# Patient Record
Sex: FEMALE | Race: WHITE | NOT HISPANIC OR LATINO | Employment: OTHER | URBAN - METROPOLITAN AREA
[De-identification: names, ages, dates, MRNs, and addresses within clinical notes are randomized per-mention and may not be internally consistent; named-entity substitution may affect disease eponyms.]

---

## 2017-01-06 ENCOUNTER — GENERIC CONVERSION - ENCOUNTER (OUTPATIENT)
Dept: OTHER | Facility: OTHER | Age: 47
End: 2017-01-06

## 2017-01-12 ENCOUNTER — HOSPITAL ENCOUNTER (OUTPATIENT)
Dept: RADIOLOGY | Facility: HOSPITAL | Age: 47
Discharge: HOME | End: 2017-01-12
Attending: INTERNAL MEDICINE
Payer: COMMERCIAL

## 2017-01-12 ENCOUNTER — GENERIC CONVERSION - ENCOUNTER (OUTPATIENT)
Dept: OTHER | Facility: OTHER | Age: 47
End: 2017-01-12

## 2017-01-12 DIAGNOSIS — R13.19 CONSTANT LOW-GRADE DYSPHAGIA: ICD-10-CM

## 2017-01-12 PROCEDURE — 74230 X-RAY XM SWLNG FUNCJ C+: CPT

## 2017-01-12 PROCEDURE — G8996 SWALLOW CURRENT STATUS: HCPCS

## 2017-01-12 PROCEDURE — G8997 SWALLOW GOAL STATUS: HCPCS

## 2017-01-12 PROCEDURE — G8998 SWALLOW D/C STATUS: HCPCS

## 2017-01-12 PROCEDURE — 92611 MOTION FLUOROSCOPY/SWALLOW: CPT

## 2017-01-30 ENCOUNTER — ALLSCRIPTS OFFICE VISIT (OUTPATIENT)
Dept: OTHER | Facility: OTHER | Age: 47
End: 2017-01-30

## 2017-02-16 ENCOUNTER — ALLSCRIPTS OFFICE VISIT (OUTPATIENT)
Dept: OTHER | Facility: OTHER | Age: 47
End: 2017-02-16

## 2017-02-17 ENCOUNTER — ALLSCRIPTS OFFICE VISIT (OUTPATIENT)
Dept: OTHER | Facility: OTHER | Age: 47
End: 2017-02-17

## 2017-02-17 LAB — HBA1C MFR BLD HPLC: 9.5 %

## 2017-02-20 ENCOUNTER — TRANSCRIBE ORDERS (OUTPATIENT)
Dept: LAB | Facility: HOSPITAL | Age: 47
End: 2017-02-20

## 2017-03-17 ENCOUNTER — ALLSCRIPTS OFFICE VISIT (OUTPATIENT)
Dept: OTHER | Facility: OTHER | Age: 47
End: 2017-03-17

## 2017-03-24 ENCOUNTER — ALLSCRIPTS OFFICE VISIT (OUTPATIENT)
Dept: RADIOLOGY | Facility: CLINIC | Age: 47
End: 2017-03-24
Payer: COMMERCIAL

## 2017-03-24 PROCEDURE — 77002 NEEDLE LOCALIZATION BY XRAY: CPT | Performed by: ANESTHESIOLOGY

## 2017-03-30 ENCOUNTER — ALLSCRIPTS OFFICE VISIT (OUTPATIENT)
Dept: OTHER | Facility: OTHER | Age: 47
End: 2017-03-30

## 2017-04-12 ENCOUNTER — GENERIC CONVERSION - ENCOUNTER (OUTPATIENT)
Dept: OTHER | Facility: OTHER | Age: 47
End: 2017-04-12

## 2017-04-18 ENCOUNTER — GENERIC CONVERSION - ENCOUNTER (OUTPATIENT)
Dept: OTHER | Facility: OTHER | Age: 47
End: 2017-04-18

## 2017-04-19 ENCOUNTER — ALLSCRIPTS OFFICE VISIT (OUTPATIENT)
Dept: OTHER | Facility: OTHER | Age: 47
End: 2017-04-19

## 2017-04-25 ENCOUNTER — GENERIC CONVERSION - ENCOUNTER (OUTPATIENT)
Dept: OTHER | Facility: OTHER | Age: 47
End: 2017-04-25

## 2017-05-09 ENCOUNTER — ALLSCRIPTS OFFICE VISIT (OUTPATIENT)
Dept: OTHER | Facility: OTHER | Age: 47
End: 2017-05-09

## 2017-05-12 ENCOUNTER — ALLSCRIPTS OFFICE VISIT (OUTPATIENT)
Dept: RADIOLOGY | Facility: CLINIC | Age: 47
End: 2017-05-12
Payer: COMMERCIAL

## 2017-05-25 ENCOUNTER — GENERIC CONVERSION - ENCOUNTER (OUTPATIENT)
Dept: OTHER | Facility: OTHER | Age: 47
End: 2017-05-25

## 2017-06-21 ENCOUNTER — GENERIC CONVERSION - ENCOUNTER (OUTPATIENT)
Dept: OTHER | Facility: OTHER | Age: 47
End: 2017-06-21

## 2017-06-21 ENCOUNTER — ALLSCRIPTS OFFICE VISIT (OUTPATIENT)
Dept: OTHER | Facility: OTHER | Age: 47
End: 2017-06-21

## 2017-06-21 DIAGNOSIS — E53.8 DEFICIENCY OF OTHER SPECIFIED B GROUP VITAMINS: ICD-10-CM

## 2017-06-21 DIAGNOSIS — R20.2 PARESTHESIA OF SKIN: ICD-10-CM

## 2017-06-21 DIAGNOSIS — E55.9 VITAMIN D DEFICIENCY: ICD-10-CM

## 2017-07-05 ENCOUNTER — ALLSCRIPTS OFFICE VISIT (OUTPATIENT)
Dept: OTHER | Facility: OTHER | Age: 47
End: 2017-07-05

## 2017-07-19 ENCOUNTER — GENERIC CONVERSION - ENCOUNTER (OUTPATIENT)
Dept: OTHER | Facility: OTHER | Age: 47
End: 2017-07-19

## 2017-07-19 ENCOUNTER — ALLSCRIPTS OFFICE VISIT (OUTPATIENT)
Dept: OTHER | Facility: OTHER | Age: 47
End: 2017-07-19

## 2017-07-25 ENCOUNTER — GENERIC CONVERSION - ENCOUNTER (OUTPATIENT)
Dept: OTHER | Facility: OTHER | Age: 47
End: 2017-07-25

## 2017-08-18 ENCOUNTER — ALLSCRIPTS OFFICE VISIT (OUTPATIENT)
Dept: RADIOLOGY | Facility: CLINIC | Age: 47
End: 2017-08-18
Payer: COMMERCIAL

## 2017-08-24 ENCOUNTER — APPOINTMENT (EMERGENCY)
Dept: RADIOLOGY | Facility: HOSPITAL | Age: 47
DRG: 438 | End: 2017-08-24
Payer: MEDICARE

## 2017-08-24 ENCOUNTER — HOSPITAL ENCOUNTER (INPATIENT)
Facility: HOSPITAL | Age: 47
LOS: 8 days | Discharge: HOME | DRG: 438 | End: 2017-09-02
Attending: EMERGENCY MEDICINE | Admitting: EMERGENCY MEDICINE
Payer: MEDICARE

## 2017-08-24 DIAGNOSIS — K29.70 GASTRITIS: ICD-10-CM

## 2017-08-24 DIAGNOSIS — E66.9 DIABETES MELLITUS TYPE 2 IN OBESE (HCC): Chronic | ICD-10-CM

## 2017-08-24 DIAGNOSIS — E11.10 DKA (DIABETIC KETOACIDOSES): Primary | ICD-10-CM

## 2017-08-24 DIAGNOSIS — K85.90 PANCREATITIS: ICD-10-CM

## 2017-08-24 DIAGNOSIS — E11.69 DIABETES MELLITUS TYPE 2 IN OBESE (HCC): Chronic | ICD-10-CM

## 2017-08-24 LAB
ACETONE SERPL-MCNC: ABNORMAL MG/DL
ALBUMIN SERPL BCP-MCNC: 4.1 G/DL (ref 3.5–5)
ALP SERPL-CCNC: 227 U/L (ref 46–116)
ALT SERPL W P-5'-P-CCNC: 42 U/L (ref 12–78)
ANION GAP BLD CALC-SCNC: 20 MMOL/L (ref 4–13)
ANION GAP SERPL CALCULATED.3IONS-SCNC: 21 MMOL/L (ref 4–13)
AST SERPL W P-5'-P-CCNC: 41 U/L (ref 5–45)
BACTERIA UR QL AUTO: NORMAL /HPF
BASE EXCESS BLDA CALC-SCNC: -11 MMOL/L (ref -2–3)
BASOPHILS # BLD AUTO: 0.04 THOUSANDS/ΜL (ref 0–0.1)
BASOPHILS NFR BLD AUTO: 0 % (ref 0–1)
BILIRUB SERPL-MCNC: 0.94 MG/DL (ref 0.2–1)
BILIRUB UR QL STRIP: NEGATIVE
BUN BLD-MCNC: 9 MG/DL (ref 5–25)
BUN SERPL-MCNC: 11 MG/DL (ref 5–25)
CA-I BLD-SCNC: 1.05 MMOL/L (ref 1.12–1.32)
CA-I BLD-SCNC: 1.19 MMOL/L (ref 1.12–1.32)
CALCIUM SERPL-MCNC: 9.5 MG/DL (ref 8.3–10.1)
CHLORIDE BLD-SCNC: 107 MMOL/L (ref 100–108)
CHLORIDE SERPL-SCNC: 97 MMOL/L (ref 100–108)
CLARITY UR: CLEAR
CO2 SERPL-SCNC: 13 MMOL/L (ref 21–32)
COLOR UR: YELLOW
COLOR, POC: NORMAL
CREAT BLD-MCNC: 0.3 MG/DL (ref 0.6–1.3)
CREAT SERPL-MCNC: 0.64 MG/DL (ref 0.6–1.3)
EOSINOPHIL # BLD AUTO: 0.02 THOUSAND/ΜL (ref 0–0.61)
EOSINOPHIL NFR BLD AUTO: 0 % (ref 0–6)
ERYTHROCYTE [DISTWIDTH] IN BLOOD BY AUTOMATED COUNT: 12.4 % (ref 11.6–15.1)
GFR SERPL CREATININE-BSD FRML MDRD: 107 ML/MIN/1.73SQ M
GFR SERPL CREATININE-BSD FRML MDRD: 138 ML/MIN/1.73SQ M
GLUCOSE SERPL-MCNC: 229 MG/DL (ref 65–140)
GLUCOSE SERPL-MCNC: 281 MG/DL (ref 65–140)
GLUCOSE SERPL-MCNC: 283 MG/DL (ref 65–140)
GLUCOSE SERPL-MCNC: 316 MG/DL (ref 65–140)
GLUCOSE SERPL-MCNC: 369 MG/DL (ref 65–140)
GLUCOSE SERPL-MCNC: 389 MG/DL (ref 65–140)
GLUCOSE UR STRIP-MCNC: ABNORMAL MG/DL
HCG UR QL: NEGATIVE
HCO3 BLDA-SCNC: 14.3 MMOL/L (ref 24–30)
HCT VFR BLD AUTO: 48.3 % (ref 34.8–46.1)
HCT VFR BLD CALC: 43 % (ref 34.8–46.1)
HCT VFR BLD CALC: 52 % (ref 34.8–46.1)
HGB BLD-MCNC: 17.1 G/DL (ref 11.5–15.4)
HGB BLDA-MCNC: 14.6 G/DL (ref 11.5–15.4)
HGB BLDA-MCNC: 17.7 G/DL (ref 11.5–15.4)
HGB UR QL STRIP.AUTO: ABNORMAL
HYALINE CASTS #/AREA URNS LPF: NORMAL /LPF
KETONES UR STRIP-MCNC: ABNORMAL MG/DL
LEUKOCYTE ESTERASE UR QL STRIP: NEGATIVE
LIPASE SERPL-CCNC: 3824 U/L (ref 73–393)
LYMPHOCYTES # BLD AUTO: 1.5 THOUSANDS/ΜL (ref 0.6–4.47)
LYMPHOCYTES NFR BLD AUTO: 10 % (ref 14–44)
MAGNESIUM SERPL-MCNC: 2.1 MG/DL (ref 1.6–2.6)
MCH RBC QN AUTO: 34.1 PG (ref 26.8–34.3)
MCHC RBC AUTO-ENTMCNC: 35.4 G/DL (ref 31.4–37.4)
MCV RBC AUTO: 96 FL (ref 82–98)
MONOCYTES # BLD AUTO: 0.83 THOUSAND/ΜL (ref 0.17–1.22)
MONOCYTES NFR BLD AUTO: 6 % (ref 4–12)
NEUTROPHILS # BLD AUTO: 12.18 THOUSANDS/ΜL (ref 1.85–7.62)
NEUTS SEG NFR BLD AUTO: 84 % (ref 43–75)
NITRITE UR QL STRIP: NEGATIVE
NON-SQ EPI CELLS URNS QL MICRO: NORMAL /HPF
NRBC BLD AUTO-RTO: 0 /100 WBCS
PCO2 BLD: 15 MMOL/L (ref 21–32)
PCO2 BLD: 15 MMOL/L (ref 21–32)
PCO2 BLD: 30.4 MM HG (ref 42–50)
PH BLD: 7.28 [PH] (ref 7.3–7.4)
PH UR STRIP.AUTO: 5 [PH] (ref 4.5–8)
PHOSPHATE SERPL-MCNC: 4.4 MG/DL (ref 2.7–4.5)
PLATELET # BLD AUTO: 281 THOUSANDS/UL (ref 149–390)
PMV BLD AUTO: 10.8 FL (ref 8.9–12.7)
PO2 BLD: 58 MM HG (ref 35–45)
POTASSIUM BLD-SCNC: 4.2 MMOL/L (ref 3.5–5.3)
POTASSIUM BLD-SCNC: 5.1 MMOL/L (ref 3.5–5.3)
POTASSIUM SERPL-SCNC: 3.8 MMOL/L (ref 3.5–5.3)
PROT SERPL-MCNC: 9 G/DL (ref 6.4–8.2)
PROT UR STRIP-MCNC: ABNORMAL MG/DL
RBC # BLD AUTO: 5.02 MILLION/UL (ref 3.81–5.12)
RBC #/AREA URNS AUTO: NORMAL /HPF
SAO2 % BLD FROM PO2: 86 % (ref 95–98)
SODIUM BLD-SCNC: 133 MMOL/L (ref 136–145)
SODIUM BLD-SCNC: 137 MMOL/L (ref 136–145)
SODIUM SERPL-SCNC: 131 MMOL/L (ref 136–145)
SP GR UR STRIP.AUTO: 1.01 (ref 1–1.03)
SPECIMEN SOURCE: ABNORMAL
SPECIMEN SOURCE: ABNORMAL
SPECIMEN SOURCE: NORMAL
TROPONIN I BLD-MCNC: 0 NG/ML (ref 0–0.08)
UROBILINOGEN UR QL STRIP.AUTO: 0.2 E.U./DL
WBC # BLD AUTO: 14.62 THOUSAND/UL (ref 4.31–10.16)
WBC #/AREA URNS AUTO: NORMAL /HPF

## 2017-08-24 PROCEDURE — 96367 TX/PROPH/DG ADDL SEQ IV INF: CPT

## 2017-08-24 PROCEDURE — 96365 THER/PROPH/DIAG IV INF INIT: CPT

## 2017-08-24 PROCEDURE — 81025 URINE PREGNANCY TEST: CPT | Performed by: EMERGENCY MEDICINE

## 2017-08-24 PROCEDURE — 96361 HYDRATE IV INFUSION ADD-ON: CPT

## 2017-08-24 PROCEDURE — 80048 BASIC METABOLIC PNL TOTAL CA: CPT | Performed by: EMERGENCY MEDICINE

## 2017-08-24 PROCEDURE — 82330 ASSAY OF CALCIUM: CPT

## 2017-08-24 PROCEDURE — 96376 TX/PRO/DX INJ SAME DRUG ADON: CPT

## 2017-08-24 PROCEDURE — 83690 ASSAY OF LIPASE: CPT | Performed by: EMERGENCY MEDICINE

## 2017-08-24 PROCEDURE — 96375 TX/PRO/DX INJ NEW DRUG ADDON: CPT

## 2017-08-24 PROCEDURE — 96368 THER/DIAG CONCURRENT INF: CPT

## 2017-08-24 PROCEDURE — 85025 COMPLETE CBC W/AUTO DIFF WBC: CPT | Performed by: EMERGENCY MEDICINE

## 2017-08-24 PROCEDURE — C9113 INJ PANTOPRAZOLE SODIUM, VIA: HCPCS | Performed by: EMERGENCY MEDICINE

## 2017-08-24 PROCEDURE — 71020 HB CHEST X-RAY 2VW FRONTAL&LATL: CPT

## 2017-08-24 PROCEDURE — 84132 ASSAY OF SERUM POTASSIUM: CPT

## 2017-08-24 PROCEDURE — 74177 CT ABD & PELVIS W/CONTRAST: CPT

## 2017-08-24 PROCEDURE — 84100 ASSAY OF PHOSPHORUS: CPT | Performed by: EMERGENCY MEDICINE

## 2017-08-24 PROCEDURE — 83735 ASSAY OF MAGNESIUM: CPT | Performed by: EMERGENCY MEDICINE

## 2017-08-24 PROCEDURE — 82948 REAGENT STRIP/BLOOD GLUCOSE: CPT

## 2017-08-24 PROCEDURE — 85014 HEMATOCRIT: CPT

## 2017-08-24 PROCEDURE — 82009 KETONE BODYS QUAL: CPT | Performed by: EMERGENCY MEDICINE

## 2017-08-24 PROCEDURE — 81002 URINALYSIS NONAUTO W/O SCOPE: CPT | Performed by: EMERGENCY MEDICINE

## 2017-08-24 PROCEDURE — 81001 URINALYSIS AUTO W/SCOPE: CPT

## 2017-08-24 PROCEDURE — 84484 ASSAY OF TROPONIN QUANT: CPT

## 2017-08-24 PROCEDURE — 36415 COLL VENOUS BLD VENIPUNCTURE: CPT

## 2017-08-24 PROCEDURE — 93005 ELECTROCARDIOGRAM TRACING: CPT | Performed by: EMERGENCY MEDICINE

## 2017-08-24 PROCEDURE — 84295 ASSAY OF SERUM SODIUM: CPT

## 2017-08-24 PROCEDURE — 96366 THER/PROPH/DIAG IV INF ADDON: CPT

## 2017-08-24 PROCEDURE — 80053 COMPREHEN METABOLIC PANEL: CPT | Performed by: EMERGENCY MEDICINE

## 2017-08-24 PROCEDURE — 82947 ASSAY GLUCOSE BLOOD QUANT: CPT

## 2017-08-24 PROCEDURE — 80047 BASIC METABLC PNL IONIZED CA: CPT

## 2017-08-24 PROCEDURE — 82803 BLOOD GASES ANY COMBINATION: CPT

## 2017-08-24 RX ORDER — SODIUM CHLORIDE 450 MG/100ML
1000 INJECTION, SOLUTION INTRAVENOUS CONTINUOUS
Status: DISCONTINUED | OUTPATIENT
Start: 2017-08-24 | End: 2017-08-24

## 2017-08-24 RX ORDER — POTASSIUM CHLORIDE 14.9 MG/ML
20 INJECTION INTRAVENOUS ONCE
Status: COMPLETED | OUTPATIENT
Start: 2017-08-24 | End: 2017-08-25

## 2017-08-24 RX ORDER — HYDROMORPHONE HCL 110MG/55ML
2 PATIENT CONTROLLED ANALGESIA SYRINGE INTRAVENOUS ONCE
Status: COMPLETED | OUTPATIENT
Start: 2017-08-24 | End: 2017-08-24

## 2017-08-24 RX ORDER — ONDANSETRON 2 MG/ML
4 INJECTION INTRAMUSCULAR; INTRAVENOUS ONCE
Status: COMPLETED | OUTPATIENT
Start: 2017-08-24 | End: 2017-08-24

## 2017-08-24 RX ORDER — DEXTROSE AND SODIUM CHLORIDE 5; .9 G/100ML; G/100ML
250 INJECTION, SOLUTION INTRAVENOUS CONTINUOUS
Status: DISCONTINUED | OUTPATIENT
Start: 2017-08-24 | End: 2017-08-24

## 2017-08-24 RX ORDER — SODIUM CHLORIDE 450 MG/100ML
500 INJECTION, SOLUTION INTRAVENOUS CONTINUOUS
Status: DISCONTINUED | OUTPATIENT
Start: 2017-08-24 | End: 2017-08-24

## 2017-08-24 RX ORDER — SODIUM CHLORIDE 450 MG/100ML
250 INJECTION, SOLUTION INTRAVENOUS CONTINUOUS
Status: DISCONTINUED | OUTPATIENT
Start: 2017-08-25 | End: 2017-08-24

## 2017-08-24 RX ORDER — METOCLOPRAMIDE HYDROCHLORIDE 5 MG/ML
10 INJECTION INTRAMUSCULAR; INTRAVENOUS ONCE
Status: COMPLETED | OUTPATIENT
Start: 2017-08-24 | End: 2017-08-24

## 2017-08-24 RX ORDER — DEXTROSE MONOHYDRATE, SODIUM CHLORIDE, SODIUM LACTATE, POTASSIUM CHLORIDE, CALCIUM CHLORIDE 5; 600; 310; 179; 20 G/100ML; MG/100ML; MG/100ML; MG/100ML; MG/100ML
125 INJECTION, SOLUTION INTRAVENOUS CONTINUOUS
Status: DISCONTINUED | OUTPATIENT
Start: 2017-08-24 | End: 2017-08-24 | Stop reason: SDUPTHER

## 2017-08-24 RX ORDER — POTASSIUM CHLORIDE 14.9 MG/ML
20 INJECTION INTRAVENOUS ONCE
Status: COMPLETED | OUTPATIENT
Start: 2017-08-24 | End: 2017-08-24

## 2017-08-24 RX ADMIN — POTASSIUM CHLORIDE 20 MEQ: 200 INJECTION, SOLUTION INTRAVENOUS at 21:04

## 2017-08-24 RX ADMIN — POTASSIUM CHLORIDE: 2 INJECTION, SOLUTION, CONCENTRATE INTRAVENOUS at 23:28

## 2017-08-24 RX ADMIN — DEXTROSE, SODIUM CHLORIDE, SODIUM LACTATE, POTASSIUM CHLORIDE, AND CALCIUM CHLORIDE 1000 ML: 5; .6; .31; .03; .02 INJECTION, SOLUTION INTRAVENOUS at 23:22

## 2017-08-24 RX ADMIN — SODIUM CHLORIDE 1000 ML: 0.9 INJECTION, SOLUTION INTRAVENOUS at 20:37

## 2017-08-24 RX ADMIN — SODIUM CHLORIDE 8 UNITS/HR: 9 INJECTION, SOLUTION INTRAVENOUS at 21:50

## 2017-08-24 RX ADMIN — HYDROMORPHONE HYDROCHLORIDE 2 MG: 2 INJECTION, SOLUTION INTRAMUSCULAR; INTRAVENOUS; SUBCUTANEOUS at 23:20

## 2017-08-24 RX ADMIN — HYDROMORPHONE HYDROCHLORIDE 0.5 MG: 1 INJECTION, SOLUTION INTRAMUSCULAR; INTRAVENOUS; SUBCUTANEOUS at 20:18

## 2017-08-24 RX ADMIN — POTASSIUM CHLORIDE 20 MEQ: 200 INJECTION, SOLUTION INTRAVENOUS at 23:42

## 2017-08-24 RX ADMIN — POTASSIUM CHLORIDE: 2 INJECTION, SOLUTION, CONCENTRATE INTRAVENOUS at 21:57

## 2017-08-24 RX ADMIN — METOCLOPRAMIDE 10 MG: 5 INJECTION, SOLUTION INTRAMUSCULAR; INTRAVENOUS at 23:06

## 2017-08-24 RX ADMIN — ONDANSETRON 4 MG: 2 INJECTION INTRAMUSCULAR; INTRAVENOUS at 20:18

## 2017-08-24 RX ADMIN — HYDROMORPHONE HYDROCHLORIDE 0.5 MG: 1 INJECTION, SOLUTION INTRAMUSCULAR; INTRAVENOUS; SUBCUTANEOUS at 21:07

## 2017-08-24 RX ADMIN — HYDROMORPHONE HYDROCHLORIDE 1 MG: 1 INJECTION, SOLUTION INTRAMUSCULAR; INTRAVENOUS; SUBCUTANEOUS at 20:43

## 2017-08-24 RX ADMIN — SODIUM CHLORIDE 80 MG: 9 INJECTION, SOLUTION INTRAVENOUS at 21:41

## 2017-08-24 RX ADMIN — IOHEXOL 100 ML: 350 INJECTION, SOLUTION INTRAVENOUS at 21:20

## 2017-08-24 RX ADMIN — HYDROMORPHONE HYDROCHLORIDE 1 MG: 1 INJECTION, SOLUTION INTRAMUSCULAR; INTRAVENOUS; SUBCUTANEOUS at 22:36

## 2017-08-24 RX ADMIN — ONDANSETRON 4 MG: 2 INJECTION INTRAMUSCULAR; INTRAVENOUS at 21:02

## 2017-08-24 RX ADMIN — SODIUM CHLORIDE 1000 ML: 0.9 INJECTION, SOLUTION INTRAVENOUS at 20:09

## 2017-08-25 PROBLEM — E11.10 DKA (DIABETIC KETOACIDOSES): Status: ACTIVE | Noted: 2017-08-25

## 2017-08-25 PROBLEM — IMO0002 CHRONIC MIGRAINE: Status: ACTIVE | Noted: 2017-08-25

## 2017-08-25 PROBLEM — Z98.890 HISTORY OF NISSEN FUNDOPLICATION: Status: ACTIVE | Noted: 2017-08-25

## 2017-08-25 PROBLEM — I10 HTN (HYPERTENSION): Status: ACTIVE | Noted: 2017-08-25

## 2017-08-25 PROBLEM — E87.2 HIGH ANION GAP METABOLIC ACIDOSIS: Status: ACTIVE | Noted: 2017-08-25

## 2017-08-25 PROBLEM — K21.9 GERD (GASTROESOPHAGEAL REFLUX DISEASE): Status: ACTIVE | Noted: 2017-08-25

## 2017-08-25 PROBLEM — M79.7 FIBROMYALGIA: Status: ACTIVE | Noted: 2017-08-25

## 2017-08-25 PROBLEM — E87.29 HIGH ANION GAP METABOLIC ACIDOSIS: Status: ACTIVE | Noted: 2017-08-25

## 2017-08-25 PROBLEM — E03.9 HYPOTHYROIDISM: Status: ACTIVE | Noted: 2017-08-25

## 2017-08-25 PROBLEM — K85.90 PANCREATITIS: Status: ACTIVE | Noted: 2017-08-25

## 2017-08-25 LAB
ALBUMIN SERPL BCP-MCNC: 3 G/DL (ref 3.5–5)
ALP SERPL-CCNC: 172 U/L (ref 46–116)
ALT SERPL W P-5'-P-CCNC: 27 U/L (ref 12–78)
ANION GAP BLD CALC-SCNC: 23 MMOL/L (ref 4–13)
ANION GAP SERPL CALCULATED.3IONS-SCNC: 10 MMOL/L (ref 4–13)
ANION GAP SERPL CALCULATED.3IONS-SCNC: 11 MMOL/L (ref 4–13)
ANION GAP SERPL CALCULATED.3IONS-SCNC: 13 MMOL/L (ref 4–13)
ANION GAP SERPL CALCULATED.3IONS-SCNC: 6 MMOL/L (ref 4–13)
AST SERPL W P-5'-P-CCNC: 30 U/L (ref 5–45)
ATRIAL RATE: 133 BPM
BASE EX.OXY STD BLDV CALC-SCNC: 88.4 % (ref 60–80)
BASE EXCESS BLDV CALC-SCNC: -9.8 MMOL/L
BASOPHILS # BLD AUTO: 0.02 THOUSANDS/ΜL (ref 0–0.1)
BASOPHILS NFR BLD AUTO: 0 % (ref 0–1)
BILIRUB SERPL-MCNC: 0.42 MG/DL (ref 0.2–1)
BUN BLD-MCNC: 6 MG/DL (ref 5–25)
BUN SERPL-MCNC: 2 MG/DL (ref 5–25)
BUN SERPL-MCNC: 4 MG/DL (ref 5–25)
BUN SERPL-MCNC: 6 MG/DL (ref 5–25)
BUN SERPL-MCNC: 7 MG/DL (ref 5–25)
CA-I BLD-SCNC: 1.17 MMOL/L (ref 1.12–1.32)
CALCIUM SERPL-MCNC: 8.1 MG/DL (ref 8.3–10.1)
CALCIUM SERPL-MCNC: 8.4 MG/DL (ref 8.3–10.1)
CALCIUM SERPL-MCNC: 8.5 MG/DL (ref 8.3–10.1)
CALCIUM SERPL-MCNC: 8.5 MG/DL (ref 8.3–10.1)
CHLORIDE BLD-SCNC: 105 MMOL/L (ref 100–108)
CHLORIDE SERPL-SCNC: 102 MMOL/L (ref 100–108)
CHLORIDE SERPL-SCNC: 105 MMOL/L (ref 100–108)
CHLORIDE SERPL-SCNC: 106 MMOL/L (ref 100–108)
CHLORIDE SERPL-SCNC: 106 MMOL/L (ref 100–108)
CO2 SERPL-SCNC: 15 MMOL/L (ref 21–32)
CO2 SERPL-SCNC: 18 MMOL/L (ref 21–32)
CO2 SERPL-SCNC: 20 MMOL/L (ref 21–32)
CO2 SERPL-SCNC: 24 MMOL/L (ref 21–32)
CREAT BLD-MCNC: 0.3 MG/DL (ref 0.6–1.3)
CREAT SERPL-MCNC: 0.36 MG/DL (ref 0.6–1.3)
CREAT SERPL-MCNC: 0.5 MG/DL (ref 0.6–1.3)
CREAT SERPL-MCNC: 0.56 MG/DL (ref 0.6–1.3)
CREAT SERPL-MCNC: 0.57 MG/DL (ref 0.6–1.3)
EOSINOPHIL # BLD AUTO: 0.03 THOUSAND/ΜL (ref 0–0.61)
EOSINOPHIL NFR BLD AUTO: 0 % (ref 0–6)
ERYTHROCYTE [DISTWIDTH] IN BLOOD BY AUTOMATED COUNT: 12.3 % (ref 11.6–15.1)
GFR SERPL CREATININE-BSD FRML MDRD: 112 ML/MIN/1.73SQ M
GFR SERPL CREATININE-BSD FRML MDRD: 112 ML/MIN/1.73SQ M
GFR SERPL CREATININE-BSD FRML MDRD: 116 ML/MIN/1.73SQ M
GFR SERPL CREATININE-BSD FRML MDRD: 130 ML/MIN/1.73SQ M
GFR SERPL CREATININE-BSD FRML MDRD: 138 ML/MIN/1.73SQ M
GLUCOSE SERPL-MCNC: 135 MG/DL (ref 65–140)
GLUCOSE SERPL-MCNC: 146 MG/DL (ref 65–140)
GLUCOSE SERPL-MCNC: 160 MG/DL (ref 65–140)
GLUCOSE SERPL-MCNC: 162 MG/DL (ref 65–140)
GLUCOSE SERPL-MCNC: 162 MG/DL (ref 65–140)
GLUCOSE SERPL-MCNC: 166 MG/DL (ref 65–140)
GLUCOSE SERPL-MCNC: 167 MG/DL (ref 65–140)
GLUCOSE SERPL-MCNC: 173 MG/DL (ref 65–140)
GLUCOSE SERPL-MCNC: 175 MG/DL (ref 65–140)
GLUCOSE SERPL-MCNC: 201 MG/DL (ref 65–140)
GLUCOSE SERPL-MCNC: 206 MG/DL (ref 65–140)
GLUCOSE SERPL-MCNC: 207 MG/DL (ref 65–140)
GLUCOSE SERPL-MCNC: 213 MG/DL (ref 65–140)
GLUCOSE SERPL-MCNC: 229 MG/DL (ref 65–140)
GLUCOSE SERPL-MCNC: 296 MG/DL (ref 65–140)
GLUCOSE SERPL-MCNC: 302 MG/DL (ref 65–140)
GLUCOSE SERPL-MCNC: 317 MG/DL (ref 65–140)
GLUCOSE SERPL-MCNC: 323 MG/DL (ref 65–140)
GLUCOSE SERPL-MCNC: 80 MG/DL (ref 65–140)
GLUCOSE SERPL-MCNC: 83 MG/DL (ref 65–140)
GLUCOSE SERPL-MCNC: 86 MG/DL (ref 65–140)
GLUCOSE SERPL-MCNC: 90 MG/DL (ref 65–140)
GLUCOSE SERPL-MCNC: 91 MG/DL (ref 65–140)
HCO3 BLDV-SCNC: 15.8 MMOL/L (ref 24–30)
HCT VFR BLD AUTO: 45.6 % (ref 34.8–46.1)
HCT VFR BLD CALC: 46 % (ref 34.8–46.1)
HGB BLD-MCNC: 15.4 G/DL (ref 11.5–15.4)
HGB BLDA-MCNC: 15.6 G/DL (ref 11.5–15.4)
LACTATE SERPL-SCNC: 1.4 MMOL/L (ref 0.5–2)
LDH SERPL-CCNC: 203 U/L (ref 81–234)
LIPASE SERPL-CCNC: 4188 U/L (ref 73–393)
LYMPHOCYTES # BLD AUTO: 1.32 THOUSANDS/ΜL (ref 0.6–4.47)
LYMPHOCYTES NFR BLD AUTO: 11 % (ref 14–44)
MAGNESIUM SERPL-MCNC: 1.5 MG/DL (ref 1.6–2.6)
MAGNESIUM SERPL-MCNC: 1.6 MG/DL (ref 1.6–2.6)
MAGNESIUM SERPL-MCNC: 1.8 MG/DL (ref 1.6–2.6)
MAGNESIUM SERPL-MCNC: 2.3 MG/DL (ref 1.6–2.6)
MCH RBC QN AUTO: 33.1 PG (ref 26.8–34.3)
MCHC RBC AUTO-ENTMCNC: 33.8 G/DL (ref 31.4–37.4)
MCV RBC AUTO: 98 FL (ref 82–98)
MONOCYTES # BLD AUTO: 0.7 THOUSAND/ΜL (ref 0.17–1.22)
MONOCYTES NFR BLD AUTO: 6 % (ref 4–12)
NEUTROPHILS # BLD AUTO: 10.52 THOUSANDS/ΜL (ref 1.85–7.62)
NEUTS SEG NFR BLD AUTO: 83 % (ref 43–75)
NRBC BLD AUTO-RTO: 0 /100 WBCS
O2 CT BLDV-SCNC: 19.4 ML/DL
P AXIS: 20 DEGREES
PCO2 BLD: 15 MMOL/L (ref 21–32)
PCO2 BLDV: 34.2 MM HG (ref 42–50)
PH BLDV: 7.28 [PH] (ref 7.3–7.4)
PHOSPHATE SERPL-MCNC: 0.8 MG/DL (ref 2.7–4.5)
PHOSPHATE SERPL-MCNC: 1.3 MG/DL (ref 2.7–4.5)
PHOSPHATE SERPL-MCNC: 2.3 MG/DL (ref 2.7–4.5)
PHOSPHATE SERPL-MCNC: 2.9 MG/DL (ref 2.7–4.5)
PLATELET # BLD AUTO: 196 THOUSANDS/UL (ref 149–390)
PLATELET # BLD AUTO: 224 THOUSANDS/UL (ref 149–390)
PMV BLD AUTO: 10.5 FL (ref 8.9–12.7)
PMV BLD AUTO: 11 FL (ref 8.9–12.7)
PO2 BLDV: 60.8 MM HG (ref 35–45)
POTASSIUM BLD-SCNC: 4.3 MMOL/L (ref 3.5–5.3)
POTASSIUM SERPL-SCNC: 3.3 MMOL/L (ref 3.5–5.3)
POTASSIUM SERPL-SCNC: 3.5 MMOL/L (ref 3.5–5.3)
POTASSIUM SERPL-SCNC: 4.1 MMOL/L (ref 3.5–5.3)
POTASSIUM SERPL-SCNC: 4.1 MMOL/L (ref 3.5–5.3)
PR INTERVAL: 128 MS
PROT SERPL-MCNC: 7.1 G/DL (ref 6.4–8.2)
QRS AXIS: 24 DEGREES
QRSD INTERVAL: 76 MS
QT INTERVAL: 312 MS
QTC INTERVAL: 464 MS
RBC # BLD AUTO: 4.65 MILLION/UL (ref 3.81–5.12)
SODIUM BLD-SCNC: 137 MMOL/L (ref 136–145)
SODIUM SERPL-SCNC: 133 MMOL/L (ref 136–145)
SODIUM SERPL-SCNC: 134 MMOL/L (ref 136–145)
SODIUM SERPL-SCNC: 134 MMOL/L (ref 136–145)
SODIUM SERPL-SCNC: 135 MMOL/L (ref 136–145)
SPECIMEN SOURCE: ABNORMAL
T WAVE AXIS: 70 DEGREES
TRIGL SERPL-MCNC: 163 MG/DL
VENTRICULAR RATE: 133 BPM
WBC # BLD AUTO: 12.63 THOUSAND/UL (ref 4.31–10.16)

## 2017-08-25 PROCEDURE — 84100 ASSAY OF PHOSPHORUS: CPT | Performed by: EMERGENCY MEDICINE

## 2017-08-25 PROCEDURE — 83735 ASSAY OF MAGNESIUM: CPT | Performed by: EMERGENCY MEDICINE

## 2017-08-25 PROCEDURE — 82805 BLOOD GASES W/O2 SATURATION: CPT | Performed by: EMERGENCY MEDICINE

## 2017-08-25 PROCEDURE — 82948 REAGENT STRIP/BLOOD GLUCOSE: CPT

## 2017-08-25 PROCEDURE — 99285 EMERGENCY DEPT VISIT HI MDM: CPT

## 2017-08-25 PROCEDURE — 83605 ASSAY OF LACTIC ACID: CPT | Performed by: EMERGENCY MEDICINE

## 2017-08-25 PROCEDURE — 83615 LACTATE (LD) (LDH) ENZYME: CPT | Performed by: INTERNAL MEDICINE

## 2017-08-25 PROCEDURE — 84100 ASSAY OF PHOSPHORUS: CPT | Performed by: INTERNAL MEDICINE

## 2017-08-25 PROCEDURE — 85025 COMPLETE CBC W/AUTO DIFF WBC: CPT | Performed by: EMERGENCY MEDICINE

## 2017-08-25 PROCEDURE — 83735 ASSAY OF MAGNESIUM: CPT | Performed by: INTERNAL MEDICINE

## 2017-08-25 PROCEDURE — 80048 BASIC METABOLIC PNL TOTAL CA: CPT | Performed by: EMERGENCY MEDICINE

## 2017-08-25 PROCEDURE — 84478 ASSAY OF TRIGLYCERIDES: CPT | Performed by: INTERNAL MEDICINE

## 2017-08-25 PROCEDURE — C9113 INJ PANTOPRAZOLE SODIUM, VIA: HCPCS | Performed by: EMERGENCY MEDICINE

## 2017-08-25 PROCEDURE — 85049 AUTOMATED PLATELET COUNT: CPT | Performed by: EMERGENCY MEDICINE

## 2017-08-25 PROCEDURE — C9113 INJ PANTOPRAZOLE SODIUM, VIA: HCPCS | Performed by: INTERNAL MEDICINE

## 2017-08-25 PROCEDURE — 80048 BASIC METABOLIC PNL TOTAL CA: CPT | Performed by: INTERNAL MEDICINE

## 2017-08-25 PROCEDURE — 80053 COMPREHEN METABOLIC PANEL: CPT | Performed by: EMERGENCY MEDICINE

## 2017-08-25 RX ORDER — POTASSIUM CHLORIDE 29.8 MG/ML
40 INJECTION INTRAVENOUS ONCE
Status: DISCONTINUED | OUTPATIENT
Start: 2017-08-25 | End: 2017-08-25 | Stop reason: CLARIF

## 2017-08-25 RX ORDER — TIZANIDINE 2 MG/1
2 TABLET ORAL EVERY 6 HOURS PRN
Status: DISCONTINUED | OUTPATIENT
Start: 2017-08-25 | End: 2017-09-02 | Stop reason: HOSPADM

## 2017-08-25 RX ORDER — SODIUM CHLORIDE 9 MG/ML
1000 INJECTION, SOLUTION INTRAVENOUS CONTINUOUS
Status: DISCONTINUED | OUTPATIENT
Start: 2017-08-25 | End: 2017-08-25

## 2017-08-25 RX ORDER — DEXTROSE AND SODIUM CHLORIDE 5; .9 G/100ML; G/100ML
250 INJECTION, SOLUTION INTRAVENOUS CONTINUOUS
Status: DISCONTINUED | OUTPATIENT
Start: 2017-08-25 | End: 2017-08-25

## 2017-08-25 RX ORDER — PANTOPRAZOLE SODIUM 40 MG/1
40 INJECTION, POWDER, FOR SOLUTION INTRAVENOUS DAILY
Status: DISCONTINUED | OUTPATIENT
Start: 2017-08-25 | End: 2017-08-25

## 2017-08-25 RX ORDER — PANTOPRAZOLE SODIUM 40 MG/1
40 INJECTION, POWDER, FOR SOLUTION INTRAVENOUS EVERY 12 HOURS SCHEDULED
Status: DISCONTINUED | OUTPATIENT
Start: 2017-08-25 | End: 2017-08-31 | Stop reason: CLARIF

## 2017-08-25 RX ORDER — DIVALPROEX SODIUM 500 MG/1
500 TABLET, DELAYED RELEASE ORAL DAILY
Status: DISCONTINUED | OUTPATIENT
Start: 2017-08-25 | End: 2017-09-02 | Stop reason: HOSPADM

## 2017-08-25 RX ORDER — OLANZAPINE 5 MG/1
5 TABLET, ORALLY DISINTEGRATING ORAL
Status: DISCONTINUED | OUTPATIENT
Start: 2017-08-25 | End: 2017-08-26

## 2017-08-25 RX ORDER — OLANZAPINE 5 MG/1
5 TABLET ORAL
Status: DISCONTINUED | OUTPATIENT
Start: 2017-08-25 | End: 2017-08-25 | Stop reason: SDUPTHER

## 2017-08-25 RX ORDER — FENTANYL CITRATE 50 UG/ML
50 INJECTION, SOLUTION INTRAMUSCULAR; INTRAVENOUS ONCE
Status: COMPLETED | OUTPATIENT
Start: 2017-08-25 | End: 2017-08-25

## 2017-08-25 RX ORDER — SODIUM CHLORIDE 9 MG/ML
250 INJECTION, SOLUTION INTRAVENOUS CONTINUOUS
Status: DISCONTINUED | OUTPATIENT
Start: 2017-08-25 | End: 2017-08-25

## 2017-08-25 RX ORDER — PREGABALIN 75 MG/1
150 CAPSULE ORAL
Status: DISCONTINUED | OUTPATIENT
Start: 2017-08-25 | End: 2017-08-25

## 2017-08-25 RX ORDER — ALPRAZOLAM 0.25 MG/1
0.25 TABLET ORAL 3 TIMES DAILY PRN
Status: DISCONTINUED | OUTPATIENT
Start: 2017-08-25 | End: 2017-09-02 | Stop reason: HOSPADM

## 2017-08-25 RX ORDER — POTASSIUM CHLORIDE 14.9 MG/ML
20 INJECTION INTRAVENOUS ONCE
Status: DISCONTINUED | OUTPATIENT
Start: 2017-08-25 | End: 2017-08-25 | Stop reason: CLARIF

## 2017-08-25 RX ORDER — TOPIRAMATE 100 MG/1
100 TABLET, FILM COATED ORAL 2 TIMES DAILY
Status: DISCONTINUED | OUTPATIENT
Start: 2017-08-25 | End: 2017-09-02 | Stop reason: HOSPADM

## 2017-08-25 RX ORDER — ONDANSETRON 2 MG/ML
4 INJECTION INTRAMUSCULAR; INTRAVENOUS EVERY 4 HOURS PRN
Status: DISCONTINUED | OUTPATIENT
Start: 2017-08-25 | End: 2017-09-02 | Stop reason: HOSPADM

## 2017-08-25 RX ORDER — VENLAFAXINE HYDROCHLORIDE 75 MG/1
150 CAPSULE, EXTENDED RELEASE ORAL
Status: DISCONTINUED | OUTPATIENT
Start: 2017-08-25 | End: 2017-09-02 | Stop reason: HOSPADM

## 2017-08-25 RX ORDER — LEVOTHYROXINE SODIUM 112 UG/1
112 TABLET ORAL DAILY
Status: DISCONTINUED | OUTPATIENT
Start: 2017-08-25 | End: 2017-09-02 | Stop reason: HOSPADM

## 2017-08-25 RX ORDER — LORAZEPAM 2 MG/ML
1 INJECTION INTRAMUSCULAR ONCE
Status: COMPLETED | OUTPATIENT
Start: 2017-08-25 | End: 2017-08-25

## 2017-08-25 RX ORDER — POTASSIUM CHLORIDE 14.9 MG/ML
20 INJECTION INTRAVENOUS
Status: COMPLETED | OUTPATIENT
Start: 2017-08-25 | End: 2017-08-25

## 2017-08-25 RX ORDER — ATORVASTATIN CALCIUM 40 MG/1
40 TABLET, FILM COATED ORAL DAILY
Status: DISCONTINUED | OUTPATIENT
Start: 2017-08-25 | End: 2017-09-02 | Stop reason: HOSPADM

## 2017-08-25 RX ORDER — ZOLPIDEM TARTRATE 5 MG/1
10 TABLET ORAL
Status: DISCONTINUED | OUTPATIENT
Start: 2017-08-25 | End: 2017-09-02 | Stop reason: HOSPADM

## 2017-08-25 RX ORDER — LOSARTAN POTASSIUM 50 MG/1
50 TABLET ORAL DAILY
Status: DISCONTINUED | OUTPATIENT
Start: 2017-08-25 | End: 2017-09-02 | Stop reason: HOSPADM

## 2017-08-25 RX ORDER — MAGNESIUM SULFATE HEPTAHYDRATE 40 MG/ML
4 INJECTION, SOLUTION INTRAVENOUS ONCE
Status: DISCONTINUED | OUTPATIENT
Start: 2017-08-25 | End: 2017-09-02 | Stop reason: HOSPADM

## 2017-08-25 RX ORDER — MAGNESIUM SULFATE HEPTAHYDRATE 40 MG/ML
2 INJECTION, SOLUTION INTRAVENOUS ONCE
Status: COMPLETED | OUTPATIENT
Start: 2017-08-25 | End: 2017-08-25

## 2017-08-25 RX ORDER — DEXTROSE AND SODIUM CHLORIDE 5; .9 G/100ML; G/100ML
125 INJECTION, SOLUTION INTRAVENOUS CONTINUOUS
Status: DISCONTINUED | OUTPATIENT
Start: 2017-08-25 | End: 2017-08-26

## 2017-08-25 RX ORDER — SODIUM CHLORIDE 9 MG/ML
500 INJECTION, SOLUTION INTRAVENOUS CONTINUOUS
Status: DISCONTINUED | OUTPATIENT
Start: 2017-08-25 | End: 2017-08-25

## 2017-08-25 RX ADMIN — HYDROMORPHONE HYDROCHLORIDE 1 MG: 1 INJECTION, SOLUTION INTRAMUSCULAR; INTRAVENOUS; SUBCUTANEOUS at 08:24

## 2017-08-25 RX ADMIN — PANTOPRAZOLE SODIUM 40 MG: 40 INJECTION, POWDER, FOR SOLUTION INTRAVENOUS at 08:24

## 2017-08-25 RX ADMIN — PANTOPRAZOLE SODIUM 40 MG: 40 INJECTION, POWDER, FOR SOLUTION INTRAVENOUS at 20:25

## 2017-08-25 RX ADMIN — OLANZAPINE 5 MG: 5 TABLET, ORALLY DISINTEGRATING ORAL at 21:47

## 2017-08-25 RX ADMIN — HYDROMORPHONE HYDROCHLORIDE 1 MG: 1 INJECTION, SOLUTION INTRAMUSCULAR; INTRAVENOUS; SUBCUTANEOUS at 03:52

## 2017-08-25 RX ADMIN — HYDROMORPHONE HYDROCHLORIDE 1 MG: 1 INJECTION, SOLUTION INTRAMUSCULAR; INTRAVENOUS; SUBCUTANEOUS at 16:30

## 2017-08-25 RX ADMIN — HYDROMORPHONE HYDROCHLORIDE 1 MG: 1 INJECTION, SOLUTION INTRAMUSCULAR; INTRAVENOUS; SUBCUTANEOUS at 20:25

## 2017-08-25 RX ADMIN — SODIUM CHLORIDE 12 UNITS/HR: 9 INJECTION, SOLUTION INTRAVENOUS at 01:48

## 2017-08-25 RX ADMIN — FENTANYL CITRATE 50 MCG: 50 INJECTION INTRAMUSCULAR; INTRAVENOUS at 00:50

## 2017-08-25 RX ADMIN — VENLAFAXINE HYDROCHLORIDE 150 MG: 75 CAPSULE, EXTENDED RELEASE ORAL at 21:07

## 2017-08-25 RX ADMIN — SODIUM CHLORIDE 12 UNITS/HR: 9 INJECTION, SOLUTION INTRAVENOUS at 08:19

## 2017-08-25 RX ADMIN — POTASSIUM CHLORIDE 20 MEQ: 200 INJECTION, SOLUTION INTRAVENOUS at 21:05

## 2017-08-25 RX ADMIN — LEVOTHYROXINE SODIUM 112 MCG: 112 TABLET ORAL at 20:02

## 2017-08-25 RX ADMIN — SODIUM CHLORIDE 1.5 UNITS/HR: 9 INJECTION, SOLUTION INTRAVENOUS at 11:08

## 2017-08-25 RX ADMIN — DEXTROSE AND SODIUM CHLORIDE 125 ML/HR: 5; .9 INJECTION, SOLUTION INTRAVENOUS at 20:09

## 2017-08-25 RX ADMIN — ONDANSETRON 4 MG: 2 INJECTION INTRAMUSCULAR; INTRAVENOUS at 06:10

## 2017-08-25 RX ADMIN — ONDANSETRON 4 MG: 2 INJECTION INTRAMUSCULAR; INTRAVENOUS at 14:23

## 2017-08-25 RX ADMIN — LOSARTAN POTASSIUM 50 MG: 50 TABLET, FILM COATED ORAL at 18:39

## 2017-08-25 RX ADMIN — DEXTROSE AND SODIUM CHLORIDE 250 ML/HR: 5; .9 INJECTION, SOLUTION INTRAVENOUS at 02:18

## 2017-08-25 RX ADMIN — MAGNESIUM SULFATE HEPTAHYDRATE 2 G: 40 INJECTION, SOLUTION INTRAVENOUS at 02:01

## 2017-08-25 RX ADMIN — POTASSIUM PHOSPHATE, MONOBASIC AND POTASSIUM PHOSPHATE, DIBASIC 30 MMOL: 224; 236 INJECTION, SOLUTION INTRAVENOUS at 10:12

## 2017-08-25 RX ADMIN — SODIUM CHLORIDE 6 UNITS/HR: 9 INJECTION, SOLUTION INTRAVENOUS at 23:37

## 2017-08-25 RX ADMIN — ZOLPIDEM TARTRATE 10 MG: 5 TABLET, FILM COATED ORAL at 21:47

## 2017-08-25 RX ADMIN — DEXTROSE AND SODIUM CHLORIDE 125 ML/HR: 5; .9 INJECTION, SOLUTION INTRAVENOUS at 08:20

## 2017-08-25 RX ADMIN — HYDROMORPHONE HYDROCHLORIDE 1 MG: 1 INJECTION, SOLUTION INTRAMUSCULAR; INTRAVENOUS; SUBCUTANEOUS at 11:09

## 2017-08-25 RX ADMIN — LORAZEPAM 1 MG: 2 INJECTION INTRAMUSCULAR; INTRAVENOUS at 01:53

## 2017-08-25 RX ADMIN — ALPRAZOLAM 0.25 MG: 0.25 TABLET ORAL at 20:02

## 2017-08-25 RX ADMIN — DIVALPROEX SODIUM 500 MG: 250 TABLET, DELAYED RELEASE ORAL at 20:25

## 2017-08-25 RX ADMIN — POTASSIUM CHLORIDE 20 MEQ: 200 INJECTION, SOLUTION INTRAVENOUS at 18:36

## 2017-08-25 RX ADMIN — TOPIRAMATE 100 MG: 100 TABLET, FILM COATED ORAL at 18:45

## 2017-08-25 RX ADMIN — METOPROLOL TARTRATE 25 MG: 25 TABLET ORAL at 18:39

## 2017-08-25 RX ADMIN — DEXTROSE AND SODIUM CHLORIDE 250 ML/HR: 5; .9 INJECTION, SOLUTION INTRAVENOUS at 06:09

## 2017-08-25 RX ADMIN — OLANZAPINE 5 MG: 5 TABLET, ORALLY DISINTEGRATING ORAL at 02:02

## 2017-08-25 RX ADMIN — ENOXAPARIN SODIUM 40 MG: 40 INJECTION SUBCUTANEOUS at 08:24

## 2017-08-25 RX ADMIN — HYDROMORPHONE HYDROCHLORIDE 1 MG: 1 INJECTION, SOLUTION INTRAMUSCULAR; INTRAVENOUS; SUBCUTANEOUS at 14:23

## 2017-08-25 RX ADMIN — HYDROMORPHONE HYDROCHLORIDE 1 MG: 1 INJECTION, SOLUTION INTRAMUSCULAR; INTRAVENOUS; SUBCUTANEOUS at 01:52

## 2017-08-25 RX ADMIN — ONDANSETRON 4 MG: 2 INJECTION INTRAMUSCULAR; INTRAVENOUS at 01:52

## 2017-08-25 RX ADMIN — HYDROMORPHONE HYDROCHLORIDE 1 MG: 1 INJECTION, SOLUTION INTRAMUSCULAR; INTRAVENOUS; SUBCUTANEOUS at 18:36

## 2017-08-25 RX ADMIN — HYDROMORPHONE HYDROCHLORIDE 1 MG: 1 INJECTION, SOLUTION INTRAMUSCULAR; INTRAVENOUS; SUBCUTANEOUS at 06:04

## 2017-08-25 RX ADMIN — ENOXAPARIN SODIUM 40 MG: 40 INJECTION SUBCUTANEOUS at 18:37

## 2017-08-26 ENCOUNTER — APPOINTMENT (INPATIENT)
Dept: RADIOLOGY | Facility: HOSPITAL | Age: 47
DRG: 438 | End: 2017-08-26
Payer: MEDICARE

## 2017-08-26 PROBLEM — E11.10 DKA (DIABETIC KETOACIDOSES): Status: RESOLVED | Noted: 2017-08-25 | Resolved: 2017-08-26

## 2017-08-26 PROBLEM — E87.2 HIGH ANION GAP METABOLIC ACIDOSIS: Status: RESOLVED | Noted: 2017-08-25 | Resolved: 2017-08-26

## 2017-08-26 PROBLEM — E83.39 HYPOPHOSPHATEMIA: Status: ACTIVE | Noted: 2017-08-26

## 2017-08-26 PROBLEM — E87.29 HIGH ANION GAP METABOLIC ACIDOSIS: Status: RESOLVED | Noted: 2017-08-25 | Resolved: 2017-08-26

## 2017-08-26 LAB
ANION GAP SERPL CALCULATED.3IONS-SCNC: 11 MMOL/L (ref 4–13)
ANION GAP SERPL CALCULATED.3IONS-SCNC: 8 MMOL/L (ref 4–13)
BUN SERPL-MCNC: 3 MG/DL (ref 5–25)
BUN SERPL-MCNC: 4 MG/DL (ref 5–25)
CALCIUM SERPL-MCNC: 9 MG/DL (ref 8.3–10.1)
CALCIUM SERPL-MCNC: 9.1 MG/DL (ref 8.3–10.1)
CHLORIDE SERPL-SCNC: 105 MMOL/L (ref 100–108)
CHLORIDE SERPL-SCNC: 107 MMOL/L (ref 100–108)
CO2 SERPL-SCNC: 21 MMOL/L (ref 21–32)
CO2 SERPL-SCNC: 23 MMOL/L (ref 21–32)
CREAT SERPL-MCNC: 0.45 MG/DL (ref 0.6–1.3)
CREAT SERPL-MCNC: 0.48 MG/DL (ref 0.6–1.3)
ERYTHROCYTE [DISTWIDTH] IN BLOOD BY AUTOMATED COUNT: 12.3 % (ref 11.6–15.1)
GFR SERPL CREATININE-BSD FRML MDRD: 118 ML/MIN/1.73SQ M
GFR SERPL CREATININE-BSD FRML MDRD: 121 ML/MIN/1.73SQ M
GLUCOSE SERPL-MCNC: 122 MG/DL (ref 65–140)
GLUCOSE SERPL-MCNC: 125 MG/DL (ref 65–140)
GLUCOSE SERPL-MCNC: 129 MG/DL (ref 65–140)
GLUCOSE SERPL-MCNC: 131 MG/DL (ref 65–140)
GLUCOSE SERPL-MCNC: 135 MG/DL (ref 65–140)
GLUCOSE SERPL-MCNC: 142 MG/DL (ref 65–140)
GLUCOSE SERPL-MCNC: 170 MG/DL (ref 65–140)
GLUCOSE SERPL-MCNC: 177 MG/DL (ref 65–140)
GLUCOSE SERPL-MCNC: 189 MG/DL (ref 65–140)
GLUCOSE SERPL-MCNC: 195 MG/DL (ref 65–140)
GLUCOSE SERPL-MCNC: 200 MG/DL (ref 65–140)
GLUCOSE SERPL-MCNC: 208 MG/DL (ref 65–140)
GLUCOSE SERPL-MCNC: 260 MG/DL (ref 65–140)
GLUCOSE SERPL-MCNC: 74 MG/DL (ref 65–140)
HCT VFR BLD AUTO: 45.2 % (ref 34.8–46.1)
HGB BLD-MCNC: 15.3 G/DL (ref 11.5–15.4)
LIPASE SERPL-CCNC: 176 U/L (ref 73–393)
MAGNESIUM SERPL-MCNC: 1.9 MG/DL (ref 1.6–2.6)
MCH RBC QN AUTO: 32.9 PG (ref 26.8–34.3)
MCHC RBC AUTO-ENTMCNC: 33.8 G/DL (ref 31.4–37.4)
MCV RBC AUTO: 97 FL (ref 82–98)
PHOSPHATE SERPL-MCNC: 1.4 MG/DL (ref 2.7–4.5)
PHOSPHATE SERPL-MCNC: 3.4 MG/DL (ref 2.7–4.5)
PLATELET # BLD AUTO: 232 THOUSANDS/UL (ref 149–390)
PMV BLD AUTO: 11.4 FL (ref 8.9–12.7)
POTASSIUM SERPL-SCNC: 3.3 MMOL/L (ref 3.5–5.3)
POTASSIUM SERPL-SCNC: 3.5 MMOL/L (ref 3.5–5.3)
RBC # BLD AUTO: 4.65 MILLION/UL (ref 3.81–5.12)
SODIUM SERPL-SCNC: 136 MMOL/L (ref 136–145)
SODIUM SERPL-SCNC: 139 MMOL/L (ref 136–145)
WBC # BLD AUTO: 12.51 THOUSAND/UL (ref 4.31–10.16)

## 2017-08-26 PROCEDURE — 74000 HB X-RAY EXAM OF ABDOMEN (SINGLE ANTEROPOSTERIOR VIEW): CPT

## 2017-08-26 PROCEDURE — 84100 ASSAY OF PHOSPHORUS: CPT | Performed by: INTERNAL MEDICINE

## 2017-08-26 PROCEDURE — 83690 ASSAY OF LIPASE: CPT | Performed by: NURSE PRACTITIONER

## 2017-08-26 PROCEDURE — 80048 BASIC METABOLIC PNL TOTAL CA: CPT | Performed by: NURSE PRACTITIONER

## 2017-08-26 PROCEDURE — 80048 BASIC METABOLIC PNL TOTAL CA: CPT | Performed by: INTERNAL MEDICINE

## 2017-08-26 PROCEDURE — 84100 ASSAY OF PHOSPHORUS: CPT | Performed by: NURSE PRACTITIONER

## 2017-08-26 PROCEDURE — 82948 REAGENT STRIP/BLOOD GLUCOSE: CPT

## 2017-08-26 PROCEDURE — 83735 ASSAY OF MAGNESIUM: CPT | Performed by: INTERNAL MEDICINE

## 2017-08-26 PROCEDURE — 85027 COMPLETE CBC AUTOMATED: CPT | Performed by: INTERNAL MEDICINE

## 2017-08-26 PROCEDURE — C9113 INJ PANTOPRAZOLE SODIUM, VIA: HCPCS | Performed by: INTERNAL MEDICINE

## 2017-08-26 RX ORDER — OXYCODONE HYDROCHLORIDE 10 MG/1
10 TABLET ORAL EVERY 6 HOURS PRN
Status: DISCONTINUED | OUTPATIENT
Start: 2017-08-26 | End: 2017-08-27

## 2017-08-26 RX ORDER — SODIUM CHLORIDE, SODIUM GLUCONATE, SODIUM ACETATE, POTASSIUM CHLORIDE, MAGNESIUM CHLORIDE, SODIUM PHOSPHATE, DIBASIC, AND POTASSIUM PHOSPHATE .53; .5; .37; .037; .03; .012; .00082 G/100ML; G/100ML; G/100ML; G/100ML; G/100ML; G/100ML; G/100ML
100 INJECTION, SOLUTION INTRAVENOUS CONTINUOUS
Status: DISCONTINUED | OUTPATIENT
Start: 2017-08-26 | End: 2017-08-28

## 2017-08-26 RX ORDER — MAGNESIUM SULFATE HEPTAHYDRATE 40 MG/ML
2 INJECTION, SOLUTION INTRAVENOUS ONCE
Status: COMPLETED | OUTPATIENT
Start: 2017-08-26 | End: 2017-08-26

## 2017-08-26 RX ORDER — POTASSIUM CHLORIDE 20MEQ/15ML
40 LIQUID (ML) ORAL ONCE
Status: COMPLETED | OUTPATIENT
Start: 2017-08-26 | End: 2017-08-26

## 2017-08-26 RX ORDER — POTASSIUM CHLORIDE 14.9 MG/ML
20 INJECTION INTRAVENOUS ONCE
Status: COMPLETED | OUTPATIENT
Start: 2017-08-26 | End: 2017-08-26

## 2017-08-26 RX ADMIN — LEVOTHYROXINE SODIUM 112 MCG: 112 TABLET ORAL at 08:01

## 2017-08-26 RX ADMIN — POTASSIUM CHLORIDE 40 MEQ: 20 SOLUTION ORAL at 19:13

## 2017-08-26 RX ADMIN — POTASSIUM CHLORIDE 20 MEQ: 200 INJECTION, SOLUTION INTRAVENOUS at 08:01

## 2017-08-26 RX ADMIN — LOSARTAN POTASSIUM 50 MG: 50 TABLET, FILM COATED ORAL at 08:01

## 2017-08-26 RX ADMIN — HYDROMORPHONE HYDROCHLORIDE 1 MG: 1 INJECTION, SOLUTION INTRAMUSCULAR; INTRAVENOUS; SUBCUTANEOUS at 08:01

## 2017-08-26 RX ADMIN — POTASSIUM PHOSPHATE, MONOBASIC AND POTASSIUM PHOSPHATE, DIBASIC 30 MMOL: 224; 236 INJECTION, SOLUTION INTRAVENOUS at 08:01

## 2017-08-26 RX ADMIN — PANTOPRAZOLE SODIUM 40 MG: 40 INJECTION, POWDER, FOR SOLUTION INTRAVENOUS at 08:01

## 2017-08-26 RX ADMIN — ONDANSETRON 4 MG: 2 INJECTION INTRAMUSCULAR; INTRAVENOUS at 04:13

## 2017-08-26 RX ADMIN — HYDROMORPHONE HYDROCHLORIDE 1 MG: 1 INJECTION, SOLUTION INTRAMUSCULAR; INTRAVENOUS; SUBCUTANEOUS at 22:05

## 2017-08-26 RX ADMIN — ONDANSETRON 4 MG: 2 INJECTION INTRAMUSCULAR; INTRAVENOUS at 08:00

## 2017-08-26 RX ADMIN — ONDANSETRON 4 MG: 2 INJECTION INTRAMUSCULAR; INTRAVENOUS at 20:10

## 2017-08-26 RX ADMIN — SODIUM CHLORIDE 4 UNITS/HR: 9 INJECTION, SOLUTION INTRAVENOUS at 17:03

## 2017-08-26 RX ADMIN — HYDROMORPHONE HYDROCHLORIDE 1 MG: 1 INJECTION, SOLUTION INTRAMUSCULAR; INTRAVENOUS; SUBCUTANEOUS at 14:26

## 2017-08-26 RX ADMIN — TOPIRAMATE 100 MG: 100 TABLET, FILM COATED ORAL at 10:13

## 2017-08-26 RX ADMIN — ATORVASTATIN CALCIUM 40 MG: 40 TABLET, FILM COATED ORAL at 08:01

## 2017-08-26 RX ADMIN — TOPIRAMATE 100 MG: 100 TABLET, FILM COATED ORAL at 17:03

## 2017-08-26 RX ADMIN — ENOXAPARIN SODIUM 40 MG: 40 INJECTION SUBCUTANEOUS at 08:00

## 2017-08-26 RX ADMIN — SODIUM CHLORIDE, SODIUM GLUCONATE, SODIUM ACETATE, POTASSIUM CHLORIDE, MAGNESIUM CHLORIDE, SODIUM PHOSPHATE, DIBASIC, AND POTASSIUM PHOSPHATE 100 ML/HR: .53; .5; .37; .037; .03; .012; .00082 INJECTION, SOLUTION INTRAVENOUS at 10:13

## 2017-08-26 RX ADMIN — DEXTROSE AND SODIUM CHLORIDE 125 ML/HR: 5; .9 INJECTION, SOLUTION INTRAVENOUS at 04:01

## 2017-08-26 RX ADMIN — VENLAFAXINE HYDROCHLORIDE 150 MG: 75 CAPSULE, EXTENDED RELEASE ORAL at 22:01

## 2017-08-26 RX ADMIN — ZOLPIDEM TARTRATE 10 MG: 5 TABLET, FILM COATED ORAL at 22:01

## 2017-08-26 RX ADMIN — DIVALPROEX SODIUM 500 MG: 250 TABLET, DELAYED RELEASE ORAL at 20:11

## 2017-08-26 RX ADMIN — ENOXAPARIN SODIUM 40 MG: 40 INJECTION SUBCUTANEOUS at 17:03

## 2017-08-26 RX ADMIN — PANTOPRAZOLE SODIUM 40 MG: 40 INJECTION, POWDER, FOR SOLUTION INTRAVENOUS at 20:11

## 2017-08-26 RX ADMIN — OXYCODONE HYDROCHLORIDE 10 MG: 10 TABLET ORAL at 17:17

## 2017-08-26 RX ADMIN — MAGNESIUM SULFATE HEPTAHYDRATE 2 G: 40 INJECTION, SOLUTION INTRAVENOUS at 08:01

## 2017-08-26 RX ADMIN — HYDROMORPHONE HYDROCHLORIDE 1 MG: 1 INJECTION, SOLUTION INTRAMUSCULAR; INTRAVENOUS; SUBCUTANEOUS at 19:13

## 2017-08-26 RX ADMIN — SODIUM CHLORIDE, SODIUM GLUCONATE, SODIUM ACETATE, POTASSIUM CHLORIDE, MAGNESIUM CHLORIDE, SODIUM PHOSPHATE, DIBASIC, AND POTASSIUM PHOSPHATE 100 ML/HR: .53; .5; .37; .037; .03; .012; .00082 INJECTION, SOLUTION INTRAVENOUS at 22:02

## 2017-08-26 RX ADMIN — METOPROLOL TARTRATE 25 MG: 25 TABLET ORAL at 08:01

## 2017-08-26 RX ADMIN — HYDROMORPHONE HYDROCHLORIDE 1 MG: 1 INJECTION, SOLUTION INTRAMUSCULAR; INTRAVENOUS; SUBCUTANEOUS at 04:01

## 2017-08-26 RX ADMIN — OXYCODONE HYDROCHLORIDE 10 MG: 10 TABLET ORAL at 10:22

## 2017-08-27 LAB
ANION GAP SERPL CALCULATED.3IONS-SCNC: 9 MMOL/L (ref 4–13)
BUN SERPL-MCNC: 4 MG/DL (ref 5–25)
CA-I BLD-SCNC: 1.15 MMOL/L (ref 1.12–1.32)
CALCIUM SERPL-MCNC: 9 MG/DL (ref 8.3–10.1)
CHLORIDE SERPL-SCNC: 105 MMOL/L (ref 100–108)
CO2 SERPL-SCNC: 23 MMOL/L (ref 21–32)
CREAT SERPL-MCNC: 0.39 MG/DL (ref 0.6–1.3)
GFR SERPL CREATININE-BSD FRML MDRD: 126 ML/MIN/1.73SQ M
GLUCOSE SERPL-MCNC: 102 MG/DL (ref 65–140)
GLUCOSE SERPL-MCNC: 105 MG/DL (ref 65–140)
GLUCOSE SERPL-MCNC: 118 MG/DL (ref 65–140)
GLUCOSE SERPL-MCNC: 130 MG/DL (ref 65–140)
GLUCOSE SERPL-MCNC: 132 MG/DL (ref 65–140)
GLUCOSE SERPL-MCNC: 135 MG/DL (ref 65–140)
GLUCOSE SERPL-MCNC: 136 MG/DL (ref 65–140)
GLUCOSE SERPL-MCNC: 178 MG/DL (ref 65–140)
GLUCOSE SERPL-MCNC: 184 MG/DL (ref 65–140)
GLUCOSE SERPL-MCNC: 193 MG/DL (ref 65–140)
GLUCOSE SERPL-MCNC: 207 MG/DL (ref 65–140)
LIPASE SERPL-CCNC: 130 U/L (ref 73–393)
MAGNESIUM SERPL-MCNC: 2 MG/DL (ref 1.6–2.6)
PHOSPHATE SERPL-MCNC: 2.7 MG/DL (ref 2.7–4.5)
POTASSIUM SERPL-SCNC: 3.4 MMOL/L (ref 3.5–5.3)
SODIUM SERPL-SCNC: 137 MMOL/L (ref 136–145)

## 2017-08-27 PROCEDURE — 82948 REAGENT STRIP/BLOOD GLUCOSE: CPT

## 2017-08-27 PROCEDURE — 80048 BASIC METABOLIC PNL TOTAL CA: CPT | Performed by: NURSE PRACTITIONER

## 2017-08-27 PROCEDURE — 83690 ASSAY OF LIPASE: CPT | Performed by: NURSE PRACTITIONER

## 2017-08-27 PROCEDURE — 84100 ASSAY OF PHOSPHORUS: CPT | Performed by: NURSE PRACTITIONER

## 2017-08-27 PROCEDURE — C9113 INJ PANTOPRAZOLE SODIUM, VIA: HCPCS | Performed by: INTERNAL MEDICINE

## 2017-08-27 PROCEDURE — 82330 ASSAY OF CALCIUM: CPT | Performed by: NURSE PRACTITIONER

## 2017-08-27 PROCEDURE — 83735 ASSAY OF MAGNESIUM: CPT | Performed by: NURSE PRACTITIONER

## 2017-08-27 RX ORDER — POTASSIUM CHLORIDE 14.9 MG/ML
20 INJECTION INTRAVENOUS
Status: COMPLETED | OUTPATIENT
Start: 2017-08-27 | End: 2017-08-27

## 2017-08-27 RX ORDER — INSULIN GLARGINE 100 [IU]/ML
INJECTION, SOLUTION SUBCUTANEOUS
Status: COMPLETED
Start: 2017-08-27 | End: 2017-08-27

## 2017-08-27 RX ORDER — INSULIN GLARGINE 100 [IU]/ML
15 INJECTION, SOLUTION SUBCUTANEOUS
Status: DISCONTINUED | OUTPATIENT
Start: 2017-08-27 | End: 2017-08-27

## 2017-08-27 RX ORDER — OXYCODONE HYDROCHLORIDE 10 MG/1
10 TABLET ORAL EVERY 4 HOURS PRN
Status: DISCONTINUED | OUTPATIENT
Start: 2017-08-27 | End: 2017-08-28

## 2017-08-27 RX ORDER — INSULIN GLARGINE 100 [IU]/ML
15 INJECTION, SOLUTION SUBCUTANEOUS
Status: DISCONTINUED | OUTPATIENT
Start: 2017-08-27 | End: 2017-08-28

## 2017-08-27 RX ORDER — GABAPENTIN 400 MG/1
400 CAPSULE ORAL 3 TIMES DAILY
Status: DISCONTINUED | OUTPATIENT
Start: 2017-08-27 | End: 2017-09-02 | Stop reason: HOSPADM

## 2017-08-27 RX ADMIN — HYDROMORPHONE HYDROCHLORIDE 1 MG: 1 INJECTION, SOLUTION INTRAMUSCULAR; INTRAVENOUS; SUBCUTANEOUS at 06:02

## 2017-08-27 RX ADMIN — POTASSIUM CHLORIDE 20 MEQ: 200 INJECTION, SOLUTION INTRAVENOUS at 08:20

## 2017-08-27 RX ADMIN — LOSARTAN POTASSIUM 50 MG: 50 TABLET, FILM COATED ORAL at 08:20

## 2017-08-27 RX ADMIN — DIVALPROEX SODIUM 500 MG: 250 TABLET, DELAYED RELEASE ORAL at 21:20

## 2017-08-27 RX ADMIN — PANTOPRAZOLE SODIUM 40 MG: 40 INJECTION, POWDER, FOR SOLUTION INTRAVENOUS at 08:20

## 2017-08-27 RX ADMIN — OXYCODONE HYDROCHLORIDE 10 MG: 10 TABLET ORAL at 17:19

## 2017-08-27 RX ADMIN — INSULIN GLARGINE 15 UNITS: 100 INJECTION, SOLUTION SUBCUTANEOUS at 17:26

## 2017-08-27 RX ADMIN — GABAPENTIN 400 MG: 400 CAPSULE ORAL at 21:11

## 2017-08-27 RX ADMIN — ENOXAPARIN SODIUM 40 MG: 40 INJECTION SUBCUTANEOUS at 08:20

## 2017-08-27 RX ADMIN — INSULIN LISPRO 1 UNITS: 100 INJECTION, SOLUTION INTRAVENOUS; SUBCUTANEOUS at 17:34

## 2017-08-27 RX ADMIN — PANTOPRAZOLE SODIUM 40 MG: 40 INJECTION, POWDER, FOR SOLUTION INTRAVENOUS at 21:11

## 2017-08-27 RX ADMIN — ONDANSETRON 4 MG: 2 INJECTION INTRAMUSCULAR; INTRAVENOUS at 21:16

## 2017-08-27 RX ADMIN — HYDROMORPHONE HYDROCHLORIDE 1 MG: 1 INJECTION, SOLUTION INTRAMUSCULAR; INTRAVENOUS; SUBCUTANEOUS at 03:07

## 2017-08-27 RX ADMIN — ATORVASTATIN CALCIUM 40 MG: 40 TABLET, FILM COATED ORAL at 08:20

## 2017-08-27 RX ADMIN — LEVOTHYROXINE SODIUM 112 MCG: 112 TABLET ORAL at 08:20

## 2017-08-27 RX ADMIN — ENOXAPARIN SODIUM 40 MG: 40 INJECTION SUBCUTANEOUS at 17:19

## 2017-08-27 RX ADMIN — VENLAFAXINE HYDROCHLORIDE 150 MG: 75 CAPSULE, EXTENDED RELEASE ORAL at 21:11

## 2017-08-27 RX ADMIN — OXYCODONE HYDROCHLORIDE 10 MG: 10 TABLET ORAL at 21:19

## 2017-08-27 RX ADMIN — TOPIRAMATE 100 MG: 100 TABLET, FILM COATED ORAL at 17:19

## 2017-08-27 RX ADMIN — ALPRAZOLAM 0.25 MG: 0.25 TABLET ORAL at 12:22

## 2017-08-27 RX ADMIN — OXYCODONE HYDROCHLORIDE 10 MG: 10 TABLET ORAL at 13:11

## 2017-08-27 RX ADMIN — TOPIRAMATE 100 MG: 100 TABLET, FILM COATED ORAL at 08:21

## 2017-08-27 RX ADMIN — METOPROLOL TARTRATE 25 MG: 25 TABLET ORAL at 08:20

## 2017-08-27 RX ADMIN — ALPRAZOLAM 0.25 MG: 0.25 TABLET ORAL at 22:55

## 2017-08-27 RX ADMIN — SODIUM CHLORIDE 0.5 UNITS/HR: 9 INJECTION, SOLUTION INTRAVENOUS at 04:33

## 2017-08-27 RX ADMIN — GABAPENTIN 400 MG: 400 CAPSULE ORAL at 17:18

## 2017-08-27 RX ADMIN — POTASSIUM CHLORIDE 20 MEQ: 200 INJECTION, SOLUTION INTRAVENOUS at 10:45

## 2017-08-28 LAB
ALBUMIN SERPL BCP-MCNC: 2.1 G/DL (ref 3.5–5)
ALP SERPL-CCNC: 150 U/L (ref 46–116)
ALT SERPL W P-5'-P-CCNC: 13 U/L (ref 12–78)
ANION GAP SERPL CALCULATED.3IONS-SCNC: 11 MMOL/L (ref 4–13)
AST SERPL W P-5'-P-CCNC: 13 U/L (ref 5–45)
BILIRUB SERPL-MCNC: 0.44 MG/DL (ref 0.2–1)
BUN SERPL-MCNC: 4 MG/DL (ref 5–25)
CALCIUM SERPL-MCNC: 8.9 MG/DL (ref 8.3–10.1)
CHLORIDE SERPL-SCNC: 106 MMOL/L (ref 100–108)
CO2 SERPL-SCNC: 21 MMOL/L (ref 21–32)
CREAT SERPL-MCNC: 0.42 MG/DL (ref 0.6–1.3)
GFR SERPL CREATININE-BSD FRML MDRD: 123 ML/MIN/1.73SQ M
GLUCOSE SERPL-MCNC: 204 MG/DL (ref 65–140)
GLUCOSE SERPL-MCNC: 219 MG/DL (ref 65–140)
GLUCOSE SERPL-MCNC: 276 MG/DL (ref 65–140)
GLUCOSE SERPL-MCNC: 292 MG/DL (ref 65–140)
GLUCOSE SERPL-MCNC: 350 MG/DL (ref 65–140)
POTASSIUM SERPL-SCNC: 3.7 MMOL/L (ref 3.5–5.3)
PROT SERPL-MCNC: 6.3 G/DL (ref 6.4–8.2)
SODIUM SERPL-SCNC: 138 MMOL/L (ref 136–145)

## 2017-08-28 PROCEDURE — 84443 ASSAY THYROID STIM HORMONE: CPT | Performed by: INTERNAL MEDICINE

## 2017-08-28 PROCEDURE — 80053 COMPREHEN METABOLIC PANEL: CPT | Performed by: EMERGENCY MEDICINE

## 2017-08-28 PROCEDURE — C9113 INJ PANTOPRAZOLE SODIUM, VIA: HCPCS | Performed by: INTERNAL MEDICINE

## 2017-08-28 PROCEDURE — 82948 REAGENT STRIP/BLOOD GLUCOSE: CPT

## 2017-08-28 RX ORDER — INSULIN GLARGINE 100 [IU]/ML
10 INJECTION, SOLUTION SUBCUTANEOUS ONCE
Status: COMPLETED | OUTPATIENT
Start: 2017-08-28 | End: 2017-08-28

## 2017-08-28 RX ORDER — INSULIN GLARGINE 100 [IU]/ML
25 INJECTION, SOLUTION SUBCUTANEOUS
Status: DISCONTINUED | OUTPATIENT
Start: 2017-08-28 | End: 2017-08-30

## 2017-08-28 RX ORDER — SENNOSIDES 8.6 MG
1 TABLET ORAL
Status: DISCONTINUED | OUTPATIENT
Start: 2017-08-28 | End: 2017-09-02 | Stop reason: HOSPADM

## 2017-08-28 RX ORDER — CALCIUM CARBONATE 200(500)MG
500 TABLET,CHEWABLE ORAL DAILY PRN
Status: DISCONTINUED | OUTPATIENT
Start: 2017-08-28 | End: 2017-09-02 | Stop reason: HOSPADM

## 2017-08-28 RX ADMIN — METOPROLOL TARTRATE 25 MG: 25 TABLET ORAL at 09:07

## 2017-08-28 RX ADMIN — TOPIRAMATE 100 MG: 100 TABLET, FILM COATED ORAL at 19:02

## 2017-08-28 RX ADMIN — ALPRAZOLAM 0.25 MG: 0.25 TABLET ORAL at 16:45

## 2017-08-28 RX ADMIN — INSULIN GLARGINE 10 UNITS: 100 INJECTION, SOLUTION SUBCUTANEOUS at 14:30

## 2017-08-28 RX ADMIN — INSULIN LISPRO 2 UNITS: 100 INJECTION, SOLUTION INTRAVENOUS; SUBCUTANEOUS at 08:00

## 2017-08-28 RX ADMIN — ATORVASTATIN CALCIUM 40 MG: 40 TABLET, FILM COATED ORAL at 09:07

## 2017-08-28 RX ADMIN — ALPRAZOLAM 0.25 MG: 0.25 TABLET ORAL at 22:47

## 2017-08-28 RX ADMIN — ZOLPIDEM TARTRATE 10 MG: 5 TABLET, FILM COATED ORAL at 22:46

## 2017-08-28 RX ADMIN — INSULIN LISPRO 6 UNITS: 100 INJECTION, SOLUTION INTRAVENOUS; SUBCUTANEOUS at 21:32

## 2017-08-28 RX ADMIN — VENLAFAXINE HYDROCHLORIDE 150 MG: 75 CAPSULE, EXTENDED RELEASE ORAL at 21:02

## 2017-08-28 RX ADMIN — LOSARTAN POTASSIUM 50 MG: 50 TABLET, FILM COATED ORAL at 09:07

## 2017-08-28 RX ADMIN — GABAPENTIN 400 MG: 400 CAPSULE ORAL at 15:55

## 2017-08-28 RX ADMIN — INSULIN GLARGINE 25 UNITS: 100 INJECTION, SOLUTION SUBCUTANEOUS at 21:02

## 2017-08-28 RX ADMIN — DIVALPROEX SODIUM 500 MG: 250 TABLET, DELAYED RELEASE ORAL at 20:56

## 2017-08-28 RX ADMIN — LEVOTHYROXINE SODIUM 112 MCG: 112 TABLET ORAL at 09:07

## 2017-08-28 RX ADMIN — SODIUM CHLORIDE, SODIUM GLUCONATE, SODIUM ACETATE, POTASSIUM CHLORIDE, MAGNESIUM CHLORIDE, SODIUM PHOSPHATE, DIBASIC, AND POTASSIUM PHOSPHATE 100 ML/HR: .53; .5; .37; .037; .03; .012; .00082 INJECTION, SOLUTION INTRAVENOUS at 05:00

## 2017-08-28 RX ADMIN — INSULIN LISPRO 6 UNITS: 100 INJECTION, SOLUTION INTRAVENOUS; SUBCUTANEOUS at 19:04

## 2017-08-28 RX ADMIN — GABAPENTIN 400 MG: 400 CAPSULE ORAL at 20:56

## 2017-08-28 RX ADMIN — INSULIN LISPRO 6 UNITS: 100 INJECTION, SOLUTION INTRAVENOUS; SUBCUTANEOUS at 13:30

## 2017-08-28 RX ADMIN — ENOXAPARIN SODIUM 40 MG: 40 INJECTION SUBCUTANEOUS at 09:07

## 2017-08-28 RX ADMIN — ONDANSETRON 4 MG: 2 INJECTION INTRAMUSCULAR; INTRAVENOUS at 19:36

## 2017-08-28 RX ADMIN — Medication 500 MG: at 20:56

## 2017-08-28 RX ADMIN — GABAPENTIN 400 MG: 400 CAPSULE ORAL at 09:07

## 2017-08-28 RX ADMIN — PANTOPRAZOLE SODIUM 40 MG: 40 INJECTION, POWDER, FOR SOLUTION INTRAVENOUS at 09:07

## 2017-08-28 RX ADMIN — SENNOSIDES 8.6 MG: 8.6 TABLET, FILM COATED ORAL at 21:01

## 2017-08-28 RX ADMIN — PANTOPRAZOLE SODIUM 40 MG: 40 INJECTION, POWDER, FOR SOLUTION INTRAVENOUS at 20:56

## 2017-08-28 RX ADMIN — TOPIRAMATE 100 MG: 100 TABLET, FILM COATED ORAL at 09:07

## 2017-08-29 LAB
GLUCOSE SERPL-MCNC: 230 MG/DL (ref 65–140)
GLUCOSE SERPL-MCNC: 323 MG/DL (ref 65–140)
GLUCOSE SERPL-MCNC: 381 MG/DL (ref 65–140)
GLUCOSE SERPL-MCNC: 387 MG/DL (ref 65–140)
TSH SERPL DL<=0.05 MIU/L-ACNC: 1.61 UIU/ML (ref 0.36–3.74)

## 2017-08-29 PROCEDURE — C9113 INJ PANTOPRAZOLE SODIUM, VIA: HCPCS | Performed by: INTERNAL MEDICINE

## 2017-08-29 PROCEDURE — 82948 REAGENT STRIP/BLOOD GLUCOSE: CPT

## 2017-08-29 RX ORDER — POLYETHYLENE GLYCOL 3350 17 G/17G
17 POWDER, FOR SOLUTION ORAL DAILY
Status: DISCONTINUED | OUTPATIENT
Start: 2017-08-29 | End: 2017-09-02 | Stop reason: HOSPADM

## 2017-08-29 RX ORDER — INSULIN GLARGINE 100 [IU]/ML
100 INJECTION, SOLUTION SUBCUTANEOUS
COMMUNITY
End: 2017-09-02 | Stop reason: HOSPADM

## 2017-08-29 RX ORDER — LIDOCAINE 50 MG/G
1 PATCH TOPICAL DAILY
Status: DISCONTINUED | OUTPATIENT
Start: 2017-08-29 | End: 2017-09-02 | Stop reason: HOSPADM

## 2017-08-29 RX ORDER — NICOTINE 21 MG/24HR
14 PATCH, TRANSDERMAL 24 HOURS TRANSDERMAL DAILY
Status: DISCONTINUED | OUTPATIENT
Start: 2017-08-29 | End: 2017-09-02 | Stop reason: HOSPADM

## 2017-08-29 RX ADMIN — SENNOSIDES 8.6 MG: 8.6 TABLET, FILM COATED ORAL at 21:43

## 2017-08-29 RX ADMIN — METOPROLOL TARTRATE 25 MG: 25 TABLET ORAL at 09:12

## 2017-08-29 RX ADMIN — PANTOPRAZOLE SODIUM 40 MG: 40 INJECTION, POWDER, FOR SOLUTION INTRAVENOUS at 09:13

## 2017-08-29 RX ADMIN — INSULIN LISPRO 4 UNITS: 100 INJECTION, SOLUTION INTRAVENOUS; SUBCUTANEOUS at 06:39

## 2017-08-29 RX ADMIN — POLYETHYLENE GLYCOL 3350 17 G: 17 POWDER, FOR SOLUTION ORAL at 14:13

## 2017-08-29 RX ADMIN — INSULIN LISPRO 6 UNITS: 100 INJECTION, SOLUTION INTRAVENOUS; SUBCUTANEOUS at 21:51

## 2017-08-29 RX ADMIN — GABAPENTIN 400 MG: 400 CAPSULE ORAL at 21:40

## 2017-08-29 RX ADMIN — LEVOTHYROXINE SODIUM 112 MCG: 112 TABLET ORAL at 09:12

## 2017-08-29 RX ADMIN — GABAPENTIN 400 MG: 400 CAPSULE ORAL at 16:16

## 2017-08-29 RX ADMIN — ENOXAPARIN SODIUM 40 MG: 40 INJECTION SUBCUTANEOUS at 17:21

## 2017-08-29 RX ADMIN — GABAPENTIN 400 MG: 400 CAPSULE ORAL at 09:12

## 2017-08-29 RX ADMIN — VENLAFAXINE HYDROCHLORIDE 150 MG: 75 CAPSULE, EXTENDED RELEASE ORAL at 21:42

## 2017-08-29 RX ADMIN — LOSARTAN POTASSIUM 50 MG: 50 TABLET, FILM COATED ORAL at 09:12

## 2017-08-29 RX ADMIN — INSULIN LISPRO 10 UNITS: 100 INJECTION, SOLUTION INTRAVENOUS; SUBCUTANEOUS at 16:14

## 2017-08-29 RX ADMIN — DIVALPROEX SODIUM 500 MG: 250 TABLET, DELAYED RELEASE ORAL at 21:42

## 2017-08-29 RX ADMIN — ZOLPIDEM TARTRATE 10 MG: 5 TABLET, FILM COATED ORAL at 23:04

## 2017-08-29 RX ADMIN — INSULIN GLARGINE 25 UNITS: 100 INJECTION, SOLUTION SUBCUTANEOUS at 21:51

## 2017-08-29 RX ADMIN — INSULIN LISPRO 8 UNITS: 100 INJECTION, SOLUTION INTRAVENOUS; SUBCUTANEOUS at 10:55

## 2017-08-29 RX ADMIN — ALPRAZOLAM 0.25 MG: 0.25 TABLET ORAL at 10:37

## 2017-08-29 RX ADMIN — LIDOCAINE 1 PATCH: 50 PATCH CUTANEOUS at 14:13

## 2017-08-29 RX ADMIN — TOPIRAMATE 100 MG: 100 TABLET, FILM COATED ORAL at 09:13

## 2017-08-29 RX ADMIN — TOPIRAMATE 100 MG: 100 TABLET, FILM COATED ORAL at 17:19

## 2017-08-29 RX ADMIN — ENOXAPARIN SODIUM 40 MG: 40 INJECTION SUBCUTANEOUS at 09:12

## 2017-08-29 RX ADMIN — NICOTINE 14 MG: 14 PATCH, EXTENDED RELEASE TRANSDERMAL at 14:13

## 2017-08-29 RX ADMIN — PANTOPRAZOLE SODIUM 40 MG: 40 INJECTION, POWDER, FOR SOLUTION INTRAVENOUS at 21:41

## 2017-08-29 RX ADMIN — ATORVASTATIN CALCIUM 40 MG: 40 TABLET, FILM COATED ORAL at 09:12

## 2017-08-30 PROBLEM — F17.200 TOBACCO DEPENDENCE: Status: ACTIVE | Noted: 2017-08-30

## 2017-08-30 PROBLEM — E46 PROTEIN CALORIE MALNUTRITION (HCC): Status: ACTIVE | Noted: 2017-08-30

## 2017-08-30 LAB
ANION GAP SERPL CALCULATED.3IONS-SCNC: 10 MMOL/L (ref 4–13)
BASOPHILS # BLD AUTO: 0.03 THOUSANDS/ΜL (ref 0–0.1)
BASOPHILS NFR BLD AUTO: 1 % (ref 0–1)
BUN SERPL-MCNC: 6 MG/DL (ref 5–25)
CALCIUM SERPL-MCNC: 9.5 MG/DL (ref 8.3–10.1)
CHLORIDE SERPL-SCNC: 106 MMOL/L (ref 100–108)
CO2 SERPL-SCNC: 21 MMOL/L (ref 21–32)
CREAT SERPL-MCNC: 0.58 MG/DL (ref 0.6–1.3)
EOSINOPHIL # BLD AUTO: 0.05 THOUSAND/ΜL (ref 0–0.61)
EOSINOPHIL NFR BLD AUTO: 1 % (ref 0–6)
ERYTHROCYTE [DISTWIDTH] IN BLOOD BY AUTOMATED COUNT: 12.4 % (ref 11.6–15.1)
EST. AVERAGE GLUCOSE BLD GHB EST-MCNC: 289 MG/DL
GFR SERPL CREATININE-BSD FRML MDRD: 111 ML/MIN/1.73SQ M
GLUCOSE SERPL-MCNC: 213 MG/DL (ref 65–140)
GLUCOSE SERPL-MCNC: 263 MG/DL (ref 65–140)
GLUCOSE SERPL-MCNC: 272 MG/DL (ref 65–140)
GLUCOSE SERPL-MCNC: 312 MG/DL (ref 65–140)
GLUCOSE SERPL-MCNC: 418 MG/DL (ref 65–140)
HBA1C MFR BLD: 11.7 % (ref 4.2–6.3)
HCT VFR BLD AUTO: 40 % (ref 34.8–46.1)
HGB BLD-MCNC: 13.8 G/DL (ref 11.5–15.4)
LYMPHOCYTES # BLD AUTO: 2.58 THOUSANDS/ΜL (ref 0.6–4.47)
LYMPHOCYTES NFR BLD AUTO: 41 % (ref 14–44)
MCH RBC QN AUTO: 33.3 PG (ref 26.8–34.3)
MCHC RBC AUTO-ENTMCNC: 34.5 G/DL (ref 31.4–37.4)
MCV RBC AUTO: 97 FL (ref 82–98)
MONOCYTES # BLD AUTO: 0.58 THOUSAND/ΜL (ref 0.17–1.22)
MONOCYTES NFR BLD AUTO: 9 % (ref 4–12)
NEUTROPHILS # BLD AUTO: 2.98 THOUSANDS/ΜL (ref 1.85–7.62)
NEUTS SEG NFR BLD AUTO: 48 % (ref 43–75)
NRBC BLD AUTO-RTO: 0 /100 WBCS
PLATELET # BLD AUTO: 297 THOUSANDS/UL (ref 149–390)
PMV BLD AUTO: 11 FL (ref 8.9–12.7)
POTASSIUM SERPL-SCNC: 3.5 MMOL/L (ref 3.5–5.3)
RBC # BLD AUTO: 4.14 MILLION/UL (ref 3.81–5.12)
SODIUM SERPL-SCNC: 137 MMOL/L (ref 136–145)
WBC # BLD AUTO: 6.23 THOUSAND/UL (ref 4.31–10.16)

## 2017-08-30 PROCEDURE — 82948 REAGENT STRIP/BLOOD GLUCOSE: CPT

## 2017-08-30 PROCEDURE — 83036 HEMOGLOBIN GLYCOSYLATED A1C: CPT | Performed by: INTERNAL MEDICINE

## 2017-08-30 PROCEDURE — 85025 COMPLETE CBC W/AUTO DIFF WBC: CPT | Performed by: INTERNAL MEDICINE

## 2017-08-30 PROCEDURE — C9113 INJ PANTOPRAZOLE SODIUM, VIA: HCPCS | Performed by: INTERNAL MEDICINE

## 2017-08-30 PROCEDURE — 80048 BASIC METABOLIC PNL TOTAL CA: CPT | Performed by: INTERNAL MEDICINE

## 2017-08-30 RX ORDER — INSULIN GLARGINE 100 [IU]/ML
45 INJECTION, SOLUTION SUBCUTANEOUS
Status: DISCONTINUED | OUTPATIENT
Start: 2017-08-30 | End: 2017-08-31

## 2017-08-30 RX ADMIN — TOPIRAMATE 100 MG: 100 TABLET, FILM COATED ORAL at 18:31

## 2017-08-30 RX ADMIN — VENLAFAXINE HYDROCHLORIDE 150 MG: 75 CAPSULE, EXTENDED RELEASE ORAL at 21:07

## 2017-08-30 RX ADMIN — INSULIN LISPRO 12 UNITS: 100 INJECTION, SOLUTION INTRAVENOUS; SUBCUTANEOUS at 11:28

## 2017-08-30 RX ADMIN — GABAPENTIN 400 MG: 400 CAPSULE ORAL at 16:31

## 2017-08-30 RX ADMIN — GABAPENTIN 400 MG: 400 CAPSULE ORAL at 21:08

## 2017-08-30 RX ADMIN — LIDOCAINE 1 PATCH: 50 PATCH CUTANEOUS at 08:15

## 2017-08-30 RX ADMIN — POLYETHYLENE GLYCOL 3350 17 G: 17 POWDER, FOR SOLUTION ORAL at 08:13

## 2017-08-30 RX ADMIN — INSULIN LISPRO 2 UNITS: 100 INJECTION, SOLUTION INTRAVENOUS; SUBCUTANEOUS at 16:31

## 2017-08-30 RX ADMIN — INSULIN LISPRO 6 UNITS: 100 INJECTION, SOLUTION INTRAVENOUS; SUBCUTANEOUS at 06:35

## 2017-08-30 RX ADMIN — DIVALPROEX SODIUM 500 MG: 250 TABLET, DELAYED RELEASE ORAL at 21:07

## 2017-08-30 RX ADMIN — ATORVASTATIN CALCIUM 40 MG: 40 TABLET, FILM COATED ORAL at 08:16

## 2017-08-30 RX ADMIN — INSULIN LISPRO 15 UNITS: 100 INJECTION, SOLUTION INTRAVENOUS; SUBCUTANEOUS at 11:28

## 2017-08-30 RX ADMIN — TOPIRAMATE 100 MG: 100 TABLET, FILM COATED ORAL at 08:59

## 2017-08-30 RX ADMIN — ALPRAZOLAM 0.25 MG: 0.25 TABLET ORAL at 07:41

## 2017-08-30 RX ADMIN — INSULIN GLARGINE 45 UNITS: 100 INJECTION, SOLUTION SUBCUTANEOUS at 21:07

## 2017-08-30 RX ADMIN — PANTOPRAZOLE SODIUM 40 MG: 40 INJECTION, POWDER, FOR SOLUTION INTRAVENOUS at 08:13

## 2017-08-30 RX ADMIN — ENOXAPARIN SODIUM 40 MG: 40 INJECTION SUBCUTANEOUS at 18:31

## 2017-08-30 RX ADMIN — LEVOTHYROXINE SODIUM 112 MCG: 112 TABLET ORAL at 08:16

## 2017-08-30 RX ADMIN — LOSARTAN POTASSIUM 50 MG: 50 TABLET, FILM COATED ORAL at 08:16

## 2017-08-30 RX ADMIN — ALPRAZOLAM 0.25 MG: 0.25 TABLET ORAL at 03:04

## 2017-08-30 RX ADMIN — GABAPENTIN 400 MG: 400 CAPSULE ORAL at 08:15

## 2017-08-30 RX ADMIN — ENOXAPARIN SODIUM 40 MG: 40 INJECTION SUBCUTANEOUS at 08:15

## 2017-08-30 RX ADMIN — INSULIN LISPRO 15 UNITS: 100 INJECTION, SOLUTION INTRAVENOUS; SUBCUTANEOUS at 16:31

## 2017-08-30 RX ADMIN — METOPROLOL TARTRATE 25 MG: 25 TABLET ORAL at 08:16

## 2017-08-30 RX ADMIN — INSULIN LISPRO 4 UNITS: 100 INJECTION, SOLUTION INTRAVENOUS; SUBCUTANEOUS at 22:20

## 2017-08-30 RX ADMIN — NICOTINE 14 MG: 14 PATCH, EXTENDED RELEASE TRANSDERMAL at 08:15

## 2017-08-30 RX ADMIN — PANTOPRAZOLE SODIUM 40 MG: 40 INJECTION, POWDER, FOR SOLUTION INTRAVENOUS at 21:08

## 2017-08-31 ENCOUNTER — GENERIC CONVERSION - ENCOUNTER (OUTPATIENT)
Dept: OTHER | Facility: OTHER | Age: 47
End: 2017-08-31

## 2017-08-31 LAB
GLUCOSE SERPL-MCNC: 227 MG/DL (ref 65–140)
GLUCOSE SERPL-MCNC: 254 MG/DL (ref 65–140)
GLUCOSE SERPL-MCNC: 337 MG/DL (ref 65–140)
GLUCOSE SERPL-MCNC: 374 MG/DL (ref 65–140)

## 2017-08-31 PROCEDURE — C9113 INJ PANTOPRAZOLE SODIUM, VIA: HCPCS | Performed by: INTERNAL MEDICINE

## 2017-08-31 PROCEDURE — 82948 REAGENT STRIP/BLOOD GLUCOSE: CPT

## 2017-08-31 RX ORDER — INSULIN GLARGINE 100 [IU]/ML
60 INJECTION, SOLUTION SUBCUTANEOUS
Status: DISCONTINUED | OUTPATIENT
Start: 2017-08-31 | End: 2017-09-01

## 2017-08-31 RX ORDER — PANTOPRAZOLE SODIUM 40 MG/1
40 TABLET, DELAYED RELEASE ORAL
Status: DISCONTINUED | OUTPATIENT
Start: 2017-08-31 | End: 2017-09-02 | Stop reason: HOSPADM

## 2017-08-31 RX ADMIN — INSULIN LISPRO 15 UNITS: 100 INJECTION, SOLUTION INTRAVENOUS; SUBCUTANEOUS at 11:32

## 2017-08-31 RX ADMIN — INSULIN LISPRO 4 UNITS: 100 INJECTION, SOLUTION INTRAVENOUS; SUBCUTANEOUS at 16:46

## 2017-08-31 RX ADMIN — INSULIN GLARGINE 60 UNITS: 100 INJECTION, SOLUTION SUBCUTANEOUS at 22:20

## 2017-08-31 RX ADMIN — GABAPENTIN 400 MG: 400 CAPSULE ORAL at 22:20

## 2017-08-31 RX ADMIN — METOPROLOL TARTRATE 25 MG: 25 TABLET ORAL at 08:52

## 2017-08-31 RX ADMIN — INSULIN LISPRO 8 UNITS: 100 INJECTION, SOLUTION INTRAVENOUS; SUBCUTANEOUS at 11:32

## 2017-08-31 RX ADMIN — ENOXAPARIN SODIUM 40 MG: 40 INJECTION SUBCUTANEOUS at 08:51

## 2017-08-31 RX ADMIN — INSULIN LISPRO 3 UNITS: 100 INJECTION, SOLUTION INTRAVENOUS; SUBCUTANEOUS at 22:21

## 2017-08-31 RX ADMIN — VENLAFAXINE HYDROCHLORIDE 150 MG: 75 CAPSULE, EXTENDED RELEASE ORAL at 22:21

## 2017-08-31 RX ADMIN — TOPIRAMATE 100 MG: 100 TABLET, FILM COATED ORAL at 17:06

## 2017-08-31 RX ADMIN — ALPRAZOLAM 0.25 MG: 0.25 TABLET ORAL at 22:20

## 2017-08-31 RX ADMIN — ENOXAPARIN SODIUM 40 MG: 40 INJECTION SUBCUTANEOUS at 17:05

## 2017-08-31 RX ADMIN — LEVOTHYROXINE SODIUM 112 MCG: 112 TABLET ORAL at 08:52

## 2017-08-31 RX ADMIN — NICOTINE 14 MG: 14 PATCH, EXTENDED RELEASE TRANSDERMAL at 08:53

## 2017-08-31 RX ADMIN — ATORVASTATIN CALCIUM 40 MG: 40 TABLET, FILM COATED ORAL at 08:52

## 2017-08-31 RX ADMIN — ALPRAZOLAM 0.25 MG: 0.25 TABLET ORAL at 00:10

## 2017-08-31 RX ADMIN — SENNOSIDES 8.6 MG: 8.6 TABLET, FILM COATED ORAL at 22:20

## 2017-08-31 RX ADMIN — POLYETHYLENE GLYCOL 3350 17 G: 17 POWDER, FOR SOLUTION ORAL at 08:52

## 2017-08-31 RX ADMIN — PANTOPRAZOLE SODIUM 40 MG: 40 INJECTION, POWDER, FOR SOLUTION INTRAVENOUS at 08:52

## 2017-08-31 RX ADMIN — INSULIN LISPRO 10 UNITS: 100 INJECTION, SOLUTION INTRAVENOUS; SUBCUTANEOUS at 06:30

## 2017-08-31 RX ADMIN — GABAPENTIN 400 MG: 400 CAPSULE ORAL at 16:45

## 2017-08-31 RX ADMIN — LOSARTAN POTASSIUM 50 MG: 50 TABLET, FILM COATED ORAL at 08:52

## 2017-08-31 RX ADMIN — LIDOCAINE 1 PATCH: 50 PATCH CUTANEOUS at 08:52

## 2017-08-31 RX ADMIN — INSULIN LISPRO 15 UNITS: 100 INJECTION, SOLUTION INTRAVENOUS; SUBCUTANEOUS at 07:49

## 2017-08-31 RX ADMIN — DIVALPROEX SODIUM 500 MG: 250 TABLET, DELAYED RELEASE ORAL at 22:21

## 2017-08-31 RX ADMIN — GABAPENTIN 400 MG: 400 CAPSULE ORAL at 08:52

## 2017-08-31 RX ADMIN — ZOLPIDEM TARTRATE 10 MG: 5 TABLET, FILM COATED ORAL at 00:10

## 2017-08-31 RX ADMIN — ZOLPIDEM TARTRATE 10 MG: 5 TABLET, FILM COATED ORAL at 22:19

## 2017-08-31 RX ADMIN — TOPIRAMATE 100 MG: 100 TABLET, FILM COATED ORAL at 08:54

## 2017-08-31 RX ADMIN — PANTOPRAZOLE SODIUM 40 MG: 40 TABLET, DELAYED RELEASE ORAL at 16:45

## 2017-09-01 LAB
GLUCOSE SERPL-MCNC: 217 MG/DL (ref 65–140)
GLUCOSE SERPL-MCNC: 258 MG/DL (ref 65–140)
GLUCOSE SERPL-MCNC: 313 MG/DL (ref 65–140)
GLUCOSE SERPL-MCNC: 319 MG/DL (ref 65–140)

## 2017-09-01 PROCEDURE — 82948 REAGENT STRIP/BLOOD GLUCOSE: CPT

## 2017-09-01 RX ORDER — INSULIN GLARGINE 100 [IU]/ML
75 INJECTION, SOLUTION SUBCUTANEOUS
Status: DISCONTINUED | OUTPATIENT
Start: 2017-09-01 | End: 2017-09-02

## 2017-09-01 RX ORDER — ACETAMINOPHEN 325 MG/1
650 TABLET ORAL EVERY 6 HOURS PRN
Status: DISCONTINUED | OUTPATIENT
Start: 2017-09-01 | End: 2017-09-02 | Stop reason: HOSPADM

## 2017-09-01 RX ADMIN — SENNOSIDES 8.6 MG: 8.6 TABLET, FILM COATED ORAL at 21:47

## 2017-09-01 RX ADMIN — GABAPENTIN 400 MG: 400 CAPSULE ORAL at 17:04

## 2017-09-01 RX ADMIN — INSULIN LISPRO 8 UNITS: 100 INJECTION, SOLUTION INTRAVENOUS; SUBCUTANEOUS at 12:19

## 2017-09-01 RX ADMIN — GABAPENTIN 400 MG: 400 CAPSULE ORAL at 08:12

## 2017-09-01 RX ADMIN — ZOLPIDEM TARTRATE 10 MG: 5 TABLET, FILM COATED ORAL at 23:28

## 2017-09-01 RX ADMIN — ATORVASTATIN CALCIUM 40 MG: 40 TABLET, FILM COATED ORAL at 08:12

## 2017-09-01 RX ADMIN — LIDOCAINE 1 PATCH: 50 PATCH CUTANEOUS at 08:12

## 2017-09-01 RX ADMIN — LEVOTHYROXINE SODIUM 112 MCG: 112 TABLET ORAL at 08:12

## 2017-09-01 RX ADMIN — ENOXAPARIN SODIUM 40 MG: 40 INJECTION SUBCUTANEOUS at 17:04

## 2017-09-01 RX ADMIN — METOPROLOL TARTRATE 25 MG: 25 TABLET ORAL at 08:12

## 2017-09-01 RX ADMIN — INSULIN GLARGINE 75 UNITS: 100 INJECTION, SOLUTION SUBCUTANEOUS at 21:47

## 2017-09-01 RX ADMIN — PANTOPRAZOLE SODIUM 40 MG: 40 TABLET, DELAYED RELEASE ORAL at 17:04

## 2017-09-01 RX ADMIN — INSULIN LISPRO 8 UNITS: 100 INJECTION, SOLUTION INTRAVENOUS; SUBCUTANEOUS at 08:15

## 2017-09-01 RX ADMIN — DIVALPROEX SODIUM 500 MG: 250 TABLET, DELAYED RELEASE ORAL at 21:47

## 2017-09-01 RX ADMIN — ENOXAPARIN SODIUM 40 MG: 40 INJECTION SUBCUTANEOUS at 08:13

## 2017-09-01 RX ADMIN — TOPIRAMATE 100 MG: 100 TABLET, FILM COATED ORAL at 08:13

## 2017-09-01 RX ADMIN — VENLAFAXINE HYDROCHLORIDE 150 MG: 75 CAPSULE, EXTENDED RELEASE ORAL at 21:47

## 2017-09-01 RX ADMIN — INSULIN LISPRO 2 UNITS: 100 INJECTION, SOLUTION INTRAVENOUS; SUBCUTANEOUS at 21:47

## 2017-09-01 RX ADMIN — ALPRAZOLAM 0.25 MG: 0.25 TABLET ORAL at 11:00

## 2017-09-01 RX ADMIN — TOPIRAMATE 100 MG: 100 TABLET, FILM COATED ORAL at 17:04

## 2017-09-01 RX ADMIN — GABAPENTIN 400 MG: 400 CAPSULE ORAL at 21:47

## 2017-09-01 RX ADMIN — INSULIN LISPRO 6 UNITS: 100 INJECTION, SOLUTION INTRAVENOUS; SUBCUTANEOUS at 17:05

## 2017-09-01 RX ADMIN — NICOTINE 14 MG: 14 PATCH, EXTENDED RELEASE TRANSDERMAL at 08:12

## 2017-09-01 RX ADMIN — PANTOPRAZOLE SODIUM 40 MG: 40 TABLET, DELAYED RELEASE ORAL at 06:52

## 2017-09-01 RX ADMIN — ACETAMINOPHEN 650 MG: 325 TABLET, FILM COATED ORAL at 11:18

## 2017-09-01 RX ADMIN — LOSARTAN POTASSIUM 50 MG: 50 TABLET, FILM COATED ORAL at 08:12

## 2017-09-02 VITALS
WEIGHT: 169.4 LBS | RESPIRATION RATE: 18 BRPM | DIASTOLIC BLOOD PRESSURE: 75 MMHG | OXYGEN SATURATION: 95 % | HEART RATE: 108 BPM | HEIGHT: 62 IN | SYSTOLIC BLOOD PRESSURE: 122 MMHG | BODY MASS INDEX: 31.17 KG/M2 | TEMPERATURE: 98.4 F

## 2017-09-02 LAB
GLUCOSE SERPL-MCNC: 295 MG/DL (ref 65–140)
GLUCOSE SERPL-MCNC: 370 MG/DL (ref 65–140)

## 2017-09-02 PROCEDURE — 82948 REAGENT STRIP/BLOOD GLUCOSE: CPT

## 2017-09-02 RX ORDER — INSULIN GLARGINE 100 [IU]/ML
80 INJECTION, SOLUTION SUBCUTANEOUS
Qty: 10 ML | Refills: 0 | Status: SHIPPED | OUTPATIENT
Start: 2017-09-02 | End: 2019-12-31

## 2017-09-02 RX ORDER — DIVALPROEX SODIUM 500 MG/1
500 TABLET, DELAYED RELEASE ORAL DAILY
Qty: 30 TABLET | Refills: 0 | Status: SHIPPED | OUTPATIENT
Start: 2017-09-02 | End: 2018-02-23 | Stop reason: ALTCHOICE

## 2017-09-02 RX ORDER — VENLAFAXINE HYDROCHLORIDE 150 MG/1
150 CAPSULE, EXTENDED RELEASE ORAL
Qty: 30 CAPSULE | Refills: 0 | Status: SHIPPED | OUTPATIENT
Start: 2017-09-02 | End: 2018-12-21 | Stop reason: SDUPTHER

## 2017-09-02 RX ORDER — INSULIN GLARGINE 100 [IU]/ML
80 INJECTION, SOLUTION SUBCUTANEOUS
Status: DISCONTINUED | OUTPATIENT
Start: 2017-09-02 | End: 2017-09-02 | Stop reason: HOSPADM

## 2017-09-02 RX ORDER — GABAPENTIN 400 MG/1
400 CAPSULE ORAL 3 TIMES DAILY
Qty: 90 CAPSULE | Refills: 0 | Status: SHIPPED | OUTPATIENT
Start: 2017-09-02 | End: 2018-02-24 | Stop reason: SDUPTHER

## 2017-09-02 RX ORDER — POLYETHYLENE GLYCOL 3350 17 G/17G
17 POWDER, FOR SOLUTION ORAL DAILY
Qty: 14 EACH | Refills: 0 | Status: SHIPPED | OUTPATIENT
Start: 2017-09-02 | End: 2018-12-21

## 2017-09-02 RX ORDER — NICOTINE 21 MG/24HR
1 PATCH, TRANSDERMAL 24 HOURS TRANSDERMAL DAILY
Qty: 7 PATCH | Refills: 0 | Status: SHIPPED | OUTPATIENT
Start: 2017-09-02 | End: 2017-09-09

## 2017-09-02 RX ADMIN — NICOTINE 14 MG: 14 PATCH, EXTENDED RELEASE TRANSDERMAL at 08:49

## 2017-09-02 RX ADMIN — METOPROLOL TARTRATE 25 MG: 25 TABLET ORAL at 08:49

## 2017-09-02 RX ADMIN — LIDOCAINE 1 PATCH: 50 PATCH CUTANEOUS at 08:49

## 2017-09-02 RX ADMIN — PANTOPRAZOLE SODIUM 40 MG: 40 TABLET, DELAYED RELEASE ORAL at 06:19

## 2017-09-02 RX ADMIN — ALPRAZOLAM 0.25 MG: 0.25 TABLET ORAL at 11:08

## 2017-09-02 RX ADMIN — ENOXAPARIN SODIUM 40 MG: 40 INJECTION SUBCUTANEOUS at 08:49

## 2017-09-02 RX ADMIN — INSULIN LISPRO 10 UNITS: 100 INJECTION, SOLUTION INTRAVENOUS; SUBCUTANEOUS at 11:15

## 2017-09-02 RX ADMIN — LOSARTAN POTASSIUM 50 MG: 50 TABLET, FILM COATED ORAL at 08:49

## 2017-09-02 RX ADMIN — GABAPENTIN 400 MG: 400 CAPSULE ORAL at 08:49

## 2017-09-02 RX ADMIN — INSULIN LISPRO 6 UNITS: 100 INJECTION, SOLUTION INTRAVENOUS; SUBCUTANEOUS at 07:22

## 2017-09-02 RX ADMIN — ATORVASTATIN CALCIUM 40 MG: 40 TABLET, FILM COATED ORAL at 08:49

## 2017-09-02 RX ADMIN — LEVOTHYROXINE SODIUM 112 MCG: 112 TABLET ORAL at 05:10

## 2017-09-02 RX ADMIN — TOPIRAMATE 100 MG: 100 TABLET, FILM COATED ORAL at 08:50

## 2017-09-15 ENCOUNTER — ALLSCRIPTS OFFICE VISIT (OUTPATIENT)
Dept: OTHER | Facility: OTHER | Age: 47
End: 2017-09-15

## 2017-09-26 ENCOUNTER — GENERIC CONVERSION - ENCOUNTER (OUTPATIENT)
Dept: OTHER | Facility: OTHER | Age: 47
End: 2017-09-26

## 2017-09-26 LAB
LEFT EYE DIABETIC RETINOPATHY: NORMAL
RIGHT EYE DIABETIC RETINOPATHY: NORMAL

## 2017-09-27 ENCOUNTER — ALLSCRIPTS OFFICE VISIT (OUTPATIENT)
Dept: OTHER | Facility: OTHER | Age: 47
End: 2017-09-27

## 2017-10-26 ENCOUNTER — ALLSCRIPTS OFFICE VISIT (OUTPATIENT)
Dept: OTHER | Facility: OTHER | Age: 47
End: 2017-10-26

## 2017-10-27 NOTE — PROGRESS NOTES
Assessment  1  Chronic migraine without aura (346 70) (G43 709)   2  Depression (311) (F32 9)    Plan  Chronic migraine without aura    · Topiramate 25 MG Oral Tablet (Topamax); 1 tab qHS x 5 days, then 2 tabs qhs (with  100 mg tab qhs)   Rx By: Marie Hutchinson; Dispense: 90 Days ; #:180 Tablet; Refill: 1;For: Chronic migraine without aura; HEYDI = N; Verified Transmission to [facility][id number] Last Updated By: System, SureScripts; 9/27/2017 9:37:38 AM  Chronic migraine without aura, Depression    · Follow-up visit in 3 months Evaluation and Treatment  Follow-up  Status: Complete   Done: 20Rxj0652   Ordered; For: Chronic migraine without aura, Depression; Ordered By: Marie Hutchinson Performed:  Due: 66HYI4744; Last Updated By: Linda Labs; 9/27/2017 9:51:50 AM  Depression    · Venlafaxine HCl  MG Oral Capsule Extended Release 24 Hour; take 1  capsule by mouth daily   Rx By: Marie Hutchinson; Dispense: 90 Days ; #:90 Capsule Extended Release 24 Hour; Refill: 1;For: Depression; HEYDI = N; Verified Transmission to [facility] #[id number]; Msg to Pharmacy: please refill d/t patient losing meds  pt has not taken for 2 weeks  thanks ; Last Updated By: System, SureScripts; 9/27/2017 9:37:38 AM   · *[address] [place] [address] Co-Management  *  Status: Active   Requested for: 68FTJ0853   Ordered; For: Depression; Ordered By: Marie Hutchinson Performed:  Due: 61YED8435; Last Updated By: Linda Labs; 9/27/2017 9:51:50 AM  Health Referral Questions : Patient requires Psychiatry assessment/intake      appointment (with MD/DO)  Care Summary provided  : Yes   · 6 - [address] (CLINICAL PSYCHOLOGIST ) Co-Management  *  Status: Active   Requested for: 12DVP2353   Ordered; For: Depression; Ordered By: Marie Hutchinson Performed:  Due: 10QZG0500; Last Updated By: Linda Labs; 9/27/2017 9:51:50 AM  are Referring to a non-SL Preferred Provider : Services not provided in network  Care Summary provided   Gavino Palm Yes    Discussion/Summary  Discussion Summary:   Chronic migraine, myofascial painwill continue with Botox q 3 months and increased Topamax as noted on the bottle for 1-2 weeks  After increasing Topamax she should eliminate Depakote if she is able  If she stops Depakote and feels that her headaches are returning she will restart it  She will continue to follow up with pain management for potential spinal cord stimulator which I highly encouraged her to consider, although she needs a psychological evaluation prior to insertion/ trial  For depression and anxiety I would like her to see a psychiatrist and counselor, as per orders on this note  provided a refill of venlafaxine since she states she lost her bottle  I asked her to call me if she loses her medication because most of her medications should not be stopped abruptly, including effexor  patient will follow up for Botox on 10/26/2017 with Dr Candy Marquis and in three months with me for a follow-up  The patient was encouraged to call the office with any questions or concerns  Medication SE Review and Pt Understands Tx: Possible side effects of new medications were reviewed with the patient/guardian today  The treatment plan was reviewed with the patient/guardian  The patient/guardian understands and agrees with the treatment plan   Patient Guardian understands agrees: The treatment plan was reviewed with the patient/guardian  The patient/guardian understands and agrees with the treatment plan      Chief Complaint  Chief Complaint Free Text Note Form: Patient present for follow up appointment regarding migraines      History of Present Illness  HPI: We had the pleasure of evaluating Dahiana Gamez in neurological follow up today  As you know she is a 56year old right handed female   She has a complicated medical history of pancreatitis, gastroesophageal reflux disease status post Nissen fundoplication, diabetes, hypertension, hypothyroidism, HLD, Cholecystectomy in July 2013, Depression, alleged stroke in March 2013, deep vein thrombosis and pulmonary embolus 2009, fibromyalgia and homocysteinemia  She is getting disability in September hopefully  finds flexeril, Topamax and olanzapine helpful  Toradol also helps prn migraine, but she typically runs out before the end of the month  She decreased Depakote to 250 mg qhs last time from 500 mg and reports no changes in sxs or side effects  starting botox, the patient reports greater than 7 days of migraine relief from baseline, correlated with headache diary, decreased abortive medication use and decreased ER visits  takes Tramadol daily for fibromyalgia as per her PCP  She also takes Effexor for fibromyalgia and depression, but has stopped Effexor about 2 weeks ago because she lost the medication  She stopped it suddenly w/o weaning  She continues to have significant depression, anxiety and panic, and reports that she would like to see a psychiatrist, on my questioning, and she has never seen on before  She has seen a therapist briefly in the past   brain MRI shows mild microangiopathic changes, but no clear evidence for acute or previous stroke  She cont to f/u with endocrinology for DM and sees her PCP regularly as well  On atorvastatin and fenfibrate, started recently, for elevated cholesterol and LDL, and on metformin and insulin for DM (last HBA1c 9 8%)  Thyroid tests still abnormal and following with PCP ad meryl for this  daily headaches:medications do you take or have you taken for your headaches? Prozac, Xanax, amitriptyline, Lyrica, Cymbalta, gabapentin, Metoprolol, Depakote, Topamax, Effexor, Botox, ZyprexaFioricet, aspirin, Tramadol, Dilaudid, Fentanyl, oxycodone, olanzapine- helped decrease severityTreatments used in the past for headaches: none   pain- 8/10often do the headaches occur ?  15 per month, almost every daytime of the day do the headaches start ? varies long do the headaches last ?12-24 hoursare they located ? occipital region b/l, apex, bi-temporal, behind eyes, neck, jawis the intensity of pain ? average pain level 7-9/10your usual headache - Throbbing, Pounding, Pressure, Squeezingsymptoms: blurred vision, photophobia, phonophobia, nausea, light-headed or dizzy, hands or feet tingle or feel numb, problem with concentration, stiff or sore neck, prefer to be alone and in a dark room, unable to workare worse if the patient: is anxious/ stressed, hot/ summer timetrigger: Fatigue, Stress/Tension, Weather change, bright lights, sunlight, Menstruation, Heat/ summer time  of systems, Past medical history, Surgical history, Family history, Social history and Medication history were all reviewed today  Review of Systems  Neurological ROS:   Constitutional: no fever, no chills, no recent weight gain, no recent weight loss, no complaints of feeling tired, no changes in appetite  HEENT: tinnitus  Cardiovascular:  no chest pain or pressure, no palpitations present, the heart rate was not rapid or irregular, no swelling in the arms or legs, no poor circulation  Respiratory:  no unusual or persistant cough, no shortness of breath with or without exertion  Gastrointestinal: nausea,-- diarrhea,-- abdominal pain-- and-- loss of bowel control  Genitourinary: feelings of urinary urgency  Musculoskeletal: arthralgias,-- myalgias,-- head/neck/back pain-- and-- pain while walking  Integumentary  no masses, no rash, no skin lesions, no livedo reticularis  Psychiatric: anxiety,-- depression-- and-- mood swings  Endocrine loss of sexual ability or drive   Hematologic/Lymphatic:  no unusual bleeding, no tendency for easy bruising, no clotting skin or lumps  Neurological General: headache,-- lightheadedness,-- increased sleepiness,-- trouble falling asleep-- and-- waking up at night  Neurological Mental Status: memory problems  Neurological Cranial Nerves: vertigo or dizziness     Neurological Motor findings include:  no tremor, no twitching, no cramping(pre/post exercise), no atrophy  Neurological Coordination: balance difficulties-- and-- clumsiness  Neurological Sensory: numbness-- and-- tingling  Neurological Gait: has had falls  ROS Reviewed:   ROS reviewed  Active Problems  1  Anxiety (300 00) (F41 9)   2  Arthralgia (719 40) (M25 50)   3  Benign essential hypertension (401 1) (I10)   4  Carpal tunnel syndrome of right wrist (354 0) (G56 01)   5  Carpal tunnel syndrome, left (354 0) (G56 02)   6  Cervical disc disorder with radiculopathy of mid-cervical region (723 4) (M50 120)   7  Cervical dysphagia (787 29) (R13 19)   8  Chest pain (786 50) (R07 9)   9  Chronic bilateral low back pain (724 2,338 29) (M54 5,G89 29)   10  Chronic cervical pain (723 1,338 29) (M54 2,G89 29)   11  Chronic cervical radiculopathy (723 4) (M54 12)   12  Chronic cough (786 2) (R05)   13  Chronic diarrhea (787 91) (K52 9)   14  Chronic fatigue (780 79) (R53 82)   15  Chronic migraine without aura (346 70) (G43 709)   16  Chronic pain syndrome (338 4) (G89 4)   17  CRP elevated (790 95) (R79 82)   18  Degenerative disc disease, cervical (722 4) (M50 30)   19  Depression (311) (F32 9)   20  Diabetes mellitus type II, uncontrolled (250 02) (E11 65)   21  Elevated LFTs (790 6) (R79 89)   22  Fatty liver (571 8) (K76 0)   23  Fibromyalgia (729 1) (M79 7)   24  GERD without esophagitis (530 81) (K21 9)   25  Headache (784 0) (R51)   26  Hyperlipidemia (272 4) (E78 5)   27  Hypothyroidism (244 9) (E03 9)   28  Insomnia (780 52) (G47 00)   29  Intractable chronic common migraine without aura (346 71) (G43 719)   30  Iron deficiency (280 9) (E61 1)   31  Irritable bowel syndrome with diarrhea (564 1) (K58 0)   32  Left hand paresthesia (782 0) (R20 2)   33  Left sided numbness (782 0) (R20 0)   34  Long-term insulin use (V58 67) (Z79 4)   35  Multiple joint pain (719 49) (M25 50)   36  Nausea (787 02) (R11 0)   37   Nicotine dependence (305 1) (F17 200)   38  Opioid dependence (304 00) (F11 20)   39  Osteoarthritis of spine with radiculopathy, cervical region (721 0) (M47 22)   40  Pancreatic cyst (577 2) (K86 2)   41  Pancreatitis (577 0) (K85 90)   42  Paresthesia (782 0) (R20 2)   43  Polyarthritis (716 50) (M13 0)   44  Polyneuropathy associated with underlying disease (357 4) (G63)   45  Pyelonephritis (590 80) (N12)   46  Right hand pain (729 5) (M79 641)   47  S/P carpal tunnel release (V45 89) (Z98 890)   48  S/P trigger finger release (V45 89) (Z98 890)   49  Sacroiliac pain (724 6) (M53 3)   50  Shortness of breath (786 05) (R06 02)   51  Shoulder pain (719 41) (M25 519)   52  Skin rash (782 1) (R21)   53  Snoring (786 09) (R06 83)   54  Spondylosis of cervical region without myelopathy or radiculopathy (721 0) (M47 812)   55  Stroke syndrome (434 91) (I63 9)   56  Swelling of hand joint, right (719 04) (M25 441)   57  Transient ischemic attack (TIA) (435 9) (G45 9)   58  Trigger thumb of right hand (727 03) (M65 311)   59  Type 2 diabetes mellitus with hyperglycemia, with long-term current use of insulin    (250 00,790 29,V58 67) (E11 65,Z79 4)   60  Uncontrolled type 2 diabetes mellitus with diabetic polyneuropathy, with long-term    current use of insulin (250 62,357 2,V58 67) (E11 42,E11 65,Z79 4)   61  Vitamin B12 deficiency (266 2) (E53 8)   62  Vitamin D deficiency (268 9) (E55 9)    Past Medical History  1  History of Abdominal pain, epigastric (789 06) (R10 13)   2  History of Abnormal weight gain (783 1) (R63 5)   3  History of Acute hip pain (719 45) (M25 559)   4  History of Acute venous embolism and thrombosis of deep vessels of distal lower   extremity (453 42) (I82 4Z9)   5  History of Analgesic use (V58 69) (Z79 899)   6  Anxiety (300 00) (F41 9)   7  History of Cerebral infarction, unspecified (434 91) (I63 9)   8  Diabetes mellitus type II, uncontrolled (250 02) (E11 65)   9   History of Encounter for comprehensive diabetic foot examination, type 2 diabetes   mellitus (250 00) (E11 9)   10  History of GERD without esophagitis (530 81) (K21 9)   11  History of High cholesterol (272 0) (E78 00)   12  History of abdominal pain (V13 89) (Z87 898)   13  History of acute pancreatitis (V12 79) (Z87 19)   14  History of anemia (V12 3) (Z86 2)   15  History of arthritis (V13 4) (Z87 39)   16  History of asthma (V12 69) (Z87 09)   17  History of esophageal reflux (V12 79) (Z87 19)   18  History of migraine (V12 49) (Z86 69)   19  History of nausea and vomiting (V12 79) (Z87 898)   20  History of pulmonary embolism (V12 55) (Z86 711)   21  History of stroke (V12 54) (Z86 73)   22  History of thyroid disease (V12 29) (Z86 39)   23  History of upper respiratory infection (V12 09) (Z87 09)   24  History of Need for pneumococcal vaccination (V03 82) (Z23)   25  History of Numbness (782 0) (R20 0)   26  History of Pulmonary Embolism (V12 51)   27  History of Septicemia (038 9) (A41 9)   28  History of TMJ arthralgia (524 62) (M26 629)   29  History of Visit for screening mammogram (V76 12) (Z12 31)    Surgical History  1  History of Cholecystectomy   2  History of Diagnostic Esophagogastroduodenoscopy   3  History of Esophagogastric Fundoplasty   4  History of Esophagogastric Fundoplasty Nissen Fundoplication   5  History of Neuroplasty Decompression Median Nerve At Carpal Tunnel   6  Denied: History of Tonsillectomy With Adenoidectomy    Family History  Mother    1  Family history of Acute Myocardial Infarction (V17 3)   2  Family history of Chronic Kidney Disease (NKF Classification)   3  Family history of Depression   4  Family history of arthritis (V17 7) (Z82 61)   5  Family history of substance abuse (V17 0) (Z81 4)   6  Family history of ulcerative colitis (V18 59) (Z83 79)   7  Family history of Schizophrenia   8  Family history of Type 2 Diabetes Mellitus  Father    5   Family history of Acute Myocardial Infarction (V17 3)   10  Family history of psoriasis (V19 4) (Z84 0)   11  Family history of Gastric Cancer (V16 0)   12  Family history of Stroke Syndrome (V17 1)  Sister    15  Family history of Crohn's disease (V18 59) (Z83 79)  Maternal Grandfather    15  Family history of rheumatoid arthritis (V17 7) (Z82 61)  Family History    15  Denied: Family history of systemic lupus erythematosus    Social History   · Completed 12th grade   · Current every day smoker (305 1) (F17 200)   · Current Every Day Smoker (305 1)   · Denied: Daily Coffee Consumption (___ Cups/Day)   ·    · Never Drank Alcohol   · No alcohol use   · No drug use   · One child   · Unemployed (V62 0) (Z56 0)    Current Meds   1  ALPRAZolam 0 25 MG Oral Tablet; take 1 tablet 3 times a day as needed; Therapy: 00Rzl2632 to (Evaluate:14Oct2017)  Requested for: 38Osf6928; Last   Rx:06Qde9627 Ordered   2  Aspirin 325 MG Oral Tablet; TAKE 1 TABLET DAILY; Therapy: 27FJO1002 to Recorded   3  Atorvastatin Calcium 40 MG Oral Tablet; TAKE 1 TABLET DAILY; Therapy: 18Xjo3246 to (Last Rx:21Ymf8307)  Requested for: 67Gmi5855 Ordered   4  Botox 200 UNIT Injection Solution Reconstituted; INJECT UP  UNITS ONCE   EVERY 3 MONTHS IN OFFICE; Therapy: 51Oxh8280 to (Last Rx:21Jun2017) Ordered   5  BuPROPion HCl ER (XL) 150 MG Oral Tablet Extended Release 24 Hour; TAKE 1   TABLET DAILY; Therapy: 25ROC5790 to (Evaluate:02Apr2018)  Requested for: 13MYQ4325; Last   Rx:70Vjo6480 Ordered   6  Cyanocobalamin 1000 MCG/ML Injection Solution; Inject 1 ml every 2 months; Therapy: 86HRR8520 to (Last Rx:71Uei1355)  Requested for: 13Rtr5481 Ordered   7  Cyclobenzaprine HCl - 10 MG Oral Tablet; 1 tab qAM and 1 tab qHS; Therapy: 52GMP9549 to (Dulce Worthy)  Requested for: 37Ngy9471; Last   Rx:09Otp4422 Ordered   8  Dicyclomine HCl - 20 MG Oral Tablet; TAKE 1 TABLET EVERY 6 HOURS AS NEEDED;    Therapy: 55THE5868 to (03 17 74 30 53)  Requested for: 49BCJ0538; Last Rx: 35OVD2642 Ordered   9  Divalproex Sodium 250 MG Oral Tablet Delayed Release; take 2 tablets by mouth at   bedtime; Therapy: 81OLO7246 to (SKPVZSPA:95NOR9043)  Requested for: 51Wcw4832; Last   Rx:06Wfq2546 Ordered   10  Fenofibrate 48 MG Oral Tablet; TAKE 1 TABLET DAILY; Therapy: 15QZW8809 to (Radha Haile)  Requested for: 04ZYM0670; Last    Rx:30Nov2016; Status: ACTIVE - Renewal Denied Ordered   11  Folic Acid 943 MCG Oral Tablet; TAKE 1 TABLET DAILY AS DIRECTED; Therapy: 25OEC7435 to (Evaluate:20Sif1355)  Requested for: 49XYP0619; Last    Rx:86Xey2047 Ordered   12  FreeStyle Lite Test In [employer]; use as directed; Therapy: 61APH5444 to (Last Rx:85Fst0274) Ordered   13  Gabapentin 400 MG Oral Capsule; TAKE 1-2 CAPSULES 3 TIMES DAILY; Therapy: 70PMQ5547 to (Evaluate:03Oct2017)  Requested for: 75TTD2608; Last    Rx:96Hhn5515 Ordered   14  HumaLOG KwikPen 200 UNIT/ML Subcutaneous Solution Pen-injector; Inject SQ 15    units before each meal;    Therapy: 57ARK1563 to (Last Rx:63Gbp8709) Ordered   15  Ketorolac Tromethamine 10 MG Oral Tablet; TAKE 1 TABLET EVERY 8 HOURS AS    NEEDED FOR PAIN  DON'T TAKE FOR MORE THAN 5 DAYS; Therapy: 37ONT5583 to (Evaluate:41Gip5231)  Requested for: 44MSI8841; Last    Rx:03Pgl9192 Ordered   16  Lantus SoloStar 100 UNIT/ML Subcutaneous Solution Pen-injector; Inject SQ 50 units at    bedtime; Therapy: (Recorded:62Vcd2872) to Recorded   17  Levothyroxine Sodium 112 MCG Oral Tablet; TAKE 1 TABLET DAILY MONDAY THROUGH    SATURDAY  TAKE 2 TABLETS ON SUNDAY; Therapy: 69MUI1330 to (Evaluate:17Oct2017)  Requested for: 20Apr2017; Last    Rx:31Doh6972 Ordered   18  Losartan Potassium 50 MG Oral Tablet; TAKE 1 TABLET DAILY; Therapy: 29PNV7169 to (Madeleine Elise)  Requested for: 45PCZ9287; Last    Rx:37Not1149 Ordered   19  MetFORMIN HCl  MG Oral Tablet Extended Release 24 Hour; take 2 tablets twice    daily with meals;     Therapy: 10Dfq9547 to (Evaluate:77Fdq4507)  Requested for: 25EGR7722; Last    Rx:02Jun2017; Status: ACTIVE - Renewal Denied Ordered   20  Metoprolol Tartrate 25 MG Oral Tablet; Take 1 tablet by mouth daily; Therapy: 22Erx0074 to (Donel Grapes)  Requested for: 23VGR1182; Last    Rx:66Pzc2106 Ordered   21  OLANZapine 5 MG Oral Tablet; TAKE 1 TABLET AT BEDTIME; Therapy: 57PQM9049 to (Cooper Poet)  Requested for: 71LYH0391; Last    Rx:97Khb5234 Ordered   22  Ondansetron 4 MG Oral Tablet Disintegrating; ALLOW 1 TABLET TO DISSOLVE ON    TONGUE TWICE DAILY AS NEEDED FOR NAUSEA; Therapy: 44QHO3033 to (Evaluate:26Sep2017)  Requested for: 09Zow0024; Last    Rx:14Sep2017 Ordered   23  OneTouch Ultra Blue In [employer]; USE 1 STRIP Twice daily; Therapy: 86PLO2635 to (Rosalba Brandon)  Requested for: 41LIO8440; Last    Rx:03Jan2017 Ordered   24  ProAir  (90 Base) MCG/ACT Inhalation Aerosol Solution; INHALE 2 PUFFS    EVERY 4-6 HOURS AS NEEDED; Therapy: 20EED1774 to (Last Rx:04Feb2016)  Requested for: 18Cem2567 Ordered   25  Topiramate 100 MG Oral Tablet; TAKE 1 TABLET TWICE A DAY  ONE IN AM AND ONE IN    PM;    Therapy: 95IVH6168 to (Evaluate:13Jan2018)  Requested for: 26Tej6516; Last    Rx:64Vmi0719 Ordered   26  Toujeo SoloStar 300 UNIT/ML Subcutaneous Solution Pen-injector; 50 units; Therapy: 60Ruj6306 to (Last Rx:24Oct2016) Ordered   27  TraMADol HCl - 50 MG Oral Tablet; TAKE 1 - 2 TABLETS 4 TIMES DAILY AS NEEDED; Therapy: 40NUN6738 to (Evaluate:14Oct2017)  Requested for: 80Uti7364; Last    Rx:05Qcr9618 Ordered   28  Turmeric TABS; 1 Tab daily; Therapy: (Recorded:24Oct2016) to Recorded   29  Venlafaxine HCl  MG Oral Capsule Extended Release 24 Hour; take 1 capsule by    mouth daily; Therapy: 20CJS7599 to (Evaluate:13Feb2018)  Requested for: 02Sep2017; Last    Rx:17Aug2017 Ordered   30   Ventolin  (90 Base) MCG/ACT Inhalation Aerosol Solution; inhale 1 to 2 puffs    every 4 to 6 hours as needed; Therapy: 96Dbt1135 to (Last Rx:44Mir3897)  Requested for: 47Uib6874; Status:    ACTIVE - Retrospective By Protocol Authorization Ordered   31  Zolpidem Tartrate 10 MG Oral Tablet; TAKE 1 TABLET AT BEDTIME AS NEEDED; Therapy: 91Hub9516 to (Evaluate:14Oct2017)  Requested for: 68Isy8141; Last    Rx:35Okx9431 Ordered    Allergies  1  Adhesive Tape TAPE   2  Lexapro TABS    Vitals  Signs   Recorded: 90XKN6343 09:11AM   Heart Rate: 113  Respiration: 16  Systolic: 806  Diastolic: 74  Height: 5 ft 2 in  Weight: 169 lb   BMI Calculated: 30 91  BSA Calculated: 1 78  O2 Saturation: 98    Physical Exam    Constitutional   General appearance: Abnormal   overweight  Eyes   Ophthalmoscopic examination: Vision is grossly normal  Gross visual field testing by confrontation shows no abnormalities  EOMI in both eyes  Conjunctivae clear  Eyelids normal palpebral fissures equal  Orbits exhibit normal position  No discharge from the eyes  PERRL  Musculoskeletal   Gait and station: Normal gait, stance and balance  Muscle strength: Abnormal  -- (Mild proximal muscle weakness t/o with poor effort, no give-way weakness)   Motor Strength:  no pronator drift on the right  -- no pronator drift on the left  Strength examination:   Muscle tone: No atrophy, abnormal movements, flaccidity, cogwheeling or spasticity  Neurologic   Language: Names objects, able to repeat phrases and speaks spontaneously  Language: The evaluation was normal    2nd cranial nerve: Normal     3rd, 4th, and 6th cranial nerves: Normal     5th cranial nerve: Normal     7th cranial nerve: Abnormal  -- mild left nasolabial flattening  8th cranial nerve: Normal     9th cranial nerve: Normal     11th cranial nerve: Normal     12th cranial nerve: Normal     Sensation: Abnormal  -- (Hypersensitivity to temp at the left dorsal wrist and palm, also in the left V2 and V3 distributions   LEs intact to all modalities)   Reflexes: Abnormal   Deep tendon reflexes: 1+ right ankle jerk-- and-- 1+ left ankle jerk2+ right biceps,-- 2+ left biceps,-- 2+ right triceps,-- 2+ left triceps,-- 2+ right brachioradialis,-- 2+ left brachioradialis,-- 2+ right patella,-- 2+ left patella,-- no ankle clonus on the right-- and-- no ankle clonus on the left  Superficial/Primitive Reflexes: Babinski reflex absent on the right-- and-- Babinski reflex absent on the left  Coordination: Normal     Mood and affect: Abnormal   Mood and Affect: depressed-- and-- tearful  Future Appointments    Date/Time Provider Specialty Site   01/26/2018 10:45 AM Asha Amanda [facility] Neurology [facility]  [facility]   10/26/2017 12:00 PM Leyla Foster MD Neurology [facility]   16/17/3095 65:76 PM Maye Luu DO Family Medicine [facility]     Signatures   Electronically signed by :  Vaughn Du [facility]; Sep 27 2017  9:11PM EST                       (Author)    Electronically signed by : Jordin Carcamo MD; Oct  3 2017 11:13AM EST                       (Co-author)

## 2017-10-27 NOTE — PROGRESS NOTES
Chief Complaint  f/u for botox injections for chronic migraine      Current Meds   1  ALPRAZolam 0 25 MG Oral Tablet; take 1 tablet 3 times a day as needed; Therapy: 97Chq2513 to (Evaluate:2017)  Requested for: 60Poh2529; Last   Rx:25Fbf8385 Ordered   2  Aspirin 325 MG Oral Tablet; TAKE 1 TABLET DAILY; Therapy: 36NVW6118 to Recorded   3  Atorvastatin Calcium 40 MG Oral Tablet; TAKE 1 TABLET DAILY; Therapy: 80Skg5081 to (Last Rx:17Gms3200)  Requested for: 60Hwo2475 Ordered   4  Botox 200 UNIT Injection Solution Reconstituted; INJECT UP  UNITS ONCE   EVERY 3 MONTHS IN OFFICE; Therapy: 2017 to (Last Rx:2017) Ordered   5  BuPROPion HCl ER (XL) 150 MG Oral Tablet Extended Release 24 Hour; TAKE 1   TABLET DAILY; Therapy: 51GQD6500 to (Evaluate:2018)  Requested for: 14OKO8979; Last   Rx:2017 Ordered   6  Cyanocobalamin 1000 MCG/ML Injection Solution; Inject 1 ml every 2 months; Therapy: 18WYU0222 to (Last Rx:95Ehm4789)  Requested for: 27Obc6135 Ordered   7  Cyclobenzaprine HCl - 10 MG Oral Tablet; 1 tab qAM and 1 tab qHS; Therapy: 67IUV1790 to ([phone])  Requested for: 48Igt9710; Last   Rx:88Llq9996 Ordered   8  Dicyclomine HCl - 20 MG Oral Tablet; TAKE 1 TABLET EVERY 6 HOURS AS NEEDED; Therapy: 39GJN5370 to ([phone])  Requested for: 2016; Last   Rx:2016 Ordered   9  Divalproex Sodium 250 MG Oral Tablet Delayed Release; take 2 tablets by mouth at   bedtime; Therapy: 59ATP3252 to ([id number])  Requested for: 32Vyi5172; Last   Rx:47Hhm6017 Ordered   10  Fenofibrate 48 MG Oral Tablet; TAKE 1 TABLET DAILY; Therapy: 91MXO5470 to (Chikis Gutierrez)  Requested for: 89IGD1765; Last    Rx:2016; Status: ACTIVE - Renewal Denied Ordered   11  Folic Acid 148 MCG Oral Tablet; TAKE 1 TABLET DAILY AS DIRECTED; Therapy: 15CVR8604 to (Evaluate:2017)  Requested for: 53MJO3658; Last    Rx:2016 Ordered   12   FreeStyle Lite Test In Vitro Strip; use as directed; Therapy: 17KKC0255 to (Last Rx:98Mof0965) Ordered   13  Gabapentin 400 MG Oral Capsule; TAKE 1-2 CAPSULES 3 TIMES DAILY; Therapy: 98OAJ1245 to (Evaluate:10Jan2018)  Requested for: 66YZN0847; Last    Rx:12Oct2017 Ordered   15  HumaLOG KwikPen 200 UNIT/ML Subcutaneous Solution Pen-injector; Inject SQ 15    units before each meal;    Therapy: 99CMN4065 to (Last Rx:69Rqy5066) Ordered   15  Ketorolac Tromethamine 10 MG Oral Tablet; TAKE 1 TABLET EVERY 8 HOURS AS    NEEDED FOR PAIN  DON'T TAKE FOR MORE THAN 5 DAYS; Therapy: 10RTL9791 to (Evaluate:81Vhb3172)  Requested for: 22NYK2726; Last    Rx:44Gmg2299 Ordered   16  Lantus SoloStar 100 UNIT/ML Subcutaneous Solution Pen-injector; Inject SQ 50 units at    bedtime; Therapy: (Recorded:26Jyr8270) to Recorded   17  Levothyroxine Sodium 112 MCG Oral Tablet; TAKE 1 TABLET DAILY MONDAY THROUGH    SATURDAY  TAKE 2 TABLETS ON SUNDAY; Therapy: 75JWY2167 to (Evaluate:17Oct2017)  Requested for: 56Iyz1311; Last    Rx:06Ost2541 Ordered   18  Losartan Potassium 50 MG Oral Tablet; TAKE 1 TABLET DAILY; Therapy: 72WZK1622 to (Stoney Marc)  Requested for: 10XLD9331; Last    Rx:09May2017 Ordered   19  MetFORMIN HCl  MG Oral Tablet Extended Release 24 Hour; take 2 tablets twice    daily with meals; Therapy: 45Gzd3381 to (Evaluate:56Tji3434)  Requested for: 24Yvo2913; Last    Rx:02Jun2017; Status: ACTIVE - Renewal Denied Ordered   20  Metoprolol Tartrate 25 MG Oral Tablet; Take 1 tablet by mouth daily; Therapy: 59Lsw2573 to (Stoney Marc)  Requested for: 39EMZ8340; Last    Rx:09May2017 Ordered   21  OLANZapine 5 MG Oral Tablet; TAKE 1 TABLET AT BEDTIME; Therapy: 46GFS8121 to (Nora Oreilly)  Requested for: 52MAK6309; Last    Rx:18Sqd7832 Ordered   22  Ondansetron 4 MG Oral Tablet Disintegrating; ALLOW 1 TABLET TO DISSOLVE ON    TONGUE TWICE DAILY AS NEEDED FOR NAUSEA;     Therapy: 84PRK3947 to (Evaluate:15Oct2017) Requested for: 63LCA3917; Last    Rx:03Oct2017 Ordered   23  OneTouch Ultra Blue In [employer]; USE 1 STRIP Twice daily; Therapy: 58JHN3935 to (Inderjit Keller)  Requested for: 13WML3227; Last    Rx:03Jan2017 Ordered   24  ProAir  (90 Base) MCG/ACT Inhalation Aerosol Solution; INHALE 2 PUFFS    EVERY 4-6 HOURS AS NEEDED; Therapy: 68WGV9111 to (Last Rx:33Fzj1313)  Requested for: 92Xaq9362 Ordered   25  Topiramate 100 MG Oral Tablet; TAKE 1 TABLET TWICE A DAY  ONE IN AM AND ONE IN    PM;    Therapy: 49RLB6804 to (Evaluate:13Jan2018)  Requested for: 43Eal3162; Last    Rx:74Jij7171 Ordered   26  Topiramate 25 MG Oral Tablet; 1 tab qHS x 5 days, then 2 tabs qhs (with 100 mg tab    qhs); Therapy: 24XBT4804 to (DBOUXSES:12XWX1485)  Requested for: 31CED5681; Last    Rx:93Mbi6971 Ordered   27  Toujeo SoloStar 300 UNIT/ML Subcutaneous Solution Pen-injector; 50 units; Therapy: 91Ljd8768 to (Last Rx:24Oct2016) Ordered   28  TraMADol HCl - 50 MG Oral Tablet; TAKE 1 - 2 TABLETS 4 TIMES DAILY AS NEEDED; Therapy: 58LLL1477 to (Evaluate:14Oct2017)  Requested for: 81Baa7143; Last    Rx:20Exa2178 Ordered   29  Turmeric TABS; 1 Tab daily; Therapy: (Recorded:24Oct2016) to Recorded   30  Venlafaxine HCl  MG Oral Capsule Extended Release 24 Hour; take 1 capsule by    mouth daily; Therapy: 16PLK0117 to (Evaluate:26Mar2018)  Requested for: 29HTU2369; Last    Rx:18Dto2687 Ordered   31  Ventolin  (90 Base) MCG/ACT Inhalation Aerosol Solution; inhale 1 to 2 puffs    every 4 to 6 hours as needed; Therapy: 50Oli8613 to (Last Rx:84Juu8163)  Requested for: 02Qes6255; Status:    ACTIVE - Retrospective By Protocol Authorization Ordered   32  Zolpidem Tartrate 10 MG Oral Tablet; TAKE 1 TABLET AT BEDTIME AS NEEDED; Therapy: 12Apr2011 to (Evaluate:14Oct2017)  Requested for: 97Tkf5993; Last    Rx:54Qfj7989 Ordered    Allergies  1  Adhesive Tape TAPE   2   Sapphire 21   Recorded: 51PRQ9970 12: 05PM   Temperature 98 F   Heart Rate 838   Systolic 749   Diastolic 95     Procedure  Procedure: Headache botox injection  Indication: Chronic migraine headache  Risks, benefits and alternatives were discussed with the patient  We discussed possible complications, including infection,-- bleeding-- and-- allergic reaction  The site was prepped with an alcohol swab  Anesthesia:   Procedure Note:   200 units --Mml of Botox-A and    5 unit(s) was injected into the procerus muscle  5 unit(s) was injected into the  right  muscle  5 unit(s) was injected into the  left  muscle  10 unit(s) was injected into the  right frontalis muscle  10 unit(s) was injected into the  left frontalis muscle  20 unit(s) was injected into the  right temporalis muscle  20 unit(s) was injected into the  left temporalis muscle  15 unit(s) was injected into the  right occipitalis muscle  15 unit(s) was injected into the  left occipitalis muscle  10 unit(s) was injected into the  right cervical paraspinal muscle  10 unit(s) was injected into the  left cervical paraspinal muscle  15 unit(s) was injected into the  right trapezius muscle  15 unit(s) was injected into the  left trapezius muscle  A total of 155 unitsunits were used  A total of 45 unitsunits were discarded  Botox Lot:  Lot number: [id number] -- Expiration date: 05 2020  Complications:  there were no complications  100 units/ 2 cc saline x2      Assessment  1  Chronic migraine without aura (346 70) (G43 709)    Plan  Chronic migraine without aura    · Botox 100 UNIT Injection Solution Reconstituted   Rx By: Radha Hutchison; For: Chronic migraine without aura;  Dose of 200 UNIT; Intramuscular; HEYDI = N; Administered: 10/26/2017 12:16:00 PM; Last Updated By: Marvin Waterman; 10/26/2017 12:16:46 PM   · Follow-up visit in 3 months Evaluation and Treatment  Follow-up  Status: Hold For -  Scheduling  Requested for: 27WNV4077 Ordered; For: Chronic migraine without aura; Ordered By: Rosas Bustamante Performed:  Due: 19VKV1236   · Chemodenervation of muscles innervated by facial, trigeminal, c-spine, accessory  nerves - POC; Status:Need Information - Financial Authorization; Requested  LDW:[id number];    Perform: In Office; 30-11-62-95; Ordered; For:Chronic migraine without aura; Ordered By:Radha Bryson;     Future Appointments    Date/Time Provider Specialty Site   01[facility]26/2018 10:45 [facility] [address]   80/54/6251 71:76 PM Julian[facility]     Signatures   Electronically signed by : Shanika Banuelos MD; Oct 26 2017 12:23PM EST                       (Author)

## 2017-11-03 ENCOUNTER — GENERIC CONVERSION - ENCOUNTER (OUTPATIENT)
Dept: OTHER | Facility: OTHER | Age: 47
End: 2017-11-03

## 2017-11-20 ENCOUNTER — GENERIC CONVERSION - ENCOUNTER (OUTPATIENT)
Dept: OTHER | Facility: OTHER | Age: 47
End: 2017-11-20

## 2018-01-09 NOTE — PROGRESS NOTES
Plan    1  DSMT/MNT Time Record; Status:Complete;   Done: 04TNB0292 03:41PM    Discussion/Summary    PATIENT EDUCATION RECORD   Indication for Services: hypertension, type 2 Diabetes Mellitus, hyperlipidemia and obesity  She is ready to learn  She has emotional  barriers to learning  Diabetes Disease Process:   She understands the pathophysiology of diabetes: Method: Instruction  Response: Verbalizes Understanding   Discussed patient's type of diabetes: Method: Instruction  Response: Verbalizes Understanding   Discussed diagnosis criteria: Method: Instruction  Response: Verbalizes Understanding   Discussed treatment goals: Method: Instruction  Response: Verbalizes Understanding   Discussed benefits of control: Method: Instruction  Response: Verbalizes Understanding   Discussed treatment options: Method: Instruction  Response: Verbalizes Understanding   Healthy Eating:   Discussed general nutrition topics: Method: Instruction and Handout  Response: Verbalizes Understanding   Being Active:   Stated the benefits of exercise: Method: Instruction  Response: Verbalizes Understanding   Discussed importance of managing body weight: Method: Instruction  Response: Verbalizes Understanding   Her blood glucose targets are: Pre-meal target <110, Post-meal target <140 and HS target   Monitoring:   Discussed target blood glucose ranges: Method: Instruction and Handout  Response: Verbalizes Understanding  Discussed target hemoglobin A1c: Method: Instruction  Response: Verbalizes Understanding  Discussed proper stick techniques: Method: Instruction and Demonstration  Response: Verbalizes Understanding  Discussed testing times: Method: Instruction and Handout  Response: Verbalizes Understanding  Discussed meter : Method: Instruction and Demonstration  Response: Verbalizes Understanding  Discussed reporting of readings to M D : Method: Instruction and Handout  Response: Verbalizes Understanding     She was given a One Touch Flex meter  Discussed Insulin Types  Method: Instruction  Taking Medication:   She is taking  biguanides  Response: [facility] [employer]  She is taking   She is taking insulin Lantus 50 U hs, Humalog 20 U qac  Discussed onset, peak, and duration of insulin  Method: Instruction  Response: [facility] [employer]  Discussed basal-bolus concept  Method: Instruction  Response: [facility] [employer]  She was given Fast 15 List and Hypoglycemia Tear Sheet   Problem Solving: She is on medications that cause hypoglycemia, She is able to state the symptoms, prevention, and treatment of hypoglycemia   Hypoglycemia: Stated definition and causes: Method: Instruction and Handout  Response: Verbalizes Understanding   Described signs and symptoms: Method: Instruction and Handout  Response: Verbalizes Understanding   Discussed prevention: Method: Instruction and Handout  Response: Verbalizes Instruction   Discussed treatment: Method: Instruction and Handout  Response: [employer] when to notify physician and when to call EMS: Method: Instruction and Handout  Response: Verbalizes Instruction   Hyperglycemia: Stated definition and causes: Method: Instruction and Handout  Response: Verbalizes Understanding   Described signs and symptoms: Method: Instruction and Handout  Response: Verbalizes Instruction   Discussed prevention of illness: Method: Instruction and Handout  Response: Verbalizes Understanding   Reducing Risk:   Discussed long term complications- prevention, assessment, and monitoring  Method: Instruction  Response: [facility] [employer]  Healthy Coping Class:   Identified lifestyle behaviors that need to change: Method: Instruction  Response: Verbalizes Understanding   Discussed motivation to change: Method: Instruction  Response: Verbalizes Understanding   Identified goals for behavior change: Method: Instruction  Response: Verbalizes Understanding   Discussed strategies for change: Method: Instruction  Response: Verbalizes Understanding   Education Plan/Path:  She needs an individual consultation  Recommended patient see: RD   She was given the following educational materials: Know Your Blood Glucose Numbers, The 15/15 Rule for Treating Low Blood Sugar, Blood Glucose Tracker, Diabetes Guidelines and Hyperglycemia/Hypoglycemia   Chief Complaint  Patient is here today for initial assessment for DSME for T2DM      History of Present Illness  Patient is a 41 y/o female with pmhx uncontrolled T2DM, Obesity  A1c 9 8, BMI 35  Patient stated she has been SMBG but had no logs or meter  Self reported readings: fasting 207-350, bedtime 221  Stated all readings were in 200's  She has been taking her bolus insulin after meals  She was instructed to take 5-15 before meals to coincide with peak BG from carbs eaten  She is also eating cakes between meals when bolus insulin is no longer active  she was educated on basal/bolus insulin therapy; timing of insulin doses; effects of carbs on BG; targets, testing times using paired testing  She was encouraged to exercise for BG and weight control  We discussed label reading and basic carb counting however more detailed education will be given at her MNT appointment  Active Problems    1  Abdominal pain, epigastric (789 06) (R10 13)   2  Analgesic use (V58 69) (Z79 899)   3  Anxiety (300 00) (F41 9)   4  Arthralgia (719 40) (M25 50)   5  Benign essential hypertension (401 1) (I10)   6  Carpal tunnel syndrome of right wrist (354 0) (G56 01)   7  Carpal tunnel syndrome, left (354 0) (G56 02)   8  Cerebral infarction, unspecified (434 91) (I63 9)   9  Cervical radiculopathy (723 4) (M54 12)   10  Chest pain (786 50) (R07 9)   11  Chronic cough (786 2) (R05)   12  Chronic diarrhea (787 91) (K52 9)   13  Chronic fatigue (780 79) (R53 82)   14  Chronic migraine without aura (346 70) (G43 709)   15  Chronic pain syndrome (338 4) (G89 4)   16   CRP elevated (790 95) (R79 82)   17  Degenerative disc disease, cervical (722 4) (M50 30)   18  Depression (311) (F32 9)   19  Diabetes mellitus type II, uncontrolled (250 02) (E11 65)   20  Elevated LFTs (790 6) (R79 89)   21  Fatty liver (571 8) (K76 0)   22  Fibromyalgia (729 1) (M79 7)   23  GERD without esophagitis (530 81) (K21 9)   24  Headache (784 0) (R51)   25  Hyperlipidemia (272 4) (E78 5)   26  Hypothyroidism (244 9) (E03 9)   27  Insomnia (780 52) (G47 00)   28  Intractable chronic common migraine without aura (346 71) (G43 719)   29  Iron deficiency (280 9) (E61 1)   30  Irritable bowel syndrome with diarrhea (564 1) (K58 0)   31  Left hand paresthesia (782 0) (R20 2)   32  Left sided numbness (782 0) (R20 0)   33  Long-term insulin use (V58 67) (Z79 4)   34  Multiple joint pain (719 49) (M25 50)   35  Nausea (787 02) (R11 0)   36  Neck pain (723 1) (M54 2)   37  Nicotine dependence (305 1) (F17 200)   38  Opioid dependence (304 00) (F11 20)   39  Pancreatic cyst (577 2) (K86 2)   40  Pancreatitis (577 0) (K85 90)   41  Paresthesia (782 0) (R20 2)   42  Polyarthritis (716 50) (M13 0)   43  Pyelonephritis (590 80) (N12)   44  Recurrent low back pain (724 2) (M54 5)   45  Right hand pain (729 5) (M79 641)   46  S/P carpal tunnel release (V45 89) (Z98 890)   47  S/P trigger finger release (V45 89) (Z98 890)   48  Sacroiliac pain (724 6) (M53 3)   49  Shortness of breath (786 05) (R06 02)   50  Shoulder pain (719 41) (M25 519)   51  Skin rash (782 1) (R21)   52  Snoring (786 09) (R06 83)   53  Spondylosis of cervical region without myelopathy or radiculopathy (721 0) (M47 812)   54  Stroke Syndrome (436)   55  Swelling of hand joint, right (719 04) (M25 441)   56  TMJ arthralgia (524 62) (M26 629)   57  Transient ischemic attack (TIA) (435 9) (G45 9)   58  Trigger thumb of right hand (727 03) (M65 311)   59  Vitamin B12 deficiency (266 2) (E53 8)   60   Vitamin D deficiency (268 9) (E55 9)    Past Medical History    1  History of Acute hip pain (719 45) (M25 559)   2  History of Acute venous embolism and thrombosis of deep vessels of distal lower   extremity (453 42) (I82 4Z9)   3  Anxiety (300 00) (F41 9)   4  Diabetes mellitus type II, uncontrolled (250 02) (E11 65)   5  History of Encounter for comprehensive diabetic foot examination, type 2 diabetes   mellitus (250 00) (E11 9)   6  History of GERD without esophagitis (530 81) (K21 9)   7  History of High cholesterol (272 0) (E78 00)   8  History of abdominal pain (V13 89) (Z87 898)   9  History of acute pancreatitis (V12 79) (Z87 19)   10  History of anemia (V12 3) (Z86 2)   11  History of arthritis (V13 4) (Z87 39)   12  History of asthma (V12 69) (Z87 09)   13  History of esophageal reflux (V12 79) (Z87 19)   14  History of migraine (V12 49) (Z86 69)   15  History of nausea and vomiting (V12 79) (Z87 898)   16  History of pulmonary embolism (V12 55) (Z86 711)   17  History of stroke (V12 54) (Z86 73)   18  History of thyroid disease (V12 29) (Z86 39)   19  History of upper respiratory infection (V12 09) (Z87 09)   20  History of Need for pneumococcal vaccination (V03 82) (Z23)   21  History of Numbness (782 0) (R20 0)   22  History of Pulmonary Embolism (V12 51)   23  History of Septicemia (038 9) (A41 9)   24  History of Visit for screening mammogram (V76 12) (Z12 31)    Surgical History    1  History of Cholecystectomy   2  History of Diagnostic Esophagogastroduodenoscopy   3  History of Esophagogastric Fundoplasty   4  History of Esophagogastric Fundoplasty Nissen Fundoplication   5  History of Neuroplasty Decompression Median Nerve At Carpal Tunnel   6  Denied: History of Tonsillectomy With Adenoidectomy    Family History  Mother    1  Family history of Acute Myocardial Infarction (V17 3)   2  Family history of Chronic Kidney Disease (NKF Classification)   3  Family history of Depression   4  Family history of arthritis (V17 7) (Z82 61)   5   Family history of substance abuse (V17 0) (Z81 4)   6  Family history of ulcerative colitis (V18 59) (Z83 79)   7  Family history of Schizophrenia   8  Family history of Type 2 Diabetes Mellitus  Father    5  Family history of Acute Myocardial Infarction (V17 3)   10  Family history of psoriasis (V19 4) (Z84 0)   11  Family history of Gastric Cancer (V16 0)   12  Family history of Stroke Syndrome (V17 1)  Sister    15  Family history of Crohn's disease (V18 59) (Z83 79)  Maternal Grandfather    15  Family history of rheumatoid arthritis (V17 7) (Z82 61)  Family History    15  Denied: Family history of systemic lupus erythematosus    Social History    · Completed 12th grade   · Current every day smoker (305 1) (F17 200)   · Current Every Day Smoker (305 1)   · Denied: Daily Coffee Consumption (___ Cups/Day)   ·    · Never Drank Alcohol   · No alcohol use   · No drug use   · One child   · Unemployed (V62 0) (Z56 0)    Current Meds   1  ALPRAZolam 0 25 MG Oral Tablet; take 1 tablet 3 times a day as needed; Therapy: 93Zht7078 to (Evaluate:16Gjs0047)  Requested for: 34VOJ4804; Last   Rx:28Nov2016; Status: ACTIVE - Retrospective By Protocol Authorization Ordered   2  Aspirin 325 MG Oral Tablet; TAKE 1 TABLET DAILY; Therapy: 74VVD8490 to Recorded   3  Atorvastatin Calcium 40 MG Oral Tablet; TAKE 1 TABLET DAILY; Therapy: 54Mqo2967 to (Last Rx:03Uni4706)  Requested for: 34Kgy0442 Ordered   4  Botox 100 UNIT Injection Solution Reconstituted; to be injected by physician in office as   directed; Therapy: 82RTM3600 to (Last Rx:24Cpy6882) Ordered   5  Cyanocobalamin 1000 MCG/ML Injection Solution; Inject 1 ml every 2 months; Therapy: 92MDY6820 to (Last Rx:84Nlu1363)  Requested for: 44Qxy7830 Ordered   6  Dicyclomine HCl - 20 MG Oral Tablet; TAKE 1 TABLET EVERY 6 HOURS AS NEEDED; Therapy: 14ZTM5101 to ([phone])  Requested for: 31Oct2016; Last   Rx:12Qqe4867 Ordered   7   Divalproex Sodium 250 MG Oral Tablet Delayed Release; TAKE 2 TABLET Bedtime; Therapy: 85MKS4950 to (Evaluate:20Mar2017)  Requested for: 32GBA1532; Last   Rx:28Wuo6524 Ordered   8  Fenofibrate 48 MG Oral Tablet; TAKE 1 TABLET DAILY; Therapy: 07CTR5244 to (Evaluate:22Jan2017)  Requested for: 24Oct2016; Last   Rx:24Oct2016 Ordered   9  Folic Acid 663 MCG Oral Tablet; TAKE 1 TABLET DAILY AS DIRECTED; Therapy: 23XKT2962 to (Evaluate:11May2017)  Requested for: 78QFZ3030; Last   Rx:16May2016 Ordered   10  HumaLOG 100 UNIT/ML Subcutaneous Solution; inject 20 units before each meal; Last    Rx:02Aug2016 Ordered   11  HumaLOG KwikPen 200 UNIT/ML Subcutaneous Solution Pen-injector; use as directed; Therapy: 30BGP5784 to (Last Rx:24Oct2016) Ordered   12  Ketorolac Tromethamine 10 MG Oral Tablet; TAKE 1 TABLET EVERY 8 HOURS AS    NEEDED FOR PAIN;    Therapy: 41OYC3688 to (Last Rx:18Nov2016)  Requested for: 82ZLE1056 Ordered   13  Lantus 100 UNIT/ML Subcutaneous Solution; 50 units daily at bedtime  Requested for:    78Cgg5446; Last Rx:02Aug2016 Ordered   14  Levothyroxine Sodium 75 MCG Oral Tablet; TAKE 1 TABLET DAILY; Therapy: 29KNQ2191 to (Katharina Patiño)  Requested for: 13NVL1407; Last    Rx:28Nov2016; Status: ACTIVE - Retrospective By Protocol Authorization Ordered   15  Levoxyl 112 MCG Oral Tablet; TAKE 1 TABLET DAILY; Therapy: 17RDX0429 to (Evaluate:22Apr2017)  Requested for: 24Oct2016; Last    Rx:24Oct2016 Ordered   16  LORazepam 1 MG Oral Tablet; TAKE 1 TABLET 1 HOUR BEFORE MRI  MAY REPEAT    ONCE 15 MINUTES BEFORE MRI; Therapy: 79OBT9432 to (Evaluate:41Llr4049); Last Rx:38Lrq3093 Ordered   17  Losartan Potassium 50 MG Oral Tablet; TAKE 1 TABLET DAILY; Therapy: 92TGF5042 to (Evaluate:11Mar2017)  Requested for: 88GSB9185; Last    Rx:16Mar2016 Ordered   18  Lyrica 200 MG Oral Capsule; TAKE 1 CAPSULE 3 times daily;     Therapy: 73YPF7288 to ([facility])  Requested for: 28Nov2016; Last    Rx:28Nov2016; Status: ACTIVE - Retrospective By Protocol Authorization Ordered   19  MetFORMIN HCl  MG Oral Tablet Extended Release 24 Hour; TAKE 2 TABLET    DAILY twice daily with meals; Therapy: 79Frk4557 to (Evaluate:28Mar2017)  Requested for: 63RVX6987; Last    Rx:56Hxl2155 Ordered   20  Metoprolol Tartrate 25 MG Oral Tablet; Take 1 tablet by mouth daily; Therapy: 67Sqi0503 to (Evaluate:23Jul2017)  Requested for: 26Hru1199; Last    Rx:28Sqm6321 Ordered   21  OLANZapine 5 MG Oral Tablet; TAKE 1 TABLET AT BEDTIME; Therapy: 87HFR7279 to (Evaluate:23Mar2017)  Requested for: 88FIA2542; Last    Rx:23Nov2016 Ordered   22  Omeprazole 20 MG Oral Capsule Delayed Release; take one capsule by mouth daily; Therapy: 16UID0312 to (Evaluate:11Mar2017)  Requested for: 14HYH4151; Last    Rx:16Mar2016 Ordered   23  Ondansetron 4 MG Oral Tablet Dispersible; ALLOW 1 TABLET TO DISSOLVE ON    TONGUE TWICE DAILY AS NEEDED FOR NAUSEA; Therapy: 01IKQ1193 to ()  Requested for: 80CZC0846; Last    Rx:60Rbt0713 Ordered   24  OneTouch Ultra Blue In [employer]; USE 1 STRIP Twice daily; Therapy: 67UWK4619 to (Evaluate:19Oct2017)  Requested for: 02AFP9979; Last    Rx:05Lxr9689 Ordered   25  OneTouch Ultra Blue In [employer]; use as directed; Therapy: 58Pey2898 to (Evaluate:72Uxb9379)  Requested for: 84GCM0617; Last    Rx:16Nov2016 Ordered   26  ProAir  (90 Base) MCG/ACT Inhalation Aerosol Solution; INHALE 2 PUFFS    EVERY 4-6 HOURS AS NEEDED; Therapy: 21KXE0995 to (Last Rx:01Vue9008)  Requested for: 79Ats6907 Ordered   27  TiZANidine HCl - 2 MG Oral Capsule; 1 tab TID prn spasm or headache; Therapy: 95Nca8856 to (Evaluate:30Jan2017)  Requested for: 67CLM8617; Last    Rx:01Nov2016 Ordered   28  Topiramate 100 MG Oral Tablet (Topamax); TAKE 1 TABLET QAM;    Therapy: 13BJH8093 to (Evaluate:88Juh2153)  Requested for: 69XUV0294; Last    Rx:14Nov2016 Ordered   29  Topiramate 100 MG Oral Tablet; TAKE 1 TABLET AT BEDTIME;     Therapy: 37XAJ0840 to (Evaluate:24Apr2017)  Requested for: 19Gqa9169; Last    Rx:02Qhq3518 Ordered   30  Toujeo SoloStar 300 UNIT/ML Subcutaneous Solution Pen-injector; 50 units; Therapy: 81Qyi3214 to (Last Rx:24Oct2016) Ordered   31  TraMADol HCl - 50 MG Oral Tablet; TAKE 1 OR 2 TABLETS FOUR TIMES A DAY AS    NEEDED; Therapy: 71UEP0164 to ([place])  Requested for: 15PCO5209; Last    Rx:28Nov2016; Status: ACTIVE - Retrospective By Protocol Authorization Ordered   32  Turmeric TABS; 1 Tab daily; Therapy: (Recorded:24Oct2016) to Recorded   33  Venlafaxine HCl ER 75 MG Oral Capsule Extended Release 24 Hour; TAKE 1 CAPSULE    DAILY; Therapy: 06WSB6681 to (Evaluate:18Mar2017)  Requested for: 02UPT0528; Last    Rx:46Iux8637 Ordered   34  Ventolin  (90 Base) MCG/ACT Inhalation Aerosol Solution; inhale 1 to 2 puffs    every 4 to 6 hours as needed; Therapy: 12Dec2013 to (Last Rx:74Hnv7876)  Requested for: 06Jva2791; Status:    ACTIVE - Retrospective By Protocol Authorization Ordered   35  Xifaxan 550 MG Oral Tablet; TAKE 1 TABLET 3 times daily; Therapy: 96GBE6504 to (Renew:21Nov2016)  Requested for: 76XEK1665; Last    Rx:07Nov2016 Ordered   39  Zolpidem Tartrate 10 MG Oral Tablet; TAKE 1 TABLET AT BEDTIME; Therapy: 12Apr2011 to (Evaluate:01Gxr3777)  Requested for: 20MTT4093; Last    Rx:28Nov2016; Status: ACTIVE - Retrospective By Protocol Authorization Ordered    Allergies    1  Adhesive Tape TAPE   2  Lexapro TABS    End of Encounter Meds    1  ALPRAZolam 0 25 MG Oral Tablet; take 1 tablet 3 times a day as needed; Therapy: 12Apr2011 to (Evaluate:28Dec2016)  Requested for: 97EQG6583; Last   Rx:28Nov2016; Status: ACTIVE - Retrospective By Protocol Authorization Ordered   2  LORazepam 1 MG Oral Tablet; TAKE 1 TABLET 1 HOUR BEFORE MRI  MAY REPEAT   ONCE 15 MINUTES BEFORE MRI; Therapy: 62QKZ0766 to (Evaluate:52Wyb9379); Last Rx:06Rdd2254 Ordered    3   TraMADol HCl - 50 MG Oral Tablet; TAKE 1 OR 2 TABLETS FOUR TIMES A DAY AS   NEEDED; Therapy: 12EYO0834 to (Sharon Owen)  Requested for: 88HOP7041; Last   Rx:28Nov2016; Status: ACTIVE - Retrospective By Protocol Authorization Ordered    4  Losartan Potassium 50 MG Oral Tablet; TAKE 1 TABLET DAILY; Therapy: 35MJH1310 to (Evaluate:11Mar2017)  Requested for: 30PLA9646; Last   Rx:16Mar2016 Ordered   5  Metoprolol Tartrate 25 MG Oral Tablet; Take 1 tablet by mouth daily; Therapy: 28Hxq7503 to (Evaluate:20Jly4416)  Requested for: 43Cvv2537; Last   Rx:64Sjm7426 Ordered    6  MetFORMIN HCl  MG Oral Tablet Extended Release 24 Hour; TAKE 2 TABLET   DAILY twice daily with meals; Therapy: 65Mue1896 to (Evaluate:28Mar2017)  Requested for: 01EVW1497; Last   Rx:31Cey1618 Ordered    7  Aspirin 325 MG Oral Tablet; TAKE 1 TABLET DAILY; Therapy: 43GYT7967 to Recorded    8  Lyrica 200 MG Oral Capsule; TAKE 1 CAPSULE 3 times daily; Therapy: 26IWG0258 to (Jose A Larios)  Requested for: 28Nov2016; Last   Rx:28Nov2016; Status: ACTIVE - Retrospective By Protocol Authorization Ordered    9  Ventolin  (90 Base) MCG/ACT Inhalation Aerosol Solution; inhale 1 to 2 puffs   every 4 to 6 hours as needed; Therapy: 81Usy3569 to (Last Rx:60Ttv6822)  Requested for: 32Iqk1488; Status:   ACTIVE - Retrospective By Protocol Authorization Ordered    10  Xifaxan 550 MG Oral Tablet; TAKE 1 TABLET 3 times daily; Therapy: 01NUA8839 to (Renew:21Nov2016)  Requested for: 22CST3763; Last    Rx:07Nov2016 Ordered    11  Botox 100 UNIT Injection Solution Reconstituted; to be injected by physician in office as    directed; Therapy: 85FEG0200 to (Last Rx:80Fxt3924) Ordered   12  Divalproex Sodium 250 MG Oral Tablet Delayed Release; TAKE 2 TABLET Bedtime; Therapy: 97WGE0151 to (Evaluate:20Mar2017)  Requested for: 17SKY5123; Last    Rx:66Zvt7385 Ordered   13  OLANZapine 5 MG Oral Tablet; TAKE 1 TABLET AT BEDTIME;     Therapy: 81QFT4613 to (Evaluate:23Mar2017)  Requested for: 34ZNL2446; Last    Rx:23Nov2016 Ordered   14  Topiramate 100 MG Oral Tablet; TAKE 1 TABLET AT BEDTIME; Therapy: 94JLN9674 to (Evaluate:24Apr2017)  Requested for: 94Tfj6182; Last    Rx:14Ihi0416 Ordered    15  Topiramate 100 MG Oral Tablet (Topamax); TAKE 1 TABLET QAM;    Therapy: 00QHO7731 to (Evaluate:02Qfg5484)  Requested for: 01ZFU1974; Last    Rx:14Nov2016 Ordered    16  TiZANidine HCl - 2 MG Oral Capsule; 1 tab TID prn spasm or headache; Therapy: 03Dbx4789 to (Evaluate:30Jan2017)  Requested for: 56MCS8044; Last    Rx:01Nov2016 Ordered    17  Venlafaxine HCl ER 75 MG Oral Capsule Extended Release 24 Hour; TAKE 1 CAPSULE    DAILY; Therapy: 67QVM4163 to (Evaluate:18Mar2017)  Requested for: 96XCZ7598; Last    Rx:17Keb0365 Ordered    18  HumaLOG 100 UNIT/ML Subcutaneous Solution; inject 20 units before each meal; Last    Rx:02Aug2016 Ordered   19  HumaLOG KwikPen 200 UNIT/ML Subcutaneous Solution Pen-injector; use as directed; Therapy: 03GCD3962 to (Last Rx:24Oct2016) Ordered   20  Lantus 100 UNIT/ML Subcutaneous Solution; 50 units daily at bedtime  Requested for:    31Kxd0401; Last Rx:84Eeu9842 Ordered   21  OneTouch Ultra Blue In [employer]; USE 1 STRIP Twice daily; Therapy: 89FJN3014 to (Evaluate:19Oct2017)  Requested for: 78DYF9742; Last    Rx:24Oct2016 Ordered   22  OneTouch Ultra Blue In [employer]; use as directed; Therapy: 58Sfx8051 to (Evaluate:44Pyk8800)  Requested for: 59SGM8957; Last    Rx:16Nov2016 Ordered   23  Toujeo SoloStar 300 UNIT/ML Subcutaneous Solution Pen-injector; 50 units; Therapy: 89Twl0328 to (Last Rx:24Oct2016) Ordered    24  Fenofibrate 48 MG Oral Tablet; TAKE 1 TABLET DAILY; Therapy: 95KIQ3890 to (Evaluate:22Jan2017)  Requested for: 05Yzw9604; Last    Rx:24Oct2016 Ordered    25  Omeprazole 20 MG Oral Capsule Delayed Release; take one capsule by mouth daily;     Therapy: 83SEB7808 to (Evaluate:11Mar2017)  Requested for: 60FRD5438; Last    Rx:16Mar2016 Ordered    26  Ketorolac Tromethamine 10 MG Oral Tablet; TAKE 1 TABLET EVERY 8 HOURS AS    NEEDED FOR PAIN;    Therapy: 37ZRW1770 to (Last Rx:58Fmg8201)  Requested for: 23WHN7679 Ordered    27  Atorvastatin Calcium 40 MG Oral Tablet; TAKE 1 TABLET DAILY; Therapy: 55Uxt8789 to (Last Rx:48Vrc2687)  Requested for: 46Lqg1209 Ordered    28  Levothyroxine Sodium 75 MCG Oral Tablet; TAKE 1 TABLET DAILY; Therapy: 25PRV8719 to (Marshal Acosta)  Requested for: 56VVF0556; Last    Rx:28Nov2016; Status: ACTIVE - Retrospective By Protocol Authorization Ordered   29  Levoxyl 112 MCG Oral Tablet; TAKE 1 TABLET DAILY; Therapy: 99WRA0165 to (Evaluate:05Ytx1782)  Requested for: 24Oct2016; Last    Rx:21Qvs2771 Ordered    30  Zolpidem Tartrate 10 MG Oral Tablet; TAKE 1 TABLET AT BEDTIME; Therapy: 44Mot7032 to (Trinh Hansen)  Requested for: 41CZC3502; Last    Rx:28Nov2016; Status: ACTIVE - Retrospective By Protocol Authorization Ordered    31  Cyanocobalamin 1000 MCG/ML Injection Solution; Inject 1 ml every 2 months; Therapy: 33KAZ8001 to (Last Rx:18Ylz8106)  Requested for: 66Tlu7705 Ordered    32  Dicyclomine HCl - 20 MG Oral Tablet; TAKE 1 TABLET EVERY 6 HOURS AS NEEDED; Therapy: 22WGT4239 to (03 17 74 30 53)  Requested for: 41Rqu7251; Last    Rx:17Xwy1772 Ordered    33  Ondansetron 4 MG Oral Tablet Dispersible; ALLOW 1 TABLET TO DISSOLVE ON    TONGUE TWICE DAILY AS NEEDED FOR NAUSEA; Therapy: 71ZTE3693 to (03 17 74 30 53)  Requested for: 02VQZ9584; Last    Rx:27Tif1657 Ordered    34  ProAir  (90 Base) MCG/ACT Inhalation Aerosol Solution; INHALE 2 PUFFS    EVERY 4-6 HOURS AS NEEDED; Therapy: 35CPX5020 to (Last Rx:31Evf1531)  Requested for: 49Aub0168 Ordered    35  Folic Acid 559 MCG Oral Tablet; TAKE 1 TABLET DAILY AS DIRECTED; Therapy: 83DBO4143 to (Evaluate:99Jon3415)  Requested for: 38TQZ6684; Last    Rx:27Nzl4095 Ordered    36  Turmeric TABS; 1 Tab daily;     Therapy: (Steve Zapata) to Recorded    Future Appointments    Date/Time Provider Specialty Site   12/08/2016 09:15 AM John Paul Call, [address] Endocrinology [facility] ENDOCRINOLOGY [facility]   01/19/2017 08:30 AM Loan Fox, [address] Diabetes Educator [facility] ENDOCRINOLOGY [facility]   08/22/2017 01:20 PM ANASTACIO Guzman  Hematology Oncology [facility]   03/15/2017 09:00 AM Derrell Ashby, [facility] Neurology [facility] NE[facility]  Aaron Boone   12/14/2016 09:30 AM Nohemi Brooks MD Neurology [facility] NEUROLOGY ASS  Aaron Boone   12/02/2016 08:30 AM Molly Sanchez MD Gastroenterology Adult  [address] [address]   67/47/0808 68:98 AM Lissett Darby, [address] [place] [address]   01/30/2017 02:10 PM ANASTACIO Miles   Orthopedic Surgery  [facility]     Signatures   Electronically signed by : Karson Corrales RD; Nov 30 2016  3:54PM EST                       (Author)    Electronically signed by : ANASTACIO Koroma ; Dec  1 2016  7:52AM EST

## 2018-01-09 NOTE — MISCELLANEOUS
Message   Recorded as Task   Date: 06/20/2017 11:50 AM, Created By: Zeny Coffman   Task Name: Miscellaneous   Assigned To: [facility],Team   Regarding Patient: Abram Colmenares, Status: Active   Comment:    Chel Miller - 20 Jun 2017 11:50 AM     TASK CREATED  Pt called stating she would like to discuss the spinal stimulator  Pls call pt at [phone]  She said Dr Savanna Benites told her to call back if she still has pain  Sangeeta Quintaan - 20 Jun 2017 11:52 AM     TASK EDITED   Iris Huddleston - 20 Jun 2017 12:39 PM     TASK REASSIGNED: Previously Assigned To Isaac Martinez - 21 Jun 2017 9:59 AM     TASK EDITED  Pt scheduled for SOVS 7/5/17 w/ DG to discuss tx  Active Problems    1  Anxiety (300 00) (F41 9)   2  Arthralgia (719 40) (M25 50)   3  Benign essential hypertension (401 1) (I10)   4  Carpal tunnel syndrome of right wrist (354 0) (G56 01)   5  Carpal tunnel syndrome, left (354 0) (G56 02)   6  Cervical disc disorder with radiculopathy of mid-cervical region (723 4) (M50 120)   7  Cervical dysphagia (787 29) (R13 19)   8  Chest pain (786 50) (R07 9)   9  Chronic cervical pain (723 1,338 29) (M54 2,G89 29)   10  Chronic cervical radiculopathy (723 4) (M54 12)   11  Chronic cough (786 2) (R05)   12  Chronic diarrhea (787 91) (K52 9)   13  Chronic fatigue (780 79) (R53 82)   14  Chronic migraine without aura (346 70) (G43 709)   15  Chronic pain syndrome (338 4) (G89 4)   16  CRP elevated (790 95) (R79 82)   17  Degenerative disc disease, cervical (722 4) (M50 30)   18  Depression (311) (F32 9)   19  Diabetes mellitus type II, uncontrolled (250 02) (E11 65)   20  Elevated LFTs (790 6) (R94 5)   21  Fatty liver (571 8) (K76 0)   22  Fibromyalgia (729 1) (M79 7)   23  GERD without esophagitis (530 81) (K21 9)   24  Headache (784 0) (R51)   25  Hyperlipidemia (272 4) (E78 5)   26  Hypothyroidism (244 9) (E03 9)   27  Insomnia (780 52) (G47 00)   28   Intractable chronic common migraine without aura (346 71) (G43 719)   29  Iron deficiency (280 9) (E61 1)   30  Irritable bowel syndrome with diarrhea (564 1) (K58 0)   31  Left hand paresthesia (782 0) (R20 2)   32  Left sided numbness (782 0) (R20 0)   33  Long-term insulin use (V58 67) (Z79 4)   34  Multiple joint pain (719 49) (M25 50)   35  Nausea (787 02) (R11 0)   36  Nicotine dependence (305 1) (F17 200)   37  Opioid dependence (304 00) (F11 20)   38  Osteoarthritis of spine with radiculopathy, cervical region (721 0) (M47 22)   39  Pancreatic cyst (577 2) (K86 2)   40  Pancreatitis (577 0) (K85 90)   41  Paresthesia (782 0) (R20 2)   42  Polyarthritis (716 50) (M13 0)   43  Polyneuropathy associated with underlying disease (357 4) (G63)   44  Pyelonephritis (590 80) (N12)   45  Recurrent low back pain (724 2) (M54 5)   46  Right hand pain (729 5) (M79 641)   47  S/P carpal tunnel release (V45 89) (Z98 890)   48  S/P trigger finger release (V45 89) (Z98 890)   49  Sacroiliac pain (724 6) (M53 3)   50  Shortness of breath (786 05) (R06 02)   51  Shoulder pain (719 41) (M25 519)   52  Skin rash (782 1) (R21)   53  Snoring (786 09) (R06 83)   54  Spondylosis of cervical region without myelopathy or radiculopathy (721 0) (M47 812)   55  Stroke syndrome (434 91) (I63 9)   56  Swelling of hand joint, right (719 04) (M25 441)   57  Transient ischemic attack (TIA) (435 9) (G45 9)   58  Trigger thumb of right hand (727 03) (M65 311)   59  Type 2 diabetes mellitus with hyperglycemia, with long-term current use of insulin    (250 00,790 29,V58 67) (E11 65,Z79 4)   60  Uncontrolled type 2 diabetes mellitus with diabetic polyneuropathy, with long-term    current use of insulin (250 62,357 2,V58 67) (E11 42,E11 65,Z79 4)   61  Vitamin B12 deficiency (266 2) (E53 8)   62  Vitamin D deficiency (268 9) (E55 9)    Current Meds   1  ALPRAZolam 0 25 MG Oral Tablet; TAKE 1 TABLET THREE TIMES A DAY AS NEEDED;    Therapy: 12Apr2011 to (Evaluate:00Qxd1589) Requested for: 41TXS9389; Last   Rx:06Jun2017; Status: ACTIVE - Retrospective By Protocol Authorization Ordered   2  Aspirin 325 MG Oral Tablet; TAKE 1 TABLET DAILY; Therapy: 51JOX6665 to Recorded   3  Atorvastatin Calcium 40 MG Oral Tablet; TAKE 1 TABLET DAILY; Therapy: 53Dbr0496 to (Last Rx:64Qxq5751)  Requested for: 06Ymy3940 Ordered   4  BuPROPion HCl ER (XL) 150 MG Oral Tablet Extended Release 24 Hour; TAKE 1   TABLET DAILY; Therapy: 47KES7884 to (Evaluate:02Apr2018)  Requested for: 87JXJ5072; Last   Rx:07Apr2017 Ordered   5  Cyanocobalamin 1000 MCG/ML Injection Solution; Inject 1 ml every 2 months; Therapy: 61ULS9562 to (Last Rx:67Qrn8775)  Requested for: 14Qmb3567 Ordered   6  Cyclobenzaprine HCl - 10 MG Oral Tablet; TAKE 1 TABLET AT BEDTIME; Therapy: 19NNF9246 to (Evaluate:98Mjl4572)  Requested for: 21Jun2017; Last   SO:54LMH2706 Ordered   7  Dicyclomine HCl - 20 MG Oral Tablet; TAKE 1 TABLET EVERY 6 HOURS AS NEEDED; Therapy: 56OMN9753 to [phone]  Requested for: 31Oct2016; Last   Rx:31Oct2016 Ordered   8  Divalproex Sodium 250 MG Oral Tablet Delayed Release; take 2 tablets by mouth at   bedtime; Therapy: 26REC0152 to (Evaluate:62Myj2739)  Requested for: 97FBZ6981; Last   Rx:49Vrg4209 Ordered   9  Fenofibrate 48 MG Oral Tablet; TAKE 1 TABLET DAILY; Therapy: 12CWN2159 to (Ernestene Essex)  Requested for: 72HPS1782; Last   Rx:30Nov2016; Status: ACTIVE - Renewal Denied Ordered   10  Folic Acid 494 MCG Oral Tablet; TAKE 1 TABLET DAILY AS DIRECTED; Therapy: 30KZZ8508 to (Evaluate:01Sir6997)  Requested for: 05GXS0874; Last    Rx:46Vaq2858 Ordered   11  FreeStyle Lite Test In [employer]; use as directed; Therapy: 42GDG5123 to (Last Rx:31Pvn0723) Ordered   12  Gabapentin 400 MG Oral Capsule; TAKE 1-2 CAPSULES 3 TIMES DAILY; Therapy: 88RWS3634 to (Evaluate:16Apr2017)  Requested for: 97CFC4618; Last    Rx:17Mar2017 Ordered   13   HumaLOG KwikPen 200 UNIT/ML Subcutaneous Solution Pen-injector; Inject SQ 15    units before each meal;    Therapy: 24Oct2016 to (Last Rx:73Niv5172) Ordered   14  Ketorolac Tromethamine 10 MG Oral Tablet; TAKE 1 TABLET EVERY 8 HOURS AS    NEEDED FOR PAIN  DON'T TAKE FOR MORE THAN 5 DAYS; Therapy: 07MEU6368 to (Evaluate:24Jun2017)  Requested for: 21Jun2017; Last    HK:52KXE6736 Ordered   15  Lantus SoloStar 100 UNIT/ML Subcutaneous Solution Pen-injector; Inject SQ 50 units at    bedtime; Therapy: (Recorded:19Zvx0458) to Recorded   16  Levothyroxine Sodium 112 MCG Oral Tablet; TAKE 1 TABLET DAILY MONDAY    THROUGH SATURDAY  TAKE 2 TABLETS ON SUNDAY; Therapy: 60SXW1641 to (Evaluate:17Oct2017)  Requested for: 20Apr2017; Last    Rx:20Apr2017 Ordered   17  Losartan Potassium 50 MG Oral Tablet; TAKE 1 TABLET DAILY; Therapy: 75ETT2018 to (Nasim Lan)  Requested for: 80QIN3586; Last    Rx:09May2017 Ordered   18  MetFORMIN HCl  MG Oral Tablet Extended Release 24 Hour; take 2 tablets twice    daily with meals; Therapy: 94Vbs5674 to (Evaluate:64Yed0744)  Requested for: 25TFH6965; Last    Rx:02Jun2017 Ordered   19  Metoprolol Tartrate 25 MG Oral Tablet; Take 1 tablet by mouth daily; Therapy: 25Tbn0260 to (Nasim Lan)  Requested for: 54ITL1576; Last    Rx:09May2017 Ordered   20  OLANZapine 5 MG Oral Tablet; TAKE 1 TABLET AT BEDTIME; Therapy: 36HOE6011 to (Evaluate:99Elt1055)  Requested for: 15RPK7222; Last    Rx:25Mar2017 Ordered   21  Ondansetron 4 MG Oral Tablet Disintegrating; ALLOW 1 TABLET TO DISSOLVE ON    TONGUE TWICE DAILY AS NEEDED FOR NAUSEA; Therapy: 99GZA7708 to (Evaluate:18Jun2017)  Requested for: 34MHS9265; Last    Rx:06Jun2017; Status: ACTIVE - Retrospective By Protocol Authorization Ordered   22  OneTouch Ultra Blue In [employer]; USE 1 STRIP Twice daily; Therapy: 89GVZ3403 to (Kirsty Gottlieb)  Requested for: 77DBH5712; Last    Rx:03Jan2017 Ordered   23   ProAir  (90 Base) MCG/ACT Inhalation Aerosol Solution; INHALE 2 PUFFS    EVERY 4-6 HOURS AS NEEDED; Therapy: 24NIA2031 to (Last Rx:37Ewl6798)  Requested for: 10Yps4015 Ordered   24  Topiramate 100 MG Oral Tablet; TAKE 1 TABLET TWICE A DAY  ONE IN AM AND ONE    IN PM;    Therapy: 01YQZ0029 to (Evaluate:13Jan2018)  Requested for: 72Gfg9557; Last    Rx:18Apr2017 Ordered   25  Toujeo SoloStar 300 UNIT/ML Subcutaneous Solution Pen-injector; 50 units; Therapy: 09Mnf8911 to (Last Rx:24Oct2016) Ordered   26  TraMADol HCl - 50 MG Oral Tablet; TAKE 1-2 TABLETS BY MOUTH 4 TIMES A DAY AS    NEEDED; Therapy: 71BOX8580 to (Evaluate:13Fqy4153)  Requested for: 90VCO1164; Last    Rx:06Jun2017; Status: ACTIVE - Retrospective By Protocol Authorization Ordered   27  Turmeric TABS; 1 Tab daily; Therapy: (Recorded:24Oct2016) to Recorded   28  Venlafaxine HCl  MG Oral Capsule Extended Release 24 Hour; Take 1 capsule    by mouth  daily; Therapy: 59SES4331 to (Evaluate:45Wdn6260)  Requested for: 50WMO4032; Last    Rx:27Mar2017 Ordered   29  Ventolin  (90 Base) MCG/ACT Inhalation Aerosol Solution; inhale 1 to 2 puffs    every 4 to 6 hours as needed; Therapy: 90Gkd5527 to (Last Rx:87Yri2852)  Requested for: 72Zql9947; Status:    ACTIVE - Retrospective By Protocol Authorization Ordered   30  Zolpidem Tartrate 10 MG Oral Tablet; take 1 tablet by mouth at bedtime as needed; Therapy: 94The8971 to (Evaluate:09Fse1532)  Requested for: 17JPD2998; Last    Rx:06Jun2017; Status: ACTIVE - Retrospective By Protocol Authorization Ordered    Allergies    1  Adhesive Tape TAPE   2   Lexapro TABS    Signatures   Electronically signed by : Pavel Resendez, ; Jun 21 2017  9:59AM EST                       (Author)

## 2018-01-09 NOTE — RESULT NOTES
Verified Results  (1) TISSUE EXAM [id number] 11:43AM [facility]     Test Name Result Flag Reference   LAB AP CASE REPORT (Report)     Surgical Pathology Report             Case: [id number]                   Authorizing Provider: Dena Good MD       Collected:      11/02/2016 1143        Ordering Location:   [address] [place] [address]   Received:      11/02/2016 [address] [place] [address] Endoscopy                               Pathologist:      Tesfaye Smith MD                               Specimens:  A) - Duodenum, Bx of duodenum, h/o diarrhea                              B) - Stomach, Gastric body, gastritis, r/o H pylori                          C) - Colon, Random colon bx for h/o diarrhea                              D) - Polyp, Colorectal, Descending Colon Polyp                             E) - Polyp, Colorectal, Sigmoid Colon Polyp x2   LAB AP FINAL DIAGNOSIS (Report)     A  Duodenum, biopsy:   - No significant pathologic findings   - Features of gluten-sensitive enteropathy (celiac disease) are not   identified    B  Stomach, body, biopsy:   - Mild chronic inactive gastritis   - H pylori organisms are not identified on immunostain   - No intestinal metaplasia, dysplasia, or malignancy identified    C  Colon, random, biopsy:   - No significant pathologic findings    D  Colon, descending, biopsy:   - Hyperplastic polyp    E  Colon, sigmoid, biopsy:   - Hyperplastic polyp  Electronically signed by Tesfaye Smith MD on 11/4/2016 at 3:05 PM   LAB AP NOTE      Immunohistochemical stains are performed with adequate controls  LAB AP SURGICAL ADDITIONAL INFORMATION (Report)     These tests were developed and their performance characteristics   determined by [facility] or [facility]  They may not be cleared or approved by the [place]  Food and   Drug Administration  The FDA has determined that such clearance or   approval is not necessary   These tests are used for clinical purposes  They should not be regarded as investigational or for research  This   laboratory has been approved by CLIA 88, designated as a high-complexity   laboratory and is qualified to perform these tests  Interpretation performed at [facility]   LAB AP GROSS DESCRIPTION (Report)     A  The specimen is received in formalin, labeled with the patient's name   and hospital number, and is designated biopsy of duodenum, history of   diarrhea, are two irregularly shaped fragments of tan-pink soft tissue   measuring 0 5 and 0 1 cm in greatest dimension  Entirely submitted  One   cassette  B  The specimen is received in formalin, labeled with the patient's name   and hospital number, and is designated gastric body, gastritis, rule out   H  pylori, is a single irregularly shaped fragment of tan-pink soft   tissue measuring 0 5 cm in greatest dimension  Entirely submitted  One   cassette  C  The specimen is received in formalin, labeled with the patient's name   and hospital number, and is designated random colon biopsy for history of   diarrhea, are multiple irregularly shaped fragments of tan-pink soft   tissue measuring 1 2 x 1 0 x 0 1 cm in aggregate  Entirely submitted  One   cassette  D  The specimen is received in formalin, labeled with the patient's name   and hospital number, and is designated descending colon polyp, is a   single irregularly shaped fragment of tan-pink soft tissue measuring 0 4   cm in greatest dimension  Entirely submitted  One cassette  E  The specimen is received in formalin, labeled with the patient's name   and hospital number, and is designated sigmoid colon polyp ??2, is a   single irregularly shaped fragment of tan-pink soft tissue measuring 0 6   cm in greatest dimension  Entirely submitted  One cassette          Note: The estimated total formalin fixation time based upon information   provided by the submitting clinician and the standard processing schedule   is 16 5 hours  RLR       Discussion/Summary   Biopsies were benign including polyps  No cause for diarrhea was identified  I would recommend to continue with the imodium but if it is not helping please let us know so we can start her alternative therapy

## 2018-01-10 NOTE — MISCELLANEOUS
Message   Recorded as Task   Date: 05/11/2016 10:29 AM, Created By: Genesis Parekh   Task Name: Follow Up   Assigned To: [address] ,Team   Regarding Patient: Yuan Guzman, Status: Active   CommentDeranulfo Cortes - 11 May 1428 25:93 AM     TASK CREATED  S/P B/L SIJ INJ ON 5/4/2016 W/R  MICKY - NO F/U Jojo Ashippun - 11 May 1042 31:10 AM     TASK EDITED  Lm on vm to HOSP [facility] - 17 May 2120 78:39 AM     TASK EDITED  2nd MSG left for CB   Genesis Parekh - 24 May 8298 8:45 PM     TASK EDITED  Please send CNR letter  Thanks   letter sent      Active Problems    1  Abdominal pain, epigastric (789 06) (R10 13)   2  Analgesic use (V58 69) (Z79 899)   3  Anxiety (300 00) (F41 9)   4  Arthralgia (719 40) (M25 50)   5  Benign essential hypertension (401 1) (I10)   6  Carpal tunnel syndrome of right wrist (354 0) (G56 01)   7  Carpal tunnel syndrome, left (354 0) (G56 02)   8  Cerebral infarction, unspecified (434 91) (I63 9)   9  Cervical radiculopathy (723 4) (M54 12)   10  Chest pain (786 50) (R07 9)   11  Chronic cough (786 2) (R05)   12  Chronic migraine without aura (346 70) (G43 709)   13  CRP elevated (790 95) (R79 82)   14  Degenerative disc disease, cervical (722 4) (M50 30)   15  Depression (311) (F32 9)   16  Diabetes mellitus type II, uncontrolled (250 02) (E11 65)   17  Elevated LFTs (790 6) (R79 89)   18  Fatty liver (571 8) (K76 0)   19  Fibromyalgia (729 1) (M79 7)   20  GERD without esophagitis (530 81) (K21 9)   21  Headache (784 0) (R51)   22  Hyperlipidemia (272 4) (E78 5)   23  Hypothyroidism (244 9) (E03 9)   24  Insomnia (780 52) (G47 00)   25  Intractable chronic common migraine without aura (346 71) (G43 719)   26  Iron deficiency (280 9) (E61 1)   27  Left hand paresthesia (782 0) (R20 2)   28  Long-term insulin use (V58 67) (Z79 4)   29  Multiple joint pain (719 49) (M25 50)   30  Nausea (787 02) (R11 0)   31  Neck pain (723 1) (M54 2)   32   Nicotine dependence (305 1) (F17 200)   33  Opioid dependence (304 00) (F11 20)   34  Pancreatitis (577 0) (K85 9)   35  Paresthesia (782 0) (R20 2)   36  Polyarthritis (716 50) (M13 0)   37  Pyelonephritis (590 80) (N12)   38  Recurrent low back pain (724 2) (M54 5)   39  Right hand pain (729 5) (M79 641)   40  S/P carpal tunnel release (V45 89) (Z98 89)   41  S/P trigger finger release (V45 89) (Z98 89)   42  Sacroiliac pain (724 6) (M53 3)   43  Septicemia (038 9) (A41 9)   44  Shortness of breath (786 05) (R06 02)   45  Skin rash (782 1) (R21)   46  Snoring (786 09) (R06 83)   47  Spondylosis of cervical region without myelopathy or radiculopathy (721 0) (M47 812)   48  Stroke Syndrome (436)   49  Swelling of hand joint, right (719 04) (M25 441)   50  TMJ arthralgia (524 62) (M26 62)   51  Transient cerebral ischemic attack (435 9) (G45 9)   52  Trigger thumb of right hand (727 03) (M65 311)   53  Vitamin B12 deficiency (266 2) (E53 8)   54  Vitamin D deficiency (268 9) (E55 9)    Current Meds   1  ALPRAZolam 0 25 MG Oral Tablet; TAKE 1 TABLET THREE TIMES A DAY AS NEEDED; Therapy: 87Mzt5934 to (Evaluate:15Jun2016)  Requested for: 65NMC1210; Last   Rx:16May2016; Status: ACTIVE - Retrospective By Protocol Authorization Ordered   2  Aspirin 325 MG Oral Tablet; TAKE 1 TABLET DAILY; Therapy: 87CIT8418 to Recorded   3  Atorvastatin Calcium 20 MG Oral Tablet; TAKE 1 TABLET DAILY AS DIRECTED; Therapy: 64LAC6452 to (Jon Cooper)  Requested for: 35OLK5361; Last   Rx:12May2016 Ordered   4  Botox 100 UNIT Injection Solution Reconstituted; to be injected by physician in office as   directed; Therapy: 18TAP6920 to (Last Rx:18May2016) Ordered   5  Cyanocobalamin 1000 MCG/ML Injection Solution; Inject 1 ml every 2 months; Therapy: 76HCA9861 to (Last Rx:26Feb2016)  Requested for: 26Feb2016 Ordered   6  Divalproex Sodium 250 MG Oral Tablet Delayed Release; TAKE 2 TABLET Bedtime;    Therapy: 67YWE1013 to (Evaluate:10Sep2016)  Requested for: 77XIQ3864; Last   Rx:14Mar2016 Ordered   7  Folic Acid 330 MCG Oral Tablet; TAKE 1 TABLET DAILY AS DIRECTED; Therapy: 34OQY7392 to (Evaluate:11May2017)  Requested for: 08JCO2427; Last   Rx:75Wfi1270 Ordered   8  Lantus 100 UNIT/ML Subcutaneous Solution; 20 units daily  Requested for: 10Tqc6341; Last Rx:07Btm3477 Ordered   9  Levothyroxine Sodium 75 MCG Oral Tablet; TAKE 1 TABLET DAILY; Therapy: 46FUJ8045 to (Evaluate:62Adb6175)  Requested for: 30Thr0895; Last   Rx:44Age5943 Ordered   10  Losartan Potassium 50 MG Oral Tablet; TAKE 1 TABLET DAILY; Therapy: 55NIL5875 to (Evaluate:11Mar2017)  Requested for: 33WYL0030; Last    Rx:16Mar2016 Ordered   11  Lyrica 150 MG Oral Capsule; TAKE 1 CAPSULE 3 TIMES DAILY; Therapy: 39XCT0691 to (Evaluate:26Oct2016)  Requested for: 29Apr2016; Last    Rx:67Wnv8710 Ordered   12  Metoprolol Tartrate 25 MG Oral Tablet; Take 1 tablet by mouth daily; Therapy: 58Huj3002 to (Evaluate:06Sep2016)  Requested for: 89DLL7031; Last    Rx:11Mar2016 Ordered   13  OLANZapine 5 MG Oral Tablet; TAKE 1 TABLET AT BEDTIME; Therapy: 08HPT6609 to (Evaluate:43Tgo7887)  Requested for: 06Nwy5499; Last    Rx:94Pxf1631 Ordered   14  Omeprazole 20 MG Oral Capsule Delayed Release; take one capsule by mouth daily; Therapy: 50ZXG2019 to (Evaluate:11Mar2017)  Requested for: 91SNS0490; Last    Rx:16Mar2016 Ordered   15  Ondansetron 4 MG Oral Tablet Dispersible; ALLOW 1 TABLET TO DISSOLVE ON    TONGUE TWICE DAILY AS NEEDED FOR NAUSEA; Therapy: 31GAI6236 to (Evaluate:24Mar2016)  Requested for: 86Sdl8043; Last    Rx:13Eos7625 Ordered   16  OneTouch Ultra Blue In [employer]; USE 1 STRIP Twice daily; Therapy: 95MAS2620 to (Evaluate:74Tni2807)  Requested for: 72FQB7127; Last    Rx:32Tqx6627 Ordered   17  ProAir  (90 Base) MCG/ACT Inhalation Aerosol Solution; INHALE 2 PUFFS    EVERY 4-6 HOURS AS NEEDED;     Therapy: 86VTU9820 to (Last Rx:28Qub0384) Requested for: 20Olc0384 Ordered   18  Topiramate 100 MG Oral Tablet (Topamax); take 1 tab q HS; Therapy: 86HPH1400 to (Chelsea Cool)  Requested for: 99SNF3962; Last    Rx:00Bui5392 Ordered   19  TraMADol HCl - 50 MG Oral Tablet; TAKE 1 TO 2 TABLETS 4 TIMES A DAY AS NEEDED; Therapy: 55FJG5561 to (Evaluate:15Jun2016)  Requested for: 53FDR1597; Last    Rx:46Hgc7667; Status: ACTIVE - Retrospective By Protocol Authorization Ordered   20  Venlafaxine HCl ER 75 MG Oral Capsule Extended Release 24 Hour; TAKE 1 CAPSULE    DAILY; Therapy: 30XBG9145 to (Evaluate:49Uob1770)  Requested for: 93Kzr8011; Last    Rx:25Mar2016 Ordered   21  Ventolin  (90 Base) MCG/ACT Inhalation Aerosol Solution; inhale 1 to 2 puffs    every 4 to 6 hours as needed; Therapy: 66Xih8615 to (Last Rx:30Mdg7513)  Requested for: 11Zck4694; Status:    ACTIVE - Retrospective By Protocol Authorization Ordered   22  Zolpidem Tartrate 10 MG Oral Tablet; Take 1 tablet by mouth at bedtime; Therapy: 80Sjz6595 to (Evaluate:09Hwn5420)  Requested for: 16QDR3496; Last    Rx:22Qtw8752; Status: ACTIVE - Retrospective By Protocol Authorization Ordered    Allergies    1  Adhesive Tape TAPE   2   Lexapro TABS    Signatures   Electronically signed by : Audrey Johansen, ; May 26 2016 11:13AM EST                       (Author)

## 2018-01-10 NOTE — MISCELLANEOUS
Message   Recorded as Task   Date: 01/13/2016 01:57 PM, Created By: Alicia Rodríguez   Task Name: Med Renewal Request   Assigned To: [facility]   Regarding Patient: Gwyn Sen, Status: Active   Comment:    Alicia Rodríguez - 13 Jan 2016 1:57 PM     TASK CREATED  Caller: Self; Renew Medication; [phone] (Home)  pt left vm 1/13 at 1:11 that she has problem with the Lyrica, requesting c/b  Left vm for pt to c/b to discuss  *there is another task that was completed this morning that Dr Jf Staton approved RF to be called to pt's [facility] for a 1 month supply of lyrica 150mg TID, #90, with no refill  *   Joann Sheets - 13 Jan 2016 1:58 PM     TASK IN PROGRESS   Joann Sheets - 13 Jan 2016 2:06 PM     TASK EDITED  TC from pt who began crying saying that the lyrica will cost her $508 00  She said her last ins had a zero co-pay but now she has a $4,500 00 deductible to pay first  Pt stated she doesn't have 508 00 and doesn't know what to do  Told pt I would inform Dr Jf Staton and c/b with her recommendation  *just a reminder from earlier task pt only has 3 of her 150mg lyrica left and 4 of some 75mg lyrica left  *   Shahid Kholer - 13 Jan 2016 2:38 PM     TASK REPLIED TO: Previously Assigned To Shahid Kohler  There is a program through [employer] she can apply for  She can look it up on line- called [employer] or care program     In the mean time, she should decrease to 150 mg BID, then starting 75 mg TID  We can also provide sample to continue wtih 75 mg TID dose  Darryle Darner - 13 Jan 2016 2:39 PM     TASK REASSIGNED: Previously Assigned To [facility]  please see below  I am not sure if she used the copay card previously  She can pick it up now or ask pharmacy if that can be used  Sumaya Bernal - 13 Jan 2016 3:00 PM     TASK REPLIED TO: Previously Assigned To [facility]  S/w pt  and advised of above   Pt  verbalized understanding and states she would like to  the samples for 75 mg capsules  Asking when she can pick them up  Pt  was advised will notify Dr Jf Staton that should would like the samples and she can pick them up today if she would like  Pt  states her  will be to the office shortly to retrieve samples of 75 mg Lyrica  Pt  also states that she would like to try the co-pay card, anything that could help  Pt  was advised will also have Dr Jf Staton place co-pay card at   Pt  appreciative  Pt  also asking if there is any alternative to the Lyrica that may be cheaper  Pt  was asked if she took Gabapentin before  Pt  states she took Gabapentin 300 mg in the past, and did not have relief  Pt  was advised will discuss w/ Dr Jf Staton and c/b w/ recs  Pt  appreciative  ****Please place the Lyrica 75 mg samples and co-pay card at  for pt ****   Shahid Kohler - 13 Jan 2016 3:47 PM     TASK REPLIED TO: Previously Assigned To Darryle Darner  at   Sumaya Bernal - 13 Jan 2016 4:05 PM     TASK REPLIED TO: Previously Assigned To [facility]  Thank you  Do you have any recommendations for alternatives if pt  cannot afford medication? Darryle Darner - 13 Jan 2016 4:09 PM     TASK REPLIED TO: Previously Assigned To Darryle Darner  I will discuss gabapentin again with her during ov  Hopefully she is able to use copay card  Daysi Coombs - 14 Jan 2016 8:34 AM     TASK REPLIED TO: Previously Assigned To [facility]  Spoke with pt and advised of the same,verbalizes understanding  Active Problems    1  Acute hip pain (719 45) (M25 559)   2  Analgesic use (V58 69) (Z79 899)   3  Anxiety (300 00) (F41 9)   4  Arthralgia (719 40) (M25 50)   5  Benign essential hypertension (401 1) (I10)   6  Carpal tunnel syndrome, left (354 0) (G56 02)   7  Cervical radiculopathy (723 4) (M54 12)   8  Chest pain (786 50) (R07 9)   9  Chronic cough (786 2) (R05)   10   Chronic migraine without aura (346 70) (G43 709)   11  CVA (cerebral vascular accident) (434 91) (I63 9)   12  Degenerative disc disease, cervical (722 4) (M50 30)   13  Depression (311) (F32 9)   14  Diabetes mellitus type II, uncontrolled (250 02) (E11 65)   15  Encounter for comprehensive diabetic foot examination, type 2 diabetes mellitus    (250 00) (E11 9)   16  Esophageal reflux (530 81) (K21 9)   17  Fibromyalgia (729 1) (M79 7)   18  Headache (784 0) (R51)   19  Hyperlipidemia (272 4) (E78 5)   20  Hypothyroidism (244 9) (E03 9)   21  Insomnia (780 52) (G47 00)   22  Iron deficiency (280 9) (E61 1)   23  Long-term insulin use (V58 67) (Z79 4)   24  Nausea (787 02) (R11 0)   25  Neck pain (723 1) (M54 2)   26  Nicotine dependence (305 1) (F17 200)   27  Pancreatitis (577 0) (K85 9)   28  Paresthesia (782 0) (R20 2)   29  Pyelonephritis (590 80) (N12)   30  Recurrent low back pain (724 2) (M54 5)   31  Right hand pain (729 5) (M79 641)   32  Septicemia (038 9) (A41 9)   33  Shortness of breath (786 05) (R06 02)   34  Skin rash (782 1) (R21)   35  Snoring (786 09) (R06 83)   36  Spondylosis of cervical region without myelopathy or radiculopathy (721 0) (M47 812)   37  Stroke Syndrome (436)   38  Swelling of hand joint, right (719 04) (M25 441)   39  TIA (transient ischemic attack) (435 9) (G45 9)   40  Vitamin B12 deficiency (266 2) (E53 8)   41  Vitamin D deficiency (268 9) (E55 9)    Current Meds   1  ALPRAZolam 0 25 MG Oral Tablet; TAKE 1 TABLET BY MOUTH 3 TIMES A DAY AS   NEEDED; Therapy: 66Ivo7263 to (Evaluate:50Ocl7624)  Requested for: 75XKO1197; Last   Rx:43Biw4405 Ordered   2  Aspirin 325 MG Oral Tablet; TAKE 1 TABLET DAILY; Therapy: 10BXP5026 to Recorded   3  Divalproex Sodium  MG Oral Tablet Extended Release 24 Hour (Depakote ER);   TAKE TWO TABLETS BY MOUTH DAILY AT BEDTIME; Therapy: 36ARH9803 to (Evaluate:18Apr2016)  Requested for: 21Sep2015; Last   Rx:21Sep2015 Ordered   4   DULoxetine HCl - 30 MG Oral Capsule Delayed Release Particles (Cymbalta); TAKE 1   CAPSULE TWICE DAILY; Therapy: 54TJZ7921 to (Evaluate:10Aug2015)  Requested for: 79QQK9930; Last   Rx:19Jou3699 Ordered   5  Folic Acid 082 MCG Oral Tablet; TAKE 1 TABLET DAILY AS DIRECTED; Therapy: 73GZR5019 to (Evaluate:96Zua1485)  Requested for: 64AKO9234; Last   Rx:43Qff4914 Ordered   6  Gabapentin 300 MG Oral Capsule; TAKE 1 CAPSULE BY MOUTH 3 TIMES DAILY; Therapy: 14AUA7769 to (Evaluate:24Mar2016)  Requested for: 95YAA5600; Last   Rx:86Ytw9114 Ordered   7  Lantus 100 UNIT/ML Subcutaneous Solution; 20 units daily  Requested for: 57Bpn5017; Last Rx:40Vff6365 Ordered   8  Levothyroxine Sodium 75 MCG Oral Tablet; TAKE 1 TABLET DAILY; Therapy: 24ZTN1796 to (Evaluate:33Vwo1452)  Requested for: 90Tgh2954; Last   Rx:22Hju4957 Ordered   9  LORazepam 1 MG Oral Tablet (Ativan); TAKE 1 TABLET 1 HOUR BEFORE MRI  MAY   REPEAT ONCE 15 MINUTES BEFORE MRI; Therapy: 51DXS5135 to (Evaluate:48Xga3548); Last Rx:81Pzl6200 Ordered   10  Losartan Potassium 50 MG Oral Tablet; TAKE 1 TABLET DAILY; Therapy: 97MDC6037 to (Evaluate:05Pff0851)  Requested for: 52XYR0118; Last    Rx:13Jan2016; Status: ACTIVE - Retrospective By Protocol Authorization Ordered   11  Lyrica 150 MG Oral Capsule; TAKE 1 CAPSULE 3 TIMES DAILY; Therapy: 76KND7301 to (Evaluate:45Jek0861)  Requested for: 64ADB8046; Last    Rx:13Jan2016 Ordered   12  Metoprolol Tartrate 25 MG Oral Tablet; Take 1 tablet by mouth daily; Therapy: 51Qtz7057 to (Evaluate:13Nqq9124)  Requested for: 68Hld4467; Last    Rx:18Fkg1281 Ordered   13  Mometasone Furoate 0 1 % External Solution; apply BID to rash; Therapy: 75WVI8902 to (Last Rx:22Svv8444)  Requested for: 93Spf0885 Ordered   14  Morphine Sulfate 15 MG Oral Tablet; TAKE 1 TABLET Every 6 hours PRN pain; Therapy: 58PEC0129 to (Evaluate:28Jan2016); Last Rx:51Tou0060 Ordered   15  Omeprazole 20 MG Oral Tablet Delayed Release; Take one tablet daily;     Therapy: 25Apr2013 to (Evaluate:20Jaa3227)  Requested for: 33PMS2614; Last    Rx:89Jwm6722 Ordered   16  Ondansetron 4 MG Oral Tablet Dispersible; ALLOW 1 TABLET TO DISSOLVE ON    TONGUE TWICE DAILY AS NEEDED FOR NAUSEA; Therapy: 50DGL4170 to (Evaluate:18Jan2016)  Requested for: 01OLB9830; Last    Rx:42Eoz5591 Ordered   17  OneTouch Ultra Blue In [employer]; USE 1 STRIP Twice daily; Therapy: 58ZTA7905 to (Evaluate:08Aug2016)  Requested for: 42FSP2357; Last    Rx:77Dlp9479 Ordered   18  Pennsaid 2 % Transdermal Solution; 2 pumps BID prn; Therapy: 88KPJ8521 to (Evaluate:06Jan2016); Last Rx:15Hdc0396 Ordered   19  TraMADol HCl - 50 MG Oral Tablet; take 1-2 tablets qid prn; Therapy: 69GCB9048 to (Last Rx:52Qvd5005) Ordered   20  Ventolin  (90 Base) MCG/ACT Inhalation Aerosol Solution; inhale 1 to 2 puffs    every 4 to 6 hours as needed; Therapy: 20Dsc3172 to (Last Rx:91Xxj2669)  Requested for: 15Dvq3710; Status:    ACTIVE - Retrospective By Protocol Authorization Ordered   21  Vitamin D3 81156 UNIT Oral Capsule; Once a week for 8 weeks; Therapy: 87UDK0976 to (Last Rx:90Vud9877)  Requested for: 90Cwq9115 Ordered   22  Zolpidem Tartrate 10 MG Oral Tablet; Take 1 tablet by mouth at bedtime; Therapy: 12Apr2011 to (Evaluate:81Hqx8498)  Requested for: 08RHJ4915; Last    Rx:10Jan2016 Ordered    Allergies    1  Lexapro TABS   2  Adhesive Tape TAPE    Signatures   Electronically signed by :  Daysi Coombs, ; Jan 14 2016  8:34AM EST                       (Author)

## 2018-01-11 NOTE — MISCELLANEOUS
Message   Recorded as Task   Date: 05/18/2017 02:40 PM, Created By: Antonietta Singh   Task Name: Follow Up   Assigned To: [facility]   Regarding Patient: Debby Mosqueda, Status: Active   Comment:    Helene Coronel - 18 May 2017 2:40 PM     TASK CREATED  L/m to return call  S/p C7-T1 NATHALY- ASA done 5/12/17 by DR Johnson @ [facility]   HomerJully - 18 May 2017 3:03 PM     TASK EDITED   Cierra Arroyo - 24 May 5639 7:75 PM     TASK EDITED  2nd Attempt     LM on VM to Vanna Calles - 24 May 2017 1:42 PM     TASK EDITED  phone call from patient returning call stating she is doing okay, she has a little pain, but nothing excrutiating  please call patient at [phone]  HomerJully - 24 May 2017 2:28 PM     TASK EDITED  Patient reports she received 70-80% relief from her inj  She has no pain today  S/p C7-T1 NATHALY- ASA done 5/12/17 @ [facility]  [employer]ia [employer] 17 - 24 May 2017 2:34 PM     TASK REPLIED TO: Previously Assigned To [place]  Thanks  Active Problems    1  Anxiety (300 00) (F41 9)   2  Arthralgia (719 40) (M25 50)   3  Benign essential hypertension (401 1) (I10)   4  Carpal tunnel syndrome of right wrist (354 0) (G56 01)   5  Carpal tunnel syndrome, left (354 0) (G56 02)   6  Cervical disc disorder with radiculopathy of mid-cervical region (723 4) (M50 120)   7  Cervical dysphagia (787 29) (R13 19)   8  Chest pain (786 50) (R07 9)   9  Chronic cervical pain (723 1,338 29) (M54 2,G89 29)   10  Chronic cervical radiculopathy (723 4) (M54 12)   11  Chronic cough (786 2) (R05)   12  Chronic diarrhea (787 91) (K52 9)   13  Chronic fatigue (780 79) (R53 82)   14  Chronic migraine without aura (346 70) (G43 709)   15  Chronic pain syndrome (338 4) (G89 4)   16  CRP elevated (790 95) (R79 82)   17  Degenerative disc disease, cervical (722 4) (M50 30)   18  Depression (311) (F32 9)   19  Diabetes mellitus type II, uncontrolled (250 02) (E11 65)   20  Elevated LFTs (790 6) (R94 5)   21  Fatty liver (571 8) (K76 0)   22  Fibromyalgia (729 1) (M79 7)   23  GERD without esophagitis (530 81) (K21 9)   24  Headache (784 0) (R51)   25  Hyperlipidemia (272 4) (E78 5)   26  Hypothyroidism (244 9) (E03 9)   27  Insomnia (780 52) (G47 00)   28  Intractable chronic common migraine without aura (346 71) (G43 719)   29  Iron deficiency (280 9) (E61 1)   30  Irritable bowel syndrome with diarrhea (564 1) (K58 0)   31  Left hand paresthesia (782 0) (R20 2)   32  Left sided numbness (782 0) (R20 0)   33  Long-term insulin use (V58 67) (Z79 4)   34  Multiple joint pain (719 49) (M25 50)   35  Nausea (787 02) (R11 0)   36  Nicotine dependence (305 1) (F17 200)   37  Opioid dependence (304 00) (F11 20)   38  Osteoarthritis of spine with radiculopathy, cervical region (721 0) (M47 22)   39  Pancreatic cyst (577 2) (K86 2)   40  Pancreatitis (577 0) (K85 90)   41  Paresthesia (782 0) (R20 2)   42  Polyarthritis (716 50) (M13 0)   43  Polyneuropathy associated with underlying disease (357 4) (G63)   44  Pyelonephritis (590 80) (N12)   45  Recurrent low back pain (724 2) (M54 5)   46  Right hand pain (729 5) (M79 641)   47  S/P carpal tunnel release (V45 89) (Z98 890)   48  S/P trigger finger release (V45 89) (Z98 890)   49  Sacroiliac pain (724 6) (M53 3)   50  Shortness of breath (786 05) (R06 02)   51  Shoulder pain (719 41) (M25 519)   52  Skin rash (782 1) (R21)   53  Snoring (786 09) (R06 83)   54  Spondylosis of cervical region without myelopathy or radiculopathy (721 0) (M47 812)   55  Stroke syndrome (434 91) (I63 9)   56  Swelling of hand joint, right (719 04) (M25 441)   57  Transient ischemic attack (TIA) (435 9) (G45 9)   58  Trigger thumb of right hand (727 03) (M65 311)   59  Type 2 diabetes mellitus with hyperglycemia, with long-term current use of insulin    (250 00,790 29,V58 67) (E11 65,Z79 4)   60   Uncontrolled type 2 diabetes mellitus with diabetic polyneuropathy, with long-term    current use of insulin (250 62,357 2,V58 67) (E11 42,E11 65,Z79 4)   61  Vitamin B12 deficiency (266 2) (E53 8)   62  Vitamin D deficiency (268 9) (E55 9)    Current Meds   1  ALPRAZolam 0 25 MG Oral Tablet; take 1 tablet 3 times a day as needed; Therapy: 30Rqo4887 to (Evaluate:90Vxz8657)  Requested for: 88OUD4922; Last   Rx:02Xrn3391 Ordered   2  Aspirin 325 MG Oral Tablet; TAKE 1 TABLET DAILY; Therapy: 91YJI4038 to Recorded   3  Atorvastatin Calcium 40 MG Oral Tablet; TAKE 1 TABLET DAILY; Therapy: 06Iyu7150 to (Last Rx:58Nye2659)  Requested for: 59Mxc0947 Ordered   4  BuPROPion HCl ER (XL) 150 MG Oral Tablet Extended Release 24 Hour; TAKE 1   TABLET DAILY; Therapy: 62YZT7895 to (Evaluate:02Apr2018)  Requested for: 17PMQ6305; Last   Rx:07Apr2017 Ordered   5  Cyanocobalamin 1000 MCG/ML Injection Solution; Inject 1 ml every 2 months; Therapy: 68CDS6900 to (Last Rx:20Zfb9047)  Requested for: 10Wgn0802 Ordered   6  Cyclobenzaprine HCl - 10 MG Oral Tablet; TAKE 1 TABLET AT BEDTIME; Therapy: 19IOH9546 to (Evaluate:07Jun2017)  Requested for: 16FMQ3713; Last   Rx:09Mar2017 Ordered   7  Dexamethasone 1 MG Oral Tablet; take 1 tab at 11 pm the night before labs; Therapy: 07HGP1980 to (Evaluate:15Nna5766)  Requested for: 72IXH8216; Last   Rx:33Lkp4994 Ordered   8  Dicyclomine HCl - 20 MG Oral Tablet; TAKE 1 TABLET EVERY 6 HOURS AS NEEDED; Therapy: 22TKR9618 to (05 12 73 93 30)  Requested for: 45Kxy4486; Last   Rx:57Gbv7351 Ordered   9  Divalproex Sodium 250 MG Oral Tablet Delayed Release; take 2 tablets by mouth at   bedtime; Therapy: 15XMT9349 to (Evaluate:21Lfy9025)  Requested for: 50TKR4880; Last   Rx:13Mpe1798 Ordered   10  Fenofibrate 48 MG Oral Tablet; TAKE 1 TABLET DAILY; Therapy: 36CTK6289 to (Cody Harrington)  Requested for: 75FEB6383; Last    Rx:30Nov2016; Status: ACTIVE - Renewal Denied Ordered   11  Folic Acid 947 MCG Oral Tablet; TAKE 1 TABLET DAILY AS DIRECTED;     Therapy: 22CDV8926 to (Evaluate:11May2017)  Requested for: 74UXZ2278; Last    Rx:37Bra3492 Ordered   12  FreeStyle Lite Test In [employer]; use as directed; Therapy: 23XIM1403 to (Last Rx:06Jac8736) Ordered   13  Gabapentin 400 MG Oral Capsule; TAKE 1-2 CAPSULES 3 TIMES DAILY; Therapy: 29GHH2434 to (Evaluate:16Apr2017)  Requested for: 48VAZ3600; Last    Rx:17Mar2017 Ordered   14  HumaLOG KwikPen 200 UNIT/ML Subcutaneous Solution Pen-injector; Inject SQ 15    units before each meal;    Therapy: 08TWO4804 to (Last Rx:69Hwo2678) Ordered   15  Ketorolac Tromethamine 10 MG Oral Tablet; TAKE 1 TABLET EVERY 8 HOURS AS    NEEDED FOR PAIN  DON'T TAKE FOR MORE THAN 5 DAYS; Therapy: 04NMO5604 to (Evaluate:21Apr2017)  Requested for: 36Ucx0828; Last    Rx:08Ffp7926 Ordered   16  Lantus SoloStar 100 UNIT/ML Subcutaneous Solution Pen-injector; Inject SQ 50 units at    bedtime; Therapy: (Recorded:85Xcz9901) to Recorded   17  Levothyroxine Sodium 112 MCG Oral Tablet; TAKE 1 TABLET DAILY MONDAY    THROUGH SATURDAY  TAKE 2 TABLETS ON SUNDAY; Therapy: 88HHW1768 to (Evaluate:17Oct2017)  Requested for: 02Hpe8485; Last    Rx:20Apr2017 Ordered   18  Losartan Potassium 50 MG Oral Tablet; TAKE 1 TABLET DAILY; Therapy: 00MQK5134 to (Inocente Grossman)  Requested for: 90HCX9757; Last    Rx:09May2017 Ordered   19  MetFORMIN HCl  MG Oral Tablet Extended Release 24 Hour; TAKE 2 TABLET    DAILY twice daily with meals; Therapy: 27Yzx3746 to (Evaluate:28Mar2017)  Requested for: 40WHO9132; Last    Rx:33Pxo1277 Ordered   20  Metoprolol Tartrate 25 MG Oral Tablet; Take 1 tablet by mouth daily; Therapy: 68Soj7443 to (Inocente Grossman)  Requested for: 11CQC8945; Last    Rx:09May2017 Ordered   21  OLANZapine 5 MG Oral Tablet; TAKE 1 TABLET AT BEDTIME; Therapy: 05ZXP5933 to (Evaluate:08Iwu8406)  Requested for: 42CFQ7195; Last    Rx:25Mar2017 Ordered   22   Ondansetron 4 MG Oral Tablet Disintegrating; ALLOW 1 TABLET TO DISSOLVE ON    TONGUE TWICE DAILY AS NEEDED FOR NAUSEA; Therapy: 27ERC3354 to (Evaluate:18May2017)  Requested for: 28UZN0392; Last    Rx:18Apr2017 Ordered   23  OneTouch Ultra Blue In [employer]; USE 1 STRIP Twice daily; Therapy: 26XJM5774 to (Marina Stark)  Requested for: 23LUE9567; Last    Rx:03Jan2017 Ordered   24  ProAir  (90 Base) MCG/ACT Inhalation Aerosol Solution; INHALE 2 PUFFS    EVERY 4-6 HOURS AS NEEDED; Therapy: 66HWZ2693 to (Last Rx:83Csh7194)  Requested for: 58Kdc9593 Ordered   25  Topiramate 100 MG Oral Tablet; TAKE 1 TABLET TWICE A DAY  ONE IN AM AND ONE    IN PM;    Therapy: 48SJJ9368 to (Evaluate:13Jan2018)  Requested for: 18Apr2017; Last    Rx:18Apr2017 Ordered   26  Toujeo SoloStar 300 UNIT/ML Subcutaneous Solution Pen-injector; 50 units; Therapy: 08Qno6559 to (Last Rx:24Oct2016) Ordered   27  TraMADol HCl - 50 MG Oral Tablet; TAKE 1 OR 2 TABLETS FOUR TIMES A DAY AS    NEEDED; Therapy: 76KZN0888 to (Evaluate:18May2017)  Requested for: 44KGD3256; Last    Rx:18Apr2017 Ordered   28  Turmeric TABS; 1 Tab daily; Therapy: (Recorded:24Oct2016) to Recorded   29  Venlafaxine HCl  MG Oral Capsule Extended Release 24 Hour; Take 1 capsule    by mouth  daily; Therapy: 60MMH9442 to (Evaluate:82Mbf9388)  Requested for: 83DZO9918; Last    Rx:27Mar2017 Ordered   30  Ventolin  (90 Base) MCG/ACT Inhalation Aerosol Solution; inhale 1 to 2 puffs    every 4 to 6 hours as needed; Therapy: 98Rop9006 to (Last Rx:32Evu1973)  Requested for: 50Xro3080; Status:    ACTIVE - Retrospective By Protocol Authorization Ordered   31  Zolpidem Tartrate 10 MG Oral Tablet; TAKE 1 TABLET AT BEDTIME AS NEEDED; Therapy: 74Tfs9985 to (Evaluate:97Drt5169)  Requested for: 31HQB9574; Last    Rx:18Apr2017 Ordered    Allergies    1  Adhesive Tape TAPE   2   Lexapro TABS    Signatures   Electronically signed by : Erika Morrell MA; May 25 2017  1:12PM EST                       (Author)

## 2018-01-11 NOTE — CONSULTS
Chief Complaint    1  Wrist Pain    History of Present Illness  HPI: Patient presents to me today is a 42-year-old female with a history of significant multiple medical problems in the past including stroke, diabetes, pulmonary embolism, hypertension with a history of numbness and tingling into the right hand  This started about a year ago and produces pain into basically the thumb and long finger  She also has discomfort which is worse at night and problems with heavy gripping moderate in intensity  She has catching popping clicking and locking into her right thumb with pain mild to moderate over the A1 pulley  splints do not significantly help her  The past medical history, surgical history, family history, social history, medications, allergies, and review of systems have been read and reviewed on the chart and have been updated  Review of Systems was recorded in the office today on a separate evaluation sheet and is listed below  Review of Systems  Focused-Female Orthopedics:   Constitutional: No fever, no chills, feels well, no tiredness, no recent weight gain or loss  Eyes: No complaints of eyesight problems, no red eyes  ENT: no loss of hearing, no nosebleeds, no sore throat  Cardiovascular: No complaints of chest pain, no palpitations, no leg claudication or lower extremity edema  Respiratory: no compliants of shortness of breath, no wheezing, no cough  Gastrointestinal: no complaints of abdominal pain, no constipation, no nausea or diarrhea, no vomiting, no bloody stools  Genitourinary: no complaints of dysuria, no incontinence  Musculoskeletal: as noted in HPI  Integumentary: no complaints of skin rash or lesion, no itching or dry skin, no skin wounds  Neurological: no complaints of headache, no confusion, no numbness or tingling, no dizziness     Endocrine: No complaints of muscle weakness, no feelings of weakness, no frequent urination, no excessive thirst    Psychiatric: No suicidal thoughts, no anxiety, no feelings of depression  Active Problems    1  Acute hip pain (719 45) (M25 559)   2  Analgesic use (V58 69) (Z79 899)   3  Anxiety (300 00) (F41 9)   4  Arthralgia (719 40) (M25 50)   5  Benign essential hypertension (401 1) (I10)   6  Carpal tunnel syndrome of right wrist (354 0) (G56 01)   7  Carpal tunnel syndrome, left (354 0) (G56 02)   8  Cervical radiculopathy (723 4) (M54 12)   9  Chest pain (786 50) (R07 9)   10  Chronic cough (786 2) (R05)   11  Chronic migraine without aura (346 70) (G43 709)   12  CVA (cerebral vascular accident) (434 91) (I63 9)   13  Degenerative disc disease, cervical (722 4) (M50 30)   14  Depression (311) (F32 9)   15  Diabetes mellitus type II, uncontrolled (250 02) (E11 65)   16  Encounter for comprehensive diabetic foot examination, type 2 diabetes mellitus    (250 00) (E11 9)   17  Esophageal reflux (530 81) (K21 9)   18  Fibromyalgia (729 1) (M79 7)   19  Headache (784 0) (R51)   20  Hyperlipidemia (272 4) (E78 5)   21  Hypothyroidism (244 9) (E03 9)   22  Insomnia (780 52) (G47 00)   23  Iron deficiency (280 9) (E61 1)   24  Long-term insulin use (V58 67) (Z79 4)   25  Multiple joint pain (719 49) (M25 50)   26  Nausea (787 02) (R11 0)   27  Neck pain (723 1) (M54 2)   28  Nicotine dependence (305 1) (F17 200)   29  Opioid dependence (304 00) (F11 20)   30  Pancreatitis (577 0) (K85 9)   31  Paresthesia (782 0) (R20 2)   32  Pyelonephritis (590 80) (N12)   33  Recurrent low back pain (724 2) (M54 5)   34  Right hand pain (729 5) (M79 641)   35  Septicemia (038 9) (A41 9)   36  Shortness of breath (786 05) (R06 02)   37  Skin rash (782 1) (R21)   38  Snoring (786 09) (R06 83)   39  Spondylosis of cervical region without myelopathy or radiculopathy (721 0) (M47 812)   40  Stroke Syndrome (436)   41  Swelling of hand joint, right (719 04) (M25 441)   42  TIA (transient ischemic attack) (435 9) (G45 9)   43   Trigger thumb of right hand (727 03) (M65 311)   44  Vitamin B12 deficiency (266 2) (E53 8)   45  Vitamin D deficiency (268 9) (E55 9)    Past Medical History    1  History of Acute venous embolism and thrombosis of deep vessels of distal lower   extremity (453 42) (I82 4Z9)   2  Anxiety (300 00) (F41 9)   3  Diabetes mellitus type II, uncontrolled (250 02) (E11 65)   4  History of High cholesterol (272 0) (E78 0)   5  History of abdominal pain (V13 89) (Z87 898)   6  History of acute pancreatitis (V12 79) (Z87 19)   7  History of anemia (V12 3) (Z86 2)   8  History of arthritis (V13 4) (Z87 39)   9  History of asthma (V12 69) (Z87 09)   10  History of esophageal reflux (V12 79) (Z87 19)   11  History of migraine (V12 49) (Z86 69)   12  History of nausea and vomiting (V12 79) (Z87 898)   13  History of stroke (V12 54) (Z86 73)   14  History of thyroid disease (V12 29) (Z86 39)   15  History of upper respiratory infection (V12 09) (Z87 09)   16  History of Numbness (782 0) (R20 0)   17  History of Pulmonary Embolism (V12 51)    Surgical History    1  History of Cholecystectomy   2  History of Diagnostic Esophagogastroduodenoscopy   3  History of Esophagogastric Fundoplasty Nissen Fundoplication   4  History of Tonsillectomy With Adenoidectomy    Family History    1  Family history of Acute Myocardial Infarction (V17 3)   2  Family history of Chronic Kidney Disease (NKF Classification)   3  Family history of Depression   4  Family history of Schizophrenia   5  Family history of Type 2 Diabetes Mellitus    6  Family history of Acute Myocardial Infarction (V17 3)   7  Family history of Gastric Cancer (V16 0)   8  Family history of Stroke Syndrome (V17 1)    Social History    · Completed 12th grade   · Current Every Day Smoker (305 1)   · Denied: Daily Coffee Consumption (___ Cups/Day)   ·    · Never Drank Alcohol   · No drug use   · One child   · Unemployed (V62 0) (Z56 0)    Current Meds   1   ALPRAZolam 0 25 MG Oral Tablet; TAKE 1 TABLET BY MOUTH 3 TIMES A DAY AS   NEEDED; Therapy: 92Jur5479 to (Evaluate:95Stf1679)  Requested for: 10UPU6339; Last   Rx:98Drr1770 Ordered   2  Aspirin 325 MG Oral Tablet; TAKE 1 TABLET DAILY; Therapy: 22AOA9080 to Recorded   3  Divalproex Sodium 250 MG Oral Tablet Delayed Release; TAKE 2 TABLET Bedtime; Therapy: 90DYE6641 to (Evaluate:23Jun2016)  Requested for: 90XZJ5264; Last   Rx:25Jan2016 Ordered   4  DULoxetine HCl - 30 MG Oral Capsule Delayed Release Particles (Cymbalta); TAKE 1   CAPSULE TWICE DAILY; Therapy: 00EUU8272 to (Evaluate:10Aug2015)  Requested for: 24BAH3717; Last   Rx:99Dkc9347 Ordered   5  Folic Acid 720 MCG Oral Tablet; TAKE 1 TABLET DAILY AS DIRECTED; Therapy: 73SUD9036 to (Evaluate:90Ajm5447)  Requested for: 90XPT1568; Last   Rx:30Ufr1974 Ordered   6  Gabapentin 300 MG Oral Capsule; TAKE 1 CAPSULE BY MOUTH 3 TIMES DAILY; Therapy: 31NHQ5487 to (Evaluate:24Mar2016)  Requested for: 88YPI6979; Last   Rx:30Ezk8728 Ordered   7  Lantus 100 UNIT/ML Subcutaneous Solution; 20 units daily  Requested for: 14Msx8756; Last Rx:61Wab4475 Ordered   8  Levothyroxine Sodium 75 MCG Oral Tablet; TAKE 1 TABLET DAILY; Therapy: 85ENT2859 to (Evaluate:71Srw7271)  Requested for: 51Ues4379; Last   Rx:68Bbu6795 Ordered   9  LORazepam 1 MG Oral Tablet (Ativan); TAKE 1 TABLET 1 HOUR BEFORE MRI  MAY   REPEAT ONCE 15 MINUTES BEFORE MRI; Therapy: 47TQZ9427 to (Evaluate:17Kri4425); Last Rx:09Kck3964 Ordered   10  Losartan Potassium 50 MG Oral Tablet; TAKE 1 TABLET DAILY; Therapy: 83VFX8361 to (Evaluate:13Dfh6311)  Requested for: 05DQQ1635; Last    Rx:25Jan2016 Ordered   11  Lyrica 150 MG Oral Capsule; TAKE 1 CAPSULE 3 TIMES DAILY; Therapy: 96PPE4824 to (Evaluate:89Ahs8930)  Requested for: 28OIP5368; Last    Rx:25Jan2016 Ordered   12  Metoprolol Tartrate 25 MG Oral Tablet; Take 1 tablet by mouth daily; Therapy: 53Zzv7032 to (Evaluate:06Pmf9542)  Requested for: 04Sep2015; Last    Rx:30Tmo1345 Ordered   13  Mometasone Furoate 0 1 % External Solution; apply BID to rash; Therapy: 74NTQ5244 to (Last Rx:09Dkv0548)  Requested for: 08Jqr2871 Ordered   14  Morphine Sulfate 15 MG Oral Tablet; TAKE 1 TABLET Every 6 hours PRN pain; Therapy: 22NAE9589 to (Evaluate:26Mar2016); Last Rx:25Jan2016 Ordered   15  Omeprazole 20 MG Oral Tablet Delayed Release; Take one tablet daily; Therapy: 48ACT3392 to (Evaluate:76Eed2408)  Requested for: 78DOM9987; Last    Rx:06Feb2015 Ordered   16  Ondansetron 4 MG Oral Tablet Dispersible; ALLOW 1 TABLET TO DISSOLVE ON    TONGUE TWICE DAILY AS NEEDED FOR NAUSEA; Therapy: 20BWF0487 to (Evaluate:18Jan2016)  Requested for: 63KSB9911; Last    Rx:51Xir1474 Ordered   17  OneTouch Ultra Blue In [employer]; USE 1 STRIP Twice daily; Therapy: 89ZGM3475 to (Evaluate:08Lhx2544)  Requested for: 39VSB0312; Last    Rx:96Hyj9386 Ordered   18  TraMADol HCl - 50 MG Oral Tablet; take 1-2 tablets qid prn; Therapy: 10IQD3495 to (Last Rx:79Hyl3296) Ordered   19  Ventolin  (90 Base) MCG/ACT Inhalation Aerosol Solution; inhale 1 to 2 puffs    every 4 to 6 hours as needed; Therapy: 21Yeh8743 to (Last Rx:25Wfc1440)  Requested for: 53Yqp3766; Status:    ACTIVE - Retrospective By Protocol Authorization Ordered   20  Zolpidem Tartrate 10 MG Oral Tablet; Take 1 tablet by mouth at bedtime; Therapy: 12Apr2011 to (Evaluate:22Fmq0507)  Requested for: 37KHK2608; Last    Rx:10Jan2016 Ordered    Allergies    1  Lexapro TABS   2  Adhesive Tape TAPE    Vitals  Signs [Data Includes: Current Encounter]   Recorded: [id number] 01:47PM   Heart Rate: 96  Systolic: 819  Diastolic: 019  Height: 5 ft 2 in  Weight: 179 lb   BMI Calculated: 32 74  BSA Calculated: 1 82    Physical Exam      General: No acute distress, age-appropriate  Neck: Supple, trachea midline  HEENT: Normocephalic atraumatic, mucous membranes are moist, sclera are nonicteric  Cardiovascular: No discernable arrhythmia     Respiratory: Breathing is even and unlabored, no stridor or audible wheezing  Psychiatric: Awake alert and oriented x3, normal mood and affect  Abdomen: Without rebound or guarding  Right Carpal Tunnel: Tinel's, Josesito's Compression and Phalen's, but No AIN Weakness, Negative FDS Flexion Test, Negative Pronator Stress Test, No Scratch Collapse, No Thenar Atrophy and Abnormal APB Strength (4/5)   Carpal Tunnel Examination-   Right Trigger Finger: Crepitus, Nodules and Locking (thumb)    Skin: was evaluated and demonstrated no masses, no abrasions, no erythema, no effusion, no edema, no fluctuance, no induration, no laceration, no ulceration, no lymphadenopathy  Right Upper Neurovascular: were evaluated and demonstrated: The patient has normal motor strength to the median, ulnar and radial nerve  Normal sensation to the median, ulnar, and radial nerve  Normal pulses in the radial and ulnar artery  Capillary refill is < 2 seconds  Procedure           Injection: After appropriate confirmation of the patient, site, and procedure, the Right thumb flexor tendon was prepared in a sterile fashion  The area was then injected with 6 milligrams of Celestone and 1 of 1% lidocaine without epinephrine  A sterile bandage was then applied  The patient tolerated the procedure well  All risks, benefits, and potential complications of the steroid injection were discussed with the patient, which were including but not limited to: Bleeding, small risk of infection, numbness and tingling within the area of injection, soreness at the injection site, potential brief exacerbation of pain, as well as steroid reactions including flushing, increased aggravation, and potential increased blood sugar in diabetics  Assessment    1  Carpal tunnel syndrome of right wrist (354 0) (G56 01)   2  Trigger thumb of right hand (727 03) (M65 311)    Plan  Carpal tunnel syndrome of right wrist, Trigger thumb of right hand    1   Betamethasone Sod Phos & Acet 6 (3-3) MG/ML Injection Suspension   2  Injection Tendon Sheath Ligament Single - POC; Status:Complete;   Done: 51JAP3553   3  Follow Up After Surgery Evaluation and Treatment  Follow-up  Status: Hold For -   Scheduling  Requested for: 20DWE6556   4  Please refer to the patient information sheet that was provided at your office visit today for   information on  your current condition ; Status:Complete;   Done: 23QCV3946    Discussion/Summary  Discussion Summary:   Patient with right-sided carpal tunnel syndrome and right thumb trigger finger  Trigger finger was injected today  She is signing up for carpal tunnel release on the right-hand side  As well as trigger finger release of the right thumb  Paperwork was done today and we'll do this under local medication  She is aware about the risks with diabetes and we'll get this cleared by her medical doctor  The patient has elected to undergo a release of the A1 pulley (trigger finger)  A small incision will be made over the palmar aspect of the hand, the tendon sheath holding the flexor tendons will be released  In the postoperative period, light activities are allowed immediately, driving is allowed when narcotic medication has stopped, and the incision may get wet after 5 days  Heavy activities (lifting more than approximately 10 pounds) will be allowed after the sutures are removed at approximately 10-14 days  While the pain and discomfort within the wrist typically improves rapidly, some residual discomfort may be present for up to 6 weeks  The nodule that is typically palpable in the palmar aspect of the hand will not be removed, as this would necessitate removal of a portion of the flexor tendon, however the catching, clicking, and locking should resolve  Approximate success rate is 98%  The patient has elected to undergo an endoscopic carpal tunnel release   The 2 incision technique was discussed with the patient, which results in approximately a two-week less recovery time, and less wound complications  In the postoperative period, light activities are allowed immediately, driving is allowed when narcotic medication has stopped, and the incision may get wet after 5 days  Heavy activities (lifting more than approximately 10 pounds) will be allowed after the sutures are removed at approximately 10-14 days  While the pain and discomfort in the hands generally improves rapidly, the numbness and tingling as well as the strength will slowly improve over weeks to months depending on the severity of the carpal tunnel syndrome  Pillar pain was discussed with the patient, which is typically a common but self-limiting condition  The risks of bleeding and infection from the surgery are less than 1%  Risk of recurrence is approximately 0 5%  The risks of nerve injury or nerve damage or damage to the blood vessels is approximately 1 in 1200  The patient has an understanding of the above mentioned discussion         Future Appointments    Signatures   Electronically signed by : ANASTACIO Fenton ; Feb  3 2016  4:59PM EST                       (Author)

## 2018-01-11 NOTE — MISCELLANEOUS
Assessment    1  Pancreatitis (577 0) (K85 9)   2  Abdominal pain, epigastric (789 06) (R10 13)   3  Diabetes mellitus type II, uncontrolled (250 02) (E11 65)   4  Long-term insulin use (V58 67) (Z79 4)   5  Fibromyalgia (729 1) (M79 7)   6  Chest pain (786 50) (R07 9)   7  Elevated LFTs (790 6) (R79 89)    Plan  Diabetes mellitus type II, uncontrolled    · Atorvastatin Calcium 20 MG Oral Tablet; TAKE 1 TABLET DAILY AS DIRECTED   Rx By: Seth Cole; Dispense: 30 Days ; #:30 Tablet; Refill: 5; For: Diabetes mellitus type II, uncontrolled; HEYDI = N; Verified Transmission to [facility]Y #[id number] Last Updated By: System, SureScripts; 5/12/2016 2:24:40 PM  Diabetes mellitus type II, uncontrolled, Pancreatitis    · *1 -  ENDOCRINOLOGY Physician Referral  Consult  Status: Hold For -  Scheduling,Retrospective By Protocol Authorization  Requested for: [id number]   Ordered; For: Diabetes mellitus type II, uncontrolled, Pancreatitis; Ordered By: Seth Cole Performed:  Due: 81TBR5334; Last Updated By: Carlotta Gonzalez; 5/12/2016 2:05:34 PM  Care Summary provided  : Yes  Pancreatitis    · (1) COMPREHENSIVE METABOLIC PANEL; Status:Active - Retrospective By Protocol  Authorization; Requested for:12May2016 02:20PM;    Perform:[facility]; UMJ:[id number]; Last Updated By:Flori Horton; 5/12/2016 2:20:43 PM;Ordered; For:Pancreatitis; Ordered By:Kris Mendez;   · (1) LIPASE; Status:Active - Retrospective By Protocol Authorization; Requested  for:12May2016 02:20PM;    Perform:[facility]; WMY:[id number]; Last Updated By:Flori Horton; 5/12/2016 2:20:43 PM;Ordered; For:Pancreatitis; Ordered By:Kris Mendez;    Chief Complaint  Chief Complaint Free Text Note Form: Pt is here for a KIERRA  Pt said she is still having some pain and trouble controlling the sugars  Pt went in on 05/06/2016 and discharged 05/10/2016        History of Present Illness  TCM Communication [facility]: The patient is being contacted for follow-up after hospitalization  She was hospitalized at [address]  The date of admission: 05/06/2016, date of discharge: 05/10/2016  Diagnosis: Acute pancreatitis/Hypokalemia  She was discharged to home  The patient is currently asymptomatic  Communication performed and completed by Rosario Finch      Active Problems    1  Analgesic use (V58 69) (Z79 899)   2  Anxiety (300 00) (F41 9)   3  Arthralgia (719 40) (M25 50)   4  Benign essential hypertension (401 1) (I10)   5  Carpal tunnel syndrome of right wrist (354 0) (G56 01)   6  Carpal tunnel syndrome, left (354 0) (G56 02)   7  Cerebral infarction, unspecified (434 91) (I63 9)   8  Cervical radiculopathy (723 4) (M54 12)   9  Chest pain (786 50) (R07 9)   10  Chronic cough (786 2) (R05)   11  Chronic migraine without aura (346 70) (G43 709)   12  CRP elevated (790 95) (R79 82)   13  Degenerative disc disease, cervical (722 4) (M50 30)   14  Depression (311) (F32 9)   15  Diabetes mellitus type II, uncontrolled (250 02) (E11 65)   16  Elevated LFTs (790 6) (R79 89)   17  Fatty liver (571 8) (K76 0)   18  Fibromyalgia (729 1) (M79 7)   19  GERD without esophagitis (530 81) (K21 9)   20  Headache (784 0) (R51)   21  Hyperlipidemia (272 4) (E78 5)   22  Hypothyroidism (244 9) (E03 9)   23  Insomnia (780 52) (G47 00)   24  Intractable chronic common migraine without aura (346 71) (G43 719)   25  Iron deficiency (280 9) (E61 1)   26  Left hand paresthesia (782 0) (R20 2)   27  Long-term insulin use (V58 67) (Z79 4)   28  Multiple joint pain (719 49) (M25 50)   29  Nausea (787 02) (R11 0)   30  Neck pain (723 1) (M54 2)   31  Nicotine dependence (305 1) (F17 200)   32  Opioid dependence (304 00) (F11 20)   33  Pancreatitis (577 0) (K85 9)   34  Paresthesia (782 0) (R20 2)   35  Polyarthritis (716 50) (M13 0)   36  Pyelonephritis (590 80) (N12)   37  Recurrent low back pain (724 2) (M54 5)   38  Right hand pain (729 5) (M79 641)   39   S/P carpal tunnel release (V45 89) (Z98 89)   40  S/P trigger finger release (V45 89) (Z98 89)   41  Sacroiliac pain (724 6) (M53 3)   42  Septicemia (038 9) (A41 9)   43  Shortness of breath (786 05) (R06 02)   44  Skin rash (782 1) (R21)   45  Snoring (786 09) (R06 83)   46  Spondylosis of cervical region without myelopathy or radiculopathy (721 0) (M47 812)   47  Stroke Syndrome (436)   48  Swelling of hand joint, right (719 04) (M25 441)   49  TMJ arthralgia (524 62) (M26 62)   50  Transient cerebral ischemic attack (435 9) (G45 9)   51  Trigger thumb of right hand (727 03) (M65 311)   52  Vitamin B12 deficiency (266 2) (E53 8)   53  Vitamin D deficiency (268 9) (E55 9)    Past Medical History    1  History of Acute hip pain (719 45) (M25 559)   2  History of Acute venous embolism and thrombosis of deep vessels of distal lower   extremity (453 42) (I82 4Z9)   3  Anxiety (300 00) (F41 9)   4  Diabetes mellitus type II, uncontrolled (250 02) (E11 65)   5  History of Encounter for comprehensive diabetic foot examination, type 2 diabetes   mellitus (250 00) (E11 9)   6  History of GERD without esophagitis (530 81) (K21 9)   7  History of High cholesterol (272 0) (E78 0)   8  History of abdominal pain (V13 89) (Z87 898)   9  History of acute pancreatitis (V12 79) (Z87 19)   10  History of anemia (V12 3) (Z86 2)   11  History of arthritis (V13 4) (Z87 39)   12  History of asthma (V12 69) (Z87 09)   13  History of esophageal reflux (V12 79) (Z87 19)   14  History of migraine (V12 49) (Z86 69)   15  History of nausea and vomiting (V12 79) (Z87 898)   16  History of pulmonary embolism (V12 55) (Z86 711)   17  History of stroke (V12 54) (Z86 73)   18  History of thyroid disease (V12 29) (Z86 39)   19  History of upper respiratory infection (V12 09) (Z87 09)   20  History of Need for pneumococcal vaccination (V03 82) (Z23)   21  History of Numbness (782 0) (R20 0)   22  History of Pulmonary Embolism (V12 51)   23   History of Visit for screening mammogram (V76 12) (Z12 31)    Surgical History    1  History of Cholecystectomy   2  History of Diagnostic Esophagogastroduodenoscopy   3  History of Esophagogastric Fundoplasty   4  History of Esophagogastric Fundoplasty Nissen Fundoplication   5  History of Neuroplasty Decompression Median Nerve At Carpal Tunnel   6  Denied: History of Tonsillectomy With Adenoidectomy    Family History  Mother    1  Family history of Acute Myocardial Infarction (V17 3)   2  Family history of Chronic Kidney Disease (NKF Classification)   3  Family history of Depression   4  Family history of substance abuse (V17 0) (Z81 4)   5  Family history of ulcerative colitis (V18 59) (Z83 79)   6  Family history of Schizophrenia   7  Family history of Type 2 Diabetes Mellitus  Father    8  Family history of Acute Myocardial Infarction (V17 3)   9  Family history of psoriasis (V19 4) (Z84 0)   10  Family history of Gastric Cancer (V16 0)   11  Family history of Stroke Syndrome (V17 1)  Sister    15  Family history of Crohn's disease (V18 59) (Z83 79)  Maternal Grandfather    15  Family history of rheumatoid arthritis (V17 7) (Z82 61)  Family History    14  Denied: Family history of systemic lupus erythematosus    Social History    · Completed 12th grade   · Current every day smoker (305 1) (F17 200)   · Current Every Day Smoker (305 1)   · Denied: Daily Coffee Consumption (___ Cups/Day)   ·    · Never Drank Alcohol   · No alcohol use   · No drug use   · One child   · Unemployed (V62 0) (Z56 0)    Current Meds   1  ALPRAZolam 0 25 MG Oral Tablet; TAKE 1 TABLET THREE TIMES A DAY AS NEEDED; Therapy: 86Dfd4620 to (Evaluate:64Tvm0167)  Requested for: 25Apr2016; Last   Rx:15Apr2016 Ordered   2  Aspirin 325 MG Oral Tablet; TAKE 1 TABLET DAILY; Therapy: 40NVH5939 to Recorded   3  Cyanocobalamin 1000 MCG/ML Injection Solution; Inject 1 ml every 2 months;    Therapy: 61SOK2039 to (Last Rx:32Voe8376)  Requested for: 70KDV7671 Ordered   4  Divalproex Sodium 250 MG Oral Tablet Delayed Release; TAKE 2 TABLET Bedtime; Therapy: 93FRM0157 to (Evaluate:03Xnz7513)  Requested for: 96XIM9821; Last   Rx:14Mar2016 Ordered   5  Folic Acid 317 MCG Oral Tablet; TAKE 1 TABLET DAILY AS DIRECTED; Therapy: 97YDR4401 to (Evaluate:30Mar2017)  Requested for: 94Xnj6114; Last   Rx:04Apr2016 Ordered   6  Lantus 100 UNIT/ML Subcutaneous Solution; 20 units daily  Requested for: 16Ifw2102; Last Rx:20Hlh0710 Ordered   7  Levothyroxine Sodium 75 MCG Oral Tablet; TAKE 1 TABLET DAILY; Therapy: 57UPL2846 to (Evaluate:17Klz5024)  Requested for: 19Yfp8657; Last   Rx:17Vci0055 Ordered   8  Losartan Potassium 50 MG Oral Tablet; TAKE 1 TABLET DAILY; Therapy: 71MTU6919 to (Evaluate:11Mar2017)  Requested for: 00YXZ7255; Last   Rx:16Mar2016 Ordered   9  Lyrica 150 MG Oral Capsule; TAKE 1 CAPSULE 3 TIMES DAILY; Therapy: 34HHA4103 to (Evaluate:26Oct2016)  Requested for: 29Apr2016; Last   Rx:36Zdk1644 Ordered   10  Metoprolol Tartrate 25 MG Oral Tablet; Take 1 tablet by mouth daily; Therapy: 40Dsh4421 to (Evaluate:53Amp5518)  Requested for: 02IQS6606; Last    Rx:11Mar2016 Ordered   11  OLANZapine 5 MG Oral Tablet; TAKE 1 TABLET AT BEDTIME; Therapy: 40OZG0975 to (Evaluate:41Has6594)  Requested for: 04Apr2016; Last    Rx:68Cyu2496 Ordered   12  Omeprazole 20 MG Oral Capsule Delayed Release; take one capsule by mouth daily; Therapy: 20CYA4380 to (Evaluate:11Mar2017)  Requested for: 48XMS9989; Last    Rx:16Mar2016 Ordered   13  Ondansetron 4 MG Oral Tablet Dispersible; ALLOW 1 TABLET TO DISSOLVE ON    TONGUE TWICE DAILY AS NEEDED FOR NAUSEA; Therapy: 29BPK9671 to (Evaluate:24Mar2016)  Requested for: 14Nza5747; Last    Rx:61Byt5609 Ordered   14  OneTouch Ultra Blue In [employer]; USE 1 STRIP Twice daily; Therapy: 57KKV9975 to (Evaluate:73Xpf6722)  Requested for: 98QDO4660; Last    Rx:56Gvc5335 Ordered   15   ProAir  (90 Base) MCG/ACT Inhalation Aerosol Solution; INHALE 2 PUFFS    EVERY 4-6 HOURS AS NEEDED; Therapy: 30YZK5570 to (Last Rx:70Umv0778)  Requested for: 25Ule7950 Ordered   16  Topiramate 100 MG Oral Tablet; take 1 tab q HS; Therapy: 60IFN7878 to (Maria Khadijah)  Requested for: 06MGC1434; Last    Rx:01Avh7383 Ordered   17  TraMADol HCl - 50 MG Oral Tablet; TAKE 1 TO 2 TABLETS 4 TIMES A DAY AS NEEDED; Therapy: 49BSD5902 to (Evaluate:23Zzj5902)  Requested for: 61Ncy4201; Last    Rx:22Tmv9575 Ordered   18  Venlafaxine HCl ER 75 MG Oral Capsule Extended Release 24 Hour; TAKE 1 CAPSULE    DAILY; Therapy: 29XJK8788 to (Evaluate:44Prj8277)  Requested for: 46Jzf6399; Last    Rx:25Mar2016 Ordered   19  Ventolin  (90 Base) MCG/ACT Inhalation Aerosol Solution; inhale 1 to 2 puffs    every 4 to 6 hours as needed; Therapy: 41Kfy9760 to (Last Rx:98Wmr9771)  Requested for: 88Bxk4808; Status:    ACTIVE - Retrospective By Protocol Authorization Ordered   20  Zolpidem Tartrate 10 MG Oral Tablet; Take 1 tablet by mouth at bedtime; Therapy: 57Qow5445 to (Evaluate:66Rnj0947)  Requested for: 83MQG1885; Last    Rx:99Izy0219 Ordered    Allergies    1  Adhesive Tape TAPE   2  Lexapro TABS    Vitals  Signs [Data Includes: Current Encounter]   Recorded: 08BKQ5257 58:96QX   Systolic: 020  Diastolic: 76  Height: 5 ft 2 in  Weight: 182 lb 8 0 oz  BMI Calculated: 33 38  BSA Calculated: 1 84    Future Appointments    Date/Time Provider Specialty Site   08/24/2016 01:30 PM ANASTACIO Skelton  Hematology Oncology [facility]   07/13/2016 11:40 AM Ton Gomes MD Gastroenterology Adult ST [address]   05/25/2016 01:30 PM ANASTACIO Jackson   Orthopedic Surgery [facility]   06/02/2016 11:00 AM Iona Cat DO Rheumatology ST [address] [facility]   58/39/0898 94:21 PM [facility]     Signatures   Electronically signed by : Zoe Pham DO; May 13 2016  1:02PM EST                       (Author)

## 2018-01-11 NOTE — MISCELLANEOUS
Message   Recorded as Task   Date: 07/19/2017 01:25 PM, Created By: Nilton Liz   Task Name: Miscellaneous   Assigned To: [facility],Team   Regarding Patient: Andres Kendall, Status: Active   Comment:    Jose Daniel Alex - 19 Jul 2017 1:25 PM     TASK CREATED  Faxed anti coag hold form to Dr Aleja Blas for pt to hold aspirin  Pt to be scheduled for C7-T1 NATH[facility]Y at [facility] with Dr Angel Santos fax: [phone], phone: [phone]  Rachel Hernandez - 21 Jul 2017 7:23 AM     TASK EDITED  Received consent to hold ASA per Dr Tori Santos on 1/28/34  Rachel Hernandez - 21 Jul 2017 7:24 AM     TASK EDITED   Arie Lee - 21 Jul 2017 8:25 AM     TASK EDITED  LMfor pt to cb, CB#provided   Zahira Denton - 24 Jul 2017 11:37 AM     TASK EDITED  lm for pt to cb,cb# provided   Nilton Liz - 24 Jul 2017 1:40 PM     TASK EDITED  Pt returned call and LMOM here- she can be reached at [phone]  Michelle Pineda - 25 Jul 2017 3:29 PM     TASK EDITED   Arie Lee - 25 Jul 2017 3:39 PM     TASK EDITED   S/w pt schedule pt procedure C-7-T1  NATHALY for 8/18/17 @ 815am   went over istruction for ASpirin hold last dose of ASA will be on 8/11/17 and ASA hold will begin on 8/12/17 pt verbalized understanding  Also went over pre-operative instructions with pt and pt verbalized understanding    ******FYI******   Regina Fine - 25 Jul 2017 4:05 PM     TASK REPLIED TO: Previously Assigned To [place]  Thanks  Active Problems    1  Anxiety (300 00) (F41 9)   2  Arthralgia (719 40) (M25 50)   3  Benign essential hypertension (401 1) (I10)   4  Carpal tunnel syndrome of right wrist (354 0) (G56 01)   5  Carpal tunnel syndrome, left (354 0) (G56 02)   6  Cervical disc disorder with radiculopathy of mid-cervical region (723 4) (M50 120)   7  Cervical dysphagia (787 29) (R13 19)   8  Chest pain (786 50) (R07 9)   9  Chronic bilateral low back pain (724 2,338 29) (M54 5,G89 29)   10   Chronic cervical pain (723 1,338 29) (M54 2,G89 29)   11  Chronic cervical radiculopathy (723 4) (M54 12)   12  Chronic cough (786 2) (R05)   13  Chronic diarrhea (787 91) (K52 9)   14  Chronic fatigue (780 79) (R53 82)   15  Chronic migraine without aura (346 70) (G43 709)   16  Chronic pain syndrome (338 4) (G89 4)   17  CRP elevated (790 95) (R79 82)   18  Degenerative disc disease, cervical (722 4) (M50 30)   19  Depression (311) (F32 9)   20  Diabetes mellitus type II, uncontrolled (250 02) (E11 65)   21  Elevated LFTs (790 6) (R79 89)   22  Fatty liver (571 8) (K76 0)   23  Fibromyalgia (729 1) (M79 7)   24  GERD without esophagitis (530 81) (K21 9)   25  Headache (784 0) (R51)   26  Hyperlipidemia (272 4) (E78 5)   27  Hypothyroidism (244 9) (E03 9)   28  Insomnia (780 52) (G47 00)   29  Intractable chronic common migraine without aura (346 71) (G43 719)   30  Iron deficiency (280 9) (E61 1)   31  Irritable bowel syndrome with diarrhea (564 1) (K58 0)   32  Left hand paresthesia (782 0) (R20 2)   33  Left sided numbness (782 0) (R20 0)   34  Long-term insulin use (V58 67) (Z79 4)   35  Multiple joint pain (719 49) (M25 50)   36  Nausea (787 02) (R11 0)   37  Nicotine dependence (305 1) (F17 200)   38  Opioid dependence (304 00) (F11 20)   39  Osteoarthritis of spine with radiculopathy, cervical region (721 0) (M47 22)   40  Pancreatic cyst (577 2) (K86 2)   41  Pancreatitis (577 0) (K85 90)   42  Paresthesia (782 0) (R20 2)   43  Polyarthritis (716 50) (M13 0)   44  Polyneuropathy associated with underlying disease (357 4) (G63)   45  Pyelonephritis (590 80) (N12)   46  Right hand pain (729 5) (M79 641)   47  S/P carpal tunnel release (V45 89) (Z98 890)   48  S/P trigger finger release (V45 89) (Z98 890)   49  Sacroiliac pain (724 6) (M53 3)   50  Shortness of breath (786 05) (R06 02)   51  Shoulder pain (719 41) (M25 519)   52  Skin rash (782 1) (R21)   53  Snoring (786 09) (R06 83)   54   Spondylosis of cervical region without myelopathy or radiculopathy (721 0) (M47 812)   55  Stroke syndrome (434 91) (I63 9)   56  Swelling of hand joint, right (719 04) (M25 441)   57  Transient ischemic attack (TIA) (435 9) (G45 9)   58  Trigger thumb of right hand (727 03) (M65 311)   59  Type 2 diabetes mellitus with hyperglycemia, with long-term current use of insulin    (250 00,790 29,V58 67) (E11 65,Z79 4)   60  Uncontrolled type 2 diabetes mellitus with diabetic polyneuropathy, with long-term    current use of insulin (250 62,357 2,V58 67) (E11 42,E11 65,Z79 4)   61  Vitamin B12 deficiency (266 2) (E53 8)   62  Vitamin D deficiency (268 9) (E55 9)    Current Meds   1  ALPRAZolam 0 25 MG Oral Tablet; TAKE 1 TABLET THREE TIMES A DAY AS NEEDED; Therapy: 12Apr2011 to (Evaluate:86Jjj8365)  Requested for: 26Jun2017; Last   Rx:06Jun2017 Ordered   2  Aspirin 325 MG Oral Tablet; TAKE 1 TABLET DAILY; Therapy: 68FMM6028 to Recorded   3  Atorvastatin Calcium 40 MG Oral Tablet; TAKE 1 TABLET DAILY; Therapy: 58Ylc3460 to (Last Rx:88Ppr9616)  Requested for: 02Mbh6008 Ordered   4  Botox 200 UNIT Injection Solution Reconstituted; INJECT UP  UNITS ONCE   EVERY 3 MONTHS IN OFFICE; Therapy: 21Jun2017 to (Last Rx:21Jun2017) Ordered   5  BuPROPion HCl ER (XL) 150 MG Oral Tablet Extended Release 24 Hour; TAKE 1   TABLET DAILY; Therapy: 05CQN1159 to (Evaluate:02Apr2018)  Requested for: 92NYP7326; Last   Rx:07Apr2017 Ordered   6  Cyanocobalamin 1000 MCG/ML Injection Solution; Inject 1 ml every 2 months; Therapy: 79EMP3990 to (Last Rx:02Nwx8899)  Requested for: 72Bdr3037 Ordered   7  Cyclobenzaprine HCl - 10 MG Oral Tablet; TAKE 1 TABLET AT BEDTIME; Therapy: 82PZW6174 to (Evaluate:42Rff4835)  Requested for: 21Jun2017; Last   QT:07BUD9148 Ordered   8  Dicyclomine HCl - 20 MG Oral Tablet; TAKE 1 TABLET EVERY 6 HOURS AS NEEDED; Therapy: 92HGR9359 to (([phone])  Requested for: 31Oct2016; Last   Rx:31Oct2016 Ordered   9   Divalproex Sodium 250 MG Oral Tablet Delayed Release; take 2 tablets by mouth at   bedtime; Therapy: 83ZVU0956 to (Evaluate:25Ulo7374)  Requested for: 12AHG5005; Last   Rx:92Grn4109 Ordered   10  Fenofibrate 48 MG Oral Tablet; TAKE 1 TABLET DAILY; Therapy: 48YFW5805 to (Leeann Buchanan)  Requested for: 25GJM8608; Last    Rx:30Nov2016; Status: ACTIVE - Renewal Denied Ordered   11  Folic Acid 019 MCG Oral Tablet; TAKE 1 TABLET DAILY AS DIRECTED; Therapy: 81TIE5307 to (Evaluate:11May2017)  Requested for: 27RAO8930; Last    Rx:64Orv0684 Ordered   12  FreeStyle Lite Test In [employer]; use as directed; Therapy: 64NDE9905 to (Last Rx:91Yzb4525) Ordered   13  Gabapentin 400 MG Oral Capsule; TAKE 1-2 CAPSULES 3 TIMES DAILY; Therapy: 96QEK5724 to (Evaluate:03Oct2017)  Requested for: 51SCW1765; Last    Rx:78Usp8647 Ordered   14  HumaLOG KwikPen 200 UNIT/ML Subcutaneous Solution Pen-injector; Inject SQ 15    units before each meal;    Therapy: 44FBK5688 to (Last Rx:08Dec2016) Ordered   15  Ketorolac Tromethamine 10 MG Oral Tablet; TAKE 1 TABLET EVERY 8 HOURS AS    NEEDED FOR PAIN  DON'T TAKE FOR MORE THAN 5 DAYS; Therapy: 37ZQB1737 to (Evaluate:24Jun2017)  Requested for: 21Jun2017; Last    FI:37UBY7692 Ordered   16  Lantus SoloStar 100 UNIT/ML Subcutaneous Solution Pen-injector; Inject SQ 50 units at    bedtime; Therapy: (Recorded:18Ced5975) to Recorded   17  Levothyroxine Sodium 112 MCG Oral Tablet; TAKE 1 TABLET DAILY MONDAY    THROUGH SATURDAY  TAKE 2 TABLETS ON SUNDAY; Therapy: 58QPS1616 to (Evaluate:17Oct2017)  Requested for: 20Apr2017; Last    Rx:20Apr2017 Ordered   18  Losartan Potassium 50 MG Oral Tablet; TAKE 1 TABLET DAILY; Therapy: 73WOS8164 to (Kimberly Salas)  Requested for: 75OHV4796; Last    Rx:09May2017 Ordered   19  MetFORMIN HCl  MG Oral Tablet Extended Release 24 Hour; take 2 tablets twice    daily with meals;     Therapy: 39Obc1463 to (Evaluate:91Noh3224)  Requested for: 18OYC6364; Last    NZ:45SSX8916 Ordered   20  Metoprolol Tartrate 25 MG Oral Tablet; Take 1 tablet by mouth daily; Therapy: 05Qiq9001 to (Lauralee Kehr)  Requested for: 87PVR5858; Last    Rx:33Xes4242 Ordered   21  OLANZapine 5 MG Oral Tablet; TAKE 1 TABLET AT BEDTIME; Therapy: 00SMJ7604 to (Evaluate:41Kzf9343)  Requested for: 72XTR8640; Last    Rx:25Mar2017 Ordered   22  Ondansetron 4 MG Oral Tablet Disintegrating; ALLOW 1 TABLET TO DISSOLVE ON    TONGUE TWICE DAILY AS NEEDED FOR NAUSEA; Therapy: 45XLU1899 to (Evaluate:17Pli5853)  Requested for: 89PDJ9154; Last    Rx:62Yrg8861 Ordered   23  OneTouch Ultra Blue In [employer]; USE 1 STRIP Twice daily; Therapy: 07IIF6420 to (Meri Cortez)  Requested for: 88VLW5534; Last    Rx:03Jan2017 Ordered   24  ProAir  (90 Base) MCG/ACT Inhalation Aerosol Solution; INHALE 2 PUFFS    EVERY 4-6 HOURS AS NEEDED; Therapy: 86DKX9184 to (Last Rx:96Toc2006)  Requested for: 25Osy9780 Ordered   25  Topiramate 100 MG Oral Tablet; TAKE 1 TABLET TWICE A DAY  ONE IN AM AND ONE    IN PM;    Therapy: 27KCJ3136 to (Evaluate:13Jan2018)  Requested for: 39Lat1892; Last    Rx:72Uri0353 Ordered   26  Toujeo SoloStar 300 UNIT/ML Subcutaneous Solution Pen-injector; 50 units; Therapy: 95Dca1487 to (Last Rx:24Oct2016) Ordered   27  TraMADol HCl - 50 MG Oral Tablet; TAKE 1-2 TABLETS BY MOUTH 4 TIMES A DAY AS    NEEDED; Therapy: 06MMV2480 to (Evaluate:90Avq0235)  Requested for: 21NST8339; Last    Rx:06Jun2017 Ordered   28  Turmeric TABS; 1 Tab daily; Therapy: (Recorded:24Oct2016) to Recorded   29  Venlafaxine HCl  MG Oral Capsule Extended Release 24 Hour; Take 1 capsule    by mouth  daily; Therapy: 18AIJ4916 to (Evaluate:72Dqg6492)  Requested for: 61WKC5391; Last    Rx:27Mar2017 Ordered   30  Ventolin  (90 Base) MCG/ACT Inhalation Aerosol Solution; inhale 1 to 2 puffs    every 4 to 6 hours as needed;     Therapy: 59Fjt1358 to (Last Rx:14Aug2015) Requested for: 27Aik4206; Status:    ACTIVE - Retrospective By Protocol Authorization Ordered   31  Zolpidem Tartrate 10 MG Oral Tablet; take 1 tablet by mouth at bedtime as needed; Therapy: 12Apr2011 to (Evaluate:58Mks9222)  Requested for: 02TPN3229; Last    Rx:06Jun2017 Ordered    Allergies    1  Adhesive Tape TAPE   2   Lexapro TABS    Signatures   Electronically signed by : Carlos Alberto Lemus, ; Jul 25 2017  4:08PM EST                       (Author)

## 2018-01-12 NOTE — CONSULTS
Chief Complaint    1  Wrist Pain    History of Present Illness  HPI: Patient presents to me today is a 60-year-old female with a history of significant multiple medical problems in the past including stroke, diabetes, pulmonary embolism, hypertension with a history of numbness and tingling into the right hand  This started about a year ago and produces pain into basically the thumb and long finger  She also has discomfort which is worse at night and problems with heavy gripping moderate in intensity  She has catching popping clicking and locking into her right thumb with pain mild to moderate over the A1 pulley  splints do not significantly help her  The past medical history, surgical history, family history, social history, medications, allergies, and review of systems have been read and reviewed on the chart and have been updated  Review of Systems was recorded in the office today on a separate evaluation sheet and is listed below  Review of Systems    Constitutional: No fever, no chills, feels well, no tiredness, no recent weight gain or loss  Eyes: No complaints of eyesight problems, no red eyes  ENT: no loss of hearing, no nosebleeds, no sore throat  Cardiovascular: No complaints of chest pain, no palpitations, no leg claudication or lower extremity edema  Respiratory: no compliants of shortness of breath, no wheezing, no cough  Gastrointestinal: no complaints of abdominal pain, no constipation, no nausea or diarrhea, no vomiting, no bloody stools  Genitourinary: no complaints of dysuria, no incontinence  Musculoskeletal: as noted in HPI  Integumentary: no complaints of skin rash or lesion, no itching or dry skin, no skin wounds  Neurological: no complaints of headache, no confusion, no numbness or tingling, no dizziness     Endocrine: No complaints of muscle weakness, no feelings of weakness, no frequent urination, no excessive thirst    Psychiatric: No suicidal thoughts, no anxiety, no feelings of depression  Active Problems    1  Acute hip pain (719 45) (M25 559)   2  Analgesic use (V58 69) (Z79 899)   3  Anxiety (300 00) (F41 9)   4  Arthralgia (719 40) (M25 50)   5  Benign essential hypertension (401 1) (I10)   6  Carpal tunnel syndrome of right wrist (354 0) (G56 01)   7  Carpal tunnel syndrome, left (354 0) (G56 02)   8  Cervical radiculopathy (723 4) (M54 12)   9  Chest pain (786 50) (R07 9)   10  Chronic cough (786 2) (R05)   11  Chronic migraine without aura (346 70) (G43 709)   12  CVA (cerebral vascular accident) (434 91) (I63 9)   13  Degenerative disc disease, cervical (722 4) (M50 30)   14  Depression (311) (F32 9)   15  Diabetes mellitus type II, uncontrolled (250 02) (E11 65)   16  Encounter for comprehensive diabetic foot examination, type 2 diabetes mellitus    (250 00) (E11 9)   17  Esophageal reflux (530 81) (K21 9)   18  Fibromyalgia (729 1) (M79 7)   19  Headache (784 0) (R51)   20  Hyperlipidemia (272 4) (E78 5)   21  Hypothyroidism (244 9) (E03 9)   22  Insomnia (780 52) (G47 00)   23  Iron deficiency (280 9) (E61 1)   24  Long-term insulin use (V58 67) (Z79 4)   25  Multiple joint pain (719 49) (M25 50)   26  Nausea (787 02) (R11 0)   27  Neck pain (723 1) (M54 2)   28  Nicotine dependence (305 1) (F17 200)   29  Opioid dependence (304 00) (F11 20)   30  Pancreatitis (577 0) (K85 9)   31  Paresthesia (782 0) (R20 2)   32  Pyelonephritis (590 80) (N12)   33  Recurrent low back pain (724 2) (M54 5)   34  Right hand pain (729 5) (M79 641)   35  Septicemia (038 9) (A41 9)   36  Shortness of breath (786 05) (R06 02)   37  Skin rash (782 1) (R21)   38  Snoring (786 09) (R06 83)   39  Spondylosis of cervical region without myelopathy or radiculopathy (721 0) (M47 812)   40  Stroke Syndrome (436)   41  Swelling of hand joint, right (719 04) (M25 441)   42  TIA (transient ischemic attack) (435 9) (G45 9)   43  Trigger thumb of right hand (727 03) (M65 311)   44  Vitamin B12 deficiency (266 2) (E53 8)   45  Vitamin D deficiency (268 9) (E55 9)    Past Medical History    · History of Acute venous embolism and thrombosis of deep vessels of distal lower  extremity (453 42) (I82 4Z9)   · Anxiety (300 00) (F41 9)   · Diabetes mellitus type II, uncontrolled (250 02) (E11 65)   · History of High cholesterol (272 0) (E78 0)   · History of abdominal pain (V13 89) (C95 071)   · History of acute pancreatitis (V12 79) (Z87 19)   · History of anemia (V12 3) (Z86 2)   · History of arthritis (V13 4) (Z87 39)   · History of asthma (V12 69) (Z87 09)   · History of esophageal reflux (V12 79) (Z87 19)   · History of migraine (V12 49) (Z86 69)   · History of nausea and vomiting (V12 79) (K14 547)   · History of stroke (V12 54) (Z86 73)   · History of thyroid disease (V12 29) (Z86 39)   · History of upper respiratory infection (V12 09) (Z87 09)   · History of Numbness (782 0) (R20 0)   · History of Pulmonary Embolism (V12 51)    Surgical History    · History of Cholecystectomy   · History of Diagnostic Esophagogastroduodenoscopy   · History of Esophagogastric Fundoplasty Nissen Fundoplication   · History of Tonsillectomy With Adenoidectomy    Family History    · Family history of Acute Myocardial Infarction (V17 3)   · Family history of Chronic Kidney Disease (NKF Classification)   · Family history of Depression   · Family history of Schizophrenia   · Family history of Type 2 Diabetes Mellitus    · Family history of Acute Myocardial Infarction (V17 3)   · Family history of Gastric Cancer (V16 0)   · Family history of Stroke Syndrome (V17 1)    Social History    · Completed 12th grade   · Current Every Day Smoker (305 1)   · Denied: Daily Coffee Consumption (___ Cups/Day)   ·    · Never Drank Alcohol   · No drug use   · One child   · Unemployed (V62 0) (Z56 0)    Current Meds   1  ALPRAZolam 0 25 MG Oral Tablet; TAKE 1 TABLET BY MOUTH 3 TIMES A DAY AS   NEEDED;    Therapy: 98Tgw0754 to (Evaluate:81Yqx5041)  Requested for: 24RHV8408; Last   Rx:20Oct2015 Ordered   2  Aspirin 325 MG Oral Tablet; TAKE 1 TABLET DAILY; Therapy: 98SAH5738 to Recorded   3  Divalproex Sodium 250 MG Oral Tablet Delayed Release; TAKE 2 TABLET Bedtime; Therapy: 97BNA5461 to (Evaluate:23Jun2016)  Requested for: 18WGP8182; Last   Rx:25Jan2016 Ordered   4  DULoxetine HCl - 30 MG Oral Capsule Delayed Release Particles (Cymbalta); TAKE 1   CAPSULE TWICE DAILY; Therapy: 47WLD5187 to (Evaluate:10Aug2015)  Requested for: 23CZX3088; Last   Rx:98Rnj5485 Ordered   5  Folic Acid 763 MCG Oral Tablet; TAKE 1 TABLET DAILY AS DIRECTED; Therapy: 88MDL4571 to (Evaluate:53Rad9170)  Requested for: 34TDD6206; Last   Rx:42Mlw6439 Ordered   6  Gabapentin 300 MG Oral Capsule; TAKE 1 CAPSULE BY MOUTH 3 TIMES DAILY; Therapy: 37NJR0154 to (Evaluate:24Mar2016)  Requested for: 43MQN1824; Last   Rx:26Oct2015 Ordered   7  Lantus 100 UNIT/ML Subcutaneous Solution; 20 units daily  Requested for: 90Lkj1552; Last Rx:14Rim0092 Ordered   8  Levothyroxine Sodium 75 MCG Oral Tablet; TAKE 1 TABLET DAILY; Therapy: 72GQK8049 to (Evaluate:98Yky6910)  Requested for: 53Usf6517; Last   Rx:07Vfu9667 Ordered   9  LORazepam 1 MG Oral Tablet (Ativan); TAKE 1 TABLET 1 HOUR BEFORE MRI  MAY   REPEAT ONCE 15 MINUTES BEFORE MRI; Therapy: 03MFF4567 to (Evaluate:66Mwv7421); Last Rx:71Rgi8478 Ordered   10  Losartan Potassium 50 MG Oral Tablet; TAKE 1 TABLET DAILY; Therapy: 79WHM7448 to (Evaluate:27Fjl6392)  Requested for: 98WMX0054; Last    Rx:25Jan2016 Ordered   11  Lyrica 150 MG Oral Capsule; TAKE 1 CAPSULE 3 TIMES DAILY; Therapy: 40CSR4125 to (Evaluate:86Vid0448)  Requested for: 91PTI8959; Last    Rx:25Jan2016 Ordered   12  Metoprolol Tartrate 25 MG Oral Tablet; Take 1 tablet by mouth daily; Therapy: 10Qzd8054 to (Evaluate:49Lwb5574)  Requested for: 04Sep2015; Last    Rx:59Gnc2739 Ordered   13   Mometasone Furoate 0 1 % External Solution; apply BID to rash;    Therapy: 94Sbl3013 to (Last Rx:36Sdx4415)  Requested for: 28Dec2015 Ordered   14  Morphine Sulfate 15 MG Oral Tablet; TAKE 1 TABLET Every 6 hours PRN pain; Therapy: 88WMM3755 to (Evaluate:26Mar2016); Last Rx:25Jan2016 Ordered   15  Omeprazole 20 MG Oral Tablet Delayed Release; Take one tablet daily; Therapy: 42LJZ1287 to (Evaluate:64Kwo5841)  Requested for: 37UAM6011; Last    Rx:91Eie5014 Ordered   16  Ondansetron 4 MG Oral Tablet Dispersible; ALLOW 1 TABLET TO DISSOLVE ON    TONGUE TWICE DAILY AS NEEDED FOR NAUSEA; Therapy: 39IJV7357 to (Evaluate:18Jan2016)  Requested for: 18UUY0020; Last    Rx:28Plw4298 Ordered   17  OneTouch Ultra Blue In [employer]; USE 1 STRIP Twice daily; Therapy: 81ZCS1055 to (Evaluate:08Aug2016)  Requested for: 46LEI0443; Last    Rx:85Iot2968 Ordered   18  TraMADol HCl - 50 MG Oral Tablet; take 1-2 tablets qid prn; Therapy: 16KJZ4206 to (Last Rx:04Aug2015) Ordered   19  Ventolin  (90 Base) MCG/ACT Inhalation Aerosol Solution; inhale 1 to 2 puffs    every 4 to 6 hours as needed; Therapy: 88Jyk6980 to (Last Rx:90Kzj5251)  Requested for: 11Egd5721; Status:    ACTIVE - Retrospective By Protocol Authorization Ordered   20  Zolpidem Tartrate 10 MG Oral Tablet; Take 1 tablet by mouth at bedtime; Therapy: 10Sqk2974 to (Evaluate:12Ata7967)  Requested for: 71WKM1728; Last    Rx:10Jan2016 Ordered    Allergies    1  Lexapro TABS   2  Adhesive Tape TAPE    Vitals  Signs [Data Includes: Current Encounter]    Heart Rate: 91CGHIWBPA: 362AUMPLZQJL: 358ZKOJIH: 5 ft 2 inWeight: 179 lb BMI Calculated: 32 74BSA Calculated: 1 82    Physical Exam      General: No acute distress, age-appropriate  Neck: Supple, trachea midline  HEENT: Normocephalic atraumatic, mucous membranes are moist, sclera are nonicteric  Cardiovascular: No discernable arrhythmia  Respiratory: Breathing is even and unlabored, no stridor or audible wheezing     Psychiatric: Awake alert and oriented x3, normal mood and affect  Abdomen: Without rebound or guarding  Right Carpal Tunnel: Tinel's, Josesito's Compression and Phalen's, but No AIN Weakness, Negative FDS Flexion Test, Negative Pronator Stress Test, No Scratch Collapse, No Thenar Atrophy and Abnormal APB Strength (4/5)   Carpal Tunnel Examination-   Right Trigger Finger: Crepitus, Nodules and Locking (thumb)    Skin: was evaluated and demonstrated no masses, no abrasions, no erythema, no effusion, no edema, no fluctuance, no induration, no laceration, no ulceration, no lymphadenopathy  Right Upper Neurovascular: were evaluated and demonstrated: The patient has normal motor strength to the median, ulnar and radial nerve  Normal sensation to the median, ulnar, and radial nerve  Normal pulses in the radial and ulnar artery  Capillary refill is < 2 seconds  Procedure           Injection: After appropriate confirmation of the patient, site, and procedure, the Right thumb flexor tendon was prepared in a sterile fashion  The area was then injected with 6 milligrams of Celestone and 1 of 1% lidocaine without epinephrine  A sterile bandage was then applied  The patient tolerated the procedure well  All risks, benefits, and potential complications of the steroid injection were discussed with the patient, which were including but not limited to: Bleeding, small risk of infection, numbness and tingling within the area of injection, soreness at the injection site, potential brief exacerbation of pain, as well as steroid reactions including flushing, increased aggravation, and potential increased blood sugar in diabetics  Assessment    1  Carpal tunnel syndrome of right wrist (354 0) (G56 01)   2   Trigger thumb of right hand (727 03) (M65 311)    Plan     Carpal tunnel syndrome of right wrist, Trigger thumb of right hand    · Betamethasone Sod Phos & Acet 6 (3-3) MG/ML Injection Suspension   · Injection Tendon Sheath Ligament Single - POC; Status:Complete;   Done: 57XEX0852   · Follow Up After Surgery Evaluation and Treatment  Follow-up  Status: Hold For -  Scheduling  Requested for: 61ZUH9295   · Please refer to the patient information sheet that was provided at your office visit today for  information on  your current condition ; Status:Complete;   Done: 36RQN7965    Discussion/Summary    Patient with right-sided carpal tunnel syndrome and right thumb trigger finger  Trigger finger was injected today  She is signing up for carpal tunnel release on the right-hand side  As well as trigger finger release of the right thumb  Paperwork was done today and we'll do this under local medication  She is aware about the risks with diabetes and we'll get this cleared by her medical doctor  The patient has elected to undergo a release of the A1 pulley (trigger finger)  A small incision will be made over the palmar aspect of the hand, the tendon sheath holding the flexor tendons will be released  In the postoperative period, light activities are allowed immediately, driving is allowed when narcotic medication has stopped, and the incision may get wet after 5 days  Heavy activities (lifting more than approximately 10 pounds) will be allowed after the sutures are removed at approximately 10-14 days  While the pain and discomfort within the wrist typically improves rapidly, some residual discomfort may be present for up to 6 weeks  The nodule that is typically palpable in the palmar aspect of the hand will not be removed, as this would necessitate removal of a portion of the flexor tendon, however the catching, clicking, and locking should resolve  Approximate success rate is 98%  The patient has elected to undergo an endoscopic carpal tunnel release  The 2 incision technique was discussed with the patient, which results in approximately a two-week less recovery time, and less wound complications   In the postoperative period, light activities are allowed immediately, driving is allowed when narcotic medication has stopped, and the incision may get wet after 5 days  Heavy activities (lifting more than approximately 10 pounds) will be allowed after the sutures are removed at approximately 10-14 days  While the pain and discomfort in the hands generally improves rapidly, the numbness and tingling as well as the strength will slowly improve over weeks to months depending on the severity of the carpal tunnel syndrome  Pillar pain was discussed with the patient, which is typically a common but self-limiting condition  The risks of bleeding and infection from the surgery are less than 1%  Risk of recurrence is approximately 0 5%  The risks of nerve injury or nerve damage or damage to the blood vessels is approximately 1 in 1200  The patient has an understanding of the above mentioned discussion         Signatures   Electronically signed by : ANASTACIO Ocampo ; Feb  3 2016  4:59PM EST                       (Author)

## 2018-01-12 NOTE — RESULT NOTES
Message   Recorded as Task   Date: 04/19/2017 10:45 AM, Created By: Alyson Che   Task Name: Miscellaneous   Assigned To: [facility],Team   Regarding Patient: Sj Gil, Status: In Progress   Comment:    Jose Daniel Alex - 19 Apr 2017 10:45 AM     TASK CREATED  Faxed anti coag hold to Dr Benigno Longo to [phone]  Pt to hold aspirin and needs to be scheduled for C7-T1 NATHALY with Dr Gerianne Goltz in [place]  Dr Beatrice Law phone: [phone]  Sangeeta Quintana - 19 Apr 2017 4:03 PM     TASK EDITED  Received anticoagulant hold request by Rachel Menard - 20 Apr 2017 10:31 AM     TASK EDITED  Attempted to call the pt  and LMOM to CB to schedule  Rachel Hernandez - 20 Apr 2017 10:31 AM     TASK IN PROGRESS   Rachel Hernandez - 25 Apr 2017 10:10 AM     TASK EDITED  Attempted to call the pt  and left a detailed mom to cb to schedule a opro c/ AS  Rachel Hernandez - 25 Apr 2017 10:37 AM     TASK EDITED  S/w the pt  and scheduled a C7-T1 NATHALY c/ AS for 5/12/17 at 0930  Pt  will stop her ASA on 5/6/17  Pt  denies taking any other anticoagulants or NSAID's  She verbalized understanding of the preoperative instructions and will cb if she becomes sick  Tomas Mccormack - 25 Apr 2017 10:53 AM     TASK REPLIED TO: Previously Assigned To [place]  Thanks          Signatures   Electronically signed by : Gerianne Goltz, ; Apr 25 2017 11:59AM EST                       (Author)

## 2018-01-12 NOTE — PROGRESS NOTES
Assessment    1  Carpal tunnel syndrome of right wrist (354 0) (G56 01)   2  Trigger thumb of right hand (727 03) (M65 311)    Plan  Carpal tunnel syndrome of right wrist, Trigger thumb of right hand    · Betamethasone Sod Phos & Acet 6 (3-3) MG/ML Injection Suspension   · Injection Tendon Sheath Ligament Single - POC; Status:Complete;   Done: 08ISY3171   · Follow Up After Surgery Evaluation and Treatment  Follow-up  Status: Hold For -  Scheduling  Requested for: 69QSH4075   · Please refer to the patient information sheet that was provided at your office visit today for  information on  your current condition ; Status:Complete;   Done: 45FKU5359    Discussion/Summary    Patient with right-sided carpal tunnel syndrome and right thumb trigger finger  Trigger finger was injected today  She is signing up for carpal tunnel release on the right-hand side  As well as trigger finger release of the right thumb  Paperwork was done today and we'll do this under local medication  She is aware about the risks with diabetes and we'll get this cleared by her medical doctor  The patient has elected to undergo a release of the A1 pulley (trigger finger)  A small incision will be made over the palmar aspect of the hand, the tendon sheath holding the flexor tendons will be released  In the postoperative period, light activities are allowed immediately, driving is allowed when narcotic medication has stopped, and the incision may get wet after 5 days  Heavy activities (lifting more than approximately 10 pounds) will be allowed after the sutures are removed at approximately 10-14 days  While the pain and discomfort within the wrist typically improves rapidly, some residual discomfort may be present for up to 6 weeks   The nodule that is typically palpable in the palmar aspect of the hand will not be removed, as this would necessitate removal of a portion of the flexor tendon, however the catching, clicking, and locking should resolve  Approximate success rate is 98%  The patient has elected to undergo an endoscopic carpal tunnel release  The 2 incision technique was discussed with the patient, which results in approximately a two-week less recovery time, and less wound complications  In the postoperative period, light activities are allowed immediately, driving is allowed when narcotic medication has stopped, and the incision may get wet after 5 days  Heavy activities (lifting more than approximately 10 pounds) will be allowed after the sutures are removed at approximately 10-14 days  While the pain and discomfort in the hands generally improves rapidly, the numbness and tingling as well as the strength will slowly improve over weeks to months depending on the severity of the carpal tunnel syndrome  Pillar pain was discussed with the patient, which is typically a common but self-limiting condition  The risks of bleeding and infection from the surgery are less than 1%  Risk of recurrence is approximately 0 5%  The risks of nerve injury or nerve damage or damage to the blood vessels is approximately 1 in 1200  The patient has an understanding of the above mentioned discussion  Chief Complaint    1  Wrist Pain    History of Present Illness  HPI: Patient presents to me today is a 30-year-old female with a history of significant multiple medical problems in the past including stroke, diabetes, pulmonary embolism, hypertension with a history of numbness and tingling into the right hand  This started about a year ago and produces pain into basically the thumb and long finger  She also has discomfort which is worse at night and problems with heavy gripping moderate in intensity  She has catching popping clicking and locking into her right thumb with pain mild to moderate over the A1 pulley  splints do not significantly help her       The past medical history, surgical history, family history, social history, medications, allergies, and review of systems have been read and reviewed on the chart and have been updated  Review of Systems was recorded in the office today on a separate evaluation sheet and is listed below  Review of Systems    Constitutional: No fever, no chills, feels well, no tiredness, no recent weight gain or loss  Eyes: No complaints of eyesight problems, no red eyes  ENT: no loss of hearing, no nosebleeds, no sore throat  Cardiovascular: No complaints of chest pain, no palpitations, no leg claudication or lower extremity edema  Respiratory: no compliants of shortness of breath, no wheezing, no cough  Gastrointestinal: no complaints of abdominal pain, no constipation, no nausea or diarrhea, no vomiting, no bloody stools  Genitourinary: no complaints of dysuria, no incontinence  Musculoskeletal: as noted in HPI  Integumentary: no complaints of skin rash or lesion, no itching or dry skin, no skin wounds  Neurological: no complaints of headache, no confusion, no numbness or tingling, no dizziness  Endocrine: No complaints of muscle weakness, no feelings of weakness, no frequent urination, no excessive thirst    Psychiatric: No suicidal thoughts, no anxiety, no feelings of depression  Active Problems    1  Acute hip pain (719 45) (M25 559)   2  Analgesic use (V58 69) (Z79 899)   3  Anxiety (300 00) (F41 9)   4  Arthralgia (719 40) (M25 50)   5  Benign essential hypertension (401 1) (I10)   6  Carpal tunnel syndrome of right wrist (354 0) (G56 01)   7  Carpal tunnel syndrome, left (354 0) (G56 02)   8  Cervical radiculopathy (723 4) (M54 12)   9  Chest pain (786 50) (R07 9)   10  Chronic cough (786 2) (R05)   11  Chronic migraine without aura (346 70) (G43 709)   12  CVA (cerebral vascular accident) (434 91) (I63 9)   13  Degenerative disc disease, cervical (722 4) (M50 30)   14  Depression (311) (F32 9)   15  Diabetes mellitus type II, uncontrolled (250 02) (E11 65)   16   Encounter for comprehensive diabetic foot examination, type 2 diabetes mellitus    (250 00) (E11 9)   17  Esophageal reflux (530 81) (K21 9)   18  Fibromyalgia (729 1) (M79 7)   19  Headache (784 0) (R51)   20  Hyperlipidemia (272 4) (E78 5)   21  Hypothyroidism (244 9) (E03 9)   22  Insomnia (780 52) (G47 00)   23  Iron deficiency (280 9) (E61 1)   24  Long-term insulin use (V58 67) (Z79 4)   25  Multiple joint pain (719 49) (M25 50)   26  Nausea (787 02) (R11 0)   27  Neck pain (723 1) (M54 2)   28  Nicotine dependence (305 1) (F17 200)   29  Opioid dependence (304 00) (F11 20)   30  Pancreatitis (577 0) (K85 9)   31  Paresthesia (782 0) (R20 2)   32  Pyelonephritis (590 80) (N12)   33  Recurrent low back pain (724 2) (M54 5)   34  Right hand pain (729 5) (M79 641)   35  Septicemia (038 9) (A41 9)   36  Shortness of breath (786 05) (R06 02)   37  Skin rash (782 1) (R21)   38  Snoring (786 09) (R06 83)   39  Spondylosis of cervical region without myelopathy or radiculopathy (721 0) (M47 812)   40  Stroke Syndrome (436)   41  Swelling of hand joint, right (719 04) (M25 441)   42  TIA (transient ischemic attack) (435 9) (G45 9)   43  Trigger thumb of right hand (727 03) (M65 311)   44  Vitamin B12 deficiency (266 2) (E53 8)   45   Vitamin D deficiency (268 9) (E55 9)    Past Medical History    · History of Acute venous embolism and thrombosis of deep vessels of distal lower  extremity (453 42) (I82 4Z9)   · Anxiety (300 00) (F41 9)   · Diabetes mellitus type II, uncontrolled (250 02) (E11 65)   · History of High cholesterol (272 0) (E78 0)   · History of abdominal pain (V13 89) (M34 943)   · History of acute pancreatitis (V12 79) (Z87 19)   · History of anemia (V12 3) (Z86 2)   · History of arthritis (V13 4) (Z87 39)   · History of asthma (V12 69) (Z87 09)   · History of esophageal reflux (V12 79) (Z87 19)   · History of migraine (V12 49) (Z86 69)   · History of nausea and vomiting (V12 79) (Z87 898)   · History of stroke (V12 54) (Z86 73)   · History of thyroid disease (V12 29) (Z86 39)   · History of upper respiratory infection (V12 09) (Z87 09)   · History of Numbness (782 0) (R20 0)   · History of Pulmonary Embolism (V12 51)    Surgical History    · History of Cholecystectomy   · History of Diagnostic Esophagogastroduodenoscopy   · History of Esophagogastric Fundoplasty Nissen Fundoplication   · History of Tonsillectomy With Adenoidectomy    Family History    · Family history of Acute Myocardial Infarction (V17 3)   · Family history of Chronic Kidney Disease (NKF Classification)   · Family history of Depression   · Family history of Schizophrenia   · Family history of Type 2 Diabetes Mellitus    · Family history of Acute Myocardial Infarction (V17 3)   · Family history of Gastric Cancer (V16 0)   · Family history of Stroke Syndrome (V17 1)    Social History    · Completed 12th grade   · Current Every Day Smoker (305 1)   · Denied: Daily Coffee Consumption (___ Cups/Day)   ·    · Never Drank Alcohol   · No drug use   · One child   · Unemployed (V62 0) (Z56 0)    Current Meds   1  ALPRAZolam 0 25 MG Oral Tablet; TAKE 1 TABLET BY MOUTH 3 TIMES A DAY AS   NEEDED; Therapy: 97Vuz5027 to (Evaluate:79Bje1130)  Requested for: 94HXV3979; Last   Rx:15Riy1384 Ordered   2  Aspirin 325 MG Oral Tablet; TAKE 1 TABLET DAILY; Therapy: 22FSP6308 to Recorded   3  Divalproex Sodium 250 MG Oral Tablet Delayed Release; TAKE 2 TABLET Bedtime; Therapy: 45FSF1258 to (Evaluate:23Jun2016)  Requested for: 39CCN4205; Last   Rx:25Jan2016 Ordered   4  DULoxetine HCl - 30 MG Oral Capsule Delayed Release Particles (Cymbalta); TAKE 1   CAPSULE TWICE DAILY; Therapy: 59GME0405 to (Evaluate:10Aug2015)  Requested for: 25SEO4458; Last   Rx:62Nfp2569 Ordered   5  Folic Acid 465 MCG Oral Tablet; TAKE 1 TABLET DAILY AS DIRECTED; Therapy: 02ATW5282 to (Evaluate:10Fdp6433)  Requested for: 97SVI0599; Last   Rx:80Rxg4858 Ordered   6   Gabapentin 300 MG Oral Capsule; TAKE 1 CAPSULE BY MOUTH 3 TIMES DAILY; Therapy: 30WIV7156 to (Evaluate:24Mar2016)  Requested for: 78SHB2055; Last   Rx:26Oct2015 Ordered   7  Lantus 100 UNIT/ML Subcutaneous Solution; 20 units daily  Requested for: 06Jvh1258; Last Rx:72Qsz5554 Ordered   8  Levothyroxine Sodium 75 MCG Oral Tablet; TAKE 1 TABLET DAILY; Therapy: 22DNF9024 to (Evaluate:99Aen1449)  Requested for: 28Dwu3691; Last   Rx:16Awo6924 Ordered   9  LORazepam 1 MG Oral Tablet (Ativan); TAKE 1 TABLET 1 HOUR BEFORE MRI  MAY   REPEAT ONCE 15 MINUTES BEFORE MRI; Therapy: 64FPI0814 to (Evaluate:93Jjq6228); Last Rx:19May2015 Ordered   10  Losartan Potassium 50 MG Oral Tablet; TAKE 1 TABLET DAILY; Therapy: 36CVM5885 to (Evaluate:90Avl6876)  Requested for: 60GAC2709; Last    Rx:25Jan2016 Ordered   11  Lyrica 150 MG Oral Capsule; TAKE 1 CAPSULE 3 TIMES DAILY; Therapy: 14TES4300 to (Evaluate:30Pmb7487)  Requested for: 22GKU6715; Last    Rx:25Jan2016 Ordered   12  Metoprolol Tartrate 25 MG Oral Tablet; Take 1 tablet by mouth daily; Therapy: 54Tdh0557 to (Evaluate:35Edf0237)  Requested for: 60Jbf8338; Last    Rx:07Hjl2968 Ordered   13  Mometasone Furoate 0 1 % External Solution; apply BID to rash; Therapy: 14BBY1635 to (Last Rx:31Vfw2449)  Requested for: 27Gko9142 Ordered   14  Morphine Sulfate 15 MG Oral Tablet; TAKE 1 TABLET Every 6 hours PRN pain; Therapy: 48AHE2411 to (Evaluate:26Mar2016); Last Rx:25Jan2016 Ordered   15  Omeprazole 20 MG Oral Tablet Delayed Release; Take one tablet daily; Therapy: 59NIS0570 to (Evaluate:65Rrg8629)  Requested for: 70AIX3293; Last    Rx:99Gic5638 Ordered   16  Ondansetron 4 MG Oral Tablet Dispersible; ALLOW 1 TABLET TO DISSOLVE ON    TONGUE TWICE DAILY AS NEEDED FOR NAUSEA; Therapy: 77SJL2017 to (Evaluate:18Jan2016)  Requested for: 11BST1853; Last    Rx:82Zei0596 Ordered   17  OneTouch Ultra Blue In [employer]; USE 1 STRIP Twice daily;     Therapy: 06PGB0701 to (Evaluate:75Kmq1694)  Requested for: 05JTM8275; Last    Rx:19Otx3474 Ordered   18  TraMADol HCl - 50 MG Oral Tablet; take 1-2 tablets qid prn; Therapy: 96BMK1693 to (Last Rx:04Aug2015) Ordered   19  Ventolin  (90 Base) MCG/ACT Inhalation Aerosol Solution; inhale 1 to 2 puffs    every 4 to 6 hours as needed; Therapy: 82Jsw6405 to (Last Rx:65Eoq2623)  Requested for: 46Oio2439; Status:    ACTIVE - Retrospective By Protocol Authorization Ordered   20  Zolpidem Tartrate 10 MG Oral Tablet; Take 1 tablet by mouth at bedtime; Therapy: 32Kvj9868 to (Evaluate:78Ttv7133)  Requested for: 33WRX4903; Last    Rx:10Jan2016 Ordered    Allergies    1  Lexapro TABS   2  Adhesive Tape TAPE    Vitals  Signs [Data Includes: Current Encounter]    Heart Rate: 96  Systolic: 158  Diastolic: 707  Height: 5 ft 2 in  Weight: 179 lb   BMI Calculated: 32 74  BSA Calculated: 1 82    Physical Exam      General: No acute distress, age-appropriate  Neck: Supple, trachea midline  HEENT: Normocephalic atraumatic, mucous membranes are moist, sclera are nonicteric  Cardiovascular: No discernable arrhythmia  Respiratory: Breathing is even and unlabored, no stridor or audible wheezing  Psychiatric: Awake alert and oriented x3, normal mood and affect  Abdomen: Without rebound or guarding  Right Carpal Tunnel: Tinel's, Josesito's Compression and Phalen's, but No AIN Weakness, Negative FDS Flexion Test, Negative Pronator Stress Test, No Scratch Collapse, No Thenar Atrophy and Abnormal APB Strength (4/5)   Carpal Tunnel Examination-   Right Trigger Finger: Crepitus, Nodules and Locking (thumb)    Skin: was evaluated and demonstrated no masses, no abrasions, no erythema, no effusion, no edema, no fluctuance, no induration, no laceration, no ulceration, no lymphadenopathy  Right Upper Neurovascular: were evaluated and demonstrated: The patient has normal motor strength to the median, ulnar and radial nerve  Normal sensation to the median, ulnar, and radial nerve   Normal pulses in the radial and ulnar artery  Capillary refill is < 2 seconds  Procedure           Injection: After appropriate confirmation of the patient, site, and procedure, the Right thumb flexor tendon was prepared in a sterile fashion  The area was then injected with 6 milligrams of Celestone and 1 of 1% lidocaine without epinephrine  A sterile bandage was then applied  The patient tolerated the procedure well  All risks, benefits, and potential complications of the steroid injection were discussed with the patient, which were including but not limited to: Bleeding, small risk of infection, numbness and tingling within the area of injection, soreness at the injection site, potential brief exacerbation of pain, as well as steroid reactions including flushing, increased aggravation, and potential increased blood sugar in diabetics  Future Appointments    Date/Time Provider Specialty Site   02/09/2016 09:45 AM ANASTACIO Love  Orthopedic Surgery [facility]   02/10/2016 09:45 AM Jenel Galeazzi, MD Pain Management [address] [place] [address]   03/21/2016 02:30 PM Jenel Galeazzi, MD Pain Management [address]   02/26/2016 02:30 PM ANASTACIO Agrawal  Hematology [facility]   03/08/2016 10:45 AM ANASTACIO Pearson  [address] [place] [address]   03/18/2016 01:10 PM ANASTACIO Pearson   Orthopedic Surgery [facility]   03/08/2016 01:40 PM Ugo Hickman DO Rheumatology [facility]   05/56/8608 43:59 PM Tony Tomlin, [address] [place] [address]   78/08/6279 53:41 PM Tony Tomlin DO Family Medicine [facility]   03/17/2016 07:30 AM [facility] [address]     Signatures   Electronically signed by : ANASTACIO Spangler ; Feb  3 2016  4:59PM EST                       (Author)

## 2018-01-12 NOTE — RESULT NOTES
Message   Recorded as Task   Date: 03/31/2017 10:41 AM, Created By: Vamsi Hernandez   Task Name: Follow Up   Assigned To: [facility] [place] [facility],Team   Regarding Patient: Michelle Cruz, Status: In Progress   Comment:    HomerHelene - 31 Mar 2017 10:41 AM     TASK CREATED  L/m to return call  S/p C7-T1 NATHALY done 3/24/17 by Dr Augusto CoronelHelene - 31 Mar 2017 10:48 AM     TASK REASSIGNED: Previously Assigned To [address] - 31 Mar 2017 11:04 AM     TASK EDITED   Adra Proud - 07 Apr 3175 52:39 AM     TASK EDITED  2nd Attempt     Lm on VM to [facility]   Adra Proud - 12 Apr 8550 36:25 PM     TASK EDITED  Patient has f/u 4/19 w/ DG     Unable to get a hold of patient will f/u at appointment          Signatures   Electronically signed by : Julianne Madera, ; Apr 12 2017 12:43PM EST                       (Author)

## 2018-01-13 VITALS
OXYGEN SATURATION: 96 % | SYSTOLIC BLOOD PRESSURE: 135 MMHG | HEART RATE: 116 BPM | HEIGHT: 62 IN | BODY MASS INDEX: 35.95 KG/M2 | DIASTOLIC BLOOD PRESSURE: 95 MMHG | WEIGHT: 195.38 LBS | TEMPERATURE: 98.3 F

## 2018-01-13 VITALS
HEART RATE: 113 BPM | BODY MASS INDEX: 31.1 KG/M2 | WEIGHT: 169 LBS | OXYGEN SATURATION: 98 % | HEIGHT: 62 IN | SYSTOLIC BLOOD PRESSURE: 122 MMHG | RESPIRATION RATE: 16 BRPM | DIASTOLIC BLOOD PRESSURE: 74 MMHG

## 2018-01-13 VITALS
HEART RATE: 112 BPM | SYSTOLIC BLOOD PRESSURE: 122 MMHG | HEIGHT: 62 IN | OXYGEN SATURATION: 98 % | TEMPERATURE: 95.8 F | WEIGHT: 194 LBS | BODY MASS INDEX: 35.7 KG/M2 | DIASTOLIC BLOOD PRESSURE: 86 MMHG

## 2018-01-13 VITALS
DIASTOLIC BLOOD PRESSURE: 70 MMHG | SYSTOLIC BLOOD PRESSURE: 120 MMHG | OXYGEN SATURATION: 97 % | HEART RATE: 97 BPM | WEIGHT: 181.75 LBS | BODY MASS INDEX: 33.45 KG/M2 | HEIGHT: 62 IN

## 2018-01-13 VITALS — TEMPERATURE: 98 F | SYSTOLIC BLOOD PRESSURE: 132 MMHG | DIASTOLIC BLOOD PRESSURE: 95 MMHG | HEART RATE: 117 BPM

## 2018-01-13 VITALS — DIASTOLIC BLOOD PRESSURE: 86 MMHG | TEMPERATURE: 98.3 F | SYSTOLIC BLOOD PRESSURE: 134 MMHG

## 2018-01-13 NOTE — PROGRESS NOTES
Assessment    1  Fibromyalgia (729 1) (M79 7)   2  Depression (311) (F32 9)   3  Diabetes mellitus type II, uncontrolled (250 02) (E11 65)   4  Long-term insulin use (V58 67) (Z79 4)    Plan  Depression    · Venlafaxine HCl ER 75 MG Oral Capsule Extended Release 24 Hour; TAKE 1  CAPSULE DAILY  Nicotine dependence    · ProAir  (90 Base) MCG/ACT Inhalation Aerosol Solution; INHALE 2  PUFFS EVERY 4-6 HOURS AS NEEDED    Discussion/Summary    Meds revd  Do not like her being on KARLA and Lyrica  Weaning of KARLA revd  Ins forms for Lantus completed and faxed  rec wean off and change to effexor XR AS  This will save a lot of $$  Reassured  keep next OV  New rheum soon but needs hand surg first w [facility]  The patient, patient's family was counseled regarding instructions for management, prognosis, patient and family education, impressions, risks and benefits of treatment options  total time of encounter was 25 minutes and > 50% minutes was spent counseling  Chief Complaint  PT IS HERE TO DISCUSS EFFEXOR  History of Present Illness  The patient reports doing poorly  Comorbid Illnesses: depression and sedentary  Interval symptoms:  stable myalgias, stable diffuse pain, stable diffuse tenderness and worsened fatigue  40 yo wf w mult medical issues, here to rev meds  W current insurance now has high deductible ($4,500)  Can't afford all meds  Depressed  needs to change rheum due to ins change  Review of Systems    Constitutional: as noted in HPI    ENT: no complaints of earache, no loss of hearing, no nose bleeds, no nasal discharge, no sore throat, no hoarseness  Cardiovascular: No complaints of slow heart rate, no fast heart rate, no chest pain, no palpitations, no leg claudication, no lower extremity edema  Respiratory: No complaints of shortness of breath, no wheezing, no cough, no SOB on exertion, no orthopnea, no PND     Gastrointestinal: No complaints of abdominal pain, no constipation, no nausea or vomiting, no diarrhea, no bloody stools  Musculoskeletal: as noted in HPI  Neurological: No complaints of headache, no confusion, no convulsions, no numbness, no dizziness or fainting, no tingling, no limb weakness, no difficulty walking  Psychiatric: as noted in HPI  Active Problems    1  Acute hip pain (719 45) (M25 559)   2  Analgesic use (V58 69) (Z79 899)   3  Anxiety (300 00) (F41 9)   4  Arthralgia (719 40) (M25 50)   5  Benign essential hypertension (401 1) (I10)   6  Carpal tunnel syndrome of right wrist (354 0) (G56 01)   7  Carpal tunnel syndrome, left (354 0) (G56 02)   8  Cervical radiculopathy (723 4) (M54 12)   9  Chest pain (786 50) (R07 9)   10  Chronic cough (786 2) (R05)   11  Chronic migraine without aura (346 70) (G43 709)   12  CVA (cerebral vascular accident) (434 91) (I63 9)   13  Degenerative disc disease, cervical (722 4) (M50 30)   14  Depression (311) (F32 9)   15  Diabetes mellitus type II, uncontrolled (250 02) (E11 65)   16  Encounter for comprehensive diabetic foot examination, type 2 diabetes mellitus    (250 00) (E11 9)   17  Esophageal reflux (530 81) (K21 9)   18  Fibromyalgia (729 1) (M79 7)   19  Headache (784 0) (R51)   20  Hyperlipidemia (272 4) (E78 5)   21  Hypothyroidism (244 9) (E03 9)   22  Insomnia (780 52) (G47 00)   23  Iron deficiency (280 9) (E61 1)   24  Long-term insulin use (V58 67) (Z79 4)   25  Multiple joint pain (719 49) (M25 50)   26  Nausea (787 02) (R11 0)   27  Neck pain (723 1) (M54 2)   28  Nicotine dependence (305 1) (F17 200)   29  Opioid dependence (304 00) (F11 20)   30  Pancreatitis (577 0) (K85 9)   31  Paresthesia (782 0) (R20 2)   32  Pyelonephritis (590 80) (N12)   33  Recurrent low back pain (724 2) (M54 5)   34  Right hand pain (729 5) (M79 641)   35  Septicemia (038 9) (A41 9)   36  Shortness of breath (786 05) (R06 02)   37  Skin rash (782 1) (R21)   38  Snoring (786 09) (R06 83)   39   Spondylosis of cervical region without myelopathy or radiculopathy (721 0) (M47 812)   40  Stroke Syndrome (436)   41  Swelling of hand joint, right (719 04) (M25 441)   42  TIA (transient ischemic attack) (435 9) (G45 9)   43  Trigger thumb of right hand (727 03) (M65 311)   44  Vitamin B12 deficiency (266 2) (E53 8)   45  Vitamin D deficiency (268 9) (E55 9)    Past Medical History    1  History of Acute venous embolism and thrombosis of deep vessels of distal lower   extremity (453 42) (I82 4Z9)   2  Anxiety (300 00) (F41 9)   3  Diabetes mellitus type II, uncontrolled (250 02) (E11 65)   4  History of High cholesterol (272 0) (E78 0)   5  History of abdominal pain (V13 89) (Z87 898)   6  History of acute pancreatitis (V12 79) (Z87 19)   7  History of anemia (V12 3) (Z86 2)   8  History of arthritis (V13 4) (Z87 39)   9  History of asthma (V12 69) (Z87 09)   10  History of esophageal reflux (V12 79) (Z87 19)   11  History of migraine (V12 49) (Z86 69)   12  History of nausea and vomiting (V12 79) (Z87 898)   13  History of stroke (V12 54) (Z86 73)   14  History of thyroid disease (V12 29) (Z86 39)   15  History of upper respiratory infection (V12 09) (Z87 09)   16  History of Numbness (782 0) (R20 0)   17  History of Pulmonary Embolism (V12 51)    Surgical History    1  History of Cholecystectomy   2  History of Diagnostic Esophagogastroduodenoscopy   3  History of Esophagogastric Fundoplasty Nissen Fundoplication   4  History of Tonsillectomy With Adenoidectomy    Family History    1  Family history of Acute Myocardial Infarction (V17 3)   2  Family history of Chronic Kidney Disease (NKF Classification)   3  Family history of Depression   4  Family history of Schizophrenia   5  Family history of Type 2 Diabetes Mellitus    6  Family history of Acute Myocardial Infarction (V17 3)   7  Family history of Gastric Cancer (V16 0)   8   Family history of Stroke Syndrome (V17 1)    Social History    · Completed 12th grade   · Current Every Day Smoker (305 1)   · Denied: Daily Coffee Consumption (___ Cups/Day)   ·    · Never Drank Alcohol   · No drug use   · One child   · Unemployed (V62 0) (Z56 0)    Current Meds   1  ALPRAZolam 0 25 MG Oral Tablet; TAKE 1 TABLET BY MOUTH 3 TIMES A DAY AS   NEEDED; Therapy: 95Pbl7971 to (Evaluate:19Fvb8853)  Requested for: 18STF7322; Last   Rx:42Ncc0537 Ordered   2  Aspirin 325 MG Oral Tablet; TAKE 1 TABLET DAILY; Therapy: 14PIQ0623 to Recorded   3  Divalproex Sodium 250 MG Oral Tablet Delayed Release; TAKE 2 TABLET Bedtime; Therapy: 83SAG3688 to (Evaluate:23Jun2016)  Requested for: 98HLJ1545; Last   Rx:25Jan2016 Ordered   4  Folic Acid 445 MCG Oral Tablet; TAKE 1 TABLET DAILY AS DIRECTED; Therapy: 08GKV5409 to (Evaluate:17Kyb4341)  Requested for: 13ATQ9177; Last   Rx:59Oej7271 Ordered   5  Gabapentin 300 MG Oral Capsule; TAKE 1 CAPSULE BY MOUTH 3 TIMES DAILY; Therapy: 74AUE0458 to (Evaluate:24Mar2016)  Requested for: 30UXV0037; Last   Rx:36Iis6954 Ordered   6  Lantus 100 UNIT/ML Subcutaneous Solution; 20 units daily  Requested for: 53Inn6015; Last Rx:36Lps3694 Ordered   7  Levothyroxine Sodium 75 MCG Oral Tablet; TAKE 1 TABLET DAILY; Therapy: 48VWV7501 to (Evaluate:46Yyl6929)  Requested for: 85Oze1791; Last   Rx:37Gtl7191 Ordered   8  LORazepam 1 MG Oral Tablet; TAKE 1 TABLET 1 HOUR BEFORE MRI  MAY REPEAT   ONCE 15 MINUTES BEFORE MRI; Therapy: 51WRF5972 to (Evaluate:08Ned5377); Last Rx:65Dlp8694 Ordered   9  Losartan Potassium 50 MG Oral Tablet; TAKE 1 TABLET DAILY; Therapy: 63EUZ1131 to (Evaluate:33Fch9109)  Requested for: 64GGD1956; Last   Rx:25Jan2016 Ordered   10  Lyrica 150 MG Oral Capsule; TAKE 1 CAPSULE 3 TIMES DAILY; Therapy: 96CIF9203 to (Evaluate:52Wzn3428)  Requested for: 68TGG7708; Last    Rx:25Jan2016 Ordered   11  Metoprolol Tartrate 25 MG Oral Tablet; Take 1 tablet by mouth daily; Therapy: 65Ghd9617 to (Evaluate:71Jtw1475)  Requested for: 60Res2836;  Last    Rx:86Oqy6827 Ordered 12  Omeprazole 20 MG Oral Tablet Delayed Release; Take one tablet daily; Therapy: 96AIG8914 to (Evaluate:05Aug2015)  Requested for: 94WCG1255; Last    Rx:85Zup0697 Ordered   13  Ondansetron 4 MG Oral Tablet Dispersible; ALLOW 1 TABLET TO DISSOLVE ON    TONGUE TWICE DAILY AS NEEDED FOR NAUSEA; Therapy: 49PLL9821 to (Evaluate:18Jan2016)  Requested for: 59LME9230; Last    Rx:92Byn2593 Ordered   14  OneTouch Ultra Blue In [employer]; USE 1 STRIP Twice daily; Therapy: 15DEK0165 to (Evaluate:08Aug2016)  Requested for: 09WCP7933; Last    Rx:64Xrd1977 Ordered   15  TraMADol HCl - 50 MG Oral Tablet; take 1-2 tablets qid prn; Therapy: 44GHW1900 to (Last Rx:71Oue6150) Ordered   16  Ventolin  (90 Base) MCG/ACT Inhalation Aerosol Solution; inhale 1 to 2 puffs    every 4 to 6 hours as needed; Therapy: 88Nvx7800 to (Last Rx:41Vbm0581)  Requested for: 23Bzy1981; Status:    ACTIVE - Retrospective By Protocol Authorization Ordered   17  Zolpidem Tartrate 10 MG Oral Tablet; Take 1 tablet by mouth at bedtime; Therapy: 12Apr2011 to (Evaluate:88Ltz8809)  Requested for: 31CIZ8829; Last    Rx:10Jan2016 Ordered    Allergies    1  Lexapro TABS   2  Adhesive Tape TAPE    Vitals  Vital Signs [Data Includes: Current Encounter]    Recorded: 45QJN3034 16:16AE   Systolic 453   Diastolic 979   Height 5 ft 2 in   Weight 176 lb 2 08 oz   BMI Calculated 32 21   BSA Calculated 1 81     Physical Exam    Constitutional   General appearance: Abnormal   obese  Eyes   Pupils and irises: Equal, round and reactive to light  Pulmonary   Respiratory effort: No increased work of breathing or signs of respiratory distress  Auscultation of lungs: Clear to auscultation  Cardiovascular   Auscultation of heart: Normal rate and rhythm, normal S1 and S2, without murmurs  Examination of extremities for edema and/or varicosities: Normal     Carotid pulses: Normal     Neurologic   Cranial nerves: Cranial nerves 2-12 intact  Psychiatric   Orientation to person, place, and time: Normal     Mood and affect: Abnormal   Mood and Affect: anxious and tearful  Future Appointments    Date/Time Provider Specialty Site   02/09/2016 09:45 AM ANASTACIO Anguiano  Orthopedic Surgery [facility]   02/10/2016 09:45 AM Gaston Wong MD Pain Management [address] [place] [address]   03/21/2016 02:30 PM Gaston Wong MD Pain Management [address] [place] [address]   02/26/2016 02:30 PM ANASTACIO Cool  Hematology [facility]   03/08/2016 10:45 AM ANASTACIO Lozano  [address] [facility]   03/18/2016 01:10 PM ANASTACIO Lozano   Orthopedic Surgery [facility]   03/08/2016 01:40 PM Cesar Morillo DO Rheumatology [facility]   86/70/3652 41:00 PM Alejandro Suggs DO Family Medicine [facility]   03/17/2016 07:30 AM Tracey Cockayne, [facility] Neurology [address]     Signatures   Electronically signed by : Caleb Mooney DO; Feb 5 0827  8:36AM EST                       (Author)

## 2018-01-13 NOTE — MISCELLANEOUS
Message   Recorded as Task   Date: 01/13/2016 09:25 AM, Created By: Julieta Jung   Task Name: Med Renewal Request   Assigned To: [facility]   Regarding Patient: Mika Sy, Status: Active   Comment:    Julieta Jung - 13 Jan 2016 9:25 AM     TASK CREATED  Caller: Self; Renew Medication; [phone] (Home)  TC from pt 1/13 at 9:17 asking for RF of lyrica 150mg TID, she has 3 of her 150mg pills left  pt said she's tolerating and has no s/e  Pt informed office policy is 02OXQ notice for all RF  Pt said she also has 4 of her 75mg pills left from in the past if she needed to use for tomorrow  pt uses [facility] on file  Next povs 1/25/16 w/ Dr Jeanmarie Spatz  Told pt I would forward request to Dr Jeanmarie Spatz and c/b once filled  Sophia Lick - 13 Jan 2016 10:10 AM     TASK REPLIED TO: Previously Assigned To [facility],Team  Can you pleae call in refill for 1 month Sherita 150 mg TID  #90, no refills  I can provide additional during ov  Joann Sheets - 13 Jan 2016 10:24 AM     TASK EDITED  Left vm for pt (ok per release) that a 1 month refill for her lyrica was called to her [facility] and Dr Jeanmarie Spatz will provide add'l at next ov on 1/25/16  Any questions pt to contact the office  Joann Sheets - 13 Jan 2016 10:24 AM     TASK EDITED  Addendum, lyrica refill called to [facility] on [address] as written in task note by Dr Jeanmarie Spatz  Active Problems    1  Acute hip pain (719 45) (M25 559)   2  Analgesic use (V58 69) (Z79 899)   3  Anxiety (300 00) (F41 9)   4  Arthralgia (719 40) (M25 50)   5  Benign essential hypertension (401 1) (I10)   6  Carpal tunnel syndrome, left (354 0) (G56 02)   7  Cervical radiculopathy (723 4) (M54 12)   8  Chest pain (786 50) (R07 9)   9  Chronic cough (786 2) (R05)   10  Chronic migraine without aura (346 70) (G43 709)   11  CVA (cerebral vascular accident) (434 91) (I63 9)   12  Degenerative disc disease, cervical (722 4) (M50 30)   13  Depression (311) (F32 9)   14  Diabetes mellitus type II, uncontrolled (250 02) (E11 65)   15  Encounter for comprehensive diabetic foot examination, type 2 diabetes mellitus    (250 00) (E11 9)   16  Esophageal reflux (530 81) (K21 9)   17  Fibromyalgia (729 1) (M79 7)   18  Headache (784 0) (R51)   19  Hyperlipidemia (272 4) (E78 5)   20  Hypothyroidism (244 9) (E03 9)   21  Insomnia (780 52) (G47 00)   22  Iron deficiency (280 9) (E61 1)   23  Long-term insulin use (V58 67) (Z79 4)   24  Nausea (787 02) (R11 0)   25  Neck pain (723 1) (M54 2)   26  Nicotine dependence (305 1) (F17 200)   27  Pancreatitis (577 0) (K85 9)   28  Paresthesia (782 0) (R20 2)   29  Pyelonephritis (590 80) (N12)   30  Recurrent low back pain (724 2) (M54 5)   31  Right hand pain (729 5) (M79 641)   32  Septicemia (038 9) (A41 9)   33  Shortness of breath (786 05) (R06 02)   34  Skin rash (782 1) (R21)   35  Snoring (786 09) (R06 83)   36  Spondylosis of cervical region without myelopathy or radiculopathy (721 0) (M47 812)   37  Stroke Syndrome (436)   38  Swelling of hand joint, right (719 04) (M25 441)   39  TIA (transient ischemic attack) (435 9) (G45 9)   40  Vitamin B12 deficiency (266 2) (E53 8)   41  Vitamin D deficiency (268 9) (E55 9)    Current Meds   1  ALPRAZolam 0 25 MG Oral Tablet; TAKE 1 TABLET BY MOUTH 3 TIMES A DAY AS   NEEDED; Therapy: 25Ahf7621 to (Evaluate:26Qtr8605)  Requested for: 57KOC8011; Last   Rx:34Nrp2123 Ordered   2  Aspirin 325 MG Oral Tablet; TAKE 1 TABLET DAILY; Therapy: 18PYS5547 to Recorded   3  Divalproex Sodium  MG Oral Tablet Extended Release 24 Hour (Depakote ER);   TAKE TWO TABLETS BY MOUTH DAILY AT BEDTIME; Therapy: 66UXU1112 to (Evaluate:98Pcc7502)  Requested for: 64Hsu7635; Last   Rx:42Rwv9030 Ordered   4  DULoxetine HCl - 30 MG Oral Capsule Delayed Release Particles (Cymbalta); TAKE 1   CAPSULE TWICE DAILY; Therapy: 68DWY0100 to (Evaluate:10Aug2015)  Requested for: 15KVO0221; Last   Rx:71Tja9394 Ordered   5  Folic Acid 731 MCG Oral Tablet; TAKE 1 TABLET DAILY AS DIRECTED; Therapy: 22TNT5001 to (Evaluate:07Aug2016)  Requested for: 42KHY7689; Last   Rx:33Hss1905 Ordered   6  Gabapentin 300 MG Oral Capsule; TAKE 1 CAPSULE BY MOUTH 3 TIMES DAILY; Therapy: 55OME8998 to (Evaluate:24Mar2016)  Requested for: 54INJ5044; Last   Rx:46Oia3640 Ordered   7  Lantus 100 UNIT/ML Subcutaneous Solution; 20 units daily  Requested for: 04Hgx3734; Last Rx:75Yti0945 Ordered   8  Levothyroxine Sodium 75 MCG Oral Tablet; TAKE 1 TABLET DAILY; Therapy: 56UMW6455 to (Evaluate:22Dec2016)  Requested for: 64Swb3701; Last   Rx:21Bti4803 Ordered   9  LORazepam 1 MG Oral Tablet (Ativan); TAKE 1 TABLET 1 HOUR BEFORE MRI  MAY   REPEAT ONCE 15 MINUTES BEFORE MRI; Therapy: 53PYY1220 to (Evaluate:20May2015); Last Rx:19Bzx6370 Ordered   10  Losartan Potassium 50 MG Oral Tablet; TAKE 1 TABLET DAILY; Therapy: 98MNO1533 to (Evaluate:35Ywg9767)  Requested for: 45OSO6036; Last    Rx:13Jan2016; Status: ACTIVE - Retrospective By Protocol Authorization Ordered   11  Lyrica 150 MG Oral Capsule; TAKE 1 CAPSULE 3 TIMES DAILY; Therapy: 86LOO3297 to (Evaluate:06Jan2016)  Requested for: 45RKL0634; Last    Rx:27Yfz3984; Status: ACTIVE - Renewal Denied Ordered   12  Metoprolol Tartrate 25 MG Oral Tablet; Take 1 tablet by mouth daily; Therapy: 52Ygw6441 to (Evaluate:11Xfy3635)  Requested for: 77Jlu7004; Last    Rx:61Sxe5664 Ordered   13  Mometasone Furoate 0 1 % External Solution; apply BID to rash; Therapy: 11QLN6999 to (Last Rx:60Nkb4890)  Requested for: 87Plp1543 Ordered   14  Morphine Sulfate 15 MG Oral Tablet; TAKE 1 TABLET Every 6 hours PRN pain; Therapy: 40RKO0546 to (Evaluate:28Jan2016); Last Rx:01Lcc0382 Ordered   15  Omeprazole 20 MG Oral Tablet Delayed Release; Take one tablet daily; Therapy: 08JKK6524 to (Evaluate:02Urq4410)  Requested for: 59JWW4747; Last    Rx:66Eqq2065 Ordered   16   Ondansetron 4 MG Oral Tablet Dispersible; ALLOW 1 TABLET TO DISSOLVE ON    TONGUE TWICE DAILY AS NEEDED FOR NAUSEA; Therapy: 51MTP9982 to (Evaluate:18Jan2016)  Requested for: 14QZH0452; Last    Rx:71Mtm3725 Ordered   17  OneTouch Ultra Blue In [employer]; USE 1 STRIP Twice daily; Therapy: 92ULF8183 to (Evaluate:08Aug2016)  Requested for: 14CRR4909; Last    Rx:20Fpn4209 Ordered   18  Pennsaid 2 % Transdermal Solution; 2 pumps BID prn; Therapy: 44QQS3472 to (Evaluate:06Jan2016); Last Rx:11Nmx9113 Ordered   19  TraMADol HCl - 50 MG Oral Tablet; take 1-2 tablets qid prn; Therapy: 52XUI0089 to (Last Rx:04Aug2015) Ordered   20  Ventolin  (90 Base) MCG/ACT Inhalation Aerosol Solution; inhale 1 to 2 puffs    every 4 to 6 hours as needed; Therapy: 54Bsr7776 to (Last Rx:64Plf3174)  Requested for: 40Zez9577; Status:    ACTIVE - Retrospective By Protocol Authorization Ordered   21  Vitamin D3 25922 UNIT Oral Capsule; Once a week for 8 weeks; Therapy: 82OFQ3931 to (Last Rx:65Jmo6615)  Requested for: 57Tri6368 Ordered   22  Zolpidem Tartrate 10 MG Oral Tablet; Take 1 tablet by mouth at bedtime; Therapy: 12Apr2011 to (Evaluate:14Vmx8205)  Requested for: 89SGR8626; Last    Rx:10Jan2016 Ordered    Allergies    1  Lexapro TABS   2   Adhesive Tape TAPE    Plan  Cervical radiculopathy, Degenerative disc disease, cervical, Fibromyalgia    · Lyrica 150 MG Oral Capsule; TAKE 1 CAPSULE 3 TIMES DAILY    Signatures   Electronically signed by : Niels Salazar, ; Jan 13 2016 10:25AM EST                       (Author)

## 2018-01-13 NOTE — CONSULTS
Assessment    1  Polyarthritis (716 50) (M13 0)   2  CRP elevated (790 95) (R79 82)   3  Fibromyalgia (729 1) (M79 7)   4  Vitamin D deficiency (268 9) (E55 9)   5  Elevated LFTs (790 6) (R79 89)   6  Chronic migraine without aura (346 70) (G43 709)   7  Cerebral infarction, unspecified (434 91) (I63 9)   8  Benign essential hypertension (401 1) (I10)   9  Depression (311) (F32 9)   10  Diabetes mellitus type II, uncontrolled (250 02) (E11 65)   11  GERD without esophagitis (530 81) (K21 9)    Plan  Cervical radiculopathy, Degenerative disc disease, cervical, Fibromyalgia    · Lyrica 150 MG Oral Capsule; TAKE 1 CAPSULE 3 TIMES DAILY  CRP elevated, Fibromyalgia, Polyarthritis    · (1) ROGERIO SCREEN W/REFLEX TO TITER/PATTERN; Status:Active; Requested  for:29Apr2016;    · (1) CBC/PLT/DIFF; Status:Active; Requested for:29Apr2016;    · (1) CHRONIC HEPATITIS PANEL; Status:Active; Requested for:29Apr2016;    · (1) C-REACTIVE PROTEIN; Status:Active; Requested QOM:26HUE7031;    · (1) CYCLIC CITRULLINATED PEPTIDE; Status:Active; Requested for:29Apr2016;    · (1) HIV AG/AB COMBO, 4TH GEN; [Do Not Release]; Status:Active; Requested  for:29Apr2016;    · (1) HLA B27; Status:Active; Requested for:29Apr2016;    · (1) RHEUMATOID FACTOR SCREEN; Status:Active; Requested for:29Apr2016;    · (1) SED RATE; Status:Active; Requested for:29Apr2016;    · (1) SJOGREN'S ANTIBODIES; Status:Active; Requested for:29Apr2016;    · (1) TSH WITH FT4 REFLEX; Status:Active; Requested for:29Apr2016;    · (1) VITAMIN D 25-HYDROXY; Status:Active; Requested for:29Apr2016;    · Call [phone] if: The pain seems worse ; Status:Complete;   Done: 04DMG4760   · Call [phone] if: You have questions or concerns about your problem ;  Status:Complete;   Done: 29LFR0377   · Follow-up visit in 1 month Evaluation and Treatment  Follow-up  Status: Complete  Done:  35Qjp9014    Discussion/Summary    Ms Joe Campbell is a 52-year-old  female who presents the office with a known history of fibromyalgia and was referred here by Dr Osman Saleh, her pain management physician  She was previously treated by Dr Sagrario Hernadez, who ultimately retired, and she then transferred her care to Dr Ashley Brooks  She was initially treated with Lyrica, but stopped it as she cannot afford it due to insurance issues  She was then given the Lyrica by Dr Osman Saleh, but it has not increased until she was able to be seen by another rheumatologist  She states that Dr Ashley Brooks was unable to offer her any specific treatment for her fibromyalgia  She states that she was diagnosed with a fibromyalgia 2006 or 2007, and her symptoms worsened after sustaining a CVA several years ago  She also has a history of degenerative disc disease in the cervical and lumbar spine  She did try gabapentin with Dr Ashley Brooks, but she did not find it helpful  She describes her pain as being burning in quality and constant  Her pain is worsened by sitting or standing for prolonged periods of time  She also briefly tried Cymbalta, but could not afford it  She denies any obvious joint swelling, but she does report intermittent swelling in her hands and her feet  She reports that she was tried on morphine by Dr Osman Saleh, but this was discontinued because of a "problem " She does report morning stiffness which can last all day, and she recently throughout her back  She also reports difficulty sleeping because of pain, nonrestorative sleep, and fatigue  On exam, there is synovitis of the MCPs and PIPs of the bilateral hands, as well as the bilateral ankles  There is crepitus of the bilateral knees  She has decreased range of motion of the cervical and lumbar spine  There are multiple myofascial tender points throughout the spine, as well as the bilateral upper and lower extremities  Review of laboratory studies did reveal a mildly elevated CRP in February 2015  An ROGERIO and ESR at that time were negative   A recent CMP did show significant transaminitis  A review of a drug abuse screen did reveal positive results for morphine, tramadol, and metabolites of alcohol and benzodiazepines  At this time, her history, and exam to appear consistent with an inflammatory arthropathy, and she does have a family history of psoriasis, as well as inflammatory bowel disease  She did have an elevated CRP in the past, which would not typically be found in fibromyalgia  Therefore she warrants an additional workup for this finding  I will check additional laboratory studies to further investigate this  I did explain that she is on the maximum dose of Lyrica for fibromyalgia, and I cannot use Cymbalta as she is currently on venlafaxine, and also has transaminitis  Therefore treatment of her fibromyalgia will be relatively difficult, and I did explain that I typically do not prescribe narcotic medications, as this is usually managed by pain management  Narcotics are also inappropriate in fibromyalgia with the exception of tramadol  She does have transaminitis of unclear etiology, but it is possible that there is some component of alcohol abuse given the alcohol metabolites were found in her drug abuse screen  This should be considered  I did provide her with samples for the Lyrica today, as well as an updated prescription  I will reevaluate her in one month  She will call in interim if there are any questions or concerns  Counseling  Rheumatology Counseling Documentation: The patient and patient's family was counseled regarding diagnostic results, instructions for management, impressions and risks and benefits of treatment options  Chief Complaint  History of fibromyalgia      History of Present Illness  Pt  is a 41 yo  female who presents with history of fibromyalgia  Referred here by Pain Management  Previously treated by Dr Jose Oquendo, who retired, then saw Dr Lalo Gordon  Symptoms worsened after having a CVA  Diagnosed in 0851-8252  Initially had pain all over, especially in joints  Initially treated with Lyrica, but stopped as she could not afford it due to insurance issues  Also has DDD C-spine and L-spine  + difficulty coping with pain  Was given Lyrica by Dr Gal Ellis, but she has not increased until she had another Rheum eval  Also tried Gabapentin with Dr Dayanara Sandhu which was not helping  Then transitioned to Lyrica  Currently does not have a prescription for the Lyrica  Pain described as burning in quality  Pain is constant and worsened by sitting or standing for prolonged periods of time  Previously tried Cymbalta, but could not afford it  No obvious joint swelling, but has swelling in hands and feet at time  + AM stiffness x up to all day  Recently threw out back  + difficulty sleeping due to pain  + non-restorative sleep  + fatigue  RAPID3: not completed      Review of Systems    Constitutional: recent 7 lb weight gain, fatigue and anorexia, but no fever, no chills and no recent weight loss  HEENT: + jaw pain, blurred vision, double vision, dryness of the eyes and dryness mouth, but no amaurosis fugax, no eye pain, no erythema eye(s), no mouth sores, not feeling congested and no sore throat  Cardiovascular: swelling in the arms or legs, but no dyspnea on exertion    The patient presents with complaints of chest pain or pressure, described as pleuritic and pressure-like, radiating to the jaw and left arm  Respiratory: shortness of breath and pleurisy, but no unusual or persistent cough  Gastrointestinal: heartburn and constipation, but no vomiting, no melena and no BRBPR    The patient presents with complaints of occasional episodes of abdominal pain  The patient presents with complaints of nausea (on daily basis)   The patient presents with complaints of diarrhea (intermittent with constipation)   Genitourinary: no foamy urine, but no dysuria and no hematuria  Musculoskeletal: as noted in HPI     Integumentary rash, Raynaud's and nail changes, but no alopecia and no photosensitivity  Endocrine polydipsia, but no polyuria  Hematologic/Lymphatic: no unusual bleeding and no tendency for easy bruising  Neurological: headache, tingling and weakness    The patient presents with complaints of occasional episodes of vertigo or dizziness, described as lightheadedness  Symptoms are made worse by bending over and getting up quickly  Psychiatric: insomnia, non-restorative sleep, depression and anxiety  Active Problems    1  Analgesic use (V58 69) (Z79 899)   2  Anxiety (300 00) (F41 9)   3  Arthralgia (719 40) (M25 50)   4  Benign essential hypertension (401 1) (I10)   5  Carpal tunnel syndrome of right wrist (354 0) (G56 01)   6  Carpal tunnel syndrome, left (354 0) (G56 02)   7  Cerebral infarction, unspecified (434 91) (I63 9)   8  Cervical radiculopathy (723 4) (M54 12)   9  Chest pain (786 50) (R07 9)   10  Chronic cough (786 2) (R05)   11  Chronic migraine without aura (346 70) (G43 709)   12  Degenerative disc disease, cervical (722 4) (M50 30)   13  Depression (311) (F32 9)   14  Diabetes mellitus type II, uncontrolled (250 02) (E11 65)   15  Elevated LFTs (790 6) (R79 89)   16  Fatty liver (571 8) (K76 0)   17  Fibromyalgia (729 1) (M79 7)   18  GERD without esophagitis (530 81) (K21 9)   19  Headache (784 0) (R51)   20  Hyperlipidemia (272 4) (E78 5)   21  Hypothyroidism (244 9) (E03 9)   22  Insomnia (780 52) (G47 00)   23  Intractable chronic common migraine without aura (346 71) (G43 719)   24  Iron deficiency (280 9) (E61 1)   25  Left hand paresthesia (782 0) (R20 2)   26  Long-term insulin use (V58 67) (Z79 4)   27  Multiple joint pain (719 49) (M25 50)   28  Nausea (787 02) (R11 0)   29  Neck pain (723 1) (M54 2)   30  Nicotine dependence (305 1) (F17 200)   31  Opioid dependence (304 00) (F11 20)   32  Pancreatitis (577 0) (K85 9)   33  Paresthesia (782 0) (R20 2)   34  Pyelonephritis (590 80) (N12)   35  Recurrent low back pain (724 2) (M54 5)   36  Right hand pain (729 5) (M79 641)   37  S/P carpal tunnel release (V45 89) (Z98 89)   38  S/P trigger finger release (V45 89) (Z98 89)   39  Sacroiliac pain (724 6) (M53 3)   40  Septicemia (038 9) (A41 9)   41  Shortness of breath (786 05) (R06 02)   42  Skin rash (782 1) (R21)   43  Snoring (786 09) (R06 83)   44  Spondylosis of cervical region without myelopathy or radiculopathy (721 0) (M47 812)   45  Stroke Syndrome (436)   46  Swelling of hand joint, right (719 04) (M25 441)   47  TMJ arthralgia (524 62) (M26 62)   48  Transient cerebral ischemic attack (435 9) (G45 9)   49  Trigger thumb of right hand (727 03) (M65 311)   50  Vitamin B12 deficiency (266 2) (E53 8)   51  Vitamin D deficiency (268 9) (E55 9)    Past Medical History    1  History of Acute hip pain (719 45) (M25 559)   2  History of Acute venous embolism and thrombosis of deep vessels of distal lower   extremity (453 42) (I82 4Z9)   3  Anxiety (300 00) (F41 9)   4  Diabetes mellitus type II, uncontrolled (250 02) (E11 65)   5  History of Encounter for comprehensive diabetic foot examination, type 2 diabetes   mellitus (250 00) (E11 9)   6  History of High cholesterol (272 0) (E78 0)   7  History of abdominal pain (V13 89) (Z87 898)   8  History of acute pancreatitis (V12 79) (Z87 19)   9  History of anemia (V12 3) (Z86 2)   10  History of arthritis (V13 4) (Z87 39)   11  History of asthma (V12 69) (Z87 09)   12  History of esophageal reflux (V12 79) (Z87 19)   13  History of migraine (V12 49) (Z86 69)   14  History of nausea and vomiting (V12 79) (Z87 898)   15  History of pulmonary embolism (V12 55) (Z86 711)   16  History of stroke (V12 54) (Z86 73)   17  History of thyroid disease (V12 29) (Z86 39)   18  History of upper respiratory infection (V12 09) (Z87 09)   19  History of Need for pneumococcal vaccination (V03 82) (Z23)   20  History of Numbness (782 0) (R20 0)   21   History of Pulmonary Embolism (V12 51)   22  History of Visit for screening mammogram (V76 12) (Z12 31)    Surgical History    1  History of Cholecystectomy   2  History of Diagnostic Esophagogastroduodenoscopy   3  History of Esophagogastric Fundoplasty   4  History of Esophagogastric Fundoplasty Nissen Fundoplication   5  History of Neuroplasty Decompression Median Nerve At Carpal Tunnel   6  Denied: History of Tonsillectomy With Adenoidectomy    OB History  Pregnancy History (Brief):   Prior pregnancies: : 1  Para: 0 (abortions) and 1 (living)   Additional pregnancy history details: 0 miscarriage(s)       Family History  Mother    1  Family history of Acute Myocardial Infarction (V17 3)   2  Family history of Chronic Kidney Disease (NKF Classification)   3  Family history of Depression   4  Family history of ulcerative colitis (V18 59) (Z83 79)   5  Family history of Schizophrenia   6  Family history of Type 2 Diabetes Mellitus  Father    7  Family history of Acute Myocardial Infarction (V17 3)   8  Family history of psoriasis (V19 4) (Z84 0)   9  Family history of Gastric Cancer (V16 0)   10  Family history of Stroke Syndrome (V17 1)  Sister    6  Family history of Crohn's disease (V18 59) (Z83 79)  Maternal Grandfather    15  Family history of rheumatoid arthritis (V17 7) (Z82 61)  Family History    13  Denied: Family history of systemic lupus erythematosus    Social History    · Completed 12th grade   · Current every day smoker (305 1) (F17 200)   · Current Every Day Smoker (305 1)   · Denied: Daily Coffee Consumption (___ Cups/Day)   ·    · Never Drank Alcohol   · No alcohol use   · No drug use   · One child   · Unemployed (V62 0) (Z56 0)    Current Meds   1  ALPRAZolam 0 25 MG Oral Tablet; TAKE 1 TABLET THREE TIMES A DAY AS NEEDED; Therapy: 32Sfi4924 to (Evaluate:41Lxv6866)  Requested for: 2016; Last   Rx:2016 Ordered   2  Aspirin 325 MG Oral Tablet; TAKE 1 TABLET DAILY;    Therapy: 53LGJ9682 to Recorded   3  Cyanocobalamin 1000 MCG/ML Injection Solution; Inject 1 ml every 2 months; Therapy: 32EVF4050 to (Last Rx:86Juv4912)  Requested for: 12Gdi4952 Ordered   4  Divalproex Sodium 250 MG Oral Tablet Delayed Release; TAKE 2 TABLET Bedtime; Therapy: 67SYH7068 to (Evaluate:10Sep2016)  Requested for: 66NDN9665; Last   Rx:14Mar2016 Ordered   5  Folic Acid 174 MCG Oral Tablet; TAKE 1 TABLET DAILY AS DIRECTED; Therapy: 72XPS9617 to (Evaluate:30Mar2017)  Requested for: 04Apr2016; Last   Rx:04Apr2016 Ordered   6  Lantus 100 UNIT/ML Subcutaneous Solution; 20 units daily  Requested for: 30Dsx0935; Last Rx:66Lbe3838 Ordered   7  Levothyroxine Sodium 75 MCG Oral Tablet; TAKE 1 TABLET DAILY; Therapy: 15KJB4328 to (Evaluate:16Bpi4659)  Requested for: 66Mjs6746; Last   Rx:11Guh2281 Ordered   8  Losartan Potassium 50 MG Oral Tablet; TAKE 1 TABLET DAILY; Therapy: 33NGP8201 to (Evaluate:11Mar2017)  Requested for: 38RHK6435; Last   Rx:16Mar2016 Ordered   9  Lyrica 150 MG Oral Capsule; TAKE 1 CAPSULE 3 TIMES DAILY; Therapy: 66CIY2214 to (Evaluate:49Zns3507)  Requested for: 82FBK2208; Last   Rx:25Jan2016 Ordered   10  Metoprolol Tartrate 25 MG Oral Tablet; Take 1 tablet by mouth daily; Therapy: 81Ewb6852 to (Evaluate:06Sep2016)  Requested for: 18BXD9055; Last    Rx:11Mar2016 Ordered   11  OLANZapine 5 MG Oral Tablet; TAKE 1 TABLET AT BEDTIME; Therapy: 28DKL0500 to (Evaluate:42Ykf9491)  Requested for: 35Erx6433; Last    Rx:04Apr2016 Ordered   12  Omeprazole 20 MG Oral Capsule Delayed Release; take one capsule by mouth daily; Therapy: 76WNL9476 to (Evaluate:11Mar2017)  Requested for: 43ZMN2083; Last    Rx:16Mar2016 Ordered   13  Ondansetron 4 MG Oral Tablet Dispersible; ALLOW 1 TABLET TO DISSOLVE ON    TONGUE TWICE DAILY AS NEEDED FOR NAUSEA; Therapy: 84ZCB6619 to (Evaluate:24Mar2016)  Requested for: 44Ovx4688; Last    Rx:61Jsl7862 Ordered   14   OneTouch Ultra Blue In [employer]; USE 1 STRIP Twice daily; Therapy: 58NIG9540 to (Evaluate:14Wns9419)  Requested for: 69IRE1603; Last    Rx:00Yzr8423 Ordered   15  ProAir  (90 Base) MCG/ACT Inhalation Aerosol Solution; INHALE 2 PUFFS    EVERY 4-6 HOURS AS NEEDED; Therapy: 89PYW8033 to (Last Rx:86Qly1457)  Requested for: 11Kjc4302 Ordered   16  Topiramate 25 MG Oral Tablet; 1 tab qhs x 5 days, then 2 tabs qhs x 5 days, then 3    tablets qhs x 5 days, then 4 tab qhs;    Therapy: 12FRR5640 to (Evaluate:55Orr3598)  Requested for: 32JVT2345; Last    Rx:17Mar2016 Ordered   17  TraMADol HCl - 50 MG Oral Tablet; TAKE 1 TO 2 TABLETS 4 TIMES A DAY AS NEEDED; Therapy: 12TMS9169 to (Evaluate:05Mwp8213)  Requested for: 58Qzw0897; Last    Rx:77Reb6517 Ordered   18  Venlafaxine HCl ER 75 MG Oral Capsule Extended Release 24 Hour; TAKE 1 CAPSULE    DAILY; Therapy: 11XGZ4566 to (Evaluate:03Svq4151)  Requested for: 77Wuu8395; Last    Rx:25Mar2016 Ordered   19  Ventolin  (90 Base) MCG/ACT Inhalation Aerosol Solution; inhale 1 to 2 puffs    every 4 to 6 hours as needed; Therapy: 79Zfd7431 to (Last Rx:72Jnm2818)  Requested for: 54Rvy4500; Status:    ACTIVE - Retrospective By Protocol Authorization Ordered   20  Zolpidem Tartrate 10 MG Oral Tablet; Take 1 tablet by mouth at bedtime; Therapy: 83Olw3788 to (Evaluate:21Rzj3888)  Requested for: 75KPW6785; Last    Rx:10Jan2016 Ordered    Immunizations   1    Pneumococcal  14Rzt4639     Allergies    1  Adhesive Tape TAPE   2  Lexapro TABS    Vitals  Signs [Data Includes: Current Encounter]   Recorded: 29Apr2016 02:02PM   Heart Rate: 978  Systolic: 792  Diastolic: 986  Weight: 313 lb   BMI Calculated: 32 92  BSA Calculated: 1 83    Physical Exam    Constitutional   General appearance: Abnormal   obese and appears tired  Eyes   Conjunctiva and lids: No swelling, erythema or discharge  Pupils and irises: Equal, round and reactive to light      Ears, Nose, Mouth, and Throat   External inspection of ears and nose: Normal     Oropharynx: Abnormal   Oral mucosa was dry  Pulmonary   Respiratory effort: No increased work of breathing or signs of respiratory distress  Auscultation of lungs: Abnormal   Auscultation of the lungs revealed decreased breath sounds diffusely  Cardiovascular   Auscultation of heart: Normal rate and rhythm, normal S1 and S2, without murmurs  Examination of extremities for edema and/or varicosities: Normal     Lymphatic   Palpation of lymph nodes in neck: No lymphadenopathy  Psychiatric   Orientation to person, place, and time: Normal     Mood and affect: Abnormal   Mood and Affect: tearful  Right wrist tenderness and swelling  Right elbow tenderness  Right glenohumeral joint tenderness and restricted ROM  Left wrist tenderness and swelling  Left Elbow tenderness  Left glenohumeral joint tenderness and restricted ROM  Right ankle tenderness and swelling  Right knee tenderness  Right hip tenderness  Left ankle tenderness and swelling  Left knee tenderness  Left hip tenderness  Musculoskeletal - Joints, bones, and muscles: Abnormal  Palpation - bilateral knee crepitus  Muscle strength/tone: Normal  Motor Strength Findings: right hand dominance and bilateral upper extremity weakness, but normal lower extremity strength  Right upper extremity: shoulder flexion 5/5, shoulder extension 5/5, biceps 5/5, triceps 5/5, the hand  was weak  Left upper extremity shoulder flexion 5/5, shoulder extension 5/5, biceps 5/5, triceps 5/5, the hand  was weak  Right lower extremity strength: hip flexion 5/5, hip abduction 5/5, hip adduction 5/5, knee flexion 5/5, knee extension 5/5, ankle dorsiflexion 5/5, ankle plantar flexion 5/5  Left lower extremity strength: hip flexion 5/5, hip abduction 5/5, hip adduction 5/5, knee flexion 5/5, knee extension 5/5, ankle dorsiflexion 5/5, ankle plantar flexion 5/5  Skin - Skin and subcutaneous tissue: Abnormal  multiple tattoos  Neurologic - Reflexes: Normal  Deep tendon reflexes: 1+ right biceps, 1+ left biceps, 1+ right triceps, 1+ left triceps, 1+ right brachioradialis, 1+ left brachioradialis, 1+ right patella, 1+ left patella, 1+ right ankle jerk and 1+ left ankle jerk  Sensation: Normal    Additional Findings - Decreased ROM C-spine and L-spine; + multiple myofascial tender points  The patient has tenderness in all MCP, PIP and DIP joints of the right hand  The patient has tenderness in all MCP and PIP joints of the left hand  The patient has swelling of all MCP joints of the right hand  The patient has swelling of all MCP joints of the left hand  The patient has tenderness in all MTP joints of the right foot  The patient has tenderness in all MTP joints of the left foot  Results/Data  Results   (1) COMPREHENSIVE METABOLIC PANEL [id number] 91:87NN [employer]     Test Name Result Flag Reference   GLUCOSE 212 mg/dL H 65-99   Fasting reference interval   UREA NITROGEN (BUN) 12 mg/dL  7-25   CREATININE 0 67 mg/dL  0 50-1 10   eGFR NON-AFR   AMERICAN 107 mL/min/1 73m2  > OR = 60   eGFR AFRICAN AMERICAN 124 mL/min/1 73m2  > OR = 60   BUN/CREATININE RATIO   3-05   NOT APPLICABLE (calc)   SODIUM 136 mmol/L  135-146   POTASSIUM 4 5 mmol/L  3 5-5 3   CHLORIDE 103 mmol/L     CARBON DIOXIDE 21 mmol/L  19-30   CALCIUM 9 5 mg/dL  8 6-10 2   PROTEIN, TOTAL 7 0 g/dL  6 1-8 1   ALBUMIN 3 7 g/dL  3 6-5 1   GLOBULIN 3 3 g/dL (calc)  1 9-3 7   ALBUMIN/GLOBULIN RATIO 1 1 (calc)  1 0-2 5   BILIRUBIN, TOTAL 0 4 mg/dL  0 2-1 2   ALKALINE PHOSPHATASE 152 U/L H     U/L H 10-30    U/L H 6-29     (Q) TSH, 3RD GENERATION [id number] 68:00YC [employer]     Test Name Result Flag Reference   TSH 1 40 mIU/L     Reference Range                         > or = 20 Years  0 40-4 50                              Pregnancy Ranges            First trimester    0 26-2 66            Second trimester   0 55-2 73            Third trimester    0 43-2 91     (1) COMPREHENSIVE METABOLIC PANEL [id number] 77:49RC Trey Baltazar     Test Name Result Flag Reference   SODIUM 136 mmol/L  136-145   POTASSIUM 3 5 mmol/L  3 5-5 3   CHLORIDE 101 mmol/L  100-108   CARBON DIOXIDE 23 mmol/L  23-33   ANION GAP (CALC) 12 mmol/L  4-13   BILI, TOTAL 0 14 mg/dL L 0 20-1 00   TOTAL PROTEIN 7 6 g/dL  6 4-8 2   ALK PHOSPHATAS 121 U/L H    ALT (SGPT) 79 U/L H 14-59   AST(SGOT) 105 U/L H 0-45   GLUCOSE,RANDM 268 mg/dL H    If patient is fasting, the ADA then defines impaired fasting glucose as  >100 mg/dl and diabetes as  >or equal to 126 mg/dl  ALBUMIN 3 4 g/dL L 3 5-5 0   BLOOD UREA NITROGEN 7 mg/dL  5-25   CALCIUM 9 2 mg/dL  8 3-10 1   CREATININE 0 54 mg/dL L 0 60-1 30   Standardized to IDMS reference method   eGFR African American >60 ml/min/1 73sqm     National Kidney Disease Education Program recommendations are as  follows:  GFR calculation is accurate only with a steady state creatinine  Chronic Kidney disease less than 60 ml/min/1 73 sq  meters  Kidney failure less than 15 ml/min/1 73 sq  meters  eGFR Non-African American >60 ml/min/1 73sqm       (1) ROGERIO SCREEN W/REFLEX TO TITER/PATTERN [id number] 07:68VI Trey Baltazar     Test Name Result Flag Reference   ROGERIO SCREEN  Negative  Negative     (1) SED RATE [id number] 65:82TX Trey Baltazar     Test Name Result Flag Reference   SED RATE 18 mm/hour  0-20     (1) C-REACTIVE PROTEIN [id number] 90:49EF Trey Abltazar     Test Name Result Flag Reference   C-REACT PROTEIN 11 7 mg/L H 0 0-2 9       Future Appointments    Date/Time Provider Specialty Site   05/04/2016 09:15 AM Kemper Mcardle, MD Pain Management [address]   08/24/2016 01:30 PM ANASTACIO Morton  Hematology [facility]cology [facility]   07/13/2016 11:40 AM Gabriel Vila MD Gastroenterology Adult ST [address]   05/25/2016 01:30 PM ANASTACIO Guerrero   Orthopedic Surgery [facility]   06/02/2016 11:00 AM Ugo Hickman DO Rheumatology [facility] [address] [facility]   61/57/8054 06:12 PM [facility]     Signatures   Electronically signed by : Natalie Tenorio DO;  Apr 29 2016  5:26PM EST                       (Author)

## 2018-01-13 NOTE — MISCELLANEOUS
Message  GI Reminder Recall [facility]:   Date: 07/19/2017   Dear Kishor [place]:     Review of our records shows you are due for the following: Follow Up Visit  Please call the following office to schedule your appointment:   [address] [facility], [address] [place] [address], [address] [place] [address] [phone]  We look forward to hearing from you!      Sincerely,     [facility]      Signatures   Electronically signed by : Sheryl Gonzalez, ; Jul 19 2017  2:44PM EST                       (Author)

## 2018-01-13 NOTE — RESULT NOTES
Verified Results  [facility] BARIUM SWALLOW [id number] 09:36AM Samir Finer Order Number: [id number]    - Patient Instructions: To schedule this appointment, please contact Central Scheduling at [phone]  Test Name Result Flag Reference   FL ESOPHAGRAM COMPLETE (Report)     BARIUM SWALLOW-ESOPHAGRAM     INDICATION: Dysphasia  Patient reports having difficulty swallowing  COMPARISON: October 25, 2014     IMAGES: [de-identified]     FLUOROSCOPY TIME:  1 minute      TECHNIQUE:   The patient was given effervescent granules and barium by mouth and images of the esophagus were obtained  FINDINGS:   The esophagus is normal in caliber  Esophageal emptying of contrast from the esophagus is prompt  Esophageal motility within normal limits with slightly uncoordinated tertiary wave contractions observed distally    Patient was able to swallow a 13 mm    barium tablet without difficulty  Gastroesophageal reflux was not observed  There is no hiatal hernia  IMPRESSION:   No esophageal stricture, diverticulum, mass, or spasm identified  Workstation performed: [id number]     Signed by: Zelalem Barrientos DO   12/6/16   Annabebalbir STUDENT FENG MAGALLON ASSISTED IN PROCEDURE  RAD_DOSE   Modality Radiation Exposure Data    Order Radiation    Type Dose Range    Radiation Dose 358 52 mGy 0 - 30 mGy       Discussion/Summary   normal barium esophagram study  No narrowing or stricture or change in motility seen

## 2018-01-13 NOTE — RESULT NOTES
Message   Recorded as Task   Date: 02/18/2016 12:30 PM, Created By: Aneudy Morales   Task Name: Follow Up   Assigned To: [facility] [place] procedure,Team   Regarding Patient: Sj Gil, Status: Active   Comment:    Aneudy Morales - 18 Feb 2016 12:30 PM     TASK CREATED  Patient appeared to have significant relief of her pain in her neck based on pain diary  We can proceed with confirmatory block, mbb #2   Alex Pool - 19 Feb 4024 0:67 AM     TASK EDITED  Spoke with patient she states she did get 90% relief from procedure - She was scheduled for confirmatory 2/24/2016  All preprocedure instructions were revieed, patient agreed and understoon          Signatures   Electronically signed by : Carolann Tapia, ; Feb 19 2016  9:56AM EST                       (Author)

## 2018-01-13 NOTE — MISCELLANEOUS
Message   Recorded as Task   Date: 07/06/2017 08:07 AM, Created By: Marisa Salomon   Task Name: Care Coordination   Assigned To: [facility] stim,Team   Regarding Patient: Chantelle Calderon, Status: In Progress   Comment:    Jeri Nice - 06 Jul 2017 8:07 AM     TASK CREATED  Pt to be an [employer] cervical SCS trial with Dr Tad Holloway in [place]  Pt signed release of info  Pt received SCS booklet, script for psych eval with list of providers  Clinical info excluding psych eval faxed to [employer]  Education? Jeri Nice - 06 Jul 2017 8:07 AM     TASK IN PROGRESS   Jeri Nice - 03 Nov 2017 10:55 AM     TASK EDITED  Attempt to reach pt to see if she was still interseted in SCS  LM asking pt to return call if she would like to move forward  Active Problems    1  Anxiety (300 00) (F41 9)   2  Arthralgia (719 40) (M25 50)   3  Benign essential hypertension (401 1) (I10)   4  Carpal tunnel syndrome of right wrist (354 0) (G56 01)   5  Carpal tunnel syndrome, left (354 0) (G56 02)   6  Cervical disc disorder with radiculopathy of mid-cervical region (723 4) (M50 120)   7  Cervical dysphagia (787 29) (R13 19)   8  Chest pain (786 50) (R07 9)   9  Chronic bilateral low back pain (724 2,338 29) (M54 5,G89 29)   10  Chronic cervical pain (723 1,338 29) (M54 2,G89 29)   11  Chronic cervical radiculopathy (723 4) (M54 12)   12  Chronic cough (786 2) (R05)   13  Chronic diarrhea (787 91) (K52 9)   14  Chronic fatigue (780 79) (R53 82)   15  Chronic migraine without aura (346 70) (G43 709)   16  Chronic pain syndrome (338 4) (G89 4)   17  CRP elevated (790 95) (R79 82)   18  Degenerative disc disease, cervical (722 4) (M50 30)   19  Depression (311) (F32 9)   20  Diabetes mellitus type II, uncontrolled (250 02) (E11 65)   21  Elevated LFTs (790 6) (R79 89)   22  Fatty liver (571 8) (K76 0)   23  Fibromyalgia (729 1) (M79 7)   24  GERD without esophagitis (530 81) (K21 9)   25  Headache (784 0) (R51)   26   Hyperlipidemia (272  4) (E78 5)   27  Hypothyroidism (244 9) (E03 9)   28  Insomnia (780 52) (G47 00)   29  Intractable chronic common migraine without aura (346 71) (G43 719)   30  Iron deficiency (280 9) (E61 1)   31  Irritable bowel syndrome with diarrhea (564 1) (K58 0)   32  Left hand paresthesia (782 0) (R20 2)   33  Left sided numbness (782 0) (R20 0)   34  Long-term insulin use (V58 67) (Z79 4)   35  Multiple joint pain (719 49) (M25 50)   36  Nausea (787 02) (R11 0)   37  Nicotine dependence (305 1) (F17 200)   38  Opioid dependence (304 00) (F11 20)   39  Osteoarthritis of spine with radiculopathy, cervical region (721 0) (M47 22)   40  Pancreatic cyst (577 2) (K86 2)   41  Pancreatitis (577 0) (K85 90)   42  Paresthesia (782 0) (R20 2)   43  Polyarthritis (716 50) (M13 0)   44  Polyneuropathy associated with underlying disease (357 4) (G63)   45  Pyelonephritis (590 80) (N12)   46  Right hand pain (729 5) (M79 641)   47  S/P carpal tunnel release (V45 89) (Z98 890)   48  S/P trigger finger release (V45 89) (Z98 890)   49  Sacroiliac pain (724 6) (M53 3)   50  Shortness of breath (786 05) (R06 02)   51  Shoulder pain (719 41) (M25 519)   52  Skin rash (782 1) (R21)   53  Snoring (786 09) (R06 83)   54  Spondylosis of cervical region without myelopathy or radiculopathy (721 0) (M47 812)   55  Stroke syndrome (434 91) (I63 9)   56  Swelling of hand joint, right (719 04) (M25 441)   57  Transient ischemic attack (TIA) (435 9) (G45 9)   58  Trigger thumb of right hand (727 03) (M65 311)   59  Type 2 diabetes mellitus with hyperglycemia, with long-term current use of insulin    (250 00,790 29,V58 67) (E11 65,Z79 4)   60  Uncontrolled type 2 diabetes mellitus with diabetic polyneuropathy, with long-term    current use of insulin (250 62,357 2,V58 67) (E11 42,E11 65,Z79 4)   61  Vitamin B12 deficiency (266 2) (E53 8)   62  Vitamin D deficiency (268 9) (E55 9)    Current Meds   1   ALPRAZolam 0 25 MG Oral Tablet; take 1 tablet 3 times a day as needed; Therapy: 63Wlt6007 to (Paco Ro)  Requested for: 33PXL9192; Last   Rx:02Nov2017; Status: ACTIVE - Retrospective By Protocol Authorization Ordered   2  Aspirin 325 MG Oral Tablet; TAKE 1 TABLET DAILY; Therapy: 02GQJ8655 to Recorded   3  Atorvastatin Calcium 40 MG Oral Tablet; TAKE 1 TABLET DAILY; Therapy: 05Mgn9189 to (Last Rx:32Rhe3372)  Requested for: 19Fvb4635 Ordered   4  Botox 200 UNIT Injection Solution Reconstituted; INJECT UP  UNITS ONCE   EVERY 3 MONTHS IN OFFICE; Therapy: 71Qkx0570 to (Last Rx:21Jun2017) Ordered   5  BuPROPion HCl ER (XL) 150 MG Oral Tablet Extended Release 24 Hour; TAKE 1   TABLET DAILY; Therapy: 36SCP5722 to (Evaluate:02Apr2018)  Requested for: 04GQA0801; Last   Rx:07Apr2017 Ordered   6  Cyanocobalamin 1000 MCG/ML Injection Solution; Inject 1 ml every 2 months; Therapy: 50NXW4567 to (Last Rx:07Qsj5780)  Requested for: 62Ybe2787 Ordered   7  Cyclobenzaprine HCl - 10 MG Oral Tablet; 1 tab qAM and 1 tab qHS; Therapy: 97AMJ7636 to ([phone])  Requested for: 07Ptt3427; Last   Rx:92Csp5509 Ordered   8  Dicyclomine HCl - 20 MG Oral Tablet; TAKE 1 TABLET EVERY 6 HOURS AS NEEDED; Therapy: 73YTB9096 to [phone])  Requested for: 31Oct2016; Last   Rx:31Oct2016 Ordered   9  Divalproex Sodium 250 MG Oral Tablet Delayed Release; take 2 tablets by mouth at   bedtime; Therapy: 55FBF4085 to (USQIHGIX:56TUZ8502)  Requested for: 25Nvr8278; Last   Rx:40Tlo0349 Ordered   10  Fenofibrate 48 MG Oral Tablet; TAKE 1 TABLET DAILY; Therapy: 69CXN6501 to (King Stewart)  Requested for: 58GYQ0285; Last    Rx:30Nov2016; Status: ACTIVE - Renewal Denied Ordered   11  Folic Acid 628 MCG Oral Tablet; TAKE 1 TABLET DAILY AS DIRECTED; Therapy: 89DQT3761 to (Evaluate:84Ksr3612)  Requested for: 82GGR8180; Last    Rx:96Adt0727 Ordered   12  FreeStyle Lite Test In [employer]; use as directed;     Therapy: 45PWW2044 to (Last Rx:06Pkl6070) Ordered   13  Gabapentin 400 MG Oral Capsule; TAKE 1-2 CAPSULES 3 TIMES DAILY; Therapy: 36SJA6187 to (Evaluate:10Jan2018)  Requested for: 41JWP6170; Last    Rx:12Oct2017 Ordered   15  HumaLOG KwikPen 200 UNIT/ML Subcutaneous Solution Pen-injector; Inject SQ 15    units before each meal;    Therapy: 00EHJ4732 to (Last Rx:08Dec2016) Ordered   15  Ketorolac Tromethamine 10 MG Oral Tablet; TAKE 1 TABLET EVERY 8 HOURS AS    NEEDED FOR PAIN  DON'T TAKE FOR MORE THAN 5 DAYS; Therapy: 80IDH4629 to (Zaid Rodgers)  Requested for: 08HXR3761; Last    Rx:02Nov2017 Ordered   16  Lantus SoloStar 100 UNIT/ML Subcutaneous Solution Pen-injector; Inject SQ 50 units at    bedtime; Therapy: (Recorded:61Erk7728) to Recorded   17  Levothyroxine Sodium 112 MCG Oral Tablet; TAKE 1 TABLET DAILY MONDAY    THROUGH SATURDAY  TAKE 2 TABLETS ON SUNDAY; Therapy: 46PIG4511 to (Evaluate:17Oct2017)  Requested for: 35FYX7300; Last    Rx:20Apr2017; Status: ACTIVE - Renewal Denied Ordered   18  Losartan Potassium 50 MG Oral Tablet; TAKE 1 TABLET DAILY; Therapy: 70GTC9711 to (Severa Coupe)  Requested for: 07DVK0095; Last    Rx:09May2017 Ordered   19  MetFORMIN HCl  MG Oral Tablet Extended Release 24 Hour; take 2 tablets twice    daily with meals; Therapy: 86Nuu2521 to (Evaluate:54Bbt4418)  Requested for: 32Cnh4895; Last    Rx:02Jun2017; Status: ACTIVE - Renewal Denied Ordered   20  Metoprolol Tartrate 25 MG Oral Tablet; Take 1 tablet by mouth daily; Therapy: 99Uco1833 to (Severa Coupe)  Requested for: 81BZF6898; Last    Rx:09May2017 Ordered   21  OLANZapine 5 MG Oral Tablet; TAKE 1 TABLET AT BEDTIME; Therapy: 36GMW7616 to (Rima Munson)  Requested for: 78WPF7249; Last    Rx:75Ryf8169 Ordered   22  Ondansetron 4 MG Oral Tablet Disintegrating; ALLOW 1 TABLET TO DISSOLVE ON    TONGUE TWICE DAILY AS NEEDED FOR NAUSEA;     Therapy: 06DMK6388 to 96 754532)  Requested for: 69UYB8828; Last Rx: 40JDY1258 Ordered   23  OneTouch Ultra Blue In [employer]; USE 1 STRIP Twice daily; Therapy: 93IVC3198 to (Delores Dennis)  Requested for: 54EKY5326; Last    Rx:03Jan2017 Ordered   24  ProAir  (90 Base) MCG/ACT Inhalation Aerosol Solution; INHALE 2 PUFFS    EVERY 4-6 HOURS AS NEEDED; Therapy: 46UWJ4493 to (Last Rx:79Pji3317)  Requested for: 85Emu9697 Ordered   25  Topiramate 100 MG Oral Tablet; TAKE 1 TABLET TWICE A DAY  ONE IN AM AND ONE    IN PM;    Therapy: 19XYR4119 to (Evaluate:13Jan2018)  Requested for: 18Apr2017; Last    Rx:18Apr2017 Ordered   26  Topiramate 25 MG Oral Tablet (Topamax); 1 tab qHS x 5 days, then 2 tabs qhs (with 100    mg tab qhs); Therapy: 31GOU1201 to (BDKWMNRI:39WKP0513)  Requested for: 63IMG6774; Last    Rx:27Sep2017 Ordered   27  Toujeo SoloStar 300 UNIT/ML Subcutaneous Solution Pen-injector; 50 units; Therapy: 77Iyg3286 to (Last Rx:24Oct2016) Ordered   28  TraMADol HCl - 50 MG Oral Tablet; 1-2 TABS 4 TIMES DAILY AS NEEDED; Therapy: 03LWM9138 to (Heidi Beech)  Requested for: 83LGN4553; Last    Rx:02Nov2017; Status: ACTIVE - Retrospective By Protocol Authorization Ordered   29  Turmeric TABS; 1 Tab daily; Therapy: (Recorded:24Oct2016) to Recorded   30  Venlafaxine HCl  MG Oral Capsule Extended Release 24 Hour; take 1 capsule by    mouth daily; Therapy: 86EQH7461 to (Evaluate:26Mar2018)  Requested for: 29JUF0469; Last    Rx:27Jcl2252 Ordered   31  Ventolin  (90 Base) MCG/ACT Inhalation Aerosol Solution; inhale 1 to 2 puffs    every 4 to 6 hours as needed; Therapy: 51Hcb8361 to (Last Rx:59Wws5206)  Requested for: 24Qbu7339; Status:    ACTIVE - Retrospective By Protocol Authorization Ordered   32  Zolpidem Tartrate 10 MG Oral Tablet; TAKE 1 TABLET AT BEDTIME AS NEEDED;     Therapy: 12Apr2011 to (Evaluate:97Ijl2384)  Requested for: 18HFN6477; Last    Rx:02Nov2017; Status: ACTIVE - Retrospective By Protocol Authorization Ordered    Allergies    1  Adhesive Tape TAPE   2   Lexapro TABS    Signatures   Electronically signed by : Richa More, ; Nov  3 2017 10:56AM EST                       (Author)

## 2018-01-14 VITALS
SYSTOLIC BLOOD PRESSURE: 110 MMHG | WEIGHT: 182.25 LBS | DIASTOLIC BLOOD PRESSURE: 82 MMHG | HEIGHT: 62 IN | BODY MASS INDEX: 33.54 KG/M2

## 2018-01-14 VITALS
HEART RATE: 101 BPM | TEMPERATURE: 98.6 F | SYSTOLIC BLOOD PRESSURE: 130 MMHG | WEIGHT: 190 LBS | BODY MASS INDEX: 34.96 KG/M2 | HEIGHT: 62 IN | DIASTOLIC BLOOD PRESSURE: 96 MMHG

## 2018-01-14 VITALS
HEART RATE: 114 BPM | DIASTOLIC BLOOD PRESSURE: 84 MMHG | TEMPERATURE: 98.9 F | HEIGHT: 62 IN | BODY MASS INDEX: 33.08 KG/M2 | WEIGHT: 179.75 LBS | SYSTOLIC BLOOD PRESSURE: 124 MMHG

## 2018-01-14 VITALS
DIASTOLIC BLOOD PRESSURE: 99 MMHG | HEART RATE: 121 BPM | SYSTOLIC BLOOD PRESSURE: 144 MMHG | WEIGHT: 197.13 LBS | BODY MASS INDEX: 36.28 KG/M2 | HEIGHT: 62 IN

## 2018-01-14 VITALS
DIASTOLIC BLOOD PRESSURE: 98 MMHG | HEIGHT: 62 IN | BODY MASS INDEX: 36.5 KG/M2 | WEIGHT: 198.38 LBS | SYSTOLIC BLOOD PRESSURE: 140 MMHG

## 2018-01-14 NOTE — PROCEDURES
Procedures by LO Wallace  at 1/12/2017 10:45 AM      Author:  LO Wallace Service:  PMR Author Type:  Speech and Language Pathologist     Filed:  1/12/2017 12:26 PM Date of Service:  1/12/2017 10:45 AM Status:  Signed     :  LO Wallace (Speech and Language Pathologist)         Pre-procedure Diagnoses:       1  Gastroesophageal reflux disease without esophagitis [K21 9]       2  Feeding difficulties and mismanagement [R63 3]                Procedures:       1   FL BARIUM Lonita Krelvin SPEECH [NBH534 (Custom)]                                                      Video Swallow Study      Patient Name: Ashley Morrison  YLCKQ'C Date: 1/12/2017  par  par  Past Medical History  Past Medical History     Diagnosis  Date    Acute venous embolism and thrombosis of deep vessels of distal lower extremity     Asthma     Depression     Diabetes mellitus     DJD (degenerative joint disease)     Fibromyalgia     GERD with esophagitis     History of pulmonary embolism     Hyperlipidemia     Hypertension     Hypothyroidism     Iron deficiency     Pancreatitis     Polyarthritis     Stroke syndrome     TIA (transient ischemic attack)     TIA (transient ischemic attack)     Vitamin B12 deficiency     Vitamin D deficiency         Past Surgical History  Past Surgical History       Procedure   Laterality Date    Pr wrist arthroscop,release xvers lig  Right 3/8/2016     Procedure: RELEASE CARPAL TUNNEL ENDOSCOPIC;  Surgeon: Cherry Vasquez MD;  Location: [facility];  Service: Orthopedics      Pr incise finger tendon sheath  Right 3/8/2016     Procedure: RELEASE TRIGGER FINGER RIGHT THUMB;  Surgeon: Cherry Vasquez MD;  Location: [facility];  Service: Orthopedics      Cholecystectomy       Esophagogastric fundoplasty       Nissen fundoplication       Tonsillectomy and adenoidectomy       Ercp       Carpal tunnel release  Right     Ercp       Pr esophagogastroduodenoscopy transoral diagnostic  N/A 11/2/2016     Procedure: EGD AND COLONOSCOPY;  Surgeon: Ni Gold MD;  Location: [facility]; Service: Gastroenterology         Pt is a 39 y/o F referred by Dr Raven Flores for video barium swallow due to pt's reports of difficulty swallowing food and occasional heart burn  EGD Nov '16 did not reveal any source  for dysphagia  Barium Swallow - Esophagram completed 12/6/16 showed: FINDINGS:  The esophagus is normal in caliber  Esophageal emptying of contrast from the esophagus is prompt  Esophageal motility within normal limits with slightly uncoordinated  tertiary wave contractions observed distally    Patient was able to swallow a 13 mm barium tablet without difficulty  Gastroesophageal reflux was not observed  There is no hiatal hernia  IMPRESSION:  No esophageal stricture, diverticulum, mass, or spasm  identified  Pt reports today that for approximately 1 year, she has experienced food getting stuck (pointing to her throat) and at times pain when swallowing due to same  She reports that rice and potatoes (mostly starches) give her the most trouble  She also reports difficulty taking pills and states that at times, even liquids are difficult to swallow  Previous VBS:  none  Current Diet:  Regular diet with thin liquids; pt denies making any changes to diet based on current complaints  Dentition:  adequate  O2 requirement:  Room air  Oral mech:  Strength and ROM:  WNLs  Vocal Quality/Speech:  WNLs  Cognitive status:  Alert and oriented; appropriate    Consistencies administered: Barium laden applesauce, banana, hard solid, nectar thick, thin liquids, 13mm barium pill  Liquids were administered by cup and straw  Pt was seated laterally at 90 degrees       Oral stage:  WNL  Lip closure: good  Mastication: good  Bolus formation: good  Bolus control: good  Transfer: timely  Residue: mild oral residue for hard/dry consistency required multiple transfers and ultimately liquid wash to clear  Pharyngeal stage: WNL  Swallow promptness: prompt  Spill to valleculae: no  Spill to pyriforms: no  Epiglottic inversion: yes  Laryngeal rise: good  Pharyngeal constriction: good  Vallecular retention: only for dry/hard solids; cleared with liquid wash  Pyriform retention: no  PPW coating: no  Penetration: no  Aspiration: no  Strategies: alternate liquids and solids to clear oral residue for dry/sticky boluses     Esophageal stage:  Screened only; slow transit for hard/dry solids through esophagus (pt identified this as a time where she feels the food) - pt benefited from liquid wash to clear  Slow clearance of 13 MM barium pill at LES; ultimately cleared with liquid  wash  Summary:  Oropharyngeal swallow is WFLs  Pt was observed to have mild difficulty transferring hard dry solids orally (also noted in esophageal screening) that my be attributed to xerostomia  No penetration, aspiration, or significant pharyngeal residue  observed on this study  Pt benefited from liquid wash and moister food textures to facilitate prompt clearance  Recommendations:  Diet: Regular (moist food choices, use of sauces/gravies discussed)  Liquids: Thin  Meds: as desired followed by extra liquids to aid in clearance  Strategies: Alternate liquids and solids  Upright position  F/u ST tx: NO  Results reviewed with patient                     Received for:Provider  EPIC   Jan 12 2017 12:26PM [facility] Standard Time

## 2018-01-14 NOTE — MISCELLANEOUS
Message  patient called asking records alexandra sent to [facility] to new doctor  Records were faxed today 1/6/17 at 1100  to [phone]      Active Problems    1  Abdominal pain, epigastric (789 06) (R10 13)   2  Abnormal weight gain (783 1) (R63 5)   3  Analgesic use (V58 69) (Z79 899)   4  Anxiety (300 00) (F41 9)   5  Arthralgia (719 40) (M25 50)   6  Benign essential hypertension (401 1) (I10)   7  Carpal tunnel syndrome of right wrist (354 0) (G56 01)   8  Carpal tunnel syndrome, left (354 0) (G56 02)   9  Cerebral infarction, unspecified (434 91) (I63 9)   10  Cervical dysphagia (787 29) (R13 19)   11  Cervical radiculopathy (723 4) (M54 12)   12  Chest pain (786 50) (R07 9)   13  Chronic cough (786 2) (R05)   14  Chronic diarrhea (787 91) (K52 9)   15  Chronic fatigue (780 79) (R53 82)   16  Chronic migraine without aura (346 70) (G43 709)   17  Chronic pain syndrome (338 4) (G89 4)   18  CRP elevated (790 95) (R79 82)   19  Degenerative disc disease, cervical (722 4) (M50 30)   20  Depression (311) (F32 9)   21  Diabetes mellitus type II, uncontrolled (250 02) (E11 65)   22  Elevated LFTs (790 6) (R94 5)   23  Fatty liver (571 8) (K76 0)   24  Fibromyalgia (729 1) (M79 7)   25  GERD without esophagitis (530 81) (K21 9)   26  Headache (784 0) (R51)   27  Hyperlipidemia (272 4) (E78 5)   28  Hypothyroidism (244 9) (E03 9)   29  Insomnia (780 52) (G47 00)   30  Intractable chronic common migraine without aura (346 71) (G43 719)   31  Iron deficiency (280 9) (E61 1)   32  Irritable bowel syndrome with diarrhea (564 1) (K58 0)   33  Left hand paresthesia (782 0) (R20 2)   34  Left sided numbness (782 0) (R20 0)   35  Long-term insulin use (V58 67) (Z79 4)   36  Multiple joint pain (719 49) (M25 50)   37  Nausea (787 02) (R11 0)   38  Neck pain (723 1) (M54 2)   39  Nicotine dependence (305 1) (F17 200)   40  Opioid dependence (304 00) (F11 20)   41  Pancreatic cyst (577 2) (K86 2)   42   Pancreatitis (577 0) (K85 90)   43  Paresthesia (782 0) (R20 2)   44  Polyarthritis (716 50) (M13 0)   45  Pyelonephritis (590 80) (N12)   46  Recurrent low back pain (724 2) (M54 5)   47  Right hand pain (729 5) (M79 641)   48  S/P carpal tunnel release (V45 89) (Z98 890)   49  S/P trigger finger release (V45 89) (Z98 890)   50  Sacroiliac pain (724 6) (M53 3)   51  Shortness of breath (786 05) (R06 02)   52  Shoulder pain (719 41) (M25 519)   53  Skin rash (782 1) (R21)   54  Snoring (786 09) (R06 83)   55  Spondylosis of cervical region without myelopathy or radiculopathy (721 0) (M47 812)   56  Stroke Syndrome (436)   57  Swelling of hand joint, right (719 04) (M25 441)   58  TMJ arthralgia (524 62) (M26 629)   59  Transient ischemic attack (TIA) (435 9) (G45 9)   60  Trigger thumb of right hand (727 03) (M65 311)   61  Type 2 diabetes mellitus with hyperglycemia, with long-term current use of insulin    (250 00,790 29,V58 67) (E11 65,Z79 4)   62  Vitamin B12 deficiency (266 2) (E53 8)   63  Vitamin D deficiency (268 9) (E55 9)    Current Meds   1  ALPRAZolam 0 25 MG Oral Tablet; take 1 tablet 3 times a day as needed; Therapy: 12Apr2011 to (Evaluate:26Jan2017)  Requested for: 02Jan2017; Last   Rx:68Wau9582 Ordered   2  Aspirin 325 MG Oral Tablet; TAKE 1 TABLET DAILY; Therapy: 66PZA8244 to Recorded   3  Atorvastatin Calcium 40 MG Oral Tablet; TAKE 1 TABLET DAILY; Therapy: 85Ouv6287 to (Last Rx:11Jau5068)  Requested for: 21Jul2016 Ordered   4  Botox 100 UNIT Injection Solution Reconstituted; to be injected by physician in office as   directed; Therapy: 36TJC9049 to (Last Rx:82Fai2584) Ordered   5  Cholestyramine 4 GM Oral Packet; MIX THE CONTENTS OF 1 POWDER PACKET WITH   2-6 OZ OF NONCARBONATED BEVERAGE AND SWALLOW ONCE DAILY; Therapy: 36ONI9175 to (Ephriam Edward)  Requested for: 03AZZ9333; Last   Rx:05Jan2017 Ordered   6  Cyanocobalamin 1000 MCG/ML Injection Solution; Inject 1 ml every 2 months;    Therapy: 65OBE8453 to (Last Rx:22Xpt8518)  Requested for: 93Ztj3637 Ordered   7  Dexamethasone 1 MG Oral Tablet; take 1 tab at 11 pm the night before labs; Therapy: 38OFS2272 to (Evaluate:07Mgk9583)  Requested for: 16ASM1921; Last   Rx:42Krz6651 Ordered   8  Dicyclomine HCl - 20 MG Oral Tablet; TAKE 1 TABLET EVERY 6 HOURS AS NEEDED; Therapy: 52HZO7523 to [phone])  Requested for: 31Oct2016; Last   Rx:63Crd9415 Ordered   9  Divalproex Sodium 250 MG Oral Tablet Delayed Release; TAKE 2 TABLET Bedtime; Therapy: 83SHM3859 to (Evaluate:20Mar2017)  Requested for: 31KPG2399; Last   Rx:32Fpb4756 Ordered   10  Fenofibrate 48 MG Oral Tablet; TAKE 1 TABLET DAILY; Therapy: 51LZQ1260 to (Derrick Medina)  Requested for: 47OSP3507; Last    Rx:71Mrq1603; Status: ACTIVE - Renewal Denied Ordered   11  Folic Acid 120 MCG Oral Tablet; TAKE 1 TABLET DAILY AS DIRECTED; Therapy: 94QFF1522 to (Evaluate:99Gzy2186)  Requested for: 98XQD8004; Last    Rx:71Wit6375 Ordered   12  FreeStyle Lite Test In [employer]; use as directed; Therapy: 44VOQ8458 to (Last Rx:36Jqt0400) Ordered   13  HumaLOG KwikPen 200 UNIT/ML Subcutaneous Solution Pen-injector; Inject SQ 15    units before each meal;    Therapy: 24Oct2016 to (Last Rx:59Hvm8225) Ordered   14  Ketorolac Tromethamine 10 MG Oral Tablet; TAKE 1 TABLET EVERY 8 HOURS AS    NEEDED FOR PAIN  DON'T TAKE FOR MORE THAN 5 DAYS; Therapy: 45NJW9208 to (Evaluate:11Ubw0989)  Requested for: 84Qdb5255; Last    Rx:20Eyc5510 Ordered   15  Lantus SoloStar 100 UNIT/ML Subcutaneous Solution Pen-injector; Inject SQ 50 units at    bedtime; Therapy: (Recorded:63Xfc3924) to Recorded   16  Levothyroxine Sodium 112 MCG Oral Tablet; 1 tablet daily Monday thru Saturday; take 2    tabs daily on Sunday; Therapy: 38DMO8033 to (Last Rx:23Cnr7617)  Requested for: 75RXV6718 Ordered   17  LORazepam 1 MG Oral Tablet; TAKE 1 TABLET 1 HOUR BEFORE MRI  MAY REPEAT    ONCE 15 MINUTES BEFORE MRI;     Therapy: 35IMK8552 to (Evaluate:29Mld5118); Last Rx:13Swg8366 Ordered   18  Losartan Potassium 50 MG Oral Tablet; TAKE 1 TABLET DAILY; Therapy: 40DJY2581 to (Evaluate:11Mar2017)  Requested for: 28XWY2377; Last    Rx:16Mar2016 Ordered   19  Lyrica 200 MG Oral Capsule; TAKE 1 CAPSULE 3 times daily; Therapy: 48NHH1776 to (Renu Sullivan)  Requested for: 18BEC8942; Last    Rx:28Nov2016 Ordered   20  MetFORMIN HCl  MG Oral Tablet Extended Release 24 Hour; TAKE 2 TABLET    DAILY twice daily with meals; Therapy: 00Xtt0659 to (Evaluate:28Mar2017)  Requested for: 54IWO9066; Last    Rx:41Kqu2213 Ordered   21  Metoprolol Tartrate 25 MG Oral Tablet; Take 1 tablet by mouth daily; Therapy: 87Nwr7061 to (Evaluate:23Jul2017)  Requested for: 86Vfc0968; Last    Rx:59Ved6228 Ordered   22  OLANZapine 5 MG Oral Tablet; TAKE 1 TABLET AT BEDTIME; Therapy: 67FYD9489 to (Evaluate:23Mar2017)  Requested for: 58CQZ5412; Last    Rx:23Nov2016 Ordered   23  Omeprazole 20 MG Oral Capsule Delayed Release; take one capsule by mouth daily; Therapy: 69OKA5014 to (Evaluate:11Mar2017)  Requested for: 11FCK6306; Last    Rx:16Mar2016 Ordered   24  Ondansetron 4 MG Oral Tablet Dispersible; ALLOW 1 TABLET TO DISSOLVE ON    TONGUE TWICE DAILY AS NEEDED FOR NAUSEA; Therapy: 60HLM4798 to (Evaluate:21Hgi2488)  Requested for: 97SEI4097; Last    Rx:57Kmr8072 Ordered   25  OneTouch Ultra Blue In [employer]; USE 1 STRIP Twice daily; Therapy: 39ITA7225 to (Fan Collins)  Requested for: 95YEH2234; Last    Rx:03Jan2017 Ordered   26  ProAir  (90 Base) MCG/ACT Inhalation Aerosol Solution; INHALE 2 PUFFS    EVERY 4-6 HOURS AS NEEDED; Therapy: 36LUP0302 to (Last Rx:74Sje8369)  Requested for: 69Oip6468 Ordered   27  TiZANidine HCl - 2 MG Oral Capsule; 1 tab TID prn spasm or headache; Therapy: 96Qxa0991 to (Evaluate:30Jan2017)  Requested for: 19MML5883; Last    Rx:01Nov2016 Ordered   28  Topiramate 100 MG Oral Tablet;  Take 1 tab BID;    Therapy: 39XGV8507 to (Evaluate:12Mar2017)  Requested for: 60Ybk6162; Last    Rx:12Dec2016 Ordered   29  Toujeo SoloStar 300 UNIT/ML Subcutaneous Solution Pen-injector; 50 units; Therapy: 90Cgg9108 to (Last Rx:24Oct2016) Ordered   30  TraMADol HCl - 50 MG Oral Tablet; TAKE 1 OR 2 TABLETS FOUR TIMES A DAY AS    NEEDED; Therapy: 40KQA5181 to (Evaluate:26Jan2017)  Requested for: 02Jan2017; Last    Rx:30Oak5973 Ordered   31  Turmeric TABS; 1 Tab daily; Therapy: (Recorded:24Oct2016) to Recorded   32  Venlafaxine HCl  MG Oral Capsule Extended Release 24 Hour; Take 1 capsule    by mouth  daily; Therapy: 44IPS8412 to (Evaluate:11Jun2017)  Requested for: 07JQE6820; Last    Rx:03Asp9212 Ordered   33  Venlafaxine HCl ER 75 MG Oral Capsule Extended Release 24 Hour; TAKE 1 CAPSULE    DAILY; Therapy: 53YTW2871 to (Evaluate:10Jun2017)  Requested for: 24CZE0813; Last    Rx:12Dec2016 Ordered   34  Ventolin  (90 Base) MCG/ACT Inhalation Aerosol Solution; inhale 1 to 2 puffs    every 4 to 6 hours as needed; Therapy: 00Eyq5725 to (Last Rx:64Dpm6451)  Requested for: 53Utx0392; Status:    ACTIVE - Retrospective By Protocol Authorization Ordered   35  Xifaxan 550 MG Oral Tablet; TAKE 1 TABLET 3 times daily; Therapy: 01LKL8970 to (Renew:21Nov2016)  Requested for: 33XVU7266; Last    Rx:07Nov2016 Ordered   39  Zolpidem Tartrate 10 MG Oral Tablet; TAKE 1 TABLET AT BEDTIME; Therapy: 12Apr2011 to (Evaluate:26Jan2017)  Requested for: 02Jan2017; Last    Rx:52Uzm3030 Ordered    Allergies    1  Adhesive Tape TAPE   2   Lexapro TABS    Signatures   Electronically signed by : Arun Acosta, ; Jan 6 2017 11:20AM EST                       (Author)

## 2018-01-15 VITALS
WEIGHT: 171 LBS | DIASTOLIC BLOOD PRESSURE: 80 MMHG | BODY MASS INDEX: 31.47 KG/M2 | HEIGHT: 62 IN | SYSTOLIC BLOOD PRESSURE: 118 MMHG

## 2018-01-15 NOTE — RESULT NOTES
Message   Recorded as Task   Date: 02/29/2016 12:11 PM, Created By: Aneudy Morales   Task Name: Follow Up   Assigned To: Kimmie Sharma   Regarding Patient: Sj Gil, Status: Active   Comment:    Aneudy Morales - 29 Feb 2016 12:11 PM     TASK CREATED  It appears patient had significant relief wtih mbb, we can proceed with RFA if she is in agreement  [place] - 01 Mar 7934 92:53 AM     TASK EDITED  Pt agreed and waiting on call for the RFA scheduling       Please schedule patient for RFA   Nevaeh Whyte - 01 Mar 2016 3:27 PM     TASK REASSIGNED: Previously Assigned To [facility] sched,Team   Nevaeh Whyte - 01 Mar 2016 4:15 PM     TASK EDITED  S/W patient - patient scheduled for Wednesday, March 16 at [facility] with Dr Maribel Warren - patient advised nothing to eat or drink 1 hour prior to procedure but encouraged to have a light lunch - patient denies any blood thinners/abx's - patient also advised to arrange for  - patient aware and agreed    Patient also scheduled for 4 wk f/u post RFA for Thursday, April 14        Signatures   Electronically signed by : Meka Ogden, ; Mar  1 2016  4:15PM EST                       (Author)

## 2018-01-15 NOTE — RESULT NOTES
Message   Recorded as Task   Date: 08/24/2017 12:37 PM, Created By: Juliana Dennis   Task Name: Follow Up   Assigned To: [facility]   Regarding Patient: Devyn Hinds, Status: In Progress   Comment:    Helene Coronel - 24 Aug 2017 12:37 PM     TASK CREATED  L/m to return call with % of relief  S/p C7&T1 NATHALY done 8/18/17 by Helene Mccall - 24 Aug 2017 1:08 PM     TASK EDITED   Helene Coronel - 30 Aug 2017 10:16 AM     TASK EDITED  2nd attempt  Raina Camejo - 31 Aug 3493 74:43 AM     TASK EDITED  Unsuccessful attempts to reach patient          Signatures   Electronically signed by : Vanessa Moore, ; Aug 31 2017 11:49AM EST                       (Author)

## 2018-01-16 NOTE — RESULT NOTES
Message   TSH normal and free T4 in lower end of normal, please verify that she is on Levothyroxine 112 mcg daily because I see an order for 75 mcg daily from PCP  If on LT4 112 mcg tab daily, increase by taking 2 tabs every Sunday and continue with 1 tab Mon-Sat  Repeat TSH and free T4 in 6 weeks  Verified Results  (1) CORTISOL AM SPECIMEN [id number] 07:31AM Elena Membreno     Test Name Result Flag Reference   CORTISOL, A M  5 6 mcg/dL     Reference Range  8 a m  (7-9 a m ) Specimen: 4 0-22 0     (Q) TEST AUTHORIZATION [id number] 07:31AM Elena Membreno     Test Name Result Flag Reference   TEST(S) ORDERED ON$REQUISITION T4, FREE TSH     TEST CODE: [id number]     CLIENT CONTACT: JULISSA FULLER     REPORT ALWAYS MESSAGE$SIGNATURE See Below     The laboratory testing on this patient was verbally requested  or confirmed by the ordering physician or his or her authorized  representative after contact with an employee of [facility]  Federal regulations require that we maintain on file written  authorization for all laboratory testing  Accordingly we are asking  that the ordering physician or his or her authorized representative  sign a copy of this report and promptly return it to the client            Signature:____________________________________________________     (1) T4, FREE [id number] 07:31AM Elena Membreno     Test Name Result Flag Reference   T4, FREE 0 9 ng/dL  0 8-1 8     (Q) TSH, 3RD GENERATION 24PZU4737 07:31AM Elena Membreno     Test Name Result Flag Reference   TSH 3 73 mIU/L     Reference Range                         > or = 20 Years  0 40-4 50                              Pregnancy Ranges            First trimester    0 26-2 66            Second trimester   0 55-2 73            Third trimester    0 43-2 91

## 2018-01-16 NOTE — MISCELLANEOUS
Message   Recorded as Task   Date: 03/02/2016 03:16 PM, Created By: Jeremy Alvarenga   Task Name: Care Coordination   Assigned To: [facility]   Regarding Patient: Madeline Mccurdy, Status: Active   [facility] [place] [facility] [place] 02 [place] 3:16 PM     TASK CREATED  Caller: Shannen Singh, Care Coordinator; Care Coordination; [phone] (Work)  Received VM from Shannen Singh at [facility] on [place] triage line from [address] [facility] states that she received the rx  application for Lyrica 928 mg, but there was no fax cover sheet  Shannen Singh states that she needs verbal confirmation that this was faxed from our office  Shannen Singh states that she can only make one help phone call, so she needs a c/b w/ the verbal confirmation  Shannen Singh also states that she can only hold this paperwork until this Friday 3/4  Shannen Singh states that if she is not reached to please Gillian Hensley w/ the verbal confirmation, staff member's full name and title  Shannen Singh requesting c/b at [phone] ext  [phone]  Joann Sheets - 02 Mar 2016 4:03 PM     TASK EDITED  Left detailed vm for Kristi at number/ext provided confirming that the RX application for lyrica did come from our office/Dr Tushar Covarrubias  I provided my full name and title in vm as directed  Active Problems    1  Acute hip pain (719 45) (M25 559)   2  Analgesic use (V58 69) (Z79 899)   3  Anxiety (300 00) (F41 9)   4  Arthralgia (719 40) (M25 50)   5  Benign essential hypertension (401 1) (I10)   6  Carpal tunnel syndrome of right wrist (354 0) (G56 01)   7  Carpal tunnel syndrome, left (354 0) (G56 02)   8  Cervical radiculopathy (723 4) (M54 12)   9  Chest pain (786 50) (R07 9)   10  Chronic cough (786 2) (R05)   11  Chronic migraine without aura (346 70) (G43 709)   12  CVA (cerebral vascular accident) (434 91) (I63 9)   13  Degenerative disc disease, cervical (722 4) (M50 30)   14  Depression (311) (F32 9)   15   Diabetes mellitus type II, uncontrolled (250 02) (E11 65) 12  Encounter for comprehensive diabetic foot examination, type 2 diabetes mellitus    (250 00) (E11 9)   17  Esophageal reflux (530 81) (K21 9)   18  Fibromyalgia (729 1) (M79 7)   19  Headache (784 0) (R51)   20  Hyperlipidemia (272 4) (E78 5)   21  Hypothyroidism (244 9) (E03 9)   22  Insomnia (780 52) (G47 00)   23  Iron deficiency (280 9) (E61 1)   24  Long-term insulin use (V58 67) (Z79 4)   25  Multiple joint pain (719 49) (M25 50)   26  Nausea (787 02) (R11 0)   27  Neck pain (723 1) (M54 2)   28  Nicotine dependence (305 1) (F17 200)   29  Opioid dependence (304 00) (F11 20)   30  Pancreatitis (577 0) (K85 9)   31  Paresthesia (782 0) (R20 2)   32  Pyelonephritis (590 80) (N12)   33  Recurrent low back pain (724 2) (M54 5)   34  Right hand pain (729 5) (M79 641)   35  Septicemia (038 9) (A41 9)   36  Shortness of breath (786 05) (R06 02)   37  Skin rash (782 1) (R21)   38  Snoring (786 09) (R06 83)   39  Spondylosis of cervical region without myelopathy or radiculopathy (721 0) (M47 812)   40  Stroke Syndrome (436)   41  Swelling of hand joint, right (719 04) (M25 441)   42  TIA (transient ischemic attack) (435 9) (G45 9)   43  Trigger thumb of right hand (727 03) (M65 311)   44  Visit for screening mammogram (V76 12) (Z12 31)   45  Vitamin B12 deficiency (266 2) (E53 8)   46  Vitamin D deficiency (268 9) (E55 9)    Current Meds   1  ALPRAZolam 0 25 MG Oral Tablet; TAKE 1 TABLET BY MOUTH 3 TIMES A DAY AS   NEEDED; Therapy: 76Lih7007 to (Evaluate:06Rli5487)  Requested for: 24ZQQ4352; Last   Rx:06Wng0237 Ordered   2  Aspirin 325 MG Oral Tablet; TAKE 1 TABLET DAILY; Therapy: 22KTC0170 to Recorded   3  Cyanocobalamin 1000 MCG/ML Injection Solution; Inject 1 ml every 2 months; Therapy: 45MUE1540 to (Last Rx:26Feb2016)  Requested for: 26Feb2016 Ordered   4  Divalproex Sodium 250 MG Oral Tablet Delayed Release; TAKE 2 TABLET Bedtime;    Therapy: 30XNP3887 to (DJSNINYL:59TPB2143)  Requested for: 24XSO5322; Last   Rx:25Jan2016 Ordered   5  Folic Acid 748 MCG Oral Tablet; TAKE 1 TABLET DAILY AS DIRECTED; Therapy: 90OOR7481 to (Evaluate:10Bls0271)  Requested for: 82TPF2060; Last   Rx:53Wfn0345 Ordered   6  Gabapentin 300 MG Oral Capsule; TAKE 1 CAPSULE BY MOUTH 3 TIMES DAILY; Therapy: 70PZT7676 to (Evaluate:24Mar2016)  Requested for: 46ZWW5653; Last   Rx:58Rlx4677 Ordered   7  Lantus 100 UNIT/ML Subcutaneous Solution; 20 units daily  Requested for: 46Nej4308; Last Rx:09Vyu9270 Ordered   8  Levothyroxine Sodium 75 MCG Oral Tablet; TAKE 1 TABLET DAILY; Therapy: 69OSH1273 to (Evaluate:33Gij7365)  Requested for: 22Xuk6943; Last   Rx:78Ybp1436 Ordered   9  LORazepam 1 MG Oral Tablet (Ativan); TAKE 1 TABLET 1 HOUR BEFORE MRI  MAY   REPEAT ONCE 15 MINUTES BEFORE MRI; Therapy: 37XCW5115 to (Evaluate:13Agu2066); Last Rx:19May2015 Ordered   10  Losartan Potassium 50 MG Oral Tablet; TAKE 1 TABLET DAILY; Therapy: 02BGG7341 to (Evaluate:86Xsf1953)  Requested for: 15LEQ5946; Last    Rx:25Jan2016 Ordered   11  Lyrica 150 MG Oral Capsule; TAKE 1 CAPSULE 3 TIMES DAILY; Therapy: 01MHU5019 to (Evaluate:03Dnv0885)  Requested for: 52MZV9923; Last    Rx:25Jan2016 Ordered   12  Metoprolol Tartrate 25 MG Oral Tablet; Take 1 tablet by mouth daily; Therapy: 31Ryr8582 to (Evaluate:17Agb7502)  Requested for: 27Ily0294; Last    Rx:04Oxn5596 Ordered   13  OLANZapine 5 MG Oral Tablet; 1 po qd x 5 days; Therapy: 50ETU2764 to (Dylan Ramirez)  Requested for: 58Ipi6011; Last    Rx:23Eox0978 Ordered   14  Omeprazole 20 MG Oral Capsule Delayed Release; take one capsule by mouth daily; Therapy: 73GRF8884 to (Evaluate:23Dyl3711)  Requested for: 19Gvd5115; Last    Rx:51Gmx8040 Ordered   15  Omeprazole 20 MG Oral Tablet Delayed Release; Take one tablet daily; Therapy: 49MXI9451 to (Evaluate:78Nko2368)  Requested for: 70CDU6878; Last    Rx:81Ekk1847 Ordered   16   Ondansetron 4 MG Oral Tablet Dispersible; ALLOW 1 TABLET TO DISSOLVE ON    TONGUE TWICE DAILY AS NEEDED FOR NAUSEA; Therapy: 61OVX1841 to (Evaluate:24Mar2016)  Requested for: 55Oad0117; Last    Rx:17Feb2016 Ordered   17  OneTouch Ultra Blue In [employer]; USE 1 STRIP Twice daily; Therapy: 26SGW2165 to (Evaluate:08Aug2016)  Requested for: 38TWW2447; Last    Rx:31Rgw1350 Ordered   18  ProAir  (90 Base) MCG/ACT Inhalation Aerosol Solution; INHALE 2 PUFFS    EVERY 4-6 HOURS AS NEEDED; Therapy: 37XTM1581 to (Last Rx:04Feb2016)  Requested for: 14Zvg9526 Ordered   19  TraMADol HCl - 50 MG Oral Tablet; TAKE 1 TO 2 TABLET(S) BY MOUTH 4 TIMES A DAY    AS NEEDED; Therapy: 59LKQ9082 to (Evaluate:18Mar2016)  Requested for: 25Gcf3574; Last    Rx:82Hvv9108 Ordered   20  Venlafaxine HCl ER 75 MG Oral Capsule Extended Release 24 Hour; TAKE 1 CAPSULE    DAILY; Therapy: 63TTW7109 to (Evaluate:07Eyw3981)  Requested for: 78MMS6741; Last    Rx:04Feb2016 Ordered   21  Ventolin  (90 Base) MCG/ACT Inhalation Aerosol Solution; inhale 1 to 2 puffs    every 4 to 6 hours as needed; Therapy: 77Gkp0679 to (Last Rx:85Urp1138)  Requested for: 14Bdw3857; Status:    ACTIVE - Retrospective By Protocol Authorization Ordered   22  Zolpidem Tartrate 10 MG Oral Tablet; Take 1 tablet by mouth at bedtime; Therapy: 54Nje8563 to (Evaluate:03Haa4264)  Requested for: 93MCO7862; Last    Rx:10Jan2016 Ordered    Allergies    1  Lexapro TABS   2   Adhesive Tape TAPE    Signatures   Electronically signed by : Rupali Ring, ; Mar  2 2016  4:03PM EST                       (Author)

## 2018-01-16 NOTE — MISCELLANEOUS
Message      Recorded as Task   Date: 02/10/2016 09:51 AM, Created By: Silvia Mcfarland   Task Name: Follow Up   Assigned To: Joann Sheets   Regarding Patient: Codey Vazquez, Status: Active   Comment:    Joann Sheets - 10 Feb 2016 9:51 AM     TASK CREATED  Other  Pt arrived for inj today w/ Dr Lexi Marshall and brought back the Rxs written 1/25/16 by Dr Lexi Marshall for Morphine Sulfate 15mg DNFB 1/27/16 and DNFB 2/25/16 and told Dr Lexi Marshall she doesn't want to take them  Dr Lexi Marshall aware  Both RXs marked void and placed in shredder box  Active Problems    1  Acute hip pain (719 45) (M25 559)   2  Analgesic use (V58 69) (Z79 899)   3  Anxiety (300 00) (F41 9)   4  Arthralgia (719 40) (M25 50)   5  Benign essential hypertension (401 1) (I10)   6  Carpal tunnel syndrome of right wrist (354 0) (G56 01)   7  Carpal tunnel syndrome, left (354 0) (G56 02)   8  Cervical radiculopathy (723 4) (M54 12)   9  Chest pain (786 50) (R07 9)   10  Chronic cough (786 2) (R05)   11  Chronic migraine without aura (346 70) (G43 709)   12  CVA (cerebral vascular accident) (434 91) (I63 9)   13  Degenerative disc disease, cervical (722 4) (M50 30)   14  Depression (311) (F32 9)   15  Diabetes mellitus type II, uncontrolled (250 02) (E11 65)   16  Encounter for comprehensive diabetic foot examination, type 2 diabetes mellitus    (250 00) (E11 9)   17  Esophageal reflux (530 81) (K21 9)   18  Fibromyalgia (729 1) (M79 7)   19  Headache (784 0) (R51)   20  Hyperlipidemia (272 4) (E78 5)   21  Hypothyroidism (244 9) (E03 9)   22  Insomnia (780 52) (G47 00)   23  Iron deficiency (280 9) (E61 1)   24  Long-term insulin use (V58 67) (Z79 4)   25  Multiple joint pain (719 49) (M25 50)   26  Nausea (787 02) (R11 0)   27  Neck pain (723 1) (M54 2)   28  Nicotine dependence (305 1) (F17 200)   29  Opioid dependence (304 00) (F11 20)   30  Pancreatitis (577 0) (K85 9)   31  Paresthesia (782 0) (R20 2)   32  Pyelonephritis (590 80) (N12)   33   Recurrent low back pain (724 2) (M54 5)   34  Right hand pain (729 5) (M79 641)   35  Septicemia (038 9) (A41 9)   36  Shortness of breath (786 05) (R06 02)   37  Skin rash (782 1) (R21)   38  Snoring (786 09) (R06 83)   39  Spondylosis of cervical region without myelopathy or radiculopathy (721 0) (M47 812)   40  Stroke Syndrome (436)   41  Swelling of hand joint, right (719 04) (M25 441)   42  TIA (transient ischemic attack) (435 9) (G45 9)   43  Trigger thumb of right hand (727 03) (M65 311)   44  Vitamin B12 deficiency (266 2) (E53 8)   45  Vitamin D deficiency (268 9) (E55 9)    Current Meds   1  ALPRAZolam 0 25 MG Oral Tablet; TAKE 1 TABLET BY MOUTH 3 TIMES A DAY AS   NEEDED; Therapy: 21Ehd5785 to (Evaluate:17Ocl9570)  Requested for: 77GWL3949; Last   Rx:20Oct2015 Ordered   2  Aspirin 325 MG Oral Tablet; TAKE 1 TABLET DAILY; Therapy: 18VYI3554 to Recorded   3  Divalproex Sodium 250 MG Oral Tablet Delayed Release; TAKE 2 TABLET Bedtime; Therapy: 82EXY7477 to (Evaluate:23Jun2016)  Requested for: 37PLN6350; Last   Rx:25Jan2016 Ordered   4  Folic Acid 483 MCG Oral Tablet; TAKE 1 TABLET DAILY AS DIRECTED; Therapy: 38GGO3317 to (Evaluate:08Usl9351)  Requested for: 49FCO2983; Last   Rx:17Feb9634 Ordered   5  Gabapentin 300 MG Oral Capsule; TAKE 1 CAPSULE BY MOUTH 3 TIMES DAILY; Therapy: 57AOR4613 to (Evaluate:24Mar2016)  Requested for: 52IAM4232; Last   Rx:26Oct2015 Ordered   6  Lantus 100 UNIT/ML Subcutaneous Solution; 20 units daily  Requested for: 83Fls5951; Last Rx:65Bgl4662 Ordered   7  Levothyroxine Sodium 75 MCG Oral Tablet; TAKE 1 TABLET DAILY; Therapy: 95LQT0616 to (Evaluate:71Qrs4565)  Requested for: 60Vyb1585; Last   Rx:08Pft6904 Ordered   8  LORazepam 1 MG Oral Tablet (Ativan); TAKE 1 TABLET 1 HOUR BEFORE MRI  MAY   REPEAT ONCE 15 MINUTES BEFORE MRI; Therapy: 93USJ5674 to (Evaluate:20May2015); Last Rx:19May2015 Ordered   9  Losartan Potassium 50 MG Oral Tablet; TAKE 1 TABLET DAILY;    Therapy: 47XLL3078 to (Evaluate:37Jbm1158)  Requested for: 72QRN7457; Last   Rx:25Jan2016 Ordered   10  Lyrica 150 MG Oral Capsule; TAKE 1 CAPSULE 3 TIMES DAILY; Therapy: 83GJY6082 to (Evaluate:66Jei0861)  Requested for: 01NWJ6584; Last    Rx:25Jan2016 Ordered   11  Metoprolol Tartrate 25 MG Oral Tablet; Take 1 tablet by mouth daily; Therapy: 91Hix8229 to (Evaluate:41Bvw2112)  Requested for: 82Epi3073; Last    Rx:58Qfn3377 Ordered   12  Omeprazole 20 MG Oral Tablet Delayed Release; Take one tablet daily; Therapy: 59PGK5301 to (Evaluate:57Nzu1179)  Requested for: 66GWH1274; Last    Rx:06Feb2015 Ordered   13  Ondansetron 4 MG Oral Tablet Dispersible; ALLOW 1 TABLET TO DISSOLVE ON    TONGUE TWICE DAILY AS NEEDED FOR NAUSEA; Therapy: 54LKH8654 to (Evaluate:18Jan2016)  Requested for: 76HPG0758; Last    Rx:92Twj3215 Ordered   14  OneTouch Ultra Blue In [employer]; USE 1 STRIP Twice daily; Therapy: 67XVU7184 to (Evaluate:93Eer9653)  Requested for: 46HPZ7366; Last    Rx:48Exm1472 Ordered   15  ProAir  (90 Base) MCG/ACT Inhalation Aerosol Solution; INHALE 2 PUFFS    EVERY 4-6 HOURS AS NEEDED; Therapy: 35ODG1933 to (Last Rx:72Kkq1994)  Requested for: 77OOF5172; Status:    ACTIVE - Retrospective By Protocol Authorization Ordered   16  TraMADol HCl - 50 MG Oral Tablet; take 1-2 tablets qid prn; Therapy: 79JVS5918 to (Last Rx:66Jwg7894) Ordered   17  Venlafaxine HCl ER 75 MG Oral Capsule Extended Release 24 Hour; TAKE 1 CAPSULE    DAILY; Therapy: 02PGM0558 to (Evaluate:05Mkj9015)  Requested for: 06GNX0576; Last    Rx:04Feb2016 Ordered   18  Ventolin  (90 Base) MCG/ACT Inhalation Aerosol Solution; inhale 1 to 2 puffs    every 4 to 6 hours as needed; Therapy: 93Drv4693 to (Last Rx:26Zij4750)  Requested for: 05Xbm4719; Status:    ACTIVE - Retrospective By Protocol Authorization Ordered   19  Zolpidem Tartrate 10 MG Oral Tablet; Take 1 tablet by mouth at bedtime;     Therapy: 12Apr2011 to (Evaluate:05Tlo7528)  Requested for: 25VVJ1484; Last    Rx:10Jan2016 Ordered    Allergies    1  Lexapro TABS   2   Adhesive Tape TAPE    Signatures   Electronically signed by : Erick Carrizales, ; Feb 10 2016  9:52AM EST                       (Author)

## 2018-01-17 NOTE — MISCELLANEOUS
Message   Recorded as Task   Date: 01/28/2016 11:12 AM, Created By: Sixto Wallace   Task Name: Call Back   Assigned To: [facility]   Regarding Patient: Devyn Hinds, Status: In Progress   Comment:    Shahid Kohler - 28 Jan 2016 11:12 AM     TASK CREATED  Patient is appropriate for the medication on her UDS  Please remind her that she should not combine Tramadol and Morphine  Her UDS also demonstrated alcohol  Please inform her she should not be combing alcohol with opioid medications  Quinn Tyler - 29 Jan 2016 10:06 AM     TASK EDITED  Matthew Sanchez with pt and informed her of above  Pt verbalizes understanding and states that she only had 1 drink  pt also states that her prescribed morphine is not working, and that she will not be filling her new prescriptions for morphine  Pt states that her prescribed tramodol works better for her  Advise pt to bring back the prescribtions for morphine  Informed her I will relay this information to Shahid Mann - 29 Jan 2016 12:14 PM     TASK REPLIED TO: Previously Assigned To Sixto Wallace  Thank you  Quinn Tyler - 29 Jan 2016 4:26 PM     TASK EDITED        Active Problems    1  Acute hip pain (719 45) (M25 559)   2  Analgesic use (V58 69) (Z79 899)   3  Anxiety (300 00) (F41 9)   4  Arthralgia (719 40) (M25 50)   5  Benign essential hypertension (401 1) (I10)   6  Carpal tunnel syndrome, left (354 0) (G56 02)   7  Cervical radiculopathy (723 4) (M54 12)   8  Chest pain (786 50) (R07 9)   9  Chronic cough (786 2) (R05)   10  Chronic migraine without aura (346 70) (G43 709)   11  CVA (cerebral vascular accident) (434 91) (I63 9)   12  Degenerative disc disease, cervical (722 4) (M50 30)   13  Depression (311) (F32 9)   14  Diabetes mellitus type II, uncontrolled (250 02) (E11 65)   15  Encounter for comprehensive diabetic foot examination, type 2 diabetes mellitus    (250 00) (E11 9)   16  Esophageal reflux (530 81) (K21 9)   17  Fibromyalgia (729 1) (M79 7)   18  Headache (784 0) (R51)   19  Hyperlipidemia (272 4) (E78 5)   20  Hypothyroidism (244 9) (E03 9)   21  Insomnia (780 52) (G47 00)   22  Iron deficiency (280 9) (E61 1)   23  Long-term insulin use (V58 67) (Z79 4)   24  Multiple joint pain (719 49) (M25 50)   25  Nausea (787 02) (R11 0)   26  Neck pain (723 1) (M54 2)   27  Nicotine dependence (305 1) (F17 200)   28  Opioid dependence (304 00) (F11 20)   29  Pancreatitis (577 0) (K85 9)   30  Paresthesia (782 0) (R20 2)   31  Pyelonephritis (590 80) (N12)   32  Recurrent low back pain (724 2) (M54 5)   33  Right hand pain (729 5) (M79 641)   34  Septicemia (038 9) (A41 9)   35  Shortness of breath (786 05) (R06 02)   36  Skin rash (782 1) (R21)   37  Snoring (786 09) (R06 83)   38  Spondylosis of cervical region without myelopathy or radiculopathy (721 0) (M47 812)   39  Stroke Syndrome (436)   40  Swelling of hand joint, right (719 04) (M25 441)   41  TIA (transient ischemic attack) (435 9) (G45 9)   42  Vitamin B12 deficiency (266 2) (E53 8)   43  Vitamin D deficiency (268 9) (E55 9)    Current Meds   1  ALPRAZolam 0 25 MG Oral Tablet; TAKE 1 TABLET BY MOUTH 3 TIMES A DAY AS   NEEDED; Therapy: 42Rjd9816 to (Evaluate:99Ngd6118)  Requested for: 12GKB8100; Last   Rx:20Oct2015 Ordered   2  Aspirin 325 MG Oral Tablet; TAKE 1 TABLET DAILY; Therapy: 48UAE2598 to Recorded   3  Divalproex Sodium 250 MG Oral Tablet Delayed Release; TAKE 2 TABLET Bedtime; Therapy: 88JOA6164 to (Evaluate:23Whm6254)  Requested for: 00YXS6001; Last   Rx:25Jan2016 Ordered   4  DULoxetine HCl - 30 MG Oral Capsule Delayed Release Particles (Cymbalta); TAKE 1   CAPSULE TWICE DAILY; Therapy: 33QFC3145 to (Evaluate:10Aug2015)  Requested for: 37KYC9781; Last   Rx:26Ycz4514 Ordered   5  Folic Acid 027 MCG Oral Tablet; TAKE 1 TABLET DAILY AS DIRECTED; Therapy: 15YJU6897 to (Evaluate:63Wqq0211)  Requested for: 64MAV3166; Last   Rx:44Ttu0426 Ordered   6  Gabapentin 300 MG Oral Capsule; TAKE 1 CAPSULE BY MOUTH 3 TIMES DAILY; Therapy: 12VPO0323 to (Evaluate:24Mar2016)  Requested for: 96TMA6993; Last   Rx:60Jad9981 Ordered   7  Lantus 100 UNIT/ML Subcutaneous Solution; 20 units daily  Requested for: 58Xth4854; Last Rx:81Tru3359 Ordered   8  Levothyroxine Sodium 75 MCG Oral Tablet; TAKE 1 TABLET DAILY; Therapy: 49PHP9664 to (Evaluate:35Fri5046)  Requested for: 14Azp7727; Last   Rx:29Aed5712 Ordered   9  LORazepam 1 MG Oral Tablet (Ativan); TAKE 1 TABLET 1 HOUR BEFORE MRI  MAY   REPEAT ONCE 15 MINUTES BEFORE MRI; Therapy: 63IPE6606 to (Evaluate:74Pck5955); Last Rx:14Owm9027 Ordered   10  Losartan Potassium 50 MG Oral Tablet; TAKE 1 TABLET DAILY; Therapy: 82IQD6698 to (Evaluate:47Vsh4577)  Requested for: 78GEW3938; Last    Rx:25Jan2016 Ordered   11  Lyrica 150 MG Oral Capsule; TAKE 1 CAPSULE 3 TIMES DAILY; Therapy: 17XUU2742 to (Evaluate:72Aun5991)  Requested for: 77JYO2306; Last    Rx:25Jan2016 Ordered   12  Metoprolol Tartrate 25 MG Oral Tablet; Take 1 tablet by mouth daily; Therapy: 63Bdc9319 to (Evaluate:68Qwp9404)  Requested for: 21Hso6800; Last    Rx:47Mvw0192 Ordered   13  Mometasone Furoate 0 1 % External Solution; apply BID to rash; Therapy: 26GLY0976 to (Last Rx:18Sbv9036)  Requested for: 50Xbf2120 Ordered   14  Morphine Sulfate 15 MG Oral Tablet; TAKE 1 TABLET Every 6 hours PRN pain; Therapy: 04NJY7833 to (Evaluate:26Mar2016); Last Rx:25Jan2016 Ordered   15  Omeprazole 20 MG Oral Tablet Delayed Release; Take one tablet daily; Therapy: 90FMY3381 to (Evaluate:56Bca9862)  Requested for: 62HIM6642; Last    Rx:12Zva6496 Ordered   16  Ondansetron 4 MG Oral Tablet Dispersible; ALLOW 1 TABLET TO DISSOLVE ON    TONGUE TWICE DAILY AS NEEDED FOR NAUSEA; Therapy: 85HZU9472 to (Evaluate:18Jan2016)  Requested for: 18YSB3967; Last    Rx:39Cvq1077 Ordered   17  OneTouch Ultra Blue In [employer]; USE 1 STRIP Twice daily;     Therapy: 94EIQ1742 to (Evaluate:15Rzp6398)  Requested for: 03UXM3406; Last    Rx:97Zyy2608 Ordered   18  TraMADol HCl - 50 MG Oral Tablet; take 1-2 tablets qid prn; Therapy: 27XAT1070 to (Last Rx:44Hqn8178) Ordered   19  Ventolin  (90 Base) MCG/ACT Inhalation Aerosol Solution; inhale 1 to 2 puffs    every 4 to 6 hours as needed; Therapy: 67Spf8645 to (Last Rx:35Cmw1076)  Requested for: 50Prb7477; Status:    ACTIVE - Retrospective By Protocol Authorization Ordered   20  Zolpidem Tartrate 10 MG Oral Tablet; Take 1 tablet by mouth at bedtime; Therapy: 12Apr2011 to (Evaluate:08Typ2482)  Requested for: 29VTY3316; Last    Rx:10Jan2016 Ordered    Allergies    1  Lexapro TABS   2   Adhesive Tape TAPE    Signatures   Electronically signed by : Florencia Pierre, ; Jan 29 2016  4:27PM EST                       (Author)

## 2018-01-17 NOTE — MISCELLANEOUS
Provider Comments  Provider Comments:   TU CRNA      [id number]   Electronically signed by : ANASTACIO Church ; Jun 22 2016  1:12PM EST                       (Author)

## 2018-01-17 NOTE — MISCELLANEOUS
Provider Comments  Pt no showed for appointment on 2/16/17        Signatures   Electronically signed by : Daksha Ferreira, ; Feb 16 2017  3:47PM EST                       (Author)

## 2018-01-17 NOTE — RESULT NOTES
Message   Recorded as Task   Date: 03/17/2016 02:06 PM, Created By: Ever Luz   Task Name: Follow Up   Assigned To: [facility]   Regarding Patient: Sj Gil, Status: Active   Comment:    Daysi Coombs - 17 Mar 2016 2:06 PM     TASK CREATED  S/P RIGHT C4-C6 RFA 3-16-16    F/U with DR Maribel Warren 9[birth year]74  Pt requesting to be called for procedure f/u on Friday,3-18-16,b/c her phone will be out of service until then  ***Please call pt on Friday  Pt has another ov scheduled for 3-21-16? Please question pt,if no med refill,can cx as pt has ov on 4-14-16  *****   Daysi Coombs - 18 Mar 2016 3:15 PM     TASK REASSIGNED: Previously Assigned To [facility],Team  Spoke with pt who states she is doing well  A little bit sore,but good  States sites look good,no s/s of infection  Confirmed f/u on 4-14-16  OV cx on 3-21-16-pt states she does not need med refills  She is in [employer] program for her Jarvis KohlerShahid - 18 Mar 2016 4:16 PM     TASK REPLIED TO: Previously Assigned To Aneudy Morales  Thank you  Signatures   Electronically signed by :  Jessica Merida, ; Mar 18 2016  4:40PM EST                       (Author)

## 2018-01-18 NOTE — PROGRESS NOTES
Assessment    1  Multiple joint pain (719 49) (M25 50)   2  Neck pain (723 1) (M54 2)   3  Degenerative disc disease, cervical (722 4) (M50 30)   4  Spondylosis of cervical region without myelopathy or radiculopathy (721 0) (M47 812)    Plan  Analgesic use, Neck pain, Opioid dependence    · (1) DRUG ABUSE SCREEN, URINE ROUTINE; Status:Active - Retrospective  Authorization; Requested PMK:[id number];    Perform:[employer]; TRK:[id number]; Last Updated Radha Jung; 2/70/5055 5:18:00 PM;Ordered; For:Analgesic use, Neck pain, Opioid dependence; Ordered By:Shahid Kohler;  Cervical radiculopathy, Degenerative disc disease, cervical, Fibromyalgia    · Lyrica 150 MG Oral Capsule; TAKE 1 CAPSULE 3 TIMES DAILY   Rx By: Helio Warren; Dispense: 30 Days ; #:90 Capsule; Refill: 0; For: Cervical radiculopathy, Degenerative disc disease, cervical, Fibromyalgia; HEYDI = N; Print Rx  Cervical radiculopathy, Neck pain, Recurrent low back pain    · Morphine Sulfate 15 MG Oral Tablet; TAKE 1 TABLET Every 6 hours PRN pain   Rx By: Helio Warren; Dispense: 30 Days ; #:120 Tablet; Refill: 0; For: Cervical radiculopathy, Neck pain, Recurrent low back pain; HEYDI = N; Print Rx; Do Not Fill Before: [id number]  Degenerative disc disease, cervical, Fibromyalgia, Multiple joint pain, Neck pain,  Recurrent low back pain    · *1 - [facility] Physician Referral  Consult  Please evaluate and treat for generalized body pain, multiple joint pain, neck pain, low  back pain  Please convert to HEP when appropriate  Status: Hold For - Scheduling  Requested for:  [id number]   Ordered;  For: Degenerative disc disease, cervical, Fibromyalgia, Multiple joint pain, Neck pain, Recurrent low back pain; Ordered By: Helio Warren Performed:  Due: 03BRQ5972  Care Summary provided  : Yes    Discussion/Summary    Patient is a 60-year-old female with history of depression, diabetes, anxiety, asthma, angina, fibromyalgia, GERD, stroke in 2013 resulting in left-sided facial and left upper extremity numbness, arthritis, DVT and pulmonary embolism not on anticoagulation at this time presenting with neck and low back pain and right hand and b/l LE pain  She also has diffuse generalized pain consistent with fibromyalgia, multiple joint pain  She has noticed increase in the right thumb pain and stiffness since the EMG  However denies any fevers  Patient has had worsening of the right hand pain generalized with swelling and redness and warmth  Patient's pain appears to be multifactorial  Patient's generalized body pain is consistent with fibromyalgia  She also does display spondylitic changes of the neck  Does not appear to be radicular on current exam  She also is displaying hand pain that appears to be inflammatory  EMG demonstrates right carpal tunnel, leftl ulnar neuropathy and b/l LE neuropathy    Patient's more x-ray demonstrates advancing degenerative changes compared with the MRI in the past   MRI does nto demonstrates changes  [employer] form completed to get approval of Lyrica to 150 mg TID prn  Discussed that it should never be stopped abruptly  Possible side effects include but are not limited to vertigo, lethargy, nausea and edema of the extremities  Advised the patient to call our office if they experience any side effects  We will also order morphine 15 mg every 6 when necessary  DNFB 1/26 and 2/24  Dsicussed possible long acting  Advised not to combine with Tramadol and d/c tramadol  Patient has noticed some relief with tramadol and no significant relief with Percocet  The risk of opioid medications, including dependence, addiction and tolerance were explained to the patient  The patient understands and agrees to use the medications only as prescribed   I have fully discussed the potential side effects of the medication with the patient, which include, but are not limited to, constipation, drowsiness, addiction, impaired judgment, and risk of fatal overdose if not taken as prescribed  I have warned the patient that sharing medications is a felony  I warned against driving while taking sedating medications  At this point in time, the patient is showing no signs of addiction, abuse, diversion or suicidal ideation  While the patient was in the office today, an opioid contract was thoroughly reviewed and signed by the patient  The patient was given adequate time to ask questions in regards to the contract and a signed copy was given to the patient for his/her records  Recommended to f/u with Neurology  Referral provided to Rheumatology previously  F/U with orthopedic  Also provided sample of Pennsaid to be applied to right hand and neck again  Unable to tolerate Acetaminophen and NSAIDS  On ASA 325mg  Discussed cervical medial branch block to the right C4-6, if relief, will proceed with confirmatory block and consider RFA  Complete risks and benefits including bleeding, infection, tissue reaction, nerve injury, and allergic reaction were discussed  The approach was demonstrated using models and literature was provided  F/u in 2 month  Possible side effects of new medications were reviewed with the patient/guardian today  The treatment plan was reviewed with the patient/guardian  The patient/guardian understands and agrees with the treatment plan      Chief Complaint    1  Pain    History of Present Illness  Patient is a 56-year-old female with history of depression, diabetes, anxiety, asthma, angina, fibromyalgia, GERD, stroke in 2013 resulting in left-sided facial and left upper extremity numbness, arthritis, DVT and pulmonary embolism not on anticoagulation at this time presenting with neck and low back pain and right hand and b/l LE pain  She also has diffuse generalized pain consistent with fibromyalgia, multiple joint pain  She has noticed increase in the right thumb pain and stiffness since the EMG   However denies any fevers  EMG demonstrates right carpal tunnel, leftl ulnar neuropathy and b/l LE neuropathy    She is also on Cymbalta  Previous medications include Paxil, Zoloft, MOBIC, oxycodone 5/325mg - she did not feel that this provides significant relief, some relief with tramadol daily to 4 times per day  She has started Lyrica 75 mg 3 times a day she is weaned herself off gabapentin, which did not provide relief  She is also taking Xanax  Referring physician is  Dr Rodriguez Hem   Primary Care physician is  Nan Ordonez presents with complaints of constant episodes of bilateral neck and bilateral lower back pain, described as burning and throbbing  On a scale of 1 to 10, the patient rates the pain as 5 Symptoms are unchanged (shooting, numbness, pins & needles )      Review of Systems    Constitutional: no fever, no recent weight gain and no recent weight loss  Eyes: no double vision and no blurry vision  Cardiovascular: chest pain, but no palpitations and no lower extremity edema  Respiratory: shortness of breath, but no wheezing  Musculoskeletal: joint stiffness and decreased range of motion, but no difficulty walking, no muscle weakness, no joint swelling, no limb swelling` and no pain in extremity  Neurological: dizziness and memory loss, but no difficulty swallowing, no loss of consciousness and no seizures  Gastrointestinal: nausea, constipation and diarrhea, but no vomiting  Genitourinary: no difficulty initiating urine stream, no genital pain and no frequent urination  Integumentary: a rash  Psychiatric: no depression  Endocrine: no excessive thirst, no adrenal disease, no hypothyroidism and no hyperthyroidism  Hematologic/Lymphatic: no tendency for easy bruising and no tendency for easy bleeding  ROS reviewed  Active Problems    1  Acute hip pain (619 45) (M25 559)   2  Analgesic use (V58 69) (Z79 899)   3  Anxiety (300 00) (F41 9)   4  Arthralgia (719 40) (M25 50)   5  Benign essential hypertension (401 1) (I10)   6  Carpal tunnel syndrome, left (354 0) (G56 02)   7  Cervical radiculopathy (723 4) (M54 12)   8  Chest pain (786 50) (R07 9)   9  Chronic cough (786 2) (R05)   10  Chronic migraine without aura (346 70) (G43 709)   11  CVA (cerebral vascular accident) (434 91) (I63 9)   12  Degenerative disc disease, cervical (722 4) (M50 30)   13  Depression (311) (F32 9)   14  Diabetes mellitus type II, uncontrolled (250 02) (E11 65)   15  Encounter for comprehensive diabetic foot examination, type 2 diabetes mellitus    (250 00) (E11 9)   16  Esophageal reflux (530 81) (K21 9)   17  Fibromyalgia (729 1) (M79 7)   18  Headache (784 0) (R51)   19  Hyperlipidemia (272 4) (E78 5)   20  Hypothyroidism (244 9) (E03 9)   21  Insomnia (780 52) (G47 00)   22  Iron deficiency (280 9) (E61 1)   23  Long-term insulin use (V58 67) (Z79 4)   24  Nausea (787 02) (R11 0)   25  Neck pain (723 1) (M54 2)   26  Nicotine dependence (305 1) (F17 200)   27  Pancreatitis (577 0) (K85 9)   28  Paresthesia (782 0) (R20 2)   29  Pyelonephritis (590 80) (N12)   30  Recurrent low back pain (724 2) (M54 5)   31  Right hand pain (729 5) (M79 641)   32  Septicemia (038 9) (A41 9)   33  Shortness of breath (786 05) (R06 02)   34  Skin rash (782 1) (R21)   35  Snoring (786 09) (R06 83)   36  Spondylosis of cervical region without myelopathy or radiculopathy (721 0) (M47 812)   37  Stroke Syndrome (436)   38  Swelling of hand joint, right (719 04) (M25 441)   39  TIA (transient ischemic attack) (435 9) (G45 9)   40  Vitamin B12 deficiency (266 2) (E53 8)   41  Vitamin D deficiency (268 9) (E55 9)    Past Medical History    1  History of Acute venous embolism and thrombosis of deep vessels of distal lower   extremity (453 42) (I82 4Z9)   2  Anxiety (300 00) (F41 9)   3  Diabetes mellitus type II, uncontrolled (250 02) (E11 65)   4  History of High cholesterol (272 0) (E78 0)   5   History of abdominal pain (V13 89) (V72 394)   6  History of acute pancreatitis (V12 79) (Z87 19)   7  History of anemia (V12 3) (Z86 2)   8  History of arthritis (V13 4) (Z87 39)   9  History of asthma (V12 69) (Z87 09)   10  History of esophageal reflux (V12 79) (Z87 19)   11  History of migraine (V12 49) (Z86 69)   12  History of nausea and vomiting (V12 79) (Z87 898)   13  History of stroke (V12 54) (Z86 73)   14  History of thyroid disease (V12 29) (Z86 39)   15  History of upper respiratory infection (V12 09) (Z87 09)   16  History of Numbness (782 0) (R20 0)   17  History of Pulmonary Embolism (V12 51)    The active problems and past medical history were reviewed and updated today  Surgical History    1  History of Cholecystectomy   2  History of Diagnostic Esophagogastroduodenoscopy   3  History of Esophagogastric Fundoplasty Nissen Fundoplication   4  History of Tonsillectomy With Adenoidectomy    The surgical history was reviewed and updated today  Family History    1  Family history of Acute Myocardial Infarction (V17 3)   2  Family history of Chronic Kidney Disease (NKF Classification)   3  Family history of Depression   4  Family history of Schizophrenia   5  Family history of Type 2 Diabetes Mellitus    6  Family history of Acute Myocardial Infarction (V17 3)   7  Family history of Gastric Cancer (V16 0)   8  Family history of Stroke Syndrome (V17 1)    The family history was reviewed and updated today  Social History    · Completed 12th grade   · Current Every Day Smoker (305 1)   · Denied: Daily Coffee Consumption (___ Cups/Day)   ·    · Never Drank Alcohol   · No drug use   · One child   · Unemployed (V62 0) (Z56 0)  The social history was reviewed and updated today  The social history was reviewed and is unchanged  Current Meds   1  ALPRAZolam 0 25 MG Oral Tablet; TAKE 1 TABLET BY MOUTH 3 TIMES A DAY AS   NEEDED; Therapy: 12Apr2011 to (Evaluate:66Stf5301)  Requested for: 51KFY1951;  Last   Rx:20Oct2015 Ordered   2  Aspirin 325 MG Oral Tablet; TAKE 1 TABLET DAILY; Therapy: 88RAT2620 to Recorded   3  Divalproex Sodium 250 MG Oral Tablet Delayed Release; TAKE 2 TABLET Bedtime; Therapy: 15KBK7287 to (Evaluate:23Jun2016)  Requested for: 27ADP3361; Last   Rx:25Jan2016 Ordered   4  DULoxetine HCl - 30 MG Oral Capsule Delayed Release Particles; TAKE 1 CAPSULE   TWICE DAILY; Therapy: 20FQW4619 to (Evaluate:10Aug2015)  Requested for: 78IXI9290; Last   Rx:47Hgh0962 Ordered   5  Folic Acid 203 MCG Oral Tablet; TAKE 1 TABLET DAILY AS DIRECTED; Therapy: 69HMQ2993 to (Evaluate:20Cpa3392)  Requested for: 87SGG6364; Last   Rx:12Klm3606 Ordered   6  Gabapentin 300 MG Oral Capsule; TAKE 1 CAPSULE BY MOUTH 3 TIMES DAILY; Therapy: 86TFK3440 to (Evaluate:24Mar2016)  Requested for: 94DXD8164; Last   Rx:89Zbz6253 Ordered   7  Lantus 100 UNIT/ML Subcutaneous Solution; 20 units daily  Requested for: 34Jwq6295; Last Rx:83Moa8367 Ordered   8  Levothyroxine Sodium 75 MCG Oral Tablet; TAKE 1 TABLET DAILY; Therapy: 78MWD6954 to (Evaluate:29Tmq9831)  Requested for: 06Ogi6405; Last   Rx:42Nsw4163 Ordered   9  LORazepam 1 MG Oral Tablet; TAKE 1 TABLET 1 HOUR BEFORE MRI  MAY REPEAT   ONCE 15 MINUTES BEFORE MRI; Therapy: 62JFE0072 to (Evaluate:00Whm6403); Last Rx:19May2015 Ordered   10  Losartan Potassium 50 MG Oral Tablet; TAKE 1 TABLET DAILY; Therapy: 95DKB8014 to (Evaluate:68Lee5611)  Requested for: 53SIX7706; Last    Rx:25Jan2016 Ordered   11  Lyrica 150 MG Oral Capsule; TAKE 1 CAPSULE 3 TIMES DAILY; Therapy: 82ONJ2429 to (Evaluate:72Zzh3222)  Requested for: 28VXB0553; Last    Rx:13Jan2016 Ordered   12  Metoprolol Tartrate 25 MG Oral Tablet; Take 1 tablet by mouth daily; Therapy: 49Vlg5904 to (Evaluate:99Fhx7402)  Requested for: 38Fgh7800; Last    Rx:36Jpz7984 Ordered   13  Mometasone Furoate 0 1 % External Solution; apply BID to rash; Therapy: 68OZL3457 to (Last Rx:28Dec2015)  Requested for: 18Rnj0885 Ordered   14  Morphine Sulfate 15 MG Oral Tablet; TAKE 1 TABLET Every 6 hours PRN pain; Therapy: 58AHB5756 to (Evaluate:28Jan2016); Last Rx:00Pkx8779 Ordered   15  Omeprazole 20 MG Oral Tablet Delayed Release; Take one tablet daily; Therapy: 15ODZ6402 to (Evaluate:57Dic9586)  Requested for: 55OYU2457; Last    Rx:06Kpb5815 Ordered   16  Ondansetron 4 MG Oral Tablet Dispersible; ALLOW 1 TABLET TO DISSOLVE ON    TONGUE TWICE DAILY AS NEEDED FOR NAUSEA; Therapy: 52RTX0061 to (Evaluate:18Jan2016)  Requested for: 69SRP8418; Last    Rx:15Sfy0166 Ordered   17  OneTouch Ultra Blue In [employer]; USE 1 STRIP Twice daily; Therapy: 28IHA8807 to (Evaluate:08Aug2016)  Requested for: 07PJE3406; Last    Rx:97Gvt8677 Ordered   18  TraMADol HCl - 50 MG Oral Tablet; take 1-2 tablets qid prn; Therapy: 60KOI3511 to (Last Rx:93Vpf4800) Ordered   19  Ventolin  (90 Base) MCG/ACT Inhalation Aerosol Solution; inhale 1 to 2 puffs    every 4 to 6 hours as needed; Therapy: 95Dej0546 to (Last Rx:46Ped6085)  Requested for: 07Ikx0767; Status:    ACTIVE - Retrospective By Protocol Authorization Ordered   20  Zolpidem Tartrate 10 MG Oral Tablet; Take 1 tablet by mouth at bedtime; Therapy: 12Apr2011 to (Evaluate:09Feb2016)  Requested for: 97BOY8427; Last    Rx:10Jan2016 Ordered    The medication list was reviewed and updated today  Allergies    1  Lexapro TABS   2  Adhesive Tape TAPE    Vitals  Vital Signs [Data Includes: Current Encounter]    Recorded: 61BYZ1518 03:06PM   Temperature 98 7 F   Heart Rate 958   Systolic 695   Diastolic 78   Height 5 ft 2 in   Weight 179 lb 8 0 oz   BMI Calculated 32 83   BSA Calculated 1 83   Pain Scale 5     Physical Exam    Constitutional   General appearance: Well developed, well nourished, alert, in no distress, non-toxic and no overt pain behavior  Eyes   Sclera: anicteric   HEENT   Hearing grossly intact  Neck   Neck: Supple, symmetric, trachea midline, no masses      Pulmonary Respiratory effort: Even and unlabored  Cardiovascular   Examination of extremities: No edema or pitting edema present  Skin   Skin and subcutaneous tissue: Normal without rashes or lesions, well hydrated  Psychiatric   Mood and affect: Mood and affect appropriate  Neurologic   Cranial nerves: Cranial nerves II-XII grossly intact  Musculoskeletal   Gait and station: Normal   TTP in clavicle, triceps, spinous process,paraspinal region, ASIS, knees, wrists, triceps, shoulders, clavicle  Diffuse right hand pain tenderness to palpation  Difficulty in range of motion with the right thumb  Cervical Spine examination demonstrates  Limitation in range of motion with extension  negative Spurling's  Negative Akil's  Results/Data  Results Free Text Form Pain [facility]:   Results     MRI:  MRI Cervical spine dated 11/7/2015  FINDINGS-     ALIGNMENT- There is nonspecific straightening of the cervical lordosis   without subluxation  MARROW SIGNAL- Multilevel degenerative endplate changes noted  No   compression abnormality or bone marrow edema  No focally suspicious   marrow lesions  CERVICAL AND VISUALIZED THORACIC CORD- Normal signal within the   visualized cord  PREVERTEBRAL AND PARASPINAL SOFT TISSUES- Prevertebral and paraspinal   soft tissues are unremarkable  VISUALIZED POSTERIOR FOSSA- The visualized posterior fossa   demonstrates no abnormal signal      CERVICAL DISC SPACES-     C2-C3- Normal      C3-C4- Normal      C4-C5- There is a disc osteophyte complex with a superimposed right   neural foraminal disc protrusion  There is moderate to severe right   neural foraminal narrowing  Mild central canal and left neural   foraminal narrowing  This level is similar to the prior study  C5-C6- There is a disc osteophyte complex with a superimposed left   neural foraminal disc protrusion  Moderate left neural foraminal   narrowing   Mild central canal or neural foraminal narrowing  This   level is similar to the prior study  C6-C7- There is a disc osteophyte complex with a superimposed left   neural foraminal disc protrusion  There is moderate left neural   foraminal narrowing  Mild central canal and right neural foraminal   narrowing  This level is similar to the prior study  C7-T1- There is loss of disc height and signal  There is no focal   disk herniation, central canal or neural foraminal narrowing  UPPER THORACIC DISC SPACES- Normal      IMPRESSION-     Multilevel cervical spondylosis is stable  No cord compression or cord   signal abnormality  No new disc herniation  Spondylotic narrowing   relatively advanced at C4-C5, asymmetric on the right  Moderate   spondylotic narrowing C5-C6 and C6-C7 re demonstrated  Future Appointments    Date/Time Provider Specialty Site   02/09/2016 09:45 AM ANASTACIO Borges  Orthopedic Surgery [facility]   02/10/2016 09:45 AM Jamal Sun MD Pain Management [place] [facility]   02/02/2016 02:30 PM ANASTACIO Rahman  Hematology [facility]   02/03/2016 01:40 PM ANASTACIO Sims   Orthopedic Surgery [facility]   03/08/2016 01:40 PM Lenny Gomes DO Rheumatology [facility]   05/40/4521 70:67 PM Beverley Peck DO Family Medicine [facility]   03/17/2016 07:30 AM Eric Marcus [facility] Neurology [address]     Signatures   Electronically signed by : Ayala Cullen MD; Jan 25 2016  4:11PM EST                       (Author)

## 2018-01-22 VITALS
BODY MASS INDEX: 29.63 KG/M2 | WEIGHT: 161 LBS | HEIGHT: 62 IN | SYSTOLIC BLOOD PRESSURE: 122 MMHG | DIASTOLIC BLOOD PRESSURE: 70 MMHG

## 2018-01-23 ENCOUNTER — GENERIC CONVERSION - ENCOUNTER (OUTPATIENT)
Dept: OTHER | Facility: OTHER | Age: 48
End: 2018-01-23

## 2018-01-24 NOTE — MISCELLANEOUS
Assessment   1  Pancreatitis (577 0) (K85 90)1   2  Uncontrolled type 2 diabetes mellitus with diabetic polyneuropathy, with long-term   current use of insulin (250 62,357 2,V58 67) (E11 42,E11 65,Z79 4)1   3  Polyneuropathy associated with underlying disease (357 4) (G63)1   4  Long-term insulin use (V58 67) (Z79 4)1   5  Hyperlipidemia (272 4) (E78 5)1   6  Anxiety (300 00) (F41 9)1   7  Depression (311) (F32 9)1   8  Chronic pain syndrome (338 4) (G89 4)1      1 Amended By: Aubrie Fails; Sep 15 7270 4:42 PM EST    Discussion/Summary  Discussion Summary:   1  Pancreatitis-patient's hospitalization emergency room evaluation and discharge summary and procedures have been reviewed thoroughly  2  Type 2 diabetes with neuropathy  Diabetic foot examination today is positive  A1c remains elevated  Samples of NovoLog x2 given today  3  Diabetic neuropathy 4  Long-term insulin usage  5  Hyperlipidemia continue present medications and will recheck in November 6  Anxiety with depression  Patient counseled  Will contact [facility] with regards to the patient advocate  Hopefully that will contact the patient and find out what happened during her hospitalization  Patient to call us every 2 weeks with her blood sugars  1    Counseling Documentation With Imm: The  patient1  was counseled regarding1  diagnostic results1 , instructions for management1 , risk factor reductions1 , prognosis1 , patient and family education1 , impressions1 , risks and benefits of treatment options1 , importance of compliance with treatment1   Total time of encounter was 40 minutes1  and > 50% minutes was spent counseling1   Medication SE Review and Pt Understands Tx: The treatment plan was reviewed with the patient/guardian  The patient/guardian understands and agrees with the treatment plan1        1 Amended By: Aubrie Chiu; Sep 18 9458 9:14 AM EST    Chief Complaint  Chief Complaint Free Text Note Form: TCM  1        1 Amended By: Jazmin Beasley; Sep 15 2017 2:06 PM EST    History of Present Illness  Pancreatitis, Acute (Brief): The patient is being seen for  follow-up of a hospitalization for1  acute pancreatitis1   Symptoms: 1  no abdominal pain1 , no diarrhea1 , no fever1 , no loss of appetite1 , no nausea1  and no vomiting1   Associated symptoms: 1  no abdominal distension1 , no chest pain1 , no fatty stools1 , no muscle spasms1 , no jaundice1 , no shortness of breath1  and no tachycardia1   TCM Communication [facility]: The patient is being contacted for follow-up after hospitalization  Hospital records were reviewed  She was hospitalized at [place]  The date of admission: 08/24/17, date of discharge: 09/02/17  Diagnosis: pancreatitis  She was discharged to home  Medications reviewed and updated today  She scheduled a follow up appointment  The patient is currently asymptomatic  Counseling was provided to the patient  Communication performed and completed by dilip boudreaux   HPI: 52-year-old obese white female status post hospitalization for pancreatitis at [facility]  Patient very upset hospitalization because she states that a couple of her caretakers were outside her room suggestions she was a drug addict  Once again she can only take her insulin when she has it there are significant financial issues for her and her   She does not qualify for medical assistance  Does not qualify for patient assistance  Very upset and tearful  1        1 Amended By: Nai New; Sep 18 7122 9:10 AM EST    Review of Systems  Complete-Female:   Constitutional: as noted in HPI1   ENT:1  no complaints of earache, no loss of hearing, no nose bleeds, no nasal discharge, no sore throat, no hoarseness1   Cardiovascular:1  No complaints of slow heart rate, no fast heart rate, no chest pain, no palpitations, no leg claudication, no lower extremity edema1      Respiratory:1  No complaints of shortness of breath, no wheezing, no cough, no SOB on exertion, no orthopnea, no PND1   Gastrointestinal:1  No complaints of abdominal pain, no constipation, no nausea or vomiting, no diarrhea, no bloody stools1   Genitourinary:1  no dysuria1   Musculoskeletal:1  No complaints of arthralgias, no myalgias, no joint swelling or stiffness, no limb pain or swelling1   Integumentary:1  no rashes1   Neurological:1  No complaints of headache, no confusion, no convulsions, no numbness, no dizziness or fainting, no tingling, no limb weakness, no difficulty walking1   Psychiatric:1  anxiety1  and depression1   Endocrine:1  No complaints of proptosis, no hot flashes, no muscle weakness, no deepening of the voice, no feelings of weakness1   Hematologic/Lymphatic:1  no tendency for easy bleeding1         1 Amended By: Miri Ly; Sep 18 2001 9:11 AM EST    Active Problems   1  Anxiety (300 00) (F41 9)  2  Arthralgia (719 40) (M25 50)  3  Benign essential hypertension (401 1) (I10)  4  Carpal tunnel syndrome of right wrist (354 0) (G56 01)  5  Carpal tunnel syndrome, left (354 0) (G56 02)  6  Cervical disc disorder with radiculopathy of mid-cervical region (723 4) (M50 120)  7  Cervical dysphagia (787 29) (R13 19)  8  Chest pain (786 50) (R07 9)  9  Chronic bilateral low back pain (724 2,338 29) (M54 5,G89 29)  10  Chronic cervical pain (723 1,338 29) (M54 2,G89 29)  11  Chronic cervical radiculopathy (723 4) (M54 12)  12  Chronic cough (786 2) (R05)  13  Chronic diarrhea (787 91) (K52 9)  14  Chronic fatigue (780 79) (R53 82)  15  Chronic migraine without aura (346 70) (G43 709)  16  Chronic pain syndrome (338 4) (G89 4)  17  CRP elevated (790 95) (R79 82)  18  Degenerative disc disease, cervical (722 4) (M50 30)  19  Depression (311) (F32 9)  20  Diabetes mellitus type II, uncontrolled (250 02) (E11 65)  21  Elevated LFTs (790 6) (R79 89)  22  Fatty liver (571 8) (K76 0)  23  Fibromyalgia (729 1) (M79 7)  24  GERD without esophagitis (530 81) (K21 9)  25  Headache (784 0) (R51)  26  Hyperlipidemia (272 4) (E78 5)  27  Hypothyroidism (244 9) (E03 9)  28  Insomnia (780 52) (G47 00)  29  Intractable chronic common migraine without aura (346 71) (G43 719)  30  Iron deficiency (280 9) (E61 1)  31  Irritable bowel syndrome with diarrhea (564 1) (K58 0)  32  Left hand paresthesia (782 0) (R20 2)  33  Left sided numbness (782 0) (R20 0)  34  Long-term insulin use (V58 67) (Z79 4)  35  Multiple joint pain (719 49) (M25 50)  36  Nausea (787 02) (R11 0)  37  Nicotine dependence (305 1) (F17 200)  38  Opioid dependence (304 00) (F11 20)  39  Osteoarthritis of spine with radiculopathy, cervical region (721 0) (M47 22)  40  Pancreatic cyst (577 2) (K86 2)  41  Pancreatitis (577 0) (K85 90)  42  Paresthesia (782 0) (R20 2)  43  Polyarthritis (716 50) (M13 0)  44  Polyneuropathy associated with underlying disease (357 4) (G63)  45  Pyelonephritis (590 80) (N12)  46  Right hand pain (729 5) (M79 641)  47  S/P carpal tunnel release (V45 89) (Z98 890)  48  S/P trigger finger release (V45 89) (Z98 890)  49  Sacroiliac pain (724 6) (M53 3)  50  Shortness of breath (786 05) (R06 02)  51  Shoulder pain (719 41) (M25 519)  52  Skin rash (782 1) (R21)  53  Snoring (786 09) (R06 83)  54  Spondylosis of cervical region without myelopathy or radiculopathy (721 0) (M47 812)  55  Stroke syndrome (434 91) (I63 9)  56  Swelling of hand joint, right (719 04) (M25 441)  57  Transient ischemic attack (TIA) (435 9) (G45 9)  58  Trigger thumb of right hand (727 03) (M65 311)  59  Type 2 diabetes mellitus with hyperglycemia, with long-term current use of insulin    (250 00,790 29,V58 67) (E11 65,Z79 4)  60  Uncontrolled type 2 diabetes mellitus with diabetic polyneuropathy, with long-term    current use of insulin (250 62,357 2,V58 67) (E11 42,E11 65,Z79 4)  61  Vitamin B12 deficiency (266 2) (E53 8)  62  Vitamin D deficiency (268 9) (E55 9)    Past Medical History   1   History of Abdominal pain, epigastric (789 06) (R10 13)  2  History of Abnormal weight gain (783 1) (R63 5)  3  History of Acute hip pain (719 45) (M25 559)  4  History of Acute venous embolism and thrombosis of deep vessels of distal lower   extremity (453 42) (I82 4Z9)  5  History of Analgesic use (V58 69) (Z79 899)  6  Anxiety (300 00) (F41 9)  7  History of Cerebral infarction, unspecified (434 91) (I63 9)  8  Diabetes mellitus type II, uncontrolled (250 02) (E11 65)  9  History of Encounter for comprehensive diabetic foot examination, type 2 diabetes   mellitus (250 00) (E11 9)  10  History of GERD without esophagitis (530 81) (K21 9)  11  History of High cholesterol (272 0) (E78 00)  12  History of abdominal pain (V13 89) (Z87 898)  13  History of acute pancreatitis (V12 79) (Z87 19)  14  History of anemia (V12 3) (Z86 2)  15  History of arthritis (V13 4) (Z87 39)  16  History of asthma (V12 69) (Z87 09)  17  History of esophageal reflux (V12 79) (Z87 19)  18  History of migraine (V12 49) (Z86 69)  19  History of nausea and vomiting (V12 79) (Z87 898)  20  History of pulmonary embolism (V12 55) (Z86 711)  21  History of stroke (V12 54) (Z86 73)  22  History of thyroid disease (V12 29) (Z86 39)  23  History of upper respiratory infection (V12 09) (Z87 09)  24  History of Need for pneumococcal vaccination (V03 82) (Z23)  25  History of Numbness (782 0) (R20 0)  26  History of Pulmonary Embolism (V12 51)  27  History of Septicemia (038 9) (A41 9)  28  History of TMJ arthralgia (524 62) (M26 629)  29  History of Visit for screening mammogram (V76 12) (Z12 31)    Surgical History   1  History of Cholecystectomy  2  History of Diagnostic Esophagogastroduodenoscopy  3  History of Esophagogastric Fundoplasty  4  History of Esophagogastric Fundoplasty Nissen Fundoplication  5  History of Neuroplasty Decompression Median Nerve At Carpal Tunnel  6  Denied: History of Tonsillectomy With Adenoidectomy    Family History  Mother   1   Family history of Acute Myocardial Infarction (V17 3)  2  Family history of Chronic Kidney Disease (NKF Classification)  3  Family history of Depression  4  Family history of arthritis (V17 7) (Z82 61)  5  Family history of substance abuse (V17 0) (Z81 4)  6  Family history of ulcerative colitis (V18 59) (Z83 79)  7  Family history of Schizophrenia  8  Family history of Type 2 Diabetes Mellitus  Father   5  Family history of Acute Myocardial Infarction (V17 3)  10  Family history of psoriasis (V19 4) (Z84 0)  11  Family history of Gastric Cancer (V16 0)  12  Family history of Stroke Syndrome (V17 1)  Sister   15  Family history of Crohn's disease (V18 59) (Z83 79)  Maternal Grandfather   15  Family history of rheumatoid arthritis (V17 7) (Z82 61)  Family History   15  Denied: Family history of systemic lupus erythematosus    Social History    · Completed 12th grade   · Current every day smoker (305 1) (F17 200)   · Current Every Day Smoker (305 1)   · Denied: Daily Coffee Consumption (___ Cups/Day)   ·    · Never Drank Alcohol   · No alcohol use   · No drug use   · One child   · Unemployed (V62 0) (Z56 0)    Current Meds  1  ALPRAZolam 0 25 MG Oral Tablet; TAKE 1 TABLET BY MOUTH 3 TIMES A DAY AS   NEEDED; Therapy: 12Apr2011 to (Evaluate:02Hjo9651)  Requested for: 48Vxz7762; Last   Rx:32Gfj3266 Ordered  2  Aspirin 325 MG Oral Tablet; TAKE 1 TABLET DAILY; Therapy: 09XHA1725 to Recorded  3  Atorvastatin Calcium 40 MG Oral Tablet; TAKE 1 TABLET DAILY; Therapy: 33Dzm1662 to (Last Rx:84Asr3446)  Requested for: 11Mmi7374 Ordered  4  Botox 200 UNIT Injection Solution Reconstituted; INJECT UP  UNITS ONCE   EVERY 3 MONTHS IN OFFICE; Therapy: 03Ghp6439 to (Last Rx:21Jun2017) Ordered  5  BuPROPion HCl ER (XL) 150 MG Oral Tablet Extended Release 24 Hour; TAKE 1   TABLET DAILY; Therapy: 49GEF2584 to (Evaluate:02Apr2018)  Requested for: 97EPU0010; Last   Rx:81Vft2755 Ordered  6   Cyanocobalamin 1000 MCG/ML Injection Solution; Inject 1 ml every 2 months; Therapy: 90XKL4120 to (Last Rx:65Kqf0922)  Requested for: 85Uye3026 Ordered  7  Cyclobenzaprine HCl - 10 MG Oral Tablet; 1 tab qAM and 1 tab qHS; Therapy: 06GJY0963 to (95 579698)  Requested for: 51Zzq8070; Last   Rx:29Wfx0570 Ordered  8  Dicyclomine HCl - 20 MG Oral Tablet; TAKE 1 TABLET EVERY 6 HOURS AS NEEDED; Therapy: 75HMD9660 to (03 17 74 30 53)  Requested for: 31Oct2016; Last   Rx:31Oct2016 Ordered  9  Divalproex Sodium 250 MG Oral Tablet Delayed Release; take 2 tablets by mouth at   bedtime; Therapy: 18KEH8242 to (MNRWTWKW:50RGS9375)  Requested for: 03Aug2017; Last   Rx:03Aug2017 Ordered  10  Fenofibrate 48 MG Oral Tablet; TAKE 1 TABLET DAILY; Therapy: 83IBI1247 to (Isael Armas)  Requested for: 33GSF1449; Last    Rx:30Nov2016; Status: ACTIVE - Renewal Denied Ordered  11  Folic Acid 139 MCG Oral Tablet; TAKE 1 TABLET DAILY AS DIRECTED; Therapy: 38LYJ8336 to (Evaluate:37Osm9909)  Requested for: 57DYE4715; Last    Rx:44Svc3474 Ordered  12  FreeStyle Lite Test In [employer]; use as directed; Therapy: 64LIF5512 to (Last Rx:90Moe1711) Ordered  13  Gabapentin 400 MG Oral Capsule; TAKE 1-2 CAPSULES 3 TIMES DAILY; Therapy: 25SQU4127 to (Evaluate:03Oct2017)  Requested for: 42MTV4376; Last    Rx:44Uob4675 Ordered  14  HumaLOG KwikPen 200 UNIT/ML Subcutaneous Solution Pen-injector; Inject SQ 15    units before each meal;    Therapy: 69RXX0471 to (Last Rx:82Hlm4548) Ordered  15  Ketorolac Tromethamine 10 MG Oral Tablet; TAKE 1 TABLET EVERY 8 HOURS AS    NEEDED FOR PAIN  DON'T TAKE FOR MORE THAN 5 DAYS; Therapy: 05JHG2942 to (Evaluate:05Txu8051)  Requested for: 94ZPQ7784; Last    Rx:92Frw5833 Ordered  16  Lantus SoloStar 100 UNIT/ML Subcutaneous Solution Pen-injector; Inject SQ 50 units at    bedtime; Therapy: (Recorded:51Ssl6775) to Recorded  17  Levothyroxine Sodium 112 MCG Oral Tablet; TAKE 1 TABLET DAILY MONDAY THROUGH    SATURDAY   TAKE 2 TABLETS ON SUNDAY; Therapy: 28EFL6104 to (Evaluate:17Oct2017)  Requested for: 20Apr2017; Last    Rx:20Apr2017 Ordered  18  Losartan Potassium 50 MG Oral Tablet; TAKE 1 TABLET DAILY; Therapy: 59OKZ0932 to (Rosenda Vences)  Requested for: 03PTI1560; Last    Rx:22Jgd1440 Ordered  19  MetFORMIN HCl  MG Oral Tablet Extended Release 24 Hour; take 2 tablets twice    daily with meals; Therapy: 04Lcn8206 to (Evaluate:31Aug2017)  Requested for: 99Ikl7397; Last    Rx:02Jun2017; Status: ACTIVE - Renewal Denied Ordered  20  Metoprolol Tartrate 25 MG Oral Tablet; Take 1 tablet by mouth daily; Therapy: 68Rpi8949 to (Rosenda Vences)  Requested for: 95HOS2007; Last    Rx:09May2017 Ordered  21  OLANZapine 5 MG Oral Tablet; TAKE 1 TABLET AT BEDTIME; Therapy: 93MOM7512 to (Gaby Pop)  Requested for: 23UPU1719; Last    Rx:85Krt6130 Ordered  22  Ondansetron 4 MG Oral Tablet Disintegrating; ALLOW 1 TABLET TO DISSOLVE ON    TONGUE TWICE DAILY AS NEEDED FOR NAUSEA; Therapy: 36EUY8715 to (Evaluate:76Ntb3796)  Requested for: 93Jcv5475; Last    Rx:18Xee7476 Ordered  23  OneTouch Ultra Blue In [employer]; USE 1 STRIP Twice daily; Therapy: 20IVA1915 to (Danita Marcano)  Requested for: 21RMA3488; Last    Rx:03Jan2017 Ordered  24  ProAir  (90 Base) MCG/ACT Inhalation Aerosol Solution; INHALE 2 PUFFS    EVERY 4-6 HOURS AS NEEDED; Therapy: 12QAU3448 to (Last Rx:97Vlu2793)  Requested for: 79Xde8950 Ordered  25  Topiramate 100 MG Oral Tablet; TAKE 1 TABLET TWICE A DAY  ONE IN AM AND ONE IN    PM;    Therapy: 73QFG5583 to (Evaluate:13Jan2018)  Requested for: 18Apr2017; Last    Rx:18Apr2017 Ordered  26  Toujeo SoloStar 300 UNIT/ML Subcutaneous Solution Pen-injector; 50 units; Therapy: 72Rka9155 to (Last Rx:24Oct2016) Ordered  27  TraMADol HCl - 50 MG Oral Tablet; TAKE 1-2 TABLET BY MOUTH 4 TIMES A DAY AS    NEEDED;     Therapy: 01UNU4189 to (Evaluate:76Wuh9870)  Requested for: 12Ddk9774; Last    Rx:43Qll1749 Ordered  28  Turmeric TABS; 1 Tab daily; Therapy: (Recorded:2016) to Recorded  29  Venlafaxine HCl  MG Oral Capsule Extended Release 24 Hour; take 1 capsule by    mouth daily; Therapy: 18VLS3696 to (Evaluate:01Jly1073)  Requested for: 80Odz1249; Last    Rx:33Sxx5983 Ordered  30  Ventolin  (90 Base) MCG/ACT Inhalation Aerosol Solution; inhale 1 to 2 puffs    every 4 to 6 hours as needed; Therapy: 62Dvo6331 to (Last Rx:15Mnq4519)  Requested for: 60Wai6493; Status:    ACTIVE - Retrospective By Protocol Authorization Ordered  31  Zolpidem Tartrate 10 MG Oral Tablet; take 1 tablet by mouth at bedtime as needed; Therapy: 39Wmm8322 to (Evaluate:48Qmf7669)  Requested for: 31Uuo5638; Last    Rx:91Ltj0248 Ordered    Allergies   1  Adhesive Tape TAPE  2  Lexapro TABS    Physical Exam  Socks and shoes removed, Right Foot Findings:1  normal foot1   The toes1  were normal1   Socks and Shoes removed, Left Foot Findings:1  normal foot1   The toes1  were normal1   Monofilament Testin  Diminished tactile sensation with monofilament testing throughout both feet1   Vascular: Pulses:1  1+ in the posterior tibialis1  and 1+ in the dorsalis pedis1   Pulses:1  1+ in the posterior tibialis1  and 1+ in the dorsalis pedis1   Assign Risk Category:1  2: Loss of protective sensation with or without weakness, deformity, callus, pre-ulcer, or history of ulceration  High risk1   Constitutional1    General appearance: Abnormal  1  Obese1   Eyes1    Pupils and irises: Equal, round and reactive to light  1    Ears, Nose, Mouth, and Throat1    Otoscopic examination: Tympanic membranes translucent with normal light reflex  Canals patent without erythema  1    Nasal mucosa, septum, and turbinates: Normal without edema or erythema  1    Oropharynx: Normal with no erythema, edema, exudate or lesions  1    Pulmonary1    Respiratory effort: No increased work of breathing or signs of respiratory distress  1    Auscultation of lungs: Clear to auscultation  1    Cardiovascular1    Auscultation of heart: Normal rate and rhythm, normal S1 and S2, without murmurs  1    Examination of extremities for edema and/or varicosities: Normal 1    Carotid pulses: Normal 1    Abdomen1    Abdomen: Non-tender, no masses  1    Lymphatic1    Palpation of lymph nodes in neck: No lymphadenopathy  1    Musculoskeletal1    Gait and station: Normal 1    Skin1    Skin and subcutaneous tissue: Normal without rashes or lesions  1    Neurologic1    Cranial nerves: Cranial nerves 2-12 intact  1    Psychiatric1    Orientation to person, place, and time: Normal 1    Mood and affect: Abnormal  1  Mood and Affect:1  anxious1 , depressed1  and tearful1   Diabetic Foot Screen: Abnormal  1        1 Amended By: Venkat Aviles; Sep 18 1993 9:12 AM EST    Health Management  Irritable bowel syndrome with diarrhea   COLONOSCOPY (GI, SURG); every 5 years; Last 51PMP5331; Next Due: 50CMY7095; Active  Pancreatic cyst   * MRI ABDOMEN W WO CONTRAST AND MRCP; every 1 year; Next Due: 46Xcc1030;  Overdue    Future Appointments    Date/Time Provider Specialty Site   09/27/2017 09:00 AM Didi Fabian [facility]   10/26/2017 12:00 PM Rosas Bustamante MD Neurology [facility] 21   49/18/4282 08:09 PM [facility]     Signatures   Electronically signed by : Erich Quezada DO; Sep  5 2017  3:34PM EST                       (Author)    Electronically signed by : Erich Quezada DO; Sep  5 2017  3:34PM EST                       (Author)    Electronically signed by : Saundra Guerra DO; Sep 15 0615  4:42PM EST                       (Author)    Electronically signed by : Saundra Guerra DO; Sep 18 7476  9:14AM EST                       (Author)

## 2018-01-24 NOTE — MISCELLANEOUS
Message   Recorded as Task   Date: 07/06/2017 08:07 AM, Created By: Jaki Ellis   Task Name: Care Coordination   Assigned To: [facility] stim,Team   Regarding Patient: Dana Roland, Status: In Progress   Comment:    Jeri Nice - 06 Jul 2017 8:07 AM     TASK CREATED  Pt to be an [facility] cervical SCS trial with Dr Debo Carney in [place]  Pt signed release of info  Pt received SCS booklet, script for psych eval with list of providers  Clinical info excluding psych eval faxed to [facility]  Education? Jeri Nice - 06 Jul 2017 8:07 AM     TASK IN PROGRESS   Jeri Nice - 03 Nov 2017 10:55 AM     TASK EDITED  Attempt to reach pt to see if she was still interseted in SCS  LM asking pt to return call if she would like to move forward  Jeri Nice - 64 Nov 2017 10:56 AM     TASK Chip Gelineau - 08 Nov 2017 8:27 AM     TASK REACTIVATED   Jaki Ellis - 08 Nov 2017 8:29 AM     TASK EDITED  Pt returned my call about SCS  Attempt to reach pt, LM for return call  Jeri Nice - 08 Nov 2017 8:29 AM     TASK IN PROGRESS   Jaki Ellis - 08 Nov 2017 10:29 AM     TASK EDITED  Spoke with pt who is still interested in SCS trial  Pt misplaced script for psych eval  I mailed pt new script with list of providers  Pt will also need education  Active Problems    1  Anxiety (300 00) (F41 9)   2  Arthralgia (719 40) (M25 50)   3  Benign essential hypertension (401 1) (I10)   4  Carpal tunnel syndrome of right wrist (354 0) (G56 01)   5  Carpal tunnel syndrome, left (354 0) (G56 02)   6  Cervical disc disorder with radiculopathy of mid-cervical region (723 4) (M50 120)   7  Cervical dysphagia (787 29) (R13 19)   8  Chronic bilateral low back pain (724 2,338 29) (M54 5,G89 29)   9  Chronic cervical pain (723 1,338 29) (M54 2,G89 29)   10  Chronic cervical radiculopathy (723 4) (M54 12)   11  Chronic diarrhea (787 91) (K52 9)   12  Chronic fatigue (780 79) (R53 82)   13   Chronic migraine without aura (346 70) (G43 709)   14  Chronic pain syndrome (338 4) (G89 4)   15  CRP elevated (790 95) (R79 82)   16  Degenerative disc disease, cervical (722 4) (M50 30)   17  Depression (311) (F32 9)   18  Diabetes mellitus type II, uncontrolled (250 02) (E11 65)   19  Elevated LFTs (790 6) (R79 89)   20  Fatty liver (571 8) (K76 0)   21  Fibromyalgia (729 1) (M79 7)   22  GERD without esophagitis (530 81) (K21 9)   23  Headache (784 0) (R51)   24  Hyperlipidemia (272 4) (E78 5)   25  Hypothyroidism (244 9) (E03 9)   26  Insomnia (780 52) (G47 00)   27  Intractable chronic common migraine without aura (346 71) (G43 719)   28  Iron deficiency (280 9) (E61 1)   29  Irritable bowel syndrome with diarrhea (564 1) (K58 0)   30  Left hand paresthesia (782 0) (R20 2)   31  Left sided numbness (782 0) (R20 0)   32  Long-term insulin use (V58 67) (Z79 4)   33  Multiple joint pain (719 49) (M25 50)   34  Nausea (787 02) (R11 0)   35  Nicotine dependence (305 1) (F17 200)   36  Opioid dependence (304 00) (F11 20)   37  Osteoarthritis of spine with radiculopathy, cervical region (721 0) (M47 22)   38  Pancreatic cyst (577 2) (K86 2)   39  Paresthesia (782 0) (R20 2)   40  Polyarthritis (716 50) (M13 0)   41  Polyneuropathy associated with underlying disease (357 4) (G63)   42  Pyelonephritis (590 80) (N12)   43  Right hand pain (729 5) (M79 641)   44  Sacroiliac pain (724 6) (M53 3)   45  Shoulder pain (719 41) (M25 519)   46  Snoring (786 09) (R06 83)   47  Spondylosis of cervical region without myelopathy or radiculopathy (721 0) (M47 812)   48  Stroke syndrome (434 91) (I63 9)   49  Swelling of hand joint, right (719 04) (M25 441)   50  Transient ischemic attack (TIA) (435 9) (G45 9)   51  Trigger thumb of right hand (727 03) (M65 311)   52  Type 2 diabetes mellitus with hyperglycemia, with long-term current use of insulin    (250 00,790 29,V58 67) (E11 65,Z79 4)   53   Uncontrolled type 2 diabetes mellitus with diabetic polyneuropathy, with long-term    current use of insulin (250 62,357 2,V58 67) (E11 42,E11 65,Z79 4)   54  Vitamin B12 deficiency (266 2) (E53 8)   55  Vitamin D deficiency (268 9) (E55 9)    Current Meds   1  ALPRAZolam 0 25 MG Oral Tablet; TAKE 1 TABLET BY MOUTH 3 TIMES A DAY AS   NEEDED; Therapy: 18Rvg8821 to (Evaluate:20Jan2018)  Requested for: 61Wnz1892; Last   Rx:18Bpp3391 Ordered   2  Aspirin 325 MG Oral Tablet; TAKE 1 TABLET DAILY; Therapy: 93PVL1841 to Recorded   3  Atorvastatin Calcium 40 MG Oral Tablet; TAKE 1 TABLET DAILY; Therapy: 10Jst2045 to (Last Rx:12Pex7397)  Requested for: 47Xvi1289 Ordered   4  BD Pen Needle Kimberli U/F 32G X 4 MM Miscellaneous; use daily; Therapy: 57UPC8777 to (Last Rx:20Nov2017)  Requested for: 20Nov2017 Ordered   5  Botox 200 UNIT Injection Solution Reconstituted; INJECT UP  UNITS ONCE   EVERY 3 MONTHS IN OFFICE; Therapy: 21Jun2017 to (Last Rx:04Jan2018) Ordered   6  BuPROPion HCl ER (XL) 150 MG Oral Tablet Extended Release 24 Hour; TAKE 1   TABLET DAILY; Therapy: 35SIH0034 to (Evaluate:02Apr2018)  Requested for: 70KWP8698; Last   Rx:07Apr2017 Ordered   7  Cyanocobalamin 1000 MCG/ML Injection Solution; Inject 1 ml every 2 months; Therapy: 89DJT9932 to (Last Rx:32Lvu2158)  Requested for: 88Hnt1624 Ordered   8  Cyclobenzaprine HCl - 10 MG Oral Tablet; 1 tab qAM and 1 tab qHS; Therapy: 92CRK7398 to ([phone])  Requested for: 26Zzr1864; Last   Rx:83Jgm1279 Ordered   9  Dicyclomine HCl - 20 MG Oral Tablet; TAKE 1 TABLET EVERY 6 HOURS AS NEEDED; Therapy: 60AVM3744 to ([phone] [id number])  Requested for: 31Oct2016; Last   Rx:31Oct2016 Ordered   10  Divalproex Sodium 250 MG Oral Tablet Delayed Release; take 2 tablets by mouth at    bedtime; Therapy: 55BTA0642 to (PHAJ:07GKT4221)  Requested for: 74Xzi4053; Last    Rx:59Xvs4856 Ordered   11  Fenofibrate 48 MG Oral Tablet; TAKE 1 TABLET DAILY;     Therapy: 98OBB1940 to (Kateryna Lucas)  Requested for: 71PRE8185; Last    Rx:30Nov2016; Status: ACTIVE - Renewal Denied Ordered   12  Folic Acid 858 MCG Oral Tablet; TAKE 1 TABLET DAILY AS DIRECTED; Therapy: 46TQH3511 to (Evaluate:11May2017)  Requested for: 86UJD1466; Last    Rx:16May2016 Ordered   13  FreeStyle Lite Test In [employer]; use as directed; Therapy: 22IIB8456 to (Last Rx:48Hit7687) Ordered   14  Gabapentin 400 MG Oral Capsule; TAKE 1-2 CAPSULES 3 TIMES DAILY; Therapy: 89WBT1302 to (Nyasia Heller)  Requested for: 05KCC0718; Last    Rx:23Jan2018 Ordered   15  HumaLOG KwikPen 200 UNIT/ML Subcutaneous Solution Pen-injector; Inject SQ 15    units before each meal;    Therapy: 82YIC5180 to (Last Rx:27Cvb5980) Ordered   16  Ketorolac Tromethamine 10 MG Oral Tablet; TAKE 1 TABLET EVERY 8 HOURS AS    NEEDED FOR PAIN  DON'T TAKE FOR MORE THAN 5 DAYS; Therapy: 38LTH0404 to (Evaluate:83Hlx2955)  Requested for: 84Qxt5541; Last    Rx:21Dec2017 Ordered   17  Lantus SoloStar 100 UNIT/ML Subcutaneous Solution Pen-injector; Inject SQ 50 units at    bedtime; Therapy: (Recorded:26Hoa8682) to Recorded   18  Levothyroxine Sodium 112 MCG Oral Tablet; TAKE 1 TABLET DAILY MONDAY    THROUGH SATURDAY  TAKE 2 TABLETS ON SUNDAY; Therapy: 56KMY3556 to (Evaluate:17Oct2017)  Requested for: 42QJV7445; Last    Rx:20Apr2017; Status: ACTIVE - Renewal Denied Ordered   19  Losartan Potassium 50 MG Oral Tablet; TAKE 1 TABLET DAILY; Therapy: 36XMN7409 to (Rowdy Slimmer)  Requested for: 67PPI9786; Last    Rx:09May2017 Ordered   20  MetFORMIN HCl  MG Oral Tablet Extended Release 24 Hour; take 2 tablets twice    daily with meals; Therapy: 63Kvi2161 to (Evaluate:65Jup3949)  Requested for: 05TLF8985; Last    Rx:20Nov2017 Ordered   21  Metoprolol Tartrate 25 MG Oral Tablet; Take 1 tablet by mouth daily; Therapy: 08Nam0173 to (Rowdy Slimmer)  Requested for: 11TZS0512; Last    Rx:81Ssn4476 Ordered   22   OLANZapine 5 MG Oral Tablet; TAKE 1 TABLET AT BEDTIME; Therapy: 91FCH2521 to (Evaluate:21Mar2018)  Requested for: 21Nov2017; Last    Rx:21Nov2017 Ordered   23  Ondansetron 4 MG Oral Tablet Disintegrating; ALLOW 1 TABLET TO DISSOLVE ON    TONGUE TWICE DAILY AS NEEDED FOR NAUSEA; Therapy: 71KNB5344 to [phone] [id number])  Requested for: 39MQP4944; Last    Rx:23Jan2018 Ordered   24  OneTouch Ultra Blue In [employer]; USE 1 STRIP Twice daily; Therapy: 93BKR1789 to ([facility])  Requested for: 74BAU0019; Last    Rx:03Jan2017 Ordered   25  ProAir  (90 Base) MCG/ACT Inhalation Aerosol Solution; INHALE 2 PUFFS    EVERY 4-6 HOURS AS NEEDED; Therapy: 05YFI0435 to (Last Rx:04Feb2016)  Requested for: 21Feb2016 Ordered   26  ProAir RespiClick 806 (90 Base) MCG/ACT Inhalation Aerosol Powder Breath Activated;    2 inhalations every 4-6 hours; Therapy: 39TGU2323 to (Last Rx:10Nov2017)  Requested for: 94GFE7883 Ordered   27  Topiramate 100 MG Oral Tablet; TAKE 1 TABLET TWICE A DAY  ONE IN AM AND ONE    IN PM;    Therapy: 13KFD8627 to (Evaluate:22Sep2018)  Requested for: 26Dec2017; Last    Rx:26Dec2017 Ordered   28  Topiramate 25 MG Oral Tablet (Topamax); 1 tab qHS x 5 days, then 2 tabs qhs (with 100    mg tab qhs); [facility]UEV2089 [facility]  Requested for: 90BPI0075; Last    Rx:27Sep2017 Ordered   29  Toujeo SoloStar 300 UNIT/ML Subcutaneous Solution Pen-injector; 50 units; Therapy: 92Pdn6221 to (Last Rx:24Oct2016) Ordered   30  TraMADol HCl - 50 MG Oral Tablet; TAKE 1-2 TABLETS BY MOUTH 4 TIMES DAILY AS    NEEDED; Therapy: 91IBI4327 to (Evaluate:20Jan2018)  Requested for: 27Kxs5002; Last    Rx:76Ybo7672 Ordered   31  Turmeric TABS; 1 Tab daily; Therapy: (Recorded:24Oct2016) to Recorded   32  Venlafaxine HCl  MG Oral Capsule Extended Release 24 Hour; take 1 capsule by    mouth daily; Therapy: 43KBW0293 to (Krystal Quigley)  Requested for: 13YUX0817; Last    Rx:69Lyq8086 Ordered   33   Ventolin A 108 (90 Base) MCG/ACT Inhalation Aerosol Solution; inhale 1 to 2 puffs    every 4 to 6 hours as needed; Therapy: 87Dhw4603 to (Last Rx:78Onf1181)  Requested for: 02Rkv6947; Status:    ACTIVE - Retrospective By Protocol Authorization Ordered   34  Zolpidem Tartrate 10 MG Oral Tablet; take 1 tablet by mouth at bedtime as needed; Therapy: 60Vkg4129 to (Evaluate:20Jan2018)  Requested for: 16Rlh7203; Last    Rx:87Luz6137 Ordered    Allergies    1  Adhesive Tape TAPE   2   Lexapro TABS    Signatures   Electronically signed by : Levon Hayes, ; Jan 23 2018  4:14PM EST                       (Author)

## 2018-01-24 NOTE — CONSULTS
Assessment    1  Benign essential hypertension (401 1) (I10)   2  Diabetes mellitus type II, uncontrolled (250 02) (E11 65)   3  Hyperlipidemia (272 4) (E78 5)   4  Hypothyroidism (244 9) (E03 9)   5  Vitamin D deficiency (268 9) (E55 9)   6  Depression (311) (F32 9)   7  Elevated LFTs (790 6) (R79 89)   8  Fatty liver (571 8) (K76 0)   9  Fibromyalgia (729 1) (M79 7)   10  Long-term insulin use (V58 67) (Z79 4)   11  Nicotine dependence (305 1) (F17 200)      1  Uncontrolled T2 DM: a1c 13%  - increase lantus to 50 units at bedtime  - Increase novolog to 20 units with each meal  - Start metformin 500 BID and build upto 1 gram BID  - Meet CDE, eye MD and podiatry   - explained long and short term a/e of uncontrolled DM  - Send us BG logs every week  - carry glucose tabs all the time  - she reported financial issues with her supplies and meds  -- gave her multiple samples of insulin, needles and glucometer strips    2  HLP:  - keep same atorva and decide in future if she needs fibrates  -She has hx of transaminitis and fatty liver    3  HTN:  - uncontrolled, fu with PCP    4  Depression:  -refer to Psychiatry      Plan  Benign essential hypertension, Depression, Diabetes mellitus type II, uncontrolled,  Elevated LFTs, Fatty liver, Hyperlipidemia, Hypothyroidism, Vitamin D deficiency    · MetFORMIN HCl  MG Oral Tablet Extended Release 24 Hour; TAKE 2  TABLET DAILY twice daily with meals   Rx By: Thomas Alfaro; Dispense: 30 Days ; #:120 Tablet Extended Release 24 Hour; Refill: 1; For: Benign essential hypertension, Depression, Diabetes mellitus type II, uncontrolled, Elevated LFTs, Fatty liver, Hyperlipidemia, Hypothyroidism, Vitamin D deficiency; HEYDI = N; Verified Transmission to [facility]/PHARMACY #[id number]; Last Updated By: System, SureScript[employer]; 8/2/2016 10:17:15 AM   · (1) C-PEPTIDE; Status:Active; Requested for:17Oct2016;    Perform:[facility]; Due:17Oct2017;Ordered;   For:Benign essential hypertension, Depression, Diabetes mellitus type II, uncontrolled, Elevated LFTs, Fatty liver, Hyperlipidemia, Hypothyroidism, Vitamin D deficiency; Ordered By:Chris Park;   · (1) HEMOGLOBIN A1C; Status:Active; Requested for:17Oct2016;    Perform:[facility]; Due:17Oct2017;Ordered; For:Benign essential hypertension, Depression, Diabetes mellitus type II, uncontrolled, Elevated LFTs, Fatty liver, Hyperlipidemia, Hypothyroidism, Vitamin D deficiency; Ordered By:Chris Park;   · (1) HEPATIC FUNCTION PANEL; Status:Active; Requested for:17Oct2016;    Perform:[facility]; Due:17Oct2017;Ordered; For:Benign essential hypertension, Depression, Diabetes mellitus type II, uncontrolled, Elevated LFTs, Fatty liver, Hyperlipidemia, Hypothyroidism, Vitamin D deficiency; Ordered By:Chris Park;   · (1) LIPID PANEL, FASTING; Status:Active; Requested for:17Oct2016;    Perform:[facility]; Due:17Oct2017;Ordered; For:Benign essential hypertension, Depression, Diabetes mellitus type II, uncontrolled, Elevated LFTs, Fatty liver, Hyperlipidemia, Hypothyroidism, Vitamin D deficiency; Ordered By:Chris Park;   · (1) MICROALBUMIN CREATININE RATIO, RANDOM URINE; Status:Active; Requested  for:17Oct2016;    Perform:[facility]; Due:17Oct2017;Ordered; For:Benign essential hypertension, Depression, Diabetes mellitus type II, uncontrolled, Elevated LFTs, Fatty liver, Hyperlipidemia, Hypothyroidism, Vitamin D deficiency; Ordered By:Chris Park;   · (1) T4, FREE; Status:Active; Requested for:17Oct2016;    Perform:[facility]; Due:17Oct2017;Ordered; For:Benign essential hypertension, Depression, Diabetes mellitus type II, uncontrolled, Elevated LFTs, Fatty liver, Hyperlipidemia, Hypothyroidism, Vitamin D deficiency; Ordered By:Chris Park;   · (1) TSH; Status:Active; Requested for:17Oct2016;    Perform:[facility]; Due:17Oct2017;Ordered;   For:Benign essential hypertension, Depression, Diabetes mellitus type II, uncontrolled, Elevated LFTs, Fatty liver, Hyperlipidemia, Hypothyroidism, Vitamin D deficiency; Ordered By:Chris Park;   · (1) URINALYSIS (will reflex a microscopy if leukocytes, occult blood, protein or nitrites are  not within normal limits); Status:Active; Requested for:17Oct2016;    Perform:[facility]; Due:17Oct2017;Ordered; For:Benign essential hypertension, Depression, Diabetes mellitus type II, uncontrolled, Elevated LFTs, Fatty liver, Hyperlipidemia, Hypothyroidism, Vitamin D deficiency; Ordered By:Chris Park;   · *VB-Foot Exam; Status:Active; Requested for:17Oct2016;    Perform: In Office; [facility]:[id number];SPUXPVN; For:Benign essential hypertension, Depression, Diabetes mellitus type II, uncontrolled, Elevated LFTs, Fatty liver, Hyperlipidemia, Hypothyroidism, Vitamin D deficiency; Ordered By:Chris Park;   · Begin a limited exercise program ; Status:Complete;   Done: 25JRJ3261   Ordered; For:Benign essential hypertension, Depression, Diabetes mellitus type II, uncontrolled, Elevated LFTs, Fatty liver, Hyperlipidemia, Hypothyroidism, Vitamin D deficiency; Ordered By:Chris Park;   · Eat a low fat and low cholesterol diet ; Status:Complete;   Done: 40WLT4172   Ordered; For:Benign essential hypertension, Depression, Diabetes mellitus type II, uncontrolled, Elevated LFTs, Fatty liver, Hyperlipidemia, Hypothyroidism, Vitamin D deficiency; Ordered By:Chris Park;   · Some eating tips that can help you lose weight ; Status:Complete;   Done: 01LJZ8076   Ordered; For:Benign essential hypertension, Depression, Diabetes mellitus type II, uncontrolled, Elevated LFTs, Fatty liver, Hyperlipidemia, Hypothyroidism, Vitamin D deficiency; Ordered By:Chris Park;   · We recommend that you bring your body mass index down to 26 ; Status:Complete;    Done: 71Odk1879   Ordered; For:Benign essential hypertension, Depression, Diabetes mellitus type II, uncontrolled, Elevated LFTs, Fatty liver, Hyperlipidemia, Hypothyroidism, Vitamin D deficiency;  Ordered By:Chris Park;   · *1 - [facility]ET[facility] SELF MANAGEMENT TRAINING OUTPATIENT Physician Referral   Consult  Status: Hold For - Scheduling  Requested for: 36DQO8457   Ordered; For: Benign essential hypertension, Depression, Diabetes mellitus type II, uncontrolled, Elevated LFTs, Fatty liver, Hyperlipidemia, Hypothyroidism, Vitamin D deficiency; Ordered By: Francisca Justice Performed:  Due: 75MOC3695  Insulin Therapy : Yes  Counting : Yes  requires instruction : Yes  Needs requiring Individual DSMT? : No  Self-Management Education/Trainng : Living Well with Diabetes Education      Program  Care Summary provided  : Yes   · *1 - [facility] FOR DIABETES Physician Referral  Consult   Status: Need Information - Financial Authorization  Requested for: [id number]   Ordered; For: Benign essential hypertension, Depression, Diabetes mellitus type II, uncontrolled, Elevated LFTs, Fatty liver, Hyperlipidemia, Hypothyroidism, Vitamin D deficiency; Ordered By: Francisca Justice Performed:  Due: 64TYA9404  Care Summary provided  : Yes   · *1 - [address] [facility] Physician Referral  Consult for major  depression  Status: Active  Requested for: 49Cks4844   Ordered; For: Benign essential hypertension, Depression, Diabetes mellitus type II, uncontrolled, Elevated LFTs, Fatty liver, Hyperlipidemia, Hypothyroidism, Vitamin D deficiency; Ordered By: rFancisca Justice Performed:  Due: 45CJM1989; Last Updated By: Anthony [facility]; 8/2/2016 10:26:58 AM  Health Referral Questions : Patient requires outpatient Psychotherapy      assessment/intake appointment (with licensed therapist)  Care Summary provided  : Yes   · 2 - Whitney Burroughs  (Podiatry) Physician Referral  Consult  Status: Active   Requested for: 20BMR5656   Ordered; For: Benign essential hypertension, Depression, Diabetes mellitus type II, uncontrolled, Elevated LFTs, Fatty liver, Hyperlipidemia, Hypothyroidism, Vitamin D deficiency;  Ordered By: Francisca Justice Performed:  Due: 12LPW9871; Last Updated By: Rachel Mayen; 8/2/2016 10:26:58 AM  Care Summary provided  : Yes   · [address] ([address] ) Physician Referral  Consult  Status: Active   Requested for: 31Lhu4695   Ordered; For: Benign essential hypertension, Depression, Diabetes mellitus type II, uncontrolled, Elevated LFTs, Fatty liver, Hyperlipidemia, Hypothyroidism, Vitamin D deficiency; Ordered By: Rei Geiger Performed:  Due: 36QSP0808; Last Updated By: Rachel Mayen; 8/2/2016 10:26:58 AM  Care Summary provided  : Yes   · Follow-up visit in 2 months Evaluation and Treatment  Follow-up  Status: Complete   Done: 48Mgv0504   Ordered; For: Benign essential hypertension, Depression, Diabetes mellitus type II, uncontrolled, Elevated LFTs, Fatty liver, Hyperlipidemia, Hypothyroidism, Vitamin D deficiency; Ordered By: Rei Geiger Performed:  Due: 35JTN2623; Last Updated By: Rachel Mayen; 8/2/2016 10:26:58 AM   · Follow-Up With Advanced Practitioner Evaluation and Treatment  Follow-up  Status:  Complete  Done: 54VXO6745   Ordered; For: Benign essential hypertension, Depression, Diabetes mellitus type II, uncontrolled, Elevated LFTs, Fatty liver, Hyperlipidemia, Hypothyroidism, Vitamin D deficiency; Ordered By: Rei Geiger Performed:  Due: 59LQE8720; Last Updated By: Rachel Mayen; 8/2/2016 10:26:58 AM  Diabetes mellitus type II, uncontrolled    · NovoLOG FlexPen 100 UNIT/ML Subcutaneous Solution Pen-injector; as directed   Rx By: Rei Geiger; Dispense: 0 Days ; #:3 ML; Refill: 0; For: Diabetes mellitus type II, uncontrolled; HEYDI = N; Dispense Sample; Last Updated By: Chuck Schreiber; 8/2/2016 4:06:24 PM   · OneTouch Ultra Blue In Vitro Strip; use as directed   Rx By: Rei Geiger; Dispense: 0 Days ; #:40 Strip; Refill: 0; For: Diabetes mellitus type II, uncontrolled; HEYDI = N; Dispense Sample;  Last Updated By: Chuck Schreiber; 8/2/2016 4:06:24 PM   · Toujeo SoloStar 300 UNIT/ML Subcutaneous Solution Pen-injector; 50 units   Rx By: Lv Alexander; Dispense: 0 Days ; #:3 ML; Refill: 0; For: Diabetes mellitus type II, uncontrolled; HEYDI = N; Dispense Sample; Last Updated By: Gini Soler; 8/2/2016 4:06:24 PM   · HumaLOG 100 UNIT/ML Subcutaneous Solution; inject 20 units before each  meal   Rx By: Lv Alexander; Dispense: 0 Days ; #:2 X 3 ML Vial; Refill: 0; For: Diabetes mellitus type II, uncontrolled; HEYDI = N; Dispense Sample; Last Updated By: Gini Soler; 8/2/2016 4:06:24 PM   · Lantus 100 UNIT/ML Subcutaneous Solution; 50 units daily at bedtime   Rx By: Lv Alexander; Dispense: 0 Days ; #:3 X 10 ML Vial; Refill: 5; For: Diabetes mellitus type II, uncontrolled; HEYDI = N; Verified Transmission to [facility]/PHARMACY #[id number]; Msg to Pharmacy: [address]; Last Updated By: System, SureScripts; 8/2/2016 10:20:20 AM   · OneTouch Ultra Blue In Vitro Strip; USE 1 STRIP Twice daily   Rx By: Kenny Cash; Dispense: 90 Days ; #:2 X 100 Strip Box; Refill: 3; For: Diabetes mellitus type II, uncontrolled; HEYDI = N; Dispense Sample; Last Updated By: Christin Shaver; 8/2/2016 11:28:01 AM    Discussion/Summary  Discussion Summary:   Increase lantus to 50 units at bedtime  increase novolog 20 units with each meal  check your BG and send me every week  labs before next visit in 2 months  pls see a psychiatrist  Counseling Documentation With Imm: The patient was counseled regarding diagnostic results, instructions for management, risk factor reductions, impressions, risks and benefits of treatment options, importance of compliance with treatment  Medication SE Review and Pt Understands Tx: Possible side effects of new medications were reviewed with the patient/guardian today  The treatment plan was reviewed with the patient/guardian   The patient/guardian understands and agrees with the treatment plan      Chief Complaint  Chief Complaint Free Text Note Form: Consult      History of Present Illness  HPI: The patient is a 25-year-old female with a past medical history poorly controlled type 2 diabetes mellitus a1C 13% for last 2 years, on Lantus and NovoLog given as samples by the primary care physician, reports that she cannot afford the co-pays of these insulins  She comes for initial visit and she is constantly crying  She reports having depression anxiety and panic disorder and does not see a psychologist  She also has fibromyalgia and chronic pain syndrome  She takes levothyroxine for hypothyroidism  Do not have any blood sugar logs today but she reported testing once or twice a day with numbers greater than 300  No hypoglycemia  Takes NovoLog 10 units at brunch and dinner, does not eat breakfast, takes Lantus 30 units at bedtime but forgets some time  She has neuropathy in his legs is not able to see an eye doctor recently  She also has hyperlipidemia and recently started atorvastatin previously had transaminitis also  She has fatty liver disease  Diabetes: The patient is being seen for an initial evaluation of and 2 Diabetes Mellitus 2  The HbA1c was   See Medication List for current medication(s)  By report, there is fair compliance with treatment and fair tolerance of treatment  Current pertinent lifestyle factors include obesity, disordered eating and inactivity  The patient is currently asymptomatic   Disease Course and Complications:  there have been no previous episodes of diabetic ketoacidosis  there have been no previous hospitalizations  Cardiovascular: last eye exam in May  Neurologic: peripheral neuropathy  (none)   Hypothyroidism: The patient is being seen for an initial evaluation of and 15-20 yrs hypothyroidism  The patient has hypothyroidism of undetermined etiology  Recent results: TSH date  Current treatment includes levothyroxine  The patient is currently asymptomatic        Review of Systems  Endo Adult ROS Female New Patient:   Constitutional/General: change in ring size, change in shoe size, no chills, dizziness, no fainting, fatigue, no fever, forgetfulness, headache, loss of sleep, no recent weight loss, nervousness, no numbness, temperature intolerance, excessive sweating and no weight gain  Muscle/Joint/Bone: arm pain, back pain, hip pain, leg pain, foot pain, neck pain, hand pain, shoulder pain, arm weakness, hip weakness, leg weakness, neck weakness, hand weakness, arm numbness, leg numbness, foot numbness, neck numbness and hand numbness, but no back weakness, no foot weakness, no shoulder weakness, no back numbness, no hip numbness and no shoulder numbness  Gastrointestinal: constipation, diarrhea, excessive thirst, nausea, poor appetite and stomach pain, but no excessive hunger, no rectal bleeding, no vomiting and no vomiting blood  Cardiovascular: chest pain, hypertension, rapid heart beat and ankle swelling, but no irregular heart beat, no hypotension and no poor circulation  Eye/Ear/Nose/Throat: blurred vision, difficulty swallowing and hoarseness, but no bleeding gums, no double vision, no gritty eyes, no hearing loss, no persistent cough, no sinus problems, not seeing flashes and not seeing halos  Skin: itching and a rash, but no easy bruising, no hives, no change in a mole, no scar and no slow healing sores  Genitourinary frequent urination and night time urination, but no blood in urine and no painful urination  Genitourinary - Reproductive abnorrmal period, hot flashes and 1 Children, but no bleeding between periods, no breast lump, no nipple discharge, no vaginal discharge and not pregnant  date of last menstruation 5/3/16 the interval of the LMP is unknown periods usually lasts 4 days   ROS Reviewed:   ROS reviewed  Active Problems    1  Abdominal pain, epigastric (789 06) (R10 13)   2  Analgesic use (V58 69) (Z79 899)   3  Anxiety (300 00) (F41 9)   4  Arthralgia (719 40) (M25 50)   5  Benign essential hypertension (401 1) (I10)   6   Carpal tunnel syndrome of right wrist (354 0) (G56 01)   7  Carpal tunnel syndrome, left (354 0) (G56 02)   8  Cerebral infarction, unspecified (434 91) (I63 9)   9  Cervical radiculopathy (723 4) (M54 12)   10  Chest pain (786 50) (R07 9)   11  Chronic cough (786 2) (R05)   12  Chronic migraine without aura (346 70) (G43 709)   13  CRP elevated (790 95) (R79 82)   14  Degenerative disc disease, cervical (722 4) (M50 30)   15  Depression (311) (F32 9)   16  Diabetes mellitus type II, uncontrolled (250 02) (E11 65)   17  Elevated LFTs (790 6) (R79 89)   18  Fatty liver (571 8) (K76 0)   19  Fibromyalgia (729 1) (M79 7)   20  GERD without esophagitis (530 81) (K21 9)   21  Headache (784 0) (R51)   22  Hyperlipidemia (272 4) (E78 5)   23  Hypothyroidism (244 9) (E03 9)   24  Insomnia (780 52) (G47 00)   25  Intractable chronic common migraine without aura (346 71) (G43 719)   26  Iron deficiency (280 9) (E61 1)   27  Left hand paresthesia (782 0) (R20 2)   28  Left sided numbness (782 0) (R20 0)   29  Long-term insulin use (V58 67) (Z79 4)   30  Multiple joint pain (719 49) (M25 50)   31  Nausea (787 02) (R11 0)   32  Neck pain (723 1) (M54 2)   33  Nicotine dependence (305 1) (F17 200)   34  Opioid dependence (304 00) (F11 20)   35  Pancreatitis (577 0) (K85 9)   36  Paresthesia (782 0) (R20 2)   37  Polyarthritis (716 50) (M13 0)   38  Pyelonephritis (590 80) (N12)   39  Recurrent low back pain (724 2) (M54 5)   40  Right hand pain (729 5) (M79 641)   41  S/P carpal tunnel release (V45 89) (Z98 89)   42  S/P trigger finger release (V45 89) (Z98 89)   43  Sacroiliac pain (724 6) (M53 3)   44  Septicemia (038 9) (A41 9)   45  Shortness of breath (786 05) (R06 02)   46  Skin rash (782 1) (R21)   47  Snoring (786 09) (R06 83)   48  Spondylosis of cervical region without myelopathy or radiculopathy (721 0) (M47 812)   49  Stroke Syndrome (436)   50  Swelling of hand joint, right (719 04) (M25 441)   51  TMJ arthralgia (524 62) (M26 62)   52   Transient cerebral ischemic attack (435 9) (G45 9)   53  Trigger thumb of right hand (727 03) (M65 311)   54  Vitamin B12 deficiency (266 2) (E53 8)   55  Vitamin D deficiency (268 9) (E55 9)    Past Medical History    1  History of Acute hip pain (719 45) (M25 559)   2  History of Acute venous embolism and thrombosis of deep vessels of distal lower   extremity (453 42) (I82 4Z9)   3  Anxiety (300 00) (F41 9)   4  Diabetes mellitus type II, uncontrolled (250 02) (E11 65)   5  History of Encounter for comprehensive diabetic foot examination, type 2 diabetes   mellitus (250 00) (E11 9)   6  History of GERD without esophagitis (530 81) (K21 9)   7  History of High cholesterol (272 0) (E78 0)   8  History of abdominal pain (V13 89) (Z87 898)   9  History of acute pancreatitis (V12 79) (Z87 19)   10  History of anemia (V12 3) (Z86 2)   11  History of arthritis (V13 4) (Z87 39)   12  History of asthma (V12 69) (Z87 09)   13  History of esophageal reflux (V12 79) (Z87 19)   14  History of migraine (V12 49) (Z86 69)   15  History of nausea and vomiting (V12 79) (Z87 898)   16  History of pulmonary embolism (V12 55) (Z86 711)   17  History of stroke (V12 54) (Z86 73)   18  History of thyroid disease (V12 29) (Z86 39)   19  History of upper respiratory infection (V12 09) (Z87 09)   20  History of Need for pneumococcal vaccination (V03 82) (Z23)   21  History of Numbness (782 0) (R20 0)   22  History of Pulmonary Embolism (V12 51)   23  History of Visit for screening mammogram (N98 50) (Z12 31)  Active Problems And Past Medical History Reviewed: The active problems and past medical history were reviewed and updated today  Surgical History    1  History of Cholecystectomy   2  History of Diagnostic Esophagogastroduodenoscopy   3  History of Esophagogastric Fundoplasty   4  History of Esophagogastric Fundoplasty Nissen Fundoplication   5  History of Neuroplasty Decompression Median Nerve At Carpal Tunnel   6   Denied: History of Tonsillectomy With Adenoidectomy  Surgical History Reviewed: The surgical history was reviewed and updated today  Family History  Mother    1  Family history of Acute Myocardial Infarction (V17 3)   2  Family history of Chronic Kidney Disease (NKF Classification)   3  Family history of Depression   4  Family history of substance abuse (V17 0) (Z81 4)   5  Family history of ulcerative colitis (V18 59) (Z83 79)   6  Family history of Schizophrenia   7  Family history of Type 2 Diabetes Mellitus  Father    8  Family history of Acute Myocardial Infarction (V17 3)   9  Family history of psoriasis (V19 4) (Z84 0)   10  Family history of Gastric Cancer (V16 0)   11  Family history of Stroke Syndrome (V17 1)  Sister    15  Family history of Crohn's disease (V18 59) (Z83 79)  Maternal Grandfather    15  Family history of rheumatoid arthritis (V17 7) (Z82 61)  Family History    14  Denied: Family history of systemic lupus erythematosus  Family History Reviewed: The family history was reviewed and updated today  Social History    · Completed 12th grade   · Current every day smoker (305 1) (F17 200)   · Current Every Day Smoker (305 1)   · Denied: Daily Coffee Consumption (___ Cups/Day)   ·    · Never Drank Alcohol   · No alcohol use   · No drug use   · One child   · Unemployed (V62 0) (Z56 0)  Social History Reviewed: The social history was reviewed and updated today  Current Meds   1  ALPRAZolam 0 25 MG Oral Tablet; TAKE 1 TABLET THREE TIMES A DAY AS NEEDED; Therapy: 42Clz4008 to (Evaluate:78Cwv7996)  Requested for: 06Skc3419; Last   Rx:23Ipi2331 Ordered   2  Aspirin 325 MG Oral Tablet; TAKE 1 TABLET DAILY; Therapy: 99DPI8612 to Recorded   3  Atorvastatin Calcium 20 MG Oral Tablet; TAKE 1 TABLET DAILY AS DIRECTED; Therapy: 99JTW6420 to ([place])  Requested for: 93BGI7180; Last   Rx:14Ijo2092 Ordered   4  Atorvastatin Calcium 40 MG Oral Tablet; TAKE 1 TABLET DAILY;    Therapy: 88LFX1817 to (Last Rx:37Avo1078)  Requested for: 19Frh2980 Ordered   5  Botox 100 UNIT Injection Solution Reconstituted; to be injected by physician in office as   directed; Therapy: 76ITS6548 to (Last Rx:18May2016) Ordered   6  Cyanocobalamin 1000 MCG/ML Injection Solution; Inject 1 ml every 2 months; Therapy: 66UAW2070 to (Last Rx:29Eid1717)  Requested for: 14Nfp8929 Ordered   7  Divalproex Sodium 250 MG Oral Tablet Delayed Release; TAKE 2 TABLET Bedtime; Therapy: 85MFH6305 to (Evaluate:82Trm5043)  Requested for: 26ZER7515; Last   Rx:14Mar2016 Ordered   8  Folic Acid 251 MCG Oral Tablet; TAKE 1 TABLET DAILY AS DIRECTED; Therapy: 94AYV1483 to (Evaluate:11May2017)  Requested for: 91AMB0624; Last   Rx:16May2016 Ordered   9  HumaLOG 100 UNIT/ML Subcutaneous Solution; inject 20 units before each meal;   Therapy: (Recorded:85Gag9717) to Recorded   10  Lantus 100 UNIT/ML Subcutaneous Solution; 20 units daily  Requested for: 48Ixe6673; Last Rx:08Icq0960 Ordered   11  Levothyroxine Sodium 75 MCG Oral Tablet; TAKE 1 TABLET DAILY; Therapy: 75LVT8337 to (Evaluate:39Jdl4677)  Requested for: 74Ttv2899; Last    Rx:66Buu3606 Ordered   12  LORazepam 1 MG Oral Tablet; TAKE 1 TABLET 1 HOUR BEFORE MRI  MAY REPEAT    ONCE 15 MINUTES BEFORE MRI; Therapy: 01HQT6690 to (Evaluate:59Yxy2566); Last Rx:54Xss3989 Ordered   13  Losartan Potassium 50 MG Oral Tablet; TAKE 1 TABLET DAILY; Therapy: 46BQK8979 to (Evaluate:11Mar2017)  Requested for: 34HDQ7859; Last    Rx:16Mar2016 Ordered   14  Lyrica 150 MG Oral Capsule; TAKE 1 CAPSULE 3 TIMES DAILY; Therapy: 48SCH1688 to (Evaluate:26Oct2016)  Requested for: 64Fwj6168; Last    Rx:86Smg7980 Ordered   15  Metoprolol Tartrate 25 MG Oral Tablet; Take 1 tablet by mouth daily; Therapy: 15Uek4702 to (Evaluate:16Haz4360)  Requested for: 47Zyv2167; Last    Rx:16Ocg1889 Ordered   16  OLANZapine 5 MG Oral Tablet; TAKE 1 TABLET AT BEDTIME;     Therapy: 92IRX8678 to (Evaluate:74Qss5484) Requested for: 02Uzm8421; Last    Rx:89Drp5998 Ordered   17  Omeprazole 20 MG Oral Capsule Delayed Release; take one capsule by mouth daily; Therapy: 47ZBD0339 to (Evaluate:11Mar2017)  Requested for: 37ETY1465; Last    Rx:16Mar2016 Ordered   18  Ondansetron 4 MG Oral Tablet Dispersible; ALLOW 1 TABLET TO DISSOLVE ON    TONGUE TWICE DAILY AS NEEDED FOR NAUSEA; Therapy: 69TKC1999 to (Evaluate:39Jyp4857)  Requested for: 33BVD4169; Last    Rx:15Jun2016 Ordered   19  OneTouch Ultra Blue In [employer]; USE 1 STRIP Twice daily; Therapy: 72QIZ0035 to (Evaluate:81Nar8509)  Requested for: 66BGC3366; Last    Rx:86Vrb9547 Ordered   20  ProAir  (90 Base) MCG/ACT Inhalation Aerosol Solution; INHALE 2 PUFFS    EVERY 4-6 HOURS AS NEEDED; Therapy: 81GYH0179 to (Last Rx:34Hsc3567)  Requested for: 05Uvx6491 Ordered   21  Topiramate 100 MG Oral Tablet; TAKE 1 TABLET AT BEDTIME; Therapy: 43WWG4615 to (Evaluate:24Apr2017)  Requested for: 09XHE3927; Last    Rx:[id number] Ordered   22  Topiramate 25 MG Oral Tablet; Take 1 tab qAM (in addition to 100 mg qHS); Therapy: 25BOH2300 to (Last Rx:17Hia0258)  Requested for: 04Ysg5705 Ordered   23  TraMADol HCl - 50 MG Oral Tablet; TAKE 1 TO 2 TABLETS BY MOUTH 4 TIMES A DAY AS    NEEDED; Therapy: 87KYD7778 to (Evaluate:59Zpz5503)  Requested for: 47SLT7530; Last    Rx:[id number] Ordered   24  Venlafaxine HCl ER 75 MG Oral Capsule Extended Release 24 Hour; TAKE 1 CAPSULE    DAILY; Therapy: 34CRG6265 to (Evaluate:23Fqo3594)  Requested for: 72Tqv4702; Last    Rx:25Mar2016 Ordered   25  Ventolin  (90 Base) MCG/ACT Inhalation Aerosol Solution; inhale 1 to 2 puffs    every 4 to 6 hours as needed; Therapy: 42Zlf2704 to (Last Rx:95Lgu1878)  Requested for: 42Cvs9914; Status:    ACTIVE - Retrospective By Protocol Authorization Ordered   26  Zolpidem Tartrate 10 MG Oral Tablet; TAKE 1 TABLET AT BEDTIME;     Therapy: 12Apr2011 to (Evaluate:70Unq5432)  Requested for: 19BOH9293; Last    Rx:47Mfd1300 Ordered  Medication List Reviewed: The medication list was reviewed and updated today  Allergies    1  Adhesive Tape TAPE   2  Lexapro TABS    Vitals  Vital Signs    Recorded: 02Aug2016 09:54AM Recorded: 54UWQ2631 86:50CL   Systolic 746    Diastolic 791    Heart Rate 56    Height  5 ft 2 in   Weight  178 lb 2 00 oz   BMI Calculated  32 58   BSA Calculated  1 82     Physical Exam    Constitutional   General appearance: No acute distress, well appearing and well nourished  Eyes   Conjunctiva and lids: No swelling, erythema, or discharge  Pupils: Equal, round and reactive to light  The sclera are anicteric  Extraocular movements are intact  Ears, Nose, Mouth, and Throat   External inspection of ears, nose and lips: Normal     Exam of Head: The head is atraumatic and normocephalic  Neck: The neck is supple  The thyroid is normal in size with no palpable nodules  Pulmonary   Auscultation of lungs: Clear to auscultation bilaterally with normal chest expansion  Cardiovascular   Auscultation of heart: Abnormal     Examination of extremities for edema and/or varicosities: Abnormal     Examination of pulses: Dorsalis pedal pulses are +2 and equal bilaterally  Abdomen   Abdomen: Abdomen is soft, non-tender with normal bowel sounds  Lymphatic   Palpation of lymph nodes: No supraclavicular or suboccipital lymphadenopathy  Musculoskeletal   Inspection/palpation of joints, bones, and muscles: Muscle bulk and tone is normal     Skin   Skin and subcutaneous tissue: Normal skin temperature and color  Neurologic   Reflexes: 2+ and symmetric  Motor Strength: Strength is 5/5 bilaterally      Psychiatric   Orientation to person, place and time: Normal     Mood and affect: Abnormal        Results/Data  Fingerstick - POC 02Aug2016 09:57AM Saenz Cos     Test Name Result Flag Reference   Glucose Finger Stick 310       Office Record Review: I have reviewed the office records as summarized above in the HPI  I have requested any additional records  (1) LIPID PANEL, FASTING [id number] 89:15UO [employer]     Test Name Result Flag Reference   CHOLESTEROL, TOTAL 309 mg/dL H 125-200   HDL CHOLESTEROL 49 mg/dL  > OR = 46   TRIGLICERIDES 472 mg/dL H <150   LDL-CHOLESTEROL 202 mg/dL (calc) H <130   LDL-C levels > or = 190 mg/dL may indicate familial   hypercholesterolemia (FH)  Clinical assessment and   measurement of blood lipid levels should be considered  for all first degree relatives of patients with an   FH diagnosis  J of Clinical Lipidology 5:S1-S8 2011  Desirable range <100 mg/dL for patients with CHD or  diabetes and <70 mg/dL for diabetic patients with  known heart disease  CHOL/HDLC RATIO 6 3 (calc) H < OR = 5 0   NON HDL CHOLESTEROL 260 mg/dL (calc) H    Target for non-HDL cholesterol is 30 mg/dL higher than   LDL cholesterol target  (1) COMPREHENSIVE METABOLIC PANEL [id number] 66:04WZ [employer]     Test Name Result Flag Reference   GLUCOSE 353 mg/dL H 65-99   Fasting reference interval   UREA NITROGEN (BUN) 8 mg/dL  7-25   CREATININE 0 65 mg/dL  0 50-1 10   eGFR NON-AFR   AMERICAN 107 mL/min/1 73m2  > OR = 60   eGFR AFRICAN AMERICAN 124 mL/min/1 73m2  > OR = 60   BUN/CREATININE RATIO   6-39   NOT APPLICABLE (calc)   SODIUM 136 mmol/L  135-146   POTASSIUM 4 2 mmol/L  3 5-5 3   CHLORIDE 101 mmol/L     CARBON DIOXIDE 20 mmol/L  19-30   CALCIUM 9 5 mg/dL  8 6-10 2   PROTEIN, TOTAL 7 3 g/dL  6 1-8 1   ALBUMIN 4 2 g/dL  3 6-5 1   GLOBULIN 3 1 g/dL (calc)  1 9-3 7   ALBUMIN/GLOBULIN RATIO 1 4 (calc)  1 0-2 5   BILIRUBIN, TOTAL 0 3 mg/dL  0 2-1 2   ALKALINE PHOSPHATASE 121 U/L H    AST 33 U/L  10-35   ALT 58 U/L H 6-29     (Q) HEMOGLOBIN A1c [id number] 16:50XV [employer]     Test Name Result Flag Reference   HEMOGLOBIN A1c 13 0 % of total Hgb H <5 7   According to ADA guidelines, hemoglobin A1c <7 0%  represents optimal control in non-pregnant diabetic  patients  Different metrics may apply to specific  patient populations  Standards of Medical Care in    Diabetes Care  2013;36:s11-s66     For the purpose of screening for the presence of  diabetes  <5 7%       Consistent with the absence of diabetes  5 7-6 4%    Consistent with increased risk for diabetes              (prediabetes)  >or=6 5%    Consistent with diabetes     This assay result is consistent with diabetes  mellitus  Currently, no consensus exists for use of hemoglobin  A1c for diagnosis of diabetes for children  (1) LIPASE [id number] 21:29ZL Matt Fill   REPORT COMMENT:  FASTING:YES     Test Name Result Flag Reference   LIPASE 24 U/L  7-60     (1) COMPREHENSIVE METABOLIC PANEL [id number] 00:09TS Matt Fill     Test Name Result Flag Reference   GLUCOSE 212 mg/dL H 65-99   Fasting reference interval   UREA NITROGEN (BUN) 12 mg/dL  7-25   CREATININE 0 67 mg/dL  0 50-1 10   eGFR NON-AFR  AMERICAN 107 mL/min/1 73m2  > OR = 60   eGFR AFRICAN AMERICAN 124 mL/min/1 73m2  > OR = 60   BUN/CREATININE RATIO   0-12   NOT APPLICABLE (calc)   SODIUM 136 mmol/L  135-146   POTASSIUM 4 5 mmol/L  3 5-5 3   CHLORIDE 103 mmol/L     CARBON DIOXIDE 21 mmol/L  19-30   CALCIUM 9 5 mg/dL  8 6-10 2   PROTEIN, TOTAL 7 0 g/dL  6 1-8 1   ALBUMIN 3 7 g/dL  3 6-5 1   GLOBULIN 3 3 g/dL (calc)  1 9-3 7   ALBUMIN/GLOBULIN RATIO 1 1 (calc)  1 0-2 5   BILIRUBIN, TOTAL 0 4 mg/dL  0 2-1 2   ALKALINE PHOSPHATASE 152 U/L H     U/L H 10-30    U/L H 6-29     (Q) LIPID PANEL WITH REFLEX TO DIRECT LDL [id number] 11:96BT Matt Fill     Test Name Result Flag Reference   CHOLESTEROL, TOTAL 284 mg/dL H 125-200   HDL CHOLESTEROL 43 mg/dL L > OR = 46   TRIGLICERIDES 375 mg/dL H <150   LDL-CHOLESTEROL 177 mg/dL (calc) H <130   Desirable range <100 mg/dL for patients with CHD or  diabetes and <70 mg/dL for diabetic patients with  known heart disease     CHOL/HDLC RATIO 6 6 (calc) H < OR = 5  0   NON HDL CHOLESTEROL 241 mg/dL (calc) H    Target for non-HDL cholesterol is 30 mg/dL higher than   LDL cholesterol target  (Q) MICROALBUMIN, RANDOM URINE (W/CREATININE) [id number] 67:44OL Alejandro Suggs     Test Name Result Flag Reference   CREATININE, RANDOM URINE 91 mg/dL     MICROALBUMIN 0 5 mg/dL     Reference Range  Not established   MICROALBUMIN/CREATININE$RATIO, RANDOM URINE 5 mcg/mg creat  <30   The ADA defines abnormalities in albumin  excretion as follows:     Category         Result (mcg/mg creatinine)     Normal                    <30  Microalbuminuria            Clinical albuminuria   > OR = 300     The ADA recommends that at least two of three  specimens collected within a 3-6 month period be  abnormal before considering a patient to be  within a diagnostic category  (Q) TSH, 3RD GENERATION [id number] 35:38QF Alejandro Suggs     Test Name Result Flag Reference   TSH 1 40 mIU/L     Reference Range                         > or = 20 Years  0 40-4 50                              Pregnancy Ranges            First trimester    0 26-2 66            Second trimester   0 55-2 73            Third trimester    0 43-2 91     (Q) HEMOGLOBIN A1c [id number] 57:00VW Eduardo Mendez   REPORT COMMENT:  FASTING:YES     Test Name Result Flag Reference   HEMOGLOBIN A1c 10 6 % of total Hgb H <5 7   According to ADA guidelines, hemoglobin A1c <7 0%  represents optimal control in non-pregnant diabetic  patients  Different metrics may apply to specific  patient populations  Standards of Medical Care in    Diabetes Care  2013;36:s11-s66     For the purpose of screening for the presence of  diabetes  <5 7%       Consistent with the absence of diabetes  5 7-6 4%    Consistent with increased risk for diabetes              (prediabetes)  >or=6 5%    Consistent with diabetes     This assay result is consistent with diabetes  mellitus       Currently, no consensus exists for use of hemoglobin  A1c for diagnosis of diabetes for children  Future Appointments    Date/Time Provider Specialty Site   10/24/2016 09:30 AM ANASTACIO Shaw  Endocrinology [facility] ENDOCRINOLOGY [place] [facility]   08/24/2016 01:30 PM ANASTACIO Morton  Hematology [facility]   [facility]12[facility]2016 09:[facility]   09/13/2016 12:30 [facility]   10/04/2016 10:00 AM Gabriel Vila [facility]   08/03/2016 01:30 PM ANASTACIO Guerrero   Orthopedic Surgery [address]   61/63/4509 59:90 PM [facility]     Signatures   Electronically signed by : ANASTACIO Coy ; Aug  2 2016  5:29PM EST                       (Author)

## 2018-01-25 DIAGNOSIS — G43.709 CHRONIC MIGRAINE WITHOUT AURA WITHOUT STATUS MIGRAINOSUS, NOT INTRACTABLE: Primary | ICD-10-CM

## 2018-01-25 DIAGNOSIS — J45.20 MILD INTERMITTENT ASTHMA, UNSPECIFIED WHETHER COMPLICATED: ICD-10-CM

## 2018-01-25 DIAGNOSIS — G89.4 CHRONIC PAIN SYNDROME: Primary | ICD-10-CM

## 2018-01-25 DIAGNOSIS — F51.01 PRIMARY INSOMNIA: ICD-10-CM

## 2018-01-25 DIAGNOSIS — F41.9 ANXIETY: ICD-10-CM

## 2018-01-26 RX ORDER — ZOLPIDEM TARTRATE 10 MG/1
TABLET ORAL
Qty: 30 TABLET | Refills: 0 | Status: SHIPPED | OUTPATIENT
Start: 2018-01-26 | End: 2018-01-27 | Stop reason: SDUPTHER

## 2018-01-26 RX ORDER — ALPRAZOLAM 0.25 MG/1
TABLET ORAL
Qty: 90 TABLET | Refills: 0 | Status: SHIPPED | OUTPATIENT
Start: 2018-01-26 | End: 2018-01-27 | Stop reason: SDUPTHER

## 2018-01-26 RX ORDER — KETOROLAC TROMETHAMINE 10 MG/1
TABLET, FILM COATED ORAL
Qty: 7 TABLET | Refills: 0 | Status: SHIPPED | OUTPATIENT
Start: 2018-01-26 | End: 2018-02-23 | Stop reason: SDUPTHER

## 2018-01-26 RX ORDER — TRAMADOL HYDROCHLORIDE 50 MG/1
TABLET ORAL
Qty: 240 TABLET | Refills: 0 | Status: SHIPPED | OUTPATIENT
Start: 2018-01-26 | End: 2018-01-27 | Stop reason: SDUPTHER

## 2018-01-26 RX ORDER — ALBUTEROL SULFATE 90 UG/1
POWDER, METERED RESPIRATORY (INHALATION)
Qty: 1 EACH | Refills: 1 | Status: SHIPPED | OUTPATIENT
Start: 2018-01-26 | End: 2018-01-29 | Stop reason: SDUPTHER

## 2018-01-27 DIAGNOSIS — G43.709 CHRONIC MIGRAINE WITHOUT AURA WITHOUT STATUS MIGRAINOSUS, NOT INTRACTABLE: Primary | ICD-10-CM

## 2018-01-27 DIAGNOSIS — F41.9 ANXIETY: ICD-10-CM

## 2018-01-27 DIAGNOSIS — F51.01 PRIMARY INSOMNIA: ICD-10-CM

## 2018-01-27 DIAGNOSIS — G89.4 CHRONIC PAIN SYNDROME: ICD-10-CM

## 2018-01-27 RX ORDER — DIVALPROEX SODIUM 250 MG/1
TABLET, DELAYED RELEASE ORAL
Qty: 180 TABLET | Refills: 0 | Status: SHIPPED | OUTPATIENT
Start: 2018-01-27 | End: 2018-01-30 | Stop reason: SDUPTHER

## 2018-01-29 ENCOUNTER — TELEPHONE (OUTPATIENT)
Dept: NEUROLOGY | Facility: CLINIC | Age: 48
End: 2018-01-29

## 2018-01-29 DIAGNOSIS — F41.9 ANXIETY: ICD-10-CM

## 2018-01-29 DIAGNOSIS — G89.4 CHRONIC PAIN SYNDROME: ICD-10-CM

## 2018-01-29 DIAGNOSIS — F51.01 PRIMARY INSOMNIA: ICD-10-CM

## 2018-01-29 DIAGNOSIS — J45.20 MILD INTERMITTENT ASTHMA, UNSPECIFIED WHETHER COMPLICATED: ICD-10-CM

## 2018-01-29 DIAGNOSIS — G43.709 CHRONIC MIGRAINE WITHOUT AURA WITHOUT STATUS MIGRAINOSUS, NOT INTRACTABLE: ICD-10-CM

## 2018-01-29 RX ORDER — ALBUTEROL SULFATE 90 UG/1
POWDER, METERED RESPIRATORY (INHALATION)
Qty: 1 EACH | Refills: 1 | Status: SHIPPED | OUTPATIENT
Start: 2018-01-29 | End: 2018-12-21

## 2018-01-29 RX ORDER — ZOLPIDEM TARTRATE 10 MG/1
TABLET ORAL
Qty: 30 TABLET | Refills: 0 | Status: SHIPPED | OUTPATIENT
Start: 2018-01-29 | End: 2018-01-29 | Stop reason: SDUPTHER

## 2018-01-29 RX ORDER — TRAMADOL HYDROCHLORIDE 50 MG/1
50-100 TABLET ORAL 4 TIMES DAILY PRN
Qty: 240 TABLET | Refills: 0 | OUTPATIENT
Start: 2018-01-29 | End: 2018-06-15 | Stop reason: SDUPTHER

## 2018-01-29 RX ORDER — TRAMADOL HYDROCHLORIDE 50 MG/1
TABLET ORAL
Qty: 240 TABLET | Refills: 0 | Status: SHIPPED | OUTPATIENT
Start: 2018-01-29 | End: 2018-01-29 | Stop reason: SDUPTHER

## 2018-01-29 RX ORDER — ZOLPIDEM TARTRATE 10 MG/1
10 TABLET ORAL
Qty: 30 TABLET | Refills: 0 | OUTPATIENT
Start: 2018-01-29 | End: 2018-07-20 | Stop reason: SDUPTHER

## 2018-01-29 RX ORDER — ALPRAZOLAM 0.25 MG/1
0.25 TABLET ORAL 3 TIMES DAILY PRN
Qty: 90 TABLET | Refills: 0 | OUTPATIENT
Start: 2018-01-29 | End: 2018-07-27 | Stop reason: SDUPTHER

## 2018-01-29 RX ORDER — ALPRAZOLAM 0.25 MG/1
TABLET ORAL
Qty: 90 TABLET | Refills: 0 | Status: SHIPPED | OUTPATIENT
Start: 2018-01-29 | End: 2018-01-29 | Stop reason: SDUPTHER

## 2018-01-29 NOTE — TELEPHONE ENCOUNTER
Pia has been trying to reach the pt to set up botox delivery  Pt has not returned their calls   They will be mailing out a patient rescue letter and will be placing her order of botox on hold until they speak to the patient

## 2018-01-30 DIAGNOSIS — G43.709 CHRONIC MIGRAINE WITHOUT AURA WITHOUT STATUS MIGRAINOSUS, NOT INTRACTABLE: ICD-10-CM

## 2018-01-30 RX ORDER — KETOROLAC TROMETHAMINE 10 MG/1
TABLET, FILM COATED ORAL
Qty: 10 TABLET | Refills: 0 | OUTPATIENT
Start: 2018-01-30

## 2018-01-30 RX ORDER — DIVALPROEX SODIUM 250 MG/1
TABLET, DELAYED RELEASE ORAL
Qty: 180 TABLET | Refills: 1 | Status: SHIPPED | OUTPATIENT
Start: 2018-01-30 | End: 2018-02-23 | Stop reason: ALTCHOICE

## 2018-01-31 DIAGNOSIS — G89.4 CHRONIC PAIN SYNDROME: ICD-10-CM

## 2018-01-31 DIAGNOSIS — F51.01 PRIMARY INSOMNIA: ICD-10-CM

## 2018-01-31 DIAGNOSIS — F41.9 ANXIETY: ICD-10-CM

## 2018-01-31 RX ORDER — ALPRAZOLAM 0.25 MG/1
TABLET ORAL
Qty: 90 TABLET | Refills: 0 | OUTPATIENT
Start: 2018-01-31

## 2018-01-31 RX ORDER — ZOLPIDEM TARTRATE 10 MG/1
TABLET ORAL
Qty: 30 TABLET | Refills: 0 | OUTPATIENT
Start: 2018-01-31

## 2018-01-31 RX ORDER — TRAMADOL HYDROCHLORIDE 50 MG/1
TABLET ORAL
Qty: 240 TABLET | Refills: 0 | OUTPATIENT
Start: 2018-01-31

## 2018-02-16 DIAGNOSIS — Z79.4 TYPE 2 DIABETES MELLITUS WITH OTHER NEUROLOGIC COMPLICATION, WITH LONG-TERM CURRENT USE OF INSULIN (HCC): Primary | ICD-10-CM

## 2018-02-16 DIAGNOSIS — E11.49 TYPE 2 DIABETES MELLITUS WITH OTHER NEUROLOGIC COMPLICATION, WITH LONG-TERM CURRENT USE OF INSULIN (HCC): Primary | ICD-10-CM

## 2018-02-16 RX ORDER — METFORMIN HYDROCHLORIDE 500 MG/1
TABLET, EXTENDED RELEASE ORAL
Qty: 360 TABLET | Refills: 0 | Status: SHIPPED | OUTPATIENT
Start: 2018-02-16 | End: 2018-12-21 | Stop reason: SDUPTHER

## 2018-02-22 RX ORDER — ALBUTEROL SULFATE 90 UG/1
2 AEROSOL, METERED RESPIRATORY (INHALATION)
COMMUNITY
Start: 2016-02-04 | End: 2020-05-26

## 2018-02-22 RX ORDER — VENLAFAXINE HYDROCHLORIDE 150 MG/1
1 CAPSULE, EXTENDED RELEASE ORAL DAILY
COMMUNITY
Start: 2016-12-13 | End: 2018-12-21 | Stop reason: SDUPTHER

## 2018-02-22 RX ORDER — KETOROLAC TROMETHAMINE 10 MG/1
TABLET, FILM COATED ORAL
COMMUNITY
Start: 2016-11-18 | End: 2018-12-21 | Stop reason: SDUPTHER

## 2018-02-23 ENCOUNTER — OFFICE VISIT (OUTPATIENT)
Dept: FAMILY MEDICINE CLINIC | Facility: CLINIC | Age: 48
End: 2018-02-23
Payer: COMMERCIAL

## 2018-02-23 ENCOUNTER — TELEPHONE (OUTPATIENT)
Dept: FAMILY MEDICINE CLINIC | Facility: CLINIC | Age: 48
End: 2018-02-23

## 2018-02-23 VITALS
SYSTOLIC BLOOD PRESSURE: 142 MMHG | DIASTOLIC BLOOD PRESSURE: 96 MMHG | BODY MASS INDEX: 27.57 KG/M2 | HEIGHT: 62 IN | WEIGHT: 149.8 LBS

## 2018-02-23 DIAGNOSIS — M62.838 MUSCLE SPASM: ICD-10-CM

## 2018-02-23 DIAGNOSIS — E03.9 ACQUIRED HYPOTHYROIDISM: Primary | ICD-10-CM

## 2018-02-23 DIAGNOSIS — E78.2 MIXED HYPERLIPIDEMIA: ICD-10-CM

## 2018-02-23 DIAGNOSIS — G63 POLYNEUROPATHY ASSOCIATED WITH UNDERLYING DISEASE (HCC): ICD-10-CM

## 2018-02-23 DIAGNOSIS — G43.709 CHRONIC MIGRAINE WITHOUT AURA WITHOUT STATUS MIGRAINOSUS, NOT INTRACTABLE: ICD-10-CM

## 2018-02-23 DIAGNOSIS — I10 ESSENTIAL HYPERTENSION: ICD-10-CM

## 2018-02-23 DIAGNOSIS — J45.20 MILD INTERMITTENT REACTIVE AIRWAY DISEASE WITHOUT COMPLICATION: ICD-10-CM

## 2018-02-23 DIAGNOSIS — Z79.4 TYPE 2 DIABETES MELLITUS WITH COMPLICATION, WITH LONG-TERM CURRENT USE OF INSULIN (HCC): Primary | ICD-10-CM

## 2018-02-23 DIAGNOSIS — E11.8 TYPE 2 DIABETES MELLITUS WITH COMPLICATION, WITH LONG-TERM CURRENT USE OF INSULIN (HCC): ICD-10-CM

## 2018-02-23 DIAGNOSIS — F41.9 ANXIETY: ICD-10-CM

## 2018-02-23 DIAGNOSIS — E11.8 TYPE 2 DIABETES MELLITUS WITH COMPLICATION, WITH LONG-TERM CURRENT USE OF INSULIN (HCC): Primary | ICD-10-CM

## 2018-02-23 DIAGNOSIS — Z79.4 TYPE 2 DIABETES MELLITUS WITH COMPLICATION, WITH LONG-TERM CURRENT USE OF INSULIN (HCC): ICD-10-CM

## 2018-02-23 DIAGNOSIS — F33.41 RECURRENT MAJOR DEPRESSIVE DISORDER, IN PARTIAL REMISSION (HCC): Chronic | ICD-10-CM

## 2018-02-23 PROBLEM — M50.120 CERVICAL DISC DISORDER WITH RADICULOPATHY OF MID-CERVICAL REGION: Status: ACTIVE | Noted: 2017-03-17

## 2018-02-23 PROBLEM — J45.909 REACTIVE AIRWAY DISEASE WITHOUT COMPLICATION: Status: ACTIVE | Noted: 2018-02-23

## 2018-02-23 PROBLEM — K85.90 PANCREATITIS: Status: RESOLVED | Noted: 2017-08-25 | Resolved: 2018-02-23

## 2018-02-23 LAB — SL AMB POCT HEMOGLOBIN AIC: 13.3

## 2018-02-23 PROCEDURE — 99214 OFFICE O/P EST MOD 30 MIN: CPT | Performed by: FAMILY MEDICINE

## 2018-02-23 PROCEDURE — 83036 HEMOGLOBIN GLYCOSYLATED A1C: CPT | Performed by: FAMILY MEDICINE

## 2018-02-23 RX ORDER — CYCLOBENZAPRINE HCL 10 MG
TABLET ORAL
COMMUNITY
Start: 2017-01-24 | End: 2018-02-24 | Stop reason: SDUPTHER

## 2018-02-23 RX ORDER — TIZANIDINE 2 MG/1
2 TABLET ORAL EVERY 6 HOURS PRN
Qty: 30 TABLET | Refills: 5 | Status: SHIPPED | OUTPATIENT
Start: 2018-02-23 | End: 2018-02-26 | Stop reason: SDUPTHER

## 2018-02-23 RX ORDER — ASPIRIN 325 MG
1 TABLET ORAL DAILY
COMMUNITY
Start: 2013-03-15 | End: 2018-12-21 | Stop reason: SDUPTHER

## 2018-02-23 RX ORDER — KETOROLAC TROMETHAMINE 10 MG/1
10 TABLET, FILM COATED ORAL EVERY 8 HOURS PRN
Qty: 7 TABLET | Refills: 5 | Status: SHIPPED | OUTPATIENT
Start: 2018-02-23 | End: 2018-02-26 | Stop reason: SDUPTHER

## 2018-02-23 RX ORDER — BUPROPION HYDROCHLORIDE 150 MG/1
150 TABLET ORAL DAILY
Refills: 3 | COMMUNITY
Start: 2018-01-09 | End: 2018-04-13 | Stop reason: SDUPTHER

## 2018-02-23 RX ORDER — PEN NEEDLE, DIABETIC 32GX 5/32"
NEEDLE, DISPOSABLE MISCELLANEOUS DAILY
Refills: 0 | COMMUNITY
Start: 2017-11-20 | End: 2020-04-28 | Stop reason: SDUPTHER

## 2018-02-23 RX ORDER — TURMERIC ROOT EXTRACT 500 MG
1 TABLET ORAL DAILY
COMMUNITY

## 2018-02-23 RX ORDER — LEVOTHYROXINE SODIUM 112 UG/1
112 TABLET ORAL DAILY
Qty: 30 TABLET | Refills: 11 | Status: SHIPPED | OUTPATIENT
Start: 2018-02-23 | End: 2018-02-26 | Stop reason: SDUPTHER

## 2018-02-23 RX ORDER — ONDANSETRON 4 MG/1
TABLET, ORALLY DISINTEGRATING ORAL
Refills: 0 | COMMUNITY
Start: 2018-01-23 | End: 2018-03-16 | Stop reason: SDUPTHER

## 2018-02-23 NOTE — TELEPHONE ENCOUNTER
Pt called in stating that she gave a list of meds that needed to be filled when she was here this morning, she called the [facility] and was told that nothing was ordered     Earl Dewey would like a call back  # [phone]

## 2018-02-23 NOTE — ASSESSMENT & PLAN NOTE
2 samples of Symbicort 80 have been given to the patient sample of ProAir respite click has been given to the patient

## 2018-02-23 NOTE — PROGRESS NOTES
Assessment/Plan:    Uncontrolled type 2 diabetes mellitus with diabetic polyneuropathy, with long-term current use of insulin ([facility])  A1c in the office is 13 1  Patient is noncompliant with her medication  Multiple samples provided today  She is to call us every 2 weeks with her sugars  Will likely send Endocrinology to see what else they can possibly do for her  We might have to change her to 70/30 insulin for cost savings  [facility]urop[facility] associated with [facility]  Stable    HTN (hypertension)  Stable continue present medication    Depression  Increased symptoms right now but continue present medication  Patient not suicidal    Hyperlipidemia  Patient did not get labs for today  Will update them in 3 months  Continue    Reactive airway disease without complication  2 samples of Symbicort 80 have been given to the patient sample of ProAir respite click has been given to the patient       Diagnoses and all orders for this visit:    Type 2 diabetes mellitus with complication, with long-term current use of insulin ([facility])  -     POCT hemoglobin A1c    Polyneuropathy associated with underlying disease ([facility] )    Essential hypertension    Recurrent major depressive disorder, in partial remission ([facility] )    Anxiety    Mixed hyperlipidemia    Mild intermittent reactive airway disease without complication    Other orders  -     venlafaxine (EFFEXOR-XR) 150 mg 24 hr capsule; Take 1 capsule by mouth daily  -     Insulin Glargine (TOUJEO SOLOSTAR) injection pen 300 units/mL; Inject under the skin  -     albuterol (PROAIR HFA) 90 mcg/act inhaler; Inhale 2 puffs  -     ketorolac (TORADOL) 10 mg tablet; Take by mouth  -     aspirin 325 mg tablet;  Take 1 tablet by mouth daily  -     Insulin Pen Needle (BD PEN NEEDLE LINDY U/F) 32G X 4 MM MISC; by Does not apply route daily  -     Botulinum Toxin Type A (BOTOX) 200 units SOLR; Inject 200 Units as directed every 3 (three) months  -     buPROPion (WELLBUTRIN XL) 150 mg 24 hr tablet; Take 150 mg by mouth daily  -     cyclobenzaprine (FLEXERIL) 10 mg tablet; Take by mouth  -     glucose blood (FREESTYLE LITE) test strip; by In Vitro route  -     BD PEN NEEDLE LINDY U/F 32G X 4 MM MISC; daily  -     ondansetron (ZOFRAN-ODT) 4 mg disintegrating tablet; ALLOW 1 TABLET TO DISSOLVE ON TONGUE TWICE DAILY AS NEEDED FOR NAUSEA  -     Turmeric 500 MG TABS; Take 1 tablet by mouth daily        There are no Patient Instructions on file for this visit  Subjective:        Patient ID: Chun Miller is a 56 y o  female  Chief Complaint   Patient presents with    Follow-up     DM       18-year-old female here for 3 month check of labs in blood pressure  Patient's mother passed away since we last saw  They are not sure the cause of death  She is 64years old and living in a nursing home  Patient very upset about this  Patient also has a new insurance which has extremely poor prescription coverage  Has not been taking a lot of her meds  Is in need of an inhaler because of breathing issues  Has none  The following portions of the patient's history were reviewed and updated as appropriate: past medical history, past surgical history and problem list       Review of Systems   Constitutional: Negative for appetite change, fatigue, fever and unexpected weight change  HENT: Negative for congestion, ear pain, postnasal drip, rhinorrhea, sinus pain, sinus pressure and sore throat  Eyes: Negative for redness and visual disturbance  Respiratory: Positive for chest tightness  Negative for shortness of breath  Cardiovascular: Negative for chest pain, palpitations and leg swelling  Gastrointestinal: Negative for abdominal distention, abdominal pain, diarrhea and nausea  Endocrine: Negative for cold intolerance and heat intolerance  Genitourinary: Negative for dysuria and hematuria  Musculoskeletal: Negative for gait problem and myalgias     Skin: Negative for pallor and rash  Neurological: Negative for dizziness and headaches  Psychiatric/Behavioral: The patient is nervous/anxious  Depressed         Objective:  /96 (BP Location: Left arm, Patient Position: Sitting, Cuff Size: Standard)   Ht 5' 2" (1 575 m)   Wt 67 9 kg (149 lb 12 8 oz)   LMP 03/04/2016   BMI 27 40 kg/m²        Physical Exam   Constitutional: She is oriented to person, place, and time  She appears well-developed and well-nourished  No distress  HENT:   Head: Normocephalic and atraumatic  Right Ear: External ear normal    Left Ear: External ear normal    Mouth/Throat: Oropharynx is clear and moist    Eyes: Conjunctivae and EOM are normal  Pupils are equal, round, and reactive to light  No scleral icterus  Neck: Normal range of motion  Neck supple  No thyromegaly present  Cardiovascular: Normal rate, regular rhythm and intact distal pulses  No murmur heard  Pulmonary/Chest: Effort normal and breath sounds normal  She has no wheezes  Abdominal: Soft  Bowel sounds are normal  She exhibits no distension  There is no tenderness  Musculoskeletal: Normal range of motion  Lymphadenopathy:     She has no cervical adenopathy  Neurological: She is alert and oriented to person, place, and time  She displays normal reflexes  Coordination normal    Skin: Skin is warm  No pallor     Psychiatric: Her behavior is normal  Thought content normal    Tearful and anxious

## 2018-02-23 NOTE — ASSESSMENT & PLAN NOTE
A1c in the office is 13 1  Patient is noncompliant with her medication  Multiple samples provided today  She is to call us every 2 weeks with her sugars  Will likely send Endocrinology to see what else they can possibly do for her  We might have to change her to 70/30 insulin for cost savings

## 2018-02-24 DIAGNOSIS — G89.4 CHRONIC PAIN DISORDER: Primary | ICD-10-CM

## 2018-02-24 DIAGNOSIS — G43.709 CHRONIC MIGRAINE WITHOUT AURA WITHOUT STATUS MIGRAINOSUS, NOT INTRACTABLE: Primary | ICD-10-CM

## 2018-02-24 RX ORDER — CYCLOBENZAPRINE HCL 10 MG
TABLET ORAL
Qty: 180 TABLET | Refills: 1 | Status: SHIPPED | OUTPATIENT
Start: 2018-02-24 | End: 2018-12-21 | Stop reason: SDUPTHER

## 2018-02-25 RX ORDER — GABAPENTIN 400 MG/1
CAPSULE ORAL
Qty: 180 CAPSULE | Refills: 0 | Status: SHIPPED | OUTPATIENT
Start: 2018-02-25 | End: 2018-04-05 | Stop reason: SDUPTHER

## 2018-02-26 DIAGNOSIS — R11.0 NAUSEA: Primary | ICD-10-CM

## 2018-02-26 DIAGNOSIS — G43.709 CHRONIC MIGRAINE WITHOUT AURA WITHOUT STATUS MIGRAINOSUS, NOT INTRACTABLE: ICD-10-CM

## 2018-02-26 DIAGNOSIS — M62.838 MUSCLE SPASM: ICD-10-CM

## 2018-02-26 DIAGNOSIS — E11.8 TYPE 2 DIABETES MELLITUS WITH COMPLICATION, WITH LONG-TERM CURRENT USE OF INSULIN (HCC): ICD-10-CM

## 2018-02-26 DIAGNOSIS — E03.9 ACQUIRED HYPOTHYROIDISM: ICD-10-CM

## 2018-02-26 DIAGNOSIS — Z79.4 TYPE 2 DIABETES MELLITUS WITH COMPLICATION, WITH LONG-TERM CURRENT USE OF INSULIN (HCC): ICD-10-CM

## 2018-02-26 RX ORDER — TIZANIDINE 2 MG/1
2 TABLET ORAL EVERY 6 HOURS PRN
Qty: 60 TABLET | Refills: 5 | Status: SHIPPED | OUTPATIENT
Start: 2018-02-26 | End: 2018-10-04 | Stop reason: ALTCHOICE

## 2018-02-26 RX ORDER — LEVOTHYROXINE SODIUM 112 UG/1
112 TABLET ORAL DAILY
Qty: 30 TABLET | Refills: 5 | Status: SHIPPED | OUTPATIENT
Start: 2018-02-26 | End: 2018-12-21 | Stop reason: SDUPTHER

## 2018-02-26 RX ORDER — ONDANSETRON 4 MG/1
4 TABLET, FILM COATED ORAL EVERY 8 HOURS PRN
Qty: 20 TABLET | Refills: 0 | Status: SHIPPED | OUTPATIENT
Start: 2018-02-26 | End: 2018-08-23 | Stop reason: SDUPTHER

## 2018-02-26 RX ORDER — KETOROLAC TROMETHAMINE 10 MG/1
10 TABLET, FILM COATED ORAL EVERY 8 HOURS PRN
Qty: 7 TABLET | Refills: 5 | Status: SHIPPED | OUTPATIENT
Start: 2018-02-26 | End: 2019-09-17

## 2018-02-26 NOTE — TELEPHONE ENCOUNTER
Patient states her prescriptions were sent to the incorrect pharmacy, they should go to [address] [facility]  Can we please resend metformin, levothyroxine, ketoralac, and tizanidine to the correct pharmacy? Also states she only has 6 tablets of the Ondansetron left and wondering if this could be refilled for her, she uses the dissolving tablet

## 2018-03-16 DIAGNOSIS — R11.0 NAUSEA: Primary | ICD-10-CM

## 2018-03-16 RX ORDER — ONDANSETRON 4 MG/1
TABLET, ORALLY DISINTEGRATING ORAL
Qty: 30 TABLET | Refills: 0 | Status: SHIPPED | OUTPATIENT
Start: 2018-03-16 | End: 2018-06-15 | Stop reason: SDUPTHER

## 2018-03-23 DIAGNOSIS — I10 ESSENTIAL HYPERTENSION: Primary | ICD-10-CM

## 2018-03-23 DIAGNOSIS — G43.709 CHRONIC MIGRAINE WITHOUT AURA WITHOUT STATUS MIGRAINOSUS, NOT INTRACTABLE: Primary | ICD-10-CM

## 2018-03-23 RX ORDER — TOPIRAMATE 25 MG/1
TABLET ORAL
Qty: 180 TABLET | Refills: 1 | Status: SHIPPED | OUTPATIENT
Start: 2018-03-23 | End: 2018-12-21

## 2018-04-05 DIAGNOSIS — G89.4 CHRONIC PAIN DISORDER: ICD-10-CM

## 2018-04-05 RX ORDER — GABAPENTIN 400 MG/1
CAPSULE ORAL
Qty: 180 CAPSULE | Refills: 0 | Status: SHIPPED | OUTPATIENT
Start: 2018-04-05 | End: 2018-05-10 | Stop reason: SDUPTHER

## 2018-04-11 DIAGNOSIS — I10 ESSENTIAL HYPERTENSION: Primary | ICD-10-CM

## 2018-04-11 RX ORDER — LOSARTAN POTASSIUM 50 MG/1
TABLET ORAL
Qty: 90 TABLET | Refills: 2 | Status: SHIPPED | OUTPATIENT
Start: 2018-04-11

## 2018-04-13 DIAGNOSIS — F33.41 RECURRENT MAJOR DEPRESSIVE DISORDER, IN PARTIAL REMISSION (HCC): Primary | ICD-10-CM

## 2018-04-16 ENCOUNTER — TELEPHONE (OUTPATIENT)
Dept: FAMILY MEDICINE CLINIC | Facility: CLINIC | Age: 48
End: 2018-04-16

## 2018-04-16 RX ORDER — BUPROPION HYDROCHLORIDE 150 MG/1
TABLET ORAL
Qty: 90 TABLET | Refills: 2 | Status: SHIPPED | OUTPATIENT
Start: 2018-04-16 | End: 2019-03-14 | Stop reason: SDUPTHER

## 2018-04-16 NOTE — TELEPHONE ENCOUNTER
Please clarify with the patient that she really should not be on 2 different muscle relaxants and she should pick the 1 that works best for her and let me know

## 2018-04-16 NOTE — TELEPHONE ENCOUNTER
I spoke to the patient she states the [place] works the best for her, so I did notify the pharmacy this is what the patient should be taking

## 2018-05-10 DIAGNOSIS — G89.4 CHRONIC PAIN DISORDER: ICD-10-CM

## 2018-05-10 RX ORDER — GABAPENTIN 400 MG/1
CAPSULE ORAL
Qty: 180 CAPSULE | Refills: 0 | Status: SHIPPED | OUTPATIENT
Start: 2018-05-10 | End: 2018-12-21 | Stop reason: SDUPTHER

## 2018-06-15 DIAGNOSIS — G89.4 CHRONIC PAIN SYNDROME: ICD-10-CM

## 2018-06-15 DIAGNOSIS — R11.0 NAUSEA: ICD-10-CM

## 2018-06-15 RX ORDER — ONDANSETRON 4 MG/1
TABLET, ORALLY DISINTEGRATING ORAL
Qty: 30 TABLET | Refills: 0 | Status: SHIPPED | OUTPATIENT
Start: 2018-06-15 | End: 2019-03-22

## 2018-06-15 RX ORDER — TRAMADOL HYDROCHLORIDE 50 MG/1
TABLET ORAL
Qty: 240 TABLET | Refills: 0 | Status: SHIPPED | OUTPATIENT
Start: 2018-06-15 | End: 2018-08-23 | Stop reason: SDUPTHER

## 2018-06-28 ENCOUNTER — TELEPHONE (OUTPATIENT)
Dept: FAMILY MEDICINE CLINIC | Facility: CLINIC | Age: 48
End: 2018-06-28

## 2018-06-28 NOTE — TELEPHONE ENCOUNTER
Please call patient back ASAP, if her sugars are running that high she MUST present to the ER and follow up with us       Thank you

## 2018-06-28 NOTE — TELEPHONE ENCOUNTER
Pt refuses to go to ER  States she does not want to go at this time because last time she went the staff was "mean" to her and had "issues" I asked if she could possibly go to a different hospital, she said yes she could but does not want to tonight  States she will go tomorrow if her numbers are still high  Either way, I asked her to give me a call so we know  If I do not hear from her tomorrow morning, I will call her myself to get her status

## 2018-06-28 NOTE — TELEPHONE ENCOUNTER
Patient asking if we have samples of a quick acting insulin, her blood sugars have been running very high for the past 5 days, at times cannot even be read on her glucometer which goes to 600  Also asking for a slip for labs before her August appointment to be mailed to her

## 2018-06-29 NOTE — TELEPHONE ENCOUNTER
Pt is coming to pick of lantus samples today  Pt cannot afford any insulin to be prescribed to her pharmacy

## 2018-06-29 NOTE — TELEPHONE ENCOUNTER
Please verify which insulins she takes  We can check samples and she can  today if we have them       It says she takes toujeo and lantus but it should just be one of those    Then the humalog       Thanks

## 2018-06-29 NOTE — TELEPHONE ENCOUNTER
Pt states her sugar this morning was 375, she does have blurry vision and excessive thirst  Feels a lot better than yesterday  Pt does not want to go to ER at this time  She does need the long acting insulin

## 2018-07-12 DIAGNOSIS — G43.709 CHRONIC MIGRAINE WITHOUT AURA WITHOUT STATUS MIGRAINOSUS, NOT INTRACTABLE: Primary | ICD-10-CM

## 2018-07-12 RX ORDER — OLANZAPINE 5 MG/1
TABLET ORAL
Qty: 30 TABLET | Refills: 3 | Status: SHIPPED | OUTPATIENT
Start: 2018-07-12 | End: 2018-12-21 | Stop reason: SDUPTHER

## 2018-07-20 DIAGNOSIS — F51.01 PRIMARY INSOMNIA: ICD-10-CM

## 2018-07-20 RX ORDER — ZOLPIDEM TARTRATE 10 MG/1
TABLET ORAL
Qty: 30 TABLET | Refills: 1 | Status: SHIPPED | OUTPATIENT
Start: 2018-07-20 | End: 2018-09-23 | Stop reason: SDUPTHER

## 2018-07-27 DIAGNOSIS — F41.9 ANXIETY: ICD-10-CM

## 2018-07-30 ENCOUNTER — TELEPHONE (OUTPATIENT)
Dept: FAMILY MEDICINE CLINIC | Facility: CLINIC | Age: 48
End: 2018-07-30

## 2018-07-30 NOTE — TELEPHONE ENCOUNTER
Patient called and stated she has an appt on 08/03/2018, she needs a bw script to get done prior   She goes to [facility]

## 2018-07-31 ENCOUNTER — TRANSCRIBE ORDERS (OUTPATIENT)
Dept: FAMILY MEDICINE CLINIC | Facility: CLINIC | Age: 48
End: 2018-07-31

## 2018-07-31 DIAGNOSIS — M25.50 ARTHRALGIA, UNSPECIFIED JOINT: Primary | ICD-10-CM

## 2018-07-31 DIAGNOSIS — IMO0002 TYPE II DIABETES MELLITUS WITH COMA, UNCONTROLLED: ICD-10-CM

## 2018-07-31 DIAGNOSIS — R79.89 ABNORMAL LFTS: ICD-10-CM

## 2018-08-01 RX ORDER — ALPRAZOLAM 0.25 MG/1
TABLET ORAL
Qty: 90 TABLET | Refills: 1 | Status: SHIPPED | OUTPATIENT
Start: 2018-08-01 | End: 2018-10-25 | Stop reason: SDUPTHER

## 2018-08-23 DIAGNOSIS — G89.4 CHRONIC PAIN SYNDROME: ICD-10-CM

## 2018-08-23 DIAGNOSIS — R11.0 NAUSEA: ICD-10-CM

## 2018-08-23 RX ORDER — ONDANSETRON 4 MG/1
4 TABLET, FILM COATED ORAL EVERY 8 HOURS PRN
Qty: 20 TABLET | Refills: 3 | Status: SHIPPED | OUTPATIENT
Start: 2018-08-23 | End: 2018-11-28 | Stop reason: SDUPTHER

## 2018-08-23 RX ORDER — TRAMADOL HYDROCHLORIDE 50 MG/1
TABLET ORAL
Qty: 240 TABLET | Refills: 2 | Status: SHIPPED | OUTPATIENT
Start: 2018-08-23 | End: 2019-01-11 | Stop reason: SDUPTHER

## 2018-09-04 RX ORDER — ASPIRIN 325 MG
TABLET ORAL DAILY
COMMUNITY
End: 2018-12-21 | Stop reason: SDUPTHER

## 2018-09-23 DIAGNOSIS — F51.01 PRIMARY INSOMNIA: ICD-10-CM

## 2018-09-24 RX ORDER — ZOLPIDEM TARTRATE 10 MG/1
TABLET ORAL
Qty: 30 TABLET | Refills: 1 | Status: SHIPPED | OUTPATIENT
Start: 2018-09-24 | End: 2018-11-28 | Stop reason: SDUPTHER

## 2018-10-04 ENCOUNTER — TELEPHONE (OUTPATIENT)
Dept: FAMILY MEDICINE CLINIC | Facility: CLINIC | Age: 48
End: 2018-10-04

## 2018-10-04 DIAGNOSIS — M25.511 CHRONIC PAIN OF BOTH SHOULDERS: Primary | ICD-10-CM

## 2018-10-04 DIAGNOSIS — G89.29 CHRONIC PAIN OF BOTH SHOULDERS: Primary | ICD-10-CM

## 2018-10-04 DIAGNOSIS — M25.512 CHRONIC PAIN OF BOTH SHOULDERS: Primary | ICD-10-CM

## 2018-10-04 RX ORDER — CYCLOBENZAPRINE HCL 10 MG
10 TABLET ORAL 3 TIMES DAILY PRN
Qty: 30 TABLET | Refills: 0 | Status: SHIPPED | OUTPATIENT
Start: 2018-10-04 | End: 2018-12-21 | Stop reason: SDUPTHER

## 2018-10-04 NOTE — TELEPHONE ENCOUNTER
Patient aware but wants to know if she can get bilateral xrays on her shoulders due to the pain that she is in

## 2018-10-04 NOTE — TELEPHONE ENCOUNTER
Patient has bilateral shoulder pain  Her left is worse than the right  Her pain level is an 8  She states that the Tizanidine is not helping  She would like to go back on the Flexeril      [address] [place] [address] Anirudh Torres

## 2018-10-05 DIAGNOSIS — G89.29 OTHER CHRONIC PAIN: ICD-10-CM

## 2018-10-05 DIAGNOSIS — G89.29 CHRONIC PAIN IN LEFT SHOULDER: Primary | ICD-10-CM

## 2018-10-05 DIAGNOSIS — M25.512 CHRONIC PAIN IN LEFT SHOULDER: Primary | ICD-10-CM

## 2018-10-25 DIAGNOSIS — F41.9 ANXIETY: ICD-10-CM

## 2018-10-27 RX ORDER — ALPRAZOLAM 0.25 MG/1
TABLET ORAL
Qty: 90 TABLET | Refills: 1 | Status: SHIPPED | OUTPATIENT
Start: 2018-10-27 | End: 2018-12-27 | Stop reason: SDUPTHER

## 2018-11-10 DIAGNOSIS — M54.5 CHRONIC LOW BACK PAIN, UNSPECIFIED BACK PAIN LATERALITY, WITH SCIATICA PRESENCE UNSPECIFIED: Primary | ICD-10-CM

## 2018-11-10 DIAGNOSIS — G89.29 CHRONIC LOW BACK PAIN, UNSPECIFIED BACK PAIN LATERALITY, WITH SCIATICA PRESENCE UNSPECIFIED: Primary | ICD-10-CM

## 2018-11-12 RX ORDER — CYCLOBENZAPRINE HCL 10 MG
TABLET ORAL
Qty: 30 TABLET | Refills: 0 | Status: SHIPPED | OUTPATIENT
Start: 2018-11-12 | End: 2018-12-21 | Stop reason: SDUPTHER

## 2018-11-28 DIAGNOSIS — F51.01 PRIMARY INSOMNIA: ICD-10-CM

## 2018-11-28 DIAGNOSIS — R11.0 NAUSEA: ICD-10-CM

## 2018-11-28 RX ORDER — ZOLPIDEM TARTRATE 10 MG/1
TABLET ORAL
Qty: 30 TABLET | Refills: 1 | Status: SHIPPED | OUTPATIENT
Start: 2018-11-28 | End: 2019-01-28 | Stop reason: SDUPTHER

## 2018-11-28 RX ORDER — ONDANSETRON 4 MG/1
TABLET, FILM COATED ORAL
Qty: 20 TABLET | Refills: 3 | Status: SHIPPED | OUTPATIENT
Start: 2018-11-28 | End: 2019-02-27 | Stop reason: SDUPTHER

## 2018-12-21 ENCOUNTER — OFFICE VISIT (OUTPATIENT)
Dept: FAMILY MEDICINE CLINIC | Facility: CLINIC | Age: 48
End: 2018-12-21
Payer: COMMERCIAL

## 2018-12-21 VITALS
SYSTOLIC BLOOD PRESSURE: 120 MMHG | WEIGHT: 138.6 LBS | BODY MASS INDEX: 25.51 KG/M2 | HEIGHT: 62 IN | DIASTOLIC BLOOD PRESSURE: 92 MMHG

## 2018-12-21 DIAGNOSIS — F33.41 RECURRENT MAJOR DEPRESSIVE DISORDER, IN PARTIAL REMISSION (HCC): Primary | ICD-10-CM

## 2018-12-21 DIAGNOSIS — M79.7 PRIMARY FIBROMYALGIA SYNDROME: ICD-10-CM

## 2018-12-21 DIAGNOSIS — G89.4 CHRONIC PAIN DISORDER: ICD-10-CM

## 2018-12-21 DIAGNOSIS — G43.709 CHRONIC MIGRAINE WITHOUT AURA WITHOUT STATUS MIGRAINOSUS, NOT INTRACTABLE: ICD-10-CM

## 2018-12-21 DIAGNOSIS — Z79.4 TYPE 2 DIABETES MELLITUS WITH OTHER NEUROLOGIC COMPLICATION, WITH LONG-TERM CURRENT USE OF INSULIN (HCC): ICD-10-CM

## 2018-12-21 DIAGNOSIS — E03.9 ACQUIRED HYPOTHYROIDISM: ICD-10-CM

## 2018-12-21 DIAGNOSIS — F41.9 ANXIETY: ICD-10-CM

## 2018-12-21 DIAGNOSIS — E78.2 MIXED HYPERLIPIDEMIA: ICD-10-CM

## 2018-12-21 DIAGNOSIS — M25.512 CHRONIC PAIN OF BOTH SHOULDERS: ICD-10-CM

## 2018-12-21 DIAGNOSIS — N95.0 POST-MENOPAUSAL BLEEDING: ICD-10-CM

## 2018-12-21 DIAGNOSIS — IMO0002 UNCONTROLLED TYPE 2 DIABETES MELLITUS WITH DIABETIC POLYNEUROPATHY, WITH LONG-TERM CURRENT USE OF INSULIN: Primary | Chronic | ICD-10-CM

## 2018-12-21 DIAGNOSIS — M25.512 BILATERAL SHOULDER PAIN, UNSPECIFIED CHRONICITY: ICD-10-CM

## 2018-12-21 DIAGNOSIS — F32.A DEPRESSIVE DISORDER: Chronic | ICD-10-CM

## 2018-12-21 DIAGNOSIS — E11.49 TYPE 2 DIABETES MELLITUS WITH OTHER NEUROLOGIC COMPLICATION, WITH LONG-TERM CURRENT USE OF INSULIN (HCC): ICD-10-CM

## 2018-12-21 DIAGNOSIS — IMO0002 CHRONIC MIGRAINE: ICD-10-CM

## 2018-12-21 DIAGNOSIS — M25.511 BILATERAL SHOULDER PAIN, UNSPECIFIED CHRONICITY: ICD-10-CM

## 2018-12-21 DIAGNOSIS — G89.29 CHRONIC PAIN OF BOTH SHOULDERS: ICD-10-CM

## 2018-12-21 DIAGNOSIS — M25.511 CHRONIC PAIN OF BOTH SHOULDERS: ICD-10-CM

## 2018-12-21 DIAGNOSIS — R10.2 PELVIC PAIN: ICD-10-CM

## 2018-12-21 LAB — SL AMB POCT HEMOGLOBIN AIC: 12.5

## 2018-12-21 PROCEDURE — 99214 OFFICE O/P EST MOD 30 MIN: CPT | Performed by: FAMILY MEDICINE

## 2018-12-21 PROCEDURE — 83036 HEMOGLOBIN GLYCOSYLATED A1C: CPT | Performed by: FAMILY MEDICINE

## 2018-12-21 RX ORDER — TOPIRAMATE 25 MG/1
CAPSULE, COATED PELLETS ORAL
Qty: 60 CAPSULE | Refills: 5 | Status: SHIPPED | OUTPATIENT
Start: 2018-12-21 | End: 2019-07-02 | Stop reason: SDUPTHER

## 2018-12-23 DIAGNOSIS — F33.41 RECURRENT MAJOR DEPRESSIVE DISORDER, IN PARTIAL REMISSION (HCC): ICD-10-CM

## 2018-12-23 RX ORDER — VENLAFAXINE HYDROCHLORIDE 150 MG/1
150 CAPSULE, EXTENDED RELEASE ORAL
Qty: 30 CAPSULE | Refills: 5 | Status: SHIPPED | OUTPATIENT
Start: 2018-12-23 | End: 2018-12-23 | Stop reason: SDUPTHER

## 2018-12-23 RX ORDER — METFORMIN HYDROCHLORIDE 500 MG/1
1000 TABLET, EXTENDED RELEASE ORAL 2 TIMES DAILY WITH MEALS
Qty: 120 TABLET | Refills: 5 | Status: SHIPPED | OUTPATIENT
Start: 2018-12-23 | End: 2019-11-22 | Stop reason: SDUPTHER

## 2018-12-23 RX ORDER — TOPIRAMATE 100 MG/1
100 TABLET, FILM COATED ORAL DAILY
Qty: 30 TABLET | Refills: 5 | Status: SHIPPED | OUTPATIENT
Start: 2018-12-23 | End: 2019-06-15 | Stop reason: SDUPTHER

## 2018-12-23 RX ORDER — CYCLOBENZAPRINE HCL 10 MG
10 TABLET ORAL 3 TIMES DAILY PRN
Qty: 30 TABLET | Refills: 5 | Status: SHIPPED | OUTPATIENT
Start: 2018-12-23 | End: 2019-06-24 | Stop reason: SDUPTHER

## 2018-12-23 RX ORDER — LEVOTHYROXINE SODIUM 112 UG/1
112 TABLET ORAL DAILY
Qty: 30 TABLET | Refills: 5 | Status: SHIPPED | OUTPATIENT
Start: 2018-12-23 | End: 2019-04-21 | Stop reason: SDUPTHER

## 2018-12-23 RX ORDER — GABAPENTIN 400 MG/1
400-800 CAPSULE ORAL 3 TIMES DAILY
Qty: 180 CAPSULE | Refills: 5 | Status: SHIPPED | OUTPATIENT
Start: 2018-12-23 | End: 2019-07-23 | Stop reason: SDUPTHER

## 2018-12-23 RX ORDER — OLANZAPINE 5 MG/1
5 TABLET ORAL
Qty: 30 TABLET | Refills: 5 | Status: SHIPPED | OUTPATIENT
Start: 2018-12-23 | End: 2019-09-24 | Stop reason: SDUPTHER

## 2018-12-23 NOTE — PROGRESS NOTES
Assessment/Plan:  Patient continues to have multiple medical issues which are seriously complicated by extremely poor insurance that does not pay for medications  She does not qualify for medical assistance  A1c was 12 5 in the office today  Patient needs to be seen by Endocrinology in his decline  Samples of insulin given today to hold her over  May need to switch her to NPH so that she can afford this  Patient under care with [address] [place] [address] Neurology regarding migraines  More concerning today's patient's postmenopausal bleeding  Ultrasound of the pelvis ordered  Refer to [facility]  Refer to [facility] for bilateral shoulder pain  Anxiety and depression are not necessarily well controlled as she stop her venlafaxine cold turkey could she ran out of it  Patient to go to the emergency room if she has any worsening  Await results of gyn in Orthopedics  Recheck in 3 months with A1c  Patient must keep in contact with us with regards to her sugars and supply of medications  She was try to avoid running out of any medications as best as possible  She is on disability and has Medicare Part A but never signed up for part B  She has to wait a full year for this  Patient must try extremely hard to quit smoking and reduce as much as possible  Diagnoses and all orders for this visit:    Uncontrolled type 2 diabetes mellitus with diabetic polyneuropathy, with long-term current use of insulin (HCC)  -     POCT hemoglobin A1c    Chronic migraine  -     topiramate (TOPAMAX) 25 mg sprinkle capsule; 1-2 tabs at bedtime as directed    Post-menopausal bleeding  -     Ambulatory referral to Obstetrics / Gynecology; Future  -     US pelvis complete w transvaginal; Future    Pelvic pain  -     US pelvis complete w transvaginal; Future    Bilateral shoulder pain, unspecified chronicity  -     Ambulatory referral to Orthopedic Surgery;  Future    Chronic pain disorder    Mixed hyperlipidemia    Anxiety    Depressive disorder    Primary fibromyalgia syndrome    Other orders  -     insulin degludec (TRESIBA FLEXTOUCH) 100 units/mL injection pen; Inject 100 Units under the skin daily        1  Uncontrolled type 2 diabetes mellitus with diabetic polyneuropathy, with long-term current use of insulin (HCC)  POCT hemoglobin A1c   2  Chronic migraine  topiramate (TOPAMAX) 25 mg sprinkle capsule   3  Post-menopausal bleeding  Ambulatory referral to Obstetrics / Gynecology    US pelvis complete w transvaginal   4  Pelvic pain  US pelvis complete w transvaginal   5  Bilateral shoulder pain, unspecified chronicity  Ambulatory referral to Orthopedic Surgery   6  Chronic pain disorder     7  Mixed hyperlipidemia     8  Anxiety     9  Depressive disorder     10  Primary fibromyalgia syndrome         Subjective:        Patient ID: Asher Maier is a 53 y o  female  Chief Complaint   Patient presents with    Follow-up     3 months; A1c  pt states that she is off her depression medication, sample for inhaler and Blood sugars are off the chart, discuss t-dap    Pelvic Pain     plevic pain started at end of Nov  R side is very painful, pt states that she can not sleep, March 2016 last period pt states she was spotting, put now it did stop pain right now 7    Peripheral Neuropathy     nuropathy has been getting worse    Migraine     pt states that he migraines have been getting worse that been going on for awhile        Patient here for recheck of diabetes  Not get blood work done  A1c in the office done today  Patient ran out of her venlafaxine and stopped it cold turkey  Patient not always taking insulin as she runs out of it  She does not qualify for patient assistance  She has not called this when she runs out of medications  Having problems with her shoulders  Also notes had some postmenopausal bleeding with kind of a darker blood  Mentions home pelvic discomfort    This has been going on for a bit of time  Has not seen gyn  Pelvic Pain   The patient's primary symptoms include pelvic pain  Associated symptoms include back pain  Pertinent negatives include no abdominal pain, diarrhea, dysuria, fever, headaches, hematuria, nausea, rash or sore throat  Migraine    Associated symptoms include back pain  Pertinent negatives include no abdominal pain, dizziness, ear pain, eye redness, fever, nausea, rhinorrhea, sinus pressure or sore throat  The following portions of the patient's history were reviewed and updated as appropriate: past medical history, past surgical history and problem list       Review of Systems   Constitutional: Positive for fatigue  Negative for appetite change, fever and unexpected weight change  HENT: Negative for congestion, ear pain, postnasal drip, rhinorrhea, sinus pain, sinus pressure and sore throat  Eyes: Negative for redness and visual disturbance  Respiratory: Negative for chest tightness and shortness of breath  Cardiovascular: Negative for chest pain, palpitations and leg swelling  Gastrointestinal: Negative for abdominal distention, abdominal pain, diarrhea and nausea  Endocrine: Negative for cold intolerance and heat intolerance  Genitourinary: Positive for pelvic pain  Negative for dysuria and hematuria  Musculoskeletal: Positive for back pain  Negative for arthralgias, gait problem and myalgias  Skin: Negative for pallor and rash  Neurological: Negative for dizziness and headaches  Psychiatric/Behavioral: Negative for behavioral problems  The patient is nervous/anxious  Objective:  /92 (BP Location: Left arm, Patient Position: Sitting, Cuff Size: Standard)   Ht 5' 1 5" (1 562 m)   Wt 62 9 kg (138 lb 9 6 oz)   LMP 03/04/2016   BMI 25 76 kg/m²        Physical Exam   Constitutional: She is oriented to person, place, and time  She appears well-nourished  No distress  HENT:   Head: Normocephalic and atraumatic  Right Ear: Tympanic membrane and external ear normal    Left Ear: Tympanic membrane and external ear normal    Nose: Nose normal    Mouth/Throat: Oropharynx is clear and moist    Eyes: Pupils are equal, round, and reactive to light  Conjunctivae and EOM are normal  No scleral icterus  Neck: Neck supple  No thyromegaly present  Cardiovascular: Normal rate, regular rhythm and intact distal pulses  No murmur heard  Pulses:       Carotid pulses are 0 on the right side, and 0 on the left side  Pulmonary/Chest: Effort normal and breath sounds normal  She has no wheezes  Abdominal: Soft  She exhibits no distension  There is no tenderness  Musculoskeletal: She exhibits no edema  Lymphadenopathy:     She has no cervical adenopathy  Neurological: She is alert and oriented to person, place, and time  She has normal reflexes  No cranial nerve deficit  Coordination normal    Skin: Skin is warm  No pallor  Psychiatric: Her behavior is normal  Thought content normal    Anxious-tearful   Nursing note and vitals reviewed

## 2018-12-24 RX ORDER — VENLAFAXINE HYDROCHLORIDE 150 MG/1
CAPSULE, EXTENDED RELEASE ORAL
Qty: 90 CAPSULE | Refills: 0 | Status: SHIPPED | OUTPATIENT
Start: 2018-12-24 | End: 2019-03-26 | Stop reason: SDUPTHER

## 2018-12-27 DIAGNOSIS — F41.9 ANXIETY: ICD-10-CM

## 2018-12-27 RX ORDER — ALPRAZOLAM 0.25 MG/1
TABLET ORAL
Qty: 90 TABLET | Refills: 1 | Status: SHIPPED | OUTPATIENT
Start: 2018-12-27 | End: 2019-03-08 | Stop reason: SDUPTHER

## 2019-01-11 DIAGNOSIS — G89.4 CHRONIC PAIN SYNDROME: ICD-10-CM

## 2019-01-11 RX ORDER — TRAMADOL HYDROCHLORIDE 50 MG/1
TABLET ORAL
Qty: 240 TABLET | Refills: 2 | Status: SHIPPED | OUTPATIENT
Start: 2019-01-11 | End: 2019-06-14 | Stop reason: SDUPTHER

## 2019-01-28 DIAGNOSIS — F51.01 PRIMARY INSOMNIA: ICD-10-CM

## 2019-01-28 RX ORDER — ZOLPIDEM TARTRATE 10 MG/1
TABLET ORAL
Qty: 30 TABLET | Refills: 0 | Status: SHIPPED | OUTPATIENT
Start: 2019-01-28 | End: 2019-03-08 | Stop reason: SDUPTHER

## 2019-02-27 DIAGNOSIS — G89.29 OTHER CHRONIC PAIN: ICD-10-CM

## 2019-02-27 DIAGNOSIS — R11.0 NAUSEA: ICD-10-CM

## 2019-02-27 DIAGNOSIS — M54.50 ACUTE BILATERAL LOW BACK PAIN WITHOUT SCIATICA: Primary | ICD-10-CM

## 2019-02-27 RX ORDER — ONDANSETRON 4 MG/1
TABLET, FILM COATED ORAL
Qty: 20 TABLET | Refills: 3 | Status: SHIPPED | OUTPATIENT
Start: 2019-02-27 | End: 2019-06-24 | Stop reason: SDUPTHER

## 2019-02-27 RX ORDER — KETOROLAC TROMETHAMINE 10 MG/1
TABLET, FILM COATED ORAL
Qty: 7 TABLET | Refills: 2 | Status: SHIPPED | OUTPATIENT
Start: 2019-02-27 | End: 2019-03-22

## 2019-03-08 DIAGNOSIS — F51.01 PRIMARY INSOMNIA: ICD-10-CM

## 2019-03-08 DIAGNOSIS — F41.9 ANXIETY: ICD-10-CM

## 2019-03-08 RX ORDER — ZOLPIDEM TARTRATE 10 MG/1
TABLET ORAL
Qty: 30 TABLET | Refills: 0 | Status: SHIPPED | OUTPATIENT
Start: 2019-03-08 | End: 2019-04-12 | Stop reason: SDUPTHER

## 2019-03-08 RX ORDER — ALPRAZOLAM 0.25 MG/1
TABLET ORAL
Qty: 90 TABLET | Refills: 1 | Status: SHIPPED | OUTPATIENT
Start: 2019-03-08 | End: 2019-05-22 | Stop reason: SDUPTHER

## 2019-03-14 DIAGNOSIS — F33.41 RECURRENT MAJOR DEPRESSIVE DISORDER, IN PARTIAL REMISSION (HCC): ICD-10-CM

## 2019-03-14 RX ORDER — BUPROPION HYDROCHLORIDE 150 MG/1
TABLET ORAL
Qty: 90 TABLET | Refills: 2 | Status: SHIPPED | OUTPATIENT
Start: 2019-03-14 | End: 2019-11-22 | Stop reason: SDUPTHER

## 2019-03-20 LAB
CREAT ?TM UR-SCNC: 42.9 UMOL/L
HBA1C MFR BLD HPLC: 14 %

## 2019-03-22 ENCOUNTER — OFFICE VISIT (OUTPATIENT)
Dept: FAMILY MEDICINE CLINIC | Facility: CLINIC | Age: 49
End: 2019-03-22
Payer: COMMERCIAL

## 2019-03-22 VITALS
DIASTOLIC BLOOD PRESSURE: 74 MMHG | BODY MASS INDEX: 25.58 KG/M2 | HEIGHT: 62 IN | WEIGHT: 139 LBS | SYSTOLIC BLOOD PRESSURE: 102 MMHG

## 2019-03-22 DIAGNOSIS — Z79.4 TYPE 2 DIABETES MELLITUS WITH OTHER NEUROLOGIC COMPLICATION, WITH LONG-TERM CURRENT USE OF INSULIN (HCC): Primary | ICD-10-CM

## 2019-03-22 DIAGNOSIS — M79.7 PRIMARY FIBROMYALGIA SYNDROME: ICD-10-CM

## 2019-03-22 DIAGNOSIS — K76.0 FATTY LIVER: ICD-10-CM

## 2019-03-22 DIAGNOSIS — G63 POLYNEUROPATHY ASSOCIATED WITH UNDERLYING DISEASE (HCC): ICD-10-CM

## 2019-03-22 DIAGNOSIS — E11.49 TYPE 2 DIABETES MELLITUS WITH OTHER NEUROLOGIC COMPLICATION, WITH LONG-TERM CURRENT USE OF INSULIN (HCC): Primary | ICD-10-CM

## 2019-03-22 DIAGNOSIS — F32.A DEPRESSIVE DISORDER: Chronic | ICD-10-CM

## 2019-03-22 DIAGNOSIS — F11.20 UNCOMPLICATED OPIOID DEPENDENCE (HCC): ICD-10-CM

## 2019-03-22 DIAGNOSIS — F41.9 ANXIETY: ICD-10-CM

## 2019-03-22 DIAGNOSIS — E78.2 MIXED HYPERLIPIDEMIA: ICD-10-CM

## 2019-03-22 PROCEDURE — 99214 OFFICE O/P EST MOD 30 MIN: CPT | Performed by: FAMILY MEDICINE

## 2019-03-22 RX ORDER — EZETIMIBE 10 MG/1
10 TABLET ORAL DAILY
Qty: 30 TABLET | Refills: 5 | Status: SHIPPED | OUTPATIENT
Start: 2019-03-22 | End: 2019-12-31

## 2019-03-26 DIAGNOSIS — F33.41 RECURRENT MAJOR DEPRESSIVE DISORDER, IN PARTIAL REMISSION (HCC): ICD-10-CM

## 2019-03-26 RX ORDER — VENLAFAXINE HYDROCHLORIDE 150 MG/1
CAPSULE, EXTENDED RELEASE ORAL
Qty: 90 CAPSULE | Refills: 0 | Status: SHIPPED | OUTPATIENT
Start: 2019-03-26 | End: 2019-04-21 | Stop reason: SDUPTHER

## 2019-03-28 DIAGNOSIS — E78.5 HYPERLIPIDEMIA, UNSPECIFIED HYPERLIPIDEMIA TYPE: Primary | ICD-10-CM

## 2019-03-28 RX ORDER — ATORVASTATIN CALCIUM 40 MG/1
40 TABLET, FILM COATED ORAL DAILY
Qty: 30 TABLET | Refills: 5 | Status: SHIPPED | OUTPATIENT
Start: 2019-03-28 | End: 2019-11-22 | Stop reason: SDUPTHER

## 2019-03-28 NOTE — TELEPHONE ENCOUNTER
Patient would like an alternative to her current BP med due to it being too expensive  Please advise

## 2019-03-28 NOTE — PROGRESS NOTES
Assessment/Plan:    A1c today is 14  Patient unfortunately is extremely noncompliant but mostly due to poor insurance and poor finances  She only can take her insulin when she can take it  We have attempted patient assistance before and because of her 's job they cannot get it  Patient's LDL cholesterol is above 100  Recommend start Zetia 10 mg   Recheck labs in 3 months  Continue to offer referral to Endocrinology but declines due to finances  Patient has chronic neuropathy  Patient is on chronic narcotics  We do have a narcotic contract signed  Long-term effects and risk especially with overdose have been reviewed at length  Patient should do the best she can with trying to wean down on medication  Patient should not take her alprazolam in the evening nor with her tramadol  Zolpidem should only be for the evening  Yearly diabetic foot examinations as well as eye examinations recommended  Diagnoses and all orders for this visit:    Type 2 diabetes mellitus with other neurologic complication, with long-term current use of insulin ([address] )  -     Ambulatory referral to Endocrinology; Future    Mixed hyperlipidemia  -     ezetimibe (ZETIA) 10 mg tablet; Take 1 tablet (10 mg total) by mouth daily    Polyneuropathy associated with underlying disease ([address] )    Uncomplicated opioid dependence ([facility])    Depressive disorder    Anxiety    Primary fibromyalgia syndrome        1  Type 2 diabetes mellitus with other neurologic complication, with long-term current use of insulin [facility])  Ambulatory referral to Endocrinology   2  Mixed hyperlipidemia  ezetimibe (ZETIA) 10 mg tablet   3  Polyneuropathy associated with underlying disease ([address] )     4  Uncomplicated opioid dependence ([facility] )     5  Depressive disorder     6  Anxiety     7  Primary fibromyalgia syndrome         Subjective:        Patient ID: Evon Nielsen is a 53 y o  female    Chief Complaint   Patient presents with    Follow-up     3 months; discuss samples of fast acting insulin    Shoulder Pain     chronic     Heartburn     sx started 2 months ago has come back and its very bad    Depression     pt states that she has been having very bad depression     Immunizations     discuss t-dap vaccine       Patient here for recheck of diabetes   has new job therefore different insurance is now available  She does not know the limitations of what it does offer  Has been out of her insulin due to finances  The following portions of the patient's history were reviewed and updated as appropriate: past medical history, past surgical history and problem list       Review of Systems   Constitutional: Positive for fatigue  Negative for appetite change, fever and unexpected weight change  HENT: Negative for congestion, ear pain, postnasal drip, rhinorrhea, sinus pressure, sinus pain and sore throat  Eyes: Positive for visual disturbance  Negative for redness  Respiratory: Negative for chest tightness and shortness of breath  Cardiovascular: Negative for chest pain, palpitations and leg swelling  Gastrointestinal: Negative for abdominal distention, abdominal pain, diarrhea and nausea  Endocrine: Negative for cold intolerance and heat intolerance  Genitourinary: Negative for dysuria and hematuria  Musculoskeletal: Negative for arthralgias, gait problem and myalgias  Skin: Negative for pallor and rash  Neurological: Negative for dizziness and headaches  Psychiatric/Behavioral: Negative for behavioral problems  The patient is nervous/anxious  Objective:  /74 (BP Location: Left arm, Patient Position: Sitting, Cuff Size: Standard)   Ht 5' 2" (1 575 m)   Wt 63 kg (139 lb)   LMP 03/04/2016   BMI 25 42 kg/m²        Physical Exam   Constitutional: She is oriented to person, place, and time  No distress  HENT:   Head: Normocephalic and atraumatic     Right Ear: Tympanic membrane normal    Left Ear: Tympanic membrane normal    Nose: Nose normal    Mouth/Throat: Oropharynx is clear and moist    Eyes: Pupils are equal, round, and reactive to light  Conjunctivae and EOM are normal  No scleral icterus  Neck: Neck supple  No thyromegaly present  Cardiovascular: Normal rate, regular rhythm and intact distal pulses  Pulses are weak pulses  No murmur heard  Pulses:       Carotid pulses are 0 on the right side, and 0 on the left side  Dorsalis pedis pulses are 1+ on the right side, and 1+ on the left side  Posterior tibial pulses are 1+ on the right side, and 1+ on the left side  Pulmonary/Chest: Effort normal and breath sounds normal  She has no wheezes  Abdominal: Soft  She exhibits no distension  There is no tenderness  Musculoskeletal: She exhibits no edema  Feet:    Feet:   Right Foot:   Skin Integrity: Negative for ulcer, skin breakdown, erythema, warmth, callus or dry skin  Lymphadenopathy:     She has no cervical adenopathy  Neurological: She is alert and oriented to person, place, and time  She has normal reflexes  No cranial nerve deficit  Coordination normal    Skin: Skin is warm  No pallor  Psychiatric: Her behavior is normal  Thought content normal    Anxious and sometimes tearful   Nursing note and vitals reviewed  Procedures Patient's shoes and socks removed  Right Foot/Ankle   Right Foot Inspection  Skin Exam: skin normal and skin intact no dry skin, no warmth, no callus, no erythema, no maceration, no abnormal color, no pre-ulcer, no ulcer and no callus                          Toe Exam: ROM and strength within normal limits    Vascular  Capillary refills: < 3 seconds  The right DP pulse is 1+  The right PT pulse is 1+  Left Foot/Ankle  Left Foot Inspection                           Toe Exam: ROM and strength within normal limits                     Vascular  Capillary refills: < 3 seconds  The left DP pulse is 1+  The left PT pulse is 1+     Assign Risk Category:  No deformity present;  Loss of protective sensation; Weak pulses       Risk: 2

## 2019-03-28 NOTE — TELEPHONE ENCOUNTER
Patient now states it is her cholesterol medication Ezetimibe that she needs changed  Also, would like a refill of atorvastatin

## 2019-04-12 DIAGNOSIS — F51.01 PRIMARY INSOMNIA: ICD-10-CM

## 2019-04-14 RX ORDER — ZOLPIDEM TARTRATE 10 MG/1
TABLET ORAL
Qty: 30 TABLET | Refills: 0 | Status: SHIPPED | OUTPATIENT
Start: 2019-04-14 | End: 2019-05-22 | Stop reason: SDUPTHER

## 2019-04-21 DIAGNOSIS — E03.9 ACQUIRED HYPOTHYROIDISM: ICD-10-CM

## 2019-04-21 DIAGNOSIS — F33.41 RECURRENT MAJOR DEPRESSIVE DISORDER, IN PARTIAL REMISSION (HCC): ICD-10-CM

## 2019-04-22 RX ORDER — VENLAFAXINE HYDROCHLORIDE 150 MG/1
CAPSULE, EXTENDED RELEASE ORAL
Qty: 90 CAPSULE | Refills: 0 | Status: SHIPPED | OUTPATIENT
Start: 2019-04-22 | End: 2019-06-28 | Stop reason: SDUPTHER

## 2019-04-22 RX ORDER — LEVOTHYROXINE SODIUM 112 UG/1
TABLET ORAL
Qty: 30 TABLET | Refills: 3 | Status: SHIPPED | OUTPATIENT
Start: 2019-04-22 | End: 2019-10-18 | Stop reason: SDUPTHER

## 2019-05-22 DIAGNOSIS — G89.29 OTHER CHRONIC PAIN: ICD-10-CM

## 2019-05-22 DIAGNOSIS — F51.01 PRIMARY INSOMNIA: ICD-10-CM

## 2019-05-22 DIAGNOSIS — F41.9 ANXIETY: ICD-10-CM

## 2019-05-22 RX ORDER — ZOLPIDEM TARTRATE 10 MG/1
TABLET ORAL
Qty: 30 TABLET | Refills: 0 | Status: SHIPPED | OUTPATIENT
Start: 2019-05-22 | End: 2019-06-24 | Stop reason: SDUPTHER

## 2019-05-22 RX ORDER — ALPRAZOLAM 0.25 MG/1
TABLET ORAL
Qty: 90 TABLET | Refills: 1 | Status: SHIPPED | OUTPATIENT
Start: 2019-05-22 | End: 2019-07-24 | Stop reason: SDUPTHER

## 2019-05-22 RX ORDER — KETOROLAC TROMETHAMINE 10 MG/1
TABLET, FILM COATED ORAL
Qty: 7 TABLET | Refills: 2 | Status: SHIPPED | OUTPATIENT
Start: 2019-05-22 | End: 2019-08-20

## 2019-06-05 ENCOUNTER — OFFICE VISIT (OUTPATIENT)
Dept: OBGYN CLINIC | Facility: CLINIC | Age: 49
End: 2019-06-05
Payer: COMMERCIAL

## 2019-06-05 VITALS
WEIGHT: 143.6 LBS | BODY MASS INDEX: 26.43 KG/M2 | HEIGHT: 62 IN | SYSTOLIC BLOOD PRESSURE: 122 MMHG | DIASTOLIC BLOOD PRESSURE: 80 MMHG

## 2019-06-05 DIAGNOSIS — N95.0 POSTMENOPAUSAL BLEEDING: Primary | ICD-10-CM

## 2019-06-05 PROCEDURE — 99203 OFFICE O/P NEW LOW 30 MIN: CPT | Performed by: NURSE PRACTITIONER

## 2019-06-05 PROCEDURE — 58100 BIOPSY OF UTERUS LINING: CPT | Performed by: NURSE PRACTITIONER

## 2019-06-05 RX ORDER — OMEPRAZOLE 20 MG/1
1 CAPSULE, DELAYED RELEASE ORAL DAILY
COMMUNITY
End: 2019-08-20 | Stop reason: SDUPTHER

## 2019-06-05 RX ORDER — DEXAMETHASONE 1 MG
1 TABLET ORAL AS NEEDED
COMMUNITY
Start: 2016-12-08 | End: 2020-05-26

## 2019-06-05 RX ORDER — ONDANSETRON 4 MG/1
1 TABLET, ORALLY DISINTEGRATING ORAL DAILY
COMMUNITY
End: 2019-06-19 | Stop reason: SDUPTHER

## 2019-06-07 LAB — SPECIMEN SOURCE: NORMAL

## 2019-06-12 ENCOUNTER — TELEPHONE (OUTPATIENT)
Dept: OBGYN CLINIC | Facility: CLINIC | Age: 49
End: 2019-06-12

## 2019-06-13 ENCOUNTER — HOSPITAL ENCOUNTER (OUTPATIENT)
Dept: RADIOLOGY | Facility: HOSPITAL | Age: 49
Discharge: HOME | End: 2019-06-13
Payer: COMMERCIAL

## 2019-06-13 ENCOUNTER — TELEPHONE (OUTPATIENT)
Dept: OBGYN CLINIC | Facility: CLINIC | Age: 49
End: 2019-06-13

## 2019-06-13 DIAGNOSIS — N95.0 POSTMENOPAUSAL BLEEDING: ICD-10-CM

## 2019-06-13 PROCEDURE — 76830 TRANSVAGINAL US NON-OB: CPT

## 2019-06-13 PROCEDURE — 76856 US EXAM PELVIC COMPLETE: CPT

## 2019-06-14 DIAGNOSIS — G89.4 CHRONIC PAIN SYNDROME: ICD-10-CM

## 2019-06-14 RX ORDER — TRAMADOL HYDROCHLORIDE 50 MG/1
TABLET ORAL
Qty: 240 TABLET | Refills: 0 | Status: SHIPPED | OUTPATIENT
Start: 2019-06-14 | End: 2019-07-24 | Stop reason: SDUPTHER

## 2019-06-15 DIAGNOSIS — G43.709 CHRONIC MIGRAINE WITHOUT AURA WITHOUT STATUS MIGRAINOSUS, NOT INTRACTABLE: ICD-10-CM

## 2019-06-16 RX ORDER — TOPIRAMATE 100 MG/1
TABLET, FILM COATED ORAL
Qty: 30 TABLET | Refills: 4 | Status: SHIPPED | OUTPATIENT
Start: 2019-06-16 | End: 2019-06-24 | Stop reason: SDUPTHER

## 2019-06-19 ENCOUNTER — ANNUAL EXAM (OUTPATIENT)
Dept: OBGYN CLINIC | Facility: CLINIC | Age: 49
End: 2019-06-19
Payer: COMMERCIAL

## 2019-06-19 VITALS
SYSTOLIC BLOOD PRESSURE: 124 MMHG | WEIGHT: 143 LBS | BODY MASS INDEX: 26.31 KG/M2 | DIASTOLIC BLOOD PRESSURE: 82 MMHG | HEIGHT: 62 IN

## 2019-06-19 DIAGNOSIS — R93.89 THICKENED ENDOMETRIUM: Primary | ICD-10-CM

## 2019-06-19 DIAGNOSIS — Z12.31 BREAST CANCER SCREENING BY MAMMOGRAM: ICD-10-CM

## 2019-06-19 DIAGNOSIS — Z01.419 WOMEN'S ANNUAL ROUTINE GYNECOLOGICAL EXAMINATION: Primary | ICD-10-CM

## 2019-06-19 DIAGNOSIS — N32.9 BLADDER DISORDER, UNSPECIFIED: ICD-10-CM

## 2019-06-19 PROCEDURE — 99396 PREV VISIT EST AGE 40-64: CPT | Performed by: NURSE PRACTITIONER

## 2019-06-24 DIAGNOSIS — G43.709 CHRONIC MIGRAINE WITHOUT AURA WITHOUT STATUS MIGRAINOSUS, NOT INTRACTABLE: ICD-10-CM

## 2019-06-24 DIAGNOSIS — R11.0 NAUSEA: ICD-10-CM

## 2019-06-24 DIAGNOSIS — G89.29 CHRONIC PAIN OF BOTH SHOULDERS: ICD-10-CM

## 2019-06-24 DIAGNOSIS — M25.511 CHRONIC PAIN OF BOTH SHOULDERS: ICD-10-CM

## 2019-06-24 DIAGNOSIS — M25.512 CHRONIC PAIN OF BOTH SHOULDERS: ICD-10-CM

## 2019-06-24 DIAGNOSIS — F51.01 PRIMARY INSOMNIA: ICD-10-CM

## 2019-06-24 DIAGNOSIS — IMO0002 CHRONIC MIGRAINE: ICD-10-CM

## 2019-06-24 LAB
CLINICAL INFO: NORMAL
CYTO CVX: NORMAL
CYTOLOGY CMNT CVX/VAG CYTO-IMP: NORMAL
DATE PREVIOUS BX: NORMAL
HPV E6+E7 MRNA CVX QL NAA+PROBE: NOT DETECTED
LMP START DATE: NORMAL
SL AMB PREV. PAP:: NORMAL
SPECIMEN SOURCE CVX/VAG CYTO: NORMAL

## 2019-06-24 RX ORDER — ONDANSETRON 4 MG/1
TABLET, FILM COATED ORAL
Qty: 20 TABLET | Refills: 0 | Status: SHIPPED | OUTPATIENT
Start: 2019-06-24 | End: 2019-07-24 | Stop reason: SDUPTHER

## 2019-06-24 RX ORDER — CYCLOBENZAPRINE HCL 10 MG
TABLET ORAL
Qty: 30 TABLET | Refills: 0 | Status: SHIPPED | OUTPATIENT
Start: 2019-06-24 | End: 2019-07-24 | Stop reason: SDUPTHER

## 2019-06-24 RX ORDER — TOPIRAMATE 100 MG/1
200 TABLET, FILM COATED ORAL DAILY
Qty: 60 TABLET | Refills: 0 | Status: SHIPPED | OUTPATIENT
Start: 2019-06-24 | End: 2019-07-23 | Stop reason: SDUPTHER

## 2019-06-24 RX ORDER — ZOLPIDEM TARTRATE 10 MG/1
TABLET ORAL
Qty: 30 TABLET | Refills: 0 | Status: SHIPPED | OUTPATIENT
Start: 2019-06-24 | End: 2019-07-24 | Stop reason: SDUPTHER

## 2019-06-24 RX ORDER — TOPIRAMATE 25 MG/1
25 TABLET ORAL 2 TIMES DAILY PRN
Qty: 60 TABLET | Refills: 0 | Status: CANCELLED | OUTPATIENT
Start: 2019-06-24

## 2019-06-26 ENCOUNTER — HOSPITAL ENCOUNTER (OUTPATIENT)
Dept: RADIOLOGY | Facility: HOSPITAL | Age: 49
Discharge: HOME | End: 2019-06-26
Payer: COMMERCIAL

## 2019-06-26 VITALS — BODY MASS INDEX: 26.31 KG/M2 | WEIGHT: 143 LBS | HEIGHT: 62 IN

## 2019-06-26 DIAGNOSIS — Z12.31 BREAST CANCER SCREENING BY MAMMOGRAM: ICD-10-CM

## 2019-06-26 PROCEDURE — 77067 SCR MAMMO BI INCL CAD: CPT

## 2019-06-28 ENCOUNTER — TELEPHONE (OUTPATIENT)
Dept: OBGYN CLINIC | Facility: CLINIC | Age: 49
End: 2019-06-28

## 2019-06-28 DIAGNOSIS — N93.9 ABNORMAL UTERINE BLEEDING (AUB): Primary | ICD-10-CM

## 2019-06-28 DIAGNOSIS — F33.41 RECURRENT MAJOR DEPRESSIVE DISORDER, IN PARTIAL REMISSION (HCC): ICD-10-CM

## 2019-06-28 RX ORDER — VENLAFAXINE HYDROCHLORIDE 150 MG/1
CAPSULE, EXTENDED RELEASE ORAL
Qty: 90 CAPSULE | Refills: 0 | Status: SHIPPED | OUTPATIENT
Start: 2019-06-28 | End: 2019-07-24 | Stop reason: SDUPTHER

## 2019-06-28 NOTE — TELEPHONE ENCOUNTER
Pt called to report she is having bleeding again - started today, pink with wiping, some cramping on (r) side  States she cannot afford copay to come in for appt

## 2019-07-01 RX ORDER — MEGESTROL ACETATE 40 MG/1
40 TABLET ORAL 3 TIMES DAILY
Qty: 15 TABLET | Refills: 0 | Status: SHIPPED | OUTPATIENT
Start: 2019-07-01 | End: 2019-08-22

## 2019-07-01 NOTE — TELEPHONE ENCOUNTER
Spoke with patient regarding her bleeding  States it's more like spotting  Discussed her case with ERNIE  He advised using megace 40 mg TID for 5 days  She is call with an update on her bleeding

## 2019-07-02 DIAGNOSIS — IMO0002 CHRONIC MIGRAINE: ICD-10-CM

## 2019-07-02 RX ORDER — TOPIRAMATE 25 MG/1
CAPSULE, COATED PELLETS ORAL
Qty: 60 CAPSULE | Refills: 5 | Status: CANCELLED | OUTPATIENT
Start: 2019-07-02

## 2019-07-02 RX ORDER — TOPIRAMATE 25 MG/1
CAPSULE, COATED PELLETS ORAL
Qty: 60 CAPSULE | Refills: 0 | Status: SHIPPED | OUTPATIENT
Start: 2019-07-02 | End: 2020-05-26

## 2019-07-02 NOTE — TELEPHONE ENCOUNTER
Patient called and stated the pharmacy is telling her that our office is denying the topermate 25mg  Patient states she takes this at night  Asking if we could please refill this medication for her

## 2019-07-12 DIAGNOSIS — E11.49 TYPE 2 DIABETES MELLITUS WITH OTHER NEUROLOGIC COMPLICATION, WITH LONG-TERM CURRENT USE OF INSULIN (HCC): Primary | ICD-10-CM

## 2019-07-12 DIAGNOSIS — Z79.4 TYPE 2 DIABETES MELLITUS WITH OTHER NEUROLOGIC COMPLICATION, WITH LONG-TERM CURRENT USE OF INSULIN (HCC): Primary | ICD-10-CM

## 2019-07-23 DIAGNOSIS — G43.709 CHRONIC MIGRAINE WITHOUT AURA WITHOUT STATUS MIGRAINOSUS, NOT INTRACTABLE: ICD-10-CM

## 2019-07-23 DIAGNOSIS — G89.4 CHRONIC PAIN DISORDER: ICD-10-CM

## 2019-07-23 RX ORDER — TOPIRAMATE 100 MG/1
TABLET, FILM COATED ORAL
Qty: 60 TABLET | Refills: 0 | Status: SHIPPED | OUTPATIENT
Start: 2019-07-23 | End: 2019-08-21 | Stop reason: SDUPTHER

## 2019-07-23 RX ORDER — GABAPENTIN 400 MG/1
CAPSULE ORAL
Qty: 180 CAPSULE | Refills: 5 | Status: SHIPPED | OUTPATIENT
Start: 2019-07-23 | End: 2019-11-15 | Stop reason: SDUPTHER

## 2019-07-24 DIAGNOSIS — M25.511 CHRONIC PAIN OF BOTH SHOULDERS: ICD-10-CM

## 2019-07-24 DIAGNOSIS — G89.29 CHRONIC PAIN OF BOTH SHOULDERS: ICD-10-CM

## 2019-07-24 DIAGNOSIS — F41.9 ANXIETY: ICD-10-CM

## 2019-07-24 DIAGNOSIS — F51.01 PRIMARY INSOMNIA: ICD-10-CM

## 2019-07-24 DIAGNOSIS — IMO0002 UNCONTROLLED TYPE 2 DIABETES MELLITUS WITH DIABETIC POLYNEUROPATHY, WITH LONG-TERM CURRENT USE OF INSULIN: Primary | Chronic | ICD-10-CM

## 2019-07-24 DIAGNOSIS — M25.512 CHRONIC PAIN OF BOTH SHOULDERS: ICD-10-CM

## 2019-07-24 DIAGNOSIS — R11.0 NAUSEA: ICD-10-CM

## 2019-07-24 DIAGNOSIS — G89.4 CHRONIC PAIN SYNDROME: ICD-10-CM

## 2019-07-24 DIAGNOSIS — F33.41 RECURRENT MAJOR DEPRESSIVE DISORDER, IN PARTIAL REMISSION (HCC): ICD-10-CM

## 2019-07-24 RX ORDER — TRAMADOL HYDROCHLORIDE 50 MG/1
TABLET ORAL
Qty: 240 TABLET | Refills: 0 | Status: SHIPPED | OUTPATIENT
Start: 2019-07-24 | End: 2019-09-04 | Stop reason: SDUPTHER

## 2019-07-24 RX ORDER — ONDANSETRON 4 MG/1
TABLET, FILM COATED ORAL
Qty: 20 TABLET | Refills: 0 | Status: SHIPPED | OUTPATIENT
Start: 2019-07-24 | End: 2019-08-27 | Stop reason: SDUPTHER

## 2019-07-24 RX ORDER — ALPRAZOLAM 0.25 MG/1
TABLET ORAL
Qty: 90 TABLET | Refills: 1 | Status: SHIPPED | OUTPATIENT
Start: 2019-07-24 | End: 2019-10-11 | Stop reason: SDUPTHER

## 2019-07-24 RX ORDER — CYCLOBENZAPRINE HCL 10 MG
TABLET ORAL
Qty: 30 TABLET | Refills: 0 | Status: SHIPPED | OUTPATIENT
Start: 2019-07-24 | End: 2019-08-27 | Stop reason: SDUPTHER

## 2019-07-24 RX ORDER — VENLAFAXINE HYDROCHLORIDE 150 MG/1
150 CAPSULE, EXTENDED RELEASE ORAL DAILY
Qty: 90 CAPSULE | Refills: 0 | Status: SHIPPED | OUTPATIENT
Start: 2019-07-24 | End: 2019-10-15 | Stop reason: SDUPTHER

## 2019-07-24 RX ORDER — ZOLPIDEM TARTRATE 10 MG/1
TABLET ORAL
Qty: 30 TABLET | Refills: 0 | Status: SHIPPED | OUTPATIENT
Start: 2019-07-24 | End: 2019-08-27 | Stop reason: SDUPTHER

## 2019-07-24 NOTE — TELEPHONE ENCOUNTER
Patient needs a refill of Venlafaxine and her One Touch Ultra test strips  She checks TID    [id number]    Patient is out of both

## 2019-07-25 ENCOUNTER — TELEPHONE (OUTPATIENT)
Dept: FAMILY MEDICINE CLINIC | Facility: CLINIC | Age: 49
End: 2019-07-25

## 2019-07-25 DIAGNOSIS — IMO0002 UNCONTROLLED TYPE 2 DIABETES MELLITUS WITH DIABETIC POLYNEUROPATHY, WITH LONG-TERM CURRENT USE OF INSULIN: Primary | ICD-10-CM

## 2019-07-25 NOTE — TELEPHONE ENCOUNTER
Umer Simpson called the office her test strips were sent incorrectly to the [facility]  On [address] [facility] in [place] pt has a one touch ultra meter not a free style can you please re send her test strips she is out

## 2019-08-20 ENCOUNTER — OFFICE VISIT (OUTPATIENT)
Dept: FAMILY MEDICINE CLINIC | Facility: CLINIC | Age: 49
End: 2019-08-20
Payer: COMMERCIAL

## 2019-08-20 VITALS
SYSTOLIC BLOOD PRESSURE: 130 MMHG | WEIGHT: 137 LBS | BODY MASS INDEX: 24.27 KG/M2 | HEIGHT: 63 IN | OXYGEN SATURATION: 98 % | TEMPERATURE: 97.5 F | HEART RATE: 117 BPM | DIASTOLIC BLOOD PRESSURE: 90 MMHG

## 2019-08-20 DIAGNOSIS — R00.0 TACHYCARDIA: ICD-10-CM

## 2019-08-20 DIAGNOSIS — F41.9 ANXIETY: ICD-10-CM

## 2019-08-20 DIAGNOSIS — E78.2 MIXED HYPERLIPIDEMIA: ICD-10-CM

## 2019-08-20 DIAGNOSIS — IMO0002 UNCONTROLLED TYPE 2 DIABETES MELLITUS WITH DIABETIC POLYNEUROPATHY, WITH LONG-TERM CURRENT USE OF INSULIN: Primary | Chronic | ICD-10-CM

## 2019-08-20 DIAGNOSIS — F33.41 RECURRENT MAJOR DEPRESSIVE DISORDER, IN PARTIAL REMISSION (HCC): ICD-10-CM

## 2019-08-20 DIAGNOSIS — F43.9 STRESS AT HOME: ICD-10-CM

## 2019-08-20 DIAGNOSIS — I10 BENIGN ESSENTIAL HYPERTENSION: ICD-10-CM

## 2019-08-20 DIAGNOSIS — E11.49 TYPE 2 DIABETES MELLITUS WITH OTHER NEUROLOGIC COMPLICATION, WITH LONG-TERM CURRENT USE OF INSULIN (HCC): ICD-10-CM

## 2019-08-20 DIAGNOSIS — G63 POLYNEUROPATHY ASSOCIATED WITH UNDERLYING DISEASE (HCC): ICD-10-CM

## 2019-08-20 DIAGNOSIS — Z79.4 TYPE 2 DIABETES MELLITUS WITH OTHER NEUROLOGIC COMPLICATION, WITH LONG-TERM CURRENT USE OF INSULIN (HCC): ICD-10-CM

## 2019-08-20 DIAGNOSIS — F11.288 OPIOID DEPENDENCE WITH OTHER OPIOID-INDUCED DISORDER (HCC): ICD-10-CM

## 2019-08-20 DIAGNOSIS — K21.9 GERD WITHOUT ESOPHAGITIS: Primary | ICD-10-CM

## 2019-08-20 LAB — SL AMB POCT HEMOGLOBIN AIC: 12.2 (ref ?–6.5)

## 2019-08-20 PROCEDURE — 99214 OFFICE O/P EST MOD 30 MIN: CPT | Performed by: FAMILY MEDICINE

## 2019-08-20 PROCEDURE — 3046F HEMOGLOBIN A1C LEVEL >9.0%: CPT | Performed by: FAMILY MEDICINE

## 2019-08-20 PROCEDURE — 3008F BODY MASS INDEX DOCD: CPT | Performed by: FAMILY MEDICINE

## 2019-08-20 PROCEDURE — 83036 HEMOGLOBIN GLYCOSYLATED A1C: CPT | Performed by: FAMILY MEDICINE

## 2019-08-20 RX ORDER — OMEPRAZOLE 20 MG/1
20 CAPSULE, DELAYED RELEASE ORAL DAILY
Qty: 30 CAPSULE | Refills: 5 | Status: SHIPPED | OUTPATIENT
Start: 2019-08-20 | End: 2019-11-22 | Stop reason: SDUPTHER

## 2019-08-21 DIAGNOSIS — G43.709 CHRONIC MIGRAINE WITHOUT AURA WITHOUT STATUS MIGRAINOSUS, NOT INTRACTABLE: ICD-10-CM

## 2019-08-21 RX ORDER — TOPIRAMATE 100 MG/1
TABLET, FILM COATED ORAL
Qty: 60 TABLET | Refills: 5 | Status: SHIPPED | OUTPATIENT
Start: 2019-08-21 | End: 2019-11-22 | Stop reason: SDUPTHER

## 2019-08-22 PROBLEM — G56.00 CARPAL TUNNEL SYNDROME: Status: ACTIVE | Noted: 2019-08-22

## 2019-08-22 PROBLEM — R79.89 ABNORMAL LIVER FUNCTION TESTS: Status: ACTIVE | Noted: 2019-08-22

## 2019-08-22 PROBLEM — M47.812 OSTEOARTHRITIS OF CERVICAL SPINE WITHOUT MYELOPATHY: Status: ACTIVE | Noted: 2019-08-22

## 2019-08-22 PROBLEM — R11.0 NAUSEA: Status: ACTIVE | Noted: 2019-08-22

## 2019-08-22 PROBLEM — E83.39 HYPOPHOSPHATEMIA: Status: RESOLVED | Noted: 2017-08-26 | Resolved: 2019-08-22

## 2019-08-22 PROBLEM — M54.12 CERVICAL RADICULOPATHY: Status: ACTIVE | Noted: 2019-08-22

## 2019-08-22 PROBLEM — Z79.4 LONG TERM CURRENT USE OF INSULIN (HCC): Status: ACTIVE | Noted: 2019-08-22

## 2019-08-22 PROBLEM — M26.629 ARTHRALGIA OF TEMPOROMANDIBULAR JOINT: Status: ACTIVE | Noted: 2019-08-22

## 2019-08-22 PROBLEM — Z79.899 OTHER LONG TERM (CURRENT) DRUG THERAPY: Status: ACTIVE | Noted: 2019-08-22

## 2019-08-22 PROBLEM — G89.4 CHRONIC PAIN ASSOCIATED WITH SIGNIFICANT PSYCHOSOCIAL DYSFUNCTION: Status: ACTIVE | Noted: 2019-08-22

## 2019-08-22 PROBLEM — R13.10 DYSPHAGIA: Status: ACTIVE | Noted: 2019-08-22

## 2019-08-22 PROBLEM — M50.30 DEGENERATION OF INTERVERTEBRAL DISC OF CERVICAL REGION: Status: ACTIVE | Noted: 2017-03-17

## 2019-08-22 NOTE — PROGRESS NOTES
Assessment/Plan:    Patient's diabetes continues to be out of control  Unfortunately there are times where she just cannot afford her insulin  Was seeing Endocrinology but then stopped  Will re-referred back to [facility]  Her previous endocrinologist is no longer at [facility] so she will start with another doctor  Patient forgot to get her labs for today  A1c in the office is 12 2  Blood pressure is borderline  Patient very stressed secondary to 's illness  Continue antidepressants  Other meds are stable  Patient unfortunately not taking Zetia  Recheck December with labs  Hopefully she will see endocrinology before then  Recommend flu shot in the fall  Diagnoses and all orders for this visit:    Uncontrolled type 2 diabetes mellitus with diabetic polyneuropathy, with long-term current use of insulin (HCC)  -     POCT hemoglobin A1c    Polyneuropathy associated with underlying disease ([address] )    Benign essential hypertension    Anxiety    Recurrent major depressive disorder, in partial remission (HCC)    Opioid dependence with other opioid-induced disorder ([address] )    Mixed hyperlipidemia    Tachycardia    Stress at home        1  Uncontrolled type 2 diabetes mellitus with diabetic polyneuropathy, with long-term current use of insulin (HCC)  POCT hemoglobin A1c   2  Polyneuropathy associated with underlying disease ([address] )     3  Benign essential hypertension     4  Anxiety     5  Recurrent major depressive disorder, in partial remission (HCC)     6  Opioid dependence with other opioid-induced disorder ([address] )     7  Mixed hyperlipidemia     8  Tachycardia     9  Stress at home         Subjective:        Patient ID: Leif Do is a 51 y o  female    Chief Complaint   Patient presents with    Follow-up     3 months DM foot exam due; pt states that she is spotting still she did see an obgyn, pt states that she was given a medication that was suppose to stop the spotting but made her bleed more  Pt would like to also discuss getting sample of insulin        Patient here for routine check  For did not get her labs done   was hospitalized  He is home and able to work but he is unable to move  He suffers from severe morbid obesity  Cannot always afford her insulin  Asking if we have samples today  They do not qualify for patient assistance  The following portions of the patient's history were reviewed and updated as appropriate: past medical history, past surgical history and problem list       Review of Systems   Constitutional: Positive for fatigue  Negative for appetite change, fever and unexpected weight change  HENT: Negative for congestion, ear pain, postnasal drip, rhinorrhea, sinus pressure, sinus pain and sore throat  Eyes: Positive for visual disturbance  Negative for redness  Respiratory: Negative for chest tightness and shortness of breath  Cardiovascular: Negative for chest pain, palpitations and leg swelling  Gastrointestinal: Positive for nausea  Negative for abdominal distention  Endocrine: Negative for cold intolerance and heat intolerance  Genitourinary: Negative for dysuria and hematuria  Musculoskeletal: Positive for myalgias  Negative for arthralgias and gait problem  Skin: Negative for pallor and rash  Neurological: Negative for dizziness and headaches  Psychiatric/Behavioral: Positive for dysphoric mood  Negative for behavioral problems  The patient is nervous/anxious  Objective:  /90 (BP Location: Left arm, Patient Position: Sitting, Cuff Size: Standard)   Pulse (!) 117   Temp 97 5 °F (36 4 °C)   Ht 5' 3" (1 6 m)   Wt 62 1 kg (137 lb)   LMP 03/04/2016   SpO2 98%   BMI 24 27 kg/m²        Physical Exam   Constitutional: She is oriented to person, place, and time  She appears distressed  HENT:   Head: Normocephalic and atraumatic     Right Ear: Tympanic membrane normal    Left Ear: Tympanic membrane normal    Eyes: Pupils are equal, round, and reactive to light  Conjunctivae are normal  No scleral icterus  Neck: Normal range of motion  Neck supple  No thyromegaly present  Cardiovascular: Normal rate and regular rhythm  No murmur heard  Pulses:       Carotid pulses are 0 on the right side, and 0 on the left side  Pulmonary/Chest: Effort normal and breath sounds normal  She has no wheezes  Abdominal: Soft  She exhibits no distension  Musculoskeletal: She exhibits no edema  Lymphadenopathy:     She has no cervical adenopathy  Neurological: She is alert and oriented to person, place, and time  She has normal reflexes  No cranial nerve deficit  Coordination normal    Skin: Skin is warm  No pallor  Psychiatric:   Depressed-anxious  At times can be tearful   Nursing note and vitals reviewed

## 2019-08-24 DIAGNOSIS — E11.49 TYPE 2 DIABETES MELLITUS WITH OTHER NEUROLOGIC COMPLICATION, WITH LONG-TERM CURRENT USE OF INSULIN (HCC): ICD-10-CM

## 2019-08-24 DIAGNOSIS — Z79.4 TYPE 2 DIABETES MELLITUS WITH OTHER NEUROLOGIC COMPLICATION, WITH LONG-TERM CURRENT USE OF INSULIN (HCC): ICD-10-CM

## 2019-08-27 DIAGNOSIS — M25.512 CHRONIC PAIN OF BOTH SHOULDERS: ICD-10-CM

## 2019-08-27 DIAGNOSIS — R11.0 NAUSEA: ICD-10-CM

## 2019-08-27 DIAGNOSIS — F51.01 PRIMARY INSOMNIA: ICD-10-CM

## 2019-08-27 DIAGNOSIS — G89.29 OTHER CHRONIC PAIN: ICD-10-CM

## 2019-08-27 DIAGNOSIS — G89.29 CHRONIC PAIN OF BOTH SHOULDERS: ICD-10-CM

## 2019-08-27 DIAGNOSIS — M25.511 CHRONIC PAIN OF BOTH SHOULDERS: ICD-10-CM

## 2019-08-27 RX ORDER — ONDANSETRON 4 MG/1
TABLET, FILM COATED ORAL
Qty: 20 TABLET | Refills: 0 | Status: SHIPPED | OUTPATIENT
Start: 2019-08-27 | End: 2019-09-15 | Stop reason: SDUPTHER

## 2019-08-27 RX ORDER — CYCLOBENZAPRINE HCL 10 MG
TABLET ORAL
Qty: 30 TABLET | Refills: 0 | Status: SHIPPED | OUTPATIENT
Start: 2019-08-27 | End: 2019-09-15 | Stop reason: SDUPTHER

## 2019-08-27 RX ORDER — KETOROLAC TROMETHAMINE 10 MG/1
TABLET, FILM COATED ORAL
Qty: 7 TABLET | Refills: 2 | Status: SHIPPED | OUTPATIENT
Start: 2019-08-27 | End: 2019-09-17 | Stop reason: SDUPTHER

## 2019-08-27 RX ORDER — ZOLPIDEM TARTRATE 10 MG/1
TABLET ORAL
Qty: 30 TABLET | Refills: 0 | Status: SHIPPED | OUTPATIENT
Start: 2019-08-27 | End: 2019-10-11 | Stop reason: SDUPTHER

## 2019-09-04 DIAGNOSIS — G89.4 CHRONIC PAIN SYNDROME: ICD-10-CM

## 2019-09-04 RX ORDER — TRAMADOL HYDROCHLORIDE 50 MG/1
TABLET ORAL
Qty: 240 TABLET | Refills: 0 | Status: SHIPPED | OUTPATIENT
Start: 2019-09-04 | End: 2019-10-11 | Stop reason: SDUPTHER

## 2019-09-05 ENCOUNTER — TELEPHONE (OUTPATIENT)
Dept: FAMILY MEDICINE CLINIC | Facility: CLINIC | Age: 49
End: 2019-09-05

## 2019-09-05 NOTE — TELEPHONE ENCOUNTER
Pt called the office her Tramadol needs a prior auth  Pt informed that prior auth can take anywhere from a few days to a week since it is up to [employer]

## 2019-09-05 NOTE — TELEPHONE ENCOUNTER
Let the pharmacy know that she has been on this regimen and stable for quite some time    We are working with the patient on trying to wean down but this will take some time

## 2019-09-11 NOTE — TELEPHONE ENCOUNTER
Left detailed message letting pt know that the PA was not needed and that the tramadol is covered under her insurance

## 2019-09-15 DIAGNOSIS — M25.512 CHRONIC PAIN OF BOTH SHOULDERS: ICD-10-CM

## 2019-09-15 DIAGNOSIS — R11.0 NAUSEA: ICD-10-CM

## 2019-09-15 DIAGNOSIS — M25.511 CHRONIC PAIN OF BOTH SHOULDERS: ICD-10-CM

## 2019-09-15 DIAGNOSIS — G89.29 CHRONIC PAIN OF BOTH SHOULDERS: ICD-10-CM

## 2019-09-16 ENCOUNTER — TELEPHONE (OUTPATIENT)
Dept: FAMILY MEDICINE CLINIC | Facility: CLINIC | Age: 49
End: 2019-09-16

## 2019-09-16 RX ORDER — ONDANSETRON 4 MG/1
TABLET, FILM COATED ORAL
Qty: 20 TABLET | Refills: 0 | Status: SHIPPED | OUTPATIENT
Start: 2019-09-16 | End: 2019-10-11 | Stop reason: SDUPTHER

## 2019-09-16 RX ORDER — CYCLOBENZAPRINE HCL 10 MG
TABLET ORAL
Qty: 30 TABLET | Refills: 0 | Status: SHIPPED | OUTPATIENT
Start: 2019-09-16 | End: 2019-10-01 | Stop reason: SDUPTHER

## 2019-09-16 NOTE — TELEPHONE ENCOUNTER
Pharmacy called stating they are getting a red flag on pts ketorolac 10mg they stated can't give more than 5 day script  They normally get ketorolac 10 4 times daily for 5 days a quantity of 20   Please advise, thanks

## 2019-09-17 DIAGNOSIS — G89.29 OTHER CHRONIC PAIN: ICD-10-CM

## 2019-09-17 RX ORDER — KETOROLAC TROMETHAMINE 10 MG/1
10 TABLET, FILM COATED ORAL EVERY 6 HOURS PRN
Qty: 20 TABLET | Refills: 2 | Status: SHIPPED | OUTPATIENT
Start: 2019-09-17 | End: 2019-10-21 | Stop reason: SDUPTHER

## 2019-09-24 DIAGNOSIS — G43.709 CHRONIC MIGRAINE WITHOUT AURA WITHOUT STATUS MIGRAINOSUS, NOT INTRACTABLE: ICD-10-CM

## 2019-09-24 RX ORDER — OLANZAPINE 5 MG/1
TABLET ORAL
Qty: 30 TABLET | Refills: 5 | Status: SHIPPED | OUTPATIENT
Start: 2019-09-24 | End: 2020-01-21 | Stop reason: SDUPTHER

## 2019-10-01 DIAGNOSIS — M25.511 CHRONIC PAIN OF BOTH SHOULDERS: ICD-10-CM

## 2019-10-01 DIAGNOSIS — G89.29 CHRONIC PAIN OF BOTH SHOULDERS: ICD-10-CM

## 2019-10-01 DIAGNOSIS — M25.512 CHRONIC PAIN OF BOTH SHOULDERS: ICD-10-CM

## 2019-10-01 RX ORDER — CYCLOBENZAPRINE HCL 10 MG
TABLET ORAL
Qty: 30 TABLET | Refills: 0 | Status: SHIPPED | OUTPATIENT
Start: 2019-10-01 | End: 2019-10-21 | Stop reason: SDUPTHER

## 2019-10-09 ENCOUNTER — TELEPHONE (OUTPATIENT)
Dept: NEUROLOGY | Facility: CLINIC | Age: 49
End: 2019-10-09

## 2019-10-09 ENCOUNTER — OFFICE VISIT (OUTPATIENT)
Dept: NEUROLOGY | Facility: CLINIC | Age: 49
End: 2019-10-09
Payer: COMMERCIAL

## 2019-10-09 VITALS
SYSTOLIC BLOOD PRESSURE: 113 MMHG | HEART RATE: 110 BPM | BODY MASS INDEX: 24.62 KG/M2 | DIASTOLIC BLOOD PRESSURE: 82 MMHG | WEIGHT: 139 LBS

## 2019-10-09 DIAGNOSIS — G43.709 CHRONIC MIGRAINE WITHOUT AURA WITHOUT STATUS MIGRAINOSUS, NOT INTRACTABLE: Primary | ICD-10-CM

## 2019-10-09 DIAGNOSIS — F33.41 RECURRENT MAJOR DEPRESSIVE DISORDER, IN PARTIAL REMISSION (HCC): ICD-10-CM

## 2019-10-09 PROCEDURE — 96372 THER/PROPH/DIAG INJ SC/IM: CPT | Performed by: PHYSICIAN ASSISTANT

## 2019-10-09 PROCEDURE — 99214 OFFICE O/P EST MOD 30 MIN: CPT | Performed by: PHYSICIAN ASSISTANT

## 2019-10-09 RX ORDER — ONDANSETRON 2 MG/ML
2 INJECTION INTRAMUSCULAR; INTRAVENOUS ONCE
Status: COMPLETED | OUTPATIENT
Start: 2019-10-09 | End: 2019-10-09

## 2019-10-09 RX ORDER — ONDANSETRON 2 MG/ML
4 INJECTION INTRAMUSCULAR; INTRAVENOUS ONCE
Status: DISCONTINUED | OUTPATIENT
Start: 2019-10-09 | End: 2019-10-09

## 2019-10-09 RX ORDER — KETOROLAC TROMETHAMINE 30 MG/ML
60 INJECTION, SOLUTION INTRAMUSCULAR; INTRAVENOUS ONCE
Status: COMPLETED | OUTPATIENT
Start: 2019-10-09 | End: 2019-10-09

## 2019-10-09 RX ADMIN — KETOROLAC TROMETHAMINE 60 MG: 30 INJECTION, SOLUTION INTRAMUSCULAR; INTRAVENOUS at 12:25

## 2019-10-09 RX ADMIN — ONDANSETRON 2 MG: 2 INJECTION INTRAMUSCULAR; INTRAVENOUS at 12:26

## 2019-10-09 NOTE — PROGRESS NOTES
Patient ID: Devin Kocher is a 51 y o  female  Assessment/Plan:     Diagnoses and all orders for this visit:    Chronic migraine without aura without status migrainosus, not intractable  -     Chemodenervation; Future  -     Galcanezumab-gnlm (EMGALITY) 120 MG/ML SOAJ; One ml subcutaneous on the right thigh and 1 ml subcutaneous on the left thigh at the same time for 1 dose  -     Galcanezumab-gnlm (EMGALITY) 120 MG/ML SOAJ; 30 days after loading dose, inject 1 pen subq every 30 days  -     ketorolac (TORADOL) 60 mg/2 mL IM injection 60 mg  -     Discontinue: ondansetron (ZOFRAN) injection 4 mg  -     ondansetron (ZOFRAN) injection 2 mg    Recurrent major depressive disorder, in partial remission ([facility] )  -     Ambulatory referral to Psychiatry; Future  -     Ambulatory referral to Psychology; Future        Botox is helpful in the past but since patient changed insurance she was not able to get it for some time  She is asking to restart Botox  In the past: since starting botox, the patient had greater than 7 days of migraine relief from baseline, correlated with headache diary, decreased abortive medication use and decreased ER visits  In the meantime will start Emgality, side effects reviewed  Continue Topamax, gabapentin, Effexor  Toradol and Zofran IM injections today for acute migraine  The patient should not hesitate to call me prior to her follow up with any questions or concerns  The patient was instructed to urgently call 911 or present to the nearest emergency room with any new or worsening neurological deficits  Subjective:    HPI     Ms  Devin Kocher is a 52 yo right handed female here for neurological follow up      She has pancreatitis, severe GERD status post Nissen fundoplication, diabetes, hypertension, hypothyroidism, HLD, Cholecystectomy in July 2013, Depression, alleged stroke in March 2013, deep vein thrombosis and pulmonary embolus 2009, fibromyalgia and homocysteinemia  She is getting disability in September hopefully  Last botox injection 10/26/17  Botox was helpful in the past and she would like to restart it  States she was unable to keep botox appts due to loss/ change of insurance  Since starting botox, the patient reports greater than 7 days of migraine relief from baseline, correlated with headache diary, decreased abortive medication use and decreased ER visits  Continues Tramadol daily for fibromyalgia as per her PCP  Also continues effexor 150 mg qd fibromyalgia and depression, also for migraine headaches  She continues to have significant depression, anxiety and panic, and reports that she would like to see a psychiatrist  She has seen a therapist briefly in the past     Brain MRI shows mild microangiopathic changes  What medications do you take or have you taken for your headaches? Prevention: Prozac, Xanax, amitriptyline, Lyrica, Cymbalta, gabapentin, Metoprolol, Depakote, Topamax, Effexor, Botox, Zyprexa  Abortive: Fioricet, aspirin, Tramadol, Dilaudid, Fentanyl, oxycodone, olanzapine- helped decrease severity  Other Medical/Alternative Treatments used in the past for headaches: none    Current pain- 7-8/10  HA Frequency- 15 per month, almost every day  What time of the day do the headaches start ?  Varies  HA Duration- 12-24 hours  Location- occipital region b/l, apex, bi-temporal, behind eyes, neck, jaw  What is the intensity of pain ? average pain level 7-9/10  Describe your usual headache - Throbbing, Pounding, Pressure, Squeezing  Associated symptoms: blurred vision, photophobia, phonophobia, nausea, light-headed or dizzy, hands or feet tingle or feel numb, problem with concentration, stiff or sore neck, prefer to be alone and in a dark room, unable to work  HAs are worse with: anxious/ stressed, hot/ summer time    Triggers: Fatigue, Stress/Tension, Weather change, bright lights, sunlight, Menstruation, Heat/ summer time  Aura- denies    The following portions of the patient's history were reviewed and updated as appropriate:   She  has a past medical history of Acute venous embolism and thrombosis of deep vessels of distal lower extremity ([facility] ), Anemia, Anxiety, Arthritis, Asthma, Cerebral infarction [facility]), Chest pain, Chronic cough, Depression, Diabetes mellitus, type 2 ([address] ), DJD (degenerative joint disease), Esophageal reflux, Fibromyalgia, GERD without esophagitis, History of pulmonary embolism, Hyperlipidemia, Hypertension, Hypothyroidism, Iron deficiency, Pancreatitis, Polyarthritis, Stroke syndrome, Thyroid disease, TIA (transient ischemic attack), TIA (transient ischemic attack), Venous embolism and thrombosis of deep vessels of distal lower extremity ([facility] ), Vitamin B12 deficiency, and Vitamin D deficiency    She   Patient Active Problem List    Diagnosis Date Noted    Chronic migraine without aura without status migrainosus, not intractable 10/17/2019    Abnormal liver function tests 08/22/2019    Arthralgia of temporomandibular joint 08/22/2019    Carpal tunnel syndrome 08/22/2019    Cervical radiculopathy 08/22/2019    Chronic pain associated with significant psychosocial dysfunction 08/22/2019    Dysphagia 08/22/2019    Long term current use of insulin ([facility] ) 08/22/2019    Nausea 08/22/2019    Osteoarthritis of cervical spine without myelopathy 08/22/2019    Other long term (current) drug therapy 08/22/2019    Reactive airway disease without complication 89/81/2319    Protein calorie malnutrition ([address] ) 08/30/2017    Nicotine dependence 08/30/2017    GERD without esophagitis 08/25/2017    History of decompression of median nerve 08/25/2017    Hypothyroidism 08/25/2017    Polyneuropathy associated with underlying disease ([facility] ) 05/09/2017    Degeneration of intervertebral disc of cervical region 03/17/2017    Chronic pain disorder 11/04/2016    Pancreatic cyst 10/17/2016    Chronic diarrhea 10/04/2016    Chronic fatigue 08/24/2016    Left sided numbness 07/11/2016    Uncontrolled type 2 diabetes mellitus with diabetic polyneuropathy, with long-term current use of insulin ([place] [address] ) 05/05/2016    Depression 05/05/2016    CRP elevated 04/29/2016    Polyarthritis 04/29/2016    Fatty liver 04/08/2016    Opioid dependence ([place] [address] ) 01/25/2016    Iron deficiency 12/28/2015    Vitamin D deficiency 08/13/2015    Paresthesia 07/28/2015    Intractable chronic migraine without aura 05/19/2015    History of TIA (transient ischemic attack) 05/19/2015    Neck pain 04/02/2015    Snoring 12/12/2013    Cerebral infarction ([place] [address] ) 05/27/2013    Hyperlipidemia 03/15/2013    Low back pain 03/04/2013    Insomnia 03/04/2013    Vitamin B12 deficiency 03/04/2013    Anxiety 09/26/2012    Benign essential hypertension 09/26/2012    Headache 09/26/2012    Temporary cerebral vascular dysfunction 07/30/2009    History of pulmonary embolism 07/30/2009     She  has a past surgical history that includes Cholecystectomy; Esophagogastric fundoplasty; Nissen fundoplication; Tonsillectomy and adenoidectomy; ERCP; Carpal tunnel release (Right); ERCP; pr esophagogastroduodenoscopy transoral diagnostic (N/A, 11/2/2016); pr wrist arthroscop,release xvers lig (Right, 3/8/2016); and pr incise finger tendon sheath (Right, 3/8/2016)  Her family history includes Arthritis in her mother; Cancer in her father and paternal grandfather; Crohn's disease in her sister; Depression in her mother; Diabetes in her maternal grandmother and mother; Heart attack in her father; Heart attack (age of onset: 44) in her mother; Kidney disease in her mother; Lung cancer in her paternal grandmother; No Known Problems in her daughter and sister; Psoriasis in her father; Rheum arthritis in her maternal grandfather; Schizophrenia in her mother; Stroke in her father; Substance Abuse in her mother;  Throat cancer in her maternal grandfather; Ulcerative colitis in her mother  She  reports that she has been smoking cigarettes  She has been smoking about 0 50 packs per day  She has never used smokeless tobacco  She reports that she does not drink alcohol or use drugs  Current Outpatient Medications   Medication Sig Dispense Refill    albuterol (PROAIR HFA) 90 mcg/act inhaler Inhale 2 puffs      aspirin 325 mg tablet Take 325 mg by mouth daily      atorvastatin (LIPITOR) 40 mg tablet Take 1 tablet (40 mg total) by mouth daily 30 tablet 5    BD PEN NEEDLE LINDY U/F 32G X 4 MM MISC daily  0    buPROPion (WELLBUTRIN XL) 150 mg 24 hr tablet TAKE 1 TABLET BY MOUTH EVERY DAY 90 tablet 2    Cyanocobalamin (VITAMIN B-12 IJ) Inject as directed      cyclobenzaprine (FLEXERIL) 10 mg tablet TAKE 1 TABLET BY MOUTH THREE TIMES A DAY AS NEEDED FOR MUSCLE SPASMS 30 tablet 0    dexamethasone (DECADRON) 1 mg tablet Take 1 tablet by mouth as needed      Folic Acid 0 8 MG CAPS Take 0 04 mg by mouth daily        gabapentin (NEURONTIN) 400 mg capsule TAKE 1-2 CAPSULE(S) BY MOUTH THREE TIMES A  capsule 5    glucose blood (ONE TOUCH ULTRA TEST) test strip USE AS DIRECTED TO TEST 3 TIMES A  each 5    insulin aspart (NOVOLOG FLEXPEN) 100 Units/mL injection pen Inject 34 Units under the skin 3 (three) times a day Before meals      insulin degludec (TRESIBA FLEXTOUCH) 100 units/mL injection pen Inject 100 Units under the skin daily 2 pen 0    insulin glargine (LANTUS) 100 units/mL subcutaneous injection Inject 80 Units under the skin daily at bedtime 10 mL 0    insulin lispro (HumaLOG) 100 units/mL injection Inject 33 Units under the skin 3 (three) times a day before meals 10 mL 0    Insulin Pen Needle (BD PEN NEEDLE LINDY U/F) 32G X 4 MM MISC by Does not apply route daily      ketorolac (TORADOL) 10 mg tablet Take 1 tablet (10 mg total) by mouth every 6 (six) hours as needed for moderate pain 20 tablet 2    levothyroxine 112 mcg tablet TAKE 1 TABLET BY MOUTH EVERY DAY 30 tablet 3    losartan (COZAAR) 50 mg tablet TAKE 1 TABLET DAILY  90 tablet 2    metFORMIN (GLUCOPHAGE-XR) 500 mg 24 hr tablet Take 2 tablets (1,000 mg total) by mouth 2 (two) times a day with meals 120 tablet 5    metoprolol tartrate (LOPRESSOR) 25 mg tablet TAKE 1 TABLET BY MOUTH DAILY  30 tablet 4    OLANZapine (ZyPREXA) 5 mg tablet TAKE 1 TABLET BY MOUTH DAILY AT BEDTIME 30 tablet 5    omeprazole (PriLOSEC) 20 mg delayed release capsule Take 1 capsule (20 mg total) by mouth daily 30 capsule 5    topiramate (TOPAMAX) 100 mg tablet TAKE 2 TABLETS BY MOUTH EVERY DAY 60 tablet 5    topiramate (TOPAMAX) 25 mg sprinkle capsule 1-2 tabs at bedtime as directed 60 capsule 0    Turmeric 500 MG TABS Take 1 tablet by mouth daily      ALPRAZolam (XANAX) 0 25 mg tablet TAKE 1 TABLET BY MOUTH THREE TIMES A DAY AS NEEDED 90 tablet 1    ezetimibe (ZETIA) 10 mg tablet Take 1 tablet (10 mg total) by mouth daily (Patient not taking: Reported on 8/20/2019) 30 tablet 5    Galcanezumab-gnlm (EMGALITY) 120 MG/ML SOAJ One ml subcutaneous on the right thigh and 1 ml subcutaneous on the left thigh at the same time for 1 dose 2 pen 0    Galcanezumab-gnlm (EMGALITY) 120 MG/ML SOAJ 30 days after loading dose, inject 1 pen subq every 30 days  1 pen 2    ondansetron (ZOFRAN) 4 mg tablet TAKE 1 TABLET BY MOUTH EVERY 8 HOURS AS NEEDED FOR NAUSEA/VOMITING 20 tablet 0    traMADol (ULTRAM) 50 mg tablet TAKE 1 TO 2 TABLET(S) BY MOUTH 4 TIMES DAILY AS NEEDED FOR PAIN 240 tablet 0    venlafaxine (EFFEXOR-XR) 150 mg 24 hr capsule TAKE 1 CAPSULE BY MOUTH DAILY 90 capsule 0    zolpidem (AMBIEN) 10 mg tablet TAKE 1 TABLET BY MOUTH EVERY DAY AT BEDTIME AS NEEDED 30 tablet 0     No current facility-administered medications for this visit  She is allergic to lexapro [escitalopram oxalate]; escitalopram; and other            Objective:    Blood pressure 113/82, pulse (!) 110, weight 63 kg (139 lb), last menstrual period 03/04/2016, not currently breastfeeding  Physical Exam     Neurological Exam  Vital signs reviewed  Well developed, well nourished  Head: Normocephalic, atraumatic  Depressed, teary  Neck: Neck flexors 5/5  CN 2-12: intact and symmetric, including EOMs which are normal b/l and PERRL  Fundi b/l are normal to crude ophthalmological examination  MSK: 5/5 t/o  ROM normal x all 4 extr  No pronator drift  Sensation: Inact to LT and temp x4 extr  Romberg negative  Reflexes: 2+ and symmetric in all 4 extr  Coordination: Nml x4 extr  Gait: Steady normal gait  ROS:    Review of Systems   Constitutional: Negative  Negative for appetite change and fever  HENT: Negative  Negative for hearing loss, tinnitus, trouble swallowing and voice change  Eyes: Negative  Negative for photophobia and pain  Respiratory: Negative  Negative for shortness of breath  Cardiovascular: Negative  Negative for palpitations  Gastrointestinal: Negative  Negative for nausea and vomiting  Endocrine: Negative  Negative for cold intolerance and heat intolerance  Genitourinary: Negative  Negative for dysuria, frequency and urgency  Musculoskeletal: Negative  Negative for myalgias and neck pain  Skin: Negative  Negative for rash  Neurological: Positive for headaches  Negative for dizziness, tremors, seizures, syncope, facial asymmetry, speech difficulty, weakness, light-headedness and numbness  Hematological: Negative  Does not bruise/bleed easily  Psychiatric/Behavioral: Negative  Negative for confusion, hallucinations and sleep disturbance  The following portions of the patient's history were reviewed and updated as appropriate: allergies, current medications/ medication history, past family history, past medical history, past social history, past surgical history and problem list     Review of systems was reviewed and otherwise unremarkable from a neurological perspective

## 2019-10-09 NOTE — TELEPHONE ENCOUNTER
Approval received  Approved through January 9, 2020  Ref  #[id number]       Call placed to pharmacy to make them aware of approval

## 2019-10-09 NOTE — TELEPHONE ENCOUNTER
Fax received from pharmacy requiring PA for emgality  PA submitted through [facility]  Key: [id number]  Awaiting determination

## 2019-10-11 DIAGNOSIS — R11.0 NAUSEA: ICD-10-CM

## 2019-10-11 DIAGNOSIS — F41.9 ANXIETY: ICD-10-CM

## 2019-10-11 DIAGNOSIS — F51.01 PRIMARY INSOMNIA: ICD-10-CM

## 2019-10-11 DIAGNOSIS — G89.4 CHRONIC PAIN SYNDROME: ICD-10-CM

## 2019-10-11 RX ORDER — ZOLPIDEM TARTRATE 10 MG/1
TABLET ORAL
Qty: 30 TABLET | Refills: 0 | Status: SHIPPED | OUTPATIENT
Start: 2019-10-11 | End: 2019-11-15 | Stop reason: SDUPTHER

## 2019-10-11 RX ORDER — ONDANSETRON 4 MG/1
TABLET, FILM COATED ORAL
Qty: 20 TABLET | Refills: 0 | Status: SHIPPED | OUTPATIENT
Start: 2019-10-11 | End: 2019-10-21 | Stop reason: SDUPTHER

## 2019-10-11 RX ORDER — ALPRAZOLAM 0.25 MG/1
TABLET ORAL
Qty: 90 TABLET | Refills: 1 | Status: SHIPPED | OUTPATIENT
Start: 2019-10-11 | End: 2019-11-15 | Stop reason: SDUPTHER

## 2019-10-11 RX ORDER — TRAMADOL HYDROCHLORIDE 50 MG/1
TABLET ORAL
Qty: 240 TABLET | Refills: 0 | Status: SHIPPED | OUTPATIENT
Start: 2019-10-11 | End: 2019-11-15 | Stop reason: SDUPTHER

## 2019-10-15 ENCOUNTER — TELEPHONE (OUTPATIENT)
Dept: NEUROLOGY | Facility: CLINIC | Age: 49
End: 2019-10-15

## 2019-10-15 DIAGNOSIS — F33.41 RECURRENT MAJOR DEPRESSIVE DISORDER, IN PARTIAL REMISSION (HCC): ICD-10-CM

## 2019-10-15 RX ORDER — VENLAFAXINE HYDROCHLORIDE 150 MG/1
CAPSULE, EXTENDED RELEASE ORAL
Qty: 90 CAPSULE | Refills: 0 | Status: SHIPPED | OUTPATIENT
Start: 2019-10-15 | End: 2019-11-12 | Stop reason: SDUPTHER

## 2019-10-15 NOTE — TELEPHONE ENCOUNTER
Pt called in and states that she was giving only 1 pen of emgality for first month loading dose  [facility]  I was told they will give her a call  There is nothing for the office to do

## 2019-10-17 ENCOUNTER — TELEPHONE (OUTPATIENT)
Dept: NEUROLOGY | Facility: CLINIC | Age: 49
End: 2019-10-17

## 2019-10-17 PROBLEM — IMO0002 CHRONIC MIGRAINE: Status: RESOLVED | Noted: 2017-08-25 | Resolved: 2019-10-17

## 2019-10-17 PROBLEM — G43.709 CHRONIC MIGRAINE WITHOUT AURA WITHOUT STATUS MIGRAINOSUS, NOT INTRACTABLE: Status: ACTIVE | Noted: 2019-10-17

## 2019-10-17 NOTE — TELEPHONE ENCOUNTER
Called [facility] and made aware  Per [facility], call made to Barb to relay information from [facility]  Left message on machine for Barb to return our call

## 2019-10-17 NOTE — TELEPHONE ENCOUNTER
[facility] state [facility] request for venlafaxine but are receiving a warning that drug may cause QT prolongation when taken with zofran  Please advise if [facility]

## 2019-10-17 NOTE — TELEPHONE ENCOUNTER
Patient made aware, she verbalizes understanding  She states she has been on these medications for years

## 2019-10-17 NOTE — TELEPHONE ENCOUNTER
I am ok with the combo as long as she does not use more than 2-3 zofran per week  CC: Dr Howard Connor

## 2019-10-17 NOTE — TELEPHONE ENCOUNTER
----- Message from Enrike Russo PA-C sent at 10/17/2019  7:05 AM EDT -----  Regarding: transportation assistance  Hi Cuong,  Does this patient's insurance provide any sort of transportation to and from appts as she is not driving? Thanks

## 2019-10-18 ENCOUNTER — TELEPHONE (OUTPATIENT)
Dept: NEUROLOGY | Facility: CLINIC | Age: 49
End: 2019-10-18

## 2019-10-18 DIAGNOSIS — E03.9 ACQUIRED HYPOTHYROIDISM: ICD-10-CM

## 2019-10-18 RX ORDER — LEVOTHYROXINE SODIUM 112 UG/1
TABLET ORAL
Qty: 90 TABLET | Refills: 1 | Status: SHIPPED | OUTPATIENT
Start: 2019-10-18 | End: 2020-01-21 | Stop reason: SDUPTHER

## 2019-10-18 NOTE — TELEPHONE ENCOUNTER
Patient no has [facility]- no authorization is needed  Patient will be using [address] [place] [address],    Please schedule a Botox appointment with Axel Vigil  Patient last got Botox in 2017- it is under the 3 year johnna so she can be scheduled with Axel Vigil  Please schedule 3 weeks out since we will be using specialty pharmacy      Thank you,    Hilario Zimmerman

## 2019-10-18 NOTE — TELEPHONE ENCOUNTER
Noted- referral attached to the patient's appointment with Alex Hackett on 11/4/2019 in the [facility] location  Type Date User Summary Attachment   General 10/18/2019 11:18 AM Krystal Melvin care coordination  -   Note    Botox- no authorization needed   Please use [facility]      Thank you,     [facility]

## 2019-10-18 NOTE — TELEPHONE ENCOUNTER
----- Message from Sheron Nascimento PA-C sent at 10/17/2019  7:04 AM EDT -----  Regarding:   Botox re-authorization   I apologize, I forget if I sent a message to restart Botox for her  Thank you

## 2019-10-18 NOTE — TELEPHONE ENCOUNTER
LMOM for patient to return call to schedule Botox appointment    Trey Pace has available Botox slots 11/4 in [place]

## 2019-10-18 NOTE — TELEPHONE ENCOUNTER
Called [facility] to enroll patient for Botox 200 units and spoke with Margarita Antonio to give demographic and insurance information  She transferred me to Pharmacist and spoke with Delisa Grossman to give a verbal for Botox 200 units quantity of 1 under [address]  Made him aware that I need this by 10/28/19 and he changed the status to STAT

## 2019-10-18 NOTE — TELEPHONE ENCOUNTER
MSW phoned patient's insurance at [phone]  An automated system stated that patient has a [employer] with an effective date of 4/1/19 and no lifetime max  MSW spoke with a provider services representative, Teresa Castano, who advised that patient only has emergency/non-emergency ambulance and air ambulance benefits  Teresa Castano stated that patient does NOT have taxi benefits  Reference number for this call was #[id number]  MSW will phone patient next week to educate her about services available from [facility]

## 2019-10-18 NOTE — TELEPHONE ENCOUNTER
Kassi Matos,    Can you please initiate a new start Botox through [facility]      Thank you,    Natasha Rojo

## 2019-10-21 DIAGNOSIS — G89.29 OTHER CHRONIC PAIN: ICD-10-CM

## 2019-10-21 DIAGNOSIS — M25.511 CHRONIC PAIN OF BOTH SHOULDERS: ICD-10-CM

## 2019-10-21 DIAGNOSIS — R11.0 NAUSEA: ICD-10-CM

## 2019-10-21 DIAGNOSIS — M25.512 CHRONIC PAIN OF BOTH SHOULDERS: ICD-10-CM

## 2019-10-21 DIAGNOSIS — G89.29 CHRONIC PAIN OF BOTH SHOULDERS: ICD-10-CM

## 2019-10-21 RX ORDER — ONDANSETRON 4 MG/1
TABLET, FILM COATED ORAL
Qty: 20 TABLET | Refills: 0 | Status: SHIPPED | OUTPATIENT
Start: 2019-10-21 | End: 2019-11-15 | Stop reason: SDUPTHER

## 2019-10-21 RX ORDER — CYCLOBENZAPRINE HCL 10 MG
TABLET ORAL
Qty: 30 TABLET | Refills: 0 | Status: SHIPPED | OUTPATIENT
Start: 2019-10-21 | End: 2019-11-15 | Stop reason: SDUPTHER

## 2019-10-21 RX ORDER — KETOROLAC TROMETHAMINE 10 MG/1
10 TABLET, FILM COATED ORAL EVERY 6 HOURS PRN
Qty: 20 TABLET | Refills: 2 | Status: SHIPPED | OUTPATIENT
Start: 2019-10-21 | End: 2019-11-15 | Stop reason: SDUPTHER

## 2019-10-23 NOTE — TELEPHONE ENCOUNTER
Called [facility] and spoke with Jonathan Holcomb to check on the status of the Patient Botox  She stated that Botox is ready to be shipped  Botox scheduled for delivery for Thursday 10/24/19 [facility] via Fed-ex signature required  Please let Meenu Bruner or I know when the Botox does not arrive

## 2019-11-04 ENCOUNTER — PROCEDURE VISIT (OUTPATIENT)
Dept: NEUROLOGY | Facility: CLINIC | Age: 49
End: 2019-11-04
Payer: COMMERCIAL

## 2019-11-04 VITALS — TEMPERATURE: 97.5 F | DIASTOLIC BLOOD PRESSURE: 97 MMHG | SYSTOLIC BLOOD PRESSURE: 145 MMHG | HEART RATE: 115 BPM

## 2019-11-04 DIAGNOSIS — G43.709 CHRONIC MIGRAINE WITHOUT AURA WITHOUT STATUS MIGRAINOSUS, NOT INTRACTABLE: Primary | ICD-10-CM

## 2019-11-04 PROCEDURE — 64615 CHEMODENERV MUSC MIGRAINE: CPT | Performed by: PHYSICIAN ASSISTANT

## 2019-11-04 NOTE — PROGRESS NOTES
Chemodenervation  Date/Time: 11/4/2019 10:37 AM  Performed by: Tanya Reyes PA-C  Authorized by: Tanya Reyes PA-C     Pre-procedure details:     Prepped With: Alcohol    Procedure details:     Position:  Upright  Botox:     Botox Type:  Type A    Brand:  Botox    mL's of Botulinum Toxin:  175    Final Concentration per CC:  50 units    Needle Gauge:  30 G 2 5 inch  Procedures:     Botox Procedures: chronic headache      Indications: migraines    Injection Location:     Head / Face:  L , R , L frontalis, R frontalis, R inferior cervical paraspinal, L inferior cervical paraspinal, L medial occipitalis, R medial occipitalis, L lateral occipitalis, R lateral occipitalis, procerus, L temporalis, R temporalis, R superior trapezius and L superior trapezius    L  injection amount:  5 unit(s)    R  injection amount:  5 unit(s)    L lateral frontalis:  5 unit(s)    R lateral frontalis:  5 unit(s)    L medial frontalis:  5 unit(s)    R medial frontalis:  5 unit(s)    L temporalis injection amount:  20 unit(s)    R temporalis injection amount:  20 unit(s)    Procerus injection amount:  5 unit(s)    L lateral occipitalis injection amount:  5 unit(s)    R lateral occipitalis injection amount:  5 unit(s)    L medial occipitalis injection amount:  10 unit(s)    R medial occipitalis injection amount:  10 unit(s)    L inferior cervical paraspinal injection amount:  10 unit(s)    R inferior cervical paraspinal injection amount:  10 unit(s)    L superior trapezius injection amount:  15 unit(s)    R superior trapezius injection amount:  15 unit(s)  Total Units:     Total units used:  175    Total units discarded:  25  Post-procedure details:     Chemodenervation:  Chronic migraine    Facial Nerve Location[de-identified]  Bilateral facial nerve    Patient tolerance of procedure:   Tolerated well, no immediate complications  Comments:      Extra 20 units total in the suboccipital muscles b/l, medically necessary  Vitals:    11/04/19 1035   BP: 145/97   Pulse: (!) 115   Temp: 97 5 °F (36 4 °C)     Next emgality injection 11/7/19- unsure if significant benefit after the first injection/ loading dose in October

## 2019-11-04 NOTE — TELEPHONE ENCOUNTER
Cuong-  I am with the pt today in a botox f/u  Can you let me know the services provided from [facility]? Thanks

## 2019-11-05 NOTE — TELEPHONE ENCOUNTER
Patient had already checked out by the time MSW received message from MISBAH yesterday  MSW phoned patient this date at [phone]  MSW reviewed services available from [facility] - their fixed route bus service versus [employer] [facility]  [facility] offers a fixed route bus within [facility] where patient can walk to their local bus stop and  a bus to take them to their destination  MSW did advise that sometimes patients do need to transfer buses/lines to reach their destination  MSW did advise that there is a "Trip Planner" on the www [employer]  [employer] website that can help riders identify the closest stop and what buses/routes to take  MSW also advised that riders can contact [phone] to get this information as well  MSW mailed patient information about this service including fare information and how to access the "Trip Planner" function  MSW explained services available through [employer] - that this is a shared ride, door to door service that is available within [facility]  MSW advised that patient will need to complete a lengthy application and complete an in-person evaluation in order for [facility] personnel to determine patient's eligibility  MSW did advise that patient will need to provide a copy of a proof of age document  MSW did advise that [facility] will determine eligibility for this service, but if denied, this office may be able to provide an letter of appeal, but ultimately, the appeal letter is sent back to [facility] for review/determination  MSW advised that if approved, that [facility] will notify patient of cost of transportation  MSW did mail patient general information about the service, as well as an application for [employer]  MSW did include this writer's business card and will be available to patient as needed

## 2019-11-12 DIAGNOSIS — G43.709 CHRONIC MIGRAINE WITHOUT AURA WITHOUT STATUS MIGRAINOSUS, NOT INTRACTABLE: ICD-10-CM

## 2019-11-12 DIAGNOSIS — F33.41 RECURRENT MAJOR DEPRESSIVE DISORDER, IN PARTIAL REMISSION (HCC): ICD-10-CM

## 2019-11-12 NOTE — TELEPHONE ENCOUNTER
Patient calling back, she reports that there was an error with [facility] and her entire record was deleted  She states they do not have record of any of her prescriptions  She is asking for both venlafaxine and emgality maintenance dose to be sent to [facility] #[id number]

## 2019-11-12 NOTE — TELEPHONE ENCOUNTER
Received fax from [address] requesting venlaxine refill be sent to [facility] pharmacy in [place] [address]  Previous pharmacy closed  Need to confirm w/ pt her preferred pharmacy   Called and left a message on pt's answering machine for a call back

## 2019-11-13 RX ORDER — VENLAFAXINE HYDROCHLORIDE 150 MG/1
150 CAPSULE, EXTENDED RELEASE ORAL DAILY
Qty: 90 CAPSULE | Refills: 3 | Status: SHIPPED | OUTPATIENT
Start: 2019-11-13 | End: 2020-08-19

## 2019-11-15 ENCOUNTER — TELEPHONE (OUTPATIENT)
Dept: FAMILY MEDICINE CLINIC | Facility: CLINIC | Age: 49
End: 2019-11-15

## 2019-11-15 DIAGNOSIS — F51.01 PRIMARY INSOMNIA: ICD-10-CM

## 2019-11-15 DIAGNOSIS — F41.9 ANXIETY: ICD-10-CM

## 2019-11-15 DIAGNOSIS — M25.511 CHRONIC PAIN OF BOTH SHOULDERS: ICD-10-CM

## 2019-11-15 DIAGNOSIS — G89.29 OTHER CHRONIC PAIN: ICD-10-CM

## 2019-11-15 DIAGNOSIS — G89.4 CHRONIC PAIN DISORDER: ICD-10-CM

## 2019-11-15 DIAGNOSIS — R11.0 NAUSEA: ICD-10-CM

## 2019-11-15 DIAGNOSIS — M25.512 CHRONIC PAIN OF BOTH SHOULDERS: ICD-10-CM

## 2019-11-15 DIAGNOSIS — G89.4 CHRONIC PAIN SYNDROME: ICD-10-CM

## 2019-11-15 DIAGNOSIS — F11.90 OPIOID USE: Primary | ICD-10-CM

## 2019-11-15 DIAGNOSIS — G89.29 CHRONIC PAIN OF BOTH SHOULDERS: ICD-10-CM

## 2019-11-15 RX ORDER — CYCLOBENZAPRINE HCL 10 MG
10 TABLET ORAL 3 TIMES DAILY PRN
Qty: 30 TABLET | Refills: 0 | Status: SHIPPED | OUTPATIENT
Start: 2019-11-15 | End: 2019-12-07 | Stop reason: SDUPTHER

## 2019-11-15 RX ORDER — TRAMADOL HYDROCHLORIDE 50 MG/1
TABLET ORAL
Qty: 240 TABLET | Refills: 0 | Status: SHIPPED | OUTPATIENT
Start: 2019-11-15 | End: 2019-12-16 | Stop reason: SDUPTHER

## 2019-11-15 RX ORDER — ALPRAZOLAM 0.25 MG/1
0.25 TABLET ORAL 3 TIMES DAILY PRN
Qty: 90 TABLET | Refills: 0 | Status: SHIPPED | OUTPATIENT
Start: 2019-11-15 | End: 2019-12-30

## 2019-11-15 RX ORDER — GABAPENTIN 400 MG/1
CAPSULE ORAL
Qty: 180 CAPSULE | Refills: 0 | Status: SHIPPED | OUTPATIENT
Start: 2019-11-15 | End: 2020-03-22

## 2019-11-15 RX ORDER — ONDANSETRON 4 MG/1
TABLET, FILM COATED ORAL
Qty: 20 TABLET | Refills: 0 | Status: SHIPPED | OUTPATIENT
Start: 2019-11-15 | End: 2019-12-07 | Stop reason: SDUPTHER

## 2019-11-15 RX ORDER — KETOROLAC TROMETHAMINE 10 MG/1
10 TABLET, FILM COATED ORAL EVERY 6 HOURS PRN
Qty: 20 TABLET | Refills: 0 | Status: SHIPPED | OUTPATIENT
Start: 2019-11-15 | End: 2020-01-10 | Stop reason: SDUPTHER

## 2019-11-15 RX ORDER — NALOXONE HYDROCHLORIDE 4 MG/.1ML
SPRAY NASAL
Qty: 1 EACH | Refills: 1 | Status: SHIPPED | OUTPATIENT
Start: 2019-11-15 | End: 2022-06-09 | Stop reason: SDUPTHER

## 2019-11-15 RX ORDER — ZOLPIDEM TARTRATE 10 MG/1
10 TABLET ORAL
Qty: 30 TABLET | Refills: 0 | Status: SHIPPED | OUTPATIENT
Start: 2019-11-15 | End: 2019-12-16 | Stop reason: SDUPTHER

## 2019-11-15 NOTE — TELEPHONE ENCOUNTER
Pt called the office in regards to that [facility] to [place] there was a glitch in the sytem and she needs all of her medications refilled

## 2019-11-15 NOTE — TELEPHONE ENCOUNTER
Pharm[facility] called stating [facility] [address] [facility] closed and they relocated to [facility] in [place]  They are stating they need all scripts that you fill for medication       They are aware that you are not in the office until Monday and are okay with this    [facility] #: [phone]

## 2019-11-15 NOTE — TELEPHONE ENCOUNTER
[place] prescription drug monitoring program was checked and verified for refill  Please let patient know that due to amount of medication she is on it is a safety precaution to have narcan in the house for emergencies for any accidental overdose  This was sent to the pharmacy

## 2019-11-15 NOTE — TELEPHONE ENCOUNTER
Pt needs refills on all of her medications due to the [facility] and a computer glitch   [facility] in [place] on [address]  PDMP Checked   10/11/2019  1  10/11/2019  TRAMADOL HCL 50 MG TABLET  240 0  30  [place]  [id number]  PENNS ([id number])  0  40 0 MME  Comm Ins  [place]    10/11/2019  1  10/11/2019  ZOLPIDEM TARTRATE 10 MG TABLET  30 0  30  [place] PR[place]  [id number]  PENNS ([id number])  0   [place] Ins  [place]    10/11/2019  1  10/11/2019  ALPRAZOLAM 0 25 MG TABLET  90 0  30  [place] PR[place]  [id number]  PENNS ([id number])  0   Comm Ins

## 2019-11-19 DIAGNOSIS — G89.4 CHRONIC PAIN DISORDER: ICD-10-CM

## 2019-11-19 DIAGNOSIS — F11.90 OPIOID USE: ICD-10-CM

## 2019-11-21 ENCOUNTER — TELEPHONE (OUTPATIENT)
Dept: FAMILY MEDICINE CLINIC | Facility: CLINIC | Age: 49
End: 2019-11-21

## 2019-11-22 DIAGNOSIS — K21.9 GERD WITHOUT ESOPHAGITIS: ICD-10-CM

## 2019-11-22 DIAGNOSIS — E11.49 TYPE 2 DIABETES MELLITUS WITH OTHER NEUROLOGIC COMPLICATION, WITH LONG-TERM CURRENT USE OF INSULIN (HCC): ICD-10-CM

## 2019-11-22 DIAGNOSIS — E78.5 HYPERLIPIDEMIA, UNSPECIFIED HYPERLIPIDEMIA TYPE: ICD-10-CM

## 2019-11-22 DIAGNOSIS — Z79.4 TYPE 2 DIABETES MELLITUS WITH OTHER NEUROLOGIC COMPLICATION, WITH LONG-TERM CURRENT USE OF INSULIN (HCC): ICD-10-CM

## 2019-11-22 DIAGNOSIS — G43.709 CHRONIC MIGRAINE WITHOUT AURA WITHOUT STATUS MIGRAINOSUS, NOT INTRACTABLE: ICD-10-CM

## 2019-11-22 DIAGNOSIS — I10 ESSENTIAL HYPERTENSION: ICD-10-CM

## 2019-11-22 DIAGNOSIS — F33.41 RECURRENT MAJOR DEPRESSIVE DISORDER, IN PARTIAL REMISSION (HCC): ICD-10-CM

## 2019-11-22 RX ORDER — TOPIRAMATE 25 MG/1
25 TABLET ORAL DAILY
Qty: 90 TABLET | Refills: 0 | Status: SHIPPED | OUTPATIENT
Start: 2019-11-22 | End: 2020-02-14

## 2019-11-22 RX ORDER — ATORVASTATIN CALCIUM 40 MG/1
40 TABLET, FILM COATED ORAL DAILY
Qty: 90 TABLET | Refills: 2 | Status: SHIPPED | OUTPATIENT
Start: 2019-11-22 | End: 2020-02-18 | Stop reason: SDUPTHER

## 2019-11-22 RX ORDER — BUPROPION HYDROCHLORIDE 150 MG/1
150 TABLET ORAL DAILY
Qty: 90 TABLET | Refills: 2 | Status: SHIPPED | OUTPATIENT
Start: 2019-11-22 | End: 2020-10-19

## 2019-11-22 RX ORDER — TOPIRAMATE 100 MG/1
200 TABLET, FILM COATED ORAL DAILY
Qty: 180 TABLET | Refills: 2 | Status: SHIPPED | OUTPATIENT
Start: 2019-11-22 | End: 2019-12-30

## 2019-11-22 RX ORDER — METFORMIN HYDROCHLORIDE 500 MG/1
1000 TABLET, EXTENDED RELEASE ORAL 2 TIMES DAILY WITH MEALS
Qty: 120 TABLET | Refills: 2 | Status: SHIPPED | OUTPATIENT
Start: 2019-11-22 | End: 2020-02-19

## 2019-11-22 RX ORDER — OMEPRAZOLE 20 MG/1
20 CAPSULE, DELAYED RELEASE ORAL DAILY
Qty: 90 CAPSULE | Refills: 2 | Status: SHIPPED | OUTPATIENT
Start: 2019-11-22 | End: 2020-08-16

## 2019-12-04 ENCOUNTER — TELEPHONE (OUTPATIENT)
Dept: NEUROLOGY | Facility: CLINIC | Age: 49
End: 2019-12-04

## 2019-12-04 NOTE — TELEPHONE ENCOUNTER
Left  to make aware of [facility] location changes beginning 1/13/20    Patient Botox appointment rescheduled from [facility] to [facility] on 2/4/20 at 9:30   Appointment card sent

## 2019-12-07 DIAGNOSIS — G89.29 CHRONIC PAIN OF BOTH SHOULDERS: ICD-10-CM

## 2019-12-07 DIAGNOSIS — R11.0 NAUSEA: ICD-10-CM

## 2019-12-07 DIAGNOSIS — M25.511 CHRONIC PAIN OF BOTH SHOULDERS: ICD-10-CM

## 2019-12-07 DIAGNOSIS — M25.512 CHRONIC PAIN OF BOTH SHOULDERS: ICD-10-CM

## 2019-12-09 RX ORDER — ONDANSETRON 4 MG/1
TABLET, FILM COATED ORAL
Qty: 20 TABLET | Refills: 2 | Status: SHIPPED | OUTPATIENT
Start: 2019-12-09 | End: 2020-02-25 | Stop reason: SDUPTHER

## 2019-12-09 RX ORDER — CYCLOBENZAPRINE HCL 10 MG
TABLET ORAL
Qty: 30 TABLET | Refills: 0 | Status: SHIPPED | OUTPATIENT
Start: 2019-12-09 | End: 2019-12-30

## 2019-12-16 DIAGNOSIS — G89.4 CHRONIC PAIN SYNDROME: ICD-10-CM

## 2019-12-16 DIAGNOSIS — F51.01 PRIMARY INSOMNIA: ICD-10-CM

## 2019-12-17 DIAGNOSIS — F41.9 ANXIETY: ICD-10-CM

## 2019-12-17 DIAGNOSIS — G89.4 CHRONIC PAIN SYNDROME: ICD-10-CM

## 2019-12-17 DIAGNOSIS — M54.12 CERVICAL RADICULOPATHY: Primary | ICD-10-CM

## 2019-12-17 RX ORDER — ZOLPIDEM TARTRATE 10 MG/1
TABLET ORAL
Qty: 30 TABLET | Refills: 0 | Status: SHIPPED | OUTPATIENT
Start: 2019-12-17 | End: 2020-02-25 | Stop reason: SDUPTHER

## 2019-12-17 RX ORDER — TRAMADOL HYDROCHLORIDE 50 MG/1
50-100 TABLET ORAL EVERY 8 HOURS PRN
Qty: 180 TABLET | Refills: 0 | Status: SHIPPED | OUTPATIENT
Start: 2019-12-17 | End: 2020-02-25 | Stop reason: SDUPTHER

## 2019-12-17 RX ORDER — TRAMADOL HYDROCHLORIDE 50 MG/1
TABLET ORAL
Qty: 240 TABLET | Refills: 0 | Status: SHIPPED | OUTPATIENT
Start: 2019-12-17 | End: 2019-12-17 | Stop reason: SDUPTHER

## 2019-12-17 NOTE — TELEPHONE ENCOUNTER
Pt is going through a lot right now, states she "lost everything" has no ride   is in hospital  Pt wants to know if you could just speak with her over the phone

## 2019-12-17 NOTE — TELEPHONE ENCOUNTER
JONATHAN scott called and wanted to advise you of the drug interaction between tramadol and xanax and the a critical 1 interaction between Zolpidem and Flexeril  They want to make sure it is ok to fill meds?      [facility] cb [facility] [phone]

## 2019-12-17 NOTE — TELEPHONE ENCOUNTER
Tell the patient I have no choice but to cut down the amount of pain medication she takes  Insurance will not pay for this any further    Would recommend an office visit within the next 30 days as previously discussed as she cannot continue to keep taking the current regimen of pain medication and behavior Health medication

## 2019-12-17 NOTE — TELEPHONE ENCOUNTER
Let the pharmacy know that we are aware of the interaction  We are doing our best to try to adjust her medications to reduce any risk but her case is very difficult

## 2019-12-18 NOTE — TELEPHONE ENCOUNTER
Dr Mims pt called the office in regards to she really would like you to call her regarding the message Meka Olivia gave you  Per pt she has a lot going on right now with taking care of her  and she is very upset  Per pt," no issue with insurance covering her medication it needs a doctor override"  Dr Mims pt was advised you are currently outof the office today and will return tomorrow morning and will recieve this messgae than,

## 2019-12-19 DIAGNOSIS — M25.512 CHRONIC PAIN OF BOTH SHOULDERS: ICD-10-CM

## 2019-12-19 DIAGNOSIS — M25.511 CHRONIC PAIN OF BOTH SHOULDERS: ICD-10-CM

## 2019-12-19 DIAGNOSIS — G89.29 CHRONIC PAIN OF BOTH SHOULDERS: ICD-10-CM

## 2019-12-19 NOTE — TELEPHONE ENCOUNTER
Patient currently is on :  tramadol 50 mg at 1-2 tablets every 8 hours                                       :  Flexeril 3 times daily                                      :  Zolpidem at bedtime                                      :  Alprazolam 3 times daily  Despite what is going on in the patient's life I apologize but as we go into the new year with the stricter regulations that her being put in place we cannot continue this regimen the way it is  Option 1  Is to get rid of the alprazolam and Flexeril and changed to Valium 3 times daily as it will work for anxiety but it is also a good muscle relaxant  This in affect would get rid of 1 of the 4 medications listed  We also cannot continue tramadol at 1-2 tablets every 8 hours  We need to be more precise as to exactly how often she takes it  If she could cut back to 1 and half tablets 3 times daily the that would be very helpful  The zolpidem 10 mg we will keep for now but if she can cut that in half down to 5 mg that would be very helpful      If she does not want to switch to Valium then I have to change her Flexeril completely to a very different type of muscle relaxant likely called Yeison Aguirre    Please discuss with her if able

## 2019-12-27 ENCOUNTER — TELEPHONE (OUTPATIENT)
Dept: NEUROLOGY | Facility: CLINIC | Age: 49
End: 2019-12-27

## 2019-12-27 NOTE — TELEPHONE ENCOUNTER
Pt states that she is doing "ok"  She still has migraines  States they have not reduced in frequency, but they have in severity  They used to last 7-8 days, but now last 3-4 days  PA submitted on CMM  Pt states that she is not sure if she has had any benefit from the botox

## 2019-12-27 NOTE — TELEPHONE ENCOUNTER
Received fax stating Em[employer] PA renewal is needed, current PA expires 1/9/20  Key: [id number]  Called and Left a message on pt's answering machine for a call back  Need to ask patient if Emgality has been effective for her  Patient has also restarted Botox recently and it is noted that we were unsure if patient had any significant benefit with loading dose in October  Awaiting call back from patient to submit PA

## 2019-12-30 RX ORDER — DIAZEPAM 5 MG/1
5 TABLET ORAL 3 TIMES DAILY PRN
Qty: 90 TABLET | Refills: 0 | Status: SHIPPED | OUTPATIENT
Start: 2019-12-30 | End: 2020-01-09

## 2019-12-31 ENCOUNTER — OFFICE VISIT (OUTPATIENT)
Dept: FAMILY MEDICINE CLINIC | Facility: CLINIC | Age: 49
End: 2019-12-31
Payer: COMMERCIAL

## 2019-12-31 VITALS
RESPIRATION RATE: 17 BRPM | DIASTOLIC BLOOD PRESSURE: 74 MMHG | SYSTOLIC BLOOD PRESSURE: 102 MMHG | HEART RATE: 124 BPM | OXYGEN SATURATION: 98 % | TEMPERATURE: 96.4 F | HEIGHT: 63 IN | BODY MASS INDEX: 21.09 KG/M2 | WEIGHT: 119 LBS

## 2019-12-31 DIAGNOSIS — IMO0002 UNCONTROLLED TYPE 2 DIABETES MELLITUS WITH DIABETIC POLYNEUROPATHY, WITH LONG-TERM CURRENT USE OF INSULIN: Primary | Chronic | ICD-10-CM

## 2019-12-31 DIAGNOSIS — E11.49 TYPE 2 DIABETES MELLITUS WITH OTHER NEUROLOGIC COMPLICATION, WITH LONG-TERM CURRENT USE OF INSULIN (HCC): ICD-10-CM

## 2019-12-31 DIAGNOSIS — Z79.4 TYPE 2 DIABETES MELLITUS WITH OTHER NEUROLOGIC COMPLICATION, WITH LONG-TERM CURRENT USE OF INSULIN (HCC): ICD-10-CM

## 2019-12-31 DIAGNOSIS — M54.12 CERVICAL RADICULOPATHY: ICD-10-CM

## 2019-12-31 DIAGNOSIS — E78.5 HYPERLIPIDEMIA, UNSPECIFIED HYPERLIPIDEMIA TYPE: ICD-10-CM

## 2019-12-31 DIAGNOSIS — F41.9 ANXIETY: ICD-10-CM

## 2019-12-31 DIAGNOSIS — N95.0 POSTMENOPAUSAL BLEEDING: ICD-10-CM

## 2019-12-31 DIAGNOSIS — R63.4 WEIGHT LOSS: ICD-10-CM

## 2019-12-31 DIAGNOSIS — Z72.0 TOBACCO ABUSE: ICD-10-CM

## 2019-12-31 DIAGNOSIS — R05.9 COUGH: ICD-10-CM

## 2019-12-31 LAB — SL AMB POCT HEMOGLOBIN AIC: 13.4 (ref ?–6.5)

## 2019-12-31 PROCEDURE — 99215 OFFICE O/P EST HI 40 MIN: CPT | Performed by: FAMILY MEDICINE

## 2019-12-31 PROCEDURE — 83036 HEMOGLOBIN GLYCOSYLATED A1C: CPT | Performed by: FAMILY MEDICINE

## 2019-12-31 RX ORDER — DIAZEPAM 5 MG/1
TABLET ORAL
Qty: 90 TABLET | Refills: 0 | Status: SHIPPED | OUTPATIENT
Start: 2019-12-31 | End: 2020-02-25 | Stop reason: SDUPTHER

## 2020-01-02 ENCOUNTER — TELEPHONE (OUTPATIENT)
Dept: FAMILY MEDICINE CLINIC | Facility: CLINIC | Age: 50
End: 2020-01-02

## 2020-01-02 DIAGNOSIS — Z79.4 TYPE 2 DIABETES MELLITUS WITH OTHER NEUROLOGIC COMPLICATION, WITH LONG-TERM CURRENT USE OF INSULIN (HCC): ICD-10-CM

## 2020-01-02 DIAGNOSIS — IMO0002 UNCONTROLLED TYPE 2 DIABETES MELLITUS WITH DIABETIC POLYNEUROPATHY, WITH LONG-TERM CURRENT USE OF INSULIN: Chronic | ICD-10-CM

## 2020-01-02 DIAGNOSIS — E11.49 TYPE 2 DIABETES MELLITUS WITH OTHER NEUROLOGIC COMPLICATION, WITH LONG-TERM CURRENT USE OF INSULIN (HCC): ICD-10-CM

## 2020-01-02 NOTE — TELEPHONE ENCOUNTER
I spoke to the pharmacy, patient also uses Tramadol so they need OK to fill the diazepam knowing the patient is on Tramadol

## 2020-01-02 NOTE — PROGRESS NOTES
Assessment/Plan:    Patient's A1c again is over 13  Once again we will try to get to go to Endocrinology but she has issues affording her Co pays  I told her I have done pretty much what I can do as a primary care doctor and the amount of insulin she is on is definitely significant  Refer to [address]  Patient aware that we can no longer keep giving her Flexeril and her benzodiazepine  We will try to switch this regimen to just Valium 3 times daily  She knows to start with half tablet 1st   Hopefully 1 Medicine will do the workup to and may prescribing a bit easier  Patient under significant stress with her  and his illness  He may lose his job the need to go on disability  They are already financially distressed  Patient has lost weight since last visit  This could be stress related but recommend chest x-ray secondary to her smoking history  She had a recent fall on gynecological ultrasound but has had postmenopausal bleeding and has not followed up  Told her we cannot rule out endometrial cancer and that she must follow up otherwise she is at increased risk of morbidity and mortality  Recheck early April  Diagnoses and all orders for this visit:    Uncontrolled type 2 diabetes mellitus with diabetic polyneuropathy, with long-term current use of insulin (HCC)  -     POCT hemoglobin A1c    Cervical radiculopathy  -     diazepam (VALIUM) 5 mg tablet; Take 1/2 -1 tab 3 times a days as needed for anxiety and or muscle spasm  Do not take w Delgado Gamma    Anxiety  -     diazepam (VALIUM) 5 mg tablet; Take 1/2 -1 tab 3 times a days as needed for anxiety and or muscle spasm  Do not take w ambien    Hyperlipidemia, unspecified hyperlipidemia type  -     Comprehensive metabolic panel; Future  -     Lipid panel; Future    Weight loss  -     CBC and differential; Future  -     TSH, 3rd generation;  Future  -     XR chest pa & lateral; Future    Postmenopausal bleeding    Tobacco abuse    Cough  -     XR chest pa & lateral; Future    Type 2 diabetes mellitus with other neurologic complication, with long-term current use of insulin (HCC)  -     insulin lispro (HumaLOG) 100 units/mL injection; Inject 33 Units under the skin 3 (three) times a day before meals        1  Uncontrolled type 2 diabetes mellitus with diabetic polyneuropathy, with long-term current use of insulin (HCC)  POCT hemoglobin A1c   2  Cervical radiculopathy  diazepam (VALIUM) 5 mg tablet   3  Anxiety  diazepam (VALIUM) 5 mg tablet   4  Hyperlipidemia, unspecified hyperlipidemia type  Comprehensive metabolic panel    Lipid panel   5  Weight loss  CBC and differential    TSH, 3rd generation    XR chest pa & lateral   6  Postmenopausal bleeding     7  Tobacco abuse     8  Cough  XR chest pa & lateral   9  Type 2 diabetes mellitus with other neurologic complication, with long-term current use of insulin (HCC)  insulin lispro (HumaLOG) 100 units/mL injection       Subjective:        Patient ID: Daysi Santos is a 51 y o  female  Chief Complaint   Patient presents with    Follow-up     Pt presents for a 3 month f/u  Pt would like to discuss her medications   Generalized Body Aches     Pt c/o generalized body aches  Pt states she has trouble sleeping and has been loosing weight   Cough     Pt c/o consistent cough x1 month followed by chest tightness and SOB  Patient here for recheck  Where she is not allowed to have Flexeril with her benzodiazepine anymore  Discussed options today  Patient under severe stress secondary to her 's illness  May lose his job in the new year  If that happens he would have to go on disability  They are or ready financially and a dilemma  Patient has lost weight  States she is not eating as much  Still smoking  States she also had postmenopausal bleeding but has not followed up yet        The following portions of the patient's history were reviewed and updated as appropriate: past medical history, past surgical history and problem list       Review of Systems   Constitutional: Positive for fatigue and unexpected weight change  Negative for appetite change and fever  HENT: Negative for congestion, ear pain, postnasal drip, rhinorrhea, sinus pressure, sinus pain and sore throat  Eyes: Negative for redness and visual disturbance  Respiratory: Negative for chest tightness and shortness of breath  Cardiovascular: Negative for chest pain, palpitations and leg swelling  Gastrointestinal: Negative for abdominal distention, abdominal pain, diarrhea and nausea  Endocrine: Negative for cold intolerance and heat intolerance  Genitourinary: Positive for menstrual problem  Negative for dysuria and hematuria  Musculoskeletal: Positive for myalgias, neck pain and neck stiffness  Negative for arthralgias and gait problem  Skin: Negative for pallor and rash  Neurological: Negative for dizziness and headaches  Psychiatric/Behavioral: Positive for dysphoric mood  Negative for behavioral problems  The patient is nervous/anxious  Objective:  /74 (BP Location: Left arm, Patient Position: Sitting, Cuff Size: Adult)   Pulse (!) 124   Temp (!) 96 4 °F (35 8 °C) (Temporal)   Resp 17   Ht 5' 3" (1 6 m)   Wt 54 kg (119 lb)   LMP 03/04/2016   SpO2 98%   BMI 21 08 kg/m²        Physical Exam   Constitutional: She is oriented to person, place, and time  No distress  HENT:   Head: Normocephalic and atraumatic  Right Ear: Tympanic membrane normal    Left Ear: Tympanic membrane normal    Mouth/Throat: Oropharynx is clear and moist  No oropharyngeal exudate  Eyes: Pupils are equal, round, and reactive to light  Conjunctivae and EOM are normal  No scleral icterus  Neck: Normal range of motion  Neck supple  No thyromegaly present  Cardiovascular: Regular rhythm, normal heart sounds and intact distal pulses  No murmur heard    Pulses:       Carotid pulses are 0 on the right side, and 0 on the left side  Borderline tachycardia   Pulmonary/Chest: Effort normal and breath sounds normal  No respiratory distress  She has no wheezes  Abdominal: Soft  She exhibits no distension  Musculoskeletal: She exhibits no edema  Lymphadenopathy:     She has no cervical adenopathy  Neurological: She is alert and oriented to person, place, and time  She has normal reflexes  No cranial nerve deficit  Skin: Skin is warm  No pallor  Psychiatric: Her behavior is normal  Judgment and thought content normal    Anxious and tearful   Nursing note and vitals reviewed

## 2020-01-02 NOTE — TELEPHONE ENCOUNTER
Dr Mims pt is requesting refill of her Toujeo and her Humalog  Pt uses the [facility] pharmacy on [address] in [place]  Please advise pt needs refills sent to the pharmacy to see how much they would cost Pt was originally  getting samples from you please advise

## 2020-01-02 NOTE — TELEPHONE ENCOUNTER
Josias Quevedo called the office in regards to her medication Diazepam needs carnification from [facility]  Pt's number is [phone]     Pt's pharmacy is the [facility] on [address] in [place]

## 2020-01-03 NOTE — TELEPHONE ENCOUNTER
Yes we are aware of this were trying to get rid of both the Flexeril and the other benzodiazepine to cut it down to 1 medication

## 2020-01-07 DIAGNOSIS — IMO0002 UNCONTROLLED TYPE 2 DIABETES MELLITUS WITH DIABETIC POLYNEUROPATHY, WITH LONG-TERM CURRENT USE OF INSULIN: Primary | ICD-10-CM

## 2020-01-09 ENCOUNTER — TELEPHONE (OUTPATIENT)
Dept: FAMILY MEDICINE CLINIC | Facility: CLINIC | Age: 50
End: 2020-01-09

## 2020-01-09 NOTE — TELEPHONE ENCOUNTER
I spoke to the pharmacy, they are aware to contact neurology regarding both medications  Pharmacy has made 2 calls to neurology today and have not gotten a response  Left voicemail message for patient to return call

## 2020-01-09 NOTE — TELEPHONE ENCOUNTER
The venlafaxine is written for by her neurologist and there sure we should not be questioned on this and it should be filled  If they have questions they can contact Neurology  Also whether not she should continue could markos fermin should also come from Neurology

## 2020-01-09 NOTE — TELEPHONE ENCOUNTER
Please let the patient know that they will no longer give her her the ketorolac and she should try to make do unfortunately just with the tramadol  I would recommend a trial off of it for at least a month    If she agrees which hopefully she will because she does not really have much other choice then I would just let the pharmacy know were D/c ing the medication

## 2020-01-09 NOTE — TELEPHONE ENCOUNTER
Pharmacy is getting a message from the patient's insurance regarding ketorolac due to patient's dx/history of hypertension, hyperlipidemia, and cardiac events medication is contraindicated    Can you please review and advise, thanks

## 2020-01-09 NOTE — TELEPHONE ENCOUNTER
I spoke to the patient, she states she doesn't need a refill for the ketorolac, she didn't ask for a refill from the pharmacy  She only takes this if needed for migraine  She started crying with on the phone  She states she has not had venlefaxine for the past few days as she needs a refill of this and the pharmacy will not give it to her due to the other meds she is on and needs clarification from you that she should be on this medication    Please advise, thanks

## 2020-01-10 ENCOUNTER — TELEPHONE (OUTPATIENT)
Dept: NEUROLOGY | Facility: CLINIC | Age: 50
End: 2020-01-10

## 2020-01-10 DIAGNOSIS — G89.29 OTHER CHRONIC PAIN: ICD-10-CM

## 2020-01-10 RX ORDER — KETOROLAC TROMETHAMINE 10 MG/1
10 TABLET, FILM COATED ORAL EVERY 6 HOURS PRN
Qty: 5 TABLET | Refills: 0 | Status: SHIPPED | OUTPATIENT
Start: 2020-01-10 | End: 2020-07-08

## 2020-01-10 NOTE — TELEPHONE ENCOUNTER
When was her vascular cardiac event? We can decrease to #5 toradol per month for now  At f/u we can discuss trying something different

## 2020-01-10 NOTE — TELEPHONE ENCOUNTER
Patient calling about venlafaxine, stating that the pharmacy will not fill her venlafaxine  Called pharmacy and they advised that they called PCP and confirmed patient is on venlafaxine and bupropion and filled  Patient made aware  [facility]ac  I advised that it is with our provider and we would let them know

## 2020-01-10 NOTE — TELEPHONE ENCOUNTER
[facility] with [facility] regarding [facility]olac  Insurance is flagging medication due to history of tobacco use, HTN, and risk of cardiac vascular event  Patients PCP last filled script as a courtesy for patient, but our office was the first to prescribe  Sebastian Fraser does not want to prescribe this med for patient, he wants it to come from you  Pharmacy is requesting a new script with your name on it if you are agreeable and okay with patient being on medication with known risk factors  Clinical team - pharmacy requesting notification if [facility] is agreeable and sends script

## 2020-01-13 NOTE — TELEPHONE ENCOUNTER
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

## 2020-01-14 ENCOUNTER — TELEPHONE (OUTPATIENT)
Dept: NEUROLOGY | Facility: CLINIC | Age: 50
End: 2020-01-14

## 2020-01-14 NOTE — TELEPHONE ENCOUNTER
[address] [facility]- spoke with Laddie Klinefelter- I advised him I was calling to initiate a refill on the patient's Botox prescription  He states he Is getting a rejection  He is going to send it to the medical benefits  Will do a status check in 2 days

## 2020-01-15 NOTE — TELEPHONE ENCOUNTER
Botox to be delivered on Tuesday 1/21/2020 to the [facility] location- a signature will be required  Miriam/ [employer],    Please await Botox delivery and document once it has arrived  Please let me know if you do not receive the patient's Botox order      Thank you,    Ana Romero

## 2020-01-15 NOTE — TELEPHONE ENCOUNTER
[address] [facility]- spoke with Janiya Gutierrez- she advised me that the medical benefits review has been completed  She states the patient's order is ready to be scheduled for delivery  They do need to obtain the patient's consent before we can set up delivery  She is going to try and attempt to contact the patient while I am on the phone to see if she can get her consent  She states she was unable to reach the patient- she questioned if I wanted to try and give her a call  I attempted to contact the patient on my end- she did not answer  Left a message for the patient to give our office a call back  When patient calls back please advise her that her Botox order is ready to be scheduled for delivery  Patient must call [address] [place] [address] to give consent to ship her order before I can set up delivery  Please provide the patient with the phone number for Abbi Frausto listed below      Abbi Frausto: [phone]

## 2020-01-15 NOTE — TELEPHONE ENCOUNTER
Received a call from Ye with Meryl [facility] - Botox delivery confirmed for Tuesday 1/21/20 via [employer] priority signature required to [facility] location [address]

## 2020-01-20 ENCOUNTER — TELEPHONE (OUTPATIENT)
Dept: NEUROLOGY | Facility: CLINIC | Age: 50
End: 2020-01-20

## 2020-01-20 DIAGNOSIS — H54.62 VISION LOSS OF LEFT EYE: Primary | ICD-10-CM

## 2020-01-20 DIAGNOSIS — G43.709 CHRONIC MIGRAINE WITHOUT AURA WITHOUT STATUS MIGRAINOSUS, NOT INTRACTABLE: ICD-10-CM

## 2020-01-20 DIAGNOSIS — R29.898 LEFT ARM WEAKNESS: ICD-10-CM

## 2020-01-20 RX ORDER — TIZANIDINE 2 MG/1
TABLET ORAL
Qty: 30 TABLET | Refills: 2 | Status: SHIPPED | OUTPATIENT
Start: 2020-01-20 | End: 2020-04-20

## 2020-01-20 NOTE — TELEPHONE ENCOUNTER
Called patient to discuss message  Patient does not have a facial droop  Her only complaint is the vision loss and worsening numbness on her left side  She does not know what her BP is  Patient begins to start crying stating that all she wants is to get a scan done  Patient does not drive and has no access to a ride  Inquired about a f/u and she says that she cannot afford the $40 co-pay  States that she cannot come in today  Maybe in the next couple of days she says, but will not be able to pay the co-pay  Still not agreeable to the ED  Please place order for HCT and I'll check with our PA team and notify the patient

## 2020-01-20 NOTE — TELEPHONE ENCOUNTER
Can you change the CT order from routine to STAT  They will not allow us to schedule routine as STAT, but we can push the STAT order back if needed  Thanks

## 2020-01-20 NOTE — TELEPHONE ENCOUNTER
Patient is requesting a "scan" of the head  States that she lost her vision in her left eye and was confused  States that she can barely see out of her left eye today, but is no longer confused  States that the ED is out of the question as there is no one to look after her  and she can not risk getting admitted  Says all they would do is shoot up her stomach with blood thinners and she can do that at home  Patient does not have any new pain  Says that her numbness on the left side (Face, arm, hand, and part of the leg) as gotten worse  Please Advise    [phone]: Ramya Simpson to leave a detailed message

## 2020-01-20 NOTE — TELEPHONE ENCOUNTER
I spoke with the pt  States she started with trouble walking, imbalance starting at grocery store, this past Thursday, 1/16/2020  She then began to have trouble seeing out of her left eye or left eye blurry vision  Vision issue started same day as imbalance  Denies falls  Also reports some hand weakness in the left, dropping things, numbness on the left which occurred with her "last stroke" but is worse, all since Thursday  Also has had a severe migraine for the past 5 days (since Wednesday she thinks) which improves with tramadol a little bit, but then comes back  Some nausea, no vomiting  Is not taking toradol  Refusing to come to see me or go to ED  States that no one is available to take care of her  who is bed bound  States if she is admitted no one could take care of him overnight  States no family members to help  Again told her important to go to ED to r/o stroke, intracranial bleed  But she refused again  I told to her to seek emergent care in ED or call 911 if headache worsens or gets n/v, or thunderclap headache  She does take asa daily  I told her to call me back to keep me updated if she cannot come in for an OV  HCT ordered wo  Please order stat if possible

## 2020-01-20 NOTE — TELEPHONE ENCOUNTER
MSW phoned patient this date  Patient still with c/o left blurry vision  Patient reports that she is agreeable to having a STAT CT scan, and if the CT scan could be scheduled in the AM that she would have transportation to get there  Patient stated that she is able to leave her  unattended for short time periods so she would be able to get the CT done  MSW spoke with [facility] who is in agreement to arrange a Lyft as a last resort as a one time courtesy if the STAT CT would be ordered at a later time in the day  MSW previously spoke with patient regarding transportation options, and mailed information on [employer] and [employer]  Patient stated that she does not plan to apply for [employer] as she feels she will not qualify for same  MSW advised that this office does not determine her eligibility, but that she would need to complete application and go for an in-person evaluation in order for [employer] to determine her eligibility  MSW also offered to connect patient to a [facility] to see if patient would qualify for any assistance to help lower her out of pocket costs  Patient was agreeable to receive a call from the [facility]  MSW left message for Bryant at  to request that she contact patient ASAP

## 2020-01-21 ENCOUNTER — HOSPITAL ENCOUNTER (OUTPATIENT)
Dept: RADIOLOGY | Age: 50
Discharge: HOME | End: 2020-01-21
Payer: COMMERCIAL

## 2020-01-21 DIAGNOSIS — H54.62 VISION LOSS OF LEFT EYE: ICD-10-CM

## 2020-01-21 DIAGNOSIS — E03.9 ACQUIRED HYPOTHYROIDISM: ICD-10-CM

## 2020-01-21 DIAGNOSIS — G43.709 CHRONIC MIGRAINE WITHOUT AURA WITHOUT STATUS MIGRAINOSUS, NOT INTRACTABLE: ICD-10-CM

## 2020-01-21 PROCEDURE — 70450 CT HEAD/BRAIN W/O DYE: CPT

## 2020-01-21 RX ORDER — OLANZAPINE 5 MG/1
5 TABLET ORAL
Qty: 30 TABLET | Refills: 5 | Status: SHIPPED | OUTPATIENT
Start: 2020-01-21 | End: 2020-06-18

## 2020-01-21 RX ORDER — LEVOTHYROXINE SODIUM 112 UG/1
112 TABLET ORAL DAILY
Qty: 30 TABLET | Refills: 5 | Status: SHIPPED | OUTPATIENT
Start: 2020-01-21 | End: 2020-06-16

## 2020-01-21 NOTE — TELEPHONE ENCOUNTER
Please Advise:    Patient notified  Patient states that the same thing happened when she had a stroke  The CT did not show anything, but her MRI did  Patient is confused because she does not have an answer to the loss of vision  Patient would like to know what you recommend as a next step?

## 2020-01-21 NOTE — TELEPHONE ENCOUNTER
Pt called the office requesting that new scripts please be sent to the [facility] in [place] on [address]

## 2020-01-21 NOTE — TELEPHONE ENCOUNTER
MSW phoned patient at [phone] to inquire if she was in need of transport to her STAT CT scan this date  Patient stated that she has a ride  MSW advised that a [facility] would be reaching out to her  Patient requested that another Darylene Holts application be placed in the mail to her  Same placed in the mail to her this date  MSW will follow-up with patient in ~4 weeks to see if she had applied for/received determination about her [employer]ts application  MSW will be available to patient as needed

## 2020-01-21 NOTE — TELEPHONE ENCOUNTER
We could order MRI, not stat, or consider coming to see me for eval  I know she can't really come in, but I need to offer  Also would recommend f/u with eye  as well

## 2020-01-21 NOTE — TELEPHONE ENCOUNTER
Called patient and transferred to central scheduling  Once scheduled PA team will begin working to get the scan approved

## 2020-01-21 NOTE — TELEPHONE ENCOUNTER
FYI:    Patient calling back to inform office that her HCT is scheduled for today 1/21/2020 1:45 pm @ [facility] [address]  PA team made aware

## 2020-01-22 NOTE — TELEPHONE ENCOUNTER
Patient returned call  Verbalized understanding with recommendations  Requesting order for MRI to be placed  Will also follow up with eye doctor

## 2020-01-30 ENCOUNTER — TELEPHONE (OUTPATIENT)
Dept: NEUROLOGY | Facility: CLINIC | Age: 50
End: 2020-01-30

## 2020-01-31 ENCOUNTER — DOCTOR'S OFFICE (OUTPATIENT)
Dept: URBAN - METROPOLITAN AREA CLINIC 136 | Facility: CLINIC | Age: 50
Setting detail: OPHTHALMOLOGY
End: 2020-01-31
Payer: COMMERCIAL

## 2020-01-31 DIAGNOSIS — E11.9: ICD-10-CM

## 2020-01-31 DIAGNOSIS — I63.9: ICD-10-CM

## 2020-01-31 DIAGNOSIS — H25.13: ICD-10-CM

## 2020-01-31 DIAGNOSIS — H04.123: ICD-10-CM

## 2020-01-31 PROCEDURE — 92004 COMPRE OPH EXAM NEW PT 1/>: CPT | Performed by: OPHTHALMOLOGY

## 2020-01-31 ASSESSMENT — VISUAL ACUITY
OD_BCVA: 20/CF@3FT
OS_BCVA: 20/150

## 2020-01-31 ASSESSMENT — CONFRONTATIONAL VISUAL FIELD TEST (CVF)
OS_FINDINGS: FULL
OD_FINDINGS: FULL

## 2020-01-31 ASSESSMENT — REFRACTION_AUTOREFRACTION
OD_CYLINDER: -1.50
OD_AXIS: 092
OD_SPHERE: -1.25
OS_CYLINDER: -1.75
OS_AXIS: 103
OS_SPHERE: -4.75

## 2020-01-31 ASSESSMENT — SPHEQUIV_DERIVED
OS_SPHEQUIV: -5.625
OD_SPHEQUIV: -2

## 2020-01-31 ASSESSMENT — SUPERFICIAL PUNCTATE KERATITIS (SPK)
OD_SPK: 1+ 2+
OS_SPK: 1+ 2+

## 2020-02-04 ENCOUNTER — PROCEDURE VISIT (OUTPATIENT)
Dept: NEUROLOGY | Facility: CLINIC | Age: 50
End: 2020-02-04
Payer: COMMERCIAL

## 2020-02-04 VITALS — TEMPERATURE: 98 F | SYSTOLIC BLOOD PRESSURE: 91 MMHG | HEART RATE: 98 BPM | DIASTOLIC BLOOD PRESSURE: 55 MMHG

## 2020-02-04 DIAGNOSIS — H53.2 DIPLOPIA: ICD-10-CM

## 2020-02-04 DIAGNOSIS — R63.4 WEIGHT LOSS: ICD-10-CM

## 2020-02-04 DIAGNOSIS — H54.62 VISION LOSS OF LEFT EYE: ICD-10-CM

## 2020-02-04 DIAGNOSIS — G43.709 CHRONIC MIGRAINE WITHOUT AURA WITHOUT STATUS MIGRAINOSUS, NOT INTRACTABLE: Primary | ICD-10-CM

## 2020-02-04 PROCEDURE — 64615 CHEMODENERV MUSC MIGRAINE: CPT | Performed by: PHYSICIAN ASSISTANT

## 2020-02-04 NOTE — PROGRESS NOTES
Chemodenervation  Date/Time: 2/4/2020 9:46 AM  Performed by: Lennox Gold, PA-C  Authorized by: Lennox Gold, PA-C     Pre-procedure details:     Prepped With: Alcohol    Procedure details:     Position:  Upright  Botox:     Botox Type:  Type A    Brand:  Botox    mL's of Botulinum Toxin:  175    Final Concentration per CC:  50 units    Needle Gauge:  30 G 2 5 inch  Procedures:     Botox Procedures: chronic headache      Indications: migraines    Injection Location:     Head / Face:  L , R , L frontalis, R frontalis, R inferior cervical paraspinal, L inferior cervical paraspinal, L medial occipitalis, R medial occipitalis, L lateral occipitalis, R lateral occipitalis, procerus, L temporalis, R temporalis, R superior trapezius and L superior trapezius    L  injection amount:  5 unit(s)    R  injection amount:  5 unit(s)    L lateral frontalis:  5 unit(s)    R lateral frontalis:  5 unit(s)    L medial frontalis:  5 unit(s)    R medial frontalis:  5 unit(s)    L temporalis injection amount:  20 unit(s)    R temporalis injection amount:  20 unit(s)    Procerus injection amount:  5 unit(s)    L lateral occipitalis injection amount:  5 unit(s)    R lateral occipitalis injection amount:  5 unit(s)    L medial occipitalis injection amount:  10 unit(s)    R medial occipitalis injection amount:  10 unit(s)    L inferior cervical paraspinal injection amount:  10 unit(s)    R inferior cervical paraspinal injection amount:  10 unit(s)    L superior trapezius injection amount:  15 unit(s)    R superior trapezius injection amount:  15 unit(s)  Total Units:     Total units used:  175    Total units discarded:  25  Post-procedure details:     Chemodenervation:  Chronic migraine    Facial Nerve Location[de-identified]  Bilateral facial nerve    Patient tolerance of procedure:   Tolerated well, no immediate complications  Comments:      Extra 20 units total in the suboccipital muscles b/l, medically necessary  Vitals:    02/04/20 0924   BP: 91/55   Pulse: 98   Temp: 98 °F (36 7 °C)     See other documentation note re: new blood work ordered

## 2020-02-06 ENCOUNTER — HOSPITAL ENCOUNTER (OUTPATIENT)
Dept: RADIOLOGY | Age: 50
Discharge: HOME | End: 2020-02-06
Payer: COMMERCIAL

## 2020-02-06 DIAGNOSIS — H54.62 VISION LOSS OF LEFT EYE: ICD-10-CM

## 2020-02-06 DIAGNOSIS — R29.898 LEFT ARM WEAKNESS: ICD-10-CM

## 2020-02-06 PROCEDURE — 70551 MRI BRAIN STEM W/O DYE: CPT

## 2020-02-11 ENCOUNTER — DOCTOR'S OFFICE (OUTPATIENT)
Dept: URBAN - METROPOLITAN AREA CLINIC 136 | Facility: CLINIC | Age: 50
Setting detail: OPHTHALMOLOGY
End: 2020-02-11
Payer: COMMERCIAL

## 2020-02-11 ENCOUNTER — RX ONLY (RX ONLY)
Age: 50
End: 2020-02-11

## 2020-02-11 DIAGNOSIS — E11.9: ICD-10-CM

## 2020-02-11 DIAGNOSIS — H31.003: ICD-10-CM

## 2020-02-11 DIAGNOSIS — H04.123: ICD-10-CM

## 2020-02-11 DIAGNOSIS — H25.13: ICD-10-CM

## 2020-02-11 PROCEDURE — 92250 FUNDUS PHOTOGRAPHY W/I&R: CPT | Performed by: OPHTHALMOLOGY

## 2020-02-11 PROCEDURE — 92014 COMPRE OPH EXAM EST PT 1/>: CPT | Performed by: OPHTHALMOLOGY

## 2020-02-11 ASSESSMENT — CONFRONTATIONAL VISUAL FIELD TEST (CVF)
OD_FINDINGS: FULL
OS_FINDINGS: FULL

## 2020-02-11 ASSESSMENT — VISUAL ACUITY
OS_BCVA: 20/100-1
OD_BCVA: 20/400

## 2020-02-11 ASSESSMENT — SPHEQUIV_DERIVED
OD_SPHEQUIV: -2
OS_SPHEQUIV: -5.625

## 2020-02-11 ASSESSMENT — REFRACTION_AUTOREFRACTION
OD_AXIS: 092
OS_SPHERE: -4.75
OS_CYLINDER: -1.75
OD_CYLINDER: -1.50
OD_SPHERE: -1.25
OS_AXIS: 103

## 2020-02-11 ASSESSMENT — SUPERFICIAL PUNCTATE KERATITIS (SPK)
OD_SPK: 1+ 2+
OS_SPK: 1+ 2+

## 2020-02-14 DIAGNOSIS — G43.709 CHRONIC MIGRAINE WITHOUT AURA WITHOUT STATUS MIGRAINOSUS, NOT INTRACTABLE: ICD-10-CM

## 2020-02-14 RX ORDER — TOPIRAMATE 25 MG/1
TABLET ORAL
Qty: 90 TABLET | Refills: 0 | Status: SHIPPED | OUTPATIENT
Start: 2020-02-14 | End: 2020-05-19

## 2020-02-18 DIAGNOSIS — E78.5 HYPERLIPIDEMIA, UNSPECIFIED HYPERLIPIDEMIA TYPE: ICD-10-CM

## 2020-02-18 DIAGNOSIS — E11.49 TYPE 2 DIABETES MELLITUS WITH OTHER NEUROLOGIC COMPLICATION, WITH LONG-TERM CURRENT USE OF INSULIN (HCC): ICD-10-CM

## 2020-02-18 DIAGNOSIS — Z79.4 TYPE 2 DIABETES MELLITUS WITH OTHER NEUROLOGIC COMPLICATION, WITH LONG-TERM CURRENT USE OF INSULIN (HCC): ICD-10-CM

## 2020-02-18 NOTE — TELEPHONE ENCOUNTER
Brain MRI does not show anything new, no acute stroke  There are is an old stroke in the right thalamus  She may have had a recrudescence of symptoms/ stroke in light of possible dehydration or uncontrolled diabetes  We need to monitor her very closely  I need her to let me know if she has recurrence of symptoms  I would also like her to see 1 of our stroke specialists, if she can come in the office again, to re-evaluate  I know she has a high co-pay  Continue aspirin for now and atorvastatin daily  Would recommend increasing atorvastatin to 80 mg if tolerable  Last   Goal less than 100  Biggest side effect is muscle cramping  Goal hemoglobin A1c less than 7  The last was 13 4% in December  Documentation only: Dr Cristian Ramirez- recent LUE weakness, diplopia, with known chronic R thalamus stroke  Is she on the right preventative medication for this? Should I get an echo? Would you be willing to re evaluate her from a stroke perspective if necessary?

## 2020-02-18 NOTE — TELEPHONE ENCOUNTER
Patient made aware of brain MRI results and verbalized understanding  Patient aware to inform us if she would have any sxs related to stroke  Patient also agreeable to increasing the atorvastatin, she is requesting new rx be sent to pharmacy  Advised she watch for cramping as SE and inform us if this would occur  Patient agreeable to seeing stroke specialist    Please assist in scheduling patient with soonest available  Thank you

## 2020-02-19 RX ORDER — ATORVASTATIN CALCIUM 80 MG/1
80 TABLET, FILM COATED ORAL DAILY
Qty: 30 TABLET | Refills: 2 | Status: SHIPPED | OUTPATIENT
Start: 2020-02-19 | End: 2020-03-19

## 2020-02-19 RX ORDER — METFORMIN HYDROCHLORIDE 500 MG/1
TABLET, EXTENDED RELEASE ORAL
Qty: 120 TABLET | Refills: 2 | Status: SHIPPED | OUTPATIENT
Start: 2020-02-19 | End: 2020-03-19

## 2020-02-19 NOTE — TELEPHONE ENCOUNTER
The only appointment times open are 30 mins  Would you be able to fit this patient into Dr Brigitte Berrios schedule the near future?  Patient has never seen Dr Felicia Min

## 2020-02-21 NOTE — TELEPHONE ENCOUNTER
MSW followed up with patient this date  Patient confirmed that she did receive the Joanie Whiteon application that this writer had sent her, but stated that she has not completed same yet  Patient is considering if she wishes to apply for the service  MSW will follow-up with patient in one month to see if she has applied, and if so, to see if she had received determination yet  Patient reported that she had yet to get a call from a Financial Counselor  MSW spoke with Monae Chaney, [facility] Counselor, previously who advised that there is a phone number on any bill that patient gets from [facility] that directs them to contact the Billing Office if they have any difficulty affording their bills as a payment plan can be established or that they can be connected to a financial counselor who can see if patient will qualify for any other assistance  Patient confirmed that she does have outstanding balances and a bill from [facility]  Patient was directed to contact the number on the bill for further assistance  Patient was agreeable to same  MSW will follow-up with patient regarding transportation in about one month

## 2020-02-24 DIAGNOSIS — M54.12 CERVICAL RADICULOPATHY: ICD-10-CM

## 2020-02-24 DIAGNOSIS — R11.0 NAUSEA: ICD-10-CM

## 2020-02-24 DIAGNOSIS — G89.4 CHRONIC PAIN SYNDROME: ICD-10-CM

## 2020-02-24 DIAGNOSIS — F51.01 PRIMARY INSOMNIA: ICD-10-CM

## 2020-02-24 DIAGNOSIS — F41.9 ANXIETY: ICD-10-CM

## 2020-02-25 DIAGNOSIS — F41.9 ANXIETY: ICD-10-CM

## 2020-02-25 DIAGNOSIS — G89.4 CHRONIC PAIN SYNDROME: ICD-10-CM

## 2020-02-25 DIAGNOSIS — R11.0 NAUSEA: ICD-10-CM

## 2020-02-25 DIAGNOSIS — M54.12 CERVICAL RADICULOPATHY: ICD-10-CM

## 2020-02-25 DIAGNOSIS — F51.01 PRIMARY INSOMNIA: ICD-10-CM

## 2020-02-25 NOTE — TELEPHONE ENCOUNTER
Called patient, lmom for a return call to schedule appt per Sharona Collier    60 min appointment with Dr Chantale Garcia in [place] on 3/24/20 at 2 PM

## 2020-02-25 NOTE — TELEPHONE ENCOUNTER
Patient calling in  Stating she would like an earlier in the day appt - 9am or 10am works best for her  As I was looking for different appointments, patient disconnected call  Left voicemail for patient to call our office back    Spoke with Abe Hassan MA and as of right now, no earlier in the day appt

## 2020-02-25 NOTE — TELEPHONE ENCOUNTER
Would ideally need 60mins since she has not seen me, but if no new appts available then ok to fit into 30 min slot

## 2020-02-26 RX ORDER — TRAMADOL HYDROCHLORIDE 50 MG/1
TABLET ORAL
Qty: 180 TABLET | Refills: 0 | OUTPATIENT
Start: 2020-02-26

## 2020-02-26 RX ORDER — DIAZEPAM 5 MG/1
TABLET ORAL
Qty: 90 TABLET | Refills: 0 | Status: SHIPPED | OUTPATIENT
Start: 2020-02-26 | End: 2020-04-10

## 2020-02-26 RX ORDER — ONDANSETRON 4 MG/1
TABLET, FILM COATED ORAL
Qty: 20 TABLET | Refills: 2 | Status: SHIPPED | OUTPATIENT
Start: 2020-02-26 | End: 2020-05-04

## 2020-02-26 RX ORDER — DIAZEPAM 5 MG/1
TABLET ORAL
Qty: 90 TABLET | Refills: 0 | OUTPATIENT
Start: 2020-02-26

## 2020-02-26 RX ORDER — TRAMADOL HYDROCHLORIDE 50 MG/1
50-100 TABLET ORAL EVERY 8 HOURS PRN
Qty: 180 TABLET | Refills: 0 | Status: SHIPPED | OUTPATIENT
Start: 2020-02-26 | End: 2020-04-10

## 2020-02-26 RX ORDER — ZOLPIDEM TARTRATE 10 MG/1
10 TABLET ORAL
Qty: 30 TABLET | Refills: 0 | Status: SHIPPED | OUTPATIENT
Start: 2020-02-26 | End: 2020-04-10

## 2020-02-26 RX ORDER — ZOLPIDEM TARTRATE 10 MG/1
TABLET ORAL
Qty: 30 TABLET | Refills: 0 | OUTPATIENT
Start: 2020-02-26

## 2020-02-26 RX ORDER — ONDANSETRON 4 MG/1
TABLET, FILM COATED ORAL
Qty: 20 TABLET | Refills: 2 | OUTPATIENT
Start: 2020-02-26

## 2020-02-27 ENCOUNTER — TELEPHONE (OUTPATIENT)
Dept: NEUROLOGY | Facility: CLINIC | Age: 50
End: 2020-02-27

## 2020-02-27 NOTE — TELEPHONE ENCOUNTER
Pt called for lab results   See results 2/19/20              [phone] ok to leave detailed message

## 2020-02-28 NOTE — TELEPHONE ENCOUNTER
B12 low- take 1000 mcg daily  Also ROGERIO positive indicates some sort of possible autoimmune problem  Would like to get her back to see rheumatology  Last seen here 2016

## 2020-03-05 ENCOUNTER — DOCTOR'S OFFICE (OUTPATIENT)
Dept: URBAN - METROPOLITAN AREA CLINIC 136 | Facility: CLINIC | Age: 50
Setting detail: OPHTHALMOLOGY
End: 2020-03-05
Payer: COMMERCIAL

## 2020-03-05 DIAGNOSIS — H31.003: ICD-10-CM

## 2020-03-05 DIAGNOSIS — H04.123: ICD-10-CM

## 2020-03-05 DIAGNOSIS — H25.13: ICD-10-CM

## 2020-03-05 DIAGNOSIS — E11.9: ICD-10-CM

## 2020-03-05 LAB
LEFT EYE DIABETIC RETINOPATHY: NORMAL
RIGHT EYE DIABETIC RETINOPATHY: NORMAL
SEVERITY (EYE EXAM): NORMAL

## 2020-03-05 PROCEDURE — 92014 COMPRE OPH EXAM EST PT 1/>: CPT | Performed by: OPHTHALMOLOGY

## 2020-03-05 PROCEDURE — 2023F DILAT RTA XM W/O RTNOPTHY: CPT | Performed by: INTERNAL MEDICINE

## 2020-03-05 ASSESSMENT — REFRACTION_AUTOREFRACTION
OS_SPHERE: -4.75
OD_AXIS: 092
OS_AXIS: 103
OD_CYLINDER: -1.50
OD_SPHERE: -1.25
OS_CYLINDER: -1.75

## 2020-03-05 ASSESSMENT — SPHEQUIV_DERIVED
OS_SPHEQUIV: -5.625
OD_SPHEQUIV: -2

## 2020-03-05 ASSESSMENT — SUPERFICIAL PUNCTATE KERATITIS (SPK)
OD_SPK: 2+
OS_SPK: 2+

## 2020-03-05 ASSESSMENT — CONFRONTATIONAL VISUAL FIELD TEST (CVF)
OS_FINDINGS: FULL
OD_FINDINGS: FULL

## 2020-03-05 ASSESSMENT — VISUAL ACUITY
OS_BCVA: 20/200
OD_BCVA: 20/CF@5FT

## 2020-03-06 ENCOUNTER — TELEPHONE (OUTPATIENT)
Dept: FAMILY MEDICINE CLINIC | Facility: CLINIC | Age: 50
End: 2020-03-06

## 2020-03-06 NOTE — TELEPHONE ENCOUNTER
Sunny [phone]  ID#: [id number]  [facility]#:[id number]  Group#: Shanika Bah  YASIR#: [id number]

## 2020-03-06 NOTE — TELEPHONE ENCOUNTER
Patient called and stated in order for her to get a refill on her tramadol, she needs a prior authorization  Patient was very upset on the phone (crying) because she needs this medication as soon as possible  Asking if we could get this done as soon as possible

## 2020-03-19 DIAGNOSIS — E11.49 TYPE 2 DIABETES MELLITUS WITH OTHER NEUROLOGIC COMPLICATION, WITH LONG-TERM CURRENT USE OF INSULIN (HCC): ICD-10-CM

## 2020-03-19 DIAGNOSIS — E78.5 HYPERLIPIDEMIA, UNSPECIFIED HYPERLIPIDEMIA TYPE: ICD-10-CM

## 2020-03-19 DIAGNOSIS — Z79.4 TYPE 2 DIABETES MELLITUS WITH OTHER NEUROLOGIC COMPLICATION, WITH LONG-TERM CURRENT USE OF INSULIN (HCC): ICD-10-CM

## 2020-03-19 RX ORDER — METFORMIN HYDROCHLORIDE 500 MG/1
TABLET, EXTENDED RELEASE ORAL
Qty: 120 TABLET | Refills: 2 | Status: SHIPPED | OUTPATIENT
Start: 2020-03-19 | End: 2020-07-10 | Stop reason: ALTCHOICE

## 2020-03-19 RX ORDER — ATORVASTATIN CALCIUM 80 MG/1
TABLET, FILM COATED ORAL
Qty: 30 TABLET | Refills: 2 | Status: SHIPPED | OUTPATIENT
Start: 2020-03-19 | End: 2020-06-16

## 2020-03-20 NOTE — TELEPHONE ENCOUNTER
MSW phoned patient at [phone]  MSW inquired if patient had decided to apply for Venus Edward  Patient confirmed that she has the application, but has not completed it yet as she has not been feeling well  Patient sounded tearful  MSW asked if there was anything this writer could do to help  Patient stated that she has cataracts and that her eye doctor's office just called to cancel her appt amid the coronavirus crisis  Patient stated that she is having difficulty with her visit  MSW provided this writer's contact number and will be available to patient as needed, even to assist with the Venus Edward application if needed  Patient will reach out to this writer if she requires assistance

## 2020-03-21 DIAGNOSIS — G89.4 CHRONIC PAIN DISORDER: ICD-10-CM

## 2020-03-22 RX ORDER — GABAPENTIN 400 MG/1
CAPSULE ORAL
Qty: 180 CAPSULE | Refills: 2 | Status: SHIPPED | OUTPATIENT
Start: 2020-03-22 | End: 2020-06-17

## 2020-03-24 ENCOUNTER — TELEPHONE (OUTPATIENT)
Dept: NEUROLOGY | Facility: CLINIC | Age: 50
End: 2020-03-24

## 2020-03-25 DIAGNOSIS — IMO0002 UNCONTROLLED TYPE 2 DIABETES MELLITUS WITH DIABETIC POLYNEUROPATHY, WITH LONG-TERM CURRENT USE OF INSULIN: Chronic | ICD-10-CM

## 2020-04-10 ENCOUNTER — TELEPHONE (OUTPATIENT)
Dept: NEUROLOGY | Facility: CLINIC | Age: 50
End: 2020-04-10

## 2020-04-10 DIAGNOSIS — F41.9 ANXIETY: ICD-10-CM

## 2020-04-10 DIAGNOSIS — F51.01 PRIMARY INSOMNIA: ICD-10-CM

## 2020-04-10 DIAGNOSIS — M54.12 CERVICAL RADICULOPATHY: ICD-10-CM

## 2020-04-10 DIAGNOSIS — G89.4 CHRONIC PAIN SYNDROME: ICD-10-CM

## 2020-04-10 RX ORDER — ZOLPIDEM TARTRATE 10 MG/1
TABLET ORAL
Qty: 30 TABLET | Refills: 0 | Status: SHIPPED | OUTPATIENT
Start: 2020-04-10 | End: 2020-05-14

## 2020-04-10 RX ORDER — TRAMADOL HYDROCHLORIDE 50 MG/1
TABLET ORAL
Qty: 180 TABLET | Refills: 0 | Status: SHIPPED | OUTPATIENT
Start: 2020-04-10 | End: 2020-05-14

## 2020-04-10 RX ORDER — DIAZEPAM 5 MG/1
TABLET ORAL
Qty: 90 TABLET | Refills: 0 | Status: SHIPPED | OUTPATIENT
Start: 2020-04-10 | End: 2020-05-14

## 2020-04-10 NOTE — TELEPHONE ENCOUNTER
PDMP as of 4/10/2020:    Aliyah Angeles   Risk Indicators   NARX SCORES   Narcotic   000   Sedative   000   Stimulant   210     OVERDOSE RISK SCORE   000   (Range 000-999)     ADDITIONAL RISK INDICATORS ( 0 )     This [facility]xCare report is based on search criteria supplied and the data entered by the dispensing pharmacy. For more information about any prescription, please contact the dispensing pharmacy or the prescriber. NarxCare scores and reports are intended to aid, not replace, medical decision making. None of the information presented should be used as sole justification for providing or refusing to provide medications. The information on this report is not warranted as accurate or complete.   Graphs   RX GRAPH   Narcotic Buprenorphine Sedative Stimulant Other   All PrescribersPrescribers4 - Romulo Santiago, M3 - WILLIAM Forbes 2 - Rubi Lopezs1 - Gris Martinez    Buprenorphine mg   Morphine MgEq (MME)   Lorazepam MgEq (LME)     *Per CDC guidance, the MME conversion factors prescribed or provided as part of the medication-assisted treatment for opioid use disorder should not be used to benchmark against dosage thresholds meant for opioids prescribed for pain. Buprenorphine products have no agreed upon morphine equivalency, and as partial opioid agonists, are not expected to be associated with overdose risk in the same dose-dependent manner as doses for full agonist opioids. MME = morphine milligram equivalents. LME = Lorazepam milligram equivalents. mg = dose in milligrams.   Summary   Summary   Total Prescriptions: 14   Total Prescribers: 4   Total Pharmacies: 4   Narcotics*   (excluding buprenorphine)  Current Qty: 0   Current MME/day: 0.00   30 Day Avg MME/day: 0.00   Sedatives*   Current Qty: 0   Current LME/day: 0.00   30 Day Avg LME/day: 0.00   Buprenorphine*   Current Qty: 0   Current mg/day: 0.00   30 Day Avg mg/day: 0.00   Rx Data   PRESCRIPTIONS   Total Prescriptions: 14   Total Private Pay: 9  Type Date User Summary Attachment   General 01/14/2020 11:36 AM Prashanthlali [facility] coordination  -   Note    Botox- no authorization needed   Please use Brittaney         Thank you,     [facility]     Fill Date ID Written Drug Qty Days Prescriber Rx # Pharmacy Refill Daily Dose * Pymt Type    04/03/2020  3  03/27/2020  Gabapentin 100 Mg Capsule    180.00 30 An Pyl  [id number]  Gen ([id number])  0/0  [place]   03/10/2020  3  03/10/2020  Gabapentin 100 Mg Capsule    180.00 30 Sh Car  [id number]  Gen ([id number])  0/0  [place]   03/02/2020  2  03/02/2020  Gabapentin 100 Mg Capsule    90.00 30 Sh Car  [id number]  Gen ([id number])  0/0  [place]   11/01/2019  1  10/28/2019  Dextroamp-Amphetamin 30 Mg Tab    90.00 30 L Rn  [id number]  Wal ([id number])  0/0  [place]   10/16/2019  1  10/11/2019  Dextroamp-Amphetamin 30 Mg Tab    90.00 30 L Rn  [id number]  Wal ([id number])  0/0  [place]   09/24/2019  4  09/12/2019  Dextroamp-Amphetamin 30 Mg Tab    60.00 30 Br Lab  [id number]  [facility] ([id number])  0/[place]   09/24/2019  1  08/23/2019  Dextroamp-Amphetamin 30 Mg Tab    90.00 30 L Rn  [id number]  Wal ([id number])  0/0  [place]   08/28/2019  4  08/28/2019  Dextroamp-Amphetamin 30 Mg Tab    90.00 30 L Rn  [id number]  [place] ([id number])  0/[place]   08/06/2019  1  07/11/2019  Dextroamp-Amphetamin 30 Mg Tab    90.00 30 L Rn  [id number]  Wal ([id number])  0/0  [place]   07/18/2019  1  07/11/2019  Dextroamp-Amphetamin 30 Mg Tab    90.00 30 L Rn  [id number]  [facility] ([id number])  0/0  [place]   06/25/2019  1  06/16/2019  Dextroamp-Amphetamin 30 Mg Tab    90.00 30 L Rn  [id number]  [facility] ([id number])  0/0  [place]   06/04/2019  4  06/03/2019  Dextroamp-Amphetamin 30 Mg Tab    90.00 30 L Rn  [id number]  [facility] ([id number])  0/0  [place]   05/17/2019  1  05/10/2019  Dextroamp-Amphetamin 30 Mg Tab    90.00 30 L Rn  [id number]  [facility] ([id number])  0/0  [place]   04/18/2019  1  04/15/2019  Dextroamp-Amphetamin 30 Mg Tab    90.00 30 L Rn  [id number]  [facility] ([id number])  0/0  [place]   *Per CDC guidance, the MME conversion factors prescribed or provided as part of the medication-assisted treatment for opioid use disorder should not be used to benchmark against dosage  thresholds meant for opioids prescribed for pain. Buprenorphine products have no agreed upon morphine equivalency, and as partial opioid agonists, are not expected to be associated with overdose risk in the same dose-dependent manner as doses for full agonist opioids. MME = morphine milligram equivalents. LME = Lorazepam milligram equivalents. mg = dose in milligrams.   Providers   Total Providers: 4     Name Address City State Zipcode Phone   Gris Martinez [address] [facility] [address] [phone]   Rubi Pino [address] [facility] [address] [phone]   WILLIAM THOMAS [address] [address] [address] [facility] [address] [phone]   Romulo Santiago MD [address] [place] [address] [address] [facility] [address] [phone]   Pharmacies   Total Pharmacies: 4     Name Address City State Zipcode Phone   [facility]  [phone] [facility] [address] [phone]   [facility] ([phone]) [address] [place] [address] [phone]   [facility] [phone]) [phone] [facility] [address] [phone]   [facility] [facility] [phone]) [address] [address] [phone]     The report provided is based upon the search criteria entered and the corresponding data as it has been reported by dispenser(s). If erroneous information is identified or additional information is needed, please contact the dispenser or the prescriber provided on the report. Note, federal regulation (CFR Title 42: Part 2) prevents federally funded opioid treatment programs (OTPs) from releasing certain patient data. As such, controlled substances dispensed from OTPs for medication-assisted treatment will not appear in the [place]  report. Morphine milligram equivalent (MME) conversion factors published by the CDC are used in the MME calculation. Per the CDC, the MME conversion factor is intended only for analytic purposes where  prescription data are used to retrospectively calculate daily MME to inform analyses of risks associated with opioid prescribing. This value does not constitute clinical guidance or recommendations for converting patients from one form of opioid analgesic to another. Per the CDC, the conversion factors for drugs prescribed or provided, as part of medication-assisted treatment for opioid use disorder should not be used to benchmark against MME dosage thresholds meant for opioids prescribed for pain. Buprenorphine products listed in the CDC s MME file do not have an associated conversion factor. Lastly, the CDC notes, in clinical practice, calculating MME for methadone often involves a sliding-scale approach, whereby the conversion factor increases with increasing dose. The conversion factor of 3 for methadone presented in this file could underestimate MME for a given patient. This report contains confidential information, including patient identifiers, and is not a public record. The information on this report must be treated as protected health information and is only to be disclosed to others as authorized by applicable state and Federal regulations.   Powered By     [place] Prescription Monitoring Program  [place] [employer]  [address] [place] [address] [address]  [place] [address]  [phone]     [employer] 2020. All Rights Reserved.

## 2020-04-16 DIAGNOSIS — J45.20 MILD INTERMITTENT REACTIVE AIRWAY DISEASE WITHOUT COMPLICATION: Primary | ICD-10-CM

## 2020-04-17 DIAGNOSIS — J45.20 MILD INTERMITTENT REACTIVE AIRWAY DISEASE WITHOUT COMPLICATION: Primary | ICD-10-CM

## 2020-04-20 DIAGNOSIS — G43.709 CHRONIC MIGRAINE WITHOUT AURA WITHOUT STATUS MIGRAINOSUS, NOT INTRACTABLE: ICD-10-CM

## 2020-04-20 RX ORDER — TIZANIDINE 2 MG/1
TABLET ORAL
Qty: 30 TABLET | Refills: 2 | Status: SHIPPED | OUTPATIENT
Start: 2020-04-20 | End: 2020-05-28

## 2020-04-22 ENCOUNTER — TELEPHONE (OUTPATIENT)
Dept: NEUROLOGY | Facility: CLINIC | Age: 50
End: 2020-04-22

## 2020-04-27 DIAGNOSIS — IMO0002 UNCONTROLLED TYPE 2 DIABETES MELLITUS WITH DIABETIC POLYNEUROPATHY, WITH LONG-TERM CURRENT USE OF INSULIN: Primary | ICD-10-CM

## 2020-04-27 RX ORDER — PEN NEEDLE, DIABETIC 32GX 5/32"
NEEDLE, DISPOSABLE MISCELLANEOUS DAILY
Qty: 30 EACH | Refills: 5 | Status: SHIPPED | OUTPATIENT
Start: 2020-04-27

## 2020-04-28 ENCOUNTER — TELEMEDICINE (OUTPATIENT)
Dept: NEUROLOGY | Facility: CLINIC | Age: 50
End: 2020-04-28
Payer: COMMERCIAL

## 2020-04-28 DIAGNOSIS — I63.9 CEREBRAL INFARCTION, UNSPECIFIED MECHANISM (HCC): Primary | ICD-10-CM

## 2020-04-28 DIAGNOSIS — G43.719 INTRACTABLE CHRONIC MIGRAINE WITHOUT AURA AND WITHOUT STATUS MIGRAINOSUS: ICD-10-CM

## 2020-04-28 PROCEDURE — 99214 OFFICE O/P EST MOD 30 MIN: CPT | Performed by: PSYCHIATRY & NEUROLOGY

## 2020-05-04 DIAGNOSIS — R11.0 NAUSEA: ICD-10-CM

## 2020-05-04 DIAGNOSIS — Z79.4 TYPE 2 DIABETES MELLITUS WITH OTHER NEUROLOGIC COMPLICATION, WITH LONG-TERM CURRENT USE OF INSULIN (HCC): ICD-10-CM

## 2020-05-04 DIAGNOSIS — E11.49 TYPE 2 DIABETES MELLITUS WITH OTHER NEUROLOGIC COMPLICATION, WITH LONG-TERM CURRENT USE OF INSULIN (HCC): ICD-10-CM

## 2020-05-04 RX ORDER — ONDANSETRON 4 MG/1
TABLET, FILM COATED ORAL
Qty: 20 TABLET | Refills: 2 | Status: SHIPPED | OUTPATIENT
Start: 2020-05-04 | End: 2020-06-18

## 2020-05-12 ENCOUNTER — TELEPHONE (OUTPATIENT)
Dept: NEUROLOGY | Facility: CLINIC | Age: 50
End: 2020-05-12

## 2020-05-13 ENCOUNTER — PROCEDURE VISIT (OUTPATIENT)
Dept: NEUROLOGY | Facility: CLINIC | Age: 50
End: 2020-05-13
Payer: COMMERCIAL

## 2020-05-13 ENCOUNTER — TELEPHONE (OUTPATIENT)
Dept: NEUROLOGY | Facility: CLINIC | Age: 50
End: 2020-05-13

## 2020-05-13 VITALS
BODY MASS INDEX: 20.55 KG/M2 | SYSTOLIC BLOOD PRESSURE: 95 MMHG | HEIGHT: 63 IN | DIASTOLIC BLOOD PRESSURE: 67 MMHG | HEART RATE: 107 BPM | TEMPERATURE: 98.5 F | WEIGHT: 116 LBS

## 2020-05-13 DIAGNOSIS — G43.709 CHRONIC MIGRAINE WITHOUT AURA WITHOUT STATUS MIGRAINOSUS, NOT INTRACTABLE: ICD-10-CM

## 2020-05-13 DIAGNOSIS — G89.29 OTHER CHRONIC PAIN: ICD-10-CM

## 2020-05-13 DIAGNOSIS — R11.0 NAUSEA: ICD-10-CM

## 2020-05-13 DIAGNOSIS — F33.41 RECURRENT MAJOR DEPRESSIVE DISORDER, IN PARTIAL REMISSION (HCC): ICD-10-CM

## 2020-05-13 DIAGNOSIS — F41.9 ANXIETY: ICD-10-CM

## 2020-05-13 DIAGNOSIS — M54.12 CERVICAL RADICULOPATHY: ICD-10-CM

## 2020-05-13 DIAGNOSIS — R76.8 ANA POSITIVE: ICD-10-CM

## 2020-05-13 DIAGNOSIS — F51.01 PRIMARY INSOMNIA: ICD-10-CM

## 2020-05-13 DIAGNOSIS — G43.709 CHRONIC MIGRAINE WITHOUT AURA WITHOUT STATUS MIGRAINOSUS, NOT INTRACTABLE: Primary | ICD-10-CM

## 2020-05-13 DIAGNOSIS — IMO0002 UNCONTROLLED TYPE 2 DIABETES MELLITUS WITH DIABETIC POLYNEUROPATHY, WITH LONG-TERM CURRENT USE OF INSULIN: Chronic | ICD-10-CM

## 2020-05-13 DIAGNOSIS — G89.4 CHRONIC PAIN SYNDROME: ICD-10-CM

## 2020-05-13 PROCEDURE — 64615 CHEMODENERV MUSC MIGRAINE: CPT | Performed by: PHYSICIAN ASSISTANT

## 2020-05-13 RX ORDER — PROCHLORPERAZINE MALEATE 10 MG
10 TABLET ORAL EVERY 6 HOURS PRN
Qty: 20 TABLET | Refills: 0 | Status: SHIPPED | OUTPATIENT
Start: 2020-05-13 | End: 2020-08-09

## 2020-05-14 RX ORDER — ZOLPIDEM TARTRATE 10 MG/1
TABLET ORAL
Qty: 30 TABLET | Refills: 0 | Status: SHIPPED | OUTPATIENT
Start: 2020-05-14 | End: 2020-06-18

## 2020-05-14 RX ORDER — DIAZEPAM 5 MG/1
TABLET ORAL
Qty: 90 TABLET | Refills: 0 | Status: SHIPPED | OUTPATIENT
Start: 2020-05-14 | End: 2020-05-15

## 2020-05-14 RX ORDER — TRAMADOL HYDROCHLORIDE 50 MG/1
TABLET ORAL
Qty: 180 TABLET | Refills: 0 | Status: SHIPPED | OUTPATIENT
Start: 2020-05-14 | End: 2020-05-15

## 2020-05-15 ENCOUNTER — HOSPITAL ENCOUNTER (OUTPATIENT)
Facility: HOSPITAL | Age: 50
Setting detail: OBSERVATION
Discharge: HOME | End: 2020-05-17
Attending: EMERGENCY MEDICINE | Admitting: INTERNAL MEDICINE
Payer: COMMERCIAL

## 2020-05-15 ENCOUNTER — APPOINTMENT (EMERGENCY)
Dept: RADIOLOGY | Facility: HOSPITAL | Age: 50
End: 2020-05-15
Payer: COMMERCIAL

## 2020-05-15 DIAGNOSIS — G93.40 ENCEPHALOPATHY: ICD-10-CM

## 2020-05-15 DIAGNOSIS — G89.4 CHRONIC PAIN SYNDROME: ICD-10-CM

## 2020-05-15 DIAGNOSIS — F41.9 ANXIETY: ICD-10-CM

## 2020-05-15 DIAGNOSIS — R41.82 ALTERED MENTAL STATUS: Primary | ICD-10-CM

## 2020-05-15 DIAGNOSIS — F51.01 PRIMARY INSOMNIA: ICD-10-CM

## 2020-05-15 DIAGNOSIS — M54.12 CERVICAL RADICULOPATHY: ICD-10-CM

## 2020-05-15 PROBLEM — R94.31 PROLONGED QT INTERVAL: Status: ACTIVE | Noted: 2020-05-15

## 2020-05-15 LAB
ALBUMIN SERPL BCP-MCNC: 3.8 G/DL (ref 3.5–5)
ALP SERPL-CCNC: 144 U/L (ref 46–116)
ALT SERPL W P-5'-P-CCNC: 34 U/L (ref 12–78)
AMPHETAMINES SERPL QL SCN: NEGATIVE
ANION GAP SERPL CALCULATED.3IONS-SCNC: 10 MMOL/L (ref 4–13)
APAP SERPL-MCNC: <2 UG/ML (ref 10–20)
AST SERPL W P-5'-P-CCNC: 22 U/L (ref 5–45)
ATRIAL RATE: 104 BPM
BARBITURATES UR QL: NEGATIVE
BASOPHILS # BLD AUTO: 0.07 THOUSANDS/ΜL (ref 0–0.1)
BASOPHILS NFR BLD AUTO: 1 % (ref 0–1)
BENZODIAZ UR QL: POSITIVE
BILIRUB SERPL-MCNC: 0.39 MG/DL (ref 0.2–1)
BILIRUB UR QL STRIP: ABNORMAL
BUN SERPL-MCNC: 7 MG/DL (ref 5–25)
CALCIUM SERPL-MCNC: 10 MG/DL (ref 8.3–10.1)
CHLORIDE SERPL-SCNC: 104 MMOL/L (ref 100–108)
CLARITY UR: CLEAR
CO2 SERPL-SCNC: 23 MMOL/L (ref 21–32)
COCAINE UR QL: NEGATIVE
COLOR UR: YELLOW
CREAT SERPL-MCNC: 0.82 MG/DL (ref 0.6–1.3)
EOSINOPHIL # BLD AUTO: 0.1 THOUSAND/ΜL (ref 0–0.61)
EOSINOPHIL NFR BLD AUTO: 1 % (ref 0–6)
ERYTHROCYTE [DISTWIDTH] IN BLOOD BY AUTOMATED COUNT: 11.4 % (ref 11.6–15.1)
ETHANOL SERPL-MCNC: <3 MG/DL (ref 0–3)
GFR SERPL CREATININE-BSD FRML MDRD: 84 ML/MIN/1.73SQ M
GLUCOSE SERPL-MCNC: 138 MG/DL (ref 65–140)
GLUCOSE SERPL-MCNC: 149 MG/DL (ref 65–140)
GLUCOSE SERPL-MCNC: 184 MG/DL (ref 65–140)
GLUCOSE UR STRIP-MCNC: ABNORMAL MG/DL
HCG SERPL QL: NEGATIVE
HCT VFR BLD AUTO: 40.9 % (ref 34.8–46.1)
HGB BLD-MCNC: 14.1 G/DL (ref 11.5–15.4)
HGB UR QL STRIP.AUTO: NEGATIVE
IMM GRANULOCYTES # BLD AUTO: 0.07 THOUSAND/UL (ref 0–0.2)
IMM GRANULOCYTES NFR BLD AUTO: 1 % (ref 0–2)
KETONES UR STRIP-MCNC: NEGATIVE MG/DL
LEUKOCYTE ESTERASE UR QL STRIP: NEGATIVE
LYMPHOCYTES # BLD AUTO: 2.37 THOUSANDS/ΜL (ref 0.6–4.47)
LYMPHOCYTES NFR BLD AUTO: 22 % (ref 14–44)
MCH RBC QN AUTO: 33.3 PG (ref 26.8–34.3)
MCHC RBC AUTO-ENTMCNC: 34.5 G/DL (ref 31.4–37.4)
MCV RBC AUTO: 97 FL (ref 82–98)
METHADONE UR QL: NEGATIVE
MONOCYTES # BLD AUTO: 0.81 THOUSAND/ΜL (ref 0.17–1.22)
MONOCYTES NFR BLD AUTO: 7 % (ref 4–12)
NEUTROPHILS # BLD AUTO: 7.52 THOUSANDS/ΜL (ref 1.85–7.62)
NEUTS SEG NFR BLD AUTO: 68 % (ref 43–75)
NITRITE UR QL STRIP: NEGATIVE
NRBC BLD AUTO-RTO: 0 /100 WBCS
OPIATES UR QL SCN: NEGATIVE
P AXIS: 55 DEGREES
PCP UR QL: NEGATIVE
PH UR STRIP.AUTO: 5.5 [PH] (ref 4.5–8)
PLATELET # BLD AUTO: 225 THOUSANDS/UL (ref 149–390)
PMV BLD AUTO: 11.7 FL (ref 8.9–12.7)
POTASSIUM SERPL-SCNC: 3.2 MMOL/L (ref 3.5–5.3)
PR INTERVAL: 118 MS
PROT SERPL-MCNC: 7.2 G/DL (ref 6.4–8.2)
PROT UR STRIP-MCNC: NEGATIVE MG/DL
QRS AXIS: 76 DEGREES
QRSD INTERVAL: 88 MS
QT INTERVAL: 388 MS
QTC INTERVAL: 510 MS
RBC # BLD AUTO: 4.24 MILLION/UL (ref 3.81–5.12)
SALICYLATES SERPL-MCNC: <3 MG/DL (ref 3–20)
SODIUM SERPL-SCNC: 137 MMOL/L (ref 136–145)
SP GR UR STRIP.AUTO: 1.02 (ref 1–1.03)
T WAVE AXIS: 65 DEGREES
THC UR QL: NEGATIVE
UROBILINOGEN UR QL STRIP.AUTO: 0.2 E.U./DL
VENTRICULAR RATE: 104 BPM
WBC # BLD AUTO: 10.94 THOUSAND/UL (ref 4.31–10.16)

## 2020-05-15 PROCEDURE — 93005 ELECTROCARDIOGRAM TRACING: CPT

## 2020-05-15 PROCEDURE — 70450 CT HEAD/BRAIN W/O DYE: CPT

## 2020-05-15 PROCEDURE — 82948 REAGENT STRIP/BLOOD GLUCOSE: CPT

## 2020-05-15 PROCEDURE — 93010 ELECTROCARDIOGRAM REPORT: CPT | Performed by: INTERNAL MEDICINE

## 2020-05-15 PROCEDURE — 80329 ANALGESICS NON-OPIOID 1 OR 2: CPT | Performed by: EMERGENCY MEDICINE

## 2020-05-15 PROCEDURE — 85025 COMPLETE CBC W/AUTO DIFF WBC: CPT | Performed by: EMERGENCY MEDICINE

## 2020-05-15 PROCEDURE — 99220 PR INITIAL OBSERVATION CARE/DAY 70 MINUTES: CPT | Performed by: INTERNAL MEDICINE

## 2020-05-15 PROCEDURE — 82607 VITAMIN B-12: CPT | Performed by: INTERNAL MEDICINE

## 2020-05-15 PROCEDURE — 82746 ASSAY OF FOLIC ACID SERUM: CPT | Performed by: INTERNAL MEDICINE

## 2020-05-15 PROCEDURE — 36415 COLL VENOUS BLD VENIPUNCTURE: CPT | Performed by: EMERGENCY MEDICINE

## 2020-05-15 PROCEDURE — 80053 COMPREHEN METABOLIC PANEL: CPT | Performed by: EMERGENCY MEDICINE

## 2020-05-15 PROCEDURE — 80307 DRUG TEST PRSMV CHEM ANLYZR: CPT | Performed by: EMERGENCY MEDICINE

## 2020-05-15 PROCEDURE — 84703 CHORIONIC GONADOTROPIN ASSAY: CPT | Performed by: EMERGENCY MEDICINE

## 2020-05-15 PROCEDURE — 99285 EMERGENCY DEPT VISIT HI MDM: CPT

## 2020-05-15 PROCEDURE — 99285 EMERGENCY DEPT VISIT HI MDM: CPT | Performed by: EMERGENCY MEDICINE

## 2020-05-15 PROCEDURE — 80320 DRUG SCREEN QUANTALCOHOLS: CPT | Performed by: EMERGENCY MEDICINE

## 2020-05-15 PROCEDURE — 84443 ASSAY THYROID STIM HORMONE: CPT | Performed by: INTERNAL MEDICINE

## 2020-05-15 PROCEDURE — 81003 URINALYSIS AUTO W/O SCOPE: CPT

## 2020-05-15 RX ORDER — LEVOTHYROXINE SODIUM 112 UG/1
112 TABLET ORAL
Status: DISCONTINUED | OUTPATIENT
Start: 2020-05-16 | End: 2020-05-17 | Stop reason: HOSPADM

## 2020-05-15 RX ORDER — LOSARTAN POTASSIUM 50 MG/1
50 TABLET ORAL DAILY
Status: DISCONTINUED | OUTPATIENT
Start: 2020-05-16 | End: 2020-05-17 | Stop reason: HOSPADM

## 2020-05-15 RX ORDER — ASPIRIN 325 MG
325 TABLET ORAL DAILY
Status: DISCONTINUED | OUTPATIENT
Start: 2020-05-16 | End: 2020-05-17 | Stop reason: HOSPADM

## 2020-05-15 RX ORDER — ATORVASTATIN CALCIUM 80 MG/1
80 TABLET, FILM COATED ORAL DAILY
Status: DISCONTINUED | OUTPATIENT
Start: 2020-05-16 | End: 2020-05-17 | Stop reason: HOSPADM

## 2020-05-15 RX ORDER — DIAZEPAM 2 MG/1
2 TABLET ORAL EVERY 8 HOURS PRN
Status: DISCONTINUED | OUTPATIENT
Start: 2020-05-15 | End: 2020-05-17 | Stop reason: HOSPADM

## 2020-05-15 RX ORDER — NICOTINE 21 MG/24HR
1 PATCH, TRANSDERMAL 24 HOURS TRANSDERMAL DAILY
Status: DISCONTINUED | OUTPATIENT
Start: 2020-05-16 | End: 2020-05-17 | Stop reason: HOSPADM

## 2020-05-15 RX ORDER — LANOLIN ALCOHOL/MO/W.PET/CERES
400 CREAM (GRAM) TOPICAL DAILY
Status: DISCONTINUED | OUTPATIENT
Start: 2020-05-16 | End: 2020-05-17 | Stop reason: HOSPADM

## 2020-05-15 RX ORDER — ACETAMINOPHEN 325 MG/1
650 TABLET ORAL EVERY 6 HOURS PRN
Status: DISCONTINUED | OUTPATIENT
Start: 2020-05-15 | End: 2020-05-17 | Stop reason: HOSPADM

## 2020-05-15 RX ORDER — BUPROPION HYDROCHLORIDE 150 MG/1
150 TABLET ORAL DAILY
Status: DISCONTINUED | OUTPATIENT
Start: 2020-05-16 | End: 2020-05-17 | Stop reason: HOSPADM

## 2020-05-15 RX ORDER — TRAMADOL HYDROCHLORIDE 50 MG/1
TABLET ORAL
Qty: 180 TABLET | Refills: 0 | Status: SHIPPED | OUTPATIENT
Start: 2020-05-15 | End: 2020-06-18

## 2020-05-15 RX ORDER — ALBUTEROL SULFATE 90 UG/1
2 AEROSOL, METERED RESPIRATORY (INHALATION) EVERY 4 HOURS PRN
Status: DISCONTINUED | OUTPATIENT
Start: 2020-05-15 | End: 2020-05-17 | Stop reason: HOSPADM

## 2020-05-15 RX ORDER — LORAZEPAM 2 MG/ML
1 INJECTION INTRAMUSCULAR ONCE
Status: COMPLETED | OUTPATIENT
Start: 2020-05-15 | End: 2020-05-15

## 2020-05-15 RX ORDER — INSULIN GLARGINE 100 [IU]/ML
100 INJECTION, SOLUTION SUBCUTANEOUS EVERY MORNING
Status: DISCONTINUED | OUTPATIENT
Start: 2020-05-16 | End: 2020-05-17 | Stop reason: HOSPADM

## 2020-05-15 RX ORDER — ZOLPIDEM TARTRATE 10 MG/1
10 TABLET ORAL
Qty: 30 TABLET | Refills: 0 | Status: CANCELLED | OUTPATIENT
Start: 2020-05-15

## 2020-05-15 RX ORDER — FLUTICASONE FUROATE AND VILANTEROL 200; 25 UG/1; UG/1
1 POWDER RESPIRATORY (INHALATION)
Status: DISCONTINUED | OUTPATIENT
Start: 2020-05-16 | End: 2020-05-17 | Stop reason: HOSPADM

## 2020-05-15 RX ORDER — DIAZEPAM 5 MG/1
TABLET ORAL
Qty: 90 TABLET | Refills: 0 | Status: SHIPPED | OUTPATIENT
Start: 2020-05-15 | End: 2020-06-18

## 2020-05-15 RX ADMIN — SODIUM CHLORIDE 1000 ML: 0.9 INJECTION, SOLUTION INTRAVENOUS at 21:09

## 2020-05-16 ENCOUNTER — APPOINTMENT (OUTPATIENT)
Dept: NEUROLOGY | Facility: CLINIC | Age: 50
End: 2020-05-16
Payer: COMMERCIAL

## 2020-05-16 LAB
ANION GAP SERPL CALCULATED.3IONS-SCNC: 5 MMOL/L (ref 4–13)
BUN SERPL-MCNC: 7 MG/DL (ref 5–25)
CALCIUM SERPL-MCNC: 9.1 MG/DL (ref 8.3–10.1)
CHLORIDE SERPL-SCNC: 114 MMOL/L (ref 100–108)
CO2 SERPL-SCNC: 26 MMOL/L (ref 21–32)
CREAT SERPL-MCNC: 0.52 MG/DL (ref 0.6–1.3)
ERYTHROCYTE [DISTWIDTH] IN BLOOD BY AUTOMATED COUNT: 11.5 % (ref 11.6–15.1)
EST. AVERAGE GLUCOSE BLD GHB EST-MCNC: 326 MG/DL
FOLATE SERPL-MCNC: >20 NG/ML (ref 3.1–17.5)
GFR SERPL CREATININE-BSD FRML MDRD: 113 ML/MIN/1.73SQ M
GLUCOSE P FAST SERPL-MCNC: 92 MG/DL (ref 65–99)
GLUCOSE SERPL-MCNC: 158 MG/DL (ref 65–140)
GLUCOSE SERPL-MCNC: 166 MG/DL (ref 65–140)
GLUCOSE SERPL-MCNC: 273 MG/DL (ref 65–140)
GLUCOSE SERPL-MCNC: 46 MG/DL (ref 65–140)
GLUCOSE SERPL-MCNC: 77 MG/DL (ref 65–140)
GLUCOSE SERPL-MCNC: 85 MG/DL (ref 65–140)
GLUCOSE SERPL-MCNC: 87 MG/DL (ref 65–140)
GLUCOSE SERPL-MCNC: 92 MG/DL (ref 65–140)
HBA1C MFR BLD: 13 %
HCT VFR BLD AUTO: 40.4 % (ref 34.8–46.1)
HGB BLD-MCNC: 13.7 G/DL (ref 11.5–15.4)
MCH RBC QN AUTO: 33.3 PG (ref 26.8–34.3)
MCHC RBC AUTO-ENTMCNC: 33.9 G/DL (ref 31.4–37.4)
MCV RBC AUTO: 98 FL (ref 82–98)
PLATELET # BLD AUTO: 220 THOUSANDS/UL (ref 149–390)
PMV BLD AUTO: 11.9 FL (ref 8.9–12.7)
POTASSIUM SERPL-SCNC: 3 MMOL/L (ref 3.5–5.3)
RBC # BLD AUTO: 4.11 MILLION/UL (ref 3.81–5.12)
SODIUM SERPL-SCNC: 145 MMOL/L (ref 136–145)
TSH SERPL DL<=0.05 MIU/L-ACNC: 3.61 UIU/ML (ref 0.36–3.74)
VIT B12 SERPL-MCNC: >6000 PG/ML (ref 100–900)
WBC # BLD AUTO: 7.29 THOUSAND/UL (ref 4.31–10.16)

## 2020-05-16 PROCEDURE — 85027 COMPLETE CBC AUTOMATED: CPT | Performed by: INTERNAL MEDICINE

## 2020-05-16 PROCEDURE — 82948 REAGENT STRIP/BLOOD GLUCOSE: CPT

## 2020-05-16 PROCEDURE — 95816 EEG AWAKE AND DROWSY: CPT

## 2020-05-16 PROCEDURE — 95816 EEG AWAKE AND DROWSY: CPT | Performed by: PSYCHIATRY & NEUROLOGY

## 2020-05-16 PROCEDURE — 99225 PR SBSQ OBSERVATION CARE/DAY 25 MINUTES: CPT | Performed by: INTERNAL MEDICINE

## 2020-05-16 PROCEDURE — 99214 OFFICE O/P EST MOD 30 MIN: CPT | Performed by: PSYCHIATRY & NEUROLOGY

## 2020-05-16 PROCEDURE — 80048 BASIC METABOLIC PNL TOTAL CA: CPT | Performed by: INTERNAL MEDICINE

## 2020-05-16 PROCEDURE — 83036 HEMOGLOBIN GLYCOSYLATED A1C: CPT | Performed by: INTERNAL MEDICINE

## 2020-05-16 RX ORDER — POTASSIUM CHLORIDE 20 MEQ/1
40 TABLET, EXTENDED RELEASE ORAL ONCE
Status: COMPLETED | OUTPATIENT
Start: 2020-05-16 | End: 2020-05-16

## 2020-05-16 RX ORDER — POTASSIUM CHLORIDE 20 MEQ/1
20 TABLET, EXTENDED RELEASE ORAL ONCE
Status: COMPLETED | OUTPATIENT
Start: 2020-05-16 | End: 2020-05-16

## 2020-05-16 RX ADMIN — DICLOFENAC 2 G: 10 GEL TOPICAL at 17:29

## 2020-05-16 RX ADMIN — ATORVASTATIN CALCIUM 80 MG: 80 TABLET, FILM COATED ORAL at 09:45

## 2020-05-16 RX ADMIN — METOPROLOL TARTRATE 25 MG: 25 TABLET, FILM COATED ORAL at 09:45

## 2020-05-16 RX ADMIN — INSULIN LISPRO 33 UNITS: 100 INJECTION, SOLUTION INTRAVENOUS; SUBCUTANEOUS at 18:09

## 2020-05-16 RX ADMIN — INSULIN GLARGINE 100 UNITS: 100 INJECTION, SOLUTION SUBCUTANEOUS at 10:00

## 2020-05-16 RX ADMIN — LOSARTAN POTASSIUM 50 MG: 50 TABLET, FILM COATED ORAL at 09:45

## 2020-05-16 RX ADMIN — DIAZEPAM 2 MG: 2 TABLET ORAL at 21:55

## 2020-05-16 RX ADMIN — POTASSIUM CHLORIDE 20 MEQ: 1500 TABLET, EXTENDED RELEASE ORAL at 12:15

## 2020-05-16 RX ADMIN — FLUTICASONE FUROATE AND VILANTEROL TRIFENATATE 1 PUFF: 200; 25 POWDER RESPIRATORY (INHALATION) at 09:53

## 2020-05-16 RX ADMIN — INSULIN LISPRO 33 UNITS: 100 INJECTION, SOLUTION INTRAVENOUS; SUBCUTANEOUS at 07:46

## 2020-05-16 RX ADMIN — ENOXAPARIN SODIUM 40 MG: 40 INJECTION SUBCUTANEOUS at 09:44

## 2020-05-16 RX ADMIN — POTASSIUM CHLORIDE 40 MEQ: 1500 TABLET, EXTENDED RELEASE ORAL at 10:00

## 2020-05-16 RX ADMIN — FOLIC ACID TAB 400 MCG 400 MCG: 400 TAB at 09:45

## 2020-05-16 RX ADMIN — LEVOTHYROXINE SODIUM 112 MCG: 112 TABLET ORAL at 05:19

## 2020-05-16 RX ADMIN — INSULIN LISPRO 5 UNITS: 100 INJECTION, SOLUTION INTRAVENOUS; SUBCUTANEOUS at 17:28

## 2020-05-16 RX ADMIN — ASPIRIN 325 MG ORAL TABLET 325 MG: 325 PILL ORAL at 09:45

## 2020-05-16 RX ADMIN — DICLOFENAC 2 G: 10 GEL TOPICAL at 21:55

## 2020-05-16 RX ADMIN — DICLOFENAC 2 G: 10 GEL TOPICAL at 12:15

## 2020-05-16 RX ADMIN — ACETAMINOPHEN 650 MG: 325 TABLET ORAL at 09:45

## 2020-05-16 RX ADMIN — BUPROPION HYDROCHLORIDE 150 MG: 150 TABLET, FILM COATED, EXTENDED RELEASE ORAL at 09:45

## 2020-05-16 RX ADMIN — NICOTINE 1 PATCH: 14 PATCH TRANSDERMAL at 09:53

## 2020-05-17 VITALS
TEMPERATURE: 98 F | RESPIRATION RATE: 14 BRPM | BODY MASS INDEX: 20.08 KG/M2 | HEART RATE: 110 BPM | SYSTOLIC BLOOD PRESSURE: 117 MMHG | WEIGHT: 113.32 LBS | DIASTOLIC BLOOD PRESSURE: 85 MMHG | OXYGEN SATURATION: 98 % | HEIGHT: 63 IN

## 2020-05-17 LAB
ALBUMIN SERPL BCP-MCNC: 3.1 G/DL (ref 3.5–5)
ALP SERPL-CCNC: 114 U/L (ref 46–116)
ALT SERPL W P-5'-P-CCNC: 33 U/L (ref 12–78)
AMMONIA PLAS-SCNC: 21 UMOL/L (ref 11–35)
ANION GAP SERPL CALCULATED.3IONS-SCNC: 6 MMOL/L (ref 4–13)
AST SERPL W P-5'-P-CCNC: 28 U/L (ref 5–45)
BILIRUB SERPL-MCNC: 0.29 MG/DL (ref 0.2–1)
BUN SERPL-MCNC: 8 MG/DL (ref 5–25)
CALCIUM SERPL-MCNC: 9.5 MG/DL (ref 8.3–10.1)
CHLORIDE SERPL-SCNC: 114 MMOL/L (ref 100–108)
CO2 SERPL-SCNC: 24 MMOL/L (ref 21–32)
CREAT SERPL-MCNC: 0.58 MG/DL (ref 0.6–1.3)
GFR SERPL CREATININE-BSD FRML MDRD: 109 ML/MIN/1.73SQ M
GLUCOSE SERPL-MCNC: 150 MG/DL (ref 65–140)
GLUCOSE SERPL-MCNC: 158 MG/DL (ref 65–140)
GLUCOSE SERPL-MCNC: 229 MG/DL (ref 65–140)
GLUCOSE SERPL-MCNC: 234 MG/DL (ref 65–140)
POTASSIUM SERPL-SCNC: 3.5 MMOL/L (ref 3.5–5.3)
PROT SERPL-MCNC: 6.5 G/DL (ref 6.4–8.2)
SODIUM SERPL-SCNC: 144 MMOL/L (ref 136–145)

## 2020-05-17 PROCEDURE — 82140 ASSAY OF AMMONIA: CPT | Performed by: INTERNAL MEDICINE

## 2020-05-17 PROCEDURE — 99217 PR OBSERVATION CARE DISCHARGE MANAGEMENT: CPT | Performed by: INTERNAL MEDICINE

## 2020-05-17 PROCEDURE — 82948 REAGENT STRIP/BLOOD GLUCOSE: CPT

## 2020-05-17 PROCEDURE — 80053 COMPREHEN METABOLIC PANEL: CPT | Performed by: INTERNAL MEDICINE

## 2020-05-17 RX ORDER — GABAPENTIN 400 MG/1
800 CAPSULE ORAL ONCE
Status: COMPLETED | OUTPATIENT
Start: 2020-05-17 | End: 2020-05-17

## 2020-05-17 RX ADMIN — DICLOFENAC 2 G: 10 GEL TOPICAL at 09:28

## 2020-05-17 RX ADMIN — ATORVASTATIN CALCIUM 80 MG: 80 TABLET, FILM COATED ORAL at 09:26

## 2020-05-17 RX ADMIN — LOSARTAN POTASSIUM 50 MG: 50 TABLET, FILM COATED ORAL at 09:25

## 2020-05-17 RX ADMIN — NICOTINE 1 PATCH: 14 PATCH TRANSDERMAL at 09:29

## 2020-05-17 RX ADMIN — BUPROPION HYDROCHLORIDE 150 MG: 150 TABLET, FILM COATED, EXTENDED RELEASE ORAL at 09:26

## 2020-05-17 RX ADMIN — METOPROLOL TARTRATE 25 MG: 25 TABLET, FILM COATED ORAL at 09:28

## 2020-05-17 RX ADMIN — FOLIC ACID TAB 400 MCG 400 MCG: 400 TAB at 09:26

## 2020-05-17 RX ADMIN — GABAPENTIN 800 MG: 400 CAPSULE ORAL at 03:55

## 2020-05-17 RX ADMIN — INSULIN LISPRO 10 UNITS: 100 INJECTION, SOLUTION INTRAVENOUS; SUBCUTANEOUS at 11:36

## 2020-05-17 RX ADMIN — ASPIRIN 325 MG ORAL TABLET 325 MG: 325 PILL ORAL at 09:25

## 2020-05-17 RX ADMIN — DICLOFENAC 2 G: 10 GEL TOPICAL at 11:40

## 2020-05-17 RX ADMIN — INSULIN LISPRO 33 UNITS: 100 INJECTION, SOLUTION INTRAVENOUS; SUBCUTANEOUS at 11:37

## 2020-05-17 RX ADMIN — ENOXAPARIN SODIUM 40 MG: 40 INJECTION SUBCUTANEOUS at 09:25

## 2020-05-17 RX ADMIN — LEVOTHYROXINE SODIUM 112 MCG: 112 TABLET ORAL at 05:47

## 2020-05-17 RX ADMIN — FLUTICASONE FUROATE AND VILANTEROL TRIFENATATE 1 PUFF: 200; 25 POWDER RESPIRATORY (INHALATION) at 07:25

## 2020-05-17 RX ADMIN — INSULIN LISPRO 5 UNITS: 100 INJECTION, SOLUTION INTRAVENOUS; SUBCUTANEOUS at 07:23

## 2020-05-18 DIAGNOSIS — G43.709 CHRONIC MIGRAINE WITHOUT AURA WITHOUT STATUS MIGRAINOSUS, NOT INTRACTABLE: ICD-10-CM

## 2020-05-19 ENCOUNTER — DOCTOR'S OFFICE (OUTPATIENT)
Dept: URBAN - METROPOLITAN AREA CLINIC 136 | Facility: CLINIC | Age: 50
Setting detail: OPHTHALMOLOGY
End: 2020-05-19
Payer: COMMERCIAL

## 2020-05-19 DIAGNOSIS — E11.9: ICD-10-CM

## 2020-05-19 DIAGNOSIS — H04.123: ICD-10-CM

## 2020-05-19 DIAGNOSIS — H25.13: ICD-10-CM

## 2020-05-19 PROCEDURE — 76512 OPH US DX B-SCAN: CPT | Performed by: OPHTHALMOLOGY

## 2020-05-19 PROCEDURE — 92014 COMPRE OPH EXAM EST PT 1/>: CPT | Performed by: OPHTHALMOLOGY

## 2020-05-19 RX ORDER — TOPIRAMATE 25 MG/1
TABLET ORAL
Qty: 90 TABLET | Refills: 0 | Status: SHIPPED | OUTPATIENT
Start: 2020-05-19 | End: 2020-08-13

## 2020-05-19 ASSESSMENT — SPHEQUIV_DERIVED
OD_SPHEQUIV: -2
OS_SPHEQUIV: -5.625

## 2020-05-19 ASSESSMENT — VISUAL ACUITY: OS_BCVA: 20/70

## 2020-05-19 ASSESSMENT — REFRACTION_AUTOREFRACTION
OS_AXIS: 103
OD_AXIS: 092
OS_CYLINDER: -1.75
OS_SPHERE: -4.75
OD_CYLINDER: -1.50
OD_SPHERE: -1.25

## 2020-05-19 ASSESSMENT — SUPERFICIAL PUNCTATE KERATITIS (SPK)
OD_SPK: 2+
OS_SPK: 2+

## 2020-05-19 ASSESSMENT — CONFRONTATIONAL VISUAL FIELD TEST (CVF)
OS_FINDINGS: FULL
OD_FINDINGS: FULL
OS_COMMENTS: HM

## 2020-05-21 ENCOUNTER — TELEPHONE (OUTPATIENT)
Dept: PSYCHIATRY | Facility: CLINIC | Age: 50
End: 2020-05-21

## 2020-05-22 ENCOUNTER — TELEPHONE (OUTPATIENT)
Dept: PSYCHIATRY | Facility: CLINIC | Age: 50
End: 2020-05-22

## 2020-05-22 ENCOUNTER — TELEPHONE (OUTPATIENT)
Dept: FAMILY MEDICINE CLINIC | Facility: CLINIC | Age: 50
End: 2020-05-22

## 2020-05-26 ENCOUNTER — OFFICE VISIT (OUTPATIENT)
Dept: FAMILY MEDICINE CLINIC | Facility: CLINIC | Age: 50
End: 2020-05-26
Payer: COMMERCIAL

## 2020-05-26 VITALS
DIASTOLIC BLOOD PRESSURE: 72 MMHG | TEMPERATURE: 97.7 F | WEIGHT: 121 LBS | HEIGHT: 62 IN | HEART RATE: 106 BPM | SYSTOLIC BLOOD PRESSURE: 96 MMHG | OXYGEN SATURATION: 97 % | RESPIRATION RATE: 17 BRPM | BODY MASS INDEX: 22.26 KG/M2

## 2020-05-26 DIAGNOSIS — E55.9 VITAMIN D DEFICIENCY: ICD-10-CM

## 2020-05-26 DIAGNOSIS — G89.4 CHRONIC PAIN DISORDER: ICD-10-CM

## 2020-05-26 DIAGNOSIS — E03.9 HYPOTHYROIDISM, UNSPECIFIED TYPE: ICD-10-CM

## 2020-05-26 DIAGNOSIS — R94.31 PROLONGED QT INTERVAL: ICD-10-CM

## 2020-05-26 DIAGNOSIS — K21.9 GERD WITHOUT ESOPHAGITIS: ICD-10-CM

## 2020-05-26 DIAGNOSIS — Z01.818 PRE-OP EXAMINATION: Primary | ICD-10-CM

## 2020-05-26 DIAGNOSIS — F17.210 CIGARETTE NICOTINE DEPENDENCE WITHOUT COMPLICATION: ICD-10-CM

## 2020-05-26 DIAGNOSIS — K86.2 PANCREATIC CYST: ICD-10-CM

## 2020-05-26 DIAGNOSIS — F41.9 ANXIETY: ICD-10-CM

## 2020-05-26 DIAGNOSIS — G93.40 ACUTE ENCEPHALOPATHY: ICD-10-CM

## 2020-05-26 DIAGNOSIS — E61.1 IRON DEFICIENCY: ICD-10-CM

## 2020-05-26 DIAGNOSIS — G43.709 CHRONIC MIGRAINE WITHOUT AURA WITHOUT STATUS MIGRAINOSUS, NOT INTRACTABLE: ICD-10-CM

## 2020-05-26 DIAGNOSIS — Z86.711 HISTORY OF PULMONARY EMBOLISM: ICD-10-CM

## 2020-05-26 DIAGNOSIS — J45.20 MILD INTERMITTENT REACTIVE AIRWAY DISEASE WITHOUT COMPLICATION: ICD-10-CM

## 2020-05-26 DIAGNOSIS — IMO0002 UNCONTROLLED TYPE 2 DIABETES MELLITUS WITH DIABETIC POLYNEUROPATHY, WITH LONG-TERM CURRENT USE OF INSULIN: Chronic | ICD-10-CM

## 2020-05-26 DIAGNOSIS — G47.00 INSOMNIA, UNSPECIFIED TYPE: ICD-10-CM

## 2020-05-26 DIAGNOSIS — F33.41 RECURRENT MAJOR DEPRESSIVE DISORDER, IN PARTIAL REMISSION (HCC): ICD-10-CM

## 2020-05-26 DIAGNOSIS — E78.2 MIXED HYPERLIPIDEMIA: ICD-10-CM

## 2020-05-26 DIAGNOSIS — H26.9 CATARACT OF LEFT EYE, UNSPECIFIED CATARACT TYPE: ICD-10-CM

## 2020-05-26 DIAGNOSIS — I10 BENIGN ESSENTIAL HYPERTENSION: ICD-10-CM

## 2020-05-26 DIAGNOSIS — E53.8 VITAMIN B12 DEFICIENCY: ICD-10-CM

## 2020-05-26 DIAGNOSIS — K76.0 FATTY LIVER: ICD-10-CM

## 2020-05-26 DIAGNOSIS — I63.9 CEREBRAL INFARCTION, UNSPECIFIED MECHANISM (HCC): ICD-10-CM

## 2020-05-26 DIAGNOSIS — I67.82 TEMPORARY CEREBRAL VASCULAR DYSFUNCTION: ICD-10-CM

## 2020-05-26 PROBLEM — R68.82 DECREASED LIBIDO: Status: ACTIVE | Noted: 2020-05-26

## 2020-05-26 PROBLEM — N97.9 FEMALE INFERTILITY: Status: ACTIVE | Noted: 2020-05-26

## 2020-05-26 PROBLEM — N39.3 FEMALE STRESS INCONTINENCE: Status: ACTIVE | Noted: 2020-05-26

## 2020-05-26 PROBLEM — N94.9 SYMPTOM ASSOCIATED WITH FEMALE GENITAL ORGANS: Status: ACTIVE | Noted: 2020-05-26

## 2020-05-26 PROBLEM — R73.01 IMPAIRED FASTING GLUCOSE: Status: RESOLVED | Noted: 2020-05-26 | Resolved: 2020-05-26

## 2020-05-26 PROBLEM — R73.01 IMPAIRED FASTING GLUCOSE: Status: ACTIVE | Noted: 2020-05-26

## 2020-05-26 PROCEDURE — 99214 OFFICE O/P EST MOD 30 MIN: CPT | Performed by: NURSE PRACTITIONER

## 2020-05-26 PROCEDURE — 1111F DSCHRG MED/CURRENT MED MERGE: CPT | Performed by: NURSE PRACTITIONER

## 2020-05-26 PROCEDURE — 3046F HEMOGLOBIN A1C LEVEL >9.0%: CPT | Performed by: NURSE PRACTITIONER

## 2020-05-26 PROCEDURE — 93000 ELECTROCARDIOGRAM COMPLETE: CPT | Performed by: NURSE PRACTITIONER

## 2020-05-26 RX ORDER — PREDNISOLONE ACETATE 10 MG/ML
SUSPENSION/ DROPS OPHTHALMIC
COMMUNITY
Start: 2020-03-25 | End: 2021-10-19

## 2020-05-26 RX ORDER — OFLOXACIN 3 MG/ML
SOLUTION/ DROPS OPHTHALMIC
COMMUNITY
Start: 2020-03-25 | End: 2021-10-19

## 2020-05-26 RX ORDER — B-COMPLEX WITH VITAMIN C
TABLET ORAL
COMMUNITY
End: 2020-08-19

## 2020-05-27 ENCOUNTER — DOCTOR'S OFFICE (OUTPATIENT)
Dept: URBAN - METROPOLITAN AREA CLINIC 136 | Facility: CLINIC | Age: 50
Setting detail: OPHTHALMOLOGY
End: 2020-05-27
Payer: COMMERCIAL

## 2020-05-27 ENCOUNTER — TELEPHONE (OUTPATIENT)
Dept: NEUROLOGY | Facility: CLINIC | Age: 50
End: 2020-05-27

## 2020-05-27 DIAGNOSIS — H25.012: ICD-10-CM

## 2020-05-27 PROCEDURE — 92136 OPHTHALMIC BIOMETRY: CPT | Performed by: OPHTHALMOLOGY

## 2020-05-28 ENCOUNTER — TELEMEDICINE (OUTPATIENT)
Dept: FAMILY MEDICINE CLINIC | Facility: CLINIC | Age: 50
End: 2020-05-28
Payer: COMMERCIAL

## 2020-05-28 DIAGNOSIS — G89.29 CHRONIC PAIN OF BOTH KNEES: ICD-10-CM

## 2020-05-28 DIAGNOSIS — M25.562 CHRONIC PAIN OF BOTH KNEES: ICD-10-CM

## 2020-05-28 DIAGNOSIS — M25.561 CHRONIC PAIN OF BOTH KNEES: ICD-10-CM

## 2020-05-28 DIAGNOSIS — G43.709 CHRONIC MIGRAINE WITHOUT AURA WITHOUT STATUS MIGRAINOSUS, NOT INTRACTABLE: ICD-10-CM

## 2020-05-28 DIAGNOSIS — M25.559 HIP PAIN: Primary | ICD-10-CM

## 2020-05-28 DIAGNOSIS — E11.49 TYPE 2 DIABETES MELLITUS WITH OTHER NEUROLOGIC COMPLICATION, WITH LONG-TERM CURRENT USE OF INSULIN (HCC): ICD-10-CM

## 2020-05-28 DIAGNOSIS — I10 BENIGN ESSENTIAL HYPERTENSION: ICD-10-CM

## 2020-05-28 DIAGNOSIS — G47.00 INSOMNIA, UNSPECIFIED TYPE: ICD-10-CM

## 2020-05-28 DIAGNOSIS — IMO0002 UNCONTROLLED TYPE 2 DIABETES MELLITUS WITH DIABETIC POLYNEUROPATHY, WITH LONG-TERM CURRENT USE OF INSULIN: Chronic | ICD-10-CM

## 2020-05-28 DIAGNOSIS — Z79.4 TYPE 2 DIABETES MELLITUS WITH OTHER NEUROLOGIC COMPLICATION, WITH LONG-TERM CURRENT USE OF INSULIN (HCC): ICD-10-CM

## 2020-05-28 DIAGNOSIS — F41.9 ANXIETY: ICD-10-CM

## 2020-05-28 DIAGNOSIS — E03.9 HYPOTHYROIDISM, UNSPECIFIED TYPE: ICD-10-CM

## 2020-05-28 PROBLEM — N97.9 FEMALE INFERTILITY: Status: RESOLVED | Noted: 2020-05-26 | Resolved: 2020-05-28

## 2020-05-28 PROCEDURE — 99214 OFFICE O/P EST MOD 30 MIN: CPT | Performed by: FAMILY MEDICINE

## 2020-05-28 PROCEDURE — 1111F DSCHRG MED/CURRENT MED MERGE: CPT | Performed by: FAMILY MEDICINE

## 2020-05-31 PROBLEM — G93.40 ACUTE ENCEPHALOPATHY: Status: RESOLVED | Noted: 2020-05-15 | Resolved: 2020-05-31

## 2020-06-01 ENCOUNTER — AMBUL SURGICAL CARE (OUTPATIENT)
Dept: URBAN - METROPOLITAN AREA SURGERY 32 | Facility: SURGERY | Age: 50
Setting detail: OPHTHALMOLOGY
End: 2020-06-01
Payer: COMMERCIAL

## 2020-06-01 DIAGNOSIS — H25.12: ICD-10-CM

## 2020-06-01 PROCEDURE — G8907 PT DOC NO EVENTS ON DISCHARG: HCPCS | Performed by: OPHTHALMOLOGY

## 2020-06-01 PROCEDURE — 66982 XCAPSL CTRC RMVL CPLX WO ECP: CPT | Performed by: OPHTHALMOLOGY

## 2020-06-01 PROCEDURE — G8918 PT W/O PREOP ORDER IV AB PRO: HCPCS | Performed by: OPHTHALMOLOGY

## 2020-06-02 ENCOUNTER — RX ONLY (RX ONLY)
Age: 50
End: 2020-06-02

## 2020-06-02 ENCOUNTER — DOCTOR'S OFFICE (OUTPATIENT)
Dept: URBAN - METROPOLITAN AREA CLINIC 136 | Facility: CLINIC | Age: 50
Setting detail: OPHTHALMOLOGY
End: 2020-06-02
Payer: COMMERCIAL

## 2020-06-02 DIAGNOSIS — H25.11: ICD-10-CM

## 2020-06-02 DIAGNOSIS — H04.122: ICD-10-CM

## 2020-06-02 DIAGNOSIS — H04.121: ICD-10-CM

## 2020-06-02 DIAGNOSIS — H27.8: ICD-10-CM

## 2020-06-02 DIAGNOSIS — E11.9: ICD-10-CM

## 2020-06-02 DIAGNOSIS — H27.9: ICD-10-CM

## 2020-06-02 PROCEDURE — 99024 POSTOP FOLLOW-UP VISIT: CPT | Performed by: OPHTHALMOLOGY

## 2020-06-02 ASSESSMENT — REFRACTION_AUTOREFRACTION
OD_AXIS: 092
OS_AXIS: 103
OD_CYLINDER: -1.50
OS_CYLINDER: -1.75
OD_SPHERE: -1.25
OS_SPHERE: -4.75

## 2020-06-02 ASSESSMENT — SUPERFICIAL PUNCTATE KERATITIS (SPK)
OS_SPK: 2+
OD_SPK: 2+

## 2020-06-02 ASSESSMENT — VISUAL ACUITY
OD_BCVA: 20/30-1
OS_BCVA: 20/70

## 2020-06-02 ASSESSMENT — SPHEQUIV_DERIVED
OD_SPHEQUIV: -2
OS_SPHEQUIV: -5.625

## 2020-06-16 DIAGNOSIS — E78.5 HYPERLIPIDEMIA, UNSPECIFIED HYPERLIPIDEMIA TYPE: ICD-10-CM

## 2020-06-16 DIAGNOSIS — E03.9 ACQUIRED HYPOTHYROIDISM: ICD-10-CM

## 2020-06-16 RX ORDER — LEVOTHYROXINE SODIUM 112 UG/1
TABLET ORAL
Qty: 90 TABLET | Refills: 0 | Status: SHIPPED | OUTPATIENT
Start: 2020-06-16 | End: 2020-07-13

## 2020-06-16 RX ORDER — ATORVASTATIN CALCIUM 80 MG/1
TABLET, FILM COATED ORAL
Qty: 90 TABLET | Refills: 3 | Status: SHIPPED | OUTPATIENT
Start: 2020-06-16 | End: 2021-06-14

## 2020-06-17 ENCOUNTER — APPOINTMENT (OUTPATIENT)
Dept: RADIOLOGY | Age: 50
End: 2020-06-17
Payer: COMMERCIAL

## 2020-06-17 DIAGNOSIS — G89.4 CHRONIC PAIN DISORDER: ICD-10-CM

## 2020-06-17 DIAGNOSIS — G89.29 CHRONIC PAIN OF BOTH KNEES: ICD-10-CM

## 2020-06-17 DIAGNOSIS — M25.559 HIP PAIN: ICD-10-CM

## 2020-06-17 DIAGNOSIS — G43.709 CHRONIC MIGRAINE WITHOUT AURA WITHOUT STATUS MIGRAINOSUS, NOT INTRACTABLE: ICD-10-CM

## 2020-06-17 DIAGNOSIS — M25.562 CHRONIC PAIN OF BOTH KNEES: ICD-10-CM

## 2020-06-17 DIAGNOSIS — M25.561 CHRONIC PAIN OF BOTH KNEES: ICD-10-CM

## 2020-06-17 PROCEDURE — 72110 X-RAY EXAM L-2 SPINE 4/>VWS: CPT

## 2020-06-17 PROCEDURE — 73521 X-RAY EXAM HIPS BI 2 VIEWS: CPT

## 2020-06-17 PROCEDURE — 73562 X-RAY EXAM OF KNEE 3: CPT

## 2020-06-17 RX ORDER — GABAPENTIN 400 MG/1
CAPSULE ORAL
Qty: 180 CAPSULE | Refills: 2 | Status: SHIPPED | OUTPATIENT
Start: 2020-06-17 | End: 2020-06-23

## 2020-06-18 DIAGNOSIS — F51.01 PRIMARY INSOMNIA: ICD-10-CM

## 2020-06-18 DIAGNOSIS — G89.4 CHRONIC PAIN SYNDROME: ICD-10-CM

## 2020-06-18 DIAGNOSIS — R11.0 NAUSEA: ICD-10-CM

## 2020-06-18 DIAGNOSIS — J45.20 MILD INTERMITTENT REACTIVE AIRWAY DISEASE WITHOUT COMPLICATION: ICD-10-CM

## 2020-06-18 DIAGNOSIS — F41.9 ANXIETY: ICD-10-CM

## 2020-06-18 DIAGNOSIS — IMO0002 UNCONTROLLED TYPE 2 DIABETES MELLITUS WITH DIABETIC POLYNEUROPATHY, WITH LONG-TERM CURRENT USE OF INSULIN: Chronic | ICD-10-CM

## 2020-06-18 DIAGNOSIS — M54.12 CERVICAL RADICULOPATHY: ICD-10-CM

## 2020-06-18 RX ORDER — OLANZAPINE 5 MG/1
TABLET ORAL
Qty: 90 TABLET | Refills: 1 | Status: SHIPPED | OUTPATIENT
Start: 2020-06-18 | End: 2020-08-19 | Stop reason: SDUPTHER

## 2020-06-18 RX ORDER — TRAMADOL HYDROCHLORIDE 50 MG/1
TABLET ORAL
Qty: 180 TABLET | Refills: 0 | Status: SHIPPED | OUTPATIENT
Start: 2020-06-18 | End: 2020-07-27

## 2020-06-18 RX ORDER — ZOLPIDEM TARTRATE 10 MG/1
TABLET ORAL
Qty: 30 TABLET | Refills: 0 | Status: SHIPPED | OUTPATIENT
Start: 2020-06-18 | End: 2020-07-27

## 2020-06-18 RX ORDER — ONDANSETRON 4 MG/1
TABLET, FILM COATED ORAL
Qty: 20 TABLET | Refills: 2 | Status: SHIPPED | OUTPATIENT
Start: 2020-06-18 | End: 2020-08-06

## 2020-06-18 RX ORDER — DIAZEPAM 5 MG/1
TABLET ORAL
Qty: 90 TABLET | Refills: 0 | Status: SHIPPED | OUTPATIENT
Start: 2020-06-18 | End: 2020-07-27

## 2020-06-22 ENCOUNTER — TELEPHONE (OUTPATIENT)
Dept: NEUROLOGY | Facility: CLINIC | Age: 50
End: 2020-06-22

## 2020-06-23 ENCOUNTER — DOCTOR'S OFFICE (OUTPATIENT)
Dept: URBAN - METROPOLITAN AREA CLINIC 136 | Facility: CLINIC | Age: 50
Setting detail: OPHTHALMOLOGY
End: 2020-06-23
Payer: COMMERCIAL

## 2020-06-23 DIAGNOSIS — Z96.1: ICD-10-CM

## 2020-06-23 DIAGNOSIS — H25.11: ICD-10-CM

## 2020-06-23 DIAGNOSIS — G89.4 CHRONIC PAIN DISORDER: ICD-10-CM

## 2020-06-23 PROCEDURE — 99024 POSTOP FOLLOW-UP VISIT: CPT | Performed by: OPHTHALMOLOGY

## 2020-06-23 RX ORDER — GABAPENTIN 400 MG/1
CAPSULE ORAL
Qty: 540 CAPSULE | Refills: 1 | Status: SHIPPED | OUTPATIENT
Start: 2020-06-23 | End: 2021-01-11

## 2020-06-23 ASSESSMENT — SUPERFICIAL PUNCTATE KERATITIS (SPK)
OS_SPK: 2+
OD_SPK: 2+

## 2020-06-23 ASSESSMENT — REFRACTION_AUTOREFRACTION
OS_SPHERE: +0.50
OD_CYLINDER: -2.00
OS_CYLINDER: -0.50
OD_AXIS: 094
OS_AXIS: 074
OD_SPHERE: -1.50

## 2020-06-23 ASSESSMENT — SPHEQUIV_DERIVED
OD_SPHEQUIV: -2.5
OS_SPHEQUIV: 0.25

## 2020-06-23 ASSESSMENT — VISUAL ACUITY
OS_BCVA: 20/400
OD_BCVA: 20/25-2

## 2020-06-26 DIAGNOSIS — E11.49 TYPE 2 DIABETES MELLITUS WITH OTHER NEUROLOGIC COMPLICATION, WITH LONG-TERM CURRENT USE OF INSULIN (HCC): ICD-10-CM

## 2020-06-26 DIAGNOSIS — Z79.4 TYPE 2 DIABETES MELLITUS WITH OTHER NEUROLOGIC COMPLICATION, WITH LONG-TERM CURRENT USE OF INSULIN (HCC): ICD-10-CM

## 2020-07-01 ENCOUNTER — OFFICE VISIT (OUTPATIENT)
Dept: FAMILY MEDICINE CLINIC | Facility: CLINIC | Age: 50
End: 2020-07-01
Payer: COMMERCIAL

## 2020-07-01 VITALS
DIASTOLIC BLOOD PRESSURE: 84 MMHG | OXYGEN SATURATION: 98 % | TEMPERATURE: 97.1 F | HEIGHT: 62 IN | HEART RATE: 108 BPM | SYSTOLIC BLOOD PRESSURE: 118 MMHG | RESPIRATION RATE: 16 BRPM | BODY MASS INDEX: 20.39 KG/M2 | WEIGHT: 110.8 LBS

## 2020-07-01 DIAGNOSIS — Z01.818 PREOPERATIVE CLEARANCE: Primary | ICD-10-CM

## 2020-07-01 DIAGNOSIS — H26.9 CATARACT OF RIGHT EYE, UNSPECIFIED CATARACT TYPE: ICD-10-CM

## 2020-07-01 DIAGNOSIS — I10 BENIGN ESSENTIAL HYPERTENSION: ICD-10-CM

## 2020-07-01 DIAGNOSIS — IMO0002 UNCONTROLLED TYPE 2 DIABETES MELLITUS WITH DIABETIC POLYNEUROPATHY, WITH LONG-TERM CURRENT USE OF INSULIN: Chronic | ICD-10-CM

## 2020-07-01 PROCEDURE — 3074F SYST BP LT 130 MM HG: CPT | Performed by: FAMILY MEDICINE

## 2020-07-01 PROCEDURE — 3046F HEMOGLOBIN A1C LEVEL >9.0%: CPT | Performed by: FAMILY MEDICINE

## 2020-07-01 PROCEDURE — 3079F DIAST BP 80-89 MM HG: CPT | Performed by: FAMILY MEDICINE

## 2020-07-01 PROCEDURE — 99214 OFFICE O/P EST MOD 30 MIN: CPT | Performed by: FAMILY MEDICINE

## 2020-07-01 RX ORDER — TOPIRAMATE 100 MG/1
TABLET, FILM COATED ORAL
COMMUNITY
Start: 2020-05-27 | End: 2020-08-16

## 2020-07-01 NOTE — PROGRESS NOTES
Subjective:      Katya Jack is a 53 y o  female who presents to the office today for a preoperative consultation at the request of surgeon CHARLA at [facility] (patient did not bring any paperwork)  who plans on performing R CAT on July 6  Planned anesthesia is MAC  The patient has the following known anesthesia issues: NONE  Patient has a bleeding risk of: no recent abnormal bleeding  Review of Systems  Review of Systems   Constitutional: Negative for chills, fatigue and fever  Eyes: Negative for visual disturbance  Respiratory: Negative for cough and shortness of breath  Cardiovascular: Negative for chest pain, palpitations and leg swelling  Gastrointestinal: Negative for abdominal pain  Genitourinary: Negative for dysuria  Musculoskeletal: Positive for back pain, myalgias, neck pain and neck stiffness  Neurological: Negative for dizziness and headaches  Psychiatric/Behavioral: Positive for dysphoric mood  The patient is nervous/anxious  Objective:      Physical Exam    /84 (BP Location: Left arm, Patient Position: Sitting, Cuff Size: Adult)   Pulse (!) 108   Temp (!) 97 1 °F (36 2 °C) (Temporal)   Resp 16   Ht 5' 2" (1 575 m)   Wt 50 3 kg (110 lb 12 8 oz)   LMP 03/04/2016   SpO2 98%   BMI 20 27 kg/m²     Physical Exam   Constitutional: She is oriented to person, place, and time  She appears well-nourished  No distress  HENT:   Head: Normocephalic  Right Ear: Tympanic membrane normal    Left Ear: Tympanic membrane normal    Eyes: Pupils are equal, round, and reactive to light  No scleral icterus  Cardiovascular: Normal heart sounds  No murmur heard  Pulmonary/Chest: Breath sounds normal    Musculoskeletal: She exhibits no edema  Lymphadenopathy:     She has no cervical adenopathy  Neurological: She is alert and oriented to person, place, and time  Psychiatric: She has a normal mood and affect   Her behavior is normal  Thought content normal  Nursing note and vitals reviewed  Predictors of intubation difficulty:  Morbid obesity? no  Anatomically abnormal facies? no  Short, thick neck? no  Neck range of motion: normal    Cardiographics  ECG: N/A    Imaging  Chest x-ray: N/A    Lab Review   N/A      Assessment:      53 y o  female with planned surgery as above  Known risk factors for perioperative complications: Diabetes mellitus        Can walk 2 blocks without difficulty:  Can walk up 2 flights of stairs without difficulty:      1  Preoperative clearance     2  Cataract of right eye, unspecified cataract type     3  Benign essential hypertension     4  Uncontrolled type 2 diabetes mellitus with diabetic polyneuropathy, with long-term current use of insulin ([facility] [address] )            Plan:        No problem-specific Assessment & Plan notes found for this encounter  Patient is medically cleared for surgery  We also reviewed recent x-rays done of the knees and lumbar spine There are minimal changes  Patient's pain is higher than the actual findings on actual x-ray  They are very likely can be a fibromyalgia like component to her history as previously discussed  She is scheduled to see endocrinology next week but is not sure she can not sure if she can afford the co-pay  Told her she must try to make this visit as she has had uncontrolled diabetes and is on a significant amount of insulin  She will also have follow-up with Neurology and eventually Rheumatology    Has follow-up in our office in August  Lexapro [escitalopram oxalate]; Escitalopram; and Other  Past Medical History:   Diagnosis Date    Acute venous embolism and thrombosis of deep vessels of distal lower extremity (HCC)     Anemia     Anxiety     last assessed 11/20/17    Arthritis     Asthma     Cerebral infarction [facility])     unspecified, last assessed 11/14/16    Chest pain     last assessed 5/9/17    Chronic cough     last assessed 12/12/13    Depression     Diabetes mellitus, type 2 ([address] )     DJD (degenerative joint disease)     Esophageal reflux     Fibromyalgia     GERD without esophagitis     resolved 5/13/16    History of pulmonary embolism     Hyperlipidemia     Hypertension     Hypothyroidism     Iron deficiency     Pancreatitis     Polyarthritis     Stroke syndrome     Thyroid disease     TIA (transient ischemic attack)     TIA (transient ischemic attack)     Venous embolism and thrombosis of deep vessels of distal lower extremity ([address] )     Vitamin B12 deficiency     Vitamin D deficiency      Past Surgical History:   Procedure Laterality Date    CARPAL TUNNEL RELEASE Right     neuroplasty decompression of median nerve    CATARACT EXTRACTION      CHOLECYSTECTOMY      ERCP      ERCP      ESOPHAGOGASTRIC FUNDOPLASTY      NISSEN FUNDOPLICATION      IA ESOPHAGOGASTRODUODENOSCOPY TRANSORAL DIAGNOSTIC N/A 11/2/2016    Procedure: EGD AND COLONOSCOPY;  Surgeon: Daniel Mcbride MD;  [facility]: [facility];   Service: Gastroenterology    IA INCISE FINGER TENDON SHEATH Right 3/8/2016    Procedure: RELEASE TRIGGER FINGER RIGHT THUMB;  Surgeon: Lc Hurtdao MD;  [facility];  Service: Orthopedics    IA WRIST Aydin Narrow LIG Right 3/8/2016    Procedure: RELEASE CARPAL TUNNEL ENDOSCOPIC;  Surgeon: Lc Hurtado MD;  [facility];  Service: Orthopedics    TONSILLECTOMY AND ADENOIDECTOMY       Patient Active Problem List   Diagnosis    Uncontrolled type 2 diabetes mellitus with diabetic polyneuropathy, with long-term current use of insulin ([address] )    Depression    GERD without esophagitis    History of decompression of median nerve    Hypothyroidism    Protein calorie malnutrition ([address] )    Nicotine dependence    Anxiety    Benign essential hypertension    Chronic fatigue    Chronic pain disorder    Fatty liver    Hyperlipidemia    Insomnia    Iron deficiency    Chronic diarrhea    Opioid dependence ([address] )    Pancreatic cyst    History of stroke    Vitamin B12 deficiency    Vitamin D deficiency    Reactive airway disease without complication    Temporary cerebral vascular dysfunction    History of pulmonary embolism    Arthralgia of temporomandibular joint    Carpal tunnel syndrome    Dysphagia    Chronic migraine without aura without status migrainosus, not intractable    Diplopia    ROGERIO positive    Prolonged QT interval    Symptom associated with female genital organs    Irregular menstrual cycle    Female stress incontinence    Decreased libido     Social History     Socioeconomic History    Marital status: /Civil Union     Spouse name: None    Number of children: 1    Years of education: completed 1 grade    Highest education level: None   Occupational History    Occupation: unemployed   Social Needs    Financial resource strain: None    Food insecurity:     Worry: None     Inability: None    Transportation needs:     Medical: None     Non-medical: None   Tobacco Use    Smoking status: Current Every Day Smoker     Packs/day: 0 50     Years: 35 00     Pack years: 17 50     Types: Cigarettes     Start date: 1/1/1983    Smokeless tobacco: Never Used    Tobacco comment: 20 + years   Substance and Sexual Activity    Alcohol use: Not Currently     Alcohol/week: 0 0 standard drinks     Frequency: Never     Binge frequency: Never    Drug use: No    Sexual activity: Yes   Lifestyle    Physical activity:     Days per week: None     Minutes per session: None    Stress: None   Relationships    Social connections:     Talks on phone: None     Gets together: None     Attends Yarsani service: None     Active member of club or organization: None     Attends meetings of clubs or organizations: None     Relationship status: None    Intimate partner violence:     Fear of current or ex partner: None     Emotionally abused: None     Physically abused: None     Forced sexual activity: None Other Topics Concern    None   Social History Narrative    No coffee consumption       Current Outpatient Medications:     ADVAIR DISKUS 500-50 MCG/DOSE inhaler, INHALE 1 PUFF 2 (TWO) TIMES A DAY RINSE MOUTH AFTER USE , Disp: 1 Inhaler, Rfl: 5    Albuterol Sulfate 108 (90 Base) MCG/ACT AEPB, Inhale 2 puffs by mouth every 4-6 hours as needed for wheezing or shortness of breath, Disp: 1 each, Rfl: 2    aspirin 325 mg tablet, Take 325 mg by mouth daily, Disp: , Rfl:     atorvastatin (LIPITOR) 80 mg tablet, TAKE 1 TABLET BY MOUTH EVERY DAY, Disp: 90 tablet, Rfl: 3    BD PEN NEEDLE LINDY U/F 32G X 4 MM MISC, Inject under the skin daily, Disp: 30 each, Rfl: 5    buPROPion (WELLBUTRIN XL) 150 mg 24 hr tablet, Take 1 tablet (150 mg total) by mouth daily, Disp: 90 tablet, Rfl: 2    Cyanocobalamin (VITAMIN B-12 IJ), Inject as directed, Disp: , Rfl:     diazepam (VALIUM) 5 mg tablet, TAKE 1/2 TO 1 TABLET BY MOUTH 3 TIMES DAILY AS NEEDED FOR ANXIETY AND/OR MUSCLE SPASMS  DO NOT TAKE WITH AMBIEN , Disp: 90 tablet, Rfl: 0    Folic Acid 0 8 MG CAPS, Take 0 04 mg by mouth daily  , Disp: , Rfl:     gabapentin (NEURONTIN) 400 mg capsule, TAKE 1 TO 2 CAPSULE(S) BY MOUTH THREE TIMES DAILY, Disp: 540 capsule, Rfl: 1    Galcanezumab-gnlm (Emgality) 120 MG/ML SOAJ, inject 1 pen subq every 30 days  , Disp: 1 pen, Rfl: 11    glucose blood (ONE TOUCH ULTRA TEST) test strip, USE AS DIRECTED TO TEST 3 TIMES A DAY, Disp: 100 each, Rfl: 5    HUMALOG 100 UNIT/ML injection, INJECT 33 UNITS UNDER THE SKIN 3 TIMES DAILY BEFORE MEALS, Disp: 30 mL, Rfl: 5    insulin aspart (NOVOLOG FLEXPEN) 100 Units/mL injection pen, Inject 34 Units under the skin 3 (three) times a day Before meals, Disp: , Rfl:     insulin degludec (TRESIBA) 200 units/mL CONCENTRATED U-200 injection pen, INJECT 100 UNITS UNDER THE SKIN DAILY, Disp: 9 pen, Rfl: 3    Insulin Pen Needle (BD PEN NEEDLE LINDY U/F) 32G X 4 MM MISC, by Does not apply route daily, Disp: , Rfl:     Insulin Syringes, Disposable, U-100 1 ML MISC, by Does not apply route 3 (three) times a day, Disp: 100 each, Rfl: 3    levothyroxine 112 mcg tablet, TAKE 1 TABLET BY MOUTH EVERY DAY, Disp: 90 tablet, Rfl: 0    losartan (COZAAR) 50 mg tablet, TAKE 1 TABLET DAILY  , Disp: 90 tablet, Rfl: 2    metFORMIN (GLUCOPHAGE-XR) 500 mg 24 hr tablet, TAKE 2 TABLETS BY MOUTH TWICE A DAY WITH FOOD, Disp: 120 tablet, Rfl: 2    metoprolol tartrate (LOPRESSOR) 25 mg tablet, Take 1 tablet (25 mg total) by mouth daily, Disp: 90 tablet, Rfl: 2    Naloxone HCl (NARCAN) 4 MG/0 1ML LIQD, 1 actuation in one nostril once as needed for opioid overdose, may repeat dose every 2-3 minutes until patient responsive or EMS arrives, Disp: 1 each, Rfl: 1    NEEDLE, DISP, 25 G (B-D DISP NEEDLE 25GX1") 25G X 1" MISC, by Does not apply route once as needed (opiod overdose) for up to 1 dose, Disp: 3 each, Rfl: 0    ofloxacin (OCUFLOX) 0 3 % ophthalmic solution, PLEASE SEE ATTACHED FOR DETAILED DIRECTIONS, Disp: , Rfl:     OLANZapine (ZyPREXA) 5 mg tablet, TAKE 1 TABLET BY MOUTH AT BEDTIME, Disp: 90 tablet, Rfl: 1    omeprazole (PriLOSEC) 20 mg delayed release capsule, Take 1 capsule (20 mg total) by mouth daily, Disp: 90 capsule, Rfl: 2    ondansetron (ZOFRAN) 4 mg tablet, TAKE 1 TABLET BY MOUTH EVERY 8 HOURS AS NEEDED FOR NAUSEA AND VOMITING, Disp: 20 tablet, Rfl: 2    prednisoLONE acetate (PRED FORTE) 1 % ophthalmic suspension, INSTILL 1 DROP INTO SURGERY EYE 4 TIMES DAILY AFTER SURGERY , Disp: , Rfl:     prochlorperazine (COMPAZINE) 10 mg tablet, Take 1 tablet (10 mg total) by mouth every 6 (six) hours as needed for nausea or vomiting, Disp: 20 tablet, Rfl: 0    topiramate (TOPAMAX) 100 mg tablet, , Disp: , Rfl:     topiramate (TOPAMAX) 25 mg tablet, TAKE 1 TABLET BY MOUTH EVERY DAY, Disp: 90 tablet, Rfl: 0    traMADol (ULTRAM) 50 mg tablet, TAKE 1 TO 2 TABLET(S) BY MOUTH EVERY 8 HOURS AS NEEDED FOR SEVERE PAIN, Disp: 180 tablet, Rfl: 0    Turmeric 500 MG TABS, Take 1 tablet by mouth daily, Disp: , Rfl:     venlafaxine (EFFEXOR-XR) 150 mg 24 hr capsule, Take 1 capsule (150 mg total) by mouth daily, Disp: 90 capsule, Rfl: 3    Vitamin B Complex-C CAPS, Take 1 tablet by mouth daily, Disp: , Rfl:     zolpidem (AMBIEN) 10 mg tablet, TAKE 1 TABLET BY MOUTH DAILY AT BEDTIME AS NEEDED FOR SLEEP, Disp: 30 tablet, Rfl: 0    ketorolac (TORADOL) 10 mg tablet, Take 1 tablet (10 mg total) by mouth every 6 (six) hours as needed for moderate pain (Patient not taking: Reported on 7/1/2020), Disp: 5 tablet, Rfl: 0  Lab Results   Component Value Date    WBC 7 29 05/16/2020    HGB 13 7 05/16/2020    HCT 40 4 05/16/2020    MCV 98 05/16/2020     05/16/2020     Lab Results   Component Value Date     07/16/2016    K 3 5 05/17/2020     (H) 05/17/2020    CO2 24 05/17/2020    ANIONGAP 12 02/11/2015    BUN 8 05/17/2020    CREATININE 0 58 (L) 05/17/2020    GLUCOSE 323 (H) 08/24/2017    GLUF 92 05/16/2020    CALCIUM 9 5 05/17/2020    AST 28 05/17/2020    ALT 33 05/17/2020    ALKPHOS 114 05/17/2020    PROT 6 7 10/22/2016    BILITOT 0 3 10/22/2016    EGFR 109 05/17/2020     No results found for: KIMBERLY        @[email or web address] [email or web address]@    Current Outpatient Medications:     ADVAIR DISKUS 500-50 MCG/DOSE inhaler, INHALE 1 PUFF 2 (TWO) TIMES A DAY RINSE MOUTH AFTER USE , Disp: 1 Inhaler, Rfl: 5    Albuterol Sulfate 108 (90 Base) MCG/ACT AEPB, Inhale 2 puffs by mouth every 4-6 hours as needed for wheezing or shortness of breath, Disp: 1 each, Rfl: 2    aspirin 325 mg tablet, Take 325 mg by mouth daily, Disp: , Rfl:     atorvastatin (LIPITOR) 80 mg tablet, TAKE 1 TABLET BY MOUTH EVERY DAY, Disp: 90 tablet, Rfl: 3    BD PEN NEEDLE LINDY U/F 32G X 4 MM MISC, Inject under the skin daily, Disp: 30 each, Rfl: 5    buPROPion (WELLBUTRIN XL) 150 mg 24 hr tablet, Take 1 tablet (150 mg total) by mouth daily, Disp: 90 tablet, Rfl: 2    Cyanocobalamin (VITAMIN B-12 IJ), Inject as directed, Disp: , Rfl:     diazepam (VALIUM) 5 mg tablet, TAKE 1/2 TO 1 TABLET BY MOUTH 3 TIMES DAILY AS NEEDED FOR ANXIETY AND/OR MUSCLE SPASMS  DO NOT TAKE WITH AMBIEN , Disp: 90 tablet, Rfl: 0    Folic Acid 0 8 MG CAPS, Take 0 04 mg by mouth daily  , Disp: , Rfl:     gabapentin (NEURONTIN) 400 mg capsule, TAKE 1 TO 2 CAPSULE(S) BY MOUTH THREE TIMES DAILY, Disp: 540 capsule, Rfl: 1    Galcanezumab-gnlm (Emgality) 120 MG/ML SOAJ, inject 1 pen subq every 30 days  , Disp: 1 pen, Rfl: 11    glucose blood (ONE TOUCH ULTRA TEST) test strip, USE AS DIRECTED TO TEST 3 TIMES A DAY, Disp: 100 each, Rfl: 5    HUMALOG 100 UNIT/ML injection, INJECT 33 UNITS UNDER THE SKIN 3 TIMES DAILY BEFORE MEALS, Disp: 30 mL, Rfl: 5    insulin aspart (NOVOLOG FLEXPEN) 100 Units/mL injection pen, Inject 34 Units under the skin 3 (three) times a day Before meals, Disp: , Rfl:     insulin degludec (TRESIBA) 200 units/mL CONCENTRATED U-200 injection pen, INJECT 100 UNITS UNDER THE SKIN DAILY, Disp: 9 pen, Rfl: 3    Insulin Pen Needle (BD PEN NEEDLE LINDY U/F) 32G X 4 MM MISC, by Does not apply route daily, Disp: , Rfl:     Insulin Syringes, Disposable, U-100 1 ML MISC, by Does not apply route 3 (three) times a day, Disp: 100 each, Rfl: 3    levothyroxine 112 mcg tablet, TAKE 1 TABLET BY MOUTH EVERY DAY, Disp: 90 tablet, Rfl: 0    losartan (COZAAR) 50 mg tablet, TAKE 1 TABLET DAILY  , Disp: 90 tablet, Rfl: 2    metFORMIN (GLUCOPHAGE-XR) 500 mg 24 hr tablet, TAKE 2 TABLETS BY MOUTH TWICE A DAY WITH FOOD, Disp: 120 tablet, Rfl: 2    metoprolol tartrate (LOPRESSOR) 25 mg tablet, Take 1 tablet (25 mg total) by mouth daily, Disp: 90 tablet, Rfl: 2    Naloxone HCl (NARCAN) 4 MG/0 1ML LIQD, 1 actuation in one nostril once as needed for opioid overdose, may repeat dose every 2-3 minutes until patient responsive or EMS arrives, Disp: 1 each, Rfl: 1    NEEDLE, DISP, 25 G (B-D DISP NEEDLE 25GX1") 25G X 1" MISC, by Does not apply route once as needed (opiod overdose) for up to 1 dose, Disp: 3 each, Rfl: 0    ofloxacin (OCUFLOX) 0 3 % ophthalmic solution, PLEASE SEE ATTACHED FOR DETAILED DIRECTIONS, Disp: , Rfl:     OLANZapine (ZyPREXA) 5 mg tablet, TAKE 1 TABLET BY MOUTH AT BEDTIME, Disp: 90 tablet, Rfl: 1    omeprazole (PriLOSEC) 20 mg delayed release capsule, Take 1 capsule (20 mg total) by mouth daily, Disp: 90 capsule, Rfl: 2    ondansetron (ZOFRAN) 4 mg tablet, TAKE 1 TABLET BY MOUTH EVERY 8 HOURS AS NEEDED FOR NAUSEA AND VOMITING, Disp: 20 tablet, Rfl: 2    prednisoLONE acetate (PRED FORTE) 1 % ophthalmic suspension, INSTILL 1 DROP INTO SURGERY EYE 4 TIMES DAILY AFTER SURGERY , Disp: , Rfl:     prochlorperazine (COMPAZINE) 10 mg tablet, Take 1 tablet (10 mg total) by mouth every 6 (six) hours as needed for nausea or vomiting, Disp: 20 tablet, Rfl: 0    topiramate (TOPAMAX) 100 mg tablet, , Disp: , Rfl:     topiramate (TOPAMAX) 25 mg tablet, TAKE 1 TABLET BY MOUTH EVERY DAY, Disp: 90 tablet, Rfl: 0    traMADol (ULTRAM) 50 mg tablet, TAKE 1 TO 2 TABLET(S) BY MOUTH EVERY 8 HOURS AS NEEDED FOR SEVERE PAIN, Disp: 180 tablet, Rfl: 0    Turmeric 500 MG TABS, Take 1 tablet by mouth daily, Disp: , Rfl:     venlafaxine (EFFEXOR-XR) 150 mg 24 hr capsule, Take 1 capsule (150 mg total) by mouth daily, Disp: 90 capsule, Rfl: 3    Vitamin B Complex-C CAPS, Take 1 tablet by mouth daily, Disp: , Rfl:     zolpidem (AMBIEN) 10 mg tablet, TAKE 1 TABLET BY MOUTH DAILY AT BEDTIME AS NEEDED FOR SLEEP, Disp: 30 tablet, Rfl: 0    ketorolac (TORADOL) 10 mg tablet, Take 1 tablet (10 mg total) by mouth every 6 (six) hours as needed for moderate pain (Patient not taking: Reported on 7/1/2020), Disp: 5 tablet, Rfl: 0  Allergies   Allergen Reactions    Lexapro [Escitalopram Oxalate]     Escitalopram Other (See Comments) and Palpitations    Other Hives and Rash     Adhesive Tape

## 2020-07-06 ENCOUNTER — AMBUL SURGICAL CARE (OUTPATIENT)
Dept: URBAN - METROPOLITAN AREA SURGERY 32 | Facility: SURGERY | Age: 50
Setting detail: OPHTHALMOLOGY
End: 2020-07-06
Payer: COMMERCIAL

## 2020-07-06 DIAGNOSIS — H25.11: ICD-10-CM

## 2020-07-06 PROCEDURE — 66984 XCAPSL CTRC RMVL W/O ECP: CPT | Performed by: OPHTHALMOLOGY

## 2020-07-06 PROCEDURE — G8907 PT DOC NO EVENTS ON DISCHARG: HCPCS | Performed by: OPHTHALMOLOGY

## 2020-07-06 PROCEDURE — G8918 PT W/O PREOP ORDER IV AB PRO: HCPCS | Performed by: OPHTHALMOLOGY

## 2020-07-07 ENCOUNTER — TELEPHONE (OUTPATIENT)
Dept: FAMILY MEDICINE CLINIC | Facility: CLINIC | Age: 50
End: 2020-07-07

## 2020-07-07 ENCOUNTER — DOCTOR'S OFFICE (OUTPATIENT)
Dept: URBAN - METROPOLITAN AREA CLINIC 136 | Facility: CLINIC | Age: 50
Setting detail: OPHTHALMOLOGY
End: 2020-07-07
Payer: COMMERCIAL

## 2020-07-07 DIAGNOSIS — H25.011: ICD-10-CM

## 2020-07-07 DIAGNOSIS — Z79.4 TYPE 2 DIABETES MELLITUS WITH OTHER NEUROLOGIC COMPLICATION, WITH LONG-TERM CURRENT USE OF INSULIN (HCC): ICD-10-CM

## 2020-07-07 DIAGNOSIS — H04.122: ICD-10-CM

## 2020-07-07 DIAGNOSIS — E11.49 TYPE 2 DIABETES MELLITUS WITH OTHER NEUROLOGIC COMPLICATION, WITH LONG-TERM CURRENT USE OF INSULIN (HCC): ICD-10-CM

## 2020-07-07 DIAGNOSIS — H04.121: ICD-10-CM

## 2020-07-07 DIAGNOSIS — Z96.1: ICD-10-CM

## 2020-07-07 PROCEDURE — 99024 POSTOP FOLLOW-UP VISIT: CPT | Performed by: OPHTHALMOLOGY

## 2020-07-07 ASSESSMENT — REFRACTION_AUTOREFRACTION
OD_CYLINDER: -1.75
OS_AXIS: 074
OD_AXIS: 096
OS_SPHERE: +0.50
OD_SPHERE: +1.00
OS_CYLINDER: -0.50

## 2020-07-07 ASSESSMENT — VISUAL ACUITY
OS_BCVA: 20/30
OD_BCVA: 20/25-2

## 2020-07-07 ASSESSMENT — SUPERFICIAL PUNCTATE KERATITIS (SPK)
OS_SPK: 2+
OD_SPK: 2+

## 2020-07-07 ASSESSMENT — SPHEQUIV_DERIVED
OS_SPHEQUIV: 0.25
OD_SPHEQUIV: 0.125

## 2020-07-09 DIAGNOSIS — E11.49 TYPE 2 DIABETES MELLITUS WITH OTHER NEUROLOGIC COMPLICATION, WITH LONG-TERM CURRENT USE OF INSULIN (HCC): Primary | ICD-10-CM

## 2020-07-09 DIAGNOSIS — Z79.4 TYPE 2 DIABETES MELLITUS WITH OTHER NEUROLOGIC COMPLICATION, WITH LONG-TERM CURRENT USE OF INSULIN (HCC): Primary | ICD-10-CM

## 2020-07-10 ENCOUNTER — CONSULT (OUTPATIENT)
Dept: ENDOCRINOLOGY | Facility: CLINIC | Age: 50
End: 2020-07-10
Payer: COMMERCIAL

## 2020-07-10 VITALS
WEIGHT: 109 LBS | HEART RATE: 109 BPM | TEMPERATURE: 97.3 F | BODY MASS INDEX: 19.94 KG/M2 | SYSTOLIC BLOOD PRESSURE: 98 MMHG | DIASTOLIC BLOOD PRESSURE: 70 MMHG

## 2020-07-10 DIAGNOSIS — E78.2 MIXED HYPERLIPIDEMIA: ICD-10-CM

## 2020-07-10 DIAGNOSIS — IMO0002 UNCONTROLLED TYPE 2 DIABETES MELLITUS WITH DIABETIC POLYNEUROPATHY, WITH LONG-TERM CURRENT USE OF INSULIN: Primary | Chronic | ICD-10-CM

## 2020-07-10 DIAGNOSIS — E55.9 VITAMIN D DEFICIENCY: ICD-10-CM

## 2020-07-10 DIAGNOSIS — R53.82 CHRONIC FATIGUE: ICD-10-CM

## 2020-07-10 DIAGNOSIS — K76.0 FATTY LIVER: ICD-10-CM

## 2020-07-10 DIAGNOSIS — E03.9 HYPOTHYROIDISM, UNSPECIFIED TYPE: ICD-10-CM

## 2020-07-10 DIAGNOSIS — E88.1: ICD-10-CM

## 2020-07-10 PROCEDURE — 3074F SYST BP LT 130 MM HG: CPT | Performed by: INTERNAL MEDICINE

## 2020-07-10 PROCEDURE — 3046F HEMOGLOBIN A1C LEVEL >9.0%: CPT | Performed by: INTERNAL MEDICINE

## 2020-07-10 PROCEDURE — 99215 OFFICE O/P EST HI 40 MIN: CPT | Performed by: INTERNAL MEDICINE

## 2020-07-10 PROCEDURE — 3078F DIAST BP <80 MM HG: CPT | Performed by: INTERNAL MEDICINE

## 2020-07-10 RX ORDER — PIOGLITAZONEHYDROCHLORIDE 15 MG/1
15 TABLET ORAL DAILY
Qty: 90 TABLET | Refills: 0 | Status: SHIPPED | OUTPATIENT
Start: 2020-07-10 | End: 2020-10-02

## 2020-07-10 NOTE — PATIENT INSTRUCTIONS
INSULIN DOSAGE INSTRUCTIONS    Name: Anthony Aden                        : 1970  MRN #: [id number]    Your Current Insulin  and dose is: Before Breakfast Before Lunch Before Evening Meal Bedtime     Novolog Insulin   15     15   15    Regular, Apidra, Humalog orNovolog Sliding Scale:   <80              151-200 +  + +    201-250 + + + +   251-300 + + + +   301-350 + + + +   >350 + + + +              NPH Insulin       Lantus Insulin/Tresiba   (*see below)    50     Additional Instructions:   Please test your blood sugar:  _4_ Times per day  X_ Before Breakfast                _ Alternate Testing  X_ Before Lunch                _ 2 Hours After  Meal  X_ Before Evening Meal               _ 3 a m   x_ Before Bedtime Snack     Target Blood sugar range _70_to _140__  Call if  blood sugar is less than _60_ or greater than _400__      Today's Date: 7/10/2020

## 2020-07-10 NOTE — PROGRESS NOTES
Follow-up Patient Progress Note      CC: Initial visit type 2 diabetes    History of Present Illness:   53 yr female with history of pre diabetes diagnosed as a child and type 2 diabetes as a young adult initially on oral agents and now on high doses of insulin since 2009  He has associated EMG proven peripheral neuropathy with some foot deformity and severe pain on gabapentin; retinopathy and history of bilateral cataracts surgery but no nephropathy  She denies any history of heart attacks or strokes  She reports family history of type 2 diabetes with her mother and grandmother  She reports polyuria, polydipsia, blurred vision, weight loss associated with significant persistent hyperglycemia in the 200-400 mg/dL range  She had recent hospitalizations during which she was given 100 units of basal insulin with stable morning glucose the day after approximately 130 mg/dL  Diet:  She eats only 1 meal a day  Reports significant financial stress and therefore tends to eat mostly carb rich diet  She uses her mealtime insulin with a meal   Does report some occasional snacking  Home blood glucose monitoring: checks glucose 3 times a day using a  glucometer  Before breakfast:   Before lunch:   Before dinner:   Bedtime:   Hypoglycemia:  She denies any previous episodes of hypoglycemia  Diabetes Education:  No    Current meds:  Lantus 100u qhs  Humalog 34 u daily once a day  Metformin - not taken for a long time  Opthamology: cataracts surgery; Last exam 5/2020  Podiatry: No  Influenza: Never gets them  Dental: No  Pancreatitis:Yes 3 times; had cholecystectomy but still recurrence  Ace/ARB: Losartan  Statin:Lipitor 80  Thyroid issues:known Hypothyroidism for 15 yrs on Levothyroxine 112mcg daily early morning c water, nothing to eat/drink for 1 hour       Patient Active Problem List   Diagnosis    Uncontrolled type 2 diabetes mellitus with diabetic polyneuropathy, with long-term current use of insulin (HCC)    Depression    GERD without esophagitis    History of decompression of median nerve    Hypothyroidism    Protein calorie malnutrition (HCC)    Nicotine dependence    Anxiety    Benign essential hypertension    Chronic fatigue    Chronic pain disorder    Fatty liver    Hyperlipidemia    Insomnia    Iron deficiency    Chronic diarrhea    Opioid dependence (HCC)    Pancreatic cyst    History of stroke    Vitamin B12 deficiency    Vitamin D deficiency    Reactive airway disease without complication    Temporary cerebral vascular dysfunction    History of pulmonary embolism    Arthralgia of temporomandibular joint    Carpal tunnel syndrome    Dysphagia    Chronic migraine without aura without status migrainosus, not intractable    Diplopia    ROGERIO positive    Prolonged QT interval    Symptom associated with female genital organs    Irregular menstrual cycle    Female stress incontinence    Decreased libido     Past Medical History:   Diagnosis Date    Acute venous embolism and thrombosis of deep vessels of distal lower extremity (HCC)     Anemia     Anxiety     last assessed 11/20/17    Arthritis     Asthma     Cerebral infarction ([facility] [address] )     unspecified, last assessed 11/14/16    Chest pain     last assessed 5/9/17    Chronic cough     last assessed 12/12/13    Depression     Diabetes mellitus, type 2 ([address] )     DJD (degenerative joint disease)     Esophageal reflux     Fibromyalgia     GERD without esophagitis     resolved 5/13/16    History of pulmonary embolism     Hyperlipidemia     Hypertension     Hypothyroidism     Iron deficiency     Pancreatitis     Polyarthritis     Stroke syndrome     Thyroid disease     TIA (transient ischemic attack)     TIA (transient ischemic attack)     Venous embolism and thrombosis of deep vessels of distal lower extremity (HCC)     Vitamin B12 deficiency     Vitamin D deficiency       Past Surgical History: Procedure Laterality Date    CARPAL TUNNEL RELEASE Right     neuroplasty decompression of median nerve    CATARACT EXTRACTION      CHOLECYSTECTOMY      ERCP      ERCP      ESOPHAGOGASTRIC FUNDOPLASTY      NISSEN FUNDOPLICATION      [place] ESOPHAGOGASTRODUODENOSCOPY TRANSORAL DIAGNOSTIC N/A 11/2/2016    Procedure: EGD AND COLONOSCOPY;  Surgeon: Daniel Mcbride MD;  [facility];   Service: Gastroenterology    [place] INCISE FINGER TENDON SHEATH Right 3/8/2016    Procedure: RELEASE TRIGGER FINGER RIGHT THUMB;  Surgeon: Lc Hurtado MD;  [facility];  Service: Orthopedics    [place] WRIST Aydin Narrow LIG Right 3/8/2016    Procedure: RELEASE CARPAL TUNNEL ENDOSCOPIC;  Surgeon: Lc Hurtado MD;  [facility];  Service: Orthopedics    TONSILLECTOMY AND ADENOIDECTOMY        Family History   Problem Relation Age of Onset    Diabetes Mother         type 2    Heart attack Mother 44        acute MI    Kidney disease Mother         CKD NKF classfication    Depression Mother     Arthritis Mother     Substance Abuse Mother         mother OD in past on MS04    Ulcerative colitis Mother     Schizophrenia Mother     Diabetes Maternal Grandmother     Heart attack Father         acute MI    Psoriasis Father    Maurice Abt Cancer Father         gastric cancer    Stroke Father     Crohn's disease Sister     Rheum arthritis Maternal Grandfather     Throat cancer Maternal Grandfather     No Known Problems Daughter     Lung cancer Paternal Grandmother     Cancer Paternal Grandfather     No Known Problems Sister     Anesthesia problems Neg Hx      Social History     Tobacco Use    Smoking status: Current Every Day Smoker     Packs/day: 0 50     Years: 35 00     Pack years: 17 50     Types: Cigarettes     Start date: 1/1/1983    Smokeless tobacco: Never Used    Tobacco comment: 20 + years   Substance Use Topics    Alcohol use: Not Currently     Alcohol/week: 0 0 standard drinks Frequency: Never     Binge frequency: Never     Allergies   Allergen Reactions    Lexapro [Escitalopram Oxalate]     Escitalopram Other (See Comments) and Palpitations    Other Hives and Rash     Adhesive Tape       Review of Systems   Constitutional: Positive for fatigue  HENT: Negative  Eyes: Negative  Respiratory: Negative  Cardiovascular: Negative  Gastrointestinal: Negative  Endocrine: Negative  Musculoskeletal: Negative  Skin: Negative  Allergic/Immunologic: Negative  Neurological: Negative  Hematological: Negative  Psychiatric/Behavioral: Negative  All other systems reviewed and are negative  Physical Exam:  Body mass index is 19 94 kg/m²  BP 98/70   Pulse (!) 109   Temp (!) 97 3 °F (36 3 °C)   Wt 49 4 kg (109 lb)   LMP 03/04/2016   BMI 19 94 kg/m²    Vitals:    07/10/20 1006   Weight: 49 4 kg (109 lb)        Physical Exam   Constitutional: She is oriented to person, place, and time  She appears well-developed  Very lean individual with the very little subcutaneous fat   HENT:   Head: Normocephalic  Mouth/Throat: Oropharynx is clear and moist    Eyes: Pupils are equal, round, and reactive to light  Neck: Normal range of motion  No thyromegaly present  Cardiovascular: Normal rate and normal heart sounds  Pulmonary/Chest: Effort normal and breath sounds normal    Abdominal: Soft  Bowel sounds are normal    Musculoskeletal: She exhibits no edema or deformity  Thin arms and legs  Mild swelling of the right foot   Neurological: She is alert and oriented to person, place, and time  Skin: Capillary refill takes less than 2 seconds  No rash noted  No pallor  Psychiatric: She has a normal mood and affect  Vitals reviewed      Labs:   Lab Results   Component Value Date    HGBA1C 13 0 (H) 05/16/2020       Lab Results   Component Value Date    QAD4ZKKIGELB 3 610 05/15/2020       Lab Results   Component Value Date    CREATININE 0 58 (L) 05/17/2020 CREATININE 0 52 (L) 05/16/2020    CREATININE 0 82 05/15/2020    BUN 8 05/17/2020     07/16/2016    K 3 5 05/17/2020     (H) 05/17/2020    CO2 24 05/17/2020     eGFR   Date Value Ref Range Status   05/17/2020 109 ml/min/1 73sq m Final   08/24/2017 138 ml/min/1 73sq m Final       Lab Results   Component Value Date    ALT 33 05/17/2020    AST 28 05/17/2020    ALKPHOS 114 05/17/2020    BILITOT 0 3 10/22/2016       Lab Results   Component Value Date    CHOLESTEROL 229 (H) 05/07/2016     Lab Results   Component Value Date    HDL 54 10/22/2016    HDL 49 07/16/2016    HDL 47 05/07/2016     Lab Results   Component Value Date    TRIG 163 (H) 08/25/2017    TRIG 284 (H) 10/22/2016    TRIG 288 (H) 07/16/2016     Lab Results   Component Value Date    NONHDLC 211 (H) 10/22/2016    NONHDLC 260 (H) 07/16/2016    [place] 241 (H) 04/02/2016         Impression:  1  Fatty liver    2  Uncontrolled type 2 diabetes mellitus with diabetic polyneuropathy, with long-term current use of insulin ([address] )    3  Hypothyroidism, unspecified type    4  Chronic fatigue    5  Mixed hyperlipidemia    6  Vitamin D deficiency         Plan:    Diagnoses and all orders for this visit:    Uncontrolled type 2 diabetes mellitus with diabetic polyneuropathy, with long-term current use of insulin ([address] )  She is uncontrolled with an A1c of 13%  Her goal is less than 7%  Her phenotype of diabetes is not typical      In the context of significant insulin resistance, hypertriglyceridemia, weight loss and muscle wasting, loss of subcutaneous fat, history of cataracts and pancreatitis, will need to rule out lipodystrophy syndrome  She also has a component of medical non adherence due to multiple reasons  She reports missing 40% of her basal insulin dosage  Today we discussed consistent carbohydrate diet at least twice daily along with prandial insulin use and regular use of basal insulin  Her total insulin dose was reduced by roughly 50% today    She was advised to check blood glucose before meals and bedtime and send logs in 2 weeks when she will be followed up in the office again  -     Ambulatory referral to Endocrinology  -     Zinc Transporter 8 (Znt8) Antibody; Future  -     IA2 Autoantibodies; Future  -     Glutamic acid decarboxylase; Future  -     Hemoglobin A1C; Future  -     Microalbumin / creatinine urine ratio; Future  -     insulin degludec (TRESIBA) 200 units/mL CONCENTRATED U-200 injection pen; Use 50units daily at bedtime  -     pioglitazone (ACTOS) 15 mg tablet; Take 1 tablet (15 mg total) by mouth daily  -     Insulin syringes  -     Ambulatory referral to Diabetic Education; Future    Will follow-up in office in 2 weeks time  Fatty liver/recurrent pancreatitis/hypertriglyceridemia  She does not have transaminasemia  Rule out underlying lipodystrophy syndrome  Hypothyroidism, unspecified type  She has longstanding hypothyroidism with positive family history but no record of antibody tests  For now continue levothyroxine 112 mcg daily  Repeat thyroid functions  -     T4, free; Future  -     TSH, 3rd generation; Future    Mixed hyperlipidemia  On Lipitor   -     Lipid panel; Future    Vitamin D deficiency  -     Vitamin D 25 hydroxy; Future      I have spent 60 minutes with patient today in which greater than 50% of this time was spent in counseling/coordination of care  Discussed with the patient and all questioned fully answered  She will call me if any problems arise  Educated/ Counseled patient on diagnostic test results, prognosis, risk vs benefit of treatment options, importance of treatment compliance, healthy life and lifestyle choices        [address]

## 2020-07-12 DIAGNOSIS — E03.9 ACQUIRED HYPOTHYROIDISM: ICD-10-CM

## 2020-07-13 RX ORDER — LEVOTHYROXINE SODIUM 112 UG/1
TABLET ORAL
Qty: 90 TABLET | Refills: 1 | Status: SHIPPED | OUTPATIENT
Start: 2020-07-13 | End: 2021-03-19

## 2020-07-13 NOTE — ADDENDUM NOTE
Addended [facility] on: 7/13/2020 12:01 PM     Modules accepted: Orders, Level of Service

## 2020-07-15 ENCOUNTER — TELEPHONE (OUTPATIENT)
Dept: NEUROLOGY | Facility: CLINIC | Age: 50
End: 2020-07-15

## 2020-07-15 NOTE — TELEPHONE ENCOUNTER
Received a call from [address] [facility] [place] with Brittaney- he is calling to [facility] for the patient's Botox order  Botox to be delivered on Tuesday 7/21/2020 to the [facility] location via [employer] priority overnight- a signature will be required  Katelynn Sumner,    Please await the patient's Botox order and document once it has arrived  Please let myself or Michelle know if you do not receive the patient's Botox order  Thank you,    Yumi Arias- please attach a referral     Thank you!

## 2020-07-16 ENCOUNTER — TELEMEDICINE (OUTPATIENT)
Dept: DIABETES SERVICES | Facility: CLINIC | Age: 50
End: 2020-07-16
Payer: COMMERCIAL

## 2020-07-16 DIAGNOSIS — IMO0002 UNCONTROLLED TYPE 2 DIABETES MELLITUS WITH DIABETIC POLYNEUROPATHY, WITH LONG-TERM CURRENT USE OF INSULIN: Primary | Chronic | ICD-10-CM

## 2020-07-16 PROCEDURE — 97802 MEDICAL NUTRITION INDIV IN: CPT | Performed by: DIETITIAN, REGISTERED

## 2020-07-16 NOTE — PATIENT INSTRUCTIONS
45 g of carbohydrate per meal    15 g of carbohydrate per snack  2-3 meals per day, 4-5 hours apart  1-2 snacks per day, at least 2 hours apart from meals  Use the Portion Book and label reading to determine carbohydrate amounts in food and beverages  Increase fiber intake by replacing refined grains such as white rice and bagels with whole grain versions such as brown rice (1 cup) or 1/2 whole wheat/whole grain bagel and 3/4 cup berries  Decrease intake of saturated fat and sugar adequate intake of omega-3 fatty acids  Begin to gradually increase exercise working toward goal of at least 150 minutes of physical activity per week, spread throughout the week

## 2020-07-16 NOTE — PROGRESS NOTES
Virtual Regular Visit      Assessment/Plan:    Problem List Items Addressed This Visit     None          Reason for visit is   Chief Complaint   Patient presents with    Diabetes Type 2    Virtual Regular Visit        Encounter provider Vince Blair RD    Provider located at [address] [address]      Recent Visits  No visits were found meeting these conditions  Showing recent visits within past 7 days and meeting all other requirements     Today's Visits  Date Type Provider Dept   07/16/20 Telemedicine Reji Enriquez RD [facility] Niki Sandoval   Showing today's visits and meeting all other requirements     Future Appointments  No visits were found meeting these conditions  Showing future appointments within next 150 days and meeting all other requirements        The patient was identified by name and date of birth  Reagan Montoya was informed that this is a telemedicine visit and that the visit is being conducted through [employer]  My office door was closed  No one else was in the room  She acknowledged consent and understanding of privacy and security of the video platform  The patient has agreed to participate and understands they can discontinue the visit at any time  Patient is aware this is a billable service  Subjective  Reagan Montoya is a 53 y o  female with type 2 diabetes, lipodystrophy, fatty liver, hypothyroidism, hyperlipidemia, and vitamin D deficiency  See note below  There were no vitals filed for this visit  I spent 51 minutes with patient today in which greater than 50% of the time was spent in counseling/coordination of care regarding Medical Nutrition Therapy  VIRTUAL VISIT DISCLAIMER    Reagan Montoya acknowledges that she has consented to an online visit or consultation   She understands that the online visit is based solely on information provided by her, and that, in the absence of a face-to-face physical evaluation by the physician, the diagnosis she receives is both limited and provisional in terms of accuracy and completeness  This is not intended to replace a full medical face-to-face evaluation by the physician  Jaden Ly understands and accepts these terms  Medical Nutrition Therapy        Assessment    Visit Type: Initial visit    Chief complaint T2DM    HPI: Jaden Ly is a 53year old female diagnosed with type 2 diabetes, partial lipodystrophy, fatty liver, hypothyroidism, hyperlipidemia, and vitamin D deficiency  She also has history of pancreatitis and cataracts  Most recent HbA1c 13 0%  Barbs diet history reveals excess carbohydrate intake at meals, meal skipping, excess intake of saturated fat, and inadequate intake of omega-3 fatty acids, fiber, fruit, whole grains, and low-fat dairy  Currently meals range from 60 to 75 grams of carbohydrate  Currently she always eats a meal between 8-10 pm and sometimes also eats a meals between 10 am - 12 pm as well  She states that she usually does not eat between those meal times and may occasionally have a snack between dinner and bedtime which is usually 2-3 tasharoldo  Explained basic pathophysiology of diabetes and impact of diet on blood glucose levels  Together we discussed what foods contain CHO, reading a food label, timing of meals, serving sizes, and the role of consistent carbohydrate intake in achieving and maintaining target blood glucose levels  Used the portion booklet to teach Clinton Varela more about food groups and basic carbohydrate counting  Discussed nutrition recommendations for lipodystrophy including getting carbohydrates from  high fiber sources such as whole grains and high-fiber fruit/starchy vegetables, limiting saturated fat intake, and adequate intake of omega-3 fatty acids   Clinton Nichole states that she really likes fish and other seafood such as shrimp which will help with omega-3 intake  She also likes many different vegetables and high-fiber fruit such as apples, blueberries, and tangerines  Created an individualized meal plan for Barb with 2 meals for now and 1-2 snacks providing 45 g carb per meal and 15 g carb per snack  Recommended that Barb begin to improve consistency with her diet by routinely having 2 meals per day and gradually adding food to a 2-3 pm meal/snack time as well such as starting with just an apple as a snack at that time and then gradually adding other nutrient dense foods as well  Discussed sample meals for Barb's reference  Discussed the role of exercise in helping to manage blood sugars as well as improving overall health  Provided physical activity guidleline of gradually increasing exercise, working toward goal of at least 150 minutes of physical activity per week spread throughout the week  Bethany Contreras demonstrated good understanding and will call with any questions prior to follow-up appointment in 6 weeks  Ht Readings from Last 1 Encounters:   07/01/20 5' 2" (1 575 m)     Wt Readings from Last 2 Encounters:   07/10/20 49 4 kg (109 lb)   07/01/20 50 3 kg (110 lb 12 8 oz)     Weight Change: Yes reports 90 lb weight loss over last 6 months most recent HbA1c 13 0%  Medical Diagnosis/ICD10: E11 42, Z79 4, E11 65 (ICD-10-CM) - Uncontrolled type 2 diabetes mellitus with diabetic polyneuropathy, with long-term current use of insulin    Barriers to Learning: hearing and has had 2 strokes, paperwork overwhelms her    Do you follow any special diet presently?: No, on disability so eats affordable foods such as rice and pasta  Who shops: patient  Who cooks: patient    Food Log: Completed via the method of food recall    Breakfast:wakes 8:45 am  Cup of tea plain (no sugar or cream) then seltzer water  Cucumber lime water  Sometimes does not eat at all  Other times eats 10-11 am  Bagel (everything bagel) with cream cheese and tomato   12 pm today4 cookies (short bread or peanut butter)  Morning Snack:n/a  Lunch:n/a  Afternoon Snack: n/a  Dinner:8 -10 pm  Pork chops made in air fryer (only ate 2 bites) 1 5 cups rice (from a packet, mushroom flavored) , mixed vegetables, (cauliflower, broccoli, carrots, and corn   Evening Snack:not usually, but sometimes between 11 pm - 12 am a tastycake cupcake (2-3 per pack) once every 2 weeks  Beverages: drinks water throughout the day, tea, selzter water  Eating out/Take out:very rare, twice per month  Exercise walks down to the store, 4 blocks away about twice per week  Calorie needs 1300 kcals/day Carbs: 45 g/meal, 15g/snack     Protein: 60 g/day Fat: 49 g/day    Nutrition Diagnosis:  Excess carbohydrate intake  related to Food and nutrition related knowledge deficit concerning appropriate amount of carbohydrate intake as  evidenced by Estimated carbohydrate intake that is consistently more than recommended amounts    Intervention: plate method, reduced fat intake, increased fiber intake, label reading, carbohydrate counting, increased protein intake, increased plant based foods, meal timing, meal planning, individualized meal plan, monitoring portion control and food diary     Treatment Goals: Patient understands education and recommendations, Patient will monitor food intake daily with tracker, Patient will monitor fat intake, Patient will monitor portion control, Patient will consume 25-35 grams of fiber a day, Patient will increase their intake of plant based foods, Patient will count carbohydrates and Patient will exercise    Monitoring and evaluation:    Term code indicator  FH 1 6 3 Carbohydrate Intake Criteria: 45 g of carbohydrate per meal, 15 g of carbohydrate per snack  2-3 meals per day, 4-5 hours apart  1-2 snacks per day, at least 2 hours apart from meals    Term code indicator  CH 2 2 Treatments/Therapy/Alternative Medicine Criteria: Patient will begin a gradual increase exercise working toward goal of at least 150 minutes of physical activity per week, spread throughout the week  Term code indicator  FH 1 6 1 Fat and Cholesterol Intake and CH 2 2 Treatments/Therapy/Alternative Medicine Criteria: Decrease intake of saturated fat and sugar adequate intake of omega-3 fatty acids    Patients Response to Instruction:  Anastacio Lopez  Expected Compliancefair     Start- Stop: 1:20 pm - 2:11 pm  Total Minutes: 51 Minutes  Group or Individual Instruction: MNT - I   Other: Trang Mittal MD    Thank you for referring your patient to the [facility] for education today  Please feel free to call with any questions or concerns      Vince Blair, [address] [facility] [id number]

## 2020-07-22 ENCOUNTER — DOCTOR'S OFFICE (OUTPATIENT)
Dept: URBAN - METROPOLITAN AREA CLINIC 136 | Facility: CLINIC | Age: 50
Setting detail: OPHTHALMOLOGY
End: 2020-07-22
Payer: COMMERCIAL

## 2020-07-22 DIAGNOSIS — H04.122: ICD-10-CM

## 2020-07-22 DIAGNOSIS — Z96.1: ICD-10-CM

## 2020-07-22 DIAGNOSIS — H04.121: ICD-10-CM

## 2020-07-22 LAB
CREAT ?TM UR-SCNC: 125 UMOL/L
EXT MICROALBUMIN URINE RANDOM: 1.5
HBA1C MFR BLD HPLC: 13.7 %
MICROALBUMIN/CREAT UR: 12 MG/G{CREAT}

## 2020-07-22 PROCEDURE — 99024 POSTOP FOLLOW-UP VISIT: CPT | Performed by: OPHTHALMOLOGY

## 2020-07-22 ASSESSMENT — SPHEQUIV_DERIVED
OS_SPHEQUIV: 0.25
OD_SPHEQUIV: 0.125

## 2020-07-22 ASSESSMENT — REFRACTION_AUTOREFRACTION
OD_SPHERE: +1.00
OD_CYLINDER: -1.75
OD_AXIS: 096
OS_SPHERE: +0.50
OS_CYLINDER: -0.50
OS_AXIS: 074

## 2020-07-22 ASSESSMENT — VISUAL ACUITY
OS_BCVA: 20/30-2
OD_BCVA: 20/20-2

## 2020-07-22 ASSESSMENT — SUPERFICIAL PUNCTATE KERATITIS (SPK)
OS_SPK: T
OD_SPK: 1+

## 2020-07-24 ENCOUNTER — TELEPHONE (OUTPATIENT)
Dept: ENDOCRINOLOGY | Facility: CLINIC | Age: 50
End: 2020-07-24

## 2020-07-24 DIAGNOSIS — G89.4 CHRONIC PAIN SYNDROME: ICD-10-CM

## 2020-07-24 DIAGNOSIS — F51.01 PRIMARY INSOMNIA: ICD-10-CM

## 2020-07-24 DIAGNOSIS — M54.12 CERVICAL RADICULOPATHY: ICD-10-CM

## 2020-07-24 DIAGNOSIS — F41.9 ANXIETY: ICD-10-CM

## 2020-07-24 DIAGNOSIS — E27.40 LOW SERUM CORTISOL LEVEL (HCC): Primary | ICD-10-CM

## 2020-07-24 RX ORDER — HYDROCORTISONE 5 MG/1
TABLET ORAL
Qty: 100 TABLET | Refills: 2 | Status: SHIPPED | OUTPATIENT
Start: 2020-07-24 | End: 2020-11-18

## 2020-07-24 NOTE — TELEPHONE ENCOUNTER
Called pt to discuss the blood work results, cortisol is low, however it was drawn late in the day which can cause falsely low cortisol level,   Could not reach patient and got answering machine, message left to call us back to review the results  Patient also has upcoming appointment for follow-up  Will have to repeat cortisol   Her vitamin-D was also low  She has upcoming appointment will discuss in detail at that time  Call Back again at 3:20 a m  Was able to speak with patient  She does complain of nausea which has been going on for past couple of weeks, not sure whether it is related to her migraine headaches  Also she has poor appetite  She has lost weight in the last 2-3 months  Considering her cortisol level was low, her blood pressure was also on low side, will start her on hydrocortisone 10 mg in the morning and 5 mg in the evening  Will assess if her symptoms are improving during her follow-up appointment  Also discussed with patient to repeat cortisol at 8:00 a m  next week, by holding the dose of hydrocortisone the day before in the evening as well as on the day of the test in the morning, restart Hydrocortisone after the blood work is done    Will follow-up    Shasta Griggs MD

## 2020-07-27 ENCOUNTER — TELEPHONE (OUTPATIENT)
Dept: ENDOCRINOLOGY | Facility: CLINIC | Age: 50
End: 2020-07-27

## 2020-07-27 RX ORDER — DIAZEPAM 5 MG/1
TABLET ORAL
Qty: 90 TABLET | Refills: 0 | Status: SHIPPED | OUTPATIENT
Start: 2020-07-27 | End: 2020-08-27

## 2020-07-27 RX ORDER — ZOLPIDEM TARTRATE 10 MG/1
TABLET ORAL
Qty: 30 TABLET | Refills: 0 | Status: SHIPPED | OUTPATIENT
Start: 2020-07-27 | End: 2020-08-27

## 2020-07-27 RX ORDER — TRAMADOL HYDROCHLORIDE 50 MG/1
TABLET ORAL
Qty: 180 TABLET | Refills: 0 | Status: SHIPPED | OUTPATIENT
Start: 2020-07-27 | End: 2020-08-27

## 2020-07-27 NOTE — TELEPHONE ENCOUNTER
Please double check if patient has controlled substance/narcotic contract sign  She should  If this is not done within the last year please send her new forms as her chart is listed is not being on file  See me in the a m   Regarding this

## 2020-08-04 ENCOUNTER — TELEPHONE (OUTPATIENT)
Dept: OTHER | Facility: HOSPITAL | Age: 50
End: 2020-08-04

## 2020-08-04 NOTE — TELEPHONE ENCOUNTER
[facility] on [facility]    Also patient was supposed to follow-up with us in 2 weeks, can you please make appointment for follow-up  She should bring her meter or glucose log for her appointment    Patience Dorado MD

## 2020-08-04 NOTE — TELEPHONE ENCOUNTER
Only lab drawn yesterday was cortisol  Labs from 7/22/2020 received and scanned   [address] [facility] scheduling ov

## 2020-08-04 NOTE — TELEPHONE ENCOUNTER
She had blood work done 2 weeks ago, it was fasting leptin level   Can you please check     Corrie Blakely MD

## 2020-08-06 ENCOUNTER — TELEPHONE (OUTPATIENT)
Dept: NEUROLOGY | Facility: CLINIC | Age: 50
End: 2020-08-06

## 2020-08-06 DIAGNOSIS — G43.709 CHRONIC MIGRAINE WITHOUT AURA WITHOUT STATUS MIGRAINOSUS, NOT INTRACTABLE: ICD-10-CM

## 2020-08-06 DIAGNOSIS — R11.0 NAUSEA: ICD-10-CM

## 2020-08-06 DIAGNOSIS — G43.709 CHRONIC MIGRAINE WITHOUT AURA WITHOUT STATUS MIGRAINOSUS, NOT INTRACTABLE: Primary | ICD-10-CM

## 2020-08-06 RX ORDER — ONDANSETRON 4 MG/1
TABLET, FILM COATED ORAL
Qty: 20 TABLET | Refills: 2 | Status: SHIPPED | OUTPATIENT
Start: 2020-08-06 | End: 2020-10-19

## 2020-08-06 RX ORDER — BUTALBITAL, ACETAMINOPHEN AND CAFFEINE 50; 325; 40 MG/1; MG/1; MG/1
TABLET ORAL
Qty: 10 TABLET | Refills: 0 | Status: SHIPPED | OUTPATIENT
Start: 2020-08-06 | End: 2020-09-14

## 2020-08-06 NOTE — TELEPHONE ENCOUNTER
patient calling in stating she is having a really bad migraine  patient is very tearful on the line  This started 2 days ago  Pain is throbbing behind eyes and going to neck  Rates pain 7/10  She states she is having nausea and dizziness      Patient has and takes emgality (took injection yesterday), get botox, gabapentin 400mg caps 2 caps TID, zofran, topamax 100mg tab in am and 125mg in PM, olanzapine 5mg tab HS, venlafaxine 150mg tab daily  If agreeable to a medication, can send to [facility] in [place]    [phone]   ok to leave a detailed message

## 2020-08-09 RX ORDER — PROCHLORPERAZINE MALEATE 10 MG
10 TABLET ORAL EVERY 6 HOURS PRN
Qty: 20 TABLET | Refills: 0 | Status: SHIPPED | OUTPATIENT
Start: 2020-08-09 | End: 2020-12-01

## 2020-08-11 ENCOUNTER — TELEPHONE (OUTPATIENT)
Dept: NEUROLOGY | Facility: CLINIC | Age: 50
End: 2020-08-11

## 2020-08-11 NOTE — TELEPHONE ENCOUNTER
----- Message from Rere Rosen PA-C sent at 5/13/2020 10:26 AM EDT -----  Can we contact [facility] to get last report/ office visit note for this patient? Thank you!

## 2020-08-12 ENCOUNTER — TELEPHONE (OUTPATIENT)
Dept: NEUROLOGY | Facility: CLINIC | Age: 50
End: 2020-08-12

## 2020-08-12 NOTE — TELEPHONE ENCOUNTER
LMOM to confirm  8/13/2020 8:30AM [address] at [facility] and review covid screening questions and to complete registration, please transfer to [address]  ADVISED OF NO SHOW FEE  Please make aware of mask, visitor and temp policy

## 2020-08-13 ENCOUNTER — TELEPHONE (OUTPATIENT)
Dept: FAMILY MEDICINE CLINIC | Facility: CLINIC | Age: 50
End: 2020-08-13

## 2020-08-13 ENCOUNTER — PROCEDURE VISIT (OUTPATIENT)
Dept: NEUROLOGY | Facility: CLINIC | Age: 50
End: 2020-08-13
Payer: COMMERCIAL

## 2020-08-13 VITALS
DIASTOLIC BLOOD PRESSURE: 77 MMHG | TEMPERATURE: 98.5 F | SYSTOLIC BLOOD PRESSURE: 113 MMHG | HEIGHT: 62 IN | WEIGHT: 115.4 LBS | HEART RATE: 112 BPM | BODY MASS INDEX: 21.23 KG/M2

## 2020-08-13 DIAGNOSIS — G43.709 CHRONIC MIGRAINE WITHOUT AURA WITHOUT STATUS MIGRAINOSUS, NOT INTRACTABLE: ICD-10-CM

## 2020-08-13 DIAGNOSIS — R39.9 UTI SYMPTOMS: Primary | ICD-10-CM

## 2020-08-13 DIAGNOSIS — G89.29 CHRONIC CERVICAL PAIN: ICD-10-CM

## 2020-08-13 DIAGNOSIS — M54.2 CHRONIC CERVICAL PAIN: ICD-10-CM

## 2020-08-13 DIAGNOSIS — G43.709 CHRONIC MIGRAINE WITHOUT AURA WITHOUT STATUS MIGRAINOSUS, NOT INTRACTABLE: Primary | ICD-10-CM

## 2020-08-13 PROCEDURE — 64615 CHEMODENERV MUSC MIGRAINE: CPT | Performed by: PHYSICIAN ASSISTANT

## 2020-08-13 RX ORDER — TOPIRAMATE 25 MG/1
TABLET ORAL
Qty: 90 TABLET | Refills: 1 | Status: SHIPPED | OUTPATIENT
Start: 2020-08-13 | End: 2021-01-29

## 2020-08-13 RX ORDER — NITROFURANTOIN 25; 75 MG/1; MG/1
100 CAPSULE ORAL 2 TIMES DAILY
Qty: 10 CAPSULE | Refills: 0 | Status: SHIPPED | OUTPATIENT
Start: 2020-08-13 | End: 2020-08-19

## 2020-08-13 NOTE — PROGRESS NOTES
Chemodenervation    Date/Time: 8/13/2020 8:40 AM  Performed by: Fausto Schneider PA-C  Authorized by: Fausto Schneider PA-C     Pre-procedure details:     Prepped With: Alcohol    Procedure details:     Position:  Upright  Botox:     Botox Type:  Type A    Brand:  Botox    mL's of Botulinum Toxin:  200    Final Concentration per CC:  100 units    Needle Gauge:  30 G 2 5 inch  Procedures:     Botox Procedures: chronic headache      Indications: migraines    Injection Location:     Head / Face:  L , R , L frontalis, R frontalis, R inferior cervical paraspinal, L inferior cervical paraspinal, L medial occipitalis, R medial occipitalis, L lateral occipitalis, R lateral occipitalis, procerus, L temporalis, R temporalis, R superior trapezius and L superior trapezius    L  injection amount:  5 unit(s)    R  injection amount:  5 unit(s)    L lateral frontalis:  5 unit(s)    R lateral frontalis:  5 unit(s)    L medial frontalis:  5 unit(s)    R medial frontalis:  5 unit(s)    L temporalis injection amount:  20 unit(s)    R temporalis injection amount:  20 unit(s)    Procerus injection amount:  5 unit(s)    L lateral occipitalis injection amount:  5 unit(s)    R lateral occipitalis injection amount:  5 unit(s)    L medial occipitalis injection amount:  10 unit(s)    R medial occipitalis injection amount:  10 unit(s)    L inferior cervical paraspinal injection amount:  10 unit(s)    R inferior cervical paraspinal injection amount:  10 unit(s)    L superior trapezius injection amount:  15 unit(s)    R superior trapezius injection amount:  15 unit(s)  Total Units:     Total units used:  200    Total units discarded:  0  Post-procedure details:     Chemodenervation:  Chronic migraine    Facial Nerve Location[de-identified]  Bilateral facial nerve    Patient tolerance of procedure:   Tolerated well, no immediate complications  Comments:      Extra units medically necessary:  - 25 between the right and left occipital region  - 10 left cervical paraspinal muscles  - 10 right cervical paraspinal muscles    Need to address dystonia with LEFT SCM spasm- inject at next visit  Blood pressure 113/77, pulse (!) 112, temperature 98 5 °F (36 9 °C), height 5' 2" (1 575 m), weight 52 3 kg (115 lb 6 4 oz), last menstrual period 03/04/2016, not currently breastfeeding

## 2020-08-13 NOTE — TELEPHONE ENCOUNTER
Patient has pain/burning with urination, frequency, urgency x's 3 days  I offered patient appointment, but she began crying stating she only has $ 067 in her account and cannot afford any type of copay due to this    She is asking if we can please send an antibiotic to [facility] - [address] [place] [address]

## 2020-08-15 DIAGNOSIS — K21.9 GERD WITHOUT ESOPHAGITIS: ICD-10-CM

## 2020-08-15 DIAGNOSIS — G43.709 CHRONIC MIGRAINE WITHOUT AURA WITHOUT STATUS MIGRAINOSUS, NOT INTRACTABLE: Primary | ICD-10-CM

## 2020-08-16 RX ORDER — TOPIRAMATE 100 MG/1
TABLET, FILM COATED ORAL
Qty: 180 TABLET | Refills: 0 | Status: SHIPPED | OUTPATIENT
Start: 2020-08-16 | End: 2020-11-05

## 2020-08-16 RX ORDER — OMEPRAZOLE 20 MG/1
CAPSULE, DELAYED RELEASE ORAL
Qty: 90 CAPSULE | Refills: 0 | Status: SHIPPED | OUTPATIENT
Start: 2020-08-16 | End: 2020-11-09

## 2020-08-18 DIAGNOSIS — I10 ESSENTIAL HYPERTENSION: ICD-10-CM

## 2020-08-19 ENCOUNTER — OFFICE VISIT (OUTPATIENT)
Dept: PSYCHIATRY | Facility: CLINIC | Age: 50
End: 2020-08-19
Payer: COMMERCIAL

## 2020-08-19 DIAGNOSIS — F33.41 RECURRENT MAJOR DEPRESSIVE DISORDER, IN PARTIAL REMISSION (HCC): ICD-10-CM

## 2020-08-19 DIAGNOSIS — F40.01 PANIC DISORDER WITH AGORAPHOBIA: ICD-10-CM

## 2020-08-19 DIAGNOSIS — F40.10 SOCIAL ANXIETY DISORDER: ICD-10-CM

## 2020-08-19 DIAGNOSIS — G43.709 CHRONIC MIGRAINE WITHOUT AURA WITHOUT STATUS MIGRAINOSUS, NOT INTRACTABLE: ICD-10-CM

## 2020-08-19 DIAGNOSIS — F43.10 PTSD (POST-TRAUMATIC STRESS DISORDER): ICD-10-CM

## 2020-08-19 DIAGNOSIS — F31.81 BIPOLAR II DISORDER (HCC): Primary | ICD-10-CM

## 2020-08-19 PROCEDURE — 90792 PSYCH DIAG EVAL W/MED SRVCS: CPT | Performed by: PSYCHIATRY & NEUROLOGY

## 2020-08-19 RX ORDER — DULOXETIN HYDROCHLORIDE 60 MG/1
60 CAPSULE, DELAYED RELEASE ORAL DAILY
Qty: 30 CAPSULE | Refills: 1 | Status: SHIPPED | OUTPATIENT
Start: 2020-08-19 | End: 2020-09-11

## 2020-08-19 RX ORDER — OLANZAPINE 10 MG/1
10 TABLET ORAL
Qty: 30 TABLET | Refills: 1 | Status: SHIPPED | OUTPATIENT
Start: 2020-08-19 | End: 2020-09-11

## 2020-08-19 RX ORDER — PRAZOSIN HYDROCHLORIDE 1 MG/1
1 CAPSULE ORAL
Qty: 30 CAPSULE | Refills: 1 | Status: SHIPPED | OUTPATIENT
Start: 2020-08-19 | End: 2020-09-11

## 2020-08-19 NOTE — BH TREATMENT PLAN
TREATMENT PLAN (Medication Management Only)        [facility]    Name and Date of Birth:  Sonya Copeland 53 y o  1970  Date of Treatment Plan: August 19, 2020  Diagnosis/Diagnoses:    1  Bipolar II disorder ([facility] )    2  Recurrent major depressive disorder, in partial remission ([facility] )    3  Chronic migraine without aura without status migrainosus, not intractable    4  Social anxiety disorder    5  Panic disorder with agoraphobia    6  PTSD (post-traumatic stress disorder)      Strengths/Personal Resources for Self-Care: "I have a really big heart  I am very patient  I will do anything for anyone ", taking medications as prescribed  Area/Areas of need (in own words): anxiety symptoms, depressive symptoms, mood swings, unstable mood, leaving the house  1  Long Term Goal: interact more with family  Target Date: 2 months - 10/19/2020  Person/Persons responsible for completion of goal: Laurent Lucas, Dr Jessica Egan  2  Short Term Objective (s) - How will we reach this goal?:   A  Provider new recommended medication/dosage changes and/or continue medication(s): Medication changes: I have discontinued Barb Palomo's venlafaxine and Vitamin B Complex-C  I have also changed her OLANZapine  Additionally, I am having her start on DULoxetine and prazosin   Lastly, I am having her maintain her Folic Acid, aspirin, Cyanocobalamin (VITAMIN B-12 IJ), Turmeric, losartan, insulin aspart, naloxone, NEEDLE (DISP) 25 G, buPROPion, glucose blood, Insulin Syringes (Disposable), BD Pen Needle Kimberli U/F, Galcanezumab-gnlm, ofloxacin, prednisoLONE acetate, atorvastatin, Advair Diskus, Albuterol Sulfate, gabapentin, insulin lispro, Insulin Pen Needle, insulin degludec, pioglitazone, insulin glargine, levothyroxine, traMADol, diazepam, zolpidem, hydrocortisone, butalbital-acetaminophen-caffeine, prochlorperazine, ondansetron, topiramate, nitrofurantoin, omeprazole, topiramate, metoprolol tartrate, and Cyanocobalamin     B  N/A   C  N/A  Target Date: 1 year - 8/19/2021  Person/Persons Responsible for Completion of Goal: Dr Keron Rangel  Progress Towards Goals: starting treatment  Treatment Modality: medication management every 4 weeks, referral for individual psychotherapy  Review due 90 to 120 days from date of this plan: 6 months  Expected length of service: ongoing treatment  My Physician/PA/NP and I have developed this plan together and I agree to work on the goals and objectives  I understand the treatment goals that were developed for my treatment
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
## 2020-08-19 NOTE — PATIENT INSTRUCTIONS
Bipolar II disorder (HCC)  -     OLANZapine (ZyPREXA) 10 mg tablet; Take 1 tablet (10 mg total) by mouth daily at bedtime  -     DULoxetine (CYMBALTA) 60 mg delayed release capsule; Take 1 capsule (60 mg total) by mouth daily    Recurrent major depressive disorder, in partial remission ([facility] )  -     Ambulatory referral to Psychiatry  -     DULoxetine (CYMBALTA) 60 mg delayed release capsule; Take 1 capsule (60 mg total) by mouth daily    Chronic migraine without aura without status migrainosus, not intractable    Social anxiety disorder  -     DULoxetine (CYMBALTA) 60 mg delayed release capsule; Take 1 capsule (60 mg total) by mouth daily    Panic disorder with agoraphobia  -     DULoxetine (CYMBALTA) 60 mg delayed release capsule; Take 1 capsule (60 mg total) by mouth daily    PTSD (post-traumatic stress disorder)  -     DULoxetine (CYMBALTA) 60 mg delayed release capsule; Take 1 capsule (60 mg total) by mouth daily  -     prazosin (MINIPRESS) 1 mg capsule;  Take 1 capsule (1 mg total) by mouth daily at bedtime    Other orders  -     Cyanocobalamin 1000 MCG/ML LIQD; Take 500 mcg by mouth daily      discontinue venlafaxine, direct switch to Cymbalta  Use ambien sparingly and only if still unable to sleep with Zyprexa 10mg at bedtime  Valium 5mg as needed for anxiety and panic    Therapy referral     Follow up in 4 weeks

## 2020-08-26 ENCOUNTER — DOCTOR'S OFFICE (OUTPATIENT)
Dept: URBAN - METROPOLITAN AREA CLINIC 136 | Facility: CLINIC | Age: 50
Setting detail: OPHTHALMOLOGY
End: 2020-08-26
Payer: COMMERCIAL

## 2020-08-26 DIAGNOSIS — Z96.1: ICD-10-CM

## 2020-08-26 DIAGNOSIS — E11.49 TYPE 2 DIABETES MELLITUS WITH OTHER NEUROLOGIC COMPLICATION, WITH LONG-TERM CURRENT USE OF INSULIN (HCC): ICD-10-CM

## 2020-08-26 DIAGNOSIS — Z79.4 TYPE 2 DIABETES MELLITUS WITH OTHER NEUROLOGIC COMPLICATION, WITH LONG-TERM CURRENT USE OF INSULIN (HCC): ICD-10-CM

## 2020-08-26 DIAGNOSIS — H04.122: ICD-10-CM

## 2020-08-26 DIAGNOSIS — H04.121: ICD-10-CM

## 2020-08-26 PROBLEM — E11.9 DIABETES TYPE 2 NO RETINOPATHY; 
BOTH EYES: Status: ACTIVE | Noted: 2020-01-31

## 2020-08-26 PROBLEM — H31.003 CHORIORETINAL SCARS; BOTH EYES: Status: ACTIVE | Noted: 2020-02-11

## 2020-08-26 PROCEDURE — 99024 POSTOP FOLLOW-UP VISIT: CPT | Performed by: OPHTHALMOLOGY

## 2020-08-26 ASSESSMENT — REFRACTION_AUTOREFRACTION
OS_CYLINDER: -1.75
OD_CYLINDER: -1.00
OS_SPHERE: +1.25
OD_AXIS: 083
OD_SPHERE: +1.00
OS_AXIS: 084

## 2020-08-26 ASSESSMENT — SUPERFICIAL PUNCTATE KERATITIS (SPK)
OD_SPK: T
OS_SPK: T

## 2020-08-26 ASSESSMENT — SPHEQUIV_DERIVED
OD_SPHEQUIV: 0.5
OS_SPHEQUIV: 0.375

## 2020-08-26 ASSESSMENT — VISUAL ACUITY
OS_BCVA: 20/40
OD_BCVA: 20/30-2

## 2020-08-27 ENCOUNTER — TELEPHONE (OUTPATIENT)
Dept: ADMINISTRATIVE | Facility: OTHER | Age: 50
End: 2020-08-27

## 2020-08-27 ENCOUNTER — OFFICE VISIT (OUTPATIENT)
Dept: FAMILY MEDICINE CLINIC | Facility: CLINIC | Age: 50
End: 2020-08-27
Payer: COMMERCIAL

## 2020-08-27 ENCOUNTER — TELEPHONE (OUTPATIENT)
Dept: FAMILY MEDICINE CLINIC | Facility: CLINIC | Age: 50
End: 2020-08-27

## 2020-08-27 VITALS
TEMPERATURE: 98.9 F | WEIGHT: 119.2 LBS | BODY MASS INDEX: 21.94 KG/M2 | DIASTOLIC BLOOD PRESSURE: 72 MMHG | RESPIRATION RATE: 18 BRPM | HEIGHT: 62 IN | OXYGEN SATURATION: 98 % | SYSTOLIC BLOOD PRESSURE: 120 MMHG | HEART RATE: 112 BPM

## 2020-08-27 DIAGNOSIS — G89.4 CHRONIC PAIN DISORDER: ICD-10-CM

## 2020-08-27 DIAGNOSIS — G89.4 CHRONIC PAIN SYNDROME: ICD-10-CM

## 2020-08-27 DIAGNOSIS — G89.29 CHRONIC CERVICAL PAIN: ICD-10-CM

## 2020-08-27 DIAGNOSIS — F33.41 RECURRENT MAJOR DEPRESSIVE DISORDER, IN PARTIAL REMISSION (HCC): ICD-10-CM

## 2020-08-27 DIAGNOSIS — F41.9 ANXIETY: ICD-10-CM

## 2020-08-27 DIAGNOSIS — M79.602 PARESTHESIA AND PAIN OF BOTH UPPER EXTREMITIES: ICD-10-CM

## 2020-08-27 DIAGNOSIS — M54.12 CERVICAL RADICULOPATHY: ICD-10-CM

## 2020-08-27 DIAGNOSIS — R00.0 TACHYCARDIA: ICD-10-CM

## 2020-08-27 DIAGNOSIS — F17.210 CIGARETTE NICOTINE DEPENDENCE WITHOUT COMPLICATION: ICD-10-CM

## 2020-08-27 DIAGNOSIS — Z12.31 VISIT FOR SCREENING MAMMOGRAM: ICD-10-CM

## 2020-08-27 DIAGNOSIS — M79.601 PARESTHESIA AND PAIN OF BOTH UPPER EXTREMITIES: ICD-10-CM

## 2020-08-27 DIAGNOSIS — R20.2 PARESTHESIA AND PAIN OF BOTH UPPER EXTREMITIES: ICD-10-CM

## 2020-08-27 DIAGNOSIS — M25.512 CHRONIC PAIN OF BOTH SHOULDERS: ICD-10-CM

## 2020-08-27 DIAGNOSIS — M25.511 CHRONIC PAIN OF BOTH SHOULDERS: ICD-10-CM

## 2020-08-27 DIAGNOSIS — M54.2 CHRONIC CERVICAL PAIN: ICD-10-CM

## 2020-08-27 DIAGNOSIS — IMO0002 UNCONTROLLED TYPE 2 DIABETES MELLITUS WITH DIABETIC POLYNEUROPATHY, WITH LONG-TERM CURRENT USE OF INSULIN: Chronic | ICD-10-CM

## 2020-08-27 DIAGNOSIS — I10 BENIGN ESSENTIAL HYPERTENSION: Primary | ICD-10-CM

## 2020-08-27 DIAGNOSIS — G89.29 CHRONIC PAIN OF BOTH SHOULDERS: ICD-10-CM

## 2020-08-27 DIAGNOSIS — R20.2 PARESTHESIA OF BOTH LOWER EXTREMITIES: ICD-10-CM

## 2020-08-27 DIAGNOSIS — E03.9 HYPOTHYROIDISM, UNSPECIFIED TYPE: ICD-10-CM

## 2020-08-27 DIAGNOSIS — F11.288 OPIOID DEPENDENCE WITH OTHER OPIOID-INDUCED DISORDER (HCC): ICD-10-CM

## 2020-08-27 PROCEDURE — 99215 OFFICE O/P EST HI 40 MIN: CPT | Performed by: FAMILY MEDICINE

## 2020-08-27 PROCEDURE — 3008F BODY MASS INDEX DOCD: CPT | Performed by: INTERNAL MEDICINE

## 2020-08-27 RX ORDER — BLOOD SUGAR DIAGNOSTIC
STRIP MISCELLANEOUS
Qty: 100 EACH | Refills: 2 | Status: SHIPPED | OUTPATIENT
Start: 2020-08-27 | End: 2020-12-01

## 2020-08-27 RX ORDER — TRAMADOL HYDROCHLORIDE 50 MG/1
TABLET ORAL
Qty: 180 TABLET | Refills: 0 | Status: SHIPPED | OUTPATIENT
Start: 2020-08-27 | End: 2020-10-13

## 2020-08-27 RX ORDER — DIAZEPAM 5 MG/1
TABLET ORAL
Qty: 90 TABLET | Refills: 0 | Status: SHIPPED | OUTPATIENT
Start: 2020-08-27 | End: 2020-09-16

## 2020-08-27 NOTE — TELEPHONE ENCOUNTER
----- Message from Pratik Law sent at 8/27/2020 11:55 AM EDT -----  Regarding: DM eye exam  08/27/20 11:56 AM    Hello, our patient Jacquelin Tucker has had Diabetic Eye Exam completed/performed  Please assist in updating the patient chart by pulling the document from the Media Tab  The date of service is 03/05/2020       Thank you,  JEFF MONTEIRO  [facility]

## 2020-08-27 NOTE — TELEPHONE ENCOUNTER
Patient called and stated she did not get the order for the MRI that you and her discussed, was not sure what the final decision was    Please advise

## 2020-08-27 NOTE — TELEPHONE ENCOUNTER
Tell her I did not order the MRI because she seeing pain management next week  We will let them decide which she needs to have done since he cannot be done before that visit anyway  She was also supposed to have a routine mammography ordered    If that can be placed

## 2020-08-27 NOTE — TELEPHONE ENCOUNTER
Patient requested that we send office notes, labs, ans imaging to Dr Arthur Lane  I mailed the patient the medical records release

## 2020-08-27 NOTE — TELEPHONE ENCOUNTER
Upon review of the In Basket request we were able to locate, review, and update the patient chart as requested for Diabetic Eye Exam     Any additional questions or concerns should be emailed to the Practice Liaisons via [email or web address]  [email or web address] email, please do not reply via In Basket      Thank you  Everette Cushing

## 2020-08-27 NOTE — PROGRESS NOTES
Patient's shoes and socks removed  Right Foot/Ankle   Right Foot Inspection  Skin Exam: skin normal, skin intact and dry skin no warmth, no callus, no erythema, no maceration, no abnormal color, no pre-ulcer, no ulcer and no callus                          Toe Exam: erythema  Sensory   Vibration: intact  Proprioception: intact   Monofilament testing: diminished  Vascular    The right DP pulse is 1+  The right PT pulse is 1+  Left Foot/Ankle  Left Foot Inspection  Skin Exam: skin normal, skin intact and dry skinno warmth, no erythema, no maceration, normal color, no pre-ulcer, no ulcer and no callus                         Toe Exam: erythema                   Sensory   Vibration: intact  Proprioception: intact  Monofilament: diminished  Vascular    The left DP pulse is 1+  The left PT pulse is 1+  Assign Risk Category:  No deformity present;  Loss of protective sensation; Weak pulses       Risk: 2

## 2020-08-31 ENCOUNTER — TELEPHONE (OUTPATIENT)
Dept: ADMINISTRATIVE | Facility: OTHER | Age: 50
End: 2020-08-31

## 2020-08-31 NOTE — TELEPHONE ENCOUNTER
----- Message from Chani Terry MA sent at 8/28/2020  4:19 PM EDT -----  Regarding: Diabetic Eye Exam  Diabetic Eye Exam done this year at [facility]

## 2020-08-31 NOTE — TELEPHONE ENCOUNTER
Upon review of your request/inquiry, we have found this is a duplicate request and no further action is needed  This request was completed upon initial request, the patient chart is up to date, and this message will now be closed  HM is up to date     Any additional questions or concerns should be emailed to the Practice Liaisons via [email or web address] email, please do not reply via In Basket       Thank you  Dulce Batista

## 2020-09-01 ENCOUNTER — TELEPHONE (OUTPATIENT)
Dept: FAMILY MEDICINE CLINIC | Facility: CLINIC | Age: 50
End: 2020-09-01

## 2020-09-01 PROBLEM — M79.602 PARESTHESIA AND PAIN OF BOTH UPPER EXTREMITIES: Status: ACTIVE | Noted: 2020-09-01

## 2020-09-01 PROBLEM — R20.2 PARESTHESIA AND PAIN OF BOTH UPPER EXTREMITIES: Status: ACTIVE | Noted: 2020-09-01

## 2020-09-01 PROBLEM — R20.2 PARESTHESIA OF BOTH LOWER EXTREMITIES: Status: ACTIVE | Noted: 2020-09-01

## 2020-09-01 PROBLEM — M25.512 CHRONIC PAIN OF BOTH SHOULDERS: Status: ACTIVE | Noted: 2020-09-01

## 2020-09-01 PROBLEM — G89.29 CHRONIC PAIN OF BOTH SHOULDERS: Status: ACTIVE | Noted: 2020-09-01

## 2020-09-01 PROBLEM — M25.511 CHRONIC PAIN OF BOTH SHOULDERS: Status: ACTIVE | Noted: 2020-09-01

## 2020-09-01 PROBLEM — M79.601 PARESTHESIA AND PAIN OF BOTH UPPER EXTREMITIES: Status: ACTIVE | Noted: 2020-09-01

## 2020-09-01 NOTE — TELEPHONE ENCOUNTER
[place] not on medication list, called [facility] pharmacy however my call was unable to be connected  Will try again to see who prescribed the [place] and when

## 2020-09-01 NOTE — PROGRESS NOTES
Assessment/Plan:    Approximately 40 minutes spent with the patient today reviewing multiple medical issues  Patient has significant medical history  Living situation is not good as her  basically can not move in the home as he is well over 350 lb  Is working from home but they will not allow him to continue to do this for ever  States son-in-law is horrible to him and her  They cannot get out of the situation  Patient finally got to see endocrinology for the 1st time after multiple years of trying to get her there  They will now manage her sugars which are definitely on controlled  She continues to take her tramadol with regards to multiple pains including neck and shoulders  X-rays ordered of the shoulders  Depression at this point stable  Patient actually started off the visit in much better spirits than usual   At the end she ended up being tearful once again  TSH is in normal range  LDL cholesterol is less than 100  Will not order any labs and will follow-up with endocrinology  If endo does not order certain things then I will add them on as we go  Recommend consider flu shot in the fall  Patient is no longer on Ambien  Diabetic foot examination done  Recommend yearly  eye examination  Patient continues to follow with [facility] with regards to chronic headaches  X-rays ordered the bilateral shoulders  Rheumatology evaluation coming up soon  Diagnoses and all orders for this visit:    Benign essential hypertension    Uncontrolled type 2 diabetes mellitus with diabetic polyneuropathy, with long-term current use of insulin (HCC)    Opioid dependence with other opioid-induced disorder (HCC)    Recurrent major depressive disorder, in partial remission (HCC)    Chronic pain disorder    Paresthesia and pain of both upper extremities  -     EMG 2 Limb Upper Extremity; Future    Paresthesia of both lower extremities  -     EMG 2 limb lower extremity;  Future    Chronic pain of both shoulders  -     XR shoulder 2+ vw left; Future  -     XR shoulder 2+ vw right; Future    Anxiety    Cigarette nicotine dependence without complication    Hypothyroidism, unspecified type    Chronic cervical pain    Tachycardia    Visit for screening mammogram  -     Mammo screening bilateral w cad; Future        1  Benign essential hypertension     2  Uncontrolled type 2 diabetes mellitus with diabetic polyneuropathy, with long-term current use of insulin ([facility])     3  Opioid dependence with other opioid-induced disorder ([facility] [address] )     4  Recurrent major depressive disorder, in partial [facility] )     5  Chronic pain disorder     6  Paresthesia and pain of both upper extremities  EMG 2 Limb Upper Extremity   7  Paresthesia of both lower extremities  EMG 2 limb lower extremity   8  Chronic pain of both shoulders  XR shoulder 2+ vw left    XR shoulder 2+ vw right   9  Anxiety     10  Cigarette nicotine dependence without complication     11  Hypothyroidism, unspecified type     12  Chronic cervical pain     13  Tachycardia     14  Visit for screening mammogram  Mammo screening bilateral w cad       Subjective:        Patient ID: Asher Maier is a 53 y o  female  Chief Complaint   Patient presents with    Follow-up     med check       Patient here for routine check  Since we saw her last she finally got in to [facility] endocrinology and they are not managing her insulin  Always under severe stress at home especially secondary to son-in-law  She is no longer taking Ambien  Mentions chronic bilateral shoulder pain and numbness in her hands and feet  The following portions of the patient's history were reviewed and updated as appropriate: past medical history, past surgical history and problem list       Review of Systems   Constitutional: Negative for appetite change, fatigue, fever and unexpected weight change     HENT: Negative for congestion, ear pain, postnasal drip, rhinorrhea, sinus pressure, sinus pain and sore throat  Eyes: Negative for redness and visual disturbance  Respiratory: Negative for chest tightness and shortness of breath  Cardiovascular: Negative for chest pain, palpitations and leg swelling  Gastrointestinal: Negative for abdominal distention, abdominal pain, diarrhea and nausea  Endocrine: Negative for cold intolerance and heat intolerance  Genitourinary: Negative for dysuria and hematuria  Musculoskeletal: Positive for neck stiffness  Negative for arthralgias, gait problem and myalgias  Chronic bilateral shoulder pain neck pain and low back pain  Skin: Negative for pallor and rash  Neurological: Positive for numbness  Negative for dizziness and headaches  Psychiatric/Behavioral: Positive for dysphoric mood  Negative for behavioral problems  The patient is nervous/anxious  Objective:  /72 (BP Location: Left arm, Patient Position: Sitting, Cuff Size: Standard)   Pulse (!) 112   Temp 98 9 °F (37 2 °C) (Temporal)   Resp 18   Ht 5' 2" (1 575 m)   Wt 54 1 kg (119 lb 3 2 oz)   LMP 03/04/2016   SpO2 98%   BMI 21 80 kg/m²        Physical Exam  Vitals signs and nursing note reviewed  Constitutional:       General: She is not in acute distress  HENT:      Head: Normocephalic and atraumatic  Eyes:      General: No scleral icterus  Conjunctiva/sclera: Conjunctivae normal       Pupils: Pupils are equal, round, and reactive to light  Neck:      Musculoskeletal: Normal range of motion and neck supple  Thyroid: No thyromegaly  Cardiovascular:      Rate and Rhythm: Normal rate and regular rhythm  Pulses:           Carotid pulses are 0 on the right side and 0 on the left side  Heart sounds: Normal heart sounds  No murmur  Pulmonary:      Effort: Pulmonary effort is normal  No respiratory distress  Breath sounds: Normal breath sounds  No wheezing  Abdominal:      General: There is no distension  Palpations: Abdomen is soft  Musculoskeletal:      Comments: Slight bilateral decreased range of motion of the upper extremities  Lymphadenopathy:      Cervical: No cervical adenopathy  Skin:     General: Skin is warm  Coloration: Skin is not pale  Neurological:      Mental Status: She is alert and oriented to person, place, and time  Cranial Nerves: No cranial nerve deficit  Deep Tendon Reflexes: Reflexes are normal and symmetric  Psychiatric:         Behavior: Behavior normal          Thought Content:  Thought content normal          Judgment: Judgment normal

## 2020-09-09 ENCOUNTER — TELEPHONE (OUTPATIENT)
Dept: NEUROLOGY | Facility: CLINIC | Age: 50
End: 2020-09-09

## 2020-09-09 NOTE — TELEPHONE ENCOUNTER
PT IS SCHEDULED WITH [address] ON 11/12/20 IN [place]    [facility]  [place]

## 2020-09-09 NOTE — TELEPHONE ENCOUNTER
General  09/09/2020 12:55 PM  Reina Epps MA  CARE COORDINATION  -    Note     BOTOX 200 UNITS - NO AUTH NEEDED    [facility]  Marty Schmidt

## 2020-09-11 DIAGNOSIS — F31.81 BIPOLAR II DISORDER (HCC): ICD-10-CM

## 2020-09-11 DIAGNOSIS — F33.41 RECURRENT MAJOR DEPRESSIVE DISORDER, IN PARTIAL REMISSION (HCC): ICD-10-CM

## 2020-09-11 DIAGNOSIS — F40.01 PANIC DISORDER WITH AGORAPHOBIA: ICD-10-CM

## 2020-09-11 DIAGNOSIS — F43.10 PTSD (POST-TRAUMATIC STRESS DISORDER): ICD-10-CM

## 2020-09-11 DIAGNOSIS — F40.10 SOCIAL ANXIETY DISORDER: ICD-10-CM

## 2020-09-11 RX ORDER — PRAZOSIN HYDROCHLORIDE 1 MG/1
1 CAPSULE ORAL
Qty: 30 CAPSULE | Refills: 1 | Status: SHIPPED | OUTPATIENT
Start: 2020-09-11 | End: 2020-10-07

## 2020-09-11 RX ORDER — DULOXETIN HYDROCHLORIDE 60 MG/1
CAPSULE, DELAYED RELEASE ORAL
Qty: 30 CAPSULE | Refills: 1 | Status: SHIPPED | OUTPATIENT
Start: 2020-09-11 | End: 2020-10-07

## 2020-09-11 RX ORDER — OLANZAPINE 10 MG/1
TABLET ORAL
Qty: 30 TABLET | Refills: 1 | Status: SHIPPED | OUTPATIENT
Start: 2020-09-11 | End: 2020-10-07

## 2020-09-14 ENCOUNTER — CONSULT (OUTPATIENT)
Dept: PAIN MEDICINE | Facility: CLINIC | Age: 50
End: 2020-09-14
Payer: COMMERCIAL

## 2020-09-14 VITALS
SYSTOLIC BLOOD PRESSURE: 109 MMHG | WEIGHT: 115 LBS | HEART RATE: 116 BPM | BODY MASS INDEX: 21.16 KG/M2 | DIASTOLIC BLOOD PRESSURE: 83 MMHG | TEMPERATURE: 97.9 F | HEIGHT: 62 IN

## 2020-09-14 DIAGNOSIS — G89.29 CHRONIC CERVICAL PAIN: ICD-10-CM

## 2020-09-14 DIAGNOSIS — J45.20 MILD INTERMITTENT REACTIVE AIRWAY DISEASE WITHOUT COMPLICATION: ICD-10-CM

## 2020-09-14 DIAGNOSIS — M54.12 CERVICAL RADICULOPATHY: Primary | ICD-10-CM

## 2020-09-14 DIAGNOSIS — M54.16 LUMBAR RADICULOPATHY: ICD-10-CM

## 2020-09-14 DIAGNOSIS — M48.02 CERVICAL SPINAL STENOSIS: ICD-10-CM

## 2020-09-14 DIAGNOSIS — M54.40 LOW BACK PAIN WITH SCIATICA, SCIATICA LATERALITY UNSPECIFIED, UNSPECIFIED BACK PAIN LATERALITY, UNSPECIFIED CHRONICITY: ICD-10-CM

## 2020-09-14 DIAGNOSIS — M47.812 CERVICAL SPONDYLOSIS WITHOUT MYELOPATHY: ICD-10-CM

## 2020-09-14 DIAGNOSIS — M51.36 DDD (DEGENERATIVE DISC DISEASE), LUMBAR: ICD-10-CM

## 2020-09-14 DIAGNOSIS — M50.30 DDD (DEGENERATIVE DISC DISEASE), CERVICAL: ICD-10-CM

## 2020-09-14 DIAGNOSIS — M54.2 CHRONIC CERVICAL PAIN: ICD-10-CM

## 2020-09-14 DIAGNOSIS — G43.709 CHRONIC MIGRAINE WITHOUT AURA WITHOUT STATUS MIGRAINOSUS, NOT INTRACTABLE: ICD-10-CM

## 2020-09-14 PROBLEM — M51.369 DDD (DEGENERATIVE DISC DISEASE), LUMBAR: Status: ACTIVE | Noted: 2020-09-14

## 2020-09-14 PROCEDURE — 3079F DIAST BP 80-89 MM HG: CPT | Performed by: ANESTHESIOLOGY

## 2020-09-14 PROCEDURE — 99204 OFFICE O/P NEW MOD 45 MIN: CPT | Performed by: ANESTHESIOLOGY

## 2020-09-14 RX ORDER — ALBUTEROL SULFATE 90 UG/1
POWDER, METERED RESPIRATORY (INHALATION)
Qty: 1 EACH | Refills: 2 | Status: SHIPPED | OUTPATIENT
Start: 2020-09-14 | End: 2020-12-23

## 2020-09-14 RX ORDER — BUTALBITAL, ACETAMINOPHEN AND CAFFEINE 50; 325; 40 MG/1; MG/1; MG/1
TABLET ORAL
Qty: 10 TABLET | Refills: 3 | Status: SHIPPED | OUTPATIENT
Start: 2020-09-14 | End: 2021-02-12 | Stop reason: SDUPTHER

## 2020-09-14 NOTE — PATIENT INSTRUCTIONS
Lower Back Exercises   WHAT YOU NEED TO KNOW:   What do I need to know about lower back exercises? Lower back exercises help heal and strengthen your back muscles to prevent another injury  Ask your healthcare provider if you need to see a physical therapist for more advanced exercises  · Do the exercises on a mat or firm surface  (not on a bed) to support your spine and prevent low back pain  · Move slowly and smoothly  Avoid fast or jerky motions  · Breathe normally  Do not hold your breath  · Stop if you feel pain  It is normal to feel some discomfort at first  Regular exercise will help decrease your discomfort over time  How do I perform lower back exercises safely? Your healthcare provider may recommend that you do back exercises 10 to 30 minutes each day  He may also recommend that you do exercises 1 to 3 times each day  Ask your healthcare provider which exercises are best for you and how often to do them  · Ankle pumps:  Lie on your back  Move your foot up (with your toes pointing toward your head)  Then, move your foot down (with your toes pointing away from you)  Repeat this exercise 10 times on each side  · Heel slides:  Lie on your back  Slowly bend one leg and then straighten it  Next, bend the other leg and then straighten it  Repeat 10 times on each side  · Pelvic tilt:  Lie on your back with your knees bent and feet flat on the floor  Place your arms in a relaxed position beside your body  Tighten the muscles of your abdomen and flatten your back against the floor  Hold for 5 seconds  Repeat 5 times  · Back stretch:  Lie on your back with your hands behind your head  Bend your knees and turn the lower half of your body to one side  Hold this position for 10 seconds  Repeat 3 times on each side  · Straight leg raises:  Lie on your back with one leg straight  Bend the other knee   Tighten your abdomen and then slowly lift the straight leg up about 6 to 12 inches off the floor  Hold for 1 to 5 seconds  Lower your leg slowly  Repeat 10 times on each leg  · Knee-to-chest:  Lie on your back with your knees bent and feet flat on the floor  Pull one of your knees toward your chest and hold it there for 5 seconds  Return your leg to the starting position  Lift the other knee toward your chest and hold for 5 seconds  Do this 5 times on each side  · Cat and camel:  Place your hands and knees on the floor  Arch your back upward toward the ceiling and lower your head  Round out your spine as much as you can  Hold for 5 seconds  Lift your head upward and push your chest downward toward the floor  Hold for 5 seconds  Do 3 sets or as directed  · Wall squats:  Stand with your back against a wall  Tighten the muscles of your abdomen  Slowly lower your body until your knees are bent at a 45 degree angle  Hold this position for 5 seconds  Slowly move back up to a standing position  Repeat 10 times  · Curl up:  Lie on your back with your knees bent and feet flat on the floor  Place your hands, palms down, underneath the curve in your lower back  Next, with your elbows on the floor, lift your shoulders and chest 2 to 3 inches  Keep your head in line with your shoulders  Hold this position for 5 seconds  When you can do this exercise without pain for 10 to 15 seconds, you may add a rotation  While your shoulders and chest are lifted off the ground, turn slightly to the left and hold  Repeat on the other side  · Bird dog:  Place your hands and knees on the floor  Keep your wrists directly below your shoulders and your knees directly below your hips  Pull your belly button in toward your spine  Do not flatten or arch your back  Tighten your abdominal muscles  Raise one arm straight out so that it is aligned with your head  Next, raise the leg opposite your arm  Hold this position for 15 seconds   Lower your arm and leg slowly and change sides  Do 5 sets  When should I seek immediate care? · You have severe pain that prevents you from moving  When should I contact my healthcare provider? · Your pain becomes worse  · You have new pain  · You have questions or concerns about your condition or care  CARE AGREEMENT:   You have the right to help plan your care  Learn about your health condition and how it may be treated  Discuss treatment options with your caregivers to decide what care you want to receive  You always have the right to refuse treatment  The above information is an  only  It is not intended as medical advice for individual conditions or treatments  Talk to your doctor, nurse or pharmacist before following any medical regimen to see if it is safe and effective for you  © 2017 [address] Information is for End User's use only and may not be sold, redistributed or otherwise used for commercial purposes  All illustrations and images included in CareNotes® are the copyrighted property of [employer] M [employer]  or [employer]  Neck Exercises   WHAT YOU NEED TO KNOW:   Why is it important to do neck exercises? Neck exercises help reduce neck pain, and improve neck movement and strength  Neck exercises also help prevent long-term neck problems  What do I need to know about neck exercises? · Do the exercises every day,  or as often as directed by your healthcare provider  · Move slowly, gently, and smoothly  Avoid fast or jerky motions  · Stand and sit the way your healthcare provider shows you  Good posture may reduce your neck pain  Check your posture often, even when you are not doing your neck exercises  How do I perform neck exercises safely? · Exercise position:  You may sit or stand while you do neck exercises  Face forward  Your shoulders should be straight and relaxed, with a good posture             · Head tilts, forward and back:  Gently bow your head and try to touch your chin to your chest  Your healthcare provider may tell you to push on the back of your neck to help bow your head  Raise your chin back to the starting position  Tilt your head back as far as possible so you are looking up at the ceiling  Your healthcare provider may tell you to lift your chin to help tilt your head back  Return your head to the starting position  · Head tilts, side to side:  Tilt your head, bringing your ear toward your shoulder  Then tilt your head toward the other shoulder  · Head turns:  Turn your head to look over your shoulder  Tilt your chin down and try to touch it to your shoulder  Do not raise your shoulder to your chin  Face forward again  Do the same on the other side  · Head rolls:  Slowly bring your chin toward your chest  Next, roll your head to the right  Your ear should be positioned over your shoulder  Hold this position for 5 seconds  Roll your head back toward your chest and to the left into the same position  Hold for 5 seconds  Gently roll your head back and around in a clockwise [place] 3 times  Next, move your head in the reverse direction (counterclockwise) in a [place] 3 times  Do not shrug your shoulders upwards while you do this exercise  When should I contact my healthcare provider? · Your pain does not get better, or gets worse  · You have questions or concerns about your condition, care, or exercise program   CARE AGREEMENT:   You have the right to help plan your care  Learn about your health condition and how it may be treated  Discuss treatment options with your caregivers to decide what care you want to receive  You always have the right to refuse treatment  The above information is an  only  It is not intended as medical advice for individual conditions or treatments  Talk to your doctor, nurse or pharmacist before following any medical regimen to see if it is safe and effective for you    © 2017 [facility] Information is for End User's use only and may not be sold, redistributed or otherwise used for commercial purposes  All illustrations and images included in CareNotes® are the copyrighted property of A D A M , Inc  or Andres Garibay

## 2020-09-14 NOTE — PROGRESS NOTES
Assessment  1  Cervical radiculopathy    2  Lumbar radiculopathy    3  Chronic cervical pain    4  DDD (degenerative disc disease), cervical    5  DDD (degenerative disc disease), lumbar    6  Low back pain with sciatica, sciatica laterality unspecified, unspecified back pain laterality, unspecified chronicity    7  Cervical spondylosis without myelopathy    8  Cervical spinal stenosis        Plan  41-year-old female last seen by our practice in July 2017 with a history of fibromyalgia, carpal tunnel syndrome on the right status post release, CVA affecting the left side of her body, cervical degenerative disc disease, spondylosis, cervical radiculopathy, stenosis returning for follow-up  The patient complains of neck pain that radiates in bilateral upper extremities in no specific dermatomal fashion with associated numbness, paresthesias, and subjective weakness  She also complains of lumbosacral back pain that radiates into bilateral lower extremities with associated numbness, paresthesias, and subjective weakness  Last MRI of the cervical spine from 2015 reveals degenerative disc disease and spondylosis with varying degrees of central and foraminal stenosis from C4-5 C6-7 without any cord or neural impingement  Patient does have an EMG of her upper extremities revealing chronic C6 radiculopathy as well as carpal tunnel syndrome on the left  Most recent x-ray of the lumbar spine reveals degenerative disc disease at L3-4  The patient is currently taking tramadol 100 mg q 8 hours p r n  Which is prescribed by her PCP, gabapentin 800 mg t i d , and duloxetine 60 mg every other day which she takes for mood  Physical therapy has been ineffective in the past   The patient has had cervical rhizotomy and cervical epidural steroid injections which had provided her approximately 80% relief  Epidural steroid injections lasted for at least 3 months at a time and cervical rhizotomy lasted for more than a year    The patient's neck pain seems to be multifactorial including myofascial and facet mediated components  Upper extremity symptoms are multifactorial including radicular components, carpal tunnel syndrome, and a component stemming from previous CVA  Patient's low back pain does have a myofascial and possibly facet mediated component  Her lower extremity symptoms may be radicular in nature, however peripheral neuropathy a component coming from CVA also on the differential   The patient does have evidence of sacroiliitis on the right  1  I will order an MRI of the cervical spine without contrast  2  The patient was prescribed home exercise program for her cervical and lumbar complaints as she is unable to afford formal physical therapy and chiropractic treatment secondary to out of pocket cost     3  The patient will continue with tramadol and gabapentin as prescribed by PCP  4  I will follow up with the patient in 6 weeks and will call her with results of MRI once received and recommendations based upon this results       My impressions and treatment recommendations were discussed in detail with the patient who verbalized understanding and had no further questions  Discharge instructions were provided  I personally saw and examined the patient and I agree with the above discussed plan of care  No orders of the defined types were placed in this encounter  No orders of the defined types were placed in this encounter  History of Present Illness    Yvonne Ackerman is a 53 y o  female last seen by our practice in July 2017 with a history of fibromyalgia, carpal tunnel syndrome on the right status post release, CVA affecting the left side of her body, cervical degenerative disc disease, spondylosis, cervical radiculopathy, stenosis returning for interval consultation    The patient complains of neck pain that radiates in bilateral upper extremities in no specific dermatomal fashion with associated numbness, paresthesias, and subjective weakness  She also complains of lumbosacral back pain that radiates into bilateral lower extremities with associated numbness, paresthesias, and subjective weakness  She denies any trauma or inciting event  Pain has been going on for greater than 12 years  Last MRI of the cervical spine from 2015 reveals degenerative disc disease and spondylosis with varying degrees of central and foraminal stenosis from C4-5 C6-7 without any cord or neural impingement  Patient does have an EMG of her upper extremities revealing chronic C6 radiculopathy as well as carpal tunnel syndrome on the left  Most recent x-ray of the lumbar spine reveals degenerative disc disease at L3-4  The patient is currently taking tramadol 100 mg q 8 hours p r n  Which is prescribed by her PCP, gabapentin 800 mg t i d , and duloxetine 60 mg every other day which she takes for mood  Physical therapy has been ineffective in the past   The patient has had cervical rhizotomy and cervical epidural steroid injections which had provided her approximately 80% relief  Epidural steroid injections lasted for at least 3 months at a time and cervical rhizotomy lasted for more than a year  The patient rates her pain an 8/10 on the pain is nearly constant  The pain is not follow any particular pattern throughout the day  The pain is described as shooting, numbness, sharp, pins and needles, pressure like, and throbbing  The pain is increased with lying down, standing, bending, sitting, walking, and exercise  She has received some relief with previous injections as noted above  Other than as stated above, the patient denies any interval changes in medications, medical condition, mental condition, symptoms, or allergies since the last office visit            I have personally reviewed and/or updated the patient's past medical history, past surgical history, family history, social history, current medications, allergies, and vital signs today  Review of Systems   Constitutional: Negative for fever and unexpected weight change  HENT: Negative for trouble swallowing  Eyes: Negative for visual disturbance  Respiratory: Negative for shortness of breath and wheezing  Cardiovascular: Positive for leg swelling  Negative for chest pain and palpitations  Gastrointestinal: Negative for constipation, diarrhea, nausea and vomiting  Endocrine: Positive for polydipsia  Negative for cold intolerance and heat intolerance  Genitourinary: Positive for difficulty urinating and frequency  Musculoskeletal: Positive for joint swelling and myalgias  Negative for arthralgias and gait problem  Skin: Negative for rash  Neurological: Positive for numbness and headaches  Negative for dizziness, seizures, syncope and weakness  Hematological: Does not bruise/bleed easily  Psychiatric/Behavioral: Positive for decreased concentration  Negative for dysphoric mood  Anxiety, depression   All other systems reviewed and are negative        Patient Active Problem List   Diagnosis    Uncontrolled type 2 diabetes mellitus with diabetic polyneuropathy, with long-term current use of insulin (HCC)    Depression    GERD without esophagitis    History of decompression of median nerve    Hypothyroidism    Protein calorie malnutrition (HCC)    Nicotine dependence    Anxiety    Benign essential hypertension    Chronic fatigue    Chronic pain disorder    Fatty liver    Hyperlipidemia    Insomnia    Iron deficiency    Chronic diarrhea    Opioid dependence ([facility] [address] )    Pancreatic cyst    History of stroke    Vitamin B12 deficiency    Vitamin D deficiency    Reactive airway disease without complication    Temporary cerebral vascular dysfunction    History of pulmonary embolism    Arthralgia of temporomandibular joint    Carpal tunnel syndrome    Dysphagia    Chronic migraine without aura without status migrainosus, not intractable    Diplopia    ROGERIO positive    Prolonged QT interval    Symptom associated with female genital organs    Irregular menstrual cycle    Female stress incontinence    Decreased libido    Chronic cervical pain    Paresthesia and pain of both upper extremities    Paresthesia of both lower extremities    Chronic pain of both shoulders       Past Medical History:   Diagnosis Date    Acute venous embolism and thrombosis of deep vessels of distal lower extremity (HCC)     Anemia     Anxiety     last assessed 11/20/17    Arthritis     Asthma     Cerebral infarction ([facility] )     unspecified, last assessed 11/14/16    Chest pain     last assessed 5/9/17    Chronic cough     last assessed 12/12/13    Depression     Diabetes mellitus, type 2 ([facility] )     DJD (degenerative joint disease)     Esophageal reflux     Fibromyalgia     GERD without esophagitis     resolved 5/13/16    History of pulmonary embolism     Hyperlipidemia     Hypertension     Hypothyroidism     Iron deficiency     Pancreatitis     Panic attack     Panic disorder     Polyarthritis     PTSD (post-traumatic stress disorder)     Sleep difficulties     Stroke syndrome     Thyroid disease     TIA (transient ischemic attack)     TIA (transient ischemic attack)     Venous embolism and thrombosis of deep vessels of distal lower extremity (HCC)     Vitamin B12 deficiency     Vitamin D deficiency        Past Surgical History:   Procedure Laterality Date    CARPAL TUNNEL RELEASE Right     neuroplasty decompression of median nerve    CATARACT EXTRACTION      CHOLECYSTECTOMY      ERCP      ERCP      ESOPHAGOGASTRIC FUNDOPLASTY      NISSEN FUNDOPLICATION      HI ESOPHAGOGASTRODUODENOSCOPY TRANSORAL DIAGNOSTIC N/A 11/2/2016    Procedure: EGD AND COLONOSCOPY;  Surgeon: Cristal Diaz MD;  [facility]: [facility];   Service: Gastroenterology    HI INCISE FINGER TENDON SHEATH Right 3/8/2016    Procedure: RELEASE TRIGGER FINGER RIGHT THUMB;  Surgeon: Giovana Otero MD;  [facility];  Service: Orthopedics    NC WRIST Olesya Tejada LIG Right 3/8/2016    Procedure: RELEASE CARPAL TUNNEL ENDOSCOPIC;  Surgeon: Giovana Otero MD;  [facility];  Service: Orthopedics    TONSILLECTOMY AND ADENOIDECTOMY         Family History   Problem Relation Age of Onset    Diabetes Mother         type 2    Heart attack Mother 44        acute MI    Kidney disease Mother         CKD NKF classfication    Depression Mother     Arthritis Mother     Substance Abuse Mother         mother OD in past on MS04    Ulcerative colitis Mother    Brittany Ambrosio Schizophrenia Mother     Suicide Attempts Mother     Bipolar disorder Mother     Diabetes Maternal Grandmother     Heart attack Father         acute MI    Psoriasis Father    Brittany Ambrosio Cancer Father         gastric cancer    Stroke Father     Crohn's disease Sister     Bipolar disorder Sister     Rheum arthritis Maternal Grandfather     Throat cancer Maternal Grandfather     Suicide Attempts Daughter     Drug abuse Daughter     Lung cancer Paternal Grandmother     Cancer Paternal Grandfather     No Known Problems Sister     Bipolar disorder Maternal Uncle     Anesthesia problems Neg Hx        Social History     Occupational History    Occupation: unemployed     Comment: SSDI   Tobacco Use    Smoking status: Current Every Day Smoker     Packs/day: 0 50     Years: 35 00     Pack years: 17 50     Types: Cigarettes     Start date: 1/1/1983    Smokeless tobacco: Never Used    Tobacco comment: 20 + years   Substance and Sexual Activity    Alcohol use: Not Currently     Alcohol/week: 0 0 standard drinks     Frequency: Never     Binge frequency: Never     Comment: last was in 2010 after DUI    Drug use: No    Sexual activity: Yes     Partners: Male       Current Outpatient Medications on File Prior to Visit   Medication Sig    ADVAIR DISKUS 500-50 MCG/DOSE inhaler INHALE 1 PUFF 2 (TWO) TIMES A DAY RINSE MOUTH AFTER USE   Albuterol Sulfate 108 (90 Base) MCG/ACT AEPB Inhale 2 puffs by mouth every 4-6 hours as needed for wheezing or shortness of breath    aspirin 81 mg chewable tablet Chew 81 mg daily     atorvastatin (LIPITOR) 80 mg tablet TAKE 1 TABLET BY MOUTH EVERY DAY    BD PEN NEEDLE LINDY U/F 32G X 4 MM MISC Inject under the skin daily    buPROPion (WELLBUTRIN XL) 150 mg 24 hr tablet Take 1 tablet (150 mg total) by mouth daily    butalbital-acetaminophen-caffeine (FIORICET,ESGIC) -40 mg per tablet 1 tab q6 hours prn migraine  No more than 2-3 per week   Cyanocobalamin (VITAMIN B-12 IJ) Inject as directed    Cyanocobalamin 1000 MCG/ML LIQD Take 500 mcg by mouth daily    diazepam (VALIUM) 5 mg tablet TAKE 1/2 TO 1 TABLET BY MOUTH 3 TIMES DAILY AS NEEDED FOR ANXIETY AND/OR MUSCLE SPASMS  DO NOT TAKE WITH AMBIEN   DULoxetine (CYMBALTA) 60 mg delayed release capsule TAKE 1 CAPSULE BY MOUTH EVERY DAY    Folic Acid 0 8 MG CAPS Take 0 04 mg by mouth daily   gabapentin (NEURONTIN) 400 mg capsule TAKE 1 TO 2 CAPSULE(S) BY MOUTH THREE TIMES DAILY    Galcanezumab-gnlm (Emgality) 120 MG/ML SOAJ inject 1 pen subq every 30 days      hydrocortisone (CORTEF) 5 mg tablet Take 2 tablets in the morning at 8:00 a m , 1 tablet at 5:00 p m ( double the dose in acute sickness, such as fever upper respiratory tract infection)    insulin aspart (NOVOLOG FLEXPEN) 100 Units/mL injection pen Inject 15 Units under the skin 3 (three) times a day Before meals plus 2units for every 50mg/dL above 150mg/dL    insulin glargine (Lantus SoloStar) 100 units/mL injection pen Inject 50 Units under the skin daily    insulin lispro (HumaLOG) 100 units/mL injection Inject 33 units before each meal    Insulin Pen Needle (BD Pen Needle Lindy U/F) 32G X 4 MM MISC by Does not apply route daily    Insulin Syringes, Disposable, U-100 1 ML MISC by Does not apply route 3 (three) times a day    levothyroxine 112 mcg tablet TAKE 1 TABLET BY MOUTH EVERY DAY    losartan (COZAAR) 50 mg tablet TAKE 1 TABLET DAILY   metoprolol tartrate (LOPRESSOR) 25 mg tablet TAKE 1 TABLET BY MOUTH EVERY DAY    NEEDLE, DISP, 25 G (B-D DISP NEEDLE 25GX1") 25G X 1" MISC by Does not apply route once as needed (opiod overdose) for up to 1 dose    OLANZapine (ZyPREXA) 10 mg tablet TAKE 1 TABLET BY MOUTH DAILY AT BEDTIME    omeprazole (PriLOSEC) 20 mg delayed release capsule TAKE 1 CAPSULE BY MOUTH EVERY DAY    ondansetron (ZOFRAN) 4 mg tablet TAKE 1 TABLET BY MOUTH EVERY 8 HOURS AS NEEDED FOR NAUSEA AND VOMITING    OneTouch Ultra test strip USE AS DIRECTED TO TEST 3 TIMES A DAY    pioglitazone (ACTOS) 15 mg tablet Take 1 tablet (15 mg total) by mouth daily    prazosin (MINIPRESS) 1 mg capsule TAKE 1 CAPSULE (1 MG TOTAL) BY MOUTH DAILY AT BEDTIME    prednisoLONE acetate (PRED FORTE) 1 % ophthalmic suspension INSTILL 1 DROP INTO SURGERY EYE 4 TIMES DAILY AFTER SURGERY   prochlorperazine (COMPAZINE) 10 mg tablet TAKE 1 TABLET (10 MG TOTAL) BY MOUTH EVERY 6 (SIX) HOURS AS NEEDED FOR NAUSEA OR VOMITING    topiramate (TOPAMAX) 100 mg tablet TAKE 2 TABLETS BY MOUTH EVERY DAY    topiramate (TOPAMAX) 25 mg tablet TAKE 1 TABLET BY MOUTH EVERY DAY    traMADol (ULTRAM) 50 mg tablet TAKE 1 TO 2 TABLET(S) BY MOUTH EVERY 8 HOURS AS NEEDED FOR SEVERE PAIN    Turmeric 500 MG TABS Take 1 tablet by mouth daily    Naloxone HCl (NARCAN) 4 MG/0 1ML LIQD 1 actuation in one nostril once as needed for opioid overdose, may repeat dose every 2-3 minutes until patient responsive or EMS arrives (Patient not taking: Reported on 9/14/2020)    ofloxacin (OCUFLOX) 0 3 % ophthalmic solution PLEASE SEE ATTACHED FOR DETAILED DIRECTIONS     No current facility-administered medications on file prior to visit          Allergies   Allergen Reactions    Lexapro [Escitalopram Oxalate]     Escitalopram Other (See Comments) and Palpitations    Other Hives and Rash Adhesive Tape       Physical Exam    /83   Pulse (!) 116   Temp 97 9 °F (36 6 °C) (Temporal)   Ht 5' 2" (1 575 m)   Wt 52 2 kg (115 lb)   LMP 03/04/2016   BMI 21 03 kg/m²     Constitutional: normal, well developed, well nourished, alert, in no distress and non-toxic and no overt pain behavior  Eyes: anicteric  HEENT: grossly intact  Neck: supple, symmetric, trachea midline and no masses   Pulmonary:even and unlabored  Cardiovascular:No edema or pitting edema present  Skin:Normal without rashes or lesions and well hydrated  Psychiatric:Mood and affect appropriate  Neurologic:Cranial Nerves II-XII grossly intact  Musculoskeletal:normal gait  Bilateral cervical paraspinals and trapezii tender to palpation or ropy in texture  Full range of motion of cervical spine in all planes  Bilateral biceps, triceps, brachioradialis, patella, and Achilles reflexes are 2 4th symmetrical   No clonus noted bilaterally  Negative Akil's reflex bilaterally  Bilateral upper extremity strength 5/5 in all muscle groups with the exception of right  strength which was 4/5  Sensation intact to light touch in C5 13 dermatomes bilaterally  Positive Spurling's bilaterally  Negative Tinel's over bilateral wrists and cubital tunnels  Bilateral lumbar paraspinals and SI joint is tender to palpation  Bilateral trochanteric flares nontender to palpation  Bilateral lower extremity strength 5/5 in all muscle groups  Sensation intact to light touch in the thoracic dermatomes bilaterally  Positive straight leg raise bilaterally  Positive Venkata's on the right and negative on the left  Positive Gaenslen's and AP compression test on the right and negative on the left  Imaging        PACS Images      Show images for XR spine lumbar minimum 4 views non injury    Study Result     LUMBAR SPINE     INDICATION:   [id number]: Pain in unspecified hip    Low back pain and bilateral hip and knee pain      COMPARISON:  Abdomen and pelvic CT from 8/24/2017      VIEWS:  XR SPINE LUMBAR MINIMUM 4 VIEWS NON INJURY        FINDINGS:     There are 5 non rib bearing lumbar vertebral bodies       There is no evidence of acute fracture or destructive osseous lesion      Alignment is unremarkable       Mild degenerative disc space narrowing at L3-L4      The pedicles appear intact      Soft tissues are unremarkable      IMPRESSION:     No acute osseous abnormality        Mild degenerative disc space narrowing at L3-L4         Workstation performed: [id number]

## 2020-09-16 ENCOUNTER — OFFICE VISIT (OUTPATIENT)
Dept: PSYCHIATRY | Facility: CLINIC | Age: 50
End: 2020-09-16
Payer: COMMERCIAL

## 2020-09-16 DIAGNOSIS — F40.01 PANIC DISORDER WITH AGORAPHOBIA: ICD-10-CM

## 2020-09-16 DIAGNOSIS — M54.12 CERVICAL RADICULOPATHY: ICD-10-CM

## 2020-09-16 DIAGNOSIS — F43.10 POST TRAUMATIC STRESS DISORDER (PTSD): ICD-10-CM

## 2020-09-16 DIAGNOSIS — F41.1 GENERALIZED ANXIETY DISORDER: ICD-10-CM

## 2020-09-16 DIAGNOSIS — F41.9 ANXIETY: ICD-10-CM

## 2020-09-16 DIAGNOSIS — F31.81 BIPOLAR II DISORDER (HCC): Primary | ICD-10-CM

## 2020-09-16 PROCEDURE — 90833 PSYTX W PT W E/M 30 MIN: CPT | Performed by: PSYCHIATRY & NEUROLOGY

## 2020-09-16 PROCEDURE — 99214 OFFICE O/P EST MOD 30 MIN: CPT | Performed by: PSYCHIATRY & NEUROLOGY

## 2020-09-16 RX ORDER — DIAZEPAM 5 MG/1
5 TABLET ORAL 3 TIMES DAILY PRN
Qty: 90 TABLET | Refills: 1 | Status: SHIPPED | OUTPATIENT
Start: 2020-09-24 | End: 2020-12-02 | Stop reason: SDUPTHER

## 2020-09-16 RX ORDER — DULOXETIN HYDROCHLORIDE 30 MG/1
30 CAPSULE, DELAYED RELEASE ORAL
Qty: 30 CAPSULE | Refills: 1 | Status: SHIPPED | OUTPATIENT
Start: 2020-09-16 | End: 2020-10-09

## 2020-09-16 RX ORDER — ONABOTULINUMTOXINA 200 [USP'U]/1
INJECTION, POWDER, LYOPHILIZED, FOR SOLUTION INTRADERMAL; INTRAMUSCULAR
Qty: 1 EACH | OUTPATIENT
Start: 2020-09-16

## 2020-09-16 NOTE — PATIENT INSTRUCTIONS
Bipolar II disorder (HCC)  -     DULoxetine (CYMBALTA) 30 mg delayed release capsule; Take 1 capsule (30 mg total) by mouth daily at bedtime 60mg in the morning, daily total of 90mg  Generalized anxiety disorder  -     DULoxetine (CYMBALTA) 30 mg delayed release capsule; Take 1 capsule (30 mg total) by mouth daily at bedtime 60mg in the morning, daily total of 90mg   -     diazepam (VALIUM) 5 mg tablet; Take 1 tablet (5 mg total) by mouth 3 (three) times a day as needed for anxiety    Post traumatic stress disorder (PTSD)  -     diazepam (VALIUM) 5 mg tablet; Take 1 tablet (5 mg total) by mouth 3 (three) times a day as needed for anxiety    Panic disorder with agoraphobia  -     diazepam (VALIUM) 5 mg tablet;  Take 1 tablet (5 mg total) by mouth 3 (three) times a day as needed for anxiety    Cervical radiculopathy    Anxiety        Follow up in 4-6 weeks  Therapy appt
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
## 2020-09-17 RX ORDER — ONABOTULINUMTOXINA 200 [USP'U]/1
INJECTION, POWDER, LYOPHILIZED, FOR SOLUTION INTRADERMAL; INTRAMUSCULAR
Qty: 1 EACH | OUTPATIENT
Start: 2020-09-17

## 2020-09-18 DIAGNOSIS — E11.49 TYPE 2 DIABETES MELLITUS WITH OTHER NEUROLOGIC COMPLICATION, WITH LONG-TERM CURRENT USE OF INSULIN (HCC): ICD-10-CM

## 2020-09-18 DIAGNOSIS — Z79.4 TYPE 2 DIABETES MELLITUS WITH OTHER NEUROLOGIC COMPLICATION, WITH LONG-TERM CURRENT USE OF INSULIN (HCC): ICD-10-CM

## 2020-09-18 DIAGNOSIS — IMO0002 UNCONTROLLED TYPE 2 DIABETES MELLITUS WITH DIABETIC POLYNEUROPATHY, WITH LONG-TERM CURRENT USE OF INSULIN: ICD-10-CM

## 2020-09-18 DIAGNOSIS — F11.90 OPIOID USE: ICD-10-CM

## 2020-09-30 DIAGNOSIS — IMO0002 UNCONTROLLED TYPE 2 DIABETES MELLITUS WITH DIABETIC POLYNEUROPATHY, WITH LONG-TERM CURRENT USE OF INSULIN: Chronic | ICD-10-CM

## 2020-10-01 ENCOUNTER — TELEPHONE (OUTPATIENT)
Dept: NEUROLOGY | Facility: CLINIC | Age: 50
End: 2020-10-01

## 2020-10-02 ENCOUNTER — TELEPHONE (OUTPATIENT)
Dept: NEUROLOGY | Facility: CLINIC | Age: 50
End: 2020-10-02

## 2020-10-02 ENCOUNTER — TELEMEDICINE (OUTPATIENT)
Dept: NEUROLOGY | Facility: CLINIC | Age: 50
End: 2020-10-02
Payer: COMMERCIAL

## 2020-10-02 VITALS — HEIGHT: 62 IN | BODY MASS INDEX: 20.61 KG/M2 | WEIGHT: 112 LBS

## 2020-10-02 DIAGNOSIS — G43.709 CHRONIC MIGRAINE WITHOUT AURA WITHOUT STATUS MIGRAINOSUS, NOT INTRACTABLE: Primary | ICD-10-CM

## 2020-10-02 DIAGNOSIS — M79.18 CERVICAL MYOFASCIAL PAIN SYNDROME: ICD-10-CM

## 2020-10-02 DIAGNOSIS — IMO0002 UNCONTROLLED TYPE 2 DIABETES MELLITUS WITH DIABETIC POLYNEUROPATHY, WITH LONG-TERM CURRENT USE OF INSULIN: ICD-10-CM

## 2020-10-02 DIAGNOSIS — M54.2 CHRONIC CERVICAL PAIN: ICD-10-CM

## 2020-10-02 DIAGNOSIS — E55.9 VITAMIN D DEFICIENCY: ICD-10-CM

## 2020-10-02 DIAGNOSIS — G89.29 CHRONIC CERVICAL PAIN: ICD-10-CM

## 2020-10-02 PROCEDURE — 99214 OFFICE O/P EST MOD 30 MIN: CPT | Performed by: PHYSICIAN ASSISTANT

## 2020-10-02 RX ORDER — BACLOFEN 10 MG/1
TABLET ORAL
Qty: 90 TABLET | Refills: 0 | Status: SHIPPED | OUTPATIENT
Start: 2020-10-02 | End: 2020-10-26

## 2020-10-02 RX ORDER — PIOGLITAZONEHYDROCHLORIDE 15 MG/1
TABLET ORAL
Qty: 90 TABLET | Refills: 0 | Status: SHIPPED | OUTPATIENT
Start: 2020-10-02 | End: 2020-12-23 | Stop reason: SDUPTHER

## 2020-10-02 RX ORDER — ERGOCALCIFEROL 1.25 MG/1
CAPSULE ORAL
Qty: 12 CAPSULE | Refills: 0 | Status: SHIPPED | OUTPATIENT
Start: 2020-10-02 | End: 2020-12-27

## 2020-10-07 ENCOUNTER — TELEPHONE (OUTPATIENT)
Dept: PAIN MEDICINE | Facility: MEDICAL CENTER | Age: 50
End: 2020-10-07

## 2020-10-07 DIAGNOSIS — F40.01 PANIC DISORDER WITH AGORAPHOBIA: ICD-10-CM

## 2020-10-07 DIAGNOSIS — F43.10 PTSD (POST-TRAUMATIC STRESS DISORDER): ICD-10-CM

## 2020-10-07 DIAGNOSIS — F40.10 SOCIAL ANXIETY DISORDER: ICD-10-CM

## 2020-10-07 DIAGNOSIS — F33.41 RECURRENT MAJOR DEPRESSIVE DISORDER, IN PARTIAL REMISSION (HCC): ICD-10-CM

## 2020-10-07 DIAGNOSIS — F31.81 BIPOLAR II DISORDER (HCC): ICD-10-CM

## 2020-10-07 RX ORDER — DULOXETIN HYDROCHLORIDE 60 MG/1
CAPSULE, DELAYED RELEASE ORAL
Qty: 30 CAPSULE | Refills: 1 | Status: SHIPPED | OUTPATIENT
Start: 2020-10-07 | End: 2020-11-25

## 2020-10-07 RX ORDER — OLANZAPINE 10 MG/1
TABLET ORAL
Qty: 30 TABLET | Refills: 1 | Status: SHIPPED | OUTPATIENT
Start: 2020-10-07 | End: 2021-01-13 | Stop reason: SDUPTHER

## 2020-10-07 RX ORDER — PRAZOSIN HYDROCHLORIDE 1 MG/1
1 CAPSULE ORAL
Qty: 30 CAPSULE | Refills: 1 | Status: SHIPPED | OUTPATIENT
Start: 2020-10-07 | End: 2020-11-24 | Stop reason: SDUPTHER

## 2020-10-08 ENCOUNTER — HOSPITAL ENCOUNTER (OUTPATIENT)
Dept: RADIOLOGY | Facility: HOSPITAL | Age: 50
Discharge: HOME | End: 2020-10-08

## 2020-10-08 ENCOUNTER — APPOINTMENT (OUTPATIENT)
Dept: RADIOLOGY | Age: 50
End: 2020-10-08
Payer: COMMERCIAL

## 2020-10-08 DIAGNOSIS — M25.511 CHRONIC PAIN OF BOTH SHOULDERS: ICD-10-CM

## 2020-10-08 DIAGNOSIS — G89.29 CHRONIC PAIN OF BOTH SHOULDERS: ICD-10-CM

## 2020-10-08 DIAGNOSIS — M25.512 CHRONIC PAIN OF BOTH SHOULDERS: ICD-10-CM

## 2020-10-08 PROCEDURE — 73030 X-RAY EXAM OF SHOULDER: CPT

## 2020-10-09 DIAGNOSIS — F41.1 GENERALIZED ANXIETY DISORDER: ICD-10-CM

## 2020-10-09 DIAGNOSIS — F31.81 BIPOLAR II DISORDER (HCC): ICD-10-CM

## 2020-10-09 RX ORDER — DULOXETIN HYDROCHLORIDE 30 MG/1
30 CAPSULE, DELAYED RELEASE ORAL
Qty: 30 CAPSULE | Refills: 1 | Status: SHIPPED | OUTPATIENT
Start: 2020-10-09 | End: 2020-11-25

## 2020-10-12 DIAGNOSIS — G89.4 CHRONIC PAIN SYNDROME: ICD-10-CM

## 2020-10-12 NOTE — TELEPHONE ENCOUNTER
Called [facility] and spoke to Tameka Robbins - he informed me that pt's prescription has   I was transferred to the pharmacy where I spoke to [facility]  - provided her with verbal order for Botox 200 Units vial Qty of 1 with 3 refills

## 2020-10-13 RX ORDER — TRAMADOL HYDROCHLORIDE 50 MG/1
TABLET ORAL
Qty: 180 TABLET | Refills: 0 | Status: SHIPPED | OUTPATIENT
Start: 2020-10-13 | End: 2020-11-18

## 2020-10-15 NOTE — TELEPHONE ENCOUNTER
Called [facility] and spoke to [place] - Botox delivery confirmed for Tuesday 10/22/20 via [facility] priority overnight signature required to [facility] location [address]  Please await Botox delivery  Thank you!     Michelle

## 2020-10-16 DIAGNOSIS — F33.41 RECURRENT MAJOR DEPRESSIVE DISORDER, IN PARTIAL REMISSION (HCC): ICD-10-CM

## 2020-10-19 DIAGNOSIS — R11.0 NAUSEA: ICD-10-CM

## 2020-10-19 RX ORDER — BUPROPION HYDROCHLORIDE 150 MG/1
TABLET ORAL
Qty: 90 TABLET | Refills: 2 | Status: SHIPPED | OUTPATIENT
Start: 2020-10-19 | End: 2021-02-17

## 2020-10-19 RX ORDER — ONDANSETRON 4 MG/1
TABLET, FILM COATED ORAL
Qty: 20 TABLET | Refills: 2 | Status: SHIPPED | OUTPATIENT
Start: 2020-10-19 | End: 2020-11-18

## 2020-10-20 NOTE — TELEPHONE ENCOUNTER
Botox number of units: 200  Botox quantity: 1 - 200 unit vial  Arrived at what location: [facility]  Lot number: [id number]  Expiration Date: 06/2023  Medication received, logged and stored  Invoice emailed

## 2020-10-26 ENCOUNTER — OFFICE VISIT (OUTPATIENT)
Dept: PAIN MEDICINE | Facility: CLINIC | Age: 50
End: 2020-10-26
Payer: COMMERCIAL

## 2020-10-26 VITALS
BODY MASS INDEX: 20.8 KG/M2 | HEIGHT: 62 IN | WEIGHT: 113 LBS | DIASTOLIC BLOOD PRESSURE: 88 MMHG | SYSTOLIC BLOOD PRESSURE: 121 MMHG | TEMPERATURE: 97.9 F | HEART RATE: 114 BPM

## 2020-10-26 DIAGNOSIS — M79.18 MYOFASCIAL PAIN SYNDROME: ICD-10-CM

## 2020-10-26 DIAGNOSIS — M54.2 CHRONIC CERVICAL PAIN: ICD-10-CM

## 2020-10-26 DIAGNOSIS — M79.18 CERVICAL MYOFASCIAL PAIN SYNDROME: ICD-10-CM

## 2020-10-26 DIAGNOSIS — G43.709 CHRONIC MIGRAINE WITHOUT AURA WITHOUT STATUS MIGRAINOSUS, NOT INTRACTABLE: ICD-10-CM

## 2020-10-26 DIAGNOSIS — G89.29 CHRONIC CERVICAL PAIN: ICD-10-CM

## 2020-10-26 DIAGNOSIS — M48.02 CERVICAL SPINAL STENOSIS: ICD-10-CM

## 2020-10-26 DIAGNOSIS — M51.36 DDD (DEGENERATIVE DISC DISEASE), LUMBAR: ICD-10-CM

## 2020-10-26 DIAGNOSIS — M54.12 CERVICAL RADICULOPATHY: ICD-10-CM

## 2020-10-26 DIAGNOSIS — M54.16 LUMBAR RADICULOPATHY: ICD-10-CM

## 2020-10-26 DIAGNOSIS — G89.4 CHRONIC PAIN DISORDER: Primary | ICD-10-CM

## 2020-10-26 DIAGNOSIS — M25.512 CHRONIC PAIN OF BOTH SHOULDERS: ICD-10-CM

## 2020-10-26 DIAGNOSIS — M47.812 CERVICAL SPONDYLOSIS WITHOUT MYELOPATHY: ICD-10-CM

## 2020-10-26 DIAGNOSIS — M25.511 CHRONIC PAIN OF BOTH SHOULDERS: ICD-10-CM

## 2020-10-26 DIAGNOSIS — M50.30 DDD (DEGENERATIVE DISC DISEASE), CERVICAL: ICD-10-CM

## 2020-10-26 DIAGNOSIS — G89.29 CHRONIC PAIN OF BOTH SHOULDERS: ICD-10-CM

## 2020-10-26 PROCEDURE — 99214 OFFICE O/P EST MOD 30 MIN: CPT | Performed by: NURSE PRACTITIONER

## 2020-10-26 RX ORDER — BACLOFEN 10 MG/1
TABLET ORAL
Qty: 90 TABLET | Refills: 0 | Status: SHIPPED | OUTPATIENT
Start: 2020-10-26 | End: 2020-11-24 | Stop reason: SDUPTHER

## 2020-10-28 ENCOUNTER — OFFICE VISIT (OUTPATIENT)
Dept: PSYCHIATRY | Facility: CLINIC | Age: 50
End: 2020-10-28
Payer: COMMERCIAL

## 2020-10-28 DIAGNOSIS — F41.1 GENERALIZED ANXIETY DISORDER: ICD-10-CM

## 2020-10-28 DIAGNOSIS — F40.10 SOCIAL ANXIETY DISORDER: ICD-10-CM

## 2020-10-28 DIAGNOSIS — F40.01 PANIC DISORDER WITH AGORAPHOBIA: ICD-10-CM

## 2020-10-28 DIAGNOSIS — F31.81 BIPOLAR II DISORDER (HCC): Primary | ICD-10-CM

## 2020-10-28 DIAGNOSIS — F33.41 RECURRENT MAJOR DEPRESSIVE DISORDER, IN PARTIAL REMISSION (HCC): ICD-10-CM

## 2020-10-28 DIAGNOSIS — F43.10 PTSD (POST-TRAUMATIC STRESS DISORDER): ICD-10-CM

## 2020-10-28 PROCEDURE — 99214 OFFICE O/P EST MOD 30 MIN: CPT | Performed by: PSYCHIATRY & NEUROLOGY

## 2020-11-05 DIAGNOSIS — G43.709 CHRONIC MIGRAINE WITHOUT AURA WITHOUT STATUS MIGRAINOSUS, NOT INTRACTABLE: ICD-10-CM

## 2020-11-05 RX ORDER — TOPIRAMATE 100 MG/1
TABLET, FILM COATED ORAL
Qty: 180 TABLET | Refills: 0 | Status: SHIPPED | OUTPATIENT
Start: 2020-11-05 | End: 2021-01-29

## 2020-11-09 DIAGNOSIS — K21.9 GERD WITHOUT ESOPHAGITIS: ICD-10-CM

## 2020-11-09 RX ORDER — OMEPRAZOLE 20 MG/1
CAPSULE, DELAYED RELEASE ORAL
Qty: 90 CAPSULE | Refills: 1 | Status: SHIPPED | OUTPATIENT
Start: 2020-11-09

## 2020-11-10 ENCOUNTER — TELEPHONE (OUTPATIENT)
Dept: NEUROLOGY | Facility: CLINIC | Age: 50
End: 2020-11-10

## 2020-11-11 ENCOUNTER — TELEPHONE (OUTPATIENT)
Dept: NEUROLOGY | Facility: CLINIC | Age: 50
End: 2020-11-11

## 2020-11-12 ENCOUNTER — PROCEDURE VISIT (OUTPATIENT)
Dept: NEUROLOGY | Facility: CLINIC | Age: 50
End: 2020-11-12
Payer: COMMERCIAL

## 2020-11-12 VITALS
WEIGHT: 111.4 LBS | SYSTOLIC BLOOD PRESSURE: 106 MMHG | DIASTOLIC BLOOD PRESSURE: 74 MMHG | HEART RATE: 120 BPM | BODY MASS INDEX: 20.5 KG/M2 | HEIGHT: 62 IN | TEMPERATURE: 97.3 F

## 2020-11-12 DIAGNOSIS — M54.2 CHRONIC CERVICAL PAIN: ICD-10-CM

## 2020-11-12 DIAGNOSIS — G89.29 CHRONIC CERVICAL PAIN: ICD-10-CM

## 2020-11-12 DIAGNOSIS — R29.898 RIGHT ARM WEAKNESS: ICD-10-CM

## 2020-11-12 DIAGNOSIS — G43.709 CHRONIC MIGRAINE WITHOUT AURA WITHOUT STATUS MIGRAINOSUS, NOT INTRACTABLE: Primary | ICD-10-CM

## 2020-11-12 PROCEDURE — 64615 CHEMODENERV MUSC MIGRAINE: CPT | Performed by: PHYSICIAN ASSISTANT

## 2020-11-17 ENCOUNTER — TELEPHONE (OUTPATIENT)
Dept: PAIN MEDICINE | Facility: CLINIC | Age: 50
End: 2020-11-17

## 2020-11-18 ENCOUNTER — TELEPHONE (OUTPATIENT)
Dept: ENDOCRINOLOGY | Facility: CLINIC | Age: 50
End: 2020-11-18

## 2020-11-18 ENCOUNTER — OFFICE VISIT (OUTPATIENT)
Dept: ENDOCRINOLOGY | Facility: CLINIC | Age: 50
End: 2020-11-18
Payer: COMMERCIAL

## 2020-11-18 ENCOUNTER — TELEPHONE (OUTPATIENT)
Dept: ENDOCRINOLOGY | Facility: HOSPITAL | Age: 50
End: 2020-11-18

## 2020-11-18 VITALS
HEIGHT: 62 IN | TEMPERATURE: 96.9 F | SYSTOLIC BLOOD PRESSURE: 96 MMHG | DIASTOLIC BLOOD PRESSURE: 60 MMHG | HEART RATE: 92 BPM | WEIGHT: 119 LBS | BODY MASS INDEX: 21.9 KG/M2

## 2020-11-18 DIAGNOSIS — E78.2 MIXED HYPERLIPIDEMIA: ICD-10-CM

## 2020-11-18 DIAGNOSIS — R11.0 NAUSEA: ICD-10-CM

## 2020-11-18 DIAGNOSIS — E03.9 ACQUIRED HYPOTHYROIDISM: ICD-10-CM

## 2020-11-18 DIAGNOSIS — Z79.4 TYPE 2 DIABETES MELLITUS WITH OTHER NEUROLOGIC COMPLICATION, WITH LONG-TERM CURRENT USE OF INSULIN (HCC): Primary | ICD-10-CM

## 2020-11-18 DIAGNOSIS — E27.40 LOW SERUM CORTISOL LEVEL (HCC): ICD-10-CM

## 2020-11-18 DIAGNOSIS — E11.49 TYPE 2 DIABETES MELLITUS WITH OTHER NEUROLOGIC COMPLICATION, WITH LONG-TERM CURRENT USE OF INSULIN (HCC): Primary | ICD-10-CM

## 2020-11-18 DIAGNOSIS — G89.4 CHRONIC PAIN SYNDROME: ICD-10-CM

## 2020-11-18 LAB — SL AMB POCT HEMOGLOBIN AIC: 15 (ref ?–6.5)

## 2020-11-18 PROCEDURE — 83036 HEMOGLOBIN GLYCOSYLATED A1C: CPT | Performed by: PHYSICIAN ASSISTANT

## 2020-11-18 PROCEDURE — 99214 OFFICE O/P EST MOD 30 MIN: CPT | Performed by: PHYSICIAN ASSISTANT

## 2020-11-18 PROCEDURE — 3046F HEMOGLOBIN A1C LEVEL >9.0%: CPT | Performed by: PHYSICIAN ASSISTANT

## 2020-11-18 PROCEDURE — 3078F DIAST BP <80 MM HG: CPT | Performed by: PHYSICIAN ASSISTANT

## 2020-11-18 PROCEDURE — 3008F BODY MASS INDEX DOCD: CPT | Performed by: PHYSICIAN ASSISTANT

## 2020-11-18 PROCEDURE — 3074F SYST BP LT 130 MM HG: CPT | Performed by: PHYSICIAN ASSISTANT

## 2020-11-18 RX ORDER — HYDROCORTISONE 5 MG/1
TABLET ORAL
Qty: 100 TABLET | Refills: 2
Start: 2020-11-18 | End: 2021-01-27

## 2020-11-18 RX ORDER — PIOGLITAZONEHYDROCHLORIDE 30 MG/1
15 TABLET ORAL DAILY
Qty: 90 TABLET | Refills: 1 | Status: SHIPPED | OUTPATIENT
Start: 2020-11-18 | End: 2021-04-01 | Stop reason: SDUPTHER

## 2020-11-18 RX ORDER — INSULIN GLARGINE 100 [IU]/ML
60 INJECTION, SOLUTION SUBCUTANEOUS DAILY
Qty: 15 PEN | Refills: 1
Start: 2020-11-18 | End: 2020-12-23

## 2020-11-18 RX ORDER — TRAMADOL HYDROCHLORIDE 50 MG/1
TABLET ORAL
Qty: 180 TABLET | Refills: 0 | Status: SHIPPED | OUTPATIENT
Start: 2020-11-18 | End: 2020-12-23

## 2020-11-18 RX ORDER — ACARBOSE 25 MG/1
25 TABLET ORAL
Qty: 270 TABLET | Refills: 1 | Status: SHIPPED | OUTPATIENT
Start: 2020-11-18 | End: 2021-04-19

## 2020-11-18 RX ORDER — INSULIN LISPRO 200 [IU]/ML
34 INJECTION, SOLUTION SUBCUTANEOUS
Start: 2020-11-18 | End: 2021-04-01 | Stop reason: ALTCHOICE

## 2020-11-18 RX ORDER — ONDANSETRON 4 MG/1
TABLET, FILM COATED ORAL
Qty: 20 TABLET | Refills: 2 | Status: SHIPPED | OUTPATIENT
Start: 2020-11-18 | End: 2021-01-29

## 2020-11-19 PROBLEM — R79.89 LOW SERUM CORTISOL LEVEL: Status: ACTIVE | Noted: 2020-11-19

## 2020-11-19 PROBLEM — E27.40 LOW SERUM CORTISOL LEVEL (HCC): Status: ACTIVE | Noted: 2020-11-19

## 2020-11-19 LAB
CREAT ?TM UR-SCNC: 43.3 UMOL/L
HBA1C MFR BLD HPLC: >14.9 %
MICROALBUMIN/CREAT UR: NORMAL MG/G{CREAT}

## 2020-11-24 ENCOUNTER — TELEPHONE (OUTPATIENT)
Dept: OBGYN CLINIC | Facility: CLINIC | Age: 50
End: 2020-11-24

## 2020-11-24 DIAGNOSIS — F31.81 BIPOLAR II DISORDER (HCC): ICD-10-CM

## 2020-11-24 DIAGNOSIS — R93.89 THICKENED ENDOMETRIUM: Primary | ICD-10-CM

## 2020-11-24 DIAGNOSIS — F40.01 PANIC DISORDER WITH AGORAPHOBIA: ICD-10-CM

## 2020-11-24 DIAGNOSIS — N83.201 CYST OF RIGHT OVARY: ICD-10-CM

## 2020-11-24 DIAGNOSIS — M79.18 CERVICAL MYOFASCIAL PAIN SYNDROME: ICD-10-CM

## 2020-11-24 DIAGNOSIS — G43.709 CHRONIC MIGRAINE WITHOUT AURA WITHOUT STATUS MIGRAINOSUS, NOT INTRACTABLE: ICD-10-CM

## 2020-11-24 DIAGNOSIS — F40.10 SOCIAL ANXIETY DISORDER: ICD-10-CM

## 2020-11-24 DIAGNOSIS — N83.202 CYST OF LEFT OVARY: ICD-10-CM

## 2020-11-24 DIAGNOSIS — F33.41 RECURRENT MAJOR DEPRESSIVE DISORDER, IN PARTIAL REMISSION (HCC): ICD-10-CM

## 2020-11-24 DIAGNOSIS — F43.10 PTSD (POST-TRAUMATIC STRESS DISORDER): ICD-10-CM

## 2020-11-24 RX ORDER — BACLOFEN 10 MG/1
TABLET ORAL
Qty: 90 TABLET | Refills: 0 | Status: SHIPPED | OUTPATIENT
Start: 2020-11-24 | End: 2020-12-17

## 2020-11-25 ENCOUNTER — TELEPHONE (OUTPATIENT)
Dept: DIABETES SERVICES | Facility: CLINIC | Age: 50
End: 2020-11-25

## 2020-11-25 RX ORDER — PRAZOSIN HYDROCHLORIDE 1 MG/1
1 CAPSULE ORAL
Qty: 30 CAPSULE | Refills: 1 | Status: SHIPPED | OUTPATIENT
Start: 2020-11-25 | End: 2020-12-17

## 2020-11-25 RX ORDER — DULOXETIN HYDROCHLORIDE 60 MG/1
60 CAPSULE, DELAYED RELEASE ORAL 2 TIMES DAILY
Qty: 180 CAPSULE | Refills: 1 | Status: SHIPPED | OUTPATIENT
Start: 2020-11-25 | End: 2020-12-30 | Stop reason: SDUPTHER

## 2020-11-30 ENCOUNTER — TELEMEDICINE (OUTPATIENT)
Dept: DIABETES SERVICES | Facility: CLINIC | Age: 50
End: 2020-11-30
Payer: COMMERCIAL

## 2020-11-30 DIAGNOSIS — R11.0 NAUSEA: ICD-10-CM

## 2020-11-30 DIAGNOSIS — E11.49 TYPE 2 DIABETES MELLITUS WITH OTHER NEUROLOGIC COMPLICATION, WITH LONG-TERM CURRENT USE OF INSULIN (HCC): ICD-10-CM

## 2020-11-30 DIAGNOSIS — Z79.4 TYPE 2 DIABETES MELLITUS WITH OTHER NEUROLOGIC COMPLICATION, WITH LONG-TERM CURRENT USE OF INSULIN (HCC): ICD-10-CM

## 2020-11-30 DIAGNOSIS — G43.709 CHRONIC MIGRAINE WITHOUT AURA WITHOUT STATUS MIGRAINOSUS, NOT INTRACTABLE: ICD-10-CM

## 2020-11-30 PROCEDURE — G0270 MNT SUBS TX FOR CHANGE DX: HCPCS | Performed by: DIETITIAN, REGISTERED

## 2020-12-01 RX ORDER — BLOOD SUGAR DIAGNOSTIC
STRIP MISCELLANEOUS
Qty: 100 EACH | Refills: 2 | Status: SHIPPED | OUTPATIENT
Start: 2020-12-01 | End: 2021-03-24

## 2020-12-01 RX ORDER — PROCHLORPERAZINE MALEATE 10 MG
10 TABLET ORAL EVERY 6 HOURS PRN
Qty: 20 TABLET | Refills: 0 | Status: SHIPPED | OUTPATIENT
Start: 2020-12-01 | End: 2020-12-23

## 2020-12-02 DIAGNOSIS — F41.1 GENERALIZED ANXIETY DISORDER: ICD-10-CM

## 2020-12-02 DIAGNOSIS — F43.10 POST TRAUMATIC STRESS DISORDER (PTSD): ICD-10-CM

## 2020-12-02 DIAGNOSIS — F40.01 PANIC DISORDER WITH AGORAPHOBIA: ICD-10-CM

## 2020-12-02 RX ORDER — DIAZEPAM 5 MG/1
5 TABLET ORAL 3 TIMES DAILY PRN
Qty: 90 TABLET | Refills: 1 | Status: SHIPPED | OUTPATIENT
Start: 2020-12-02 | End: 2021-02-22

## 2020-12-02 RX ORDER — DIAZEPAM 5 MG/1
5 TABLET ORAL 3 TIMES DAILY PRN
Qty: 90 TABLET | Refills: 0 | Status: CANCELLED | OUTPATIENT
Start: 2020-12-02

## 2020-12-08 ENCOUNTER — ANNUAL EXAM (OUTPATIENT)
Dept: OBGYN CLINIC | Facility: CLINIC | Age: 50
End: 2020-12-08
Payer: COMMERCIAL

## 2020-12-08 VITALS
WEIGHT: 121 LBS | BODY MASS INDEX: 22.26 KG/M2 | DIASTOLIC BLOOD PRESSURE: 66 MMHG | HEIGHT: 62 IN | SYSTOLIC BLOOD PRESSURE: 108 MMHG

## 2020-12-08 DIAGNOSIS — Z12.31 ENCOUNTER FOR SCREENING MAMMOGRAM FOR MALIGNANT NEOPLASM OF BREAST: ICD-10-CM

## 2020-12-08 DIAGNOSIS — Z11.4 SCREENING FOR HIV (HUMAN IMMUNODEFICIENCY VIRUS): ICD-10-CM

## 2020-12-08 DIAGNOSIS — Z01.419 WOMEN'S ANNUAL ROUTINE GYNECOLOGICAL EXAMINATION: Primary | ICD-10-CM

## 2020-12-08 DIAGNOSIS — N39.0 RECURRENT UTI: ICD-10-CM

## 2020-12-08 PROCEDURE — 3008F BODY MASS INDEX DOCD: CPT | Performed by: NURSE PRACTITIONER

## 2020-12-08 PROCEDURE — 99396 PREV VISIT EST AGE 40-64: CPT | Performed by: NURSE PRACTITIONER

## 2020-12-08 PROCEDURE — 3078F DIAST BP <80 MM HG: CPT | Performed by: NURSE PRACTITIONER

## 2020-12-08 PROCEDURE — 3074F SYST BP LT 130 MM HG: CPT | Performed by: NURSE PRACTITIONER

## 2020-12-15 DIAGNOSIS — J45.20 MILD INTERMITTENT REACTIVE AIRWAY DISEASE WITHOUT COMPLICATION: ICD-10-CM

## 2020-12-17 DIAGNOSIS — F43.10 PTSD (POST-TRAUMATIC STRESS DISORDER): ICD-10-CM

## 2020-12-17 DIAGNOSIS — G43.709 CHRONIC MIGRAINE WITHOUT AURA WITHOUT STATUS MIGRAINOSUS, NOT INTRACTABLE: ICD-10-CM

## 2020-12-17 DIAGNOSIS — M79.18 CERVICAL MYOFASCIAL PAIN SYNDROME: ICD-10-CM

## 2020-12-17 RX ORDER — BACLOFEN 10 MG/1
TABLET ORAL
Qty: 90 TABLET | Refills: 0 | Status: SHIPPED | OUTPATIENT
Start: 2020-12-17 | End: 2021-01-12

## 2020-12-17 RX ORDER — PRAZOSIN HYDROCHLORIDE 1 MG/1
1 CAPSULE ORAL
Qty: 30 CAPSULE | Refills: 1 | Status: SHIPPED | OUTPATIENT
Start: 2020-12-17 | End: 2021-01-22 | Stop reason: SDUPTHER

## 2020-12-23 DIAGNOSIS — G89.4 CHRONIC PAIN SYNDROME: ICD-10-CM

## 2020-12-23 DIAGNOSIS — J45.20 MILD INTERMITTENT REACTIVE AIRWAY DISEASE WITHOUT COMPLICATION: ICD-10-CM

## 2020-12-23 DIAGNOSIS — E11.49 TYPE 2 DIABETES MELLITUS WITH OTHER NEUROLOGIC COMPLICATION, WITH LONG-TERM CURRENT USE OF INSULIN (HCC): ICD-10-CM

## 2020-12-23 DIAGNOSIS — IMO0002 UNCONTROLLED TYPE 2 DIABETES MELLITUS WITH DIABETIC POLYNEUROPATHY, WITH LONG-TERM CURRENT USE OF INSULIN: Chronic | ICD-10-CM

## 2020-12-23 DIAGNOSIS — Z79.4 TYPE 2 DIABETES MELLITUS WITH OTHER NEUROLOGIC COMPLICATION, WITH LONG-TERM CURRENT USE OF INSULIN (HCC): ICD-10-CM

## 2020-12-23 DIAGNOSIS — R11.0 NAUSEA: ICD-10-CM

## 2020-12-23 DIAGNOSIS — G43.709 CHRONIC MIGRAINE WITHOUT AURA WITHOUT STATUS MIGRAINOSUS, NOT INTRACTABLE: ICD-10-CM

## 2020-12-23 RX ORDER — PIOGLITAZONEHYDROCHLORIDE 15 MG/1
TABLET ORAL
Qty: 90 TABLET | Refills: 0 | Status: SHIPPED | OUTPATIENT
Start: 2020-12-23 | End: 2021-04-01 | Stop reason: ALTCHOICE

## 2020-12-23 RX ORDER — PROCHLORPERAZINE MALEATE 10 MG
10 TABLET ORAL EVERY 6 HOURS PRN
Qty: 20 TABLET | Refills: 0 | Status: SHIPPED | OUTPATIENT
Start: 2020-12-23 | End: 2021-01-11

## 2020-12-23 RX ORDER — INSULIN GLARGINE 100 [IU]/ML
60 INJECTION, SOLUTION SUBCUTANEOUS DAILY
Qty: 18 PEN | Refills: 0 | Status: SHIPPED | OUTPATIENT
Start: 2020-12-23 | End: 2021-01-25 | Stop reason: SDUPTHER

## 2020-12-23 RX ORDER — ALBUTEROL SULFATE 90 UG/1
POWDER, METERED RESPIRATORY (INHALATION)
Qty: 1 EACH | Refills: 2 | Status: SHIPPED | OUTPATIENT
Start: 2020-12-23 | End: 2021-01-29

## 2020-12-23 RX ORDER — TRAMADOL HYDROCHLORIDE 50 MG/1
TABLET ORAL
Qty: 160 TABLET | Refills: 0 | Status: SHIPPED | OUTPATIENT
Start: 2020-12-23 | End: 2021-01-29

## 2020-12-25 DIAGNOSIS — E55.9 VITAMIN D DEFICIENCY: ICD-10-CM

## 2020-12-27 RX ORDER — ERGOCALCIFEROL 1.25 MG/1
CAPSULE ORAL
Qty: 12 CAPSULE | Refills: 0 | Status: SHIPPED | OUTPATIENT
Start: 2020-12-27 | End: 2021-05-10

## 2020-12-30 DIAGNOSIS — F40.10 SOCIAL ANXIETY DISORDER: ICD-10-CM

## 2020-12-30 DIAGNOSIS — F40.01 PANIC DISORDER WITH AGORAPHOBIA: ICD-10-CM

## 2020-12-30 DIAGNOSIS — F43.10 PTSD (POST-TRAUMATIC STRESS DISORDER): ICD-10-CM

## 2020-12-30 DIAGNOSIS — F33.41 RECURRENT MAJOR DEPRESSIVE DISORDER, IN PARTIAL REMISSION (HCC): ICD-10-CM

## 2020-12-30 DIAGNOSIS — F31.81 BIPOLAR II DISORDER (HCC): ICD-10-CM

## 2020-12-30 RX ORDER — DULOXETIN HYDROCHLORIDE 60 MG/1
60 CAPSULE, DELAYED RELEASE ORAL 2 TIMES DAILY
Qty: 180 CAPSULE | Refills: 1 | Status: SHIPPED | OUTPATIENT
Start: 2020-12-30 | End: 2021-08-27

## 2021-01-11 DIAGNOSIS — G89.4 CHRONIC PAIN DISORDER: ICD-10-CM

## 2021-01-11 DIAGNOSIS — R11.0 NAUSEA: ICD-10-CM

## 2021-01-11 DIAGNOSIS — G43.709 CHRONIC MIGRAINE WITHOUT AURA WITHOUT STATUS MIGRAINOSUS, NOT INTRACTABLE: ICD-10-CM

## 2021-01-11 RX ORDER — PROCHLORPERAZINE MALEATE 10 MG
10 TABLET ORAL EVERY 6 HOURS PRN
Qty: 20 TABLET | Refills: 0 | Status: SHIPPED | OUTPATIENT
Start: 2021-01-11 | End: 2021-01-27

## 2021-01-11 RX ORDER — GABAPENTIN 400 MG/1
CAPSULE ORAL
Qty: 540 CAPSULE | Refills: 1 | Status: SHIPPED | OUTPATIENT
Start: 2021-01-11 | End: 2021-03-18 | Stop reason: SDUPTHER

## 2021-01-12 DIAGNOSIS — G43.709 CHRONIC MIGRAINE WITHOUT AURA WITHOUT STATUS MIGRAINOSUS, NOT INTRACTABLE: ICD-10-CM

## 2021-01-12 DIAGNOSIS — M79.18 CERVICAL MYOFASCIAL PAIN SYNDROME: ICD-10-CM

## 2021-01-12 RX ORDER — BACLOFEN 10 MG/1
TABLET ORAL
Qty: 90 TABLET | Refills: 0 | Status: SHIPPED | OUTPATIENT
Start: 2021-01-12 | End: 2021-02-10

## 2021-01-13 DIAGNOSIS — F31.81 BIPOLAR II DISORDER (HCC): ICD-10-CM

## 2021-01-13 RX ORDER — OLANZAPINE 10 MG/1
10 TABLET ORAL
Qty: 60 TABLET | Refills: 1 | Status: SHIPPED | OUTPATIENT
Start: 2021-01-13 | End: 2021-03-22

## 2021-01-18 ENCOUNTER — TELEPHONE (OUTPATIENT)
Dept: NEUROLOGY | Facility: CLINIC | Age: 51
End: 2021-01-18

## 2021-01-18 NOTE — TELEPHONE ENCOUNTER
Type Date User Summary Attachment   General 02/11/2021 10:10 AM [facility] coordination -   Note    Botox - authorization # [id number] - valid dates 01/26/2021 - 01/26/2022   Please use [facility]      Thanks  Eric Payton

## 2021-01-22 DIAGNOSIS — F43.10 PTSD (POST-TRAUMATIC STRESS DISORDER): ICD-10-CM

## 2021-01-22 RX ORDER — PRAZOSIN HYDROCHLORIDE 1 MG/1
1 CAPSULE ORAL
Qty: 30 CAPSULE | Refills: 1 | Status: SHIPPED | OUTPATIENT
Start: 2021-01-22 | End: 2021-02-22

## 2021-01-25 DIAGNOSIS — Z79.4 TYPE 2 DIABETES MELLITUS WITH OTHER NEUROLOGIC COMPLICATION, WITH LONG-TERM CURRENT USE OF INSULIN (HCC): ICD-10-CM

## 2021-01-25 DIAGNOSIS — E11.49 TYPE 2 DIABETES MELLITUS WITH OTHER NEUROLOGIC COMPLICATION, WITH LONG-TERM CURRENT USE OF INSULIN (HCC): ICD-10-CM

## 2021-01-26 DIAGNOSIS — E27.40 LOW SERUM CORTISOL LEVEL (HCC): ICD-10-CM

## 2021-01-26 DIAGNOSIS — G43.709 CHRONIC MIGRAINE WITHOUT AURA WITHOUT STATUS MIGRAINOSUS, NOT INTRACTABLE: ICD-10-CM

## 2021-01-26 DIAGNOSIS — R11.0 NAUSEA: ICD-10-CM

## 2021-01-26 DIAGNOSIS — G89.4 CHRONIC PAIN SYNDROME: ICD-10-CM

## 2021-01-26 RX ORDER — INSULIN GLARGINE 100 [IU]/ML
60 INJECTION, SOLUTION SUBCUTANEOUS DAILY
Qty: 20 PEN | Refills: 0 | Status: SHIPPED | OUTPATIENT
Start: 2021-01-26 | End: 2021-04-01 | Stop reason: SDUPTHER

## 2021-01-26 NOTE — TELEPHONE ENCOUNTER
Patient had an appointment with Dr Janice Johnson in December that she had canceled  I suggest she make a follow-up with Dr DENNIS this time given the complexity of condition

## 2021-01-26 NOTE — TELEPHONE ENCOUNTER
Received a call back from Stalin Iniguez with [facility], I provided her with clinical information to complete authorization request   Stalin Iniguez states that botox has been approved and provided me with the following authorization information  Botox 200 units approved authorization # [id number] valid dates 01/26/2021 - 01/26/2022 with [facility] listed as dispensing pharmacy  Will call [facility] and provide them with new authorization information

## 2021-01-26 NOTE — TELEPHONE ENCOUNTER
Called [facility] prior authorization, spoke with Amanda Yancey, advised I was calling to inquire if prior authorization is needed for botox  [facility] states that authorization is needed through patient's medical benefits, Amanda Yancey offered to begin prior authorization over the phone  Authorization was started for botox 200 units for Dx [id number] [id number] order by Dr Jon Tony  While beginning authorization Amnada Yancey states her computer froze   She provided me with Case ID # [id number] she states she will restart her system and call me back in 5 mins to continue authorization process

## 2021-01-26 NOTE — TELEPHONE ENCOUNTER
Called Steve, spoke with [facility], advised I was calling to initiate a refill for patient's botox 200 units  [facility] advised that there a remaining refills left on current Rx  [facility] states that patient's profile is already in insurance verification, their system is showing a new authorization is needed through patient's medical benefits  Jimi Gutierrez we had not obtained authorization for this patient before, [facility] states that authorization was on file from 10/21/2019 - 10/21/2020 and authorization is needed through patient's medical benefits  [facility] provided me with phone number for [facility] prior authorization [phone] to call and obtain authorization

## 2021-01-27 ENCOUNTER — HOSPITAL ENCOUNTER (OUTPATIENT)
Dept: NEUROLOGY | Facility: CLINIC | Age: 51
Discharge: HOME | End: 2021-01-27
Payer: COMMERCIAL

## 2021-01-27 DIAGNOSIS — R20.2 PARESTHESIA OF BOTH LOWER EXTREMITIES: ICD-10-CM

## 2021-01-27 PROCEDURE — 95911 NRV CNDJ TEST 9-10 STUDIES: CPT | Performed by: PSYCHIATRY & NEUROLOGY

## 2021-01-27 PROCEDURE — 95886 MUSC TEST DONE W/N TEST COMP: CPT | Performed by: PSYCHIATRY & NEUROLOGY

## 2021-01-27 RX ORDER — HYDROCORTISONE 5 MG/1
TABLET ORAL
Qty: 50 TABLET | Refills: 5 | Status: SHIPPED | OUTPATIENT
Start: 2021-01-27 | End: 2021-06-17

## 2021-01-27 RX ORDER — PROCHLORPERAZINE MALEATE 10 MG
10 TABLET ORAL EVERY 6 HOURS PRN
Qty: 20 TABLET | Refills: 0 | Status: SHIPPED | OUTPATIENT
Start: 2021-01-27 | End: 2021-02-21

## 2021-01-27 NOTE — TELEPHONE ENCOUNTER
Called [facility], spoke with Akosua Dillon, advised I was calling to inform that authorization has been received and from patient's medical benefits  Akosua Dillon states that this will now be verified by their major medical  Once claim is processed they will reach out to patient to obtain consent for shipment    Will call for status check in 2 days

## 2021-01-27 NOTE — TELEPHONE ENCOUNTER
Please tell the patient as of the new year we need to be much more specific gone controlled substances  I can no longer right 1-2 tablets of tramadol every 6-8 hours    I need to know exactly what her averages per month

## 2021-01-28 ENCOUNTER — HOSPITAL ENCOUNTER (OUTPATIENT)
Dept: NEUROLOGY | Facility: CLINIC | Age: 51
Discharge: HOME | End: 2021-01-28
Payer: COMMERCIAL

## 2021-01-28 DIAGNOSIS — R20.2 PARESTHESIA AND PAIN OF BOTH UPPER EXTREMITIES: ICD-10-CM

## 2021-01-28 DIAGNOSIS — M79.602 PARESTHESIA AND PAIN OF BOTH UPPER EXTREMITIES: ICD-10-CM

## 2021-01-28 DIAGNOSIS — M79.601 PARESTHESIA AND PAIN OF BOTH UPPER EXTREMITIES: ICD-10-CM

## 2021-01-28 PROCEDURE — 95886 MUSC TEST DONE W/N TEST COMP: CPT | Performed by: PSYCHIATRY & NEUROLOGY

## 2021-01-28 PROCEDURE — 95911 NRV CNDJ TEST 9-10 STUDIES: CPT | Performed by: PSYCHIATRY & NEUROLOGY

## 2021-01-29 DIAGNOSIS — J45.20 MILD INTERMITTENT REACTIVE AIRWAY DISEASE WITHOUT COMPLICATION: ICD-10-CM

## 2021-01-29 RX ORDER — TRAMADOL HYDROCHLORIDE 50 MG/1
TABLET ORAL
Qty: 160 TABLET | Refills: 0 | Status: SHIPPED | OUTPATIENT
Start: 2021-01-29 | End: 2021-03-19

## 2021-01-29 RX ORDER — TOPIRAMATE 25 MG/1
TABLET ORAL
Qty: 90 TABLET | Refills: 1 | Status: SHIPPED | OUTPATIENT
Start: 2021-01-29 | End: 2021-07-28

## 2021-01-29 RX ORDER — ONDANSETRON 4 MG/1
TABLET, FILM COATED ORAL
Qty: 20 TABLET | Refills: 2 | Status: SHIPPED | OUTPATIENT
Start: 2021-01-29 | End: 2021-07-20

## 2021-01-29 RX ORDER — TOPIRAMATE 100 MG/1
TABLET, FILM COATED ORAL
Qty: 180 TABLET | Refills: 0 | Status: SHIPPED | OUTPATIENT
Start: 2021-01-29 | End: 2021-04-08

## 2021-01-29 RX ORDER — ALBUTEROL SULFATE 90 UG/1
POWDER, METERED RESPIRATORY (INHALATION)
Qty: 1 EACH | Refills: 2 | Status: SHIPPED | OUTPATIENT
Start: 2021-01-29 | End: 2021-09-07

## 2021-01-29 NOTE — TELEPHONE ENCOUNTER
I spoke to the patient she has been taking 2 tablets every 6-8 hours and is usually averaging 6 tablets per day

## 2021-01-29 NOTE — TELEPHONE ENCOUNTER
Please see me early next week  Patient finally did virtual visit in November  Has not had labs in over a year  I did tell him this during that visit  I refilled his medications right now but he needs to get blood work done within the next month    Orders were placed last February

## 2021-01-29 NOTE — TELEPHONE ENCOUNTER
Patient called stating the Respiquik is too expensive and she did call her insurance company and they gave her a medication that they will cover for less and it is Ventolin, if you would send another script over for that

## 2021-02-03 NOTE — TELEPHONE ENCOUNTER
Called [facility], spoke with Kenneth Barnhart, advised I was calling to check the status of patient's botox order  Kenneth Barnhart states that patient's profile is still in major medical  Kenneth Barnhart marked order as STAT, states this should take about 24-48 hours to be processed     Will call in 2 days for status check

## 2021-02-04 ENCOUNTER — TELEPHONE (OUTPATIENT)
Dept: NEUROLOGY | Facility: CLINIC | Age: 51
End: 2021-02-04

## 2021-02-05 NOTE — TELEPHONE ENCOUNTER
Called patient, left message, advised patient that her botox is ready to be scheduled for delivery, we need her to call [facility] @ [phone] and provide her consent for shipment  If patient returns call please advise her to call [facility] @ [phone]    Will call [facility] Monday to see if they were able to obtain patient's consent for shipment

## 2021-02-05 NOTE — TELEPHONE ENCOUNTER
[facility], spoke with Bert Dill, advised I was calling to check the status of patient's botox order  Bert Dill states that patient's botox is ready to be scheduled for delivery however patient's consent is not on file  Bert Dill placed me on hold and attempted to reach patient but was unsuccessful     Will call patient and ask her to call [facility] @ [phone] to provide her consent for shipment

## 2021-02-08 ENCOUNTER — TELEPHONE (OUTPATIENT)
Dept: NEUROLOGY | Facility: CLINIC | Age: 51
End: 2021-02-08

## 2021-02-08 DIAGNOSIS — M79.18 CERVICAL MYOFASCIAL PAIN SYNDROME: ICD-10-CM

## 2021-02-08 DIAGNOSIS — G43.709 CHRONIC MIGRAINE WITHOUT AURA WITHOUT STATUS MIGRAINOSUS, NOT INTRACTABLE: ICD-10-CM

## 2021-02-08 NOTE — TELEPHONE ENCOUNTER
T/c to patient to discuss providing consent to Delfino Johnson Lamine to ship Botox for appointment on 2/16  Patient states that she is unable to provide consent because she cannot afford the large copay  Patient stated she cannot keep appointment on Friday with Renetta Rider because she does not have transportation  Offered patient conversion to virtual visit - patient accepted  Patient will conduct virtual visit through 's cellphone - email address is [email or web address]  Entered appointment into Teams

## 2021-02-08 NOTE — TELEPHONE ENCOUNTER
Please call [facility] @ [phone] and ask if patient has called in and provided her consent for shipment  If consent has been given please coordinate delivery to the [facility] location for Wednesday 2/10/2021      Thanks  Wilbert Miguel

## 2021-02-09 NOTE — TELEPHONE ENCOUNTER
Called patient, left message, asked patient to call our office ASAP  Need to see how patient would like to proceed with botox, patient states that she will be unable to afford the co-pay that she was quoted from [facility]   Would like to see if Steve had spoken with her about setting up a payment plan and also to advise patient of the botox savings program   Will attempt to reach patient tomorrow to see how she would like to proceed with botox order

## 2021-02-10 ENCOUNTER — TELEPHONE (OUTPATIENT)
Dept: NEUROLOGY | Facility: CLINIC | Age: 51
End: 2021-02-10

## 2021-02-10 RX ORDER — BACLOFEN 10 MG/1
TABLET ORAL
Qty: 90 TABLET | Refills: 0 | Status: SHIPPED | OUTPATIENT
Start: 2021-02-10 | End: 2021-03-12

## 2021-02-10 NOTE — TELEPHONE ENCOUNTER
Called, spoke with patient, advised I was following up regarding her botox and her co-pay  Asked patient is she had spoke with [facility] regarding co-pay and if they offered her any type of co-pay assistance  Patient states she spoke with [facility] and was advised that she has a co-pay but was not offered any type of assistance  Spoke with patient in length about the botox savings program, patient cannot put money out though and wait to be reimbursed  Offered to reach out to [facility] with patient on the line so they could advised her if there are any options for a payment plan or co-pay assistance  Patient is agreeable to this plan  Called [facility], spoke with Mar advised I was calling with patient to see if there are any payment plans/co-pay assistance that patient can apply for  Cloretta Medal states that patient doesn't currently have account balance only balance for current order of $117 72  Asked Mar if patient can be billed for this amount instead of charged Cloretta Medal states since patient does not have balance she can be billed for her current order  Attempted to conference patient in with [facility] and accidentally transfer patient to [facility], will call patient back and see if she spoke to [facility] regarding co-pay after we disconnected  maximum assist (25% patients effort) moderate assist (50% patients effort) maximum assist (25% patients effort)

## 2021-02-10 NOTE — TELEPHONE ENCOUNTER
Received a voicemail from CARLOS with [facility]- she states she is calling to schedule delivery for the patient's Botox order   Please give them a call at [phone]

## 2021-02-10 NOTE — TELEPHONE ENCOUNTER
Called spoke with patient, she was not connected to [facility] when call was disconnected  Advised patient that I spoke to a representative from [employer], she advised me that patient's reasonability for current order is $117 72 and that patient has no other balance on her account  Per Mar patient can be billed for her responsibility and does not need to make payment in full  Patient verbalized understanding, she states she will call [facility] and provide her consent for shipment today  Will call [facility] tomorrow to see if we can coordinate delivery

## 2021-02-10 NOTE — TELEPHONE ENCOUNTER
Called patient regarding her upcoming botox appointment 2/16/2021 with [facility] in the [facility] location, patient states she is currently without transportation, advised patient that I will reach out to our social workers to see if they would be able to assist patient in arranging transportation/ Patient has [employer]  Patient is agreeable to this plan  Linda Ball can you please reach out to the patient to assist in coordinating transportation for 2/16/21 botox in [facility]       Thanks  Rosalio Benavidez

## 2021-02-11 ENCOUNTER — TELEPHONE (OUTPATIENT)
Dept: NEUROLOGY | Facility: CLINIC | Age: 51
End: 2021-02-11

## 2021-02-11 NOTE — TELEPHONE ENCOUNTER
Sudheer Connelly returned call unable to accept appointment today, she would like to move her [phone] video to [phone] if possible

## 2021-02-11 NOTE — TELEPHONE ENCOUNTER
DELAYED ENTRY FROM 2/10/2021  Called Steve, spoke with Harley, advised I was calling to coordinate delivery for patient's botox  Harley advised that botox is ready for delivery and confirmed that patient's consent is on file  While scheduling delivery Harley states that he is receiving a notification that prior authorization is needed through medical, advised Harley that we have authorization through [facility] and provided him with authorization information Diamatheusshant Rodriguez # [id number] valid dates 01/26/2021 - 01/26/2022  Harley advised that he has added auth information to order and confirmed that botox is scheduled for delivery conf # [id number]    Botox is scheduled for delivery Monday 2/15/2021 to the [facility] location   [facility] please await [facility]otox delivery and document once received   Please advise if medication is not received by 2pm     Thanks  Karen

## 2021-02-11 NOTE — TELEPHONE ENCOUNTER
SHARLENE called the patient to discuss transportation options  She did not answer the phone  SHARLENE left her a VM explaining the reason for the call and asked her to please call SHARLENE back

## 2021-02-11 NOTE — TELEPHONE ENCOUNTER
LMOM asking patient to call the office back to see if patient would like to reschedule her video visit for today 2/11/21 per [facility] office call back number

## 2021-02-12 ENCOUNTER — TELEMEDICINE (OUTPATIENT)
Dept: NEUROLOGY | Facility: CLINIC | Age: 51
End: 2021-02-12
Payer: COMMERCIAL

## 2021-02-12 ENCOUNTER — TELEPHONE (OUTPATIENT)
Dept: NEUROLOGY | Facility: CLINIC | Age: 51
End: 2021-02-12

## 2021-02-12 VITALS — HEIGHT: 62 IN | WEIGHT: 112 LBS | BODY MASS INDEX: 20.61 KG/M2

## 2021-02-12 DIAGNOSIS — R20.2 PARESTHESIA AND PAIN OF BOTH UPPER EXTREMITIES: ICD-10-CM

## 2021-02-12 DIAGNOSIS — R29.898 WEAKNESS OF RIGHT UPPER EXTREMITY: Primary | ICD-10-CM

## 2021-02-12 DIAGNOSIS — M79.602 PARESTHESIA AND PAIN OF BOTH UPPER EXTREMITIES: ICD-10-CM

## 2021-02-12 DIAGNOSIS — G43.709 CHRONIC MIGRAINE WITHOUT AURA WITHOUT STATUS MIGRAINOSUS, NOT INTRACTABLE: ICD-10-CM

## 2021-02-12 DIAGNOSIS — M79.601 PARESTHESIA AND PAIN OF BOTH UPPER EXTREMITIES: ICD-10-CM

## 2021-02-12 DIAGNOSIS — G89.29 CHRONIC CERVICAL PAIN: ICD-10-CM

## 2021-02-12 DIAGNOSIS — M54.2 CHRONIC CERVICAL PAIN: ICD-10-CM

## 2021-02-12 PROCEDURE — 3008F BODY MASS INDEX DOCD: CPT | Performed by: PHYSICIAN ASSISTANT

## 2021-02-12 PROCEDURE — 99214 OFFICE O/P EST MOD 30 MIN: CPT | Performed by: PHYSICIAN ASSISTANT

## 2021-02-12 RX ORDER — BUTALBITAL, ACETAMINOPHEN AND CAFFEINE 50; 325; 40 MG/1; MG/1; MG/1
TABLET ORAL
Qty: 10 TABLET | Refills: 3 | Status: SHIPPED | OUTPATIENT
Start: 2021-02-12 | End: 2021-05-10

## 2021-02-12 NOTE — TELEPHONE ENCOUNTER
SHARLENE made a 2nd attempt to contact the patient but she did not answer the phone  SHARLENE left her a VM explaining the reason for the call and asked her to please call SHARLENE back

## 2021-02-12 NOTE — PATIENT INSTRUCTIONS
Wrist braces for both sides  Neck exercises  Will contact MSW to call you again about transportation        Neck Exercises   AMBULATORY CARE:   Neck exercises  help reduce neck pain, and improve neck movement and strength  Neck exercises also help prevent long-term neck problems  What you need to know about neck exercises:   · Do the exercises every day,  or as often as directed by your healthcare provider  · Move slowly, gently, and smoothly  Avoid fast or jerky motions  · Stand and sit the way your healthcare provider shows you  Good posture may reduce your neck pain  Check your posture often, even when you are not doing your neck exercises  How to perform neck exercises safely:   · Exercise position:  You may sit or stand while you do neck exercises  Face forward  Your shoulders should be straight and relaxed, with a good posture  · Head tilts, forward and back:  Gently bow your head and try to touch your chin to your chest  Your healthcare provider may tell you to push on the back of your neck to help bow your head  Raise your chin back to the starting position  Tilt your head back as far as possible so you are looking up at the ceiling  Your healthcare provider may tell you to lift your chin to help tilt your head back  Return your head to the starting position  · Head tilts, side to side:  Tilt your head, bringing your ear toward your shoulder  Then tilt your head toward the other shoulder  · Head turns:  Turn your head to look over your shoulder  Tilt your chin down and try to touch it to your shoulder  Do not raise your shoulder to your chin  Face forward again  Do the same on the other side  · Head rolls:  Slowly bring your chin toward your chest  Next, roll your head to the right  Your ear should be positioned over your shoulder  Hold this position for 5 seconds  Roll your head back toward your chest and to the left into the same position  Hold for 5 seconds  Gently roll your head back and around in a clockwise [place] 3 times  Next, move your head in the reverse direction (counterclockwise) in a [place] 3 times  Do not shrug your shoulders upwards while you do this exercise  Contact your healthcare provider if:   · Your pain does not get better, or gets worse  · You have questions or concerns about your condition, care, or exercise program     © Copyright [address] Information is for End User's use only and may not be sold, redistributed or otherwise used for commercial purposes  All illustrations and images included in CareNotes® are the copyrighted property of [employer]  or [address] [place], [place] [address]calvin   The above information is an  only  It is not intended as medical advice for individual conditions or treatments  Talk to your doctor, nurse or pharmacist before following any medical regimen to see if it is safe and effective for you

## 2021-02-12 NOTE — TELEPHONE ENCOUNTER
LMOM for patient to call the office back to go over medical history prior to her virtual visit with [facility] at [facility]

## 2021-02-12 NOTE — PROGRESS NOTES
Virtual Regular Visit      Assessment/Plan:    Problem List Items Addressed This Visit        Cardiovascular and Mediastinum    Chronic migraine without aura without status migrainosus, not intractable    Relevant Medications    butalbital-acetaminophen-caffeine (FIORICET,ESGIC) -40 mg per tablet       Nervous and Auditory    Weakness of right upper extremity - Primary    Relevant Orders    MRI cervical spine wo contrast       Other    Chronic cervical pain    Relevant Orders    MRI cervical spine wo contrast    Paresthesia and pain of both upper extremities    Relevant Orders    MRI cervical spine wo contrast        The patient will increase baclofen to 10/10/10/20 q h  s  Helps with spasms especially at nighttime  Cervical spine MRI for right upper extremity weakness, worsening  The patient is interested in a pain management interventional procedures such as JOSE ANGEL if appropriate  She has tried and failed 6-8 weeks of physician directed therapy exercises, and EMG reveals C7-8 radiculopathy  The patient cannot do formal physical therapy due to high co-pay  Will contact MSW to reach out to the patient for transportation to her next Botox  May also need to reach out to endocrinology to assist with transportation to that next appointment  Will send order for cock-up wrist splint bilaterally for carpal tunnel  Right upper extremity weakness from the neck, more so in the hand, so much so that she has difficulty picking up a cup  Advised to rest and not lift anything heavy  Can continue light manipulation with the exercises as sent to her today  The patient should not hesitate to call me prior to her follow up with any questions or concerns           Reason for visit is   Chief Complaint   Patient presents with    Virtual Regular Visit        Encounter provider Timm Dance, PA-C    Provider located at [address] [address]      Recent Visits  Date Type Provider Dept   02/15/21 Telephone Katrina  98  recent visits within past 7 days and meeting all other requirements     Future Appointments  No visits were found meeting these conditions  Showing future appointments within next 150 days and meeting all other requirements        The patient was identified by name and date of birth  Maryse Emmanuel was informed that this is a telemedicine visit and that the visit is being conducted through [employer] and patient was informed that this is a secure, HIPAA-compliant platform  She agrees to proceed     My office door was closed  No one else was in the room  She acknowledged consent and understanding of privacy and security of the video platform  The patient has agreed to participate and understands they can discontinue the visit at any time  Patient is aware this is a billable service  Subjective  Maryse Emmanuel is a 53 y o  female who is contacted via telemedicine for neurological follow-up  HPI     The patient reports worsening neck pain with right upper extremity weakness which is worsening since last seen  States she can barely even hold a cup or a plate  She is trying not to use her right arm  The increased dose of baclofen does help take the edge off the pain, and helps with spasm  She has difficulty falling asleep, but not due to pain, rather she has a lot of other stressors  She is now following with Endocrinology  Botox is helpful for migraine prevention and she would like to continue with it  Since starting botox, the patient reports greater than 7 days of migraine relief from baseline, correlated with headache diary, decreased abortive medication use and decreased ER visits        Past Medical History:   Diagnosis Date    Acute venous embolism and thrombosis of deep vessels of distal lower extremity (HCC)     Anemia     Anxiety     last assessed 11/20/17  Arthritis     Asthma     Cerebral infarction ([facility] )     unspecified, last assessed 11/14/16    Chest pain     last assessed 5/9/17    Chronic cough     last assessed 12/12/13    Depression     Diabetes mellitus, type 2 ([facility] )     DJD (degenerative joint disease)     Esophageal reflux     Fibromyalgia     GERD without esophagitis     resolved 5/13/16    History of pulmonary embolism     Hyperlipidemia     Hypertension     Hypothyroidism     Iron deficiency     Pancreatitis     Panic attack     Panic disorder     Polyarthritis     PTSD (post-traumatic stress disorder)     Sleep difficulties     Stroke syndrome     Thyroid disease     TIA (transient ischemic attack)     TIA (transient ischemic attack)     Venous embolism and thrombosis of deep vessels of distal lower extremity (HCC)     Vitamin B12 deficiency     Vitamin D deficiency        Past Surgical History:   Procedure Laterality Date    CARPAL TUNNEL RELEASE Right     neuroplasty decompression of median nerve    CATARACT EXTRACTION      CHOLECYSTECTOMY      ERCP      ERCP      ESOPHAGOGASTRIC FUNDOPLASTY      NISSEN FUNDOPLICATION      AR ESOPHAGOGASTRODUODENOSCOPY TRANSORAL DIAGNOSTIC N/A 11/2/2016    Procedure: EGD AND COLONOSCOPY;  Surgeon: Ingris Hwang MD;  [facility];   Service: Gastroenterology    AR INCISE FINGER TENDON SHEATH Right 3/8/2016    Procedure: RELEASE TRIGGER FINGER RIGHT THUMB;  Surgeon: Felicity Cranker, MD;  [facility];  Service: Orthopedics    AR WRIST [place] Gina LIG Right 3/8/2016    Procedure: RELEASE CARPAL TUNNEL ENDOSCOPIC;  Surgeon: Felicity Cranker, MD;  [facility];  Service: Orthopedics    TONSILLECTOMY AND ADENOIDECTOMY         Current Outpatient Medications   Medication Sig Dispense Refill    acarbose (PRECOSE) 25 mg tablet Take 1 tablet (25 mg total) by mouth 3 (three) times a day with meals 270 tablet 1    Advair Diskus 500-50 MCG/DOSE inhaler INHALE 1 PUFF BY MOUTH 2 TIMES PER DAY  RINSE MOUTH AFTER USE  1 Inhaler 5    aspirin 81 mg chewable tablet Chew 81 mg daily       atorvastatin (LIPITOR) 80 mg tablet TAKE 1 TABLET BY MOUTH EVERY DAY 90 tablet 3    baclofen 10 mg tablet TAKE 1 TABLET BY MOUTH THREE TIMES A DAY AS NEEDED 90 tablet 0    BD PEN NEEDLE LINDY U/F 32G X 4 MM MISC Inject under the skin daily 30 each 5    butalbital-acetaminophen-caffeine (FIORICET,ESGIC) -40 mg per tablet TAKE 1 TABLET EVERY 6 HOURS AS NEEDED FOR MIGRAINE  NO MORE THAN 2-3 PER WEEK  10 tablet 3    Cyanocobalamin 1000 MCG/ML LIQD Take 500 mcg by mouth daily      diazepam (VALIUM) 5 mg tablet Take 1 tablet (5 mg total) by mouth 3 (three) times a day as needed for anxiety 90 tablet 1    DULoxetine (CYMBALTA) 60 mg delayed release capsule Take 1 capsule (60 mg total) by mouth 2 (two) times a day 180 capsule 1    ergocalciferol (VITAMIN D2) 50,000 units 1 TAB ONCE WEEKLY 12 capsule 0    Folic Acid 0 8 MG CAPS Take 0 04 mg by mouth daily   gabapentin (NEURONTIN) 400 mg capsule TAKE 1 TO 2 CAPSULE(S) BY MOUTH THREE TIMES DAILY 540 capsule 1    Galcanezumab-gnlm (Emgality) 120 MG/ML SOAJ inject 1 pen subq every 30 days  1 pen 11    hydrocortisone (CORTEF) 5 mg tablet TAKE 2 TABLETS IN THE MORNING AT 8:00 A  M , 1 TABLET AT 5:00 P M ( DOUBLE THE DOSE IN ACUTE SICKNESS, SUCH AS FEVER UPPER RESPIRATORY TRACT INFECTION) 50 tablet 5    insulin aspart (NOVOLOG FLEXPEN) 100 Units/mL injection pen Inject 15 Units under the skin 3 (three) times a day Before meals plus 2units for every 50mg/dL above 150mg/dL      insulin glargine (Lantus SoloStar) 100 units/mL injection pen Inject 60 Units under the skin daily 20 pen 0    Insulin Pen Needle (BD Pen Needle Lindy U/F) 32G X 4 MM MISC by Does not apply route daily 100 each 3    Insulin Syringe-Needle U-100 25G X 1" 1 ML MISC by Does not apply route 3 (three) times a day 300 each 1    Insulin Syringes, Disposable, U-100 1 ML MISC by Does not apply route 3 (three) times a day 100 each 3    levothyroxine 112 mcg tablet TAKE 1 TABLET BY MOUTH EVERY DAY 90 tablet 1    losartan (COZAAR) 50 mg tablet TAKE 1 TABLET DAILY  90 tablet 2    metoprolol tartrate (LOPRESSOR) 25 mg tablet TAKE 1 TABLET BY MOUTH EVERY DAY 90 tablet 2    Naloxone HCl (NARCAN) 4 MG/0 1ML LIQD 1 actuation in one nostril once as needed for opioid overdose, may repeat dose every 2-3 minutes until patient responsive or EMS arrives 1 each 1    NEEDLE, DISP, 25 G (B-D DISP NEEDLE 25GX1") 25G X 1" MISC by Does not apply route once as needed (opiod overdose) for up to 1 dose 3 each 0    Nerve Stimulator (TENS Therapy Pain Relief) ALPESH 1 Device by Does not apply route as needed (myofascial pain) 1 Device 0    OLANZapine (ZyPREXA) 10 mg tablet Take 1 tablet (10 mg total) by mouth daily at bedtime 60 tablet 1    omeprazole (PriLOSEC) 20 mg delayed release capsule TAKE 1 CAPSULE BY MOUTH EVERY DAY 90 capsule 1    ondansetron (ZOFRAN) 4 mg tablet TAKE 1 TABLET BY MOUTH EVERY 8 HOURS AS NEEDED FOR NAUSEA AND VOMITING 20 tablet 2    OneTouch Ultra test strip USE AS DIRECTED TO TEST 3 TIMES A  each 2    pioglitazone (ACTOS) 15 mg tablet TAKE 1 TABLET BY MOUTH EVERY DAY 90 tablet 0    prazosin (MINIPRESS) 1 mg capsule Take 1 capsule (1 mg total) by mouth daily at bedtime 30 capsule 1    ProAir RespiClick 763 (90 Base) MCG/ACT AEPB INHALE 2 PUFFS BY MOUTH EVERY 4-6 HOURS AS NEEDED FOR WHEEZING OR SHORTNESS OF BREATH 1 each 2    topiramate (TOPAMAX) 100 mg tablet TAKE 2 TABLETS BY MOUTH EVERY  tablet 0    topiramate (TOPAMAX) 25 mg tablet TAKE 1 TABLET BY MOUTH EVERY DAY 90 tablet 1    traMADol (ULTRAM) 50 mg tablet TAKE 1-2 TABLETS EVERY 6-8 HOURS AS NEEDED FOR SEVERE PAIN  DO NOT TAKE WITHIN 6 HOURS OF VALIUM (Patient taking differently: Take 50 mg by mouth Take 1-2 tablets every 6-8 hours as needed for severe pain    Do not take within 6 hours of Valium) 160 tablet 0    Turmeric 500 MG TABS Take 1 tablet by mouth daily      Cyanocobalamin (VITAMIN B-12 IJ) Inject as directed      insuln lispro (HumaLOG KwikPen) 200 units/mL CONCENTRATED U-200 injection pen Inject 34 Units under the skin 3 (three) times a day with meals      mirtazapine (REMERON) 7 5 MG tablet Take 1 tablet (7 5 mg total) by mouth daily at bedtime 30 tablet 1    Nutritional Supplements (Glucerna Advance Shake) LIQD 1 daily  30 Bottle 0    ofloxacin (OCUFLOX) 0 3 % ophthalmic solution PLEASE SEE ATTACHED FOR DETAILED DIRECTIONS      pioglitazone (ACTOS) 30 mg tablet Take 0 5 tablets (15 mg total) by mouth daily 90 tablet 1    prednisoLONE acetate (PRED FORTE) 1 % ophthalmic suspension INSTILL 1 DROP INTO SURGERY EYE 4 TIMES DAILY AFTER SURGERY   prochlorperazine (COMPAZINE) 10 mg tablet TAKE 1 TABLET (10 MG TOTAL) BY MOUTH EVERY 6 (SIX) HOURS AS NEEDED FOR NAUSEA OR VOMITING 20 tablet 0     No current facility-administered medications for this visit  Allergies   Allergen Reactions    Medical Tape Rash    Lexapro [Escitalopram Oxalate]     Escitalopram Other (See Comments) and Palpitations    Other Hives and Rash     Adhesive Tape       Review of Systems   Constitutional: Negative  Negative for appetite change and fever  HENT: Negative  Negative for hearing loss, tinnitus, trouble swallowing and voice change  Eyes: Negative  Negative for photophobia and pain  Respiratory: Positive for shortness of breath  Cardiovascular: Negative  Negative for palpitations  Gastrointestinal: Negative  Negative for nausea and vomiting  Endocrine: Negative  Negative for cold intolerance  Genitourinary: Negative  Negative for dysuria, frequency and urgency  Musculoskeletal: Negative  Negative for myalgias and neck pain  Skin: Negative  Negative for rash  Allergic/Immunologic: Negative  Neurological: Positive for weakness (right hand)   Negative for dizziness, tremors, seizures, syncope, facial asymmetry, speech difficulty, light-headedness, numbness and headaches  Hematological: Negative  Does not bruise/bleed easily  Psychiatric/Behavioral: Positive for sleep disturbance  Negative for confusion and hallucinations  The patient is nervous/anxious  Memory issues, depression, anxiety and mood swings     The following portions of the patient's history were reviewed and updated as appropriate: allergies, current medications/ medication history, past family history, past medical history, past social history, past surgical history and problem list     Review of systems was reviewed and otherwise unremarkable from a neurological perspective  Video Exam    Vitals:    02/12/21 1422   Weight: 50 8 kg (112 lb)   Height: 5' 2" (1 575 m)   Body mass index is 20 49 kg/m²  Physical Exam   Neurological exam:  On neurologic exam, the patient is alert and oriented to time and place  Speech is fluent and articulate, and the patient follows commands appropriately  Judgment and affect appear normal   Extraocular muscles are intact without nystagmus  Face is symmetric  Hearing is intact bilaterally  Motor examination reveals adequate range of motion  Cannot directly test strength due to telemedicine platform  I spent 38 minutes directly with the patient during this visit      [address] [place] [address] Thom Braden acknowledges that she has consented to an online visit or consultation  She understands that the online visit is based solely on information provided by her, and that, in the absence of a face-to-face physical evaluation by the physician, the diagnosis she receives is both limited and provisional in terms of accuracy and completeness  This is not intended to replace a full medical face-to-face evaluation by the physician  Bradley Mcneil understands and accepts these terms

## 2021-02-15 ENCOUNTER — TELEPHONE (OUTPATIENT)
Dept: NEUROLOGY | Facility: CLINIC | Age: 51
End: 2021-02-15

## 2021-02-15 NOTE — TELEPHONE ENCOUNTER
Spoke with patient and explained that the morning appts are being converted to virtual appts and see if she wanted to move her Botox appt down to 12:45PM 2/13/2021 1898 Vlad Caballero at [facility]  Patient agreed to the new appt time of 12:45PM and she stated that the  is trying to coordinate transportation for her and if there are any issues, then she will reach out to the office  Registration completed  Covid-screening questions completed  Aware of all policies - mask, visitor and no show fee

## 2021-02-15 NOTE — TELEPHONE ENCOUNTER
MSW received a call back from the patient  She confirmed that she is still in need for a ride to her botox appointment tomorrow  This writer received a message from [employer] stating that the patient's appointment was moved 12:45pm tomorrow afternoon  Patient was not sure if her insurance benefits included transportation to medical appointments  MSW called them to verify and spoke with Sugar Leal ( call reference number [id number] [phone]) she confirmed that unfortunately the patient does not have transportation benefits to out patient appointments but does have them for medically necessary appointments that would include an ambulance or helicopter  MSW then contacted [address] [place] [address] transport and spoke with Fermín  MSW provided the patients information to her  When Fermín puts in the request tomorrow the patient should receive a text message  She will then later receive a call from the [facility]  when they are on the way  Patient should expect an 11:45  time  MSW called the patient to make her aware  She expressed understanding  MSJOSE will follow up with DEMETRA tomorrow to ensure that the [facility] ride is still scheduled for dispatch

## 2021-02-15 NOTE — TELEPHONE ENCOUNTER
Noted, thank you  The [facility] is [facility] by [facility]  MSW will follow up with them to confirm the patient remains scheduled to be dispatched a ride   Please see telephone encounter dated 2/10/21 for more details

## 2021-02-15 NOTE — TELEPHONE ENCOUNTER
SHARLENE made a 3rd attempt to contact the patient and discuss transportation but she did not answer the phone  SHARLENE left her a VM explaining the reason for the call and asked her to please call SHARLENE back

## 2021-02-15 NOTE — TELEPHONE ENCOUNTER
Botox number of units: 200u  Botox quantity: 1  Arrived at what location: [place]  Lot number: [id number]  Expiration Date:10/2023  Appt notes indicate correct medication: Y

## 2021-02-16 ENCOUNTER — PROCEDURE VISIT (OUTPATIENT)
Dept: NEUROLOGY | Facility: CLINIC | Age: 51
End: 2021-02-16
Payer: COMMERCIAL

## 2021-02-16 VITALS — SYSTOLIC BLOOD PRESSURE: 101 MMHG | DIASTOLIC BLOOD PRESSURE: 68 MMHG | HEART RATE: 107 BPM | TEMPERATURE: 97.9 F

## 2021-02-16 DIAGNOSIS — G56.10 MEDIAN NERVE NEUROPATHY, UNSPECIFIED LATERALITY: ICD-10-CM

## 2021-02-16 DIAGNOSIS — G43.709 CHRONIC MIGRAINE WITHOUT AURA: Primary | ICD-10-CM

## 2021-02-16 DIAGNOSIS — IMO0002 UNCONTROLLED TYPE 2 DIABETES MELLITUS WITH DIABETIC POLYNEUROPATHY, WITH LONG-TERM CURRENT USE OF INSULIN: ICD-10-CM

## 2021-02-16 PROBLEM — R29.898 WEAKNESS OF RIGHT UPPER EXTREMITY: Status: ACTIVE | Noted: 2021-02-16

## 2021-02-16 PROCEDURE — 64615 CHEMODENERV MUSC MIGRAINE: CPT | Performed by: PHYSICIAN ASSISTANT

## 2021-02-16 NOTE — PROGRESS NOTES
Universal Protocol   Consent: Verbal consent obtained  Written consent obtained    Risks and benefits: risks, benefits and alternatives were discussed  Consent given by: patient        Chemodenervation     Date/Time 2/16/2021 12:32 PM     Performed by  Virginia Garza PA-C     Authorized by Virgiina Garza PA-C        Pre-procedure details      Prepped With: Alcohol     Procedure details     Position:  Upright   Botox     Botox Type:  Type A    Brand:  Botox    mL's of Botulinum Toxin:  190    Final Concentration per CC:  100 units    Needle Gauge:  30 G 2 5 inch   Procedures     Botox Procedures: chronic headache      Indications: migraines     Injection Location      Head / Face:  L , R , L frontalis, R frontalis, R inferior cervical paraspinal, L inferior cervical paraspinal, L medial occipitalis, R medial occipitalis, L lateral occipitalis, R lateral occipitalis, procerus, L temporalis, R temporalis, R superior trapezius and L superior trapezius    L  injection amount:  5 unit(s)    R  injection amount:  5 unit(s)    L lateral frontalis:  5 unit(s)    R lateral frontalis:  5 unit(s)    L medial frontalis:  5 unit(s)    R medial frontalis:  5 unit(s)    L temporalis injection amount:  20 unit(s)    R temporalis injection amount:  20 unit(s)    Procerus injection amount:  5 unit(s)    L lateral occipitalis injection amount:  5 unit(s)    R lateral occipitalis injection amount:  5 unit(s)    L medial occipitalis injection amount:  10 unit(s)    R medial occipitalis injection amount:  10 unit(s)    L inferior cervical paraspinal injection amount:  10 unit(s)    R inferior cervical paraspinal injection amount:  10 unit(s)    L superior trapezius injection amount:  15 unit(s)    R superior trapezius injection amount:  15 unit(s)   Total Units     Total units used:  190    Total units discarded:  10   Post-procedure details      Chemodenervation:  Chronic migraine    Facial Nerve Location[de-identified]  Bilateral facial nerve    Patient tolerance of procedure: Tolerated well, no immediate complications   Comments      Extra units medically necessary:  - 15 between the right and left occipital region  - 10 left cervical paraspinal muscles  - 10 right cervical paraspinal muscles       Barb was seen today for botulinum toxin injection  Diagnoses and all orders for this visit:    Chronic migraine without aura  -     Botulinum Toxin Type A SOLR 200 Units  -     Chemodenervation    Median nerve neuropathy, unspecified laterality  -     Cock Up Wrist Splint    Uncontrolled type 2 diabetes mellitus with diabetic polyneuropathy, with long-term current use of insulin ([address] )  -     Ambulatory referral to Podiatry; Future  -     Nutritional Supplements (Glucerna Advance Shake) LIQD; 1 daily  Blood pressure 101/68, pulse (!) 107, temperature 97 9 °F (36 6 °C), temperature source Temporal, last menstrual period 03/04/2016, not currently breastfeeding  She reports insomnia, increased dose of baclofen did not help  Will fax wrist splint order to [facility]- for b/l median neuropathy  Did neurological exam today:  Vital signs reviewed  Well developed, well nourished  Head: Normocephalic, atraumatic  CN 3-64: intact and symmetric, including EOMs which are normal b/l and PERRL  MSK: generalized weakness t/o, in particular 4/5 R shoulder abd, 4/5 triceps and biceps, 5-/5 R finger abd  Sensation: temp slightly diminished below the R ankle in comparison to the L, vibration intact in all 4 extr  Temp diminished slightly in the R dorsal hand compared to the L  Romberg borderline/ with mild sway  Reflexes: 2+ and symmetric in all 4 extr  Coordination: Nml x4 extr  Gait: Steady normal gait, tandem gait is steady but slow/ cautious

## 2021-02-16 NOTE — TELEPHONE ENCOUNTER
Transportation waiver sent to [address]  He will give it to the patient for her to sign and then will scan it to her chart

## 2021-02-16 NOTE — TELEPHONE ENCOUNTER
MSW called [facility] this morning to confirm that the patient was on their list of patient's to dispatch a [employer] ride to  The representative confirmed both the patient's phone number and address  MSW called the patient this morning and provided her with a friendly reminder about her appointment and [employer] ride  MSW informed her that should she not hear from [facility] by 12pm to please call MSW directly so that writer can call [facility] and follow up  Patient was agreeable and expressed understanding

## 2021-02-16 NOTE — TELEPHONE ENCOUNTER
Form was signed and scanned into the patient's chart  Writer called [facility] after patient's appointment for her to be dispatched a [place] ride home  There are no further needs at this time  MSW will be available to assist with any further needs in regards to this patient

## 2021-02-17 ENCOUNTER — TELEMEDICINE (OUTPATIENT)
Dept: PSYCHIATRY | Facility: CLINIC | Age: 51
End: 2021-02-17
Payer: COMMERCIAL

## 2021-02-17 DIAGNOSIS — F40.10 SOCIAL ANXIETY DISORDER: ICD-10-CM

## 2021-02-17 DIAGNOSIS — F40.01 PANIC DISORDER WITH AGORAPHOBIA: ICD-10-CM

## 2021-02-17 DIAGNOSIS — F31.81 BIPOLAR II DISORDER (HCC): ICD-10-CM

## 2021-02-17 DIAGNOSIS — F41.1 GENERALIZED ANXIETY DISORDER: ICD-10-CM

## 2021-02-17 DIAGNOSIS — F43.10 PTSD (POST-TRAUMATIC STRESS DISORDER): Primary | ICD-10-CM

## 2021-02-17 PROCEDURE — 99214 OFFICE O/P EST MOD 30 MIN: CPT | Performed by: PSYCHIATRY & NEUROLOGY

## 2021-02-17 PROCEDURE — 90833 PSYTX W PT W E/M 30 MIN: CPT | Performed by: PSYCHIATRY & NEUROLOGY

## 2021-02-17 RX ORDER — MIRTAZAPINE 7.5 MG/1
7.5 TABLET, FILM COATED ORAL
Qty: 30 TABLET | Refills: 1 | Status: SHIPPED | OUTPATIENT
Start: 2021-02-17 | End: 2021-03-22
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
## 2021-02-17 NOTE — PATIENT INSTRUCTIONS
PTSD (post-traumatic stress disorder)    Bipolar II disorder (HCC)    Social anxiety disorder    Panic disorder with agoraphobia    Generalized anxiety disorder    continue all meds except for wellbutrin which will be weaned to every other day for a week then stopped  Prazosin increased to 2mg, start Remron 7 5mg once wellbutrin stopped       Schedule an appointment with therapy    Encouraged to drink a glucerna with meals    Follow up in 2 months

## 2021-02-17 NOTE — BH TREATMENT PLAN
TREATMENT PLAN (Medication Management Only)        [facility]    Name and Date of Birth:  Aliza Obrien 53 y o  1970  Date of Treatment Plan: February 17, 2021  Diagnosis/Diagnoses:    1  PTSD (post-traumatic stress disorder)    2  Bipolar II disorder ([address] )    3  Social anxiety disorder    4  Panic disorder with agoraphobia    5  Generalized anxiety disorder      Strengths/Personal Resources for Self-Care: "I am very stubburn, thats a strength  I keep on going no matter how i feel  I have will power  i am very kind ", taking medications as prescribed  Area/Areas of need (in own words): anxiety, depression, sleep problems  1  Long Term Goal: Feel happier  Target Date:6 months - 8/17/2021  Person/Persons responsible for completion of goal: Dr Maye Adames  2  Short Term Objective (s) - How will we reach this goal?:   A  Provider new recommended medication/dosage changes and/or continue medication(s): Medication changes: I have discontinued Barb Palomo's buPROPion  I am also having her start on mirtazapine  Additionally, I am having her maintain her Folic Acid, aspirin, Cyanocobalamin (VITAMIN B-12 IJ), Turmeric, losartan, insulin aspart, naloxone, NEEDLE (DISP) 25 G, BD Pen Needle Kimberli U/F, Galcanezumab-gnlm, ofloxacin, prednisoLONE acetate, atorvastatin, Insulin Pen Needle, levothyroxine, metoprolol tartrate, Cyanocobalamin, Insulin Syringes (Disposable), Insulin Syringe-Needle U-100, TENS Therapy Pain Relief, omeprazole, pioglitazone, acarbose, HumaLOG KwikPen, OneTouch Ultra, diazepam, Advair Diskus, pioglitazone, ergocalciferol, DULoxetine, gabapentin, OLANZapine, prazosin, Lantus SoloStar, topiramate, hydrocortisone, ondansetron, topiramate, traMADol, prochlorperazine, ProAir RespiClick, baclofen, butalbital-acetaminophen-caffeine, and Glucerna Advance Shake     B  N/A   C  N/A    Target Date:6 months - 8/17/2021  Person/Persons Responsible for Completion of Goal: Dr Steve Rai Goals: continuing treatment  Treatment Modality: medication management every 2 months  Review due 180 days from date of this plan: 6 months - 8/17/2021  Expected length of service: ongoing treatment  My Physician/PA/NP and I have developed this plan together and I agree to work on the goals and objectives  I understand the treatment goals that were developed for my treatment      Treatment Plan done but not signed at time of office visit due to:  Plan reviewed by phone or in person  and verbal consent given due to Susanne social ann-marie

## 2021-02-18 ENCOUNTER — TELEPHONE (OUTPATIENT)
Dept: NEUROLOGY | Facility: CLINIC | Age: 51
End: 2021-02-18

## 2021-02-18 NOTE — TELEPHONE ENCOUNTER
Call received from [employer]  Requesting copy of last office note to use for supporting documentation for wrist brace  Faxed as requested to [phone]

## 2021-02-20 DIAGNOSIS — F43.10 PTSD (POST-TRAUMATIC STRESS DISORDER): ICD-10-CM

## 2021-02-21 DIAGNOSIS — F43.10 POST TRAUMATIC STRESS DISORDER (PTSD): ICD-10-CM

## 2021-02-21 DIAGNOSIS — R11.0 NAUSEA: ICD-10-CM

## 2021-02-21 DIAGNOSIS — G43.709 CHRONIC MIGRAINE WITHOUT AURA WITHOUT STATUS MIGRAINOSUS, NOT INTRACTABLE: ICD-10-CM

## 2021-02-21 DIAGNOSIS — F40.01 PANIC DISORDER WITH AGORAPHOBIA: ICD-10-CM

## 2021-02-21 DIAGNOSIS — F41.1 GENERALIZED ANXIETY DISORDER: ICD-10-CM

## 2021-02-21 RX ORDER — PROCHLORPERAZINE MALEATE 10 MG
10 TABLET ORAL EVERY 6 HOURS PRN
Qty: 20 TABLET | Refills: 0 | Status: SHIPPED | OUTPATIENT
Start: 2021-02-21 | End: 2021-03-15

## 2021-02-22 RX ORDER — PRAZOSIN HYDROCHLORIDE 1 MG/1
1 CAPSULE ORAL
Qty: 30 CAPSULE | Refills: 1 | Status: SHIPPED | OUTPATIENT
Start: 2021-02-22 | End: 2021-03-22

## 2021-02-22 RX ORDER — DIAZEPAM 5 MG/1
5 TABLET ORAL 3 TIMES DAILY PRN
Qty: 90 TABLET | Refills: 1 | Status: SHIPPED | OUTPATIENT
Start: 2021-02-22 | End: 2021-05-12 | Stop reason: SDUPTHER

## 2021-03-02 ENCOUNTER — HOSPITAL ENCOUNTER (OUTPATIENT)
Dept: RADIOLOGY | Age: 51
Discharge: HOME | End: 2021-03-02
Payer: COMMERCIAL

## 2021-03-02 DIAGNOSIS — M79.602 PARESTHESIA AND PAIN OF BOTH UPPER EXTREMITIES: ICD-10-CM

## 2021-03-02 DIAGNOSIS — R29.898 WEAKNESS OF RIGHT UPPER EXTREMITY: ICD-10-CM

## 2021-03-02 DIAGNOSIS — M79.601 PARESTHESIA AND PAIN OF BOTH UPPER EXTREMITIES: ICD-10-CM

## 2021-03-02 DIAGNOSIS — R20.2 PARESTHESIA AND PAIN OF BOTH UPPER EXTREMITIES: ICD-10-CM

## 2021-03-02 DIAGNOSIS — M54.2 CHRONIC CERVICAL PAIN: ICD-10-CM

## 2021-03-02 DIAGNOSIS — G89.29 CHRONIC CERVICAL PAIN: ICD-10-CM

## 2021-03-02 PROCEDURE — G1004 CDSM NDSC: HCPCS

## 2021-03-02 PROCEDURE — 72141 MRI NECK SPINE W/O DYE: CPT

## 2021-03-04 ENCOUNTER — TELEPHONE (OUTPATIENT)
Dept: PAIN MEDICINE | Facility: CLINIC | Age: 51
End: 2021-03-04

## 2021-03-10 ENCOUNTER — TELEPHONE (OUTPATIENT)
Dept: NEUROLOGY | Facility: CLINIC | Age: 51
End: 2021-03-10

## 2021-03-10 DIAGNOSIS — G43.709 CHRONIC MIGRAINE WITHOUT AURA WITHOUT STATUS MIGRAINOSUS, NOT INTRACTABLE: ICD-10-CM

## 2021-03-10 DIAGNOSIS — M79.18 CERVICAL MYOFASCIAL PAIN SYNDROME: ICD-10-CM

## 2021-03-10 NOTE — TELEPHONE ENCOUNTER
----- Message from Maru Pressley PA-C sent at 3/8/2021  3:02 PM EST -----  Please let the pt know her cervical spine MRI appears the same, and please recommend potentially making f/u with pain management to address  I will order Nsx evaluation if she is agreeable  Thanks

## 2021-03-11 ENCOUNTER — TELEPHONE (OUTPATIENT)
Dept: ENDOCRINOLOGY | Facility: CLINIC | Age: 51
End: 2021-03-11

## 2021-03-12 DIAGNOSIS — E03.9 ACQUIRED HYPOTHYROIDISM: ICD-10-CM

## 2021-03-12 DIAGNOSIS — G89.4 CHRONIC PAIN SYNDROME: ICD-10-CM

## 2021-03-12 RX ORDER — BACLOFEN 10 MG/1
TABLET ORAL
Qty: 90 TABLET | Refills: 0 | Status: SHIPPED | OUTPATIENT
Start: 2021-03-12 | End: 2021-04-10

## 2021-03-12 NOTE — TELEPHONE ENCOUNTER
Left patient detailed message regarding below   Instructed patient to return call if interested in neurosurgery referral or is she has any further questions

## 2021-03-15 DIAGNOSIS — R11.0 NAUSEA: ICD-10-CM

## 2021-03-15 DIAGNOSIS — G43.709 CHRONIC MIGRAINE WITHOUT AURA WITHOUT STATUS MIGRAINOSUS, NOT INTRACTABLE: ICD-10-CM

## 2021-03-15 RX ORDER — PROCHLORPERAZINE MALEATE 10 MG
10 TABLET ORAL EVERY 6 HOURS PRN
Qty: 20 TABLET | Refills: 0 | Status: SHIPPED | OUTPATIENT
Start: 2021-03-15 | End: 2021-04-02

## 2021-03-16 NOTE — TELEPHONE ENCOUNTER
Please call patient to find out on average how much tramadol she actually takes on a daily basis as we may need to modify the quantity

## 2021-03-16 NOTE — TELEPHONE ENCOUNTER
Abdullahi Merritt returned the offices' call  Pt takes 6 tablets of tramadol daily  Please advise   Any questions please call [phone]

## 2021-03-18 DIAGNOSIS — G89.4 CHRONIC PAIN DISORDER: ICD-10-CM

## 2021-03-18 NOTE — TELEPHONE ENCOUNTER
Incoming fax refill request for patient medication Gabapentin 400 mg sent to [facility]

## 2021-03-18 NOTE — TELEPHONE ENCOUNTER
A1C is > 14 9% which is suggestive of very high BG readings, averaging high 300s mg/dl   Advise her to take her medications without missing doses  Follow a diabetic diet  Eliminate sweets, sugary beverages and limit carbs  She can send a log so adjustments can be made to insulin  She needs to keep her appointment for 4/1 with Dr Martinez Anguiano

## 2021-03-18 NOTE — TELEPHONE ENCOUNTER
Please call the patient to find out exactly how she is taking gabapentin as I need to narrow down the amount precisely

## 2021-03-19 RX ORDER — TRAMADOL HYDROCHLORIDE 50 MG/1
TABLET ORAL
Qty: 160 TABLET | Refills: 0 | Status: SHIPPED | OUTPATIENT
Start: 2021-03-19 | End: 2021-04-27

## 2021-03-19 RX ORDER — GABAPENTIN 400 MG/1
CAPSULE ORAL
Qty: 540 CAPSULE | Refills: 1 | Status: SHIPPED | OUTPATIENT
Start: 2021-03-19 | End: 2021-11-29

## 2021-03-19 RX ORDER — LEVOTHYROXINE SODIUM 112 UG/1
TABLET ORAL
Qty: 90 TABLET | Refills: 1 | Status: SHIPPED | OUTPATIENT
Start: 2021-03-19 | End: 2021-09-15

## 2021-03-20 DIAGNOSIS — F31.81 BIPOLAR II DISORDER (HCC): ICD-10-CM

## 2021-03-20 DIAGNOSIS — F43.10 PTSD (POST-TRAUMATIC STRESS DISORDER): ICD-10-CM

## 2021-03-20 DIAGNOSIS — F41.1 GENERALIZED ANXIETY DISORDER: ICD-10-CM

## 2021-03-22 RX ORDER — MIRTAZAPINE 7.5 MG/1
7.5 TABLET, FILM COATED ORAL
Qty: 30 TABLET | Refills: 1 | Status: SHIPPED | OUTPATIENT
Start: 2021-03-22 | End: 2021-04-16

## 2021-03-22 RX ORDER — PRAZOSIN HYDROCHLORIDE 1 MG/1
1 CAPSULE ORAL
Qty: 30 CAPSULE | Refills: 1 | Status: SHIPPED | OUTPATIENT
Start: 2021-03-22 | End: 2021-04-16

## 2021-03-22 RX ORDER — OLANZAPINE 10 MG/1
TABLET ORAL
Qty: 30 TABLET | Refills: 3 | Status: SHIPPED | OUTPATIENT
Start: 2021-03-22 | End: 2021-06-15

## 2021-03-24 DIAGNOSIS — E11.49 TYPE 2 DIABETES MELLITUS WITH OTHER NEUROLOGIC COMPLICATION, WITH LONG-TERM CURRENT USE OF INSULIN (HCC): ICD-10-CM

## 2021-03-24 DIAGNOSIS — Z79.4 TYPE 2 DIABETES MELLITUS WITH OTHER NEUROLOGIC COMPLICATION, WITH LONG-TERM CURRENT USE OF INSULIN (HCC): ICD-10-CM

## 2021-03-24 RX ORDER — BLOOD SUGAR DIAGNOSTIC
STRIP MISCELLANEOUS
Qty: 100 EACH | Refills: 5 | Status: SHIPPED | OUTPATIENT
Start: 2021-03-24 | End: 2021-09-16

## 2021-03-25 ENCOUNTER — TELEPHONE (OUTPATIENT)
Dept: NEUROLOGY | Facility: CLINIC | Age: 51
End: 2021-03-25

## 2021-03-25 NOTE — TELEPHONE ENCOUNTER
Type Date User Summary Attachment   General 03/24/2021  4:15 PM Edelmira Hickey care coordination  -   Note    Botox- authorization #: [id number]- 2nd visit- valid from 1/26/2021 until 1/26/2022   Please use [facility]      Thank you     Yue Foster

## 2021-03-31 ENCOUNTER — TELEMEDICINE (OUTPATIENT)
Dept: FAMILY MEDICINE CLINIC | Facility: CLINIC | Age: 51
End: 2021-03-31
Payer: COMMERCIAL

## 2021-03-31 DIAGNOSIS — E11.49 TYPE 2 DIABETES MELLITUS WITH OTHER NEUROLOGIC COMPLICATION, WITH LONG-TERM CURRENT USE OF INSULIN (HCC): ICD-10-CM

## 2021-03-31 DIAGNOSIS — Z11.4 SCREENING FOR HIV (HUMAN IMMUNODEFICIENCY VIRUS): Primary | ICD-10-CM

## 2021-03-31 DIAGNOSIS — Z79.4 TYPE 2 DIABETES MELLITUS WITH OTHER NEUROLOGIC COMPLICATION, WITH LONG-TERM CURRENT USE OF INSULIN (HCC): ICD-10-CM

## 2021-03-31 PROCEDURE — 99213 OFFICE O/P EST LOW 20 MIN: CPT | Performed by: FAMILY MEDICINE

## 2021-04-01 ENCOUNTER — TELEMEDICINE (OUTPATIENT)
Dept: ENDOCRINOLOGY | Facility: CLINIC | Age: 51
End: 2021-04-01
Payer: COMMERCIAL

## 2021-04-01 DIAGNOSIS — Z79.4 TYPE 2 DIABETES MELLITUS WITH OTHER NEUROLOGIC COMPLICATION, WITH LONG-TERM CURRENT USE OF INSULIN (HCC): ICD-10-CM

## 2021-04-01 DIAGNOSIS — E88.1: ICD-10-CM

## 2021-04-01 DIAGNOSIS — R11.0 NAUSEA: ICD-10-CM

## 2021-04-01 DIAGNOSIS — E27.40 LOW SERUM CORTISOL LEVEL (HCC): ICD-10-CM

## 2021-04-01 DIAGNOSIS — G43.709 CHRONIC MIGRAINE WITHOUT AURA WITHOUT STATUS MIGRAINOSUS, NOT INTRACTABLE: ICD-10-CM

## 2021-04-01 DIAGNOSIS — IMO0002 UNCONTROLLED TYPE 2 DIABETES MELLITUS WITH DIABETIC POLYNEUROPATHY, WITH LONG-TERM CURRENT USE OF INSULIN: Primary | ICD-10-CM

## 2021-04-01 DIAGNOSIS — E03.9 ACQUIRED HYPOTHYROIDISM: ICD-10-CM

## 2021-04-01 DIAGNOSIS — E03.9 HYPOTHYROIDISM, UNSPECIFIED TYPE: ICD-10-CM

## 2021-04-01 DIAGNOSIS — K76.0 FATTY LIVER: ICD-10-CM

## 2021-04-01 DIAGNOSIS — E55.9 VITAMIN D DEFICIENCY: ICD-10-CM

## 2021-04-01 DIAGNOSIS — E11.49 TYPE 2 DIABETES MELLITUS WITH OTHER NEUROLOGIC COMPLICATION, WITH LONG-TERM CURRENT USE OF INSULIN (HCC): ICD-10-CM

## 2021-04-01 PROCEDURE — 99215 OFFICE O/P EST HI 40 MIN: CPT | Performed by: INTERNAL MEDICINE

## 2021-04-01 RX ORDER — INSULIN LISPRO 200 [IU]/ML
INJECTION, SOLUTION SUBCUTANEOUS
Qty: 6 PEN | Refills: 3 | Status: SHIPPED | OUTPATIENT
Start: 2021-04-01 | End: 2022-07-18

## 2021-04-01 RX ORDER — PIOGLITAZONEHYDROCHLORIDE 30 MG/1
30 TABLET ORAL DAILY
Qty: 90 TABLET | Refills: 1 | Status: SHIPPED | OUTPATIENT
Start: 2021-04-01 | End: 2021-09-21

## 2021-04-01 RX ORDER — INSULIN GLARGINE 100 [IU]/ML
INJECTION, SOLUTION SUBCUTANEOUS
Qty: 30 ML | Refills: 3 | Status: SHIPPED | OUTPATIENT
Start: 2021-04-01 | End: 2021-09-30 | Stop reason: SDUPTHER

## 2021-04-01 NOTE — TELEPHONE ENCOUNTER
[address] [facility] spoke with Herminio, advised I was calling to initiate a refill for patient's botox medication  Herminio confirmed there are remaining refills left on current prescription  Patient's profile was sent to insurance verification, will call for a status check in 2 days

## 2021-04-01 NOTE — PROGRESS NOTES
Virtual Regular Visit      Assessment/Plan:  Violette Penn was seen today for virtual regular visit  Diagnoses and all orders for this visit:    Uncontrolled type 2 diabetes mellitus with diabetic polyneuropathy, with long-term current use of insulin ([address] )  Lab Results   Component Value Date    HGBA1C >14 9 (H) 11/19/2020   uncontrolled Hba1c with severe hyperglycemia   pt is due for Hba1c   As per pt blood sugars are improved now in 200 -300 range ( previously they were in 400 range) she also experiences hypoglycemia during the day , 2-5 times a week   Will reduce lantus to 60 units in AM , Made following changes in regimen   Also discussed about starting Metraleptin injection for partial lipido-dystrophy considering all clinical features and will help to improve diabetes control   Also discussed the risk of anti-metreleptin antibodies with neutralizing activity and risk of severe infections and worsening metabolic control   Discussed hypoglycemia symptoms and treatment     insulin glargine (Lantus SoloStar) 100 units/mL injection pen; Inject 60 units in AM  -     insuln lispro (HumaLOG KwikPen) 200 units/mL CONCENTRATED U-200 injection pen; Inject 15 units with meals +Scale (  Scale - 150-200 -2 units, 201-250-4 units, 251-300 -6 units, 301-350-8 units, > 350- 10 units )  -     pioglitazone (ACTOS) 30 mg tablet; Take 1 tablet (30 mg total) by mouth daily    -     Comprehensive metabolic panel Lab Collect; Future  -     Insulin, fasting; Future  -     T4, free; Future  -     TSH, 3rd generation; Future    Low serum cortisol level (HCC)  Continue hydrocortisone 15 mg in AM and 5 mg at PM   Will repeat cortisol in AM after holding hydrocortisone at night and in the morning       Acquired hypothyroidism  Lab Results   Component Value Date    [id number] 3 610 05/15/2020   Continue Levothyroxine 112 mcg po daily     Fatty liver  Continue actos , increase dose to 30 mg po daily   Partial lipodystrophy  Discussed about starting metraleptin therapy , pt is agreeable to start, will start paper work ,     Hypothyroidism, unspecified type    Vitamin D deficiency  Continue current dose     Type 2 diabetes mellitus with other neurologic complication, with long-term current use of insulin (HCC)  -     insulin glargine (Lantus SoloStar) 100 units/mL injection pen; Inject 60 units in AM  -     insuln lispro (HumaLOG KwikPen) 200 units/mL CONCENTRATED U-200 injection pen; Inject 15 units with meals +Scale (  Scale - 150-200 -2 units, 201-250-4 units, 251-300 -6 units, 301-350-8 units, > 350- 10 units )  -     pioglitazone (ACTOS) 30 mg tablet;  Take 1 tablet (30 mg total) by mouth daily        Problem List Items Addressed This Visit        Digestive    Fatty liver       Endocrine    Diabetes mellitus (HCC)    Relevant Medications    insulin glargine (Lantus SoloStar) 100 units/mL injection pen    insuln lispro (HumaLOG KwikPen) 200 units/mL CONCENTRATED U-200 injection pen    pioglitazone (ACTOS) 30 mg tablet    Hypothyroidism       Other    Vitamin D deficiency    Low serum cortisol level (HCC)      Other Visit Diagnoses     Uncontrolled type 2 diabetes mellitus with diabetic polyneuropathy, with long-term current use of insulin (HCC)    -  Primary    Relevant Medications    insulin glargine (Lantus SoloStar) 100 units/mL injection pen    insuln lispro (HumaLOG KwikPen) 200 units/mL CONCENTRATED U-200 injection pen    pioglitazone (ACTOS) 30 mg tablet    Other Relevant Orders    Comprehensive metabolic panel Lab Collect    Insulin, fasting    T4, free    TSH, 3rd generation    Partial lipodystrophy                   Reason for visit is   Chief Complaint   Patient presents with    Virtual Regular Visit        Encounter provider Carley Anaya MD    Provider located at [address] [id number]      Recent Visits  No visits were found meeting these conditions  Showing recent visits within past 7 days and meeting all other requirements     Today's Visits  Date Type Provider Dept   04/01/21 Telemedicine Dennis Rick MD [facility]efer[facility]   Showing today's visits and meeting all other requirements     Future Appointments  No visits were found meeting these conditions  Showing future appointments within next 150 days and meeting all other requirements        The patient was identified by name and date of birth  Amanda New was informed that this is a telemedicine visit and that the visit is being conducted through [facility] and patient was informed that this is a secure, HIPAA-compliant platform  She agrees to proceed     My office door was closed  No one else was in the room  She acknowledged consent and understanding of privacy and security of the video platform  The patient has agreed to participate and understands they can discontinue the visit at any time  Patient is aware this is a billable service  Subjective  Amanda New is a 53 y o  female with medical Hx of type 2 diabetes, partial lipido-dystrophy , fatty liver, H/o cataracts, H/o pancreatis, neuropathy, retinopathy is here for follow up  Pt has severe insulin resistance secondary to partial lipido-dystrophy ( based on body fat distribution, Insulin resistance with BMI of 20, High insulin requirements, H/o pancreatitis, 2016 , hepatic steatosis, elevated triglycerides, fatty liver)     She is currently taking lantus 100 units in AM and Humalog 30-34 units with meal    She checks blood sugars 2-3 times daily, stated blood sugars improved in 200-250 range, she also has some episodes of hypoglycemia, during the day after taking Humalog  She feels shaky and dizzy and treats with juice    She takes Actos 15 mg po daily   She also take acarabose with meals     She is taking ARB , B blockers for HTN   She is taking Lipitor 80 mg po daily She checks blood sugars 2-3 times daily  She snacks at night, 2-3 times   Her blood sugars are in 200-300 range     She takes levothyroxine 112 mcg po daily , 1 hour before breakfast   She was found to have low cortisol, was started on hydrocortisone for replacement because of her symptoms     Wt Readings from Last 3 Encounters:   03/02/21 50 8 kg (112 lb)   02/12/21 50 8 kg (112 lb)   12/08/20 54 9 kg (121 lb)     Past Medical History:   Diagnosis Date    Acute venous embolism and thrombosis of deep vessels of distal lower extremity (HCC)     Anemia     Anxiety     last assessed 11/20/17    Arthritis     Asthma     Cerebral infarction ([facility] [address] )     unspecified, last assessed 11/14/16    Chest pain     last assessed 5/9/17    Chronic cough     last assessed 12/12/13    Depression     Diabetes mellitus, type 2 ([address] )     DJD (degenerative joint disease)     Esophageal reflux     Fibromyalgia     GERD without esophagitis     resolved 5/13/16    History of pulmonary embolism     Hyperlipidemia     Hypertension     Hypothyroidism     Iron deficiency     Pancreatitis     Panic attack     Panic disorder     Polyarthritis     PTSD (post-traumatic stress disorder)     Sleep difficulties     Stroke syndrome     Thyroid disease     TIA (transient ischemic attack)     TIA (transient ischemic attack)     Venous embolism and thrombosis of deep vessels of distal lower extremity ([address] )     Vitamin B12 deficiency     Vitamin D deficiency        Past Surgical History:   Procedure Laterality Date    CARPAL TUNNEL RELEASE Right     neuroplasty decompression of median nerve    CATARACT EXTRACTION      CHOLECYSTECTOMY      ERCP      ERCP      ESOPHAGOGASTRIC FUNDOPLASTY      NISSEN FUNDOPLICATION      PA ESOPHAGOGASTRODUODENOSCOPY TRANSORAL DIAGNOSTIC N/A 11/2/2016    Procedure: EGD AND COLONOSCOPY;  Surgeon: Sonia Walker MD;  Location: [facility];   Service: Gastroenterology    PA INCISE FINGER TENDON SHEATH Right 3/8/2016    Procedure: RELEASE TRIGGER FINGER RIGHT THUMB;  Surgeon: Libia Sanchez MD;  [facility];  Service: Orthopedics    CA WRIST Roberto Willow LIG Right 3/8/2016    Procedure: RELEASE CARPAL TUNNEL ENDOSCOPIC;  Surgeon: Libia Sanchez MD;  [facility];  Service: Orthopedics    TONSILLECTOMY AND ADENOIDECTOMY         Current Outpatient Medications   Medication Sig Dispense Refill    acarbose (PRECOSE) 25 mg tablet Take 1 tablet (25 mg total) by mouth 3 (three) times a day with meals 270 tablet 1    Advair Diskus 500-50 MCG/DOSE inhaler INHALE 1 PUFF BY MOUTH 2 TIMES PER DAY  RINSE MOUTH AFTER USE  1 Inhaler 5    aspirin 81 mg chewable tablet Chew 81 mg daily       atorvastatin (LIPITOR) 80 mg tablet TAKE 1 TABLET BY MOUTH EVERY DAY 90 tablet 3    baclofen 10 mg tablet TAKE 1 TABLET BY MOUTH THREE TIMES A DAY AS NEEDED 90 tablet 0    BD PEN NEEDLE LINDY U/F 32G X 4 MM MISC Inject under the skin daily 30 each 5    butalbital-acetaminophen-caffeine (FIORICET,ESGIC) -40 mg per tablet TAKE 1 TABLET EVERY 6 HOURS AS NEEDED FOR MIGRAINE  NO MORE THAN 2-3 PER WEEK  10 tablet 3    Cyanocobalamin (VITAMIN B-12 IJ) Inject as directed      Cyanocobalamin 1000 MCG/ML LIQD Take 500 mcg by mouth daily      diazepam (VALIUM) 5 mg tablet TAKE 1 TABLET (5 MG TOTAL) BY MOUTH 3 (THREE) TIMES A DAY AS NEEDED FOR ANXIETY 90 tablet 1    DULoxetine (CYMBALTA) 60 mg delayed release capsule Take 1 capsule (60 mg total) by mouth 2 (two) times a day 180 capsule 1    ergocalciferol (VITAMIN D2) 50,000 units 1 TAB ONCE WEEKLY 12 capsule 0    Folic Acid 0 8 MG CAPS Take 0 04 mg by mouth daily   gabapentin (NEURONTIN) 400 mg capsule Take 2 capsules 3 times a day 540 capsule 1    Galcanezumab-gnlm (Emgality) 120 MG/ML SOAJ inject 1 pen subq every 30 days  1 pen 11    hydrocortisone (CORTEF) 5 mg tablet TAKE 2 TABLETS IN THE MORNING AT 8:00 A  M , 1 TABLET AT 5: 00 P M ( DOUBLE THE DOSE IN ACUTE SICKNESS, SUCH AS FEVER UPPER RESPIRATORY TRACT INFECTION) 50 tablet 5    insulin glargine (Lantus SoloStar) 100 units/mL injection pen Inject 60 units in AM 30 mL 3    Insulin Pen Needle (BD Pen Needle Kimberli U/F) 32G X 4 MM MISC by Does not apply route daily 100 each 3    Insulin Syringe-Needle U-100 25G X 1" 1 ML MISC by Does not apply route 3 (three) times a day 300 each 1    Insulin Syringes, Disposable, U-100 1 ML MISC by Does not apply route 3 (three) times a day 100 each 3    insuln lispro (HumaLOG KwikPen) 200 units/mL CONCENTRATED U-200 injection pen Inject 15 units with meals +Scale (  Scale - 150-200 -2 units, 201-250-4 units, 251-300 -6 units, 301-350-8 units, > 350- 10 units ) 6 pen 3    levothyroxine 112 mcg tablet TAKE 1 TABLET BY MOUTH EVERY DAY 90 tablet 1    losartan (COZAAR) 50 mg tablet TAKE 1 TABLET DAILY  90 tablet 2    metoprolol tartrate (LOPRESSOR) 25 mg tablet TAKE 1 TABLET BY MOUTH EVERY DAY 90 tablet 2    mirtazapine (REMERON) 7 5 MG tablet TAKE 1 TABLET (7 5 MG TOTAL) BY MOUTH DAILY AT BEDTIME 30 tablet 1    Naloxone HCl (NARCAN) 4 MG/0 1ML LIQD 1 actuation in one nostril once as needed for opioid overdose, may repeat dose every 2-3 minutes until patient responsive or EMS arrives 1 each 1    NEEDLE, DISP, 25 G (B-D DISP NEEDLE 25GX1") 25G X 1" MISC by Does not apply route once as needed (opiod overdose) for up to 1 dose 3 each 0    Nerve Stimulator (TENS Therapy Pain Relief) ALPESH 1 Device by Does not apply route as needed (myofascial pain) 1 Device 0    Nutritional Supplements (Glucerna Advance Shake) LIQD 1 daily   30 Bottle 0    ofloxacin (OCUFLOX) 0 3 % ophthalmic solution PLEASE SEE ATTACHED FOR DETAILED DIRECTIONS      OLANZapine (ZyPREXA) 10 mg tablet TAKE 1 TABLET BY MOUTH DAILY AT BEDTIME 30 tablet 3    omeprazole (PriLOSEC) 20 mg delayed release capsule TAKE 1 CAPSULE BY MOUTH EVERY DAY 90 capsule 1    ondansetron (ZOFRAN) 4 mg tablet TAKE 1 TABLET BY MOUTH EVERY 8 HOURS AS NEEDED FOR NAUSEA AND VOMITING 20 tablet 2    OneTouch Ultra test strip USE AS DIRECTED TO TEST 3 TIMES A  each 5    pioglitazone (ACTOS) 30 mg tablet Take 1 tablet (30 mg total) by mouth daily 90 tablet 1    prazosin (MINIPRESS) 1 mg capsule TAKE 1 CAPSULE (1 MG TOTAL) BY MOUTH DAILY AT BEDTIME 30 capsule 1    prednisoLONE acetate (PRED FORTE) 1 % ophthalmic suspension INSTILL 1 DROP INTO SURGERY EYE 4 TIMES DAILY AFTER SURGERY   ProAir RespiClick 644 (90 Base) MCG/ACT AEPB INHALE 2 PUFFS BY MOUTH EVERY 4-6 HOURS AS NEEDED FOR WHEEZING OR SHORTNESS OF BREATH 1 each 2    prochlorperazine (COMPAZINE) 10 mg tablet TAKE 1 TABLET (10 MG TOTAL) BY MOUTH EVERY 6 (SIX) HOURS AS NEEDED FOR NAUSEA OR VOMITING 20 tablet 0    topiramate (TOPAMAX) 100 mg tablet TAKE 2 TABLETS BY MOUTH EVERY  tablet 0    topiramate (TOPAMAX) 25 mg tablet TAKE 1 TABLET BY MOUTH EVERY DAY 90 tablet 1    traMADol (ULTRAM) 50 mg tablet TAKE 1-2 TABLETS EVERY 6-8 HOURS AS NEEDED FOR SEVERE PAIN  DO NOT TAKE WITHIN 6 HOURS OF VALIUM 160 tablet 0    Turmeric 500 MG TABS Take 1 tablet by mouth daily       No current facility-administered medications for this visit  Allergies   Allergen Reactions    Medical Tape Rash    Lexapro [Escitalopram Oxalate]     Escitalopram Other (See Comments) and Palpitations    Other Hives and Rash     Adhesive Tape       Review of Systems   Constitutional: Positive for activity change  Negative for appetite change  HENT: Negative  Respiratory: Negative  Cardiovascular: Negative  Gastrointestinal: Negative  Endocrine: Negative for polydipsia, polyphagia and polyuria  Genitourinary: Negative  Musculoskeletal: Positive for arthralgias, back pain and myalgias  Skin: Negative  Neurological: Positive for headaches  Hematological: Negative  Psychiatric/Behavioral: Negative          Video Exam    There were no vitals filed for this visit  Physical Exam  Constitutional:       General: She is not in acute distress  Appearance: Normal appearance  She is not ill-appearing  HENT:      Head: Normocephalic and atraumatic  Nose: Nose normal    Eyes:      Extraocular Movements: Extraocular movements intact  Conjunctiva/sclera: Conjunctivae normal    Neck:      Musculoskeletal: Normal range of motion  Pulmonary:      Effort: No respiratory distress  Neurological:      General: No focal deficit present  Mental Status: She is alert and oriented to person, place, and time  Psychiatric:         Mood and Affect: Mood normal          Behavior: Behavior normal           I spent 24 minutes directly with the patient during this visit      [address] [place] [address] Yasmin Hensley acknowledges that she has consented to an online visit or consultation  She understands that the online visit is based solely on information provided by her, and that, in the absence of a face-to-face physical evaluation by the physician, the diagnosis she receives is both limited and provisional in terms of accuracy and completeness  This is not intended to replace a full medical face-to-face evaluation by the physician  Chun Miller understands and accepts these terms

## 2021-04-02 ENCOUNTER — OFFICE VISIT (OUTPATIENT)
Dept: PAIN MEDICINE | Facility: CLINIC | Age: 51
End: 2021-04-02
Payer: COMMERCIAL

## 2021-04-02 VITALS
SYSTOLIC BLOOD PRESSURE: 100 MMHG | BODY MASS INDEX: 20.24 KG/M2 | WEIGHT: 110 LBS | HEART RATE: 116 BPM | TEMPERATURE: 98.5 F | DIASTOLIC BLOOD PRESSURE: 68 MMHG | HEIGHT: 62 IN

## 2021-04-02 DIAGNOSIS — M25.511 CHRONIC PAIN OF BOTH SHOULDERS: ICD-10-CM

## 2021-04-02 DIAGNOSIS — M50.30 DDD (DEGENERATIVE DISC DISEASE), CERVICAL: ICD-10-CM

## 2021-04-02 DIAGNOSIS — M25.512 CHRONIC PAIN OF BOTH SHOULDERS: ICD-10-CM

## 2021-04-02 DIAGNOSIS — M54.12 CERVICAL RADICULOPATHY: ICD-10-CM

## 2021-04-02 DIAGNOSIS — M47.812 CERVICAL SPONDYLOSIS WITHOUT MYELOPATHY: ICD-10-CM

## 2021-04-02 DIAGNOSIS — M54.2 CHRONIC CERVICAL PAIN: ICD-10-CM

## 2021-04-02 DIAGNOSIS — G89.29 CHRONIC CERVICAL PAIN: ICD-10-CM

## 2021-04-02 DIAGNOSIS — M79.18 CERVICAL MYOFASCIAL PAIN SYNDROME: ICD-10-CM

## 2021-04-02 DIAGNOSIS — G89.29 CHRONIC PAIN OF BOTH SHOULDERS: ICD-10-CM

## 2021-04-02 DIAGNOSIS — G89.4 CHRONIC PAIN SYNDROME: Primary | ICD-10-CM

## 2021-04-02 DIAGNOSIS — M54.16 LUMBAR RADICULOPATHY: ICD-10-CM

## 2021-04-02 DIAGNOSIS — M54.40 LOW BACK PAIN WITH SCIATICA, SCIATICA LATERALITY UNSPECIFIED, UNSPECIFIED BACK PAIN LATERALITY, UNSPECIFIED CHRONICITY: ICD-10-CM

## 2021-04-02 DIAGNOSIS — M48.02 CERVICAL SPINAL STENOSIS: ICD-10-CM

## 2021-04-02 DIAGNOSIS — M51.36 DDD (DEGENERATIVE DISC DISEASE), LUMBAR: ICD-10-CM

## 2021-04-02 PROCEDURE — 3074F SYST BP LT 130 MM HG: CPT | Performed by: NURSE PRACTITIONER

## 2021-04-02 PROCEDURE — 99213 OFFICE O/P EST LOW 20 MIN: CPT | Performed by: NURSE PRACTITIONER

## 2021-04-02 PROCEDURE — 3008F BODY MASS INDEX DOCD: CPT | Performed by: NURSE PRACTITIONER

## 2021-04-02 PROCEDURE — 3078F DIAST BP <80 MM HG: CPT | Performed by: NURSE PRACTITIONER

## 2021-04-02 RX ORDER — PROCHLORPERAZINE MALEATE 10 MG
10 TABLET ORAL EVERY 6 HOURS PRN
Qty: 20 TABLET | Refills: 0 | Status: SHIPPED | OUTPATIENT
Start: 2021-04-02 | End: 2021-05-10

## 2021-04-02 NOTE — PROGRESS NOTES
Assessment:  1  Chronic pain syndrome    2  Cervical radiculopathy    3  Lumbar radiculopathy    4  Low back pain with sciatica, sciatica laterality unspecified, unspecified back pain laterality, unspecified chronicity    5  DDD (degenerative disc disease), cervical    6  DDD (degenerative disc disease), lumbar    7  Cervical spondylosis without myelopathy    8  Cervical myofascial pain syndrome    9  Chronic cervical pain    10  Chronic pain of both shoulders    11  Cervical spinal stenosis        Plan:  I explained to the patient that at this time, she is not an interventional candidate secondary to her A1c of 14 9  I advised that she continue to follow with endocrinology for better management of her blood glucose levels  Once her A1C is consistently under 10, she can follow up with our office to discuss interventional procedures at that time  For now, she will continue Tramadol, duloxetine, baclofen and gabapentin as prescribed  She has had side effects to pregabalin in the past  She will continue to follow with neurology, endocrinology and her PCP  She was offered PT but states she cannot afford the out of pocket cost  She will follow up on a PRN basis  M*Modal software was used to dictate this note  It may contain errors with dictating incorrect words or incorrect spelling  Please contact the provider directly with any questions  History of Present Illness:     The patient is a 53 y o  female  With a history of diabetes, most recent A1c of 14 9, 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 side, fibromyalgia, carpal row tunnel release on the right side last seen on 10/26/20 who presents for a follow up office visit in regards to chronic neck pain that radiates into the bilateral upper extremities in no specific dermatomal distribution with associated numbness, paresthesias and subjective weakness and low back pain, right greater than left that radiates in no specific dermatomal distribution into the bilateral lower extremities secondary to  Cervical degenerative disc disease, cervical spondylosis, cervical stenosis, cervical radiculopathy, diabetic neuropathy, carpal tunnel syndrome, lumbar degenerative disc disease, sacroiliitis and chronic pain syndrome  The patient denies bowel or bladder incontinence, balance issues or saddle anesthesia  MRI of the cervical spine from March 2021 reveals multilevel central and foraminal stenosis from C4-5 to C7-T1  EMG of the bilateral upper extremities confirms a cervical radiculopathy and bilateral carpal tunnel syndrome  EMG of the lower extremities confirms polyneuropathy without any evidence of a lumbar radiculopathy  X-ray of the lumbar spine reveals degenerative disc disease at L3-4, otherwise unremarkable  I do not have any advanced imaging of the lumbar spine for review  The patient has had a cervical rhizotomy and cervical epidural steroid injections in the past        Patient currently rates her pain an 8/10 on the numeric pain rating scale  She states her pain is constant nature bothersome throughout the entirety of the day  She characterizes the pain as burning, sharp, pressure-like, shooting, numbness and pins and needles    Current pain medications includes:  Tramadol 50 mg 1-2 tablets every 6-8 hours as needed for pain as prescribed by her PCP, gabapentin 800 mg 3 times a day, duloxetine 60 mg twice daily, and baclofen 10 mg p r n  as prescribed by Neurology   The patient reports that this regimen is providing 0% pain relief  The patient is reporting no side effects from this pain medication regimen  I have personally reviewed and/or updated the patient's past medical history, past surgical history, family history, social history, current medications, allergies, and vital signs today  Review of Systems:    Review of Systems   Respiratory: Negative for shortness of breath  Cardiovascular: Negative for chest pain     Gastrointestinal: Negative for constipation, diarrhea, nausea and vomiting  Musculoskeletal: Negative for arthralgias, gait problem, joint swelling and myalgias  Skin: Negative for rash  Neurological: Negative for dizziness, seizures and weakness  All other systems reviewed and are negative  Past Medical History:   Diagnosis Date    Acute venous embolism and thrombosis of deep vessels of distal lower extremity (HCC)     Anemia     Anxiety     last assessed 11/20/17    Arthritis     Asthma     Cerebral infarction [facility])     unspecified, last assessed 11/14/16    Chest pain     last assessed 5/9/17    Chronic cough     last assessed 12/12/13    Depression     Diabetes mellitus, type 2 ([facility] [address] )     DJD (degenerative joint disease)     Esophageal reflux     Fibromyalgia     GERD without esophagitis     resolved 5/13/16    History of pulmonary embolism     Hyperlipidemia     Hypertension     Hypothyroidism     Iron deficiency     Pancreatitis     Panic attack     Panic disorder     Polyarthritis     PTSD (post-traumatic stress disorder)     Sleep difficulties     Stroke syndrome     Thyroid disease     TIA (transient ischemic attack)     TIA (transient ischemic attack)     Venous embolism and thrombosis of deep vessels of distal lower extremity (HCC)     Vitamin B12 deficiency     Vitamin D deficiency        Past Surgical History:   Procedure Laterality Date    CARPAL TUNNEL RELEASE Right     neuroplasty decompression of median nerve    CATARACT EXTRACTION      CHOLECYSTECTOMY      ERCP      ERCP      ESOPHAGOGASTRIC FUNDOPLASTY      NISSEN FUNDOPLICATION      AZ ESOPHAGOGASTRODUODENOSCOPY TRANSORAL DIAGNOSTIC N/A 11/2/2016    Procedure: EGD AND COLONOSCOPY;  Surgeon: Kirby Sanford MD;  [facility];   Service: Gastroenterology    AZ INCISE FINGER TENDON SHEATH Right 3/8/2016    Procedure: RELEASE TRIGGER FINGER RIGHT THUMB;  Surgeon: Desirae Chowdhury MD;  [facility];  Service: Orthopedics    IL WRIST Eflisha Toreye LIG Right 3/8/2016    Procedure: RELEASE CARPAL TUNNEL ENDOSCOPIC;  Surgeon: Maricarmen Cuellar MD;  [facility];  Service: Orthopedics    TONSILLECTOMY AND ADENOIDECTOMY         Family History   Problem Relation Age of Onset    Diabetes Mother         type 2    Heart attack Mother 44        acute MI    Kidney disease Mother         CKD NKF classfication    Depression Mother     Arthritis Mother     Substance Abuse Mother         mother OD in past on MS04    Ulcerative colitis Mother    Earna Alma Schizophrenia Mother     Suicide Attempts Mother     Bipolar disorder Mother     Diabetes Maternal Grandmother     Heart attack Father         acute MI    Psoriasis Father    Earna Alma Cancer Father         gastric cancer    Stroke Father     Crohn's disease Sister     Bipolar disorder Sister     Rheum arthritis Maternal Grandfather     Throat cancer Maternal Grandfather     Suicide Attempts Daughter     Drug abuse Daughter     Lung cancer Paternal Grandmother     Cancer Paternal Grandfather     No Known Problems Sister     Bipolar disorder Maternal Uncle     Anesthesia problems Neg Hx        Social History     Occupational History    Occupation: unemployed     Comment: SSDI   Tobacco Use    Smoking status: Current Every Day Smoker     Packs/day: 0 50     Years: 35 00     Pack years: 17 50     Types: Cigarettes     Start date: 1/1/1983    Smokeless tobacco: Never Used    Tobacco comment: 20 + years   Substance and Sexual Activity    Alcohol use: Not Currently     Alcohol/week: 0 0 standard drinks     Frequency: Never     Binge frequency: Never     Comment: last was in 2010 after DUI    Drug use: No    Sexual activity: Yes     Partners: Male         Current Outpatient Medications:     acarbose (PRECOSE) 25 mg tablet, Take 1 tablet (25 mg total) by mouth 3 (three) times a day with meals, Disp: 270 tablet, Rfl: 1    Advair Diskus 500-50 MCG/DOSE inhaler, INHALE 1 PUFF BY MOUTH 2 TIMES PER DAY  RINSE MOUTH AFTER USE , Disp: 1 Inhaler, Rfl: 5    aspirin 81 mg chewable tablet, Chew 81 mg daily , Disp: , Rfl:     atorvastatin (LIPITOR) 80 mg tablet, TAKE 1 TABLET BY MOUTH EVERY DAY, Disp: 90 tablet, Rfl: 3    baclofen 10 mg tablet, TAKE 1 TABLET BY MOUTH THREE TIMES A DAY AS NEEDED, Disp: 90 tablet, Rfl: 0    BD PEN NEEDLE LINDY U/F 32G X 4 MM MISC, Inject under the skin daily, Disp: 30 each, Rfl: 5    butalbital-acetaminophen-caffeine (FIORICET,ESGIC) -40 mg per tablet, TAKE 1 TABLET EVERY 6 HOURS AS NEEDED FOR MIGRAINE  NO MORE THAN 2-3 PER WEEK , Disp: 10 tablet, Rfl: 3    Cyanocobalamin (VITAMIN B-12 IJ), Inject as directed, Disp: , Rfl:     Cyanocobalamin 1000 MCG/ML LIQD, Take 500 mcg by mouth daily, Disp: , Rfl:     diazepam (VALIUM) 5 mg tablet, TAKE 1 TABLET (5 MG TOTAL) BY MOUTH 3 (THREE) TIMES A DAY AS NEEDED FOR ANXIETY, Disp: 90 tablet, Rfl: 1    DULoxetine (CYMBALTA) 60 mg delayed release capsule, Take 1 capsule (60 mg total) by mouth 2 (two) times a day, Disp: 180 capsule, Rfl: 1    ergocalciferol (VITAMIN D2) 50,000 units, 1 TAB ONCE WEEKLY, Disp: 12 capsule, Rfl: 0    Folic Acid 0 8 MG CAPS, Take 0 04 mg by mouth daily  , Disp: , Rfl:     gabapentin (NEURONTIN) 400 mg capsule, Take 2 capsules 3 times a day, Disp: 540 capsule, Rfl: 1    Galcanezumab-gnlm (Emgality) 120 MG/ML SOAJ, inject 1 pen subq every 30 days  , Disp: 1 pen, Rfl: 11    hydrocortisone (CORTEF) 5 mg tablet, TAKE 2 TABLETS IN THE MORNING AT 8:00 A  M , 1 TABLET AT 5:00 P M ( DOUBLE THE DOSE IN ACUTE SICKNESS, SUCH AS FEVER UPPER RESPIRATORY TRACT INFECTION), Disp: 50 tablet, Rfl: 5    insulin glargine (Lantus SoloStar) 100 units/mL injection pen, Inject 60 units in AM, Disp: 30 mL, Rfl: 3    Insulin Pen Needle (BD Pen Needle Lindy U/F) 32G X 4 MM MISC, by Does not apply route daily, Disp: 100 each, Rfl: 3    Insulin Syringe-Needle U-100 25G X 1" 1 ML MISC, by Does not apply route 3 (three) times a day, Disp: 300 each, Rfl: 1    Insulin Syringes, Disposable, U-100 1 ML MISC, by Does not apply route 3 (three) times a day, Disp: 100 each, Rfl: 3    insuln lispro (HumaLOG KwikPen) 200 units/mL CONCENTRATED U-200 injection pen, Inject 15 units with meals +Scale (  Scale - 150-200 -2 units, 201-250-4 units, 251-300 -6 units, 301-350-8 units, > 350- 10 units ), Disp: 6 pen, Rfl: 3    levothyroxine 112 mcg tablet, TAKE 1 TABLET BY MOUTH EVERY DAY, Disp: 90 tablet, Rfl: 1    losartan (COZAAR) 50 mg tablet, TAKE 1 TABLET DAILY  , Disp: 90 tablet, Rfl: 2    metoprolol tartrate (LOPRESSOR) 25 mg tablet, TAKE 1 TABLET BY MOUTH EVERY DAY, Disp: 90 tablet, Rfl: 2    mirtazapine (REMERON) 7 5 MG tablet, TAKE 1 TABLET (7 5 MG TOTAL) BY MOUTH DAILY AT BEDTIME, Disp: 30 tablet, Rfl: 1    Naloxone HCl (NARCAN) 4 MG/0 1ML LIQD, 1 actuation in one nostril once as needed for opioid overdose, may repeat dose every 2-3 minutes until patient responsive or EMS arrives, Disp: 1 each, Rfl: 1    NEEDLE, DISP, 25 G (B-D DISP NEEDLE 25GX1") 25G X 1" MISC, by Does not apply route once as needed (opiod overdose) for up to 1 dose, Disp: 3 each, Rfl: 0    Nerve Stimulator (TENS Therapy Pain Relief) ALPESH, 1 Device by Does not apply route as needed (myofascial pain), Disp: 1 Device, Rfl: 0    Nutritional Supplements (Glucerna Advance Shake) LIQD, 1 daily  , Disp: 30 Bottle, Rfl: 0    ofloxacin (OCUFLOX) 0 3 % ophthalmic solution, PLEASE SEE ATTACHED FOR DETAILED DIRECTIONS, Disp: , Rfl:     OLANZapine (ZyPREXA) 10 mg tablet, TAKE 1 TABLET BY MOUTH DAILY AT BEDTIME, Disp: 30 tablet, Rfl: 3    omeprazole (PriLOSEC) 20 mg delayed release capsule, TAKE 1 CAPSULE BY MOUTH EVERY DAY, Disp: 90 capsule, Rfl: 1    ondansetron (ZOFRAN) 4 mg tablet, TAKE 1 TABLET BY MOUTH EVERY 8 HOURS AS NEEDED FOR NAUSEA AND VOMITING, Disp: 20 tablet, Rfl: 2    OneTouch Ultra test strip, USE AS DIRECTED TO TEST 3 TIMES A DAY, Disp: 100 each, Rfl: 5    pioglitazone (ACTOS) 30 mg tablet, Take 1 tablet (30 mg total) by mouth daily, Disp: 90 tablet, Rfl: 1    prazosin (MINIPRESS) 1 mg capsule, TAKE 1 CAPSULE (1 MG TOTAL) BY MOUTH DAILY AT BEDTIME, Disp: 30 capsule, Rfl: 1    prednisoLONE acetate (PRED FORTE) 1 % ophthalmic suspension, INSTILL 1 DROP INTO SURGERY EYE 4 TIMES DAILY AFTER SURGERY , Disp: , Rfl:     ProAir RespiClick 713 (90 Base) MCG/ACT AEPB, INHALE 2 PUFFS BY MOUTH EVERY 4-6 HOURS AS NEEDED FOR WHEEZING OR SHORTNESS OF BREATH, Disp: 1 each, Rfl: 2    prochlorperazine (COMPAZINE) 10 mg tablet, TAKE 1 TABLET (10 MG TOTAL) BY MOUTH EVERY 6 (SIX) HOURS AS NEEDED FOR NAUSEA OR VOMITING, Disp: 20 tablet, Rfl: 0    topiramate (TOPAMAX) 100 mg tablet, TAKE 2 TABLETS BY MOUTH EVERY DAY, Disp: 180 tablet, Rfl: 0    topiramate (TOPAMAX) 25 mg tablet, TAKE 1 TABLET BY MOUTH EVERY DAY, Disp: 90 tablet, Rfl: 1    traMADol (ULTRAM) 50 mg tablet, TAKE 1-2 TABLETS EVERY 6-8 HOURS AS NEEDED FOR SEVERE PAIN  DO NOT TAKE WITHIN 6 HOURS OF VALIUM, Disp: 160 tablet, Rfl: 0    Turmeric 500 MG TABS, Take 1 tablet by mouth daily, Disp: , Rfl:     Allergies   Allergen Reactions    Medical Tape Rash    Lexapro [Escitalopram Oxalate]     Escitalopram Other (See Comments) and Palpitations    Other Hives and Rash     Adhesive Tape       Physical Exam:    /68   Pulse (!) 116   Temp 98 5 °F (36 9 °C)   Ht 5' 2" (1 575 m)   Wt 49 9 kg (110 lb)   LMP 03/04/2016   BMI 20 12 kg/m²     Constitutional:normal, well developed, well nourished, alert, in no distress and non-toxic and no overt pain behavior    Eyes:anicteric  HEENT:grossly intact  Neck:supple, symmetric, trachea midline and no masses   Pulmonary:even and unlabored  Cardiovascular:No edema or pitting edema present  Skin:Normal without rashes or lesions and well hydrated  Psychiatric:Mood and affect appropriate  Neurologic:Cranial Nerves II-XII grossly intact  Musculoskeletal:normal gait  Bilateral cervical paraspinal musculature tender to palpation  Full range of motion all planes of the cervical spine  Bilateral upper extremity strength 5/5 in all muscle groups  Decreased sensation to light touch in the right upper extremity  Sensation intact to light touch in C5 through T1 dermatomes in the left upper extremity  Negative Spurling's bilaterally  Negative Alfaro's reflex bilaterally  Bilateral lumbar paraspinal musculature minimally tender to palpation  Right SI joint tender to palpation  Bilateral lower extremity strength 5/5 in all muscle groups  Sensation intact to light touch in L3 through S1 dermatomes bilaterally  Negative straight leg raise bilaterally  Positive Venkata's, AP compression and Gaenslen's test on the right negative on the left  Imaging  No orders to display     MRI CERVICAL SPINE WITHOUT CONTRAST     INDICATION: R20 2: Paresthesia of skin  M79 601: Pain in right arm  M79 602: Pain in left arm  R29 898: Other symptoms and signs involving the musculoskeletal system  M54 2: Cervicalgia  G89 29: Other chronic pain      COMPARISON:  None      TECHNIQUE:  Sagittal T1, sagittal T2, sagittal inversion recovery, axial T2, axial  2D merge     IMAGE QUALITY:  Diagnostic     FINDINGS:     ALIGNMENT:  Normal alignment of the cervical spine  No compression fracture  No subluxation  No scoliosis      MARROW SIGNAL:  Endplate marrow degenerative change C4-5, C5-6 and C6-7, Modic type II      CERVICAL AND VISUALIZED THORACIC CORD:  Normal signal within the visualized cord      PREVERTEBRAL AND PARASPINAL SOFT TISSUES:  Normal      VISUALIZED POSTERIOR FOSSA:  The visualized posterior fossa demonstrates no abnormal signal      CERVICAL DISC SPACES:     C2-C3:  Normal      C3-C4:  Normal      C4-C5:  Slight loss of disc height with minor annular bulging and right greater than left uncinate joint hypertrophic degenerative change    There are anterior osteophytes noted as well  There is mild canal stenosis  Moderate right foraminal narrowing  Correlate for corresponding radiculopathy      C5-C6:  Disc desiccation and loss of disc height  Mild uncinate joint hypertrophic degenerative change  No disc herniation or canal stenosis  Mild to moderate left foraminal narrowing with disc and endplate changes abutting the ventral aspect of the   exiting nerve      C6-C7:  Disc desiccation and loss of disc height  Mild annular bulging with slight endplate /uncinate hypertrophic change noted on the left  There is no canal stenosis or right foraminal narrowing  Mild left foraminal narrowing      C7-T1:  Disc desiccation and loss of disc height  Right greater than left uncinate joint hypertrophic degenerative change  There is a small foraminal disc protrusion bilaterally  There is no canal stenosis  Moderate right and mild left foraminal   narrowing      UPPER THORACIC DISC SPACES:  Normal      IMPRESSION:     There is multilevel cervical degenerative disc disease from C3-4 through C7-T1  Annular bulging with endplate and uncinate hypertrophic changes resulting in mild canal stenosis and mild to moderate foraminal narrowing  Foraminal narrowing is most   pronounced at C4-5 and C7-T1 on the right  Correlate for corresponding radiculopathy  Findings are similar to prior examination from 2015  LUMBAR SPINE     INDICATION:   M25 559: Pain in unspecified hip    Low back pain and bilateral hip and knee pain      COMPARISON:  Abdomen and pelvic CT from 8/24/2017      VIEWS:  XR SPINE LUMBAR MINIMUM 4 VIEWS NON INJURY        FINDINGS:     There are 5 non rib bearing lumbar vertebral bodies       There is no evidence of acute fracture or destructive osseous lesion      Alignment is unremarkable       Mild degenerative disc space narrowing at L3-L4      The pedicles appear intact      Soft tissues are unremarkable      IMPRESSION:     No acute osseous abnormality        Mild degenerative disc space narrowing at L3-L4         No orders of the defined types were placed in this encounter

## 2021-04-05 NOTE — TELEPHONE ENCOUNTER
[address] spoke with Bert Dill, advised I was calling to check the status of patient's botox order   Bert Dill states that patient's profile is still in major medical verification, will call for a status check in 2 days

## 2021-04-07 NOTE — TELEPHONE ENCOUNTER
[address] [facility] spoke with Loraine advised I was calling to check the status of patient's botox order  Loraine states that patient's profile is still in major medical verification, I asked Loraine for patient's order to expedited as it has been in verification for almost 1 week  Loraine states she will johnna patient;s profile as STAT   Will call for a status check in 2 days

## 2021-04-08 DIAGNOSIS — G43.709 CHRONIC MIGRAINE WITHOUT AURA WITHOUT STATUS MIGRAINOSUS, NOT INTRACTABLE: ICD-10-CM

## 2021-04-08 DIAGNOSIS — M79.18 CERVICAL MYOFASCIAL PAIN SYNDROME: ICD-10-CM

## 2021-04-08 RX ORDER — TOPIRAMATE 100 MG/1
TABLET, FILM COATED ORAL
Qty: 60 TABLET | Refills: 2 | Status: SHIPPED | OUTPATIENT
Start: 2021-04-08 | End: 2021-07-06

## 2021-04-08 NOTE — PROGRESS NOTES
Virtual Brief Visit    Assessment/Plan:  Needs updated lipid panel and A1c  Will follow up with all specialties I e  Psychiatry, Endocrinology, pain management, neurology  Recommend yearly diabetic eye and foot examination  Await updated consults  Prescription writing of Valium now thru Psychiatry  Fioricet through Neurology  PDMP checked    Problem List Items Addressed This Visit        Endocrine    Diabetes mellitus ([facility] [address] )    Relevant Orders    Lipid panel    Hemoglobin A1C    Comprehensive metabolic panel      Other Visit Diagnoses     Screening for HIV (human immunodeficiency virus)    -  Primary                Reason for visit is   Chief Complaint   Patient presents with    Virtual Brief Visit        Encounter provider Terri Haji DO    Provider located at [address] [place] [address] [address]  [facility] [address] [phone]    Recent Visits  No visits were found meeting these conditions  Showing recent visits within past 7 days and meeting all other requirements     Future Appointments  No visits were found meeting these conditions  Showing future appointments within next 150 days and meeting all other requirements        After connecting through  phone and patient was informed that this is not a secure, HIPAA-compliant platform  She agrees to proceed  , the patient was identified by name and date of birth  Lukas Collins was informed that this is a telemedicine visit and that the visit is being conducted through  phone and patient was informed that this is not a secure, HIPAA-compliant platform  She agrees to proceed     My office door was closed  No one else was in the room  She acknowledged consent and understanding of privacy and security of the platform  The patient has agreed to participate and understands she can discontinue the visit at any time  Patient is aware this is a billable service       [facility]    Marv Cortes Jacinta Hurtado is a 53 y o  female   67-year-old female with extensive medical history uncontrolled diabetes and chronic pain due to fibromyalgia for telephone visit regarding chronic medications  Is finally being compliant with pain management, endocrinology and neurology  Past Medical History:   Diagnosis Date    Acute venous embolism and thrombosis of deep vessels of distal lower extremity (HCC)     Anemia     Anxiety     last assessed 11/20/17    Arthritis     Asthma     Cerebral infarction [facility])     unspecified, last assessed 11/14/16    Chest pain     last assessed 5/9/17    Chronic cough     last assessed 12/12/13    Depression     Diabetes mellitus, type 2 ([facility] [address] )     DJD (degenerative joint disease)     Esophageal reflux     Fibromyalgia     GERD without esophagitis     resolved 5/13/16    History of pulmonary embolism     Hyperlipidemia     Hypertension     Hypothyroidism     Iron deficiency     Pancreatitis     Panic attack     Panic disorder     Polyarthritis     PTSD (post-traumatic stress disorder)     Sleep difficulties     Stroke syndrome     Thyroid disease     TIA (transient ischemic attack)     TIA (transient ischemic attack)     Venous embolism and thrombosis of deep vessels of distal lower extremity (HCC)     Vitamin B12 deficiency     Vitamin D deficiency        Past Surgical History:   Procedure Laterality Date    CARPAL TUNNEL RELEASE Right     neuroplasty decompression of median nerve    CATARACT EXTRACTION      CHOLECYSTECTOMY      ERCP      ERCP      ESOPHAGOGASTRIC FUNDOPLASTY      NISSEN FUNDOPLICATION      OK ESOPHAGOGASTRODUODENOSCOPY TRANSORAL DIAGNOSTIC N/A 11/2/2016    Procedure: EGD AND COLONOSCOPY;  Surgeon: Ventura Oconnell MD;  [facility]: [facility];   Service: Gastroenterology    OK INCISE FINGER TENDON SHEATH Right 3/8/2016    Procedure: RELEASE TRIGGER FINGER RIGHT THUMB;  Surgeon: Anahi Smith MD;  [facility];  Service: Orthopedics    NE WRIST Aydin Narrow LIG Right 3/8/2016    Procedure: RELEASE CARPAL TUNNEL ENDOSCOPIC;  Surgeon: Lc Hurtado MD;  [facility];  Service: Orthopedics    TONSILLECTOMY AND ADENOIDECTOMY         Current Outpatient Medications   Medication Sig Dispense Refill    acarbose (PRECOSE) 25 mg tablet Take 1 tablet (25 mg total) by mouth 3 (three) times a day with meals 270 tablet 1    Advair Diskus 500-50 MCG/DOSE inhaler INHALE 1 PUFF BY MOUTH 2 TIMES PER DAY  RINSE MOUTH AFTER USE  1 Inhaler 5    aspirin 81 mg chewable tablet Chew 81 mg daily       atorvastatin (LIPITOR) 80 mg tablet TAKE 1 TABLET BY MOUTH EVERY DAY 90 tablet 3    baclofen 10 mg tablet TAKE 1 TABLET BY MOUTH THREE TIMES A DAY AS NEEDED 90 tablet 0    BD PEN NEEDLE LINDY U/F 32G X 4 MM MISC Inject under the skin daily 30 each 5    butalbital-acetaminophen-caffeine (FIORICET,ESGIC) -40 mg per tablet TAKE 1 TABLET EVERY 6 HOURS AS NEEDED FOR MIGRAINE  NO MORE THAN 2-3 PER WEEK  10 tablet 3    Cyanocobalamin (VITAMIN B-12 IJ) Inject as directed      Cyanocobalamin 1000 MCG/ML LIQD Take 500 mcg by mouth daily      diazepam (VALIUM) 5 mg tablet TAKE 1 TABLET (5 MG TOTAL) BY MOUTH 3 (THREE) TIMES A DAY AS NEEDED FOR ANXIETY 90 tablet 1    DULoxetine (CYMBALTA) 60 mg delayed release capsule Take 1 capsule (60 mg total) by mouth 2 (two) times a day 180 capsule 1    ergocalciferol (VITAMIN D2) 50,000 units 1 TAB ONCE WEEKLY 12 capsule 0    Folic Acid 0 8 MG CAPS Take 0 04 mg by mouth daily   gabapentin (NEURONTIN) 400 mg capsule Take 2 capsules 3 times a day 540 capsule 1    Galcanezumab-gnlm (Emgality) 120 MG/ML SOAJ inject 1 pen subq every 30 days  1 pen 11    hydrocortisone (CORTEF) 5 mg tablet TAKE 2 TABLETS IN THE MORNING AT 8:00 A  M , 1 TABLET AT 5:00 P M ( DOUBLE THE DOSE IN ACUTE SICKNESS, SUCH AS FEVER UPPER RESPIRATORY TRACT INFECTION) 50 tablet 5    insulin glargine (Lantus SoloStar) 100 units/mL injection pen Inject 60 units in AM 30 mL 3    Insulin Pen Needle (BD Pen Needle Kimberli U/F) 32G X 4 MM MISC by Does not apply route daily 100 each 3    Insulin Syringe-Needle U-100 25G X 1" 1 ML MISC by Does not apply route 3 (three) times a day 300 each 1    Insulin Syringes, Disposable, U-100 1 ML MISC by Does not apply route 3 (three) times a day 100 each 3    insuln lispro (HumaLOG KwikPen) 200 units/mL CONCENTRATED U-200 injection pen Inject 15 units with meals +Scale (  Scale - 150-200 -2 units, 201-250-4 units, 251-300 -6 units, 301-350-8 units, > 350- 10 units ) 6 pen 3    levothyroxine 112 mcg tablet TAKE 1 TABLET BY MOUTH EVERY DAY 90 tablet 1    losartan (COZAAR) 50 mg tablet TAKE 1 TABLET DAILY  90 tablet 2    metoprolol tartrate (LOPRESSOR) 25 mg tablet TAKE 1 TABLET BY MOUTH EVERY DAY 90 tablet 2    mirtazapine (REMERON) 7 5 MG tablet TAKE 1 TABLET (7 5 MG TOTAL) BY MOUTH DAILY AT BEDTIME 30 tablet 1    Naloxone HCl (NARCAN) 4 MG/0 1ML LIQD 1 actuation in one nostril once as needed for opioid overdose, may repeat dose every 2-3 minutes until patient responsive or EMS arrives 1 each 1    NEEDLE, DISP, 25 G (B-D DISP NEEDLE 25GX1") 25G X 1" MISC by Does not apply route once as needed (opiod overdose) for up to 1 dose 3 each 0    Nerve Stimulator (TENS Therapy Pain Relief) ALPESH 1 Device by Does not apply route as needed (myofascial pain) 1 Device 0    Nutritional Supplements (Glucerna Advance Shake) LIQD 1 daily   30 Bottle 0    ofloxacin (OCUFLOX) 0 3 % ophthalmic solution PLEASE SEE ATTACHED FOR DETAILED DIRECTIONS      OLANZapine (ZyPREXA) 10 mg tablet TAKE 1 TABLET BY MOUTH DAILY AT BEDTIME 30 tablet 3    omeprazole (PriLOSEC) 20 mg delayed release capsule TAKE 1 CAPSULE BY MOUTH EVERY DAY 90 capsule 1    ondansetron (ZOFRAN) 4 mg tablet TAKE 1 TABLET BY MOUTH EVERY 8 HOURS AS NEEDED FOR NAUSEA AND VOMITING 20 tablet 2    OneTouch Ultra test strip USE AS DIRECTED TO TEST 3 TIMES A  each 5    pioglitazone (ACTOS) 30 mg tablet Take 1 tablet (30 mg total) by mouth daily 90 tablet 1    prazosin (MINIPRESS) 1 mg capsule TAKE 1 CAPSULE (1 MG TOTAL) BY MOUTH DAILY AT BEDTIME 30 capsule 1    prednisoLONE acetate (PRED FORTE) 1 % ophthalmic suspension INSTILL 1 DROP INTO SURGERY EYE 4 TIMES DAILY AFTER SURGERY   ProAir RespiClick 064 (90 Base) MCG/ACT AEPB INHALE 2 PUFFS BY MOUTH EVERY 4-6 HOURS AS NEEDED FOR WHEEZING OR SHORTNESS OF BREATH 1 each 2    prochlorperazine (COMPAZINE) 10 mg tablet TAKE 1 TABLET (10 MG TOTAL) BY MOUTH EVERY 6 (SIX) HOURS AS NEEDED FOR NAUSEA OR VOMITING 20 tablet 0    topiramate (TOPAMAX) 100 mg tablet TAKE 2 TABLETS BY MOUTH EVERY  tablet 0    topiramate (TOPAMAX) 25 mg tablet TAKE 1 TABLET BY MOUTH EVERY DAY 90 tablet 1    traMADol (ULTRAM) 50 mg tablet TAKE 1-2 TABLETS EVERY 6-8 HOURS AS NEEDED FOR SEVERE PAIN  DO NOT TAKE WITHIN 6 HOURS OF VALIUM 160 tablet 0    Turmeric 500 MG TABS Take 1 tablet by mouth daily       No current facility-administered medications for this visit  Allergies   Allergen Reactions    Medical Tape Rash    Lexapro [Escitalopram Oxalate]     Escitalopram Other (See Comments) and Palpitations    Other Hives and Rash     Adhesive Tape       Review of Systems   Constitutional: Negative for fatigue  Eyes: Positive for visual disturbance  Respiratory: Negative for cough and shortness of breath  Cardiovascular: Negative for chest pain, palpitations and leg swelling  Gastrointestinal: Negative for abdominal pain  Musculoskeletal: Positive for arthralgias, myalgias and neck stiffness  Neurological: Negative for dizziness and headaches  Psychiatric/Behavioral: Positive for dysphoric mood  The patient is nervous/anxious  There were no vitals filed for this visit        I spent 15 minutes with patient today in which greater than 50% of the time was spent in counseling/coordination of care regarding Multiple medical issues and pain med review    VIRTUAL VISIT DISCLAIMER    Jaden Ly acknowledges that she has consented to an online visit or consultation  She understands that the online visit is based solely on information provided by her, and that, in the absence of a face-to-face physical evaluation by the physician, the diagnosis she receives is both limited and provisional in terms of accuracy and completeness  This is not intended to replace a full medical face-to-face evaluation by the physician  Rajluis Malachi understands and accepts these terms

## 2021-04-09 NOTE — TELEPHONE ENCOUNTER
[address] [facility] spoke with Nikita Lee, advised I was calling to check the status of patient's botox order  Nikita Lee states that there is a note on patient's account that the refill is too soon until 5/1/2021   Will call Danii Rojas on 5/3 to initiate refill of patient's medication

## 2021-04-10 RX ORDER — BACLOFEN 10 MG/1
TABLET ORAL
Qty: 90 TABLET | Refills: 0 | Status: SHIPPED | OUTPATIENT
Start: 2021-04-10 | End: 2021-05-06

## 2021-04-15 DIAGNOSIS — F43.10 PTSD (POST-TRAUMATIC STRESS DISORDER): ICD-10-CM

## 2021-04-15 DIAGNOSIS — F41.1 GENERALIZED ANXIETY DISORDER: ICD-10-CM

## 2021-04-15 DIAGNOSIS — F31.81 BIPOLAR II DISORDER (HCC): ICD-10-CM

## 2021-04-16 RX ORDER — PRAZOSIN HYDROCHLORIDE 1 MG/1
1 CAPSULE ORAL
Qty: 30 CAPSULE | Refills: 1 | Status: SHIPPED | OUTPATIENT
Start: 2021-04-16 | End: 2021-04-21 | Stop reason: SDUPTHER

## 2021-04-16 RX ORDER — MIRTAZAPINE 7.5 MG/1
7.5 TABLET, FILM COATED ORAL
Qty: 30 TABLET | Refills: 1 | Status: SHIPPED | OUTPATIENT
Start: 2021-04-16 | End: 2021-05-18 | Stop reason: SDUPTHER

## 2021-04-17 DIAGNOSIS — E11.49 TYPE 2 DIABETES MELLITUS WITH OTHER NEUROLOGIC COMPLICATION, WITH LONG-TERM CURRENT USE OF INSULIN (HCC): ICD-10-CM

## 2021-04-17 DIAGNOSIS — J45.20 MILD INTERMITTENT REACTIVE AIRWAY DISEASE WITHOUT COMPLICATION: ICD-10-CM

## 2021-04-17 DIAGNOSIS — Z79.4 TYPE 2 DIABETES MELLITUS WITH OTHER NEUROLOGIC COMPLICATION, WITH LONG-TERM CURRENT USE OF INSULIN (HCC): ICD-10-CM

## 2021-04-19 RX ORDER — ACARBOSE 25 MG/1
25 TABLET ORAL
Qty: 270 TABLET | Refills: 1 | Status: SHIPPED | OUTPATIENT
Start: 2021-04-19 | End: 2021-05-17

## 2021-04-21 ENCOUNTER — OFFICE VISIT (OUTPATIENT)
Dept: PSYCHIATRY | Facility: CLINIC | Age: 51
End: 2021-04-21
Payer: COMMERCIAL

## 2021-04-21 DIAGNOSIS — F31.81 BIPOLAR II DISORDER (HCC): Primary | ICD-10-CM

## 2021-04-21 DIAGNOSIS — F40.01 PANIC DISORDER WITH AGORAPHOBIA: ICD-10-CM

## 2021-04-21 DIAGNOSIS — F43.10 PTSD (POST-TRAUMATIC STRESS DISORDER): ICD-10-CM

## 2021-04-21 DIAGNOSIS — F41.1 GENERALIZED ANXIETY DISORDER: ICD-10-CM

## 2021-04-21 DIAGNOSIS — F40.10 SOCIAL ANXIETY DISORDER: ICD-10-CM

## 2021-04-21 PROCEDURE — 99214 OFFICE O/P EST MOD 30 MIN: CPT | Performed by: PSYCHIATRY & NEUROLOGY

## 2021-04-21 PROCEDURE — 90833 PSYTX W PT W E/M 30 MIN: CPT | Performed by: PSYCHIATRY & NEUROLOGY

## 2021-04-21 RX ORDER — PRAZOSIN HYDROCHLORIDE 2 MG/1
2 CAPSULE ORAL
Qty: 60 CAPSULE | Refills: 1 | Status: SHIPPED | OUTPATIENT
Start: 2021-04-21 | End: 2021-07-14

## 2021-04-21 NOTE — PATIENT INSTRUCTIONS
Bipolar II disorder (HCC)    Generalized anxiety disorder    PTSD (post-traumatic stress disorder)  -     prazosin (MINIPRESS) 2 mg capsule;  Take 1 capsule (2 mg total) by mouth daily at bedtime    Panic disorder with agoraphobia    Social anxiety disorder        Continue all other medications    Follow up in 2 months
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
## 2021-04-25 DIAGNOSIS — I10 ESSENTIAL HYPERTENSION: ICD-10-CM

## 2021-04-27 DIAGNOSIS — G89.4 CHRONIC PAIN SYNDROME: ICD-10-CM

## 2021-04-27 RX ORDER — TRAMADOL HYDROCHLORIDE 50 MG/1
TABLET ORAL
Qty: 160 TABLET | Refills: 0 | Status: SHIPPED | OUTPATIENT
Start: 2021-04-27 | End: 2021-06-06

## 2021-05-03 NOTE — TELEPHONE ENCOUNTER
[address]  [facility] spoke with Kailey, advised I was calling to initiate a refill for patient's botox medication   Refill request has been initiated, will call for a status check in 2 days

## 2021-05-03 NOTE — TELEPHONE ENCOUNTER
Received a fax from [facility] that botox is ready to schedule for delivery however patient's consent for shipment is needed  Called patient, left message asking patient to call back  If patient calls back please advise that her Botox medication is ready to schedule for delivery from [facility]  Patient needs to call [address] [place] [address] @ [phone] to provide her consent for shipment    Will attempt patient again tomorrow

## 2021-05-04 NOTE — TELEPHONE ENCOUNTER
SECOND ATTEMPT  Called patient, left message asking patient to call back  If patient calls back please advise that her Botox medication is ready to schedule for delivery from [facility]  Patient needs to call [address] [place] [address] @ [phone] to provide her consent for shipment    Will call [address] [place] [address] tomorrow to see if consent has been obtained as of yet

## 2021-05-05 NOTE — TELEPHONE ENCOUNTER
[address] spoke with Naty Sharma, advised I was calling to check if patient has celled in as of yet to provide her consent for shipment  [place] Other states that patient's consent is on file and order is ready to schedule for delivery  Botox is scheduled for delivery Tuesday 5/11/2021 to the [facility] location via [facility]    Please await patient's botox delivery and document once received, please advise if medication is not received by 2pm     Thanks  Maria Antonia Pillai

## 2021-05-06 DIAGNOSIS — G43.709 CHRONIC MIGRAINE WITHOUT AURA WITHOUT STATUS MIGRAINOSUS, NOT INTRACTABLE: ICD-10-CM

## 2021-05-06 DIAGNOSIS — M79.18 CERVICAL MYOFASCIAL PAIN SYNDROME: ICD-10-CM

## 2021-05-06 RX ORDER — BACLOFEN 10 MG/1
TABLET ORAL
Qty: 90 TABLET | Refills: 0 | Status: SHIPPED | OUTPATIENT
Start: 2021-05-06 | End: 2021-06-04

## 2021-05-09 DIAGNOSIS — F43.10 PTSD (POST-TRAUMATIC STRESS DISORDER): ICD-10-CM

## 2021-05-09 DIAGNOSIS — F41.1 GENERALIZED ANXIETY DISORDER: ICD-10-CM

## 2021-05-09 DIAGNOSIS — F31.81 BIPOLAR II DISORDER (HCC): ICD-10-CM

## 2021-05-10 DIAGNOSIS — G43.709 CHRONIC MIGRAINE WITHOUT AURA WITHOUT STATUS MIGRAINOSUS, NOT INTRACTABLE: ICD-10-CM

## 2021-05-10 DIAGNOSIS — E55.9 VITAMIN D DEFICIENCY: ICD-10-CM

## 2021-05-10 DIAGNOSIS — R11.0 NAUSEA: ICD-10-CM

## 2021-05-10 RX ORDER — BUTALBITAL, ACETAMINOPHEN AND CAFFEINE 50; 325; 40 MG/1; MG/1; MG/1
TABLET ORAL
Qty: 10 TABLET | Refills: 3 | Status: SHIPPED | OUTPATIENT
Start: 2021-05-10 | End: 2021-08-06

## 2021-05-10 RX ORDER — ERGOCALCIFEROL 1.25 MG/1
CAPSULE ORAL
Qty: 12 CAPSULE | Refills: 0 | Status: SHIPPED | OUTPATIENT
Start: 2021-05-10 | End: 2021-07-29

## 2021-05-10 RX ORDER — PROCHLORPERAZINE MALEATE 10 MG
10 TABLET ORAL EVERY 6 HOURS PRN
Qty: 20 TABLET | Refills: 0 | Status: SHIPPED | OUTPATIENT
Start: 2021-05-10 | End: 2021-06-17

## 2021-05-11 NOTE — TELEPHONE ENCOUNTER
Botox number of units: 200 units  Botox quantity: 1 vial  Arrived at what location: [place]  Lot number: [id number]  Expiration Date: 12/01/2023  Appt notes indicate correct medication: yes    Botox logged and stored

## 2021-05-12 DIAGNOSIS — F43.10 POST TRAUMATIC STRESS DISORDER (PTSD): ICD-10-CM

## 2021-05-12 DIAGNOSIS — F41.1 GENERALIZED ANXIETY DISORDER: ICD-10-CM

## 2021-05-12 DIAGNOSIS — F40.01 PANIC DISORDER WITH AGORAPHOBIA: ICD-10-CM

## 2021-05-12 RX ORDER — DIAZEPAM 5 MG/1
5 TABLET ORAL 3 TIMES DAILY PRN
Qty: 90 TABLET | Refills: 0 | Status: SHIPPED | OUTPATIENT
Start: 2021-05-12 | End: 2021-06-17 | Stop reason: SDUPTHER

## 2021-05-12 NOTE — PROGRESS NOTES
All documentation, nursing notes, and PDMP website reviewed  Evon Nielsen called the clinic today and appropriately requested refill of controlled psychotropic medications (Valium)  David Medina was last evaluated on 4/21/2021 and plan during that visit was to continue this agent given efficacy and tolerability  David Medina did not endorse adverse medication side effects at that time  David Medina is currently taking Valium and tolerating it well  Review of PDMP website reveals no concerns for abuse or misuse  As such, will refill script today for 30-days as I am covering for patient's primary provider  David Medina has a scheduled follow-up visit for medication management on 7/9/21/2021

## 2021-05-13 DIAGNOSIS — F31.81 BIPOLAR II DISORDER (HCC): ICD-10-CM

## 2021-05-13 DIAGNOSIS — F41.1 GENERALIZED ANXIETY DISORDER: ICD-10-CM

## 2021-05-13 NOTE — TELEPHONE ENCOUNTER
Spoke with pharmacist  Refill available and will get ready    Spoke with Cassi De Luna and she is aware

## 2021-05-14 DIAGNOSIS — Z79.4 TYPE 2 DIABETES MELLITUS WITH OTHER NEUROLOGIC COMPLICATION, WITH LONG-TERM CURRENT USE OF INSULIN (HCC): ICD-10-CM

## 2021-05-14 DIAGNOSIS — E11.49 TYPE 2 DIABETES MELLITUS WITH OTHER NEUROLOGIC COMPLICATION, WITH LONG-TERM CURRENT USE OF INSULIN (HCC): ICD-10-CM

## 2021-05-17 ENCOUNTER — TELEPHONE (OUTPATIENT)
Dept: NEUROLOGY | Facility: CLINIC | Age: 51
End: 2021-05-17

## 2021-05-17 RX ORDER — PRAZOSIN HYDROCHLORIDE 1 MG/1
1 CAPSULE ORAL
Qty: 30 CAPSULE | Refills: 1 | OUTPATIENT
Start: 2021-05-17

## 2021-05-17 RX ORDER — ACARBOSE 25 MG/1
25 TABLET ORAL
Qty: 270 TABLET | Refills: 1 | Status: SHIPPED | OUTPATIENT
Start: 2021-05-17 | End: 2021-09-30 | Stop reason: ALTCHOICE

## 2021-05-17 RX ORDER — MIRTAZAPINE 7.5 MG/1
7.5 TABLET, FILM COATED ORAL
Qty: 30 TABLET | Refills: 1 | OUTPATIENT
Start: 2021-05-17

## 2021-05-17 NOTE — TELEPHONE ENCOUNTER
MSW received an incoming call from the patient  She informed MSW that she has a botox appointment scheduled for 5/19 and her ride to the appointment fell through  She asked MSW to set up a ride for her  MSW has helped this patient in the past and confirmed with her that all her transportation options were exhausted  MSW asked the patient if she was able to connect with [employer] transportation services and she was not  MSW will send her their information in the mail  MSW reached out to practice administrator Jessica Hernández  She confirmed that the patient can utilize [employer] this one last time  In the future she will need to find transportation elsewhere  MSW then called the patient to make her aware and she expressed gratitude and understanding  MSW will call [employer] tomorrow morning to schedule her lyft ride since they are currently closed for the remainder of the day  MSW will follow up with the patient once transportation is scheduled

## 2021-05-18 DIAGNOSIS — F31.81 BIPOLAR II DISORDER (HCC): ICD-10-CM

## 2021-05-18 DIAGNOSIS — F41.1 GENERALIZED ANXIETY DISORDER: ICD-10-CM

## 2021-05-18 RX ORDER — MIRTAZAPINE 7.5 MG/1
7.5 TABLET, FILM COATED ORAL
Qty: 30 TABLET | Refills: 1 | Status: SHIPPED | OUTPATIENT
Start: 2021-05-18 | End: 2021-07-02

## 2021-05-18 NOTE — TELEPHONE ENCOUNTER
MSW called [employer] ([phone]) and spoke with Fermín  The patient is scheduled for a 1pm  time tomorrow for her lyft ride  After her appointment is complete she needs to inform the MR team or MSW so that her lyft ride can be called to bring her home  MSW will follow and contact [place] MR team tomorrow to inform them of this information

## 2021-05-18 NOTE — TELEPHONE ENCOUNTER
SHARLENE called the patient to ensure she received a confirmation text message from [facility]  Patient confirmed that she received a confirmation message  She will either call MSW or inform [facility]   Team when her appointment is over so that her lyft ride home can be called

## 2021-05-19 ENCOUNTER — PROCEDURE VISIT (OUTPATIENT)
Dept: NEUROLOGY | Facility: CLINIC | Age: 51
End: 2021-05-19
Payer: COMMERCIAL

## 2021-05-19 VITALS — TEMPERATURE: 98.4 F | SYSTOLIC BLOOD PRESSURE: 103 MMHG | HEART RATE: 104 BPM | DIASTOLIC BLOOD PRESSURE: 68 MMHG

## 2021-05-19 DIAGNOSIS — G43.709 CHRONIC MIGRAINE WITHOUT AURA: Primary | ICD-10-CM

## 2021-05-19 PROCEDURE — 64615 CHEMODENERV MUSC MIGRAINE: CPT | Performed by: PHYSICIAN ASSISTANT

## 2021-05-19 NOTE — TELEPHONE ENCOUNTER
MSW spoke with MR Heather Terry in the [place] office to make him aware that the patient will be arriving via Lyft and will need a ride home  MSW provided him with the phone number to call [facility] for them to call her ride home  If he is unable to call, SHARLENE will be able to do the same

## 2021-05-19 NOTE — PROGRESS NOTES
Universal Protocol   Consent: Verbal consent obtained  Written consent obtained    Risks and benefits: risks, benefits and alternatives were discussed  Consent given by: patient  Patient understanding: patient states understanding of the procedure being performed  Patient consent: the patient's understanding of the procedure matches consent given  Procedure consent: procedure consent matches procedure scheduled        Chemodenervation     Date/Time 5/19/2021 2:36 PM     Performed by  Sarmad Vang PA-C     Authorized by Sarmad Vang PA-C        Pre-procedure details      Prepped With: Alcohol     Procedure details     Position:  Upright   Botox     Botox Type:  Type A    Brand:  Botox    mL's of Botulinum Toxin:  190    Final Concentration per CC:  100 units    Needle Gauge:  30 G 2 5 inch   Procedures     Botox Procedures: chronic headache      Indications: migraines     Injection Location      Head / Face:  L superior trapezius, R superior trapezius, L superior cervical paraspinal, R superior cervical paraspinal, L , R , procerus, L temporalis, R temporalis, R frontalis, L frontalis, R medial occipitalis and L medial occipitalis    L  injection amount:  5 unit(s)    R  injection amount:  5 unit(s)    L lateral frontalis:  5 unit(s)    R lateral frontalis:  5 unit(s)    L medial frontalis:  5 unit(s)    R medial frontalis:  5 unit(s)    L temporalis injection amount:  20 unit(s)    R temporalis injection amount:  20 unit(s)    Procerus injection amount:  5 unit(s)    L medial occipitalis injection amount:  15 unit(s)    R medial occipitalis injection amount:  15 unit(s)    L superior cervical paraspinal injection amount:  10 unit(s)    R superior cervical paraspinal injection amount:  10 unit(s)    L superior trapezius injection amount:  15 unit(s)    R superior trapezius injection amount:  15 unit(s)   Total Units     Total units used:  190    Total units discarded: 10   Post-procedure details      Chemodenervation:  Chronic migraine    Facial Nerve Location[de-identified]  Bilateral facial nerve    Patient tolerance of procedure: Tolerated well, no immediate complications   Comments       Extra units medically necessary:  - 15 between the right and left occipital region  - 10 left cervical paraspinal muscles  - 10 right cervical paraspinal muscles       Blood pressure 103/68, pulse 104, temperature 98 4 °F (36 9 °C), temperature source Oral, last menstrual period 03/04/2016, not currently breastfeeding

## 2021-05-19 NOTE — TELEPHONE ENCOUNTER
Patient appointment complete  MSW placed info about [facility] and [facility] services in the mail for her to utilize for future appointments  There are no further social needs at this time  MSW will be available to assist with any future needs in regard to this patient

## 2021-05-27 ENCOUNTER — TELEPHONE (OUTPATIENT)
Dept: FAMILY MEDICINE CLINIC | Facility: CLINIC | Age: 51
End: 2021-05-27

## 2021-05-27 NOTE — TELEPHONE ENCOUNTER
My apologies! Patient said name of inhaler is ProAir RespiClick  inhaler per patient  She said that we have prescribed before

## 2021-05-27 NOTE — TELEPHONE ENCOUNTER
Patient states pharmacy advised her to contact us to start a prior authorization for her inhaler  Please advise

## 2021-06-03 ENCOUNTER — PATIENT OUTREACH (OUTPATIENT)
Dept: FAMILY MEDICINE CLINIC | Facility: CLINIC | Age: 51
End: 2021-06-03

## 2021-06-03 DIAGNOSIS — F32.A DEPRESSION, UNSPECIFIED DEPRESSION TYPE: Primary | ICD-10-CM

## 2021-06-03 NOTE — PROGRESS NOTES
OP CM spoke to Dr Tushar Keating and reviewed chart  Called to pt and LM to see if OP CM can assist pt with any psychosocial needs  Will call again

## 2021-06-04 ENCOUNTER — PATIENT OUTREACH (OUTPATIENT)
Dept: FAMILY MEDICINE CLINIC | Facility: CLINIC | Age: 51
End: 2021-06-04

## 2021-06-04 DIAGNOSIS — M79.18 CERVICAL MYOFASCIAL PAIN SYNDROME: ICD-10-CM

## 2021-06-04 DIAGNOSIS — F31.81 BIPOLAR II DISORDER (HCC): ICD-10-CM

## 2021-06-04 DIAGNOSIS — G89.4 CHRONIC PAIN SYNDROME: ICD-10-CM

## 2021-06-04 DIAGNOSIS — F41.1 GENERALIZED ANXIETY DISORDER: ICD-10-CM

## 2021-06-04 DIAGNOSIS — G43.709 CHRONIC MIGRAINE WITHOUT AURA WITHOUT STATUS MIGRAINOSUS, NOT INTRACTABLE: ICD-10-CM

## 2021-06-04 RX ORDER — MIRTAZAPINE 7.5 MG/1
7.5 TABLET, FILM COATED ORAL
Qty: 30 TABLET | Refills: 1 | OUTPATIENT
Start: 2021-06-04

## 2021-06-04 RX ORDER — BACLOFEN 10 MG/1
TABLET ORAL
Qty: 90 TABLET | Refills: 0 | Status: SHIPPED | OUTPATIENT
Start: 2021-06-04 | End: 2021-06-30

## 2021-06-06 RX ORDER — TRAMADOL HYDROCHLORIDE 50 MG/1
TABLET ORAL
Qty: 160 TABLET | Refills: 0 | Status: SHIPPED | OUTPATIENT
Start: 2021-06-06 | End: 2021-07-09

## 2021-06-09 ENCOUNTER — PATIENT OUTREACH (OUTPATIENT)
Dept: FAMILY MEDICINE CLINIC | Facility: CLINIC | Age: 51
End: 2021-06-09

## 2021-06-09 NOTE — PROGRESS NOTES
OP CM made third attempt to contact pt  Will close case unless pt makes contact  Email sent to pt as well

## 2021-06-11 ENCOUNTER — PATIENT OUTREACH (OUTPATIENT)
Dept: FAMILY MEDICINE CLINIC | Facility: CLINIC | Age: 51
End: 2021-06-11

## 2021-06-11 ENCOUNTER — TELEPHONE (OUTPATIENT)
Dept: ENDOCRINOLOGY | Facility: CLINIC | Age: 51
End: 2021-06-11

## 2021-06-11 DIAGNOSIS — Z74.8 ASSISTANCE NEEDED WITH TRANSPORTATION: Primary | ICD-10-CM

## 2021-06-11 NOTE — PROGRESS NOTES
OP CM rcvd call back from pt in regards to psychosocial issues  Pt states she resides with her dtr and her hsb who is bed bound and has not walked since August   Hsb works from home and makes [address] and wife gets 1200 disability  Pt is not interested in a home health aide and pts hsb is over income for waiver  Pt states she has a lot of pain and she uses a cane for ambulation  Pt also uses a shower chair  Pts hsb has not returned any phone calls so gave wife the phone number for the mobile lab and explained she can call them directly to schedule the visit  Pt states she has difficulty with getting appts  and is agreeable to referral to CHW reaching out for [employer]  Pt has insurance through her hsb employer  Pt states she already met her deductible and her co pays are low  Pt states they have a lot of other bills but their combined income puts them over the income limit for assistance  Pt also states that she now has a psychiatrist through [facility] and she goes back in July  Pt is on the waiting list for a therapist through [facility]  Pt became tearful on the phone  Emotional support provided  OP CM will continue to follow

## 2021-06-12 DIAGNOSIS — E78.5 HYPERLIPIDEMIA, UNSPECIFIED HYPERLIPIDEMIA TYPE: ICD-10-CM

## 2021-06-14 ENCOUNTER — PATIENT OUTREACH (OUTPATIENT)
Dept: CASE MANAGEMENT | Facility: HOSPITAL | Age: 51
End: 2021-06-14

## 2021-06-14 RX ORDER — ATORVASTATIN CALCIUM 80 MG/1
TABLET, FILM COATED ORAL
Qty: 90 TABLET | Refills: 3 | Status: SHIPPED | OUTPATIENT
Start: 2021-06-14 | End: 2022-07-21

## 2021-06-15 DIAGNOSIS — F31.81 BIPOLAR II DISORDER (HCC): ICD-10-CM

## 2021-06-15 RX ORDER — OLANZAPINE 10 MG/1
TABLET ORAL
Qty: 90 TABLET | Refills: 1 | Status: SHIPPED | OUTPATIENT
Start: 2021-06-15 | End: 2021-10-13 | Stop reason: SDUPTHER

## 2021-06-17 DIAGNOSIS — E27.40 LOW SERUM CORTISOL LEVEL (HCC): ICD-10-CM

## 2021-06-17 DIAGNOSIS — F41.1 GENERALIZED ANXIETY DISORDER: ICD-10-CM

## 2021-06-17 DIAGNOSIS — F40.01 PANIC DISORDER WITH AGORAPHOBIA: ICD-10-CM

## 2021-06-17 DIAGNOSIS — F43.10 POST TRAUMATIC STRESS DISORDER (PTSD): ICD-10-CM

## 2021-06-17 RX ORDER — HYDROCORTISONE 5 MG/1
TABLET ORAL
Qty: 50 TABLET | Refills: 5 | Status: SHIPPED | OUTPATIENT
Start: 2021-06-17 | End: 2021-09-07

## 2021-06-18 ENCOUNTER — PATIENT OUTREACH (OUTPATIENT)
Dept: CASE MANAGEMENT | Facility: HOSPITAL | Age: 51
End: 2021-06-18

## 2021-06-18 RX ORDER — DIAZEPAM 5 MG/1
5 TABLET ORAL 3 TIMES DAILY PRN
Qty: 90 TABLET | Refills: 0 | Status: SHIPPED | OUTPATIENT
Start: 2021-06-18 | End: 2021-07-21

## 2021-06-21 ENCOUNTER — TELEPHONE (OUTPATIENT)
Dept: NEUROLOGY | Facility: CLINIC | Age: 51
End: 2021-06-21

## 2021-06-24 ENCOUNTER — PATIENT OUTREACH (OUTPATIENT)
Dept: FAMILY MEDICINE CLINIC | Facility: CLINIC | Age: 51
End: 2021-06-24

## 2021-06-24 NOTE — PROGRESS NOTES
OP CM Called again and left message to follow up with pt on psychosocial needs for her and her hsb  Will call again

## 2021-07-01 ENCOUNTER — PATIENT OUTREACH (OUTPATIENT)
Dept: CASE MANAGEMENT | Facility: HOSPITAL | Age: 51
End: 2021-07-01

## 2021-07-02 ENCOUNTER — PATIENT OUTREACH (OUTPATIENT)
Dept: CASE MANAGEMENT | Facility: HOSPITAL | Age: 51
End: 2021-07-02

## 2021-07-02 DIAGNOSIS — F41.1 GENERALIZED ANXIETY DISORDER: ICD-10-CM

## 2021-07-02 DIAGNOSIS — F31.81 BIPOLAR II DISORDER (HCC): ICD-10-CM

## 2021-07-02 RX ORDER — MIRTAZAPINE 7.5 MG/1
7.5 TABLET, FILM COATED ORAL
Qty: 30 TABLET | Refills: 1 | Status: SHIPPED | OUTPATIENT
Start: 2021-07-02 | End: 2021-07-27

## 2021-07-06 DIAGNOSIS — G43.709 CHRONIC MIGRAINE WITHOUT AURA WITHOUT STATUS MIGRAINOSUS, NOT INTRACTABLE: ICD-10-CM

## 2021-07-06 RX ORDER — TOPIRAMATE 100 MG/1
TABLET, FILM COATED ORAL
Qty: 180 TABLET | Refills: 1 | Status: SHIPPED | OUTPATIENT
Start: 2021-07-06 | End: 2022-07-17

## 2021-07-08 ENCOUNTER — PATIENT OUTREACH (OUTPATIENT)
Dept: CASE MANAGEMENT | Facility: HOSPITAL | Age: 51
End: 2021-07-08

## 2021-07-08 NOTE — PROGRESS NOTES
CHW-Left a message to set an appointment to complete applications as requested  Marlyn MAYO  additional needs as determined

## 2021-07-09 ENCOUNTER — OFFICE VISIT (OUTPATIENT)
Dept: PSYCHIATRY | Facility: CLINIC | Age: 51
End: 2021-07-09
Payer: COMMERCIAL

## 2021-07-09 DIAGNOSIS — F43.10 POST TRAUMATIC STRESS DISORDER (PTSD): ICD-10-CM

## 2021-07-09 DIAGNOSIS — F41.1 GENERALIZED ANXIETY DISORDER: ICD-10-CM

## 2021-07-09 DIAGNOSIS — F40.01 PANIC DISORDER WITH AGORAPHOBIA: ICD-10-CM

## 2021-07-09 DIAGNOSIS — F31.81 BIPOLAR II DISORDER (HCC): Primary | ICD-10-CM

## 2021-07-09 DIAGNOSIS — G89.4 CHRONIC PAIN SYNDROME: ICD-10-CM

## 2021-07-09 PROCEDURE — 99213 OFFICE O/P EST LOW 20 MIN: CPT | Performed by: PSYCHIATRY & NEUROLOGY

## 2021-07-09 RX ORDER — METHOTREXATE 2.5 MG/1
TABLET ORAL
COMMUNITY
Start: 2021-07-07

## 2021-07-09 RX ORDER — TRAMADOL HYDROCHLORIDE 50 MG/1
TABLET ORAL
Qty: 160 TABLET | Refills: 0 | Status: SHIPPED | OUTPATIENT
Start: 2021-07-09 | End: 2021-08-16

## 2021-07-09 NOTE — PATIENT INSTRUCTIONS
Bipolar II disorder (HCC)    Generalized anxiety disorder    Post traumatic stress disorder (PTSD)    Panic disorder with agoraphobia        Continue all other medications    Follow up in 3 months

## 2021-07-09 NOTE — PSYCH
Subjective:     Patient ID: Vinny Rodriguez is a 49 y o  female with BPAD type 2, MARITO, and PTSD being seen for a follow up  She is currently on zyprexa, prazosin, cymbalta, valium, gabapentin, Topamax, and wellbutrin  Last visit she was weaned off of the Wellbutrin due to anxiety, started on Remeron, and Prazosin was increased  HPI ROS Appetite Changes and Sleep: the patient stated that she is doing okay  Nothing at home has changed  Her daughter went to rehab for a week, but then came back and relapsed  She is still very overwhelmed at home with caring for her  and her daughter's drug use  She has to take an uber over here and this is getting expensive  She and her sw at the PCPs office who is helping her get a Zulay Demario  In the last week her ankles and leg have been swelling  She has pain in her foot and calf, but denied redness and heat in that area  She has bother RA and OA in those areas  She is sleeping interrupted  She has a hard time getting back to sleep  She will drink 1-2 glasses of milk and then can get back to sleep  Energy is low  She has to clean the house, but has no one to help her  Since going up on the dose of Prazosin, she no longer has nightmares  She denied SI/HI  Anxiety remains high, but would be worse without meds she believes  Appetite is increased  She has gained 20 lbs in 3 months  Review Of Systems:     Mood Anxiety and Depression on and off  Anxiety always high   Depression has good days   Behavior Normal    Thought Content Disturbing Thoughts, Feelings and Unreasonalbe or Irrational Fears   General Relationship Problems, Emotional Problems, Sleep Disturbances and Decreased Functioning   Personality Normal   Other Psych Symptoms Normal   Constitutional As Noted in HPI   ENT Negative   Cardiovascular Lower Ext Edema   Respiratory Negative   Gastrointestinal Diarrhea from methotrexate   Genitourinary Negative   Musculoskeletal neck and lower back pain   Integumentary Negative   Neurological Negative   Endocrine blood sugars have either been normal with insulin or low   Other Symptoms Normal          Laboratory Results: No results found for this or any previous visit      Substance Abuse History:  Social History     Substance and Sexual Activity   Drug Use No       Family Psychiatric History:   Family History   Problem Relation Age of Onset    Diabetes Mother         type 2    Heart attack Mother 44        acute MI    Kidney disease Mother         CKD NKF classfication    Depression Mother     Arthritis Mother     Substance Abuse Mother         mother OD in past on MS04    Ulcerative colitis Mother    [facility] Schizophrenia Mother     Suicide Attempts Mother     Bipolar disorder Mother     Diabetes Maternal Grandmother     Heart attack Father         acute MI    Psoriasis Father    [facility] Cancer Father         gastric cancer    Stroke Father     Crohn's disease Sister     Bipolar disorder Sister     Rheum arthritis Maternal Grandfather     Throat cancer Maternal Grandfather     Suicide Attempts Daughter     Drug abuse Daughter     Lung cancer Paternal Grandmother     Cancer Paternal Grandfather     No Known Problems Sister     Bipolar disorder Maternal Uncle     Anesthesia problems Neg Hx        The following portions of the patient's history were reviewed and updated as appropriate: allergies, current medications, past family history, past medical history, past social history, past surgical history and problem list     Social History     Socioeconomic History    Marital status: /Civil Union     Spouse name: Not on file    Number of children: 1    Years of education: technical school    Highest education level: Associate degree: occupational, technical, or vocational program   Occupational History    Occupation: unemployed     Comment: SSDI   Tobacco Use    Smoking status: Current Every Day Smoker     Packs/day: 0 50     Years: 35 00     Pack years: 17 50 Types: Cigarettes     Start date: 1/1/1983    Smokeless tobacco: Never Used    Tobacco comment: 21 + years   Vaping Use    Vaping Use: Former   Substance and Sexual Activity    Alcohol use: Not Currently     Alcohol/week: 0 0 standard drinks     Comment: last was in 2010 after DUI    Drug use: No    Sexual activity: Yes     Partners: Male   Other Topics Concern    Not on file   Social History Narrative    No coffee consumption     Social Determinants of Health     Financial Resource Strain: High Risk    Difficulty of Paying Living Expenses: Very hard   Food Insecurity: Food Insecurity Present    Worried About Running Out of Food in the Last Year: Often true    Melba of Food in the Last Year: Often true   Transportation Needs: Unmet Transportation Needs    Lack of Transportation (Medical): Yes    Lack of Transportation (Non-Medical): Yes   Physical Activity: Inactive    Days of Exercise per Week: 0 days    Minutes of Exercise per Session: 0 min   Stress: Stress Concern Present    Feeling of Stress : Rather much   Social Connections: Moderately Isolated    Frequency of Communication with Friends and Family:  Three times a week    Frequency of Social Gatherings with Friends and Family: Never    Attends Restorationism Services: Never    Active Member of Clubs or Organizations: No    Attends Club or Organization Meetings: Never    Marital Status:    Intimate Partner Violence: Not At Risk    Fear of Current or Ex-Partner: No    Emotionally Abused: No    Physically Abused: No    Sexually Abused: No     Social History     Social History Narrative    No coffee consumption       Objective:       Mental status:  Appearance calm and cooperative , adequate hygiene and grooming and poor eye contact    Mood dysphoric   Affect affect was constricted   Speech a normal rate   Thought Processes coherent/organized and normal thought processes   Hallucinations no hallucinations present    Thought Content no delusions   Abnormal Thoughts no suicidal thoughts  and no homicidal thoughts    Orientation  oriented to person and place and time   Remote Memory short term memory intact and long term memory intact   Attention Span concentration intact   Intellect Appears to be of Average Intelligence   Insight Insight intact   Judgement judgment was intact   Muscle Strength Muscle strength and tone were normal and Normal gait    Language no difficulty naming common objects   Fund of Knowledge displays adequate knowledge of current events   Pain moderate to severe   Pain Scale 7/10       Assessment/Plan:       Diagnoses and all orders for this visit:    Bipolar II disorder (HCC)    Generalized anxiety disorder    Post traumatic stress disorder (PTSD)    Panic disorder with agoraphobia        Continue all other medications    Follow up in 3 months    Treatment Recommendations- Risks Benefits      Immediate Medical/Psychiatric/Psychotherapy Treatments and Any Precautions: she continues to have less than ideal situation at home with caring for her  and her daughter drug addiction  She is doing okay from a mood perspective, but anxiety is high due to being overwhelmed  She would like to see a therapist  No medication changes at this time  She has gained weight, likely with the help of Remeron  Risks, Benefits And Possible Side Effects Of Medications:  PSYCH RISK, BENEFITS AND POSSIBLE SIDE EFFECTS discussed    Controlled Medication Discussion: Discussed with patient Black Box warning on concurrent use of benzodiazepines and opioid medications including sedation, respiratory depression, coma and death  Patient understands the risk of treatment with benzodiazepines in addition to opioids and wants to continue taking those medications   , Discussed with patient the risks of sedation, respiratory depression, impairment of ability to drive and potential for abuse and addiction related to treatment with benzodiazepine medications  The patient understands risk of treatment with benzodiazepine medications, agrees to not drive if feels impaired and agrees to take medications as prescribed   and The patient has been filling controlled prescriptions on time as prescribed to [facility]  program         This note was not shared with the patient due to reasonable likelihood of causing patient harm

## 2021-07-09 NOTE — BH TREATMENT PLAN
TREATMENT PLAN (Medication Management Only)        [facility]    Name and Date of Birth:  Nova Burgos 49 y o  1970  Date of Treatment Plan: July 9, 2021  Diagnosis/Diagnoses:    1  Bipolar II disorder ([address] )    2  Generalized anxiety disorder    3  Post traumatic stress disorder (PTSD)    4  Panic disorder with agoraphobia      Strengths/Personal Resources for Self-Care: "I am a very patient person  I am good hearted  I will do anything for anyone  ", taking medications as prescribed  Area/Areas of need (in own words): "improved sleep", anxiety symptoms  1  Long Term Goal: to clean her house     Target Date:6 months - 1/9/2022  Person/Persons responsible for completion of goal: Dr Caitlin Valle  2  Short Term Objective (s) - How will we reach this goal?:   A  Provider new recommended medication/dosage changes and/or continue medication(s): continue all other medications  B  N/A   C  N/A  Target Date:6 months - 1/9/2022  Person/Persons Responsible for Completion of Goal: Dr Mamie Valle Goals: continuing treatment  Treatment Modality: medication management every 3 months  Review due 180 days from date of this plan: 6 months - 1/9/2022  Expected length of service: ongoing treatment  My Physician/PA/NP and I have developed this plan together and I agree to work on the goals and objectives  I understand the treatment goals that were developed for my treatment    Treatment Plan not completed within required Limits due to : there was an Epic technology error and Treatment Plan was lost, will do at next OV

## 2021-07-14 DIAGNOSIS — F43.10 PTSD (POST-TRAUMATIC STRESS DISORDER): ICD-10-CM

## 2021-07-14 RX ORDER — PRAZOSIN HYDROCHLORIDE 2 MG/1
2 CAPSULE ORAL
Qty: 90 CAPSULE | Refills: 1 | Status: SHIPPED | OUTPATIENT
Start: 2021-07-14 | End: 2021-10-13 | Stop reason: SDUPTHER

## 2021-07-20 DIAGNOSIS — F41.1 GENERALIZED ANXIETY DISORDER: ICD-10-CM

## 2021-07-20 DIAGNOSIS — F40.01 PANIC DISORDER WITH AGORAPHOBIA: ICD-10-CM

## 2021-07-20 DIAGNOSIS — F43.10 POST TRAUMATIC STRESS DISORDER (PTSD): ICD-10-CM

## 2021-07-20 DIAGNOSIS — R11.0 NAUSEA: ICD-10-CM

## 2021-07-20 RX ORDER — ONDANSETRON 4 MG/1
TABLET, FILM COATED ORAL
Qty: 20 TABLET | Refills: 2 | Status: SHIPPED | OUTPATIENT
Start: 2021-07-20 | End: 2021-12-01 | Stop reason: SDUPTHER

## 2021-07-21 RX ORDER — DIAZEPAM 5 MG/1
5 TABLET ORAL 3 TIMES DAILY PRN
Qty: 90 TABLET | Refills: 0 | Status: SHIPPED | OUTPATIENT
Start: 2021-07-21 | End: 2021-08-20

## 2021-07-22 ENCOUNTER — PATIENT OUTREACH (OUTPATIENT)
Dept: CASE MANAGEMENT | Facility: HOSPITAL | Age: 51
End: 2021-07-22

## 2021-07-22 NOTE — PROGRESS NOTES
CHW- left message for Miranda Faust to return call about transportation and Catrahcita Francis applications  CHW to continue to follow with OP MSW Jerrod Blackwell

## 2021-07-25 DIAGNOSIS — F31.81 BIPOLAR II DISORDER (HCC): ICD-10-CM

## 2021-07-25 DIAGNOSIS — F41.1 GENERALIZED ANXIETY DISORDER: ICD-10-CM

## 2021-07-27 RX ORDER — MIRTAZAPINE 7.5 MG/1
7.5 TABLET, FILM COATED ORAL
Qty: 30 TABLET | Refills: 1 | Status: SHIPPED | OUTPATIENT
Start: 2021-07-27 | End: 2021-08-20

## 2021-07-28 ENCOUNTER — TELEPHONE (OUTPATIENT)
Dept: NEUROLOGY | Facility: CLINIC | Age: 51
End: 2021-07-28

## 2021-07-28 DIAGNOSIS — E55.9 VITAMIN D DEFICIENCY: ICD-10-CM

## 2021-07-28 DIAGNOSIS — G43.709 CHRONIC MIGRAINE WITHOUT AURA WITHOUT STATUS MIGRAINOSUS, NOT INTRACTABLE: ICD-10-CM

## 2021-07-28 RX ORDER — TOPIRAMATE 25 MG/1
TABLET ORAL
Qty: 90 TABLET | Refills: 1 | Status: SHIPPED | OUTPATIENT
Start: 2021-07-28 | End: 2021-12-23

## 2021-07-28 NOTE — TELEPHONE ENCOUNTER
Patient is scheduled 8/24/2021 with [facility] in the [facility] location

## 2021-07-28 NOTE — TELEPHONE ENCOUNTER
Type Date User Summary Attachment   General 07/23/2021  2:48 PM Jordy Mark [facility] cooridnation -   Note    Botox - authorization # [id number] - valid 4 visits - 3rd visit - valid dates 1/26/2021 - 1/26/2022   Please use Brittaney Fragoos

## 2021-07-28 NOTE — TELEPHONE ENCOUNTER
Received fax from [facility] stating that patient's benefits has been verified and order is ready to scheduled for delivery  [address] spoke with Carlos Mora, advised I was calling to scheduled delivery of patient's botox medication  Carlos Mora confirmed that order is ready and consent is on file  Botox is scheduled for delivery Thursday 7/29/2021 to the [facility] location via [employer] Priority Overnight    Please await patient's botox delivery and document once received, please advise if medication is not received by 2pm     Thanks  Radha Lacey

## 2021-07-29 RX ORDER — ERGOCALCIFEROL 1.25 MG/1
CAPSULE ORAL
Qty: 12 CAPSULE | Refills: 0 | Status: SHIPPED | OUTPATIENT
Start: 2021-07-29 | End: 2021-10-13

## 2021-07-29 NOTE — TELEPHONE ENCOUNTER
Botox number of units: 200 units  Botox quantity: 1 box  Arrived at what location: [facility]  Botox at Correct Administering Location: YES  NDC number: 2330-3649-18  Lot LDENMA:[id number]  Expiration Date: 04/30/2024  Appt notes indicate correct medication: YES    Botox received, documented and stored in the refrigerator    Receipt emailed to the Botox Coordinator and scanned into the patients chart

## 2021-08-16 DIAGNOSIS — G89.4 CHRONIC PAIN SYNDROME: ICD-10-CM

## 2021-08-16 RX ORDER — TRAMADOL HYDROCHLORIDE 50 MG/1
TABLET ORAL
Qty: 160 TABLET | Refills: 0 | Status: SHIPPED | OUTPATIENT
Start: 2021-08-16 | End: 2021-09-16

## 2021-08-25 ENCOUNTER — PROCEDURE VISIT (OUTPATIENT)
Dept: NEUROLOGY | Facility: CLINIC | Age: 51
End: 2021-08-25
Payer: COMMERCIAL

## 2021-08-25 VITALS
HEART RATE: 97 BPM | HEIGHT: 62 IN | DIASTOLIC BLOOD PRESSURE: 72 MMHG | WEIGHT: 124 LBS | RESPIRATION RATE: 18 BRPM | SYSTOLIC BLOOD PRESSURE: 107 MMHG | TEMPERATURE: 97.1 F | BODY MASS INDEX: 22.82 KG/M2

## 2021-08-25 DIAGNOSIS — G43.709 CHRONIC MIGRAINE WITHOUT AURA WITHOUT STATUS MIGRAINOSUS, NOT INTRACTABLE: Primary | ICD-10-CM

## 2021-08-25 PROCEDURE — 64615 CHEMODENERV MUSC MIGRAINE: CPT | Performed by: PHYSICIAN ASSISTANT

## 2021-08-25 NOTE — PROGRESS NOTES
Universal Protocol   Consent: Verbal consent obtained  Written consent obtained    Risks and benefits: risks, benefits and alternatives were discussed  Consent given by: patient  Patient understanding: patient states understanding of the procedure being performed  Patient consent: the patient's understanding of the procedure matches consent given  Procedure consent: procedure consent matches procedure scheduled        Chemodenervation     Date/Time 8/25/2021 8:08 AM     Performed by  Yuli Smith PA-C     Authorized by Yuli Smith PA-C        Pre-procedure details      Prepped With: Alcohol     Procedure details     Position:  Upright   Botox     Botox Type:  Type A    Brand:  Botox    mL's of Botulinum Toxin:  195    Final Concentration per CC:  100 units    Needle Gauge:  30 G 2 5 inch   Procedures     Botox Procedures: chronic headache      Indications: migraines     Injection Location      Head / Face:  L superior trapezius, R superior trapezius, L superior cervical paraspinal, R superior cervical paraspinal, L , R , procerus, L temporalis, R temporalis, R frontalis, L frontalis, R medial occipitalis and L medial occipitalis    L  injection amount:  5 unit(s)    R  injection amount:  5 unit(s)    L lateral frontalis:  5 unit(s)    R lateral frontalis:  5 unit(s)    L medial frontalis:  5 unit(s)    R medial frontalis:  5 unit(s)    L temporalis injection amount:  20 unit(s)    R temporalis injection amount:  20 unit(s)    Procerus injection amount:  5 unit(s)    L medial occipitalis injection amount:  15 unit(s)    R medial occipitalis injection amount:  15 unit(s)    L superior cervical paraspinal injection amount:  10 unit(s)    R superior cervical paraspinal injection amount:  10 unit(s)    L superior trapezius injection amount:  15 unit(s)    R superior trapezius injection amount:  15 unit(s)   Total Units     Total units used:  195    Total units discarded:  5 Post-procedure details      Chemodenervation:  Chronic migraine    Facial Nerve Location[de-identified]  Bilateral facial nerve    Patient tolerance of procedure: Tolerated well, no immediate complications   Comments      Extra units medically necessary:  - 20 between the right and left occipital region  - 10 left cervical paraspinal muscles  - 10 right cervical paraspinal muscles         Blood pressure 107/72, pulse 97, temperature (!) 97 1 °F (36 2 °C), temperature source Tympanic, resp  rate 18, height 5' 2" (1 575 m), weight 56 2 kg (124 lb), last menstrual period 03/04/2016, not currently breastfeeding

## 2021-08-27 DIAGNOSIS — F40.01 PANIC DISORDER WITH AGORAPHOBIA: ICD-10-CM

## 2021-08-27 DIAGNOSIS — F40.10 SOCIAL ANXIETY DISORDER: ICD-10-CM

## 2021-08-27 DIAGNOSIS — F33.41 RECURRENT MAJOR DEPRESSIVE DISORDER, IN PARTIAL REMISSION (HCC): ICD-10-CM

## 2021-08-27 DIAGNOSIS — F43.10 PTSD (POST-TRAUMATIC STRESS DISORDER): ICD-10-CM

## 2021-08-27 DIAGNOSIS — F31.81 BIPOLAR II DISORDER (HCC): ICD-10-CM

## 2021-08-27 RX ORDER — DULOXETIN HYDROCHLORIDE 60 MG/1
60 CAPSULE, DELAYED RELEASE ORAL 2 TIMES DAILY
Qty: 180 CAPSULE | Refills: 1 | Status: SHIPPED | OUTPATIENT
Start: 2021-08-27 | End: 2022-03-23 | Stop reason: SDUPTHER

## 2021-08-31 ENCOUNTER — TELEPHONE (OUTPATIENT)
Dept: FAMILY MEDICINE CLINIC | Facility: CLINIC | Age: 51
End: 2021-08-31

## 2021-08-31 NOTE — TELEPHONE ENCOUNTER
Mendel Coventry called the office requesting to schedule an appointment to see you  Pt has swelling in her legs x 1 1/2 months ago  Pt has redness and pain  Legs are warm to the touch sometimes not right now  Pt is sleeping a lot and having a hard time waking up  no sob no trouble breathing  No active chest pain  Pt's legs are numb, weak, tingling  I have no availability  with JEANNIE Patel he is aware  I offered Jose F's next available apt for tomorrow 09/01/21 pt will call back  Dr Almodovar is aware of symptoms and expressed she had had this for some time

## 2021-08-31 NOTE — TELEPHONE ENCOUNTER
Caroline Quiroz called Akila Walls to confirm if she can make it tomorrow Wed 09/01 at 9:30 am to see [address] pt can not so appointment has been cancelled  Pt was advised to please call if symptoms change or worsen  Pt expressed verbla understanding  I offered to schedule her another date, but pt expressed she does not know when she could come in  Pt has no finances for an uber  I advised if she is able to get transportation to please call us so we can get her scheduled  Do you have any recommendations?

## 2021-08-31 NOTE — TELEPHONE ENCOUNTER
Jose F sending to you as an FYI  Pt is scheduled tomorrow 09/01/21 at 9:30 am for a virtual appointment ,due to no transportation  Pt has had bilateral leg pain swelling, numbness x 1 month  Pt stated the swelling has gone one ,but has pain in legs and feet  Water pill was prescribed by her rheumatologist based on my verbal conversation with her  Pt would like to also discuss sleeping a lot more/ trouble getting up  I informed her I would make you aware  Pt advised to call if symptoms change or worsen  Pt expressed verbal understanding

## 2021-09-01 ENCOUNTER — TELEPHONE (OUTPATIENT)
Dept: FAMILY MEDICINE CLINIC | Facility: CLINIC | Age: 51
End: 2021-09-01

## 2021-09-01 ENCOUNTER — HOSPITAL ENCOUNTER (OUTPATIENT)
Dept: NON INVASIVE DIAGNOSTICS | Facility: HOSPITAL | Age: 51
Discharge: HOME | End: 2021-09-01
Payer: COMMERCIAL

## 2021-09-01 ENCOUNTER — TELEMEDICINE (OUTPATIENT)
Dept: FAMILY MEDICINE CLINIC | Facility: CLINIC | Age: 51
End: 2021-09-01
Payer: COMMERCIAL

## 2021-09-01 DIAGNOSIS — M79.604 PAIN IN BOTH LOWER EXTREMITIES: Primary | ICD-10-CM

## 2021-09-01 DIAGNOSIS — Z86.718 HISTORY OF DVT (DEEP VEIN THROMBOSIS): ICD-10-CM

## 2021-09-01 DIAGNOSIS — M79.604 PAIN IN BOTH LOWER EXTREMITIES: ICD-10-CM

## 2021-09-01 DIAGNOSIS — R60.0 BILATERAL LEG EDEMA: ICD-10-CM

## 2021-09-01 DIAGNOSIS — M79.605 PAIN IN BOTH LOWER EXTREMITIES: Primary | ICD-10-CM

## 2021-09-01 DIAGNOSIS — M79.605 PAIN IN BOTH LOWER EXTREMITIES: ICD-10-CM

## 2021-09-01 PROCEDURE — 93970 EXTREMITY STUDY: CPT | Performed by: SURGERY

## 2021-09-01 PROCEDURE — 93970 EXTREMITY STUDY: CPT

## 2021-09-01 PROCEDURE — 99214 OFFICE O/P EST MOD 30 MIN: CPT | Performed by: NURSE PRACTITIONER

## 2021-09-01 NOTE — PROGRESS NOTES
Virtual Regular Visit    Verification of patient location:    Patient is located in the following state in which I hold an active license [place]      Assessment/Plan:    Problem List Items Addressed This Visit     None      Visit Diagnoses     Pain in both lower extremities    -  Primary    Relevant Orders    VAS lower limb venous duplex study, complete bilateral    Bilateral leg edema        Relevant Orders    VAS lower limb venous duplex study, complete bilateral    History of DVT (deep vein thrombosis)        Relevant Orders    VAS lower limb venous duplex study, complete bilateral      STAT venous duplex needed to r/o DVT  Coordinated transportation for patient to be completed  Will be in touch with results and treat appropriately  She will also complete blood work at that time  Please call the office if you are experiencing any worsening of symptoms or no symptom improvement  ER for any CP or SOB  Reason for visit is   Chief Complaint   Patient presents with    Virtual Regular Visit        Encounter provider Abram Mendieta, [facility]    Provider located at [address] [place] [address] [address] [place] [id number] [phone]      Recent Visits  Date Type Provider Dept   08/31/21 Telephone Td Sen, [address]  Primary Care   Showing recent visits within past 7 days and meeting all other requirements  Today's Visits  Date Type Provider Dept   09/01/21 Telephone Td Sen, [address] Primary Care   09/01/21 Telemedicine Abram Mendieta, [facility] Primary Care   Showing today's visits and meeting all other requirements  Future Appointments  No visits were found meeting these conditions  Showing future appointments within next 150 days and meeting all other requirements       The patient was identified by name and date of birth   Jenniffer Smith was informed that this is a telemedicine visit and that the visit is being conducted through [address] [place] [address] Now and patient was informed that this is a secure, HIPAA-compliant platform  She agrees to proceed     My office door was closed  No one else was in the room  She acknowledged consent and understanding of privacy and security of the video platform  The patient has agreed to participate and understands they can discontinue the visit at any time  Patient is aware this is a billable service  Norman Holcomb is a 49 y o  female  Visit today to discuss leg swelling  Initially she had bilateral swelling, left leg was worse than right leg  She saw her rheumatologist who gave HCTZ to help with swelling starting Friday 8/27/21  The swelling did improve but it's not resolved  The swelling in the left leg is a little better but she still feels like her leg is asleep  She has pain in her legs and feet  She states "it feels like someone is breaking her feet and legs"  She has tramadol for chornic pain which takes the edge off  She has gabapentin 800 mg TID  She is also supplementing with Tumeric  This swelling started 7/1/21 and the pain had started around 1 week after that  There is no redness in her legs  She doesn't believe they are warm to touch  She states she does have pitting edema in some areas  The swelling is predominantly around her calves and ankles  She is able to walk and put weight on her feet but it's very painful  She has a personal history of thrombus, in 2009 she had a DVT and PE  Labs were ordered in March but she hasn't gone for the labs yet          Past Medical History:   Diagnosis Date    Acute venous embolism and thrombosis of deep vessels of distal lower extremity (HCC)     Anemia     Anxiety     last assessed 11/20/17    Arthritis     Asthma     Cerebral infarction [facility])     unspecified, last assessed 11/14/16    Chest pain     last assessed 5/9/17    Chronic cough     last assessed 12/12/13    Depression     Diabetes mellitus, type 2 ([facility] )     DJD (degenerative joint disease)     Esophageal reflux     Fibromyalgia     GERD without esophagitis     resolved 5/13/16    History of pulmonary embolism     Hyperlipidemia     Hypertension     Hypothyroidism     Iron deficiency     Pancreatitis     Panic attack     Panic disorder     Polyarthritis     PTSD (post-traumatic stress disorder)     Sleep difficulties     Stroke syndrome     Thyroid disease     TIA (transient ischemic attack)     TIA (transient ischemic attack)     Venous embolism and thrombosis of deep vessels of distal lower [facility]     Vitamin B12 deficiency     Vitamin D deficiency        Past Surgical History:   Procedure Laterality Date    CARPAL TUNNEL RELEASE Right     neuroplasty decompression of median nerve    CATARACT EXTRACTION      CHOLECYSTECTOMY      ERCP      ERCP      ESOPHAGOGASTRIC FUNDOPLASTY      NISSEN FUNDOPLICATION      DC ESOPHAGOGASTRODUODENOSCOPY TRANSORAL DIAGNOSTIC N/A 11/2/2016    Procedure: EGD AND COLONOSCOPY;  Surgeon: Gary Solis MD;  [facility]: [facility]; Service: Gastroenterology    DC INCISE FINGER TENDON SHEATH Right 3/8/2016    Procedure: RELEASE TRIGGER FINGER RIGHT THUMB;  Surgeon: Jacob Pimentel MD;  [facility];  Service: Orthopedics    DC WRIST Demario Boozer LIG Right 3/8/2016    Procedure: RELEASE CARPAL TUNNEL ENDOSCOPIC;  Surgeon: Jacob Pimentel MD;  [facility];  Service: Orthopedics    TONSILLECTOMY AND ADENOIDECTOMY         Current Outpatient Medications   Medication Sig Dispense Refill    acarbose (PRECOSE) 25 mg tablet TAKE 1 TABLET (25 MG TOTAL) BY MOUTH 3 (THREE) TIMES A DAY WITH MEALS 270 tablet 1    Advair Diskus 500-50 MCG/DOSE inhaler INHALE 1 PUFF BY MOUTH 2 TIMES PER DAY   RINSE MOUTH AFTER USE  1 Inhaler 5    aspirin 81 mg chewable tablet Chew 81 mg daily       atorvastatin (LIPITOR) 80 mg tablet TAKE 1 TABLET BY MOUTH EVERY DAY 90 tablet 3    baclofen 10 mg tablet TAKE 1 TABLET BY MOUTH THREE TIMES A DAY AS NEEDED 90 tablet 2    BD PEN NEEDLE LINDY U/F 32G X 4 MM MISC Inject under the skin daily 30 each 5    butalbital-acetaminophen-caffeine (FIORICET,ESGIC) -40 mg per tablet TAKE 1 TABLET EVERY 6 HOURS AS NEEDED FOR MIGRAINE  NO MORE THAN 2-3 PER WEEK  10 tablet 2    Cyanocobalamin (VITAMIN B-12 IJ) Inject as directed      Cyanocobalamin 1000 MCG/ML LIQD Take 500 mcg by mouth daily      diazepam (VALIUM) 5 mg tablet TAKE 1 TABLET (5 MG TOTAL) BY MOUTH 3 (THREE) TIMES A DAY AS NEEDED FOR ANXIETY 90 tablet 5    DULoxetine (CYMBALTA) 60 mg delayed release capsule TAKE 1 CAPSULE (60 MG TOTAL) BY MOUTH 2 (TWO) TIMES A  capsule 1    Emgality 120 MG/ML SOAJ INJECT 1 PEN SUBQ EVERY 30 DAYS  1 mL 11    ergocalciferol (VITAMIN D2) 50,000 units 1 TAB ONCE WEEKLY 12 capsule 0    Folic Acid 0 8 MG CAPS Take 0 04 mg by mouth daily   gabapentin (NEURONTIN) 400 mg capsule Take 2 capsules 3 times a day 540 capsule 1    hydrocortisone (CORTEF) 5 mg tablet TAKE 2 TABLETS IN THE MORNING AT 8:00 A  M , 1 TABLET AT 5:00 P M ( DOUBLE THE DOSE IN ACUTE SICKNESS, SUCH AS FEVER UPPER RESPIRATORY TRACT INFECTION) 50 tablet 5    insulin glargine (Lantus SoloStar) 100 units/mL injection pen Inject 60 units in AM 30 mL 3    Insulin Pen Needle (BD Pen Needle Lindy U/F) 32G X 4 MM MISC by Does not apply route daily 100 each 3    Insulin Syringe-Needle U-100 25G X 1" 1 ML MISC by Does not apply route 3 (three) times a day 300 each 1    Insulin Syringes, Disposable, U-100 1 ML MISC by Does not apply route 3 (three) times a day 100 each 3    insuln lispro (HumaLOG KwikPen) 200 units/mL CONCENTRATED U-200 injection pen Inject 15 units with meals +Scale (  Scale - 150-200 -2 units, 201-250-4 units, 251-300 -6 units, 301-350-8 units, > 350- 10 units ) 6 pen 3    levothyroxine 112 mcg tablet TAKE 1 TABLET BY MOUTH EVERY DAY 90 tablet 1    losartan (COZAAR) 50 mg tablet TAKE 1 TABLET DAILY  90 tablet 2    methotrexate 2 5 MG tablet       metoprolol tartrate (LOPRESSOR) 25 mg tablet TAKE 1 TABLET BY MOUTH EVERY DAY 90 tablet 2    mirtazapine (REMERON) 7 5 MG tablet TAKE 1 TABLET (7 5 MG TOTAL) BY MOUTH DAILY AT BEDTIME 30 tablet 5    Naloxone HCl (NARCAN) 4 MG/0 1ML LIQD 1 actuation in one nostril once as needed for opioid overdose, may repeat dose every 2-3 minutes until patient responsive or EMS arrives (Patient not taking: Reported on 7/9/2021) 1 each 1    NEEDLE, DISP, 25 G (B-D DISP NEEDLE 25GX1") 25G X 1" MISC by Does not apply route once as needed (opiod overdose) for up to 1 dose 3 each 0    Nerve Stimulator (TENS Therapy Pain Relief) ALPESH 1 Device by Does not apply route as needed (myofascial pain) 1 Device 0    Nutritional Supplements (Glucerna Advance Shake) LIQD 1 daily  30 Bottle 0    ofloxacin (OCUFLOX) 0 3 % ophthalmic solution PLEASE SEE ATTACHED FOR DETAILED DIRECTIONS      OLANZapine (ZyPREXA) 10 mg tablet TAKE 1 TABLET BY MOUTH DAILY AT BEDTIME 90 tablet 1    omeprazole (PriLOSEC) 20 mg delayed release capsule TAKE 1 CAPSULE BY MOUTH EVERY DAY 90 capsule 1    ondansetron (ZOFRAN) 4 mg tablet TAKE 1 TABLET BY MOUTH EVERY 8 HOURS AS NEEDED FOR NAUSEA AND VOMITING 20 tablet 2    OneTouch Ultra test strip USE AS DIRECTED TO TEST 3 TIMES A  each 5    pioglitazone (ACTOS) 30 mg tablet Take 1 tablet (30 mg total) by mouth daily 90 tablet 1    prazosin (MINIPRESS) 2 mg capsule TAKE 1 CAPSULE (2 MG TOTAL) BY MOUTH DAILY AT BEDTIME 90 capsule 1    prednisoLONE acetate (PRED FORTE) 1 % ophthalmic suspension INSTILL 1 DROP INTO SURGERY EYE 4 TIMES DAILY AFTER SURGERY        ProAir RespiClick 413 (90 Base) MCG/ACT AEPB INHALE 2 PUFFS BY MOUTH EVERY 4-6 HOURS AS NEEDED FOR WHEEZING OR SHORTNESS OF BREATH 1 each 2    prochlorperazine (COMPAZINE) 10 mg tablet TAKE 1 TABLET (10 MG TOTAL) BY MOUTH EVERY 6 (SIX) HOURS AS NEEDED FOR NAUSEA OR VOMITING 20 tablet 0    topiramate (TOPAMAX) 100 mg tablet TAKE 2 TABLETS BY MOUTH EVERY  tablet 1    topiramate (TOPAMAX) 25 mg tablet TAKE 1 TABLET BY MOUTH EVERY DAY 90 tablet 1    traMADol (ULTRAM) 50 mg tablet TAKE 1-2 TABLETS EVERY 6-8 HOURS AS NEEDED FOR SEVERE PAIN  DO NOT TAKE WITHIN 6 HOURS OF VALIUM OR FIORICET  160 tablet 0    Turmeric 500 MG TABS Take 1 tablet by mouth daily       No current facility-administered medications for this visit  Allergies   Allergen Reactions    Medical Tape Rash    Lexapro [Escitalopram Oxalate]     Escitalopram Other (See Comments) and Palpitations    Other Hives and Rash     Adhesive Tape       Review of Systems   Constitutional: Negative for chills and fever  Eyes: Negative for discharge  Respiratory: Negative for shortness of breath  Cardiovascular: Positive for leg swelling  Negative for chest pain  Gastrointestinal: Negative for constipation and diarrhea  Genitourinary: Negative for difficulty urinating  Musculoskeletal: Positive for arthralgias and myalgias  Negative for joint swelling  Skin: Negative for rash  Neurological: Negative for headaches  Hematological: Negative for adenopathy  Psychiatric/Behavioral: The patient is not nervous/anxious  Video Exam    There were no vitals filed for this visit  Physical Exam  Constitutional:       General: She is not in acute distress  Appearance: Normal appearance  She is not ill-appearing, toxic-appearing or diaphoretic  HENT:      Head: Normocephalic and atraumatic  Nose: Nose normal    Pulmonary:      Effort: Pulmonary effort is normal  No respiratory distress  Skin:     Coloration: Skin is not pale  Neurological:      Mental Status: She is alert and oriented to person, place, and time     Psychiatric:         Mood and Affect: Mood normal           I spent 30 minutes with patient today in which greater than 50% of the time was spent in counseling/coordination of care regarding leg swelling/ pain    VIRTUAL VISIT DISCLAIMER      Cal Merino verbally agrees to participate in [employer]  Pt is aware that [employer] could be limited without vital signs or the ability to perform a full hands-on physical Gerlene Stage understands she or the provider may request at any time to terminate the video visit and request the patient to seek care or treatment in person

## 2021-09-01 NOTE — TELEPHONE ENCOUNTER
I called pt she is aware of all he information  as below pt expressed verbal understanding  I will mail labs to pt as Hermelinda Galeazzi requested

## 2021-09-01 NOTE — TELEPHONE ENCOUNTER
I called [facility]  Dariana Gnadhi will be picked up at 12:45 pm and [facility]  At [address] will either get a text if her phone allows texts and if not she will get a call with the ;s name ,number, care type color, make etc  When pt is done with her test at [facility] she will need to have them call [facility] to initiate the drive home  She can request they call [phone] to start the process  I do have to inform pt they are not sure how the  storm will affect the weather and drive time so I have to make pt aware

## 2021-09-01 NOTE — TELEPHONE ENCOUNTER
I called Fermín at [facility] regarding pt never got any information about a ride  Fermín was able to help re book he ride and pt received the information that there was a new ride 2 minutes out in a black [facility] [place]lin by the name of Yaima Beard  Pt got the information and is aware  Call complete with pt and [facility]

## 2021-09-01 NOTE — TELEPHONE ENCOUNTER
I called pt she is aware we will schedule STAT test then set  her up for a ride today pt is agreeable  Pt is scheduled for a STAT venous duplex study today 09/01/21 at 2:00 pm at [address] Pamela Lamb  Pt is to arrive at 1:45 pm  Will need to wear mask, bring id and insurance

## 2021-09-01 NOTE — TELEPHONE ENCOUNTER
Calling Ride share through [facility]'s  to see if they do same day requests for STAT tests through [facility]? I spoke with Fermín yes they do  I just call and we get pt scheduled for the test then I call them back to set up transportation for the pt     [facility] [phone]

## 2021-09-06 DIAGNOSIS — E27.40 LOW SERUM CORTISOL LEVEL (HCC): ICD-10-CM

## 2021-09-06 DIAGNOSIS — J45.20 MILD INTERMITTENT REACTIVE AIRWAY DISEASE WITHOUT COMPLICATION: ICD-10-CM

## 2021-09-07 RX ORDER — HYDROCORTISONE 5 MG/1
TABLET ORAL
Qty: 50 TABLET | Refills: 5 | Status: SHIPPED | OUTPATIENT
Start: 2021-09-07 | End: 2021-09-30 | Stop reason: SDUPTHER

## 2021-09-07 RX ORDER — ALBUTEROL SULFATE 90 UG/1
POWDER, METERED RESPIRATORY (INHALATION)
Qty: 1 EACH | Refills: 2 | Status: SHIPPED | OUTPATIENT
Start: 2021-09-07 | End: 2021-11-04

## 2021-09-15 DIAGNOSIS — E03.9 ACQUIRED HYPOTHYROIDISM: ICD-10-CM

## 2021-09-15 RX ORDER — LEVOTHYROXINE SODIUM 112 UG/1
TABLET ORAL
Qty: 90 TABLET | Refills: 1 | Status: SHIPPED | OUTPATIENT
Start: 2021-09-15 | End: 2022-03-18

## 2021-09-16 DIAGNOSIS — E11.49 TYPE 2 DIABETES MELLITUS WITH OTHER NEUROLOGIC COMPLICATION, WITH LONG-TERM CURRENT USE OF INSULIN (HCC): ICD-10-CM

## 2021-09-16 DIAGNOSIS — Z79.4 TYPE 2 DIABETES MELLITUS WITH OTHER NEUROLOGIC COMPLICATION, WITH LONG-TERM CURRENT USE OF INSULIN (HCC): ICD-10-CM

## 2021-09-16 DIAGNOSIS — G89.4 CHRONIC PAIN SYNDROME: ICD-10-CM

## 2021-09-16 RX ORDER — BLOOD SUGAR DIAGNOSTIC
STRIP MISCELLANEOUS
Qty: 100 STRIP | Refills: 5 | Status: SHIPPED | OUTPATIENT
Start: 2021-09-16 | End: 2022-06-07

## 2021-09-16 RX ORDER — TRAMADOL HYDROCHLORIDE 50 MG/1
TABLET ORAL
Qty: 160 TABLET | Refills: 0 | Status: SHIPPED | OUTPATIENT
Start: 2021-09-16 | End: 2021-10-19 | Stop reason: SDUPTHER

## 2021-09-16 NOTE — TELEPHONE ENCOUNTER
Please inform the patient despite her multiple issues and different specialist the her narcotic contract is out of date as of now and she will need to come in within the next 30-60 days likely with Jose F martinez slots to update her narcotic contract  She is extremely overdue and updating her labs that were ordered by endocrinology and no further refills will be given until this blood work is done    She will need to provide us with a urine sample on the next visit

## 2021-09-17 NOTE — TELEPHONE ENCOUNTER
Patient aware, she will have labs done prior to her 9/30/21 endo appointment  Appt scheduled with Ana Lilia Daigle for 10/20/21

## 2021-09-20 DIAGNOSIS — E11.49 TYPE 2 DIABETES MELLITUS WITH OTHER NEUROLOGIC COMPLICATION, WITH LONG-TERM CURRENT USE OF INSULIN (HCC): ICD-10-CM

## 2021-09-20 DIAGNOSIS — Z79.4 TYPE 2 DIABETES MELLITUS WITH OTHER NEUROLOGIC COMPLICATION, WITH LONG-TERM CURRENT USE OF INSULIN (HCC): ICD-10-CM

## 2021-09-21 RX ORDER — PIOGLITAZONEHYDROCHLORIDE 30 MG/1
TABLET ORAL
Qty: 90 TABLET | Refills: 1 | Status: SHIPPED | OUTPATIENT
Start: 2021-09-21 | End: 2021-09-30 | Stop reason: ALTCHOICE

## 2021-09-30 ENCOUNTER — OFFICE VISIT (OUTPATIENT)
Dept: ENDOCRINOLOGY | Facility: CLINIC | Age: 51
End: 2021-09-30
Payer: COMMERCIAL

## 2021-09-30 VITALS
HEIGHT: 62 IN | SYSTOLIC BLOOD PRESSURE: 110 MMHG | TEMPERATURE: 97.6 F | WEIGHT: 133 LBS | DIASTOLIC BLOOD PRESSURE: 70 MMHG | BODY MASS INDEX: 24.48 KG/M2

## 2021-09-30 DIAGNOSIS — M25.471 BILATERAL SWELLING OF FEET AND ANKLES: ICD-10-CM

## 2021-09-30 DIAGNOSIS — I10 BENIGN ESSENTIAL HYPERTENSION: ICD-10-CM

## 2021-09-30 DIAGNOSIS — E88.1: ICD-10-CM

## 2021-09-30 DIAGNOSIS — M25.472 BILATERAL SWELLING OF FEET AND ANKLES: ICD-10-CM

## 2021-09-30 DIAGNOSIS — E27.40 LOW SERUM CORTISOL LEVEL (HCC): ICD-10-CM

## 2021-09-30 DIAGNOSIS — E78.2 MIXED HYPERLIPIDEMIA: ICD-10-CM

## 2021-09-30 DIAGNOSIS — E55.9 VITAMIN D DEFICIENCY: ICD-10-CM

## 2021-09-30 DIAGNOSIS — Z79.4 TYPE 2 DIABETES MELLITUS WITH OTHER NEUROLOGIC COMPLICATION, WITH LONG-TERM CURRENT USE OF INSULIN (HCC): ICD-10-CM

## 2021-09-30 DIAGNOSIS — Z13.6 SCREENING FOR CARDIOVASCULAR CONDITION: ICD-10-CM

## 2021-09-30 DIAGNOSIS — M25.475 BILATERAL SWELLING OF FEET AND ANKLES: ICD-10-CM

## 2021-09-30 DIAGNOSIS — E03.9 ACQUIRED HYPOTHYROIDISM: ICD-10-CM

## 2021-09-30 DIAGNOSIS — E11.65 TYPE 2 DIABETES MELLITUS WITH HYPERGLYCEMIA, WITH LONG-TERM CURRENT USE OF INSULIN (HCC): Primary | ICD-10-CM

## 2021-09-30 DIAGNOSIS — Z79.4 TYPE 2 DIABETES MELLITUS WITH HYPERGLYCEMIA, WITH LONG-TERM CURRENT USE OF INSULIN (HCC): Primary | ICD-10-CM

## 2021-09-30 DIAGNOSIS — M25.474 BILATERAL SWELLING OF FEET AND ANKLES: ICD-10-CM

## 2021-09-30 DIAGNOSIS — E11.49 TYPE 2 DIABETES MELLITUS WITH OTHER NEUROLOGIC COMPLICATION, WITH LONG-TERM CURRENT USE OF INSULIN (HCC): ICD-10-CM

## 2021-09-30 PROCEDURE — 99214 OFFICE O/P EST MOD 30 MIN: CPT | Performed by: INTERNAL MEDICINE

## 2021-09-30 PROCEDURE — 3008F BODY MASS INDEX DOCD: CPT | Performed by: NURSE PRACTITIONER

## 2021-09-30 RX ORDER — FLASH GLUCOSE SCANNING READER
EACH MISCELLANEOUS
Qty: 1 EACH | Refills: 0 | Status: SHIPPED | OUTPATIENT
Start: 2021-09-30

## 2021-09-30 RX ORDER — FLASH GLUCOSE SENSOR
KIT MISCELLANEOUS
Qty: 2 EACH | Refills: 4 | Status: SHIPPED | OUTPATIENT
Start: 2021-09-30

## 2021-09-30 RX ORDER — INSULIN GLARGINE 100 [IU]/ML
INJECTION, SOLUTION SUBCUTANEOUS
Qty: 30 ML | Refills: 3
Start: 2021-09-30 | End: 2021-11-22

## 2021-09-30 RX ORDER — HYDROCORTISONE 5 MG/1
TABLET ORAL
Qty: 50 TABLET | Refills: 5
Start: 2021-09-30

## 2021-09-30 RX ORDER — ACARBOSE 50 MG/1
50 TABLET ORAL
Qty: 90 TABLET | Refills: 4 | Status: SHIPPED | OUTPATIENT
Start: 2021-09-30 | End: 2022-07-18

## 2021-10-12 DIAGNOSIS — E55.9 VITAMIN D DEFICIENCY: ICD-10-CM

## 2021-10-13 ENCOUNTER — TELEMEDICINE (OUTPATIENT)
Dept: PSYCHIATRY | Facility: CLINIC | Age: 51
End: 2021-10-13
Payer: COMMERCIAL

## 2021-10-13 DIAGNOSIS — F43.10 PTSD (POST-TRAUMATIC STRESS DISORDER): ICD-10-CM

## 2021-10-13 DIAGNOSIS — F31.81 BIPOLAR II DISORDER (HCC): ICD-10-CM

## 2021-10-13 DIAGNOSIS — F41.1 GENERALIZED ANXIETY DISORDER: ICD-10-CM

## 2021-10-13 DIAGNOSIS — F40.10 SOCIAL ANXIETY DISORDER: ICD-10-CM

## 2021-10-13 DIAGNOSIS — F33.41 RECURRENT MAJOR DEPRESSIVE DISORDER, IN PARTIAL REMISSION (HCC): Primary | ICD-10-CM

## 2021-10-13 PROCEDURE — 99214 OFFICE O/P EST MOD 30 MIN: CPT | Performed by: PSYCHIATRY & NEUROLOGY

## 2021-10-13 RX ORDER — ERGOCALCIFEROL 1.25 MG/1
CAPSULE ORAL
Qty: 12 CAPSULE | Refills: 0 | Status: SHIPPED | OUTPATIENT
Start: 2021-10-13 | End: 2022-07-18

## 2021-10-13 RX ORDER — OLANZAPINE 15 MG/1
15 TABLET ORAL
Qty: 90 TABLET | Refills: 1 | Status: SHIPPED | OUTPATIENT
Start: 2021-10-13 | End: 2022-03-23 | Stop reason: SDUPTHER

## 2021-10-13 RX ORDER — PRAZOSIN HYDROCHLORIDE 2 MG/1
2 CAPSULE ORAL
Qty: 90 CAPSULE | Refills: 1 | Status: SHIPPED | OUTPATIENT
Start: 2021-10-13 | End: 2022-03-23 | Stop reason: SDUPTHER

## 2021-10-15 ENCOUNTER — TELEPHONE (OUTPATIENT)
Dept: FAMILY MEDICINE CLINIC | Facility: CLINIC | Age: 51
End: 2021-10-15

## 2021-10-19 ENCOUNTER — OFFICE VISIT (OUTPATIENT)
Dept: FAMILY MEDICINE CLINIC | Facility: CLINIC | Age: 51
End: 2021-10-19
Payer: COMMERCIAL

## 2021-10-19 VITALS
OXYGEN SATURATION: 98 % | DIASTOLIC BLOOD PRESSURE: 80 MMHG | WEIGHT: 129.6 LBS | HEART RATE: 104 BPM | BODY MASS INDEX: 22.96 KG/M2 | SYSTOLIC BLOOD PRESSURE: 118 MMHG | TEMPERATURE: 97.6 F | HEIGHT: 63 IN | RESPIRATION RATE: 16 BRPM

## 2021-10-19 DIAGNOSIS — E03.9 ACQUIRED HYPOTHYROIDISM: ICD-10-CM

## 2021-10-19 DIAGNOSIS — R35.0 URINARY FREQUENCY: ICD-10-CM

## 2021-10-19 DIAGNOSIS — Z23 ENCOUNTER FOR IMMUNIZATION: ICD-10-CM

## 2021-10-19 DIAGNOSIS — B37.3 VULVOVAGINAL CANDIDIASIS: ICD-10-CM

## 2021-10-19 DIAGNOSIS — G89.4 CHRONIC PAIN SYNDROME: ICD-10-CM

## 2021-10-19 DIAGNOSIS — I10 BENIGN ESSENTIAL HYPERTENSION: ICD-10-CM

## 2021-10-19 DIAGNOSIS — M79.89 LEFT LEG SWELLING: ICD-10-CM

## 2021-10-19 DIAGNOSIS — Z12.12 SCREENING FOR COLORECTAL CANCER: ICD-10-CM

## 2021-10-19 DIAGNOSIS — Z11.59 NEED FOR HEPATITIS C SCREENING TEST: ICD-10-CM

## 2021-10-19 DIAGNOSIS — Z12.11 SCREENING FOR COLORECTAL CANCER: ICD-10-CM

## 2021-10-19 DIAGNOSIS — G43.709 CHRONIC MIGRAINE WITHOUT AURA WITHOUT STATUS MIGRAINOSUS, NOT INTRACTABLE: ICD-10-CM

## 2021-10-19 DIAGNOSIS — Z79.4 TYPE 2 DIABETES MELLITUS WITH HYPERGLYCEMIA, WITH LONG-TERM CURRENT USE OF INSULIN (HCC): Primary | ICD-10-CM

## 2021-10-19 DIAGNOSIS — E11.65 TYPE 2 DIABETES MELLITUS WITH HYPERGLYCEMIA, WITH LONG-TERM CURRENT USE OF INSULIN (HCC): Primary | ICD-10-CM

## 2021-10-19 DIAGNOSIS — M79.605 LEFT LEG PAIN: ICD-10-CM

## 2021-10-19 LAB
SL AMB  POCT GLUCOSE, UA: 1000
SL AMB LEUKOCYTE ESTERASE,UA: ABNORMAL
SL AMB POCT BILIRUBIN,UA: ABNORMAL
SL AMB POCT BLOOD,UA: ABNORMAL
SL AMB POCT CLARITY,UA: CLEAR
SL AMB POCT COLOR,UA: YELLOW
SL AMB POCT KETONES,UA: ABNORMAL
SL AMB POCT NITRITE,UA: ABNORMAL
SL AMB POCT PH,UA: 7.5
SL AMB POCT SPECIFIC GRAVITY,UA: 1
SL AMB POCT URINE PROTEIN: 15
SL AMB POCT UROBILINOGEN: 0.2

## 2021-10-19 PROCEDURE — 87086 URINE CULTURE/COLONY COUNT: CPT | Performed by: NURSE PRACTITIONER

## 2021-10-19 PROCEDURE — 3061F NEG MICROALBUMINURIA REV: CPT | Performed by: NURSE PRACTITIONER

## 2021-10-19 PROCEDURE — 3008F BODY MASS INDEX DOCD: CPT | Performed by: NURSE PRACTITIONER

## 2021-10-19 PROCEDURE — 3074F SYST BP LT 130 MM HG: CPT | Performed by: NURSE PRACTITIONER

## 2021-10-19 PROCEDURE — 90472 IMMUNIZATION ADMIN EACH ADD: CPT | Performed by: NURSE PRACTITIONER

## 2021-10-19 PROCEDURE — 3725F SCREEN DEPRESSION PERFORMED: CPT | Performed by: NURSE PRACTITIONER

## 2021-10-19 PROCEDURE — 81002 URINALYSIS NONAUTO W/O SCOPE: CPT | Performed by: NURSE PRACTITIONER

## 2021-10-19 PROCEDURE — 90471 IMMUNIZATION ADMIN: CPT | Performed by: NURSE PRACTITIONER

## 2021-10-19 PROCEDURE — 99215 OFFICE O/P EST HI 40 MIN: CPT | Performed by: NURSE PRACTITIONER

## 2021-10-19 PROCEDURE — 80307 DRUG TEST PRSMV CHEM ANLYZR: CPT | Performed by: NURSE PRACTITIONER

## 2021-10-19 PROCEDURE — 90682 RIV4 VACC RECOMBINANT DNA IM: CPT | Performed by: NURSE PRACTITIONER

## 2021-10-19 PROCEDURE — 90715 TDAP VACCINE 7 YRS/> IM: CPT | Performed by: NURSE PRACTITIONER

## 2021-10-19 PROCEDURE — 3079F DIAST BP 80-89 MM HG: CPT | Performed by: NURSE PRACTITIONER

## 2021-10-19 PROCEDURE — 87147 CULTURE TYPE IMMUNOLOGIC: CPT | Performed by: NURSE PRACTITIONER

## 2021-10-19 RX ORDER — TRAMADOL HYDROCHLORIDE 50 MG/1
TABLET ORAL
Qty: 160 TABLET | Refills: 0 | Status: SHIPPED | OUTPATIENT
Start: 2021-10-19 | End: 2021-11-22

## 2021-10-19 RX ORDER — FLUCONAZOLE 150 MG/1
150 TABLET ORAL ONCE
Qty: 1 TABLET | Refills: 0 | Status: SHIPPED | OUTPATIENT
Start: 2021-10-19 | End: 2021-10-19

## 2021-10-20 ENCOUNTER — PATIENT OUTREACH (OUTPATIENT)
Dept: FAMILY MEDICINE CLINIC | Facility: CLINIC | Age: 51
End: 2021-10-20

## 2021-10-20 DIAGNOSIS — Z71.89 COMPLEX CARE COORDINATION: Primary | ICD-10-CM

## 2021-10-21 LAB
AMPHETAMINES UR QL SCN: NEGATIVE NG/ML
BACTERIA UR CULT: ABNORMAL
BARBITURATES UR QL SCN: NEGATIVE NG/ML
BENZODIAZ UR QL: NEGATIVE NG/ML
BZE UR QL: NEGATIVE NG/ML
CANNABINOIDS UR QL SCN: NEGATIVE NG/ML
METHADONE UR QL SCN: NEGATIVE NG/ML
OPIATES UR QL: NEGATIVE NG/ML
PCP UR QL: NEGATIVE NG/ML
PROPOXYPH UR QL SCN: NEGATIVE NG/ML

## 2021-10-22 ENCOUNTER — PATIENT OUTREACH (OUTPATIENT)
Dept: FAMILY MEDICINE CLINIC | Facility: CLINIC | Age: 51
End: 2021-10-22

## 2021-10-26 ENCOUNTER — TELEPHONE (OUTPATIENT)
Dept: FAMILY MEDICINE CLINIC | Facility: CLINIC | Age: 51
End: 2021-10-26

## 2021-10-26 ENCOUNTER — PATIENT OUTREACH (OUTPATIENT)
Dept: FAMILY MEDICINE CLINIC | Facility: CLINIC | Age: 51
End: 2021-10-26

## 2021-10-27 ENCOUNTER — TELEPHONE (OUTPATIENT)
Dept: FAMILY MEDICINE CLINIC | Facility: CLINIC | Age: 51
End: 2021-10-27

## 2021-10-28 ENCOUNTER — PATIENT OUTREACH (OUTPATIENT)
Dept: FAMILY MEDICINE CLINIC | Facility: CLINIC | Age: 51
End: 2021-10-28

## 2021-10-29 ENCOUNTER — HOSPITAL ENCOUNTER (OUTPATIENT)
Dept: RADIOLOGY | Age: 51
Discharge: HOME | End: 2021-10-29
Payer: COMMERCIAL

## 2021-10-29 ENCOUNTER — PATIENT OUTREACH (OUTPATIENT)
Dept: FAMILY MEDICINE CLINIC | Facility: CLINIC | Age: 51
End: 2021-10-29

## 2021-10-29 DIAGNOSIS — M79.89 LEFT LEG SWELLING: ICD-10-CM

## 2021-10-29 DIAGNOSIS — M79.605 LEFT LEG PAIN: ICD-10-CM

## 2021-10-29 PROCEDURE — G1004 CDSM NDSC: HCPCS

## 2021-10-29 PROCEDURE — 74177 CT ABD & PELVIS W/CONTRAST: CPT

## 2021-10-29 RX ADMIN — IOHEXOL 100 ML: 350 INJECTION, SOLUTION INTRAVENOUS at 11:08

## 2021-11-01 ENCOUNTER — PATIENT OUTREACH (OUTPATIENT)
Dept: CASE MANAGEMENT | Facility: OTHER | Age: 51
End: 2021-11-01

## 2021-11-04 ENCOUNTER — TELEPHONE (OUTPATIENT)
Dept: FAMILY MEDICINE CLINIC | Facility: CLINIC | Age: 51
End: 2021-11-04

## 2021-11-04 ENCOUNTER — PATIENT OUTREACH (OUTPATIENT)
Dept: FAMILY MEDICINE CLINIC | Facility: CLINIC | Age: 51
End: 2021-11-04

## 2021-11-04 DIAGNOSIS — Z12.12 SCREENING FOR COLORECTAL CANCER: Primary | ICD-10-CM

## 2021-11-04 DIAGNOSIS — R93.5 ABNORMAL CT OF THE ABDOMEN: Primary | ICD-10-CM

## 2021-11-04 DIAGNOSIS — J45.20 MILD INTERMITTENT REACTIVE AIRWAY DISEASE WITHOUT COMPLICATION: ICD-10-CM

## 2021-11-04 DIAGNOSIS — Z12.11 SCREENING FOR COLORECTAL CANCER: Primary | ICD-10-CM

## 2021-11-04 RX ORDER — ALBUTEROL SULFATE 90 UG/1
2 AEROSOL, METERED RESPIRATORY (INHALATION) EVERY 6 HOURS PRN
Qty: 18 G | Refills: 1 | Status: SHIPPED | OUTPATIENT
Start: 2021-11-04 | End: 2021-11-11

## 2021-11-11 ENCOUNTER — TELEPHONE (OUTPATIENT)
Dept: FAMILY MEDICINE CLINIC | Facility: CLINIC | Age: 51
End: 2021-11-11

## 2021-11-11 ENCOUNTER — TELEPHONE (OUTPATIENT)
Dept: NEUROLOGY | Facility: CLINIC | Age: 51
End: 2021-11-11

## 2021-11-11 DIAGNOSIS — J45.20 MILD INTERMITTENT REACTIVE AIRWAY DISEASE WITHOUT COMPLICATION: Primary | ICD-10-CM

## 2021-11-11 RX ORDER — ALBUTEROL SULFATE 90 UG/1
2 POWDER, METERED RESPIRATORY (INHALATION) EVERY 6 HOURS PRN
Qty: 1 EACH | Refills: 1 | Status: SHIPPED | OUTPATIENT
Start: 2021-11-11

## 2021-11-12 ENCOUNTER — TELEPHONE (OUTPATIENT)
Dept: FAMILY MEDICINE CLINIC | Facility: CLINIC | Age: 51
End: 2021-11-12

## 2021-11-12 ENCOUNTER — PATIENT OUTREACH (OUTPATIENT)
Dept: CASE MANAGEMENT | Facility: OTHER | Age: 51
End: 2021-11-12

## 2021-11-16 ENCOUNTER — PATIENT OUTREACH (OUTPATIENT)
Dept: FAMILY MEDICINE CLINIC | Facility: CLINIC | Age: 51
End: 2021-11-16

## 2021-11-17 ENCOUNTER — PATIENT OUTREACH (OUTPATIENT)
Dept: FAMILY MEDICINE CLINIC | Facility: CLINIC | Age: 51
End: 2021-11-17

## 2021-11-17 ENCOUNTER — TELEPHONE (OUTPATIENT)
Dept: NEUROLOGY | Facility: CLINIC | Age: 51
End: 2021-11-17

## 2021-11-18 ENCOUNTER — PATIENT OUTREACH (OUTPATIENT)
Dept: FAMILY MEDICINE CLINIC | Facility: CLINIC | Age: 51
End: 2021-11-18

## 2021-11-22 ENCOUNTER — PATIENT OUTREACH (OUTPATIENT)
Dept: FAMILY MEDICINE CLINIC | Facility: CLINIC | Age: 51
End: 2021-11-22

## 2021-11-26 DIAGNOSIS — G43.709 CHRONIC MIGRAINE WITHOUT AURA WITHOUT STATUS MIGRAINOSUS, NOT INTRACTABLE: ICD-10-CM

## 2021-11-27 DIAGNOSIS — G89.4 CHRONIC PAIN DISORDER: ICD-10-CM

## 2021-11-29 RX ORDER — BUTALBITAL, ACETAMINOPHEN AND CAFFEINE 50; 325; 40 MG/1; MG/1; MG/1
TABLET ORAL
Qty: 15 TABLET | Refills: 0 | Status: SHIPPED | OUTPATIENT
Start: 2021-11-29 | End: 2022-02-28

## 2021-11-29 RX ORDER — GABAPENTIN 400 MG/1
CAPSULE ORAL
Qty: 540 CAPSULE | Refills: 1 | Status: SHIPPED | OUTPATIENT
Start: 2021-11-29 | End: 2022-06-07

## 2021-12-01 ENCOUNTER — PROCEDURE VISIT (OUTPATIENT)
Dept: NEUROLOGY | Facility: CLINIC | Age: 51
End: 2021-12-01
Payer: COMMERCIAL

## 2021-12-01 VITALS — DIASTOLIC BLOOD PRESSURE: 63 MMHG | TEMPERATURE: 96.7 F | SYSTOLIC BLOOD PRESSURE: 92 MMHG

## 2021-12-01 DIAGNOSIS — G43.709 CHRONIC MIGRAINE WITHOUT AURA WITHOUT STATUS MIGRAINOSUS, NOT INTRACTABLE: Primary | ICD-10-CM

## 2021-12-01 DIAGNOSIS — R11.0 NAUSEA: ICD-10-CM

## 2021-12-01 PROCEDURE — 64615 CHEMODENERV MUSC MIGRAINE: CPT | Performed by: PHYSICIAN ASSISTANT

## 2021-12-01 RX ORDER — PROCHLORPERAZINE MALEATE 10 MG
10 TABLET ORAL EVERY 6 HOURS PRN
Qty: 90 TABLET | Refills: 1 | Status: SHIPPED | OUTPATIENT
Start: 2021-12-01 | End: 2022-07-18

## 2021-12-01 RX ORDER — ONDANSETRON 4 MG/1
TABLET, FILM COATED ORAL
Qty: 20 TABLET | Refills: 2 | Status: SHIPPED | OUTPATIENT
Start: 2021-12-01 | End: 2022-04-11

## 2021-12-17 ENCOUNTER — TELEPHONE (OUTPATIENT)
Dept: FAMILY MEDICINE CLINIC | Facility: CLINIC | Age: 51
End: 2021-12-17

## 2021-12-17 ENCOUNTER — TRANSITIONAL CARE MANAGEMENT (OUTPATIENT)
Dept: FAMILY MEDICINE CLINIC | Facility: CLINIC | Age: 51
End: 2021-12-17

## 2021-12-22 DIAGNOSIS — G43.709 CHRONIC MIGRAINE WITHOUT AURA WITHOUT STATUS MIGRAINOSUS, NOT INTRACTABLE: ICD-10-CM

## 2021-12-23 ENCOUNTER — TELEPHONE (OUTPATIENT)
Dept: FAMILY MEDICINE CLINIC | Facility: CLINIC | Age: 51
End: 2021-12-23

## 2021-12-23 RX ORDER — TOPIRAMATE 25 MG/1
TABLET ORAL
Qty: 90 TABLET | Refills: 1 | Status: SHIPPED | OUTPATIENT
Start: 2021-12-23

## 2021-12-27 ENCOUNTER — PATIENT OUTREACH (OUTPATIENT)
Dept: FAMILY MEDICINE CLINIC | Facility: CLINIC | Age: 51
End: 2021-12-27

## 2021-12-27 DIAGNOSIS — F33.41 RECURRENT MAJOR DEPRESSIVE DISORDER, IN PARTIAL REMISSION (HCC): Primary | ICD-10-CM

## 2022-01-03 ENCOUNTER — PATIENT OUTREACH (OUTPATIENT)
Dept: FAMILY MEDICINE CLINIC | Facility: CLINIC | Age: 52
End: 2022-01-03

## 2022-01-07 ENCOUNTER — TELEPHONE (OUTPATIENT)
Dept: FAMILY MEDICINE CLINIC | Facility: CLINIC | Age: 52
End: 2022-01-07

## 2022-01-07 ENCOUNTER — TRANSITIONAL CARE MANAGEMENT (OUTPATIENT)
Dept: OTHER | Facility: OTHER | Age: 52
End: 2022-01-07

## 2022-01-07 DIAGNOSIS — R39.9 UTI SYMPTOMS: Primary | ICD-10-CM

## 2022-01-07 NOTE — TELEPHONE ENCOUNTER
I called pt by mistake and told her that the urine order has been placed and I was faxing it to [place]  I called [facility] and made her aware  I will fax order  Order for ua was faxed to [phone]  Dr Youngblood   Pt's blood pressure was low 88/48 heart rate 100 and temp 99 1 these vitals were just taken a few minutes ago  Pt was advised to go ot urgent care for urinae symptoms and her vitals going  along with a possible infecting  Pt refused  Pt also reported a fall to [facility] form this past Monday she fell when getting her insulin from the fridge  She lost her balance fell on right hip and ankle pt is denying injuries  Pt is not eating  Pt is not checking blood sugar  They needed  to make a doctor aware  Per Laura we can call her directly to get a message to the pt  They are going to collect pt's urine

## 2022-01-07 NOTE — TELEPHONE ENCOUNTER
PT care taker is taking care of patient  Her urine cloudy, pain while urinating  Can they dip the urine? We just need an order placed to be faxed over to patient   Please fax to [phone]

## 2022-01-07 NOTE — TELEPHONE ENCOUNTER
I called Chelita Kruse and informed her of your recommendations  Chelita Kruse stated that she advised Kassie Cavazos that she needs to go to the ER also  Chelita Kruse stated, "  pt declined " Chelita Kruse is going to contact pt also to inform her of your recommendations regarding the ER  I will call the pt also  Chelita Kruse did go back to get pt's urine this afternoon  for urine test and pt told her that she had already peed and  that she can not go again and come back tomorrow  Then pt closed the door  Per verbal conversation with Chelita Kruse she is going to request a nurse go check on her tomorrow 01/08/22  to get urine sample from Kassie Cavazos then check pt's vitals tomorrow while they are at her home  They will do a full visit tomorrow to make sure she is ok also

## 2022-01-07 NOTE — TELEPHONE ENCOUNTER
Patient should have gone to the emergency room    Please check on her on Monday to see where she is at how she is doing

## 2022-01-07 NOTE — TELEPHONE ENCOUNTER
I called Gaye Davis and made her aware that Angie Peng recommends and advises that she go directly to the ER ,due to her low blood pressure and  May be septic from a uti  Pt stated, " I understand, but I can not afford that right now " I will keep an eye on my blood pressure I have a cuff, and I will make you aware if anything goes down hill I promise  "I am used to low blood pressures  "I feel fine"  "That fall was from Monday when I lost my balance  getting my insulin out of the fridge  "  Pt expressed she did understand Dr Youngblood's  Recommendations  I did advise she call if anything worsens,  but Angie Peng does recommend it is in her best interest to go to the ER  Call complete  Pt refusing ER

## 2022-01-10 ENCOUNTER — PREP FOR PROCEDURE (OUTPATIENT)
Dept: GASTROENTEROLOGY | Facility: CLINIC | Age: 52
End: 2022-01-10

## 2022-01-10 ENCOUNTER — PATIENT OUTREACH (OUTPATIENT)
Dept: FAMILY MEDICINE CLINIC | Facility: CLINIC | Age: 52
End: 2022-01-10

## 2022-01-10 DIAGNOSIS — Z12.11 COLON CANCER SCREENING: Primary | ICD-10-CM

## 2022-01-10 DIAGNOSIS — R39.9 UTI SYMPTOMS: Primary | ICD-10-CM

## 2022-01-10 RX ORDER — CIPROFLOXACIN 500 MG/1
500 TABLET, FILM COATED ORAL EVERY 12 HOURS SCHEDULED
Qty: 10 TABLET | Refills: 0 | Status: SHIPPED | OUTPATIENT
Start: 2022-01-10 | End: 2022-01-15

## 2022-01-10 NOTE — PROGRESS NOTES
OP BO called to pt to follow up on depression and she states that she now has an appt with a mental health therapist today at [place] [address]   She states she is still depressed due to her hsb now having Covid and being hospitalized at [facility]  She states [facility] will not take him back and they want him to go home  Pt states she still needs cali lift, wheelchair, and her hsb is now also on 02  Pt states that they also need a ramp at the house  Pt states they have 3 steps to get in  Pt states she has a  Andre Cardona for her hsb through [address] [facility] and he is a Vet but he does not meet criteria for a lot of services because he did not serve in the war  Pt states that her dtr is there with her and she is her support system  Emotional support provided to pt  Also explained that she should write down all her questions and concerns and speak to the  about her hsb  OP BO will remian available

## 2022-01-11 DIAGNOSIS — R39.9 UTI SYMPTOMS: Primary | ICD-10-CM

## 2022-01-11 RX ORDER — CIPROFLOXACIN 250 MG/1
500 TABLET, FILM COATED ORAL EVERY 12 HOURS SCHEDULED
Qty: 20 TABLET | Refills: 0 | Status: SHIPPED | OUTPATIENT
Start: 2022-01-11 | End: 2022-01-16

## 2022-01-13 ENCOUNTER — NURSE TRIAGE (OUTPATIENT)
Dept: FAMILY MEDICINE CLINIC | Facility: CLINIC | Age: 52
End: 2022-01-13

## 2022-01-17 ENCOUNTER — PATIENT OUTREACH (OUTPATIENT)
Dept: FAMILY MEDICINE CLINIC | Facility: CLINIC | Age: 52
End: 2022-01-17

## 2022-01-17 NOTE — PROGRESS NOTES
OP CM called to pt to follow up on psychosocial needs  Pt is in tears and overwhelmed  Her hsb is getting dc Wed from [facility] and will now have a cali lift  Pt states his currently getting 2200 a month in short term disability from his employer but his FMLA runs out in Feb and she is afraid they will let him go  Pt gets 1200 a month in disability  Explained that if her hsb lost his job hsb he could apply for unemployment and Dayana Gillis the community health worker could help apply for [employer]  Pt states her hsb also has a  from the [address] [facility] at [facility]  Emotional support provided to pt and pt advised to call with any questions or concerns

## 2022-01-19 ENCOUNTER — TELEPHONE (OUTPATIENT)
Dept: PSYCHIATRY | Facility: CLINIC | Age: 52
End: 2022-01-19

## 2022-01-19 DIAGNOSIS — Z59.89 INSURANCE COVERAGE PROBLEMS: Primary | ICD-10-CM

## 2022-01-19 SDOH — ECONOMIC STABILITY - INCOME SECURITY: OTHER PROBLEMS RELATED TO HOUSING AND ECONOMIC CIRCUMSTANCES: Z59.89

## 2022-01-19 NOTE — TELEPHONE ENCOUNTER
William if she is looking for a therapist we may be able to get her in with True Allan or Migdalia Bullock our new therapist

## 2022-01-19 NOTE — PROGRESS NOTES
Spoke to Aleyda Norman and explained pts hsb may lose his job so we will follow in case pt and hsb eventually need medicaid

## 2022-01-19 NOTE — TELEPHONE ENCOUNTER
Pt retuning your call  She said she will be available at 245  She is going to a   appt now  I told her itr will be a blocked #

## 2022-01-19 NOTE — TELEPHONE ENCOUNTER
Dayana Frances can you please reach out to foster oshea noted to see if we can get her in with Anette Estrada as well does this type of service  He has an opening on 1/24  I have copied in [facility] as well

## 2022-01-19 NOTE — TELEPHONE ENCOUNTER
The patient has called in and stated that her  has ALS and only has 6-12 months to live and needs help coping  The patient does not plan on hurting herself but is in need of help and talking to some one  The patient does have the crisis number but is looking for some one to help her cope  The patient can be reached at [phone]  bryan

## 2022-01-19 NOTE — TELEPHONE ENCOUNTER
Taken off [address]  site:    [address]  A support group is held typically every fourth Tuesday of the month at [facility]  If this is your first time attending or if you have any questions, please contact Frankey Damme, MSW, with the [facility], at [phone]  Offered support and spoke with patient regarding situation  She states she probably would not be able to go to a support group due to caring for  when he comes home in the next two days  Pt is on waiting list for our office, but message will be sent to see if telemedicine services can be offered due to pt situation; she wants to stay within the  system  She was very distraught about how she will cope with her 's required care and his prognosis  Intake-  Are there any openings with a clinician somewhere that can be telemedicine specific - (pt will not be able to come into the office due to 's Dx)? Please see below thread

## 2022-01-20 ENCOUNTER — TELEPHONE (OUTPATIENT)
Dept: FAMILY MEDICINE CLINIC | Facility: CLINIC | Age: 52
End: 2022-01-20

## 2022-01-20 NOTE — TELEPHONE ENCOUNTER
Jenn Carlson called from [facility] home care regarding Barb  Pt has a lot of stuff going on physically, and mostly emotional  Do we have any behavioral health nurses that can come into her home and help her? She was going to be discharge pt from nursing  She still has physical therapy  She will reach out to her  whom was there once before  Pt has no income, no life insurance  Pt has no means of transportation to even get to a grocery store  Pt is very depressed  Has a lot going on with  and his diagnoses  Blood sugar was in the 380's today   Pt is not eating healthy  Pt has a lot weighing on her and not taking care of herself properly  Please advise     Please call agency back at [phone]  Please ask for Heber Liu

## 2022-01-20 NOTE — TELEPHONE ENCOUNTER
----- Message from Hanna White sent at 1/7/2022  3:57 PM EST -----  Called in regards to pt having vitals as follows  BP-88/48  HR- 100  Temp- 99 1  Pt has had a recent fall     Please contact Payal and advise

## 2022-01-20 NOTE — TELEPHONE ENCOUNTER
Oleksandr Whitaker has spoken w her previously  I will put a message in to her to recontact  Again I will be out sick tomorrow    Hope to be back in the office on Monday

## 2022-01-20 NOTE — TELEPHONE ENCOUNTER
Berhane Kelly, I believe you have spoken to this patient before  Unfortunately I have been out sick the last 2 days and a to her with regards to the issues on the message from today  Even though I am out sick tomorrow I can read messages on epic or Tiger text  Feel free to reach out to me  Thank you so much    Brody Claire

## 2022-01-21 ENCOUNTER — TELEPHONE (OUTPATIENT)
Dept: NEUROLOGY | Facility: CLINIC | Age: 52
End: 2022-01-21

## 2022-01-21 ENCOUNTER — TELEPHONE (OUTPATIENT)
Dept: GASTROENTEROLOGY | Facility: CLINIC | Age: 52
End: 2022-01-21

## 2022-01-21 ENCOUNTER — PATIENT OUTREACH (OUTPATIENT)
Dept: FAMILY MEDICINE CLINIC | Facility: CLINIC | Age: 52
End: 2022-01-21

## 2022-01-21 NOTE — PROGRESS NOTES
OP CM called to pt to follow up on psychosocial needs  Pt states her hsb is still in the hospital waiting for auth for his trilogy machine  Pt also no longer has home care as of yesterday  Pt did have an OP MH therapy appt but had to cancel it because the woman wanted $40 and pt states she didn't have the money  Pt states she is now scheduled for a therapy appt next wed through [facility]  Pt is emotional and pt is fearful that her hsb will lose his income  Pt states her dtr is there with her at the house and is her support system while her hsb is hospitalized  Explained OP CM will continue to follow

## 2022-01-21 NOTE — TELEPHONE ENCOUNTER
I called  home care and requested to speak to Avelina Lee  I informed her that Louie Angela is not physically here in the office today, but is still answering messages  I informed her Louie Angela reached out to our  Wilmer Kendrick  whom has spoken with Francisca before  [facility]  Avelina Lee appreciated the call back

## 2022-01-21 NOTE — TELEPHONE ENCOUNTER
Pt Colon rescheduled to 4/7 with Dr Quynh Zhou at [address] [place] [address] due to A1C being to high  Called pt and informed her of her new date and lacation as well as mailed her new paper work

## 2022-01-25 ENCOUNTER — TELEPHONE (OUTPATIENT)
Dept: FAMILY MEDICINE CLINIC | Facility: CLINIC | Age: 52
End: 2022-01-25

## 2022-01-25 NOTE — TELEPHONE ENCOUNTER
I called to follow up with the Pt as she has not been seen in the office since 10/2021  Pt is due for follow up, diabetes check and diabetic foot exam  I advised Pt to please give the office a call back to schedule an appointment

## 2022-01-26 ENCOUNTER — TELEPHONE (OUTPATIENT)
Dept: PSYCHIATRY | Facility: CLINIC | Age: 52
End: 2022-01-26

## 2022-01-26 ENCOUNTER — SOCIAL WORK (OUTPATIENT)
Dept: BEHAVIORAL/MENTAL HEALTH CLINIC | Facility: CLINIC | Age: 52
End: 2022-01-26

## 2022-01-26 DIAGNOSIS — F32.A DEPRESSION, UNSPECIFIED DEPRESSION TYPE: Primary | ICD-10-CM

## 2022-01-26 NOTE — PSYCH
No Call  No Show   No Charge    Chetan Farooq no showed 01/26/22 appointment , staff called and left message to reschedule appointment     Treatment Plan not completed within required time limits due to: Chetan Farooq no show appointment on 01/26/22

## 2022-01-26 NOTE — TELEPHONE ENCOUNTER
Patient cancelled NP appointment for today, 1/26 at 9am  No transportation to do an in office visit  She will do a NP Virtual appointment on 2/11/22

## 2022-01-27 ENCOUNTER — TELEPHONE (OUTPATIENT)
Dept: NEUROLOGY | Facility: CLINIC | Age: 52
End: 2022-01-27

## 2022-01-27 DIAGNOSIS — G89.4 CHRONIC PAIN SYNDROME: ICD-10-CM

## 2022-01-27 RX ORDER — TRAMADOL HYDROCHLORIDE 50 MG/1
TABLET ORAL
Qty: 150 TABLET | Refills: 0 | Status: SHIPPED | OUTPATIENT
Start: 2022-01-27 | End: 2022-03-11 | Stop reason: SDUPTHER

## 2022-01-27 NOTE — TELEPHONE ENCOUNTER
Called and spoke to patient, she agreed to change apt time to 10:30am     Can you please change the apt time as for some reason it won't let me    Thank you!     Michelle

## 2022-01-27 NOTE — TELEPHONE ENCOUNTER
Called and spoke to patient again - informed her that we will keep her apt at 10am  Deya Luna,   The openings were due to scheduling error, patient was scheduled for 30 mins instead of 45mins    please disregard my previous request

## 2022-01-31 ENCOUNTER — TELEPHONE (OUTPATIENT)
Dept: NEUROLOGY | Facility: CLINIC | Age: 52
End: 2022-01-31

## 2022-01-31 NOTE — TELEPHONE ENCOUNTER
Botox: Approve  200 units  Auth: [id number]  Eff: 01/31/2022 until 01/31/2023  4 visit    Please use [facility]

## 2022-02-01 ENCOUNTER — TELEPHONE (OUTPATIENT)
Dept: NEUROLOGY | Facility: CLINIC | Age: 52
End: 2022-02-01

## 2022-02-01 NOTE — TELEPHONE ENCOUNTER
Called pt to reschedule Botox appointment that is on march 3rd due to provider being out of the office  No answer, left voicemail

## 2022-02-07 ENCOUNTER — PATIENT OUTREACH (OUTPATIENT)
Dept: FAMILY MEDICINE CLINIC | Facility: CLINIC | Age: 52
End: 2022-02-07

## 2022-02-07 DIAGNOSIS — F41.1 GENERALIZED ANXIETY DISORDER: ICD-10-CM

## 2022-02-07 DIAGNOSIS — F31.81 BIPOLAR II DISORDER (HCC): ICD-10-CM
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
## 2022-02-07 NOTE — TELEPHONE ENCOUNTER
Call [facility] spoke with Mehul Elise the rep, I told her I was calling to schedule the patient Botox delivery, as per Mehul Elise, the patient insurance is going thru benefits verification for the month of Feb  Please allowed 24-72 hours turn around time for processing

## 2022-02-08 RX ORDER — MIRTAZAPINE 7.5 MG/1
7.5 TABLET, FILM COATED ORAL
Qty: 90 TABLET | Refills: 1 | Status: SHIPPED | OUTPATIENT
Start: 2022-02-08 | End: 2022-06-09

## 2022-02-09 NOTE — TELEPHONE ENCOUNTER
As per amanda the patient cancelled her appt  Once patient reschedule I will call to have her Botox delivered

## 2022-02-11 ENCOUNTER — TELEMEDICINE (OUTPATIENT)
Dept: BEHAVIORAL/MENTAL HEALTH CLINIC | Facility: CLINIC | Age: 52
End: 2022-02-11
Payer: COMMERCIAL

## 2022-02-11 DIAGNOSIS — F33.41 RECURRENT MAJOR DEPRESSIVE DISORDER, IN PARTIAL REMISSION (HCC): Primary | ICD-10-CM

## 2022-02-11 DIAGNOSIS — G47.00 INSOMNIA, UNSPECIFIED TYPE: ICD-10-CM

## 2022-02-11 DIAGNOSIS — F41.9 ANXIETY: ICD-10-CM

## 2022-02-11 PROCEDURE — 90791 PSYCH DIAGNOSTIC EVALUATION: CPT | Performed by: COUNSELOR

## 2022-02-11 NOTE — PSYCH
Assessment/Plan:      Diagnoses and all orders for this visit:    Recurrent major depressive disorder, in partial remission ([facility] [address] )    Anxiety    Insomnia, unspecified type          Subjective:      Patient ID: Gray Current is a 49 y o  female  HPI:     Pre-morbid level of function and History of Present Illness: Chelsea Person takes care of her  who is dying from degenerative illness and is in need of physical assistance and also her daughter recently went out of her drug rehab services and has issues on her own and not much of a help for her mom and dad at home  Chelsea Perosn is recovering from a broken knee cap and it still not recovered yet and needs rest that she cannot give  They have partial nursing assistance but not enough to cover his needs and to help her recover  Previous Psychiatric/psychological treatment/year: Psychiatric services only  Current Psychiatrist/Therapist: Dr Donald Luis on leave; TT for therapy   Outpatient and/or Partial and Other Community Resources Used (CTT, ICM, VNA): Outpatient only        Problem Assessment:     SOCIAL/VOCATION:  Family Constellation (include parents, relationship with each and pertinent Psych/Medical History):     Family History   Problem Relation Age of Onset    Diabetes Mother         type 2    Heart attack Mother 44        acute MI    Kidney disease Mother         CKD NKF classfication    Depression Mother     Arthritis Mother     Substance Abuse Mother         mother OD in past on MS04    Ulcerative colitis Mother    Ameya Leigh Schizophrenia Mother     Suicide Attempts Mother     Bipolar disorder Mother     Diabetes Maternal Grandmother     Heart attack Father         acute MI    Psoriasis Father     Cancer Father         gastric cancer    Stroke Father     Crohn's disease Sister     Bipolar disorder Sister     Rheum arthritis Maternal Grandfather     Throat cancer Maternal Grandfather     Suicide Attempts Daughter     Drug abuse Daughter     Lung cancer Paternal Grandmother     Cancer Paternal Grandfather     No Known Problems Sister     Bipolar disorder Maternal Uncle     Anesthesia problems Neg Hx        Mother:   Spouse: Eliseo Hastings, 46   Father:     Children: Belkis Mills, 27   Sibling: Van Dickinson, 47   Sibling: Fabienne, 37   Children: NA   Other: NA    Chente Etienne relates best to two sisters  she lives with  and daughter  she does not live alone  Domestic Violence: No past history of domestic violence    Additional Comments related to family/relationships/peer support: NA      School or Work History (strengths/limitations/needs): [employer] for over 20 years    Her highest grade level achieved was [email or web address].me history includesNA    Financial status includes major stressor     LEISURE ASSESSMENT (Include past and present hobbies/interests and level of involvement (Ex: Group/Club Affiliations): NA  her primary language is [place]  Preferred language is [place]  Ethnic considerations are NA  Religions affiliations and level of involvement NA   Does spirituality help you cope? No    FUNCTIONAL STATUS: There has been a recent change in Chente Etienne ability to do the following: some help needed during present knee recovery     Level of Assistance Needed/By Whom?: trying to do this on her own    Chente Etienne learns best by  demonstration    SUBSTANCE ABUSE ASSESSMENT: no substance abuse    Substance/Route/Age/Amount/Frequency/Last Use: NA    DETOX HISTORY: NA    Previous detox/rehab treatment: NA    HEALTH ASSESSMENT: has lost 10 lbs or more in the last 6 months without trying and diarrhea    LEGAL: No Mental Health Advance Directive or Power of  on file    Prenatal History: N/A    Delivery History: N/A    Developmental Milestones: N/A  Temperament as an infant was N/A  Temperament as a toddler was N/A  Temperament at school age was N/A  Temperament as a teenager was N/A      Risk Assessment:   The following ratings are based on my interview(s) with patient    Risk of Harm to Self:   Demographic risk factors include NA  Historical Risk Factors include mother had history of suicide attempts  Recent Specific Risk Factors include feelings of guilt or self blame, diagnosis of depression  and guilt, shame  Additional Factors for a Child or Adolescent NA    Risk of Harm to Others:   Demographic Risk Factors include NA  Historical Risk Factors include NA  Recent Specific Risk Factors include multiple stressors    Access to Weapons:   Marcellus Rios has access to the following weapons: NA  The following steps have been taken to ensure weapons are properly secured: NA    Based on the above information, the client presents the following risk of harm to self or others:  low    The following interventions are recommended:   consultation with psychiatrist     Notes regarding this Risk Assessment: Marcellus Rios denies having suicidal ideation but has multiple stressors and not effective med management  She experiences significant anxiety, lack of sleep, and 's needs           Review Of Systems:     Mood Anxiety and Depression   Behavior Normal  and negative content   Thought Content Disturbing Thoughts, Feelings and negative   General Emotional Problems, Sleep Disturbances and Decreased Functioning   Personality Normal   Other Psych Symptoms multiple stressors around and inside her   Constitutional Normal   ENT Normal   Cardiovascular strokes, cloths    Respiratory asthma, COPD   Gastrointestinal Nausea, Diarrhea and GERD   Genitourinary Normal    Musculoskeletal Limb Pain and arthritic pain, fybromialgia    Integumentary Normal    Neurological Headache, Confusion, Numbness and Tingling   Endocrine underactive thyroid          Mental status:  Appearance poor hygiene /disheveled   Mood depressed and anxious   Affect affect was tearful   Speech speech soft   Thought Processes normal thought processes   Hallucinations no hallucinations present Thought Content no delusions   Abnormal Thoughts no suicidal thoughts  and no homicidal thoughts    Orientation  oriented to person and place and time   Remote Memory short term memory impaired and long term memory impaired   Attention Span concentration impaired   Intellect Appears to be of Average Intelligence   Fund of Knowledge displays adequate knowledge of current events   Insight Insight intact   Judgement judgment was intact   Muscle Strength Decreased muscle strength   Language no difficulty naming common objects, no difficulty repeating a phrase  and no difficulty writing a sentence    Pain moderate to severe   Pain Scale 8     Virtual Regular Visit    Verification of patient location:    Patient is located in the following state in which I hold an active license [place]      Assessment/Plan:    Problem List Items Addressed This Visit        Other    Depression - Primary    Anxiety    Insomnia          Goals addressed in session: Goal 1 and Goal 2          Reason for visit is No chief complaint on file  Encounter provider Julia Carlson, [id number]     Provider located at [address] [place] [id number] [phone]      Recent Visits  No visits were found meeting these conditions  Showing recent visits within past 7 days and meeting all other requirements  Future Appointments  No visits were found meeting these conditions  Showing future appointments within next 150 days and meeting all other requirements       The patient was identified by name and date of birth  Makayla Sherry was informed that this is a telemedicine visit and that [facility] and patient was informed that this is a secure, HIPAA-compliant platform  She agrees to proceed     My office door was closed  No one else was in the room    She acknowledged consent and understanding of privacy and security of the video platform  The patient has agreed to participate and understands they can discontinue the visit at any time  Patient is aware this is a billable service  Subjective  Lexington Favorite is a 49 y o  female  HPI     Past Medical History:   Diagnosis Date    Acute venous embolism and thrombosis of deep vessels of distal lower extremity (HCC)     Anemia     Anxiety     last assessed 11/20/17    Arthritis     Asthma     Cerebral infarction [facility])     unspecified, last assessed 11/14/16    Chest pain     last assessed 5/9/17    Chronic cough     last assessed 12/12/13    Depression     Diabetes mellitus, type 2 ([address] )     DJD (degenerative joint disease)     Esophageal reflux     Fibromyalgia     GERD without esophagitis     resolved 5/13/16    History of pulmonary embolism     Hyperlipidemia     Hypertension     Hypothyroidism     Iron deficiency     Pancreatitis     Panic attack     Panic disorder     Polyarthritis     PTSD (post-traumatic stress disorder)     Sleep difficulties     Stroke syndrome     Thyroid disease     TIA (transient ischemic attack)     TIA (transient ischemic attack)     Venous embolism and thrombosis of deep vessels of distal lower extremity (HCC)     Vitamin B12 deficiency     Vitamin D deficiency        Past Surgical History:   Procedure Laterality Date    CARPAL TUNNEL RELEASE Right     neuroplasty decompression of median nerve    CATARACT EXTRACTION      CHOLECYSTECTOMY      ERCP      ERCP      ESOPHAGOGASTRIC FUNDOPLASTY      NISSEN FUNDOPLICATION      VT ESOPHAGOGASTRODUODENOSCOPY TRANSORAL DIAGNOSTIC N/A 11/2/2016    Procedure: EGD AND COLONOSCOPY;  Surgeon: Virginia Wesley MD;  Location: [facility];   Service: Gastroenterology    VT INCISE FINGER TENDON SHEATH Right 3/8/2016    Procedure: RELEASE TRIGGER FINGER RIGHT THUMB;  Surgeon: Kali Diaz MD;  [facility];  Service: Orthopedics    VT ANDERSON Baxter LIG Right 3/8/2016    Procedure: RELEASE CARPAL TUNNEL ENDOSCOPIC;  Surgeon: Diamond De La Cruz MD;  [facility];  Service: Orthopedics    TONSILLECTOMY AND ADENOIDECTOMY         Current Outpatient Medications   Medication Sig Dispense Refill    acarbose (PRECOSE) 50 mg tablet Take 1 tablet (50 mg total) by mouth 3 (three) times a day with meals 90 tablet 4    Adalimumab (HUMIRA PEN SC) Inject under the skin      Advair Diskus 500-50 MCG/DOSE inhaler INHALE 1 PUFF BY MOUTH 2 TIMES PER DAY  RINSE MOUTH AFTER USE  1 Inhaler 5    Albuterol Sulfate (ProAir RespiClick) 482 (90 Base) MCG/ACT AEPB Inhale 2 puffs every 6 (six) hours as needed (wheezing) 1 each 1    aspirin 81 mg chewable tablet Chew 81 mg daily       atorvastatin (LIPITOR) 80 mg tablet TAKE 1 TABLET BY MOUTH EVERY DAY 90 tablet 3    baclofen 10 mg tablet TAKE 1 TABLET BY MOUTH THREE TIMES A DAY AS NEEDED 270 tablet 2    BD PEN NEEDLE LINDY U/F 32G X 4 MM MISC Inject under the skin daily 30 each 5    butalbital-acetaminophen-caffeine (FIORICET,ESGIC) -40 mg per tablet TAKE 1 TABLET EVERY 6 HOURS AS NEEDED FOR MIGRAINE  PLEASE LIMIT 3-4 PER WEEK, 15 PER MONTH  15 tablet 0    Continuous Blood Gluc  (FreeStyle Cooley 2 Brenham) ALPESH Please dispense 1 Brenham 1 each 0    Continuous Blood Gluc Sensor (FreeStyle Sherine 2 Sensor) MISC Use sherine sensor every 15 days 2 each 4    Cyanocobalamin (VITAMIN B-12 IJ) Inject as directed      Cyanocobalamin 1000 MCG/ML LIQD Take 500 mcg by mouth daily      diazepam (VALIUM) 5 mg tablet TAKE 1 TABLET (5 MG TOTAL) BY MOUTH 3 (THREE) TIMES A DAY AS NEEDED FOR ANXIETY 90 tablet 5    DULoxetine (CYMBALTA) 60 mg delayed release capsule TAKE 1 CAPSULE (60 MG TOTAL) BY MOUTH 2 (TWO) TIMES A  capsule 1    Emgality 120 MG/ML SOAJ INJECT 1 PEN SUBQ EVERY 30 DAYS   1 mL 11    ergocalciferol (VITAMIN D2) 50,000 units 1 TAB ONCE WEEKLY 12 capsule 0    Folic Acid 0 8 MG CAPS Take 0 04 mg by mouth daily       gabapentin (NEURONTIN) 400 mg capsule TAKE 2 CAPSULES BY MOUTH 3 TIMES A  capsule 1    hydrocortisone (CORTEF) 5 mg tablet Take one tablet in AM and 1 tablet at PM for 2 weeks and then take 1 tablet for 2 weeks and then stop ( go for blood work in 1 week at 8 AM ) 50 tablet 5    insulin glargine (Lantus SoloStar) 100 units/mL injection pen INJECT 45 UNITS IN AM 45 mL 0    Insulin Pen Needle (BD Pen Needle Kimberli U/F) 32G X 4 MM MISC by Does not apply route daily 100 each 3    insuln lispro (HumaLOG KwikPen) 200 units/mL CONCENTRATED U-200 injection pen Inject 15 units with meals +Scale (  Scale - 150-200 -2 units, 201-250-4 units, 251-300 -6 units, 301-350-8 units, > 350- 10 units ) 6 pen 3    levothyroxine 112 mcg tablet TAKE 1 TABLET BY MOUTH EVERY DAY 90 tablet 1    losartan (COZAAR) 50 mg tablet TAKE 1 TABLET DAILY  90 tablet 2    methotrexate 2 5 MG tablet  (Patient not taking: Reported on 9/30/2021)      metoprolol tartrate (LOPRESSOR) 25 mg tablet TAKE 1 TABLET BY MOUTH EVERY DAY 90 tablet 2    mirtazapine (REMERON) 7 5 MG tablet TAKE 1 TABLET (7 5 MG TOTAL) BY MOUTH DAILY AT BEDTIME 90 tablet 1    Naloxone HCl (NARCAN) 4 MG/0 1ML LIQD 1 actuation in one nostril once as needed for opioid overdose, may repeat dose every 2-3 minutes until patient responsive or EMS arrives 1 each 1    NEEDLE, DISP, 25 G (B-D DISP NEEDLE 25GX1") 25G X 1" MISC by Does not apply route once as needed (opiod overdose) for up to 1 dose 3 each 0    Nerve Stimulator (TENS Therapy Pain Relief) ALPESH 1 Device by Does not apply route as needed (myofascial pain) 1 Device 0    Nutritional Supplements (Glucerna Advance Shake) LIQD 1 daily  30 Bottle 0    OLANZapine (ZyPREXA) 15 mg tablet Take 1 tablet (15 mg total) by mouth daily at bedtime 90 tablet 1    omeprazole (PriLOSEC) 20 mg delayed release capsule TAKE 1 CAPSULE BY MOUTH EVERY DAY 90 capsule 1    ondansetron (ZOFRAN) 4 mg tablet 1 tab q6 h prn nausea  Hold compazine  20 tablet 2    OneTouch Ultra test strip USE AS DIRECTED TO TEST 3 TIMES A  strip 5    prazosin (MINIPRESS) 2 mg capsule Take 1 capsule (2 mg total) by mouth daily at bedtime 90 capsule 1    prochlorperazine (COMPAZINE) 10 mg tablet Take 1 tablet (10 mg total) by mouth every 6 (six) hours as needed for nausea or vomiting 90 tablet 1    topiramate (TOPAMAX) 100 mg tablet TAKE 2 TABLETS BY MOUTH EVERY  tablet 1    topiramate (TOPAMAX) 25 mg tablet TAKE 1 TABLET BY MOUTH EVERY DAY 90 tablet 1    traMADol (ULTRAM) 50 mg tablet TAKE 1-2 TABLETS EVERY 6-8 HOURS AS NEEDED FOR SEVERE PAIN, DO NOT TAKE WITHIN 6 HOURS OF VALIUM OR FIORICET 150 tablet 0    Turmeric 500 MG TABS Take 1 tablet by mouth daily       No current facility-administered medications for this visit  Allergies   Allergen Reactions    Medical Tape Rash    Lexapro [Escitalopram Oxalate]     Escitalopram Other (See Comments) and Palpitations    Other Hives and Rash     Adhesive Tape       Review of Systems    Video Exam    There were no vitals filed for this visit  Physical Exam     I spent 50 minutes directly with the patient during this visit     Barb's audio did not work, and the conversation part was provided through the phone  VIRTUAL VISIT DISCLAIMER    Evon Nielsen verbally agrees to participate in [employer]  Pt is aware that [employer] could be limited without vital signs or the ability to perform a full hands-on physical [place] understands she or the provider may request at any time to terminate the video visit and request the patient to seek care or treatment in person

## 2022-02-11 NOTE — BH TREATMENT PLAN
Meli Robert  1970       Date of Initial Treatment Plan: 2/11/22  Date of Current Treatment Plan: 02/11/22    Treatment Plan Number 1    Strengths/Personal Resources for Self Care: very limited help from others but good insight and judgment     Diagnosis:   1  Recurrent major depressive disorder, in partial remission ([address] )     2  Anxiety     3  Insomnia, unspecified type         Area of Needs: Mood      Long Term Goal 1: Adecrease anxiety to improve daily functioning , responsible for which is Francisca, her Psychiatrist, and her Therapist      Target Date: Aug 10, 22  Completion Date: TBD         Short Term Objectives for Goal 1: Alearning positive coping skills to counter anxiety- DBT, CBT, Mindfulenss  Barb to rate her anxiety monthly through [employer] that would provide evidence for symptom relief or development to be considered for the ne TX  Long Term Goal 2: decrease depression to improve daily functioning , responsible for which is Francisca, her Psychiatrist, and her Therapist      Target Date: Aug 10, 22  Completion Date: TBD    Short Term Objectives for Goal 2: Alearning DBT, CBT, and Mindfulness skills to cope better with psychological burden - Francisca will rate her depression every month through [facility] and will provide evidence for symptom relief or development to be considered for the new TX  Long Term Goal # 3: N/A     Target Date: N/A  Completion Date: N/A    Short Term Objectives for Goal 3: N/A    GOAL 1: Modality: Individual 2 X month Completion TBD    GOAL 2: Modality: Individual 2x per month   Completion Date TBD     GOAL 3: Modality: Individual NAx per month   Completion Date NA      Behavioral Health Treatment Plan [facility]: Diagnosis and Treatment Plan explained to Milka Contreras relates understanding diagnosis and is agreeable to Treatment Plan         Client Comments : Please share your thoughts, feelings, need and/or experiences regarding your treatment plan: Francisca seems aware and accepting her [place]  She sounds overwhelmed  Carlos Manuel Ralph provided her verbal agreement to this [place] at 1:11PM, on 2/11/22 due to virtual format

## 2022-02-24 ENCOUNTER — TELEPHONE (OUTPATIENT)
Dept: PSYCHIATRY | Facility: CLINIC | Age: 52
End: 2022-02-24

## 2022-02-26 DIAGNOSIS — F40.01 PANIC DISORDER WITH AGORAPHOBIA: ICD-10-CM

## 2022-02-26 DIAGNOSIS — F43.10 POST TRAUMATIC STRESS DISORDER (PTSD): ICD-10-CM

## 2022-02-26 DIAGNOSIS — G43.709 CHRONIC MIGRAINE WITHOUT AURA WITHOUT STATUS MIGRAINOSUS, NOT INTRACTABLE: ICD-10-CM

## 2022-02-26 DIAGNOSIS — F41.1 GENERALIZED ANXIETY DISORDER: ICD-10-CM

## 2022-02-28 ENCOUNTER — PATIENT OUTREACH (OUTPATIENT)
Dept: FAMILY MEDICINE CLINIC | Facility: CLINIC | Age: 52
End: 2022-02-28

## 2022-02-28 DIAGNOSIS — F43.10 POST TRAUMATIC STRESS DISORDER (PTSD): ICD-10-CM

## 2022-02-28 DIAGNOSIS — F40.01 PANIC DISORDER WITH AGORAPHOBIA: ICD-10-CM

## 2022-02-28 DIAGNOSIS — F41.1 GENERALIZED ANXIETY DISORDER: ICD-10-CM

## 2022-02-28 RX ORDER — BUTALBITAL, ACETAMINOPHEN AND CAFFEINE 50; 325; 40 MG/1; MG/1; MG/1
TABLET ORAL
Qty: 15 TABLET | Refills: 0 | Status: SHIPPED | OUTPATIENT
Start: 2022-02-28 | End: 2022-03-28

## 2022-03-01 RX ORDER — DIAZEPAM 5 MG/1
5 TABLET ORAL 3 TIMES DAILY PRN
Qty: 90 TABLET | Refills: 5 | OUTPATIENT
Start: 2022-03-01

## 2022-03-01 RX ORDER — DIAZEPAM 5 MG/1
5 TABLET ORAL 3 TIMES DAILY PRN
Qty: 90 TABLET | Refills: 2 | Status: SHIPPED | OUTPATIENT
Start: 2022-03-01 | End: 2022-06-09 | Stop reason: SDUPTHER

## 2022-03-07 ENCOUNTER — PATIENT OUTREACH (OUTPATIENT)
Dept: FAMILY MEDICINE CLINIC | Facility: CLINIC | Age: 52
End: 2022-03-07

## 2022-03-09 ENCOUNTER — PATIENT OUTREACH (OUTPATIENT)
Dept: FAMILY MEDICINE CLINIC | Facility: CLINIC | Age: 52
End: 2022-03-09

## 2022-03-09 NOTE — PROGRESS NOTES
[address] [place] [address]- Chart Review to check on status needed insurance- Not noted at this time  CMOC will follow up with SHARLENE Sherman and ask for status update as the referral is to assist with Medical Assistance when family is ready  CMOC will continue to follow with SHARLENE Sherman

## 2022-03-11 ENCOUNTER — TELEMEDICINE (OUTPATIENT)
Dept: BEHAVIORAL/MENTAL HEALTH CLINIC | Facility: CLINIC | Age: 52
End: 2022-03-11
Payer: COMMERCIAL

## 2022-03-11 DIAGNOSIS — F33.41 RECURRENT MAJOR DEPRESSIVE DISORDER, IN PARTIAL REMISSION (HCC): ICD-10-CM

## 2022-03-11 DIAGNOSIS — G89.4 CHRONIC PAIN SYNDROME: ICD-10-CM

## 2022-03-11 DIAGNOSIS — R53.82 CHRONIC FATIGUE: ICD-10-CM

## 2022-03-11 DIAGNOSIS — F41.9 ANXIETY: ICD-10-CM

## 2022-03-11 DIAGNOSIS — G47.00 INSOMNIA, UNSPECIFIED TYPE: Primary | ICD-10-CM

## 2022-03-11 PROCEDURE — 90834 PSYTX W PT 45 MINUTES: CPT | Performed by: COUNSELOR

## 2022-03-11 RX ORDER — TRAMADOL HYDROCHLORIDE 50 MG/1
TABLET ORAL
Qty: 120 TABLET | Refills: 0 | Status: SHIPPED | OUTPATIENT
Start: 2022-03-11 | End: 2022-04-13

## 2022-03-11 NOTE — PSYCH
Virtual Regular Visit    Verification of patient location:    Patient is located in the following state in which I hold an active license [place]      Assessment/Plan:    Problem List Items Addressed This Visit        Other    Depression    Anxiety    Chronic fatigue    Insomnia - Primary          Goals addressed in session: Goal 1 and Goal 2          Reason for visit is No chief complaint on file  Encounter provider Sid [facility]    Provider located at [address] [place] [id number] [phone]      Recent Visits  No visits were found meeting these conditions  Showing recent visits within past 7 days and meeting all other requirements  Future Appointments  No visits were found meeting these conditions  Showing future appointments within next 150 days and meeting all other requirements       The patient was identified by name and date of birth  Osman Wolf was informed that this is a telemedicine visit and that the visit is being conducted throughBoundless and patient was informed that this is a secure, HIPAA-compliant platform  She agrees to proceed     My office door was closed  No one else was in the room  She acknowledged consent and understanding of privacy and security of the video platform  The patient has agreed to participate and understands they can discontinue the visit at any time  Patient is aware this is a billable service  Subjective  Osman Wolf is a 49 y o  female  Data: Cindy Gallardo reports extreme fatigue- emotional and physical  She takes care of her dying  with the help of nurses for 45 mins a day  Person-Centered, Mindfulness, and existential techniques were used to aid the patient learn some other ways to look at her situation and keep more positive and motivating approach       Assessment: Cindy Gallardo feels very depressed, observed on her facial expressions, through her words and voice tone  She was tearful and with fleeting thoughts, mainly focused on her daughter's addiction struggle  Plan: Deonna Paulino has difficulties implementing new techniques at the moment but will try to go out in the light more, to have a little walk, to feed herself better, with more fresh food when possible, and to know that she deserves self-compassion more than ever- she stated that she liked to idea of giving herself that was missing from others         HPI     Past Medical History:   Diagnosis Date    Acute venous embolism and thrombosis of deep vessels of distal lower extremity (HCC)     Anemia     Anxiety     last assessed 11/20/17    Arthritis     Asthma     Cerebral infarction [facility])     unspecified, last assessed 11/14/16    Chest pain     last assessed 5/9/17    Chronic cough     last assessed 12/12/13    Depression     Diabetes mellitus, type 2 ([facility] [address] )     DJD (degenerative joint disease)     Esophageal reflux     Fibromyalgia     GERD without esophagitis     resolved 5/13/16    History of pulmonary embolism     Hyperlipidemia     Hypertension     Hypothyroidism     Iron deficiency     Pancreatitis     Panic attack     Panic disorder     Polyarthritis     PTSD (post-traumatic stress disorder)     Sleep difficulties     Stroke syndrome     Thyroid disease     TIA (transient ischemic attack)     TIA (transient ischemic attack)     Venous embolism and thrombosis of deep vessels of distal lower extremity (HCC)     Vitamin B12 deficiency     Vitamin D deficiency        Past Surgical History:   Procedure Laterality Date    CARPAL TUNNEL RELEASE Right     neuroplasty decompression of median nerve    CATARACT EXTRACTION      CHOLECYSTECTOMY      ERCP      ERCP      ESOPHAGOGASTRIC FUNDOPLASTY      NISSEN FUNDOPLICATION      NC ESOPHAGOGASTRODUODENOSCOPY TRANSORAL DIAGNOSTIC N/A 11/2/2016    Procedure: EGD AND COLONOSCOPY;  Surgeon: Gunjan Munson MD; [facility]: [facility]; Service: Gastroenterology    RI INCISE FINGER TENDON SHEATH Right 3/8/2016    Procedure: RELEASE TRIGGER FINGER RIGHT THUMB;  Surgeon: Melanie Tamayo MD;  [facility];  Service: Orthopedics    RI WRIST Charmel Margarita LIG Right 3/8/2016    Procedure: RELEASE CARPAL TUNNEL ENDOSCOPIC;  Surgeon: Melanie Tamayo MD;  [facility];  Service: Orthopedics    TONSILLECTOMY AND ADENOIDECTOMY         Current Outpatient Medications   Medication Sig Dispense Refill    acarbose (PRECOSE) 50 mg tablet Take 1 tablet (50 mg total) by mouth 3 (three) times a day with meals 90 tablet 4    Adalimumab (HUMIRA PEN SC) Inject under the skin      Advair Diskus 500-50 MCG/DOSE inhaler INHALE 1 PUFF BY MOUTH 2 TIMES PER DAY  RINSE MOUTH AFTER USE  1 Inhaler 5    Albuterol Sulfate (ProAir RespiClick) 444 (90 Base) MCG/ACT AEPB Inhale 2 puffs every 6 (six) hours as needed (wheezing) 1 each 1    aspirin 81 mg chewable tablet Chew 81 mg daily       atorvastatin (LIPITOR) 80 mg tablet TAKE 1 TABLET BY MOUTH EVERY DAY 90 tablet 3    baclofen 10 mg tablet TAKE 1 TABLET BY MOUTH THREE TIMES A DAY AS NEEDED 270 tablet 2    BD PEN NEEDLE LINDY U/F 32G X 4 MM MISC Inject under the skin daily 30 each 5    butalbital-acetaminophen-caffeine (FIORICET,ESGIC) -40 mg per tablet TAKE 1 TABLET EVERY 6 HOURS AS NEEDED FOR MIGRAINE   PLEASE LIMIT 3-4 PER WEEK, 15 PER MONTH  15 tablet 0    Continuous Blood Gluc  (FreeStyle Cooley 2 [place]) ALPESH Please dispense 1 [place] 1 each 0    Continuous Blood Gluc Sensor (FreeStyle Sherine 2 Sensor) MISC Use sherine sensor every 15 days 2 each 4    Cyanocobalamin (VITAMIN B-12 IJ) Inject as directed      Cyanocobalamin 1000 MCG/ML LIQD Take 500 mcg by mouth daily      diazepam (VALIUM) 5 mg tablet TAKE 1 TABLET (5 MG TOTAL) BY MOUTH 3 (THREE) TIMES A DAY AS NEEDED FOR ANXIETY 90 tablet 2    DULoxetine (CYMBALTA) 60 mg delayed release capsule TAKE 1 CAPSULE (60 MG TOTAL) BY MOUTH 2 (TWO) TIMES A  capsule 1    Emgality 120 MG/ML SOAJ INJECT 1 PEN SUBQ EVERY 30 DAYS  1 mL 11    ergocalciferol (VITAMIN D2) 50,000 units 1 TAB ONCE WEEKLY 12 capsule 0    Folic Acid 0 8 MG CAPS Take 0 04 mg by mouth daily   gabapentin (NEURONTIN) 400 mg capsule TAKE 2 CAPSULES BY MOUTH 3 TIMES A  capsule 1    hydrocortisone (CORTEF) 5 mg tablet Take one tablet in AM and 1 tablet at PM for 2 weeks and then take 1 tablet for 2 weeks and then stop ( go for blood work in 1 week at 8 AM ) 50 tablet 5    insulin glargine (Lantus SoloStar) 100 units/mL injection pen INJECT 45 UNITS IN AM 45 mL 0    Insulin Pen Needle (BD Pen Needle Kimberli U/F) 32G X 4 MM MISC by Does not apply route daily 100 each 3    insuln lispro (HumaLOG KwikPen) 200 units/mL CONCENTRATED U-200 injection pen Inject 15 units with meals +Scale (  Scale - 150-200 -2 units, 201-250-4 units, 251-300 -6 units, 301-350-8 units, > 350- 10 units ) 6 pen 3    levothyroxine 112 mcg tablet TAKE 1 TABLET BY MOUTH EVERY DAY 90 tablet 1    losartan (COZAAR) 50 mg tablet TAKE 1 TABLET DAILY  90 tablet 2    methotrexate 2 5 MG tablet  (Patient not taking: Reported on 9/30/2021)      metoprolol tartrate (LOPRESSOR) 25 mg tablet TAKE 1 TABLET BY MOUTH EVERY DAY 90 tablet 2    mirtazapine (REMERON) 7 5 MG tablet TAKE 1 TABLET (7 5 MG TOTAL) BY MOUTH DAILY AT BEDTIME 90 tablet 1    Naloxone HCl (NARCAN) 4 MG/0 1ML LIQD 1 actuation in one nostril once as needed for opioid overdose, may repeat dose every 2-3 minutes until patient responsive or EMS arrives 1 each 1    NEEDLE, DISP, 25 G (B-D DISP NEEDLE 25GX1") 25G X 1" MISC by Does not apply route once as needed (opiod overdose) for up to 1 dose 3 each 0    Nerve Stimulator (TENS Therapy Pain Relief) ALPESH 1 Device by Does not apply route as needed (myofascial pain) 1 Device 0    Nutritional Supplements (Glucerna Advance Shake) LIQD 1 daily   30 Bottle 0  OLANZapine (ZyPREXA) 15 mg tablet Take 1 tablet (15 mg total) by mouth daily at bedtime 90 tablet 1    omeprazole (PriLOSEC) 20 mg delayed release capsule TAKE 1 CAPSULE BY MOUTH EVERY DAY 90 capsule 1    ondansetron (ZOFRAN) 4 mg tablet 1 tab q6 h prn nausea  Hold compazine  20 tablet 2    OneTouch Ultra test strip USE AS DIRECTED TO TEST 3 TIMES A  strip 5    prazosin (MINIPRESS) 2 mg capsule Take 1 capsule (2 mg total) by mouth daily at bedtime 90 capsule 1    prochlorperazine (COMPAZINE) 10 mg tablet Take 1 tablet (10 mg total) by mouth every 6 (six) hours as needed for nausea or vomiting 90 tablet 1    topiramate (TOPAMAX) 100 mg tablet TAKE 2 TABLETS BY MOUTH EVERY  tablet 1    topiramate (TOPAMAX) 25 mg tablet TAKE 1 TABLET BY MOUTH EVERY DAY 90 tablet 1    traMADol (ULTRAM) 50 mg tablet TAKE 1-2 TABLETS EVERY 6-8 HOURS AS NEEDED FOR SEVERE PAIN, DO NOT TAKE WITHIN 6 HOURS OF VALIUM OR FIORICET 150 tablet 0    Turmeric 500 MG TABS Take 1 tablet by mouth daily       No current facility-administered medications for this visit  Allergies   Allergen Reactions    Medical Tape Rash    Lexapro [Escitalopram Oxalate]     Escitalopram Other (See Comments) and Palpitations    Other Hives and Rash     Adhesive Tape       Review of Systems    Video Exam    There were no vitals filed for this visit  Physical Exam     I spent 45 minutes directly with the patient during this visit    [address] [place] [address] verbally agrees to participate in [employer]  Pt is aware that [employer] could be limited without vital signs or the ability to perform a full hands-on physical Clarence Irving understands she or the provider may request at any time to terminate the video visit and request the patient to seek care or treatment in person

## 2022-03-18 DIAGNOSIS — E03.9 ACQUIRED HYPOTHYROIDISM: ICD-10-CM

## 2022-03-18 RX ORDER — LEVOTHYROXINE SODIUM 112 UG/1
TABLET ORAL
Qty: 90 TABLET | Refills: 1 | Status: SHIPPED | OUTPATIENT
Start: 2022-03-18

## 2022-03-23 ENCOUNTER — TELEMEDICINE (OUTPATIENT)
Dept: PSYCHIATRY | Facility: CLINIC | Age: 52
End: 2022-03-23
Payer: COMMERCIAL

## 2022-03-23 ENCOUNTER — TELEPHONE (OUTPATIENT)
Dept: PSYCHIATRY | Facility: CLINIC | Age: 52
End: 2022-03-23

## 2022-03-23 DIAGNOSIS — F43.10 PTSD (POST-TRAUMATIC STRESS DISORDER): ICD-10-CM

## 2022-03-23 DIAGNOSIS — F43.10 POST TRAUMATIC STRESS DISORDER (PTSD): ICD-10-CM

## 2022-03-23 DIAGNOSIS — F33.41 RECURRENT MAJOR DEPRESSIVE DISORDER, IN PARTIAL REMISSION (HCC): ICD-10-CM

## 2022-03-23 DIAGNOSIS — G89.4 CHRONIC PAIN DISORDER: ICD-10-CM

## 2022-03-23 DIAGNOSIS — F40.01 PANIC DISORDER WITH AGORAPHOBIA: ICD-10-CM

## 2022-03-23 DIAGNOSIS — F41.1 GENERALIZED ANXIETY DISORDER: Primary | ICD-10-CM

## 2022-03-23 DIAGNOSIS — F31.81 BIPOLAR II DISORDER (HCC): ICD-10-CM

## 2022-03-23 DIAGNOSIS — F40.10 SOCIAL ANXIETY DISORDER: ICD-10-CM

## 2022-03-23 PROCEDURE — 99214 OFFICE O/P EST MOD 30 MIN: CPT | Performed by: PSYCHIATRY & NEUROLOGY

## 2022-03-23 RX ORDER — PRAZOSIN HYDROCHLORIDE 2 MG/1
2 CAPSULE ORAL
Qty: 90 CAPSULE | Refills: 1 | Status: SHIPPED | OUTPATIENT
Start: 2022-03-23

## 2022-03-23 RX ORDER — OLANZAPINE 20 MG/1
20 TABLET ORAL
Qty: 90 TABLET | Refills: 1 | Status: SHIPPED | OUTPATIENT
Start: 2022-03-23

## 2022-03-23 RX ORDER — DULOXETIN HYDROCHLORIDE 60 MG/1
60 CAPSULE, DELAYED RELEASE ORAL 2 TIMES DAILY
Qty: 180 CAPSULE | Refills: 1 | Status: SHIPPED | OUTPATIENT
Start: 2022-03-23

## 2022-03-23 NOTE — PATIENT INSTRUCTIONS
Generalized anxiety disorder    Post traumatic stress disorder (PTSD)    Panic disorder with agoraphobia  -     DULoxetine (CYMBALTA) 60 mg delayed release capsule; Take 1 capsule (60 mg total) by mouth 2 (two) times a day    Bipolar II disorder (HCC)  -     DULoxetine (CYMBALTA) 60 mg delayed release capsule; Take 1 capsule (60 mg total) by mouth 2 (two) times a day  -     OLANZapine (ZyPREXA) 20 MG tablet; Take 1 tablet (20 mg total) by mouth daily at bedtime    Recurrent major depressive disorder, in partial remission (HCC)  -     DULoxetine (CYMBALTA) 60 mg delayed release capsule; Take 1 capsule (60 mg total) by mouth 2 (two) times a day    Social anxiety disorder  -     DULoxetine (CYMBALTA) 60 mg delayed release capsule; Take 1 capsule (60 mg total) by mouth 2 (two) times a day    PTSD (post-traumatic stress disorder)  -     DULoxetine (CYMBALTA) 60 mg delayed release capsule; Take 1 capsule (60 mg total) by mouth 2 (two) times a day  -     prazosin (MINIPRESS) 2 mg capsule;  Take 1 capsule (2 mg total) by mouth daily at bedtime    Chronic pain disorder        Continue all other medications    Follow up in 1 month with Raisa Armando

## 2022-03-23 NOTE — PSYCH
Virtual Regular Visit    Verification of patient location:    Patient is located in the following state in which I hold an active license [place]      Assessment/Plan:    Problem List Items Addressed This Visit        Other    Depression    Relevant Medications    DULoxetine (CYMBALTA) 60 mg delayed release capsule    OLANZapine (ZyPREXA) 20 MG tablet      Other Visit Diagnoses     Generalized anxiety disorder    -  Primary    Relevant Medications    DULoxetine (CYMBALTA) 60 mg delayed release capsule    OLANZapine (ZyPREXA) 20 MG tablet    Post traumatic stress disorder (PTSD)        Relevant Medications    DULoxetine (CYMBALTA) 60 mg delayed release capsule    OLANZapine (ZyPREXA) 20 MG tablet    Panic disorder with agoraphobia        Relevant Medications    DULoxetine (CYMBALTA) 60 mg delayed release capsule    OLANZapine (ZyPREXA) 20 MG tablet    Bipolar II disorder (HCC)        Relevant Medications    DULoxetine (CYMBALTA) 60 mg delayed release capsule    OLANZapine (ZyPREXA) 20 MG tablet    Social anxiety disorder        Relevant Medications    DULoxetine (CYMBALTA) 60 mg delayed release capsule    OLANZapine (ZyPREXA) 20 MG tablet    PTSD (post-traumatic stress disorder)        Relevant Medications    DULoxetine (CYMBALTA) 60 mg delayed release capsule    OLANZapine (ZyPREXA) 20 MG tablet    prazosin (MINIPRESS) 2 mg capsule    Chronic pain disorder                     Reason for visit is   Chief Complaint   Patient presents with    Medication Management    Virtual Regular Visit        Encounter provider Tani Shepherd MD    Provider located at [address] [address] [phone]      Recent Visits  No visits were found meeting these conditions    Showing recent visits within past 7 days and meeting all other requirements  Today's Visits  Date Type Provider Dept   03/23/22 Luba Newman MD [facility]   Showing today's visits and meeting all other requirements  Future Appointments  No visits were found meeting these conditions  Showing future appointments within next 150 days and meeting all other requirements       The patient was identified by name and date of birth  Adora Harada was informed that this is a telemedicine visit and that the visit is being conducted throughEpic Embedded and patient was informed this is a secure, HIPAA-complaint platform  She agrees to proceed     My office door was closed  No one else was in the room  She acknowledged consent and understanding of privacy and security of the video platform  The patient has agreed to participate and understands they can discontinue the visit at any time  Patient is aware this is a billable service  Subjective:     Patient ID: Adora Harada is a 49 y o  female with BPAD type 2, MARITO, and PTSD being seen for a follow up  She is currently on zyprexa, prazosin, cymbalta, valium, gabapentin, Topamax, Remeron, and wellbutrin  HPI ROS Appetite Changes and Sleep: the patient stated that she doing poorly  She broke her knee in December and then shortly thereafter her  was diagnosed with ALS and was given an 8 month prognosis  She is struggling a lot with anxiety  Not eating or sleeping well  Anxious all the time with a racing heart rate and feels she cannot shut her mind off at night  She is medication compliant  The valium helps to take the edge off, but doesn't do too much for the residual anxiety  She is struggling to find care for her  as their insurance ran out of the availability for visits from [facility] and now going through [employer], but shes concerned that sergei will run out as well  They are applying for disability for him  Denied SI/HI   She has lost 10 lbs    Review Of Systems:     Mood Anxiety and Depression    Behavior Normal    Thought Content Disturbing Thoughts, Feelings and Unreasonalbe or Irrational Fears   General Relationship Problems, Emotional Problems, Sleep Disturbances and Decreased Functioning   Personality Normal   Other Psych Symptoms Normal   Constitutional As Noted in HPI   ENT Negative   Cardiovascular Lower Ext Edema   Respiratory Negative   Gastrointestinal Negative    Genitourinary Negative   Musculoskeletal As Noted in HPI   Integumentary Negative   Neurological Negative   Endocrine blood sugars have either been normal with insulin or low   Other Symptoms Normal          Laboratory Results: No results found for this or any previous visit      Substance Abuse History:  Social History     Substance and Sexual Activity   Drug Use No       Family Psychiatric History:   Family History   Problem Relation Age of Onset    Diabetes Mother         type 2    Heart attack Mother 44        acute MI    Kidney disease Mother         CKD NKF classfication    Depression Mother     Arthritis Mother     Substance Abuse Mother         mother OD in past on MS04    Ulcerative colitis Mother    [place] Schizophrenia Mother     Suicide Attempts Mother     Bipolar disorder Mother     Diabetes Maternal Grandmother     Heart attack Father         acute MI    Psoriasis Father    [place] Cancer Father         gastric cancer    Stroke Father     Crohn's disease Sister     Bipolar disorder Sister     Rheum arthritis Maternal Grandfather     Throat cancer Maternal Grandfather     Suicide Attempts Daughter     Drug abuse Daughter     Lung cancer Paternal Grandmother     Cancer Paternal Grandfather     No Known Problems Sister     Bipolar disorder Maternal Uncle     Anesthesia problems Neg Hx        The following portions of the patient's history were reviewed and updated as appropriate: allergies, current medications, past family history, past medical history, past social history, past surgical history and problem list     Social History     Socioeconomic History    Marital status: /Civil [employer]     Spouse name: Not on file    Number of children: 1    Years of education: technical school    Highest education level: Associate degree: occupational, technical, or vocational program   Occupational History    Occupation: unemployed     Comment: SSDI   Tobacco Use    Smoking status: Current Every Day Smoker     Packs/day: 0 50     Years: 35 00     Pack years: 17 50     Types: Cigarettes     Start date: 1/1/1983    Smokeless tobacco: Never Used    Tobacco comment: 21 + years   Vaping Use    Vaping Use: Former   Substance and Sexual Activity    Alcohol use: Not Currently     Alcohol/week: 0 0 standard drinks     Comment: last was in 2010 after DUI    Drug use: No    Sexual activity: Yes     Partners: Male   Other Topics Concern    Not on file   Social History Narrative    No coffee consumption     Social Determinants of Health     Financial Resource Strain: Not on file   Food Insecurity: Not on file   Transportation Needs: Unknown    Lack of Transportation (Medical): Not on file    Lack of Transportation (Non-Medical):  No   Physical Activity: Not on file   Stress: Not on file   Social Connections: Not on file   Intimate Partner Violence: Not on file   Housing Stability: Not on file     Social History     Social History Narrative    No coffee consumption       Objective:       Mental status:  Appearance calm and cooperative , poor hygiene /disheveled and good eye contact    Mood dysphoric and anxious   Affect affect was constricted   Speech a normal rate   Thought Processes coherent/organized and normal thought processes   Hallucinations no hallucinations present    Thought Content no delusions   Abnormal Thoughts no suicidal thoughts  and no homicidal thoughts    Orientation  oriented to person and place and time   Remote Memory short term memory intact and long term memory intact   Attention Span concentration intact   Intellect Appears to be of Average Intelligence   Insight Insight intact   Judgement judgment was intact   Muscle Strength Muscle strength and tone were normal and Normal gait    Language no difficulty naming common objects   Fund of Knowledge displays adequate knowledge of current events   Pain moderate to severe   Pain Scale 7/10       Assessment/Plan:       Diagnoses and all orders for this visit:    Generalized anxiety disorder    Post traumatic stress disorder (PTSD)    Panic disorder with agoraphobia  -     DULoxetine (CYMBALTA) 60 mg delayed release capsule; Take 1 capsule (60 mg total) by mouth 2 (two) times a day    Bipolar II disorder (HCC)  -     DULoxetine (CYMBALTA) 60 mg delayed release capsule; Take 1 capsule (60 mg total) by mouth 2 (two) times a day  -     OLANZapine (ZyPREXA) 20 MG tablet; Take 1 tablet (20 mg total) by mouth daily at bedtime    Recurrent major depressive disorder, in partial remission (HCC)  -     DULoxetine (CYMBALTA) 60 mg delayed release capsule; Take 1 capsule (60 mg total) by mouth 2 (two) times a day    Social anxiety disorder  -     DULoxetine (CYMBALTA) 60 mg delayed release capsule; Take 1 capsule (60 mg total) by mouth 2 (two) times a day    PTSD (post-traumatic stress disorder)  -     DULoxetine (CYMBALTA) 60 mg delayed release capsule; Take 1 capsule (60 mg total) by mouth 2 (two) times a day  -     prazosin (MINIPRESS) 2 mg capsule; Take 1 capsule (2 mg total) by mouth daily at bedtime    Chronic pain disorder        Continue all other medications    Follow up in 1 month with Yanira Jayleen    Treatment Recommendations- Risks Benefits      Immediate Medical/Psychiatric/Psychotherapy Treatments and Any Precautions: she continues to have less than ideal situation at home with caring for her  and her daughter drug addiction  She is doing okay from a mood perspective, but anxiety is high due to being overwhelmed  She would like to see a therapist  No medication changes at this time   She has gained weight, likely with the Jose Antonio  Risks, Benefits And Possible Side Effects Of Medications:  PSYCH RISK, BENEFITS AND POSSIBLE SIDE EFFECTS discussed    Controlled Medication Discussion: Discussed with patient Black Box warning on concurrent use of benzodiazepines and opioid medications including sedation, respiratory depression, coma and death  Patient understands the risk of treatment with benzodiazepines in addition to opioids and wants to continue taking those medications  , Discussed with patient the risks of sedation, respiratory depression, impairment of ability to drive and potential for abuse and addiction related to treatment with benzodiazepine medications  The patient understands risk of treatment with benzodiazepine medications, agrees to not drive if feels impaired and agrees to take medications as prescribed  and The patient has been filling controlled prescriptions on time as prescribed to [facility] program         This note was not shared with the patient due to reasonable likelihood of causing patient harm                  I spent 20 minutes with patient today in which greater than 50% of the time was spent in counseling/coordination of care regarding treatment    [address] verbally agrees to participate in [employer]  Pt is aware that [employer] could be limited without vital signs or the ability to perform a full hands-on physical Bharti Arlette understands she or the provider may request at any time to terminate the video visit and request the patient to seek care or treatment in person

## 2022-03-25 ENCOUNTER — TELEPHONE (OUTPATIENT)
Dept: PSYCHIATRY | Facility: CLINIC | Age: 52
End: 2022-03-25

## 2022-03-25 ENCOUNTER — TELEPHONE (OUTPATIENT)
Dept: GASTROENTEROLOGY | Facility: CLINIC | Age: 52
End: 2022-03-25

## 2022-03-25 NOTE — TELEPHONE ENCOUNTER
I lmom for pt to please call back to reschedule to an [facility] as her insurance is not covering  Will call pt again in a few days if do not hear back from her

## 2022-03-25 NOTE — TELEPHONE ENCOUNTER
[facility] is denying colon because it doesn't meet guidelines to be done in a hospital setting  Patient needs to be rescheduled to [facility]  Please call patient to reschedule  Thank you!

## 2022-03-26 DIAGNOSIS — G43.709 CHRONIC MIGRAINE WITHOUT AURA WITHOUT STATUS MIGRAINOSUS, NOT INTRACTABLE: ICD-10-CM

## 2022-03-28 ENCOUNTER — PATIENT OUTREACH (OUTPATIENT)
Dept: FAMILY MEDICINE CLINIC | Facility: CLINIC | Age: 52
End: 2022-03-28

## 2022-03-28 RX ORDER — BUTALBITAL, ACETAMINOPHEN AND CAFFEINE 50; 325; 40 MG/1; MG/1; MG/1
TABLET ORAL
Qty: 15 TABLET | Refills: 0 | Status: SHIPPED | OUTPATIENT
Start: 2022-03-28 | End: 2022-05-11

## 2022-03-28 NOTE — PROGRESS NOTES
[address] [place] [address]- Left message with Cindy Gallardo asking about status of medical assistance application and if assistance is still needed to complete the [employer] application  Left message asking for a return call to this [address] [place] [address]  [facility] will continue to follow with OP SHARLENE Hu

## 2022-03-29 NOTE — TELEPHONE ENCOUNTER
I lmom for pt to please call me back as her procedure on 4/7/22 had to be cxd due to insurance and needs to be r/sd to a different date at an [facility]  Will call pt again in one week if do not hear back from her

## 2022-04-05 NOTE — TELEPHONE ENCOUNTER
I lmom for pt to please call back to r/s her procedure to an [facility] due to her insurance  If pt calls back, please reschedule her to [facility]  Thank you!

## 2022-04-07 ENCOUNTER — TELEPHONE (OUTPATIENT)
Dept: PSYCHIATRY | Facility: CLINIC | Age: 52
End: 2022-04-07

## 2022-04-09 DIAGNOSIS — G89.4 CHRONIC PAIN SYNDROME: ICD-10-CM

## 2022-04-09 DIAGNOSIS — R11.0 NAUSEA: ICD-10-CM

## 2022-04-11 RX ORDER — ONDANSETRON 4 MG/1
TABLET, FILM COATED ORAL
Qty: 20 TABLET | Refills: 2 | Status: SHIPPED | OUTPATIENT
Start: 2022-04-11

## 2022-04-12 ENCOUNTER — DOCUMENTATION (OUTPATIENT)
Dept: BEHAVIORAL/MENTAL HEALTH CLINIC | Facility: CLINIC | Age: 52
End: 2022-04-12

## 2022-04-12 NOTE — PROGRESS NOTES
Assessment/Plan:      There are no diagnoses linked to this encounter  Subjective: Rimma Diaz cancelled all her appointments without scheduling a follow up  She has history of no-shows  She completed only the initial assessment and one individual session, after which, she cancelled all scheduled appointments  Rimma Diaz did not respond to the letter with the request to call the office within 10 business days, showing no interest in therapy  Patient ID: Britany Sweeney is a 49 y o  female  Outpatient Discharge Summary:   Admission Date: 1/26/22 started with NS for initial assessment  Rimma Diaz was referred by self/doctor  Discharge Date: 4/12/22    Discharge Diagnosis:  Depression F32  A  No diagnosis found  Treating Physician: Dr Deedee Duke   Treatment Complications: Needs processing loss but does not attend her sessions   Presenting Problem: Grief over her dying , depression   Course of treatment includes:    individual therapy   Treatment Progress: poor  Criteria for Discharge: demonstrated failure to uphold their treatment plan/contract + no-shows and numerous cancellations until she cancelled all appointments and did not respond to letter   Aftercare recommendations include: To continue processing grief and loss and be more responsible and accountable   Discharge Medications include:  Current Outpatient Medications:     acarbose (PRECOSE) 50 mg tablet, Take 1 tablet (50 mg total) by mouth 3 (three) times a day with meals, Disp: 90 tablet, Rfl: 4    Adalimumab (HUMIRA PEN SC), Inject under the skin, Disp: , Rfl:     Advair Diskus 500-50 MCG/DOSE inhaler, INHALE 1 PUFF BY MOUTH 2 TIMES PER DAY   RINSE MOUTH AFTER USE , Disp: 1 Inhaler, Rfl: 5    Albuterol Sulfate (ProAir RespiClick) 565 (90 Base) MCG/ACT AEPB, Inhale 2 puffs every 6 (six) hours as needed (wheezing), Disp: 1 each, Rfl: 1    aspirin 81 mg chewable tablet, Chew 81 mg daily , Disp: , Rfl:     atorvastatin (LIPITOR) 80 mg tablet, TAKE 1 TABLET BY MOUTH EVERY DAY, Disp: 90 tablet, Rfl: 3    baclofen 10 mg tablet, TAKE 1 TABLET BY MOUTH THREE TIMES A DAY AS NEEDED, Disp: 270 tablet, Rfl: 2    BD PEN NEEDLE KIMBERLI U/F 32G X 4 MM MISC, Inject under the skin daily, Disp: 30 each, Rfl: 5    butalbital-acetaminophen-caffeine (FIORICET,ESGIC) -40 mg per tablet, TAKE 1 TABLET EVERY 6 HOURS AS NEEDED FOR MIGRAINE  PLEASE LIMIT 3-4 PER WEEK, 15 PER MONTH , Disp: 15 tablet, Rfl: 0    Continuous Blood Gluc  (FreeStyle Sherine 2 Roosevelt) ALPESH, Please dispense 1 Roosevelt, Disp: 1 each, Rfl: 0    Continuous Blood Gluc Sensor (FreeStyle Sherine 2 Sensor) MISC, Use sherine sensor every 15 days, Disp: 2 each, Rfl: 4    Cyanocobalamin (VITAMIN B-12 IJ), Inject as directed, Disp: , Rfl:     Cyanocobalamin 1000 MCG/ML LIQD, Take 500 mcg by mouth daily, Disp: , Rfl:     diazepam (VALIUM) 5 mg tablet, TAKE 1 TABLET (5 MG TOTAL) BY MOUTH 3 (THREE) TIMES A DAY AS NEEDED FOR ANXIETY, Disp: 90 tablet, Rfl: 2    DULoxetine (CYMBALTA) 60 mg delayed release capsule, Take 1 capsule (60 mg total) by mouth 2 (two) times a day, Disp: 180 capsule, Rfl: 1    Emgality 120 MG/ML SOAJ, INJECT 1 PEN SUBQ EVERY 30 DAYS , Disp: 1 mL, Rfl: 11    ergocalciferol (VITAMIN D2) 50,000 units, 1 TAB ONCE WEEKLY, Disp: 12 capsule, Rfl: 0    Folic Acid 0 8 MG CAPS, Take 0 04 mg by mouth daily  , Disp: , Rfl:     gabapentin (NEURONTIN) 400 mg capsule, TAKE 2 CAPSULES BY MOUTH 3 TIMES A DAY, Disp: 540 capsule, Rfl: 1    hydrocortisone (CORTEF) 5 mg tablet, Take one tablet in AM and 1 tablet at PM for 2 weeks and then take 1 tablet for 2 weeks and then stop ( go for blood work in 1 week at 8 AM ), Disp: 50 tablet, Rfl: 5    insulin glargine (Lantus SoloStar) 100 units/mL injection pen, INJECT 45 UNITS IN AM, Disp: 45 mL, Rfl: 0    Insulin Pen Needle (BD Pen Needle Kimberli U/F) 32G X 4 MM MISC, by Does not apply route daily, Disp: 100 each, Rfl: 3    insuln lispro (HumaLOG KwikPen) 200 units/mL CONCENTRATED U-200 injection pen, Inject 15 units with meals +Scale (  Scale - 150-200 -2 units, 201-250-4 units, 251-300 -6 units, 301-350-8 units, > 350- 10 units ), Disp: 6 pen, Rfl: 3    levothyroxine 112 mcg tablet, TAKE 1 TABLET BY MOUTH EVERY DAY, Disp: 90 tablet, Rfl: 1    losartan (COZAAR) 50 mg tablet, TAKE 1 TABLET DAILY  , Disp: 90 tablet, Rfl: 2    methotrexate 2 5 MG tablet, , Disp: , Rfl:     metoprolol tartrate (LOPRESSOR) 25 mg tablet, TAKE 1 TABLET BY MOUTH EVERY DAY, Disp: 90 tablet, Rfl: 2    mirtazapine (REMERON) 7 5 MG tablet, TAKE 1 TABLET (7 5 MG TOTAL) BY MOUTH DAILY AT BEDTIME, Disp: 90 tablet, Rfl: 1    Naloxone HCl (NARCAN) 4 MG/0 1ML LIQD, 1 actuation in one nostril once as needed for opioid overdose, may repeat dose every 2-3 minutes until patient responsive or EMS arrives, Disp: 1 each, Rfl: 1    NEEDLE, DISP, 25 G (B-D DISP NEEDLE 25GX1") 25G X 1" MISC, by Does not apply route once as needed (opiod overdose) for up to 1 dose, Disp: 3 each, Rfl: 0    Nerve Stimulator (TENS Therapy Pain Relief) ALPESH, 1 Device by Does not apply route as needed (myofascial pain), Disp: 1 Device, Rfl: 0    Nutritional Supplements (Glucerna Advance Shake) LIQD, 1 daily  , Disp: 30 Bottle, Rfl: 0    OLANZapine (ZyPREXA) 20 MG tablet, Take 1 tablet (20 mg total) by mouth daily at bedtime, Disp: 90 tablet, Rfl: 1    omeprazole (PriLOSEC) 20 mg delayed release capsule, TAKE 1 CAPSULE BY MOUTH EVERY DAY, Disp: 90 capsule, Rfl: 1    ondansetron (ZOFRAN) 4 mg tablet, 1 TAB EVERY 6 HRS AS NEEDED FOR NAUSEA   HOLD COMPAZINE , Disp: 20 tablet, Rfl: 2    OneTouch Ultra test strip, USE AS DIRECTED TO TEST 3 TIMES A DAY, Disp: 100 strip, Rfl: 5    prazosin (MINIPRESS) 2 mg capsule, Take 1 capsule (2 mg total) by mouth daily at bedtime, Disp: 90 capsule, Rfl: 1    prochlorperazine (COMPAZINE) 10 mg tablet, Take 1 tablet (10 mg total) by mouth every 6 (six) hours as needed for nausea or vomiting, Disp: 90 tablet, Rfl: 1    topiramate (TOPAMAX) 100 mg tablet, TAKE 2 TABLETS BY MOUTH EVERY DAY, Disp: 180 tablet, Rfl: 1    topiramate (TOPAMAX) 25 mg tablet, TAKE 1 TABLET BY MOUTH EVERY DAY, Disp: 90 tablet, Rfl: 1    traMADol (ULTRAM) 50 mg tablet, ONE PO every 6 hours prn (do not take within 6 hours of Valium or Fioricet), Disp: 120 tablet, Rfl: 0    Turmeric 500 MG TABS, Take 1 tablet by mouth daily, Disp: , Rfl:     Prognosis: poor

## 2022-04-13 RX ORDER — TRAMADOL HYDROCHLORIDE 50 MG/1
TABLET ORAL
Qty: 120 TABLET | Refills: 0 | Status: SHIPPED | OUTPATIENT
Start: 2022-04-13 | End: 2022-05-11

## 2022-04-20 ENCOUNTER — PATIENT OUTREACH (OUTPATIENT)
Dept: FAMILY MEDICINE CLINIC | Facility: CLINIC | Age: 52
End: 2022-04-20

## 2022-04-20 NOTE — PROGRESS NOTES
[facility]- following up on request for medical assistance application when no longer insured  Left message asking for a return call to assist with MA application if needed at this time  [facility] following as requested by ANILA FRAGOSO will follow up in 1 weeks time

## 2022-04-26 ENCOUNTER — PATIENT OUTREACH (OUTPATIENT)
Dept: FAMILY MEDICINE CLINIC | Facility: CLINIC | Age: 52
End: 2022-04-26

## 2022-04-26 NOTE — PROGRESS NOTES
[facility]- spoke with OP CM MSW ok for [facility] to close as no return call from patient and still insured at this time  CMOC will follow again when needed for insurance applications

## 2022-05-02 ENCOUNTER — TELEPHONE (OUTPATIENT)
Dept: PSYCHIATRY | Facility: CLINIC | Age: 52
End: 2022-05-02

## 2022-05-09 DIAGNOSIS — G43.709 CHRONIC MIGRAINE WITHOUT AURA WITHOUT STATUS MIGRAINOSUS, NOT INTRACTABLE: ICD-10-CM

## 2022-05-09 DIAGNOSIS — G89.4 CHRONIC PAIN SYNDROME: ICD-10-CM

## 2022-05-11 RX ORDER — BUTALBITAL, ACETAMINOPHEN AND CAFFEINE 50; 325; 40 MG/1; MG/1; MG/1
TABLET ORAL
Qty: 15 TABLET | Refills: 0 | Status: SHIPPED | OUTPATIENT
Start: 2022-05-11 | End: 2022-07-18

## 2022-05-11 RX ORDER — TRAMADOL HYDROCHLORIDE 50 MG/1
TABLET ORAL
Qty: 120 TABLET | Refills: 0 | Status: SHIPPED | OUTPATIENT
Start: 2022-05-11 | End: 2022-06-16 | Stop reason: SDUPTHER

## 2022-05-11 NOTE — PSYCH
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 included sedation, dizziness, weight gain, dry mouth, constipation, hypotension, dyslipidemia, EPS, metabolic syndrome, peripheral edema, dyspepsia, joint and body pains, tachycardia, orthostatic hypotension, rash with sunlight exposure, hyperglycemia, seizures, skin rash, rare NMS, and increased risk of CVA in patient's with dementia  PARQ completed including serotonin syndrome, SIADH, worsened depression/suicidality, induction of kelly, blood pressure changes and GI distress, weight gain, sexual side effects, insomnia, sedation, potential for drug interactions, and others  PARQ completed including serotonin syndrome, induction of kelly for those at risk, worsening depression and suicidality, sedation, appetite increase/weight gain, dizziness, confusion, hypotension, rare allergic reactions, and others    Risks and side effects of chronic benzodiazepine use discussed with patient, including risk for falls, sedation, respiratory depression (especially if taken in higher dose(s) than prescribed or if taken together with another sedating medication or alcohol or other sedating substance), as well as risk of addiction (especially if taken more often or at higher doses than prescribed) and withdrawal (if stops abruptly, alexandra if taken more often or at higher doses) including seizures and death  The patient was instructed to not drive or operate heavy machinery while taking benzodiazepines, as the medication can cause increased drowsiness  Patient verbalized understanding and would like to continue at current dose  Risks and side effects of chronic benzodiazepine use discussed with patient, including evidence against using benzodiazepines in PTSD (worsening the sxs), risk for falls, sedation, respiratory depression (especially if taken in higher dose(s) than prescribed or if taken together with another sedating medication or alcohol or other sedating substance), as well as risk of addiction (especially if taken more often or at higher doses than prescribed) and withdrawal (if stops abruptly, alexandra if taken more often or at higher doses) including seizures and death  The patient was instructed to not drive or operate heavy machinery while taking benzodiazepines, as the medication can cause increased drowsiness  Patient verbalized understanding and agreed to taper off, as tolerated  Educated on the [address] [place] [address] Approach to mental health  Patient was receptive to education               Diagnoses and all orders for this visit:    Post Traumatic Stress Disorder  Continue Cymbalta 60 mg BID    Severe episode of recurrent major depressive disorder, without psychotic features (HCC)  Continue Cymbalta 60 mg BID  Continue Zyprexa 20 mg HS  Continue Remeron 7 5 mg HS    Anxiety  Continue Cymbalta 60 mg BID  Continue valium 5 mg TID PRN    Panic disorder with agoraphobia  Continue Cymbalta 60 mg BID  Continue valium 5 mg TID PRN    Insomnia, unspecified type  Continue Remeron 7 5 mg HS  Continue Prazosin 2 mg HS    Cigarette nicotine dependence without complication      - Psychoeducation provided regarding the importance of exercise and health dietary choices and their impact on mood, energy, and motivation   - Counseled to avoid ETOH, illicit substances, and nicotine secondary to the detrimental effects of these substances on mental and physical health  - Encouraged to engage in non-verbal forms of therapy such as art therapy, music therapy, and mindfulness  - Psychoeducation regarding medication benefits and risks, side effects, indications and alternatives provided to the patient and the importance of compliance with psychiatric medication reiterated  The Render Andrew verbalized understanding and agreed with the plan  - RTC in 4 weeks  - The patient was educated about 24 hour and weekend coverage for urgent situations accessed by calling [facility] main practice number  - Patient was educated to call [address] [facility] ([phone] [[phone]]) for behavioral crisis at any time, 911 for any safety concerns, or go to nearest ER if her symptoms become overwhelming or unmanageable  Medications Risks/Benefits:      Risks, Benefits And Possible Side Effects Of Medications:    Risks, benefits, and possible side effects of medications explained to Francisca including risk of parkinsonian symptoms, Tardive Dyskinesia and metabolic syndrome related to treatment with antipsychotic medications, risk of cardiovascular events in elderly related to treatment with antipsychotic medications, risk of suicidality and serotonin syndrome related to treatment with antidepressants and risks of misuse, abuse or dependence, sedation and respiratory depression related to treatment with benzodiazepine medications  She verbalizes understanding and agreement for treatment      Controlled Medication Discussion:     Francisca has been filling controlled prescriptions on time as prescribed according to [facility]  Discussed with Gio Jefferson the risks of sedation, respiratory depression, impairment of ability to drive and potential for abuse and addiction related to treatment with benzodiazepine medications  She understands risk of treatment with benzodiazepine medications, agrees to not drive if feels impaired and agrees to take medications as prescribed    Treatment Plan:    Completed and signed during the session: Yes - Treatment Plan done but not signed at time of office visit due to:  Plan reviewed by video and verbal consent given due to Susanne social distancing    Note Share Disclaimer:      This note was not shared with the patient due to reasonable likelihood of causing patient harm      ELISEO Gant 05/12/22

## 2022-05-11 NOTE — TELEPHONE ENCOUNTER
Please tell patient unfortunately she will need to be seen every 3 months based on [facility]'s new guidelines  Please Center new narcotic contract from [facility]    Let her know every other visit can be virtual

## 2022-05-12 ENCOUNTER — TELEMEDICINE (OUTPATIENT)
Dept: PSYCHIATRY | Facility: CLINIC | Age: 52
End: 2022-05-12
Payer: COMMERCIAL

## 2022-05-12 DIAGNOSIS — F40.01 PANIC DISORDER WITH AGORAPHOBIA: ICD-10-CM

## 2022-05-12 DIAGNOSIS — G47.00 INSOMNIA, UNSPECIFIED TYPE: ICD-10-CM

## 2022-05-12 DIAGNOSIS — F17.210 CIGARETTE NICOTINE DEPENDENCE WITHOUT COMPLICATION: ICD-10-CM

## 2022-05-12 DIAGNOSIS — F41.9 ANXIETY: ICD-10-CM

## 2022-05-12 DIAGNOSIS — F43.10 PTSD (POST-TRAUMATIC STRESS DISORDER): ICD-10-CM

## 2022-05-12 DIAGNOSIS — F33.2 SEVERE EPISODE OF RECURRENT MAJOR DEPRESSIVE DISORDER, WITHOUT PSYCHOTIC FEATURES (HCC): Primary | ICD-10-CM

## 2022-05-12 PROCEDURE — 90792 PSYCH DIAG EVAL W/MED SRVCS: CPT | Performed by: NURSE PRACTITIONER

## 2022-05-12 PROCEDURE — 96127 BRIEF EMOTIONAL/BEHAV ASSMT: CPT | Performed by: NURSE PRACTITIONER

## 2022-05-12 NOTE — BH TREATMENT PLAN
TREATMENT PLAN (Medication Management Only)        [employer]    Name and Date of Birth:  Rock Babinski 47 y o  1970  Date of Treatment Plan: May 12, 2022  Diagnosis/Diagnoses:    1  Severe episode of recurrent major depressive disorder, without psychotic features ([address] )    2  Anxiety    3  Panic disorder with agoraphobia    4  Insomnia, unspecified type    5  Cigarette nicotine dependence without complication    6  PTSD (post-traumatic stress disorder)      Strengths/Personal Resources for Self-Care: taking medications as prescribed, ability to communicate needs, ability to listen, ability to reason, ability to understand psychiatric illness, family ties, willingness to work on problems  Area/Areas of need (in own words): anxiety symptoms, depressive symptoms, sleep problems, lack of energy, lack of motivation  1  Long Term Goal: improve memory  Target Date:6 months - 11/12/2022  Person/Persons responsible for completion of goal: Barb  2  Short Term Objective (s) - How will we reach this goal?:   A  Provider new recommended medication/dosage changes and/or continue medication(s): continue current medications as prescribed  B  Take psychiatric medications responsibly  C  Attend support groups  Target Date:6 months - 11/12/2022  Person/Persons Responsible for Completion of Goal: Barb  Progress Towards Goals: continuing treatment  Treatment Modality: medication management every 4 weeks  Review due 180 days from date of this plan: 6 months - 11/12/2022  Expected length of service: ongoing treatment  My Physician/PA/NP and I have developed this plan together and I agree to work on the goals and objectives  I understand the treatment goals that were developed for my treatment

## 2022-05-16 ENCOUNTER — PATIENT OUTREACH (OUTPATIENT)
Dept: FAMILY MEDICINE CLINIC | Facility: CLINIC | Age: 52
End: 2022-05-16

## 2022-06-07 DIAGNOSIS — G89.4 CHRONIC PAIN DISORDER: ICD-10-CM

## 2022-06-07 RX ORDER — GABAPENTIN 400 MG/1
CAPSULE ORAL
Qty: 540 CAPSULE | Refills: 1 | Status: SHIPPED | OUTPATIENT
Start: 2022-06-07

## 2022-06-08 NOTE — PSYCH
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 according to [employer] [facility]  Discussed with Levada Nageotte the risks of sedation, respiratory depression, impairment of ability to drive and potential for abuse and addiction related to treatment with benzodiazepine medications  She understands risk of treatment with benzodiazepine medications, agrees to not drive if feels impaired and agrees to take medications as prescribed  Discussed with [facility] warning on concurrent use of benzodiazepines and opioid medications including sedation, respiratory depression, coma and death   She understands the risk of treatment with benzodiazepines in addition to opioids and wants to continue taking those medications    Psychotherapy Provided:     Individual psychotherapy provided: Yes  Counseling was provided during the session today for 16 minutes  Medication education provided to Jenniffer Dixon discussed during session  Importance of medication and treatment compliance reviewed with Barb  Cognitive therapy was utilized during the session  Reassurance and supportive therapy provided  Crisis/safety plan discussed with Nan Albarado     Treatment Plan:    Completed and signed during the session: Not applicable - Treatment Plan not due at this session    Note Share Disclaimer:      This note was not shared with the patient due to reasonable likelihood of causing patient harm    ELISEO Whitney 06/09/22

## 2022-06-09 ENCOUNTER — TELEMEDICINE (OUTPATIENT)
Dept: PSYCHIATRY | Facility: CLINIC | Age: 52
End: 2022-06-09
Payer: COMMERCIAL

## 2022-06-09 ENCOUNTER — TELEPHONE (OUTPATIENT)
Dept: PSYCHIATRY | Facility: CLINIC | Age: 52
End: 2022-06-09

## 2022-06-09 DIAGNOSIS — G89.4 CHRONIC PAIN DISORDER: ICD-10-CM

## 2022-06-09 DIAGNOSIS — F17.210 CIGARETTE NICOTINE DEPENDENCE WITHOUT COMPLICATION: ICD-10-CM

## 2022-06-09 DIAGNOSIS — F41.9 ANXIETY: ICD-10-CM

## 2022-06-09 DIAGNOSIS — F43.10 POST TRAUMATIC STRESS DISORDER (PTSD): ICD-10-CM

## 2022-06-09 DIAGNOSIS — F33.2 SEVERE EPISODE OF RECURRENT MAJOR DEPRESSIVE DISORDER, WITHOUT PSYCHOTIC FEATURES (HCC): Primary | ICD-10-CM

## 2022-06-09 DIAGNOSIS — G47.00 INSOMNIA, UNSPECIFIED TYPE: ICD-10-CM

## 2022-06-09 DIAGNOSIS — F11.90 OPIOID USE: ICD-10-CM

## 2022-06-09 DIAGNOSIS — F40.01 PANIC DISORDER WITH AGORAPHOBIA: ICD-10-CM

## 2022-06-09 PROCEDURE — 99214 OFFICE O/P EST MOD 30 MIN: CPT | Performed by: NURSE PRACTITIONER

## 2022-06-09 PROCEDURE — 90833 PSYTX W PT W E/M 30 MIN: CPT | Performed by: NURSE PRACTITIONER

## 2022-06-09 RX ORDER — NALOXONE HYDROCHLORIDE 4 MG/.1ML
SPRAY NASAL
Qty: 1 EACH | Refills: 1 | Status: SHIPPED | OUTPATIENT
Start: 2022-06-09

## 2022-06-09 RX ORDER — DIAZEPAM 5 MG/1
5 TABLET ORAL 3 TIMES DAILY PRN
Qty: 90 TABLET | Refills: 0 | Status: SHIPPED | OUTPATIENT
Start: 2022-06-09 | End: 2022-07-18

## 2022-06-09 RX ORDER — MIRTAZAPINE 7.5 MG/1
7.5 TABLET, FILM COATED ORAL
Qty: 90 TABLET | Refills: 1 | Status: SHIPPED | OUTPATIENT
Start: 2022-06-09

## 2022-06-09 NOTE — TELEPHONE ENCOUNTER
Writer called pt to schedule a 4 week follow-up with Vinita Francisco due to [facility] check out notes  I provided the office number to call at earliest convenience to schedule  Thank you

## 2022-06-10 ENCOUNTER — TELEPHONE (OUTPATIENT)
Dept: PSYCHIATRY | Facility: CLINIC | Age: 52
End: 2022-06-10

## 2022-06-14 RX ORDER — AMOXICILLIN 250 MG
1 CAPSULE ORAL 2 TIMES DAILY
COMMUNITY
Start: 2021-12-14 | End: 2022-12-14

## 2022-06-14 RX ORDER — IBUPROFEN 800 MG/1
800 TABLET ORAL EVERY 6 HOURS PRN
COMMUNITY
Start: 2022-01-19

## 2022-06-14 RX ORDER — METHYL SALICYLATE/MENTHOL
1 CREAM (GRAM) TOPICAL 3 TIMES DAILY
COMMUNITY
Start: 2021-12-14

## 2022-06-14 RX ORDER — ENOXAPARIN SODIUM 100 MG/ML
30 INJECTION SUBCUTANEOUS 2 TIMES DAILY
COMMUNITY
Start: 2021-12-14

## 2022-06-14 RX ORDER — LIDOCAINE 4 G/G
1 PATCH TOPICAL DAILY
COMMUNITY
Start: 2021-12-15

## 2022-06-16 ENCOUNTER — TELEPHONE (OUTPATIENT)
Dept: FAMILY MEDICINE CLINIC | Facility: CLINIC | Age: 52
End: 2022-06-16

## 2022-06-16 ENCOUNTER — OFFICE VISIT (OUTPATIENT)
Dept: FAMILY MEDICINE CLINIC | Facility: CLINIC | Age: 52
End: 2022-06-16
Payer: COMMERCIAL

## 2022-06-16 VITALS
HEIGHT: 64 IN | HEART RATE: 115 BPM | TEMPERATURE: 96.1 F | OXYGEN SATURATION: 96 % | SYSTOLIC BLOOD PRESSURE: 104 MMHG | WEIGHT: 99 LBS | BODY MASS INDEX: 16.9 KG/M2 | DIASTOLIC BLOOD PRESSURE: 70 MMHG

## 2022-06-16 DIAGNOSIS — Z23 ENCOUNTER FOR IMMUNIZATION: ICD-10-CM

## 2022-06-16 DIAGNOSIS — G89.4 CHRONIC PAIN SYNDROME: ICD-10-CM

## 2022-06-16 DIAGNOSIS — Z79.4 TYPE 2 DIABETES MELLITUS WITH OTHER NEUROLOGIC COMPLICATION, WITH LONG-TERM CURRENT USE OF INSULIN (HCC): Primary | ICD-10-CM

## 2022-06-16 DIAGNOSIS — R63.4 WEIGHT LOSS: ICD-10-CM

## 2022-06-16 DIAGNOSIS — N76.0 VULVOVAGINITIS: ICD-10-CM

## 2022-06-16 DIAGNOSIS — E11.49 TYPE 2 DIABETES MELLITUS WITH OTHER NEUROLOGIC COMPLICATION, WITH LONG-TERM CURRENT USE OF INSULIN (HCC): Primary | ICD-10-CM

## 2022-06-16 DIAGNOSIS — Z12.31 ENCOUNTER FOR SCREENING MAMMOGRAM FOR BREAST CANCER: ICD-10-CM

## 2022-06-16 PROCEDURE — 83036 HEMOGLOBIN GLYCOSYLATED A1C: CPT | Performed by: NURSE PRACTITIONER

## 2022-06-16 PROCEDURE — 3074F SYST BP LT 130 MM HG: CPT | Performed by: NURSE PRACTITIONER

## 2022-06-16 PROCEDURE — 3008F BODY MASS INDEX DOCD: CPT | Performed by: NURSE PRACTITIONER

## 2022-06-16 PROCEDURE — 3078F DIAST BP <80 MM HG: CPT | Performed by: NURSE PRACTITIONER

## 2022-06-16 PROCEDURE — 99215 OFFICE O/P EST HI 40 MIN: CPT | Performed by: NURSE PRACTITIONER

## 2022-06-16 NOTE — TELEPHONE ENCOUNTER
I called [facility] for a follow up on closed sleeve fracture of left patell, initial encounter/ with pain possible ?   I spoke with Amy Nova  Next available is wed 06/22 at 1:30pm with Dr Olea   At [address] [place] [address] and [address] [place] [address] [address] [place] [address], [address] [place] [address]   [phone]

## 2022-06-16 NOTE — PROGRESS NOTES
Assessment/Plan:   Diagnosis ICD-10-CM Associated Orders   1  Type 2 diabetes mellitus with other neurologic complication, with long-term current use of insulin (HCC)  E11 49 POCT hemoglobin A1c    Z79 4 CBC and differential     Comprehensive metabolic panel     TSH, 3rd generation with Free T4 reflex     Lipid panel     pH, venous     Hemoglobin A1C   2  Encounter for immunization  Z23    3  Encounter for screening mammogram for breast cancer  Z12 31 Mammo screening bilateral w 3d & cad   4  Chronic pain syndrome  G89 4 traMADol (ULTRAM) 50 mg tablet   5  Vulvovaginitis  N76 0 fluconazole (DIFLUCAN) 150 mg tablet   6  Weight loss  R63 4        Patient is not well appearing  No acute distress  Unkempt  Foot exam shows very poor foot hygiene  Rapid weight loss likely secondary to DM but cannot rule out malignancy at this time, screenings nto UTD  Diabetes very poorly managed/ not being managed by patient at all  She has been very non adherent to any treatment plans  She is primary caregiver to  currently and not taking care of herself  A1C in office won't register which means it's > 15 and glucose wouldn't register either which means > 500  She states her glucose numbers at home aren't that high and she's been drinking iced tea all morning/didn't take insulin  Declines Hep C screening  Has complaints of yeast infection, diflucan sent but advised patient this is secondary to uncontrolled DM  Patient's health status at this time requires emergent attention/ inpatient management  Likely in DKA  Advised to patient she needs to go to the ER and she refuses  Discussed risks of not going including death  Patient verbalized understanding and refused to go to the hospital  She will complete labs today across the spear  She will go directly home to take insulin and recheck glucose  Advised on dietary changes needed  Very low sugar/ low carb  No iced tea   Advised to soak feet and wash them thoroughly to release this coating present  Follow up in 3 weeks- okay to be virtual since this is easier for patient  F/u with ortho  [place] prescription drug monitoring program was checked and verified for refill  Severity of patient's medical status was very strongly discussed with patient and why ER is necessary at this time  After thorough education and explanation, patient still refused  Patient very anxious at appointment today and rushed to get home to her   Advised to call the office for any worsening of symptoms or no symptom improvement  Patient verbalizes understand and agrees with treatment plan  Diagnoses and all orders for this visit:    Type 2 diabetes mellitus with other neurologic complication, with long-term current use of insulin (HCC)  -     POCT hemoglobin A1c  -     CBC and differential; Future  -     Comprehensive metabolic panel; Future  -     TSH, 3rd generation with Free T4 reflex; Future  -     Lipid panel; Future  -     pH, venous; Future  -     Hemoglobin A1C; Future    Encounter for immunization    Encounter for screening mammogram for breast cancer  -     Mammo screening bilateral w 3d & cad; Future    Chronic pain syndrome  -     traMADol (ULTRAM) 50 mg tablet; Take 1 tablet (50 mg total) by mouth every 6 (six) hours as needed for moderate pain or severe pain Do not take within 6 hours of valium or Fioricet    Vulvovaginitis  -     fluconazole (DIFLUCAN) 150 mg tablet; Take 1 tablet (150 mg total) by mouth once for 1 dose    Weight loss    Other orders  -     enoxaparin (LOVENOX) 30 mg/0 3 mL; Inject 30 mg under the skin 2 (two) times a day (Patient not taking: Reported on 6/16/2022)  -     ibuprofen (MOTRIN) 800 mg tablet; Take 800 mg by mouth every 6 (six) hours as needed  -     insulin aspart, w/niacinamide, (FIASP) 100 Units/mL injection pen;  Inject under the skin  -     Lidocaine 4 % PTCH; Place 1 patch on the skin daily  -     Menthol-Methyl Salicylate (Thera-Gesic) 0 5-15 % CREA; Apply 1 application topically Three times a day (Patient not taking: Reported on 6/16/2022)  -     senna-docusate sodium (SENOKOT S) 8 6-50 mg per tablet; Take 1 tablet by mouth 2 (two) times a day              Subjective:        Patient ID: Lu Jean is a 47 y o  female  Chief Complaint   Patient presents with    Follow-up     Narcotic meds, Pt is having a lot of pain in hands, feet,neck, and back  Has called Ortho x 3 days and no response        Here for follow up regarding chronic opioid use and diabetes  She didn't take insulin today  She has been drinking iced tea all morning  This is not diet  She has been drinking 2 gallons of regular iced tea a day  Currently does 19 units novolog TID with meals and 50 units Lantuss solo star bedtime  She is non adherent to medication treatment plan  She states she does monitor glucose at home  Last checked last night around 1930 and it was 293  She has had substantial weight loss over past 6 months  Lab are long overdue  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    helping set up hospice aids/ respite care for her  Finances are limited  She states she is feeling okay and functioning okay and her concern today is the orthopedic pain  Daughter brought to retirement today for missing a  Court day so she doesn't have that help at home anymore  She is very anxious being here and wants to leave as soon as possible to get back home to her   The following portions of the patient's history were reviewed and updated as appropriate: allergies, current medications, past family history, past social history and problem list     Review of Systems   Constitutional: Positive for fatigue and unexpected weight change  Negative for chills and fever  Eyes: Negative for discharge  Respiratory: Negative for shortness of breath  Cardiovascular: Negative for chest pain  Gastrointestinal: Negative for constipation and diarrhea  Genitourinary: Positive for frequency and vaginal discharge  Negative for difficulty urinating  Musculoskeletal: Negative for joint swelling  Skin: Negative for rash  Neurological: Negative for headaches  Hematological: Negative for adenopathy  Psychiatric/Behavioral: The patient is nervous/anxious  Objective:  /70 (BP Location: Right arm, Patient Position: Sitting, Cuff Size: Adult)   Pulse (!) 115   Temp (!) 96 1 °F (35 6 °C) (Temporal)   Ht 5' 4" (1 626 m)   Wt 44 9 kg (99 lb)   LMP 03/04/2016   SpO2 96%   BMI 16 99 kg/m²      Physical Exam  Vitals and nursing note reviewed  Constitutional:       General: She is not in acute distress  Appearance: She is underweight  She is ill-appearing  She is not diaphoretic  HENT:      Head: Normocephalic and atraumatic  Right Ear: External ear normal       Left Ear: External ear normal    Eyes:      General: Lids are normal          Right eye: No discharge  Left eye: No discharge  Conjunctiva/sclera: Conjunctivae normal    Cardiovascular:      Rate and Rhythm: Normal rate and regular rhythm  Heart sounds: No murmur heard  Pulmonary:      Effort: Pulmonary effort is normal  No respiratory distress  Breath sounds: Normal breath sounds  No wheezing  Musculoskeletal:         General: No deformity  Cervical back: Neck supple  Feet:      Comments: Black coating of some foreign substance on both feet  See foot exam documentation  This substance is easily removed  Skin under the substance appears to be healthy  No signs of necrosis on exam  No wounds seen but exam limited due to this coating  Skin:     General: Skin is warm and dry  Neurological:      Mental Status: She is alert and oriented to person, place, and time  Psychiatric:         Speech: Speech normal          Behavior: Behavior normal          Thought Content:  Thought content normal          Judgment: Judgment normal            BMI Counseling: Body mass index is 16 99 kg/m²  The BMI is below normal  Patient advised to gain weight  Rationale for BMI follow-up plan is due to patient being underweight  Current Outpatient Medications:     acarbose (PRECOSE) 50 mg tablet, Take 1 tablet (50 mg total) by mouth 3 (three) times a day with meals, Disp: 90 tablet, Rfl: 4    Adalimumab (HUMIRA PEN SC), Inject under the skin, Disp: , Rfl:     Advair Diskus 500-50 MCG/DOSE inhaler, INHALE 1 PUFF BY MOUTH 2 TIMES PER DAY  RINSE MOUTH AFTER USE , Disp: 1 Inhaler, Rfl: 5    Albuterol Sulfate (ProAir RespiClick) 460 (90 Base) MCG/ACT AEPB, Inhale 2 puffs every 6 (six) hours as needed (wheezing), Disp: 1 each, Rfl: 1    aspirin 81 mg chewable tablet, Chew 81 mg daily , Disp: , Rfl:     atorvastatin (LIPITOR) 80 mg tablet, TAKE 1 TABLET BY MOUTH EVERY DAY, Disp: 90 tablet, Rfl: 3    baclofen 10 mg tablet, TAKE 1 TABLET BY MOUTH THREE TIMES A DAY AS NEEDED, Disp: 270 tablet, Rfl: 2    BD PEN NEEDLE LINDY U/F 32G X 4 MM MISC, Inject under the skin daily, Disp: 30 each, Rfl: 5    butalbital-acetaminophen-caffeine (FIORICET,ESGIC) -40 mg per tablet, TAKE 1 TABLET EVERY 6 HOURS AS NEEDED FOR MIGRAINE   PLEASE LIMIT 3-4 PER WEEK, 15 PER MONTH , Disp: 15 tablet, Rfl: 0    Continuous Blood Gluc  (FreeStyle Sherine 2 [place]) ALPESH, Please dispense 1 [place], Disp: 1 each, Rfl: 0    Continuous Blood Gluc Sensor (FreeStyle Sherine 2 Sensor) MISC, Use sherine sensor every 15 days, Disp: 2 each, Rfl: 4    Cyanocobalamin (VITAMIN B-12 IJ), Inject as directed, Disp: , Rfl:     Cyanocobalamin 1000 MCG/ML LIQD, Take 500 mcg by mouth daily, Disp: , Rfl:     diazepam (VALIUM) 5 mg tablet, Take 1 tablet (5 mg total) by mouth 3 (three) times a day as needed for anxiety, Disp: 90 tablet, Rfl: 0    DULoxetine (CYMBALTA) 60 mg delayed release capsule, Take 1 capsule (60 mg total) by mouth 2 (two) times a day, Disp: 180 capsule, Rfl: 1    Emgality 120 MG/ML SOAJ, INJECT 1 PEN SUBQ EVERY 30 DAYS , Disp: 1 mL, Rfl: 11    ergocalciferol (VITAMIN D2) 50,000 units, 1 TAB ONCE WEEKLY, Disp: 12 capsule, Rfl: 0    fluconazole (DIFLUCAN) 150 mg tablet, Take 1 tablet (150 mg total) by mouth once for 1 dose, Disp: 1 tablet, Rfl: 0    Folic Acid 0 8 MG CAPS, Take 0 04 mg by mouth daily  , Disp: , Rfl:     gabapentin (NEURONTIN) 400 mg capsule, TAKE 2 CAPSULES BY MOUTH 3 TIMES A DAY, Disp: 540 capsule, Rfl: 1    ibuprofen (MOTRIN) 800 mg tablet, Take 800 mg by mouth every 6 (six) hours as needed, Disp: , Rfl:     insulin aspart, w/niacinamide, (FIASP) 100 Units/mL injection pen, Inject under the skin, Disp: , Rfl:     insulin glargine (Lantus SoloStar) 100 units/mL injection pen, INJECT 45 UNITS IN AM, Disp: 45 mL, Rfl: 0    Insulin Pen Needle (BD Pen Needle Kimberli U/F) 32G X 4 MM MISC, by Does not apply route daily, Disp: 100 each, Rfl: 3    insuln lispro (HumaLOG KwikPen) 200 units/mL CONCENTRATED U-200 injection pen, Inject 15 units with meals +Scale (  Scale - 150-200 -2 units, 201-250-4 units, 251-300 -6 units, 301-350-8 units, > 350- 10 units ), Disp: 6 pen, Rfl: 3    levothyroxine 112 mcg tablet, TAKE 1 TABLET BY MOUTH EVERY DAY, Disp: 90 tablet, Rfl: 1    Lidocaine 4 % PTCH, Place 1 patch on the skin daily, Disp: , Rfl:     losartan (COZAAR) 50 mg tablet, TAKE 1 TABLET DAILY  , Disp: 90 tablet, Rfl: 2    metoprolol tartrate (LOPRESSOR) 25 mg tablet, TAKE 1 TABLET BY MOUTH EVERY DAY, Disp: 90 tablet, Rfl: 2    mirtazapine (REMERON) 7 5 MG tablet, Take 1 tablet (7 5 mg total) by mouth daily at bedtime, Disp: 90 tablet, Rfl: 1    naloxone (Narcan) 4 mg/0 1 mL nasal spray, 1 actuation in one nostril once as needed for opioid overdose, may repeat dose every 2-3 minutes until patient responsive or EMS arrives, Disp: 1 each, Rfl: 1    Nutritional Supplements (Glucerna Advance Shake) LIQD, 1 daily  , Disp: 30 Bottle, Rfl: 0    OLANZapine (ZyPREXA) 20 MG tablet, Take 1 tablet (20 mg total) by mouth daily at bedtime, Disp: 90 tablet, Rfl: 1    omeprazole (PriLOSEC) 20 mg delayed release capsule, TAKE 1 CAPSULE BY MOUTH EVERY DAY, Disp: 90 capsule, Rfl: 1    ondansetron (ZOFRAN) 4 mg tablet, 1 TAB EVERY 6 HRS AS NEEDED FOR NAUSEA   HOLD COMPAZINE , Disp: 20 tablet, Rfl: 2    OneTouch Ultra test strip, USE AS DIRECTED TO TEST 3 TIMES A DAY, Disp: 100 strip, Rfl: 0    prazosin (MINIPRESS) 2 mg capsule, Take 1 capsule (2 mg total) by mouth daily at bedtime, Disp: 90 capsule, Rfl: 1    prochlorperazine (COMPAZINE) 10 mg tablet, Take 1 tablet (10 mg total) by mouth every 6 (six) hours as needed for nausea or vomiting, Disp: 90 tablet, Rfl: 1    senna-docusate sodium (SENOKOT S) 8 6-50 mg per tablet, Take 1 tablet by mouth 2 (two) times a day, Disp: , Rfl:     topiramate (TOPAMAX) 100 mg tablet, TAKE 2 TABLETS BY MOUTH EVERY DAY, Disp: 180 tablet, Rfl: 1    topiramate (TOPAMAX) 25 mg tablet, TAKE 1 TABLET BY MOUTH EVERY DAY, Disp: 90 tablet, Rfl: 1    traMADol (ULTRAM) 50 mg tablet, Take 1 tablet (50 mg total) by mouth every 6 (six) hours as needed for moderate pain or severe pain Do not take within 6 hours of valium or Fioricet, Disp: 120 tablet, Rfl: 0    enoxaparin (LOVENOX) 30 mg/0 3 mL, Inject 30 mg under the skin 2 (two) times a day (Patient not taking: Reported on 6/16/2022), Disp: , Rfl:     hydrocortisone (CORTEF) 5 mg tablet, Take one tablet in AM and 1 tablet at PM for 2 weeks and then take 1 tablet for 2 weeks and then stop ( go for blood work in 1 week at 8 AM ) (Patient not taking: Reported on 6/16/2022), Disp: 50 tablet, Rfl: 5    Menthol-Methyl Salicylate (Thera-Gesic) 0 5-15 % CREA, Apply 1 application topically Three times a day (Patient not taking: Reported on 6/16/2022), Disp: , Rfl:     methotrexate 2 5 MG tablet, , Disp: , Rfl:     NEEDLE, DISP, 25 G (B-D DISP NEEDLE 25GX1") 25G X 1" MISC, by Does not apply route once as needed (opiod overdose) for up to 1 dose (Patient not taking: Reported on 6/16/2022), Disp: 3 each, Rfl: 0    Nerve Stimulator (TENS Therapy Pain Relief) ALPESH, 1 Device by Does not apply route as needed (myofascial pain) (Patient not taking: No sig reported), Disp: 1 Device, Rfl: 0    Turmeric 500 MG TABS, Take 1 tablet by mouth daily (Patient not taking: No sig reported), Disp: , Rfl:   Allergies   Allergen Reactions    Medical Tape Rash    Lexapro [Escitalopram Oxalate]     Escitalopram Other (See Comments) and Palpitations    Other Hives and Rash     Adhesive Tape

## 2022-06-16 NOTE — PATIENT INSTRUCTIONS
Take insulin immediately when you get home  Cut out sugar in diet  Low carb diet  Monitor glucose VERY closely  My recommendation at this time is for you to be evaluated at the ER which you have refused  Complete labs as soon as possible  Follow up with endocrinology  We will try to reach out to ortho for you  Monitor sugar four times a day  Write down numbers call weekly with numbers so adjustments can be made  Follow up in 3 weeks  Virtual is fine  Please call the office if you are experiencing any worsening of symptoms or no symptom improvement

## 2022-06-16 NOTE — TELEPHONE ENCOUNTER
----- Message from [address] sent at 6/16/2022  2:32 PM EDT -----  She has been trying to get in contact with ortho but no one is calling her back  Can we please try calling for her and making appointment?        [facility]   [address]    [address]    [place], [address] [facility]   [phone]   [address]   [address] [address]    [place], [address]   [phone] [place] [facility]   [phone] (Fax)   Closed sleeve fracture of left patella, initial encounter (Primary Dx)

## 2022-06-16 NOTE — PROGRESS NOTES
Diabetic Foot Exam    Patient's shoes and socks removed  Right Foot/Ankle   Right Foot Inspection  Skin Exam: skin normal, skin intact and abnormal color  No dry skin, no warmth, no callus, no erythema, no maceration, no pre-ulcer, no ulcer and no callus  Toe Exam: ROM and strength within normal limits and right toe deformity  Sensory   Monofilament testing: absent    Vascular  Capillary refills: < 3 seconds  The right DP pulse is 2+  Left Foot/Ankle  Left Foot Inspection  Skin Exam: skin normal, skin intact and abnormal color  No dry skin, no warmth, no erythema, no maceration, no pre-ulcer, no ulcer and no callus  Toe Exam: ROM and strength within normal limits and left toe deformity  Sensory   Monofilament testing: absent    Vascular  Capillary refills: < 3 seconds  The left DP pulse is 2+       Assign Risk Category  Deformity present  Loss of protective sensation  No weak pulses  Risk: 2

## 2022-06-17 RX ORDER — FLUCONAZOLE 150 MG/1
150 TABLET ORAL ONCE
Qty: 1 TABLET | Refills: 0 | Status: SHIPPED | OUTPATIENT
Start: 2022-06-17 | End: 2022-06-17

## 2022-06-17 RX ORDER — TRAMADOL HYDROCHLORIDE 50 MG/1
50 TABLET ORAL EVERY 6 HOURS PRN
Qty: 120 TABLET | Refills: 0 | Status: SHIPPED | OUTPATIENT
Start: 2022-06-17 | End: 2022-07-18

## 2022-06-21 ENCOUNTER — TELEPHONE (OUTPATIENT)
Dept: ADMINISTRATIVE | Facility: OTHER | Age: 52
End: 2022-06-21

## 2022-06-21 NOTE — TELEPHONE ENCOUNTER
06/21/22 12:44 PM    The patient was called and a message was left to call the PCP office regarding an open order  Thank you    Zoey Wharton MA  [facility]

## 2022-07-06 ENCOUNTER — APPOINTMENT (OUTPATIENT)
Dept: LAB | Facility: HOSPITAL | Age: 52
End: 2022-07-06
Payer: COMMERCIAL

## 2022-07-06 ENCOUNTER — LAB (OUTPATIENT)
Dept: LAB | Facility: CLINIC | Age: 52
End: 2022-07-06
Payer: COMMERCIAL

## 2022-07-06 ENCOUNTER — OFFICE VISIT (OUTPATIENT)
Dept: FAMILY MEDICINE CLINIC | Facility: CLINIC | Age: 52
End: 2022-07-06
Payer: COMMERCIAL

## 2022-07-06 VITALS
HEIGHT: 64 IN | DIASTOLIC BLOOD PRESSURE: 66 MMHG | SYSTOLIC BLOOD PRESSURE: 92 MMHG | BODY MASS INDEX: 16.39 KG/M2 | OXYGEN SATURATION: 96 % | TEMPERATURE: 96.7 F | HEART RATE: 111 BPM | WEIGHT: 96 LBS

## 2022-07-06 DIAGNOSIS — I10 BENIGN ESSENTIAL HYPERTENSION: ICD-10-CM

## 2022-07-06 DIAGNOSIS — E03.9 ACQUIRED HYPOTHYROIDISM: ICD-10-CM

## 2022-07-06 DIAGNOSIS — I67.82 TEMPORARY CEREBRAL VASCULAR DYSFUNCTION: ICD-10-CM

## 2022-07-06 DIAGNOSIS — E11.65 TYPE 2 DIABETES MELLITUS WITH HYPERGLYCEMIA, WITH LONG-TERM CURRENT USE OF INSULIN (HCC): ICD-10-CM

## 2022-07-06 DIAGNOSIS — G43.709 CHRONIC MIGRAINE WITHOUT AURA WITHOUT STATUS MIGRAINOSUS, NOT INTRACTABLE: ICD-10-CM

## 2022-07-06 DIAGNOSIS — K21.9 GERD WITHOUT ESOPHAGITIS: ICD-10-CM

## 2022-07-06 DIAGNOSIS — Z79.4 TYPE 2 DIABETES MELLITUS WITH HYPERGLYCEMIA, WITH LONG-TERM CURRENT USE OF INSULIN (HCC): ICD-10-CM

## 2022-07-06 DIAGNOSIS — Z01.818 PRE-OP EXAMINATION: Primary | ICD-10-CM

## 2022-07-06 DIAGNOSIS — E11.49 TYPE 2 DIABETES MELLITUS WITH OTHER NEUROLOGIC COMPLICATION, WITH LONG-TERM CURRENT USE OF INSULIN (HCC): ICD-10-CM

## 2022-07-06 DIAGNOSIS — G89.4 CHRONIC PAIN SYNDROME: ICD-10-CM

## 2022-07-06 DIAGNOSIS — Z79.4 TYPE 2 DIABETES MELLITUS WITH OTHER NEUROLOGIC COMPLICATION, WITH LONG-TERM CURRENT USE OF INSULIN (HCC): ICD-10-CM

## 2022-07-06 DIAGNOSIS — K86.2 PANCREATIC CYST: ICD-10-CM

## 2022-07-06 LAB
ALBUMIN SERPL BCP-MCNC: 3.6 G/DL (ref 3.5–5)
ALP SERPL-CCNC: 124 U/L (ref 46–116)
ALT SERPL W P-5'-P-CCNC: 31 U/L (ref 12–78)
ANION GAP SERPL CALCULATED.3IONS-SCNC: 11 MMOL/L (ref 4–13)
AST SERPL W P-5'-P-CCNC: 18 U/L (ref 5–45)
BASOPHILS # BLD AUTO: 0.08 THOUSANDS/ΜL (ref 0–0.1)
BASOPHILS NFR BLD AUTO: 1 % (ref 0–1)
BILIRUB SERPL-MCNC: 0.24 MG/DL (ref 0.2–1)
BUN SERPL-MCNC: 8 MG/DL (ref 5–25)
CALCIUM SERPL-MCNC: 9.5 MG/DL (ref 8.3–10.1)
CHLORIDE SERPL-SCNC: 99 MMOL/L (ref 100–108)
CHOLEST SERPL-MCNC: 206 MG/DL
CO2 SERPL-SCNC: 28 MMOL/L (ref 21–32)
CREAT SERPL-MCNC: 0.76 MG/DL (ref 0.6–1.3)
EOSINOPHIL # BLD AUTO: 0.13 THOUSAND/ΜL (ref 0–0.61)
EOSINOPHIL NFR BLD AUTO: 2 % (ref 0–6)
ERYTHROCYTE [DISTWIDTH] IN BLOOD BY AUTOMATED COUNT: 11.5 % (ref 11.6–15.1)
GFR SERPL CREATININE-BSD FRML MDRD: 91 ML/MIN/1.73SQ M
GLUCOSE P FAST SERPL-MCNC: 278 MG/DL (ref 65–99)
HCT VFR BLD AUTO: 44.6 % (ref 34.8–46.1)
HDLC SERPL-MCNC: 89 MG/DL
HGB BLD-MCNC: 15.5 G/DL (ref 11.5–15.4)
IMM GRANULOCYTES # BLD AUTO: 0.04 THOUSAND/UL (ref 0–0.2)
IMM GRANULOCYTES NFR BLD AUTO: 1 % (ref 0–2)
LDLC SERPL CALC-MCNC: 86 MG/DL (ref 0–100)
LYMPHOCYTES # BLD AUTO: 2.86 THOUSANDS/ΜL (ref 0.6–4.47)
LYMPHOCYTES NFR BLD AUTO: 35 % (ref 14–44)
MCH RBC QN AUTO: 32.7 PG (ref 26.8–34.3)
MCHC RBC AUTO-ENTMCNC: 34.8 G/DL (ref 31.4–37.4)
MCV RBC AUTO: 94 FL (ref 82–98)
MONOCYTES # BLD AUTO: 0.53 THOUSAND/ΜL (ref 0.17–1.22)
MONOCYTES NFR BLD AUTO: 7 % (ref 4–12)
NEUTROPHILS # BLD AUTO: 4.55 THOUSANDS/ΜL (ref 1.85–7.62)
NEUTS SEG NFR BLD AUTO: 54 % (ref 43–75)
NONHDLC SERPL-MCNC: 117 MG/DL
NRBC BLD AUTO-RTO: 0 /100 WBCS
PH BLDV: 7.35 [PH] (ref 7.3–7.4)
PLATELET # BLD AUTO: 305 THOUSANDS/UL (ref 149–390)
PMV BLD AUTO: 10.3 FL (ref 8.9–12.7)
POTASSIUM SERPL-SCNC: 3.5 MMOL/L (ref 3.5–5.3)
PROT SERPL-MCNC: 7.6 G/DL (ref 6.4–8.2)
RBC # BLD AUTO: 4.74 MILLION/UL (ref 3.81–5.12)
SODIUM SERPL-SCNC: 138 MMOL/L (ref 136–145)
TRIGL SERPL-MCNC: 157 MG/DL
TSH SERPL DL<=0.05 MIU/L-ACNC: 3.08 UIU/ML (ref 0.45–4.5)
WBC # BLD AUTO: 8.19 THOUSAND/UL (ref 4.31–10.16)

## 2022-07-06 PROCEDURE — 83036 HEMOGLOBIN GLYCOSYLATED A1C: CPT

## 2022-07-06 PROCEDURE — 80053 COMPREHEN METABOLIC PANEL: CPT

## 2022-07-06 PROCEDURE — 82800 BLOOD PH: CPT

## 2022-07-06 PROCEDURE — 36415 COLL VENOUS BLD VENIPUNCTURE: CPT

## 2022-07-06 PROCEDURE — 80061 LIPID PANEL: CPT

## 2022-07-06 PROCEDURE — 3078F DIAST BP <80 MM HG: CPT | Performed by: NURSE PRACTITIONER

## 2022-07-06 PROCEDURE — 85025 COMPLETE CBC W/AUTO DIFF WBC: CPT

## 2022-07-06 PROCEDURE — 99214 OFFICE O/P EST MOD 30 MIN: CPT | Performed by: NURSE PRACTITIONER

## 2022-07-06 PROCEDURE — 3074F SYST BP LT 130 MM HG: CPT | Performed by: NURSE PRACTITIONER

## 2022-07-06 PROCEDURE — 84443 ASSAY THYROID STIM HORMONE: CPT

## 2022-07-06 NOTE — PROGRESS NOTES
Subjective:      Migel Ariza is a 47 y o  female who presents to the office today for a preoperative consultation at the request of surgeon Dr Tisha Pfeiffer who plans on performing LEFT KNEE ARTHROSCOPY, DEBRIDE SCAR/CHONDROPLASTY, OPEN REMOVAL PATELLA HARDWARE   on July 8  Planned anesthesia is choice/TBD  The patient has the following known anesthesia issues: no personal hx of adverse reactions  Patient has a bleeding risk of: no recent abnormal bleeding  Review of Systems  Review of Systems   Constitutional: Positive for fatigue and unexpected weight change  Negative for chills and fever  Eyes: Negative for discharge  Respiratory: Negative for shortness of breath  Cardiovascular: Negative for chest pain  Gastrointestinal: Negative for constipation and diarrhea  Endocrine: Positive for polyphagia and polyuria  Genitourinary: Negative for difficulty urinating  Musculoskeletal: Positive for arthralgias  Negative for joint swelling  Skin: Negative for rash  Neurological: Negative for headaches  Hematological: Negative for adenopathy  Psychiatric/Behavioral: The patient is not nervous/anxious  Objective:      Physical Exam    BP 92/66 (BP Location: Left arm, Patient Position: Sitting, Cuff Size: Adult)   Pulse (!) 111   Temp (!) 96 7 °F (35 9 °C) (Temporal)   Ht 5' 4" (1 626 m)   Wt 43 5 kg (96 lb)   LMP 03/04/2016   SpO2 96%   BMI 16 48 kg/m²     Physical Exam  Vitals and nursing note reviewed  Constitutional:       General: She is not in acute distress  Appearance: She is underweight  She is ill-appearing  HENT:      Head: Normocephalic and atraumatic  Right Ear: External ear normal       Left Ear: External ear normal    Eyes:      General: Lids are normal          Right eye: No discharge  Left eye: No discharge  Conjunctiva/sclera: Conjunctivae normal    Cardiovascular:      Rate and Rhythm: Normal rate and regular rhythm  Heart sounds: No murmur heard  Pulmonary:      Effort: Pulmonary effort is normal  No respiratory distress  Breath sounds: Normal breath sounds  No wheezing  Musculoskeletal:         General: No deformity  Cervical back: Neck supple  Skin:     General: Skin is warm and dry  Neurological:      Mental Status: She is alert and oriented to person, place, and time  Psychiatric:         Speech: Speech normal          Behavior: Behavior normal          Thought Content: Thought content normal          Judgment: Judgment normal             Predictors of intubation difficulty:  Morbid obesity? no  Anatomically abnormal facies? no  Short, thick neck? no  Neck range of motion: normal    Cardiographics  ECG:  SINUS TACHYCARDIA   OTHERWISE NORMAL ECG   WHEN COMPARED WITH ECG OF 02-DEC-2021 09:03,   NO SIGNIFICANT CHANGE WAS FOUND      Confirmed By  Confirmed by Alexei Whitaker MD, PANKAJ ([id number]) on 6/24/2022 2:27:44 PM        Imaging  None indicated per surgeon  Lab Review   Not yet completed by patient  Assessment:      47 y o  female with planned surgery as above  Known risk factors for perioperative complications: Diabetes mellitus      Can walk 2 blocks without difficulty: yes other than knee pain  Can walk up 2 flights of stairs without difficulty: yes other than knee pain     1  Pre-op examination     2  Chronic pain syndrome     3  GERD without esophagitis     4  Type 2 diabetes mellitus with hyperglycemia, with long-term current use of insulin (HCC)     5  Acquired hypothyroidism     6  Benign essential hypertension     7  Chronic migraine without aura without status migrainosus, not intractable     8  Temporary cerebral vascular dysfunction     9  Pancreatic cyst            Plan:          Patient is NOT medically cleared for surgery  Labs need to be completed prior to surgery which would need to be done today  Advised patient that if labs not stable she will not be cleared for surgery   Will be in contact with patient when labs back to be reviewed  Patient still having weight loss and likely not candidate to safely proceed with surgery  Advised patient she needs to seriously focus on her health and addressed the potential severe risks at hand  Patient verbalized understanding  Medical tape, Lexapro [escitalopram oxalate], Escitalopram, and Other  Past Medical History:   Diagnosis Date    Acute venous embolism and thrombosis of deep vessels of distal lower extremity (HCC)     Anemia     Anxiety     last assessed 11/20/17    Arthritis     Asthma     Cerebral infarction ([facility] [address] )     unspecified, last assessed 11/14/16    Chest pain     last assessed 5/9/17    Chronic cough     last assessed 12/12/13    Depression     Diabetes mellitus, type 2 ([facility] [address] )     DJD (degenerative joint disease)     Esophageal reflux     Fibromyalgia     GERD without esophagitis     resolved 5/13/16    History of pulmonary embolism     Hyperlipidemia     Hypertension     Hypothyroidism     Iron deficiency     Pancreatitis     Panic attack     Panic disorder     Polyarthritis     PTSD (post-traumatic stress disorder)     Sleep difficulties     Stroke syndrome     Thyroid disease     TIA (transient ischemic attack)     TIA (transient ischemic attack)     Venous embolism and thrombosis of deep vessels of distal lower extremity ([facility] [address] )     Vitamin B12 deficiency     Vitamin D deficiency      Past Surgical History:   Procedure Laterality Date    CARPAL TUNNEL RELEASE Right     neuroplasty decompression of median nerve    CATARACT EXTRACTION      CHOLECYSTECTOMY      ERCP      ERCP      ESOPHAGOGASTRIC FUNDOPLASTY      NISSEN FUNDOPLICATION      PATELLA SURGERY Left 12/2021    KS ESOPHAGOGASTRODUODENOSCOPY TRANSORAL DIAGNOSTIC N/A 11/02/2016    Procedure: EGD AND COLONOSCOPY;  Surgeon: Piter Orlando MD;  [facility]: [facility];   Service: Gastroenterology    KS INCISE FINGER TENDON SHEATH Right 03/08/2016 Procedure: RELEASE TRIGGER FINGER RIGHT THUMB;  Surgeon: Shweta Bonilla MD;  [facility];  Service: Orthopedics    MO WRIST [place] LIG Right 03/08/2016    Procedure: RELEASE CARPAL TUNNEL ENDOSCOPIC;  Surgeon: Shweta Bonilla MD;  [facility];  Service: Orthopedics    TONSILLECTOMY AND ADENOIDECTOMY       Patient Active Problem List   Diagnosis    Diabetes mellitus ([facility] [address] )    Depression    GERD without esophagitis    History of decompression of median nerve    Hypothyroidism    Protein calorie malnutrition ([facility] [address] )    Nicotine dependence    Anxiety    Benign essential hypertension    Chronic fatigue    Chronic pain syndrome    Fatty liver    Hyperlipidemia    Insomnia    Iron deficiency    Chronic diarrhea    Opioid dependence ([facility] [address] )    Pancreatic cyst    History of stroke    Vitamin B12 deficiency    Vitamin D deficiency    Reactive airway disease without complication    Temporary cerebral vascular dysfunction    History of pulmonary embolism    Arthralgia of temporomandibular joint    Carpal tunnel syndrome    Dysphagia    Chronic migraine without aura without status migrainosus, not intractable    Diplopia    ROGERIO positive    Prolonged QT interval    Symptom associated with female genital organs    Irregular menstrual cycle    Female stress incontinence    Decreased libido    Chronic cervical pain    Paresthesia and pain of both upper extremities    Paresthesia of both lower extremities    Chronic pain of both shoulders    Cervical radiculopathy    Lumbar radiculopathy    DDD (degenerative disc disease), cervical    DDD (degenerative disc disease), lumbar    Low back pain with sciatica    Cervical spinal stenosis    Cervical spondylosis without myelopathy    Cervical myofascial pain syndrome    Low serum cortisol level (HCC)    Neuropathy    Weakness of right upper extremity    Chronic migraine without aura    Nausea Social History     Socioeconomic History    Marital status: /Civil Union     Spouse name: None    Number of children: 1    Years of education: technical school    Highest education level: Associate degree: occupational, technical, or vocational program   Occupational History    Occupation: unemployed     Comment: SSDI   Tobacco Use    Smoking status: Current Every Day Smoker     Packs/day: 0 50     Years: 35 00     Pack years: 17 50     Types: Cigarettes     Start date: 1/1/1983    Smokeless tobacco: Never Used    Tobacco comment: 21 + years   Vaping Use    Vaping Use: Former   Substance and Sexual Activity    Alcohol use: Not Currently     Alcohol/week: 0 0 standard drinks     Comment: last was in 2010 after DUI    Drug use: No    Sexual activity: Yes     Partners: Male   Other Topics Concern    None   Social History Narrative    No coffee consumption     Social Determinants of Health     Financial Resource Strain: Not on file   Food Insecurity: Not on file   Transportation Needs: Unknown    Lack of Transportation (Medical): Not on file    Lack of Transportation (Non-Medical): No   Physical Activity: Not on file   Stress: Not on file   Social Connections: Not on file   Intimate Partner Violence: Not on file   Housing Stability: Not on file       Current Outpatient Medications:     acarbose (PRECOSE) 50 mg tablet, Take 1 tablet (50 mg total) by mouth 3 (three) times a day with meals, Disp: 90 tablet, Rfl: 4    Adalimumab (HUMIRA PEN SC), Inject under the skin, Disp: , Rfl:     Advair Diskus 500-50 MCG/DOSE inhaler, INHALE 1 PUFF BY MOUTH 2 TIMES PER DAY   RINSE MOUTH AFTER USE , Disp: 1 Inhaler, Rfl: 5    Albuterol Sulfate (ProAir RespiClick) 509 (90 Base) MCG/ACT AEPB, Inhale 2 puffs every 6 (six) hours as needed (wheezing), Disp: 1 each, Rfl: 1    aspirin 81 mg chewable tablet, Chew 81 mg daily , Disp: , Rfl:     atorvastatin (LIPITOR) 80 mg tablet, TAKE 1 TABLET BY MOUTH EVERY DAY, Disp: 90 tablet, Rfl: 3    baclofen 10 mg tablet, TAKE 1 TABLET BY MOUTH THREE TIMES A DAY AS NEEDED, Disp: 270 tablet, Rfl: 2    BD PEN NEEDLE LINDY U/F 32G X 4 MM MISC, Inject under the skin daily, Disp: 30 each, Rfl: 5    butalbital-acetaminophen-caffeine (FIORICET,ESGIC) -40 mg per tablet, TAKE 1 TABLET EVERY 6 HOURS AS NEEDED FOR MIGRAINE  PLEASE LIMIT 3-4 PER WEEK, 15 PER MONTH , Disp: 15 tablet, Rfl: 0    diazepam (VALIUM) 5 mg tablet, Take 1 tablet (5 mg total) by mouth 3 (three) times a day as needed for anxiety, Disp: 90 tablet, Rfl: 0    DULoxetine (CYMBALTA) 60 mg delayed release capsule, Take 1 capsule (60 mg total) by mouth 2 (two) times a day, Disp: 180 capsule, Rfl: 1    Emgality 120 MG/ML SOAJ, INJECT 1 PEN SUBQ EVERY 30 DAYS , Disp: 1 mL, Rfl: 11    ergocalciferol (VITAMIN D2) 50,000 units, 1 TAB ONCE WEEKLY, Disp: 12 capsule, Rfl: 0    Folic Acid 0 8 MG CAPS, Take 0 04 mg by mouth daily  , Disp: , Rfl:     gabapentin (NEURONTIN) 400 mg capsule, TAKE 2 CAPSULES BY MOUTH 3 TIMES A DAY, Disp: 540 capsule, Rfl: 1    ibuprofen (MOTRIN) 800 mg tablet, Take 800 mg by mouth every 6 (six) hours as needed, Disp: , Rfl:     insulin glargine (Lantus SoloStar) 100 units/mL injection pen, INJECT 45 UNITS IN AM, Disp: 45 mL, Rfl: 0    insuln lispro (HumaLOG KwikPen) 200 units/mL CONCENTRATED U-200 injection pen, Inject 15 units with meals +Scale (  Scale - 150-200 -2 units, 201-250-4 units, 251-300 -6 units, 301-350-8 units, > 350- 10 units ), Disp: 6 pen, Rfl: 3    levothyroxine 112 mcg tablet, TAKE 1 TABLET BY MOUTH EVERY DAY, Disp: 90 tablet, Rfl: 1    Lidocaine 4 % PTCH, Place 1 patch on the skin daily, Disp: , Rfl:     losartan (COZAAR) 50 mg tablet, TAKE 1 TABLET DAILY  , Disp: 90 tablet, Rfl: 2    metoprolol tartrate (LOPRESSOR) 25 mg tablet, TAKE 1 TABLET BY MOUTH EVERY DAY, Disp: 90 tablet, Rfl: 2    mirtazapine (REMERON) 7 5 MG tablet, Take 1 tablet (7 5 mg total) by mouth daily at bedtime, Disp: 90 tablet, Rfl: 1    naloxone (Narcan) 4 mg/0 1 mL nasal spray, 1 actuation in one nostril once as needed for opioid overdose, may repeat dose every 2-3 minutes until patient responsive or EMS arrives, Disp: 1 each, Rfl: 1    Nutritional Supplements (Glucerna Advance Shake) LIQD, 1 daily  , Disp: 30 Bottle, Rfl: 0    OLANZapine (ZyPREXA) 20 MG tablet, Take 1 tablet (20 mg total) by mouth daily at bedtime, Disp: 90 tablet, Rfl: 1    omeprazole (PriLOSEC) 20 mg delayed release capsule, TAKE 1 CAPSULE BY MOUTH EVERY DAY, Disp: 90 capsule, Rfl: 1    ondansetron (ZOFRAN) 4 mg tablet, 1 TAB EVERY 6 HRS AS NEEDED FOR NAUSEA   HOLD COMPAZINE , Disp: 20 tablet, Rfl: 2    OneTouch Ultra test strip, USE AS DIRECTED TO TEST 3 TIMES A DAY, Disp: 100 strip, Rfl: 0    prazosin (MINIPRESS) 2 mg capsule, Take 1 capsule (2 mg total) by mouth daily at bedtime, Disp: 90 capsule, Rfl: 1    prochlorperazine (COMPAZINE) 10 mg tablet, Take 1 tablet (10 mg total) by mouth every 6 (six) hours as needed for nausea or vomiting, Disp: 90 tablet, Rfl: 1    senna-docusate sodium (SENOKOT S) 8 6-50 mg per tablet, Take 1 tablet by mouth 2 (two) times a day, Disp: , Rfl:     topiramate (TOPAMAX) 100 mg tablet, TAKE 2 TABLETS BY MOUTH EVERY DAY, Disp: 180 tablet, Rfl: 1    traMADol (ULTRAM) 50 mg tablet, Take 1 tablet (50 mg total) by mouth every 6 (six) hours as needed for moderate pain or severe pain Do not take within 6 hours of valium or Fioricet, Disp: 120 tablet, Rfl: 0    Continuous Blood Gluc  (FreeStyle [place] 2 [place]) ALPESH, Please dispense 1 [place] (Patient not taking: Reported on 7/6/2022), Disp: 1 each, Rfl: 0    Continuous Blood Gluc Sensor (FreeStyle Sherine 2 Sensor) MISC, Use sherine sensor every 15 days (Patient not taking: Reported on 7/6/2022), Disp: 2 each, Rfl: 4    Cyanocobalamin (VITAMIN B-12 IJ), Inject as directed (Patient not taking: Reported on 7/6/2022), Disp: , Rfl:     Cyanocobalamin 1000 MCG/ML LIQD, Take 500 mcg by mouth daily (Patient not taking: Reported on 7/6/2022), Disp: , Rfl:     enoxaparin (LOVENOX) 30 mg/0 3 mL, Inject 30 mg under the skin 2 (two) times a day (Patient not taking: No sig reported), Disp: , Rfl:     hydrocortisone (CORTEF) 5 mg tablet, Take one tablet in AM and 1 tablet at PM for 2 weeks and then take 1 tablet for 2 weeks and then stop ( go for blood work in 1 week at 8 AM ) (Patient not taking: No sig reported), Disp: 50 tablet, Rfl: 5    insulin aspart, w/niacinamide, (FIASP) 100 Units/mL injection pen, Inject under the skin (Patient not taking: Reported on 7/6/2022), Disp: , Rfl:     Insulin Pen Needle (BD Pen Needle Kimberli U/F) 32G X 4 MM MISC, by Does not apply route daily, Disp: 100 each, Rfl: 3    Menthol-Methyl Salicylate (Thera-Gesic) 0 5-15 % CREA, Apply 1 application topically Three times a day (Patient not taking: No sig reported), Disp: , Rfl:     methotrexate 2 5 MG tablet, , Disp: , Rfl:     NEEDLE, DISP, 25 G (B-D DISP NEEDLE 25GX1") 25G X 1" MISC, by Does not apply route once as needed (opiod overdose) for up to 1 dose (Patient not taking: No sig reported), Disp: 3 each, Rfl: 0    Nerve Stimulator (TENS Therapy Pain Relief) ALPESH, 1 Device by Does not apply route as needed (myofascial pain) (Patient not taking: No sig reported), Disp: 1 Device, Rfl: 0    topiramate (TOPAMAX) 25 mg tablet, TAKE 1 TABLET BY MOUTH EVERY DAY, Disp: 90 tablet, Rfl: 1    Turmeric 500 MG TABS, Take 1 tablet by mouth daily (Patient not taking: No sig reported), Disp: , Rfl:   Lab Results   Component Value Date    WBC 7 29 05/16/2020    HGB 13 7 05/16/2020    HCT 40 4 05/16/2020    MCV 98 05/16/2020     05/16/2020     Lab Results   Component Value Date     07/16/2016    K 3 5 05/17/2020     (H) 05/17/2020    CO2 24 05/17/2020    ANIONGAP 12 02/11/2015    BUN 8 05/17/2020    CREATININE 0 58 (L) 05/17/2020    GLUCOSE 323 (H) 08/24/2017    GLUF 92 05/16/2020 CALCIUM 9 5 05/17/2020    AST 28 05/17/2020    ALT 33 05/17/2020    ALKPHOS 114 05/17/2020    PROT 6 7 10/22/2016    BILITOT 0 3 10/22/2016    EGFR 109 05/17/2020     No results found for: KIMBERLY        [unfilled]    Current Outpatient Medications:     acarbose (PRECOSE) 50 mg tablet, Take 1 tablet (50 mg total) by mouth 3 (three) times a day with meals, Disp: 90 tablet, Rfl: 4    Adalimumab (HUMIRA PEN SC), Inject under the skin, Disp: , Rfl:     Advair Diskus 500-50 MCG/DOSE inhaler, INHALE 1 PUFF BY MOUTH 2 TIMES PER DAY  RINSE MOUTH AFTER USE , Disp: 1 Inhaler, Rfl: 5    Albuterol Sulfate (ProAir RespiClick) 978 (90 Base) MCG/ACT AEPB, Inhale 2 puffs every 6 (six) hours as needed (wheezing), Disp: 1 each, Rfl: 1    aspirin 81 mg chewable tablet, Chew 81 mg daily , Disp: , Rfl:     atorvastatin (LIPITOR) 80 mg tablet, TAKE 1 TABLET BY MOUTH EVERY DAY, Disp: 90 tablet, Rfl: 3    baclofen 10 mg tablet, TAKE 1 TABLET BY MOUTH THREE TIMES A DAY AS NEEDED, Disp: 270 tablet, Rfl: 2    BD PEN NEEDLE LINDY U/F 32G X 4 MM MISC, Inject under the skin daily, Disp: 30 each, Rfl: 5    butalbital-acetaminophen-caffeine (FIORICET,ESGIC) -40 mg per tablet, TAKE 1 TABLET EVERY 6 HOURS AS NEEDED FOR MIGRAINE  PLEASE LIMIT 3-4 PER WEEK, 15 PER MONTH , Disp: 15 tablet, Rfl: 0    diazepam (VALIUM) 5 mg tablet, Take 1 tablet (5 mg total) by mouth 3 (three) times a day as needed for anxiety, Disp: 90 tablet, Rfl: 0    DULoxetine (CYMBALTA) 60 mg delayed release capsule, Take 1 capsule (60 mg total) by mouth 2 (two) times a day, Disp: 180 capsule, Rfl: 1    Emgality 120 MG/ML SOAJ, INJECT 1 PEN SUBQ EVERY 30 DAYS , Disp: 1 mL, Rfl: 11    ergocalciferol (VITAMIN D2) 50,000 units, 1 TAB ONCE WEEKLY, Disp: 12 capsule, Rfl: 0    Folic Acid 0 8 MG CAPS, Take 0 04 mg by mouth daily  , Disp: , Rfl:     gabapentin (NEURONTIN) 400 mg capsule, TAKE 2 CAPSULES BY MOUTH 3 TIMES A DAY, Disp: 540 capsule, Rfl: 1    ibuprofen (MOTRIN) 800 mg tablet, Take 800 mg by mouth every 6 (six) hours as needed, Disp: , Rfl:     insulin glargine (Lantus SoloStar) 100 units/mL injection pen, INJECT 45 UNITS IN AM, Disp: 45 mL, Rfl: 0    insuln lispro (HumaLOG KwikPen) 200 units/mL CONCENTRATED U-200 injection pen, Inject 15 units with meals +Scale (  Scale - 150-200 -2 units, 201-250-4 units, 251-300 -6 units, 301-350-8 units, > 350- 10 units ), Disp: 6 pen, Rfl: 3    levothyroxine 112 mcg tablet, TAKE 1 TABLET BY MOUTH EVERY DAY, Disp: 90 tablet, Rfl: 1    Lidocaine 4 % PTCH, Place 1 patch on the skin daily, Disp: , Rfl:     losartan (COZAAR) 50 mg tablet, TAKE 1 TABLET DAILY  , Disp: 90 tablet, Rfl: 2    metoprolol tartrate (LOPRESSOR) 25 mg tablet, TAKE 1 TABLET BY MOUTH EVERY DAY, Disp: 90 tablet, Rfl: 2    mirtazapine (REMERON) 7 5 MG tablet, Take 1 tablet (7 5 mg total) by mouth daily at bedtime, Disp: 90 tablet, Rfl: 1    naloxone (Narcan) 4 mg/0 1 mL nasal spray, 1 actuation in one nostril once as needed for opioid overdose, may repeat dose every 2-3 minutes until patient responsive or EMS arrives, Disp: 1 each, Rfl: 1    Nutritional Supplements (Glucerna Advance Shake) LIQD, 1 daily  , Disp: 30 Bottle, Rfl: 0    OLANZapine (ZyPREXA) 20 MG tablet, Take 1 tablet (20 mg total) by mouth daily at bedtime, Disp: 90 tablet, Rfl: 1    omeprazole (PriLOSEC) 20 mg delayed release capsule, TAKE 1 CAPSULE BY MOUTH EVERY DAY, Disp: 90 capsule, Rfl: 1    ondansetron (ZOFRAN) 4 mg tablet, 1 TAB EVERY 6 HRS AS NEEDED FOR NAUSEA   HOLD COMPAZINE , Disp: 20 tablet, Rfl: 2    OneTouch Ultra test strip, USE AS DIRECTED TO TEST 3 TIMES A DAY, Disp: 100 strip, Rfl: 0    prazosin (MINIPRESS) 2 mg capsule, Take 1 capsule (2 mg total) by mouth daily at bedtime, Disp: 90 capsule, Rfl: 1    prochlorperazine (COMPAZINE) 10 mg tablet, Take 1 tablet (10 mg total) by mouth every 6 (six) hours as needed for nausea or vomiting, Disp: 90 tablet, Rfl: 1   senna-docusate sodium (SENOKOT S) 8 6-50 mg per tablet, Take 1 tablet by mouth 2 (two) times a day, Disp: , Rfl:     topiramate (TOPAMAX) 100 mg tablet, TAKE 2 TABLETS BY MOUTH EVERY DAY, Disp: 180 tablet, Rfl: 1    traMADol (ULTRAM) 50 mg tablet, Take 1 tablet (50 mg total) by mouth every 6 (six) hours as needed for moderate pain or severe pain Do not take within 6 hours of valium or Fioricet, Disp: 120 tablet, Rfl: 0    Continuous Blood Gluc  (FreeStyle Cooley 2 [place]) ALPESH, Please dispense 1 [place] (Patient not taking: Reported on 7/6/2022), Disp: 1 each, Rfl: 0    Continuous Blood Gluc Sensor (FreeStyle Sherine 2 Sensor) MISC, Use sherine sensor every 15 days (Patient not taking: Reported on 7/6/2022), Disp: 2 each, Rfl: 4    Cyanocobalamin (VITAMIN B-12 IJ), Inject as directed (Patient not taking: Reported on 7/6/2022), Disp: , Rfl:     Cyanocobalamin 1000 MCG/ML LIQD, Take 500 mcg by mouth daily (Patient not taking: Reported on 7/6/2022), Disp: , Rfl:     enoxaparin (LOVENOX) 30 mg/0 3 mL, Inject 30 mg under the skin 2 (two) times a day (Patient not taking: No sig reported), Disp: , Rfl:     hydrocortisone (CORTEF) 5 mg tablet, Take one tablet in AM and 1 tablet at PM for 2 weeks and then take 1 tablet for 2 weeks and then stop ( go for blood work in 1 week at 8 AM ) (Patient not taking: No sig reported), Disp: 50 tablet, Rfl: 5    insulin aspart, w/niacinamide, (FIASP) 100 Units/mL injection pen, Inject under the skin (Patient not taking: Reported on 7/6/2022), Disp: , Rfl:     Insulin Pen Needle (BD Pen Needle Kimberli U/F) 32G X 4 MM MISC, by Does not apply route daily, Disp: 100 each, Rfl: 3    Menthol-Methyl Salicylate (Thera-Gesic) 0 5-15 % CREA, Apply 1 application topically Three times a day (Patient not taking: No sig reported), Disp: , Rfl:     methotrexate 2 5 MG tablet, , Disp: , Rfl:     NEEDLE, DISP, 25 G (B-D DISP NEEDLE 25GX1") 25G X 1" MISC, by Does not apply route once as needed (opiod overdose) for up to 1 dose (Patient not taking: No sig reported), Disp: 3 each, Rfl: 0    Nerve Stimulator (TENS Therapy Pain Relief) ALPESH, 1 Device by Does not apply route as needed (myofascial pain) (Patient not taking: No sig reported), Disp: 1 Device, Rfl: 0    topiramate (TOPAMAX) 25 mg tablet, TAKE 1 TABLET BY MOUTH EVERY DAY, Disp: 90 tablet, Rfl: 1    Turmeric 500 MG TABS, Take 1 tablet by mouth daily (Patient not taking: No sig reported), Disp: , Rfl:   Allergies   Allergen Reactions    Medical Tape Rash    Lexapro [Escitalopram Oxalate]     Escitalopram Other (See Comments) and Palpitations    Other Hives and Rash     Adhesive Tape

## 2022-07-07 ENCOUNTER — TELEPHONE (OUTPATIENT)
Dept: FAMILY MEDICINE CLINIC | Facility: CLINIC | Age: 52
End: 2022-07-07

## 2022-07-07 LAB
EST. AVERAGE GLUCOSE BLD GHB EST-MCNC: 324 MG/DL
HBA1C MFR BLD: 12.9 %

## 2022-07-07 PROCEDURE — 3046F HEMOGLOBIN A1C LEVEL >9.0%: CPT | Performed by: NURSE PRACTITIONER

## 2022-07-07 NOTE — TELEPHONE ENCOUNTER
[facility] called they are looking for patient preop physical from yesterday  I did tell them you were waiting for the labwork to come back, they also wanted to remind us that her surgery is tomorrow  The labwork is in Epic for review

## 2022-07-11 ENCOUNTER — PATIENT OUTREACH (OUTPATIENT)
Dept: FAMILY MEDICINE CLINIC | Facility: CLINIC | Age: 52
End: 2022-07-11

## 2022-07-13 DIAGNOSIS — E11.49 TYPE 2 DIABETES MELLITUS WITH OTHER NEUROLOGIC COMPLICATION, WITH LONG-TERM CURRENT USE OF INSULIN (HCC): ICD-10-CM

## 2022-07-13 DIAGNOSIS — Z79.4 TYPE 2 DIABETES MELLITUS WITH OTHER NEUROLOGIC COMPLICATION, WITH LONG-TERM CURRENT USE OF INSULIN (HCC): ICD-10-CM

## 2022-07-14 RX ORDER — BLOOD SUGAR DIAGNOSTIC
STRIP MISCELLANEOUS
Qty: 100 STRIP | Refills: 0 | Status: SHIPPED | OUTPATIENT
Start: 2022-07-14

## 2022-07-17 DIAGNOSIS — G43.709 CHRONIC MIGRAINE WITHOUT AURA WITHOUT STATUS MIGRAINOSUS, NOT INTRACTABLE: ICD-10-CM

## 2022-07-17 DIAGNOSIS — F40.01 PANIC DISORDER WITH AGORAPHOBIA: ICD-10-CM

## 2022-07-17 DIAGNOSIS — J45.20 MILD INTERMITTENT REACTIVE AIRWAY DISEASE WITHOUT COMPLICATION: ICD-10-CM

## 2022-07-17 RX ORDER — FLUTICASONE PROPIONATE AND SALMETEROL 500; 50 UG/1; UG/1
POWDER RESPIRATORY (INHALATION)
Qty: 180 BLISTER | Refills: 1 | Status: SHIPPED | OUTPATIENT
Start: 2022-07-17

## 2022-07-17 RX ORDER — TOPIRAMATE 100 MG/1
TABLET, FILM COATED ORAL
Qty: 180 TABLET | Refills: 1 | Status: SHIPPED | OUTPATIENT
Start: 2022-07-17

## 2022-07-18 DIAGNOSIS — G43.709 CHRONIC MIGRAINE WITHOUT AURA WITHOUT STATUS MIGRAINOSUS, NOT INTRACTABLE: ICD-10-CM

## 2022-07-18 RX ORDER — DIAZEPAM 5 MG/1
5 TABLET ORAL 3 TIMES DAILY PRN
Qty: 90 TABLET | Refills: 0 | Status: SHIPPED | OUTPATIENT
Start: 2022-07-18 | End: 2022-08-26

## 2022-07-20 NOTE — PSYCH
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 choices and their impact on mood, energy, and motivation   - Counseled to avoid ETOH, illicit substances, and nicotine secondary to the detrimental effects of these substances on mental and physical health  - Encouraged to engage in non-verbal forms of therapy such as art therapy, music therapy, and mindfulness  Aware of 24 hour and weekend coverage for urgent situations accessed by calling [facility] main practice number    Medications Risks/Benefits      Risks, Benefits And Possible Side Effects Of Medications:    Risks, benefits, and possible side effects of medications explained to Barb including risk of parkinsonian symptoms, Tardive Dyskinesia and metabolic syndrome related to treatment with antipsychotic medications, risk of cardiovascular events in elderly related to treatment with antipsychotic medications, risk of suicidality and serotonin syndrome related to treatment with antidepressants and risks of misuse, abuse or dependence, sedation and respiratory depression related to treatment with benzodiazepine medications   She verbalizes understanding and agreement for treatment  Controlled Medication Discussion:     Nadyne Boeck has been filling controlled prescriptions on time as prescribed according to [facility]  Discussed with Nadyne Boeck the risks of sedation, respiratory depression, impairment of ability to drive and potential for abuse and addiction related to treatment with benzodiazepine medications  She understands risk of treatment with benzodiazepine medications, agrees to not drive if feels impaired and agrees to take medications as prescribed  Discussed with [facility] warning on concurrent use of benzodiazepines and opioid medications including sedation, respiratory depression, coma and death  She understands the risk of treatment with benzodiazepines in addition to opioids and wants to continue taking those medications    Psychotherapy Provided:     Individual psychotherapy provided: Yes  Counseling was provided during the session today for 16 minutes  Medication education provided to Jenniffer Dixon discussed during session  Importance of medication and treatment compliance reviewed with Babr  Cognitive therapy was utilized during the session  Reassurance and supportive therapy provided  Crisis/safety plan discussed with Chun Miller     Treatment Plan:    Completed and signed during the session: Not applicable - Treatment Plan not due at this session    Note Share Disclaimer:      This note was not shared with the patient due to reasonable likelihood of causing patient harm    Kaylen Green 07/21/22

## 2022-07-21 ENCOUNTER — TELEPHONE (OUTPATIENT)
Dept: PSYCHIATRY | Facility: CLINIC | Age: 52
End: 2022-07-21

## 2022-07-21 ENCOUNTER — OFFICE VISIT (OUTPATIENT)
Dept: PSYCHIATRY | Facility: CLINIC | Age: 52
End: 2022-07-21
Payer: COMMERCIAL

## 2022-07-21 DIAGNOSIS — G47.00 INSOMNIA, UNSPECIFIED TYPE: ICD-10-CM

## 2022-07-21 DIAGNOSIS — F33.2 SEVERE EPISODE OF RECURRENT MAJOR DEPRESSIVE DISORDER, WITHOUT PSYCHOTIC FEATURES (HCC): Primary | ICD-10-CM

## 2022-07-21 DIAGNOSIS — F43.10 PTSD (POST-TRAUMATIC STRESS DISORDER): ICD-10-CM

## 2022-07-21 DIAGNOSIS — F40.01 PANIC DISORDER WITH AGORAPHOBIA: ICD-10-CM

## 2022-07-21 DIAGNOSIS — F41.9 ANXIETY: ICD-10-CM

## 2022-07-21 PROCEDURE — 99213 OFFICE O/P EST LOW 20 MIN: CPT | Performed by: NURSE PRACTITIONER

## 2022-07-21 NOTE — PATIENT INSTRUCTIONS
Please return for a follow up appointment as discussed  If you are running late or are unable to attend your appointment, please call our [facility] [place] office at [phone], or if you were seen in the [place] office, please call [phone]  If you have thoughts of harming yourself or are otherwise in psychological crisis, do not hesitate to contact your [facility] hotline, or go to the nearest emergency room    [facility] Crisis: [address] [facility] Crisis: [phone]  [address] Crisis: [address] Crisis: [address] Crisis: [address] Crisis: [address] [facility] Crisis: [phone]  National Suicide Prevention Hotline: 8-900.798.8645

## 2022-08-18 ENCOUNTER — TELEMEDICINE (OUTPATIENT)
Dept: PSYCHIATRY | Facility: CLINIC | Age: 52
End: 2022-08-18
Payer: COMMERCIAL

## 2022-08-18 DIAGNOSIS — F33.2 SEVERE EPISODE OF RECURRENT MAJOR DEPRESSIVE DISORDER, WITHOUT PSYCHOTIC FEATURES (HCC): Primary | ICD-10-CM

## 2022-08-18 DIAGNOSIS — F43.10 PTSD (POST-TRAUMATIC STRESS DISORDER): ICD-10-CM

## 2022-08-18 DIAGNOSIS — F40.01 PANIC DISORDER WITH AGORAPHOBIA: ICD-10-CM

## 2022-08-18 DIAGNOSIS — F41.1 GAD (GENERALIZED ANXIETY DISORDER): ICD-10-CM

## 2022-08-18 PROCEDURE — 99214 OFFICE O/P EST MOD 30 MIN: CPT | Performed by: NURSE PRACTITIONER

## 2022-08-18 PROCEDURE — 90833 PSYTX W PT W E/M 30 MIN: CPT | Performed by: NURSE PRACTITIONER
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
## 2022-08-18 NOTE — PATIENT INSTRUCTIONS
Please return for a follow up appointment as discussed  If you are running late or are unable to attend your appointment, please call our [facility] office at [phone], or if you were seen in the [place] office, please call [phone]  If you have thoughts of harming yourself or are otherwise in psychological crisis, do not hesitate to contact your [facility] hotline, or go to the nearest emergency room    [facility], [place] Crisis: [address] [facility] Crisis: [phone]  [place] Crisis: [address] Crisis: [address] Crisis: [address] Crisis: [address] [facility] Crisis: [phone]  [place] Suicide Prevention Hotline: 7-554.783.9259

## 2022-08-22 ENCOUNTER — TELEMEDICINE (OUTPATIENT)
Dept: FAMILY MEDICINE CLINIC | Facility: CLINIC | Age: 52
End: 2022-08-22
Payer: COMMERCIAL

## 2022-08-22 ENCOUNTER — TELEPHONE (OUTPATIENT)
Dept: FAMILY MEDICINE CLINIC | Facility: CLINIC | Age: 52
End: 2022-08-22

## 2022-08-22 DIAGNOSIS — E11.49 TYPE 2 DIABETES MELLITUS WITH OTHER NEUROLOGIC COMPLICATION, WITH LONG-TERM CURRENT USE OF INSULIN (HCC): Primary | ICD-10-CM

## 2022-08-22 DIAGNOSIS — Z79.4 TYPE 2 DIABETES MELLITUS WITH OTHER NEUROLOGIC COMPLICATION, WITH LONG-TERM CURRENT USE OF INSULIN (HCC): Primary | ICD-10-CM

## 2022-08-22 PROCEDURE — 99214 OFFICE O/P EST MOD 30 MIN: CPT | Performed by: NURSE PRACTITIONER

## 2022-08-22 RX ORDER — BLOOD-GLUCOSE METER
KIT MISCELLANEOUS
Qty: 1 KIT | Refills: 0 | Status: SHIPPED | OUTPATIENT
Start: 2022-08-22 | End: 2022-09-22

## 2022-08-22 RX ORDER — BLOOD SUGAR DIAGNOSTIC
STRIP MISCELLANEOUS
Qty: 400 EACH | Refills: 3 | Status: SHIPPED | OUTPATIENT
Start: 2022-08-22

## 2022-08-22 RX ORDER — LANCETS 33 GAUGE
EACH MISCELLANEOUS
Qty: 400 EACH | Refills: 3 | Status: SHIPPED | OUTPATIENT
Start: 2022-08-22

## 2022-08-22 NOTE — PROGRESS NOTES
Virtual Regular Visit    Verification of patient location:    Patient is located in the following state in which I hold an active license [place]      Assessment/Plan:    Problem List Items Addressed This Visit        Endocrine    Diabetes mellitus ([address] ) - Primary    Relevant Medications    Blood Glucose Monitoring Suppl (OneTouch Verio Reflect) w/Device KIT    glucose blood (OneTouch Verio) test strip    OneTouch Delica Lancets 50A MISC           Susanne Paredes is extremely non adherent to treatment plan and living a very unhealthy lifestyle and is very at risk for acute complications/hospitializations  Patient aware of risks she is running living this lifestyle  She is having a lot of knee pain and really wants to complete surgery but her DM needs to be controlled prior to that happening  She will work to use this as incentive  She will make appointment with endocrinology  Next A1C due in October  She is to call weekly with glucose numbers from QID so insulin adjustments can be made accordingly  I have had this conversation with Susanne Paredes many times and she hasn't been adherent to any treatment plan despite the known risks  New glucometer supplies sent to pharmacy for patient  ER for any worsening symptoms  Lab Results   Component Value Date    HGBA1C 12 9 (H) 07/06/2022       Reason for visit is   Chief Complaint   Patient presents with    Virtual Regular Visit        Encounter provider Bessie Curry, [facility]    Provider located at [facility] [address]  [facility] [address] & [address] [address]  [phone]      Recent Visits  No visits were found meeting these conditions    Showing recent visits within past 7 days and meeting all other requirements  Today's Visits  Date Type Provider Dept   08/22/22 Telemedicine Bessie Curry, [address] [facility]   Showing today's visits and meeting all other requirements  Future Appointments  No visits were found meeting these conditions  Showing future appointments within next 150 days and meeting all other requirements       The patient was identified by name and date of birth  Reagan Montoya was informed that this is a telemedicine visit and that the visit is being conducted through [address] [employer] and patient was informed that this is a secure, HIPAA-compliant platform  She agrees to proceed     My office door was closed  No one else was in the room  She acknowledged consent and understanding of privacy and security of the video platform  The patient has agreed to participate and understands they can discontinue the visit at any time  Patient is aware this is a billable service  Subjective  Reagan Montoya is a 47 y o  female  Barry Schooling has virtual for DM follow up  She reports non adherence to medication  Her fridge  so she stopped taking her insulin  She said she has been doing insulin routinely for 1 week now but was without it for weeks  She does forget to take it before she eats so she will take it after  She is doing meal time insulin 19 units three times a day with meals but she doesn't eat 3 times a day  She is then doing 33 units of long acting at bedtime  She has been checking sugars at times at home  She lost her paperwork that it was written on  She states she lost her lancets  Lab Results       Component                Value               Date                       HGBA1C                   12 9 (H)            2022            She is more concerned about her knee due to the pain but she knows her DM must be controlled prior to surgery  She has not been prioritizing her health or following with treatment plan recommendations  She was to be calling with glucose numbers and checking sugar QID         Past Medical History:   Diagnosis Date    Acute venous embolism and thrombosis of deep vessels of distal lower extremity (HCC)     Anemia     Anxiety     last assessed 17    Arthritis  Asthma     Cerebral infarction ([place] [address] )     unspecified, last assessed 11/14/16    Chest pain     last assessed 5/9/17    Chronic cough     last assessed 12/12/13    Depression     Diabetes mellitus, type 2 ([place] [address] )     DJD (degenerative joint disease)     Esophageal reflux     Fibromyalgia     GERD without esophagitis     resolved 5/13/16    History of pulmonary embolism     Hyperlipidemia     Hypertension     Hypothyroidism     Iron deficiency     Pancreatitis     Panic attack     Panic disorder     Polyarthritis     PTSD (post-traumatic stress disorder)     Sleep difficulties     Stroke syndrome     Thyroid disease     TIA (transient ischemic attack)     TIA (transient ischemic attack)     Venous embolism and thrombosis of deep vessels of distal lower extremity ([place] [address] )     Vitamin B12 deficiency     Vitamin D deficiency        Past Surgical History:   Procedure Laterality Date    CARPAL TUNNEL RELEASE Right     neuroplasty decompression of median nerve    CATARACT EXTRACTION      CHOLECYSTECTOMY      ERCP      ERCP      ESOPHAGOGASTRIC FUNDOPLASTY      NISSEN FUNDOPLICATION      PATELLA SURGERY Left 12/2021    TX ESOPHAGOGASTRODUODENOSCOPY TRANSORAL DIAGNOSTIC N/A 11/02/2016    Procedure: EGD AND COLONOSCOPY;  Surgeon: Riky Lux MD;  [facility]: [facility]; Service: Gastroenterology    TX INCISE FINGER TENDON SHEATH Right 03/08/2016    Procedure: RELEASE TRIGGER FINGER RIGHT THUMB;  Surgeon: Kristofer Iverson MD;  [facility];  Service: Orthopedics    TX WRIST Blanca Early LIG Right 03/08/2016    Procedure: RELEASE CARPAL TUNNEL ENDOSCOPIC;  Surgeon: Kristofer Iverson MD;  [facility];  Service: Orthopedics    TONSILLECTOMY AND ADENOIDECTOMY         Current Outpatient Medications   Medication Sig Dispense Refill    Blood Glucose Monitoring Suppl (OneTouch Verio Reflect) w/Device KIT Check blood sugars four times daily   Please substitute with appropriate alternative as covered by patient's insurance  Dx: E11 65 1 kit 0    glucose blood (OneTouch Verio) test strip Check blood sugars four times daily  Please substitute with appropriate alternative as covered by patient's insurance  Dx: E11 65 400 each 3    OneTouch Delica Lancets 92Q MISC Check blood sugars four times daily  Please substitute with appropriate alternative as covered by patient's insurance  Dx: E11 65 400 each 3    acarbose (PRECOSE) 50 mg tablet TAKE 1 TABLET BY MOUTH 3 TIMES A DAY WITH MEALS  270 tablet 1    Adalimumab (HUMIRA PEN SC) Inject under the skin      Albuterol Sulfate (ProAir RespiClick) 594 (90 Base) MCG/ACT AEPB Inhale 2 puffs every 6 (six) hours as needed (wheezing) 1 each 1    aspirin 81 mg chewable tablet Chew 81 mg daily       atorvastatin (LIPITOR) 80 mg tablet TAKE 1 TABLET BY MOUTH EVERY DAY 90 tablet 0    baclofen 10 mg tablet TAKE 1 TABLET BY MOUTH THREE TIMES A DAY AS NEEDED 270 tablet 0    BD PEN NEEDLE LNIDY U/F 32G X 4 MM MISC Inject under the skin daily 30 each 5    butalbital-acetaminophen-caffeine (FIORICET,ESGIC) -40 mg per tablet TAKE 1 TABLET EVERY 6 HOURS AS NEEDED FOR MIGRAINE  PLEASE LIMIT 3-4 PER WEEK, 15 PER MONTH  15 tablet 2    Continuous Blood Gluc  (FreeStyle Cooley 2 Flatgap) ALPESH Please dispense 1 Flatgap 1 each 0    Continuous Blood Gluc Sensor (FreeStyle Sherine 2 Sensor) MISC Use sherine sensor every 15 days 2 each 4    Cyanocobalamin (VITAMIN B-12 IJ) Inject as directed      Cyanocobalamin 1000 MCG/ML LIQD Take 500 mcg by mouth daily (Patient not taking: Reported on 7/6/2022)      diazepam (VALIUM) 5 mg tablet TAKE 1 TABLET (5 MG TOTAL) BY MOUTH 3 (THREE) TIMES A DAY AS NEEDED FOR ANXIETY 90 tablet 0    DULoxetine (CYMBALTA) 60 mg delayed release capsule Take 1 capsule (60 mg total) by mouth 2 (two) times a day 180 capsule 1    Emgality 120 MG/ML SOAJ INJECT 1 PEN SUBCUTANEOUSLY EVERY 30 DAYS   1 mL 11    enoxaparin (LOVENOX) 30 mg/0 3 mL Inject 30 mg under the skin 2 (two) times a day (Patient not taking: No sig reported)      ergocalciferol (VITAMIN D2) 50,000 units 1 TAB ONCE WEEKLY 12 capsule 0    Fluticasone-Salmeterol (Advair Diskus) 500-50 mcg/dose inhaler Inhale 1 puff by mouth 2 times daily  Rinse mouth after use 180 blister 1    Folic Acid 0 8 MG CAPS Take 0 04 mg by mouth daily   gabapentin (NEURONTIN) 400 mg capsule TAKE 2 CAPSULES BY MOUTH 3 TIMES A  capsule 1    HumaLOG KwikPen 200 units/mL CONCENTRATED U-200 injection pen INJECT 15 UNITS WITH MEALS +SCALE ( SCALE - 150-200 -2 UNITS, 201-250-4 UNITS, 251-300 -6 UNITS, 301-350-8 UNITS, > 350- 10 UNITS ) 12 mL 3    hydrocortisone (CORTEF) 5 mg tablet Take one tablet in AM and 1 tablet at PM for 2 weeks and then take 1 tablet for 2 weeks and then stop ( go for blood work in 1 week at 8 AM ) (Patient not taking: No sig reported) 50 tablet 5    ibuprofen (MOTRIN) 800 mg tablet Take 800 mg by mouth every 6 (six) hours as needed      insulin aspart, w/niacinamide, (FIASP) 100 Units/mL injection pen Inject under the skin (Patient not taking: No sig reported)      Insulin Pen Needle (BD Pen Needle Kimberli U/F) 32G X 4 MM MISC by Does not apply route daily 100 each 3    Lantus SoloStar 100 units/mL injection pen INJECT 45 UNITS IN AM 15 mL 5    levothyroxine 112 mcg tablet TAKE 1 TABLET BY MOUTH EVERY DAY 90 tablet 1    Lidocaine 4 % PTCH Place 1 patch on the skin daily      losartan (COZAAR) 50 mg tablet TAKE 1 TABLET DAILY   90 tablet 2    Menthol-Methyl Salicylate (Thera-Gesic) 0 5-15 % CREA Apply 1 application topically Three times a day (Patient not taking: No sig reported)      methotrexate 2 5 MG tablet       metoprolol tartrate (LOPRESSOR) 25 mg tablet TAKE 1 TABLET BY MOUTH EVERY DAY (Patient not taking: Reported on 7/21/2022) 90 tablet 2    mirtazapine (REMERON) 7 5 MG tablet Take 1 tablet (7 5 mg total) by mouth daily at bedtime 90 tablet 1    naloxone (Narcan) 4 mg/0 1 mL nasal spray 1 actuation in one nostril once as needed for opioid overdose, may repeat dose every 2-3 minutes until patient responsive or EMS arrives 1 each 1    NEEDLE, DISP, 25 G (B-D DISP NEEDLE 25GX1") 25G X 1" MISC by Does not apply route once as needed (opiod overdose) for up to 1 dose (Patient not taking: No sig reported) 3 each 0    Nerve Stimulator (TENS Therapy Pain Relief) ALPESH 1 Device by Does not apply route as needed (myofascial pain) (Patient not taking: No sig reported) 1 Device 0    Nutritional Supplements (Glucerna Advance Shake) LIQD 1 daily  (Patient not taking: Reported on 7/21/2022) 30 Bottle 0    OLANZapine (ZyPREXA) 20 MG tablet Take 1 tablet (20 mg total) by mouth daily at bedtime 90 tablet 1    omeprazole (PriLOSEC) 20 mg delayed release capsule TAKE 1 CAPSULE BY MOUTH EVERY DAY (Patient not taking: Reported on 7/21/2022) 90 capsule 1    ondansetron (ZOFRAN) 4 mg tablet 1 TAB EVERY 6 HRS AS NEEDED FOR NAUSEA  HOLD COMPAZINE  20 tablet 2    OneTouch Ultra test strip USE AS DIRECTED TO TEST 3 TIMES A  strip 0    prazosin (MINIPRESS) 2 mg capsule Take 1 capsule (2 mg total) by mouth daily at bedtime 90 capsule 1    prochlorperazine (COMPAZINE) 10 mg tablet TAKE 1 TABLET BY MOUTH EVERY 6 HOURS AS NEEDED FOR NAUSEA OR VOMITING   90 tablet 1    senna-docusate sodium (SENOKOT S) 8 6-50 mg per tablet Take 1 tablet by mouth 2 (two) times a day (Patient not taking: Reported on 7/21/2022)      topiramate (TOPAMAX) 100 mg tablet TAKE 2 TABLETS BY MOUTH EVERY  tablet 1    topiramate (TOPAMAX) 25 mg tablet TAKE 1 TABLET BY MOUTH EVERY DAY 90 tablet 1    traMADol (ULTRAM) 50 mg tablet TAKE 1 TABLET (50 MG TOTAL) BY MOUTH EVERY 6 (SIX) HOURS AS NEEDED FOR MODERATE PAIN OR SEVERE PAIN DO NOT TAKE WITHIN 6 HOURS OF VALIUM OR FIORICET 110 tablet 0    Turmeric 500 MG TABS Take 1 tablet by mouth daily       No current facility-administered medications for this visit  Allergies   Allergen Reactions    Medical Tape Rash    Lexapro [Escitalopram Oxalate]     Escitalopram Other (See Comments) and Palpitations    Other Hives and Rash     Adhesive Tape       Review of Systems   Constitutional: Positive for fatigue  Negative for chills and fever  Eyes: Negative for discharge  Respiratory: Negative for shortness of breath  Cardiovascular: Negative for chest pain  Gastrointestinal: Negative for constipation and diarrhea  Genitourinary: Negative for difficulty urinating  Musculoskeletal: Positive for arthralgias  Negative for joint swelling  Skin: Negative for rash  Neurological: Negative for headaches  Hematological: Negative for adenopathy  Psychiatric/Behavioral: The patient is not nervous/anxious  Video Exam    There were no vitals filed for this visit  Physical Exam  Constitutional:       General: She is not in acute distress  Appearance: She is underweight  She is not ill-appearing, toxic-appearing or diaphoretic  HENT:      Head: Normocephalic and atraumatic  Nose: Nose normal    Pulmonary:      Effort: Pulmonary effort is normal  No respiratory distress  Skin:     Coloration: Skin is not pale  Neurological:      Mental Status: She is alert and oriented to person, place, and time     Psychiatric:         Mood and Affect: Mood normal           I spent 30 minutes with patient today in which greater than 50% of the time was spent in counseling/coordination of care regarding DM

## 2022-08-24 DIAGNOSIS — G89.4 CHRONIC PAIN SYNDROME: ICD-10-CM

## 2022-08-25 DIAGNOSIS — I10 ESSENTIAL HYPERTENSION: ICD-10-CM

## 2022-08-25 DIAGNOSIS — F40.01 PANIC DISORDER WITH AGORAPHOBIA: ICD-10-CM

## 2022-08-25 RX ORDER — TRAMADOL HYDROCHLORIDE 50 MG/1
50 TABLET ORAL EVERY 6 HOURS PRN
Qty: 100 TABLET | Refills: 0 | Status: SHIPPED | OUTPATIENT
Start: 2022-08-25 | End: 2022-10-25

## 2022-08-25 NOTE — TELEPHONE ENCOUNTER
I returned Herman' call regarding scheduling a 2 month check with JEANNIE Mims's next available  appointment is 11/25/22  Pt would prefer to see you  I offered an appointment with Roosevelt Ibarra pt declined  Is pt ok to wait until then or could we offer something sooner? Pt aware you are out of office today and will return tomorrow     Pt's number is [phone]

## 2022-08-25 NOTE — TELEPHONE ENCOUNTER
I spoke to the patient, she is aware medication was cut back to #100 tablets, she wants to know what she can do for her pain  I also spoke to her about scheduling an endocrinology appointment and stressed the importance to her

## 2022-08-26 RX ORDER — DIAZEPAM 5 MG/1
5 TABLET ORAL 3 TIMES DAILY PRN
Qty: 90 TABLET | Refills: 0 | Status: SHIPPED | OUTPATIENT
Start: 2022-08-26 | End: 2022-09-30 | Stop reason: SDUPTHER

## 2022-08-26 NOTE — TELEPHONE ENCOUNTER
I called pt she is aware that if she prefers to see you that 11/25/22 is your next available  appointment  Pt expressed she will take it as long as it causes no issues with medications, I informed pt I would make you aware

## 2022-08-30 ENCOUNTER — TELEPHONE (OUTPATIENT)
Dept: NEUROLOGY | Facility: CLINIC | Age: 52
End: 2022-08-30

## 2022-08-30 NOTE — TELEPHONE ENCOUNTER
[place] needed for emgality; will work on same     emgality was approved through 6/21/2022    Submitted via [facility], key Key: [id number]); determination pending

## 2022-09-05 RX ORDER — GALCANEZUMAB 120 MG/ML
INJECTION, SOLUTION SUBCUTANEOUS
Qty: 1 ML | Refills: 11 | Status: SHIPPED | OUTPATIENT
Start: 2022-09-05 | End: 2022-09-05

## 2022-09-05 RX ORDER — GALCANEZUMAB 120 MG/ML
INJECTION, SOLUTION SUBCUTANEOUS
Qty: 1 ML | Refills: 11 | Status: SHIPPED | OUTPATIENT
Start: 2022-09-05

## 2022-09-09 NOTE — TELEPHONE ENCOUNTER
Per [facility]- [id number]  EMGALITY INJ 120MG/ML is approved through 08/30/2023       Approval letter placed in clerical bin to be scanned

## 2022-09-13 ENCOUNTER — TELEPHONE (OUTPATIENT)
Dept: PSYCHIATRY | Facility: CLINIC | Age: 52
End: 2022-09-13

## 2022-09-13 DIAGNOSIS — R11.0 NAUSEA: ICD-10-CM

## 2022-09-13 DIAGNOSIS — G43.709 CHRONIC MIGRAINE WITHOUT AURA WITHOUT STATUS MIGRAINOSUS, NOT INTRACTABLE: ICD-10-CM

## 2022-09-13 RX ORDER — PROCHLORPERAZINE MALEATE 10 MG
TABLET ORAL
Qty: 90 TABLET | Refills: 1 | Status: SHIPPED | OUTPATIENT
Start: 2022-09-13

## 2022-09-13 NOTE — TELEPHONE ENCOUNTER
Called and spoke with patient to confirm appt on 9/15/22  Patient is currently in the hospital for a "severe infection" and then is being admitted into rehab  Patient will call the office back to reschedule

## 2022-09-23 ENCOUNTER — TRANSITIONAL CARE MANAGEMENT (OUTPATIENT)
Dept: FAMILY MEDICINE CLINIC | Facility: CLINIC | Age: 52
End: 2022-09-23

## 2022-09-27 ENCOUNTER — TELEPHONE (OUTPATIENT)
Dept: FAMILY MEDICINE CLINIC | Facility: CLINIC | Age: 52
End: 2022-09-27

## 2022-09-28 ENCOUNTER — OFFICE VISIT (OUTPATIENT)
Dept: ENDOCRINOLOGY | Facility: CLINIC | Age: 52
End: 2022-09-28

## 2022-09-28 VITALS
HEIGHT: 64 IN | BODY MASS INDEX: 19.97 KG/M2 | DIASTOLIC BLOOD PRESSURE: 90 MMHG | WEIGHT: 117 LBS | TEMPERATURE: 98.2 F | SYSTOLIC BLOOD PRESSURE: 110 MMHG

## 2022-09-28 DIAGNOSIS — E11.65 TYPE 2 DIABETES MELLITUS WITH HYPERGLYCEMIA, WITH LONG-TERM CURRENT USE OF INSULIN (HCC): Primary | ICD-10-CM

## 2022-09-28 DIAGNOSIS — E03.9 ACQUIRED HYPOTHYROIDISM: ICD-10-CM

## 2022-09-28 DIAGNOSIS — I10 BENIGN ESSENTIAL HYPERTENSION: ICD-10-CM

## 2022-09-28 DIAGNOSIS — Z79.4 TYPE 2 DIABETES MELLITUS WITH HYPERGLYCEMIA, WITH LONG-TERM CURRENT USE OF INSULIN (HCC): Primary | ICD-10-CM

## 2022-09-28 PROCEDURE — 99214 OFFICE O/P EST MOD 30 MIN: CPT | Performed by: PHYSICIAN ASSISTANT

## 2022-09-28 RX ORDER — METFORMIN HYDROCHLORIDE 500 MG/1
500 TABLET, EXTENDED RELEASE ORAL 2 TIMES DAILY WITH MEALS
Qty: 180 TABLET | Refills: 1 | Status: SHIPPED | OUTPATIENT
Start: 2022-09-28

## 2022-09-28 NOTE — PATIENT INSTRUCTIONS
Hypoglycemia instructions   Osman Wolf  9/28/2022  [id number]    Low Blood Sugar    Steps to treat low blood sugar  1  Test blood sugar if you have symptoms of low blood sugar:   Low Blood Sugar Symptoms:  o Sweaty  o Dizzy  o Rapid heartbeat  o Shaky  o Bad mood  o Hungry      2  Treat blood sugar less than 70 with 15 grams of fast-acting carbohydrate:   Examples of 15 grams Fast-Acting Carbohydrate:  o 4 oz juice  o 4 oz regular soda  o 3-4 glucose tablets (chew)  o 3-4 hard candies (chew)          3  Wait 15 minutes and test your blood sugar again     4   If blood sugar is less than 100, repeat steps 2-3     5  When your blood sugar is 100 or more, eat a snack if it will be longer than one hour until your next meal  The snack should be 15 grams of carbohydrate and a protein:   Examples of snacks:  o ½ sandwich  o 6 crackers with cheese  o Piece of fruit with cheese or peanut butter  o 6 crackers with peanut butter

## 2022-09-28 NOTE — PROGRESS NOTES
Patient Progress Note      CC: DM     Referring Provider  No referring provider defined for this encounter  History of Present Illness:   Maida Crum is a 47 y o  female with a history of type 2 diabetes with long term use of insulin  Diabetes course has been stable  Complications of DM: neuropathy, retinopathy, CVA  Patient was hospitalized for a perineal abscess in September 2022  Denies any issues with her current regimen  Home glucose monitoring: are performed regularly, at least 3+ times a day     Home blood glucose readings: high 100s-200s mg/dl   Prior to hospitalization, readings were in the 300s+     Current regimen: Lantus 45 units QHS, Humalog 15 units before each meal, acarbose 50 mg TID with meals  noncompliant some of the time, denies any side effects from medications  Sometimes she may take it after she eats  Injects in: abdomen  Rotates sites: Yes  Hypoglycemic episodes: No, rare (not since hospitalization)  H/o of hypoglycemia causing hospitalization or Intervention such as glucagon injection or ambulance call : No  Hypoglycemia symptoms: jitteriness and sweating  Treatment of hypoglycemia: discussed treatment     Medic alert tag: recommended: Yes     Diabetes education: Yes  Diet: 1-2 meals per day, 2-3 snacks per day  Timing of meals is predictable  Diabetic diet compliance: noncompliant sometimes  States she eats what she can afford  Activity: Daily activity is predictable: Yes  No routine exercise  Ophthamology: Eye exam, March 2020  Podiatry: Foot exam up-to-date, June 2022     Has hypertension: on ACE inhibitor/ARB, compliant most of the time  Has hyperlipidemia: on statin - tolerating well, no myalgias  compliant most of the time, denies any side effects from medications  Thyroid disorders: Hypothyroidism   She is taking levothyroxine 112 mcg 1 tablet daily on an empty stomach 1 hour before breakfast   History of pancreatitis: Yes         Patient Active Problem List Diagnosis    Diabetes mellitus ([facility] )    Severe episode of recurrent major depressive disorder, without psychotic features ([facility] )    GERD without esophagitis    History of decompression of median nerve    Hypothyroidism    Protein calorie malnutrition (HCC)    Nicotine dependence    Anxiety    Benign essential hypertension    Chronic fatigue    Chronic pain syndrome    Fatty liver    Hyperlipidemia    Insomnia    Iron deficiency    Chronic diarrhea    Opioid dependence (HCC)    Pancreatic cyst    History of stroke    Vitamin B12 deficiency    Vitamin D deficiency    Reactive airway disease without complication    Temporary cerebral vascular dysfunction    History of pulmonary embolism    Arthralgia of temporomandibular joint    Carpal tunnel syndrome    Dysphagia    Chronic migraine without aura without status migrainosus, not intractable    Diplopia    ROGERIO positive    Prolonged QT interval    Symptom associated with female genital organs    Irregular menstrual cycle    Female stress incontinence    Decreased libido    Chronic cervical pain    Paresthesia and pain of both upper extremities    Paresthesia of both lower extremities    Chronic pain of both shoulders    Cervical radiculopathy    Lumbar radiculopathy    DDD (degenerative disc disease), cervical    DDD (degenerative disc disease), lumbar    Low back pain with sciatica    Cervical spinal stenosis    Cervical spondylosis without myelopathy    Cervical myofascial pain syndrome    [facility] cortisol level ([facility]    Neuropathy    Weakness of right upper extremity    Chronic migraine without aura    Nausea      Past Medical History:   Diagnosis Date    Acute venous embolism and thrombosis of deep vessels of distal lower extremity (HCC)     Anemia     Anxiety     last assessed 11/20/17    Arthritis     Asthma     Cerebral infarction ([facility] )     unspecified, last assessed 11/14/16    Chest pain     last assessed 5/9/17    Chronic cough     last assessed 12/12/13    Depression     Diabetes mellitus, type 2 ([facility] )     DJD (degenerative joint disease)     Esophageal reflux     Fibromyalgia     GERD without esophagitis     resolved 5/13/16    History of pulmonary embolism     Hyperlipidemia     Hypertension     Hypothyroidism     Iron deficiency     Pancreatitis     Panic attack     Panic disorder     Polyarthritis     PTSD (post-traumatic stress disorder)     Sleep difficulties     Stroke syndrome     Thyroid disease     TIA (transient ischemic attack)     TIA (transient ischemic attack)     Venous embolism and thrombosis of deep vessels of distal lower extremity ([facility] )     Vitamin B12 deficiency     Vitamin D deficiency       Past Surgical History:   Procedure Laterality Date    CARPAL TUNNEL RELEASE Right     neuroplasty decompression of median nerve    CATARACT EXTRACTION      CHOLECYSTECTOMY      ERCP      ERCP      ESOPHAGOGASTRIC FUNDOPLASTY      NISSEN FUNDOPLICATION      PATELLA SURGERY Left 12/2021    VT ESOPHAGOGASTRODUODENOSCOPY TRANSORAL DIAGNOSTIC N/A 11/02/2016    Procedure: EGD AND COLONOSCOPY;  Surgeon: Riky Lux MD;  [facility]: [facility];   Service: Gastroenterology    VT INCISE FINGER TENDON SHEATH Right 03/08/2016    Procedure: RELEASE TRIGGER FINGER RIGHT THUMB;  Surgeon: Kristofer Iverson MD;  [facility];  Service: Orthopedics    VT WRIST Blanca Early LIG Right 03/08/2016    Procedure: RELEASE CARPAL TUNNEL ENDOSCOPIC;  Surgeon: Kristofer Iverson MD;  [facility];  Service: Orthopedics    TONSILLECTOMY AND ADENOIDECTOMY        Family History   Problem Relation Age of Onset    Diabetes Mother         type 2    Heart attack Mother 44        acute MI    Kidney disease Mother         CKD NKF classfication    Depression Mother     Arthritis Mother     Substance Abuse Mother         mother OD in past on MS04    Ulcerative colitis Mother     Schizophrenia Mother     Suicide Attempts Mother     Bipolar disorder Mother     Diabetes Maternal Grandmother     Heart attack Father         acute MI    Psoriasis Father     Cancer Father         gastric cancer    Stroke Father     Crohn's disease Sister     Bipolar disorder Sister     Rheum arthritis Maternal Grandfather     Throat cancer Maternal Grandfather     Suicide Attempts Daughter     Drug abuse Daughter     Lung cancer Paternal Grandmother     Cancer Paternal Grandfather     No Known Problems Sister     Bipolar disorder Maternal Uncle     Anesthesia problems Neg Hx      Social History     Tobacco Use    Smoking status: Current Every Day Smoker     Packs/day: 0 50     Years: 35 00     Pack years: 17 50     Types: Cigarettes     Start date: 1/1/1983    Smokeless tobacco: Never Used    Tobacco comment: 20 + years   Substance Use Topics    Alcohol use: Not Currently     Alcohol/week: 0 0 standard drinks     Comment: last was in 2010 after DUI     Allergies   Allergen Reactions    Medical Tape Rash    Lexapro [Escitalopram Oxalate]     Escitalopram Other (See Comments) and Palpitations    Other Hives and Rash     Adhesive Tape         Current Outpatient Medications:     acarbose (PRECOSE) 50 mg tablet, TAKE 1 TABLET BY MOUTH 3 TIMES A DAY WITH MEALS , Disp: 270 tablet, Rfl: 1    Adalimumab (HUMIRA PEN SC), Inject under the skin, Disp: , Rfl:     Albuterol Sulfate (ProAir RespiClick) 801 (90 Base) MCG/ACT AEPB, Inhale 2 puffs every 6 (six) hours as needed (wheezing), Disp: 1 each, Rfl: 1    aspirin 81 mg chewable tablet, Chew 81 mg daily , Disp: , Rfl:     atorvastatin (LIPITOR) 80 mg tablet, TAKE 1 TABLET BY MOUTH EVERY DAY, Disp: 90 tablet, Rfl: 0    baclofen 10 mg tablet, TAKE 1 TABLET BY MOUTH THREE TIMES A DAY AS NEEDED, Disp: 270 tablet, Rfl: 0    BD PEN NEEDLE LINDY U/F 32G X 4 MM MISC, Inject under the skin daily, Disp: 30 each, Rfl: 5    Blood Glucose Monitoring Suppl (OneTouch Verio Reflect) w/Device KIT, CHECK BLOOD SUGARS FOUR TIMES DAILY  PLEASE SUBSTITUTE WITH APPROPRIATE ALTERNATIVE AS COVERED BY PATIENT'S INSURANCE  DX: E11 65, Disp: 1 kit, Rfl: 0    butalbital-acetaminophen-caffeine (FIORICET,ESGIC) -40 mg per tablet, TAKE 1 TABLET EVERY 6 HOURS AS NEEDED FOR MIGRAINE  PLEASE LIMIT 3-4 PER WEEK, 15 PER MONTH , Disp: 15 tablet, Rfl: 2    Cyanocobalamin (VITAMIN B-12 IJ), Inject as directed, Disp: , Rfl:     Cyanocobalamin 1000 MCG/ML LIQD, Take 500 mcg by mouth daily, Disp: , Rfl:     DULoxetine (CYMBALTA) 60 mg delayed release capsule, Take 1 capsule (60 mg total) by mouth 2 (two) times a day, Disp: 180 capsule, Rfl: 1    ergocalciferol (VITAMIN D2) 50,000 units, 1 TAB ONCE WEEKLY, Disp: 12 capsule, Rfl: 0    Fluticasone-Salmeterol (Advair Diskus) 500-50 mcg/dose inhaler, Inhale 1 puff by mouth 2 times daily  Rinse mouth after use, Disp: 180 blister, Rfl: 1    Folic Acid 0 8 MG CAPS, Take 0 04 mg by mouth daily  , Disp: , Rfl:     gabapentin (NEURONTIN) 400 mg capsule, TAKE 2 CAPSULES BY MOUTH 3 TIMES A DAY, Disp: 540 capsule, Rfl: 1    Galcanezumab-gnlm (Emgality) 120 MG/ML SOAJ, 1 injection monthly, Disp: 1 mL, Rfl: 11    glucose blood (OneTouch Verio) test strip, Check blood sugars four times daily  Please substitute with appropriate alternative as covered by patient's insurance   Dx: E11 65, Disp: 400 each, Rfl: 3    HumaLOG KwikPen 200 units/mL CONCENTRATED U-200 injection pen, INJECT 15 UNITS WITH MEALS +SCALE ( SCALE - 150-200 -2 UNITS, 201-250-4 UNITS, 251-300 -6 UNITS, 301-350-8 UNITS, > 350- 10 UNITS ), Disp: 12 mL, Rfl: 3    ibuprofen (MOTRIN) 800 mg tablet, Take 800 mg by mouth every 6 (six) hours as needed, Disp: , Rfl:     Insulin Pen Needle (BD Pen Needle Kimberli U/F) 32G X 4 MM MISC, by Does not apply route daily, Disp: 100 each, Rfl: 3    Lantus SoloStar 100 units/mL injection pen, INJECT 45 UNITS IN AM, Disp: 15 mL, Rfl: 5    levothyroxine 112 mcg tablet, TAKE 1 TABLET BY MOUTH EVERY DAY, Disp: 90 tablet, Rfl: 1    Lidocaine 4 % PTCH, Place 1 patch on the skin daily, Disp: , Rfl:     losartan (COZAAR) 50 mg tablet, TAKE 1 TABLET DAILY  , Disp: 90 tablet, Rfl: 2    metFORMIN (GLUCOPHAGE-XR) 500 mg 24 hr tablet, Take 1 tablet (500 mg total) by mouth 2 (two) times a day with meals, Disp: 180 tablet, Rfl: 1    methotrexate 2 5 MG tablet, , Disp: , Rfl:     metoprolol tartrate (LOPRESSOR) 25 mg tablet, TAKE 1 TABLET BY MOUTH EVERY DAY, Disp: 90 tablet, Rfl: 2    mirtazapine (REMERON) 7 5 MG tablet, Take 1 tablet (7 5 mg total) by mouth daily at bedtime, Disp: 90 tablet, Rfl: 1    naloxone (Narcan) 4 mg/0 1 mL nasal spray, 1 actuation in one nostril once as needed for opioid overdose, may repeat dose every 2-3 minutes until patient responsive or EMS arrives, Disp: 1 each, Rfl: 1    OLANZapine (ZyPREXA) 20 MG tablet, Take 1 tablet (20 mg total) by mouth daily at bedtime, Disp: 90 tablet, Rfl: 1    ondansetron (ZOFRAN) 4 mg tablet, 1 TAB EVERY 6 HRS AS NEEDED FOR NAUSEA  HOLD COMPAZINE , Disp: 20 tablet, Rfl: 2    OneTouch Delica Lancets 58G MISC, Check blood sugars four times daily  Please substitute with appropriate alternative as covered by patient's insurance   Dx: E11 65, Disp: 400 each, Rfl: 3    OneTouch Ultra test strip, USE AS DIRECTED TO TEST 3 TIMES A DAY, Disp: 100 strip, Rfl: 0    prazosin (MINIPRESS) 2 mg capsule, Take 1 capsule (2 mg total) by mouth daily at bedtime, Disp: 90 capsule, Rfl: 1    prochlorperazine (COMPAZINE) 10 mg tablet, TAKE 1 TABLET BY MOUTH EVERY 6 HOURS AS NEEDED FOR NAUSEA OR VOMITING , Disp: 90 tablet, Rfl: 1    topiramate (TOPAMAX) 100 mg tablet, TAKE 2 TABLETS BY MOUTH EVERY DAY, Disp: 180 tablet, Rfl: 1    topiramate (TOPAMAX) 25 mg tablet, TAKE 1 TABLET BY MOUTH EVERY DAY, Disp: 90 tablet, Rfl: 1    traMADol (ULTRAM) 50 mg tablet, TAKE 1 TABLET (50 MG TOTAL) BY MOUTH EVERY 6 (SIX) HOURS AS NEEDED FOR MODERATE PAIN OR SEVERE PAIN DO NOT TAKE WITHIN 6 HOURS OF VALIUM OR FIORICET, Disp: 100 tablet, Rfl: 0    Turmeric 500 MG TABS, Take 1 tablet by mouth daily, Disp: , Rfl:     Continuous Blood Gluc  (FreeStyle Cooley 2 Sabetha) ALPESH, Please dispense 1 [place] (Patient not taking: Reported on 9/28/2022), Disp: 1 each, Rfl: 0    Continuous Blood Gluc Sensor (FreeStyle Sherine 2 Sensor) MISC, Use sherine sensor every 15 days (Patient not taking: Reported on 9/28/2022), Disp: 2 each, Rfl: 4    diazepam (VALIUM) 5 mg tablet, TAKE 1 TABLET (5 MG TOTAL) BY MOUTH 3 (THREE) TIMES A DAY AS NEEDED FOR ANXIETY, Disp: 90 tablet, Rfl: 0    enoxaparin (LOVENOX) 30 mg/0 3 mL, Inject 30 mg under the skin 2 (two) times a day (Patient not taking: No sig reported), Disp: , Rfl:     hydrocortisone (CORTEF) 5 mg tablet, Take one tablet in AM and 1 tablet at PM for 2 weeks and then take 1 tablet for 2 weeks and then stop ( go for blood work in 1 week at 8 AM ) (Patient not taking: No sig reported), Disp: 50 tablet, Rfl: 5    insulin aspart, w/niacinamide, (FIASP) 100 Units/mL injection pen, Inject under the skin (Patient not taking: No sig reported), Disp: , Rfl:     Menthol-Methyl Salicylate (Thera-Gesic) 0 5-15 % CREA, Apply 1 application topically Three times a day (Patient not taking: No sig reported), Disp: , Rfl:     senna-docusate sodium (SENOKOT S) 8 6-50 mg per tablet, Take 1 tablet by mouth 2 (two) times a day (Patient not taking: No sig reported), Disp: , Rfl:   Review of Systems   Constitutional: Positive for fatigue (states she gets tired from taking care of her  who has ALS)  Negative for activity change, appetite change and unexpected weight change  HENT: Negative for trouble swallowing  Eyes: Negative for visual disturbance  Respiratory: Positive for shortness of breath  Cardiovascular: Positive for palpitations  Negative for chest pain     Gastrointestinal: Positive for diarrhea  Negative for constipation  Endocrine: Positive for polydipsia  Negative for polyuria  Musculoskeletal: Positive for arthralgias (arthritis)  Skin: Positive for wound (surgical wound, healing  Sees GYN )  Neurological: Positive for numbness  Psychiatric/Behavioral: Negative  Physical Exam:  Body mass index is 20 08 kg/m²  /90   Temp 98 2 °F (36 8 °C) (Skin)   Ht 5' 4" (1 626 m)   Wt 53 1 kg (117 lb)   LMP 03/04/2016   BMI 20 08 kg/m²    Wt Readings from Last 3 Encounters:   09/28/22 53 1 kg (117 lb)   07/06/22 43 5 kg (96 lb)   06/16/22 44 9 kg (99 lb)       Physical Exam  Vitals and nursing note reviewed  Constitutional:       Appearance: She is well-developed  HENT:      Head: Normocephalic  Eyes:      General: No scleral icterus  Pupils: Pupils are equal, round, and reactive to light  Neck:      Thyroid: No thyromegaly  Cardiovascular:      Rate and Rhythm: Normal rate and regular rhythm  Pulses:           Radial pulses are 2+ on the right side and 2+ on the left side  Heart sounds: No murmur heard  Pulmonary:      Effort: Pulmonary effort is normal  No respiratory distress  Breath sounds: Normal breath sounds  No wheezing  Musculoskeletal:      Cervical back: Neck supple  Skin:     General: Skin is warm and dry  Neurological:      Mental Status: She is alert  Patient's shoes and socks were not removed            Labs:   Lab Results   Component Value Date    HGBA1C 12 9 (H) 07/06/2022     Lab Results   Component Value Date    GLUCOSE 323 (H) 08/24/2017    CALCIUM 9 5 07/06/2022     07/16/2016    K 3 5 07/06/2022    CO2 28 07/06/2022    CL 99 (L) 07/06/2022    BUN 8 07/06/2022    CREATININE 0 76 07/06/2022     No results found for: Michael Nielsen  eGFR   Date Value Ref Range Status   07/06/2022 91 ml/min/1 73sq m Final   08/24/2017 138 ml/min/1 73sq m Final     No components found for: [facility]  Lab Results   Component Value Date    CHOL 265 (H) 10/22/2016    HDL 89 07/06/2022    TRIG 157 (H) 07/06/2022     Lab Results   Component Value Date    ALT 31 07/06/2022    AST 18 07/06/2022    ALKPHOS 124 (H) 07/06/2022    BILITOT 0 3 10/22/2016     Lab Results   Component Value Date    [id number] 3 080 07/06/2022       Plan:    Diagnoses and all orders for this visit:    Type 2 diabetes mellitus with hyperglycemia, with long-term current use of insulin ([facility] )  HGA1C 12 9%  Treatment regimen: readings have improved since hospitalization  Continue current treatment  Add metformin 500 mg BID  Send log in 1 week  Discussed intensive insulin regimen does increase risk for hypoglycemia  Episodes of hypoglycemia can lead to permanent disability and death  Discussed risks/complications associated with uncontrolled diabetes  Advised to adhere to diabetic diet, and recommended staying active/exercising routinely as tolerated  Keep carbohydrates consistent to limit blood glucose fluctuations  Advised to call if blood sugars less than 70 mg/dl or over 300 mg/dl  Check blood glucose 3+ times a day  Discussed symptoms and treatment of hypoglycemia  Referred to diabetes/nutrition education  Recommended routine follow-up with podiatry and ophthalmology  Send log in 1 week  Ordered blood work to complete prior to next visit  -     metFORMIN (GLUCOPHAGE-XR) 500 mg 24 hr tablet; Take 1 tablet (500 mg total) by mouth 2 (two) times a day with meals  -     Hemoglobin A1C; Future  -     Basic metabolic panel; Future    Benign essential hypertension  Blood pressure is elevated  Advised to monitor BP at home and follow-up with PCP if remaining elevated, at or above 140/90  For now continue current treatment    Acquired hypothyroidism  Check TFTs   Prior TSH was 3 080  Continue current dose of levothyroxine  -     T4, free; Future  -     TSH, 3rd generation;  Future               Discussed with the patient diagnosis and treatment and all questions fully answered  She will call me if any problems arise  Counseled patient on diagnostic results, prognosis, risk and benefit of treatment options, instruction for management, importance of treatment compliance, risk factor reduction and impressions        Elzbieta Rehman PA-C

## 2022-09-29 NOTE — PSYCH
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 appropriate at this time as her  as 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  She contracts for safety at this time, but continues to be open to inpatient psychiatric hospitalization in near future after his passing  No medication changes at this time  Risks/benefits/alternativies to treatment discussed, including a myriad of potential adverse medication side effects, to which Cristiane Cook voiced understanding and consented fully to treatment  Also, patient is amenable to calling/contacting the outpatient office including this writer if any acute adverse effects of their medication regimen arise in addition to any comments or concerns pertaining to their psychiatric management  Plan:     1  Continue Cymbalta 60 mg twice daily for depression, anxiety, PTSD, and panic  2  Continue Zyprexa 20 mg HS for depression augmentation  3  Continue Prazosin 2 mg HS for nightmares  4   Continue Gabapentin 800 mg TID for anxiety and neuropathic pain  5  Continue Topamax 25 mg daily per PCP  6  Continue Remeron 7 5 mg HS for depression  7  Continue Valium 5 mg TID as needed for anxiety and panic  8  Psychotherapy- referral placed  9  Follow up with primary care provider for ongoing medical care  10  Follow up with this provider in 2 weeks     Diagnoses and all orders for this visit:    Severe episode of recurrent major depressive disorder, without psychotic features ([facility] [address] )  -     DULoxetine (CYMBALTA) 60 mg delayed release capsule; Take 1 capsule (60 mg total) by mouth 2 (two) times a day    PTSD (post-traumatic stress disorder)  -     DULoxetine (CYMBALTA) 60 mg delayed release capsule; Take 1 capsule (60 mg total) by mouth 2 (two) times a day  -     prazosin (MINIPRESS) 2 mg capsule; Take 1 capsule (2 mg total) by mouth daily at bedtime    Panic disorder with agoraphobia  -     DULoxetine (CYMBALTA) 60 mg delayed release capsule; Take 1 capsule (60 mg total) by mouth 2 (two) times a day  -     diazepam (VALIUM) 5 mg tablet; Take 1 tablet (5 mg total) by mouth 3 (three) times a day as needed for anxiety    Social anxiety disorder  -     DULoxetine (CYMBALTA) 60 mg delayed release capsule; Take 1 capsule (60 mg total) by mouth 2 (two) times a day    Other insomnia    Other orders  -     cholestyramine sugar free (QUESTRAN LIGHT) 4 g packet; Take 4 g by mouth 2 (two) times a day           - Psychoeducation provided regarding the importance of exercise and health dietary choices and their impact on mood, energy, and motivation   - Counseled to avoid ETOH, illicit substances, and nicotine secondary to the detrimental effects of these substances on mental and physical health  - Encouraged to engage in non-verbal forms of therapy such as art therapy, music therapy, and mindfulness     Aware of 24 hour and weekend coverage for urgent situations accessed by calling [facility] main practice number    Medications Risks/Benefits Risks, Benefits And Possible Side Effects Of Medications:    Risks, benefits, and possible side effects of medications explained to Francisca including risk of parkinsonian symptoms, Tardive Dyskinesia and metabolic syndrome related to treatment with antipsychotic medications, risk of cardiovascular events in elderly related to treatment with antipsychotic medications, risk of suicidality and serotonin syndrome related to treatment with antidepressants and risks of misuse, abuse or dependence, sedation and respiratory depression related to treatment with benzodiazepine medications  She verbalizes understanding and agreement for treatment  Controlled Medication Discussion:     Francisca has been filling controlled prescriptions on time as prescribed according to [facility]  Discussed with Francisca the risks of sedation, respiratory depression, impairment of ability to drive and potential for abuse and addiction related to treatment with benzodiazepine medications  She understands risk of treatment with benzodiazepine medications, agrees to not drive if feels impaired and agrees to take medications as prescribed  Discussed with [facility] warning on concurrent use of benzodiazepines and opioid medications including sedation, respiratory depression, coma and death  She understands the risk of treatment with benzodiazepines in addition to opioids and wants to continue taking those medications  Discussed with Barb increased risk of impairment related to concurrent use of benzodiazepines and hypnotic medications including excessive sedation, psychomotor impairment and respiratory depression   She understands the risk of treatment with benzodiazepines in addition to hypnotic medications and wants to continue taking those medications    Psychotherapy Provided:     Individual psychotherapy provided: Yes  Counseling was provided during the session today for 16 minutes  Medication education provided to Francisca  Goals discussed during session  Importance of medication and treatment compliance reviewed with Barb  Cognitive therapy was utilized during the session  Reassurance and supportive therapy provided  Crisis/safety plan discussed with Anthony Aden     Treatment Plan:    Completed and signed during the session: Not applicable - Treatment Plan not due at this session    Note Share Disclaimer:      This note was not shared with the patient due to reasonable likelihood of causing patient harm    Kaylen Burrell 09/30/22

## 2022-09-30 ENCOUNTER — TELEMEDICINE (OUTPATIENT)
Dept: PSYCHIATRY | Facility: CLINIC | Age: 52
End: 2022-09-30
Payer: COMMERCIAL

## 2022-09-30 ENCOUNTER — TELEPHONE (OUTPATIENT)
Dept: PSYCHIATRY | Facility: CLINIC | Age: 52
End: 2022-09-30

## 2022-09-30 DIAGNOSIS — G47.09 OTHER INSOMNIA: ICD-10-CM

## 2022-09-30 DIAGNOSIS — F40.01 PANIC DISORDER WITH AGORAPHOBIA: ICD-10-CM

## 2022-09-30 DIAGNOSIS — F40.10 SOCIAL ANXIETY DISORDER: ICD-10-CM

## 2022-09-30 DIAGNOSIS — F43.10 PTSD (POST-TRAUMATIC STRESS DISORDER): ICD-10-CM

## 2022-09-30 DIAGNOSIS — F33.2 SEVERE EPISODE OF RECURRENT MAJOR DEPRESSIVE DISORDER, WITHOUT PSYCHOTIC FEATURES (HCC): Primary | ICD-10-CM

## 2022-09-30 PROCEDURE — 90833 PSYTX W PT W E/M 30 MIN: CPT | Performed by: NURSE PRACTITIONER

## 2022-09-30 PROCEDURE — 99214 OFFICE O/P EST MOD 30 MIN: CPT | Performed by: NURSE PRACTITIONER

## 2022-09-30 RX ORDER — CHOLESTYRAMINE LIGHT 4 G/5.7G
4 POWDER, FOR SUSPENSION ORAL 2 TIMES DAILY
COMMUNITY

## 2022-09-30 RX ORDER — DULOXETIN HYDROCHLORIDE 60 MG/1
60 CAPSULE, DELAYED RELEASE ORAL 2 TIMES DAILY
Qty: 180 CAPSULE | Refills: 1 | Status: SHIPPED | OUTPATIENT
Start: 2022-09-30

## 2022-09-30 RX ORDER — DIAZEPAM 5 MG/1
5 TABLET ORAL 3 TIMES DAILY PRN
Qty: 90 TABLET | Refills: 0 | Status: SHIPPED | OUTPATIENT
Start: 2022-09-30 | End: 2022-10-30

## 2022-09-30 RX ORDER — PRAZOSIN HYDROCHLORIDE 2 MG/1
2 CAPSULE ORAL
Qty: 90 CAPSULE | Refills: 1 | Status: SHIPPED | OUTPATIENT
Start: 2022-09-30

## 2022-09-30 NOTE — TELEPHONE ENCOUNTER
There is no follow-up scheduled at this time please reach out to pt before scheduling this per Fior Fagan check out notes  Thank you     She will have a change in insurance   Can you please verify with her that we take her new insurance prior to scheduling?  Thanks

## 2022-09-30 NOTE — PATIENT INSTRUCTIONS
Please return for a follow up appointment as discussed  If you are running late or are unable to attend your appointment, please call our [place] office at [phone], or if you were seen in the [place] office, please call [phone]  If you have thoughts of harming yourself or are otherwise in psychological crisis, do not hesitate to contact your [facility] hotline, or go to the nearest emergency room    [facility] Crisis: [address] [facility] Crisis: [phone]  [address] Crisis: [address] Crisis: [address] Crisis: [address] Crisis: [facility] Crisis: [phone]  National Suicide Prevention Hotline: 7-721.380.7042

## 2022-10-03 NOTE — ADDENDUM NOTE
Addended by: Dereje Snow on: 7/25/2019 11:28 AM     Modules accepted: Frances Neumann M.D.  [facility]  [address] [address] [place] [address]  Telephone:  [phone]

## 2022-10-03 NOTE — TELEPHONE ENCOUNTER
Called and left partial vm for patient as the phone disconnected  Called patient to check on updated/new insurance and to schedule a follow up  Insurance needs to be verified that the office takes it before scheduling follow up  If insurance is good, schedule follow up per check out notes of last completed appt

## 2022-10-05 ENCOUNTER — TELEPHONE (OUTPATIENT)
Dept: PSYCHIATRY | Facility: CLINIC | Age: 52
End: 2022-10-05

## 2022-10-05 NOTE — TELEPHONE ENCOUNTER
Rep from [facility] called to see if pt is coming to appt, I did advise that I could not release information due to hippa and that there was no consent that listed them   They are sending an SHANI

## 2022-10-07 ENCOUNTER — OFFICE VISIT (OUTPATIENT)
Dept: FAMILY MEDICINE CLINIC | Facility: CLINIC | Age: 52
End: 2022-10-07

## 2022-10-07 VITALS
RESPIRATION RATE: 18 BRPM | WEIGHT: 120.8 LBS | SYSTOLIC BLOOD PRESSURE: 104 MMHG | DIASTOLIC BLOOD PRESSURE: 60 MMHG | OXYGEN SATURATION: 96 % | TEMPERATURE: 97.4 F | BODY MASS INDEX: 20.62 KG/M2 | HEART RATE: 112 BPM | HEIGHT: 64 IN

## 2022-10-07 DIAGNOSIS — M54.16 LUMBAR RADICULOPATHY: ICD-10-CM

## 2022-10-07 DIAGNOSIS — F41.9 ANXIETY: ICD-10-CM

## 2022-10-07 DIAGNOSIS — I10 BENIGN ESSENTIAL HYPERTENSION: ICD-10-CM

## 2022-10-07 DIAGNOSIS — F11.288 OPIOID DEPENDENCE WITH OTHER OPIOID-INDUCED DISORDER (HCC): ICD-10-CM

## 2022-10-07 DIAGNOSIS — E11.65 UNCONTROLLED TYPE 2 DIABETES MELLITUS WITH HYPERGLYCEMIA (HCC): ICD-10-CM

## 2022-10-07 DIAGNOSIS — F33.2 SEVERE EPISODE OF RECURRENT MAJOR DEPRESSIVE DISORDER, WITHOUT PSYCHOTIC FEATURES (HCC): ICD-10-CM

## 2022-10-07 DIAGNOSIS — Z01.818 PREOPERATIVE CLEARANCE: Primary | ICD-10-CM

## 2022-10-07 DIAGNOSIS — Z23 ENCOUNTER FOR IMMUNIZATION: ICD-10-CM

## 2022-10-07 DIAGNOSIS — G89.4 CHRONIC PAIN DISORDER: ICD-10-CM

## 2022-10-07 PROCEDURE — 90682 RIV4 VACC RECOMBINANT DNA IM: CPT

## 2022-10-07 PROCEDURE — 99243 OFF/OP CNSLTJ NEW/EST LOW 30: CPT | Performed by: FAMILY MEDICINE

## 2022-10-07 PROCEDURE — 90471 IMMUNIZATION ADMIN: CPT

## 2022-10-07 RX ORDER — CHOLESTYRAMINE 4 G/9G
1 POWDER, FOR SUSPENSION ORAL 2 TIMES DAILY
COMMUNITY

## 2022-10-07 RX ORDER — OMEPRAZOLE 20 MG/1
20 CAPSULE, DELAYED RELEASE ORAL DAILY
COMMUNITY
Start: 2022-09-20

## 2022-10-10 NOTE — TELEPHONE ENCOUNTER
Called patient and spoke to her regarding insurance  Patient stated she will have Medicare A & B which the facility accepts  Patient was scheduled for 1 month follow up virtual on 10/31 at 2pm  Patient will call the office with insurance information when she receives it

## 2022-10-11 PROBLEM — Z79.899 CHRONIC PRESCRIPTION BENZODIAZEPINE USE: Status: ACTIVE | Noted: 2022-09-08

## 2022-10-11 PROBLEM — E11.65 UNCONTROLLED TYPE 2 DIABETES MELLITUS WITH HYPERGLYCEMIA (HCC): Status: ACTIVE | Noted: 2021-12-02

## 2022-10-11 PROBLEM — R94.31 PROLONGED QT INTERVAL: Status: RESOLVED | Noted: 2020-05-15 | Resolved: 2022-10-11

## 2022-10-11 NOTE — PROGRESS NOTES
Subjective:      Migel Ariza is a 47 y o  female who presents to the office today for a preoperative consultation at the request of surgeon Dr Carolina Muñoz who plans on performing left knee arthropathy on October 21  Planned anesthesia is( No paperwork from patient ) The patient has the following known anesthesia issues: none aware of    Patient has a bleeding risk of : None  Recommend stopping aspirin and any kind of anti-inflammatory substance including Omega threes 7 days prior to surgery  Can resume baby strength aspirin 2-3 days after surgery unless contraindicated  Echo in good range  EKG stable  Will check with endocrinology regarding their thoughts on her current A1c with regards to having arthroscopic surgery  Meds reviewed at length  Recommend holding ARB morning of surgery  Resume it the next day  Patient again under series stress due to  now being placed on hospice with ALS  She needs to continue to follow-up in be compliant with [facility] endocrinology regarding her uncontrolled type 2 diabetes insulin requiring  Recheck here Q 3 6 months  Which check with endocrinology and potentially Cardiology regarding clearance for surgery  Review of Systems  Review of Systems   Constitutional: Positive for fatigue  Negative for appetite change, fever and unexpected weight change  HENT: Negative for congestion, ear pain, postnasal drip, rhinorrhea, sinus pressure, sinus pain and sore throat  Eyes: Negative for redness and visual disturbance  Respiratory: Negative for chest tightness and shortness of breath  Cardiovascular: Negative for chest pain, palpitations and leg swelling  Gastrointestinal: Negative for abdominal distention, abdominal pain, diarrhea and nausea  Endocrine: Negative for cold intolerance and heat intolerance  Genitourinary: Negative for dysuria and hematuria     Musculoskeletal: Positive for arthralgias, back pain, gait problem, myalgias, neck pain and neck stiffness  Skin: Negative for pallor and rash  Neurological: Negative for dizziness, tremors, weakness, light-headedness and headaches  Psychiatric/Behavioral: Negative for behavioral problems  The patient is not nervous/anxious  Objective:      Physical Exam    /60 (BP Location: Left arm, Patient Position: Sitting, Cuff Size: Adult)   Pulse (!) 112   Temp (!) 97 4 °F (36 3 °C) (Temporal)   Resp 18   Ht 5' 4" (1 626 m)   Wt 54 8 kg (120 lb 12 8 oz)   LMP 03/04/2016   SpO2 96%   BMI 20 74 kg/m²     Physical Exam  Vitals and nursing note reviewed  Constitutional:       General: She is not in acute distress  Appearance: Normal appearance  She is not ill-appearing or diaphoretic  HENT:      Head: Normocephalic and atraumatic  Mouth/Throat:      Pharynx: Oropharynx is clear  Eyes:      General: No scleral icterus  Conjunctiva/sclera: Conjunctivae normal       Pupils: Pupils are equal, round, and reactive to light  Neck:      Thyroid: No thyromegaly  Cardiovascular:      Rate and Rhythm: Normal rate and regular rhythm  Pulses:           Carotid pulses are 0 on the right side and 0 on the left side  Heart sounds: Normal heart sounds  No murmur heard  Pulmonary:      Effort: Pulmonary effort is normal  No respiratory distress  Breath sounds: Normal breath sounds  No wheezing  Abdominal:      General: There is no distension  Palpations: Abdomen is soft  There is no mass  Tenderness: There is no abdominal tenderness  Musculoskeletal:         General: Normal range of motion  Cervical back: Neck supple  Right lower leg: No edema  Left lower leg: No edema  Lymphadenopathy:      Cervical: No cervical adenopathy  Skin:     General: Skin is warm  Coloration: Skin is not pale  Neurological:      General: No focal deficit present  Mental Status: She is alert and oriented to person, place, and time        Cranial Nerves: No cranial nerve deficit  Deep Tendon Reflexes: Reflexes are normal and symmetric  Psychiatric:         Behavior: Behavior normal          Thought Content: Thought content normal          Judgment: Judgment normal       Comments: Anxious/ depressed  Predictors of intubation difficulty:  Morbid obesity? no  Anatomically abnormal facies? no  Short, thick neck? no  Neck range of motion: normal    Cardiographics  ECG: normal sinus rhythm, no blocks or conduction defects, no ischemic changes  Tachycardia   Compared to previous EKG no change  Recent echocardiogram shows normal ejection fraction and no wall motion abnormality  No valvular issues  No thoracic aortic aneurysm  Imaging  Chest x-ray:N/A    Lab Review   If applicable, they were reviewed and listed below  Assessment:      47 y o  female with planned surgery as above  Known risk factors for perioperative complications: Diabetes mellitus    Difficulty with intubation is not anticipated  Can walk 2 blocks without difficulty:  Can walk up 2 flights of stairs without difficulty:      1  Preoperative clearance     2  Uncontrolled type 2 diabetes mellitus with hyperglycemia ([facility] [address] )     3  Severe episode of recurrent major depressive disorder, without psychotic features ([facility] [address] )     4  Opioid dependence with other opioid-induced disorder ([facility] [address] )     5  Lumbar radiculopathy     6  Chronic pain disorder     7  Benign essential hypertension     8  Anxiety     9  Encounter for immunization  influenza vaccine, quadrivalent, recombinant, PF, 0 5 mL, for patients 18 yr+ (FLUBLOK)          Plan:        No problem-specific Assessment & Plan notes found for this encounter  Patient is medically cleared for surgery  Addendum 10-17-22  Spoke with Dr Madeleine Foster from orthopedics  States patient did well with her 1st procedure  Despite elevated A1c feels that she will do well  Again echo and EKG stable    States anesthesia will be general  Endo and rheumatology made aware of procedure  Patient needs to be aggressive with monitoring her sugars 2-3 times daily for at least 1-2 weeks prior to her and after surgery  Needs to call us or Endo with any significantly elevated blood sugars  Medical tape, Lexapro [escitalopram oxalate], Escitalopram, and Other  Past Medical History:   Diagnosis Date   • Acute venous embolism and thrombosis of deep vessels of distal lower extremity (HCC)    • Anemia    • Anxiety     last assessed 11/20/17   • Arthritis    • Asthma    • Cerebral infarction [facility])     unspecified, last assessed 11/14/16   • Chest pain     last assessed 5/9/17   • Chronic cough     last assessed 12/12/13   • Depression    • Diabetes mellitus, type 2 (HCC)    • DJD (degenerative joint disease)    • Esophageal reflux    • Fibromyalgia    • GERD without esophagitis     resolved 5/13/16   • History of pulmonary embolism    • Hyperlipidemia    • Hypertension    • Hypothyroidism    • Iron deficiency    • Pancreatitis    • Panic attack    • Panic disorder    • Polyarthritis    • PTSD (post-traumatic stress disorder)    • Sleep difficulties    • Stroke syndrome    • Thyroid disease    • TIA (transient ischemic attack)    • TIA (transient ischemic attack)    • Venous embolism and thrombosis of deep vessels of distal lower extremity (HCC)    • Vitamin B12 deficiency    • Vitamin D deficiency      Past Surgical History:   Procedure Laterality Date   • CARPAL TUNNEL RELEASE Right     neuroplasty decompression of median nerve   • CATARACT EXTRACTION     • CHOLECYSTECTOMY     • ERCP     • ERCP     • ESOPHAGOGASTRIC FUNDOPLASTY     • NISSEN FUNDOPLICATION     • PATELLA SURGERY Left 12/2021   • MN ESOPHAGOGASTRODUODENOSCOPY TRANSORAL DIAGNOSTIC N/A 11/02/2016    Procedure: EGD AND COLONOSCOPY;  Surgeon: Ekta Fleming MD;  Location: [facility];   Service: Gastroenterology   • MN INCISE FINGER TENDON SHEATH Right 03/08/2016    Procedure: RELEASE TRIGGER FINGER RIGHT THUMB;  Surgeon: Grecia Thorne MD;  [facility];  Service: Orthopedics   • WI WRIST Eppie Ku LIG Right 03/08/2016    Procedure: RELEASE CARPAL TUNNEL ENDOSCOPIC;  Surgeon: Grecia Thorne MD;  Location[facility];  Service: Orthopedics   • TONSILLECTOMY AND ADENOIDECTOMY       Patient Active Problem List   Diagnosis   • Diabetes mellitus ([facility] )   • Severe episode of recurrent major depressive disorder, without psychotic features ([facility] )   • GERD without esophagitis   • History of decompression of median nerve   • Hypothyroidism   • Protein calorie malnutrition ([facility] )   • Nicotine dependence   • Anxiety   • Benign essential hypertension   • Chronic fatigue   • Fatty liver   • Hyperlipidemia   • Insomnia   • Iron deficiency   • Chronic diarrhea   • Opioid dependence ([facility] )   • Pancreatic cyst   • History of stroke   • Vitamin B12 deficiency   • Vitamin D deficiency   • Reactive airway disease without complication   • Temporary cerebral vascular dysfunction   • History of pulmonary embolism   • Arthralgia of temporomandibular joint   • Carpal tunnel syndrome   • Dysphagia   • Chronic migraine without aura without status migrainosus, not intractable   • Diplopia   • ROGERIO positive   • Symptom associated with female genital organs   • Irregular menstrual cycle   • Female stress incontinence   • Decreased libido   • Chronic cervical pain   • Paresthesia and pain of both upper extremities   • Paresthesia of both lower extremities   • Chronic pain of both shoulders   • Cervical radiculopathy   • Lumbar radiculopathy   • DDD (degenerative disc disease), cervical   • DDD (degenerative disc disease), lumbar   • Low back pain with sciatica   • Cervical spinal stenosis   • Cervical spondylosis without myelopathy   • Chronic pain disorder   • Low serum cortisol level   • Neuropathy   • Weakness of right upper extremity   • Chronic migraine without aura   • Nausea   • Chronic prescription benzodiazepine use   • Uncontrolled type 2 diabetes mellitus with hyperglycemia (HCC)     Social History     Socioeconomic History   • Marital status: /Civil Union     Spouse name: None   • Number of children: 1   • Years of education: technical school   • Highest education level: Associate degree: occupational, technical, or vocational program   Occupational History   • Occupation: unemployed     Comment: SSDI   Tobacco Use   • Smoking status: Current Every Day Smoker     Packs/day: 1 00     Years: 35 00     Pack years: 35 00     Types: Cigarettes     Start date: 1/1/1983   • Smokeless tobacco: Never Used   • Tobacco comment: 21 + years   Vaping Use   • Vaping Use: Former   Substance and Sexual Activity   • Alcohol use: Not Currently     Alcohol/week: 0 0 standard drinks     Comment: last was in 2010 after DUI   • Drug use: No   • Sexual activity: Yes     Partners: Male   Other Topics Concern   • None   Social History Narrative    No coffee consumption     Social Determinants of Health     Financial Resource Strain: Not on file   Food Insecurity: Not on file   Transportation Needs: Unknown   • Lack of Transportation (Medical): Not on file   • Lack of Transportation (Non-Medical):  No   Physical Activity: Not on file   Stress: Not on file   Social Connections: Not on file   Intimate Partner Violence: Not on file   Housing Stability: Not on file       Current Outpatient Medications:   •  omeprazole (PriLOSEC) 20 mg delayed release capsule, Take 20 mg by mouth daily, Disp: , Rfl:   •  acarbose (PRECOSE) 50 mg tablet, TAKE 1 TABLET BY MOUTH 3 TIMES A DAY WITH MEALS , Disp: 270 tablet, Rfl: 1  •  Adalimumab (HUMIRA PEN SC), Inject under the skin, Disp: , Rfl:   •  Albuterol Sulfate (ProAir RespiClick) 778 (90 Base) MCG/ACT AEPB, Inhale 2 puffs every 6 (six) hours as needed (wheezing), Disp: 1 each, Rfl: 1  •  aspirin 81 mg chewable tablet, Chew 81 mg daily , Disp: , Rfl:   •  atorvastatin (LIPITOR) 80 mg tablet, TAKE 1 TABLET BY MOUTH EVERY DAY, Disp: 90 tablet, Rfl: 0  •  baclofen 10 mg tablet, TAKE 1 TABLET BY MOUTH THREE TIMES A DAY AS NEEDED, Disp: 270 tablet, Rfl: 0  •  BD PEN NEEDLE LINDY U/F 32G X 4 MM MISC, Inject under the skin daily, Disp: 30 each, Rfl: 5  •  Blood Glucose Monitoring Suppl (OneTouch Verio Reflect) w/Device KIT, CHECK BLOOD SUGARS FOUR TIMES DAILY  PLEASE SUBSTITUTE WITH APPROPRIATE ALTERNATIVE AS COVERED BY PATIENT'S INSURANCE  DX: E11 65, Disp: 1 kit, Rfl: 0  •  butalbital-acetaminophen-caffeine (FIORICET,ESGIC) -40 mg per tablet, TAKE 1 TABLET EVERY 6 HOURS AS NEEDED FOR MIGRAINE  PLEASE LIMIT 3-4 PER WEEK, 15 PER MONTH , Disp: 15 tablet, Rfl: 2  •  cholestyramine (QUESTRAN) 4 g packet, Take 1 packet by mouth 2 (two) times a day, Disp: , Rfl:   •  cholestyramine sugar free (QUESTRAN LIGHT) 4 g packet, Take 4 g by mouth 2 (two) times a day, Disp: , Rfl:   •  Cyanocobalamin (VITAMIN B-12 IJ), Inject as directed, Disp: , Rfl:   •  Cyanocobalamin 1000 MCG/ML LIQD, Take 500 mcg by mouth daily, Disp: , Rfl:   •  diazepam (VALIUM) 5 mg tablet, Take 1 tablet (5 mg total) by mouth 3 (three) times a day as needed for anxiety, Disp: 90 tablet, Rfl: 0  •  DULoxetine (CYMBALTA) 60 mg delayed release capsule, Take 1 capsule (60 mg total) by mouth 2 (two) times a day, Disp: 180 capsule, Rfl: 1  •  ergocalciferol (VITAMIN D2) 50,000 units, 1 TAB ONCE WEEKLY, Disp: 12 capsule, Rfl: 0  •  Fluticasone-Salmeterol (Advair Diskus) 500-50 mcg/dose inhaler, Inhale 1 puff by mouth 2 times daily  Rinse mouth after use, Disp: 180 blister, Rfl: 1  •  Folic Acid 0 8 MG CAPS, Take 0 04 mg by mouth daily  , Disp: , Rfl:   •  gabapentin (NEURONTIN) 400 mg capsule, TAKE 2 CAPSULES BY MOUTH 3 TIMES A DAY, Disp: 540 capsule, Rfl: 1  •  Galcanezumab-gnlm (Emgality) 120 MG/ML SOAJ, 1 injection monthly, Disp: 1 mL, Rfl: 11  •  glucose blood (OneTouch Verio) test strip, Check blood sugars four times daily   Please substitute with appropriate alternative as covered by patient's insurance  Dx: E11 65, Disp: 400 each, Rfl: 3  •  HumaLOG KwikPen 200 units/mL CONCENTRATED U-200 injection pen, INJECT 15 UNITS WITH MEALS +SCALE ( SCALE - 150-200 -2 UNITS, 201-250-4 UNITS, 251-300 -6 UNITS, 301-350-8 UNITS, > 350- 10 UNITS ), Disp: 12 mL, Rfl: 3  •  ibuprofen (MOTRIN) 800 mg tablet, Take 800 mg by mouth every 6 (six) hours as needed, Disp: , Rfl:   •  Insulin Pen Needle (BD Pen Needle Kimberli U/F) 32G X 4 MM MISC, by Does not apply route daily, Disp: 100 each, Rfl: 3  •  Lantus SoloStar 100 units/mL injection pen, INJECT 45 UNITS IN AM, Disp: 15 mL, Rfl: 5  •  levothyroxine 112 mcg tablet, TAKE 1 TABLET BY MOUTH EVERY DAY, Disp: 90 tablet, Rfl: 1  •  Lidocaine 4 % PTCH, Place 1 patch on the skin daily, Disp: , Rfl:   •  losartan (COZAAR) 50 mg tablet, TAKE 1 TABLET DAILY  , Disp: 90 tablet, Rfl: 2  •  metFORMIN (GLUCOPHAGE-XR) 500 mg 24 hr tablet, Take 1 tablet (500 mg total) by mouth 2 (two) times a day with meals, Disp: 180 tablet, Rfl: 1  •  methotrexate 2 5 MG tablet, , Disp: , Rfl:   •  metoprolol tartrate (LOPRESSOR) 25 mg tablet, TAKE 1 TABLET BY MOUTH EVERY DAY, Disp: 90 tablet, Rfl: 2  •  mirtazapine (REMERON) 7 5 MG tablet, Take 1 tablet (7 5 mg total) by mouth daily at bedtime, Disp: 90 tablet, Rfl: 1  •  naloxone (Narcan) 4 mg/0 1 mL nasal spray, 1 actuation in one nostril once as needed for opioid overdose, may repeat dose every 2-3 minutes until patient responsive or EMS arrives, Disp: 1 each, Rfl: 1  •  OLANZapine (ZyPREXA) 20 MG tablet, Take 1 tablet (20 mg total) by mouth daily at bedtime, Disp: 90 tablet, Rfl: 1  •  ondansetron (ZOFRAN) 4 mg tablet, 1 TAB EVERY 6 HRS AS NEEDED FOR NAUSEA  HOLD COMPAZINE , Disp: 20 tablet, Rfl: 2  •  OneTouch Delica Lancets 18W MISC, Check blood sugars four times daily  Please substitute with appropriate alternative as covered by patient's insurance   Dx: E11 65, Disp: 400 each, Rfl: 3  •  OneTouch Ultra test strip, USE AS DIRECTED TO TEST 3 TIMES A DAY, Disp: 100 strip, Rfl: 0  •  prazosin (MINIPRESS) 2 mg capsule, Take 1 capsule (2 mg total) by mouth daily at bedtime, Disp: 90 capsule, Rfl: 1  •  prochlorperazine (COMPAZINE) 10 mg tablet, TAKE 1 TABLET BY MOUTH EVERY 6 HOURS AS NEEDED FOR NAUSEA OR VOMITING , Disp: 90 tablet, Rfl: 1  •  topiramate (TOPAMAX) 100 mg tablet, TAKE 2 TABLETS BY MOUTH EVERY DAY, Disp: 180 tablet, Rfl: 1  •  topiramate (TOPAMAX) 25 mg tablet, TAKE 1 TABLET BY MOUTH EVERY DAY, Disp: 90 tablet, Rfl: 1  •  traMADol (ULTRAM) 50 mg tablet, TAKE 1 TABLET (50 MG TOTAL) BY MOUTH EVERY 6 (SIX) HOURS AS NEEDED FOR MODERATE PAIN OR SEVERE PAIN DO NOT TAKE WITHIN 6 HOURS OF VALIUM OR FIORICET, Disp: 100 tablet, Rfl: 0  •  Turmeric 500 MG TABS, Take 1 tablet by mouth daily, Disp: , Rfl:   Lab Results   Component Value Date    WBC 8 19 07/06/2022    HGB 15 5 (H) 07/06/2022    HCT 44 6 07/06/2022    MCV 94 07/06/2022     07/06/2022     Lab Results   Component Value Date     07/16/2016    K 3 5 07/06/2022    CL 99 (L) 07/06/2022    CO2 28 07/06/2022    ANIONGAP 12 02/11/2015    BUN 8 07/06/2022    CREATININE 0 76 07/06/2022    GLUCOSE 323 (H) 08/24/2017    GLUF 278 (H) 07/06/2022    CALCIUM 9 5 07/06/2022    AST 18 07/06/2022    ALT 31 07/06/2022    ALKPHOS 124 (H) 07/06/2022    PROT 6 7 10/22/2016    BILITOT 0 3 10/22/2016    EGFR 91 07/06/2022     No results found for: KIMBERLY        @[email or web address]    Current Outpatient Medications:   •  omeprazole (PriLOSEC) 20 mg delayed release capsule, Take 20 mg by mouth daily, Disp: , Rfl:   •  acarbose (PRECOSE) 50 mg tablet, TAKE 1 TABLET BY MOUTH 3 TIMES A DAY WITH MEALS , Disp: 270 tablet, Rfl: 1  •  Adalimumab (HUMIRA PEN SC), Inject under the skin, Disp: , Rfl:   •  Albuterol Sulfate (ProAir RespiClick) 890 (90 Base) MCG/ACT AEPB, Inhale 2 puffs every 6 (six) hours as needed (wheezing), Disp: 1 each, Rfl: 1  •  aspirin 81 mg chewable tablet, Chew 81 mg daily , Disp: , Rfl:   •  atorvastatin (LIPITOR) 80 mg tablet, TAKE 1 TABLET BY MOUTH EVERY DAY, Disp: 90 tablet, Rfl: 0  •  baclofen 10 mg tablet, TAKE 1 TABLET BY MOUTH THREE TIMES A DAY AS NEEDED, Disp: 270 tablet, Rfl: 0  •  BD PEN NEEDLE LINDY U/F 32G X 4 MM MISC, Inject under the skin daily, Disp: 30 each, Rfl: 5  •  Blood Glucose Monitoring Suppl (OneTouch Verio Reflect) w/Device KIT, CHECK BLOOD SUGARS FOUR TIMES DAILY  PLEASE SUBSTITUTE WITH APPROPRIATE ALTERNATIVE AS COVERED BY PATIENT'S INSURANCE  DX: E11 65, Disp: 1 kit, Rfl: 0  •  butalbital-acetaminophen-caffeine (FIORICET,ESGIC) -40 mg per tablet, TAKE 1 TABLET EVERY 6 HOURS AS NEEDED FOR MIGRAINE  PLEASE LIMIT 3-4 PER WEEK, 15 PER MONTH , Disp: 15 tablet, Rfl: 2  •  cholestyramine (QUESTRAN) 4 g packet, Take 1 packet by mouth 2 (two) times a day, Disp: , Rfl:   •  cholestyramine sugar free (QUESTRAN LIGHT) 4 g packet, Take 4 g by mouth 2 (two) times a day, Disp: , Rfl:   •  Cyanocobalamin (VITAMIN B-12 IJ), Inject as directed, Disp: , Rfl:   •  Cyanocobalamin 1000 MCG/ML LIQD, Take 500 mcg by mouth daily, Disp: , Rfl:   •  diazepam (VALIUM) 5 mg tablet, Take 1 tablet (5 mg total) by mouth 3 (three) times a day as needed for anxiety, Disp: 90 tablet, Rfl: 0  •  DULoxetine (CYMBALTA) 60 mg delayed release capsule, Take 1 capsule (60 mg total) by mouth 2 (two) times a day, Disp: 180 capsule, Rfl: 1  •  ergocalciferol (VITAMIN D2) 50,000 units, 1 TAB ONCE WEEKLY, Disp: 12 capsule, Rfl: 0  •  Fluticasone-Salmeterol (Advair Diskus) 500-50 mcg/dose inhaler, Inhale 1 puff by mouth 2 times daily  Rinse mouth after use, Disp: 180 blister, Rfl: 1  •  Folic Acid 0 8 MG CAPS, Take 0 04 mg by mouth daily  , Disp: , Rfl:   •  gabapentin (NEURONTIN) 400 mg capsule, TAKE 2 CAPSULES BY MOUTH 3 TIMES A DAY, Disp: 540 capsule, Rfl: 1  •  Galcanezumab-gnlm (Emgality) 120 MG/ML SOAJ, 1 injection monthly, Disp: 1 mL, Rfl: 11  •  glucose blood (OneTouch Verio) test strip, Check blood sugars four times daily  Please substitute with appropriate alternative as covered by patient's insurance  Dx: E11 65, Disp: 400 each, Rfl: 3  •  HumaLOG KwikPen 200 units/mL CONCENTRATED U-200 injection pen, INJECT 15 UNITS WITH MEALS +SCALE ( SCALE - 150-200 -2 UNITS, 201-250-4 UNITS, 251-300 -6 UNITS, 301-350-8 UNITS, > 350- 10 UNITS ), Disp: 12 mL, Rfl: 3  •  ibuprofen (MOTRIN) 800 mg tablet, Take 800 mg by mouth every 6 (six) hours as needed, Disp: , Rfl:   •  Insulin Pen Needle (BD Pen Needle Kimberli U/F) 32G X 4 MM MISC, by Does not apply route daily, Disp: 100 each, Rfl: 3  •  Lantus SoloStar 100 units/mL injection pen, INJECT 45 UNITS IN AM, Disp: 15 mL, Rfl: 5  •  levothyroxine 112 mcg tablet, TAKE 1 TABLET BY MOUTH EVERY DAY, Disp: 90 tablet, Rfl: 1  •  Lidocaine 4 % PTCH, Place 1 patch on the skin daily, Disp: , Rfl:   •  losartan (COZAAR) 50 mg tablet, TAKE 1 TABLET DAILY  , Disp: 90 tablet, Rfl: 2  •  metFORMIN (GLUCOPHAGE-XR) 500 mg 24 hr tablet, Take 1 tablet (500 mg total) by mouth 2 (two) times a day with meals, Disp: 180 tablet, Rfl: 1  •  methotrexate 2 5 MG tablet, , Disp: , Rfl:   •  metoprolol tartrate (LOPRESSOR) 25 mg tablet, TAKE 1 TABLET BY MOUTH EVERY DAY, Disp: 90 tablet, Rfl: 2  •  mirtazapine (REMERON) 7 5 MG tablet, Take 1 tablet (7 5 mg total) by mouth daily at bedtime, Disp: 90 tablet, Rfl: 1  •  naloxone (Narcan) 4 mg/0 1 mL nasal spray, 1 actuation in one nostril once as needed for opioid overdose, may repeat dose every 2-3 minutes until patient responsive or EMS arrives, Disp: 1 each, Rfl: 1  •  OLANZapine (ZyPREXA) 20 MG tablet, Take 1 tablet (20 mg total) by mouth daily at bedtime, Disp: 90 tablet, Rfl: 1  •  ondansetron (ZOFRAN) 4 mg tablet, 1 TAB EVERY 6 HRS AS NEEDED FOR NAUSEA  HOLD COMPAZINE , Disp: 20 tablet, Rfl: 2  •  OneTouch Delica Lancets 64Q MISC, Check blood sugars four times daily   Please substitute with appropriate alternative as covered by patient's insurance   Dx: E11 65, Disp: 400 each, Rfl: 3  •  OneTouch Ultra test strip, USE AS DIRECTED TO TEST 3 TIMES A DAY, Disp: 100 strip, Rfl: 0  •  prazosin (MINIPRESS) 2 mg capsule, Take 1 capsule (2 mg total) by mouth daily at bedtime, Disp: 90 capsule, Rfl: 1  •  prochlorperazine (COMPAZINE) 10 mg tablet, TAKE 1 TABLET BY MOUTH EVERY 6 HOURS AS NEEDED FOR NAUSEA OR VOMITING , Disp: 90 tablet, Rfl: 1  •  topiramate (TOPAMAX) 100 mg tablet, TAKE 2 TABLETS BY MOUTH EVERY DAY, Disp: 180 tablet, Rfl: 1  •  topiramate (TOPAMAX) 25 mg tablet, TAKE 1 TABLET BY MOUTH EVERY DAY, Disp: 90 tablet, Rfl: 1  •  traMADol (ULTRAM) 50 mg tablet, TAKE 1 TABLET (50 MG TOTAL) BY MOUTH EVERY 6 (SIX) HOURS AS NEEDED FOR MODERATE PAIN OR SEVERE PAIN DO NOT TAKE WITHIN 6 HOURS OF VALIUM OR FIORICET, Disp: 100 tablet, Rfl: 0  •  Turmeric 500 MG TABS, Take 1 tablet by mouth daily, Disp: , Rfl:   Allergies   Allergen Reactions   • Medical Tape Rash   • Lexapro [Escitalopram Oxalate]    • Escitalopram Other (See Comments) and Palpitations   • Other Hives and Rash     Adhesive Tape

## 2022-10-11 NOTE — PROGRESS NOTES
Assessment/Plan:    No problem-specific Assessment & Plan notes found for this encounter  Diagnoses and all orders for this visit:    Encounter for immunization  -     influenza vaccine, quadrivalent, recombinant, PF, 0 5 mL, for patients 18 yr+ (FLUBLOK)    Other orders  -     cholestyramine (QUESTRAN) 4 g packet; Take 1 packet by mouth 2 (two) times a day  -     omeprazole (PriLOSEC) 20 mg delayed release capsule; Take 20 mg by mouth daily        1  Encounter for immunization  influenza vaccine, quadrivalent, recombinant, PF, 0 5 mL, for patients 18 yr+ (FLUBLOK)       Subjective:        Patient ID: Asher Maier is a 47 y o  female    Chief Complaint   Patient presents with   • Transition of Care Management   • Pre-op Exam     Patient states she needs clearance for knee surgery        HPI    The following portions of the patient's history were reviewed and updated as appropriate: past medical history, past surgical history and problem list       Review of Systems      Objective:  /60 (BP Location: Left arm, Patient Position: Sitting, Cuff Size: Adult)   Pulse (!) 112   Temp (!) 97 4 °F (36 3 °C) (Temporal)   Resp 18   Ht 5' 4" (1 626 m)   Wt 54 8 kg (120 lb 12 8 oz)   LMP 03/04/2016   SpO2 96%   BMI 20 74 kg/m²        Physical Exam

## 2022-10-12 ENCOUNTER — TELEPHONE (OUTPATIENT)
Dept: ENDOCRINOLOGY | Facility: CLINIC | Age: 52
End: 2022-10-12

## 2022-10-12 NOTE — LETTER
10/14/22    Dear Nik Hartman,    We have made several attempts to call you by phone  It is important that you contact us back at Dept: [phone] so that we can assist with your care needs       Sincerely,       [facility]

## 2022-10-12 NOTE — TELEPHONE ENCOUNTER
Pt is scheduled for knee surgery on Oct 21, 2022   Please ask pt to send the log, ASAP   She was supposed to check blood sugars with meals and at bedtime     Gabriela Vaughn

## 2022-10-14 NOTE — TELEPHONE ENCOUNTER
Letter sent OFFICE VISIT      Patient: Neena Cabral    : 1988    Date of Service: 2021  MRN: [id number]  Chief Complaint   Patient presents with   • Flu Like Symptoms     sinus,headaches,ears ,congestion ,cough >Patient stated that has HX of sinus    • Office Visit      SUBJECTIVE:   HISTORY OF PRESENT ILLNESS:  Hx of recurrent sinus infections, had sinus surgery in past, reports symptoms x 1 week, chart review pt was seen earlier today COVID test pending-viral URI    Flu Like Symptoms  This is a new problem. The current episode started in the past 7 days. The problem occurs constantly. The problem has been gradually worsening. Associated symptoms include congestion, coughing (dry) and headaches (sinus pressure). Pertinent negatives include no abdominal pain, anorexia, arthralgias, change in bowel habit, chest pain, chills, diaphoresis, fatigue, fever, joint swelling, myalgias, nausea, neck pain, numbness, rash, sore throat, swollen glands, urinary symptoms, vertigo, visual change, vomiting or weakness. The symptoms are aggravated by bending. Treatments tried: OTC severe cough and cold. The treatment provided mild (help with sleep) relief.       PAST MEDICAL HISTORY:  Past Medical History:   Diagnosis Date   • Allergy    • Anxiety    • Depressive disorder    • Insomnia        MEDICATIONS:  Current Outpatient Medications   Medication Sig   • FLUoxetine (PROzac) 20 MG capsule TAKE ONE CAPSULE BY MOUTH DAILY . TAKE IN COMBO WITH 40MG CAPSULE   • lamoTRIgine (LaMICtal) 150 MG tablet Take 150 mg by mouth.   • trazodone (DESYREL) 150 MG tablet Take 150 mg by mouth.   • albuterol 108 (90 Base) MCG/ACT inhaler Inhale 2 puffs into the lungs.   • fluoxetine (PROzac) 40 MG capsule Take 40 mg by mouth daily.   • benztropine (COGENTIN) 1 MG tablet Take 1 mg by mouth 2 times daily.   • diazePAM (VALIUM) 5 MG tablet Take 5 mg by mouth 3 times daily.   • QUEtiapine (SEROquel) 50 MG tablet    • FLUoxetine (PROzac) 20 MG capsule  Take 20 mg by mouth.   • trazodone (DESYREL) 150 MG tablet    • amoxicillin-clavulanate (Augmentin) 875-125 MG per tablet Take 1 tablet by mouth 2 times daily for 7 days. Take with food.   • lamoTRIgine (LaMICtal) 100 MG tablet Take 100 mg by mouth.   • fluconazole (DIFLUCAN) 150 MG tablet TAKE 1 TABLET BY MOUTH NOW. MAY REPEAT IN 72 HOURS IF SYMPTOMS PERSIST. CALL OFFICE IF NOT RESOLVED AFTER 2 DOSES   • lamoTRIgine (LaMICtal) 25 MG tablet Take 50 mg by mouth.   • amphetamine-dextroamphetamine (ADDERALL) 10 MG tablet    • traZODone (DESYREL) 100 MG tablet TAKE 1 AND 1/2 TABLETS BY MOUTH EVERY NIGHT AS NEEDED     No current facility-administered medications for this visit.       ALLERGIES:  ALLERGIES:   Allergen Reactions   • Sulfamethoxazole W/Trimethoprim Other (See Comments)     Hot, sweaty, jittery   • Cefadroxil HIVES     hives   • Levofloxacin Other (See Comments)     Severe tendonitis       PAST SURGICAL HISTORY:  Past Surgical History:   Procedure Laterality Date   •  section, low transverse     • Cholecystectomy     • Sinus surgery     • Sinus surgery         FAMILY HISTORY:  Family History   Problem Relation Age of Onset   • Patient is unaware of any medical problems Mother    • Patient is unaware of any medical problems Father        SOCIAL HISTORY:  Social History     Tobacco Use   • Smoking status: Light Tobacco Smoker   • Smokeless tobacco: Never Used   • Tobacco comment: patient stated that she quit on 11/10/21   Vaping Use   • Vaping Use: never used   Substance Use Topics   • Alcohol use: Yes     Comment: a couple times/month   • Drug use: Never        Review of Systems   Constitutional: Negative for appetite change, chills, diaphoresis, fatigue and fever.   HENT: Positive for congestion, ear pain (ear pressure, popping), postnasal drip, rhinorrhea, sinus pressure and sinus pain. Negative for sore throat and trouble swallowing.    Eyes: Negative for visual disturbance.   Respiratory:  Positive for cough (dry). Negative for shortness of breath and wheezing.    Cardiovascular: Negative for chest pain.   Gastrointestinal: Negative for abdominal pain, anorexia, change in bowel habit, diarrhea, nausea and vomiting.   Musculoskeletal: Negative for arthralgias, back pain, joint swelling, myalgias and neck pain.   Skin: Negative for rash.   Allergic/Immunologic: Negative for environmental allergies.   Neurological: Positive for headaches (sinus pressure). Negative for vertigo, weakness and numbness.         OBJECTIVE:     Physical Exam  Vitals and nursing note reviewed.   Constitutional:       General: She is not in acute distress.     Appearance: Normal appearance. She is well-groomed. She is not ill-appearing, toxic-appearing or diaphoretic.   HENT:      Head: Normocephalic and atraumatic.      Jaw: No trismus.      Right Ear: Hearing, tympanic membrane, ear canal and external ear normal. No mastoid tenderness.      Left Ear: Hearing, ear canal and external ear normal. No mastoid tenderness. Tympanic membrane is erythematous and bulging.      Nose: Congestion and rhinorrhea present. Rhinorrhea is clear.      Right Turbinates: Swollen. Not enlarged or pale.      Left Turbinates: Swollen. Not enlarged or pale.      Right Sinus: Maxillary sinus tenderness present. No frontal sinus tenderness.      Left Sinus: Maxillary sinus tenderness present. No frontal sinus tenderness.      Mouth/Throat:      Lips: Pink.      Mouth: Mucous membranes are moist.      Pharynx: Oropharynx is clear. Uvula midline. No pharyngeal swelling, oropharyngeal exudate, posterior oropharyngeal erythema or uvula swelling.      Tonsils: No tonsillar exudate or tonsillar abscesses. 0 on the right. 0 on the left.   Eyes:      General: Lids are normal.         Right eye: No discharge.         Left eye: No discharge.      Conjunctiva/sclera: Conjunctivae normal.      Right eye: Right conjunctiva is not injected.      Left eye: Left  conjunctiva is not injected.      Pupils: Pupils are equal, round, and reactive to light.   Cardiovascular:      Rate and Rhythm: Normal rate and regular rhythm.      Heart sounds: Normal heart sounds, S1 normal and S2 normal. No murmur heard.      Pulmonary:      Effort: Pulmonary effort is normal. No respiratory distress.      Breath sounds: Normal breath sounds and air entry. No stridor. No decreased breath sounds, wheezing, rhonchi or rales.   Chest:   Breasts:      Right: No supraclavicular adenopathy.      Left: No supraclavicular adenopathy.       Abdominal:      Palpations: Abdomen is soft.   Musculoskeletal:         General: Normal range of motion.      Cervical back: Full passive range of motion without pain, normal range of motion and neck supple.   Lymphadenopathy:      Head:      Right side of head: No submental, submandibular, tonsillar, preauricular, posterior auricular or occipital adenopathy.      Left side of head: No submental, submandibular, tonsillar, preauricular, posterior auricular or occipital adenopathy.      Cervical: No cervical adenopathy.      Right cervical: No superficial, deep or posterior cervical adenopathy.     Left cervical: No superficial, deep or posterior cervical adenopathy.      Upper Body:      Right upper body: No supraclavicular adenopathy.      Left upper body: No supraclavicular adenopathy.   Skin:     General: Skin is warm and dry.      Capillary Refill: Capillary refill takes less than 2 seconds.      Coloration: Skin is not pale.      Findings: No rash.   Neurological:      Mental Status: She is alert and oriented to person, place, and time.      GCS: GCS eye subscore is 4. GCS verbal subscore is 5. GCS motor subscore is 6.   Psychiatric:         Attention and Perception: Attention normal.         Mood and Affect: Mood normal.         Speech: Speech normal.         Behavior: Behavior normal. Behavior is cooperative.         Thought Content: Thought content normal.          Judgment: Judgment normal.          Visit Vitals  BP 98/84   Pulse 94   Temp 98.2 °F (36.8 °C)   Resp 16   Ht 5' 5\" (1.651 m)   Wt 77.1 kg (170 lb)   LMP 11/19/2021   SpO2 96%   BMI 28.29 kg/m²       DIAGNOSTIC STUDIES:   LAB RESULTS:    Lab Results Reviewed, No results found for: SODIUM, POTASSIUM, CHLORIDE, CO2, BUN, CREATININE, GLUCOSE, No results found for: WBC, HCT, HGB, PLT, No results found for: HGBA1C, No results found for: AST, GPT, GGTP, ALKPT, BILIRUBIN, No results found for: COL, UAPP, USPG, UPH, UPROT, UGLU, UKET, UBILI, URBC, UNITR, UROB, UWBC,   EKG INTERPRETATION:  No results found for this or any previous visit.   and   IMAGING STUDIES:  No results found for this or any previous visit.      ASSESSMENT AND PLAN:     (J01.01) Acute recurrent maxillary sinusitis  (primary encounter diagnosis)    (H66.002) Non-recurrent acute suppurative otitis media of left ear without spontaneous rupture of tympanic membrane       Orders Placed This Encounter   • benztropine (COGENTIN) 1 MG tablet   • diazePAM (VALIUM) 5 MG tablet   • QUEtiapine (SEROquel) 50 MG tablet   • FLUoxetine (PROzac) 20 MG capsule   • FLUoxetine (PROzac) 20 MG capsule   • lamoTRIgine (LaMICtal) 150 MG tablet   • trazodone (DESYREL) 150 MG tablet   • trazodone (DESYREL) 150 MG tablet   • amoxicillin-clavulanate (Augmentin) 875-125 MG per tablet      COVID test pending at outside facility  Pt fully vaccinated  Symptoms reported x 1 week  Treat left otitis media/sinusitis +maxillary tenderness, left TM bulging + erythema  Pt allergic to Cefadroxil-hives, per chart review and pt can tolerate Augmentin allergic reaction sign & symptoms-pt verbalized understanding  AVS provided  Discussed using allergy medication to prevent recurrent sinus infections  Tylenol or Ibuprofen for pain/fever as needed per package instructions    The patient indicated understanding of the diagnosis and agreed with the plan of care. Discussed with patient follow up  and return precautions.

## 2022-10-17 ENCOUNTER — TELEPHONE (OUTPATIENT)
Dept: FAMILY MEDICINE CLINIC | Facility: CLINIC | Age: 52
End: 2022-10-17

## 2022-10-17 NOTE — TELEPHONE ENCOUNTER
Called patient per Dr Nilton Fabian to make patient aware Dr Nilton Fabian has reached out to Endo, Rheumatology, and surgeon  Per Dr Nilton Fabian patient is cleared for surgery

## 2022-10-17 NOTE — TELEPHONE ENCOUNTER
Called [facility] per Dr Anthony Quach is requesting to speak with the PA in regards to patients pre op clearance  Left message with staff to please return Dr Alexandria Cruz call

## 2022-10-24 ENCOUNTER — TELEPHONE (OUTPATIENT)
Dept: FAMILY MEDICINE CLINIC | Facility: CLINIC | Age: 52
End: 2022-10-24

## 2022-10-24 DIAGNOSIS — G43.709 CHRONIC MIGRAINE WITHOUT AURA WITHOUT STATUS MIGRAINOSUS, NOT INTRACTABLE: ICD-10-CM

## 2022-10-24 DIAGNOSIS — E78.5 HYPERLIPIDEMIA, UNSPECIFIED HYPERLIPIDEMIA TYPE: ICD-10-CM

## 2022-10-24 DIAGNOSIS — E55.9 VITAMIN D DEFICIENCY: ICD-10-CM

## 2022-10-24 DIAGNOSIS — G89.4 CHRONIC PAIN SYNDROME: ICD-10-CM

## 2022-10-24 DIAGNOSIS — M79.18 CERVICAL MYOFASCIAL PAIN SYNDROME: ICD-10-CM

## 2022-10-24 RX ORDER — ERGOCALCIFEROL 1.25 MG/1
CAPSULE ORAL
Qty: 12 CAPSULE | Refills: 0 | Status: SHIPPED | OUTPATIENT
Start: 2022-10-24

## 2022-10-24 RX ORDER — BACLOFEN 10 MG/1
TABLET ORAL
Qty: 270 TABLET | Refills: 0 | Status: SHIPPED | OUTPATIENT
Start: 2022-10-24

## 2022-10-24 RX ORDER — ATORVASTATIN CALCIUM 80 MG/1
TABLET, FILM COATED ORAL
Qty: 90 TABLET | Refills: 0 | Status: SHIPPED | OUTPATIENT
Start: 2022-10-24

## 2022-10-25 ENCOUNTER — PATIENT OUTREACH (OUTPATIENT)
Dept: FAMILY MEDICINE CLINIC | Facility: CLINIC | Age: 52
End: 2022-10-25

## 2022-10-25 DIAGNOSIS — Z59.89 INSURANCE COVERAGE PROBLEMS: Primary | ICD-10-CM

## 2022-10-25 RX ORDER — TRAMADOL HYDROCHLORIDE 50 MG/1
50 TABLET ORAL EVERY 6 HOURS PRN
Qty: 100 TABLET | Refills: 0 | Status: SHIPPED | OUTPATIENT
Start: 2022-10-25

## 2022-10-25 SDOH — ECONOMIC STABILITY - INCOME SECURITY: OTHER PROBLEMS RELATED TO HOUSING AND ECONOMIC CIRCUMSTANCES: Z59.89

## 2022-10-25 NOTE — TELEPHONE ENCOUNTER
I spoke to the patient and made her aware Dr Elihue Essex placed a referral to a  to reach out to her regarding any help available with her insulin

## 2022-10-25 NOTE — PROGRESS NOTES
OP CM spoke to PCP Dr Kristie Puckett in regards to pt now not having insurance  Called to pt and requested she call back OP CM as soon as possible  Also sent referral to Robin Portillo to see if she can help pt recert her medicaid

## 2022-10-26 ENCOUNTER — PATIENT OUTREACH (OUTPATIENT)
Dept: FAMILY MEDICINE CLINIC | Facility: CLINIC | Age: 52
End: 2022-10-26

## 2022-10-27 ENCOUNTER — TELEPHONE (OUTPATIENT)
Dept: PSYCHIATRY | Facility: CLINIC | Age: 52
End: 2022-10-27

## 2022-10-27 NOTE — TELEPHONE ENCOUNTER
Called patient to confirm appt on 10/31/22 with provider  Patient requested to reschedule stating she had just gotten knee surgery and has an appt with her Doctor on that day  Patient was rescheduled for 11/14/22 at 1030am virtual through text (number in appt notes)  Called patient back and lvm regarding coverage for the visit  Patient has no insurance in her chart  Requested she call the office and inform if she has insurance or she is a self pay  If she is a self pay, estimate must be made and sent to patient via [employer] to pay and sign self pay acknowledgement  Thank you

## 2022-10-31 ENCOUNTER — PATIENT OUTREACH (OUTPATIENT)
Dept: FAMILY MEDICINE CLINIC | Facility: CLINIC | Age: 52
End: 2022-10-31

## 2022-10-31 NOTE — PROGRESS NOTES
[facility]- Chart Review for referral from OP CM SHARLENE Ling to assist patient with insurance  This CMOC has assisted patient in the past     [facility]- contacted patient and left a message asking for a return call to assist with medical assistance application as requested  CMOC will follow up in 1 week if no return call from patient  Who Is Your Referring Provider?: Dr. Padilla

## 2022-11-10 ENCOUNTER — PATIENT OUTREACH (OUTPATIENT)
Dept: FAMILY MEDICINE CLINIC | Facility: CLINIC | Age: 52
End: 2022-11-10

## 2022-11-10 NOTE — PROGRESS NOTES
[facility]- following up with patient on medical assistance application  Yelena Review shows patient has been seen at [facility]- insurance issue resolved? Left detailed message offering assistance if need  CMOC will close referral if no return call from patient in 1 week as OP CM MSW has been trying to each patient as well

## 2022-11-18 ENCOUNTER — PATIENT OUTREACH (OUTPATIENT)
Dept: FAMILY MEDICINE CLINIC | Facility: CLINIC | Age: 52
End: 2022-11-18

## 2022-11-18 NOTE — PROGRESS NOTES
[facility]- closing referral as patient has not returned calls to [facility] or OP CM MSW Eyad Ulrich as requested  [facility] will reopen referral if and when patient is in contact with OP CM MSW Chloe  [facility] routing note to OP CM MSW with status update of closing referral this day

## 2022-11-25 DIAGNOSIS — F40.01 PANIC DISORDER WITH AGORAPHOBIA: ICD-10-CM

## 2022-11-25 DIAGNOSIS — G89.4 CHRONIC PAIN SYNDROME: ICD-10-CM

## 2022-11-25 PROBLEM — G43.709 CHRONIC MIGRAINE WITHOUT AURA: Status: RESOLVED | Noted: 2021-02-16 | Resolved: 2022-11-25

## 2022-11-25 PROBLEM — Z98.890 S/P LEFT KNEE ARTHROSCOPY: Status: ACTIVE | Noted: 2022-10-21

## 2022-11-25 RX ORDER — TRAMADOL HYDROCHLORIDE 50 MG/1
50 TABLET ORAL EVERY 6 HOURS PRN
Qty: 100 TABLET | Refills: 0 | Status: SHIPPED | OUTPATIENT
Start: 2022-11-25

## 2022-11-25 RX ORDER — DIAZEPAM 5 MG/1
5 TABLET ORAL 3 TIMES DAILY PRN
Qty: 90 TABLET | Refills: 0 | Status: SHIPPED | OUTPATIENT
Start: 2022-11-25 | End: 2022-12-25

## 2022-11-25 NOTE — TELEPHONE ENCOUNTER
PDMP website reviewed  Angely Franz has been appropriately adherent to controlled psychotropic medications (Valium) without evidence of abuse or misuse  As such, will send 30-day refill to pharmacy of choice and follow up as necessary

## 2022-12-22 ENCOUNTER — TELEPHONE (OUTPATIENT)
Dept: FAMILY MEDICINE CLINIC | Facility: CLINIC | Age: 52
End: 2022-12-22

## 2022-12-22 NOTE — TELEPHONE ENCOUNTER
Gradie Spatz returned the office's call   She is aware  She  presently does not have health insurance  I did inform her they  have self pay options available  Pt would prefer to wait until she has coverage  Call complete

## 2022-12-22 NOTE — TELEPHONE ENCOUNTER
LMOM for patient reminding her to complete a followup CT and Colonoscopy, if she needs new orders to call us back so we can update the dates of the tests

## 2023-01-12 ENCOUNTER — OFFICE VISIT (OUTPATIENT)
Dept: URGENT CARE | Age: 53
End: 2023-01-12

## 2023-01-12 VITALS
SYSTOLIC BLOOD PRESSURE: 96 MMHG | RESPIRATION RATE: 18 BRPM | TEMPERATURE: 96.1 F | HEART RATE: 118 BPM | DIASTOLIC BLOOD PRESSURE: 73 MMHG | OXYGEN SATURATION: 98 %

## 2023-01-12 DIAGNOSIS — S49.91XA INJURY OF RIGHT SHOULDER, INITIAL ENCOUNTER: ICD-10-CM

## 2023-01-12 DIAGNOSIS — R73.9 HYPERGLYCEMIA: Primary | ICD-10-CM

## 2023-01-12 DIAGNOSIS — S79.911A HIP INJURY, RIGHT, INITIAL ENCOUNTER: ICD-10-CM

## 2023-01-12 DIAGNOSIS — R29.6 MULTIPLE FALLS: ICD-10-CM

## 2023-01-12 RX ORDER — PIOGLITAZONEHYDROCHLORIDE 30 MG/1
1 TABLET ORAL DAILY
COMMUNITY

## 2023-01-13 DIAGNOSIS — M79.18 CERVICAL MYOFASCIAL PAIN SYNDROME: ICD-10-CM

## 2023-01-13 DIAGNOSIS — G89.4 CHRONIC PAIN SYNDROME: ICD-10-CM

## 2023-01-13 DIAGNOSIS — F40.01 PANIC DISORDER WITH AGORAPHOBIA: ICD-10-CM

## 2023-01-13 DIAGNOSIS — G43.709 CHRONIC MIGRAINE WITHOUT AURA WITHOUT STATUS MIGRAINOSUS, NOT INTRACTABLE: ICD-10-CM

## 2023-01-13 DIAGNOSIS — E55.9 VITAMIN D DEFICIENCY: ICD-10-CM

## 2023-01-13 RX ORDER — DIAZEPAM 5 MG/1
TABLET ORAL
Qty: 90 TABLET | Refills: 0 | Status: SHIPPED | OUTPATIENT
Start: 2023-01-13

## 2023-01-13 NOTE — PROGRESS NOTES
[address] [employer]        NAME: Hipolito Morrison is a 47 y o  female  : 1970    MRN: [id number]  DATE: 2023  TIME: 7:12 PM      Assessment and Plan     Hyperglycemia [R73 9]  1  Hyperglycemia  Transfer to other facility      2  Multiple falls  Transfer to other facility    CANCELED: XR shoulder 2+ vw right    CANCELED: XR hip/pelv 2-3 vws right if performed      3  Hip injury, right, initial encounter  Transfer to other facility      4  Injury of right shoulder, initial encounter  Transfer to other facility      patient and the visitor that drove her agreeable to proceed to the ER for further evaluation, offered EMS, declined  Requesting to go to [facility] ER  Patient Instructions     Proceed to the ER for further evaluation  Chief Complaint     Chief Complaint   Patient presents with   • Shoulder Injury     Pt presents for right shoulder and right hip pain  Pt fell -Occurred today about six hours ago  Pt was at home and went to turn knees buckled and fell onto right shoulder and hip  Has had previous knee procedures  History of Present Illness     Patient is a 60-year-old female who presents status post fall  States the injury occurred at approximately 1150 this morning  Patient states that her knee buckled and she fell  States she did not hit her head  Patient reports pain to her right shoulder and right hip  Patient reports chronic neck and back pain  Reports she is fallen approximately 12 times in the past month and a half  States she has hit her chest in the past as well as left hip  States she did not hit her head  Patient reports severe neuropathy secondary to diabetes  States that her blood sugars have been elevated above 390  Patient does take aspirin daily  Review of Systems     Review of Systems   Constitutional: Positive for fatigue  Musculoskeletal: Positive for arthralgias, back pain, gait problem, joint swelling and neck pain     Skin: Negative for wound  Neurological: Positive for weakness and numbness  Hematological: Bruises/bleeds easily  All other systems reviewed and are negative  Current Medications       Current Outpatient Medications:   •  acarbose (PRECOSE) 50 mg tablet, TAKE 1 TABLET BY MOUTH 3 TIMES A DAY WITH MEALS , Disp: 270 tablet, Rfl: 1  •  Adalimumab (HUMIRA PEN SC), Inject under the skin, Disp: , Rfl:   •  Albuterol Sulfate (ProAir RespiClick) 171 (90 Base) MCG/ACT AEPB, Inhale 2 puffs every 6 (six) hours as needed (wheezing), Disp: 1 each, Rfl: 1  •  aspirin 81 mg chewable tablet, Chew 81 mg daily , Disp: , Rfl:   •  atorvastatin (LIPITOR) 80 mg tablet, TAKE 1 TABLET BY MOUTH EVERY DAY, Disp: 90 tablet, Rfl: 0  •  baclofen 10 mg tablet, TAKE 1 TABLET BY MOUTH THREE TIMES A DAY AS NEEDED, Disp: 270 tablet, Rfl: 0  •  BD PEN NEEDLE LINDY U/F 32G X 4 MM MISC, Inject under the skin daily, Disp: 30 each, Rfl: 5  •  Blood Glucose Monitoring Suppl (OneTouch Verio Reflect) w/Device KIT, CHECK BLOOD SUGARS FOUR TIMES DAILY  PLEASE SUBSTITUTE WITH APPROPRIATE ALTERNATIVE AS COVERED BY PATIENT'S INSURANCE  DX: E11 65, Disp: 1 kit, Rfl: 0  •  butalbital-acetaminophen-caffeine (FIORICET,ESGIC) -40 mg per tablet, TAKE 1 TABLET EVERY 6 HOURS AS NEEDED FOR MIGRAINE   PLEASE LIMIT 3-4 PER WEEK, 15 PER MONTH , Disp: 15 tablet, Rfl: 2  •  cholestyramine (QUESTRAN) 4 g packet, Take 1 packet by mouth 2 (two) times a day, Disp: , Rfl:   •  cholestyramine sugar free (QUESTRAN LIGHT) 4 g packet, Take 4 g by mouth 2 (two) times a day, Disp: , Rfl:   •  Cyanocobalamin (VITAMIN B-12 IJ), Inject as directed, Disp: , Rfl:   •  diazepam (VALIUM) 5 mg tablet, TAKE 1 TABLET (5 MG TOTAL) BY MOUTH 3 (THREE) TIMES A DAY AS NEEDED FOR ANXIETY, Disp: 90 tablet, Rfl: 0  •  DULoxetine (CYMBALTA) 60 mg delayed release capsule, Take 1 capsule (60 mg total) by mouth 2 (two) times a day, Disp: 180 capsule, Rfl: 1  •  ergocalciferol (VITAMIN D2) 50,000 units, TAKE 1 CAPSULE BY MOUTH ONCE WEEKLY, Disp: 12 capsule, Rfl: 0  •  Fluticasone-Salmeterol (Advair Diskus) 500-50 mcg/dose inhaler, Inhale 1 puff by mouth 2 times daily  Rinse mouth after use, Disp: 180 blister, Rfl: 1  •  Folic Acid 0 8 MG CAPS, Take 0 04 mg by mouth daily  , Disp: , Rfl:   •  gabapentin (NEURONTIN) 400 mg capsule, TAKE 2 CAPSULES BY MOUTH 3 TIMES A DAY, Disp: 540 capsule, Rfl: 1  •  Galcanezumab-gnlm (Emgality) 120 MG/ML SOAJ, 1 injection monthly, Disp: 1 mL, Rfl: 11  •  glucose blood (OneTouch Verio) test strip, Check blood sugars four times daily  Please substitute with appropriate alternative as covered by patient's insurance  Dx: E11 65, Disp: 400 each, Rfl: 3  •  HumaLOG KwikPen 200 units/mL CONCENTRATED U-200 injection pen, INJECT 15 UNITS WITH MEALS +SCALE ( SCALE - 150-200 -2 UNITS, 201-250-4 UNITS, 251-300 -6 UNITS, 301-350-8 UNITS, > 350- 10 UNITS ), Disp: 12 mL, Rfl: 3  •  ibuprofen (MOTRIN) 800 mg tablet, Take 800 mg by mouth every 6 (six) hours as needed, Disp: , Rfl:   •  Insulin Pen Needle (BD Pen Needle Kimberli U/F) 32G X 4 MM MISC, by Does not apply route daily, Disp: 100 each, Rfl: 3  •  Lantus SoloStar 100 units/mL injection pen, INJECT 45 UNITS IN AM, Disp: 15 mL, Rfl: 5  •  levothyroxine 112 mcg tablet, TAKE 1 TABLET BY MOUTH EVERY DAY, Disp: 90 tablet, Rfl: 1  •  Lidocaine 4 % PTCH, Place 1 patch on the skin daily, Disp: , Rfl:   •  losartan (COZAAR) 50 mg tablet, TAKE 1 TABLET DAILY  , Disp: 90 tablet, Rfl: 2  •  metFORMIN (GLUCOPHAGE-XR) 500 mg 24 hr tablet, Take 1 tablet (500 mg total) by mouth 2 (two) times a day with meals, Disp: 180 tablet, Rfl: 1  •  metoprolol tartrate (LOPRESSOR) 25 mg tablet, TAKE 1 TABLET BY MOUTH EVERY DAY, Disp: 90 tablet, Rfl: 2  •  mirtazapine (REMERON) 7 5 MG tablet, Take 1 tablet (7 5 mg total) by mouth daily at bedtime, Disp: 90 tablet, Rfl: 1  •  naloxone (Narcan) 4 mg/0 1 mL nasal spray, 1 actuation in one nostril once as needed for opioid overdose, may repeat dose every 2-3 minutes until patient responsive or EMS arrives, Disp: 1 each, Rfl: 1  •  OLANZapine (ZyPREXA) 20 MG tablet, Take 1 tablet (20 mg total) by mouth daily at bedtime, Disp: 90 tablet, Rfl: 1  •  omeprazole (PriLOSEC) 20 mg delayed release capsule, Take 20 mg by mouth daily, Disp: , Rfl:   •  ondansetron (ZOFRAN) 4 mg tablet, 1 TAB EVERY 6 HRS AS NEEDED FOR NAUSEA  HOLD COMPAZINE , Disp: 20 tablet, Rfl: 2  •  OneTouch Delica Lancets 65U MISC, Check blood sugars four times daily  Please substitute with appropriate alternative as covered by patient's insurance   Dx: E11 65, Disp: 400 each, Rfl: 3  •  OneTouch Ultra test strip, USE AS DIRECTED TO TEST 3 TIMES A DAY, Disp: 100 strip, Rfl: 0  •  pioglitazone (ACTOS) 30 mg tablet, Take 1 tablet by mouth daily, Disp: , Rfl:   •  prazosin (MINIPRESS) 2 mg capsule, Take 1 capsule (2 mg total) by mouth daily at bedtime, Disp: 90 capsule, Rfl: 1  •  prochlorperazine (COMPAZINE) 10 mg tablet, TAKE 1 TABLET BY MOUTH EVERY 6 HOURS AS NEEDED FOR NAUSEA OR VOMITING , Disp: 90 tablet, Rfl: 1  •  topiramate (TOPAMAX) 100 mg tablet, TAKE 2 TABLETS BY MOUTH EVERY DAY, Disp: 180 tablet, Rfl: 1  •  topiramate (TOPAMAX) 25 mg tablet, TAKE 1 TABLET BY MOUTH EVERY DAY, Disp: 90 tablet, Rfl: 1  •  traMADol (ULTRAM) 50 mg tablet, TAKE 1 TABLET (50 MG TOTAL) BY MOUTH EVERY 6 (SIX) HOURS AS NEEDED FOR MODERATE PAIN OR SEVERE PAIN DO NOT TAKE WITHIN 6 HOURS OF VALIUM OR FIORICET, Disp: 100 tablet, Rfl: 0  •  Turmeric 500 MG TABS, Take 1 tablet by mouth daily, Disp: , Rfl:     Current Allergies     Allergies as of 01/12/2023 - Reviewed 01/12/2023   Allergen Reaction Noted   • Medical tape Rash 10/11/2020   • Lexapro [escitalopram oxalate]  03/07/2016   • Escitalopram Other (See Comments) and Palpitations 08/20/2019   • Other Hives and Rash 11/01/2016              The following portions of the patient's history were reviewed and updated as appropriate: allergies, current medications, past family history, past medical history, past social history, past surgical history and problem list      Past Medical History:   Diagnosis Date   • Acute venous embolism and thrombosis of deep vessels of distal lower extremity ([address] )    • Anemia    • Anxiety     last assessed 11/20/17   • Arthritis    • Asthma    • Cerebral infarction [facility])     unspecified, last assessed 11/14/16   • Chest pain     last assessed 5/9/17   • Chronic cough     last assessed 12/12/13   • Depression    • Diabetes mellitus, type 2 (HCC)    • DJD (degenerative joint disease)    • Esophageal reflux    • Fibromyalgia    • GERD without esophagitis     resolved 5/13/16   • History of pulmonary embolism    • Hyperlipidemia    • Hypertension    • Hypothyroidism    • Iron deficiency    • Pancreatitis    • Panic attack    • Panic disorder    • Polyarthritis    • PTSD (post-traumatic stress disorder)    • Sleep difficulties    • Stroke syndrome    • Thyroid disease    • TIA (transient ischemic attack)    • TIA (transient ischemic attack)    • Venous embolism and thrombosis of deep vessels of distal lower extremity (HCC)    • Vitamin B12 deficiency    • Vitamin D deficiency        Past Surgical History:   Procedure Laterality Date   • CARPAL TUNNEL RELEASE Right     neuroplasty decompression of median nerve   • CATARACT EXTRACTION     • CHOLECYSTECTOMY     • ERCP     • ERCP     • ESOPHAGOGASTRIC FUNDOPLASTY     • NISSEN FUNDOPLICATION     • PATELLA SURGERY Left 12/2021   • VA ESOPHAGOGASTRODUODENOSCOPY TRANSORAL DIAGNOSTIC N/A 11/02/2016    Procedure: EGD AND COLONOSCOPY;  Surgeon: Roxann Eckert MD;  Location: [facility];   Service: Gastroenterology   • VA INCISE FINGER TENDON SHEATH Right 03/08/2016    Procedure: RELEASE TRIGGER FINGER RIGHT THUMB;  Surgeon: Giovana Otero MD;  [facility];  Service: Orthopedics   • VA WRIST Olesya Doles LIG Right 03/08/2016    Procedure: RELEASE CARPAL TUNNEL ENDOSCOPIC; Surgeon: Heriberto New MD;  Location[facility];  Service: Orthopedics   • TONSILLECTOMY AND ADENOIDECTOMY         Family History   Problem Relation Age of Onset   • Diabetes Mother         type 2   • Heart attack Mother 44        acute MI   • Kidney disease Mother         CKD NKF classfication   • Depression Mother    • Arthritis Mother    • Substance Abuse Mother         mother OD in past on MS04   • Ulcerative colitis Mother    • Schizophrenia Mother    • Suicide Attempts Mother    • Bipolar disorder Mother    • Diabetes Maternal Grandmother    • Heart attack Father         acute MI   • Psoriasis Father    • Cancer Father         gastric cancer   • Stroke Father    • Crohn's disease Sister    • Bipolar disorder Sister    • Rheum arthritis Maternal Grandfather    • Throat cancer Maternal Grandfather    • Suicide Attempts Daughter    • Drug abuse Daughter    • Lung cancer Paternal Grandmother    • Cancer Paternal Grandfather    • No Known Problems Sister    • Bipolar disorder Maternal Uncle    • Anesthesia problems Neg Hx          Medications have been verified  Objective     BP 96/73   Pulse (!) 118   Temp (!) 96 1 °F (35 6 °C) (Tympanic)   Resp 18   LMP 03/04/2016   SpO2 98%   Patient's last menstrual period was 03/04/2016  Physical Exam     Physical Exam  Vitals and nursing note reviewed  Constitutional:       General: She is awake  She is not in acute distress  Appearance: She is cachectic  Cardiovascular:      Rate and Rhythm: Tachycardia present  Pulses: Normal pulses  Heart sounds: Normal heart sounds, S1 normal and S2 normal    Pulmonary:      Effort: Pulmonary effort is normal       Breath sounds: Normal breath sounds and air entry  Musculoskeletal:      Right shoulder: Tenderness and bony tenderness present  No swelling or crepitus  Decreased range of motion  Normal pulse        Right upper arm: Normal       Right elbow: Normal       Cervical back: Neck supple  Tenderness present  No signs of trauma  Muscular tenderness present  Thoracic back: Tenderness present  No signs of trauma or bony tenderness  Lumbar back: Tenderness present  No signs of trauma or bony tenderness  Right hip: Tenderness and bony tenderness present  No crepitus  Normal range of motion  Normal strength  Skin:     General: Skin is warm  Capillary Refill: Capillary refill takes less than 2 seconds  Neurological:      Mental Status: She is alert  Psychiatric:         Mood and Affect: Mood normal          Behavior: Behavior normal          Thought Content:  Thought content normal          Judgment: Judgment normal

## 2023-01-15 RX ORDER — BACLOFEN 10 MG/1
TABLET ORAL
Qty: 90 TABLET | Refills: 2 | Status: SHIPPED | OUTPATIENT
Start: 2023-01-15

## 2023-01-15 RX ORDER — ERGOCALCIFEROL 1.25 MG/1
CAPSULE ORAL
Qty: 12 CAPSULE | Refills: 0 | Status: SHIPPED | OUTPATIENT
Start: 2023-01-15

## 2023-01-16 RX ORDER — TRAMADOL HYDROCHLORIDE 50 MG/1
50 TABLET ORAL EVERY 6 HOURS PRN
Qty: 100 TABLET | Refills: 0 | Status: SHIPPED | OUTPATIENT
Start: 2023-01-16

## 2023-03-01 DIAGNOSIS — G43.709 CHRONIC MIGRAINE WITHOUT AURA WITHOUT STATUS MIGRAINOSUS, NOT INTRACTABLE: ICD-10-CM

## 2023-03-01 DIAGNOSIS — G89.4 CHRONIC PAIN SYNDROME: ICD-10-CM

## 2023-03-01 DIAGNOSIS — G89.4 CHRONIC PAIN DISORDER: ICD-10-CM

## 2023-03-01 DIAGNOSIS — F40.01 PANIC DISORDER WITH AGORAPHOBIA: ICD-10-CM

## 2023-03-02 RX ORDER — TRAMADOL HYDROCHLORIDE 50 MG/1
50 TABLET ORAL EVERY 6 HOURS PRN
Qty: 100 TABLET | Refills: 0 | Status: SHIPPED | OUTPATIENT
Start: 2023-03-02

## 2023-03-02 RX ORDER — BUTALBITAL, ACETAMINOPHEN AND CAFFEINE 50; 325; 40 MG/1; MG/1; MG/1
TABLET ORAL
Qty: 15 TABLET | Refills: 2 | Status: SHIPPED | OUTPATIENT
Start: 2023-03-02

## 2023-03-02 RX ORDER — GABAPENTIN 400 MG/1
CAPSULE ORAL
Qty: 540 CAPSULE | Refills: 1 | Status: SHIPPED | OUTPATIENT
Start: 2023-03-02

## 2023-03-02 NOTE — TELEPHONE ENCOUNTER
Patient must be scheduled for a follow up visit prior to medication being refilled    Thank you Detail Level: Detailed

## 2023-03-03 ENCOUNTER — TELEPHONE (OUTPATIENT)
Dept: PSYCHIATRY | Facility: CLINIC | Age: 53
End: 2023-03-03

## 2023-03-03 NOTE — TELEPHONE ENCOUNTER
Jennifer Mazariegos requested a call back to discuss her  passed away, she is struggling and does not have insurance  They can be reached at P# [phone]  Thank you

## 2023-03-04 RX ORDER — DIAZEPAM 5 MG/1
TABLET ORAL
Qty: 90 TABLET | Refills: 0 | Status: SHIPPED | OUTPATIENT
Start: 2023-03-04

## 2023-03-04 NOTE — TELEPHONE ENCOUNTER
PDMP website reviewed  Temitope Braswell has been appropriately adherent to controlled psychotropic medications without evidence of abuse or misuse  As such, will send 30-day refill to pharmacy of choice and follow up as necessary

## 2023-03-06 NOTE — TELEPHONE ENCOUNTER
Called patient  Reports that she is not in need of higher level of care at this time  Adamantly denies self-harming or suicidal thoughts  Contracts for safety and will call 911 in the event that safety concern arises  Daughter will be released from prison next week and will be staying with her  She has lost insurance after 's death  Will need to apply for medicaid but unsure how to proceed with next steps  Also found out that her disability is now under review and she is concerned that the outcome may not be favorable  Requests assistance from case management  This writer will reach out to team   No other concerns at this time

## 2023-03-06 NOTE — TELEPHONE ENCOUNTER
Case Management-  Would you be able to provide some assistance to this patient? Please see above phone conversation and reach out regarding any additional information that may be needed  Thank you

## 2023-03-08 ENCOUNTER — TELEPHONE (OUTPATIENT)
Dept: PSYCHIATRY | Facility: CLINIC | Age: 53
End: 2023-03-08

## 2023-03-08 NOTE — TELEPHONE ENCOUNTER
[facility] with resources and a possible referral to an ICM       Placed on CM Wait List

## 2023-03-08 NOTE — TELEPHONE ENCOUNTER
Writer contacted [facility] and spoke with eKlly Mendoza stated that she doesn't think a Psych Eval needs to be within a certain time frame  Writer outreached to Cashia who stated that she does not have access to a printer and lacks the transportation to come into the office  Writer mailed SHANI to patient in order to set her up with services she needs  SHANI Mailed to [address]    Writer is completing paperwork for Cashia to receive an ICM through [facility]

## 2023-03-21 ENCOUNTER — TELEPHONE (OUTPATIENT)
Dept: ADMINISTRATIVE | Facility: OTHER | Age: 53
End: 2023-03-21

## 2023-03-21 NOTE — TELEPHONE ENCOUNTER
Upon review of the In Basket request we were able to locate, review, and update the patient chart as requested for Hepatitis C   Any additional questions or concerns should be emailed to the Practice Liaisons via the appropriate education email address, please do not reply via In Basket      Thank you  Reed Ortiz

## 2023-03-21 NOTE — TELEPHONE ENCOUNTER
----- Message from Willie Nunez Flaco sent at 3/21/2023 10:52 AM EDT -----  Regarding: HEP C RESULTS  03/21/23 10:53 AM    Hello, our patient Yvonne Ackerman has had Hepatitis C completed/performed  Please assist in updating the patient chart by pulling the Care Everywhere (CE) document  The date of service is 09/27/2021       Thank you,  [facility]

## 2023-04-28 NOTE — TELEPHONE ENCOUNTER
Sam Wade from [facility] [address] called she will be faxing over orders over for you to sign allowing them to be able to do home vistits with Barb  [Complete] : complete cerumen impaction [Normal Gait and Station] : normal gait and station [Normal muscle strength, symmetry and tone of facial, head and neck musculature] : normal muscle strength, symmetry and tone of facial, head and neck musculature [Normal] : no cervical lymphadenopathy [Exposed Vessel] : left anterior vessel not exposed [Increased Work of Breathing] : no increased work of breathing with use of accessory muscles and retractions [de-identified] : midline vascular tongue lesion 4mm

## 2023-06-07 DIAGNOSIS — F40.01 PANIC DISORDER WITH AGORAPHOBIA: ICD-10-CM

## 2023-06-07 DIAGNOSIS — G89.4 CHRONIC PAIN SYNDROME: ICD-10-CM

## 2023-06-07 RX ORDER — DIAZEPAM 5 MG/1
5 TABLET ORAL 3 TIMES DAILY PRN
Qty: 90 TABLET | Refills: 0 | Status: SHIPPED | OUTPATIENT
Start: 2023-06-07 | End: 2023-06-13

## 2023-06-07 RX ORDER — TRAMADOL HYDROCHLORIDE 50 MG/1
50 TABLET ORAL EVERY 6 HOURS PRN
Qty: 100 TABLET | Refills: 0 | OUTPATIENT
Start: 2023-06-07

## 2023-06-13 ENCOUNTER — TELEMEDICINE (OUTPATIENT)
Dept: PSYCHIATRY | Facility: CLINIC | Age: 53
End: 2023-06-13

## 2023-06-13 ENCOUNTER — TELEPHONE (OUTPATIENT)
Dept: PSYCHIATRY | Facility: CLINIC | Age: 53
End: 2023-06-13

## 2023-06-13 DIAGNOSIS — F43.10 PTSD (POST-TRAUMATIC STRESS DISORDER): ICD-10-CM

## 2023-06-13 DIAGNOSIS — G47.00 INSOMNIA, UNSPECIFIED TYPE: ICD-10-CM

## 2023-06-13 DIAGNOSIS — F40.01 PANIC DISORDER WITH AGORAPHOBIA: ICD-10-CM

## 2023-06-13 DIAGNOSIS — F33.2 SEVERE EPISODE OF RECURRENT MAJOR DEPRESSIVE DISORDER, WITHOUT PSYCHOTIC FEATURES (HCC): Primary | ICD-10-CM

## 2023-06-13 DIAGNOSIS — F40.10 SOCIAL ANXIETY DISORDER: ICD-10-CM

## 2023-06-13 PROCEDURE — 99214 OFFICE O/P EST MOD 30 MIN: CPT | Performed by: NURSE PRACTITIONER

## 2023-06-13 PROCEDURE — 90833 PSYTX W PT W E/M 30 MIN: CPT | Performed by: NURSE PRACTITIONER

## 2023-06-13 RX ORDER — PRAZOSIN HYDROCHLORIDE 2 MG/1
2 CAPSULE ORAL
Qty: 90 CAPSULE | Refills: 1 | Status: SHIPPED | OUTPATIENT
Start: 2023-06-13

## 2023-06-13 RX ORDER — DIAZEPAM 5 MG/1
5 TABLET ORAL 2 TIMES DAILY PRN
Qty: 60 TABLET | Refills: 0 | Status: SHIPPED | OUTPATIENT
Start: 2023-06-13

## 2023-06-13 RX ORDER — DULOXETIN HYDROCHLORIDE 60 MG/1
60 CAPSULE, DELAYED RELEASE ORAL 2 TIMES DAILY
Qty: 180 CAPSULE | Refills: 1 | Status: SHIPPED | OUTPATIENT
Start: 2023-06-13

## 2023-06-13 RX ORDER — MIRTAZAPINE 7.5 MG/1
7.5 TABLET, FILM COATED ORAL
Qty: 90 TABLET | Refills: 1 | Status: SHIPPED | OUTPATIENT
Start: 2023-06-13

## 2023-06-13 NOTE — TELEPHONE ENCOUNTER
Writer left a message for the patient looking to move her appointment time up on today 6/13 @3pm per provider  Office number was left and patient was asked to call back  Please provide the patient with earlier appointment options of 12, 1:30, or 2:30 virtual appointment with Francheska Womack
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
## 2023-07-18 ENCOUNTER — TELEPHONE (OUTPATIENT)
Dept: FAMILY MEDICINE CLINIC | Facility: CLINIC | Age: 53
End: 2023-07-18

## 2023-07-18 NOTE — TELEPHONE ENCOUNTER
I called the patient as she is overdue for an office visit and we would like to get her scheduled. No answer and voicemail is full.

## 2023-08-01 DIAGNOSIS — G89.4 CHRONIC PAIN SYNDROME: ICD-10-CM

## 2023-08-02 RX ORDER — TRAMADOL HYDROCHLORIDE 50 MG/1
50 TABLET ORAL EVERY 6 HOURS PRN
Qty: 100 TABLET | Refills: 0 | OUTPATIENT
Start: 2023-08-02

## 2023-08-17 ENCOUNTER — TELEPHONE (OUTPATIENT)
Dept: PSYCHIATRY | Facility: CLINIC | Age: 53
End: 2023-08-17

## 2023-08-17 NOTE — LETTER
23     Barb Winters   : 1970   [address] [id number]       It is the policy of [facility] to monitor and manage appointments that have been no-showed or cancelled with less than 48-hour notice. This is necessary to ensure that we are able to provide timely access for all patients to our providers. Undue numbers of unutilized appointments delays necessary medical care for all patients. Dear Monserrat Alfredo : We are sorry that you missed your appointment with ELISEO Polk on 2023. It has been 2 months  since your last appointment. Your health and follow-up care are important to us. We want to make you aware that you do not have another appointment with ELISEO Polk scheduled. If you have already rescheduled this appointment, please disregard. Please be aware that our office policy states that if you 'no show' two Medication Management  appointments in a row without prior notice of cancellation, or three or more in a calendar year, you may be discharged from Medication Management  treatment. We want to bring this to your attention now to prevent an interruption of your care. If you have any questions about this policy, please call us at the number above. If we do not hear from you within 10 business days to make a follow up appointment, we will assume you are no longer interested in care here. Thank you in advance for your cooperation and assistance.        Sincerely,      [facility] Support Staff

## 2023-08-18 ENCOUNTER — OFFICE VISIT (OUTPATIENT)
Dept: FAMILY MEDICINE CLINIC | Facility: CLINIC | Age: 53
End: 2023-08-18

## 2023-08-18 VITALS
HEART RATE: 128 BPM | DIASTOLIC BLOOD PRESSURE: 52 MMHG | SYSTOLIC BLOOD PRESSURE: 98 MMHG | TEMPERATURE: 97.5 F | WEIGHT: 93.8 LBS | OXYGEN SATURATION: 99 % | HEIGHT: 64 IN | BODY MASS INDEX: 16.01 KG/M2

## 2023-08-18 DIAGNOSIS — E46 PROTEIN-CALORIE MALNUTRITION, UNSPECIFIED SEVERITY (HCC): ICD-10-CM

## 2023-08-18 DIAGNOSIS — Z12.31 ENCOUNTER FOR SCREENING MAMMOGRAM FOR BREAST CANCER: ICD-10-CM

## 2023-08-18 DIAGNOSIS — E27.40 UNSPECIFIED ADRENOCORTICAL INSUFFICIENCY (HCC): ICD-10-CM

## 2023-08-18 DIAGNOSIS — F33.2 SEVERE EPISODE OF RECURRENT MAJOR DEPRESSIVE DISORDER, WITHOUT PSYCHOTIC FEATURES (HCC): ICD-10-CM

## 2023-08-18 DIAGNOSIS — R20.2 PARESTHESIA OF BOTH LOWER EXTREMITIES: ICD-10-CM

## 2023-08-18 DIAGNOSIS — R76.8 ANA POSITIVE: ICD-10-CM

## 2023-08-18 DIAGNOSIS — Z72.0 TOBACCO ABUSE: ICD-10-CM

## 2023-08-18 DIAGNOSIS — E03.9 ACQUIRED HYPOTHYROIDISM: ICD-10-CM

## 2023-08-18 DIAGNOSIS — R63.4 WEIGHT LOSS: ICD-10-CM

## 2023-08-18 DIAGNOSIS — G89.4 CHRONIC PAIN SYNDROME: ICD-10-CM

## 2023-08-18 DIAGNOSIS — F17.210 SMOKING GREATER THAN 20 PACK YEARS: ICD-10-CM

## 2023-08-18 DIAGNOSIS — Z86.73 HISTORY OF STROKE: ICD-10-CM

## 2023-08-18 DIAGNOSIS — F31.81 BIPOLAR II DISORDER (HCC): ICD-10-CM

## 2023-08-18 DIAGNOSIS — F11.288 OPIOID DEPENDENCE WITH OTHER OPIOID-INDUCED DISORDER (HCC): ICD-10-CM

## 2023-08-18 DIAGNOSIS — M54.2 CHRONIC CERVICAL PAIN: ICD-10-CM

## 2023-08-18 DIAGNOSIS — E11.65 UNCONTROLLED TYPE 2 DIABETES MELLITUS WITH HYPERGLYCEMIA (HCC): Primary | ICD-10-CM

## 2023-08-18 DIAGNOSIS — Z59.89 INSURANCE COVERAGE PROBLEMS: ICD-10-CM

## 2023-08-18 DIAGNOSIS — E78.2 MIXED HYPERLIPIDEMIA: ICD-10-CM

## 2023-08-18 DIAGNOSIS — G89.29 CHRONIC CERVICAL PAIN: ICD-10-CM

## 2023-08-18 PROBLEM — F29 UNSPECIFIED PSYCHOSIS NOT DUE TO A SUBSTANCE OR KNOWN PHYSIOLOGICAL CONDITION (HCC): Status: ACTIVE | Noted: 2023-08-18

## 2023-08-18 PROBLEM — K51.00 PANCOLITIS (HCC): Status: ACTIVE | Noted: 2023-08-18

## 2023-08-18 PROCEDURE — 99214 OFFICE O/P EST MOD 30 MIN: CPT | Performed by: FAMILY MEDICINE

## 2023-08-18 PROCEDURE — 83036 HEMOGLOBIN GLYCOSYLATED A1C: CPT | Performed by: FAMILY MEDICINE

## 2023-08-18 SDOH — ECONOMIC STABILITY - INCOME SECURITY: OTHER PROBLEMS RELATED TO HOUSING AND ECONOMIC CIRCUMSTANCES: Z59.89

## 2023-08-20 DIAGNOSIS — G89.4 CHRONIC PAIN SYNDROME: ICD-10-CM

## 2023-08-21 ENCOUNTER — PATIENT OUTREACH (OUTPATIENT)
Dept: FAMILY MEDICINE CLINIC | Facility: CLINIC | Age: 53
End: 2023-08-21

## 2023-08-21 ENCOUNTER — TELEPHONE (OUTPATIENT)
Dept: FAMILY MEDICINE CLINIC | Facility: CLINIC | Age: 53
End: 2023-08-21

## 2023-08-21 PROBLEM — F32.A DEPRESSION, UNSPECIFIED: Status: ACTIVE | Noted: 2022-09-14

## 2023-08-21 PROBLEM — F40.01 AGORAPHOBIA WITH PANIC DISORDER: Status: ACTIVE | Noted: 2022-09-14

## 2023-08-21 PROBLEM — F31.81 BIPOLAR II DISORDER (HCC): Status: ACTIVE | Noted: 2022-09-14

## 2023-08-21 PROBLEM — M13.0 POLYARTHRITIS, UNSPECIFIED: Status: ACTIVE | Noted: 2022-09-14

## 2023-08-21 RX ORDER — TRAMADOL HYDROCHLORIDE 50 MG/1
50 TABLET ORAL EVERY 6 HOURS PRN
Qty: 100 TABLET | Refills: 0 | Status: SHIPPED | OUTPATIENT
Start: 2023-08-21

## 2023-08-21 RX ORDER — TRAMADOL HYDROCHLORIDE 50 MG/1
50 TABLET ORAL EVERY 6 HOURS PRN
Qty: 100 TABLET | Refills: 0 | OUTPATIENT
Start: 2023-08-21

## 2023-08-21 NOTE — TELEPHONE ENCOUNTER
Shannan.  Thank you for responding to the referral that it made on Jonelle Deng who I believe you have spoke with in the past.  Again  passed away. States she has no insurance. A1c was over 14 in the office and did not register. She cannot afford her insulin or multiple other medications. Supposed to be seeing psychiatry. Not sure what you can offer her but she truly needs to get back on her medications. She is cachectic. Very very dangerous situation. Any help is appreciated. I am actually out of the office until next Monday so feel free to leave a message. Thank you as always. Chuck Lizarraga.

## 2023-08-21 NOTE — PROGRESS NOTES
Assessment/Plan: Overdue recheck. Needs to be seen every 3 months. First visit since her  passed away. Patient has no insurance. This is a very serious case with increasing risk for morbidity mortality as it has been for many years. Patient borderline cachectic if not so. Not taking any insulin. Not taking duloxetine. Cannot afford most of her medications. Weight loss continues due to seriously uncontrolled type 2 diabetes and nutritional aspects. Still smokes. Advised her of Tunepresto's . A1c in the office today was not able to be done since it was over the maximum. Ideally needs to see endocrinology and chronic pain. Cannot afford any of these visits. Recommend smoking cessation or reduction as best as possible. Continue with psychiatry who is aware that she is on tramadol therefore at high risk of drug interaction. Has Narcan. PPI completed. PDMP reviewed. New controlled substance agreement completed. Needs urine drug screen with next office visit regardless of insurance. I will contact same with  to see what we can do regarding getting her insurance and/or assistance to some benefit. ER with any worsening symptoms. Again high risk of morbidity and mortality. States has no family or friends in the area and is basically alone. Again agrees to referral to social work but I also recommended palliative care evaluation. Labs ordered to be done as soon as possible for price check if she can do this. No problem-specific Assessment & Plan notes found for this encounter. Diagnoses and all orders for this visit:    Uncontrolled type 2 diabetes mellitus with hyperglycemia (HCC)  -     Albumin / creatinine urine ratio; Future  -     POCT hemoglobin A1c  -     Lipid panel; Future  -     Comprehensive metabolic panel;  Future  -     Ambulatory Referral to Social Work Care Management Program; Future    Protein-calorie malnutrition, unspecified severity [facility])  -     Ambulatory Referral to Palliative Care; Future  -     Ambulatory Referral to Social Work Care Management Program; Future    Chronic pain syndrome  -     Ambulatory Referral to Palliative Care; Future  -     traMADol (ULTRAM) 50 mg tablet; Take 1 tablet (50 mg total) by mouth every 6 (six) hours as needed for moderate pain or severe pain Do not take within 6 hours of valium or Fioricet    Insurance coverage problems  -     Ambulatory Referral to Social Work Care Management Program; Future    History of stroke  -     Ambulatory Referral to Palliative Care; Future    Smoking greater than 20 pack years    Unspecified adrenocortical insufficiency (HCC)    Opioid dependence with other opioid-induced disorder ([address])    Encounter for screening mammogram for breast cancer    Acquired hypothyroidism  -     TSH, 3rd generation; Future    Weight loss  -     XR chest pa & lateral; Future    Tobacco abuse  -     XR chest pa & lateral; Future    Severe episode of recurrent major depressive disorder, without psychotic features (HCC)    Mixed hyperlipidemia    ROGERIO positive    Chronic cervical pain    Paresthesia of both lower extremities    Bipolar II disorder ([address])        1. Uncontrolled type 2 diabetes mellitus with hyperglycemia (HCC)  Albumin / creatinine urine ratio    POCT hemoglobin A1c    Lipid panel    Comprehensive metabolic panel    Ambulatory Referral to Social Work Care Management Program      2. Protein-calorie malnutrition, unspecified severity [facility])  Ambulatory Referral to Palliative Care    Ambulatory Referral to Social Work Care Management Program      3. Chronic pain syndrome  Ambulatory Referral to Palliative Care    traMADol (ULTRAM) 50 mg tablet      4. Insurance coverage problems  Ambulatory Referral to Social Work Care Management Program      5. History of stroke  Ambulatory Referral to Palliative Care      6. Smoking greater than 20 pack years        7.  Unspecified adrenocortical insufficiency ([address])        8. Opioid dependence with other opioid-induced disorder ([address])        9. Encounter for screening mammogram for breast cancer        10. Acquired hypothyroidism  TSH, 3rd generation      11. Weight loss  XR chest pa & lateral      12. Tobacco abuse  XR chest pa & lateral      13. Severe episode of recurrent major depressive disorder, without psychotic features ([address])        14. Mixed hyperlipidemia        15. ROGERIO positive        16. Chronic cervical pain        17. Paresthesia of both lower extremities        18. Bipolar II disorder ([address])            Subjective:        Patient ID: Barbara Ricci is a 47 y.o. female. Chief Complaint   Patient presents with   • Hypertension   • Diabetes   • Hypothyroidism   • Anxiety   • Depression       Routine recheck. Not seen by me for a bit.  unfortunately passed away from a multiple medication consequences. Not eating. Cannot afford most of her medications. Sees psychiatry      The following portions of the patient's history were reviewed and updated as appropriate: past medical history, past surgical history and problem list.      Review of Systems   Constitutional: Positive for fatigue. Eyes: Negative for visual disturbance. Respiratory: Negative for cough and shortness of breath. Cardiovascular: Negative for chest pain, palpitations and leg swelling. Gastrointestinal: Negative for abdominal pain. Musculoskeletal: Positive for arthralgias, back pain and neck pain. Neurological: Negative for dizziness and headaches. Psychiatric/Behavioral: Positive for dysphoric mood and sleep disturbance. Negative for self-injury and suicidal ideas. The patient is nervous/anxious. Objective:  BP 98/52   Pulse (!) 128   Temp 97.5 °F (36.4 °C)   Ht 5' 4" (1.626 m)   Wt 42.5 kg (93 lb 12.8 oz)   LMP 03/04/2016   SpO2 99%   BMI 16.10 kg/m²        Physical Exam  Vitals and nursing note reviewed.    Constitutional:       Comments: Cachectic appearing   HENT:      Head: Normocephalic. Eyes:      General: No scleral icterus. Pupils: Pupils are equal, round, and reactive to light. Cardiovascular:      Rate and Rhythm: Normal rate and regular rhythm. Heart sounds: Normal heart sounds. No murmur heard. Pulmonary:      Breath sounds: Normal breath sounds. Musculoskeletal:      Right lower leg: No edema. Left lower leg: No edema. Lymphadenopathy:      Cervical: No cervical adenopathy. Skin:     Coloration: Skin is not pale. Neurological:      General: No focal deficit present. Mental Status: She is alert and oriented to person, place, and time. Psychiatric:         Behavior: Behavior normal.         Thought Content:  Thought content normal.      Comments: Tearful and anxious

## 2023-08-21 NOTE — PROGRESS NOTES
OP CM called to pt and left message in regards to not having insurance. Reviewed chart and pts hsb has passed away. Pt also has not been seen for the past year. Per Dr Santa Vang pt needs to come in to go over her narcotic contract. Pt also rcvd no show letter from [facility]. Multiple attempts have also been made in the past with patient. Did not send [employer] message as pt has not used in since Nov 2022. Email sent to pt as well requesting a call back. Pt did have medicaid at one point but then lost it due to husbands income at the time. Will attempt to contact pt again.

## 2023-08-22 ENCOUNTER — PATIENT OUTREACH (OUTPATIENT)
Dept: FAMILY MEDICINE CLINIC | Facility: CLINIC | Age: 53
End: 2023-08-22

## 2023-08-22 NOTE — PROGRESS NOTES
OP CM called to pt again and LM and also sent email in regards to not having insurance. Will try contacting pt again. Also rcvd message from pts PCP due to hsb passing. Will also offer Levell Ramal bereavement if able to get in contact with patient.

## 2023-08-23 ENCOUNTER — PATIENT OUTREACH (OUTPATIENT)
Dept: FAMILY MEDICINE CLINIC | Facility: CLINIC | Age: 53
End: 2023-08-23

## 2023-08-23 NOTE — PROGRESS NOTES
OP CM called again and LM in regards to social work referral.  Also sent email requesting call back.

## 2023-09-29 DIAGNOSIS — G43.709 CHRONIC MIGRAINE WITHOUT AURA WITHOUT STATUS MIGRAINOSUS, NOT INTRACTABLE: ICD-10-CM

## 2023-09-29 RX ORDER — BUTALBITAL, ACETAMINOPHEN AND CAFFEINE 50; 325; 40 MG/1; MG/1; MG/1
TABLET ORAL
Qty: 15 TABLET | Refills: 2 | Status: SHIPPED | OUTPATIENT
Start: 2023-09-29

## 2023-10-05 ENCOUNTER — TELEPHONE (OUTPATIENT)
Dept: FAMILY MEDICINE CLINIC | Facility: CLINIC | Age: 53
End: 2023-10-05

## 2023-10-05 DIAGNOSIS — M79.18 CERVICAL MYOFASCIAL PAIN SYNDROME: ICD-10-CM

## 2023-10-05 DIAGNOSIS — G43.709 CHRONIC MIGRAINE WITHOUT AURA WITHOUT STATUS MIGRAINOSUS, NOT INTRACTABLE: ICD-10-CM

## 2023-10-05 DIAGNOSIS — N76.0 VULVOVAGINITIS: Primary | ICD-10-CM

## 2023-10-05 RX ORDER — BACLOFEN 10 MG/1
TABLET ORAL
Qty: 90 TABLET | Refills: 2 | Status: SHIPPED | OUTPATIENT
Start: 2023-10-05

## 2023-10-05 RX ORDER — FLUCONAZOLE 150 MG/1
150 TABLET ORAL ONCE
Qty: 1 TABLET | Refills: 0 | Status: SHIPPED | OUTPATIENT
Start: 2023-10-05 | End: 2023-10-05

## 2023-12-04 DIAGNOSIS — E55.9 VITAMIN D DEFICIENCY: ICD-10-CM

## 2023-12-04 RX ORDER — ERGOCALCIFEROL 1.25 MG/1
CAPSULE ORAL
Qty: 12 CAPSULE | Refills: 0 | Status: SHIPPED | OUTPATIENT
Start: 2023-12-04

## 2023-12-22 DIAGNOSIS — R11.0 NAUSEA: ICD-10-CM

## 2023-12-22 RX ORDER — ONDANSETRON 4 MG/1
TABLET, FILM COATED ORAL
Qty: 20 TABLET | Refills: 2 | Status: SHIPPED | OUTPATIENT
Start: 2023-12-22

## 2024-01-08 DIAGNOSIS — F40.01 PANIC DISORDER WITH AGORAPHOBIA: ICD-10-CM

## 2024-01-08 RX ORDER — DIAZEPAM 5 MG/1
5 TABLET ORAL 2 TIMES DAILY PRN
Qty: 60 TABLET | Refills: 0 | Status: SHIPPED | OUTPATIENT
Start: 2024-01-08

## 2024-01-08 NOTE — TELEPHONE ENCOUNTER
Medication Refill Request     Name of Medication Diazepam  Dose/Frequency 5 mg/ Take 1 tablet by mouth 2 times a day as needed for anxiety.  Quantity 60  Verified pharmacy   [x]  Verified ordering Provider   [x]  Does patient have enough for the next 3 days? Yes [] No [x]  Does patient have a follow-up appointment scheduled? Yes [x] No []   If so when is appointment: 1/15/2024

## 2024-01-08 NOTE — TELEPHONE ENCOUNTER
PDMP website reviewed. Barb has been appropriately adherent to controlled psychotropic medications without evidence of abuse or misuse. As such, will send 30-day refill to pharmacy of choice and follow up as necessary.'

## 2024-01-12 ENCOUNTER — OFFICE VISIT (OUTPATIENT)
Dept: FAMILY MEDICINE CLINIC | Facility: CLINIC | Age: 54
End: 2024-01-12

## 2024-01-12 VITALS
HEIGHT: 64 IN | HEART RATE: 63 BPM | BODY MASS INDEX: 15.36 KG/M2 | SYSTOLIC BLOOD PRESSURE: 98 MMHG | WEIGHT: 90 LBS | TEMPERATURE: 97.1 F | OXYGEN SATURATION: 99 % | DIASTOLIC BLOOD PRESSURE: 58 MMHG

## 2024-01-12 DIAGNOSIS — E27.40 UNSPECIFIED ADRENOCORTICAL INSUFFICIENCY (HCC): ICD-10-CM

## 2024-01-12 DIAGNOSIS — F33.2 SEVERE EPISODE OF RECURRENT MAJOR DEPRESSIVE DISORDER, WITHOUT PSYCHOTIC FEATURES (HCC): ICD-10-CM

## 2024-01-12 DIAGNOSIS — Z86.73 HISTORY OF STROKE: ICD-10-CM

## 2024-01-12 DIAGNOSIS — E46 PROTEIN-CALORIE MALNUTRITION, UNSPECIFIED SEVERITY (HCC): ICD-10-CM

## 2024-01-12 DIAGNOSIS — F29 UNSPECIFIED PSYCHOSIS NOT DUE TO A SUBSTANCE OR KNOWN PHYSIOLOGICAL CONDITION (HCC): ICD-10-CM

## 2024-01-12 DIAGNOSIS — G89.4 CHRONIC PAIN SYNDROME: ICD-10-CM

## 2024-01-12 DIAGNOSIS — F11.288 OPIOID DEPENDENCE WITH OTHER OPIOID-INDUCED DISORDER (HCC): ICD-10-CM

## 2024-01-12 DIAGNOSIS — M47.812 CERVICAL SPONDYLOSIS WITHOUT MYELOPATHY: ICD-10-CM

## 2024-01-12 DIAGNOSIS — Z86.711 HISTORY OF PULMONARY EMBOLISM: ICD-10-CM

## 2024-01-12 DIAGNOSIS — G89.4 CHRONIC PAIN DISORDER: ICD-10-CM

## 2024-01-12 DIAGNOSIS — Z72.0 TOBACCO ABUSE: ICD-10-CM

## 2024-01-12 DIAGNOSIS — K51.00 PANCOLITIS (HCC): ICD-10-CM

## 2024-01-12 DIAGNOSIS — E11.65 UNCONTROLLED TYPE 2 DIABETES MELLITUS WITH HYPERGLYCEMIA (HCC): Primary | ICD-10-CM

## 2024-01-12 DIAGNOSIS — R64 CACHECTIC (HCC): ICD-10-CM

## 2024-01-12 PROCEDURE — 99214 OFFICE O/P EST MOD 30 MIN: CPT | Performed by: FAMILY MEDICINE

## 2024-01-12 RX ORDER — TRAMADOL HYDROCHLORIDE 50 MG/1
50 TABLET ORAL EVERY 6 HOURS PRN
Qty: 70 TABLET | Refills: 0 | Status: SHIPPED | OUTPATIENT
Start: 2024-01-12

## 2024-01-12 NOTE — PROGRESS NOTES
Diabetic Foot Exam    Patient's shoes and socks removed.    Right Foot/Ankle   Right Foot Inspection  Skin Exam: skin normal, skin intact and dry skin. No warmth, no callus, no erythema, no maceration, no abnormal color, no pre-ulcer, no ulcer and no callus.     Sensory   Monofilament testing: diminished    Vascular  Capillary refills: < 3 seconds  The right DP pulse is 1+. The right PT pulse is 1+.     Left Foot/Ankle  Left Foot Inspection  Skin Exam: skin normal, skin intact and dry skin. No warmth, no erythema, no maceration, normal color, no pre-ulcer, no ulcer and no callus.     Toe Exam: swelling.     Sensory   Monofilament testing: diminished    Vascular  Capillary refills: < 3 seconds  The left DP pulse is 1+. The left PT pulse is 1+.     Assign Risk Category  No deformity present  Loss of protective sensation  Weak pulses  Risk: 2

## 2024-01-15 ENCOUNTER — TELEPHONE (OUTPATIENT)
Dept: PSYCHIATRY | Facility: CLINIC | Age: 54
End: 2024-01-15

## 2024-01-15 LAB
CREAT ?TM UR-SCNC: 36.7 UMOL/L
EXT ALBUMIN URINE RANDOM: 2.9
MICROALBUMIN/CREAT UR: 79 MG/G{CREAT}
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
## 2024-01-15 NOTE — TELEPHONE ENCOUNTER
Patient has two or more consecutive no shows. Writer created and printed discharge letter for provider signature. Please add patient to wait list if still interested in services.

## 2024-01-15 NOTE — TELEPHONE ENCOUNTER
Patient called in regards to appointment that was 01/15/24 at 12:30pm. Writer informed patient her appointment was at 12:00pm and she got a reminder on 01/11/24 for time and date of appointment. Patient said she is depressed and she needed to see provider. Writer informed patient that if she has any thoughts of harming herself she can go to a walk in care or ED. Patient denied any thoughts of self harm. Patient was properly placed back on wait list with provider.

## 2024-01-15 NOTE — TELEPHONE ENCOUNTER
Patient has been added to the [facility] wait list without a referral.    Insurance: Self pay  Insurance Type:    Commercial []   Medicaid []   [facility] (if applicable)   Medicare []  Location Preference: [place]  Provider Preference: Delilah Louis  Virtual: Yes [x] No []  Were outside resources sent: Yes [] No [x]    Patient is being placed back on wait list due to multiple no shows.

## 2024-01-17 DIAGNOSIS — R11.0 NAUSEA: ICD-10-CM

## 2024-01-17 RX ORDER — ONDANSETRON 4 MG/1
TABLET, FILM COATED ORAL
Qty: 30 TABLET | Refills: 1 | Status: SHIPPED | OUTPATIENT
Start: 2024-01-17

## 2024-01-19 ENCOUNTER — PATIENT OUTREACH (OUTPATIENT)
Dept: FAMILY MEDICINE CLINIC | Facility: CLINIC | Age: 54
End: 2024-01-19

## 2024-01-19 ENCOUNTER — TELEMEDICINE (OUTPATIENT)
Dept: ENDOCRINOLOGY | Facility: CLINIC | Age: 54
End: 2024-01-19

## 2024-01-19 DIAGNOSIS — Z59.6 PATIENT CANNOT AFFORD MEDICATIONS: ICD-10-CM

## 2024-01-19 DIAGNOSIS — Z79.4 TYPE 2 DIABETES MELLITUS WITH HYPERGLYCEMIA, WITH LONG-TERM CURRENT USE OF INSULIN (HCC): ICD-10-CM

## 2024-01-19 DIAGNOSIS — E53.8 VITAMIN B12 DEFICIENCY: ICD-10-CM

## 2024-01-19 DIAGNOSIS — Z59.82 TRANSPORTATION INSECURITY: Primary | ICD-10-CM

## 2024-01-19 DIAGNOSIS — E46 PROTEIN-CALORIE MALNUTRITION, UNSPECIFIED SEVERITY (HCC): ICD-10-CM

## 2024-01-19 DIAGNOSIS — E55.9 VITAMIN D DEFICIENCY: ICD-10-CM

## 2024-01-19 DIAGNOSIS — E11.49 TYPE 2 DIABETES MELLITUS WITH OTHER NEUROLOGIC COMPLICATION, WITH LONG-TERM CURRENT USE OF INSULIN (HCC): Primary | ICD-10-CM

## 2024-01-19 DIAGNOSIS — E11.65 UNCONTROLLED TYPE 2 DIABETES MELLITUS WITH HYPERGLYCEMIA (HCC): ICD-10-CM

## 2024-01-19 DIAGNOSIS — Z65.8 PSYCHOSOCIAL STRESSORS: ICD-10-CM

## 2024-01-19 DIAGNOSIS — Z79.4 TYPE 2 DIABETES MELLITUS WITH OTHER NEUROLOGIC COMPLICATION, WITH LONG-TERM CURRENT USE OF INSULIN (HCC): Primary | ICD-10-CM

## 2024-01-19 DIAGNOSIS — R63.4 WEIGHT LOSS: ICD-10-CM

## 2024-01-19 DIAGNOSIS — E11.65 TYPE 2 DIABETES MELLITUS WITH HYPERGLYCEMIA, WITH LONG-TERM CURRENT USE OF INSULIN (HCC): ICD-10-CM

## 2024-01-19 DIAGNOSIS — Z59.89 DOES NOT HAVE HEALTH INSURANCE: ICD-10-CM

## 2024-01-19 PROBLEM — E27.40 UNSPECIFIED ADRENOCORTICAL INSUFFICIENCY (HCC): Status: RESOLVED | Noted: 2023-08-18 | Resolved: 2024-01-19

## 2024-01-19 PROBLEM — R79.89 LOW SERUM CORTISOL LEVEL: Status: RESOLVED | Noted: 2020-11-19 | Resolved: 2024-01-19

## 2024-01-19 PROCEDURE — 99214 OFFICE O/P EST MOD 30 MIN: CPT | Performed by: INTERNAL MEDICINE

## 2024-01-19 RX ORDER — BLOOD SUGAR DIAGNOSTIC
STRIP MISCELLANEOUS
Qty: 60 EACH | Refills: 5 | Status: SHIPPED | OUTPATIENT
Start: 2024-01-19

## 2024-01-19 RX ORDER — METFORMIN HYDROCHLORIDE 500 MG/1
500 TABLET, EXTENDED RELEASE ORAL 2 TIMES DAILY WITH MEALS
Qty: 180 TABLET | Refills: 1 | Status: SHIPPED | OUTPATIENT
Start: 2024-01-19

## 2024-01-19 RX ORDER — ACARBOSE 50 MG/1
50 TABLET ORAL
Qty: 90 TABLET | Refills: 5 | Status: SHIPPED | OUTPATIENT
Start: 2024-01-19

## 2024-01-19 RX ORDER — PIOGLITAZONEHYDROCHLORIDE 30 MG/1
30 TABLET ORAL DAILY
Qty: 30 TABLET | Refills: 5 | Status: SHIPPED | OUTPATIENT
Start: 2024-01-19

## 2024-01-19 RX ORDER — HUMAN INSULIN 100 [USP'U]/ML
INJECTION, SUSPENSION SUBCUTANEOUS
Qty: 20 ML | Refills: 5 | Status: SHIPPED | OUTPATIENT
Start: 2024-01-19

## 2024-01-19 RX ORDER — ERGOCALCIFEROL 1.25 MG/1
CAPSULE ORAL
Qty: 12 CAPSULE | Refills: 0 | Status: SHIPPED | OUTPATIENT
Start: 2024-01-19

## 2024-01-19 SDOH — ECONOMIC STABILITY - INCOME SECURITY: LOW INCOME: Z59.6

## 2024-01-19 SDOH — ECONOMIC STABILITY - INCOME SECURITY: OTHER PROBLEMS RELATED TO HOUSING AND ECONOMIC CIRCUMSTANCES: Z59.89

## 2024-01-19 SDOH — ECONOMIC STABILITY - TRANSPORTATION SECURITY: TRANSPORTATION INSECURITY: Z59.82

## 2024-01-19 NOTE — PROGRESS NOTES
Virtual Regular Visit    Verification of patient location:    Patient is located at Home in the following state in which I hold an active license [place]      Assessment/Plan:    Barb was seen today for virtual regular visit.    Diagnoses and all orders for this visit:    Weight loss  Patient has been having weight loss, last 6 to 8 months, could be multifactorial, will obtain a.m. cortisol  Discussed the importance of getting blood work done as soon as possible  Also discussed uncontrolled diabetes could be causing weight loss as well, discussed the importance of taking medication as directed.    -     Cortisol Level,7-9 AM Specimen; Future    Type 2 diabetes mellitus with other neurologic complication, with long-term current use of insulin ([facility])  Lab Results   Component Value Date    HGBA1C  08/18/2023      Comment:      HbA1c to high to read   Her A1c was undetectable, very uncontrolled.  Discussed with patient that on the recent blood work her blood sugar was 400, anion gap was positive, she should call 911 and go to the ER for management of acute hyperglycemia.  I also offered her that I can call 911 to help to get to the ER, however patient declined, she stated that she is feeling fine and will call [phone] if she does not feel well.  Discussed with patient that she should restart insulin regimen, at present she does not have insurance and will not be able to afford Lantus and Humalog.  Discussed with patient that we do not have samples below for her on Lantus and Humalog however I can prescribe Novolin 70/30 insulin vials which is affordable and only $25 prescription at [facility].  I also offered her samples for Novolin 70/30 insulin.  Patient is agreeable to start insulin regimen.  Will start Novolin 70/30 insulin 20 units before breakfast and 20 units before dinner.  Also discussed to start metformin 500 mg XR twice a day, will send prescription to [facility] pharmacy  Start acarbose 25 mg 3 times daily with  meals  Discussed to get glucometer from [employer] as patient does not have insurance and this is affordable to her to check blood sugar 2-3 times daily and goal for blood sugar is 80 to 200 mg per DL  Discussed to send a log to us and 1 week to review them make further adjustment in dose  Discussed about hypoglycemia symptoms and treatment  Discussed the importance of following medical management (of hyperglycemia which could be life-threatening such as dehydration, acute kidney injury, stroke acute coronary events, coma and death  Patient verbalized understanding    Will refer to Social service care management as patient does not have insurance, is not able to afford medical treatment management    -     C-peptide; Future  -     Comprehensive metabolic panel; Future  -     Albumin / creatinine urine ratio; Future  -     Fructosamine; Future  -     Hemoglobin A1C; Future    Protein-calorie malnutrition, unspecified severity (HCC)  Obtain a.m. cortisol to assess for adrenal insufficiency  Discussed the importance of compliance to medical management and improving glycemic control  Vitamin B12 deficiency  Start vitamin B12 supplementation 500 mcg daily  Vitamin D deficiency  Take vitamin D3 supplementation 2000 IU daily  Uncontrolled type 2 diabetes mellitus with hyperglycemia (HCC)  Started on insulin Novolin 70/30 insulin 20 units before breakfast and 20 units before dinner  Pt knows how to do Self injections        Problem List Items Addressed This Visit          Endocrine    Type 2 diabetes mellitus with other neurologic complication, with long-term current use of insulin (HCC)    Relevant Orders    C-peptide    Comprehensive metabolic panel    Albumin / creatinine urine ratio    Fructosamine    Hemoglobin A1C    Uncontrolled type 2 diabetes mellitus with hyperglycemia (HCC)       Other    Protein calorie malnutrition (HCC)    Vitamin B12 deficiency    Vitamin D deficiency     Other Visit Diagnoses        Weight loss    -  Primary    Relevant Orders    Cortisol Level,7-9 AM Specimen                 Reason for visit is   Chief Complaint   Patient presents with    Virtual Regular Visit          Encounter provider Romain Myers MD    Provider located at [facility] [place]  [address] [address]      Recent Visits  Date Type Provider Dept   01/12/24 Office Visit Prasanna Mendez DO [facility]   Showing recent visits within past 7 days and meeting all other requirements  Today's Visits  Date Type Provider Dept   01/19/24 Telemedicine Romain Myers MD [facility] [place]   Showing today's visits and meeting all other requirements  Future Appointments  No visits were found meeting these conditions.  Showing future appointments within next 150 days and meeting all other requirements       The patient was identified by name and date of birth. Barb Palomo was informed that this is a telemedicine visit and that the visit is being conducted through the [employer] platform. She agrees to proceed..  My office door was closed. No one else was in the room.  She acknowledged consent and understanding of privacy and security of the video platform. The patient has agreed to participate and understands they can discontinue the visit at any time.    Patient is aware this is a billable service.     Subjective  Barb Palomo is a 53 y.o. female with medical history of type 2 diabetes, uncontrolled, history of noncompliance to medical management because of psychosocial issues, hypertension, hyperlipidemia, hypothyroidism, partial lipodystrophy is here for follow-up.  Patient has longstanding history of noncompliance to medical management.  She has not been seen in office for more than 1 year.  Patient had lost her insurance.  She called our office to see if we have samples for insulin.  That is  why appointment was made to assess her diabetes control and management.  Patient stated that she has not been taking insulin for the last 8 months as directed, is not taking metformin or acarbose.  She has not checked her blood sugars recently because not having glucometer.  Her most recent A1c which was done at PCP office was undetectable.  She complains of weight loss after loss of her .  She has a good appetite gets 4-5 times daily still not able to achieve weight gain.  She denies any hospitalization recently.  She denies blurry vision, complains of excessive urination, excessive thirst.  She denies chest pain, shortness of breath, syncopal or presyncopal episodes.          Past Medical History:   Diagnosis Date    Acute venous embolism and thrombosis of deep vessels of distal lower extremity (HCC)     Anemia     Anxiety     last assessed 11/20/17    Arthritis     Asthma     Cerebral infarction (HCC)     unspecified, last assessed 11/14/16    Chest pain     last assessed 5/9/17    Chronic cough     last assessed 12/12/13    Depression     Diabetes mellitus, type 2 (HCC)     DJD (degenerative joint disease)     Esophageal reflux     Fibromyalgia     GERD without esophagitis     resolved 5/13/16    History of pulmonary embolism     Hyperlipidemia     Hypertension     Hypothyroidism     Iron deficiency     Pancreatitis     Panic attack     Panic disorder     Polyarthritis     PTSD (post-traumatic stress disorder)     Sleep difficulties     Stroke syndrome     Thyroid disease     TIA (transient ischemic attack)     TIA (transient ischemic attack)     Venous embolism and thrombosis of deep vessels of distal lower extremity (HCC)     Vitamin B12 deficiency     Vitamin D deficiency        Past Surgical History:   Procedure Laterality Date    CARPAL TUNNEL RELEASE Right     neuroplasty decompression of median nerve    CATARACT EXTRACTION      CHOLECYSTECTOMY      ERCP      ERCP      ESOPHAGOGASTRIC FUNDOPLASTY       NISSEN FUNDOPLICATION      PATELLA SURGERY Left 12/2021    WV ESOPHAGOGASTRODUODENOSCOPY TRANSORAL DIAGNOSTIC N/A 11/02/2016    Procedure: EGD AND COLONOSCOPY;  Surgeon: Jatin Shepherd MD;  Location: [facility];  Service: Gastroenterology    WV NDSC WRST SURG W/RLS TRANSVRS CARPL LIGM Right 03/08/2016    Procedure: RELEASE CARPAL TUNNEL ENDOSCOPIC;  Surgeon: Guero Restrepo MD;  Location[facility];  Service: Orthopedics    WV TENDON SHEATH INCISION Right 03/08/2016    Procedure: RELEASE TRIGGER FINGER RIGHT THUMB;  Surgeon: Guero Restrepo MD;  Location: [facility];  Service: Orthopedics    TONSILLECTOMY AND ADENOIDECTOMY         Current Outpatient Medications   Medication Sig Dispense Refill    acarbose (PRECOSE) 50 mg tablet TAKE 1 TABLET BY MOUTH 3 TIMES A DAY WITH MEALS. 270 tablet 1    Adalimumab (HUMIRA PEN SC) Inject under the skin      Albuterol Sulfate (ProAir RespiClick) 108 (90 Base) MCG/ACT AEPB Inhale 2 puffs every 6 (six) hours as needed (wheezing) 1 each 1    aspirin 81 mg chewable tablet Chew 81 mg daily       atorvastatin (LIPITOR) 80 mg tablet TAKE 1 TABLET BY MOUTH EVERY DAY 90 tablet 0    baclofen 10 mg tablet TAKE 1 TABLET BY MOUTH THREE TIMES A DAY AS NEEDED 90 tablet 2    BD PEN NEEDLE LINDY U/F 32G X 4 MM MISC Inject under the skin daily 30 each 5    Blood Glucose Monitoring Suppl (OneTouch Verio Reflect) w/Device KIT CHECK BLOOD SUGARS FOUR TIMES DAILY. PLEASE SUBSTITUTE WITH APPROPRIATE ALTERNATIVE AS COVERED BY PATIENT'S INSURANCE. DX: E11.65 1 kit 0    butalbital-acetaminophen-caffeine (FIORICET,ESGIC) -40 mg per tablet TAKE 1 TABLET EVERY 6 HOURS AS NEEDED FOR MIGRAINE. PLEASE LIMIT 3-4 PER WEEK, 15 PER MONTH. 15 tablet 2    cholestyramine (QUESTRAN) 4 g packet Take 1 packet by mouth 2 (two) times a day (Patient not taking: Reported on 8/18/2023)      cholestyramine sugar free (QUESTRAN LIGHT) 4 g packet Take 4 g by mouth 2 (two) times a day      Cyanocobalamin (VITAMIN B-12  IJ) Inject as directed      diazepam (VALIUM) 5 mg tablet Take 1 tablet (5 mg total) by mouth 2 (two) times a day as needed for anxiety 60 tablet 0    DULoxetine (CYMBALTA) 60 mg delayed release capsule Take 1 capsule (60 mg total) by mouth 2 (two) times a day 180 capsule 1    ergocalciferol (VITAMIN D2) 50,000 units TAKE 1 CAPSULE BY MOUTH ONCE WEEKLY 12 capsule 0    Fluticasone-Salmeterol (Advair Diskus) 500-50 mcg/dose inhaler Inhale 1 puff by mouth 2 times daily.  Rinse mouth after use 180 blister 1    Folic Acid 0.8 MG CAPS Take 0.04 mg by mouth daily.      gabapentin (NEURONTIN) 400 mg capsule TAKE 2 CAPSULES BY MOUTH 3 TIMES A  capsule 1    Galcanezumab-gnlm (Emgality) 120 MG/ML SOAJ 1 injection monthly 1 mL 11    glucose blood (OneTouch Verio) test strip Check blood sugars four times daily. Please substitute with appropriate alternative as covered by patient's insurance. Dx: E11.65 400 each 3    HumaLOG KwikPen 200 units/mL CONCENTRATED U-200 injection pen INJECT 15 UNITS WITH MEALS +SCALE ( SCALE - 150-200 -2 UNITS, 201-250-4 UNITS, 251-300 -6 UNITS, 301-350-8 UNITS, > 350- 10 UNITS ) 12 mL 3    ibuprofen (MOTRIN) 800 mg tablet Take 800 mg by mouth every 6 (six) hours as needed      Insulin Pen Needle (BD Pen Needle Kimberli U/F) 32G X 4 MM MISC by Does not apply route daily 100 each 3    Lantus SoloStar 100 units/mL injection pen INJECT 45 UNITS IN AM 15 mL 5    levothyroxine 112 mcg tablet TAKE 1 TABLET BY MOUTH EVERY DAY 90 tablet 1    Lidocaine 4 % PTCH Place 1 patch on the skin daily      losartan (COZAAR) 50 mg tablet TAKE 1 TABLET DAILY. 90 tablet 2    metFORMIN (GLUCOPHAGE-XR) 500 mg 24 hr tablet Take 1 tablet (500 mg total) by mouth 2 (two) times a day with meals 180 tablet 1    metoprolol tartrate (LOPRESSOR) 25 mg tablet TAKE 1 TABLET BY MOUTH EVERY DAY 90 tablet 2    mirtazapine (REMERON) 7.5 MG tablet Take 1 tablet (7.5 mg total) by mouth daily at bedtime 90 tablet 1    naloxone (Narcan) 4  mg/0.1 mL nasal spray 1 actuation in one nostril once as needed for opioid overdose, may repeat dose every 2-3 minutes until patient responsive or EMS arrives 1 each 1    omeprazole (PriLOSEC) 20 mg delayed release capsule Take 20 mg by mouth daily      ondansetron (ZOFRAN) 4 mg tablet TAKE 1 TABLET BY MOUTH EVERY 6 HOURS AS NEEDED FOR NAUSEA HOLD COMPAZINE. 30 tablet 1    OneTouch Delica Lancets 33G MISC Check blood sugars four times daily. Please substitute with appropriate alternative as covered by patient's insurance. Dx: E11.65 400 each 3    OneTouch Ultra test strip USE AS DIRECTED TO TEST 3 TIMES A  strip 0    pioglitazone (ACTOS) 30 mg tablet Take 1 tablet by mouth daily      prazosin (MINIPRESS) 2 mg capsule Take 1 capsule (2 mg total) by mouth daily at bedtime 90 capsule 1    prochlorperazine (COMPAZINE) 10 mg tablet TAKE 1 TABLET BY MOUTH EVERY 6 HOURS AS NEEDED FOR NAUSEA OR VOMITING. 90 tablet 1    topiramate (TOPAMAX) 100 mg tablet TAKE 2 TABLETS BY MOUTH EVERY  tablet 1    topiramate (TOPAMAX) 25 mg tablet TAKE 1 TABLET BY MOUTH EVERY DAY 90 tablet 1    traMADol (ULTRAM) 50 mg tablet Take 1 tablet (50 mg total) by mouth every 6 (six) hours as needed for moderate pain or severe pain Do not take within 6 hours of valium or Fioricet 70 tablet 0    Turmeric 500 MG TABS Take 1 tablet by mouth daily       No current facility-administered medications for this visit.        Allergies   Allergen Reactions    Medical Tape Rash    Lexapro [Escitalopram Oxalate]     Escitalopram Other (See Comments) and Palpitations    Other Hives and Rash     Adhesive Tape       Review of Systems   Constitutional:  Positive for activity change, fatigue and unexpected weight change. Negative for diaphoresis and fever.   HENT: Negative.     Eyes:  Negative for visual disturbance.   Respiratory:  Negative for cough, chest tightness and shortness of breath.    Cardiovascular:  Negative for chest pain, palpitations and  leg swelling.   Gastrointestinal:  Negative for abdominal pain, blood in stool, constipation, diarrhea, nausea and vomiting.   Endocrine: Negative for cold intolerance, heat intolerance, polydipsia, polyphagia and polyuria.   Genitourinary:  Negative for dysuria, enuresis, frequency and urgency.   Musculoskeletal:  Negative for arthralgias and myalgias.   Skin:  Negative for pallor, rash and wound.   Allergic/Immunologic: Negative.    Neurological:  Negative for dizziness, tremors, weakness and numbness.   Hematological: Negative.    Psychiatric/Behavioral: Negative.         Video Exam    There were no vitals filed for this visit.    Physical Exam  Constitutional:       General: She is not in acute distress.     Appearance: Normal appearance. She is not ill-appearing.   HENT:      Head: Normocephalic and atraumatic.      Nose: Nose normal.   Eyes:      Extraocular Movements: Extraocular movements intact.      Conjunctiva/sclera: Conjunctivae normal.   Pulmonary:      Effort: No respiratory distress.   Musculoskeletal:      Cervical back: Normal range of motion.   Neurological:      General: No focal deficit present.      Mental Status: She is alert and oriented to person, place, and time.   Psychiatric:         Mood and Affect: Mood normal.         Behavior: Behavior normal.          Visit Time  Total Visit Duration: 20 minutes

## 2024-01-19 NOTE — LETTER
January 19, 2024     Barb MGera Palomo    Patient: Barb Palomo   YOB: 1970         Good afternoon,     I am the  you have spoken to in the past.  Give me a call or email me when you can Mon- Fri from 8am to 430 pm so we can discuss your needs.      Take Care,     Chloe   [phone]  [email or web address]

## 2024-01-19 NOTE — PROGRESS NOTES
Assessment/Plan: This case unfortunately is very beyond concerning.  This has been going on for years.  Currently has no insurance.  Has seriously uncontrolled type 2 diabetes.  Has not been able to go to endocrinology as previously recommended multiple times.  Has not been able to afford any medications.  We have had social workers working on her for quite some time.  She is cachectic.  She truly needs to be hospitalized.  Refuses against medical recommendation.  Because she has a 23-year-old cat at home that nobody can take care of.  Again at increased risk for morbidity and mortality.  Blood pressure today low.  And chronic pain.  Has a lift of diagnoses as I have listed.  Will again talk to  to see if there is any specific things they can offer her.  Have urged her to go to the emergency room with any worsening of any symptoms as she should be there now be admitted for full care.  Again declines.  Needs yearly diabetic eye and foot examination.  Wish her well.    No problem-specific Assessment & Plan notes found for this encounter.       Diagnoses and all orders for this visit:    Encounter for immunization    Encounter for screening mammogram for breast cancer    Uncontrolled type 2 diabetes mellitus with hyperglycemia (HCC)    Screening for cervical cancer    Chronic pain syndrome  -     C-reactive protein; Future  -     Sedimentation rate, automated; Future  -     traMADol (ULTRAM) 50 mg tablet; Take 1 tablet (50 mg total) by mouth every 6 (six) hours as needed for moderate pain or severe pain Do not take within 6 hours of valium or Fioricet    Tobacco abuse  -     XR chest pa & lateral; Future    Protein-calorie malnutrition, unspecified severity (HCC)    Unspecified psychosis not due to a substance or known physiological condition (HCC)    Pancolitis (HCC)    Unspecified adrenocortical insufficiency (HCC)    Opioid dependence with other opioid-induced disorder (HCC)    Type 2 diabetes  "mellitus with other neurologic complication, with long-term current use of insulin (HCC)        1. Encounter for immunization        2. Encounter for screening mammogram for breast cancer        3. Uncontrolled type 2 diabetes mellitus with hyperglycemia (HCC)        4. Screening for cervical cancer        5. Chronic pain syndrome  C-reactive protein    Sedimentation rate, automated    traMADol (ULTRAM) 50 mg tablet      6. Tobacco abuse  XR chest pa & lateral      7. Protein-calorie malnutrition, unspecified severity (HCC)        8. Unspecified psychosis not due to a substance or known physiological condition (HCC)        9. Pancolitis (HCC)        10. Unspecified adrenocortical insufficiency (HCC)        11. Opioid dependence with other opioid-induced disorder (HCC)        12. Type 2 diabetes mellitus with other neurologic complication, with long-term current use of insulin (HCC)            Subjective:        Patient ID: Barb Palomo is a 53 y.o. female.  Chief Complaint   Patient presents with    Diabetes    Hypertension    Hyperlipidemia    Hypothyroidism       HPI    The following portions of the patient's history were reviewed and updated as appropriate: past medical history, past surgical history and problem list.      Review of Systems      Objective:  BP 98/58   Pulse 63   Temp (!) 97.1 °F (36.2 °C)   Ht 5' 4\" (1.626 m)   Wt 40.8 kg (90 lb)   LMP 03/04/2016   SpO2 99%   BMI 15.45 kg/m²        Physical Exam      "
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
## 2024-01-22 ENCOUNTER — PATIENT OUTREACH (OUTPATIENT)
Dept: FAMILY MEDICINE CLINIC | Facility: CLINIC | Age: 54
End: 2024-01-22

## 2024-01-22 NOTE — PROGRESS NOTES
[facility] completed a chart review prior to contacting patient.    Called patient to discuss referral received.    Referral need:  Transportation    Patient identity verify by name and date of birth.     Introduced myself and explained my role.Patient agrees to Case management out [facility] [employer] services.    Offer patient to complete [employer] application online on the [employer] website, pt agreeable.    Application successfully completed and submitted for the following programs:    [facility]  Program  Persons with Disabilities  Application Number  [id number]    [facility]  Program  Americans with Disabilities Act  Application Number  [id number]    Pt aware she will be required to complete an in person evaluation with [employer]. No other needs/concerns identify during call.    [facility] will continue to follow up and assist patient as needed.     Anticipated next contact date: 2/5/24

## 2024-01-25 ENCOUNTER — TELEPHONE (OUTPATIENT)
Dept: ENDOCRINOLOGY | Facility: CLINIC | Age: 54
End: 2024-01-25

## 2024-01-25 ENCOUNTER — TELEPHONE (OUTPATIENT)
Age: 54
End: 2024-01-25

## 2024-01-25 NOTE — TELEPHONE ENCOUNTER
Called Mee, was also very unpleasant and rude on the phone. Informed her I would also not disclosed any information without the patients consent or a Court Order Document. She said she will be faxing over a release to our fax. Made her aware I will be in the look out and will call her once I have more information.

## 2024-01-25 NOTE — TELEPHONE ENCOUNTER
Call from Nevaeh Emmanuel, [facility] requesting information on patient regarding if patient attended last appointment with Dr Mendez on 1/12/24 and what was it for. She became annoyed when I wouldn't provide her with information as to what she was requesting since she was not on Communication form. She stated she can fax it over now; I provided the fax number and stated that once it's received, I can provide her with the requested information; she got really annoyed and requested to speak with ; warm transfer to office;  was not available.  I informed Nevaeh that I will get message to ; she then stated that she wants Dr Mendez to give her a call; [phone] and hung up.

## 2024-01-26 ENCOUNTER — TELEPHONE (OUTPATIENT)
Dept: FAMILY MEDICINE CLINIC | Facility: CLINIC | Age: 54
End: 2024-01-26

## 2024-01-27 ENCOUNTER — PATIENT OUTREACH (OUTPATIENT)
Dept: FAMILY MEDICINE CLINIC | Facility: CLINIC | Age: 54
End: 2024-01-27

## 2024-01-27 NOTE — PROGRESS NOTES
Incoming email received from [employer] confirming patient application for the following programs was received.    Agency  [facility]  Program  Persons with Disabilities  Application Number  [id number]     Agency  [facility]  Program  Americans with Disabilities Act  Application Number  [id number]    CMOC will continue to follow up and assist patient as needed.

## 2024-01-29 ENCOUNTER — TELEPHONE (OUTPATIENT)
Dept: NEUROLOGY | Facility: CLINIC | Age: 54
End: 2024-01-29

## 2024-01-29 NOTE — TELEPHONE ENCOUNTER
Spoke with patient, ROBERTO had a cancellation in [place] for 1/30, added patient to the schedule for worsening migraine. Will be a self pay patient, insurance doesn't kick in some time till mid Feb she stated.

## 2024-01-29 NOTE — TELEPHONE ENCOUNTER
Left message for patient to call us back to schedule either a virtual appt in [place] (2/5 @8am) is on hold for her, [facility] is booked if she wants to be seen in person there. Can use two botox spots in [place] per MK for in person appt also!

## 2024-01-30 ENCOUNTER — OFFICE VISIT (OUTPATIENT)
Dept: NEUROLOGY | Facility: CLINIC | Age: 54
End: 2024-01-30

## 2024-01-30 VITALS
TEMPERATURE: 98.2 F | HEIGHT: 64 IN | BODY MASS INDEX: 15.03 KG/M2 | WEIGHT: 88 LBS | SYSTOLIC BLOOD PRESSURE: 76 MMHG | DIASTOLIC BLOOD PRESSURE: 57 MMHG | HEART RATE: 117 BPM

## 2024-01-30 DIAGNOSIS — G43.709 CHRONIC MIGRAINE WITHOUT AURA WITHOUT STATUS MIGRAINOSUS, NOT INTRACTABLE: Primary | ICD-10-CM

## 2024-01-30 DIAGNOSIS — F32.89 OTHER DEPRESSION: ICD-10-CM

## 2024-01-30 PROCEDURE — 96372 THER/PROPH/DIAG INJ SC/IM: CPT

## 2024-01-30 PROCEDURE — 99213 OFFICE O/P EST LOW 20 MIN: CPT

## 2024-01-30 RX ORDER — KETOROLAC TROMETHAMINE 30 MG/ML
60 INJECTION, SOLUTION INTRAMUSCULAR; INTRAVENOUS ONCE
Status: COMPLETED | OUTPATIENT
Start: 2024-01-30 | End: 2024-01-30

## 2024-01-30 RX ORDER — KETOROLAC TROMETHAMINE 10 MG/1
10 TABLET, FILM COATED ORAL EVERY 6 HOURS PRN
Qty: 10 TABLET | Refills: 2 | Status: SHIPPED | OUTPATIENT
Start: 2024-01-30

## 2024-01-30 RX ORDER — AMOXICILLIN 500 MG/1
500 CAPSULE ORAL 3 TIMES DAILY
COMMUNITY
Start: 2024-01-24

## 2024-01-30 RX ADMIN — KETOROLAC TROMETHAMINE 60 MG: 30 INJECTION, SOLUTION INTRAMUSCULAR; INTRAVENOUS at 14:32

## 2024-01-30 NOTE — PROGRESS NOTES
Patient ID: Barb Palomo is a 53 y.o. female.    Assessment/Plan:     Diagnoses and all orders for this visit:    Chronic migraine without aura without status migrainosus, not intractable  -     ketorolac (TORADOL) 60 mg/2 mL IM injection 60 mg  -     ketorolac (TORADOL) 10 mg tablet; Take 1 tablet (10 mg total) by mouth every 6 (six) hours as needed (migraine) Max 2-3 per week.    Other depression  -     Ambulatory referral to Psych Services; Future    Other orders  -     amoxicillin (AMOXIL) 500 mg capsule; Take 500 mg by mouth 3 (three) times a day       I messaged to the Botox team to attempt to authorize Botox injections.  She did get Botox in the past and she tolerated it well, and it worked well to reduce her migraine headaches. Previously with Botox, she had greater than 7 days of migraine relief from baseline, correlated with headache diary, decreased abortive medication use and decreased ER visits.    She will restart Emgality, however we need to order this through the [facility] program.  Forms completed today.    Toradol injection provided today to break current cycle.    The patient will be getting Medicare B this Thursday, 2/1/2024.    Smoking cessation counseling.  We also discussed uncontrolled diabetes and elevated A1c and some of the risks involved in this, it can also worsen headaches and other neuropathic pain, and obviously contribute to neuropathy, retinopathy, etc.  The patient states her insulin needles have not arrived to her pharmacy yet and she is still waiting for these.  She has the insulin but not the needles.  I told her to look into another pharmacy near her so that she can get the needles earlier if possible.  She is going to schedule with ophthalmology once she has her insurance.  She is also going to schedule with orthopedics for left knee pain status post ORIF and other susequent surgeries there.  Her knee is very painful and she needs therapy.  She declines home health on  questioning today.  She states she will set up therapy once insurance starts.    The patient should not hesitate to call me prior to her follow up with any questions or concerns.        Subjective:    Ms. Barb Palomo is here for neurological follow up for primarily chronic migraine headaches.    The patient is getting Medicare part B on February 1.  She would like to restart Botox injections.  She did these in the past and they worked very well and she did not have side effects.    Migraine headaches are in both temples and the back of her head bilaterally, sometimes they radiate into the bifrontal regions.  Current headache is 6 out of 10.  When she gets a migraine headache that is severe she can even see straight and she has a lot of blurry vision.  She also has nausea with vomiting or dry heaving, light sensitivity, sound sensitivity.  No scintillating scotomas or any clear warning or aura.  She has had a migraine headache for the last 6 days and has been unable to break it with any medication at home.  Since I last saw her migraine headaches are getting worse and she has at least 15 days out of the month with a migraine, each last greater than 4 hours long.  There is no focal deficit with the migraine.    Migraine  Associated symptoms include dizziness, nausea and photophobia. Pertinent negatives include no eye pain, fever, hearing loss, neck pain, numbness, seizures, tinnitus, vomiting or weakness.      Prior HPI: She has pancreatitis, severe GERD status post Nissen fundoplication, diabetes, hypertension, hypothyroidism, HLD, Cholecystectomy in July 2013, Depression, alleged stroke in March 2013, deep vein thrombosis and pulmonary embolus 2009, fibromyalgia and homocysteinemia.    Migraines are usually behind the right eye, rarely in the left.  She does not know what triggered it.  It came on very suddenly while she was sitting down.  The headache did resolve over time.  Headaches usually last 24-48 hours.   She takes Fioricet and/or Zofran and these help.     Migraine headaches also involves severe neck pain.  She has constant neck pain, worse with the migraine.  In the past we discussed that it is associated with bilateral arm weakness, more so in the left, but she did have a prior stroke in the right thalamus which reflects that.     She continues to have significant depression, anxiety and panic, and reports that she would like to see a psychiatrist. She has seen a therapist briefly in the past.     Brain MRI shows mild microangiopathic changes.     What medications do you take or have you taken for your headaches?  Prevention:  Prozac  Xanax  Amitriptyline  Lyrica  Cymbalta  Gabapentin  Metoprolol  Depakote  Topamax  Venlafaxine  Olanzapine  Baclofen  Emgality    Abortive:  Fioricet  Tramadol  Aspirin  Dilaudid  Fentanyl  Oxycodone  Olanzapine- helped decrease severity  Ondansetron  Prochlorperazine    Other Medical/Alternative Treatments used in the past for headaches: none    Did try Botox in the past and worked well.    HA Frequency- 15 per month, almost every day  What time of the day do the headaches start ? Varies  HA Duration- 12-24 hours  Location- occipital region b/l, apex, bi-temporal, behind eyes, neck, jaw  What is the intensity of pain ? average pain level 7-9/10  Describe your usual headache - Throbbing, Pounding, Pressure, Squeezing  Associated symptoms: blurred vision, photophobia, phonophobia, nausea, light-headed or dizzy, hands or feet tingle or feel numb, problem with concentration, stiff or sore neck, prefer to be alone and in a dark room, unable to work  HAs are worse with: anxious/ stressed, hot/ summer time     Triggers: Fatigue, Stress/Tension, Weather change, bright lights, sunlight, Menstruation, Heat/ summer time, noises, certain smells  Aura- denies    The following portions of the patient's history were reviewed and updated as appropriate: She  has a past medical history of Acute  venous embolism and thrombosis of deep vessels of distal lower extremity (HCC), Anemia, Anxiety, Arthritis, Asthma, Cerebral infarction ([facility]), Chest pain, Chronic cough, Depression, Diabetes mellitus, type 2 ([facility]), DJD (degenerative joint disease), Esophageal reflux, Fibromyalgia, GERD without esophagitis, History of pulmonary embolism, Hyperlipidemia, Hypertension, Hypothyroidism, Iron deficiency, Pancreatitis, Panic attack, Panic disorder, Polyarthritis, PTSD (post-traumatic stress disorder), Sleep difficulties, Stroke syndrome, Thyroid disease, TIA (transient ischemic attack), TIA (transient ischemic attack), Venous embolism and thrombosis of deep vessels of distal lower extremity ([facility]), Vitamin B12 deficiency, and Vitamin D deficiency.  She   Patient Active Problem List    Diagnosis Date Noted    Chronic migraine without aura without status migrainosus, not intractable 01/30/2024    Unspecified psychosis not due to a substance or known physiological condition ([facility]) 08/18/2023    Pancolitis ([facility]) 08/18/2023    S/P left knee arthroscopy 10/21/2022    Agoraphobia with panic disorder 09/14/2022    Bipolar II disorder ([facility]) 09/14/2022    Depression, unspecified 09/14/2022    Polyarthritis, unspecified 09/14/2022    Chronic prescription benzodiazepine use 09/08/2022    Uncontrolled type 2 diabetes mellitus with hyperglycemia ([facility]) 12/02/2021    Nausea 12/01/2021    Weakness of right upper extremity 02/16/2021    Neuropathy     Cervical radiculopathy 09/14/2020    Lumbar radiculopathy 09/14/2020    DDD (degenerative disc disease), cervical 09/14/2020    DDD (degenerative disc disease), lumbar 09/14/2020    Low back pain with sciatica 09/14/2020    Cervical spinal stenosis 09/14/2020    Cervical spondylosis without myelopathy 09/14/2020    Paresthesia and pain of both upper extremities 09/01/2020    Paresthesia of both lower extremities 09/01/2020    Chronic pain of both shoulders 09/01/2020    Chronic cervical pain  08/13/2020    Symptom associated with female genital organs 05/26/2020    Female stress incontinence 05/26/2020    Decreased libido 05/26/2020    ROGERIO positive 05/13/2020    Diplopia 02/04/2020    Chronic migraine without aura, not intractable, without status migrainosus 10/17/2019    Arthralgia of temporomandibular joint 08/22/2019    Carpal tunnel syndrome 08/22/2019    Dysphagia 08/22/2019    Reactive airway disease without complication 02/23/2018    Protein calorie malnutrition (HCC) 08/30/2017    Tobacco dependence 08/30/2017    GERD without esophagitis 08/25/2017    History of decompression of median nerve 08/25/2017    Hypothyroidism 08/25/2017    Chronic pain disorder 11/04/2016    Pancreatic cyst 10/17/2016    Chronic diarrhea 10/04/2016    Chronic fatigue, unspecified 08/24/2016    Type 2 diabetes mellitus with other neurologic complication, with long-term current use of insulin (HCC) 05/05/2016    Severe episode of recurrent major depressive disorder, without psychotic features (HCC) 05/05/2016    Fatty liver 04/08/2016    Opioid dependence with other opioid-induced disorder (HCC) 01/25/2016    Iron deficiency 12/28/2015    Vitamin D deficiency 08/13/2015    History of stroke 05/27/2013    Hyperlipidemia 03/15/2013    Insomnia 03/04/2013    Vitamin B12 deficiency 03/04/2013    Post-traumatic stress disorder, unspecified 09/26/2012    Benign essential hypertension 09/26/2012    Temporary cerebral vascular dysfunction 07/30/2009    History of pulmonary embolism 07/30/2009    Irregular menstrual cycle 04/02/2008     She  has a past surgical history that includes Cholecystectomy; Esophagogastric fundoplasty; Nissen fundoplication; Tonsillectomy and adenoidectomy; ERCP; Carpal tunnel release (Right); ERCP; pr esophagogastroduodenoscopy transoral diagnostic (N/A, 11/02/2016); pr ndsc wrst surg w/rls transvrs carpl ligm (Right, 03/08/2016); pr tendon sheath incision (Right, 03/08/2016); Cataract extraction; and  Patella surgery (Left, 12/2021).  Her family history includes Arthritis in her mother; Bipolar disorder in her maternal uncle, mother, and sister; Cancer in her father and paternal grandfather; Crohn's disease in her sister; Depression in her mother; Diabetes in her maternal grandmother and mother; Drug abuse in her daughter; Heart attack in her father; Heart attack (age of onset: 39) in her mother; Kidney disease in her mother; Lung cancer in her paternal grandmother; No Known Problems in her sister; Psoriasis in her father; Rheum arthritis in her maternal grandfather; Schizophrenia in her mother; Stroke in her father; Substance Abuse in her mother; Suicide Attempts in her daughter and mother; Throat cancer in her maternal grandfather; Ulcerative colitis in her mother.  She  reports that she has been smoking cigarettes. She started smoking about 41 years ago. She has a 41.1 pack-year smoking history. She has never used smokeless tobacco. She reports that she does not currently use alcohol. She reports that she does not use drugs.  Current Outpatient Medications   Medication Sig Dispense Refill    acarbose (PRECOSE) 50 mg tablet Take 1 tablet (50 mg total) by mouth 3 (three) times a day with meals 90 tablet 5    Albuterol Sulfate (ProAir RespiClick) 108 (90 Base) MCG/ACT AEPB Inhale 2 puffs every 6 (six) hours as needed (wheezing) 1 each 1    amoxicillin (AMOXIL) 500 mg capsule Take 500 mg by mouth 3 (three) times a day      aspirin 81 mg chewable tablet Chew 81 mg daily       atorvastatin (LIPITOR) 80 mg tablet TAKE 1 TABLET BY MOUTH EVERY DAY 90 tablet 0    baclofen 10 mg tablet TAKE 1 TABLET BY MOUTH THREE TIMES A DAY AS NEEDED 90 tablet 2    BD PEN NEEDLE LINDY U/F 32G X 4 MM MISC Inject under the skin daily 30 each 5    Blood Glucose Monitoring Suppl (OneTouch Verio Reflect) w/Device KIT CHECK BLOOD SUGARS FOUR TIMES DAILY. PLEASE SUBSTITUTE WITH APPROPRIATE ALTERNATIVE AS COVERED BY PATIENT'S INSURANCE. DX:  "E11.65 1 kit 0    butalbital-acetaminophen-caffeine (FIORICET,ESGIC) -40 mg per tablet TAKE 1 TABLET EVERY 6 HOURS AS NEEDED FOR MIGRAINE. PLEASE LIMIT 3-4 PER WEEK, 15 PER MONTH. 15 tablet 2    diazepam (VALIUM) 5 mg tablet Take 1 tablet (5 mg total) by mouth 2 (two) times a day as needed for anxiety 60 tablet 0    DULoxetine (CYMBALTA) 60 mg delayed release capsule Take 1 capsule (60 mg total) by mouth 2 (two) times a day 180 capsule 1    ergocalciferol (VITAMIN D2) 50,000 units TAKE 1 CAPSULE BY MOUTH ONCE WEEKLY 12 capsule 0    Fluticasone-Salmeterol (Advair Diskus) 500-50 mcg/dose inhaler Inhale 1 puff by mouth 2 times daily.  Rinse mouth after use 180 blister 1    gabapentin (NEURONTIN) 400 mg capsule TAKE 2 CAPSULES BY MOUTH 3 TIMES A  capsule 1    glucose blood (OneTouch Verio) test strip Check blood sugars four times daily. Please substitute with appropriate alternative as covered by patient's insurance. Dx: E11.65 400 each 3    ibuprofen (MOTRIN) 800 mg tablet Take 800 mg by mouth every 6 (six) hours as needed      insulin NPH-insulin regular (NovoLIN 70/30) 100 units/mL subcutaneous injection Inject 20 units before breakfast and 20 units before dinner 20 mL 5    Insulin Syringe-Needle U-100 31G X 5/16\" 0.3 ML MISC Use twice daily 60 each 5    ketorolac (TORADOL) 10 mg tablet Take 1 tablet (10 mg total) by mouth every 6 (six) hours as needed (migraine) Max 2-3 per week. 10 tablet 2    levothyroxine 112 mcg tablet TAKE 1 TABLET BY MOUTH EVERY DAY 90 tablet 1    losartan (COZAAR) 50 mg tablet TAKE 1 TABLET DAILY. 90 tablet 2    metFORMIN (GLUCOPHAGE-XR) 500 mg 24 hr tablet Take 1 tablet (500 mg total) by mouth 2 (two) times a day with meals 180 tablet 1    metoprolol tartrate (LOPRESSOR) 25 mg tablet TAKE 1 TABLET BY MOUTH EVERY DAY 90 tablet 2    mirtazapine (REMERON) 7.5 MG tablet Take 1 tablet (7.5 mg total) by mouth daily at bedtime 90 tablet 1    naloxone (Narcan) 4 mg/0.1 mL nasal spray 1 " actuation in one nostril once as needed for opioid overdose, may repeat dose every 2-3 minutes until patient responsive or EMS arrives 1 each 1    omeprazole (PriLOSEC) 20 mg delayed release capsule Take 20 mg by mouth daily      OneTouch Delica Lancets 33G MISC Check blood sugars four times daily. Please substitute with appropriate alternative as covered by patient's insurance. Dx: E11.65 400 each 3    OneTouch Ultra test strip USE AS DIRECTED TO TEST 3 TIMES A  strip 0    pioglitazone (ACTOS) 30 mg tablet Take 1 tablet (30 mg total) by mouth daily 30 tablet 5    prazosin (MINIPRESS) 2 mg capsule Take 1 capsule (2 mg total) by mouth daily at bedtime 90 capsule 1    prochlorperazine (COMPAZINE) 10 mg tablet TAKE 1 TABLET BY MOUTH EVERY 6 HOURS AS NEEDED FOR NAUSEA OR VOMITING. 90 tablet 1    traMADol (ULTRAM) 50 mg tablet Take 1 tablet (50 mg total) by mouth every 6 (six) hours as needed for moderate pain or severe pain Do not take within 6 hours of valium or Fioricet 70 tablet 0    Turmeric 500 MG TABS Take 1 tablet by mouth daily      Adalimumab (HUMIRA PEN SC) Inject under the skin (Patient not taking: Reported on 1/30/2024)      cholestyramine (QUESTRAN) 4 g packet Take 1 packet by mouth 2 (two) times a day (Patient not taking: Reported on 8/18/2023)      cholestyramine sugar free (QUESTRAN LIGHT) 4 g packet Take 4 g by mouth 2 (two) times a day (Patient not taking: Reported on 1/30/2024)      Cyanocobalamin (VITAMIN B-12 IJ) Inject as directed (Patient not taking: Reported on 1/30/2024)      Folic Acid 0.8 MG CAPS Take 0.04 mg by mouth daily. (Patient not taking: Reported on 1/30/2024)      Galcanezumab-gnlm (Emgality) 120 MG/ML SOAJ 1 injection monthly (Patient not taking: Reported on 1/30/2024) 1 mL 11    HumaLOG KwikPen 200 units/mL CONCENTRATED U-200 injection pen INJECT 15 UNITS WITH MEALS +SCALE ( SCALE - 150-200 -2 UNITS, 201-250-4 UNITS, 251-300 -6 UNITS, 301-350-8 UNITS, > 350- 10 UNITS )  "(Patient not taking: Reported on 1/30/2024) 12 mL 3    Lidocaine 4 % PTCH Place 1 patch on the skin daily (Patient not taking: Reported on 1/30/2024)      ondansetron (ZOFRAN) 4 mg tablet TAKE 1 TABLET BY MOUTH EVERY 6 HOURS AS NEEDED FOR NAUSEA HOLD COMPAZINE. 30 tablet 1    topiramate (TOPAMAX) 100 mg tablet TAKE 2 TABLETS BY MOUTH EVERY DAY (Patient not taking: Reported on 1/30/2024) 180 tablet 1    topiramate (TOPAMAX) 25 mg tablet TAKE 1 TABLET BY MOUTH EVERY DAY (Patient not taking: Reported on 1/30/2024) 90 tablet 1     No current facility-administered medications for this visit.     She is allergic to medical tape, lexapro [escitalopram oxalate], escitalopram, and other..         Objective:    Blood pressure (!) 76/57, pulse (!) 117, temperature 98.2 °F (36.8 °C), temperature source Temporal, height 5' 4\" (1.626 m), weight 39.9 kg (88 lb), last menstrual period 03/04/2016, not currently breastfeeding.  Body mass index is 15.11 kg/m².    Physical Exam    Neurological Exam  On neurologic exam, the patient is alert and oriented to time and place.  No dysarthria in speech.  She appears depressed and sometimes teary.  She appears cachectic and has a BMI of 15.11.    She follows all commands.  Pupils are equally round and reactive to light and extraocular muscles are intact without nystagmus. Face is symmetric, and tongue, uvula, and palate are midline. Hearing is intact. Motor examination is nonfocal throughout except she does have significant generalized weakness secondary to pain especially when testing the left lower extremity due to left knee pain and bilateral hip flexor pain.  Reflexes are absent in the ankles, knees not tested due to pain and 1+ in the biceps bilaterally.  Normal gait is steady.  She occasionally has left knee pain and winces if she moves it wrong.      ROS:    Review of Systems   Constitutional:  Negative for appetite change and fever.   HENT: Negative.  Negative for hearing loss, " tinnitus, trouble swallowing and voice change.    Eyes:  Positive for photophobia. Negative for pain.   Respiratory: Negative.  Negative for shortness of breath.    Cardiovascular: Negative.  Negative for palpitations.   Gastrointestinal:  Positive for nausea. Negative for vomiting.   Endocrine: Negative.  Negative for cold intolerance.   Genitourinary: Negative.  Negative for dysuria, frequency and urgency.   Musculoskeletal: Negative.  Negative for myalgias and neck pain.   Skin: Negative.  Negative for rash.   Neurological:  Positive for dizziness and headaches. Negative for tremors, seizures, syncope, facial asymmetry, speech difficulty, weakness, light-headedness and numbness.   Hematological: Negative.  Does not bruise/bleed easily.   Psychiatric/Behavioral:  Positive for sleep disturbance. Negative for confusion and hallucinations.    All other systems reviewed and are negative.    ROS reviewed.

## 2024-02-03 DIAGNOSIS — R11.0 NAUSEA: ICD-10-CM

## 2024-02-03 RX ORDER — ONDANSETRON 4 MG/1
TABLET, FILM COATED ORAL
Qty: 30 TABLET | Refills: 1 | Status: SHIPPED | OUTPATIENT
Start: 2024-02-03

## 2024-02-05 ENCOUNTER — TELEPHONE (OUTPATIENT)
Dept: PSYCHIATRY | Facility: CLINIC | Age: 54
End: 2024-02-05

## 2024-02-05 ENCOUNTER — PATIENT OUTREACH (OUTPATIENT)
Dept: FAMILY MEDICINE CLINIC | Facility: CLINIC | Age: 54
End: 2024-02-05

## 2024-02-05 ENCOUNTER — TELEPHONE (OUTPATIENT)
Age: 54
End: 2024-02-05

## 2024-02-05 NOTE — TELEPHONE ENCOUNTER
Received t/c from patient looking for testing to be ordered by MD. Patient states that MD was waiting until she received her medical insurance before ordering testing. She reports that she now has current insurance and would like testing to be ordered.  She is looking for an EMG d/t numbness in hands and an EGD d/t swallowing difficulty.    Please follow up with patient.

## 2024-02-05 NOTE — PROGRESS NOTES
CMOC completed a chart review prior to contacting patient.    Called patient to follow up on the following [employer] applications:     [facility]  Program  Persons with Disabilities  Application Number  [id number]     Agency  [facility]  Americans with Disabilities Act  Application Number  [id number]    Per patient she is now schedule for an in person evaluation for tomorrow 10 am.    CMOC will continue to follow up and assist patient as needed.    Anticipated next contact date: 2/12/24

## 2024-02-05 NOTE — PROGRESS NOTES
OP CM called to pt and encouraged her to keep taking steps to assist her with her needs.  Pt did complete the [email or web address] van application today with CMOC.  Pt is on wait list for [facility].  Pt denies any other needs today.

## 2024-02-05 NOTE — TELEPHONE ENCOUNTER
Mee Emmanuel from Adult protective services called the office and is requesting a called back in regards to the patient. She would like to know if there are any concerns in regards to the patient. She asked for the patients last completed appt with the provider.           # [phone]

## 2024-02-06 ENCOUNTER — TELEPHONE (OUTPATIENT)
Dept: NEUROLOGY | Facility: CLINIC | Age: 54
End: 2024-02-06

## 2024-02-06 NOTE — TELEPHONE ENCOUNTER
----- Message from Marie Alfaro PA-C sent at 2/6/2024 11:31 AM EST -----  Regarding: botox  Please start botox auth for  units. Thanks!

## 2024-02-06 NOTE — TELEPHONE ENCOUNTER
Returned call to Mee with Adult Protective Services.  Inquired on last visit with patient.  Advised that she was last seen 6/13/23, and no-showed to 2 appointments (8/14/23, 1/15/24), and was discharged from this provider's care on 1/15/24.  Advised that patient has contacted office to be placed back on wait list and that medication will be filled by this provider for 1 month following discharge, but then should be filled by PCP.  Mee advised that patient has refused all assistance from [employer]PS.  As a result, her case will be closed.  No further records or action needed from this provider.

## 2024-02-06 NOTE — TELEPHONE ENCOUNTER
Patient is a new start to Botox. You would like to start this patient on:    Botox 200 UNITS  Qty.1  DX.[id number]  Sig: Inject up to 200 UNITS I.M into the various sites in the head and neck once every three months for one year with  Refills: 3    If you agree to the order transcribed above, please respond directly if you agree or not so that I may proceed further with authorization and for use of Verbal Order.    Thank you.

## 2024-02-07 DIAGNOSIS — M79.602 PARESTHESIA AND PAIN OF BOTH UPPER EXTREMITIES: Primary | ICD-10-CM

## 2024-02-07 DIAGNOSIS — M79.601 PARESTHESIA AND PAIN OF BOTH UPPER EXTREMITIES: Primary | ICD-10-CM

## 2024-02-07 DIAGNOSIS — G62.9 NEUROPATHY: Primary | ICD-10-CM

## 2024-02-07 DIAGNOSIS — R13.10 DYSPHAGIA, UNSPECIFIED TYPE: ICD-10-CM

## 2024-02-07 DIAGNOSIS — R20.2 PARESTHESIA AND PAIN OF BOTH UPPER EXTREMITIES: Primary | ICD-10-CM

## 2024-02-07 DIAGNOSIS — D50.9 IRON DEFICIENCY ANEMIA, UNSPECIFIED IRON DEFICIENCY ANEMIA TYPE: Primary | ICD-10-CM

## 2024-02-07 DIAGNOSIS — E55.9 VITAMIN D DEFICIENCY: ICD-10-CM

## 2024-02-09 ENCOUNTER — TELEPHONE (OUTPATIENT)
Dept: NEUROLOGY | Facility: CLINIC | Age: 54
End: 2024-02-09

## 2024-02-09 NOTE — TELEPHONE ENCOUNTER
MK filled out [facility] Program form for patient for her [employer], faxed over and scanned into patients chart.

## 2024-02-12 ENCOUNTER — PATIENT OUTREACH (OUTPATIENT)
Dept: FAMILY MEDICINE CLINIC | Facility: CLINIC | Age: 54
End: 2024-02-12

## 2024-02-12 NOTE — PROGRESS NOTES
CMOC completed a chart review prior to contacting patient.     Called patient to follow up on the following [employer] applications, unable to reach, left a message requesting a call back. If no return call is received CMOC will make second attempt to reach patient in about 2 weeks.     Anticipated next contact date: 2/26/24

## 2024-02-23 DIAGNOSIS — G43.709 CHRONIC MIGRAINE WITHOUT AURA WITHOUT STATUS MIGRAINOSUS, NOT INTRACTABLE: ICD-10-CM

## 2024-02-23 RX ORDER — BUTALBITAL, ACETAMINOPHEN AND CAFFEINE 50; 325; 40 MG/1; MG/1; MG/1
TABLET ORAL
Qty: 15 TABLET | Refills: 2 | Status: SHIPPED | OUTPATIENT
Start: 2024-02-23

## 2024-02-26 ENCOUNTER — PATIENT OUTREACH (OUTPATIENT)
Dept: FAMILY MEDICINE CLINIC | Facility: CLINIC | Age: 54
End: 2024-02-26

## 2024-02-26 NOTE — LETTER
February 26, 2024     Barb CHAVESGera Dewey    Patient: Barb Palomo   YOB: 1970   Date of Visit: 2/26/2024       Dear Dr. Palomo:    Thank you for referring Barb Palomo to me for evaluation. Below are my notes for this consultation.    If you have questions, please do not hesitate to call me. I look forward to following your patient along with you.         Sincerely,        Gabby Roberto        CC: No Recipients

## 2024-02-26 NOTE — LETTER
02/26/24    Dear Barb Palomo,    I am a  with [facility] I have made several attempts to contact you by phone to follow up on your care needs.  Please call me back at your earliest convenient time. My direct number is [phone]    Sincerely,         Gabby Roberto CCMA  Care Management   Outpatient Care Management  [facility] [address]  Phone: [phone]

## 2024-02-26 NOTE — PROGRESS NOTES
CMOC completed a chart review prior to contacting patient.    Called patient to follow up on [facility] application.    Unable to reach patient, left a message requesting a call back. If no return call is received CMOC will make last attempt to reach patient in around 2 weeks.    Unable to reach letter sent.     Anticipated next contact date: 3/11/24

## 2024-03-01 DIAGNOSIS — G89.4 CHRONIC PAIN DISORDER: ICD-10-CM

## 2024-03-01 DIAGNOSIS — G43.709 CHRONIC MIGRAINE WITHOUT AURA WITHOUT STATUS MIGRAINOSUS, NOT INTRACTABLE: ICD-10-CM

## 2024-03-01 DIAGNOSIS — M79.18 CERVICAL MYOFASCIAL PAIN SYNDROME: ICD-10-CM

## 2024-03-01 RX ORDER — GABAPENTIN 400 MG/1
CAPSULE ORAL
Qty: 180 CAPSULE | Refills: 5 | Status: SHIPPED | OUTPATIENT
Start: 2024-03-01

## 2024-03-04 RX ORDER — BACLOFEN 10 MG/1
TABLET ORAL
Qty: 90 TABLET | Refills: 2 | Status: SHIPPED | OUTPATIENT
Start: 2024-03-04

## 2024-03-07 ENCOUNTER — OFFICE VISIT (OUTPATIENT)
Dept: GASTROENTEROLOGY | Facility: MEDICAL CENTER | Age: 54
End: 2024-03-07
Payer: MEDICARE

## 2024-03-07 VITALS
WEIGHT: 88.8 LBS | TEMPERATURE: 97.5 F | HEIGHT: 64 IN | HEART RATE: 104 BPM | DIASTOLIC BLOOD PRESSURE: 60 MMHG | BODY MASS INDEX: 15.16 KG/M2 | SYSTOLIC BLOOD PRESSURE: 104 MMHG

## 2024-03-07 DIAGNOSIS — K21.9 GASTROESOPHAGEAL REFLUX DISEASE, UNSPECIFIED WHETHER ESOPHAGITIS PRESENT: ICD-10-CM

## 2024-03-07 DIAGNOSIS — R13.10 DYSPHAGIA, UNSPECIFIED TYPE: ICD-10-CM

## 2024-03-07 DIAGNOSIS — R63.4 UNINTENTIONAL WEIGHT LOSS: Primary | ICD-10-CM

## 2024-03-07 DIAGNOSIS — K52.9 CHRONIC DIARRHEA: ICD-10-CM

## 2024-03-07 DIAGNOSIS — R10.13 EPIGASTRIC PAIN: ICD-10-CM

## 2024-03-07 PROCEDURE — 99204 OFFICE O/P NEW MOD 45 MIN: CPT | Performed by: INTERNAL MEDICINE

## 2024-03-07 RX ORDER — CHOLESTYRAMINE 4 G/9G
1 POWDER, FOR SUSPENSION ORAL 2 TIMES DAILY
Qty: 60 PACKET | Refills: 3 | Status: SHIPPED | OUTPATIENT
Start: 2024-03-07

## 2024-03-07 RX ORDER — CHOLESTYRAMINE 4 G/9G
1 POWDER, FOR SUSPENSION ORAL 2 TIMES DAILY
Qty: 60 PACKET | Refills: 3 | Status: SHIPPED | OUTPATIENT
Start: 2024-03-07 | End: 2024-03-07

## 2024-03-07 RX ORDER — OMEPRAZOLE 40 MG/1
40 CAPSULE, DELAYED RELEASE ORAL DAILY
Qty: 30 CAPSULE | Refills: 3 | Status: SHIPPED | OUTPATIENT
Start: 2024-03-07

## 2024-03-07 NOTE — PROGRESS NOTES
[facility] - Outpatient Consultation  Barb Palomo 53 y.o. female MRN: [id number]  Encounter: [id number]          ASSESSMENT AND PLAN:   53-year-old female with history of hypothyroidism, insulin dependent type 2 diabetes, hypertension who presents for evaluation    1. Dysphagia, unspecified type  2. Unintentional weight loss  3. Chronic diarrhea  4. Gastroesophageal reflux disease, unspecified whether esophagitis present  5. Epigastric pain  She reports chronic symptoms of dysphagia and epigastric abdominal pain in addition to early satiety.  Given her very poorly controlled type 2 diabetes with an A1c of 11.5, symptoms can be related to esophagitis, gastroparesis.  Recommend starting omeprazole 40 mg daily to be taken in the morning.  She will be scheduled for EGD for further evaluation and if results are unremarkable would require gastric emptying study in the future  She also reports chronic diarrhea which may be related to infectious, inflammatory, malabsorptive or functional causes.  I have ordered stool and serologic testing for further evaluation and recommend that she take Questran 1-2 times daily as needed for diarrhea.  Given her unintentional weight loss and chronic diarrhea colonoscopy will be scheduled as well to obtain random colon biopsies.  I discussed the indication, risk and benefit of the procedures with her today and she is agreeable to proceed.    - EGD; Future  - Colonoscopy; Future  - Calprotectin,Fecal; Future  - Tissue transglutaminase, IgA; Future  - H. pylori antigen, stool; Future  - Tissue transglutaminase, IgA; Future  - IgA; Future  - cholestyramine (QUESTRAN) 4 g packet; Take 1 packet (4 g total) by mouth 2 (two) times a day  Dispense: 60 packet; Refill: 3  - omeprazole (PriLOSEC) 40 MG capsule; Take 1 capsule (40 mg total) by mouth daily  Dispense: 30 capsule; Refill: 3        Follow-up once the above workup and procedures are  complete  ______________________________________________________________________    HPI: 53-year-old female with history of hypothyroidism, insulin dependent type 2 diabetes, hypertension who presents for evaluation.    She was last seen in the GI office in 2017 for history of IBS and dysphagia.  In 2016 she underwent colonoscopy showing 3 subcentimeter colon polyps.  EGD at that time showed mild erosive gastritis otherwise unremarkable.  Colon pathology showed hyperplastic polyp and random biopsies were unremarkable.  She was recommended repeat colonoscopy in 5 years.  Gastric and duodenal biopsies were normal.    She reports chronic symptoms of loose stool for some time.  She can have a bowel movement usually 5 times per day which is loose and watery and nonbloody.  She was previously using Imodium as needed and was ordered for Questran but is no longer taking the medications.  She also reports epigastric abdominal pain which is worse after eating in addition to early satiety and intermittent reflux symptoms.  She has a history of a fundoplication surgery in 2009 and states she has been off acid suppression medication for some time.  Her appetite is low and she states over the last 2 years she has unintentionally lost over 100 pounds.  She also notes dysphagia with solid food.    She has no family history of colon cancer  Her past surgical history includes fundoplication, cholecystectomy  Her antiplatelet use includes aspirin 81 mg    1/2024 liver enzymes are within normal limits, glucose 430.  8/2023 hemoglobin A1c 11.8      9/2022 CT abdomen pelvis showed pancolitis  2016 barium swallow was normal  2013 EUS showed pancreatitis      REVIEW OF SYSTEMS:    CONSTITUTIONAL: Denies any fever, chills, rigors, and weight loss.  HEENT: No earache or tinnitus. Denies hearing loss or visual disturbances.  CARDIOVASCULAR: No chest pain or palpitations.   RESPIRATORY: Denies any cough, hemoptysis, shortness of breath or  dyspnea on exertion.  GASTROINTESTINAL: As noted in the History of Present Illness.   GENITOURINARY: No problems with urination. Denies any hematuria or dysuria.  NEUROLOGIC: No dizziness or vertigo, denies headaches.   MUSCULOSKELETAL: Denies any muscle or joint pain.   SKIN: Denies skin rashes or itching.   ENDOCRINE: Denies excessive thirst. Denies intolerance to heat or cold.  PSYCHOSOCIAL: Denies depression or anxiety. Denies any recent memory loss.       Historical Information   Past Medical History:   Diagnosis Date    Acute venous embolism and thrombosis of deep vessels of distal lower extremity (HCC)     Anemia     Anxiety     last assessed 11/20/17    Arthritis     Asthma     Cerebral infarction (HCC)     unspecified, last assessed 11/14/16    Chest pain     last assessed 5/9/17    Chronic cough     last assessed 12/12/13    Depression     Diabetes mellitus, type 2 (HCC)     DJD (degenerative joint disease)     Esophageal reflux     Fibromyalgia     GERD without esophagitis     resolved 5/13/16    History of pulmonary embolism     Hyperlipidemia     Hypertension     Hypothyroidism     Iron deficiency     Pancreatitis     Panic attack     Panic disorder     Polyarthritis     PTSD (post-traumatic stress disorder)     Sleep difficulties     Stroke syndrome     Thyroid disease     TIA (transient ischemic attack)     TIA (transient ischemic attack)     Venous embolism and thrombosis of deep vessels of distal lower extremity (HCC)     Vitamin B12 deficiency     Vitamin D deficiency      Past Surgical History:   Procedure Laterality Date    CARPAL TUNNEL RELEASE Right     neuroplasty decompression of median nerve    CATARACT EXTRACTION      CHOLECYSTECTOMY      ERCP      ERCP      ESOPHAGOGASTRIC FUNDOPLASTY      NISSEN FUNDOPLICATION      PATELLA SURGERY Left 12/2021    WV ESOPHAGOGASTRODUODENOSCOPY TRANSORAL DIAGNOSTIC N/A 11/02/2016    Procedure: EGD AND COLONOSCOPY;  Surgeon: Jatin Shepherd MD;  Location: [facility];  Service: Gastroenterology    MD NDSC WRST SURG W/RLS TRANSVRS CARPL LIGM Right 03/08/2016    Procedure: RELEASE CARPAL TUNNEL ENDOSCOPIC;  Surgeon: Guero Restrepo MD;  Location: [facility];  Service: Orthopedics    MD TENDON SHEATH INCISION Right 03/08/2016    Procedure: RELEASE TRIGGER FINGER RIGHT THUMB;  Surgeon: Guero Restrepo MD;  Location: [facility];  Service: Orthopedics    TONSILLECTOMY AND ADENOIDECTOMY       Social History   Social History     Substance and Sexual Activity   Alcohol Use Not Currently    Alcohol/week: 0.0 standard drinks of alcohol    Comment: last was in 2010 after DUI     Social History     Substance and Sexual Activity   Drug Use No     Social History     Tobacco Use   Smoking Status Every Day    Current packs/day: 1.00    Average packs/day: 1 pack/day for 41.2 years (41.2 ttl pk-yrs)    Types: Cigarettes    Start date: 1/1/1983   Smokeless Tobacco Never   Tobacco Comments    20 + years     Family History   Problem Relation Age of Onset    Diabetes Mother         type 2    Heart attack Mother 39        acute MI    Kidney disease Mother         CKD NKF classfication    Depression Mother     Arthritis Mother     Substance Abuse Mother         mother OD in past on MS04    Ulcerative colitis Mother     Schizophrenia Mother     Suicide Attempts Mother     Bipolar disorder Mother     Diabetes Maternal Grandmother     Heart attack Father         acute MI    Psoriasis Father     Cancer Father         gastric cancer    Stroke Father     Crohn's disease Sister     Bipolar disorder Sister     Rheum arthritis Maternal Grandfather     Throat cancer Maternal Grandfather     Suicide Attempts Daughter     Drug abuse Daughter     Lung cancer Paternal Grandmother     Cancer Paternal Grandfather     No Known Problems Sister     Bipolar disorder Maternal Uncle     Anesthesia problems Neg Hx        Meds/Allergies       Current Outpatient Medications:     acarbose (PRECOSE) 50 mg tablet     "Albuterol Sulfate (ProAir RespiClick) 108 (90 Base) MCG/ACT AEPB    aspirin 81 mg chewable tablet    atorvastatin (LIPITOR) 80 mg tablet    baclofen 10 mg tablet    BD PEN NEEDLE LINDY U/F 32G X 4 MM MISC    Blood Glucose Monitoring Suppl (OneTouch Verio Reflect) w/Device KIT    butalbital-acetaminophen-caffeine (FIORICET,ESGIC) -40 mg per tablet    diazepam (VALIUM) 5 mg tablet    DULoxetine (CYMBALTA) 60 mg delayed release capsule    ergocalciferol (VITAMIN D2) 50,000 units    Fluticasone-Salmeterol (Advair Diskus) 500-50 mcg/dose inhaler    gabapentin (NEURONTIN) 400 mg capsule    glucose blood (OneTouch Verio) test strip    ibuprofen (MOTRIN) 800 mg tablet    insulin NPH-insulin regular (NovoLIN 70/30) 100 units/mL subcutaneous injection    Insulin Syringe-Needle U-100 31G X 5/16\" 0.3 ML MISC    ketorolac (TORADOL) 10 mg tablet    levothyroxine 112 mcg tablet    losartan (COZAAR) 50 mg tablet    metFORMIN (GLUCOPHAGE-XR) 500 mg 24 hr tablet    metoprolol tartrate (LOPRESSOR) 25 mg tablet    mirtazapine (REMERON) 7.5 MG tablet    naloxone (Narcan) 4 mg/0.1 mL nasal spray    ondansetron (ZOFRAN) 4 mg tablet    OneTouch Delica Lancets 33G MISC    OneTouch Ultra test strip    pioglitazone (ACTOS) 30 mg tablet    prazosin (MINIPRESS) 2 mg capsule    traMADol (ULTRAM) 50 mg tablet    Turmeric 500 MG TABS    Adalimumab (HUMIRA PEN SC)    amoxicillin (AMOXIL) 500 mg capsule    cholestyramine (QUESTRAN) 4 g packet    cholestyramine sugar free (QUESTRAN LIGHT) 4 g packet    Cyanocobalamin (VITAMIN B-12 IJ)    Folic Acid 0.8 MG CAPS    Galcanezumab-gnlm (Emgality) 120 MG/ML SOAJ    HumaLOG KwikPen 200 units/mL CONCENTRATED U-200 injection pen    Lidocaine 4 % PTCH    omeprazole (PriLOSEC) 20 mg delayed release capsule    prochlorperazine (COMPAZINE) 10 mg tablet    topiramate (TOPAMAX) 100 mg tablet    topiramate (TOPAMAX) 25 mg tablet    Allergies   Allergen Reactions    Medical Tape Rash    Lexapro [Escitalopram " "Oxalate]     Escitalopram Other (See Comments) and Palpitations    Other Hives and Rash     Adhesive Tape           Objective     Blood pressure 104/60, pulse 104, temperature 97.5 °F (36.4 °C), temperature source Tympanic, height 5' 4\" (1.626 m), weight 40.3 kg (88 lb 12.8 oz), last menstrual period 03/04/2016, not currently breastfeeding. Body mass index is 15.24 kg/m².        PHYSICAL EXAM:      General Appearance:   Alert, cooperative, no distress   HEENT:   Normocephalic, atraumatic, anicteric.     Neck:  Supple, symmetrical, trachea midline   Lungs:   Clear to auscultation bilaterally; no rales, rhonchi or wheezing; respirations unlabored    Heart::   Regular rate and rhythm; no murmur, rub, or gallop.   Abdomen:   Soft, epigastric tenderness to deep palpation without rebound or guarding, non-distended; normal bowel sounds; no masses, no organomegaly    Genitalia:   Deferred    Rectal:   Deferred    Extremities:  No cyanosis, clubbing or edema    Pulses:  2+ and symmetric    Skin:  No jaundice, rashes, or lesions    Lymph nodes:  No palpable cervical lymphadenopathy        Lab Results:   No visits with results within 1 Day(s) from this visit.   Latest known visit with results is:   Orders Only on 01/15/2024   Component Date Value    EXT Creatinine Urine 01/15/2024 36.7     Albumin,U,Random 01/15/2024 2.9     Alb/Creat Ratio 01/15/2024 79.0          Radiology Results:   No results found.  "

## 2024-03-11 ENCOUNTER — PATIENT OUTREACH (OUTPATIENT)
Dept: FAMILY MEDICINE CLINIC | Facility: CLINIC | Age: 54
End: 2024-03-11

## 2024-03-11 NOTE — PROGRESS NOTES
3 unsuccessful attempts made to reach patient to follow up on [facility] application.    CMOC will close referral but remain available may pt return call or needs any further assistance.

## 2024-03-12 ENCOUNTER — PATIENT OUTREACH (OUTPATIENT)
Dept: FAMILY MEDICINE CLINIC | Facility: CLINIC | Age: 54
End: 2024-03-12

## 2024-03-12 NOTE — PROGRESS NOTES
OP CM rcvd inbasket from [facility] that she made three attempts to contact pt in regards to L[facility] van.  Pt has not responded.  OP CM and CMOC will close case at this time.  Pt is also on wait list for [facility].

## 2024-03-14 DIAGNOSIS — R11.0 NAUSEA: ICD-10-CM

## 2024-03-14 DIAGNOSIS — G43.709 CHRONIC MIGRAINE WITHOUT AURA WITHOUT STATUS MIGRAINOSUS, NOT INTRACTABLE: ICD-10-CM

## 2024-03-14 RX ORDER — KETOROLAC TROMETHAMINE 10 MG/1
10 TABLET, FILM COATED ORAL EVERY 6 HOURS PRN
Qty: 10 TABLET | Refills: 2 | Status: SHIPPED | OUTPATIENT
Start: 2024-03-14

## 2024-03-14 RX ORDER — ONDANSETRON 4 MG/1
TABLET, FILM COATED ORAL
Qty: 120 TABLET | Refills: 5 | Status: SHIPPED | OUTPATIENT
Start: 2024-03-14

## 2024-03-21 NOTE — TELEPHONE ENCOUNTER
Patient has Primary Ins-Plan: Medicare A&B  Eff: 03/08/2024    Ye-Bufu-Rogckfxs for: , [id number]  Botox-200 units. Qty:1, Q3 Months.  DX: G43.709, Chronic Migraine.    *As long as clinical documentation notes show that patient meets the medical necessity criteria per the Medicare A&B guidelines.*     Please use our Stock.    Please reach out to patient and schedule them for the soonest Botox-Appt available. Thank you!

## 2024-03-28 NOTE — TELEPHONE ENCOUNTER
Spoke to patient who was returning call to schedule Botox.    Pt can be reached at: [phone] and is available to speak all day.    Please assist and thank you!

## 2024-03-29 DIAGNOSIS — G89.4 CHRONIC PAIN SYNDROME: ICD-10-CM

## 2024-03-29 DIAGNOSIS — E55.9 VITAMIN D DEFICIENCY: ICD-10-CM

## 2024-03-29 RX ORDER — TRAMADOL HYDROCHLORIDE 50 MG/1
50 TABLET ORAL EVERY 6 HOURS PRN
Qty: 60 TABLET | Refills: 0 | Status: SHIPPED | OUTPATIENT
Start: 2024-03-29

## 2024-03-29 RX ORDER — ERGOCALCIFEROL 1.25 MG/1
CAPSULE ORAL
Qty: 12 CAPSULE | Refills: 0 | Status: SHIPPED | OUTPATIENT
Start: 2024-03-29

## 2024-04-01 ENCOUNTER — NURSE TRIAGE (OUTPATIENT)
Age: 54
End: 2024-04-01

## 2024-04-01 DIAGNOSIS — R39.9 UTI SYMPTOMS: Primary | ICD-10-CM

## 2024-04-01 RX ORDER — CIPROFLOXACIN 500 MG/1
500 TABLET, FILM COATED ORAL EVERY 12 HOURS SCHEDULED
Qty: 10 TABLET | Refills: 0 | Status: SHIPPED | OUTPATIENT
Start: 2024-04-01 | End: 2024-04-06

## 2024-04-01 NOTE — TELEPHONE ENCOUNTER
"Reason for Disposition   Urinating more frequently than usual (i.e., frequency)    Answer Assessment - Initial Assessment Questions  1. SYMPTOM: \"What's the main symptom you're concerned about?\" (e.g., frequency, incontinence)      Urinary urgency, frequency, pain in bladder and burning with urination  2. ONSET: \"When did the  symptoms  start?\"      About a day ago  3. PAIN: \"Is there any pain?\" If Yes, ask: \"How bad is it?\" (Scale: 1-10; mild, moderate, severe)      4/10  4. CAUSE: \"What do you think is causing the symptoms?\"      UTI  5. OTHER SYMPTOMS: \"Do you have any other symptoms?\" (e.g., fever, flank pain, blood in urine, pain with urination)      no  6. PREGNANCY: \"Is there any chance you are pregnant?\" \"When was your last menstrual period?\"      no    Protocols used: Urinary Symptoms-ADULT-[place]    "

## 2024-04-01 NOTE — TELEPHONE ENCOUNTER
Received call from patient stating that she has a bladder infection. Reports urinary frequency and urgency, burning with urination and pain in lower abdomen 4/10.  She denies any fever, back pain or blood in urine.  Attempted to schedule patient an OV but she states that she has no way of getting into the office d/t transportation. States that she has no transportation to get her to urgent care or the lab.    Patient tearfully asking if provider can call in an antibiotic for her since she has no way of getting into the office for an appointment.  Please advise.

## 2024-04-01 NOTE — TELEPHONE ENCOUNTER
Patient states she can't come in to do a urine sample due to not having a ride. Please see attached message.       Thank you

## 2024-04-02 ENCOUNTER — PROCEDURE VISIT (OUTPATIENT)
Dept: NEUROLOGY | Facility: CLINIC | Age: 54
End: 2024-04-02
Payer: MEDICARE

## 2024-04-02 VITALS — HEART RATE: 123 BPM | TEMPERATURE: 97.6 F | DIASTOLIC BLOOD PRESSURE: 51 MMHG | SYSTOLIC BLOOD PRESSURE: 84 MMHG

## 2024-04-02 DIAGNOSIS — G43.709 CHRONIC MIGRAINE WITHOUT AURA WITHOUT STATUS MIGRAINOSUS, NOT INTRACTABLE: Primary | ICD-10-CM

## 2024-04-02 PROCEDURE — 64615 CHEMODENERV MUSC MIGRAINE: CPT | Performed by: PHYSICIAN ASSISTANT

## 2024-04-02 NOTE — PROGRESS NOTES
Universal Protocol   Consent: Verbal consent obtained. Written consent obtained.  Risks and benefits: risks, benefits and alternatives were discussed  Consent given by: patient  Patient understanding: patient states understanding of the procedure being performed  Patient consent: the patient's understanding of the procedure matches consent given  Procedure consent: procedure consent matches procedure scheduled      Chemodenervation     Date/Time  4/2/2024 8:00 AM     Performed by  Marie Alfaro PA-C   Authorized by  Marie Alfaro PA-C     Pre-procedure details      Prepped With: Alcohol     Procedure details      Position:  Upright   Botox      Botox Type:  Type A    Brand:  Botox    mL's of Botulinum Toxin:  200    Final Concentration per CC:  100 units    Needle Gauge:  30 G 2.5 inch   Procedures      Botox Procedures: chronic headache      Indications: migraines     Injection Location      Head / Face:  L superior trapezius, R superior trapezius, L superior cervical paraspinal, R superior cervical paraspinal, L , R , procerus, L temporalis, R temporalis, R frontalis, L frontalis, R medial occipitalis and L medial occipitalis    L  injection amount:  5 unit(s)    R  injection amount:  5 unit(s)    L lateral frontalis:  5 unit(s)    R lateral frontalis:  5 unit(s)    L medial frontalis:  5 unit(s)    R medial frontalis:  5 unit(s)    L temporalis injection amount:  20 unit(s)    R temporalis injection amount:  20 unit(s)    Procerus injection amount:  5 unit(s)    L medial occipitalis injection amount:  15 unit(s)    R medial occipitalis injection amount:  15 unit(s)    L superior cervical paraspinal injection amount:  10 unit(s)    R superior cervical paraspinal injection amount:  10 unit(s)    L superior trapezius injection amount:  15 unit(s)    R superior trapezius injection amount:  15 unit(s)   Total Units      Total units used:  200    Total units discarded:  0    Post-procedure details      Chemodenervation:  Chronic migraine    Facial Nerve Location::  Bilateral facial nerve    Patient tolerance of procedure:  Tolerated well, no immediate complications   Comments       Extra 45 units between the b/l anterior temporalis muscles and b/l occipitalis muscles, all medically necessary.       Blood pressure (!) 84/51, pulse (!) 123, temperature 97.6 °F (36.4 °C), temperature source Temporal, last menstrual period 03/04/2016, not currently breastfeeding.    I will message the pt's endocrinologist some issues that the patient has with affording medications.  I reminded the patient that she can get a coupon for insulin for $25 through [employer].  The patient states that she cannot drive and she does not have anyone that can drive her to get this medication, therefore she uses [facility] and there is no coupon available there.  The patient states she rarely uses insulin because it is not available to her.  She only takes it when she really needs it.  Also the patient cannot afford metformin either and only takes it sparingly.    The patient declined any help in the house with her activities of daily living, however she is concerned about her strength.  She has difficulty walking and using her hands, opening things.  Overall it is generalized weakness that she feels.  She has not had any falls.    She is getting dental work tomorrow, teeth removal.

## 2024-04-09 ENCOUNTER — TELEPHONE (OUTPATIENT)
Dept: FAMILY MEDICINE CLINIC | Facility: CLINIC | Age: 54
End: 2024-04-09

## 2024-04-09 DIAGNOSIS — E78.5 HYPERLIPIDEMIA, UNSPECIFIED HYPERLIPIDEMIA TYPE: ICD-10-CM

## 2024-04-09 NOTE — TELEPHONE ENCOUNTER
Patient asked that a message be sent to PCP  because when she got her refill on tramadol from Dr. Delarosa she only got a 60 day supply and stated she usually gets 90 days     Patient would like to know if you're able to put that in for her. She was going to discuss this at her appointment with you on 4/12 but we rescheduled her and you are booking out until end of June.     Please advise   Thank you

## 2024-04-09 NOTE — TELEPHONE ENCOUNTER
Most of the time these medications are only filled for 30 days at a time- moving forward I personally only order for 30 days at a time for controlled substances. If this doesn't work for her or she has concerns, she could follow up with another provider that may be comfortable doing that.

## 2024-04-10 RX ORDER — ATORVASTATIN CALCIUM 80 MG/1
TABLET, FILM COATED ORAL
Qty: 30 TABLET | Refills: 2 | Status: SHIPPED | OUTPATIENT
Start: 2024-04-10

## 2024-04-23 ENCOUNTER — NURSE TRIAGE (OUTPATIENT)
Age: 54
End: 2024-04-23

## 2024-04-23 NOTE — TELEPHONE ENCOUNTER
Pt calling asking for prep instructions as she misplaced them. I sent prep instructions via [employer] and let her know they will call her with more specific instructions for medications and clearances

## 2024-04-26 DIAGNOSIS — G43.709 CHRONIC MIGRAINE WITHOUT AURA WITHOUT STATUS MIGRAINOSUS, NOT INTRACTABLE: ICD-10-CM

## 2024-04-26 DIAGNOSIS — G89.4 CHRONIC PAIN SYNDROME: ICD-10-CM

## 2024-04-26 RX ORDER — KETOROLAC TROMETHAMINE 10 MG/1
10 TABLET, FILM COATED ORAL EVERY 6 HOURS PRN
Qty: 10 TABLET | Refills: 2 | Status: SHIPPED | OUTPATIENT
Start: 2024-04-26 | End: 2024-05-08

## 2024-04-30 RX ORDER — TRAMADOL HYDROCHLORIDE 50 MG/1
50 TABLET ORAL EVERY 6 HOURS PRN
Qty: 60 TABLET | Refills: 0 | Status: SHIPPED | OUTPATIENT
Start: 2024-04-30

## 2024-05-02 ENCOUNTER — NURSE TRIAGE (OUTPATIENT)
Age: 54
End: 2024-05-02

## 2024-05-02 ENCOUNTER — TELEPHONE (OUTPATIENT)
Dept: GASTROENTEROLOGY | Facility: HOSPITAL | Age: 54
End: 2024-05-02

## 2024-05-02 NOTE — TELEPHONE ENCOUNTER
"Patient calling in to confirm that \" you have someone coming to pick me up and bring me home. I have no one and this was discussed in the office\"  patient is crying on the phone, unsure why      Reason for Disposition   Information only question and nurse able to answer    Answer Assessment - Initial Assessment Questions  1. REASON FOR CALL or QUESTION: see notes    Protocols used: Information Only Call - No Triage-ADULT-[place]    "

## 2024-05-02 NOTE — TELEPHONE ENCOUNTER
Patient stated her concerns in the office that she had no one and was very emotional in regards to it. I did state to her to reach out to her insurance to see if they were able to assist her. I did reach out to Ed at [facility] to inquire if [facility] had any options for patients that have no one to be able to assist them and they stated that we no longer have a service for that. I did tell her that we would try and that she should still try and see if her daughter or anyone else could come with her in the meantime and to let whomever calls her to confirm the procedure and we may have to cancel the procedure and find another option for her.

## 2024-05-05 RX ORDER — SODIUM CHLORIDE, SODIUM LACTATE, POTASSIUM CHLORIDE, CALCIUM CHLORIDE 600; 310; 30; 20 MG/100ML; MG/100ML; MG/100ML; MG/100ML
50 INJECTION, SOLUTION INTRAVENOUS CONTINUOUS
Status: CANCELLED | OUTPATIENT
Start: 2024-05-05

## 2024-05-06 ENCOUNTER — HOSPITAL ENCOUNTER (OUTPATIENT)
Dept: GASTROENTEROLOGY | Facility: HOSPITAL | Age: 54
Setting detail: OUTPATIENT SURGERY
Discharge: HOME | DRG: 637 | End: 2024-05-06
Attending: INTERNAL MEDICINE | Admitting: INTERNAL MEDICINE
Payer: MEDICARE

## 2024-05-06 ENCOUNTER — ANESTHESIA (INPATIENT)
Dept: GASTROENTEROLOGY | Facility: HOSPITAL | Age: 54
DRG: 637 | End: 2024-05-06
Payer: MEDICARE

## 2024-05-06 ENCOUNTER — ANESTHESIA EVENT (INPATIENT)
Dept: GASTROENTEROLOGY | Facility: HOSPITAL | Age: 54
DRG: 637 | End: 2024-05-06
Payer: MEDICARE

## 2024-05-06 ENCOUNTER — HOSPITAL ENCOUNTER (INPATIENT)
Facility: HOSPITAL | Age: 54
LOS: 2 days | Discharge: HOME | DRG: 637 | End: 2024-05-08
Attending: EMERGENCY MEDICINE | Admitting: INTERNAL MEDICINE
Payer: MEDICARE

## 2024-05-06 VITALS
HEART RATE: 116 BPM | RESPIRATION RATE: 16 BRPM | TEMPERATURE: 95.8 F | DIASTOLIC BLOOD PRESSURE: 66 MMHG | SYSTOLIC BLOOD PRESSURE: 98 MMHG | OXYGEN SATURATION: 99 %

## 2024-05-06 DIAGNOSIS — R10.9 ABDOMINAL PAIN: ICD-10-CM

## 2024-05-06 DIAGNOSIS — E11.65 HYPERGLYCEMIA DUE TO TYPE 2 DIABETES MELLITUS (HCC): ICD-10-CM

## 2024-05-06 DIAGNOSIS — R63.4 UNINTENTIONAL WEIGHT LOSS: ICD-10-CM

## 2024-05-06 DIAGNOSIS — E83.42 HYPOMAGNESEMIA: ICD-10-CM

## 2024-05-06 DIAGNOSIS — Z79.4 TYPE 2 DIABETES MELLITUS WITH OTHER NEUROLOGIC COMPLICATION, WITH LONG-TERM CURRENT USE OF INSULIN (HCC): Primary | ICD-10-CM

## 2024-05-06 DIAGNOSIS — E11.01 HHNC (HYPERGLYCEMIC HYPEROSMOLAR NONKETOTIC COMA) (HCC): Primary | ICD-10-CM

## 2024-05-06 DIAGNOSIS — R63.4 WEIGHT LOSS: ICD-10-CM

## 2024-05-06 DIAGNOSIS — E11.49 TYPE 2 DIABETES MELLITUS WITH OTHER NEUROLOGIC COMPLICATION, WITH LONG-TERM CURRENT USE OF INSULIN (HCC): Primary | ICD-10-CM

## 2024-05-06 DIAGNOSIS — E43 SEVERE PROTEIN-CALORIE MALNUTRITION (HCC): ICD-10-CM

## 2024-05-06 DIAGNOSIS — N30.00 ACUTE CYSTITIS WITHOUT HEMATURIA: ICD-10-CM

## 2024-05-06 DIAGNOSIS — R13.10 DYSPHAGIA, UNSPECIFIED TYPE: ICD-10-CM

## 2024-05-06 LAB
ALBUMIN SERPL BCP-MCNC: 3.7 G/DL (ref 3.5–5)
ALP SERPL-CCNC: 95 U/L (ref 34–104)
ALT SERPL W P-5'-P-CCNC: 24 U/L (ref 7–52)
ANION GAP SERPL CALCULATED.3IONS-SCNC: 14 MMOL/L (ref 4–13)
AST SERPL W P-5'-P-CCNC: 28 U/L (ref 13–39)
B-OH-BUTYR SERPL-MCNC: 2.16 MMOL/L (ref 0.02–0.27)
BACTERIA UR QL AUTO: ABNORMAL /HPF
BASE EX.OXY STD BLDV CALC-SCNC: 68.7 % (ref 60–80)
BASE EXCESS BLDV CALC-SCNC: -5.8 MMOL/L
BILIRUB SERPL-MCNC: 0.5 MG/DL (ref 0.2–1)
BILIRUB UR QL STRIP: NEGATIVE
BUN SERPL-MCNC: 9 MG/DL (ref 5–25)
CALCIUM SERPL-MCNC: 9 MG/DL (ref 8.4–10.2)
CHLORIDE SERPL-SCNC: 96 MMOL/L (ref 96–108)
CLARITY UR: ABNORMAL
CO2 SERPL-SCNC: 19 MMOL/L (ref 21–32)
COLOR UR: ABNORMAL
CREAT SERPL-MCNC: 0.62 MG/DL (ref 0.6–1.3)
ERYTHROCYTE [DISTWIDTH] IN BLOOD BY AUTOMATED COUNT: 12 % (ref 11.6–15.1)
GFR SERPL CREATININE-BSD FRML MDRD: 103 ML/MIN/1.73SQ M
GLUCOSE SERPL-MCNC: 160 MG/DL (ref 65–140)
GLUCOSE SERPL-MCNC: 177 MG/DL (ref 65–140)
GLUCOSE SERPL-MCNC: 181 MG/DL (ref 65–140)
GLUCOSE SERPL-MCNC: 223 MG/DL (ref 65–140)
GLUCOSE SERPL-MCNC: 349 MG/DL (ref 65–140)
GLUCOSE SERPL-MCNC: 484 MG/DL (ref 65–140)
GLUCOSE SERPL-MCNC: 520 MG/DL (ref 65–140)
GLUCOSE SERPL-MCNC: 548 MG/DL (ref 65–140)
GLUCOSE UR STRIP-MCNC: ABNORMAL MG/DL
HCO3 BLDV-SCNC: 20 MMOL/L (ref 24–30)
HCT VFR BLD AUTO: 43.6 % (ref 34.8–46.1)
HGB BLD-MCNC: 14 G/DL (ref 11.5–15.4)
HGB UR QL STRIP.AUTO: 10
KETONES UR STRIP-MCNC: ABNORMAL MG/DL
LEUKOCYTE ESTERASE UR QL STRIP: 25
MCH RBC QN AUTO: 31.5 PG (ref 26.8–34.3)
MCHC RBC AUTO-ENTMCNC: 32.1 G/DL (ref 31.4–37.4)
MCV RBC AUTO: 98 FL (ref 82–98)
NITRITE UR QL STRIP: NEGATIVE
NON-SQ EPI CELLS URNS QL MICRO: ABNORMAL /HPF
O2 CT BLDV-SCNC: 14.2 ML/DL
PCO2 BLDV: 40.7 MM HG (ref 42–50)
PH BLDV: 7.31 [PH] (ref 7.3–7.4)
PH UR STRIP.AUTO: 5 [PH]
PLATELET # BLD AUTO: 348 THOUSANDS/UL (ref 149–390)
PMV BLD AUTO: 9.8 FL (ref 8.9–12.7)
PO2 BLDV: 37.9 MM HG (ref 35–45)
POTASSIUM SERPL-SCNC: 5 MMOL/L (ref 3.5–5.3)
PROT SERPL-MCNC: 6.7 G/DL (ref 6.4–8.4)
PROT UR STRIP-MCNC: ABNORMAL MG/DL
RBC # BLD AUTO: 4.44 MILLION/UL (ref 3.81–5.12)
RBC #/AREA URNS AUTO: ABNORMAL /HPF
SODIUM SERPL-SCNC: 129 MMOL/L (ref 135–147)
SP GR UR STRIP.AUTO: 1.01 (ref 1–1.04)
UROBILINOGEN UA: NEGATIVE MG/DL
WBC # BLD AUTO: 8.35 THOUSAND/UL (ref 4.31–10.16)
WBC #/AREA URNS AUTO: ABNORMAL /HPF

## 2024-05-06 PROCEDURE — 87077 CULTURE AEROBIC IDENTIFY: CPT | Performed by: PHYSICIAN ASSISTANT

## 2024-05-06 PROCEDURE — 96361 HYDRATE IV INFUSION ADD-ON: CPT

## 2024-05-06 PROCEDURE — 0DB58ZX EXCISION OF ESOPHAGUS, VIA NATURAL OR ARTIFICIAL OPENING ENDOSCOPIC, DIAGNOSTIC: ICD-10-PCS | Performed by: INTERNAL MEDICINE

## 2024-05-06 PROCEDURE — 81001 URINALYSIS AUTO W/SCOPE: CPT | Performed by: PHYSICIAN ASSISTANT

## 2024-05-06 PROCEDURE — 96374 THER/PROPH/DIAG INJ IV PUSH: CPT

## 2024-05-06 PROCEDURE — 82948 REAGENT STRIP/BLOOD GLUCOSE: CPT

## 2024-05-06 PROCEDURE — 81003 URINALYSIS AUTO W/O SCOPE: CPT | Performed by: PHYSICIAN ASSISTANT

## 2024-05-06 PROCEDURE — 87086 URINE CULTURE/COLONY COUNT: CPT | Performed by: PHYSICIAN ASSISTANT

## 2024-05-06 PROCEDURE — 85027 COMPLETE CBC AUTOMATED: CPT | Performed by: PHYSICIAN ASSISTANT

## 2024-05-06 PROCEDURE — 82010 KETONE BODYS QUAN: CPT | Performed by: PHYSICIAN ASSISTANT

## 2024-05-06 PROCEDURE — 99285 EMERGENCY DEPT VISIT HI MDM: CPT | Performed by: PHYSICIAN ASSISTANT

## 2024-05-06 PROCEDURE — 36415 COLL VENOUS BLD VENIPUNCTURE: CPT | Performed by: PHYSICIAN ASSISTANT

## 2024-05-06 PROCEDURE — 0DJD8ZZ INSPECTION OF LOWER INTESTINAL TRACT, VIA NATURAL OR ARTIFICIAL OPENING ENDOSCOPIC: ICD-10-PCS | Performed by: INTERNAL MEDICINE

## 2024-05-06 PROCEDURE — 0DB98ZX EXCISION OF DUODENUM, VIA NATURAL OR ARTIFICIAL OPENING ENDOSCOPIC, DIAGNOSTIC: ICD-10-PCS | Performed by: INTERNAL MEDICINE

## 2024-05-06 PROCEDURE — 99223 1ST HOSP IP/OBS HIGH 75: CPT | Performed by: INTERNAL MEDICINE

## 2024-05-06 PROCEDURE — 82805 BLOOD GASES W/O2 SATURATION: CPT | Performed by: PHYSICIAN ASSISTANT

## 2024-05-06 PROCEDURE — 0DB78ZX EXCISION OF STOMACH, PYLORUS, VIA NATURAL OR ARTIFICIAL OPENING ENDOSCOPIC, DIAGNOSTIC: ICD-10-PCS | Performed by: INTERNAL MEDICINE

## 2024-05-06 PROCEDURE — 80053 COMPREHEN METABOLIC PANEL: CPT | Performed by: PHYSICIAN ASSISTANT

## 2024-05-06 PROCEDURE — 87186 SC STD MICRODIL/AGAR DIL: CPT | Performed by: PHYSICIAN ASSISTANT

## 2024-05-06 PROCEDURE — 99285 EMERGENCY DEPT VISIT HI MDM: CPT

## 2024-05-06 RX ORDER — FLUTICASONE FUROATE AND VILANTEROL 200; 25 UG/1; UG/1
1 POWDER RESPIRATORY (INHALATION)
Status: DISCONTINUED | OUTPATIENT
Start: 2024-05-06 | End: 2024-05-08 | Stop reason: HOSPADM

## 2024-05-06 RX ORDER — DULOXETIN HYDROCHLORIDE 60 MG/1
60 CAPSULE, DELAYED RELEASE ORAL 2 TIMES DAILY
Status: DISCONTINUED | OUTPATIENT
Start: 2024-05-06 | End: 2024-05-08 | Stop reason: HOSPADM

## 2024-05-06 RX ORDER — ATORVASTATIN CALCIUM 80 MG/1
80 TABLET, FILM COATED ORAL DAILY
Status: DISCONTINUED | OUTPATIENT
Start: 2024-05-06 | End: 2024-05-08 | Stop reason: HOSPADM

## 2024-05-06 RX ORDER — ONDANSETRON 2 MG/ML
4 INJECTION INTRAMUSCULAR; INTRAVENOUS EVERY 6 HOURS PRN
Status: DISCONTINUED | OUTPATIENT
Start: 2024-05-06 | End: 2024-05-08 | Stop reason: HOSPADM

## 2024-05-06 RX ORDER — TRAMADOL HYDROCHLORIDE 50 MG/1
50 TABLET ORAL EVERY 6 HOURS PRN
Status: DISCONTINUED | OUTPATIENT
Start: 2024-05-06 | End: 2024-05-08 | Stop reason: HOSPADM

## 2024-05-06 RX ORDER — PRAZOSIN HYDROCHLORIDE 1 MG/1
2 CAPSULE ORAL
Status: DISCONTINUED | OUTPATIENT
Start: 2024-05-06 | End: 2024-05-08 | Stop reason: HOSPADM

## 2024-05-06 RX ORDER — SODIUM CHLORIDE, SODIUM LACTATE, POTASSIUM CHLORIDE, CALCIUM CHLORIDE 600; 310; 30; 20 MG/100ML; MG/100ML; MG/100ML; MG/100ML
50 INJECTION, SOLUTION INTRAVENOUS CONTINUOUS
Status: DISCONTINUED | OUTPATIENT
Start: 2024-05-06 | End: 2024-05-10 | Stop reason: HOSPADM

## 2024-05-06 RX ORDER — PANTOPRAZOLE SODIUM 40 MG/1
40 TABLET, DELAYED RELEASE ORAL
Status: DISCONTINUED | OUTPATIENT
Start: 2024-05-07 | End: 2024-05-08 | Stop reason: HOSPADM

## 2024-05-06 RX ORDER — CEFTRIAXONE 1 G/50ML
1000 INJECTION, SOLUTION INTRAVENOUS EVERY 24 HOURS
Status: DISCONTINUED | OUTPATIENT
Start: 2024-05-06 | End: 2024-05-08 | Stop reason: HOSPADM

## 2024-05-06 RX ORDER — LEVOTHYROXINE SODIUM 112 UG/1
112 TABLET ORAL
Status: DISCONTINUED | OUTPATIENT
Start: 2024-05-07 | End: 2024-05-08 | Stop reason: HOSPADM

## 2024-05-06 RX ORDER — ACETAMINOPHEN 325 MG/1
650 TABLET ORAL EVERY 4 HOURS PRN
Status: DISCONTINUED | OUTPATIENT
Start: 2024-05-06 | End: 2024-05-08 | Stop reason: HOSPADM

## 2024-05-06 RX ORDER — GABAPENTIN 400 MG/1
400 CAPSULE ORAL 3 TIMES DAILY
Status: DISCONTINUED | OUTPATIENT
Start: 2024-05-06 | End: 2024-05-08 | Stop reason: HOSPADM

## 2024-05-06 RX ORDER — SODIUM CHLORIDE 9 MG/ML
3 INJECTION INTRAVENOUS
Status: DISCONTINUED | OUTPATIENT
Start: 2024-05-06 | End: 2024-05-06

## 2024-05-06 RX ORDER — NICOTINE 21 MG/24HR
1 PATCH, TRANSDERMAL 24 HOURS TRANSDERMAL DAILY
Status: DISCONTINUED | OUTPATIENT
Start: 2024-05-06 | End: 2024-05-08 | Stop reason: HOSPADM

## 2024-05-06 RX ORDER — TOPIRAMATE 25 MG/1
25 TABLET ORAL DAILY
Status: DISCONTINUED | OUTPATIENT
Start: 2024-05-06 | End: 2024-05-08 | Stop reason: HOSPADM

## 2024-05-06 RX ORDER — SODIUM CHLORIDE 9 MG/ML
125 INJECTION, SOLUTION INTRAVENOUS CONTINUOUS
Status: DISCONTINUED | OUTPATIENT
Start: 2024-05-06 | End: 2024-05-07

## 2024-05-06 RX ORDER — MIRTAZAPINE 7.5 MG/1
7.5 TABLET, FILM COATED ORAL
Status: DISCONTINUED | OUTPATIENT
Start: 2024-05-06 | End: 2024-05-08 | Stop reason: HOSPADM

## 2024-05-06 RX ORDER — DIAZEPAM 5 MG/1
5 TABLET ORAL 2 TIMES DAILY PRN
Status: DISCONTINUED | OUTPATIENT
Start: 2024-05-06 | End: 2024-05-08 | Stop reason: HOSPADM

## 2024-05-06 RX ORDER — LOSARTAN POTASSIUM 50 MG/1
50 TABLET ORAL DAILY
Status: DISCONTINUED | OUTPATIENT
Start: 2024-05-06 | End: 2024-05-06

## 2024-05-06 RX ORDER — ASPIRIN 81 MG/1
81 TABLET, CHEWABLE ORAL DAILY
Status: DISCONTINUED | OUTPATIENT
Start: 2024-05-06 | End: 2024-05-08 | Stop reason: HOSPADM

## 2024-05-06 RX ORDER — SODIUM CHLORIDE 9 MG/ML
3 INJECTION INTRAVENOUS
Status: DISCONTINUED | OUTPATIENT
Start: 2024-05-06 | End: 2024-05-08 | Stop reason: HOSPADM

## 2024-05-06 RX ADMIN — FLUTICASONE FUROATE AND VILANTEROL TRIFENATATE 1 PUFF: 200; 25 POWDER RESPIRATORY (INHALATION) at 14:25

## 2024-05-06 RX ADMIN — SODIUM CHLORIDE 7 UNITS/HR: 9 INJECTION, SOLUTION INTRAVENOUS at 14:15

## 2024-05-06 RX ADMIN — NICOTINE 1 PATCH: 21 PATCH, EXTENDED RELEASE TRANSDERMAL at 14:24

## 2024-05-06 RX ADMIN — DULOXETINE HYDROCHLORIDE 60 MG: 60 CAPSULE, DELAYED RELEASE ORAL at 17:22

## 2024-05-06 RX ADMIN — SODIUM CHLORIDE 500 ML: 0.9 INJECTION, SOLUTION INTRAVENOUS at 12:35

## 2024-05-06 RX ADMIN — GABAPENTIN 400 MG: 400 CAPSULE ORAL at 15:31

## 2024-05-06 RX ADMIN — INSULIN HUMAN 10 UNITS: 100 INJECTION, SOLUTION PARENTERAL at 12:36

## 2024-05-06 RX ADMIN — PRAZOSIN HYDROCHLORIDE 2 MG: 1 CAPSULE ORAL at 22:25

## 2024-05-06 RX ADMIN — ASPIRIN 81 MG CHEWABLE TABLET 81 MG: 81 TABLET CHEWABLE at 14:24

## 2024-05-06 RX ADMIN — SODIUM CHLORIDE 1000 ML: 0.9 INJECTION, SOLUTION INTRAVENOUS at 15:31

## 2024-05-06 RX ADMIN — DIAZEPAM 5 MG: 5 TABLET ORAL at 14:24

## 2024-05-06 RX ADMIN — SODIUM CHLORIDE 125 ML/HR: 0.9 INJECTION, SOLUTION INTRAVENOUS at 14:29

## 2024-05-06 RX ADMIN — TRAMADOL HYDROCHLORIDE 50 MG: 50 TABLET, COATED ORAL at 22:25

## 2024-05-06 RX ADMIN — TRAMADOL HYDROCHLORIDE 50 MG: 50 TABLET, COATED ORAL at 15:30

## 2024-05-06 RX ADMIN — MIRTAZAPINE 7.5 MG: 7.5 TABLET, FILM COATED ORAL at 22:25

## 2024-05-06 RX ADMIN — TOPIRAMATE 25 MG: 25 TABLET, FILM COATED ORAL at 14:24

## 2024-05-06 RX ADMIN — SODIUM CHLORIDE 1000 ML: 0.9 INJECTION, SOLUTION INTRAVENOUS at 14:48

## 2024-05-06 RX ADMIN — GABAPENTIN 400 MG: 400 CAPSULE ORAL at 22:25

## 2024-05-06 RX ADMIN — ATORVASTATIN CALCIUM 80 MG: 80 TABLET, FILM COATED ORAL at 14:24

## 2024-05-06 RX ADMIN — CEFTRIAXONE 1000 MG: 1 INJECTION, SOLUTION INTRAVENOUS at 15:33

## 2024-05-06 NOTE — ASSESSMENT & PLAN NOTE
Patient was scheduled for colonoscopy on 5/6/2024 in the setting of abdominal pain and weight loss  Procedure was canceled secondary to significant hyperglycemia  Patient will undergo procedure likely tomorrow once blood glucose levels within normal limits    Gastroenterology consultation appreciated  Clear liquid diet  NPO midnight  Bowel prep as per GI

## 2024-05-06 NOTE — ED NOTES
Partial bloodwork obtained via [facility], unable to obtain peripheral line.      Tiki Suero RN  05/06/24 [id number]

## 2024-05-06 NOTE — ASSESSMENT & PLAN NOTE
Lab Results   Component Value Date    HGBA1C  08/18/2023      Comment:      HbA1c to high to read       Recent Labs     05/06/24  0942 05/06/24  1157   POCGLU 548* 484*       Blood Sugar Average: Last 72 hrs:  (P) 484  Patient presents with blood glucose level of 548 with mild anion gap elevation of 14  Differential diagnosis includes mild DKA versus HHNKS  Possibly secondary to acute cystitis    Initiate insulin infusion  Transition to basal bolus insulin regimen when blood glucose level less than 250  Check blood glucose levels every 2 hours  Monitor electrolytes while on insulin infusion  IV fluid hydration with normal saline solution at 125 cc/h  Level 2 stepdown  Clear liquid diet

## 2024-05-06 NOTE — ASSESSMENT & PLAN NOTE
Patient with pyuria and bacteriuria on urinalysis  May likely be etiology of significant hyperglycemia    Initiate IV ceftriaxone  De-escalate antibiotics as appropriate  Follow-up urine cultures and sensitivities

## 2024-05-06 NOTE — ASSESSMENT & PLAN NOTE
Stable and chronic in nature    Continue home levothyroxine 112 mcg daily  Outpatient follow-up with PCP

## 2024-05-06 NOTE — ED PROVIDER NOTES
History  Chief Complaint   Patient presents with    Hyperglycemia - Symptomatic     Pt had GI scope procedures scheduled today but during pre-op her BG was found to be 550.  Pt has no complaints at present.  Scopes were not done and pt sent to ER.  Pt states no meds x1 week     54-year-old female presents to the emergency department from the outpatient GI lab.  Patient was scheduled to have an endoscopy and colonoscopy today due to significant weight loss in the past year with abdominal discomfort.  Patient is a known diabetic but sugar was found to be above 500 in the preoperative unit.  It was thought that she might do better with with medical evaluation and postponing procedures until she was more stable.  Patient denies complaints at this time.      History provided by:  Patient   used: No    Hyperglycemia - Symptomatic  Blood sugar level PTA:  548  Severity:  Moderate  Onset quality:  Unable to specify  Timing:  Unable to specify  Ineffective treatments:  None tried  Associated symptoms: abdominal pain, fatigue and weight change    Associated symptoms: no altered mental status, no blurred vision, no chest pain, no confusion, no dehydration, no dizziness, no dysuria, no fever, no increased appetite, no increased thirst, no malaise, no nausea, no polyuria, no shortness of breath, no syncope, no vomiting and no weakness        Prior to Admission Medications   Prescriptions Last Dose Informant Patient Reported? Taking?   Adalimumab (HUMIRA PEN SC)   Yes No   Sig: Inject under the skin   Patient not taking: Reported on 1/30/2024   Albuterol Sulfate (ProAir RespiClick) 108 (90 Base) MCG/ACT AEPB   No No   Sig: Inhale 2 puffs every 6 (six) hours as needed (wheezing)   BD PEN NEEDLE LINDY U/F 32G X 4 MM MISC   No No   Sig: Inject under the skin daily   Blood Glucose Monitoring Suppl (OneTouch Verio Reflect) w/Device KIT   No No   Sig: CHECK BLOOD SUGARS FOUR TIMES DAILY. PLEASE SUBSTITUTE WITH  "APPROPRIATE ALTERNATIVE AS COVERED BY PATIENT'S INSURANCE. DX: E11.65   Cyanocobalamin (VITAMIN B-12 IJ)   Yes No   Sig: Inject as directed   Patient not taking: Reported on 2024   DULoxetine (CYMBALTA) 60 mg delayed release capsule   No No   Sig: Take 1 capsule (60 mg total) by mouth 2 (two) times a day   Fluticasone-Salmeterol (Advair Diskus) 500-50 mcg/dose inhaler   No No   Sig: Inhale 1 puff by mouth 2 times daily.  Rinse mouth after use   Folic Acid 0.8 MG CAPS   Yes No   Sig: Take 0.04 mg by mouth daily.   Patient not taking: Reported on 2024   Galcanezumab-gnlm (Emgality) 120 MG/ML SOAJ   No No   Si injection monthly   Patient not taking: Reported on 2024   HumaLOG KwikPen 200 units/mL CONCENTRATED U-200 injection pen   No No   Sig: INJECT 15 UNITS WITH MEALS +SCALE ( SCALE - 150-200 -2 UNITS, 201-250-4 UNITS, 251-300 -6 UNITS, 301-350-8 UNITS, > 350- 10 UNITS )   Patient not taking: Reported on 2024   Insulin Syringe-Needle U-100 31G X 5/16\" 0.3 ML MISC   No No   Sig: Use twice daily   Lidocaine 4 % PTCH   Yes No   Sig: Place 1 patch on the skin daily   Patient not taking: Reported on 2024   OneTouch Delica Lancets 33G MISC   No No   Sig: Check blood sugars four times daily. Please substitute with appropriate alternative as covered by patient's insurance. Dx: E11.65   OneTouch Ultra test strip   No No   Sig: USE AS DIRECTED TO TEST 3 TIMES A DAY   Turmeric 500 MG TABS   Yes No   Sig: Take 1 tablet by mouth daily   acarbose (PRECOSE) 50 mg tablet   No No   Sig: Take 1 tablet (50 mg total) by mouth 3 (three) times a day with meals   amoxicillin (AMOXIL) 500 mg capsule   Yes No   Sig: Take 500 mg by mouth 3 (three) times a day   aspirin 81 mg chewable tablet   Yes No   Sig: Chew 81 mg daily    atorvastatin (LIPITOR) 80 mg tablet   No No   Sig: TAKE 1 TABLET BY MOUTH EVERY DAY   baclofen 10 mg tablet   No No   Sig: TAKE 1 TABLET BY MOUTH THREE TIMES A DAY AS NEEDED "   butalbital-acetaminophen-caffeine (FIORICET,ESGIC) -40 mg per tablet   No No   Sig: TAKE 1 TABLET EVERY 6 HOURS AS NEEDED FOR MIGRAINE. PLEASE LIMIT 3-4 PER WEEK, 15 PER MONTH.   cholestyramine (QUESTRAN) 4 g packet   No No   Sig: Take 1 packet (4 g total) by mouth 2 (two) times a day As needed for diarrhea   diazepam (VALIUM) 5 mg tablet   No No   Sig: Take 1 tablet (5 mg total) by mouth 2 (two) times a day as needed for anxiety   ergocalciferol (VITAMIN D2) 50,000 units   No No   Sig: TAKE 1 CAPSULE BY MOUTH ONCE WEEKLY   gabapentin (NEURONTIN) 400 mg capsule   No No   Sig: TAKE 2 CAPSULES BY MOUTH 3 TIMES A DAY   glucose blood (OneTouch Verio) test strip   No No   Sig: Check blood sugars four times daily. Please substitute with appropriate alternative as covered by patient's insurance. Dx: E11.65   ibuprofen (MOTRIN) 800 mg tablet   Yes No   Sig: Take 800 mg by mouth every 6 (six) hours as needed   insulin NPH-insulin regular (NovoLIN 70/30) 100 units/mL subcutaneous injection   No No   Sig: Inject 20 units before breakfast and 20 units before dinner   ketorolac (TORADOL) 10 mg tablet   No No   Sig: TAKE 1 TABLET (10 MG TOTAL) BY MOUTH EVERY 6 (SIX) HOURS AS NEEDED (MIGRAINE) MAX 2-3 PER WEEK.   levothyroxine 112 mcg tablet   No No   Sig: TAKE 1 TABLET BY MOUTH EVERY DAY   losartan (COZAAR) 50 mg tablet   No No   Sig: TAKE 1 TABLET DAILY.   metFORMIN (GLUCOPHAGE-XR) 500 mg 24 hr tablet   No No   Sig: Take 1 tablet (500 mg total) by mouth 2 (two) times a day with meals   metoprolol tartrate (LOPRESSOR) 25 mg tablet   No No   Sig: TAKE 1 TABLET BY MOUTH EVERY DAY   mirtazapine (REMERON) 7.5 MG tablet   No No   Sig: Take 1 tablet (7.5 mg total) by mouth daily at bedtime   naloxone (Narcan) 4 mg/0.1 mL nasal spray   No No   Si actuation in one nostril once as needed for opioid overdose, may repeat dose every 2-3 minutes until patient responsive or EMS arrives   omeprazole (PriLOSEC) 40 MG capsule   No No    Sig: Take 1 capsule (40 mg total) by mouth daily   ondansetron (ZOFRAN) 4 mg tablet   No No   Sig: TAKE 1 TABLET BY MOUTH EVERY 6 HOURS AS NEEDED FOR NAUSEA HOLD COMPAZINE.   pioglitazone (ACTOS) 30 mg tablet   No No   Sig: Take 1 tablet (30 mg total) by mouth daily   prazosin (MINIPRESS) 2 mg capsule   No No   Sig: Take 1 capsule (2 mg total) by mouth daily at bedtime   prochlorperazine (COMPAZINE) 10 mg tablet   No No   Sig: TAKE 1 TABLET BY MOUTH EVERY 6 HOURS AS NEEDED FOR NAUSEA OR VOMITING.   topiramate (TOPAMAX) 100 mg tablet   No No   Sig: TAKE 2 TABLETS BY MOUTH EVERY DAY   topiramate (TOPAMAX) 25 mg tablet   No No   Sig: TAKE 1 TABLET BY MOUTH EVERY DAY   traMADol (ULTRAM) 50 mg tablet   No No   Sig: TAKE 1 TABLET (50 MG TOTAL) BY MOUTH EVERY 6 (SIX) HOURS AS NEEDED FOR MODERATE PAIN OR SEVERE PAIN DO NOT TAKE WITHIN 6 HOURS OF VALIUM OR FIORICET      Facility-Administered Medications: None       Past Medical History:   Diagnosis Date    Acute venous embolism and thrombosis of deep vessels of distal lower extremity (HCC)     Anemia     Anxiety     last assessed 11/20/17    Arthritis     Asthma     Cerebral infarction (HCC)     unspecified, last assessed 11/14/16    Chest pain     last assessed 5/9/17    Chronic cough     last assessed 12/12/13    Depression     Diabetes mellitus, type 2 (HCC)     DJD (degenerative joint disease)     Esophageal reflux     Fibromyalgia     GERD without esophagitis     resolved 5/13/16    History of pulmonary embolism     Hyperlipidemia     Hypertension     Hypothyroidism     Iron deficiency     Pancreatitis     Panic attack     Panic disorder     Polyarthritis     PTSD (post-traumatic stress disorder)     Sleep difficulties     Stroke syndrome     Thyroid disease     TIA (transient ischemic attack)     TIA (transient ischemic attack)     Venous embolism and thrombosis of deep vessels of distal lower extremity (HCC)     Vitamin B12 deficiency     Vitamin D deficiency         Past Surgical History:   Procedure Laterality Date    CARPAL TUNNEL RELEASE Right     neuroplasty decompression of median nerve    CATARACT EXTRACTION      CHOLECYSTECTOMY      ERCP      ERCP      ESOPHAGOGASTRIC FUNDOPLASTY      NISSEN FUNDOPLICATION      PATELLA SURGERY Left 12/2021    RI ESOPHAGOGASTRODUODENOSCOPY TRANSORAL DIAGNOSTIC N/A 11/02/2016    Procedure: EGD AND COLONOSCOPY;  Surgeon: Jatin Shepherd MD;  Location: [facility];  Service: Gastroenterology    RI NDSC WRST SURG W/RLS TRANSVRS CARPL LIGM Right 03/08/2016    Procedure: RELEASE CARPAL TUNNEL ENDOSCOPIC;  Surgeon: Guero Restrepo MD;  Location: [facility];  Service: Orthopedics    RI TENDON SHEATH INCISION Right 03/08/2016    Procedure: RELEASE TRIGGER FINGER RIGHT THUMB;  Surgeon: Guero Restrepo MD;  Location: [facility];  Service: Orthopedics    TONSILLECTOMY AND ADENOIDECTOMY         Family History   Problem Relation Age of Onset    Diabetes Mother         type 2    Heart attack Mother 39        acute MI    Kidney disease Mother         CKD NKF classfication    Depression Mother     Arthritis Mother     Substance Abuse Mother         mother OD in past on MS04    Ulcerative colitis Mother     Schizophrenia Mother     Suicide Attempts Mother     Bipolar disorder Mother     Diabetes Maternal Grandmother     Heart attack Father         acute MI    Psoriasis Father     Cancer Father         gastric cancer    Stroke Father     Crohn's disease Sister     Bipolar disorder Sister     Rheum arthritis Maternal Grandfather     Throat cancer Maternal Grandfather     Suicide Attempts Daughter     Drug abuse Daughter     Lung cancer Paternal Grandmother     Cancer Paternal Grandfather     No Known Problems Sister     Bipolar disorder Maternal Uncle     Anesthesia problems Neg Hx      I have reviewed and agree with the history as documented.    E-Cigarette/Vaping    E-Cigarette Use Former User     Cartridges/Day 1      E-Cigarette/Vaping Substances     Nicotine Yes     THC No     CBD No     Flavoring No      Social History     Tobacco Use    Smoking status: Every Day     Current packs/day: 1.00     Average packs/day: 1 pack/day for 41.3 years (41.3 ttl pk-yrs)     Types: Cigarettes     Start date: 1/1/1983    Smokeless tobacco: Never    Tobacco comments:     20 + years   Vaping Use    Vaping status: Former    Substances: Nicotine   Substance Use Topics    Alcohol use: Not Currently     Alcohol/week: 0.0 standard drinks of alcohol     Comment: last was in 2010 after DUI    Drug use: No       Review of Systems   Constitutional:  Positive for fatigue. Negative for activity change, appetite change, chills and fever.   HENT:  Negative for congestion, dental problem, drooling, ear discharge, ear pain, mouth sores, nosebleeds, rhinorrhea, sore throat and trouble swallowing.    Eyes:  Negative for blurred vision, pain, discharge and itching.   Respiratory:  Negative for cough, chest tightness, shortness of breath and wheezing.    Cardiovascular:  Negative for chest pain, palpitations and syncope.   Gastrointestinal:  Positive for abdominal pain. Negative for blood in stool, constipation, diarrhea, nausea and vomiting.   Endocrine: Negative for cold intolerance, heat intolerance, polydipsia and polyuria.   Genitourinary:  Negative for difficulty urinating, dysuria, flank pain, frequency and urgency.   Skin:  Negative for rash and wound.   Allergic/Immunologic: Negative for food allergies and immunocompromised state.   Neurological:  Negative for dizziness, seizures, syncope, weakness, numbness and headaches.   Psychiatric/Behavioral:  Negative for agitation, behavioral problems and confusion.        Physical Exam  Physical Exam  Vitals and nursing note reviewed.   Constitutional:       General: She is not in acute distress.     Appearance: She is underweight. She is not diaphoretic.   HENT:      Head: Normocephalic and atraumatic.      Right Ear: External ear normal.       Left Ear: External ear normal.      Mouth/Throat:      Pharynx: No oropharyngeal exudate.   Eyes:      Conjunctiva/sclera: Conjunctivae normal.   Neck:      Vascular: No JVD.      Trachea: No tracheal deviation.   Cardiovascular:      Rate and Rhythm: Normal rate and regular rhythm.      Heart sounds: Normal heart sounds. No murmur heard.     No friction rub. No gallop.   Pulmonary:      Effort: Pulmonary effort is normal. No respiratory distress.      Breath sounds: Normal breath sounds. No wheezing or rales.   Chest:      Chest wall: No tenderness.   Abdominal:      General: Bowel sounds are normal. There is no distension.      Palpations: Abdomen is soft.      Tenderness: There is abdominal tenderness. There is no guarding.   Musculoskeletal:         General: No tenderness or deformity. Normal range of motion.   Lymphadenopathy:      Cervical: No cervical adenopathy.   Skin:     General: Skin is warm and dry.      Findings: No erythema or rash.   Neurological:      Mental Status: She is alert and oriented to person, place, and time.   Psychiatric:         Mood and Affect: Mood normal.         Behavior: Behavior normal.         Vital Signs  ED Triage Vitals   Temperature Pulse Respirations Blood Pressure SpO2   05/06/24 1118 05/06/24 1118 05/06/24 1118 05/06/24 1118 05/06/24 1118   (!) 97.4 °F (36.3 °C) (!) 127 18 (!) 86/64 98 %      Temp Source Heart Rate Source Patient Position - Orthostatic VS BP Location FiO2 (%)   05/06/24 1118 05/06/24 1143 05/06/24 1235 05/06/24 1118 --   Oral Monitor Lying Left arm       Pain Score       05/06/24 1235       No Pain           Vitals:    05/06/24 1445 05/06/24 1454 05/06/24 1500 05/06/24 1528   BP: (!) 77/57 (!) 82/60 (!) 84/56 114/81   Pulse:   (!) 112 (!) 112   Patient Position - Orthostatic VS: Lying Lying           Visual Acuity      ED Medications  Medications   sodium chloride (PF) 0.9 % injection 3 mL (has no administration in time range)   aspirin chewable tablet  81 mg (81 mg Oral Given 5/6/24 1424)   atorvastatin (LIPITOR) tablet 80 mg (80 mg Oral Given 5/6/24 1424)   diazepam (VALIUM) tablet 5 mg (5 mg Oral Given 5/6/24 1424)   DULoxetine (CYMBALTA) delayed release capsule 60 mg (has no administration in time range)   fluticasone-vilanterol 200-25 mcg/actuation 1 puff (1 puff Inhalation Given 5/6/24 1425)   levothyroxine tablet 112 mcg (has no administration in time range)   mirtazapine (REMERON) tablet 7.5 mg (has no administration in time range)   pantoprazole (PROTONIX) EC tablet 40 mg (has no administration in time range)   prazosin (MINIPRESS) capsule 2 mg (has no administration in time range)   topiramate (TOPAMAX) tablet 25 mg (25 mg Oral Given 5/6/24 1424)   acetaminophen (TYLENOL) tablet 650 mg (has no administration in time range)   ondansetron (ZOFRAN) injection 4 mg (has no administration in time range)   nicotine (NICODERM CQ) 21 mg/24 hr TD 24 hr patch 1 patch (1 patch Transdermal Medication Applied 5/6/24 1424)   insulin regular (HumuLIN R,NovoLIN R) 1 Units/mL in sodium chloride 0.9 % 100 mL infusion (7 Units/hr Intravenous New Bag 5/6/24 1415)   sodium chloride 0.9 % infusion (0 mL/hr Intravenous Hold 5/6/24 1448)   cefTRIAXone (ROCEPHIN) IVPB (premix in dextrose) 1,000 mg 50 mL (1,000 mg Intravenous New Bag 5/6/24 1533)   traMADol (ULTRAM) tablet 50 mg (50 mg Oral Given 5/6/24 1530)   gabapentin (NEURONTIN) capsule 400 mg (400 mg Oral Given 5/6/24 1531)   sodium chloride 0.9 % bolus 500 mL (0 mL Intravenous Stopped 5/6/24 1347)   insulin regular (HumuLIN R,NovoLIN R) injection 10 Units (10 Units Intravenous Given 5/6/24 1236)   sodium chloride 0.9 % bolus 1,000 mL (1,000 mL Intravenous New Bag 5/6/24 1531)   sodium chloride 0.9 % bolus 1,000 mL (0 mL Intravenous Stopped 5/6/24 1530)       Diagnostic Studies  Results Reviewed       Procedure Component Value Units Date/Time    Blood gas, venous [[id number]]  (Abnormal) Collected: 05/06/24 1215    Lab Status:  Final result Specimen: Blood from Arm, Right Updated: 05/06/24 1233     pH, Jeremy 7.310     pCO2, Jeremy 40.7 mm Hg      pO2, Jeremy 37.9 mm Hg      HCO3, Jeremy 20.0 mmol/L      Base Excess, Jeremy -5.8 mmol/L      O2 Content, Jeremy 14.2 ml/dL      O2 HGB, VENOUS 68.7 %     CBC [[id number]]  (Normal) Collected: 05/06/24 1215    Lab Status: Final result Specimen: Blood from Arm, Right Updated: 05/06/24 1232     WBC 8.35 Thousand/uL      RBC 4.44 Million/uL      Hemoglobin 14.0 g/dL      Hematocrit 43.6 %      MCV 98 fL      MCH 31.5 pg      MCHC 32.1 g/dL      RDW 12.0 %      Platelets 348 Thousands/uL      MPV 9.8 fL     Beta Hydroxybutyrate [[id number]]  (Abnormal) Collected: 05/06/24 1140    Lab Status: Final result Specimen: Blood from Arm, Right Updated: 05/06/24 1231     Beta- Hydroxybutyrate 2.16 mmol/L     Comprehensive metabolic panel [[id number]]  (Abnormal) Collected: 05/06/24 1140    Lab Status: Final result Specimen: Blood from Arm, Right Updated: 05/06/24 1219     Sodium 129 mmol/L      Potassium 5.0 mmol/L      Chloride 96 mmol/L      CO2 19 mmol/L      ANION GAP 14 mmol/L      BUN 9 mg/dL      Creatinine 0.62 mg/dL      Glucose 520 mg/dL      Calcium 9.0 mg/dL      AST 28 U/L      ALT 24 U/L      Alkaline Phosphatase 95 U/L      Total Protein 6.7 g/dL      Albumin 3.7 g/dL      Total Bilirubin 0.50 mg/dL      eGFR 103 ml/min/1.73sq m     Narrative:      [employer] guidelines for Chronic Kidney Disease (CKD):     Stage 1 with normal or high GFR (GFR > 90 mL/min/1.73 square meters)    Stage 2 Mild CKD (GFR = 60-89 mL/min/1.73 square meters)    Stage 3A Moderate CKD (GFR = 45-59 mL/min/1.73 square meters)    Stage 3B Moderate CKD (GFR = 30-44 mL/min/1.73 square meters)    Stage 4 Severe CKD (GFR = 15-29 mL/min/1.73 square meters)    Stage 5 End Stage CKD (GFR <15 mL/min/1.73 square meters)  Note: GFR calculation is accurate only with a steady state creatinine    Urine Microscopic [[id number]]   (Abnormal) Collected: 05/06/24 1140    Lab Status: Final result Specimen: Urine, Other Updated: 05/06/24 1211     RBC, UA 0-1 /hpf      WBC, UA 10-20 /hpf      Epithelial Cells Occasional /hpf      Bacteria, UA Innumerable /hpf     Urine culture [[id number]] Collected: 05/06/24 1140    Lab Status: In process Specimen: Urine, Other Updated: 05/06/24 1211    UA w Reflex to Microscopic w Reflex to Culture [[id number]]  (Abnormal) Collected: 05/06/24 1140    Lab Status: Final result Specimen: Urine, Other Updated: 05/06/24 1207     Color, UA Straw     Clarity, UA Slightly Cloudy     Specific Gravity, UA 1.015     pH, UA 5.0     Leukocytes, UA 25.0     Nitrite, UA Negative     Protein, UA 15 (Trace) mg/dl      Glucose, UA >=1000 (1%) mg/dl      Ketones, UA 5 (Trace) mg/dl      Bilirubin, UA Negative     Occult Blood, UA 10.0     UROBILINOGEN UA Negative mg/dL     Fingerstick Glucose (POCT) [[id number]]  (Abnormal) Collected: 05/06/24 1157    Lab Status: Final result Specimen: Blood Updated: 05/06/24 1159     POC Glucose 484 mg/dl                    No orders to display              Procedures  Procedures         ED Course                               SBIRT 20yo+      Flowsheet Row Most Recent Value   Initial Alcohol Screen: US AUDIT-C     1. How often do you have a drink containing alcohol? 0 Filed at: 05/06/2024 1151   2. How many drinks containing alcohol do you have on a typical day you are drinking?  0 Filed at: 05/06/2024 1151   3a. Male UNDER 65: How often do you have five or more drinks on one occasion? 0 Filed at: 05/06/2024 1151   3b. FEMALE Any Age, or MALE 65+: How often do you have 4 or more drinks on one occassion? 0 Filed at: 05/06/2024 1151   Audit-C Score 0 Filed at: 05/06/2024 1151   YOSELYN: How many times in the past year have you...    Used an illegal drug or used a prescription medication for non-medical reasons? Never Filed at: 05/06/2024 1151                      Medical Decision Making  Differential  diagnosis includes but not limited to: DKA, HHNKS    Problems Addressed:  Hyperglycemia due to type 2 diabetes mellitus (HCC): acute illness or injury  Weight loss: acute illness or injury    Amount and/or Complexity of Data Reviewed  Labs: ordered. Decision-making details documented in ED Course.    Risk  OTC drugs.  Decision regarding hospitalization.             Disposition  Final diagnoses:   Weight loss   Hyperglycemia due to type 2 diabetes mellitus (HCC)     Time reflects when diagnosis was documented in both MDM as applicable and the Disposition within this note       Time User Action Codes Description Comment    5/6/2024  1:08 PM Paige Meyer Add [R63.4] Weight loss     5/6/2024  1:09 PM Paige Meyer Add [E11.65] Hyperglycemia due to type 2 diabetes mellitus (HCC)     5/6/2024  1:13 PM Davion Anderson Add [R10.9] Abdominal pain     5/6/2024  1:16 PM Davion Anderson Modify [R63.4] Weight loss     5/6/2024  1:16 PM Davion Anderson Add [E11.01] HHNC (hyperglycemic hyperosmolar nonketotic coma) (HCC)     5/6/2024  1:22 PM Davion Anderson Add [E43] Severe protein-calorie malnutrition (HCC)           ED Disposition       ED Disposition   Admit    Condition   Stable    Date/Time   Mon May 6, 2024 1309    Comment   Case was discussed with MEHRAN and the patient's admission status was agreed to be Admission Status: inpatient status to the service of Dr. Anderson .               Follow-up Information    None         Current Discharge Medication List        CONTINUE these medications which have NOT CHANGED    Details   acarbose (PRECOSE) 50 mg tablet Take 1 tablet (50 mg total) by mouth 3 (three) times a day with meals  Qty: 90 tablet, Refills: 5    Comments: DX Code Needed  .  Associated Diagnoses: Type 2 diabetes mellitus with other neurologic complication, with long-term current use of insulin (HCC); Type 2 diabetes mellitus with hyperglycemia, with long-term current use of insulin ([facility])      Adalimumab (HUMIRA PEN  SC) Inject under the skin      Albuterol Sulfate (ProAir RespiClick) 108 (90 Base) MCG/ACT AEPB Inhale 2 puffs every 6 (six) hours as needed (wheezing)  Qty: 1 each, Refills: 1    Associated Diagnoses: Mild intermittent reactive airway disease without complication      amoxicillin (AMOXIL) 500 mg capsule Take 500 mg by mouth 3 (three) times a day      aspirin 81 mg chewable tablet Chew 81 mg daily       atorvastatin (LIPITOR) 80 mg tablet TAKE 1 TABLET BY MOUTH EVERY DAY  Qty: 30 tablet, Refills: 2    Associated Diagnoses: Hyperlipidemia, unspecified hyperlipidemia type      baclofen 10 mg tablet TAKE 1 TABLET BY MOUTH THREE TIMES A DAY AS NEEDED  Qty: 90 tablet, Refills: 2    Associated Diagnoses: Chronic migraine without aura without status migrainosus, not intractable; Cervical myofascial pain syndrome      BD PEN NEEDLE LINDY U/F 32G X 4 MM MISC Inject under the skin daily  Qty: 30 each, Refills: 5    Associated Diagnoses: Uncontrolled type 2 diabetes mellitus with diabetic polyneuropathy, with long-term current use of insulin      Blood Glucose Monitoring Suppl (OneTouch Verio Reflect) w/Device KIT CHECK BLOOD SUGARS FOUR TIMES DAILY. PLEASE SUBSTITUTE WITH APPROPRIATE ALTERNATIVE AS COVERED BY PATIENT'S INSURANCE. DX: E11.65  Qty: 1 kit, Refills: 0    Associated Diagnoses: Type 2 diabetes mellitus with other neurologic complication, with long-term current use of insulin (HCC)      butalbital-acetaminophen-caffeine (FIORICET,ESGIC) -40 mg per tablet TAKE 1 TABLET EVERY 6 HOURS AS NEEDED FOR MIGRAINE. PLEASE LIMIT 3-4 PER WEEK, 15 PER MONTH.  Qty: 15 tablet, Refills: 2    Comments: Not to exceed 1 additional fills before 03/27/2024  Associated Diagnoses: Chronic migraine without aura without status migrainosus, not intractable      cholestyramine (QUESTRAN) 4 g packet Take 1 packet (4 g total) by mouth 2 (two) times a day As needed for diarrhea  Qty: 60 packet, Refills: 3    Associated Diagnoses: Chronic  diarrhea      Cyanocobalamin (VITAMIN B-12 IJ) Inject as directed      diazepam (VALIUM) 5 mg tablet Take 1 tablet (5 mg total) by mouth 2 (two) times a day as needed for anxiety  Qty: 60 tablet, Refills: 0    Comments: Dose adjustment (titrated from TID to BID).  Recently filled. Not to exceed 5 additional fills before 07/12/2023  Associated Diagnoses: Panic disorder with agoraphobia      DULoxetine (CYMBALTA) 60 mg delayed release capsule Take 1 capsule (60 mg total) by mouth 2 (two) times a day  Qty: 180 capsule, Refills: 1    Associated Diagnoses: Panic disorder with agoraphobia; Social anxiety disorder; PTSD (post-traumatic stress disorder); Severe episode of recurrent major depressive disorder, without psychotic features ([facility])      ergocalciferol (VITAMIN D2) 50,000 units TAKE 1 CAPSULE BY MOUTH ONCE WEEKLY  Qty: 12 capsule, Refills: 0    Associated Diagnoses: Vitamin D deficiency      Fluticasone-Salmeterol (Advair Diskus) 500-50 mcg/dose inhaler Inhale 1 puff by mouth 2 times daily.  Rinse mouth after use  Qty: 180 blister, Refills: 1    Associated Diagnoses: Mild intermittent reactive airway disease without complication      Folic Acid 0.8 MG CAPS Take 0.04 mg by mouth daily.      gabapentin (NEURONTIN) 400 mg capsule TAKE 2 CAPSULES BY MOUTH 3 TIMES A DAY  Qty: 180 capsule, Refills: 5    Associated Diagnoses: Chronic pain disorder      Galcanezumab-gnlm (Emgality) 120 MG/ML SOAJ 1 injection monthly  Qty: 1 mL, Refills: 11    Associated Diagnoses: Chronic migraine without aura without status migrainosus, not intractable      !! glucose blood (OneTouch Verio) test strip Check blood sugars four times daily. Please substitute with appropriate alternative as covered by patient's insurance. Dx: E11.65  Qty: 400 each, Refills: 3    Associated Diagnoses: Type 2 diabetes mellitus with other neurologic complication, with long-term current use of insulin ([facility])      HumaLOG KwikPen 200 units/mL CONCENTRATED U-200  "injection pen INJECT 15 UNITS WITH MEALS +SCALE ( SCALE - 150-200 -2 UNITS, 201-250-4 UNITS, 251-300 -6 UNITS, 301-350-8 UNITS, > 350- 10 UNITS )  Qty: 12 mL, Refills: 3    Associated Diagnoses: Type 2 diabetes mellitus with other neurologic complication, with long-term current use of insulin ([facility])      ibuprofen (MOTRIN) 800 mg tablet Take 800 mg by mouth every 6 (six) hours as needed      insulin NPH-insulin regular (NovoLIN 70/30) 100 units/mL subcutaneous injection Inject 20 units before breakfast and 20 units before dinner  Qty: 20 mL, Refills: 5    Associated Diagnoses: Type 2 diabetes mellitus with other neurologic complication, with long-term current use of insulin ([facility])      Insulin Syringe-Needle U-100 31G X 5/16\" 0.3 ML MISC Use twice daily  Qty: 60 each, Refills: 5    Associated Diagnoses: Type 2 diabetes mellitus with other neurologic complication, with long-term current use of insulin ([facility])      ketorolac (TORADOL) 10 mg tablet TAKE 1 TABLET (10 MG TOTAL) BY MOUTH EVERY 6 (SIX) HOURS AS NEEDED (MIGRAINE) MAX 2-3 PER WEEK.  Qty: 10 tablet, Refills: 2    Associated Diagnoses: Chronic migraine without aura without status migrainosus, not intractable      levothyroxine 112 mcg tablet TAKE 1 TABLET BY MOUTH EVERY DAY  Qty: 90 tablet, Refills: 1    Associated Diagnoses: Acquired hypothyroidism      Lidocaine 4 % PTCH Place 1 patch on the skin daily      losartan (COZAAR) 50 mg tablet TAKE 1 TABLET DAILY.  Qty: 90 tablet, Refills: 2    Associated Diagnoses: Essential hypertension      metFORMIN (GLUCOPHAGE-XR) 500 mg 24 hr tablet Take 1 tablet (500 mg total) by mouth 2 (two) times a day with meals  Qty: 180 tablet, Refills: 1    Associated Diagnoses: Type 2 diabetes mellitus with hyperglycemia, with long-term current use of insulin ([facility])      metoprolol tartrate (LOPRESSOR) 25 mg tablet TAKE 1 TABLET BY MOUTH EVERY DAY  Qty: 90 tablet, Refills: 2    Associated Diagnoses: Essential hypertension    "   mirtazapine (REMERON) 7.5 MG tablet Take 1 tablet (7.5 mg total) by mouth daily at bedtime  Qty: 90 tablet, Refills: 1    Associated Diagnoses: PTSD (post-traumatic stress disorder); Severe episode of recurrent major depressive disorder, without psychotic features ([facility]); Insomnia, unspecified type      naloxone (Narcan) 4 mg/0.1 mL nasal spray 1 actuation in one nostril once as needed for opioid overdose, may repeat dose every 2-3 minutes until patient responsive or EMS arrives  Qty: 1 each, Refills: 1    Associated Diagnoses: Chronic pain disorder; Opioid use      omeprazole (PriLOSEC) 40 MG capsule Take 1 capsule (40 mg total) by mouth daily  Qty: 30 capsule, Refills: 3    Associated Diagnoses: Dysphagia, unspecified type; Gastroesophageal reflux disease, unspecified whether esophagitis present      ondansetron (ZOFRAN) 4 mg tablet TAKE 1 TABLET BY MOUTH EVERY 6 HOURS AS NEEDED FOR NAUSEA HOLD COMPAZINE.  Qty: 120 tablet, Refills: 5    Associated Diagnoses: Nausea      OneTouch Delica Lancets 33G MISC Check blood sugars four times daily. Please substitute with appropriate alternative as covered by patient's insurance. Dx: E11.65  Qty: 400 each, Refills: 3    Associated Diagnoses: Type 2 diabetes mellitus with other neurologic complication, with long-term current use of insulin ([facility])      !! OneTouch Ultra test strip USE AS DIRECTED TO TEST 3 TIMES A DAY  Qty: 100 strip, Refills: 0    Associated Diagnoses: Type 2 diabetes mellitus with other neurologic complication, with long-term current use of insulin ([facility])      pioglitazone (ACTOS) 30 mg tablet Take 1 tablet (30 mg total) by mouth daily  Qty: 30 tablet, Refills: 5    Associated Diagnoses: Type 2 diabetes mellitus with other neurologic complication, with long-term current use of insulin ([facility])      prazosin (MINIPRESS) 2 mg capsule Take 1 capsule (2 mg total) by mouth daily at bedtime  Qty: 90 capsule, Refills: 1    Associated Diagnoses: PTSD (post-traumatic  stress disorder)      prochlorperazine (COMPAZINE) 10 mg tablet TAKE 1 TABLET BY MOUTH EVERY 6 HOURS AS NEEDED FOR NAUSEA OR VOMITING.  Qty: 90 tablet, Refills: 1    Associated Diagnoses: Chronic migraine without aura without status migrainosus, not intractable; Nausea      !! topiramate (TOPAMAX) 100 mg tablet TAKE 2 TABLETS BY MOUTH EVERY DAY  Qty: 180 tablet, Refills: 1    Associated Diagnoses: Chronic migraine without aura without status migrainosus, not intractable      !! topiramate (TOPAMAX) 25 mg tablet TAKE 1 TABLET BY MOUTH EVERY DAY  Qty: 90 tablet, Refills: 1    Associated Diagnoses: Chronic migraine without aura without status migrainosus, not intractable      traMADol (ULTRAM) 50 mg tablet TAKE 1 TABLET (50 MG TOTAL) BY MOUTH EVERY 6 (SIX) HOURS AS NEEDED FOR MODERATE PAIN OR SEVERE PAIN DO NOT TAKE WITHIN 6 HOURS OF VALIUM OR FIORICET  Qty: 60 tablet, Refills: 0    Comments: Not to exceed 2 additional fills before 09/25/2024  Associated Diagnoses: Chronic pain syndrome      Turmeric 500 MG TABS Take 1 tablet by mouth daily       !! - Potential duplicate medications found. Please discuss with provider.          No discharge procedures on file.    PDMP Review         Value Time User    PDMP Reviewed  Yes 4/30/2024  9:42 PM ELISEO Vee            ED Provider  Electronically Signed by             Paige Meyer PA-C  05/06/24 [id number]

## 2024-05-06 NOTE — ASSESSMENT & PLAN NOTE
Malnutrition Findings:                                 BMI Findings:           Body mass index is 14.42 kg/m².   Present on admission as evidenced by BMI of 14 and bitemporal wasting    Encouraged healthy lifestyle modifications and weight gain  Nutrition consult

## 2024-05-06 NOTE — PLAN OF CARE
Problem: METABOLIC, FLUID AND ELECTROLYTES - ADULT  Goal: Electrolytes maintained within normal limits  Description: INTERVENTIONS:  - Monitor labs and assess patient for signs and symptoms of electrolyte imbalances  - Administer electrolyte replacement as ordered  - Monitor response to electrolyte replacements, including repeat lab results as appropriate  - Instruct patient on fluid and nutrition as appropriate  Outcome: Progressing  Goal: Fluid balance maintained  Description: INTERVENTIONS:  - Monitor labs   - Monitor I/O and WT  - Instruct patient on fluid and nutrition as appropriate  - Assess for signs & symptoms of volume excess or deficit  Outcome: Progressing  Goal: Glucose maintained within target range  Description: INTERVENTIONS:  - Monitor Blood Glucose as ordered  - Assess for signs and symptoms of hyperglycemia and hypoglycemia  - Administer ordered medications to maintain glucose within target range  - Assess nutritional intake and initiate nutrition service referral as needed  Outcome: Progressing     Problem: PAIN - ADULT  Goal: Verbalizes/displays adequate comfort level or baseline comfort level  Description: Interventions:  - Encourage patient to monitor pain and request assistance  - Assess pain using appropriate pain scale  - Administer analgesics based on type and severity of pain and evaluate response  - Implement non-pharmacological measures as appropriate and evaluate response  - Consider cultural and social influences on pain and pain management  - Notify physician/advanced practitioner if interventions unsuccessful or patient reports new pain  Outcome: Progressing     Problem: INFECTION - ADULT  Goal: Absence or prevention of progression during hospitalization  Description: INTERVENTIONS:  - Assess and monitor for signs and symptoms of infection  - Monitor lab/diagnostic results  - Monitor all insertion sites, i.e. indwelling lines, tubes, and drains  - Monitor endotracheal if appropriate  and nasal secretions for changes in amount and color  - [place] appropriate cooling/warming therapies per order  - Administer medications as ordered  - Instruct and encourage patient and family to use good hand hygiene technique  - Identify and instruct in appropriate isolation precautions for identified infection/condition  Outcome: Progressing     Problem: Knowledge Deficit  Goal: Patient/family/caregiver demonstrates understanding of disease process, treatment plan, medications, and discharge instructions  Description: Complete learning assessment and assess knowledge base.  Interventions:  - Provide teaching at level of understanding  - Provide teaching via preferred learning methods  Outcome: Progressing

## 2024-05-06 NOTE — H&P
[facility]  H&P  Name: Barb Palomo 54 y.o. female I MRN: [id number]  Unit/Bed#: ED 06 I Date of Admission: 5/6/2024   Date of Service: 5/6/2024 I Hospital Day: 0      Assessment/Plan   * HHNC (hyperglycemic hyperosmolar nonketotic coma) (HCC)  Assessment & Plan  Lab Results   Component Value Date    HGBA1C  08/18/2023      Comment:      HbA1c to high to read       Recent Labs     05/06/24  0942 05/06/24  1157   POCGLU 548* 484*       Blood Sugar Average: Last 72 hrs:  (P) 484  Patient presents with blood glucose level of 548 with mild anion gap elevation of 14  Differential diagnosis includes mild DKA versus HHNKS  Possibly secondary to acute cystitis    Initiate insulin infusion  Transition to basal bolus insulin regimen when blood glucose level less than 250  Check blood glucose levels every 2 hours  Monitor electrolytes while on insulin infusion  IV fluid hydration with normal saline solution at 125 cc/h  Level 2 stepdown  Clear liquid diet      Abdominal pain  Assessment & Plan  Patient was scheduled for colonoscopy on 5/6/2024 in the setting of abdominal pain and weight loss  Procedure was canceled secondary to significant hyperglycemia  Patient will undergo procedure likely tomorrow once blood glucose levels within normal limits    Gastroenterology consultation appreciated  Clear liquid diet  NPO midnight  Bowel prep as per GI    Acute cystitis  Assessment & Plan  Patient with pyuria and bacteriuria on urinalysis  May likely be etiology of significant hyperglycemia    Initiate IV ceftriaxone  De-escalate antibiotics as appropriate  Follow-up urine cultures and sensitivities    Benign essential hypertension  Assessment & Plan  Blood pressures quite soft    Hold home ARB and beta-blocker  Monitor blood pressures    Hypothyroidism  Assessment & Plan  Stable and chronic in nature    Continue home levothyroxine 112 mcg daily  Outpatient follow-up with PCP    Severe episode of  recurrent major depressive disorder, without psychotic features (HCC)  Assessment & Plan  Mood appears appropriate    Continue home duloxetine, mirtazapine, as needed diazepam  Monitor patient's mood    Protein calorie malnutrition (HCC)  Assessment & Plan  Malnutrition Findings:                                 BMI Findings:           Body mass index is 14.42 kg/m².   Present on admission as evidenced by BMI of 14 and bitemporal wasting    Encouraged healthy lifestyle modifications and weight gain  Nutrition consult         VTE Prophylaxis: Up and out of bed as tolerated  Code Status: Level 1 Full Code    Anticipated Length of Stay:  Patient will be admitted on an Inpatient basis with an anticipated length of stay of greater than 2 midnights.   Justification for Hospital Stay: NKS    Total Time for Visit, including Counseling / Coordination of Care: I have spent a total time of 45 minutes on 05/06/24 in caring for this patient including Diagnostic results, Prognosis, Risks and benefits of tx options, Instructions for management, Patient and family education, Importance of tx compliance, Risk factor reductions, Impressions, Counseling / Coordination of care, Documenting in the medical record, Reviewing / ordering tests, medicine, procedures  , Obtaining or reviewing history  , and Communicating with other healthcare professionals .    Chief Complaint: Hyperglycemia    History of Present Illness:    Barb Palomo is a 54 y.o. female with a past medical history significant for type 2 diabetes mellitus, abdominal pain with weight loss, hypothyroidism, hypertension, major depressive disorder who presents from outpatient colonoscopy lab with hyperglycemia.  The patient was scheduled for colonoscopy in the setting of recent abdominal pain and weight loss.  Upon presentation, the patient was found to be severely hyperglycemic and was sent to the ED.  In the ED, the patient was found to have a blood glucose level  greater than 500 as well as a mild anion gap metabolic acidosis and mild elevation of beta hydroxybutyrate.  The patient was given insulin and IV fluids in the ED.  She is hemodynamically stable and saturating well on room air.  Gastroenterology recommends improvement of her blood glucose levels and colonoscopy on 5/7/2024.    Review of Systems:    Review of Systems   Constitutional:  Negative for chills and fever.   HENT:  Negative for ear pain and sore throat.    Eyes:  Negative for pain and visual disturbance.   Respiratory:  Negative for cough and shortness of breath.    Cardiovascular:  Negative for chest pain and palpitations.   Gastrointestinal:  Negative for abdominal pain and vomiting.   Genitourinary:  Negative for dysuria and hematuria.   Musculoskeletal:  Negative for arthralgias and back pain.   Skin:  Negative for color change and rash.   Neurological:  Negative for seizures and syncope.   All other systems reviewed and are negative.      Past Medical and Surgical History:     Past Medical History:   Diagnosis Date    Acute venous embolism and thrombosis of deep vessels of distal lower extremity (HCC)     Anemia     Anxiety     last assessed 11/20/17    Arthritis     Asthma     Cerebral infarction (HCC)     unspecified, last assessed 11/14/16    Chest pain     last assessed 5/9/17    Chronic cough     last assessed 12/12/13    Depression     Diabetes mellitus, type 2 ([facility])     DJD (degenerative joint disease)     Esophageal reflux     Fibromyalgia     GERD without esophagitis     resolved 5/13/16    History of pulmonary embolism     Hyperlipidemia     Hypertension     Hypothyroidism     Iron deficiency     Pancreatitis     Panic attack     Panic disorder     Polyarthritis     PTSD (post-traumatic stress disorder)     Sleep difficulties     Stroke syndrome     Thyroid disease     TIA (transient ischemic attack)     TIA (transient ischemic attack)     Venous embolism and thrombosis of deep vessels of  distal lower extremity (HCC)     Vitamin B12 deficiency     Vitamin D deficiency        Past Surgical History:   Procedure Laterality Date    CARPAL TUNNEL RELEASE Right     neuroplasty decompression of median nerve    CATARACT EXTRACTION      CHOLECYSTECTOMY      ERCP      ERCP      ESOPHAGOGASTRIC FUNDOPLASTY      NISSEN FUNDOPLICATION      PATELLA SURGERY Left 12/2021    IN ESOPHAGOGASTRODUODENOSCOPY TRANSORAL DIAGNOSTIC N/A 11/02/2016    Procedure: EGD AND COLONOSCOPY;  Surgeon: Jatin Shepherd MD;  Location: [facility];  Service: Gastroenterology    IN NDSC WRST SURG W/RLS TRANSVRS CARPL LIGM Right 03/08/2016    Procedure: RELEASE CARPAL TUNNEL ENDOSCOPIC;  Surgeon: Guero Restrepo MD;  Location: [facility];  Service: Orthopedics    IN TENDON SHEATH INCISION Right 03/08/2016    Procedure: RELEASE TRIGGER FINGER RIGHT THUMB;  Surgeon: Guero Restrepo MD;  Location: [facility];  Service: Orthopedics    TONSILLECTOMY AND ADENOIDECTOMY         Meds/Allergies:    Prior to Admission medications    Medication Sig Start Date End Date Taking? Authorizing Provider   acarbose (PRECOSE) 50 mg tablet Take 1 tablet (50 mg total) by mouth 3 (three) times a day with meals 1/19/24   Romain Myers MD   Adalimumab (HUMIRA PEN SC) Inject under the skin  Patient not taking: Reported on 1/30/2024    Historical Provider, MD   Albuterol Sulfate (ProAir RespiClick) 108 (90 Base) MCG/ACT AEPB Inhale 2 puffs every 6 (six) hours as needed (wheezing) 11/11/21   ELISEO Vee   amoxicillin (AMOXIL) 500 mg capsule Take 500 mg by mouth 3 (three) times a day 1/24/24   Historical Provider, MD   aspirin 81 mg chewable tablet Chew 81 mg daily     Historical Provider, MD   atorvastatin (LIPITOR) 80 mg tablet TAKE 1 TABLET BY MOUTH EVERY DAY 4/10/24   Marie Alfaro PA-C   baclofen 10 mg tablet TAKE 1 TABLET BY MOUTH THREE TIMES A DAY AS NEEDED 3/4/24   Marie Alfaro PA-C   BD PEN NEEDLE LINDY U/F 32G X 4 MM MISC Inject under  the skin daily 4/27/20   Prasanna Pryblick, DO   Blood Glucose Monitoring Suppl (OneTouch Verio Reflect) w/Device KIT CHECK BLOOD SUGARS FOUR TIMES DAILY. PLEASE SUBSTITUTE WITH APPROPRIATE ALTERNATIVE AS COVERED BY PATIENT'S INSURANCE. DX: E11.65 9/22/22   ELISEO Vee   butalbital-acetaminophen-caffeine (FIORICET,ESGIC) -40 mg per tablet TAKE 1 TABLET EVERY 6 HOURS AS NEEDED FOR MIGRAINE. PLEASE LIMIT 3-4 PER WEEK, 15 PER MONTH. 2/23/24   Marie Alfaro PA-C   cholestyramine (QUESTRAN) 4 g packet Take 1 packet (4 g total) by mouth 2 (two) times a day As needed for diarrhea 3/7/24   Diana M Jaiyeola, MD   Cyanocobalamin (VITAMIN B-12 IJ) Inject as directed  Patient not taking: Reported on 1/30/2024    Historical Provider, MD   diazepam (VALIUM) 5 mg tablet Take 1 tablet (5 mg total) by mouth 2 (two) times a day as needed for anxiety 1/8/24   ELISEO Moreland   DULoxetine (CYMBALTA) 60 mg delayed release capsule Take 1 capsule (60 mg total) by mouth 2 (two) times a day 6/13/23   ELISEO Moreland   ergocalciferol (VITAMIN D2) 50,000 units TAKE 1 CAPSULE BY MOUTH ONCE WEEKLY 3/29/24   Marie Alfaro PA-C   Fluticasone-Salmeterol (Advair Diskus) 500-50 mcg/dose inhaler Inhale 1 puff by mouth 2 times daily.  Rinse mouth after use 7/17/22   Prasanna Hugheslickal, DO   Folic Acid 0.8 MG CAPS Take 0.04 mg by mouth daily.  Patient not taking: Reported on 1/30/2024    Historical Provider, MD   gabapentin (NEURONTIN) 400 mg capsule TAKE 2 CAPSULES BY MOUTH 3 TIMES A DAY 3/1/24   Prasanna Barajask, DO   Galcanezumab-gnlm (Emgality) 120 MG/ML SOAJ 1 injection monthly  Patient not taking: Reported on 1/30/2024 9/5/22   Marie Alfaro PA-C   glucose blood (OneTouch Verio) test strip Check blood sugars four times daily. Please substitute with appropriate alternative as covered by patient's insurance. Dx: E11.65 8/22/22   ELISEO Vee   HumaLOG KwikPen 200 units/mL CONCENTRATED U-200 injection pen INJECT 15  "UNITS WITH MEALS +SCALE ( SCALE - 150-200 -2 UNITS, 201-250-4 UNITS, 251-300 -6 UNITS, 301-350-8 UNITS, > 350- 10 UNITS )  Patient not taking: Reported on 1/30/2024 7/18/22   Romain Myers MD   ibuprofen (MOTRIN) 800 mg tablet Take 800 mg by mouth every 6 (six) hours as needed 1/19/22   Historical Provider, MD   insulin NPH-insulin regular (NovoLIN 70/30) 100 units/mL subcutaneous injection Inject 20 units before breakfast and 20 units before dinner 1/19/24   Romain Myers MD   Insulin Syringe-Needle U-100 31G X 5/16\" 0.3 ML MISC Use twice daily 1/19/24   Romain Myers MD   ketorolac (TORADOL) 10 mg tablet TAKE 1 TABLET (10 MG TOTAL) BY MOUTH EVERY 6 (SIX) HOURS AS NEEDED (MIGRAINE) MAX 2-3 PER WEEK. 4/26/24   Marie Alfaro PA-C   levothyroxine 112 mcg tablet TAKE 1 TABLET BY MOUTH EVERY DAY 3/18/22   ELISEO Vee   Lidocaine 4 % PTCH Place 1 patch on the skin daily  Patient not taking: Reported on 1/30/2024 12/15/21   Historical Provider, MD   losartan (COZAAR) 50 mg tablet TAKE 1 TABLET DAILY. 4/11/18   Prasanna Mendez DO   metFORMIN (GLUCOPHAGE-XR) 500 mg 24 hr tablet Take 1 tablet (500 mg total) by mouth 2 (two) times a day with meals 1/19/24   Romain Myers MD   metoprolol tartrate (LOPRESSOR) 25 mg tablet TAKE 1 TABLET BY MOUTH EVERY DAY 8/26/22   Prasanna Mendez DO   mirtazapine (REMERON) 7.5 MG tablet Take 1 tablet (7.5 mg total) by mouth daily at bedtime 6/13/23   ELISEO Moreland   naloxone (Narcan) 4 mg/0.1 mL nasal spray 1 actuation in one nostril once as needed for opioid overdose, may repeat dose every 2-3 minutes until patient responsive or EMS arrives 6/9/22   ELISEO Moreland   omeprazole (PriLOSEC) 40 MG capsule Take 1 capsule (40 mg total) by mouth daily 3/7/24   Diana M Jaiyeola, MD   ondansetron (ZOFRAN) 4 mg tablet TAKE 1 TABLET BY MOUTH EVERY 6 HOURS AS NEEDED FOR NAUSEA HOLD COMPAZINE. 3/14/24   ELISEO VeeTouch Delica Lancets 33G " MISC Check blood sugars four times daily. Please substitute with appropriate alternative as covered by patient's insurance. Dx: E11.65 8/22/22   ELISEO Vee   OneTouch Ultra test strip USE AS DIRECTED TO TEST 3 TIMES A DAY 7/14/22   Prasanna Hugheslickal,    pioglitazone (ACTOS) 30 mg tablet Take 1 tablet (30 mg total) by mouth daily 1/19/24   Romain Myers MD   prazosin (MINIPRESS) 2 mg capsule Take 1 capsule (2 mg total) by mouth daily at bedtime 6/13/23   ELISEO Moreland   prochlorperazine (COMPAZINE) 10 mg tablet TAKE 1 TABLET BY MOUTH EVERY 6 HOURS AS NEEDED FOR NAUSEA OR VOMITING. 9/13/22   Marie Alfaro PA-C   topiramate (TOPAMAX) 100 mg tablet TAKE 2 TABLETS BY MOUTH EVERY DAY 7/17/22   Prasanna Pryblick, DO   topiramate (TOPAMAX) 25 mg tablet TAKE 1 TABLET BY MOUTH EVERY DAY 12/23/21   Prasanna Hugheslick, DO   traMADol (ULTRAM) 50 mg tablet TAKE 1 TABLET (50 MG TOTAL) BY MOUTH EVERY 6 (SIX) HOURS AS NEEDED FOR MODERATE PAIN OR SEVERE PAIN DO NOT TAKE WITHIN 6 HOURS OF VALIUM OR FIORICET 4/30/24   ELISEO Vee   Turmeric 500 MG TABS Take 1 tablet by mouth daily    Historical Provider, MD       Allergies:   Allergies   Allergen Reactions    Medical Tape Rash    Lexapro [Escitalopram Oxalate]     Escitalopram Other (See Comments) and Palpitations    Other Hives and Rash     Adhesive Tape       Social History:     Marital Status: /Civil Union   Substance Use History:   Social History     Substance and Sexual Activity   Alcohol Use Not Currently    Alcohol/week: 0.0 standard drinks of alcohol    Comment: last was in 2010 after DUI     Social History     Tobacco Use   Smoking Status Every Day    Current packs/day: 1.00    Average packs/day: 1 pack/day for 41.3 years (41.3 ttl pk-yrs)    Types: Cigarettes    Start date: 1/1/1983   Smokeless Tobacco Never   Tobacco Comments    20 + years     Social History     Substance and Sexual Activity   Drug Use No       Family  History:    Pertinent family history reviewed    Physical Exam:     Vitals:   Blood Pressure: 111/78 (05/06/24 1235)  Pulse: (!) 111 (05/06/24 1235)  Temperature: (!) 97.4 °F (36.3 °C) (05/06/24 1118)  Temp Source: Oral (05/06/24 1118)  Respirations: 16 (05/06/24 1235)  Weight - Scale: 38.1 kg (84 lb) (05/06/24 1118)  SpO2: 97 % (05/06/24 1235)    Physical Exam  Vitals and nursing note reviewed.   Constitutional:       General: She is not in acute distress.     Appearance: She is well-developed.   HENT:      Head: Normocephalic and atraumatic.   Eyes:      Conjunctiva/sclera: Conjunctivae normal.   Cardiovascular:      Rate and Rhythm: Normal rate and regular rhythm.      Heart sounds: No murmur heard.  Pulmonary:      Effort: Pulmonary effort is normal. No respiratory distress.      Breath sounds: Normal breath sounds.   Abdominal:      Palpations: Abdomen is soft.      Tenderness: There is no abdominal tenderness.   Musculoskeletal:         General: No swelling.      Cervical back: Neck supple.   Skin:     General: Skin is warm and dry.      Capillary Refill: Capillary refill takes less than 2 seconds.   Neurological:      Mental Status: She is alert.   Psychiatric:         Mood and Affect: Mood normal.          Additional Data:     Lab Results: I have reviewed pertinent results     Results from last 7 days   Lab Units 05/06/24  1215   WBC Thousand/uL 8.35   HEMOGLOBIN g/dL 14.0   HEMATOCRIT % 43.6   PLATELETS Thousands/uL 348     Results from last 7 days   Lab Units 05/06/24  1140   SODIUM mmol/L 129*   POTASSIUM mmol/L 5.0   CHLORIDE mmol/L 96   CO2 mmol/L 19*   BUN mg/dL 9   CREATININE mg/dL 0.62   ANION GAP mmol/L 14*   CALCIUM mg/dL 9.0   ALBUMIN g/dL 3.7   TOTAL BILIRUBIN mg/dL 0.50   ALK PHOS U/L 95   ALT U/L 24   AST U/L 28   GLUCOSE RANDOM mg/dL 520*         Results from last 7 days   Lab Units 05/06/24  1157 05/06/24  0942   POC GLUCOSE mg/dl 484* 548*               Imaging: I have reviewed pertinent  imaging     No orders to display       EKG, Pathology, and Other Studies Reviewed on Admission:   EKG: NSR    Allscripts / Epic Records Reviewed    ** Please Note: This note has been constructed using a voice recognition system. **

## 2024-05-06 NOTE — ANESTHESIA PREPROCEDURE EVALUATION
Procedure:  EGD  COLONOSCOPY    Relevant Problems   CARDIO   (+) Benign essential hypertension   (+) Chronic migraine without aura without status migrainosus, not intractable   (+) Chronic migraine without aura, not intractable, without status migrainosus   (+) Hyperlipidemia      ENDO   (+) Hypothyroidism   (+) Type 2 diabetes mellitus with other neurologic complication, with long-term current use of insulin (HCC)      GI/HEPATIC   (+) Dysphagia   (+) Fatty liver   (+) GERD without esophagitis   (+) Pancreatic cyst      MUSCULOSKELETAL   (+) Cervical spondylosis without myelopathy   (+) DDD (degenerative disc disease), cervical   (+) DDD (degenerative disc disease), lumbar   (+) Low back pain with sciatica      NEURO/PSYCH   (+) Agoraphobia with panic disorder   (+) Chronic cervical pain   (+) Chronic migraine without aura without status migrainosus, not intractable   (+) Chronic migraine without aura, not intractable, without status migrainosus   (+) Chronic pain disorder   (+) Chronic pain of both shoulders   (+) Depression, unspecified   (+) Diplopia   (+) Paresthesia of both lower extremities   (+) Post-traumatic stress disorder, unspecified   (+) Severe episode of recurrent major depressive disorder, without psychotic features (HCC)   (+) Weakness of right upper extremity      Behavioral Health   (+) Opioid dependence with other opioid-induced disorder (HCC)   (+) Tobacco dependence      Respiratory/Allergy   (+) Reactive airway disease without complication        Physical Exam    Airway    Mallampati score: II  TM Distance: >3 FB  Neck ROM: full     Dental        Cardiovascular  Cardiovascular exam normal    Pulmonary  Pulmonary exam normal     Other Findings  post-pubertal.      Anesthesia Plan  ASA Score- 3     Anesthesia Type- IV sedation with anesthesia with ASA Monitors.         Additional Monitors:     Airway Plan:     Comment: As per pt lost 100 # in last year R and B of high blood sugar explained. Pt  says she can't afford insulin. Pt willing to go to ER for sugar control and then she may be taken up for procedure if her Blood sugar are better controlled. B sugar today >550 .       Plan Factors-Exercise tolerance (METS): <4 METS.    Chart reviewed. EKG reviewed.   Patient summary reviewed.    Patient is not a current smoker. Patient not instructed to abstain from smoking on day of procedure. Patient did not smoke on day of surgery.            Induction-     Postoperative Plan-     Informed Consent- Anesthetic plan and risks discussed with patient.  I personally reviewed this patient with the CRNA. Discussed and agreed on the Anesthesia Plan with the CRNA..                Lab Results   Component Value Date    HGBA1C  08/18/2023      Comment:      HbA1c to high to read       Lab Results   Component Value Date     07/16/2016    K 4.6 01/15/2024    CL 92 (L) 01/15/2024    CO2 23 01/15/2024    ANIONGAP 12 02/11/2015    BUN 15 01/15/2024    CREATININE 0.69 01/15/2024    GLUCOSE 323 (H) 08/24/2017    GLUF 278 (H) 07/06/2022    CALCIUM 9.7 01/15/2024    AST 17 01/15/2024    ALT 19 01/15/2024    ALKPHOS 112 01/15/2024    PROT 6.7 10/22/2016    BILITOT 0.3 10/22/2016    EGFR 104 01/15/2024       Lab Results   Component Value Date    WBC 8.19 07/06/2022    HGB 10.0 (L) 09/06/2022    HCT 29.1 (L) 09/06/2022    MCV 94 07/06/2022     07/06/2022    Left Ventricle: Left ventricle is normal in size. Wall thickness is top   normal to midlly increased. Systolic function is normal with an ejection   fraction of 60-65%. There is probably grade I (mild) diastolic   dysfunction.    Right Ventricle: Systolic function is normal.     No significant valvular abnormalities.

## 2024-05-06 NOTE — H&P
H&P EXAM - Outpatient Endoscopy   Barb Palomo 54 y.o. female MRN: [id number]    [facility]   Encounter: [id number]        Patient was scheduled for EGD and colonoscopy today.  Her blood sugar is 550 and she has a known history of poorly controlled type 2 diabetes.  After discussion with anesthesia, ideally her procedure would be performed when her blood sugar is more controlled however with her poorly controlled diabetes she is still at high risk.  The patient has had transportation issues and prefers to have the EGD and colonoscopy performed as opposed to reschedule.  She is agreeable to go to the ER for evaluation to receive insulin and if there is improvement of her blood sugar we can plan to do her procedures tomorrow as an inpatient.  She should receive an additional one half of a GoLytely bowel preparation this evening.  I spoke to the ER and the outpatient inpatient gastroenterology team so that they are aware.

## 2024-05-06 NOTE — ASSESSMENT & PLAN NOTE
Mood appears appropriate    Continue home duloxetine, mirtazapine, as needed diazepam  Monitor patient's mood

## 2024-05-07 ENCOUNTER — APPOINTMENT (OUTPATIENT)
Dept: GASTROENTEROLOGY | Facility: HOSPITAL | Age: 54
DRG: 637 | End: 2024-05-07
Attending: INTERNAL MEDICINE
Payer: MEDICARE

## 2024-05-07 LAB
ANION GAP SERPL CALCULATED.3IONS-SCNC: 9 MMOL/L (ref 4–13)
BUN SERPL-MCNC: 9 MG/DL (ref 5–25)
CALCIUM SERPL-MCNC: 8.3 MG/DL (ref 8.4–10.2)
CHLORIDE SERPL-SCNC: 110 MMOL/L (ref 96–108)
CO2 SERPL-SCNC: 22 MMOL/L (ref 21–32)
CREAT SERPL-MCNC: 0.38 MG/DL (ref 0.6–1.3)
GFR SERPL CREATININE-BSD FRML MDRD: 121 ML/MIN/1.73SQ M
GLUCOSE SERPL-MCNC: 100 MG/DL (ref 65–140)
GLUCOSE SERPL-MCNC: 113 MG/DL (ref 65–140)
GLUCOSE SERPL-MCNC: 171 MG/DL (ref 65–140)
GLUCOSE SERPL-MCNC: 185 MG/DL (ref 65–140)
GLUCOSE SERPL-MCNC: 202 MG/DL (ref 65–140)
GLUCOSE SERPL-MCNC: 212 MG/DL (ref 65–140)
GLUCOSE SERPL-MCNC: 238 MG/DL (ref 65–140)
GLUCOSE SERPL-MCNC: 253 MG/DL (ref 65–140)
GLUCOSE SERPL-MCNC: 382 MG/DL (ref 65–140)
GLUCOSE SERPL-MCNC: 80 MG/DL (ref 65–140)
MAGNESIUM SERPL-MCNC: 1.4 MG/DL (ref 1.9–2.7)
PHOSPHATE SERPL-MCNC: 3.1 MG/DL (ref 2.7–4.5)
POTASSIUM SERPL-SCNC: 3.5 MMOL/L (ref 3.5–5.3)
SODIUM SERPL-SCNC: 141 MMOL/L (ref 135–147)

## 2024-05-07 PROCEDURE — 0DB98ZX EXCISION OF DUODENUM, VIA NATURAL OR ARTIFICIAL OPENING ENDOSCOPIC, DIAGNOSTIC: ICD-10-PCS | Performed by: INTERNAL MEDICINE

## 2024-05-07 PROCEDURE — 82948 REAGENT STRIP/BLOOD GLUCOSE: CPT

## 2024-05-07 PROCEDURE — 99232 SBSQ HOSP IP/OBS MODERATE 35: CPT | Performed by: INTERNAL MEDICINE

## 2024-05-07 PROCEDURE — 0DB68ZX EXCISION OF STOMACH, VIA NATURAL OR ARTIFICIAL OPENING ENDOSCOPIC, DIAGNOSTIC: ICD-10-PCS | Performed by: INTERNAL MEDICINE

## 2024-05-07 PROCEDURE — 88341 IMHCHEM/IMCYTCHM EA ADD ANTB: CPT | Performed by: STUDENT IN AN ORGANIZED HEALTH CARE EDUCATION/TRAINING PROGRAM

## 2024-05-07 PROCEDURE — 88305 TISSUE EXAM BY PATHOLOGIST: CPT | Performed by: STUDENT IN AN ORGANIZED HEALTH CARE EDUCATION/TRAINING PROGRAM

## 2024-05-07 PROCEDURE — 83735 ASSAY OF MAGNESIUM: CPT | Performed by: INTERNAL MEDICINE

## 2024-05-07 PROCEDURE — 0DB58ZX EXCISION OF ESOPHAGUS, VIA NATURAL OR ARTIFICIAL OPENING ENDOSCOPIC, DIAGNOSTIC: ICD-10-PCS | Performed by: INTERNAL MEDICINE

## 2024-05-07 PROCEDURE — 88342 IMHCHEM/IMCYTCHM 1ST ANTB: CPT | Performed by: STUDENT IN AN ORGANIZED HEALTH CARE EDUCATION/TRAINING PROGRAM

## 2024-05-07 PROCEDURE — 0DBM8ZX EXCISION OF DESCENDING COLON, VIA NATURAL OR ARTIFICIAL OPENING ENDOSCOPIC, DIAGNOSTIC: ICD-10-PCS | Performed by: INTERNAL MEDICINE

## 2024-05-07 PROCEDURE — 80048 BASIC METABOLIC PNL TOTAL CA: CPT | Performed by: INTERNAL MEDICINE

## 2024-05-07 PROCEDURE — 45380 COLONOSCOPY AND BIOPSY: CPT | Performed by: INTERNAL MEDICINE

## 2024-05-07 PROCEDURE — 43239 EGD BIOPSY SINGLE/MULTIPLE: CPT | Performed by: INTERNAL MEDICINE

## 2024-05-07 PROCEDURE — 84100 ASSAY OF PHOSPHORUS: CPT | Performed by: INTERNAL MEDICINE

## 2024-05-07 PROCEDURE — 0DBK8ZX EXCISION OF ASCENDING COLON, VIA NATURAL OR ARTIFICIAL OPENING ENDOSCOPIC, DIAGNOSTIC: ICD-10-PCS | Performed by: INTERNAL MEDICINE

## 2024-05-07 RX ORDER — SODIUM CHLORIDE, SODIUM LACTATE, POTASSIUM CHLORIDE, CALCIUM CHLORIDE 600; 310; 30; 20 MG/100ML; MG/100ML; MG/100ML; MG/100ML
INJECTION, SOLUTION INTRAVENOUS CONTINUOUS PRN
Status: DISCONTINUED | OUTPATIENT
Start: 2024-05-07 | End: 2024-05-07

## 2024-05-07 RX ORDER — PROPOFOL 10 MG/ML
INJECTION, EMULSION INTRAVENOUS AS NEEDED
Status: DISCONTINUED | OUTPATIENT
Start: 2024-05-07 | End: 2024-05-07

## 2024-05-07 RX ORDER — INSULIN ASPART 100 [IU]/ML
20 INJECTION, SUSPENSION SUBCUTANEOUS
Status: DISCONTINUED | OUTPATIENT
Start: 2024-05-07 | End: 2024-05-07 | Stop reason: SDUPTHER

## 2024-05-07 RX ORDER — INSULIN ASPART 100 [IU]/ML
20 INJECTION, SUSPENSION SUBCUTANEOUS
Status: DISCONTINUED | OUTPATIENT
Start: 2024-05-07 | End: 2024-05-08 | Stop reason: HOSPADM

## 2024-05-07 RX ORDER — INSULIN LISPRO 100 [IU]/ML
1-5 INJECTION, SOLUTION INTRAVENOUS; SUBCUTANEOUS
Status: DISCONTINUED | OUTPATIENT
Start: 2024-05-07 | End: 2024-05-08 | Stop reason: HOSPADM

## 2024-05-07 RX ORDER — LIDOCAINE HYDROCHLORIDE 10 MG/ML
INJECTION, SOLUTION EPIDURAL; INFILTRATION; INTRACAUDAL; PERINEURAL AS NEEDED
Status: DISCONTINUED | OUTPATIENT
Start: 2024-05-07 | End: 2024-05-07

## 2024-05-07 RX ADMIN — SODIUM CHLORIDE 500 ML: 0.9 INJECTION, SOLUTION INTRAVENOUS at 04:25

## 2024-05-07 RX ADMIN — ASPIRIN 81 MG CHEWABLE TABLET 81 MG: 81 TABLET CHEWABLE at 11:15

## 2024-05-07 RX ADMIN — DULOXETINE HYDROCHLORIDE 60 MG: 60 CAPSULE, DELAYED RELEASE ORAL at 17:06

## 2024-05-07 RX ADMIN — TOPIRAMATE 25 MG: 25 TABLET, FILM COATED ORAL at 11:15

## 2024-05-07 RX ADMIN — CEFTRIAXONE 1000 MG: 1 INJECTION, SOLUTION INTRAVENOUS at 14:09

## 2024-05-07 RX ADMIN — SODIUM CHLORIDE, SODIUM LACTATE, POTASSIUM CHLORIDE, AND CALCIUM CHLORIDE: .6; .31; .03; .02 INJECTION, SOLUTION INTRAVENOUS at 09:21

## 2024-05-07 RX ADMIN — MIRTAZAPINE 7.5 MG: 7.5 TABLET, FILM COATED ORAL at 21:05

## 2024-05-07 RX ADMIN — ATORVASTATIN CALCIUM 80 MG: 80 TABLET, FILM COATED ORAL at 11:15

## 2024-05-07 RX ADMIN — PROPOFOL 50 MG: 10 INJECTION, EMULSION INTRAVENOUS at 09:38

## 2024-05-07 RX ADMIN — TRAMADOL HYDROCHLORIDE 50 MG: 50 TABLET, COATED ORAL at 11:20

## 2024-05-07 RX ADMIN — PROPOFOL 50 MG: 10 INJECTION, EMULSION INTRAVENOUS at 10:02

## 2024-05-07 RX ADMIN — GABAPENTIN 400 MG: 400 CAPSULE ORAL at 11:15

## 2024-05-07 RX ADMIN — DIAZEPAM 5 MG: 5 TABLET ORAL at 11:15

## 2024-05-07 RX ADMIN — INSULIN ASPART 20 UNITS: 100 INJECTION, SUSPENSION SUBCUTANEOUS at 16:42

## 2024-05-07 RX ADMIN — PROPOFOL 50 MG: 10 INJECTION, EMULSION INTRAVENOUS at 09:33

## 2024-05-07 RX ADMIN — LIDOCAINE HYDROCHLORIDE 50 MG: 10 INJECTION, SOLUTION EPIDURAL; INFILTRATION; INTRACAUDAL; PERINEURAL at 09:26

## 2024-05-07 RX ADMIN — GABAPENTIN 400 MG: 400 CAPSULE ORAL at 21:05

## 2024-05-07 RX ADMIN — PROPOFOL 70 MG: 10 INJECTION, EMULSION INTRAVENOUS at 10:09

## 2024-05-07 RX ADMIN — TRAMADOL HYDROCHLORIDE 50 MG: 50 TABLET, COATED ORAL at 21:05

## 2024-05-07 RX ADMIN — PRAZOSIN HYDROCHLORIDE 2 MG: 1 CAPSULE ORAL at 21:05

## 2024-05-07 RX ADMIN — NICOTINE 1 PATCH: 21 PATCH, EXTENDED RELEASE TRANSDERMAL at 11:17

## 2024-05-07 RX ADMIN — INSULIN LISPRO 2 UNITS: 100 INJECTION, SOLUTION INTRAVENOUS; SUBCUTANEOUS at 11:15

## 2024-05-07 RX ADMIN — PROPOFOL 50 MG: 10 INJECTION, EMULSION INTRAVENOUS at 09:43

## 2024-05-07 RX ADMIN — LEVOTHYROXINE SODIUM 112 MCG: 112 TABLET ORAL at 05:42

## 2024-05-07 RX ADMIN — GABAPENTIN 400 MG: 400 CAPSULE ORAL at 16:42

## 2024-05-07 RX ADMIN — SODIUM CHLORIDE 125 ML/HR: 0.9 INJECTION, SOLUTION INTRAVENOUS at 02:22

## 2024-05-07 RX ADMIN — PROPOFOL 50 MG: 10 INJECTION, EMULSION INTRAVENOUS at 09:57

## 2024-05-07 RX ADMIN — INSULIN LISPRO 4 UNITS: 100 INJECTION, SOLUTION INTRAVENOUS; SUBCUTANEOUS at 16:43

## 2024-05-07 RX ADMIN — FLUTICASONE FUROATE AND VILANTEROL TRIFENATATE 1 PUFF: 200; 25 POWDER RESPIRATORY (INHALATION) at 07:32

## 2024-05-07 RX ADMIN — Medication 40 MG: at 09:53

## 2024-05-07 RX ADMIN — PROPOFOL 50 MG: 10 INJECTION, EMULSION INTRAVENOUS at 09:51

## 2024-05-07 RX ADMIN — DULOXETINE HYDROCHLORIDE 60 MG: 60 CAPSULE, DELAYED RELEASE ORAL at 11:15

## 2024-05-07 RX ADMIN — PROPOFOL 100 MG: 10 INJECTION, EMULSION INTRAVENOUS at 09:26

## 2024-05-07 NOTE — ASSESSMENT & PLAN NOTE
Patient with pyuria and bacteriuria on urinalysis  May likely be etiology of significant hyperglycemia    Continue IV ceftriaxone  De-escalate antibiotics as appropriate  Follow-up urine cultures and sensitivities

## 2024-05-07 NOTE — MALNUTRITION/BMI
This medical record reflects one or more clinical indicators suggestive of malnutrition and/or morbid obesity.    Malnutrition Findings:   Adult Malnutrition type: Chronic illness  Adult Degree of Malnutrition: Other severe protein calorie malnutrition  Malnutrition Characteristics: Fat loss, Weight loss                  360 Statement: Severe protein calorie malnutrition in the setting of chronic illness (untreated DM) related to fat loss, muscle loss, and wt loss as evidenced by subcutaneous fat loss of triceps, protruding clavicle, and 6.8% wt loss in 1 month treated with diabetic diet and supplements TID.    BMI Findings:  Adult BMI Classifications: Underweight < 18.5        Body mass index is 15.16 kg/m².     See Nutrition note dated 5/7/2024 for additional details.  Completed nutrition assessment is viewable in the nutrition documentation.

## 2024-05-07 NOTE — PROGRESS NOTES
[facility]  Progress Note  Name: Barb Palomo I  MRN: [id number]  Unit/Bed#: 7T [facility] 714-01 I Date of Admission: 5/6/2024   Date of Service: 5/7/2024 I Hospital Day: 1    Assessment/Plan   * HHNC (hyperglycemic hyperosmolar nonketotic coma) (HCC)  Assessment & Plan  Lab Results   Component Value Date    HGBA1C  08/18/2023      Comment:      HbA1c to high to read       Recent Labs     05/07/24  0615 05/07/24  0743 05/07/24  1035 05/07/24  1059   POCGLU 113 171* 212* 238*         Blood Sugar Average: Last 72 hrs:  (P) [id number]  Patient presents with blood glucose level of 548 with mild anion gap elevation of 14  Differential diagnosis includes mild DKA versus HHNKS  Possibly secondary to acute cystitis    Initially on insulin drip.  Blood sugar and BMP improved  Discontinue insulin drip, will transition back to her home insulin regimen.  Initiated on sliding scale insulin and [id number]  Monitor electrolytes        Acute cystitis  Assessment & Plan  Patient with pyuria and bacteriuria on urinalysis  May likely be etiology of significant hyperglycemia    Continue IV ceftriaxone  De-escalate antibiotics as appropriate  Follow-up urine cultures and sensitivities    Abdominal pain  Assessment & Plan  Patient was scheduled for colonoscopy on 5/6/2024 in the setting of abdominal pain and weight loss  Procedure was canceled secondary to significant hyperglycemia  Patient will undergo procedure likely tomorrow once blood glucose levels within normal limits    Gastroenterology consultation appreciated  On 5/7/2024, EGD showed gastritis. Biopsy taken during EGD and colonoscopy. No large masses seen.   Resume diet.  Continue p.o. Protonix 40 mg daily  Okay to discharge from GI standpoint.    Benign essential hypertension  Assessment & Plan  Blood pressures quite soft, currently stable    Hold home ARB and beta-blocker  Monitor blood pressures off IV fluid    Protein calorie malnutrition  ([facility])  Assessment & Plan  Malnutrition Findings:                                 BMI Findings:           Body mass index is 15.16 kg/m².   Present on admission as evidenced by BMI of 14 and bitemporal wasting    Encouraged healthy lifestyle modifications and weight gain  Nutrition consult    Hypothyroidism  Assessment & Plan  Stable and chronic in nature    Continue home levothyroxine 112 mcg daily  Outpatient follow-up with PCP    Severe episode of recurrent major depressive disorder, without psychotic features ([facility])  Assessment & Plan  Mood appears appropriate    Continue home duloxetine, mirtazapine, as needed diazepam  Monitor patient's mood           VTE Pharmacologic Prophylaxis:   Pharmacologic:  Encourage ambulation  Mechanical VTE Prophylaxis in Place: Yes    Patient Centered Rounds: I have performed bedside rounds with nursing staff today.    Discussions with Specialists or Other Care Team Provider: Discussed with gastroenterologist, , RN    Education and Discussions with Family / Patient: Discussed with patient    Time Spent for Care:  35 minutes .  This time was spent on one or more of the following: performing physical exam; counseling and coordination of care; obtaining or reviewing history; documenting in the medical record; reviewing/ordering tests, medications or procedures; communicating with other healthcare professionals and discussing with patient's family/caregivers.    Current Length of Stay: 1 day(s)    Current Patient Status: Inpatient   Certification Statement: The patient will continue to require additional inpatient hospital stay due to hypoglycemia, occasional hypotension    Discharge Plan / Estimated Discharge Date: In 24-hour to home      Code Status: Level 1 - Full Code      Subjective:   Patient was seen and examined at bedside. The patient stated she is doing well after EGD and colonoscopy.  Patient will try diet.  Will monitor blood pressure without IV  fluid.      Objective:     Vitals:   Temp (24hrs), Av.6 °F (36.4 °C), Min:96.8 °F (36 °C), Max:98.3 °F (36.8 °C)    Temp:  [96.8 °F (36 °C)-98.3 °F (36.8 °C)] 96.8 °F (36 °C)  HR:  [101-118] 112  Resp:  [14-16] 14  BP: ()/() 146/102  SpO2:  [96 %-98 %] 98 %  Body mass index is 15.16 kg/m².     Input and Output Summary (last 24 hours):       Intake/Output Summary (Last 24 hours) at 2024 1205  Last data filed at 2024 1119  Gross per 24 hour   Intake 7220.71 ml   Output 2000 ml   Net 5220.71 ml       Physical Exam:     Physical Exam  General: no acute distress  HEENT: NC/AT, PERRL, EOM - normal  Neck: Supple  Pulm/Chest: Normal chest wall expansion, clear breath sounds on both side  CVS: normal S1&S2, capillary refill <2s  Abd: soft, non tender, non distended, bowel sounds +  MSK: move all 4 extremities spontaneously  Skin: warm  CNS: no acute focal neuro deficit      Additional Data:     Labs:    Results from last 7 days   Lab Units 24  1215   WBC Thousand/uL 8.35   HEMOGLOBIN g/dL 14.0   HEMATOCRIT % 43.6   PLATELETS Thousands/uL 348     Results from last 7 days   Lab Units 24  0805 24  1140   POTASSIUM mmol/L 3.5 5.0   CHLORIDE mmol/L 110* 96   CO2 mmol/L 22 19*   BUN mg/dL 9 9   CREATININE mg/dL 0.38* 0.62   CALCIUM mg/dL 8.3* 9.0   ALK PHOS U/L  --  95   ALT U/L  --  24   AST U/L  --  28           * I Have Reviewed All Lab Data Listed Above.  * Additional Pertinent Lab Tests Reviewed: All Labs For Current Hospital Admission Reviewed    Imaging:      I have reviewed pertinent imaging.      Recent Cultures (last 7 days):           Last 24 Hours Medication List:   Current Facility-Administered Medications   Medication Dose Route Frequency Provider Last Rate    acetaminophen  650 mg Oral Q4H PRN Davion Anderson,       aspirin  81 mg Oral Daily Davino Anderson DO      atorvastatin  80 mg Oral Daily Davion Anderson DO      cefTRIAXone  1,000 mg Intravenous Q24H Davion MIGUEL  Yorty, DO 1,000 mg (05/06/24 1533)    diazepam  5 mg Oral BID PRN Davion C Yorty, DO      DULoxetine  60 mg Oral BID Davion C Yorty, DO      fluticasone-vilanterol  1 puff Inhalation Daily Davion C Yorty, DO      gabapentin  400 mg Oral TID Davion C Yorty, DO      insulin aspart protamine-insulin aspart  20 Units Subcutaneous BID AC Cindy Monaco MD      insulin lispro  1-5 Units Subcutaneous TID AC Cindy Monaco MD      levothyroxine  112 mcg Oral Early Morning Davion C Yorty, DO      mirtazapine  7.5 mg Oral HS Davion C Yorty, DO      nicotine  1 patch Transdermal Daily Davion C Yorty, DO      ondansetron  4 mg Intravenous Q6H PRN Davion C Yorty, DO      pantoprazole  40 mg Oral Early Morning Davion C Yorty, DO      prazosin  2 mg Oral HS Davion C Yorty, DO      sodium chloride (PF)  3 mL Intravenous Q1H PRN Davion C Yorty, DO      topiramate  25 mg Oral Daily Davion C Yorty, DO      traMADol  50 mg Oral Q6H PRN Davion C Yorty, DO       Facility-Administered Medications Ordered in Other Encounters   Medication Dose Route Frequency Provider Last Rate    lactated ringers  50 mL/hr Intravenous Continuous Martha Castillo MD          Today, Patient Was Seen By: Cindy Monaco MD    ** Please Note: Dragon 360 Dictation voice to text software may have been used in the creation of this document. **

## 2024-05-07 NOTE — CONSULTS
Patient MRN: [id number]  Date of Service: 5/7/2024  Referring Provider: Davion Anderson DO   Provider Creating Note: Lisa Calhoun PA-C  PCP: Prasanna Mendez    Reason for Consult:   Weight loss [R63.4]      Abdominal pain [R10.9]          HISTORY OF PRESENT ILLNESS:  Barb Palomo is a 54 y.o. female with PMH including hypothyroidism, poorly controlled diabetes mellitus, gastroparesis, recurrent pancreatitis, GERD s/p fundoplication, anxiety/MDD, fibromyalgia, IBS, history of colon polyps 2016. She was recently seen by OP GI for chronic dysphagia, epigastric abdominal pain, early satiety, chronic diarrhea with unintentional weight loss. She was scheduled for OP EGD and colonoscopy yesterday but was cancelled due to hyperglycemia (550). She was sent to  ED for evaluation and treatment with plans for endoscopic evaluation during admission. Labs showed mild anion gap metabolic acidosis and mild beta hydroxybutyrate. Started on antibiotics for UTI. She was treated with insulin and IV fluids. She was tachycardic, hypotensive with MAP 58 overnight, currently stable. Glucose levels  this morning. She is NPO for endoscopy today. Stool output this morning described as clear with green tinge. Patient describes chronic diffuse abdominal pain this morning, worse in epigastrium. No chest pain, shortness of breath, dizziness, gi bleeding.     Review of Systems:    General:   No fever or chills; +significant unintentional 100lb weight loss    EENT:   No ear pain, facial swelling; No sneezing, sore throat.   Skin:   No rashes, color changes.   Respiratory:     No shortness of breath, cough, wheezing, stridor.   Cardiovascular:     No chest pain, palpitations.   Gastrointestinal:    As per HPI.   Musculoskeletal:     No arthralgias, myalgias, swelling.   Neurologic:   No dizziness, numbness, weakness. No speech difficulties.   Psych:   No agitation, suicidal ideations    Otherwise, All other twelve-point review of  systems normal.     Past Medical History:   Diagnosis Date    Acute venous embolism and thrombosis of deep vessels of distal lower extremity (HCC)     Anemia     Anxiety     last assessed 11/20/17    Arthritis     Asthma     Cerebral infarction (HCC)     unspecified, last assessed 11/14/16    Chest pain     last assessed 5/9/17    Chronic cough     last assessed 12/12/13    Depression     Diabetes mellitus, type 2 (HCC)     DJD (degenerative joint disease)     Esophageal reflux     Fibromyalgia     GERD without esophagitis     resolved 5/13/16    History of pulmonary embolism     Hyperlipidemia     Hypertension     Hypothyroidism     Iron deficiency     Pancreatitis     Panic attack     Panic disorder     Polyarthritis     PTSD (post-traumatic stress disorder)     Sleep difficulties     Stroke syndrome     Thyroid disease     TIA (transient ischemic attack)     TIA (transient ischemic attack)     Venous embolism and thrombosis of deep vessels of distal lower extremity (HCC)     Vitamin B12 deficiency     Vitamin D deficiency      Past Surgical History:   Procedure Laterality Date    CARPAL TUNNEL RELEASE Right     neuroplasty decompression of median nerve    CATARACT EXTRACTION      CHOLECYSTECTOMY      ERCP      ERCP      ESOPHAGOGASTRIC FUNDOPLASTY      NISSEN FUNDOPLICATION      PATELLA SURGERY Left 12/2021    NC ESOPHAGOGASTRODUODENOSCOPY TRANSORAL DIAGNOSTIC N/A 11/02/2016    Procedure: EGD AND COLONOSCOPY;  Surgeon: Jatin Shepherd MD;  Location: [facility];  Service: Gastroenterology    NC NDSC WRST SURG W/RLS TRANSVRS CARPL LIGM Right 03/08/2016    Procedure: RELEASE CARPAL TUNNEL ENDOSCOPIC;  Surgeon: Guero Restrepo MD;  Location: [facility];  Service: Orthopedics    NC TENDON SHEATH INCISION Right 03/08/2016    Procedure: RELEASE TRIGGER FINGER RIGHT THUMB;  Surgeon: Guero Restrepo MD;  Location: [facility];  Service: Orthopedics    TONSILLECTOMY AND ADENOIDECTOMY       Allergies   Allergen Reactions     Medical Tape Rash    Lexapro [Escitalopram Oxalate]     Escitalopram Other (See Comments) and Palpitations    Other Hives and Rash     Adhesive Tape       Medications:  Home Medications  Prior to Admission medications    Medication Sig Start Date End Date Taking? Authorizing Provider   acarbose (PRECOSE) 50 mg tablet Take 1 tablet (50 mg total) by mouth 3 (three) times a day with meals 1/19/24   Romain Myers MD   Adalimumab (HUMIRA PEN SC) Inject under the skin  Patient not taking: Reported on 1/30/2024    Historical Provider, MD   Albuterol Sulfate (ProAir RespiClick) 108 (90 Base) MCG/ACT AEPB Inhale 2 puffs every 6 (six) hours as needed (wheezing) 11/11/21   ELISEO Vee   amoxicillin (AMOXIL) 500 mg capsule Take 500 mg by mouth 3 (three) times a day 1/24/24   Historical Provider, MD   aspirin 81 mg chewable tablet Chew 81 mg daily     Historical Provider, MD   atorvastatin (LIPITOR) 80 mg tablet TAKE 1 TABLET BY MOUTH EVERY DAY 4/10/24   Marie Alfaro PA-C   baclofen 10 mg tablet TAKE 1 TABLET BY MOUTH THREE TIMES A DAY AS NEEDED 3/4/24   Marie Alfaro PA-C   BD PEN NEEDLE LINDY U/F 32G X 4 MM MISC Inject under the skin daily 4/27/20   Prasanna Mendez, DO   Blood Glucose Monitoring Suppl (OneTouch Verio Reflect) w/Device KIT CHECK BLOOD SUGARS FOUR TIMES DAILY. PLEASE SUBSTITUTE WITH APPROPRIATE ALTERNATIVE AS COVERED BY PATIENT'S INSURANCE. DX: E11.65 9/22/22   ELISEO Vee   butalbital-acetaminophen-caffeine (FIORICET,ESGIC) -40 mg per tablet TAKE 1 TABLET EVERY 6 HOURS AS NEEDED FOR MIGRAINE. PLEASE LIMIT 3-4 PER WEEK, 15 PER MONTH. 2/23/24   Marie Alfaro PA-C   cholestyramine (QUESTRAN) 4 g packet Take 1 packet (4 g total) by mouth 2 (two) times a day As needed for diarrhea 3/7/24   Diana M Jaiyeola, MD   Cyanocobalamin (VITAMIN B-12 IJ) Inject as directed  Patient not taking: Reported on 1/30/2024    Historical Provider, MD   diazepam (VALIUM) 5 mg tablet Take 1  "tablet (5 mg total) by mouth 2 (two) times a day as needed for anxiety 1/8/24   ELISEO Moreland   DULoxetine (CYMBALTA) 60 mg delayed release capsule Take 1 capsule (60 mg total) by mouth 2 (two) times a day 6/13/23   ELISEO Moreland   ergocalciferol (VITAMIN D2) 50,000 units TAKE 1 CAPSULE BY MOUTH ONCE WEEKLY 3/29/24   Marie Alfaro PA-C   Fluticasone-Salmeterol (Advair Diskus) 500-50 mcg/dose inhaler Inhale 1 puff by mouth 2 times daily.  Rinse mouth after use 7/17/22   Prasanna Gonzalezyblickal, DO   Folic Acid 0.8 MG CAPS Take 0.04 mg by mouth daily.  Patient not taking: Reported on 1/30/2024    Historical Provider, MD   gabapentin (NEURONTIN) 400 mg capsule TAKE 2 CAPSULES BY MOUTH 3 TIMES A DAY 3/1/24   Prasanna Mendez, DO   Galcanezumab-gnlm (Emgality) 120 MG/ML SOAJ 1 injection monthly  Patient not taking: Reported on 1/30/2024 9/5/22   Marie Alfaro PA-C   glucose blood (OneTouch Verio) test strip Check blood sugars four times daily. Please substitute with appropriate alternative as covered by patient's insurance. Dx: E11.65 8/22/22   ELISEO Vee   HumaLOG KwikPen 200 units/mL CONCENTRATED U-200 injection pen INJECT 15 UNITS WITH MEALS +SCALE ( SCALE - 150-200 -2 UNITS, 201-250-4 UNITS, 251-300 -6 UNITS, 301-350-8 UNITS, > 350- 10 UNITS )  Patient not taking: Reported on 1/30/2024 7/18/22   Romain Myers MD   ibuprofen (MOTRIN) 800 mg tablet Take 800 mg by mouth every 6 (six) hours as needed 1/19/22   Historical Provider, MD   insulin NPH-insulin regular (NovoLIN 70/30) 100 units/mL subcutaneous injection Inject 20 units before breakfast and 20 units before dinner 1/19/24   Romain Myers MD   Insulin Syringe-Needle U-100 31G X 5/16\" 0.3 ML MISC Use twice daily 1/19/24   Romain Myers MD   ketorolac (TORADOL) 10 mg tablet TAKE 1 TABLET (10 MG TOTAL) BY MOUTH EVERY 6 (SIX) HOURS AS NEEDED (MIGRAINE) MAX 2-3 PER WEEK. 4/26/24   Marie Alfaro PA-C   levothyroxine 112 mcg tablet " TAKE 1 TABLET BY MOUTH EVERY DAY 3/18/22   ELISEO Vee   Lidocaine 4 % PTCH Place 1 patch on the skin daily  Patient not taking: Reported on 1/30/2024 12/15/21   Historical Provider, MD   losartan (COZAAR) 50 mg tablet TAKE 1 TABLET DAILY. 4/11/18   Prasanna Mendez DO   metFORMIN (GLUCOPHAGE-XR) 500 mg 24 hr tablet Take 1 tablet (500 mg total) by mouth 2 (two) times a day with meals 1/19/24   Romain Myers MD   metoprolol tartrate (LOPRESSOR) 25 mg tablet TAKE 1 TABLET BY MOUTH EVERY DAY 8/26/22   Prasanna Mendez DO   mirtazapine (REMERON) 7.5 MG tablet Take 1 tablet (7.5 mg total) by mouth daily at bedtime 6/13/23   ELISEO Moreland   naloxone (Narcan) 4 mg/0.1 mL nasal spray 1 actuation in one nostril once as needed for opioid overdose, may repeat dose every 2-3 minutes until patient responsive or EMS arrives 6/9/22   ELISEO Moreland   omeprazole (PriLOSEC) 40 MG capsule Take 1 capsule (40 mg total) by mouth daily 3/7/24   Diana M Jaiyeola, MD   ondansetron (ZOFRAN) 4 mg tablet TAKE 1 TABLET BY MOUTH EVERY 6 HOURS AS NEEDED FOR NAUSEA HOLD COMPAZINE. 3/14/24   Esha A Garzillo, CRNP   OneTouch Delica Lancets 33G MISC Check blood sugars four times daily. Please substitute with appropriate alternative as covered by patient's insurance. Dx: E11.65 8/22/22   Esha A Garzillo, CRNP   OneTouch Ultra test strip USE AS DIRECTED TO TEST 3 TIMES A DAY 7/14/22   Prasanna Mendez DO   pioglitazone (ACTOS) 30 mg tablet Take 1 tablet (30 mg total) by mouth daily 1/19/24   Romain Myers MD   prazosin (MINIPRESS) 2 mg capsule Take 1 capsule (2 mg total) by mouth daily at bedtime 6/13/23   ELISEO Moreland   prochlorperazine (COMPAZINE) 10 mg tablet TAKE 1 TABLET BY MOUTH EVERY 6 HOURS AS NEEDED FOR NAUSEA OR VOMITING. 9/13/22   Marie Alfaro PA-C   topiramate (TOPAMAX) 100 mg tablet TAKE 2 TABLETS BY MOUTH EVERY DAY 7/17/22   Prasanna Mendez DO   topiramate (TOPAMAX) 25 mg tablet TAKE 1 TABLET  BY MOUTH EVERY DAY 12/23/21   Prasanna Mendez DO   traMADol (ULTRAM) 50 mg tablet TAKE 1 TABLET (50 MG TOTAL) BY MOUTH EVERY 6 (SIX) HOURS AS NEEDED FOR MODERATE PAIN OR SEVERE PAIN DO NOT TAKE WITHIN 6 HOURS OF VALIUM OR FIORICET 4/30/24   ELISEO Vee   Turmeric 500 MG TABS Take 1 tablet by mouth daily    Historical Provider, MD       Inhouse Medications    Current Facility-Administered Medications:     acetaminophen (TYLENOL) tablet 650 mg, 650 mg, Oral, Q4H PRN    aspirin chewable tablet 81 mg, 81 mg, Oral, Daily, 81 mg at 05/06/24 1424    atorvastatin (LIPITOR) tablet 80 mg, 80 mg, Oral, Daily, 80 mg at 05/06/24 1424    cefTRIAXone (ROCEPHIN) IVPB (premix in dextrose) 1,000 mg 50 mL, 1,000 mg, Intravenous, Q24H, 1,000 mg at 05/06/24 1533    diazepam (VALIUM) tablet 5 mg, 5 mg, Oral, BID PRN, 5 mg at 05/06/24 1424    DULoxetine (CYMBALTA) delayed release capsule 60 mg, 60 mg, Oral, BID, 60 mg at 05/06/24 1722    fluticasone-vilanterol 200-25 mcg/actuation 1 puff, 1 puff, Inhalation, Daily, 1 puff at 05/06/24 1425    gabapentin (NEURONTIN) capsule 400 mg, 400 mg, Oral, TID, 400 mg at 05/06/24 2225    insulin regular (HumuLIN R,NovoLIN R) 1 Units/mL in sodium chloride 0.9 % 100 mL infusion, 0.3-21 Units/hr, Intravenous, Titrated, 0.3 Units/hr at 05/07/24 0620    levothyroxine tablet 112 mcg, 112 mcg, Oral, Early Morning, 112 mcg at 05/07/24 0542    mirtazapine (REMERON) tablet 7.5 mg, 7.5 mg, Oral, HS, 7.5 mg at 05/06/24 2225    nicotine (NICODERM CQ) 21 mg/24 hr TD 24 hr patch 1 patch, 1 patch, Transdermal, Daily, 1 patch at 05/06/24 1424    ondansetron (ZOFRAN) injection 4 mg, 4 mg, Intravenous, Q6H PRN    pantoprazole (PROTONIX) EC tablet 40 mg, 40 mg, Oral, Early Morning    prazosin (MINIPRESS) capsule 2 mg, 2 mg, Oral, HS, 2 mg at 05/06/24 0392    Insert peripheral IV, , , Once **AND** sodium chloride (PF) 0.9 % injection 3 mL, 3 mL, Intravenous, Q1H PRN    sodium chloride 0.9 % infusion, 125  "mL/hr, Intravenous, Continuous, 125 mL/hr at 05/07/24 0222    topiramate (TOPAMAX) tablet 25 mg, 25 mg, Oral, Daily, 25 mg at 05/06/24 1424    traMADol (ULTRAM) tablet 50 mg, 50 mg, Oral, Q6H PRN, 50 mg at 05/06/24 2225    Facility-Administered Medications Ordered in Other Encounters:     lactated ringers infusion, 50 mL/hr, Intravenous, Continuous      Social History   reports that she has been smoking cigarettes. She started smoking about 41 years ago. She has a 41.3 pack-year smoking history. She has never used smokeless tobacco. She reports that she does not currently use alcohol. She reports that she does not use drugs.    Family History  Family History   Problem Relation Age of Onset    Diabetes Mother         type 2    Heart attack Mother 39        acute MI    Kidney disease Mother         CKD NKF classfication    Depression Mother     Arthritis Mother     Substance Abuse Mother         mother OD in past on MS04    Ulcerative colitis Mother     Schizophrenia Mother     Suicide Attempts Mother     Bipolar disorder Mother     Diabetes Maternal Grandmother     Heart attack Father         acute MI    Psoriasis Father     Cancer Father         gastric cancer    Stroke Father     Crohn's disease Sister     Bipolar disorder Sister     Rheum arthritis Maternal Grandfather     Throat cancer Maternal Grandfather     Suicide Attempts Daughter     Drug abuse Daughter     Lung cancer Paternal Grandmother     Cancer Paternal Grandfather     No Known Problems Sister     Bipolar disorder Maternal Uncle     Anesthesia problems Neg Hx          OBJECTIVE:    /96 (BP Location: Right arm)   Pulse 102   Temp (!) 97.2 °F (36.2 °C) (Temporal)   Resp 14   Ht 5' 2\" (1.575 m)   Wt 37.6 kg (82 lb 14.3 oz)   LMP 03/04/2016   SpO2 97%   BMI 15.16 kg/m²   Physical Exam:     General Appearance:    Awake, alert, oriented x3, no distress, thin/cachectic, appears older than stated age   Head:    Normocephalic without obvious " abnormality   Eyes:    PERRL, conjunctiva/corneas clear, EOM's intact        Neck:   Supple, no adenopathy   Throat:   Mucous membranes moist   Lungs:     Clear to auscultation bilaterally, no wheezing or rhonchi   Heart:    Regular rate and rhythm, S1 and S2 normal, no murmur   Abdomen:     Soft, diffusely tender (>epigastrium), non-distended. bowel sounds active. No masses, rebound or guarding.    Extremities:   Extremities normal. No clubbing, cyanosis or edema   Psych  Derm:   Normal affect    No jaundice +tattoo   Neurologic:   CNII-XII grossly intact. Speech intact         Laboratory Studies:  Results from last 7 days   Lab Units 05/06/24  1215   WBC Thousand/uL 8.35   HEMOGLOBIN g/dL 14.0   HEMATOCRIT % 43.6   MCV fL 98   PLATELETS Thousands/uL 348     Results from last 7 days   Lab Units 05/06/24  1140   SODIUM mmol/L 129*   POTASSIUM mmol/L 5.0   CHLORIDE mmol/L 96   CO2 mmol/L 19*   BUN mg/dL 9   CREATININE mg/dL 0.62   CALCIUM mg/dL 9.0   ALBUMIN g/dL 3.7   TOTAL BILIRUBIN mg/dL 0.50   ALK PHOS U/L 95   ALT U/L 24   AST U/L 28           Imaging and Other Studies:  No results found.      ASSESSMENT AND PLAN:  54 year old female with poorly controlled DM admitted with suspected HHNC. DDx mild DKA vs HHNKS. Status post insulin infusion and IV fluids.  at 0615. Management per SLIM  Chronic dysphagia, epigastric abdominal pain with early satiety.  PPI daily. Tighter glycemic control. For EGD today. Further plan based on results.  Chronic diarrhea with unintentional weight loss and history of polyps. Outpatient stool and serological testing ordered by GI. For colonoscopy with random biopsies today, further plans based on results.         Lisa Calhoun PA-C

## 2024-05-07 NOTE — PLAN OF CARE
Problem: METABOLIC, FLUID AND ELECTROLYTES - ADULT  Goal: Fluid balance maintained  Description: INTERVENTIONS:  - Monitor labs   - Monitor I/O and WT  - Instruct patient on fluid and nutrition as appropriate  - Assess for signs & symptoms of volume excess or deficit  Outcome: Progressing     Problem: METABOLIC, FLUID AND ELECTROLYTES - ADULT  Goal: Glucose maintained within target range  Description: INTERVENTIONS:  - Monitor Blood Glucose as ordered  - Assess for signs and symptoms of hyperglycemia and hypoglycemia  - Administer ordered medications to maintain glucose within target range  - Assess nutritional intake and initiate nutrition service referral as needed  Outcome: Progressing     Problem: Nutrition/Hydration-ADULT  Goal: Nutrient/Hydration intake appropriate for improving, restoring or maintaining nutritional needs  Description: Monitor and assess patient's nutrition/hydration status for malnutrition. Collaborate with interdisciplinary team and initiate plan and interventions as ordered.  Monitor patient's weight and dietary intake as ordered or per policy. Utilize nutrition screening tool and intervene as necessary. Determine patient's food preferences and provide high-protein, high-caloric foods as appropriate.     INTERVENTIONS:  - Monitor oral intake, urinary output, labs, and treatment plans  - Assess nutrition and hydration status and recommend course of action  - Evaluate amount of meals eaten  - Assist patient with eating if necessary   - Allow adequate time for meals  - Recommend/ encourage appropriate diets, oral nutritional supplements, and vitamin/mineral supplements  - Order, calculate, and assess calorie counts as needed  - Recommend, monitor, and adjust tube feedings and TPN/PPN based on assessed needs  - Assess need for intravenous fluids  - Provide specific nutrition/hydration education as appropriate  - Include patient/family/caregiver in decisions related to nutrition  Outcome:  Progressing     Problem: INFECTION - ADULT  Goal: Absence or prevention of progression during hospitalization  Description: INTERVENTIONS:  - Assess and monitor for signs and symptoms of infection  - Monitor lab/diagnostic results  - Monitor all insertion sites, i.e. indwelling lines, tubes, and drains  - Monitor endotracheal if appropriate and nasal secretions for changes in amount and color  - [place] appropriate cooling/warming therapies per order  - Administer medications as ordered  - Instruct and encourage patient and family to use good hand hygiene technique  - Identify and instruct in appropriate isolation precautions for identified infection/condition  Outcome: Progressing     Problem: Knowledge Deficit  Goal: Patient/family/caregiver demonstrates understanding of disease process, treatment plan, medications, and discharge instructions  Description: Complete learning assessment and assess knowledge base.  Interventions:  - Provide teaching at level of understanding  - Provide teaching via preferred learning methods  Outcome: Progressing     Problem: DISCHARGE PLANNING  Goal: Discharge to home or other facility with appropriate resources  Description: INTERVENTIONS:  - Identify barriers to discharge w/patient and caregiver  - Arrange for needed discharge resources and transportation as appropriate  - Identify discharge learning needs (meds, wound care, etc.)  - Arrange for interpretive services to assist at discharge as needed  - Refer to Case Management Department for coordinating discharge planning if the patient needs post-hospital services based on physician/advanced practitioner order or complex needs related to functional status, cognitive ability, or social support system  Outcome: Progressing

## 2024-05-07 NOTE — ASSESSMENT & PLAN NOTE
Lab Results   Component Value Date    HGBA1C  08/18/2023      Comment:      HbA1c to high to read       Recent Labs     05/07/24  0615 05/07/24  0743 05/07/24  1035 05/07/24  1059   POCGLU 113 171* 212* 238*         Blood Sugar Average: Last 72 hrs:  (P) [id number]  Patient presents with blood glucose level of 548 with mild anion gap elevation of 14  Differential diagnosis includes mild DKA versus HHNKS  Possibly secondary to acute cystitis    Initially on insulin drip.  Blood sugar and BMP improved  Discontinue insulin drip, will transition back to her home insulin regimen.  Initiated on sliding scale insulin and ET1lwsp  Monitor electrolytes

## 2024-05-07 NOTE — ASSESSMENT & PLAN NOTE
Malnutrition Findings:                                 BMI Findings:           Body mass index is 15.16 kg/m².   Present on admission as evidenced by BMI of 14 and bitemporal wasting    Encouraged healthy lifestyle modifications and weight gain  Nutrition consult

## 2024-05-07 NOTE — ASSESSMENT & PLAN NOTE
Patient was scheduled for colonoscopy on 5/6/2024 in the setting of abdominal pain and weight loss  Procedure was canceled secondary to significant hyperglycemia  Patient will undergo procedure likely tomorrow once blood glucose levels within normal limits    Gastroenterology consultation appreciated  On 5/7/2024, EGD showed gastritis. Biopsy taken during EGD and colonoscopy. No large masses seen.   Resume diet.  Continue p.o. Protonix 40 mg daily  Okay to discharge from GI standpoint.

## 2024-05-07 NOTE — CASE MANAGEMENT
Case Management Assessment & Discharge Planning Note    Patient name Barb Palomo  Location [facility]-01 MRN [id number]  : 1970 Date 2024       Current Admission Date: 2024  Current Admission Diagnosis:HHNC (hyperglycemic hyperosmolar nonketotic coma) ([facility])   Patient Active Problem List    Diagnosis Date Noted    HHNC (hyperglycemic hyperosmolar nonketotic coma) ([facility]) 2024    Abdominal pain 2024    Acute cystitis 2024    Chronic migraine without aura without status migrainosus, not intractable 2024    Unspecified psychosis not due to a substance or known physiological condition ([facility]) 2023    Pancolitis ([facility]) 2023    S/P left knee arthroscopy 10/21/2022    Agoraphobia with panic disorder 2022    Bipolar II disorder ([facility]) 2022    Depression, unspecified 2022    Polyarthritis, unspecified 2022    Chronic prescription benzodiazepine use 2022    Uncontrolled type 2 diabetes mellitus with hyperglycemia ([facility]) 2021    Nausea 2021    Weakness of right upper extremity 2021    Neuropathy     Cervical radiculopathy 2020    Lumbar radiculopathy 2020    DDD (degenerative disc disease), cervical 2020    DDD (degenerative disc disease), lumbar 2020    Low back pain with sciatica 2020    Cervical spinal stenosis 2020    Cervical spondylosis without myelopathy 2020    Paresthesia and pain of both upper extremities 2020    Paresthesia of both lower extremities 2020    Chronic pain of both shoulders 2020    Chronic cervical pain 2020    Symptom associated with female genital organs 2020    Female stress incontinence 2020    Decreased libido 2020    ROGERIO positive 2020    Diplopia 2020    Chronic migraine without aura, not intractable, without status migrainosus 10/17/2019    Arthralgia of temporomandibular joint 2019     Carpal tunnel syndrome 08/22/2019    Dysphagia 08/22/2019    Reactive airway disease without complication 02/23/2018    Protein calorie malnutrition (HCC) 08/30/2017    Tobacco dependence 08/30/2017    GERD without esophagitis 08/25/2017    History of decompression of median nerve 08/25/2017    Hypothyroidism 08/25/2017    Chronic pain disorder 11/04/2016    Pancreatic cyst 10/17/2016    Chronic diarrhea 10/04/2016    Chronic fatigue, unspecified 08/24/2016    Type 2 diabetes mellitus with other neurologic complication, with long-term current use of insulin (HCC) 05/05/2016    Severe episode of recurrent major depressive disorder, without psychotic features (HCC) 05/05/2016    Fatty liver 04/08/2016    Opioid dependence with other opioid-induced disorder (HCC) 01/25/2016    Iron deficiency 12/28/2015    Vitamin D deficiency 08/13/2015    History of stroke 05/27/2013    Hyperlipidemia 03/15/2013    Insomnia 03/04/2013    Vitamin B12 deficiency 03/04/2013    Post-traumatic stress disorder, unspecified 09/26/2012    Benign essential hypertension 09/26/2012    Temporary cerebral vascular dysfunction 07/30/2009    History of pulmonary embolism 07/30/2009    Irregular menstrual cycle 04/02/2008      LOS (days): 1  Geometric Mean LOS (GMLOS) (days): 3  Days to GMLOS:2     OBJECTIVE:    Risk of Unplanned Readmission Score: 16.29         Current admission status: Inpatient  Referral Reason: Other (post scute placement)    Preferred Pharmacy:   [id number] #[id number], [address] [facility] @[address], [place], [place] [address] [place] [address]  Phone: [phone] Fax: [phone]    [id number] - [place] - [address]  [address]  [facility] [address]  Phone: [phone] Fax: [phone]    [facility] - [place], [place] - [address]  [address]  [address] [address]  Phone: [phone] Fax: [phone]    Primary  Care Provider: Prasanna Mendez DO    Primary Insurance: MEDICARE  Secondary Insurance:     ASSESSMENT:  Active Health Care Proxies    There are no active Health Care Proxies on file.       Advance Directives  Does patient have a Health Care POA?: No  Was patient offered paperwork?: Yes (provided to patient)  Does patient currently have a Health Care decision maker?: No  Does patient have Advance Directives?: No  Was patient offered paperwork?: Yes (provided to patient)  Primary Contact: Zahira Fernandes (Daughter)  [phone] (Mobile)    Readmission Root Cause  30 Day Readmission: No    Patient Information  Admitted from:: Home  Mental Status: Alert  During Assessment patient was accompanied by: Not accompanied during assessment  Assessment information provided by:: Patient  Primary Caregiver: Self  Support Systems: Children, Self, Family members  County of Residence: [place]  What city do you live in?: [facility]  Home entry access options. Select all that apply.: Stairs  Number of steps to enter home.: 2  Do the steps have railings?: Yes  Type of Current Residence: [address] [place] [address] home  Upon entering residence, is there a bedroom on the main floor (no further steps)?: Yes  Upon entering residence, is there a bathroom on the main floor (no further steps)?: Yes  Living Arrangements: Lives Alone  Is patient a ?: No    Activities of Daily Living Prior to Admission  Functional Status: Independent  Completes ADLs independently?: Yes  Ambulates independently?: Yes  Does patient use assisted devices?: Yes  Assisted Devices (DME) used: Straight Cane, Other (Comment) (Glucometer and supplies)  Does patient currently own DME?: Yes  What DME does the patient currently own?: Straight Cane, Other (Comment) (Glucometer and supplies)  Does patient have a history of Outpatient Therapy (PT/OT)?: No  Does the patient have a history of Short-Term Rehab?: No  Does patient have a history of HHC?: Yes ([facility])  Does patient currently have  C?: No      Patient Information Continued  Income Source: SSI/SSD  Does patient have prescription coverage?: Yes  Does patient receive dialysis treatments?: No  Does patient have a history of Mental Health Diagnosis?: Yes (Severe episode of recurrent major depressive disorder, without psychotic features)  Is patient receiving treatment for mental health?: Yes  Has patient received inpatient treatment related to mental health in the last 2 years?: No    Means of Transportation  Means of Transport to Appts:: Marlyn Fernandez      Social Determinants of Health (SDOH)      Flowsheet Row Most Recent Value   Housing Stability    In the last 12 months, was there a time when you were not able to pay the mortgage or rent on time? N   In the last 12 months, how many places have you lived? 1   In the last 12 months, was there a time when you did not have a steady place to sleep or slept in a shelter (including now)? N   Transportation Needs    In the past 12 months, has lack of transportation kept you from medical appointments or from getting medications? no   In the past 12 months, has lack of transportation kept you from meetings, work, or from getting things needed for daily living? No   Food Insecurity    Within the past 12 months, you worried that your food would run out before you got the money to buy more. Never true   Within the past 12 months, the food you bought just didn't last and you didn't have money to get more. Never true   Utilities    In the past 12 months has the electric, gas, oil, or water company threatened to shut off services in your home? No            DISCHARGE DETAILS:    Discharge planning discussed with:: Patient  Freedom of Choice: Yes     CM contacted family/caregiver?: No- see comments (AAOx3)    Contacts  Patient Contacts: Zahira Fernandes (Daughter)  [phone] (Mobile)  Relationship to Patient:: Family  Contact Method: Phone  Phone Number: Zahira Fernandes (Daughter)  [phone]  (Mobile)  Reason/Outcome: Emergency Contact    Requested Home Health Care         Is the patient interested in HHC at discharge?: No    Would you like to participate in our [facility] service program?  : No - Declined    Treatment Team Recommendation: Home       Additional Comments: Met with patient at bedside.  Discussed HHC for SN for disease mgt and patient declines.  CM department following thru discharge

## 2024-05-07 NOTE — PLAN OF CARE
Problem: PAIN - ADULT  Goal: Verbalizes/displays adequate comfort level or baseline comfort level  Description: Interventions:  - Encourage patient to monitor pain and request assistance  - Assess pain using appropriate pain scale  - Administer analgesics based on type and severity of pain and evaluate response  - Implement non-pharmacological measures as appropriate and evaluate response  - Consider cultural and social influences on pain and pain management  - Notify physician/advanced practitioner if interventions unsuccessful or patient reports new pain  Outcome: Progressing     Problem: METABOLIC, FLUID AND ELECTROLYTES - ADULT  Goal: Electrolytes maintained within normal limits  Description: INTERVENTIONS:  - Monitor labs and assess patient for signs and symptoms of electrolyte imbalances  - Administer electrolyte replacement as ordered  - Monitor response to electrolyte replacements, including repeat lab results as appropriate  - Instruct patient on fluid and nutrition as appropriate  Outcome: Progressing     Problem: METABOLIC, FLUID AND ELECTROLYTES - ADULT  Goal: Glucose maintained within target range  Description: INTERVENTIONS:  - Monitor Blood Glucose as ordered  - Assess for signs and symptoms of hyperglycemia and hypoglycemia  - Administer ordered medications to maintain glucose within target range  - Assess nutritional intake and initiate nutrition service referral as needed  Outcome: Progressing     Problem: Nutrition/Hydration-ADULT  Goal: Nutrient/Hydration intake appropriate for improving, restoring or maintaining nutritional needs  Description: Monitor and assess patient's nutrition/hydration status for malnutrition. Collaborate with interdisciplinary team and initiate plan and interventions as ordered.  Monitor patient's weight and dietary intake as ordered or per policy. Utilize nutrition screening tool and intervene as necessary. Determine patient's food preferences and provide high-protein,  high-caloric foods as appropriate.     INTERVENTIONS:  - Monitor oral intake, urinary output, labs, and treatment plans  - Assess nutrition and hydration status and recommend course of action  - Evaluate amount of meals eaten  - Assist patient with eating if necessary   - Allow adequate time for meals  - Recommend/ encourage appropriate diets, oral nutritional supplements, and vitamin/mineral supplements  - Order, calculate, and assess calorie counts as needed  - Recommend, monitor, and adjust tube feedings and TPN/PPN based on assessed needs  - Assess need for intravenous fluids  - Provide specific nutrition/hydration education as appropriate  - Include patient/family/caregiver in decisions related to nutrition  Outcome: Progressing

## 2024-05-07 NOTE — ASSESSMENT & PLAN NOTE
Blood pressures quite soft, currently stable    Hold home ARB and beta-blocker  Monitor blood pressures off IV fluid

## 2024-05-07 NOTE — ANESTHESIA POSTPROCEDURE EVALUATION
Post-Op Assessment Note    CV Status:  Stable    Pain management: adequate       Mental Status:  Alert and awake   Hydration Status:  Euvolemic   PONV Controlled:  Controlled   Airway Patency:  Patent     Post Op Vitals Reviewed: Yes    No anethesia notable event occurred.    Staff: CRNA               /79 (05/07/24 1023)    Temp (!) 96.8 °F (36 °C) (05/07/24 1023)    Pulse (!) 108 (05/07/24 1023)   Resp 16 (05/07/24 1023)    SpO2 98 % (05/07/24 1023)

## 2024-05-08 VITALS
DIASTOLIC BLOOD PRESSURE: 81 MMHG | SYSTOLIC BLOOD PRESSURE: 115 MMHG | OXYGEN SATURATION: 96 % | BODY MASS INDEX: 17.24 KG/M2 | RESPIRATION RATE: 18 BRPM | HEIGHT: 62 IN | WEIGHT: 93.7 LBS | HEART RATE: 97 BPM | TEMPERATURE: 99 F

## 2024-05-08 PROBLEM — E43 SEVERE PROTEIN-CALORIE MALNUTRITION (HCC): Status: ACTIVE | Noted: 2024-05-08

## 2024-05-08 LAB
ANION GAP SERPL CALCULATED.3IONS-SCNC: 7 MMOL/L (ref 4–13)
BACTERIA UR CULT: ABNORMAL
BASOPHILS # BLD AUTO: 0.04 THOUSANDS/ÂΜL (ref 0–0.1)
BASOPHILS NFR BLD AUTO: 1 % (ref 0–1)
BUN SERPL-MCNC: 7 MG/DL (ref 5–25)
CALCIUM SERPL-MCNC: 8.2 MG/DL (ref 8.4–10.2)
CHLORIDE SERPL-SCNC: 108 MMOL/L (ref 96–108)
CO2 SERPL-SCNC: 26 MMOL/L (ref 21–32)
CREAT SERPL-MCNC: 0.4 MG/DL (ref 0.6–1.3)
EOSINOPHIL # BLD AUTO: 0.08 THOUSAND/ÂΜL (ref 0–0.61)
EOSINOPHIL NFR BLD AUTO: 1 % (ref 0–6)
ERYTHROCYTE [DISTWIDTH] IN BLOOD BY AUTOMATED COUNT: 12.4 % (ref 11.6–15.1)
GFR SERPL CREATININE-BSD FRML MDRD: 119 ML/MIN/1.73SQ M
GLUCOSE SERPL-MCNC: 169 MG/DL (ref 65–140)
GLUCOSE SERPL-MCNC: 173 MG/DL (ref 65–140)
GLUCOSE SERPL-MCNC: 234 MG/DL (ref 65–140)
HCT VFR BLD AUTO: 35.1 % (ref 34.8–46.1)
HGB BLD-MCNC: 10.4 G/DL (ref 11.5–15.4)
IMM GRANULOCYTES # BLD AUTO: 0.02 THOUSAND/UL (ref 0–0.2)
IMM GRANULOCYTES NFR BLD AUTO: 0 % (ref 0–2)
LYMPHOCYTES # BLD AUTO: 2.62 THOUSANDS/ÂΜL (ref 0.6–4.47)
LYMPHOCYTES NFR BLD AUTO: 46 % (ref 14–44)
MAGNESIUM SERPL-MCNC: 1.5 MG/DL (ref 1.9–2.7)
MCH RBC QN AUTO: 30.7 PG (ref 26.8–34.3)
MCHC RBC AUTO-ENTMCNC: 29.3 G/DL (ref 31.4–37.4)
MCV RBC AUTO: 105 FL (ref 82–98)
MONOCYTES # BLD AUTO: 0.34 THOUSAND/ÂΜL (ref 0.17–1.22)
MONOCYTES NFR BLD AUTO: 6 % (ref 4–12)
NEUTROPHILS # BLD AUTO: 2.62 THOUSANDS/ÂΜL (ref 1.85–7.62)
NEUTS SEG NFR BLD AUTO: 46 % (ref 43–75)
NRBC BLD AUTO-RTO: 0 /100 WBCS
PHOSPHATE SERPL-MCNC: 2.6 MG/DL (ref 2.7–4.5)
PLATELET # BLD AUTO: 270 THOUSANDS/UL (ref 149–390)
PMV BLD AUTO: 10.1 FL (ref 8.9–12.7)
POTASSIUM SERPL-SCNC: 3.2 MMOL/L (ref 3.5–5.3)
RBC # BLD AUTO: 3.36 MILLION/UL (ref 3.81–5.12)
SODIUM SERPL-SCNC: 141 MMOL/L (ref 135–147)
WBC # BLD AUTO: 5.72 THOUSAND/UL (ref 4.31–10.16)

## 2024-05-08 PROCEDURE — 85025 COMPLETE CBC W/AUTO DIFF WBC: CPT | Performed by: INTERNAL MEDICINE

## 2024-05-08 PROCEDURE — 82948 REAGENT STRIP/BLOOD GLUCOSE: CPT

## 2024-05-08 PROCEDURE — 83735 ASSAY OF MAGNESIUM: CPT | Performed by: INTERNAL MEDICINE

## 2024-05-08 PROCEDURE — 99239 HOSP IP/OBS DSCHRG MGMT >30: CPT | Performed by: INTERNAL MEDICINE

## 2024-05-08 PROCEDURE — 84100 ASSAY OF PHOSPHORUS: CPT | Performed by: INTERNAL MEDICINE

## 2024-05-08 PROCEDURE — 80048 BASIC METABOLIC PNL TOTAL CA: CPT | Performed by: INTERNAL MEDICINE

## 2024-05-08 RX ORDER — LANOLIN ALCOHOL/MO/W.PET/CERES
400 CREAM (GRAM) TOPICAL 2 TIMES DAILY
Status: DISCONTINUED | OUTPATIENT
Start: 2024-05-08 | End: 2024-05-08 | Stop reason: HOSPADM

## 2024-05-08 RX ORDER — LANOLIN ALCOHOL/MO/W.PET/CERES
400 CREAM (GRAM) TOPICAL 2 TIMES DAILY
Qty: 30 TABLET | Refills: 0 | Status: SHIPPED | OUTPATIENT
Start: 2024-05-08 | End: 2024-05-23

## 2024-05-08 RX ORDER — CEPHALEXIN 500 MG/1
500 CAPSULE ORAL EVERY 6 HOURS SCHEDULED
Qty: 20 CAPSULE | Refills: 0 | Status: SHIPPED | OUTPATIENT
Start: 2024-05-08 | End: 2024-05-13

## 2024-05-08 RX ORDER — POTASSIUM CHLORIDE 20 MEQ/1
40 TABLET, EXTENDED RELEASE ORAL ONCE
Status: COMPLETED | OUTPATIENT
Start: 2024-05-08 | End: 2024-05-08

## 2024-05-08 RX ORDER — MAGNESIUM SULFATE HEPTAHYDRATE 40 MG/ML
2 INJECTION, SOLUTION INTRAVENOUS ONCE
Status: COMPLETED | OUTPATIENT
Start: 2024-05-08 | End: 2024-05-08

## 2024-05-08 RX ADMIN — POTASSIUM CHLORIDE 40 MEQ: 1500 TABLET, EXTENDED RELEASE ORAL at 10:22

## 2024-05-08 RX ADMIN — INSULIN LISPRO 2 UNITS: 100 INJECTION, SOLUTION INTRAVENOUS; SUBCUTANEOUS at 11:54

## 2024-05-08 RX ADMIN — TRAMADOL HYDROCHLORIDE 50 MG: 50 TABLET, COATED ORAL at 10:33

## 2024-05-08 RX ADMIN — FLUTICASONE FUROATE AND VILANTEROL TRIFENATATE 1 PUFF: 200; 25 POWDER RESPIRATORY (INHALATION) at 08:07

## 2024-05-08 RX ADMIN — PANTOPRAZOLE SODIUM 40 MG: 40 TABLET, DELAYED RELEASE ORAL at 06:15

## 2024-05-08 RX ADMIN — Medication 400 MG: at 10:24

## 2024-05-08 RX ADMIN — ASPIRIN 81 MG CHEWABLE TABLET 81 MG: 81 TABLET CHEWABLE at 08:07

## 2024-05-08 RX ADMIN — INSULIN LISPRO 1 UNITS: 100 INJECTION, SOLUTION INTRAVENOUS; SUBCUTANEOUS at 08:11

## 2024-05-08 RX ADMIN — NICOTINE 1 PATCH: 21 PATCH, EXTENDED RELEASE TRANSDERMAL at 08:11

## 2024-05-08 RX ADMIN — ATORVASTATIN CALCIUM 80 MG: 80 TABLET, FILM COATED ORAL at 08:07

## 2024-05-08 RX ADMIN — TOPIRAMATE 25 MG: 25 TABLET, FILM COATED ORAL at 08:07

## 2024-05-08 RX ADMIN — GABAPENTIN 400 MG: 400 CAPSULE ORAL at 08:12

## 2024-05-08 RX ADMIN — LEVOTHYROXINE SODIUM 112 MCG: 112 TABLET ORAL at 06:15

## 2024-05-08 RX ADMIN — INSULIN ASPART 20 UNITS: 100 INJECTION, SUSPENSION SUBCUTANEOUS at 08:12

## 2024-05-08 RX ADMIN — DULOXETINE HYDROCHLORIDE 60 MG: 60 CAPSULE, DELAYED RELEASE ORAL at 08:07

## 2024-05-08 RX ADMIN — DIAZEPAM 5 MG: 5 TABLET ORAL at 10:33

## 2024-05-08 RX ADMIN — MAGNESIUM SULFATE HEPTAHYDRATE 2 G: 2 INJECTION, SOLUTION INTRAVENOUS at 10:23

## 2024-05-08 NOTE — ASSESSMENT & PLAN NOTE
Patient was scheduled for colonoscopy on 5/6/2024 in the setting of abdominal pain and weight loss  Procedure was canceled secondary to significant hyperglycemia  Patient will undergo procedure likely tomorrow once blood glucose levels within normal limits    Gastroenterology consultation appreciated  On 5/7/2024, EGD showed gastritis. Biopsy taken during EGD and colonoscopy. No large masses seen.   Resume diet without issue.  Continue p.o. Protonix 40 mg daily  Okay to discharge from GI standpoint.

## 2024-05-08 NOTE — DISCHARGE SUMMARY
[facility]  Discharge- Barb Palomo 1970, 54 y.o. female MRN: [id number]  Unit/Bed#: 7T [facility] 714-01 Encounter: [id number]  Primary Care Provider: Prasanna Mendez DO   Date and time admitted to hospital: 5/6/2024 11:07 AM    * HHNC (hyperglycemic hyperosmolar nonketotic coma) (HCC)  Assessment & Plan  Lab Results   Component Value Date    HGBA1C  08/18/2023      Comment:      HbA1c to high to read       Recent Labs     05/07/24  1540 05/07/24 2027 05/08/24  0618 05/08/24  1129   POCGLU 382* 253* 173* 234*         Blood Sugar Average: Last 72 hrs:  (P) [id number]  Patient presents with blood glucose level of 548 with mild anion gap elevation of 14  Differential diagnosis includes mild DKA versus HHNKS  Possibly secondary to acute cystitis    Initially on insulin drip.  Blood sugar and BMP improved  Discontinue insulin drip, transitioned back to her home insulin regimen.  Patient was on NovoLog 70/30 20 units twice daily before meals and metformin 500 mg twice daily  Initiated on sliding scale insulin and [id number]  Monitor electrolytes        Severe protein-calorie malnutrition (HCC)  Assessment & Plan  Malnutrition Findings:   Adult Malnutrition type: Chronic illness  Adult Degree of Malnutrition: Other severe protein calorie malnutrition  Malnutrition Characteristics: Fat loss, Weight loss                  360 Statement: Severe protein calorie malnutrition in the setting of chronic illness (untreated DM) related to fat loss, muscle loss, and wt loss as evidenced by subcutaneous fat loss of triceps, protruding clavicle, and 6.8% wt loss in 1 month treated with diabetic diet and supplements TID.    BMI Findings:  Adult BMI Classifications: Underweight < 18.5        Body mass index is 17.14 kg/m².     Nutritional supplementation      Acute cystitis  Assessment & Plan  Patient with pyuria and bacteriuria on urinalysis  May likely be etiology of significant  hyperglycemia    Continue IV ceftriaxone  De-escalate antibiotics as appropriate  Urine culture showed Klebsiella which is sensitive to cefazolin.  Will be discharged on Keflex 500 mg every 6h for 5 days.    Abdominal pain  Assessment & Plan  Patient was scheduled for colonoscopy on 5/6/2024 in the setting of abdominal pain and weight loss  Procedure was canceled secondary to significant hyperglycemia  Patient will undergo procedure likely tomorrow once blood glucose levels within normal limits    Gastroenterology consultation appreciated  On 5/7/2024, EGD showed gastritis. Biopsy taken during EGD and colonoscopy. No large masses seen.   Resume diet without issue.  Continue p.o. Protonix 40 mg daily  Okay to discharge from GI standpoint.    Benign essential hypertension  Assessment & Plan  Blood pressures quite soft, currently stable    Recommended to hold hold home ARB and beta-blocker for now and follow-up with PCP  BP has been stable without IV fluid      Hypothyroidism  Assessment & Plan  Stable and chronic in nature    Continue home levothyroxine 112 mcg daily  Outpatient follow-up with PCP    Severe episode of recurrent major depressive disorder, without psychotic features (HCC)  Assessment & Plan  Mood appears appropriate    Continue home duloxetine, mirtazapine, as needed diazepam  Monitor patient's mood      Discharging Physician / Practitioner: Cindy Monaco MD  PCP: Prasanna Mendez DO  Admission Date:   Admission Orders (From admission, onward)       Ordered        05/06/24 1308  INPATIENT ADMISSION  Once                          Discharge Date: 05/08/24    Medical Problems       Resolved Problems  Date Reviewed: 5/8/2024   None         Consultations During Hospital Stay:  Gastroenterologist    Procedures Performed:   EGD and colonoscopy    Significant Findings / Test Results:   Colonoscopy Result Date: 5/7/2024  Impression: The terminal ileum appeared normal. Hemorrhoids Poor prep.  No large masses were  seen.  Small lesions could have been missed. Performed forceps biopsies in the ascending colon, transverse colon and descending colon to rule out colitis RECOMMENDATION:  Repeat colonoscopy in 1 year  Inadequate bowel preparation   Await pathology results Increase fiber in diet.  Carson Gentile MD     EGD Result Date: 5/7/2024  Impression: Grade A esophagitis Edematous and erythematous mucosa in the antrum, duodenal bulb and 1st part of the duodenum Performed forceps biopsies in the esophagus to rule out eosinophilic esophagitis Performed forceps biopsies in the body of the stomach, incisura and antrum to rule out H. pylori Performed forceps biopsies in the duodenal bulb, 1st part of the duodenum and 2nd part of the duodenum to rule out celiac disease RECOMMENDATION:  Await pathology results Continue pantoprazole. Proceed with colonoscopy.   Carson Gentile MD        Incidental Findings:   See above    Test Results Pending at Discharge (will require follow up):   Not applicable     Outpatient Tests Requested:  Not applicable    Complications: None    Reason for Admission: Hyperglycemia    Hospital Course:     Barb Palomo is a 54 y.o. female patient with PMH of type II DM, hypothyroidism, HTN, major depressive disorder who originally presented to the hospital on 5/6/2024 due to hyperglycemia.  Patient was referred from outpatient colonoscopy.  Patient was started on insulin drip with improvement in hyperglycemia.  Then patient was transition back to her home regimen with good blood sugar control.  Patient underwent EGD and colonoscopy.  Patient was also started on IV ceftriaxone for UTI with clinical improvement.  Urine culture showed Klebsiella which is sensitive to cefazolin.  Therefore patient will be discharged on Keflex 500 mg every 6h for 5 days.  Patient blood pressure was soft during hospitalization therefore patient ARB and metoprolol was on hold.  Patient is recommended to hold ARB and metoprolol  "until follow-up with PCP.    Patient is clinically hemodynamically stable to be discharged today.    Please see above list of diagnoses and related plan for additional information.     Condition at Discharge: stable     Discharge Day Visit / Exam:     Subjective:  Patient was seen and examined at bedside. The patient denies any pain, headache, blurry vision, chest pain, palpitation, shortness of breath, N/V, abd pain.  Patient has been eating and drinking without any issue.    Vitals: Blood Pressure: 115/81 (05/08/24 0719)  Pulse: 97 (05/08/24 0719)  Temperature: 99 °F (37.2 °C) (05/08/24 0719)  Temp Source: Temporal (05/08/24 0719)  Respirations: 18 (05/08/24 0719)  Height: 5' 2\" (157.5 cm) (05/06/24 1424)  Weight - Scale: 42.5 kg (93 lb 11.1 oz) (05/08/24 0600)  SpO2: 96 % (05/07/24 2100)  Exam:   Physical Exam  General: no acute distress  HEENT: NC/AT, PERRL, EOM - normal  Neck: Supple  Pulm/Chest: Normal chest wall expansion, clear breath sounds on both side  CVS: normal S1&S2, capillary refill <2s  Abd: soft, non tender, non distended, bowel sounds +  MSK: move all 4 extremities spontaneously  Skin: warm  CNS: no acute focal neuro deficit    Discussion with Family: Discussed with patient    Discharge instructions/Information to patient and family:   See after visit summary for information provided to patient and family.      Provisions for Follow-Up Care:  See after visit summary for information related to follow-up care and any pertinent home health orders.      Disposition:     Home    For Discharges to [facility]:   Not Applicable to this Patient - Not Applicable to this Patient    Planned Readmission: No     Discharge Statement:  I spent 45 minutes discharging the patient. This time was spent on the day of discharge. I had direct contact with the patient on the day of discharge. Greater than 50% of the total time was spent examining patient, answering all patient questions, arranging and " discussing plan of care with patient as well as directly providing post-discharge instructions.  Additional time then spent on discharge activities.    Discharge Medications:  See after visit summary for reconciled discharge medications provided to patient and family.      ** Please Note: This note has been constructed using a voice recognition system **

## 2024-05-08 NOTE — PLAN OF CARE
Problem: Potential for Falls  Goal: Patient will remain free of falls  Description: INTERVENTIONS:  - Educate patient/family on patient safety including physical limitations  - Instruct patient to call for assistance with activity   - Consult OT/PT to assist with strengthening/mobility   - Keep Call bell within reach  - Keep bed low and locked with side rails adjusted as appropriate  - Keep care items and personal belongings within reach  - Initiate and maintain comfort rounds  - Make Fall Risk Sign visible to staff  - Offer Toileting every  Hours, in advance of need  - Initiate/Maintain alarm  - Obtain necessary fall risk management equipment:   - Apply yellow socks and bracelet for high fall risk patients  - Consider moving patient to room near nurses station  Outcome: Progressing     Problem: PAIN - ADULT  Goal: Verbalizes/displays adequate comfort level or baseline comfort level  Description: Interventions:  - Encourage patient to monitor pain and request assistance  - Assess pain using appropriate pain scale  - Administer analgesics based on type and severity of pain and evaluate response  - Implement non-pharmacological measures as appropriate and evaluate response  - Consider cultural and social influences on pain and pain management  - Notify physician/advanced practitioner if interventions unsuccessful or patient reports new pain  Outcome: Progressing     Problem: INFECTION - ADULT  Goal: Absence or prevention of progression during hospitalization  Description: INTERVENTIONS:  - Assess and monitor for signs and symptoms of infection  - Monitor lab/diagnostic results  - Monitor all insertion sites, i.e. indwelling lines, tubes, and drains  - Monitor endotracheal if appropriate and nasal secretions for changes in amount and color  - [place] appropriate cooling/warming therapies per order  - Administer medications as ordered  - Instruct and encourage patient and family to use good hand hygiene technique  -  Identify and instruct in appropriate isolation precautions for identified infection/condition  Outcome: Progressing     Problem: METABOLIC, FLUID AND ELECTROLYTES - ADULT  Goal: Glucose maintained within target range  Description: INTERVENTIONS:  - Monitor Blood Glucose as ordered  - Assess for signs and symptoms of hyperglycemia and hypoglycemia  - Administer ordered medications to maintain glucose within target range  - Assess nutritional intake and initiate nutrition service referral as needed  Outcome: Progressing     Problem: Nutrition/Hydration-ADULT  Goal: Nutrient/Hydration intake appropriate for improving, restoring or maintaining nutritional needs  Description: Monitor and assess patient's nutrition/hydration status for malnutrition. Collaborate with interdisciplinary team and initiate plan and interventions as ordered.  Monitor patient's weight and dietary intake as ordered or per policy. Utilize nutrition screening tool and intervene as necessary. Determine patient's food preferences and provide high-protein, high-caloric foods as appropriate.     INTERVENTIONS:  - Monitor oral intake, urinary output, labs, and treatment plans  - Assess nutrition and hydration status and recommend course of action  - Evaluate amount of meals eaten  - Assist patient with eating if necessary   - Allow adequate time for meals  - Recommend/ encourage appropriate diets, oral nutritional supplements, and vitamin/mineral supplements  - Order, calculate, and assess calorie counts as needed  - Recommend, monitor, and adjust tube feedings and TPN/PPN based on assessed needs  - Assess need for intravenous fluids  - Provide specific nutrition/hydration education as appropriate  - Include patient/family/caregiver in decisions related to nutrition  Outcome: Progressing

## 2024-05-08 NOTE — DISCHARGE INSTR - AVS FIRST PAGE
Discharge instructions from hospitalist  1. Follow-up with your primary care physician in 1 week in regards to recent hospitalization.    Recommended to hold hold home ARB and beta-blocker for now due to soft blood pressures while inpatient and follow-up with PCP    2. Take medications regularly    3. Come back to the ER if symptoms recur or worsen  4. Activity as tolerated  5. Diet :  Heart healthy diet

## 2024-05-08 NOTE — CASE MANAGEMENT
Case Management Discharge Planning Note    Patient name Barb Palomo  Location 7T [facility] 714/7T [facility] 714-01 MRN [id number]  : 1970 Date 2024       Current Admission Date: 2024  Current Admission Diagnosis:HHNC (hyperglycemic hyperosmolar nonketotic coma) ([facility])   Patient Active Problem List    Diagnosis Date Noted    Severe protein-calorie malnutrition ([facility]) 2024    HHNC (hyperglycemic hyperosmolar nonketotic coma) ([facility]) 2024    Abdominal pain 2024    Acute cystitis 2024    Chronic migraine without aura without status migrainosus, not intractable 2024    Unspecified psychosis not due to a substance or known physiological condition ([facility]) 2023    Pancolitis ([facility]) 2023    S/P left knee arthroscopy 10/21/2022    Agoraphobia with panic disorder 2022    Bipolar II disorder ([facility]) 2022    Depression, unspecified 2022    Polyarthritis, unspecified 2022    Chronic prescription benzodiazepine use 2022    Uncontrolled type 2 diabetes mellitus with hyperglycemia ([facility]) 2021    Nausea 2021    Weakness of right upper extremity 2021    Neuropathy     Cervical radiculopathy 2020    Lumbar radiculopathy 2020    DDD (degenerative disc disease), cervical 2020    DDD (degenerative disc disease), lumbar 2020    Low back pain with sciatica 2020    Cervical spinal stenosis 2020    Cervical spondylosis without myelopathy 2020    Paresthesia and pain of both upper extremities 2020    Paresthesia of both lower extremities 2020    Chronic pain of both shoulders 2020    Chronic cervical pain 2020    Symptom associated with female genital organs 2020    Female stress incontinence 2020    Decreased libido 2020    ROGERIO positive 2020    Diplopia 2020    Chronic migraine without aura, not intractable, without status migrainosus 10/17/2019     Arthralgia of temporomandibular joint 08/22/2019    Carpal tunnel syndrome 08/22/2019    Dysphagia 08/22/2019    Reactive airway disease without complication 02/23/2018    Protein calorie malnutrition (HCC) 08/30/2017    Tobacco dependence 08/30/2017    GERD without esophagitis 08/25/2017    History of decompression of median nerve 08/25/2017    Hypothyroidism 08/25/2017    Chronic pain disorder 11/04/2016    Pancreatic cyst 10/17/2016    Chronic diarrhea 10/04/2016    Chronic fatigue, unspecified 08/24/2016    Type 2 diabetes mellitus with other neurologic complication, with long-term current use of insulin (HCC) 05/05/2016    Severe episode of recurrent major depressive disorder, without psychotic features (HCC) 05/05/2016    Fatty liver 04/08/2016    Opioid dependence with other opioid-induced disorder (HCC) 01/25/2016    Iron deficiency 12/28/2015    Vitamin D deficiency 08/13/2015    History of stroke 05/27/2013    Hyperlipidemia 03/15/2013    Insomnia 03/04/2013    Vitamin B12 deficiency 03/04/2013    Post-traumatic stress disorder, unspecified 09/26/2012    Benign essential hypertension 09/26/2012    Temporary cerebral vascular dysfunction 07/30/2009    History of pulmonary embolism 07/30/2009    Irregular menstrual cycle 04/02/2008      LOS (days): 2  Geometric Mean LOS (GMLOS) (days): 3  Days to GMLOS:1     OBJECTIVE:  Risk of Unplanned Readmission Score: 18.09       Current admission status: Inpatient   Preferred Pharmacy:   [id number], [address] @[address], [place]  [address] [place], [place] [address]  Phone: [phone] Fax: [phone]    [id number] - [place] - [address]  [address]  [facility] [address]  Phone: [phone] Fax: [phone]    [facility] - [place], [place] - [address]  [address]  [address] [address]  Phone: [phone] Fax: [phone]    Primary Care  Provider: Prasanna Mendez DO    Primary Insurance: MEDICARE  Secondary Insurance:     DISCHARGE DETAILS:    Discharge planning discussed with:: Patient       Transport at Discharge : Other (Comment) ([facility])  Dispatcher Contacted: Yes  Number/Name of Dispatcher: Via roundtrip  Transported by (Company and Unit #): [employer]  ETA of Transport (Date): 05/08/24  ETA of Transport (Time): 9245       Additional Comments: [employer] waiver signed and provided to media

## 2024-05-08 NOTE — ASSESSMENT & PLAN NOTE
Malnutrition Findings:   Adult Malnutrition type: Chronic illness  Adult Degree of Malnutrition: Other severe protein calorie malnutrition  Malnutrition Characteristics: Fat loss, Weight loss                  360 Statement: Severe protein calorie malnutrition in the setting of chronic illness (untreated DM) related to fat loss, muscle loss, and wt loss as evidenced by subcutaneous fat loss of triceps, protruding clavicle, and 6.8% wt loss in 1 month treated with diabetic diet and supplements TID.    BMI Findings:  Adult BMI Classifications: Underweight < 18.5        Body mass index is 17.14 kg/m².     Nutritional supplementation

## 2024-05-08 NOTE — NURSING NOTE
Discharge instructions given to pt. Pt verbalizes the understanding of discharge instructions. All belongings given to pt. Pt in no distress and no concerns at this time.

## 2024-05-08 NOTE — PLAN OF CARE
Problem: Nutrition/Hydration-ADULT  Goal: Nutrient/Hydration intake appropriate for improving, restoring or maintaining nutritional needs  Description: Monitor and assess patient's nutrition/hydration status for malnutrition. Collaborate with interdisciplinary team and initiate plan and interventions as ordered.  Monitor patient's weight and dietary intake as ordered or per policy. Utilize nutrition screening tool and intervene as necessary. Determine patient's food preferences and provide high-protein, high-caloric foods as appropriate.     INTERVENTIONS:  - Monitor oral intake, urinary output, labs, and treatment plans  - Assess nutrition and hydration status and recommend course of action  - Evaluate amount of meals eaten  - Assist patient with eating if necessary   - Allow adequate time for meals  - Recommend/ encourage appropriate diets, oral nutritional supplements, and vitamin/mineral supplements  - Order, calculate, and assess calorie counts as needed  - Recommend, monitor, and adjust tube feedings and TPN/PPN based on assessed needs  - Assess need for intravenous fluids  - Provide specific nutrition/hydration education as appropriate  - Include patient/family/caregiver in decisions related to nutrition  Outcome: Progressing     Problem: METABOLIC, FLUID AND ELECTROLYTES - ADULT  Goal: Glucose maintained within target range  Description: INTERVENTIONS:  - Monitor Blood Glucose as ordered  - Assess for signs and symptoms of hyperglycemia and hypoglycemia  - Administer ordered medications to maintain glucose within target range  - Assess nutritional intake and initiate nutrition service referral as needed  Outcome: Progressing     Problem: PAIN - ADULT  Goal: Verbalizes/displays adequate comfort level or baseline comfort level  Description: Interventions:  - Encourage patient to monitor pain and request assistance  - Assess pain using appropriate pain scale  - Administer analgesics based on type and severity  of pain and evaluate response  - Implement non-pharmacological measures as appropriate and evaluate response  - Consider cultural and social influences on pain and pain management  - Notify physician/advanced practitioner if interventions unsuccessful or patient reports new pain  Outcome: Progressing     Problem: Knowledge Deficit  Goal: Patient/family/caregiver demonstrates understanding of disease process, treatment plan, medications, and discharge instructions  Description: Complete learning assessment and assess knowledge base.  Interventions:  - Provide teaching at level of understanding  - Provide teaching via preferred learning methods  Outcome: Progressing     Problem: DISCHARGE PLANNING  Goal: Discharge to home or other facility with appropriate resources  Description: INTERVENTIONS:  - Identify barriers to discharge w/patient and caregiver  - Arrange for needed discharge resources and transportation as appropriate  - Identify discharge learning needs (meds, wound care, etc.)  - Arrange for interpretive services to assist at discharge as needed  - Refer to Case Management Department for coordinating discharge planning if the patient needs post-hospital services based on physician/advanced practitioner order or complex needs related to functional status, cognitive ability, or social support system  Outcome: Progressing

## 2024-05-08 NOTE — ASSESSMENT & PLAN NOTE
Patient with pyuria and bacteriuria on urinalysis  May likely be etiology of significant hyperglycemia    Continue IV ceftriaxone  De-escalate antibiotics as appropriate  Urine culture showed Klebsiella which is sensitive to cefazolin.  Will be discharged on Keflex 500 mg every 6h for 5 days.

## 2024-05-08 NOTE — ASSESSMENT & PLAN NOTE
Lab Results   Component Value Date    HGBA1C  08/18/2023      Comment:      HbA1c to high to read       Recent Labs     05/07/24  1540 05/07/24 2027 05/08/24  0618 05/08/24  1129   POCGLU 382* 253* 173* 234*         Blood Sugar Average: Last 72 hrs:  (P) [id number]  Patient presents with blood glucose level of 548 with mild anion gap elevation of 14  Differential diagnosis includes mild DKA versus HHNKS  Possibly secondary to acute cystitis    Initially on insulin drip.  Blood sugar and BMP improved  Discontinue insulin drip, transitioned back to her home insulin regimen.  Patient was on NovoLog 70/30 20 units twice daily before meals and metformin 500 mg twice daily  Initiated on sliding scale insulin and [id number]  Monitor electrolytes

## 2024-05-08 NOTE — ASSESSMENT & PLAN NOTE
Blood pressures quite soft, currently stable    Recommended to hold hold home ARB and beta-blocker for now and follow-up with PCP  BP has been stable without IV fluid

## 2024-05-09 ENCOUNTER — TRANSITIONAL CARE MANAGEMENT (OUTPATIENT)
Dept: FAMILY MEDICINE CLINIC | Facility: CLINIC | Age: 54
End: 2024-05-09

## 2024-05-13 PROCEDURE — 88305 TISSUE EXAM BY PATHOLOGIST: CPT | Performed by: STUDENT IN AN ORGANIZED HEALTH CARE EDUCATION/TRAINING PROGRAM

## 2024-05-13 PROCEDURE — 88342 IMHCHEM/IMCYTCHM 1ST ANTB: CPT | Performed by: STUDENT IN AN ORGANIZED HEALTH CARE EDUCATION/TRAINING PROGRAM

## 2024-05-13 PROCEDURE — 88341 IMHCHEM/IMCYTCHM EA ADD ANTB: CPT | Performed by: STUDENT IN AN ORGANIZED HEALTH CARE EDUCATION/TRAINING PROGRAM

## 2024-05-23 ENCOUNTER — OFFICE VISIT (OUTPATIENT)
Dept: FAMILY MEDICINE CLINIC | Facility: CLINIC | Age: 54
End: 2024-05-23
Payer: MEDICARE

## 2024-05-23 VITALS
WEIGHT: 89.6 LBS | OXYGEN SATURATION: 99 % | DIASTOLIC BLOOD PRESSURE: 60 MMHG | HEART RATE: 118 BPM | RESPIRATION RATE: 16 BRPM | HEIGHT: 62 IN | SYSTOLIC BLOOD PRESSURE: 90 MMHG | BODY MASS INDEX: 16.49 KG/M2 | TEMPERATURE: 96.6 F

## 2024-05-23 DIAGNOSIS — Z12.31 ENCOUNTER FOR SCREENING MAMMOGRAM FOR MALIGNANT NEOPLASM OF BREAST: ICD-10-CM

## 2024-05-23 DIAGNOSIS — F40.01 PANIC DISORDER WITH AGORAPHOBIA: ICD-10-CM

## 2024-05-23 DIAGNOSIS — E11.49 TYPE 2 DIABETES MELLITUS WITH OTHER NEUROLOGIC COMPLICATION, WITH LONG-TERM CURRENT USE OF INSULIN (HCC): ICD-10-CM

## 2024-05-23 DIAGNOSIS — G89.4 CHRONIC PAIN SYNDROME: Primary | ICD-10-CM

## 2024-05-23 DIAGNOSIS — Z79.899 HIGH RISK MEDICATION USE: ICD-10-CM

## 2024-05-23 DIAGNOSIS — N30.01 ACUTE CYSTITIS WITH HEMATURIA: ICD-10-CM

## 2024-05-23 DIAGNOSIS — Z79.4 TYPE 2 DIABETES MELLITUS WITH OTHER NEUROLOGIC COMPLICATION, WITH LONG-TERM CURRENT USE OF INSULIN (HCC): ICD-10-CM

## 2024-05-23 LAB
SL AMB  POCT GLUCOSE, UA: ABNORMAL
SL AMB LEUKOCYTE ESTERASE,UA: ABNORMAL
SL AMB POCT BILIRUBIN,UA: ABNORMAL
SL AMB POCT BLOOD,UA: ABNORMAL
SL AMB POCT CLARITY,UA: CLEAR
SL AMB POCT COLOR,UA: YELLOW
SL AMB POCT KETONES,UA: ABNORMAL
SL AMB POCT NITRITE,UA: ABNORMAL
SL AMB POCT PH,UA: 6
SL AMB POCT SPECIFIC GRAVITY,UA: 1.01
SL AMB POCT URINE PROTEIN: ABNORMAL
SL AMB POCT UROBILINOGEN: 0.2

## 2024-05-23 PROCEDURE — 99214 OFFICE O/P EST MOD 30 MIN: CPT | Performed by: NURSE PRACTITIONER

## 2024-05-23 PROCEDURE — 87077 CULTURE AEROBIC IDENTIFY: CPT | Performed by: NURSE PRACTITIONER

## 2024-05-23 PROCEDURE — 81003 URINALYSIS AUTO W/O SCOPE: CPT | Performed by: NURSE PRACTITIONER

## 2024-05-23 PROCEDURE — 87186 SC STD MICRODIL/AGAR DIL: CPT | Performed by: NURSE PRACTITIONER

## 2024-05-23 PROCEDURE — 87086 URINE CULTURE/COLONY COUNT: CPT | Performed by: NURSE PRACTITIONER

## 2024-05-23 RX ORDER — NALOXONE HYDROCHLORIDE 4 MG/.1ML
SPRAY NASAL
Qty: 1 EACH | Refills: 1 | Status: SHIPPED | OUTPATIENT
Start: 2024-05-23 | End: 2025-05-23

## 2024-05-23 RX ORDER — TRAMADOL HYDROCHLORIDE 50 MG/1
50 TABLET ORAL EVERY 8 HOURS PRN
Qty: 90 TABLET | Refills: 0 | Status: SHIPPED | OUTPATIENT
Start: 2024-05-23

## 2024-05-23 RX ORDER — DIAZEPAM 5 MG/1
5 TABLET ORAL 2 TIMES DAILY PRN
Qty: 60 TABLET | Refills: 0 | Status: SHIPPED | OUTPATIENT
Start: 2024-05-23

## 2024-05-23 NOTE — PROGRESS NOTES
Transition of Care Visit  Name: Barb Palomo      : 1970      MRN: [id number]  Encounter Provider: ELISEO Vee  Encounter Date: 2024   Encounter department: [facility] PRIMARY [facility]ARE    Assessment & Plan   1. Chronic pain syndrome  -     traMADol (ULTRAM) 50 mg tablet; Take 1 tablet (50 mg total) by mouth every 8 (eight) hours as needed for severe pain Do not take within 6 hours of valium or Fioricet  -     [facility] All Prescribed Meds and Special Instructions  -     Amphetamines, Methamphetamines  -     Butalbital  -     Phenobarbital  -     Secobarbital  -     Alprazolam  -     Clonazepam  -     Diazepam  -     Lorazepam  -     Gabapentin  -     Pregabalin  -     Cocaine  -     Heroin  -     Buprenorphine  -     Levorphanol  -     Meperidine  -     Naltrexone  -     Fentanyl  -     Methadone  -     Oxycodone  -     Tapentadol  -     THC  -     Tramadol  -     Codeine, Hydrocodone, Hydropmorphone, Morphine  -     Bath Salts  -     Ethyl Glucuronide/Ethyl Sulfate  -     Kratom  -     Spice  -     Methylphenidate  -     Phentermine  -     Validity Oxidant  -     Validity Creatinine  -     Validity pH  -     Validity Specific  -     Xylazine Definitive Test  2. Panic disorder with agoraphobia  -     diazepam (VALIUM) 5 mg tablet; Take 1 tablet (5 mg total) by mouth 2 (two) times a day as needed for anxiety Do not take within 8 hours or Tramadol  3. Encounter for screening mammogram for malignant neoplasm of breast  -     Mammo screening bilateral w 3d & cad; Future  4. Acute cystitis with hematuria  -     POCT urine dip auto non-scope  -     Urine culture; Future  -     Urine culture  5. High risk medication use  -     naloxone (NARCAN) 4 mg/0.1 mL nasal spray; Administer 1 spray into a nostril. If no response after 2-3 minutes, give another dose in the other nostril using a new spray.  6. Type 2 diabetes mellitus with other neurologic complication, with long-term current  use of insulin (Formerly [facility])      UDS /contract updated today. Discussed safety with medications. Continue to follow with endocrinology. Urine culture to be repeated due to dysuria still present. She did complete keflex course. Mammogram ordered to be completed. Therapy encouraged. Recheck in office 3 months. Okay if virtual if preferred by Barb.   Please call the office if you are experiencing any worsening of symptoms or no symptom improvement.          History of Present Illness   {Disappearing Hyperlinks I Encounters * My Last Note * Since Last Visit * History :99033}  Transitional Care Management Review:   Barb Palomo is a 54 y.o. female here for TCM follow up.     During the TCM phone call patient stated:  TCM Call     Date and time call was made  5/9/2024  9:00 AM    Hospital care reviewed  Records reviewed    Patient was hospitialized at  [facility]    Date of Admission  05/06/24    Date of discharge  05/08/24    Diagnosis  HHNC    Disposition  Home    Were the patients medications reviewed and updated  No    Current Symptoms  None      TCM Call     Post hospital issues  None    Should patient be enrolled in anticoag monitoring?  No    Scheduled for follow up?  Yes    Did you obtain your prescribed medications  Yes    Do you need help managing your prescriptions or medications  No    Is transportation to your appointment needed  Yes    Specify why  Pt does not have a vehicle    I have advised the patient to call PCP with any new or worsening symptoms  Priyanka Hodgson MA        On May 6 patient was scheduled for outpatient EGD and colonoscopy.  While she was there her glucose was 550.  Anesthesia at that time recommended procedure to be completed when sugars more controlled.  Their plan was for admission and if sugars improved but then they can complete EGD colonoscopy the following day.  Patient was then admitted to [facility].  She was discharged on May 8.  She was started on insulin  drip.  She was transition back to home insulin regimen which is NovoLog 70/30 20 units twice a day with meals and metformin 500 mg twice a day.  He also initiated sliding scale insulin and carb counting diet.  She was also found of urinary tract infection.  She was treated with IV antibiotics and sent home on Keflex every 6 hours for 5 days.  EGD was completed on May 7.  They recommended she continue Protonix 40 mg daily.    Patient follows with endocrinology has appointment with them [facility].  Follows with neurology has appointment July 9.  Follows with rheumatology has appointment July 18.  Has outpatient follow-up with GI scheduled for July 23.    Patient is due for diabetic eye exam Medicare wellness visit lung screening and cervical cancer screening.    Last A1c:  Lab Results       Component                Value               Date                       HGBA1C                                       08/18/2023              Comment:    HbA1c to high to read    Routine labs were ordered by endocrinology to complete prior to appointment.    She states she is in so much pain. Her hips, knees, hands, feet are painful. Has almost no sensation in hands in feet other than neuropathy. Has low back pain. Tramadol helps a little bit. Gabapentin only helps a little.     No checking sugar at home often due to poor use of hands.   She reports some pain with urination still. She completed keflex.     Was seeing psychiatrist- they managed her valium. She missed an appointment and they put her on a waiting list. Has been without the valium for 1 month now.   Having bladder/bowel incontinence ongoing for months. Eating well per patient.   Wt Readings from Last 3 Encounters:  05/23/24 : 40.6 kg (89 lb 9.6 oz)  05/08/24 : 42.5 kg (93 lb 11.1 oz)  03/07/24 : 40.3 kg (88 lb 12.8 oz)  Trying to gain weight.       Review of Systems   Constitutional:  Negative for chills and fever.   Eyes:  Negative for discharge.   Respiratory:  Negative  "for shortness of breath.    Cardiovascular:  Negative for chest pain.   Gastrointestinal:  Negative for constipation and diarrhea.   Genitourinary:  Positive for dysuria. Negative for difficulty urinating.   Musculoskeletal:  Positive for arthralgias and myalgias. Negative for joint swelling.   Skin:  Negative for rash.   Neurological:  Negative for headaches.   Hematological:  Negative for adenopathy.   Psychiatric/Behavioral:  The patient is not nervous/anxious.      Objective   {Disappearing Hyperlinks   Review Vitals * Enter New Vitals * Results Review * Labs * Imaging * Cardiology * Procedures * Lung Cancer Screening :63686}  BP 90/60   Pulse (!) 118   Temp (!) 96.6 °F (35.9 °C) (Temporal)   Resp 16   Ht 5' 2\" (1.575 m)   Wt 40.6 kg (89 lb 9.6 oz)   LMP 03/04/2016   SpO2 99%   BMI 16.39 kg/m²     Physical Exam  Vitals and nursing note reviewed.   Constitutional:       Appearance: She is well-developed and underweight. She is not diaphoretic.   HENT:      Head: Normocephalic and atraumatic.      Right Ear: External ear normal.      Left Ear: External ear normal.   Eyes:      General: Lids are normal.         Right eye: No discharge.         Left eye: No discharge.      Conjunctiva/sclera: Conjunctivae normal.   Cardiovascular:      Rate and Rhythm: Normal rate and regular rhythm.      Heart sounds: No murmur heard.  Pulmonary:      Effort: Pulmonary effort is normal. No respiratory distress.      Breath sounds: Normal breath sounds. No wheezing.   Musculoskeletal:         General: No deformity.   Skin:     General: Skin is warm and dry.   Neurological:      General: No focal deficit present.      Mental Status: She is alert and oriented to person, place, and time.   Psychiatric:         Speech: Speech normal.         Behavior: Behavior normal.         Thought Content: Thought content normal.         Judgment: Judgment normal.       Medications have been reviewed by provider in current " encounter    Administrative Statements {Disappearing Hyperlinks I  Level of Service * PCMH/PCSP:47828}

## 2024-05-24 DIAGNOSIS — E11.65 TYPE 2 DIABETES MELLITUS WITH HYPERGLYCEMIA, WITH LONG-TERM CURRENT USE OF INSULIN (HCC): ICD-10-CM

## 2024-05-24 DIAGNOSIS — Z79.4 TYPE 2 DIABETES MELLITUS WITH HYPERGLYCEMIA, WITH LONG-TERM CURRENT USE OF INSULIN (HCC): ICD-10-CM

## 2024-05-24 RX ORDER — METFORMIN HYDROCHLORIDE 500 MG/1
500 TABLET, EXTENDED RELEASE ORAL 2 TIMES DAILY WITH MEALS
Qty: 60 TABLET | Refills: 0 | Status: SHIPPED | OUTPATIENT
Start: 2024-05-24

## 2024-05-25 LAB — BACTERIA UR CULT: ABNORMAL

## 2024-05-27 ENCOUNTER — TELEPHONE (OUTPATIENT)
Dept: OTHER | Facility: HOSPITAL | Age: 54
End: 2024-05-27

## 2024-05-27 DIAGNOSIS — R39.9 UTI SYMPTOMS: Primary | ICD-10-CM

## 2024-05-27 RX ORDER — AMOXICILLIN AND CLAVULANATE POTASSIUM 500; 125 MG/1; MG/1
1 TABLET, FILM COATED ORAL EVERY 12 HOURS SCHEDULED
Qty: 14 TABLET | Refills: 0 | Status: SHIPPED | OUTPATIENT
Start: 2024-05-27 | End: 2024-06-03

## 2024-05-28 ENCOUNTER — TELEPHONE (OUTPATIENT)
Age: 54
End: 2024-05-28

## 2024-05-28 DIAGNOSIS — R39.9 UTI SYMPTOMS: Primary | ICD-10-CM

## 2024-05-29 NOTE — TELEPHONE ENCOUNTER
Called the pharmacy to get insurance information to do prior authorization but patient does not have prescription coverage. She only has discount cards that she used.    Can not do a prior authorization

## 2024-05-31 ENCOUNTER — TELEPHONE (OUTPATIENT)
Age: 54
End: 2024-05-31

## 2024-05-31 NOTE — TELEPHONE ENCOUNTER
Patient called in, has appointment Monday and needs Lyft.  Warm transferred to [facility] in [place] office.

## 2024-06-01 LAB
4OH-XYLAZINE UR QL CFM: NEGATIVE NG/ML
6MAM UR QL CFM: NEGATIVE NG/ML
7AMINOCLONAZEPAM UR QL CFM: NEGATIVE NG/ML
A-OH ALPRAZ UR QL CFM: NEGATIVE NG/ML
ACCEPTABLE CREAT UR QL: ABNORMAL MG/DL
ACCEPTIBLE SP GR UR QL: NORMAL
AMPHET UR QL CFM: NEGATIVE NG/ML
BUPRENORPHINE UR QL CFM: NEGATIVE NG/ML
BUTALBITAL UR QL CFM: ABNORMAL NG/ML
BZE UR QL CFM: NEGATIVE NG/ML
CODEINE UR QL CFM: NEGATIVE NG/ML
EDDP UR QL CFM: NEGATIVE NG/ML
ETHYL GLUCURONIDE UR QL CFM: ABNORMAL NG/ML
ETHYL SULFATE UR QL SCN: NEGATIVE NG/ML
EUTYLONE UR QL: NEGATIVE NG/ML
FENTANYL UR QL CFM: NEGATIVE NG/ML
GLIADIN IGG SER IA-ACNC: NEGATIVE NG/ML
HYDROCODONE UR QL CFM: NEGATIVE NG/ML
HYDROMORPHONE UR QL CFM: NEGATIVE NG/ML
LORAZEPAM UR QL CFM: NEGATIVE NG/ML
ME-PHENIDATE UR QL CFM: NEGATIVE NG/ML
MEPERIDINE UR QL CFM: NEGATIVE NG/ML
METHADONE UR QL CFM: NEGATIVE NG/ML
METHAMPHET UR QL CFM: NEGATIVE NG/ML
MORPHINE UR QL CFM: NEGATIVE NG/ML
NALTREXONE UR QL CFM: NEGATIVE NG/ML
NITRITE UR QL: NORMAL UG/ML
NORBUPRENORPHINE UR QL CFM: NEGATIVE NG/ML
NORDIAZEPAM UR QL CFM: NEGATIVE NG/ML
NORFENTANYL UR QL CFM: NEGATIVE NG/ML
NORHYDROCODONE UR QL CFM: NEGATIVE NG/ML
NORMEPERIDINE UR QL CFM: NEGATIVE NG/ML
NOROXYCODONE UR QL CFM: NEGATIVE NG/ML
OXAZEPAM UR QL CFM: NORMAL NG/ML
OXYCODONE UR QL CFM: NEGATIVE NG/ML
OXYMORPHONE UR QL CFM: NEGATIVE NG/ML
PARA-FLUOROFENTANYL QUANTIFICATION: NORMAL NG/ML
PHENOBARB UR QL CFM: NEGATIVE NG/ML
RESULT ALL_PRESCRIBED MEDS AND SPECIAL INSTRUCTIONS: NORMAL
SECOBARBITAL UR QL CFM: NEGATIVE NG/ML
SL AMB 4-ANPP QUANTIFICATION: NORMAL NG/ML
SL AMB 5F-ADB-M7 METABOLITE QUANTIFICATION: NEGATIVE NG/ML
SL AMB 7-OH-MITRAGYNINE (KRATOM ALKALOID) QUANTIFICATION: NEGATIVE NG/ML
SL AMB AB-FUBINACA-M3 METABOLITE QUANTIFICATION: NEGATIVE NG/ML
SL AMB ACETYL FENTANYL QUANTIFICATION: NORMAL NG/ML
SL AMB ACETYL NORFENTANYL QUANTIFICATION: NORMAL NG/ML
SL AMB ACRYL FENTANYL QUANTIFICATION: NORMAL NG/ML
SL AMB CARFENTANIL QUANTIFICATION: NORMAL NG/ML
SL AMB CTHC (MARIJUANA METABOLITE) QUANTIFICATION: NEGATIVE NG/ML
SL AMB DEXTRORPHAN (DEXTROMETHORPHAN METABOLITE) QUANT: NEGATIVE NG/ML
SL AMB GABAPENTIN QUANTIFICATION: NORMAL NG/ML
SL AMB JWH018 METABOLITE QUANTIFICATION: NEGATIVE NG/ML
SL AMB JWH073 METABOLITE QUANTIFICATION: NEGATIVE NG/ML
SL AMB MDMB-FUBINACA-M1 METABOLITE QUANTIFICATION: NEGATIVE NG/ML
SL AMB METHYLONE QUANTIFICATION: NEGATIVE NG/ML
SL AMB N-DESMETHYL-TRAMADOL QUANTIFICATION: NORMAL NG/ML
SL AMB PHENTERMINE QUANTIFICATION: NEGATIVE NG/ML
SL AMB PREGABALIN QUANTIFICATION: NEGATIVE NG/ML
SL AMB RCS4 METABOLITE QUANTIFICATION: NEGATIVE NG/ML
SL AMB RITALINIC ACID QUANTIFICATION: NEGATIVE NG/ML
SMOOTH MUSCLE AB TITR SER IF: NEGATIVE NG/ML
SPECIMEN DRAWN SERPL: NEGATIVE NG/ML
SPECIMEN PH ACCEPTABLE UR: NORMAL
TAPENTADOL UR QL CFM: NEGATIVE NG/ML
TEMAZEPAM UR QL CFM: NORMAL NG/ML
TRAMADOL UR QL CFM: NORMAL NG/ML
URATE/CREAT 24H UR: NORMAL NG/ML
XYLAZINE UR QL CFM: NEGATIVE NG/ML

## 2024-06-03 ENCOUNTER — OFFICE VISIT (OUTPATIENT)
Dept: ENDOCRINOLOGY | Facility: CLINIC | Age: 54
End: 2024-06-03
Payer: MEDICARE

## 2024-06-03 ENCOUNTER — TELEPHONE (OUTPATIENT)
Age: 54
End: 2024-06-03

## 2024-06-03 VITALS
WEIGHT: 90 LBS | DIASTOLIC BLOOD PRESSURE: 60 MMHG | SYSTOLIC BLOOD PRESSURE: 90 MMHG | BODY MASS INDEX: 16.56 KG/M2 | HEART RATE: 113 BPM | OXYGEN SATURATION: 97 % | HEIGHT: 62 IN

## 2024-06-03 DIAGNOSIS — Z79.4 TYPE 2 DIABETES MELLITUS WITH OTHER NEUROLOGIC COMPLICATION, WITH LONG-TERM CURRENT USE OF INSULIN (HCC): Primary | ICD-10-CM

## 2024-06-03 DIAGNOSIS — E78.2 MIXED HYPERLIPIDEMIA: ICD-10-CM

## 2024-06-03 DIAGNOSIS — I10 BENIGN ESSENTIAL HYPERTENSION: ICD-10-CM

## 2024-06-03 DIAGNOSIS — E11.49 TYPE 2 DIABETES MELLITUS WITH OTHER NEUROLOGIC COMPLICATION, WITH LONG-TERM CURRENT USE OF INSULIN (HCC): Primary | ICD-10-CM

## 2024-06-03 DIAGNOSIS — E03.9 ACQUIRED HYPOTHYROIDISM: ICD-10-CM

## 2024-06-03 PROCEDURE — 99214 OFFICE O/P EST MOD 30 MIN: CPT | Performed by: PHYSICIAN ASSISTANT

## 2024-06-03 RX ORDER — HUMAN INSULIN 100 [IU]/ML
INJECTION, SUSPENSION SUBCUTANEOUS
Qty: 45 ML | Refills: 1 | Status: SHIPPED | OUTPATIENT
Start: 2024-06-03

## 2024-06-03 RX ORDER — PEN NEEDLE, DIABETIC 32GX 5/32"
NEEDLE, DISPOSABLE MISCELLANEOUS 2 TIMES DAILY
Qty: 200 EACH | Refills: 3 | Status: SHIPPED | OUTPATIENT
Start: 2024-06-03

## 2024-06-03 NOTE — TELEPHONE ENCOUNTER
That is fine to repeat test in the future.  Can be done at next visit-she is due for Medicare wellness visit.  There are sometimes other medications including over-the-counter medications that could cause that to be a positive result.  This will not interfere with the prescribing of her current medications at this time.

## 2024-06-03 NOTE — TELEPHONE ENCOUNTER
Patient called stated she is really upset because of her Urine drug screening results showing up with positive alcohol results. Patient again stated she does not drink alcohol and would like to have the test repeated.    Please review and advice  Thank you

## 2024-06-03 NOTE — PATIENT INSTRUCTIONS
Hypoglycemia instructions   Barb Palomo  6/3/2024  [id number]    Low Blood Sugar    Steps to treat low blood sugar.    1. Test blood sugar if you have symptoms of low blood sugar:   Low Blood Sugar Symptoms:  o Sweaty  o Dizzy  o Rapid heartbeat  o Shaky  o Bad mood  o Hungry      2. Treat blood sugar less than 70 with 15 grams of fast-acting carbohydrate:   Examples of 15 grams Fast-Acting Carbohydrate:  o 4 oz juice  o 4 oz regular soda  o 3-4 glucose tablets (chew)  o 3-4 hard candies (chew)          3.  Wait 15 minutes and test your blood sugar again     4. If blood sugar is less than 100, repeat steps 2-3.    5. When your blood sugar is 100 or more, eat a snack if it will be longer than one hour until your next meal. The snack should be 15 grams of carbohydrate and a protein:   Examples of snacks:  o ½ sandwich  o 6 crackers with cheese  o Piece of fruit with cheese or peanut butter  o 6 crackers with peanut butter

## 2024-06-03 NOTE — PROGRESS NOTES
Patient Progress Note      CC: DM      Referring Provider  Prasanna Mendez Do  No address on file     History of Present Illness:   Barb Palomo is a 54 y.o. female with a history of type 2 diabetes with long term use of insulin. Diabetes course has been stable. Complications of DM: neuropathy, retinopathy, CVA.  In May 2024 patient was hospitalized for severe hyperglycemia. Denies any issues with her current regimen. Home glucose monitoring: are performed sporadically.     No log or meter today  Home blood glucose readings: before this visit it was 208 mg/dl      Current regimen: Metformin 500 mg twice a day, Novolin 70/30 20 units before breakfast and 20 units before dinner (she been out recently)  noncompliant some of the time, denies any side effects from medications. She's noncompliant only if she does not have enough.  Injects in: abdomen. Rotates sites: Yes  Hypoglycemic episodes: No, rare   H/o of hypoglycemia causing hospitalization or Intervention such as glucagon injection or ambulance call : No  Hypoglycemia symptoms: jitteriness and sweating  Treatment of hypoglycemia: discussed treatment     Medic alert tag: recommended: Yes     Diabetes education: Yes  Diet: 4-5 meals per day, 3-4 snacks per day. Timing of meals is predictable.   Diabetic diet compliance: noncompliant  Activity: Daily activity is predictable: Yes. No routine exercise.     Ophthamology: Eye exam, March 2020  Podiatry: Foot exam up-to-date, Jan 2024     Not on ACE inhibitor/ARB  Has hyperlipidemia: on statin - tolerating well, no myalgias. compliant most of the time, denies any side effects from medications.  Thyroid disorders: Hypothyroidism. She is taking levothyroxine 112 mcg 1 tablet daily on an empty stomach 1 hour before breakfast.  History of pancreatitis: Yes     Patient Active Problem List   Diagnosis    Type 2 diabetes mellitus with other neurologic complication, with long-term current use of insulin (HCC)    Severe  episode of recurrent major depressive disorder, without psychotic features (HCC)    GERD without esophagitis    History of decompression of median nerve    Hypothyroidism    Protein calorie malnutrition (HCC)    Tobacco dependence    Post-traumatic stress disorder, unspecified    Benign essential hypertension    Chronic fatigue, unspecified    Fatty liver    Hyperlipidemia    Insomnia    Iron deficiency    Chronic diarrhea    Opioid dependence with other opioid-induced disorder (HCC)    Pancreatic cyst    History of stroke    Vitamin B12 deficiency    Vitamin D deficiency    Reactive airway disease without complication    Temporary cerebral vascular dysfunction    History of pulmonary embolism    Arthralgia of temporomandibular joint    Carpal tunnel syndrome    Dysphagia    Chronic migraine without aura, not intractable, without status migrainosus    Diplopia    ROGERIO positive    Symptom associated with female genital organs    Irregular menstrual cycle    Female stress incontinence    Decreased libido    Chronic cervical pain    Paresthesia and pain of both upper extremities    Paresthesia of both lower extremities    Chronic pain of both shoulders    Cervical radiculopathy    Lumbar radiculopathy    DDD (degenerative disc disease), cervical    DDD (degenerative disc disease), lumbar    Low back pain with sciatica    Cervical spinal stenosis    Cervical spondylosis without myelopathy    Chronic pain disorder    Neuropathy    Weakness of right upper extremity    Nausea    Chronic prescription benzodiazepine use    Uncontrolled type 2 diabetes mellitus with hyperglycemia ([facility])    S/P left knee arthroscopy    Unspecified psychosis not due to a substance or known physiological condition (HCC)    Pancolitis (HCC)    Agoraphobia with panic disorder    Bipolar II disorder (HCC)    Depression, unspecified    Polyarthritis, unspecified    Chronic migraine without aura without status migrainosus, not intractable    HHNC  (hyperglycemic hyperosmolar nonketotic coma) (HCC)    Abdominal pain    Acute cystitis    Severe protein-calorie malnutrition (HCC)      Past Medical History:   Diagnosis Date    Acute venous embolism and thrombosis of deep vessels of distal lower extremity (HCC)     Anemia     Anxiety     last assessed 11/20/17    Arthritis     Asthma     Cerebral infarction (HCC)     unspecified, last assessed 11/14/16    Chest pain     last assessed 5/9/17    Chronic cough     last assessed 12/12/13    Depression     Diabetes mellitus, type 2 (HCC)     DJD (degenerative joint disease)     Esophageal reflux     Fibromyalgia     GERD without esophagitis     resolved 5/13/16    History of pulmonary embolism     Hyperlipidemia     Hypertension     Hypothyroidism     Iron deficiency     Pancreatitis     Panic attack     Panic disorder     Polyarthritis     PTSD (post-traumatic stress disorder)     Sleep difficulties     Stroke syndrome     Thyroid disease     TIA (transient ischemic attack)     TIA (transient ischemic attack)     Venous embolism and thrombosis of deep vessels of distal lower extremity (HCC)     Vitamin B12 deficiency     Vitamin D deficiency       Past Surgical History:   Procedure Laterality Date    CARPAL TUNNEL RELEASE Right     neuroplasty decompression of median nerve    CATARACT EXTRACTION      CHOLECYSTECTOMY      ERCP      ERCP      ESOPHAGOGASTRIC FUNDOPLASTY      NISSEN FUNDOPLICATION      PATELLA SURGERY Left 12/2021    NC ESOPHAGOGASTRODUODENOSCOPY TRANSORAL DIAGNOSTIC N/A 11/02/2016    Procedure: EGD AND COLONOSCOPY;  Surgeon: Jatin Shepherd MD;  Location: [facility];  Service: Gastroenterology    NC NDSC WRST SURG W/RLS TRANSVRS CARPL LIGM Right 03/08/2016    Procedure: RELEASE CARPAL TUNNEL ENDOSCOPIC;  Surgeon: Guero Restrepo MD;  Location: [facility];  Service: Orthopedics    NC TENDON SHEATH INCISION Right 03/08/2016    Procedure: RELEASE TRIGGER FINGER RIGHT THUMB;  Surgeon: Guero Restrepo MD;   Location: [facility];  Service: Orthopedics    TONSILLECTOMY AND ADENOIDECTOMY        Family History   Problem Relation Age of Onset    Diabetes Mother         type 2    Heart attack Mother 39        acute MI    Kidney disease Mother         CKD NKF classfication    Depression Mother     Arthritis Mother     Substance Abuse Mother         mother OD in past on MS04    Ulcerative colitis Mother     Schizophrenia Mother     Suicide Attempts Mother     Bipolar disorder Mother     Diabetes Maternal Grandmother     Heart attack Father         acute MI    Psoriasis Father     Cancer Father         gastric cancer    Stroke Father     Crohn's disease Sister     Bipolar disorder Sister     Rheum arthritis Maternal Grandfather     Throat cancer Maternal Grandfather     Suicide Attempts Daughter     Drug abuse Daughter     Lung cancer Paternal Grandmother     Cancer Paternal Grandfather     No Known Problems Sister     Bipolar disorder Maternal Uncle     Anesthesia problems Neg Hx      Social History     Tobacco Use    Smoking status: Every Day     Current packs/day: 1.00     Average packs/day: 1 pack/day for 41.4 years (41.4 ttl pk-yrs)     Types: Cigarettes     Start date: 1/1/1983    Smokeless tobacco: Never    Tobacco comments:     20 + years   Substance Use Topics    Alcohol use: Not Currently     Alcohol/week: 0.0 standard drinks of alcohol     Comment: last was in 2010 after DUI     Allergies   Allergen Reactions    Medical Tape Rash    Lexapro [Escitalopram Oxalate]     Escitalopram Other (See Comments) and Palpitations    Other Hives and Rash     Adhesive Tape         Current Outpatient Medications:     Albuterol Sulfate (ProAir RespiClick) 108 (90 Base) MCG/ACT AEPB, Inhale 2 puffs every 6 (six) hours as needed (wheezing), Disp: 1 each, Rfl: 1    amoxicillin-clavulanate (AUGMENTIN) 500-125 mg per tablet, Take 1 tablet by mouth every 12 (twelve) hours for 7 days, Disp: 14 tablet, Rfl: 0    aspirin 81 mg chewable  "tablet, Chew 81 mg daily , Disp: , Rfl:     atorvastatin (LIPITOR) 80 mg tablet, TAKE 1 TABLET BY MOUTH EVERY DAY, Disp: 30 tablet, Rfl: 2    BD PEN NEEDLE LINDY U/F 32G X 4 MM MISC, Inject under the skin daily, Disp: 30 each, Rfl: 5    Blood Glucose Monitoring Suppl (OneTouch Verio Reflect) w/Device KIT, CHECK BLOOD SUGARS FOUR TIMES DAILY. PLEASE SUBSTITUTE WITH APPROPRIATE ALTERNATIVE AS COVERED BY PATIENT'S INSURANCE. DX: E11.65, Disp: 1 kit, Rfl: 0    butalbital-acetaminophen-caffeine (FIORICET,ESGIC) -40 mg per tablet, TAKE 1 TABLET EVERY 6 HOURS AS NEEDED FOR MIGRAINE. PLEASE LIMIT 3-4 PER WEEK, 15 PER MONTH., Disp: 15 tablet, Rfl: 2    diazepam (VALIUM) 5 mg tablet, Take 1 tablet (5 mg total) by mouth 2 (two) times a day as needed for anxiety Do not take within 8 hours or Tramadol, Disp: 60 tablet, Rfl: 0    DULoxetine (CYMBALTA) 60 mg delayed release capsule, Take 1 capsule (60 mg total) by mouth 2 (two) times a day, Disp: 180 capsule, Rfl: 1    Fluticasone-Salmeterol (Advair Diskus) 500-50 mcg/dose inhaler, Inhale 1 puff by mouth 2 times daily.  Rinse mouth after use, Disp: 180 blister, Rfl: 1    gabapentin (NEURONTIN) 400 mg capsule, TAKE 2 CAPSULES BY MOUTH 3 TIMES A DAY, Disp: 180 capsule, Rfl: 5    glucose blood (OneTouch Verio) test strip, Check blood sugars four times daily. Please substitute with appropriate alternative as covered by patient's insurance. Dx: E11.65, Disp: 400 each, Rfl: 3    insulin isophane-insulin regular (NovoLIN 70/30 FlexPen) 100 units/mL injection pen, Use 20 units before breakfast and dinner, Disp: 45 mL, Rfl: 1    Insulin Pen Needle (BD Pen Needle Lindy U/F) 32G X 4 MM MISC, Use 2 (two) times a day, Disp: 200 each, Rfl: 3    Insulin Syringe-Needle U-100 31G X 5/16\" 0.3 ML MISC, Use twice daily, Disp: 60 each, Rfl: 5    levothyroxine 112 mcg tablet, TAKE 1 TABLET BY MOUTH EVERY DAY, Disp: 90 tablet, Rfl: 1    metFORMIN (GLUCOPHAGE-XR) 500 mg 24 hr tablet, TAKE 1 TABLET BY " MOUTH TWICE A DAY WITH MEALS, Disp: 60 tablet, Rfl: 0    mirtazapine (REMERON) 7.5 MG tablet, Take 1 tablet (7.5 mg total) by mouth daily at bedtime, Disp: 90 tablet, Rfl: 1    naloxone (NARCAN) 4 mg/0.1 mL nasal spray, Administer 1 spray into a nostril. If no response after 2-3 minutes, give another dose in the other nostril using a new spray., Disp: 1 each, Rfl: 1    omeprazole (PriLOSEC) 40 MG capsule, Take 1 capsule (40 mg total) by mouth daily, Disp: 30 capsule, Rfl: 3    OneTouch Delica Lancets 33G MISC, Check blood sugars four times daily. Please substitute with appropriate alternative as covered by patient's insurance. Dx: E11.65, Disp: 400 each, Rfl: 3    OneTouch Ultra test strip, USE AS DIRECTED TO TEST 3 TIMES A DAY, Disp: 100 strip, Rfl: 0    prazosin (MINIPRESS) 2 mg capsule, Take 1 capsule (2 mg total) by mouth daily at bedtime, Disp: 90 capsule, Rfl: 1    topiramate (TOPAMAX) 25 mg tablet, TAKE 1 TABLET BY MOUTH EVERY DAY, Disp: 90 tablet, Rfl: 1    traMADol (ULTRAM) 50 mg tablet, Take 1 tablet (50 mg total) by mouth every 8 (eight) hours as needed for severe pain Do not take within 6 hours of valium or Fioricet, Disp: 90 tablet, Rfl: 0    Adalimumab (HUMIRA PEN SC), Inject under the skin (Patient not taking: Reported on 6/3/2024), Disp: , Rfl:     Cyanocobalamin (VITAMIN B-12 IJ), Inject as directed (Patient not taking: Reported on 1/30/2024), Disp: , Rfl:     Folic Acid 0.8 MG CAPS, Take 0.04 mg by mouth daily. (Patient not taking: Reported on 1/30/2024), Disp: , Rfl:     Galcanezumab-gnlm (Emgality) 120 MG/ML SOAJ, 1 injection monthly (Patient not taking: Reported on 1/30/2024), Disp: 1 mL, Rfl: 11    magnesium Oxide (MAG-OX) 400 mg TABS, Take 1 tablet (400 mg total) by mouth 2 (two) times a day for 15 days (Patient not taking: Reported on 5/23/2024), Disp: 30 tablet, Rfl: 0  Review of Systems   Constitutional:  Positive for fatigue and unexpected weight change. Negative for activity change and  "appetite change.   HENT:  Positive for trouble swallowing (seeing GI).    Eyes:  Positive for visual disturbance.   Respiratory:  Negative for shortness of breath.    Cardiovascular:  Negative for chest pain and palpitations.   Gastrointestinal:  Positive for diarrhea (chronic, even prior to metformin). Negative for constipation.   Endocrine: Positive for polydipsia and polyuria.   Musculoskeletal:  Positive for arthralgias and myalgias.   Skin:  Negative for wound.   Neurological:  Positive for numbness.   Psychiatric/Behavioral: Negative.         Physical Exam:  Body mass index is 16.46 kg/m².  BP 90/60 (BP Location: Left arm, Patient Position: Sitting, Cuff Size: Large)   Pulse (!) 113   Ht 5' 2\" (1.575 m)   Wt 40.8 kg (90 lb)   LMP 03/04/2016   SpO2 97%   BMI 16.46 kg/m²    Wt Readings from Last 3 Encounters:   06/03/24 40.8 kg (90 lb)   05/23/24 40.6 kg (89 lb 9.6 oz)   05/08/24 42.5 kg (93 lb 11.1 oz)       Physical Exam  Vitals and nursing note reviewed.   Constitutional:       Appearance: She is well-developed.   HENT:      Head: Normocephalic.   Eyes:      General: No scleral icterus.     Extraocular Movements: EOM normal.   Neck:      Thyroid: No thyromegaly.   Cardiovascular:      Rate and Rhythm: Normal rate and regular rhythm.      Pulses:           Radial pulses are 2+ on the right side and 2+ on the left side.      Heart sounds: No murmur heard.  Pulmonary:      Effort: Pulmonary effort is normal. No respiratory distress.      Breath sounds: Normal breath sounds. No wheezing.   Musculoskeletal:      Cervical back: Neck supple.   Skin:     General: Skin is warm and dry.   Neurological:      Mental Status: She is alert.   Psychiatric:         Mood and Affect: Mood and affect normal.       Patient's shoes and socks were not removed.          Labs:   Lab Results   Component Value Date    HGBA1C  08/18/2023      Comment:      HbA1c to high to read     Lab Results   Component Value Date    GLUCOSE 323 " "(H) 08/24/2017    CALCIUM 8.2 (L) 05/08/2024     07/16/2016    K 3.2 (L) 05/08/2024    CO2 26 05/08/2024     05/08/2024    BUN 7 05/08/2024    CREATININE 0.40 (L) 05/08/2024     No results found for: \"MICROALBUR\", \"IYIB61HCP\"  GFR, Calculated   Date Value Ref Range Status   01/17/2020 106 >60 mL/min/1.73m2 Final     Comment:     mL/min per 1.73 square meters                                            Normal Function or Mild Renal    Disease (if clinically at risk):  >or=60  Moderately Decreased:                30-59  Severely Decreased:                  15-29  Renal Failure:                         <15                                            -American GFR: multiply reported GFR by 1.16    Please note that the eGFR is based on the CKD-EPI calculation, and is not intended to be used for drug dosing.                                            Note: Calculated GFR may not be an accurate indicator of renal function if the patient's renal function is not in a steady state.     eGFRcr   Date Value Ref Range Status   01/15/2024 104 >59 Final     eGFR   Date Value Ref Range Status   05/08/2024 119 ml/min/1.73sq m Final     No components found for: \"MALBCRER\"  Lab Results   Component Value Date    CHOL 265 (H) 10/22/2016    HDL 89 07/06/2022    TRIG 157 (H) 07/06/2022     Lab Results   Component Value Date    ALT 24 05/06/2024    AST 28 05/06/2024    ALKPHOS 95 05/06/2024    BILITOT 0.3 10/22/2016     Lab Results   Component Value Date    OAX3ZLYEQQZD 3.080 07/06/2022    TSH 2.12 01/15/2024         Plan:    Diagnoses and all orders for this visit:    Type 2 diabetes mellitus with other neurologic complication, with long-term current use of insulin (HCC)  HGA1C previously too high to read.  Treatment regimen: today BG was 208 mg/dl. Unable to make changes as patient did not bring a log. Discussed used of CGM and she interested. Sent message to clinical staff to send through DME. Check BG at least twice a " day and send log in 1-2 weeks. Patient has trouble drawing up insulin using vials so I will switch her to pens today. Advised patient to complete labs that were ordered at last visit. Cortisol was being checked due to weight loss. Also discussed that uncontrolled diabetes can also cause/contribute to weight loss.   Discussed intensive insulin regimen does increase risk for hypoglycemia. Episodes of hypoglycemia can lead to permanent disability and death.  Discussed risks/complications associated with uncontrolled diabetes.    Advised to adhere to diabetic diet, and recommended staying active/exercising routinely as tolerated.  Keep carbohydrates consistent to limit blood glucose fluctuations.  Advised to call if blood sugars less than 70 mg/dl or over 300 mg/dl.   Check blood glucose 2+ times a day  Discussed symptoms and treatment of hypoglycemia.   Discussed use of CGM to collect additional blood glucose data to reveal trends and patterns that can be used to optimize treatment plan.   Referred to diabetes/nutrition education.   Recommended routine follow-up with podiatry and ophthalmology.   Send log in 1-2 weeks.    Ordered blood work to complete now and prior to next visit.  -     Hemoglobin A1C; Future  -     Albumin / creatinine urine ratio; Future  -     Basic metabolic panel; Future  -     Hemoglobin A1C  -     Albumin / creatinine urine ratio  -     Basic metabolic panel  -     insulin isophane-insulin regular (NovoLIN 70/30 FlexPen) 100 units/mL injection pen; Use 20 units before breakfast and dinner  -     Insulin Pen Needle (BD Pen Needle Kimberli U/F) 32G X 4 MM MISC; Use 2 (two) times a day    Acquired hypothyroidism  TSH previously 2.12  Continue current dose of levothyroxine  Monitor labs  -     T4, free; Future  -     TSH, 3rd generation; Future  -     T4, free  -     TSH, 3rd generation    Mixed hyperlipidemia  LDL previously 86  On statin therapy  Managed by neurology    Benign essential  hypertension  Blood pressure is low normal  Check cortisol            Discussed with the patient diagnosis and treatment and all questions fully answered. She will call me if any problems arise.    Counseled patient on diagnostic results, prognosis, risk and benefit of treatment options, instruction for management, importance of treatment compliance, risk factor reduction and impressions.      Elzbieta Rehman PA-C

## 2024-06-03 NOTE — TELEPHONE ENCOUNTER
Patient called because she has an appointment today, and never went for her lab work. She wanted to know if she should keep her appointment for today. I spoke with the [facility] clinical staff, and the confirmed that the patient should still be seen today. I relayed this to the patient. She verbalized understanding, and will be at her appointment later today.

## 2024-06-05 ENCOUNTER — TELEPHONE (OUTPATIENT)
Dept: ENDOCRINOLOGY | Facility: CLINIC | Age: 54
End: 2024-06-05

## 2024-06-05 PROBLEM — N30.00 ACUTE CYSTITIS: Status: RESOLVED | Noted: 2024-05-06 | Resolved: 2024-06-05

## 2024-06-05 NOTE — TELEPHONE ENCOUNTER
----- Message from Elzbieta Rehman PA-C sent at 6/3/2024  3:59 PM EDT -----  Regarding: CGM  Send scott 3 sensor and reader through [employer]. Thanks!

## 2024-06-06 LAB
DME PARACHUTE DELIVERY DATE REQUESTED: NORMAL
DME PARACHUTE ITEM DESCRIPTION: NORMAL
DME PARACHUTE ITEM DESCRIPTION: NORMAL
DME PARACHUTE ORDER STATUS: NORMAL
DME PARACHUTE SUPPLIER NAME: NORMAL
DME PARACHUTE SUPPLIER PHONE: NORMAL

## 2024-06-20 ENCOUNTER — TELEMEDICINE (OUTPATIENT)
Dept: FAMILY MEDICINE CLINIC | Facility: CLINIC | Age: 54
End: 2024-06-20
Payer: MEDICARE

## 2024-06-20 DIAGNOSIS — G89.29 CHRONIC PAIN OF BOTH SHOULDERS: ICD-10-CM

## 2024-06-20 DIAGNOSIS — E11.65 UNCONTROLLED TYPE 2 DIABETES MELLITUS WITH HYPERGLYCEMIA (HCC): ICD-10-CM

## 2024-06-20 DIAGNOSIS — G62.9 NEUROPATHY: ICD-10-CM

## 2024-06-20 DIAGNOSIS — I10 BENIGN ESSENTIAL HYPERTENSION: ICD-10-CM

## 2024-06-20 DIAGNOSIS — M25.511 CHRONIC PAIN OF BOTH SHOULDERS: ICD-10-CM

## 2024-06-20 DIAGNOSIS — M47.812 CERVICAL SPONDYLOSIS WITHOUT MYELOPATHY: ICD-10-CM

## 2024-06-20 DIAGNOSIS — E61.1 IRON DEFICIENCY: ICD-10-CM

## 2024-06-20 DIAGNOSIS — K51.00 PANCOLITIS (HCC): ICD-10-CM

## 2024-06-20 DIAGNOSIS — G89.4 CHRONIC PAIN SYNDROME: ICD-10-CM

## 2024-06-20 DIAGNOSIS — F17.210 SMOKING GREATER THAN 20 PACK YEARS: ICD-10-CM

## 2024-06-20 DIAGNOSIS — M25.512 CHRONIC PAIN OF BOTH SHOULDERS: ICD-10-CM

## 2024-06-20 DIAGNOSIS — Z00.00 MEDICARE ANNUAL WELLNESS VISIT, SUBSEQUENT: Primary | ICD-10-CM

## 2024-06-20 DIAGNOSIS — M54.50 MIDLINE LOW BACK PAIN WITHOUT SCIATICA, UNSPECIFIED CHRONICITY: ICD-10-CM

## 2024-06-20 DIAGNOSIS — F40.01 PANIC DISORDER WITH AGORAPHOBIA: ICD-10-CM

## 2024-06-20 PROCEDURE — 99214 OFFICE O/P EST MOD 30 MIN: CPT | Performed by: NURSE PRACTITIONER

## 2024-06-20 PROCEDURE — G0439 PPPS, SUBSEQ VISIT: HCPCS | Performed by: NURSE PRACTITIONER

## 2024-06-20 NOTE — PROGRESS NOTES
Ambulatory Visit  Name: Barb Palomo      : 1970      MRN: [id number]  Encounter Provider: ELISEO Vee  Encounter Date: 2024   Encounter department: [facility] PRIMARY CARE    Assessment & Plan   1. Medicare annual wellness visit, subsequent  2. Chronic pain of both shoulders  3. Uncontrolled type 2 diabetes mellitus with hyperglycemia (HCC)  4. Pancolitis (HCC)  5. Benign essential hypertension  6. Chronic pain syndrome  7. Smoking greater than 20 pack years  8. Neuropathy  9. Cervical spondylosis without myelopathy  10. Panic disorder with agoraphobia  11. Midline low back pain without sciatica, unspecified chronicity  12. Iron deficiency         Patient is to complete lung screening.  Agreeable to do so.  Recommend completing imaging of cervical spine and lumbar spine due to increased pain.  Continue to follow with pain management.  Will change tramadol dosing from 50 mg 3 times a day to 200 mg extended release tablet once a day.  Patient knows not to mix this with use of Valium ever.  Refilled Cymbalta and Minipress temporarily for patient to so she is not without medication.  She needs to follow-up with psychiatry.  Patient was thankful for the help.  Patient educated on resources available and to call for any help.  Patient is to continue to follow with endocrinology psychiatry Ortho gastroenterology and neurology.  Breathing has been stable.  Labs previously ordered for her to complete.  Will follow-up with results.      Preventive health issues were discussed with patient, and age appropriate screening tests were ordered as noted in patient's After Visit Summary. Personalized health advice and appropriate referrals for health education or preventive services given if needed, as noted in patient's After Visit Summary.    History of Present Illness   {Disappearing Hyperlinks I Encounters * My Last Note * Since Last Visit * History :22008}  Patient presents for  [facility] follow-up and Medicare wellness visit.  Patient overall is not doing well right now.  Pain has not been well-managed on current regimen.  Patient states that this has been very limiting for her.  The tramadol has helped a little bit to take the edge off but does not last long enough.  Patient is using tramadol 50 mg 3 times a day as needed.  She is using gabapentin 3 times a day managed by pain management.  Patient has been out of the medication for anxiety and depression for almost a month because she was late for psychiatry appointment and they have refused to fill her medications until she is seen and cannot get a quick appointment.  Patient has been very anxious in regards to pain.  Patient denies any suicidal ideations or homicidal ideations.  Patient has Valium to use as needed this is just with leaving the house.  She does not take this concurrently with the tramadol.       Patient Care Team:  Prasanna Mendez DO as PCP - General  ELISEO Jarrell DO Vivek Bansal, MD Daryl Gordon, CRNP Manuel Jimenez Serrano, MD Harshini Dhananjay, DO Ayaz Matin, MD Ayaz Matin, MD as Endoscopist  Elzbieta Rehman PA-C as Physician Assistant (Endocrinology)    Review of Systems   Constitutional:  Negative for chills and fever.   Eyes:  Negative for discharge.   Respiratory:  Negative for shortness of breath.    Cardiovascular:  Negative for chest pain.   Gastrointestinal:  Negative for constipation and diarrhea.   Genitourinary:  Negative for difficulty urinating.   Musculoskeletal:  Positive for arthralgias and back pain. Negative for joint swelling.   Skin:  Negative for rash.   Neurological:  Negative for headaches.   Hematological:  Negative for adenopathy.   Psychiatric/Behavioral:  Negative for self-injury and suicidal ideas. The patient is nervous/anxious.      Medical History Reviewed by provider this encounter:  Tobacco  Allergies  Meds  Problems  Med Hx  Surg Hx  Fam Hx        Annual Wellness Visit Questionnaire   Barb is here for her Subsequent Wellness visit. Last Medicare Wellness visit information reviewed, patient interviewed and updates made to the record today.      Health Risk Assessment:   Patient rates overall health as poor. Patient feels that their physical health rating is much worse. Patient is very dissatisfied with their life. Eyesight was rated as much worse. Hearing was rated as slightly worse. Patient feels that their emotional and mental health rating is much worse. Patients states they are never, rarely angry. Patient states they are always unusually tired/fatigued. Pain experienced in the last 7 days has been a lot. Patient's pain rating has been 9/10. Patient states that she has experienced weight loss or gain in last 6 months.     Depression Screening:   PHQ-9 Score: 14      Fall Risk Screening:   In the past year, patient has experienced: history of falling in past year    Number of falls: 2 or more  Injured during fall?: Yes    Feels unsteady when standing or walking?: Yes    Worried about falling?: Yes      Urinary Incontinence Screening:   Patient has leaked urine accidently in the last six months.     Home Safety:  Patient has trouble with stairs inside or outside of their home. Patient has working smoke alarms and has no working carbon monoxide detector. Home safety hazards include: none.     Nutrition:   Current diet is Regular.     Medications:   Patient is currently taking over-the-counter supplements. OTC medications include: see medication list. Patient is able to manage medications.     Activities of Daily Living (ADLs)/Instrumental Activities of Daily Living (IADLs):   Walk and transfer into and out of bed and chair?: Yes  Dress and groom yourself?: Yes    Bathe or shower yourself?: Yes    Feed yourself? Yes  Do your laundry/housekeeping?: Yes  Manage your money, pay your bills and track your expenses?: Yes  Make your own meals?: Yes    Do your own  shopping?: Yes    Previous Hospitalizations:   Any hospitalizations or ED visits within the last 12 months?: Yes    How many hospitalizations have you had in the last year?: 1-2    Advance Care Planning:   Living will: No    Durable POA for healthcare: No    Advanced directive: No      PREVENTIVE SCREENINGS      Cardiovascular Screening:    General: Screening Not Indicated and History Lipid Disorder      Diabetes Screening:     General: Screening Not Indicated and History Diabetes      Colorectal Cancer Screening:     General: Screening Current      Breast Cancer Screening:     General: Risks and Benefits Discussed    Due for: Mammogram        Cervical Cancer Screening:    General: Risks and Benefits Discussed    Due for: Cervical Pap Smear      Osteoporosis Screening:    General: Screening Not Indicated      Abdominal Aortic Aneurysm (AAA) Screening:        General: Screening Not Indicated      Lung Cancer Screening:     General: Risks and Benefits Discussed    Due for: Low Dose CT (LDCT)      Hepatitis C Screening:    General: Screening Current    Screening, Brief Intervention, and Referral to Treatment (SBIRT)    Screening      AUDIT-C Screenin) How often did you have a drink containing alcohol in the past year? never  2) How many drinks did you have on a typical day when you were drinking in the past year? 0  3) How often did you have 6 or more drinks on one occasion in the past year? never    AUDIT-C Score: 0  Interpretation: Score 0-2 (female): Negative screen for alcohol misuse    Single Item Drug Screening:  How often have you used an illegal drug (including marijuana) or a prescription medication for non-medical reasons in the past year? never    Single Item Drug Screen Score: 0  Interpretation: Negative screen for possible drug use disorder    Social Determinants of Health     Financial Resource Strain: High Risk (2021)    Overall Financial Resource Strain (CARDIA)    • Difficulty of Paying  Living Expenses: Very hard   Food Insecurity: No Food Insecurity (6/20/2024)    Hunger Vital Sign    • Worried About Running Out of Food in the Last Year: Never true    • Ran Out of Food in the Last Year: Never true   Transportation Needs: Unmet Transportation Needs (6/20/2024)    PRAPARE - Transportation    • Lack of Transportation (Medical): Yes    • Lack of Transportation (Non-Medical): No   Housing Stability: High Risk (6/20/2024)    Housing Stability Vital Sign    • Unable to Pay for Housing in the Last Year: Yes    • Number of Times Moved in the Last Year: 1    • Homeless in the Last Year: No   Utilities: At Risk (6/20/2024)    [facility] Utilities    • Threatened with loss of utilities: Yes     No results found.    Objective   {Disappearing Hyperlinks   Review Vitals * Enter New Vitals * Results Review * Labs * Imaging * Cardiology * Procedures * Lung Cancer Screening :00363}  [facility] 03/04/2016     Physical Exam  Constitutional:       General: She is not in acute distress.     Appearance: Normal appearance. She is ill-appearing (in pain). She is not toxic-appearing or diaphoretic.   HENT:      Head: Normocephalic and atraumatic.      Nose: Nose normal.   Pulmonary:      Effort: Pulmonary effort is normal. No respiratory distress.   Skin:     Coloration: Skin is not pale.   Neurological:      General: No focal deficit present.      Mental Status: She is alert and oriented to person, place, and time.   Psychiatric:         Attention and Perception: Attention normal.         Mood and Affect: Mood is anxious and depressed. Affect is tearful.         Speech: Speech normal.         Behavior: Behavior normal.         Thought Content: Thought content is not paranoid or delusional. Thought content does not include homicidal or suicidal ideation. Thought content does not include homicidal or suicidal plan.       Administrative Statements {Disappearing Hyperlinks I  Level of Service * [facility]\""P:89297}        Telemedicine  consent    Patient: Barb Palomo  Provider: ELISEO Vee  Provider located at [place] PRIMARY [facility] PRIMARY [facility]  [address] [place] [address]  [address]  [place] [id number] [phone]    The patient was identified by name and date of birth. Barb Palomo was informed that this is a telemedicine visit and that the visit is being conducted through the [employer] platform. She agrees to proceed..  My office door was closed. No one else was in the room.  She acknowledged consent and understanding of privacy and security of the video platform. The patient has agreed to participate and understands they can discontinue the visit at any time.    Patient is aware this is a billable service.     I spent 42 minutes with the patient during this visit.

## 2024-06-20 NOTE — PATIENT INSTRUCTIONS
Medicare Preventive Visit Patient Instructions  Thank you for completing your Welcome to Medicare Visit or Medicare Annual Wellness Visit today. Your next wellness visit will be due in one year (6/21/2025).  The screening/preventive services that you may require over the next 5-10 years are detailed below. Some tests may not apply to you based off risk factors and/or age. Screening tests ordered at today's visit but not completed yet may show as past due. Also, please note that scanned in results may not display below.  Preventive Screenings:  Service Recommendations Previous Testing/Comments   Colorectal Cancer Screening  * Colonoscopy    * Fecal Occult Blood Test (FOBT)/Fecal Immunochemical Test (FIT)  * Fecal DNA/Cologuard Test  * Flexible Sigmoidoscopy Age: 45-75 years old   Colonoscopy: every 10 years (may be performed more frequently if at higher risk)  OR  FOBT/FIT: every 1 year  OR  Cologuard: every 3 years  OR  Sigmoidoscopy: every 5 years  Screening may be recommended earlier than age 45 if at higher risk for colorectal cancer. Also, an individualized decision between you and your healthcare provider will decide whether screening between the ages of 76-85 would be appropriate. Colonoscopy: 05/07/2024  FOBT/FIT: Not on file  Cologuard: Not on file  Sigmoidoscopy: Not on file    Screening Current     Breast Cancer Screening Age: 40+ years old  Frequency: every 1-2 years  Not required if history of left and right mastectomy Mammogram: 06/26/2019        Cervical Cancer Screening Between the ages of 21-29, pap smear recommended once every 3 years.   Between the ages of 30-65, can perform pap smear with HPV co-testing every 5 years.   Recommendations may differ for women with a history of total hysterectomy, cervical cancer, or abnormal pap smears in past. Pap Smear: 06/19/2019        Hepatitis C Screening Once for adults born between 1945 and 1965  More frequently in patients at high risk for Hepatitis C Hep C  Antibody: 09/27/2021    Screening Current   Diabetes Screening 1-2 times per year if you're at risk for diabetes or have pre-diabetes Fasting glucose: 278 mg/dL (7/6/2022)  A1C: 11.8 % (10/4/2022)  Screening Not Indicated  History Diabetes   Cholesterol Screening Once every 5 years if you don't have a lipid disorder. May order more often based on risk factors. Lipid panel: 01/15/2024    Screening Not Indicated  History Lipid Disorder     Other Preventive Screenings Covered by Medicare:  Abdominal Aortic Aneurysm (AAA) Screening: covered once if your at risk. You're considered to be at risk if you have a family history of AAA.  Lung Cancer Screening: covers low dose CT scan once per year if you meet all of the following conditions: (1) Age 55-77; (2) No signs or symptoms of lung cancer; (3) Current smoker or have quit smoking within the last 15 years; (4) You have a tobacco smoking history of at least 20 pack years (packs per day multiplied by number of years you smoked); (5) You get a written order from a healthcare provider.  Glaucoma Screening: covered annually if you're considered high risk: (1) You have diabetes OR (2) Family history of glaucoma OR (3)  aged 50 and older OR (4)  American aged 65 and older  Osteoporosis Screening: covered every 2 years if you meet one of the following conditions: (1) You're estrogen deficient and at risk for osteoporosis based off medical history and other findings; (2) Have a vertebral abnormality; (3) On glucocorticoid therapy for more than 3 months; (4) Have primary hyperparathyroidism; (5) On osteoporosis medications and need to assess response to drug therapy.   Last bone density test (DXA Scan): Not on file.  HIV Screening: covered annually if you're between the age of 15-65. Also covered annually if you are younger than 15 and older than 65 with risk factors for HIV infection. For pregnant patients, it is covered up to 3 times per  pregnancy.    Immunizations:  Immunization Recommendations   Influenza Vaccine Annual influenza vaccination during flu season is recommended for all persons aged >= 6 months who do not have contraindications   Pneumococcal Vaccine   * Pneumococcal conjugate vaccine = PCV13 (Prevnar 13), PCV15 (Vaxneuvance), PCV20 (Prevnar 20)  * Pneumococcal polysaccharide vaccine = PPSV23 (Pneumovax) Adults 19-63 yo with certain risk factors or if 65+ yo  If never received any pneumonia vaccine: recommend Prevnar 20 (PCV20)  Give PCV20 if previously received 1 dose of PCV13 or PPSV23   Hepatitis B Vaccine 3 dose series if at intermediate or high risk (ex: diabetes, end stage renal disease, liver disease)   Respiratory syncytial virus (RSV) Vaccine - COVERED BY MEDICARE PART D  * RSVPreF3 (Arexvy) CDC recommends that adults 60 years of age and older may receive a single dose of RSV vaccine using shared clinical decision-making (SCDM)   Tetanus (Td) Vaccine - COST NOT COVERED BY MEDICARE PART B Following completion of primary series, a booster dose should be given every 10 years to maintain immunity against tetanus. Td may also be given as tetanus wound prophylaxis.   Tdap Vaccine - COST NOT COVERED BY MEDICARE PART B Recommended at least once for all adults. For pregnant patients, recommended with each pregnancy.   Shingles Vaccine (Shingrix) - COST NOT COVERED BY MEDICARE PART B  2 shot series recommended in those 19 years and older who have or will have weakened immune systems or those 50 years and older     Health Maintenance Due:      Topic Date Due   • Breast Cancer Screening: Mammogram  06/26/2020   • Lung Cancer Screening  04/28/2021   • Cervical Cancer Screening  06/19/2022   • HIV Screening  05/08/2029 (Originally 4/28[birth year]1986)   • Colorectal Cancer Screening  05/07/2025   • Hepatitis C Screening  Completed     Immunizations Due:      Topic Date Due   • Hepatitis A Vaccine (1 of 2 - Risk 2-dose series) Never done   •  Pneumococcal Vaccine: Pediatrics (0 to 5 Years) and At-Risk Patients (6 to 64 Years) (2 of 2 - PCV) 04/07/2017   • COVID-19 Vaccine (4 - 2023-24 season) 09/01/2023     Advance Directives   What are advance directives?  Advance directives are legal documents that state your wishes and plans for medical care. These plans are made ahead of time in case you lose your ability to make decisions for yourself. Advance directives can apply to any medical decision, such as the treatments you want, and if you want to donate organs.   What are the types of advance directives?  There are many types of advance directives, and each state has rules about how to use them. You may choose a combination of any of the following:  Living will:  This is a written record of the treatment you want. You can also choose which treatments you do not want, which to limit, and which to stop at a certain time. This includes surgery, medicine, IV fluid, and tube feedings.   Durable power of  for healthcare (DPAHC):  This is a written record that states who you want to make healthcare choices for you when you are unable to make them for yourself. This person, called a proxy, is usually a family member or a friend. You may choose more than 1 proxy.  Do not resuscitate (DNR) order:  A DNR order is used in case your heart stops beating or you stop breathing. It is a request not to have certain forms of treatment, such as CPR. A DNR order may be included in other types of advance directives.  Medical directive:  This covers the care that you want if you are in a coma, near death, or unable to make decisions for yourself. You can list the treatments you want for each condition. Treatment may include pain medicine, surgery, blood transfusions, dialysis, IV or tube feedings, and a ventilator (breathing machine).  Values history:  This document has questions about your views, beliefs, and how you feel and think about life. This information can help  others choose the care that you would choose.  Why are advance directives important?  An advance directive helps you control your care. Although spoken wishes may be used, it is better to have your wishes written down. Spoken wishes can be misunderstood, or not followed. Treatments may be given even if you do not want them. An advance directive may make it easier for your family to make difficult choices about your care.   Urinary Incontinence   Urinary incontinence (UI)  is when you lose control of your bladder. UI develops because your bladder cannot store or empty urine properly. The 3 most common types of UI are stress incontinence, urge incontinence, or both.  Medicines:   May be given to help strengthen your bladder control. Report any side effects of medication to your healthcare provider.  Do pelvic muscle exercises often:  Your pelvic muscles help you stop urinating. Squeeze these muscles tight for 5 seconds, then relax for 5 seconds. Gradually work up to squeezing for 10 seconds. Do 3 sets of 15 repetitions a day, or as directed. This will help strengthen your pelvic muscles and improve bladder control.  Train your bladder:  Go to the bathroom at set times, such as every 2 hours, even if you do not feel the urge to go. You can also try to hold your urine when you feel the urge to go. For example, hold your urine for 5 minutes when you feel the urge to go. As that becomes easier, hold your urine for 10 minutes.   Self-care:   Keep a UI record.  Write down how often you leak urine and how much you leak. Make a note of what you were doing when you leaked urine.  Drink liquids as directed. You may need to limit the amount of liquid you drink to help control your urine leakage. Do not drink any liquid right before you go to bed. Limit or do not have drinks that contain caffeine or alcohol.   Prevent constipation.  Eat a variety of high-fiber foods. Good examples are high-fiber cereals, beans, vegetables, and  whole-grain breads. Walking is the best way to trigger your intestines to have a bowel movement.  Exercise regularly and maintain a healthy weight.  Weight loss and exercise will decrease pressure on your bladder and help you control your leakage.   Use a catheter as directed  to help empty your bladder. A catheter is a tiny, plastic tube that is put into your bladder to drain your urine.   Go to behavior therapy as directed.  Behavior therapy may be used to help you learn to control your urge to urinate.    Cigarette Smoking and Your Health   Risks to your health if you smoke:  Nicotine and other chemicals found in tobacco damage every cell in your body. Even if you are a light smoker, you have an increased risk for cancer, heart disease, and lung disease. If you are pregnant or have diabetes, smoking increases your risk for complications.   Benefits to your health if you stop smoking:   You decrease respiratory symptoms such as coughing, wheezing, and shortness of breath.   You reduce your risk for cancers of the lung, mouth, throat, kidney, bladder, pancreas, stomach, and cervix. If you already have cancer, you increase the benefits of chemotherapy. You also reduce your risk for cancer returning or a second cancer from developing.   You reduce your risk for heart disease, blood clots, heart attack, and stroke.   You reduce your risk for lung infections, and diseases such as pneumonia, asthma, chronic bronchitis, and emphysema.  Your circulation improves. More oxygen can be delivered to your body. If you have diabetes, you lower your risk for complications, such as kidney, artery, and eye diseases. You also lower your risk for nerve damage. Nerve damage can lead to amputations, poor vision, and blindness.  You improve your body's ability to heal and to fight infections.  For more information and support to stop smoking:   Smokefree.gov  Phone: 9- 298 - 533-5395  Web Address:  www.smokefree.gov  Underweight  Underweight is defined as having a body mass index (BMI) of less than 18.5 kg/m2   Anorexia  is a loss of appetite, decreased food intake, or both. Your appetite naturally decreases as you get older. You also get full faster than you used to. This occurs because your body needs less energy. Other body changes can also lead to a decreased appetite. Even though some appetite loss is normal, you still need to get enough calories and nutrients to keep you healthy. You can start to lose too much weight if you do not eat as much food as your body needs. Unwanted weight loss can cause health problems, or worsen health problems you already have. You can also become dehydrated if you do not drink enough liquid.  How to eat healthy and get enough nutrients:   Choose healthy foods.  Eat a variety of fruits, vegetables, whole grains, low-fat dairy foods, lean meats, and other protein foods. Limit foods high in fat, sugar, and salt. Limit or avoid alcohol as directed. Work with a dietitian to help you plan your meals if you need to follow a special diet. A dietitian can also teach you how to modify foods if you have trouble chewing or swallowing.   Snack on healthy foods between meals  if you only eat a small amount during meals. Snacks provide extra healthy nutrients and calories between meals. Examples include fruit, cheese, and whole grain crackers.   Drink liquids as directed  to avoid dehydration. Drink liquids between meals if they cause you to get full too quickly during meals. Ask how much liquid to drink each day and which liquids are best for you.   Use herbs, spices, and flavor enhancers to add flavor to foods.  Avoid using herbs and spice blends that also contain sodium. Ask your healthcare provider or dietitian about flavor enhancers. Flavor enhancers with ham, natural slater, and roast beef flavors can also be sprinkled on food to add flavor.   Share meals with others as often as you  can.  Eating with others may help you to eat better during meal time. Ask family members, neighbors, or friends to join you for lunch. There are also senior centers where you can meet people, and share meals with them.   Ask family and friends for help  with shopping or preparing foods. Ask for a ride to the grocery store, if needed.       © Copyright "[employer]" 2018 Information is for End User's use only and may not be sold, redistributed or otherwise used for commercial purposes. All illustrations and images included in CareNotes® are the copyrighted property of A.D.A.M., Inc. or [employer]

## 2024-06-23 DIAGNOSIS — E11.65 TYPE 2 DIABETES MELLITUS WITH HYPERGLYCEMIA, WITH LONG-TERM CURRENT USE OF INSULIN (HCC): ICD-10-CM

## 2024-06-23 DIAGNOSIS — Z79.4 TYPE 2 DIABETES MELLITUS WITH HYPERGLYCEMIA, WITH LONG-TERM CURRENT USE OF INSULIN (HCC): ICD-10-CM

## 2024-06-24 RX ORDER — METFORMIN HYDROCHLORIDE 500 MG/1
500 TABLET, EXTENDED RELEASE ORAL 2 TIMES DAILY WITH MEALS
Qty: 60 TABLET | Refills: 5 | Status: SHIPPED | OUTPATIENT
Start: 2024-06-24

## 2024-06-28 DIAGNOSIS — E78.5 HYPERLIPIDEMIA, UNSPECIFIED HYPERLIPIDEMIA TYPE: ICD-10-CM

## 2024-06-28 RX ORDER — ATORVASTATIN CALCIUM 80 MG/1
TABLET, FILM COATED ORAL
Qty: 30 TABLET | Refills: 5 | Status: SHIPPED | OUTPATIENT
Start: 2024-06-28

## 2024-07-01 ENCOUNTER — TELEPHONE (OUTPATIENT)
Age: 54
End: 2024-07-01

## 2024-07-01 ENCOUNTER — TELEPHONE (OUTPATIENT)
Dept: FAMILY MEDICINE CLINIC | Facility: CLINIC | Age: 54
End: 2024-07-01

## 2024-07-01 DIAGNOSIS — G43.709 CHRONIC MIGRAINE WITHOUT AURA WITHOUT STATUS MIGRAINOSUS, NOT INTRACTABLE: ICD-10-CM

## 2024-07-01 DIAGNOSIS — R11.0 NAUSEA: Primary | ICD-10-CM

## 2024-07-01 RX ORDER — GALCANEZUMAB 120 MG/ML
INJECTION, SOLUTION SUBCUTANEOUS
Qty: 1 ML | Refills: 11 | Status: SHIPPED | OUTPATIENT
Start: 2024-07-01

## 2024-07-01 RX ORDER — PROCHLORPERAZINE MALEATE 5 MG/1
5 TABLET ORAL EVERY 6 HOURS PRN
Qty: 30 TABLET | Refills: 2 | Status: SHIPPED | OUTPATIENT
Start: 2024-07-01

## 2024-07-01 RX ORDER — ONDANSETRON 4 MG/1
4 TABLET, FILM COATED ORAL EVERY 8 HOURS PRN
Qty: 30 TABLET | Refills: 0 | Status: SHIPPED | OUTPATIENT
Start: 2024-07-01 | End: 2024-07-29

## 2024-07-01 RX ORDER — KETOROLAC TROMETHAMINE 30 MG/ML
INJECTION, SOLUTION INTRAMUSCULAR; INTRAVENOUS
Qty: 4 ML | Refills: 0 | Status: SHIPPED | OUTPATIENT
Start: 2024-07-01

## 2024-07-01 RX ORDER — BACLOFEN 10 MG/1
10 TABLET ORAL 2 TIMES DAILY PRN
Qty: 30 TABLET | Refills: 2 | Status: SHIPPED | OUTPATIENT
Start: 2024-07-01

## 2024-07-01 RX ORDER — BUTALBITAL, ACETAMINOPHEN AND CAFFEINE 50; 325; 40 MG/1; MG/1; MG/1
TABLET ORAL
Qty: 15 TABLET | Refills: 2 | Status: SHIPPED | OUTPATIENT
Start: 2024-07-01

## 2024-07-01 NOTE — TELEPHONE ENCOUNTER
Patient called the RX Refill Line. Message is being forwarded to the office.     Patient is requesting a refill for   baclofen 10 mg tablet  The medication is not on current medication list. Patient stated that she is still taking the medication. Please review and send a new prescription to patients pharmacy if appropriate.  [facility]/pharmacy #[id number] - [place] - [address] [place] [address] [phone]   Patient can be contacted at [phone]

## 2024-07-01 NOTE — TELEPHONE ENCOUNTER
"Patient called back the RX Refill Line. Message is being forwarded to the office.     Not on active medication list     Patient called back and is also requesting ketorolac (TORADOL) 10 mg / nausea medication she does not remember the name but state it starts with a \"p\" may be prochlorperazine.     Patient also asking about her injection and she cannot remember the name of the injection. She stated she saw on her my chart that it is approved but she cannot remember the name, she apologize and state she have a bad migraine.     Please contact patient to discuss and verify her medications at [phone]      "

## 2024-07-01 NOTE — TELEPHONE ENCOUNTER
Patient states insurance will not cover Novolin flex pen. States rx is $600. Patient states she will need vials called in to [facility].   Also, patient was prescribed Freestyle Sherine and patient states it will cost $280 and $157 every month. Patient states she can not afford it. States insurance said office would need to call Medicare Provider Line. Patient could not provide phone number. States insulin will not be covered under Medicare Part B unless she has an insulin pump. Please advise, thank you.

## 2024-07-01 NOTE — TELEPHONE ENCOUNTER
Patient called the RX Refill Line. Message is being forwarded to the office.     Patient is requesting   ondansetron (ZOFRAN-ODT) 4 mg    This is no longer on active medication list.    Please contact patient at  [phone]

## 2024-07-01 NOTE — TELEPHONE ENCOUNTER
Reason for call: Patient calling for 2 separate medication refills. Received refill for 1 and call was disconnected.    [x] Refill   [] Prior Auth  [] Other:     Office:   [] PCP/Provider -   [x] Specialty/Provider -     Medication: butalbital-acetaminophen-caffeine (FIORICET,ESGIC) -40 mg per tablet      Dose/Frequency:  TAKE 1 TABLET EVERY 6 HOURS AS NEEDED FOR MIGRAINE.     Quantity: 15    Pharmacy:  TAKE 1 TABLET EVERY 6 HOURS AS NEEDED FOR MIGRAINE.     Does the patient have enough for 3 days?   [] Yes   [x] No - Send as HP to POD

## 2024-07-03 DIAGNOSIS — E11.65 TYPE 2 DIABETES MELLITUS WITH HYPERGLYCEMIA, WITH LONG-TERM CURRENT USE OF INSULIN (HCC): Primary | ICD-10-CM

## 2024-07-03 DIAGNOSIS — Z79.4 TYPE 2 DIABETES MELLITUS WITH HYPERGLYCEMIA, WITH LONG-TERM CURRENT USE OF INSULIN (HCC): Primary | ICD-10-CM

## 2024-07-03 RX ORDER — HUMAN INSULIN 100 [USP'U]/ML
20 INJECTION, SUSPENSION SUBCUTANEOUS
Qty: 40 ML | Refills: 1 | Status: SHIPPED | OUTPATIENT
Start: 2024-07-03

## 2024-07-09 ENCOUNTER — PROCEDURE VISIT (OUTPATIENT)
Dept: NEUROLOGY | Facility: CLINIC | Age: 54
End: 2024-07-09
Payer: MEDICARE

## 2024-07-09 ENCOUNTER — TELEPHONE (OUTPATIENT)
Dept: NEUROLOGY | Facility: CLINIC | Age: 54
End: 2024-07-09

## 2024-07-09 VITALS — DIASTOLIC BLOOD PRESSURE: 62 MMHG | TEMPERATURE: 96.8 F | SYSTOLIC BLOOD PRESSURE: 92 MMHG

## 2024-07-09 DIAGNOSIS — G43.709 CHRONIC MIGRAINE WITHOUT AURA WITHOUT STATUS MIGRAINOSUS, NOT INTRACTABLE: Primary | ICD-10-CM

## 2024-07-09 DIAGNOSIS — F40.01 PANIC DISORDER WITH AGORAPHOBIA: ICD-10-CM

## 2024-07-09 PROCEDURE — 64615 CHEMODENERV MUSC MIGRAINE: CPT | Performed by: PHYSICIAN ASSISTANT

## 2024-07-09 RX ORDER — DIAZEPAM 5 MG/1
TABLET ORAL
Qty: 60 TABLET | Refills: 0 | Status: SHIPPED | OUTPATIENT
Start: 2024-07-09

## 2024-07-09 NOTE — PROGRESS NOTES
Universal Protocol   Consent: Verbal consent obtained. Written consent obtained.  Risks and benefits: risks, benefits and alternatives were discussed  Consent given by: patient  Patient understanding: patient states understanding of the procedure being performed  Patient consent: the patient's understanding of the procedure matches consent given  Procedure consent: procedure consent matches procedure scheduled      Chemodenervation     Date/Time  7/9/2024 10:00 AM     Performed by  Marie Alfaro PA-C   Authorized by  Marei Alfaro PA-C     Pre-procedure details      Prepped With: Alcohol     Procedure details      Position:  Upright   Botox      Botox Type:  Type A    Brand:  Botox    mL's of Botulinum Toxin:  200    Final Concentration per CC:  100 units    Needle Gauge:  30 G 2.5 inch   Procedures      Botox Procedures: chronic headache      Indications: migraines     Injection Location      Head / Face:  L superior trapezius, R superior trapezius, L superior cervical paraspinal, R superior cervical paraspinal, L , R , procerus, L temporalis, R temporalis, R frontalis, L frontalis, R medial occipitalis and L medial occipitalis    L  injection amount:  5 unit(s)    R  injection amount:  5 unit(s)    L lateral frontalis:  5 unit(s)    R lateral frontalis:  5 unit(s)    L medial frontalis:  5 unit(s)    R medial frontalis:  5 unit(s)    L temporalis injection amount:  20 unit(s)    R temporalis injection amount:  20 unit(s)    Procerus injection amount:  5 unit(s)    L medial occipitalis injection amount:  15 unit(s)    R medial occipitalis injection amount:  15 unit(s)    L superior cervical paraspinal injection amount:  10 unit(s)    R superior cervical paraspinal injection amount:  10 unit(s)    L superior trapezius injection amount:  15 unit(s)    R superior trapezius injection amount:  15 unit(s)   Total Units      Total units used:  200    Total units discarded:  0    Post-procedure details      Chemodenervation:  Chronic migraine    Facial Nerve Location::  Bilateral facial nerve    Patient tolerance of procedure:  Tolerated well, no immediate complications   Comments       Extra 45 units between the b/l anterior temporalis muscles and b/l occipitalis muscles, all medically necessary.       Blood pressure 92/62, temperature (!) 96.8 °F (36 °C), temperature source Temporal, last menstrual period 03/04/2016, not currently breastfeeding.     no

## 2024-07-09 NOTE — TELEPHONE ENCOUNTER
----- Message from Marie Alfaro PA-C sent at 7/9/2024 11:47 AM EDT -----  [facility] social work team,  Would neurology group provide lyft or uber each time she has a botox appt? Or will we need to recommend [employer]?    I am also wondering if agency on aging would assist this pt at 54?    Thanks in advance,  carlos manuel

## 2024-07-10 NOTE — TELEPHONE ENCOUNTER
Lyft is only used as a last resort. Every attempt is made to connect patient to alternative modes of transportation.    MSW reviewed chart - patient had completed the [employer] application earlier this year with her PCP office.    MSW phoned [employer] at [phone] and spoke with Jose Eduardo - he stated that patient is registered with [employer] under PWD/ADA as of 2/20/24-2/20/27. Jose Eduardo stated that patient must pay fares for both medical and non-medical trips.     Additionally, patient will not qualify for services through the [facility] Agency on Aging, but depending on what her needs are, she may be able to apply for waiver services for in home help    MSW attempted to reach patient at [phone]. No answer. MSW left a message advising that we cannot offer Lyfts since patient does have alternative transportation. MSW requested a callback to discuss her needs to see if other community resources (such as the waiver) would be appropriate). Awaiting same.

## 2024-07-11 NOTE — TELEPHONE ENCOUNTER
MSW attempted to reach patient at [phone]. No answer. MSW left a message advising that we cannot offer Lyfts since patient does have alternative transportation. MSW requested a callback to discuss her needs to see if other community resources (such as the waiver) would be appropriate). Awaiting same.

## 2024-07-12 NOTE — TELEPHONE ENCOUNTER
"MSW attempted to reach patient at [phone]. No answer. MSW left a message advising that we cannot offer Lyfts since patient does have alternative transportation. MSW requested a callback to discuss her needs to see if other community resources (such as the waiver) would be appropriate). Awaiting same.    Since there have been 3 unsuccessful attempts to reach patient, MSW sent an \"Unable to Reach\" letter via THUBIT this date. If same is not read within a timely manner, MSW will mail a copy of the letter to patient's home address.     "

## 2024-07-16 NOTE — TELEPHONE ENCOUNTER
MSW reviewed chart - Unable to Reach letter read. MSW will be available should patient call back for assistance.

## 2024-07-17 ENCOUNTER — TELEPHONE (OUTPATIENT)
Dept: GASTROENTEROLOGY | Facility: MEDICAL CENTER | Age: 54
End: 2024-07-17

## 2024-07-17 NOTE — TELEPHONE ENCOUNTER
FHT dopplered 150s rt lower quadrant.    Requested transportation for patient's appt 7/23   Medical Necessity Information: It is in your best interest to select a reason for this procedure from the list below. All of these items fulfill various CMS LCD requirements except the new and changing color options. Detail Level: Detailed Medical Necessity Clause: This procedure was medically necessary because the lesions that were treated were: Post-Care Instructions: I reviewed with the patient in detail post-care instructions. Patient is to wear sunprotection, and avoid picking at any of the treated lesions. Pt may apply Vaseline to crusted or scabbing areas. Add 52 Modifier (Optional): no Consent: The patient's consent was obtained including but not limited to risks of crusting, scabbing, blistering, scarring, darker or lighter pigmentary change, recurrence, incomplete removal and infection.

## 2024-07-17 NOTE — TELEPHONE ENCOUNTER
Reena TERRY [facility]   @ 11:30          Previous Messages       ----- Message -----  From: Shanika Carson  Sent: 2024   7:37 AM EDT  To: Patient [facility]    Patients Name: Barb Palomo  : 1970  Phone Number: [phone]  Appointment Date:   Appointment time: 12:30pm   Address: [address] [place] [address]  [place] [address]  Drop off Facility/Office Name: [facility]  Drop off  Address: [address] [place] [address], [address]tr[address], [address], [place] [address]  Cost Center: Gastro  Special notes/directions for : Entrance C, around the side of the building  Note for scheduling team

## 2024-07-23 ENCOUNTER — OFFICE VISIT (OUTPATIENT)
Dept: GASTROENTEROLOGY | Facility: MEDICAL CENTER | Age: 54
End: 2024-07-23
Payer: MEDICARE

## 2024-07-23 ENCOUNTER — TELEPHONE (OUTPATIENT)
Dept: GASTROENTEROLOGY | Facility: HOSPITAL | Age: 54
End: 2024-07-23

## 2024-07-23 ENCOUNTER — TELEPHONE (OUTPATIENT)
Dept: GASTROENTEROLOGY | Facility: MEDICAL CENTER | Age: 54
End: 2024-07-23

## 2024-07-23 VITALS
DIASTOLIC BLOOD PRESSURE: 63 MMHG | TEMPERATURE: 98.3 F | SYSTOLIC BLOOD PRESSURE: 100 MMHG | WEIGHT: 91.2 LBS | HEIGHT: 62 IN | HEART RATE: 117 BPM | BODY MASS INDEX: 16.78 KG/M2 | OXYGEN SATURATION: 98 %

## 2024-07-23 DIAGNOSIS — Z12.11 COLON CANCER SCREENING: ICD-10-CM

## 2024-07-23 DIAGNOSIS — R63.4 UNINTENTIONAL WEIGHT LOSS: ICD-10-CM

## 2024-07-23 DIAGNOSIS — R13.19 OTHER DYSPHAGIA: ICD-10-CM

## 2024-07-23 DIAGNOSIS — K52.9 CHRONIC DIARRHEA: ICD-10-CM

## 2024-07-23 DIAGNOSIS — R68.81 EARLY SATIETY: Primary | ICD-10-CM

## 2024-07-23 PROCEDURE — 99214 OFFICE O/P EST MOD 30 MIN: CPT | Performed by: PHYSICIAN ASSISTANT

## 2024-07-23 RX ORDER — MONTELUKAST SODIUM 4 MG/1
1 TABLET, CHEWABLE ORAL 2 TIMES DAILY
Qty: 60 TABLET | Refills: 3 | Status: SHIPPED | OUTPATIENT
Start: 2024-07-23

## 2024-07-23 NOTE — PROGRESS NOTES
[facility] - Outpatient Follow-up Note  Barb Palomo 54 y.o. female MRN: [id number]  Encounter: [id number]      Assessment and Plan    1. Dysphagia  2. Epigastric pain  3. Early satiety  The patient presented with dysphagia, epigastric pain, and early satiety.  For evaluation she underwent EGD with grade a esophagitis and gastritis.  Esophageal, stomach, and duodenal biopsies without concern.  She is on omeprazole without any benefit.  She states she continues to feel as though food is getting stuck in her substernal chest area she continues to have bothersome early satiety  -Continue omeprazole  -Esophageal manometry  -Gastric emptying scan    4. Chronic diarrhea  The patient has chronic diarrhea worse after eating.  Recent colonoscopy without concern including random colon biopsies.  Question without relief.  Imodium also without relief.  -Other question was ineffective we can try colestipol  -If persistent consider fecal audrey and giardia     5. Unintentional weight loss  No malignant concerns on recent EGD and colonoscopy.  The patient has gained 3 pounds since her last visit in March  -Monitor weight    6. Colon cancer screening   Recent colonoscopy 5/7/2024 with poor prep.  No larger masses were seen but small lesions could have been missed so recall was recommended 1 year later.  -Repeat colonoscopy 5/2025 or sooner if clinically indicated    Follow-up 3 to 4 months    ______________________________________________________________________    History of Present Illness  Barb Palomo is a 54 y.o. female with HTN, HLD, DM 2, hypothyroidism, and history of PE and stroke here for follow up evaluation of dysphagia, epigastric pain, early satiety, unintentional weight loss, and chronic diarrhea.  The patient states that when she eats it feels as though both food and liquids get stuck in her substernal chest area.  She also has difficulty as she feels as though she cannot eat much  before she gets full and then has nausea and vomiting.  After she eats she gets postprandial urgency and diarrhea.  She has been started on omeprazole and Questran without any relief.  Has also tried Imodium without any relief.  Recent EGD and colonoscopy were done for evaluation.  EGD revealed grade a esophagitis and gastritis.  Esophageal, stomach, and duodenal biopsies were without concern.  Colonoscopy revealed poor prep and random colon biopsies were obtained and negative.  Recall was recommended 1 year later.      Review of Systems   Constitutional:  Negative for activity change, appetite change, chills, fatigue, fever and unexpected weight change.       Past Medical History  Past Medical History:   Diagnosis Date    Acute venous embolism and thrombosis of deep vessels of distal lower extremity (HCC)     Anemia     Anxiety     last assessed 11/20/17    Arthritis     Asthma     Cerebral infarction (HCC)     unspecified, last assessed 11/14/16    Chest pain     last assessed 5/9/17    Chronic cough     last assessed 12/12/13    Depression     Diabetes mellitus, type 2 (HCC)     DJD (degenerative joint disease)     Esophageal reflux     Fibromyalgia     GERD without esophagitis     resolved 5/13/16    History of pulmonary embolism     Hyperlipidemia     Hypertension     Hypothyroidism     Iron deficiency     Pancreatitis     Panic attack     Panic disorder     Polyarthritis     PTSD (post-traumatic stress disorder)     Sleep difficulties     Stroke syndrome     Thyroid disease     TIA (transient ischemic attack)     TIA (transient ischemic attack)     Venous embolism and thrombosis of deep vessels of distal lower extremity (HCC)     Vitamin B12 deficiency     Vitamin D deficiency        Past Social history  Past Surgical History:   Procedure Laterality Date    CARPAL TUNNEL RELEASE Right     neuroplasty decompression of median nerve    CATARACT EXTRACTION      CHOLECYSTECTOMY      ERCP      ERCP       ESOPHAGOGASTRIC FUNDOPLASTY      NISSEN FUNDOPLICATION      PATELLA SURGERY Left 12/2021    MO ESOPHAGOGASTRODUODENOSCOPY TRANSORAL DIAGNOSTIC N/A 11/02/2016    Procedure: EGD AND COLONOSCOPY;  Surgeon: Jatin Shepherd MD;  Location: [facility];  Service: Gastroenterology    MO NDSC WRST SURG W/RLS TRANSVRS CARPL LIGM Right 03/08/2016    Procedure: RELEASE CARPAL TUNNEL ENDOSCOPIC;  Surgeon: Guero Restrepo MD;  Location: [facility];  Service: Orthopedics    MO TENDON SHEATH INCISION Right 03/08/2016    Procedure: RELEASE TRIGGER FINGER RIGHT THUMB;  Surgeon: Guero Restrepo MD;  Location: [facility];  Service: Orthopedics    TONSILLECTOMY AND ADENOIDECTOMY       Social History     Socioeconomic History    Marital status: /Civil Union     Spouse name: Not on file    Number of children: 1    Years of education: technical school    Highest education level: Associate degree: occupational, technical, or vocational program   Occupational History    Occupation: unemployed     Comment: SSDI   Tobacco Use    Smoking status: Every Day     Current packs/day: 1.00     Average packs/day: 1 pack/day for 41.6 years (41.6 ttl pk-yrs)     Types: Cigarettes     Start date: 1/1/1983    Smokeless tobacco: Never    Tobacco comments:     20 + years   Vaping Use    Vaping status: Former    Substances: Nicotine   Substance and Sexual Activity    Alcohol use: Not Currently     Alcohol/week: 0.0 standard drinks of alcohol     Comment: last was in 2010 after DUI    Drug use: No    Sexual activity: Yes     Partners: Male   Other Topics Concern    Not on file   Social History Narrative    No coffee consumption     Social Determinants of Health     Financial Resource Strain: High Risk (2/12/2021)    Overall Financial Resource Strain (CARDIA)     Difficulty of Paying Living Expenses: Very hard   Food Insecurity: No Food Insecurity (6/20/2024)    Hunger Vital Sign     Worried About Running Out of Food in the Last Year: Never true     Ran  Out of Food in the Last Year: Never true   Transportation Needs: Unmet Transportation Needs (6/20/2024)    PRAPARE - Transportation     Lack of Transportation (Medical): Yes     Lack of Transportation (Non-Medical): No   Physical Activity: Inactive (8/19/2020)    Exercise Vital Sign     Days of Exercise per Week: 0 days     Minutes of Exercise per Session: 0 min   Stress: Stress Concern Present (8/19/2020)    [employer] [place] [employer] - Occupational Stress Questionnaire     Feeling of Stress : Rather much   Social Connections: Moderately Isolated (8/19/2020)    Social Connection and Isolation Panel [NHANES]     Frequency of Communication with Friends and Family: Three times a week     Frequency of Social Gatherings with Friends and Family: Never     Attends Yarsanism Services: Never     Active Member of Clubs or Organizations: No     Attends Club or Organization Meetings: Never     Marital Status:    Intimate Partner Violence: Not At Risk (8/19/2020)    Humiliation, Afraid, Rape, and Kick questionnaire     Fear of Current or Ex-Partner: No     Emotionally Abused: No     Physically Abused: No     Sexually Abused: No   Housing Stability: High Risk (6/20/2024)    Housing Stability Vital Sign     Unable to Pay for Housing in the Last Year: Yes     Number of Times Moved in the Last Year: 1     Homeless in the Last Year: No     Social History     Substance and Sexual Activity   Alcohol Use Not Currently    Alcohol/week: 0.0 standard drinks of alcohol    Comment: last was in 2010 after DUI     Social History     Substance and Sexual Activity   Drug Use No     Social History     Tobacco Use   Smoking Status Every Day    Current packs/day: 1.00    Average packs/day: 1 pack/day for 41.6 years (41.6 ttl pk-yrs)    Types: Cigarettes    Start date: 1/1/1983   Smokeless Tobacco Never   Tobacco Comments    20 + years       Past Family History  Family History   Problem Relation Age of Onset    Diabetes Mother          type 2    Heart attack Mother 39        acute MI    Kidney disease Mother         CKD NKF classfication    Depression Mother     Arthritis Mother     Substance Abuse Mother         mother OD in past on MS04    Ulcerative colitis Mother     Schizophrenia Mother     Suicide Attempts Mother     Bipolar disorder Mother     Diabetes Maternal Grandmother     Heart attack Father         acute MI    Psoriasis Father     Cancer Father         gastric cancer    Stroke Father     Crohn's disease Sister     Bipolar disorder Sister     Rheum arthritis Maternal Grandfather     Throat cancer Maternal Grandfather     Suicide Attempts Daughter     Drug abuse Daughter     Lung cancer Paternal Grandmother     Cancer Paternal Grandfather     No Known Problems Sister     Bipolar disorder Maternal Uncle     Anesthesia problems Neg Hx        Current Medications  Current Outpatient Medications   Medication Sig Dispense Refill    Adalimumab (HUMIRA PEN SC) Inject under the skin (Patient not taking: Reported on 6/3/2024)      Albuterol Sulfate (ProAir RespiClick) 108 (90 Base) MCG/ACT AEPB Inhale 2 puffs every 6 (six) hours as needed (wheezing) 1 each 1    aspirin 81 mg chewable tablet Chew 81 mg daily       atorvastatin (LIPITOR) 80 mg tablet TAKE 1 TABLET BY MOUTH EVERY DAY 30 tablet 5    baclofen 10 mg tablet Take 1 tablet (10 mg total) by mouth 2 (two) times a day as needed for muscle spasms (headache) 30 tablet 2    BD PEN NEEDLE LINDY U/F 32G X 4 MM MISC Inject under the skin daily 30 each 5    Blood Glucose Monitoring Suppl (OneTouch Verio Reflect) w/Device KIT CHECK BLOOD SUGARS FOUR TIMES DAILY. PLEASE SUBSTITUTE WITH APPROPRIATE ALTERNATIVE AS COVERED BY PATIENT'S INSURANCE. DX: E11.65 1 kit 0    butalbital-acetaminophen-caffeine (FIORICET,ESGIC) -40 mg per tablet TAKE 1 TABLET EVERY 6 HOURS AS NEEDED FOR MIGRAINE.  Please limit 3-4 per week, 15 per month. 15 tablet 2    Cyanocobalamin (VITAMIN B-12 IJ) Inject as  "directed (Patient not taking: Reported on 1/30/2024)      diazepam (VALIUM) 5 mg tablet TAKE 1 TABLET (5 MG TOTAL) BY MOUTH 2 (TWO) TIMES A DAY AS NEEDED FOR ANXIETY DO NOT TAKE WITHIN 8 HOURS OF TRAMADOL 60 tablet 0    DULoxetine (CYMBALTA) 60 mg delayed release capsule Take 1 capsule (60 mg total) by mouth 2 (two) times a day 180 capsule 1    Fluticasone-Salmeterol (Advair Diskus) 500-50 mcg/dose inhaler Inhale 1 puff by mouth 2 times daily.  Rinse mouth after use 180 blister 1    Folic Acid 0.8 MG CAPS Take 0.04 mg by mouth daily. (Patient not taking: Reported on 1/30/2024)      gabapentin (NEURONTIN) 400 mg capsule TAKE 2 CAPSULES BY MOUTH 3 TIMES A  capsule 5    Galcanezumab-gnlm (Emgality) 120 MG/ML SOAJ 1 injection monthly 1 mL 11    glucose blood (OneTouch Verio) test strip Check blood sugars four times daily. Please substitute with appropriate alternative as covered by patient's insurance. Dx: E11.65 400 each 3    insulin NPH-insulin regular (NovoLIN 70/30) 100 units/mL subcutaneous injection Inject 20 Units under the skin 2 (two) times a day before meals 40 mL 1    Insulin Pen Needle (BD Pen Needle Kimberli U/F) 32G X 4 MM MISC Use 2 (two) times a day 200 each 3    Insulin Syringe-Needle U-100 31G X 5/16\" 0.3 ML MISC Use twice daily 60 each 5    ketorolac (TORADOL) 30 mg/mL injection 1-2 ml IM injection once per 24 hours p.r.n. migraine.  No more than 2 injections per week. 4 mL 0    levothyroxine 112 mcg tablet TAKE 1 TABLET BY MOUTH EVERY DAY 90 tablet 1    magnesium Oxide (MAG-OX) 400 mg TABS Take 1 tablet (400 mg total) by mouth 2 (two) times a day for 15 days (Patient not taking: Reported on 5/23/2024) 30 tablet 0    metFORMIN (GLUCOPHAGE-XR) 500 mg 24 hr tablet TAKE 1 TABLET BY MOUTH TWICE A DAY WITH FOOD 60 tablet 5    mirtazapine (REMERON) 7.5 MG tablet Take 1 tablet (7.5 mg total) by mouth daily at bedtime 90 tablet 1    naloxone (NARCAN) 4 mg/0.1 mL nasal spray Administer 1 spray into a " nostril. If no response after 2-3 minutes, give another dose in the other nostril using a new spray. 1 each 1    omeprazole (PriLOSEC) 40 MG capsule TAKE 1 CAPSULE (40 MG TOTAL) BY MOUTH DAILY. 30 capsule 5    ondansetron (ZOFRAN) 4 mg tablet Take 1 tablet (4 mg total) by mouth every 8 (eight) hours as needed for nausea or vomiting 30 tablet 0    OneTouch Delica Lancets 33G MISC Check blood sugars four times daily. Please substitute with appropriate alternative as covered by patient's insurance. Dx: E11.65 400 each 3    OneTouch Ultra test strip USE AS DIRECTED TO TEST 3 TIMES A  strip 0    prazosin (MINIPRESS) 2 mg capsule Take 1 capsule (2 mg total) by mouth daily at bedtime 90 capsule 1    prochlorperazine (COMPAZINE) 5 mg tablet Take 1 tablet (5 mg total) by mouth every 6 (six) hours as needed for nausea or vomiting 30 tablet 2    topiramate (TOPAMAX) 25 mg tablet TAKE 1 TABLET BY MOUTH EVERY DAY 90 tablet 1    traMADol (ULTRAM-ER) 200 MG 24 hr tablet Take 1 tablet (200 mg total) by mouth daily 30 tablet 0     No current facility-administered medications for this visit.       Allergies  Allergies   Allergen Reactions    Medical Tape Rash    Lexapro [Escitalopram Oxalate]     Escitalopram Other (See Comments) and Palpitations    Other Hives and Rash     Adhesive Tape         The following portions of the patient's history were reviewed and updated as appropriate: allergies, current medications, past medical history, past social history, past surgical history and problem list.      Vitals  There were no vitals filed for this visit.      Physical Exam  Constitutional   General appearance: Patient is seated and in no acute distress, well appearing and well nourished.   Head and Face   Head and face: Normal.    Eyes   Conjunctiva and lids: No erythema, swelling or discharge.  Anicteric.  Ears, Nose, Mouth, and Throat   Hearing: Normal.    Neck: Supple, trachea midline.  Pulmonary   Respiratory effort: No  increased work of breathing or signs of respiratory distress.    Cardiovascular   Examination of extremities for edema and/or varicosities: Normal.    Musculoskeletal   Gait and station: Normal    Skin   Skin and subcutaneous tissue: Warm, dry, and intact. No visible jaundice, lesions or rashes.  Psychiatric   Judgment and insight: Normal  Recent and remote memory:  Normal  Mood and affect: Normal      Results  No visits with results within 1 Day(s) from this visit.   Latest known visit with results is:   Office Visit on 05/23/2024   Component Date Value     COLOR,UA 05/23/2024 yellow     CLARITY,UA 05/23/2024 clear     SPECIFIC GRAVITY,UA 05/23/2024 1.010      PH,UA 05/23/2024 6.0     LEUKOCYTE ESTERASE,UA 05/23/2024 neg     NITRITE,UA 05/23/2024 neg     GLUCOSE, UA 05/23/2024 3+     KETONES,UA 05/23/2024 neg     BILIRUBIN,UA 05/23/2024 neg     BLOOD,UA 05/23/2024 2+     POCT URINE PROTEIN 05/23/2024 neg     SL AMB POCT UROBILINOGEN 05/23/2024 0.2     RESULT ALL_PRESC MEDS SP* 05/23/2024 Not Applicable     Amphetamine Quantificati* 05/23/2024 negative     Methamphetamine Quantifi* 05/23/2024 negative     Butalbital Quantification 05/23/2024 negative-I (A)     Phenobarbital Quantifica* 05/23/2024 negative     Secobarbital Quantificat* 05/23/2024 negative     Alpha-Hydroxyalprazolam * 05/23/2024 negative     7-Amino-Clonazepam Quant* 05/23/2024 negative     Nordiazepam Quantificati* 05/23/2024 negative     Temazepam Quantification 05/23/2024 positive-74.252     Oxazepam Quantification 05/23/2024 positive-44.066     Lorazepam Quantification 05/23/2024 negative     Gabapentin Quantification 05/23/2024 positive-> [id number]     PREGABALIN QUANTIFICATION 05/23/2024 negative     Cocaine metabolite Quant* 05/23/2024 negative     6-TEO (Heroin metabolite* 05/23/2024 negative     Buprenorphine Quantifica* 05/23/2024 negative     Norbuprenorphine Quantif* 05/23/2024 negative     Dextrorphan (Dextrometho* 05/23/2024 negative      Meperidine Quantification 05/23/2024 negative     Normeperidine Quantifica* 05/23/2024 negative     Naltrexone Quantification 05/23/2024 negative     NALTREXOL (NALTREXONE ME* 05/23/2024 negative     Fentanyl Quantification 05/23/2024 negative     Norfentanyl Quantificati* 05/23/2024 negative     4-ANPP Quantification 05/23/2024 Fen Neg     Acetyl fentanyl Quantifi* 05/23/2024 Fen Neg     Acetyl norfentanyl Quant* 05/23/2024 Fen Neg     Acryl fentanyl Quantific* 05/23/2024 Fen Neg     Carfentanil Quantificati* 05/23/2024 Fen Neg     Para-fluorofentanyl Edi* 05/23/2024 Fen Neg     Methadone Quantification 05/23/2024 negative     EDDP (Methadone metaboli* 05/23/2024 negative     Oxycodone Quantification 05/23/2024 negative     Noroxycodone Quantificat* 05/23/2024 negative     Oxymorphone Quantificati* 05/23/2024 negative     Tapentadol Quantification 05/23/2024 negative     cTHC (Marijuana metaboli* 05/23/2024 negative     Tramadol Quantification 05/23/2024 positive-7906.862     O-desmethyl-tramadol Mayco* 05/23/2024 positive-56651.761     N-DESMETHYL-TRAMADOL MAYCO* 05/23/2024 positive-583.247     Codeine Quantification 05/23/2024 negative     Morphine Quantification 05/23/2024 negative     Hydrocodone Quantificati* 05/23/2024 negative     Norhydrocodone Quantific* 05/23/2024 negative     Hydromorphone Quantifica* 05/23/2024 negative     EUTYLONE QUANTIFICATION 05/23/2024 negative     METHYLONE QUANTIFICATION 05/23/2024 negative     Ethyl Glucuronide Quanti* 05/23/2024 positive-8063.706-I (A)     Ethyl Sulfate Quantifica* 05/23/2024 negative     Mitragynine (Kratom janice* 05/23/2024 negative     9-JA-Icyzdwgbmil (Kratom* 05/23/2024 negative     5F-ADB-M7 05/23/2024 negative     KQ-XLXIVZMK-A6 METABOLIT* 05/23/2024 negative     OWSH-NFWDGVOH-G9 METABOL* 05/23/2024 negative     NLE371 metabolite Quanti* 05/23/2024 negative     HLR681 metabolite Quanti* 05/23/2024 negative     RCS4 METABOLITE QUANTIFI* 05/23/2024 negative      XLR11/ METABOLITE Q* 05/23/2024 negative     Methylphenidate Quantifi* 05/23/2024 negative     RITALINIC ACID QUANTIFIC* 05/23/2024 negative     PHENTERMINE QUANTIFICATI* 05/23/2024 negative     OXIDANT 05/23/2024 normal-0     CREATININE 05/23/2024 abnormal-18.7 (A)     pH 05/23/2024 normal-6.1     SPECIFIC GRAVITY URINE 05/23/2024 normal-1.013     XYLAZINE 05/23/2024 negative     4-HYDROXY XYLAZINE 05/23/2024 negative     Urine Culture 05/23/2024 >100,000 cfu/ml Klebsiella pneumoniae (A)        Radiology Results  No results found.    Orders  No orders of the defined types were placed in this encounter.

## 2024-07-23 NOTE — TELEPHONE ENCOUNTER
Scheduled patient for follow up appt with physician with the intention of the ordered tests from today to have been completed by appt date, per recommendation by ADAM Payan Owens

## 2024-07-25 ENCOUNTER — HOSPITAL ENCOUNTER (OUTPATIENT)
Dept: GASTROENTEROLOGY | Facility: HOSPITAL | Age: 54
End: 2024-07-25
Payer: MEDICARE

## 2024-07-25 VITALS
TEMPERATURE: 98.2 F | RESPIRATION RATE: 18 BRPM | DIASTOLIC BLOOD PRESSURE: 64 MMHG | SYSTOLIC BLOOD PRESSURE: 96 MMHG | OXYGEN SATURATION: 98 % | HEART RATE: 109 BPM

## 2024-07-25 DIAGNOSIS — R13.19 OTHER DYSPHAGIA: ICD-10-CM

## 2024-07-25 PROCEDURE — 91010 ESOPHAGUS MOTILITY STUDY: CPT

## 2024-07-25 NOTE — PERIOPERATIVE NURSING NOTE
Patient brought in the room and educated on procedure. Viscous lidocaine 2% administered to bilateral nostrils by sniffing and allowed to take affect. Manometry catheter inserted via right nostril, positioned and secured. 10 liquid swallow, 10 viscous swallow ,1 rapid swallow, 5 upright swallows and 1 rapid drink challenge with 200 cc of water performed. Patient tolerated procedure. Catheter removed intact. Discharge instructions given to the patient and patient left the room in stable condition.

## 2024-07-27 DIAGNOSIS — G89.4 CHRONIC PAIN SYNDROME: ICD-10-CM

## 2024-07-27 DIAGNOSIS — M54.2 NECK PAIN: ICD-10-CM

## 2024-07-27 DIAGNOSIS — M54.50 CHRONIC MIDLINE LOW BACK PAIN WITHOUT SCIATICA: ICD-10-CM

## 2024-07-27 DIAGNOSIS — R11.0 NAUSEA: ICD-10-CM

## 2024-07-27 DIAGNOSIS — G89.29 CHRONIC MIDLINE LOW BACK PAIN WITHOUT SCIATICA: ICD-10-CM

## 2024-07-29 RX ORDER — ONDANSETRON 4 MG/1
TABLET, FILM COATED ORAL
Qty: 30 TABLET | Refills: 5 | Status: SHIPPED | OUTPATIENT
Start: 2024-07-29

## 2024-07-29 RX ORDER — TRAMADOL HYDROCHLORIDE 200 MG/1
200 TABLET, EXTENDED RELEASE ORAL DAILY PRN
Qty: 30 TABLET | Refills: 0 | Status: SHIPPED | OUTPATIENT
Start: 2024-07-29

## 2024-07-30 ENCOUNTER — TELEPHONE (OUTPATIENT)
Age: 54
End: 2024-07-30

## 2024-07-30 ENCOUNTER — HOSPITAL ENCOUNTER (OUTPATIENT)
Dept: RADIOLOGY | Facility: HOSPITAL | Age: 54
Discharge: HOME | End: 2024-07-30
Payer: MEDICARE

## 2024-07-30 DIAGNOSIS — G62.9 NEUROPATHY: Primary | ICD-10-CM

## 2024-07-30 DIAGNOSIS — F17.210 SMOKING GREATER THAN 20 PACK YEARS: ICD-10-CM

## 2024-07-30 PROCEDURE — 71271 CT THORAX LUNG CANCER SCR C-: CPT

## 2024-07-30 NOTE — TELEPHONE ENCOUNTER
Carrie called stating the patient is scheduled for a bilateral upper EMG tomorrow and they need an order for this test.  The current order in the system is for lower extremities, but the patient will be having UPPER BILATERAL EXTREMITIES EMG.     Please place orders asap so the patient can have her appointment.

## 2024-07-30 NOTE — TELEPHONE ENCOUNTER
Patient called and stated she had an upper extremity EMG schedule for tomorrow however this was cancelled due to their being no order in the system. Patient is requesting a new order be placed so she can schedule as soon as possible. Please return patient call to notify if order can be placed.

## 2024-07-31 ENCOUNTER — HOSPITAL ENCOUNTER (OUTPATIENT)
Dept: NEUROLOGY | Facility: CLINIC | Age: 54
Discharge: HOME | End: 2024-07-31
Payer: MEDICARE

## 2024-07-31 DIAGNOSIS — G62.9 NEUROPATHY: ICD-10-CM

## 2024-07-31 PROCEDURE — 95912 NRV CNDJ TEST 11-12 STUDIES: CPT | Performed by: PSYCHIATRY & NEUROLOGY

## 2024-07-31 PROCEDURE — 95886 MUSC TEST DONE W/N TEST COMP: CPT | Performed by: PSYCHIATRY & NEUROLOGY

## 2024-08-01 ENCOUNTER — TELEPHONE (OUTPATIENT)
Dept: FAMILY MEDICINE CLINIC | Facility: CLINIC | Age: 54
End: 2024-08-01

## 2024-08-01 NOTE — TELEPHONE ENCOUNTER
----- Message from ELISEO Oviedo sent at 8/1/2024  8:19 AM EDT -----  No nodules and/or definitely benign nodules.   negative. Continued annual screening with LDCT in 12 months.

## 2024-08-02 ENCOUNTER — HOSPITAL ENCOUNTER (OUTPATIENT)
Dept: MAMMOGRAPHY | Facility: CLINIC | Age: 54
End: 2024-08-02
Payer: MEDICARE

## 2024-08-02 VITALS — HEIGHT: 62 IN | BODY MASS INDEX: 16.2 KG/M2 | WEIGHT: 88 LBS

## 2024-08-02 DIAGNOSIS — Z12.31 ENCOUNTER FOR SCREENING MAMMOGRAM FOR MALIGNANT NEOPLASM OF BREAST: ICD-10-CM

## 2024-08-02 PROCEDURE — 77063 BREAST TOMOSYNTHESIS BI: CPT

## 2024-08-02 PROCEDURE — 91020 GASTRIC MOTILITY STUDIES: CPT | Performed by: INTERNAL MEDICINE

## 2024-08-02 PROCEDURE — 77067 SCR MAMMO BI INCL CAD: CPT

## 2024-08-05 ENCOUNTER — HOSPITAL ENCOUNTER (OUTPATIENT)
Dept: NEUROLOGY | Facility: CLINIC | Age: 54
Discharge: HOME | End: 2024-08-05
Payer: MEDICARE

## 2024-08-05 DIAGNOSIS — G62.9 NEUROPATHY: ICD-10-CM

## 2024-08-05 DIAGNOSIS — G56.03 CARPAL TUNNEL SYNDROME ON BOTH SIDES: Primary | ICD-10-CM

## 2024-08-05 PROCEDURE — 95886 MUSC TEST DONE W/N TEST COMP: CPT | Performed by: PSYCHIATRY & NEUROLOGY

## 2024-08-05 PROCEDURE — 95911 NRV CNDJ TEST 9-10 STUDIES: CPT | Performed by: PSYCHIATRY & NEUROLOGY

## 2024-08-15 ENCOUNTER — APPOINTMENT (EMERGENCY)
Dept: RADIOLOGY | Facility: HOSPITAL | Age: 54
DRG: 871 | End: 2024-08-15
Payer: MEDICARE

## 2024-08-15 ENCOUNTER — HOSPITAL ENCOUNTER (INPATIENT)
Facility: HOSPITAL | Age: 54
LOS: 31 days | Discharge: NURSING, REHAB OR LONG-TERM CARE | DRG: 871 | End: 2024-09-16
Attending: EMERGENCY MEDICINE | Admitting: INTERNAL MEDICINE
Payer: MEDICARE

## 2024-08-15 DIAGNOSIS — M54.50 CHRONIC MIDLINE LOW BACK PAIN WITHOUT SCIATICA: ICD-10-CM

## 2024-08-15 DIAGNOSIS — A41.9 SEPSIS (HCC): Primary | ICD-10-CM

## 2024-08-15 DIAGNOSIS — F17.200 TOBACCO DEPENDENCE: ICD-10-CM

## 2024-08-15 DIAGNOSIS — A04.72 C. DIFFICILE DIARRHEA: ICD-10-CM

## 2024-08-15 DIAGNOSIS — M54.2 NECK PAIN: ICD-10-CM

## 2024-08-15 DIAGNOSIS — M54.2 CHRONIC CERVICAL PAIN: ICD-10-CM

## 2024-08-15 DIAGNOSIS — K52.9 CHRONIC DIARRHEA: ICD-10-CM

## 2024-08-15 DIAGNOSIS — E43 SEVERE PROTEIN-CALORIE MALNUTRITION (HCC): ICD-10-CM

## 2024-08-15 DIAGNOSIS — G89.4 CHRONIC PAIN SYNDROME: ICD-10-CM

## 2024-08-15 DIAGNOSIS — G89.29 CHRONIC MIDLINE LOW BACK PAIN WITHOUT SCIATICA: ICD-10-CM

## 2024-08-15 DIAGNOSIS — R62.7 FAILURE TO THRIVE IN ADULT: ICD-10-CM

## 2024-08-15 DIAGNOSIS — G89.29 CHRONIC CERVICAL PAIN: ICD-10-CM

## 2024-08-15 DIAGNOSIS — F40.01 PANIC DISORDER WITH AGORAPHOBIA: ICD-10-CM

## 2024-08-15 DIAGNOSIS — R00.0 SINUS TACHYCARDIA: ICD-10-CM

## 2024-08-15 DIAGNOSIS — E11.65 UNCONTROLLED TYPE 2 DIABETES MELLITUS WITH HYPERGLYCEMIA (HCC): ICD-10-CM

## 2024-08-15 LAB
ALBUMIN SERPL BCG-MCNC: 3.1 G/DL (ref 3.5–5)
ALP SERPL-CCNC: 113 U/L (ref 34–104)
ALT SERPL W P-5'-P-CCNC: 14 U/L (ref 7–52)
ANION GAP SERPL CALCULATED.3IONS-SCNC: 12 MMOL/L (ref 4–13)
APTT PPP: 24 SECONDS (ref 23–34)
AST SERPL W P-5'-P-CCNC: 19 U/L (ref 13–39)
BASE EX.OXY STD BLDV CALC-SCNC: 69.6 % (ref 60–80)
BASE EXCESS BLDV CALC-SCNC: 15.4 MMOL/L
BASOPHILS # BLD AUTO: 0.04 THOUSANDS/ΜL (ref 0–0.1)
BASOPHILS NFR BLD AUTO: 1 % (ref 0–1)
BILIRUB SERPL-MCNC: 0.37 MG/DL (ref 0.2–1)
BUN SERPL-MCNC: 3 MG/DL (ref 5–25)
CALCIUM ALBUM COR SERPL-MCNC: 9.1 MG/DL (ref 8.3–10.1)
CALCIUM SERPL-MCNC: 8.4 MG/DL (ref 8.4–10.2)
CHLORIDE SERPL-SCNC: 88 MMOL/L (ref 96–108)
CO2 SERPL-SCNC: 36 MMOL/L (ref 21–32)
CREAT SERPL-MCNC: 0.46 MG/DL (ref 0.6–1.3)
EOSINOPHIL # BLD AUTO: 0.08 THOUSAND/ΜL (ref 0–0.61)
EOSINOPHIL NFR BLD AUTO: 1 % (ref 0–6)
ERYTHROCYTE [DISTWIDTH] IN BLOOD BY AUTOMATED COUNT: 12.6 % (ref 11.6–15.1)
FLUAV RNA RESP QL NAA+PROBE: NEGATIVE
FLUBV RNA RESP QL NAA+PROBE: NEGATIVE
GFR SERPL CREATININE-BSD FRML MDRD: 113 ML/MIN/1.73SQ M
GLUCOSE SERPL-MCNC: 435 MG/DL (ref 65–140)
GLUCOSE SERPL-MCNC: 448 MG/DL (ref 65–140)
HCO3 BLDV-SCNC: 39.7 MMOL/L (ref 24–30)
HCT VFR BLD AUTO: 40.3 % (ref 34.8–46.1)
HGB BLD-MCNC: 14 G/DL (ref 11.5–15.4)
IMM GRANULOCYTES # BLD AUTO: 0.02 THOUSAND/UL (ref 0–0.2)
IMM GRANULOCYTES NFR BLD AUTO: 0 % (ref 0–2)
INR PPP: 0.95 (ref 0.85–1.19)
LACTATE SERPL-SCNC: 1.6 MMOL/L (ref 0.5–2)
LYMPHOCYTES # BLD AUTO: 1.98 THOUSANDS/ΜL (ref 0.6–4.47)
LYMPHOCYTES NFR BLD AUTO: 26 % (ref 14–44)
MCH RBC QN AUTO: 31.3 PG (ref 26.8–34.3)
MCHC RBC AUTO-ENTMCNC: 34.7 G/DL (ref 31.4–37.4)
MCV RBC AUTO: 90 FL (ref 82–98)
MONOCYTES # BLD AUTO: 0.5 THOUSAND/ΜL (ref 0.17–1.22)
MONOCYTES NFR BLD AUTO: 7 % (ref 4–12)
NEUTROPHILS # BLD AUTO: 4.96 THOUSANDS/ΜL (ref 1.85–7.62)
NEUTS SEG NFR BLD AUTO: 65 % (ref 43–75)
NRBC BLD AUTO-RTO: 0 /100 WBCS
O2 CT BLDV-SCNC: 14.9 ML/DL
PCO2 BLDV: 45.7 MM HG (ref 42–50)
PH BLDV: 7.56 [PH] (ref 7.3–7.4)
PLATELET # BLD AUTO: 303 THOUSANDS/UL (ref 149–390)
PMV BLD AUTO: 10.7 FL (ref 8.9–12.7)
PO2 BLDV: 34.1 MM HG (ref 35–45)
POTASSIUM SERPL-SCNC: 2.3 MMOL/L (ref 3.5–5.3)
PROT SERPL-MCNC: 5.7 G/DL (ref 6.4–8.4)
PROTHROMBIN TIME: 13 SECONDS (ref 12.3–15)
RBC # BLD AUTO: 4.47 MILLION/UL (ref 3.81–5.12)
RSV RNA RESP QL NAA+PROBE: NEGATIVE
SARS-COV-2 RNA RESP QL NAA+PROBE: NEGATIVE
SODIUM SERPL-SCNC: 136 MMOL/L (ref 135–147)
WBC # BLD AUTO: 7.58 THOUSAND/UL (ref 4.31–10.16)

## 2024-08-15 PROCEDURE — 73502 X-RAY EXAM HIP UNI 2-3 VIEWS: CPT

## 2024-08-15 PROCEDURE — 85025 COMPLETE CBC W/AUTO DIFF WBC: CPT

## 2024-08-15 PROCEDURE — 71045 X-RAY EXAM CHEST 1 VIEW: CPT

## 2024-08-15 PROCEDURE — 96376 TX/PRO/DX INJ SAME DRUG ADON: CPT

## 2024-08-15 PROCEDURE — 82948 REAGENT STRIP/BLOOD GLUCOSE: CPT

## 2024-08-15 PROCEDURE — 84484 ASSAY OF TROPONIN QUANT: CPT

## 2024-08-15 PROCEDURE — 96368 THER/DIAG CONCURRENT INF: CPT

## 2024-08-15 PROCEDURE — 83605 ASSAY OF LACTIC ACID: CPT

## 2024-08-15 PROCEDURE — 87040 BLOOD CULTURE FOR BACTERIA: CPT

## 2024-08-15 PROCEDURE — 82010 KETONE BODYS QUAN: CPT

## 2024-08-15 PROCEDURE — 85610 PROTHROMBIN TIME: CPT

## 2024-08-15 PROCEDURE — 82805 BLOOD GASES W/O2 SATURATION: CPT

## 2024-08-15 PROCEDURE — 36415 COLL VENOUS BLD VENIPUNCTURE: CPT

## 2024-08-15 PROCEDURE — 85730 THROMBOPLASTIN TIME PARTIAL: CPT

## 2024-08-15 PROCEDURE — 99291 CRITICAL CARE FIRST HOUR: CPT | Performed by: EMERGENCY MEDICINE

## 2024-08-15 PROCEDURE — 93005 ELECTROCARDIOGRAM TRACING: CPT

## 2024-08-15 PROCEDURE — 80053 COMPREHEN METABOLIC PANEL: CPT

## 2024-08-15 PROCEDURE — 73564 X-RAY EXAM KNEE 4 OR MORE: CPT

## 2024-08-15 PROCEDURE — 84145 PROCALCITONIN (PCT): CPT

## 2024-08-15 PROCEDURE — 96367 TX/PROPH/DG ADDL SEQ IV INF: CPT

## 2024-08-15 PROCEDURE — 0241U HB NFCT DS VIR RESP RNA 4 TRGT: CPT

## 2024-08-15 PROCEDURE — 83735 ASSAY OF MAGNESIUM: CPT

## 2024-08-15 PROCEDURE — 96375 TX/PRO/DX INJ NEW DRUG ADDON: CPT

## 2024-08-15 PROCEDURE — 99285 EMERGENCY DEPT VISIT HI MDM: CPT

## 2024-08-15 PROCEDURE — 96365 THER/PROPH/DIAG IV INF INIT: CPT

## 2024-08-15 RX ORDER — HYDROMORPHONE HCL/PF 1 MG/ML
0.5 SYRINGE (ML) INJECTION ONCE
Status: COMPLETED | OUTPATIENT
Start: 2024-08-15 | End: 2024-08-15

## 2024-08-15 RX ORDER — POTASSIUM CHLORIDE 14.9 MG/ML
20 INJECTION INTRAVENOUS
Status: COMPLETED | OUTPATIENT
Start: 2024-08-15 | End: 2024-08-16

## 2024-08-15 RX ORDER — POTASSIUM CHLORIDE 1500 MG/1
60 TABLET, EXTENDED RELEASE ORAL ONCE
Status: COMPLETED | OUTPATIENT
Start: 2024-08-15 | End: 2024-08-15

## 2024-08-15 RX ADMIN — POTASSIUM CHLORIDE 20 MEQ: 14.9 INJECTION, SOLUTION INTRAVENOUS at 23:41

## 2024-08-15 RX ADMIN — SODIUM CHLORIDE, SODIUM LACTATE, POTASSIUM CHLORIDE, AND CALCIUM CHLORIDE 1000 ML: .6; .31; .03; .02 INJECTION, SOLUTION INTRAVENOUS at 22:34

## 2024-08-15 RX ADMIN — POTASSIUM CHLORIDE 60 MEQ: 1500 TABLET, EXTENDED RELEASE ORAL at 23:46

## 2024-08-15 RX ADMIN — CEFTRIAXONE SODIUM 2000 MG: 10 INJECTION, POWDER, FOR SOLUTION INTRAVENOUS at 22:30

## 2024-08-15 RX ADMIN — Medication 600 MG: at 23:05

## 2024-08-15 RX ADMIN — SODIUM CHLORIDE, SODIUM LACTATE, POTASSIUM CHLORIDE, AND CALCIUM CHLORIDE 1000 ML: .6; .31; .03; .02 INJECTION, SOLUTION INTRAVENOUS at 22:48

## 2024-08-15 RX ADMIN — HYDROMORPHONE HYDROCHLORIDE 0.5 MG: 1 INJECTION, SOLUTION INTRAMUSCULAR; INTRAVENOUS; SUBCUTANEOUS at 22:29

## 2024-08-16 ENCOUNTER — APPOINTMENT (INPATIENT)
Dept: RADIOLOGY | Facility: HOSPITAL | Age: 54
DRG: 871 | End: 2024-08-16
Payer: MEDICARE

## 2024-08-16 PROBLEM — R62.7 FAILURE TO THRIVE IN ADULT: Status: ACTIVE | Noted: 2024-08-16

## 2024-08-16 PROBLEM — A04.72 C. DIFFICILE COLITIS: Status: ACTIVE | Noted: 2024-08-16

## 2024-08-16 PROBLEM — E83.42 HYPOMAGNESEMIA: Status: ACTIVE | Noted: 2024-08-16

## 2024-08-16 PROBLEM — E87.6 HYPOKALEMIA: Status: ACTIVE | Noted: 2024-08-16

## 2024-08-16 LAB
ANION GAP SERPL CALCULATED.3IONS-SCNC: 8 MMOL/L (ref 4–13)
ATRIAL RATE: 104 BPM
B-OH-BUTYR SERPL-MCNC: 0.37 MMOL/L (ref 0.02–0.27)
BACTERIA UR QL AUTO: ABNORMAL /HPF
BILIRUB UR QL STRIP: NEGATIVE
BUDDING YEAST: PRESENT
BUN SERPL-MCNC: 3 MG/DL (ref 5–25)
C COLI+JEJUNI TUF STL QL NAA+PROBE: NEGATIVE
C DIFF TOX A+B STL QL IA: POSITIVE
C DIFF TOX GENS STL QL NAA+PROBE: POSITIVE
CALCIUM SERPL-MCNC: 7.1 MG/DL (ref 8.4–10.2)
CARDIAC TROPONIN I PNL SERPL HS: 4 NG/L
CHLORIDE SERPL-SCNC: 93 MMOL/L (ref 96–108)
CLARITY UR: ABNORMAL
CO2 SERPL-SCNC: 38 MMOL/L (ref 21–32)
COLOR UR: ABNORMAL
CORTIS AM PEAK SERPL-MCNC: 5.4 UG/DL (ref 6.7–22.6)
CREAT SERPL-MCNC: 0.38 MG/DL (ref 0.6–1.3)
EC STX1+STX2 GENES STL QL NAA+PROBE: NEGATIVE
ERYTHROCYTE [DISTWIDTH] IN BLOOD BY AUTOMATED COUNT: 12.7 % (ref 11.6–15.1)
EST. AVERAGE GLUCOSE BLD GHB EST-MCNC: 415 MG/DL
FOLATE SERPL-MCNC: 11.3 NG/ML
GFR SERPL CREATININE-BSD FRML MDRD: 121 ML/MIN/1.73SQ M
GLUCOSE SERPL-MCNC: 247 MG/DL (ref 65–140)
GLUCOSE SERPL-MCNC: 252 MG/DL (ref 65–140)
GLUCOSE SERPL-MCNC: 273 MG/DL (ref 65–140)
GLUCOSE SERPL-MCNC: 281 MG/DL (ref 65–140)
GLUCOSE SERPL-MCNC: 314 MG/DL (ref 65–140)
GLUCOSE SERPL-MCNC: 379 MG/DL (ref 65–140)
GLUCOSE UR STRIP-MCNC: ABNORMAL MG/DL
HBA1C MFR BLD: 16.1 %
HCT VFR BLD AUTO: 33.6 % (ref 34.8–46.1)
HGB BLD-MCNC: 11.6 G/DL (ref 11.5–15.4)
HGB UR QL STRIP.AUTO: ABNORMAL
HYALINE CASTS #/AREA URNS LPF: ABNORMAL /LPF
KETONES UR STRIP-MCNC: NEGATIVE MG/DL
LEUKOCYTE ESTERASE UR QL STRIP: ABNORMAL
MAGNESIUM SERPL-MCNC: 1.2 MG/DL (ref 1.9–2.7)
MAGNESIUM SERPL-MCNC: 2 MG/DL (ref 1.9–2.7)
MCH RBC QN AUTO: 31.4 PG (ref 26.8–34.3)
MCHC RBC AUTO-ENTMCNC: 34.5 G/DL (ref 31.4–37.4)
MCV RBC AUTO: 91 FL (ref 82–98)
MUCOUS THREADS UR QL AUTO: ABNORMAL
NITRITE UR QL STRIP: POSITIVE
NON-SQ EPI CELLS URNS QL MICRO: ABNORMAL /HPF
P AXIS: 73 DEGREES
PH UR STRIP.AUTO: 5 [PH]
PLATELET # BLD AUTO: 254 THOUSANDS/UL (ref 149–390)
PMV BLD AUTO: 10.7 FL (ref 8.9–12.7)
POTASSIUM SERPL-SCNC: 2.7 MMOL/L (ref 3.5–5.3)
PR INTERVAL: 112 MS
PROCALCITONIN SERPL-MCNC: 0.09 NG/ML
PROT UR STRIP-MCNC: NEGATIVE MG/DL
QRS AXIS: 102 DEGREES
QRSD INTERVAL: 78 MS
QT INTERVAL: 308 MS
QTC INTERVAL: 405 MS
RBC # BLD AUTO: 3.7 MILLION/UL (ref 3.81–5.12)
RBC #/AREA URNS AUTO: ABNORMAL /HPF
SALMONELLA SP SPAO STL QL NAA+PROBE: NEGATIVE
SHIGELLA SP+EIEC IPAH STL QL NAA+PROBE: NEGATIVE
SODIUM SERPL-SCNC: 139 MMOL/L (ref 135–147)
SP GR UR STRIP.AUTO: 1.03 (ref 1–1.03)
T WAVE AXIS: 267 DEGREES
TSH SERPL DL<=0.05 MIU/L-ACNC: 0.66 UIU/ML (ref 0.45–4.5)
UROBILINOGEN UR STRIP-ACNC: <2 MG/DL
VENTRICULAR RATE: 104 BPM
VIT B12 SERPL-MCNC: 391 PG/ML (ref 180–914)
WBC # BLD AUTO: 5.51 THOUSAND/UL (ref 4.31–10.16)
WBC #/AREA URNS AUTO: ABNORMAL /HPF

## 2024-08-16 PROCEDURE — 82746 ASSAY OF FOLIC ACID SERUM: CPT | Performed by: INTERNAL MEDICINE

## 2024-08-16 PROCEDURE — 83036 HEMOGLOBIN GLYCOSYLATED A1C: CPT | Performed by: INTERNAL MEDICINE

## 2024-08-16 PROCEDURE — 82948 REAGENT STRIP/BLOOD GLUCOSE: CPT

## 2024-08-16 PROCEDURE — 96367 TX/PROPH/DG ADDL SEQ IV INF: CPT

## 2024-08-16 PROCEDURE — 99223 1ST HOSP IP/OBS HIGH 75: CPT | Performed by: INTERNAL MEDICINE

## 2024-08-16 PROCEDURE — 80048 BASIC METABOLIC PNL TOTAL CA: CPT | Performed by: INTERNAL MEDICINE

## 2024-08-16 PROCEDURE — 36415 COLL VENOUS BLD VENIPUNCTURE: CPT | Performed by: INTERNAL MEDICINE

## 2024-08-16 PROCEDURE — 87077 CULTURE AEROBIC IDENTIFY: CPT | Performed by: INTERNAL MEDICINE

## 2024-08-16 PROCEDURE — 87505 NFCT AGENT DETECTION GI: CPT | Performed by: INTERNAL MEDICINE

## 2024-08-16 PROCEDURE — 87186 SC STD MICRODIL/AGAR DIL: CPT | Performed by: INTERNAL MEDICINE

## 2024-08-16 PROCEDURE — 83735 ASSAY OF MAGNESIUM: CPT | Performed by: INTERNAL MEDICINE

## 2024-08-16 PROCEDURE — 85027 COMPLETE CBC AUTOMATED: CPT | Performed by: INTERNAL MEDICINE

## 2024-08-16 PROCEDURE — 93010 ELECTROCARDIOGRAM REPORT: CPT | Performed by: INTERNAL MEDICINE

## 2024-08-16 PROCEDURE — 82533 TOTAL CORTISOL: CPT | Performed by: INTERNAL MEDICINE

## 2024-08-16 PROCEDURE — 74177 CT ABD & PELVIS W/CONTRAST: CPT

## 2024-08-16 PROCEDURE — 81001 URINALYSIS AUTO W/SCOPE: CPT | Performed by: INTERNAL MEDICINE

## 2024-08-16 PROCEDURE — 87209 SMEAR COMPLEX STAIN: CPT | Performed by: INTERNAL MEDICINE

## 2024-08-16 PROCEDURE — 84443 ASSAY THYROID STIM HORMONE: CPT | Performed by: INTERNAL MEDICINE

## 2024-08-16 PROCEDURE — RECHECK: Performed by: STUDENT IN AN ORGANIZED HEALTH CARE EDUCATION/TRAINING PROGRAM

## 2024-08-16 PROCEDURE — 82607 VITAMIN B-12: CPT | Performed by: INTERNAL MEDICINE

## 2024-08-16 PROCEDURE — 87177 OVA AND PARASITES SMEARS: CPT | Performed by: INTERNAL MEDICINE

## 2024-08-16 PROCEDURE — 89055 LEUKOCYTE ASSESSMENT FECAL: CPT | Performed by: INTERNAL MEDICINE

## 2024-08-16 PROCEDURE — 87086 URINE CULTURE/COLONY COUNT: CPT | Performed by: INTERNAL MEDICINE

## 2024-08-16 RX ORDER — OXYCODONE HYDROCHLORIDE 5 MG/1
5 TABLET ORAL ONCE
Status: COMPLETED | OUTPATIENT
Start: 2024-08-16 | End: 2024-08-16

## 2024-08-16 RX ORDER — PANTOPRAZOLE SODIUM 40 MG/1
40 TABLET, DELAYED RELEASE ORAL
Status: DISCONTINUED | OUTPATIENT
Start: 2024-08-16 | End: 2024-09-09

## 2024-08-16 RX ORDER — GABAPENTIN 400 MG/1
800 CAPSULE ORAL 3 TIMES DAILY
Status: DISCONTINUED | OUTPATIENT
Start: 2024-08-16 | End: 2024-09-16 | Stop reason: HOSPADM

## 2024-08-16 RX ORDER — ALBUMIN, HUMAN INJ 5% 5 %
25 SOLUTION INTRAVENOUS ONCE
Status: COMPLETED | OUTPATIENT
Start: 2024-08-16 | End: 2024-08-16

## 2024-08-16 RX ORDER — SODIUM CHLORIDE, SODIUM GLUCONATE, SODIUM ACETATE, POTASSIUM CHLORIDE, MAGNESIUM CHLORIDE, SODIUM PHOSPHATE, DIBASIC, AND POTASSIUM PHOSPHATE .53; .5; .37; .037; .03; .012; .00082 G/100ML; G/100ML; G/100ML; G/100ML; G/100ML; G/100ML; G/100ML
150 INJECTION, SOLUTION INTRAVENOUS CONTINUOUS
Status: DISCONTINUED | OUTPATIENT
Start: 2024-08-16 | End: 2024-08-19

## 2024-08-16 RX ORDER — MIRTAZAPINE 15 MG/1
7.5 TABLET, FILM COATED ORAL
Status: DISCONTINUED | OUTPATIENT
Start: 2024-08-16 | End: 2024-09-16 | Stop reason: HOSPADM

## 2024-08-16 RX ORDER — INSULIN GLARGINE 100 [IU]/ML
14 INJECTION, SOLUTION SUBCUTANEOUS
Status: DISCONTINUED | OUTPATIENT
Start: 2024-08-16 | End: 2024-08-18

## 2024-08-16 RX ORDER — ALBUTEROL SULFATE 90 UG/1
2 INHALANT RESPIRATORY (INHALATION) EVERY 4 HOURS PRN
Status: DISCONTINUED | OUTPATIENT
Start: 2024-08-16 | End: 2024-09-16 | Stop reason: HOSPADM

## 2024-08-16 RX ORDER — VANCOMYCIN HYDROCHLORIDE 125 MG/1
125 CAPSULE ORAL EVERY 6 HOURS SCHEDULED
Status: DISCONTINUED | OUTPATIENT
Start: 2024-08-16 | End: 2024-08-17

## 2024-08-16 RX ORDER — SODIUM CHLORIDE, SODIUM GLUCONATE, SODIUM ACETATE, POTASSIUM CHLORIDE, MAGNESIUM CHLORIDE, SODIUM PHOSPHATE, DIBASIC, AND POTASSIUM PHOSPHATE .53; .5; .37; .037; .03; .012; .00082 G/100ML; G/100ML; G/100ML; G/100ML; G/100ML; G/100ML; G/100ML
1000 INJECTION, SOLUTION INTRAVENOUS ONCE
Status: COMPLETED | OUTPATIENT
Start: 2024-08-16 | End: 2024-08-16

## 2024-08-16 RX ORDER — INSULIN LISPRO 100 [IU]/ML
1-5 INJECTION, SOLUTION INTRAVENOUS; SUBCUTANEOUS
Status: DISCONTINUED | OUTPATIENT
Start: 2024-08-16 | End: 2024-08-29

## 2024-08-16 RX ORDER — POTASSIUM CHLORIDE 1500 MG/1
40 TABLET, EXTENDED RELEASE ORAL ONCE
Status: COMPLETED | OUTPATIENT
Start: 2024-08-16 | End: 2024-08-16

## 2024-08-16 RX ORDER — ALBUMIN (HUMAN) 12.5 G/50ML
25 SOLUTION INTRAVENOUS ONCE
Status: COMPLETED | OUTPATIENT
Start: 2024-08-16 | End: 2024-08-16

## 2024-08-16 RX ORDER — DIAZEPAM 5 MG
5 TABLET ORAL EVERY 12 HOURS PRN
Status: DISCONTINUED | OUTPATIENT
Start: 2024-08-16 | End: 2024-09-16 | Stop reason: HOSPADM

## 2024-08-16 RX ORDER — GABAPENTIN 400 MG/1
800 CAPSULE ORAL 3 TIMES DAILY
Status: DISCONTINUED | OUTPATIENT
Start: 2024-08-16 | End: 2024-08-16

## 2024-08-16 RX ORDER — ASPIRIN 81 MG/1
81 TABLET, CHEWABLE ORAL DAILY
Status: DISCONTINUED | OUTPATIENT
Start: 2024-08-16 | End: 2024-09-16 | Stop reason: HOSPADM

## 2024-08-16 RX ORDER — TRAMADOL HYDROCHLORIDE 50 MG/1
50 TABLET ORAL EVERY 6 HOURS PRN
Status: DISCONTINUED | OUTPATIENT
Start: 2024-08-16 | End: 2024-09-16 | Stop reason: HOSPADM

## 2024-08-16 RX ORDER — BUTALBITAL, ACETAMINOPHEN AND CAFFEINE 50; 325; 40 MG/1; MG/1; MG/1
1 TABLET ORAL EVERY 6 HOURS PRN
Status: DISCONTINUED | OUTPATIENT
Start: 2024-08-16 | End: 2024-09-16 | Stop reason: HOSPADM

## 2024-08-16 RX ORDER — HEPARIN SODIUM 5000 [USP'U]/ML
5000 INJECTION, SOLUTION INTRAVENOUS; SUBCUTANEOUS EVERY 8 HOURS SCHEDULED
Status: DISPENSED | OUTPATIENT
Start: 2024-08-16 | End: 2024-09-01

## 2024-08-16 RX ORDER — ONDANSETRON 2 MG/ML
4 INJECTION INTRAMUSCULAR; INTRAVENOUS EVERY 6 HOURS PRN
Status: DISCONTINUED | OUTPATIENT
Start: 2024-08-16 | End: 2024-09-16 | Stop reason: HOSPADM

## 2024-08-16 RX ORDER — BACLOFEN 10 MG/1
10 TABLET ORAL 2 TIMES DAILY PRN
Status: DISCONTINUED | OUTPATIENT
Start: 2024-08-16 | End: 2024-08-20

## 2024-08-16 RX ORDER — PRAZOSIN HYDROCHLORIDE 2 MG/1
2 CAPSULE ORAL
Status: DISCONTINUED | OUTPATIENT
Start: 2024-08-16 | End: 2024-08-24

## 2024-08-16 RX ORDER — ATORVASTATIN CALCIUM 80 MG/1
80 TABLET, FILM COATED ORAL DAILY
Status: DISCONTINUED | OUTPATIENT
Start: 2024-08-16 | End: 2024-09-16 | Stop reason: HOSPADM

## 2024-08-16 RX ORDER — DULOXETIN HYDROCHLORIDE 60 MG/1
60 CAPSULE, DELAYED RELEASE ORAL 2 TIMES DAILY
Status: DISCONTINUED | OUTPATIENT
Start: 2024-08-16 | End: 2024-09-16 | Stop reason: HOSPADM

## 2024-08-16 RX ORDER — LEVOTHYROXINE SODIUM 112 UG/1
112 TABLET ORAL
Status: DISCONTINUED | OUTPATIENT
Start: 2024-08-16 | End: 2024-09-16 | Stop reason: HOSPADM

## 2024-08-16 RX ORDER — NOREPINEPHRINE BITARTRATE 1 MG/ML
INJECTION, SOLUTION INTRAVENOUS
Status: DISPENSED
Start: 2024-08-16 | End: 2024-08-16

## 2024-08-16 RX ORDER — MAGNESIUM SULFATE HEPTAHYDRATE 40 MG/ML
2 INJECTION, SOLUTION INTRAVENOUS ONCE
Status: COMPLETED | OUTPATIENT
Start: 2024-08-16 | End: 2024-08-16

## 2024-08-16 RX ORDER — NICOTINE 21 MG/24HR
1 PATCH, TRANSDERMAL 24 HOURS TRANSDERMAL DAILY
Status: DISCONTINUED | OUTPATIENT
Start: 2024-08-16 | End: 2024-09-16 | Stop reason: HOSPADM

## 2024-08-16 RX ORDER — VANCOMYCIN HYDROCHLORIDE 125 MG/1
125 CAPSULE ORAL EVERY 6 HOURS SCHEDULED
Status: DISCONTINUED | OUTPATIENT
Start: 2024-08-16 | End: 2024-08-16

## 2024-08-16 RX ORDER — ACETAMINOPHEN 325 MG/1
650 TABLET ORAL EVERY 6 HOURS PRN
Status: DISCONTINUED | OUTPATIENT
Start: 2024-08-16 | End: 2024-08-20

## 2024-08-16 RX ORDER — FLUTICASONE FUROATE AND VILANTEROL 200; 25 UG/1; UG/1
1 POWDER RESPIRATORY (INHALATION)
Status: DISCONTINUED | OUTPATIENT
Start: 2024-08-16 | End: 2024-09-16 | Stop reason: HOSPADM

## 2024-08-16 RX ADMIN — SODIUM CHLORIDE, SODIUM GLUCONATE, SODIUM ACETATE, POTASSIUM CHLORIDE, MAGNESIUM CHLORIDE, SODIUM PHOSPHATE, DIBASIC, AND POTASSIUM PHOSPHATE 1000 ML: .53; .5; .37; .037; .03; .012; .00082 INJECTION, SOLUTION INTRAVENOUS at 03:20

## 2024-08-16 RX ADMIN — SODIUM CHLORIDE 1000 ML: 0.9 INJECTION, SOLUTION INTRAVENOUS at 05:15

## 2024-08-16 RX ADMIN — HEPARIN SODIUM 5000 UNITS: 5000 INJECTION INTRAVENOUS; SUBCUTANEOUS at 01:55

## 2024-08-16 RX ADMIN — HEPARIN SODIUM 5000 UNITS: 5000 INJECTION INTRAVENOUS; SUBCUTANEOUS at 09:23

## 2024-08-16 RX ADMIN — GABAPENTIN 800 MG: 400 CAPSULE ORAL at 02:08

## 2024-08-16 RX ADMIN — ALBUMIN (HUMAN) 25 G: 0.25 INJECTION, SOLUTION INTRAVENOUS at 05:10

## 2024-08-16 RX ADMIN — MAGNESIUM SULFATE HEPTAHYDRATE 2 G: 40 INJECTION, SOLUTION INTRAVENOUS at 00:32

## 2024-08-16 RX ADMIN — INSULIN LISPRO 4 UNITS: 100 INJECTION, SOLUTION INTRAVENOUS; SUBCUTANEOUS at 01:54

## 2024-08-16 RX ADMIN — DULOXETINE HYDROCHLORIDE 60 MG: 60 CAPSULE, DELAYED RELEASE ORAL at 16:39

## 2024-08-16 RX ADMIN — POTASSIUM CHLORIDE 40 MEQ: 1500 TABLET, EXTENDED RELEASE ORAL at 05:42

## 2024-08-16 RX ADMIN — DIAZEPAM 5 MG: 5 TABLET ORAL at 22:11

## 2024-08-16 RX ADMIN — CEFTRIAXONE 1000 MG: 1 INJECTION, POWDER, FOR SOLUTION INTRAMUSCULAR; INTRAVENOUS at 21:57

## 2024-08-16 RX ADMIN — MIRTAZAPINE 7.5 MG: 15 TABLET, FILM COATED ORAL at 21:52

## 2024-08-16 RX ADMIN — VANCOMYCIN HYDROCHLORIDE 125 MG: 125 CAPSULE ORAL at 23:59

## 2024-08-16 RX ADMIN — ALBUMIN (HUMAN) 25 G: 12.5 INJECTION, SOLUTION INTRAVENOUS at 05:42

## 2024-08-16 RX ADMIN — INSULIN GLARGINE 14 UNITS: 100 INJECTION, SOLUTION SUBCUTANEOUS at 01:56

## 2024-08-16 RX ADMIN — NICOTINE 1 PATCH: 21 PATCH, EXTENDED RELEASE TRANSDERMAL at 09:15

## 2024-08-16 RX ADMIN — SODIUM CHLORIDE, SODIUM GLUCONATE, SODIUM ACETATE, POTASSIUM CHLORIDE AND MAGNESIUM CHLORIDE 125 ML/HR: 526; 502; 368; 37; 30 INJECTION, SOLUTION INTRAVENOUS at 09:14

## 2024-08-16 RX ADMIN — SODIUM CHLORIDE, SODIUM GLUCONATE, SODIUM ACETATE, POTASSIUM CHLORIDE, MAGNESIUM CHLORIDE, SODIUM PHOSPHATE, DIBASIC, AND POTASSIUM PHOSPHATE 1000 ML: .53; .5; .37; .037; .03; .012; .00082 INJECTION, SOLUTION INTRAVENOUS at 04:30

## 2024-08-16 RX ADMIN — INSULIN GLARGINE 14 UNITS: 100 INJECTION, SOLUTION SUBCUTANEOUS at 21:52

## 2024-08-16 RX ADMIN — OXYCODONE HYDROCHLORIDE 5 MG: 5 TABLET ORAL at 01:06

## 2024-08-16 RX ADMIN — FLUTICASONE FUROATE AND VILANTEROL TRIFENATATE 1 PUFF: 200; 25 POWDER RESPIRATORY (INHALATION) at 09:17

## 2024-08-16 RX ADMIN — PRAZOSIN HYDROCHLORIDE 2 MG: 2 CAPSULE ORAL at 01:53

## 2024-08-16 RX ADMIN — ONDANSETRON 4 MG: 2 INJECTION INTRAMUSCULAR; INTRAVENOUS at 01:05

## 2024-08-16 RX ADMIN — HEPARIN SODIUM 5000 UNITS: 5000 INJECTION INTRAVENOUS; SUBCUTANEOUS at 16:39

## 2024-08-16 RX ADMIN — SODIUM CHLORIDE, SODIUM GLUCONATE, SODIUM ACETATE, POTASSIUM CHLORIDE AND MAGNESIUM CHLORIDE 125 ML/HR: 526; 502; 368; 37; 30 INJECTION, SOLUTION INTRAVENOUS at 16:34

## 2024-08-16 RX ADMIN — INSULIN LISPRO 3 UNITS: 100 INJECTION, SOLUTION INTRAVENOUS; SUBCUTANEOUS at 12:16

## 2024-08-16 RX ADMIN — VANCOMYCIN HYDROCHLORIDE 125 MG: 125 CAPSULE ORAL at 16:42

## 2024-08-16 RX ADMIN — ASPIRIN 81 MG CHEWABLE TABLET 81 MG: 81 TABLET CHEWABLE at 09:17

## 2024-08-16 RX ADMIN — INSULIN LISPRO 2 UNITS: 100 INJECTION, SOLUTION INTRAVENOUS; SUBCUTANEOUS at 16:40

## 2024-08-16 RX ADMIN — ATORVASTATIN CALCIUM 80 MG: 80 TABLET, FILM COATED ORAL at 09:17

## 2024-08-16 RX ADMIN — MIRTAZAPINE 7.5 MG: 15 TABLET, FILM COATED ORAL at 01:56

## 2024-08-16 RX ADMIN — SODIUM CHLORIDE, SODIUM GLUCONATE, SODIUM ACETATE, POTASSIUM CHLORIDE AND MAGNESIUM CHLORIDE 125 ML/HR: 526; 502; 368; 37; 30 INJECTION, SOLUTION INTRAVENOUS at 01:53

## 2024-08-16 RX ADMIN — GABAPENTIN 800 MG: 400 CAPSULE ORAL at 21:52

## 2024-08-16 RX ADMIN — IOHEXOL 85 ML: 350 INJECTION, SOLUTION INTRAVENOUS at 01:22

## 2024-08-16 RX ADMIN — INSULIN LISPRO 2 UNITS: 100 INJECTION, SOLUTION INTRAVENOUS; SUBCUTANEOUS at 21:53

## 2024-08-16 RX ADMIN — POTASSIUM CHLORIDE 20 MEQ: 14.9 INJECTION, SOLUTION INTRAVENOUS at 01:51

## 2024-08-16 RX ADMIN — SODIUM CHLORIDE, SODIUM GLUCONATE, SODIUM ACETATE, POTASSIUM CHLORIDE, MAGNESIUM CHLORIDE, SODIUM PHOSPHATE, DIBASIC, AND POTASSIUM PHOSPHATE 1000 ML: .53; .5; .37; .037; .03; .012; .00082 INJECTION, SOLUTION INTRAVENOUS at 05:58

## 2024-08-16 RX ADMIN — SODIUM CHLORIDE, SODIUM GLUCONATE, SODIUM ACETATE, POTASSIUM CHLORIDE, MAGNESIUM CHLORIDE, SODIUM PHOSPHATE, DIBASIC, AND POTASSIUM PHOSPHATE 1000 ML: .53; .5; .37; .037; .03; .012; .00082 INJECTION, SOLUTION INTRAVENOUS at 04:00

## 2024-08-16 RX ADMIN — DULOXETINE HYDROCHLORIDE 60 MG: 60 CAPSULE, DELAYED RELEASE ORAL at 09:17

## 2024-08-16 RX ADMIN — GABAPENTIN 800 MG: 400 CAPSULE ORAL at 16:39

## 2024-08-16 RX ADMIN — PANTOPRAZOLE SODIUM 40 MG: 40 TABLET, DELAYED RELEASE ORAL at 05:10

## 2024-08-16 RX ADMIN — POTASSIUM CHLORIDE 40 MEQ: 1500 TABLET, EXTENDED RELEASE ORAL at 09:23

## 2024-08-16 RX ADMIN — GABAPENTIN 800 MG: 400 CAPSULE ORAL at 09:23

## 2024-08-16 RX ADMIN — PRAZOSIN HYDROCHLORIDE 2 MG: 2 CAPSULE ORAL at 22:14

## 2024-08-16 RX ADMIN — TRAMADOL HYDROCHLORIDE 50 MG: 50 TABLET, COATED ORAL at 23:58

## 2024-08-16 RX ADMIN — HEPARIN SODIUM 5000 UNITS: 5000 INJECTION INTRAVENOUS; SUBCUTANEOUS at 21:52

## 2024-08-16 NOTE — ASSESSMENT & PLAN NOTE
Potassium level 2.3 on admission, EKG fortunately without acute changes compared to prior  Suspect due to recent poor oral intake additionally with ongoing diarrhea  Replacement initiated during ED evaluation, will recheck BMP following this and continue replacement as needed  Continue telemetry monitoring until potassium at least 3

## 2024-08-16 NOTE — ED PROVIDER NOTES
History  Chief Complaint   Patient presents with    Fall     Pt arrived via EMS. Per EMS, fall today and yesterday, numbness L leg, lower back pain, and hyperglycemic. Pt reports numbness in L arm from previous stroke.     This is a 54-year-old female with past medical history of pulmonary embolism and DVT, CVA, DM2, HTN, HLD, hypothyroidism, irritable bowel syndrome, presents to the emergency room stating that for the last month, she has been increasingly weak and had difficulty ambulating due to lightheadedness, and has fallen multiple times, and yesterday experienced a fall with no head strike or loss of consciousness. Endorses increased loose stools and diarrhea during this time along with urinary frequency and dysuria.       Fall  Associated symptoms: back pain, nausea and vomiting    Associated symptoms: no abdominal pain, no chest pain and no seizures        Prior to Admission Medications   Prescriptions Last Dose Informant Patient Reported? Taking?   Adalimumab (HUMIRA PEN SC)   Yes No   Sig: Inject under the skin   Patient not taking: Reported on 6/3/2024   Albuterol Sulfate (ProAir RespiClick) 108 (90 Base) MCG/ACT AEPB   No No   Sig: Inhale 2 puffs every 6 (six) hours as needed (wheezing)   BD PEN NEEDLE LINDY U/F 32G X 4 MM MISC   No No   Sig: Inject under the skin daily   Blood Glucose Monitoring Suppl (OneTouch Verio Reflect) w/Device KIT   No No   Sig: CHECK BLOOD SUGARS FOUR TIMES DAILY. PLEASE SUBSTITUTE WITH APPROPRIATE ALTERNATIVE AS COVERED BY PATIENT'S INSURANCE. DX: E11.65   Patient not taking: Reported on 7/23/2024   Cyanocobalamin (VITAMIN B-12 IJ)   Yes No   Sig: Inject as directed   Patient not taking: Reported on 1/30/2024   DULoxetine (CYMBALTA) 60 mg delayed release capsule   No No   Sig: Take 1 capsule (60 mg total) by mouth 2 (two) times a day   Fluticasone-Salmeterol (Advair Diskus) 500-50 mcg/dose inhaler   No No   Sig: Inhale 1 puff by mouth 2 times daily.  Rinse mouth after use  "  Folic Acid 0.8 MG CAPS   Yes No   Sig: Take 0.04 mg by mouth daily.   Patient not taking: Reported on 2024   Galcanezumab-gnlm (Emgality) 120 MG/ML SOAJ   No No   Si injection monthly   Patient not taking: Reported on 2024   Insulin Pen Needle (BD Pen Needle Kimberli U/F) 32G X 4 MM MISC   No No   Sig: Use 2 (two) times a day   Insulin Syringe-Needle U-100 31G X 5/16\" 0.3 ML MISC   No No   Sig: Use twice daily   OneTouch Delica Lancets 33G MISC   No No   Sig: Check blood sugars four times daily. Please substitute with appropriate alternative as covered by patient's insurance. Dx: E11.65   OneTouch Ultra test strip   No No   Sig: USE AS DIRECTED TO TEST 3 TIMES A DAY   aspirin 81 mg chewable tablet   Yes No   Sig: Chew 81 mg daily    atorvastatin (LIPITOR) 80 mg tablet   No No   Sig: TAKE 1 TABLET BY MOUTH EVERY DAY   baclofen 10 mg tablet   No No   Sig: Take 1 tablet (10 mg total) by mouth 2 (two) times a day as needed for muscle spasms (headache)   butalbital-acetaminophen-caffeine (FIORICET,ESGIC) -40 mg per tablet   No No   Sig: TAKE 1 TABLET EVERY 6 HOURS AS NEEDED FOR MIGRAINE.  Please limit 3-4 per week, 15 per month.   colestipol (COLESTID) 1 g tablet   No No   Sig: Take 1 tablet (1 g total) by mouth 2 (two) times a day   diazepam (VALIUM) 5 mg tablet   No No   Sig: TAKE 1 TABLET (5 MG TOTAL) BY MOUTH 2 (TWO) TIMES A DAY AS NEEDED FOR ANXIETY DO NOT TAKE WITHIN 8 HOURS OF TRAMADOL   gabapentin (NEURONTIN) 400 mg capsule   No No   Sig: TAKE 2 CAPSULES BY MOUTH 3 TIMES A DAY   glucose blood (OneTouch Verio) test strip   No No   Sig: Check blood sugars four times daily. Please substitute with appropriate alternative as covered by patient's insurance. Dx: E11.65   Patient not taking: Reported on 2024   insulin NPH-insulin regular (NovoLIN 70/30) 100 units/mL subcutaneous injection   No No   Sig: Inject 20 Units under the skin 2 (two) times a day before meals   ketorolac (TORADOL) 30 mg/mL " injection   No No   Si-2 ml IM injection once per 24 hours p.r.n. migraine.  No more than 2 injections per week.   levothyroxine 112 mcg tablet   No No   Sig: TAKE 1 TABLET BY MOUTH EVERY DAY   magnesium Oxide (MAG-OX) 400 mg TABS   No No   Sig: Take 1 tablet (400 mg total) by mouth 2 (two) times a day for 15 days   Patient not taking: Reported on 2024   metFORMIN (GLUCOPHAGE-XR) 500 mg 24 hr tablet   No No   Sig: TAKE 1 TABLET BY MOUTH TWICE A DAY WITH FOOD   mirtazapine (REMERON) 7.5 MG tablet   No No   Sig: Take 1 tablet (7.5 mg total) by mouth daily at bedtime   naloxone (NARCAN) 4 mg/0.1 mL nasal spray   No No   Sig: Administer 1 spray into a nostril. If no response after 2-3 minutes, give another dose in the other nostril using a new spray.   omeprazole (PriLOSEC) 40 MG capsule   No No   Sig: TAKE 1 CAPSULE (40 MG TOTAL) BY MOUTH DAILY.   ondansetron (ZOFRAN) 4 mg tablet   No No   Sig: TAKE 1 TABLET BY MOUTH EVERY 8 HOURS AS NEEDED FOR NAUSEA AND VOMITING   prazosin (MINIPRESS) 2 mg capsule   No No   Sig: Take 1 capsule (2 mg total) by mouth daily at bedtime   prochlorperazine (COMPAZINE) 5 mg tablet   No No   Sig: Take 1 tablet (5 mg total) by mouth every 6 (six) hours as needed for nausea or vomiting   topiramate (TOPAMAX) 25 mg tablet   No No   Sig: TAKE 1 TABLET BY MOUTH EVERY DAY   Patient not taking: Reported on 2024   traMADol (ULTRAM-ER) 200 MG 24 hr tablet   No No   Sig: Take 1 tablet (200 mg total) by mouth daily as needed for moderate pain Do not take within 8 hours of valium.      Facility-Administered Medications: None       Past Medical History:   Diagnosis Date    Acute venous embolism and thrombosis of deep vessels of distal lower extremity (HCC)     Anemia     Anxiety     last assessed 17    Arthritis     Asthma     Cerebral infarction (HCC)     unspecified, last assessed 16    Chest pain     last assessed 17    Chronic cough     last assessed 13     Depression     Diabetes mellitus, type 2 (HCC)     DJD (degenerative joint disease)     Esophageal reflux     Fibromyalgia     GERD without esophagitis     resolved 5/13/16    History of pulmonary embolism     Hyperlipidemia     Hypertension     Hypothyroidism     Iron deficiency     Pancreatitis     Panic attack     Panic disorder     Polyarthritis     PTSD (post-traumatic stress disorder)     Sleep difficulties     Stroke syndrome     Thyroid disease     TIA (transient ischemic attack)     TIA (transient ischemic attack)     Venous embolism and thrombosis of deep vessels of distal lower extremity (HCC)     Vitamin B12 deficiency     Vitamin D deficiency        Past Surgical History:   Procedure Laterality Date    CARPAL TUNNEL RELEASE Right     neuroplasty decompression of median nerve    CATARACT EXTRACTION      CHOLECYSTECTOMY      ERCP      ERCP      ESOPHAGOGASTRIC FUNDOPLASTY      NISSEN FUNDOPLICATION      PATELLA SURGERY Left 12/2021    MT ESOPHAGOGASTRODUODENOSCOPY TRANSORAL DIAGNOSTIC N/A 11/02/2016    Procedure: EGD AND COLONOSCOPY;  Surgeon: Jatin Shepherd MD;  Location: [facility];  Service: Gastroenterology    MT NDSC WRST SURG W/RLS TRANSVRS CARPL LIGM Right 03/08/2016    Procedure: RELEASE CARPAL TUNNEL ENDOSCOPIC;  Surgeon: Guero Restrepo MD;  Location: [facility];  Service: Orthopedics    MT TENDON SHEATH INCISION Right 03/08/2016    Procedure: RELEASE TRIGGER FINGER RIGHT THUMB;  Surgeon: Guero Restrepo MD;  Location: [facility];  Service: Orthopedics    TONSILLECTOMY AND ADENOIDECTOMY         Family History   Problem Relation Age of Onset    Diabetes Mother         type 2    Heart attack Mother 39        acute MI    Kidney disease Mother         CKD NKF classfication    Depression Mother     Arthritis Mother     Substance Abuse Mother         mother OD in past on MS04    Ulcerative colitis Mother     Schizophrenia Mother     Suicide Attempts Mother     Bipolar disorder Mother     Diabetes  Maternal Grandmother     Heart attack Father         acute MI    Psoriasis Father     Cancer Father         gastric cancer    Stroke Father     Crohn's disease Sister     Bipolar disorder Sister     Rheum arthritis Maternal Grandfather     Throat cancer Maternal Grandfather     Suicide Attempts Daughter     Drug abuse Daughter     Lung cancer Paternal Grandmother     Cancer Paternal Grandfather     No Known Problems Sister     Bipolar disorder Maternal Uncle     Anesthesia problems Neg Hx      I have reviewed and agree with the history as documented.    E-Cigarette/Vaping    E-Cigarette Use Former User     Cartridges/Day 1      E-Cigarette/Vaping Substances    Nicotine Yes     THC No     CBD No     Flavoring No      Social History     Tobacco Use    Smoking status: Every Day     Current packs/day: 1.00     Average packs/day: 1 pack/day for 41.6 years (41.6 ttl pk-yrs)     Types: Cigarettes     Start date: 1/1/1983    Smokeless tobacco: Never    Tobacco comments:     20 + years   Vaping Use    Vaping status: Former    Substances: Nicotine   Substance Use Topics    Alcohol use: Not Currently     Alcohol/week: 0.0 standard drinks of alcohol     Comment: last was in 2010 after DUI    Drug use: No        Review of Systems   Constitutional:  Negative for chills and fever.   HENT:  Negative for ear pain and sore throat.    Eyes:  Negative for pain and visual disturbance.   Respiratory:  Negative for cough and shortness of breath.    Cardiovascular:  Negative for chest pain and palpitations.   Gastrointestinal:  Positive for diarrhea, nausea and vomiting. Negative for abdominal pain.   Genitourinary:  Positive for dysuria, frequency and urgency. Negative for decreased urine volume and hematuria.   Musculoskeletal:  Positive for back pain. Negative for arthralgias.   Skin:  Negative for color change and rash.   Neurological:  Negative for seizures and syncope.   All other systems reviewed and are negative.      Physical  Exam  ED Triage Vitals   Temperature Pulse Respirations Blood Pressure SpO2   08/15/24 2030 08/15/24 2030 08/15/24 2030 08/15/24 2030 08/15/24 2030   98 °F (36.7 °C) 104 18 (!) 73/53 98 %      Temp Source Heart Rate Source Patient Position - Orthostatic VS BP Location FiO2 (%)   08/15/24 2030 08/15/24 2030 08/15/24 2030 08/15/24 2030 --   Oral Monitor Sitting Right arm       Pain Score       08/15/24 2229       8             Orthostatic Vital Signs  Vitals:    08/15/24 2315 08/15/24 2330 08/16/24 0015 08/16/24 0100   BP: 126/80 122/77 143/92 134/82   Pulse: 90 88 98 96   Patient Position - Orthostatic VS: Lying Lying Lying Lying       Physical Exam  Vitals and nursing note reviewed.   Constitutional:       General: She is not in acute distress.     Appearance: She is well-developed. She is ill-appearing and toxic-appearing. She is not diaphoretic.      Comments: Disheveled female in her 50s.  Appears older than stated age.  Ill-appearing and toxic.  Cachectic.   HENT:      Head: Normocephalic and atraumatic.   Eyes:      Conjunctiva/sclera: Conjunctivae normal.   Cardiovascular:      Rate and Rhythm: Normal rate and regular rhythm.      Heart sounds: No murmur heard.  Pulmonary:      Effort: Pulmonary effort is normal. No respiratory distress.      Breath sounds: Normal breath sounds.   Abdominal:      Palpations: Abdomen is soft.      Tenderness: There is no abdominal tenderness.   Musculoskeletal:         General: No swelling.      Cervical back: Neck supple.   Skin:     General: Skin is warm and dry.      Capillary Refill: Capillary refill takes less than 2 seconds.   Neurological:      Mental Status: She is alert.   Psychiatric:         Mood and Affect: Mood normal.         ED Medications  Medications   potassium chloride 20 mEq IVPB (premix) (20 mEq Intravenous New Bag 8/15/24 9460)   ondansetron (ZOFRAN) injection 4 mg (4 mg Intravenous Given 8/16/24 0105)   albuterol (PROVENTIL HFA,VENTOLIN HFA) inhaler 2  puff (has no administration in time range)   aspirin chewable tablet 81 mg (has no administration in time range)   atorvastatin (LIPITOR) tablet 80 mg (has no administration in time range)   baclofen tablet 10 mg (has no administration in time range)   butalbital-acetaminophen-caffeine (FIORICET,ESGIC) -40 mg per tablet 1 tablet (has no administration in time range)   diazepam (VALIUM) tablet 5 mg (has no administration in time range)   DULoxetine (CYMBALTA) delayed release capsule 60 mg (has no administration in time range)   fluticasone-vilanterol 200-25 mcg/actuation 1 puff (has no administration in time range)   gabapentin (NEURONTIN) capsule 800 mg (has no administration in time range)   levothyroxine tablet 112 mcg (has no administration in time range)   mirtazapine (REMERON) tablet 7.5 mg (has no administration in time range)   pantoprazole (PROTONIX) EC tablet 40 mg (has no administration in time range)   prazosin (MINIPRESS) capsule 2 mg (has no administration in time range)   multi-electrolyte (PLASMALYTE-A/ISOLYTE-S PH 7.4) IV solution (has no administration in time range)   nicotine (NICODERM CQ) 21 mg/24 hr TD 24 hr patch 1 patch (has no administration in time range)   acetaminophen (TYLENOL) tablet 650 mg (has no administration in time range)   heparin (porcine) subcutaneous injection 5,000 Units (has no administration in time range)   insulin glargine (LANTUS) subcutaneous injection 14 Units 0.14 mL (has no administration in time range)   insulin lispro (HumALOG/ADMELOG) 100 units/mL subcutaneous injection 1-5 Units (has no administration in time range)   insulin lispro (HumALOG/ADMELOG) 100 units/mL subcutaneous injection 1-5 Units (has no administration in time range)   traMADol (ULTRAM) tablet 50 mg (has no administration in time range)   lactated ringers bolus 1,000 mL (0 mL Intravenous Stopped 8/15/24 7622)     Followed by   lactated ringers bolus 1,000 mL (0 mL Intravenous Stopped 8/15/24  2330)   ceftriaxone (ROCEPHIN) 2 g/50 mL in dextrose IVPB (0 mg Intravenous Stopped 8/15/24 2248)   HYDROmorphone (DILAUDID) injection 0.5 mg (0.5 mg Intravenous Given 8/15/24 2229)   acetaminophen (Ofirmev) IVPB 600 mg (0 mg Intravenous Stopped 8/15/24 2334)   potassium chloride (Klor-Con M20) CR tablet 60 mEq (60 mEq Oral Given 8/15/24 2346)   magnesium sulfate 2 g/50 mL IVPB (premix) 2 g (0 g Intravenous Stopped 8/16/24 0110)   oxyCODONE (ROXICODONE) IR tablet 5 mg (5 mg Oral Given 8/16/24 0106)   iohexol (OMNIPAQUE) 350 MG/ML injection (MULTI-DOSE) 85 mL (85 mL Intravenous Given 8/16/24 0122)       Diagnostic Studies  Results Reviewed       Procedure Component Value Units Date/Time    Fingerstick Glucose (POCT) [[id number]]  (Abnormal) Collected: 08/16/24 0055    Lab Status: Final result Specimen: Blood Updated: 08/16/24 0056     POC Glucose 379 mg/dl     Procalcitonin [[id number]]  (Normal) Collected: 08/15/24 2341    Lab Status: Final result Specimen: Blood from Arm, Right Updated: 08/16/24 0052     Procalcitonin 0.09 ng/ml     Beta Hydroxybutyrate [[id number]]  (Abnormal) Collected: 08/15/24 2341    Lab Status: Final result Specimen: Blood from Arm, Right Updated: 08/16/24 0052     Beta- Hydroxybutyrate 0.37 mmol/L     HS Troponin 0hr (reflex protocol) [[id number]]  (Normal) Collected: 08/15/24 2158    Lab Status: Final result Specimen: Blood from Arm, Right Updated: 08/16/24 0037     hs TnI 0hr 4 ng/L     Basic metabolic panel [[id number]]     Lab Status: No result Specimen: Blood     Magnesium [[id number]]  (Abnormal) Collected: 08/15/24 2341    Lab Status: Final result Specimen: Blood from Arm, Right Updated: 08/16/24 0012     Magnesium 1.2 mg/dL     Blood culture #1 [[id number]] Collected: 08/15/24 2158    Lab Status: Preliminary result Specimen: Blood from Arm, Right Updated: 08/16/24 0001     Blood Culture Received in Microbiology Lab. Culture in Progress.    Blood culture #2 [[id number]] Collected: 08/15/24  2158    Lab Status: Preliminary result Specimen: Blood from Arm, Right Updated: 08/16/24 0001     Blood Culture Received in Microbiology Lab. Culture in Progress.    Comprehensive metabolic panel [[id number]]  (Abnormal) Collected: 08/15/24 2158    Lab Status: Final result Specimen: Blood from Arm, Right Updated: 08/15/24 2320     Sodium 136 mmol/L      Potassium 2.3 mmol/L      Chloride 88 mmol/L      CO2 36 mmol/L      ANION GAP 12 mmol/L      BUN 3 mg/dL      Creatinine 0.46 mg/dL      Glucose 448 mg/dL      Calcium 8.4 mg/dL      Corrected Calcium 9.1 mg/dL      AST 19 U/L      ALT 14 U/L      Alkaline Phosphatase 113 U/L      Total Protein 5.7 g/dL      Albumin 3.1 g/dL      Total Bilirubin 0.37 mg/dL      eGFR 113 ml/min/1.73sq m     Narrative:      National Kid[employer] guidelines for Chronic Kidney Disease (CKD):     Stage 1 with normal or high GFR (GFR > 90 mL/min/1.73 square meters)    Stage 2 Mild CKD (GFR = 60-89 mL/min/1.73 square meters)    Stage 3A Moderate CKD (GFR = 45-59 mL/min/1.73 square meters)    Stage 3B Moderate CKD (GFR = 30-44 mL/min/1.73 square meters)    Stage 4 Severe CKD (GFR = 15-29 mL/min/1.73 square meters)    Stage 5 End Stage CKD (GFR <15 mL/min/1.73 square meters)  Note: GFR calculation is accurate only with a steady state creatinine    Lactic acid [[id number]]  (Normal) Collected: 08/15/24 2233    Lab Status: Final result Specimen: Blood from Arm, Right Updated: 08/15/24 2301     LACTIC ACID 1.6 mmol/L     Narrative:      Result may be elevated if tourniquet was used during collection.    FLU/RSV/COVID - if FLU/RSV clinically relevant [[id number]]  (Normal) Collected: 08/15/24 2158    Lab Status: Final result Specimen: Nares from Nose Updated: 08/15/24 2300     SARS-CoV-2 Negative     INFLUENZA A PCR Negative     INFLUENZA B PCR Negative     RSV PCR Negative    Narrative:      This test has been performed using the CoV-2/Flu/RSV plus assay on the [employer]  platform. This test has been validated by the  and verified by the performing laboratory.     This test is designed to amplify and detect the following: nucleocapsid (N), envelope (E), and RNA-dependent RNA polymerase (RdRP) genes of the SARS-CoV-2 genome; matrix (M), basic polymerase (PB2), and acidic protein (PA) segments of the influenza A genome; matrix (M) and non-structural protein (NS) segments of the influenza B genome, and the nucleocapsid genes of RSV A and RSV B.     Positive results are indicative of the presence of Flu A, Flu B, RSV, and/or SARS-CoV-2 RNA. Positive results for SARS-CoV-2 or suspected novel influenza should be reported to state, local, or federal health departments according to local reporting requirements.      All results should be assessed in conjunction with clinical presentation and other laboratory markers for clinical management.     FOR PEDIATRIC PATIENTS - copy/paste COVID Guidelines URL to browser: [email or web address]       Clostridium difficile toxin by PCR with EIA [[id number]]     Lab Status: No result Specimen: Stool from Per Rectum     Protime-INR [[id number]]  (Normal) Collected: 08/15/24 2158    Lab Status: Final result Specimen: Blood from Arm, Right Updated: 08/15/24 2225     Protime 13.0 seconds      INR 0.95    Narrative:      INR Therapeutic Range    Indication                                             INR Range      Atrial Fibrillation                                               2.0-3.0  Hypercoagulable State                                    2.0.2.3  Left Ventricular Asist Device                            2.0-3.0  Mechanical Heart Valve                                  -    Aortic(with afib, MI, embolism, HF, LA enlargement,    and/or coagulopathy)                                     2.0-3.0 (2.5-3.5)     Mitral                                                              2.5-3.5  Prosthetic/Bioprosthetic Heart Valve               2.0-3.0  Venous thromboembolism (VTE: VT, PE        2.0-3.0    APTT [[id number]]  (Normal) Collected: 08/15/24 2158    Lab Status: Final result Specimen: Blood from Arm, Right Updated: 08/15/24 2225     PTT 24 seconds     Blood gas, Venous [[id number]]  (Abnormal) Collected: 08/15/24 2158    Lab Status: Final result Specimen: Blood from Arm, Right Updated: 08/15/24 2214     pH, Jeremy 7.557     pCO2, Jeremy 45.7 mm Hg      pO2, Jeremy 34.1 mm Hg      HCO3, Jeremy 39.7 mmol/L      Base Excess, Jeremy 15.4 mmol/L      O2 Content, Jeremy 14.9 ml/dL      O2 HGB, VENOUS 69.6 %     CBC and differential [[id number]] Collected: 08/15/24 2158    Lab Status: Final result Specimen: Blood from Arm, Right Updated: 08/15/24 2214     WBC 7.58 Thousand/uL      RBC 4.47 Million/uL      Hemoglobin 14.0 g/dL      Hematocrit 40.3 %      MCV 90 fL      MCH 31.3 pg      MCHC 34.7 g/dL      RDW 12.6 %      MPV 10.7 fL      Platelets 303 Thousands/uL      nRBC 0 /100 WBCs      Segmented % 65 %      Immature Grans % 0 %      Lymphocytes % 26 %      Monocytes % 7 %      Eosinophils Relative 1 %      Basophils Relative 1 %      Absolute Neutrophils 4.96 Thousands/µL      Absolute Immature Grans 0.02 Thousand/uL      Absolute Lymphocytes 1.98 Thousands/µL      Absolute Monocytes 0.50 Thousand/µL      Eosinophils Absolute 0.08 Thousand/µL      Basophils Absolute 0.04 Thousands/µL     UA w Reflex to Microscopic w Reflex to Culture [[id number]]     Lab Status: No result Specimen: Urine     Fingerstick Glucose (POCT) [[id number]]  (Abnormal) Collected: 08/15/24 2025    Lab Status: Final result Specimen: Blood Updated: 08/15/24 2026     POC Glucose 435 mg/dl                    XR knee 4+ vw left injury    (Results Pending)   XR hip/pelv 2-3 vws left if performed    (Results Pending)   XR chest portable    (Results Pending)   CT abdomen pelvis w contrast    (Results Pending)         Procedures  ECG 12 Lead  Documentation Only    Date/Time: 8/16/2024 1:26 AM    Performed by: Yao Morales DO  Authorized by: Yao Morales DO    Indications / Diagnosis:  Weakness  ECG reviewed by me, the ED Provider: yes    Patient location:  ED  Previous ECG:     Previous ECG:  Compared to current    Comparison ECG info:  05/15/2024    Similarity:  Changes noted    Comparison to cardiac monitor: No    Interpretation:     Interpretation: abnormal    Quality:     Tracing quality:  Limited by artifact  Rate:     ECG rate:  104    ECG rate assessment: tachycardic    Rhythm:     Rhythm: sinus tachycardia    Ectopy:     Ectopy: none    QRS:     QRS axis:  Normal  Conduction:     Conduction: normal    ST segments:     ST segments:  Abnormal    Depression:  V3, V4, V6 and V5  T waves:     T waves: non-specific    Comments:      Sinus tachycardia.  Right axis deviation.  QRS, OH intervals normal.  ST segment depression noted in leads V3- V6, not shown on previous ECG tracings.  Possibly related to severe hypokalemia or type II NSTEMI. Correlate with troponin.        ED Course  ED Course as of 08/16/24 0143   Fri Aug 16, 2024   0138 Blood gas, Venous(!)  Metabolic alkalosis, possibly secondary to severe hypokalemia   0139 Protime-INR  Normal PT/INR   0139 APTT  Normal PTT   0139 FLU/RSV/COVID - if FLU/RSV clinically relevant  Flu COVID RSV negative   0139 Beta Hydroxybutyrate(!)  Elevated beta-hCG, possibly reflective of starvation ketosis   0139 Magnesium(!)  Low magnesium, likely related to hypokalemia   0139 Fingerstick Glucose (POCT)(!)  Hyper glycemia   0139 Lactic acid  Normal lactate   0139 Comprehensive metabolic panel(!!)  Severe hypochloremic hypokalemia and hyperglycemia   0140 CBC and differential  Normal CBC              Initial Sepsis Screening       Row Name 08/15/24 2121                Is the patient's history suggestive of a new or worsening infection? Yes (Proceed)  -AR        Suspected source of infection urinary tract infection  -AR   "      Indicate SIRS criteria Tachycardia > 90 bpm;Tachypnea > 20 resp per min  -AR        Are two or more of the above signs & symptoms of infection both present and new to the patient? Yes (Proceed)  -AR        Assess for evidence of organ dysfunction: Are any of the below criteria present within 6 hours of suspected infection and SIRS criteria that are NOT considered to be chronic conditions? SBP < 90  -AR        Date of presentation of septic shock --        Time of presentation of septic shock --        Fluid Resuscitation: 30 ml/kg IV fluid bolus will be given based on actual body weight  -AR        Is the patient is persistently hypotensive in the hour after fluid bolus administration? If yes, patient meets criteria for vasopressor use. --        Sepsis Note: Click \"NEXT\" below (NOT \"close\") to generate sepsis note based on above information. YES (proceed by clicking \"NEXT\")  -AR                  User Key  (r) = Recorded By, (t) = Taken By, (c) = Cosigned By      Initials Name Provider Type    WALDEMAR Morales DO Resident                              Medical Decision Making  Patient is a 54 y.o. female  who presents to the ED with weakness, recent fall. Initial vital signs notable for severe hypotension and tachycardia.  Meets SIRS criteria with unknown source.  Meets criteria for severe sepsis.    Vital signs notable for severe hypotension, tachycardia, tachypnea upon arrival.  Now stable. Exam as listed above    Differential diagnosis includes but is not limited to severe sepsis secondary to urinary tract infection, occult pneumonia, meningitis, endocarditis.  Patient is generally cachectic and not able to give good account of her symptoms.  Hypokalemia possibly related to failure to thrive.    Plan     Patient is to be admitted to  IM for further workup.    View ED course above for further discussion on patient workup.     On review of previous records note recent history of repeated falls.    All labs " reviewed and utilized in the medical decision making process  All radiology studies independently viewed by me and interpreted by the radiologist.  I reviewed all testing with the patient.     Upon re-evaluation patient endorses improvement in her symptoms.  Hypotension much improved compared to original assessment, tachycardia and tachypnea resolved.  Otherwise her physical exam remains unchanged.    Amount and/or Complexity of Data Reviewed  Labs: ordered.    Risk  Prescription drug management.  Decision regarding hospitalization.          Disposition  Final diagnoses:   Sepsis (HCC)     Time reflects when diagnosis was documented in both MDM as applicable and the Disposition within this note       Time User Action Codes Description Comment    8/16/2024 12:45 AM Renan Drake [E43] Severe protein-calorie malnutrition (HCC)     8/16/2024 12:59 AM Yao Morales [A41.9] Sepsis (HCC)     8/16/2024 12:59 AM Yao Morales [E43] Severe protein-calorie malnutrition (HCC)     8/16/2024 12:59 AM Yao Morales [A41.9] Sepsis (HCC)     8/16/2024  1:10 AM Renan Drake [R62.7] Failure to thrive in adult           ED Disposition       ED Disposition   Admit    Condition   Stable    Date/Time   Fri Aug 16, 2024  1:00 AM    Comment   Case was discussed with MEHRAN and the patient's admission status was agreed to be Admission Status: inpatient status to the service of Dr. Drake .               Follow-up Information    None         Patient's Medications   Discharge Prescriptions    No medications on file     No discharge procedures on file.    PDMP Review         Value Time User    PDMP Reviewed  Yes 8/16/2024  1:06 AM Renan Drake DO             ED Provider  Attending physically available and evaluated Barb Palomo. I managed the patient along with the ED Attending.    Electronically Signed by           Yao Morales DO  08/16/24 0143       Yao Morales DO  08/16/24 0144

## 2024-08-16 NOTE — ASSESSMENT & PLAN NOTE
Longstanding history of chronic diarrhea, patient reporting diarrhea ongoing for past 1 month.  Follow-up C. difficile sample and stool studies  Appear she had colonoscopy 3/2024 without large mass identified however was limited evaluation due to poor bowel prep.  History of IBS noted; if patient with ongoing diarrhea without other explanation consider inpatient GI evaluation

## 2024-08-16 NOTE — PLAN OF CARE
Problem: Nutrition/Hydration-ADULT  Goal: Nutrient/Hydration intake appropriate for improving, restoring or maintaining nutritional needs  Description: Monitor and assess patient's nutrition/hydration status for malnutrition. Collaborate with interdisciplinary team and initiate plan and interventions as ordered.  Monitor patient's weight and dietary intake as ordered or per policy. Utilize nutrition screening tool and intervene as necessary. Determine patient's food preferences and provide high-protein, high-caloric foods as appropriate.     INTERVENTIONS:  - Monitor oral intake, urinary output, labs, and treatment plans  - Assess nutrition and hydration status and recommend course of action  - Evaluate amount of meals eaten  - Assist patient with eating if necessary   - Allow adequate time for meals  - Recommend/ encourage appropriate diets, oral nutritional supplements, and vitamin/mineral supplements  - Order, calculate, and assess calorie counts as needed  - Recommend, monitor, and adjust tube feedings and TPN/PPN based on assessed needs  - Assess need for intravenous fluids  - Provide specific nutrition/hydration education as appropriate  - Include patient/family/caregiver in decisions related to nutrition  Outcome: Progressing     Problem: PAIN - ADULT  Goal: Verbalizes/displays adequate comfort level or baseline comfort level  Description: Interventions:  - Encourage patient to monitor pain and request assistance  - Assess pain using appropriate pain scale  - Administer analgesics based on type and severity of pain and evaluate response  - Implement non-pharmacological measures as appropriate and evaluate response  - Consider cultural and social influences on pain and pain management  - Notify physician/advanced practitioner if interventions unsuccessful or patient reports new pain  Outcome: Progressing     Problem: INFECTION - ADULT  Goal: Absence or prevention of progression during  hospitalization  Description: INTERVENTIONS:  - Assess and monitor for signs and symptoms of infection  - Monitor lab/diagnostic results  - Monitor all insertion sites, i.e. indwelling lines, tubes, and drains  - Monitor endotracheal if appropriate and nasal secretions for changes in amount and color  - [place]ullen appropriate cooling/warming therapies per order  - Administer medications as ordered  - Instruct and encourage patient and family to use good hand hygiene technique  - Identify and instruct in appropriate isolation precautions for identified infection/condition  Outcome: Progressing  Goal: Absence of fever/infection during neutropenic period  Description: INTERVENTIONS:  - Monitor WBC    Outcome: Progressing     Problem: SAFETY ADULT  Goal: Patient will remain free of falls  Description: INTERVENTIONS:  - Educate patient/family on patient safety including physical limitations  - Instruct patient to call for assistance with activity   - Consult OT/PT to assist with strengthening/mobility   - Keep Call bell within reach  - Keep bed low and locked with side rails adjusted as appropriate  - Keep care items and personal belongings within reach  - Initiate and maintain comfort rounds  - Make Fall Risk Sign visible to staff  - Offer Toileting every 2 Hours, in advance of need  - Initiate/Maintain bed alarm  - Obtain necessary fall risk management equipment:   - Apply yellow socks and bracelet for high fall risk patients  - Consider moving patient to room near nurses station  Outcome: Progressing  Goal: Maintain or return to baseline ADL function  Description: INTERVENTIONS:  -  Assess patient's ability to carry out ADLs; assess patient's baseline for ADL function and identify physical deficits which impact ability to perform ADLs (bathing, care of mouth/teeth, toileting, grooming, dressing, etc.)  - Assess/evaluate cause of self-care deficits   - Assess range of motion  - Assess patient's mobility; develop plan if  impaired  - Assess patient's need for assistive devices and provide as appropriate  - Encourage maximum independence but intervene and supervise when necessary  - Involve family in performance of ADLs  - Assess for home care needs following discharge   - Consider OT consult to assist with ADL evaluation and planning for discharge  - Provide patient education as appropriate  Outcome: Progressing     Problem: DISCHARGE PLANNING  Goal: Discharge to home or other facility with appropriate resources  Description: INTERVENTIONS:  - Identify barriers to discharge w/patient and caregiver  - Arrange for needed discharge resources and transportation as appropriate  - Identify discharge learning needs (meds, wound care, etc.)  - Arrange for interpretive services to assist at discharge as needed  - Refer to Case Management Department for coordinating discharge planning if the patient needs post-hospital services based on physician/advanced practitioner order or complex needs related to functional status, cognitive ability, or social support system  Outcome: Progressing     Problem: Knowledge Deficit  Goal: Patient/family/caregiver demonstrates understanding of disease process, treatment plan, medications, and discharge instructions  Description: Complete learning assessment and assess knowledge base.  Interventions:  - Provide teaching at level of understanding  - Provide teaching via preferred learning methods  Outcome: Progressing     Problem: NEUROSENSORY - ADULT  Goal: Achieves stable or improved neurological status  Description: INTERVENTIONS  - Monitor and report changes in neurological status  - Monitor vital signs such as temperature, blood pressure, glucose, and any other labs ordered   - Initiate measures to prevent increased intracranial pressure  - Monitor for seizure activity and implement precautions if appropriate      Outcome: Progressing  Goal: Remains free of injury related to seizures activity  Description:  INTERVENTIONS  - Maintain airway, patient safety  and administer oxygen as ordered  - Monitor patient for seizure activity, document and report duration and description of seizure to physician/advanced practitioner  - If seizure occurs,  ensure patient safety during seizure  - Reorient patient post seizure  - Seizure pads on all 4 side rails  - Instruct patient/family to notify RN of any seizure activity including if an aura is experienced  - Instruct patient/family to call for assistance with activity based on nursing assessment  - Administer anti-seizure medications if ordered    Outcome: Progressing  Goal: Achieves maximal functionality and self care  Description: INTERVENTIONS  - Monitor swallowing and airway patency with patient fatigue and changes in neurological status  - Encourage and assist patient to increase activity and self care.   - Encourage visually impaired, hearing impaired and aphasic patients to use assistive/communication devices  Outcome: Progressing     Problem: METABOLIC, FLUID AND ELECTROLYTES - ADULT  Goal: Electrolytes maintained within normal limits  Description: INTERVENTIONS:  - Monitor labs and assess patient for signs and symptoms of electrolyte imbalances  - Administer electrolyte replacement as ordered  - Monitor response to electrolyte replacements, including repeat lab results as appropriate  - Instruct patient on fluid and nutrition as appropriate  Outcome: Progressing  Goal: Fluid balance maintained  Description: INTERVENTIONS:  - Monitor labs   - Monitor I/O and WT  - Instruct patient on fluid and nutrition as appropriate  - Assess for signs & symptoms of volume excess or deficit  Outcome: Progressing  Goal: Glucose maintained within target range  Description: INTERVENTIONS:  - Monitor Blood Glucose as ordered  - Assess for signs and symptoms of hyperglycemia and hypoglycemia  - Administer ordered medications to maintain glucose within target range  - Assess nutritional intake  and initiate nutrition service referral as needed  Outcome: Progressing     Problem: MUSCULOSKELETAL - ADULT  Goal: Maintain or return mobility to safest level of function  Description: INTERVENTIONS:  - Assess patient's ability to carry out ADLs; assess patient's baseline for ADL function and identify physical deficits which impact ability to perform ADLs (bathing, care of mouth/teeth, toileting, grooming, dressing, etc.)  - Assess/evaluate cause of self-care deficits   - Assess range of motion  - Assess patient's mobility  - Assess patient's need for assistive devices and provide as appropriate  - Encourage maximum independence but intervene and supervise when necessary  - Involve family in performance of ADLs  - Assess for home care needs following discharge   - Consider OT consult to assist with ADL evaluation and planning for discharge  - Provide patient education as appropriate  Outcome: Progressing  Goal: Maintain proper alignment of affected body part  Description: INTERVENTIONS:  - Support, maintain and protect limb and body alignment  - Provide patient/ family with appropriate education  Outcome: Progressing

## 2024-08-16 NOTE — CASE MANAGEMENT
Case Management Assessment & Discharge Planning Note    Patient name Barb Palomo  Location [facility]-01 MRN [id number]  : 1970 Date 2024       Current Admission Date: 8/15/2024  Current Admission Diagnosis:Failure to thrive in adult   Patient Active Problem List    Diagnosis Date Noted Date Diagnosed    Hypokalemia 2024     Hypomagnesemia 2024     Failure to thrive in adult 2024     Severe protein-calorie malnutrition (HCC) 2024     HHNC (hyperglycemic hyperosmolar nonketotic coma) ([facility]) 2024     Abdominal pain 2024     Chronic migraine without aura without status migrainosus, not intractable 2024     Unspecified psychosis not due to a substance or known physiological condition ([facility]) 2023     Pancolitis ([facility]) 2023     S/P left knee arthroscopy 10/21/2022     Agoraphobia with panic disorder 2022    Bipolar II disorder ([facility]) 2022    Depression, unspecified 2022    Polyarthritis, unspecified 2022    Chronic prescription benzodiazepine use 2022     Uncontrolled type 2 diabetes mellitus with hyperglycemia ([facility]) 2021     Nausea 2021     Weakness of right upper extremity 2021     Neuropathy      Cervical radiculopathy 2020     Lumbar radiculopathy 2020     DDD (degenerative disc disease), cervical 2020     DDD (degenerative disc disease), lumbar 2020     Low back pain with sciatica 2020     Cervical spinal stenosis 2020     Cervical spondylosis without myelopathy 2020     Paresthesia and pain of both upper extremities 2020     Paresthesia of both lower extremities 2020     Chronic pain of both shoulders 2020     Chronic cervical pain 2020     Symptom associated with female genital organs 2020     Female stress incontinence 2020     Decreased libido 2020     ROGERIO  positive 05/13/2020     Diplopia 02/04/2020     Chronic migraine without aura, not intractable, without status migrainosus 10/17/2019     Arthralgia of temporomandibular joint 08/22/2019     Carpal tunnel syndrome 08/22/2019     Dysphagia 08/22/2019     Reactive airway disease without complication 02/23/2018     Protein calorie malnutrition (HCC) 08/30/2017     Tobacco dependence 08/30/2017     GERD without esophagitis 08/25/2017     History of decompression of median nerve 08/25/2017     Hypothyroidism 08/25/2017     Chronic pain disorder 11/04/2016     Pancreatic cyst 10/17/2016     Chronic diarrhea 10/04/2016     Chronic fatigue, unspecified 08/24/2016     Type 2 diabetes mellitus with other neurologic complication, with long-term current use of insulin (HCC) 05/05/2016     Severe episode of recurrent major depressive disorder, without psychotic features (HCC) 05/05/2016     Fatty liver 04/08/2016     Opioid dependence with other opioid-induced disorder ([facility]) 01/25/2016     Iron deficiency 12/28/2015     Vitamin D deficiency 08/13/2015     History of stroke 05/27/2013     Hyperlipidemia 03/15/2013     Insomnia 03/04/2013     Vitamin B12 deficiency 03/04/2013     Post-traumatic stress disorder, unspecified 09/26/2012     Benign essential hypertension 09/26/2012     Temporary cerebral vascular dysfunction 07/30/2009     History of pulmonary embolism 07/30/2009     Irregular menstrual cycle 04/02/2008       LOS (days): 0  Geometric Mean LOS (GMLOS) (days): 3  Days to GMLOS:2.5     OBJECTIVE:    Risk of Unplanned Readmission Score: 26.34   Current admission status: Inpatient       Preferred Pharmacy:   [id number], [facility] @[address], [place] [address] [place] [address]  Phone: [phone] Fax: [phone]    [id number] - [place] - [address]  [address] [place] [address]  [place] [address]  Phone: [phone] Fax: [phone]    [facility] - [place], [place] - [address]  [address]  [address]  [facility] [address]  Phone: [phone] Fax: [phone]    Primary Care Provider: ELISEO Vee  Primary Insurance: MEDICARE  Secondary Insurance:     ASSESSMENT:  Active Health Care Proxies       Fabienne Ha Alternate Health Care Agent - Sister   Primary Phone: [phone] (Mobile)                 Readmission Root Cause  30 Day Readmission: No    Patient Information  Admitted from:: Home  Mental Status: Alert  During Assessment patient was accompanied by: Not accompanied during assessment  Assessment information provided by:: Patient, Daughter, Sister  Primary Caregiver: Self  Support Systems: Children, Self, Family members  County of Residence: [place]  What city do you live in?: [facility]  Home entry access options. Select all that apply.: Stairs  Number of steps to enter home.: 2  Do the steps have railings?: Yes  Type of Current Residence: 3 story home  Upon entering residence, is there a bedroom on the main floor (no further steps)?: No  A bedroom is located on the following floor levels of residence (select all that apply):: 2nd Floor  Upon entering residence, is there a bathroom on the main floor (no further steps)?: No  Indicate which floors of current residence have a bathroom (select all the apply):: 2nd Floor  Number of steps to 2nd floor from main floor: One Flight  Living Arrangements: Lives Alone  Is patient a ?: No    Activities of Daily Living Prior to Admission  Functional Status: Independent  Completes ADLs independently?: Yes  Ambulates independently?: Yes  Does patient use assisted devices?: No  Does patient currently own DME?: No  Does patient have a history of Outpatient Therapy (PT/OT)?: Yes ([facility])  Does the patient have a history of Short-Term Rehab?: Yes ([facility])  Does patient have a history of HHC?: No  Does patient currently have HHC?: No    Patient  Information Continued  Income Source: SSI/SSD  Does patient have prescription coverage?: Yes  Does patient receive dialysis treatments?: No  Does patient have a history of substance abuse?: No  Does patient have a history of Mental Health Diagnosis?: Yes  Is patient receiving treatment for mental health?: Yes  Has patient received inpatient treatment related to mental health in the last 2 years?: No    Means of Transportation  [facility]  Social Determinants of Health (SDOH)      Flowsheet Row Most Recent Value   Housing Stability    In the last 12 months, was there a time when you were not able to pay the mortgage or rent on time? Y   At any time in the past 12 months, were you homeless or living in a shelter (including now)? N   Transportation Needs    In the past 12 months, has lack of transportation kept you from medical appointments or from getting medications? no   In the past 12 months, has lack of transportation kept you from meetings, work, or from getting things needed for daily living? No   Food Insecurity    Within the past 12 months, you worried that your food would run out before you got the money to buy more. Pt Unable   Within the past 12 months, the food you bought just didn't last and you didn't have money to get more. Pt Unable   Utilities    In the past 12 months has the electric, gas, oil, or water company threatened to shut off services in your home? Yes        DISCHARGE DETAILS:    Discharge planning discussed with:: Patient, Zahira (daughter), Fabienne(sister)  Freedom of Choice: Yes  Comments - Freedom of Choice: Discussed FOC  CM contacted family/caregiver?: Yes (Family Via TC)  CM reviewed d/c planning process including the following: identifying help at home, patient preference for d/c planning needs, Discharge Lounge, [facility] Meds to Bed program, availability of treatment team to discuss questions or concerns patient and/or family may have  regarding understanding medications and recognizing signs and symptoms once discharged.  CM also encouraged patient to follow up with all recommended appointments after discharge. Patient advised of importance for patient and family to participate in managing patient’s medical well being.   This CM introduced self and role to pt, daughter and sister via TC. Pt lives in a 3 story home with 2 shanice. Pt reports she sleeps on the first level and full bath is on the second level. Family reports Pt is hoarding garbage in her home. Family reports no access to second or third level due to garbage accumulation. Family reports the home is scattered with cat feces, and human excrement. Family reports no running water in home. Family reports during her last fall the pt was found in a pile of garbage, and family struggled to get into the home because of the garbage accumulation. Family reports pt does not practice healthy hygiene habits, take her medications as prescribed, or take care of the cats in the home. Upon arrival to [facility] it has been reported pt had a 70-80 pound weight loss over the past 1 year. This CM called Adult Protective Services and made a report with Linda, who will transfer the case to [place], contact [phone].

## 2024-08-16 NOTE — H&P
"[facility]  H&P  Name: Barb Palomo 54 y.o. female I MRN: [id number]  Unit/Bed#: ED 21 I Date of Admission: 8/15/2024   Date of Service: 8/16/2024 I Hospital Day: 0      Assessment & Plan   * Failure to thrive in adult  Assessment & Plan  Patient presenting with increased generalized weakness and frequent falls, she additionally admits to ongoing diarrhea, recent dysuria, poor oral intake, and at least 70-80 pound weight loss over the past 1 year  Initially hypotensive on ED arrival resolved following IVF bolus.  Await final reads of x-rays however no acute traumatic injuries noted  Labs with multiple derangements including hyperglycemia, hypokalemia and hypomagnesemia with management as per associated problems.  COVID-19/influenza/RSV PCR negative.  UA and stool studies have been ordered and are awaiting collection, will follow-up these once obtained  Broad differential for this including infectious concerns versus occult malignancy not yet identified versus other etiology.  Concerning the patient into recent poor oral intake additionally reports she has not been using insulin due to \"insurance issue\" and inability to self inject due to her neuropathy  Reports ongoing nausea in ED, will obtain CT abdomen/pelvis to better evaluate  Check TSH/B12/folate levels.  CM consult for assistance with resources/disposition    Hypokalemia  Assessment & Plan  Potassium level 2.3 on admission, EKG fortunately without acute changes compared to prior  Suspect due to recent poor oral intake additionally with ongoing diarrhea  Replacement initiated during ED evaluation, will recheck BMP following this and continue replacement as needed  Continue telemetry monitoring until potassium at least 3    Hypomagnesemia  Assessment & Plan  Magnesium level 1.2 on admission, replacement initiated during ED evaluation, recheck magnesium in a.m. and continue replacement as needed    Uncontrolled type 2 " "diabetes mellitus with hyperglycemia (HCC)  Assessment & Plan  Lab Results   Component Value Date    HGBA1C  08/18/2023      Comment:      HbA1c to high to read       Recent Labs     08/15/24  2025 08/16/24  0055   POCGLU 435* 379*       Blood Sugar Average: Last 72 hrs:  (P) 407  Patient markedly hyperglycemic on admission only partially responsive to IV fluids, remainder of labs fortunately not consistent with DKA.  Patient admitting she has not been using her home insulin as she apparently has been unable to fill prescription due to \"insurance issues,\" additionally reports that due to her neuropathy she has difficulty self injecting.  Normal to be on NovoLog 70/30 20 units twice daily  Start Lantus 14 units nightly for now and continue with IV fluids, adjust inpatient insulin regimen as needed been on blood glucose trend, likely start mealtime insulin once better able to tolerate oral diet  Obtain updated A1c  Initiate hypoglycemia protocol    Opioid dependence with other opioid-induced disorder (HCC)  Assessment & Plan  PDMP reviewed, patient maintained on tramadol 200 mg daily as needed; history of chronic cervical/lumbar pain noted  Continue as needed tramadol    Chronic diarrhea  Assessment & Plan  Longstanding history of chronic diarrhea, patient reporting diarrhea ongoing for past 1 month.  Follow-up C. difficile sample and stool studies  Appear she had colonoscopy 3/2024 without large mass identified however was limited evaluation due to poor bowel prep.  History of IBS noted; if patient with ongoing diarrhea without other explanation consider inpatient GI evaluation    Tobacco dependence  Assessment & Plan  Counseled on need for cessation, provide nicotine patch while admitted  Recent CT chest for lung cancer screening 7/30/2024 negative for nodules or masses    Hypothyroidism  Assessment & Plan  Continue home dose levothyroxine for now pending TSH result, adjust dose as needed dependent on this    GERD " without esophagitis  Assessment & Plan  Continue PPI    Severe episode of recurrent major depressive disorder, without psychotic features (HCC)  Assessment & Plan  Mood appearing fairly stable, continue home duloxetine, mirtazapine, as needed Valium             VTE Prophylaxis: Heparin  / sequential compression device   Code Status: Level 1 - Full Code   POLST: POLST form is not discussed and not completed at this time.  Discussion with family: Attempted to reach daughter via telephone but unable to reach    Anticipated Length of Stay:  Patient will be admitted on an Inpatient basis with an anticipated length of stay of greater than 2 midnights.   Justification for Hospital Stay: Please see detailed plans noted above.    Chief Complaint:     Generalized weakness and frequent falls  History of Present Illness:  Barb Palomo is a 54 y.o. 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  Patient states she has noticed an ongoing decline for nearly the past 1 year since her   with at least 70-80 pound weight loss during this span and ongoing chronic diarrhea for which she has been following with gastroenterology and underwent colonoscopy 3/2024 which did not reveal overt large mass however this was severely limited by poor bowel prep.  For the past 1 month in particular however she has noticed increasing generalized weakness which she states has been complicated by her ongoing neuropathy, additionally with some decreased appetite.  She states she has been taking her oral medications but admits she has not been using her insulin as she has been  "unable to obtain refills due to an \"insurance issue,\" additionally reporting she had difficulty self injecting herself due to her neuropathy.  Additionally she reports frequent falls during the span including a fall 8/14/2024 striking her right leg but for which she was able to ambulate afterwards without reported head strike or loss of consciousness, and due to his ongoing decline presented for evaluation.  She reports ongoing chronic watery diarrhea without melena/medic easier and additionally reports recent development of dysuria but denies fever/chills, chest pain/pressure, shortness of breath, or other systemic symptoms; does report some abdominal pain and nausea to me.    During ED evaluation her labs were significant for marked hyperglycemia without elevated anion gap or acidosis, also with hypokalemia and hypomagnesemia noted on labs for which replacement for this was initiated, and x-rays of the right hip/knee were obtained awaiting final radiologist read but to wet read without acute fracture or dislocation visualized in these areas.  Due to her frequent falls and overall picture she is admitted for further workup and management as above.    Review of Systems:    Constitutional:  Denies fever or chills but reports fatigue and generalized weakness  Eyes:  Denies change in visual acuity   HENT:  Denies nasal congestion or sore throat   Respiratory:  Denies cough or shortness of breath   Cardiovascular:  Denies chest pain or edema   GI:  Denies bloody stools but reports abdominal pain, nausea, vomiting, diarrhea  : Reported dysuria  Musculoskeletal:  Denies back pain or joint pain   Integument:  Denies rash   Neurologic:  Denies headache, focal weakness or sensory changes   Endocrine:  Denies polyuria or polydipsia   Lymphatic:  Denies swollen glands   Psychiatric:  Denies depression or anxiety     Past Medical and Surgical History:   Past Medical History:   Diagnosis Date    Acute venous embolism and " thrombosis of deep vessels of distal lower extremity (HCC)     Anemia     Anxiety     last assessed 11/20/17    Arthritis     Asthma     Cerebral infarction (HCC)     unspecified, last assessed 11/14/16    Chest pain     last assessed 5/9/17    Chronic cough     last assessed 12/12/13    Depression     Diabetes mellitus, type 2 (HCC)     DJD (degenerative joint disease)     Esophageal reflux     Fibromyalgia     GERD without esophagitis     resolved 5/13/16    History of pulmonary embolism     Hyperlipidemia     Hypertension     Hypothyroidism     Iron deficiency     Pancreatitis     Panic attack     Panic disorder     Polyarthritis     PTSD (post-traumatic stress disorder)     Sleep difficulties     Stroke syndrome     Thyroid disease     TIA (transient ischemic attack)     TIA (transient ischemic attack)     Venous embolism and thrombosis of deep vessels of distal lower extremity (HCC)     Vitamin B12 deficiency     Vitamin D deficiency      Past Surgical History:   Procedure Laterality Date    CARPAL TUNNEL RELEASE Right     neuroplasty decompression of median nerve    CATARACT EXTRACTION      CHOLECYSTECTOMY      ERCP      ERCP      ESOPHAGOGASTRIC FUNDOPLASTY      NISSEN FUNDOPLICATION      PATELLA SURGERY Left 12/2021    WI ESOPHAGOGASTRODUODENOSCOPY TRANSORAL DIAGNOSTIC N/A 11/02/2016    Procedure: EGD AND COLONOSCOPY;  Surgeon: Jatin Shepherd MD;  Location: [facility];  Service: Gastroenterology    WI NDSC WRST SURG W/RLS TRANSVRS CARPL LIGM Right 03/08/2016    Procedure: RELEASE CARPAL TUNNEL ENDOSCOPIC;  Surgeon: Guero Restrepo MD;  Location: [facility];  Service: Orthopedics    WI TENDON SHEATH INCISION Right 03/08/2016    Procedure: RELEASE TRIGGER FINGER RIGHT THUMB;  Surgeon: Guero Restrepo MD;  Location: [facility];  Service: Orthopedics    TONSILLECTOMY AND ADENOIDECTOMY         Meds/Allergies:    Current Facility-Administered Medications:     acetaminophen (TYLENOL) tablet 650 mg, 650 mg, Oral,  Q6H PRN, Renan Drake DO    albuterol (PROVENTIL HFA,VENTOLIN HFA) inhaler 2 puff, 2 puff, Inhalation, Q4H PRN, Renan Drake DO    aspirin chewable tablet 81 mg, 81 mg, Oral, Daily, Renan Drake DO    atorvastatin (LIPITOR) tablet 80 mg, 80 mg, Oral, Daily, Renan Drake DO    baclofen tablet 10 mg, 10 mg, Oral, BID PRN, Renan Drake DO    butalbital-acetaminophen-caffeine (FIORICET,ESGIC) -40 mg per tablet 1 tablet, 1 tablet, Oral, Q6H PRN, Renan Drake DO    diazepam (VALIUM) tablet 5 mg, 5 mg, Oral, Q12H PRN, Renan Drake DO    DULoxetine (CYMBALTA) delayed release capsule 60 mg, 60 mg, Oral, BID, Renan Drake DO    fluticasone-vilanterol 200-25 mcg/actuation 1 puff, 1 puff, Inhalation, Daily, Renan Drake DO    gabapentin (NEURONTIN) capsule 800 mg, 800 mg, Oral, TID, Renan Drake DO, 800 mg at 08/16/24 0208    heparin (porcine) subcutaneous injection 5,000 Units, 5,000 Units, Subcutaneous, Q8H HONEY, Renan Drake DO, 5,000 Units at 08/16/24 0155    insulin glargine (LANTUS) subcutaneous injection 14 Units 0.14 mL, 14 Units, Subcutaneous, HS, Renan Drake DO, 14 Units at 08/16/24 0156    insulin lispro (HumALOG/ADMELOG) 100 units/mL subcutaneous injection 1-5 Units, 1-5 Units, Subcutaneous, TID AC **AND** Fingerstick Glucose (POCT), , , TID AC, Renan Drake DO    insulin lispro (HumALOG/ADMELOG) 100 units/mL subcutaneous injection 1-5 Units, 1-5 Units, Subcutaneous, HS, Renan Drake DO, 4 Units at 08/16/24 0154    levothyroxine tablet 112 mcg, 112 mcg, Oral, Daily, Renan Drake DO    mirtazapine (REMERON) tablet 7.5 mg, 7.5 mg, Oral, HS, Renan Drake DO, 7.5 mg at 08/16/24 0156    multi-electrolyte (PLASMALYTE-A/ISOLYTE-S PH 7.4) IV solution, 125 mL/hr, Intravenous, Continuous, Renan Drake DO, Last Rate: 125 mL/hr at 08/16/24 0153, 125 mL/hr at 08/16/24 0153    nicotine (NICODERM CQ) 21 mg/24 hr TD 24 hr patch 1 patch, 1 patch, Transdermal, Daily, Renan  DO Noelle    ondansetron (ZOFRAN) injection 4 mg, 4 mg, Intravenous, Q6H PRN, Renan Drake DO, 4 mg at 08/16/24 0105    pantoprazole (PROTONIX) EC tablet 40 mg, 40 mg, Oral, Early Morning, Renan Drake DO    potassium chloride 20 mEq IVPB (premix), 20 mEq, Intravenous, Q2H, Renan Drake DO, Last Rate: 50 mL/hr at 08/16/24 0151, 20 mEq at 08/16/24 0151    prazosin (MINIPRESS) capsule 2 mg, 2 mg, Oral, HS, Renan Drake DO, 2 mg at 08/16/24 0153    traMADol (ULTRAM) tablet 50 mg, 50 mg, Oral, Q6H PRN, Renan Drake DO    Current Outpatient Medications:     Adalimumab (HUMIRA PEN SC), Inject under the skin (Patient not taking: Reported on 6/3/2024), Disp: , Rfl:     Albuterol Sulfate (ProAir RespiClick) 108 (90 Base) MCG/ACT AEPB, Inhale 2 puffs every 6 (six) hours as needed (wheezing), Disp: 1 each, Rfl: 1    aspirin 81 mg chewable tablet, Chew 81 mg daily , Disp: , Rfl:     atorvastatin (LIPITOR) 80 mg tablet, TAKE 1 TABLET BY MOUTH EVERY DAY, Disp: 30 tablet, Rfl: 5    baclofen 10 mg tablet, Take 1 tablet (10 mg total) by mouth 2 (two) times a day as needed for muscle spasms (headache), Disp: 30 tablet, Rfl: 2    BD PEN NEEDLE LINDY U/F 32G X 4 MM MISC, Inject under the skin daily, Disp: 30 each, Rfl: 5    Blood Glucose Monitoring Suppl (OneTouch Verio Reflect) w/Device KIT, CHECK BLOOD SUGARS FOUR TIMES DAILY. PLEASE SUBSTITUTE WITH APPROPRIATE ALTERNATIVE AS COVERED BY PATIENT'S INSURANCE. DX: E11.65 (Patient not taking: Reported on 7/23/2024), Disp: 1 kit, Rfl: 0    butalbital-acetaminophen-caffeine (FIORICET,ESGIC) -40 mg per tablet, TAKE 1 TABLET EVERY 6 HOURS AS NEEDED FOR MIGRAINE.  Please limit 3-4 per week, 15 per month., Disp: 15 tablet, Rfl: 2    colestipol (COLESTID) 1 g tablet, Take 1 tablet (1 g total) by mouth 2 (two) times a day, Disp: 60 tablet, Rfl: 3    Cyanocobalamin (VITAMIN B-12 IJ), Inject as directed (Patient not taking: Reported on 1/30/2024), Disp: , Rfl:     diazepam  "(VALIUM) 5 mg tablet, TAKE 1 TABLET (5 MG TOTAL) BY MOUTH 2 (TWO) TIMES A DAY AS NEEDED FOR ANXIETY DO NOT TAKE WITHIN 8 HOURS OF TRAMADOL, Disp: 60 tablet, Rfl: 0    DULoxetine (CYMBALTA) 60 mg delayed release capsule, Take 1 capsule (60 mg total) by mouth 2 (two) times a day, Disp: 180 capsule, Rfl: 1    Fluticasone-Salmeterol (Advair Diskus) 500-50 mcg/dose inhaler, Inhale 1 puff by mouth 2 times daily.  Rinse mouth after use, Disp: 180 blister, Rfl: 1    Folic Acid 0.8 MG CAPS, Take 0.04 mg by mouth daily. (Patient not taking: Reported on 1/30/2024), Disp: , Rfl:     gabapentin (NEURONTIN) 400 mg capsule, TAKE 2 CAPSULES BY MOUTH 3 TIMES A DAY, Disp: 180 capsule, Rfl: 5    Galcanezumab-gnlm (Emgality) 120 MG/ML SOAJ, 1 injection monthly (Patient not taking: Reported on 7/23/2024), Disp: 1 mL, Rfl: 11    glucose blood (OneTouch Verio) test strip, Check blood sugars four times daily. Please substitute with appropriate alternative as covered by patient's insurance. Dx: E11.65 (Patient not taking: Reported on 7/23/2024), Disp: 400 each, Rfl: 3    insulin NPH-insulin regular (NovoLIN 70/30) 100 units/mL subcutaneous injection, Inject 20 Units under the skin 2 (two) times a day before meals, Disp: 40 mL, Rfl: 1    Insulin Pen Needle (BD Pen Needle Kimberli U/F) 32G X 4 MM MISC, Use 2 (two) times a day, Disp: 200 each, Rfl: 3    Insulin Syringe-Needle U-100 31G X 5/16\" 0.3 ML MISC, Use twice daily, Disp: 60 each, Rfl: 5    ketorolac (TORADOL) 30 mg/mL injection, 1-2 ml IM injection once per 24 hours p.r.n. migraine.  No more than 2 injections per week., Disp: 4 mL, Rfl: 0    levothyroxine 112 mcg tablet, TAKE 1 TABLET BY MOUTH EVERY DAY, Disp: 90 tablet, Rfl: 1    magnesium Oxide (MAG-OX) 400 mg TABS, Take 1 tablet (400 mg total) by mouth 2 (two) times a day for 15 days (Patient not taking: Reported on 5/23/2024), Disp: 30 tablet, Rfl: 0    metFORMIN (GLUCOPHAGE-XR) 500 mg 24 hr tablet, TAKE 1 TABLET BY MOUTH TWICE A DAY " WITH FOOD, Disp: 60 tablet, Rfl: 5    mirtazapine (REMERON) 7.5 MG tablet, Take 1 tablet (7.5 mg total) by mouth daily at bedtime, Disp: 90 tablet, Rfl: 1    naloxone (NARCAN) 4 mg/0.1 mL nasal spray, Administer 1 spray into a nostril. If no response after 2-3 minutes, give another dose in the other nostril using a new spray., Disp: 1 each, Rfl: 1    omeprazole (PriLOSEC) 40 MG capsule, TAKE 1 CAPSULE (40 MG TOTAL) BY MOUTH DAILY., Disp: 30 capsule, Rfl: 5    ondansetron (ZOFRAN) 4 mg tablet, TAKE 1 TABLET BY MOUTH EVERY 8 HOURS AS NEEDED FOR NAUSEA AND VOMITING, Disp: 30 tablet, Rfl: 5    OneTouch Delica Lancets 33G MISC, Check blood sugars four times daily. Please substitute with appropriate alternative as covered by patient's insurance. Dx: E11.65, Disp: 400 each, Rfl: 3    OneTouch Ultra test strip, USE AS DIRECTED TO TEST 3 TIMES A DAY, Disp: 100 strip, Rfl: 0    prazosin (MINIPRESS) 2 mg capsule, Take 1 capsule (2 mg total) by mouth daily at bedtime, Disp: 90 capsule, Rfl: 1    prochlorperazine (COMPAZINE) 5 mg tablet, Take 1 tablet (5 mg total) by mouth every 6 (six) hours as needed for nausea or vomiting, Disp: 30 tablet, Rfl: 2    topiramate (TOPAMAX) 25 mg tablet, TAKE 1 TABLET BY MOUTH EVERY DAY (Patient not taking: Reported on 7/23/2024), Disp: 90 tablet, Rfl: 1    traMADol (ULTRAM-ER) 200 MG 24 hr tablet, Take 1 tablet (200 mg total) by mouth daily as needed for moderate pain Do not take within 8 hours of valium., Disp: 30 tablet, Rfl: 0      Allergies:   Allergies   Allergen Reactions    Medical Tape Rash    Lexapro [Escitalopram Oxalate]     Escitalopram Other (See Comments) and Palpitations    Other Hives and Rash     Adhesive Tape     History:  Marital Status: /Civil Union     Substance Use History:   Social History     Substance and Sexual Activity   Alcohol Use Not Currently    Alcohol/week: 0.0 standard drinks of alcohol    Comment: last was in 2010 after DUI     Social History     Tobacco  Use   Smoking Status Every Day    Current packs/day: 1.00    Average packs/day: 1 pack/day for 41.6 years (41.6 ttl pk-yrs)    Types: Cigarettes    Start date: 1/1/1983   Smokeless Tobacco Never   Tobacco Comments    20 + years     Social History     Substance and Sexual Activity   Drug Use No       Family History:  Family History   Problem Relation Age of Onset    Diabetes Mother         type 2    Heart attack Mother 39        acute MI    Kidney disease Mother         CKD NKF classfication    Depression Mother     Arthritis Mother     Substance Abuse Mother         mother OD in past on MS04    Ulcerative colitis Mother     Schizophrenia Mother     Suicide Attempts Mother     Bipolar disorder Mother     Diabetes Maternal Grandmother     Heart attack Father         acute MI    Psoriasis Father     Cancer Father         gastric cancer    Stroke Father     Crohn's disease Sister     Bipolar disorder Sister     Rheum arthritis Maternal Grandfather     Throat cancer Maternal Grandfather     Suicide Attempts Daughter     Drug abuse Daughter     Lung cancer Paternal Grandmother     Cancer Paternal Grandfather     No Known Problems Sister     Bipolar disorder Maternal Uncle     Anesthesia problems Neg Hx        Physical Exam:     Vitals:   Blood Pressure: 116/79 (08/16/24 0200)  Pulse: 90 (08/16/24 0200)  Temperature: 98 °F (36.7 °C) (08/15/24 2030)  Temp Source: Oral (08/15/24 2030)  Respirations: 20 (08/16/24 0200)  SpO2: 95 % (08/16/24 0200)    Constitutional: Frail and cachectic appearing, no acute distress, non-toxic appearance   Eyes:  PERRL, conjunctiva normal   HENT:  Atraumatic, external ears normal, nose normal, oropharynx moist, no pharyngeal exudates. Neck- normal range of motion, no tenderness, supple   Respiratory:  No respiratory distress, normal breath sounds, no rales, no wheezing   Cardiovascular:  Normal rate, normal rhythm, no murmurs, no gallops, no rubs   GI:  Soft, nondistended, normal bowel sounds,  nontender, no organomegaly, no mass, no rebound, no guarding   :  No costovertebral angle tenderness   Musculoskeletal:  No edema, no tenderness, no deformities, diffusely decreased muscle mass with clavicular/temporal wasting. Back- no tenderness  Integument:  Well hydrated, no rash   Lymphatic:  No lymphadenopathy noted   Neurologic:  Alert &awake, communicative, CN 2-12 normal, normal motor function, normal sensory function, no focal deficits noted   Psychiatric:  Speech and behavior appropriate       Lab Results: I have personally reviewed pertinent reports.      Results from last 7 days   Lab Units 08/15/24  2158   WBC Thousand/uL 7.58   HEMOGLOBIN g/dL 14.0   HEMATOCRIT % 40.3   PLATELETS Thousands/uL 303   SEGS PCT % 65   LYMPHO PCT % 26   MONO PCT % 7   EOS PCT % 1     Results from last 7 days   Lab Units 08/15/24  2158   POTASSIUM mmol/L 2.3*   CHLORIDE mmol/L 88*   CO2 mmol/L 36*   BUN mg/dL 3*   CREATININE mg/dL 0.46*   CALCIUM mg/dL 8.4   ALK PHOS U/L 113*   ALT U/L 14   AST U/L 19     Results from last 7 days   Lab Units 08/15/24  2158   INR  0.95       EKG: Personally reviewed, sinus tachycardia     Imaging: I have personally reviewed pertinent reports.      Mammo screening bilateral w 3d & cad    Result Date: 8/6/2024  Narrative: DIAGNOSIS: Encounter for screening mammogram for malignant neoplasm of breast TECHNIQUE: Digital screening mammography was performed. Computer Aided Detection (CAD) analyzed all applicable images. COMPARISONS: Prior breast imaging dated: 06/26/2019 and 04/04/2016 RELEVANT HISTORY: Family Breast Cancer History: No known family history of breast cancer. Family Medical History: No known relevant family medical history. Personal History: No known relevant hormone history. No known relevant surgical history. No known relevant medical history. The patient is scheduled in a reminder system for screening mammography. 8-10% of cancers will be missed on mammography. Management  of a palpable abnormality must be based on clinical grounds.  Patients will be notified of their results via letter from our facility. Accredited by [employer] and FDA. RISK ASSESSMENT: 5 Year Tyrer-Cuzick: 0.78% 10 Year Tyrer-Cuzick: 1.69% Lifetime Tyrer-Cuzick: 6.34% TISSUE DENSITY: The breasts are extremely dense, which lowers the sensitivity of mammography. INDICATION: Barb Palomo is a 54 y.o. female presenting for screening mammography. FINDINGS: There are no suspicious masses, grouped microcalcifications or areas of architectural distortion. The skin and nipple areolar complex are unremarkable. Increase in breast density since 2019 attributable to marked weight loss.     Impression: No mammographic evidence of malignancy. ASSESSMENT/BI-RADS CATEGORY: Left: 1 - Negative Right: 1 - Negative Overall: 1 - Negative RECOMMENDATION:      - Routine screening mammogram in 1 year for both breasts. Workstation ID: [id number]     Esophageal manometry    Result Date: 8/2/2024  Narrative: Indication-dysphagia Esophageal manometry Esophageal motility-10 out of 10 swallows demonstrated normal esophageal contractility pattern with mean DCI of 1111 mmHg.s.cm LES-median IRP is within normal limits Impedance-80% complete clearance of liquid bolus swallow Distal latency was low for 2 swallows but this was borderline and high-resolution manometry images demonstrated normal pattern Rapid swallow is less than 1 [place] classification-normal esophageal motility     CT lung screening program    Result Date: 7/31/2024  Narrative: CT CHEST LUNG CANCER SCREENING WITHOUT IV CONTRAST INDICATION: F17.210: Nicotine dependence, cigarettes, uncomplicated. COMPARISON: CT Chest 2/25/14 TECHNIQUE: Unenhanced CT examination of the chest was performed utilizing a low dose protocol. Multiplanar 2D reformatted images were created from the source data. Radiation dose length product (DLP) for this visit:  88.31 mGy-cm . This  examination, like all CT scans performed in the [facility], was performed utilizing techniques to minimize radiation dose exposure, including the use of iterative reconstruction and automated exposure control. FINDINGS: LUNGS: There are couple tiny calcified granulomas. No focal consolidation. Central airways are clear. PLEURA: Unremarkable. HEART/GREAT VESSELS: Heart is unremarkable for patient's age. No thoracic aortic aneurysm. MEDIASTINUM AND GREGORY: Unremarkable. CHEST WALL AND LOWER NECK: Unremarkable. VISUALIZED STRUCTURES IN THE UPPER ABDOMEN: Visualized hepatic parenchyma is diffusely decreased in density consistent with hepatic steatosis. Cholecystectomy. Otherwise no clinical significant abnormality identified in the visualized upper abdomen. OSSEOUS STRUCTURES: No acute fracture or destructive osseous lesion.     Impression: No nodules and/or definitely benign nodules.   Lung-RADS1, negative. Continued annual screening with LDCT in 12 months. Hepatic steatosis. Workstation performed: [id number]         ** Please Note: Dragon 360 Dictation voice to text software was used in the creation of this document. **

## 2024-08-16 NOTE — ASSESSMENT & PLAN NOTE
"Lab Results   Component Value Date    HGBA1C  08/18/2023      Comment:      HbA1c to high to read       Recent Labs     08/15/24  2025 08/16/24  0055   POCGLU 435* 379*       Blood Sugar Average: Last 72 hrs:  (P) 407  Patient markedly hyperglycemic on admission only partially responsive to IV fluids, remainder of labs fortunately not consistent with DKA.  Patient admitting she has not been using her home insulin as she apparently has been unable to fill prescription due to \"insurance issues,\" additionally reports that due to her neuropathy she has difficulty self injecting.  Normal to be on NovoLog 70/30 20 units twice daily  Start Lantus 14 units nightly for now and continue with IV fluids, adjust inpatient insulin regimen as needed been on blood glucose trend, likely start mealtime insulin once better able to tolerate oral diet  Obtain updated A1c  Initiate hypoglycemia protocol  "

## 2024-08-16 NOTE — ASSESSMENT & PLAN NOTE
Longstanding history of chronic diarrhea, patient reporting diarrhea ongoing for past 1 month.    Stool enteric panel negative  C. difficile positive, see A&P above

## 2024-08-16 NOTE — SEPSIS NOTE
"  Sepsis Note   Barb Palomo 54 y.o. female MRN: [id number]  Unit/Bed#: ED 21 Encounter: [id number]       Initial Sepsis Screening       Row Name 08/15/24 2121                Is the patient's history suggestive of a new or worsening infection? Yes (Proceed)  -AR        Suspected source of infection urinary tract infection  -AR        Indicate SIRS criteria Tachycardia > 90 bpm;Tachypnea > 20 resp per min  -AR        Are two or more of the above signs & symptoms of infection both present and new to the patient? Yes (Proceed)  -AR        Assess for evidence of organ dysfunction: Are any of the below criteria present within 6 hours of suspected infection and SIRS criteria that are NOT considered to be chronic conditions? SBP < 90  -AR        Date of presentation of septic shock --        Time of presentation of septic shock --        Fluid Resuscitation: 30 ml/kg IV fluid bolus will be given based on actual body weight  -AR        Is the patient is persistently hypotensive in the hour after fluid bolus administration? If yes, patient meets criteria for vasopressor use. --        Sepsis Note: Click \"NEXT\" below (NOT \"close\") to generate sepsis note based on above information. YES (proceed by clicking \"NEXT\")  -AR                  User Key  (r) = Recorded By, (t) = Taken By, (c) = Cosigned By      Initials Name Provider Type    DO Deana Warren                        There is no height or weight on file to calculate BMI.  Wt Readings from Last 1 Encounters:   08/02/24 39.9 kg (88 lb)        Ideal body weight: 53.9 kg (118 lb 11.8 oz)    "

## 2024-08-16 NOTE — ASSESSMENT & PLAN NOTE
"Patient presenting with increased generalized weakness and frequent falls, she additionally admits to ongoing diarrhea, recent dysuria, poor oral intake, and at least 70-80 pound weight loss over the past 1 year  Initially hypotensive on ED arrival resolved following IVF bolus.  Await final reads of x-rays however no acute traumatic injuries noted  Labs with multiple derangements including hyperglycemia, hypokalemia and hypomagnesemia with management as per associated problems.  COVID-19/influenza/RSV PCR negative.  UA and stool studies have been ordered and are awaiting collection, will follow-up these once obtained  Broad differential for this including infectious concerns versus occult malignancy not yet identified versus other etiology.  Concerning the patient into recent poor oral intake additionally reports she has not been using insulin due to \"insurance issue\" and inability to self inject due to her neuropathy  Reports ongoing nausea in ED, will obtain CT abdomen/pelvis to better evaluate  Check TSH/B12/folate levels.  CM consult for assistance with resources/disposition  "

## 2024-08-16 NOTE — ED ATTENDING ATTESTATION
Final Diagnoses:     1. Sepsis (HCC)    2. Severe protein-calorie malnutrition (HCC)    3. Failure to thrive in adult      ED Course as of 08/16/24 0111   Thu Aug 15, 2024   2104 POC Glucose(!!): 435   2105 Blood Pressure(!): 73/53   2105 Pulse: 104   2225 pH, Jeremy(!): 7.557   2226 pCO2, Jeremy: 45.7   2330 Potassium(!!): 2.3   Fri Aug 16, 2024   0011 POCUS Cardiac/IVC + POCUS Lung:  - transthoracic echocardiogram was performed at bedside by myself and resident.   - Images collected were poor quality.   - This was a technically difficult study due to lung interference.   - Apical, parasternal, subcostal views were obtained/attempted.   - The main purpose of this study was to r/o obvious pathology requiring emergent intervention as in summary.   - Many views/images obtained for educational reasons.   - Findings:    There was trace pericardial effusion, only seen in subxiphoid, near apex of heart.     Measure size in end-diastole: trivial (seen only in systole)    Quality of effusion: simple     Location of effusion: local    LV     Function: grossly Normal appearing.    RV     Function grossly normal appearing.    IVC: IVC < 2.1cm; >50% compression w/ sniff likely RAP 3 mmHg    IVC Max: 1.8    IVC Min: 0    CI: 100%    Summary:   - Very collapsible IVC would support dehydration.   - There were no obvious signs of fluid overload (B-lines, plump IVC)  - There was no large pericardial effusion to cause hypotension.     0012 Potassium(!!): 2.3  Likely dehydration related. Added magnesium.        I, Riley White MD, saw and evaluated the patient. All available labs and X-rays were ordered by me or the resident / non-physician and have been reviewed by myself. I discussed the patient with the resident / non-physician and agree with the resident's / non-physician practitioner's findings and plan as documented in the resident's / non-physician practicitioner's note, except where noted.   At this point, I agree with the  current assessment done in the ED.   I was present during key portions of all procedures performed unless otherwise stated.     HPI:  NURSING TRIAGE:    This is a 54 y.o. female presenting for evaluation of fall.   Over past month has had weakness  Chronic diarrhea for a while; daughter is requesting C.diff testing specifically.  Anyways, has been feeling unwell. Mentioned numbness (chronic).  Feels like she can't do her normal activities  Pain all over  No sick contacts with similar.   Upon arrival, she was Hypotensive.     PMH: IDDM, IBS, arthritis/fibromylagia.   +smoking Chief Complaint   Patient presents with    Fall     Pt arrived via EMS. Per EMS, fall today and yesterday, numbness L leg, lower back pain, and hyperglycemic. Pt reports numbness in L arm from previous stroke.      PHYSICAL: ASSESSMENT + PLAN:   Pertinent: diffuse belly tenderness (minimal)  Dry MM  Cachexic appearance.  Mild tachycardia      General: VS reviewed  Appears in NAD  awake, alert.   Well-nourished, well-developed. Appears stated age.   Speaking normally in full sentences.   Head: Normocephalic, atraumatic  Eyes: EOM-I. No diplopia.   No hyphema.   No subconjunctival hemorrhages.  Symmetrical lids.   ENT: Atraumatic external nose and ears.    MMM  No malocclusion. No stridor. Normal phonation. No drooling. Normal swallowing.   Neck: No JVD.  CV: No pallor noted  Lungs:   No tachypnea  No respiratory distress  MSK:   FROM spontaneously  Skin: Dry, intact.   Neuro: Awake, alert, GCS15, CN II-XII grossly intact.   Motor grossly intact.  Psychiatric/Behavioral: interacting normally; appropriate mood/affect.    Exam: deferred    Vitals:    08/15/24 2315 08/15/24 2330 08/16/24 0015 08/16/24 0100   BP: 126/80 122/77 143/92 134/82   BP Location: Left arm Left arm Left arm Left arm   Pulse: 90 88 98 96   Resp: 16 17 20 22   Temp:       TempSrc:       SpO2: 91% 93% 98% 96%    Will check labs including electorlytes, kidney  function  Beta hydroxybutryate for any degree of DKA given IDDM and sounds like she hasn't been taking it.  Low threshold to add on Mg++.  Likely admit for hypotension  POCUS for dehydration / IVC measurments.  Has some belly tendenress but I don't think her exam fits with appendicitis/cholecystitis.  Likely admission for fluids.      There are no obvious limitations to social determinants of care.   Nursing note reviewed.   Vitals reviewed.   Orders placed by myself and/or advanced practitioner / resident.    Previous chart was reviewed  No language barrier.   History obtained from patient.    There are no limitations to the history obtained:  Critical Care Time:   - Critical care time: 38 minutes  - Critical care time was exclusive of seperately bilable procedures and treating other patients as well as teaching time.   - Critical care was necessary to treat or prevent imminent or life-threatening deterioration of the following condition: hypotension  - Critical care time was spent personally by me on the following activities as well as the above as per the ED course and rest of chart: blood draw for specimens, obtaining history from patient / surrogate, developement of a treatment plan, discussions with consultants, evaluation of patient's response to the treatment, examination of the patient, ordering/performing treatements and interventions, re-evaluation of the patient's condition, review of old charts, and ordering/reviewing laboratory studies   Past Medical: Past Surgical:    has a past medical history of Acute venous embolism and thrombosis of deep vessels of distal lower extremity (HCC), Anemia, Anxiety, Arthritis, Asthma, Cerebral infarction (HCC), Chest pain, Chronic cough, Depression, Diabetes mellitus, type 2 (HCC), DJD (degenerative joint disease), Esophageal reflux, Fibromyalgia, GERD without esophagitis, History of pulmonary embolism, Hyperlipidemia, Hypertension, Hypothyroidism, Iron deficiency,  Pancreatitis, Panic attack, Panic disorder, Polyarthritis, PTSD (post-traumatic stress disorder), Sleep difficulties, Stroke syndrome, Thyroid disease, TIA (transient ischemic attack), TIA (transient ischemic attack), Venous embolism and thrombosis of deep vessels of distal lower extremity (HCC), Vitamin B12 deficiency, and Vitamin D deficiency.  has a past surgical history that includes Cholecystectomy; Esophagogastric fundoplasty; Nissen fundoplication; Tonsillectomy and adenoidectomy; ERCP; Carpal tunnel release (Right); ERCP; pr esophagogastroduodenoscopy transoral diagnostic (N/A, 11/02/2016); pr ndsc wrst surg w/rls transvrs carpl ligm (Right, 03/08/2016); pr tendon sheath incision (Right, 03/08/2016); Cataract extraction; and Patella surgery (Left, 12/2021).   Social: Cardiac (Echo/Cath)   Social History     Substance and Sexual Activity   Alcohol Use Not Currently    Alcohol/week: 0.0 standard drinks of alcohol    Comment: last was in 2010 after DUI     Social History     Tobacco Use   Smoking Status Every Day    Current packs/day: 1.00    Average packs/day: 1 pack/day for 41.6 years (41.6 ttl pk-yrs)    Types: Cigarettes    Start date: 1/1/1983   Smokeless Tobacco Never   Tobacco Comments    20 + years     Social History     Substance and Sexual Activity   Drug Use No    No results found for this or any previous visit.    No results found for this or any previous visit.    No results found for this or any previous visit.     Labs: Imaging:   Labs Reviewed   COMPREHENSIVE METABOLIC PANEL - Abnormal       Result Value Ref Range Status    Sodium 136  135 - 147 mmol/L Final    Potassium 2.3 (*) 3.5 - 5.3 mmol/L Final    Chloride 88 (*) 96 - 108 mmol/L Final    CO2 36 (*) 21 - 32 mmol/L Final    ANION GAP 12  4 - 13 mmol/L Final    BUN 3 (*) 5 - 25 mg/dL Final    Creatinine 0.46 (*) 0.60 - 1.30 mg/dL Final    Comment: Standardized to IDMS reference method    Glucose 448 (*) 65 - 140 mg/dL Final    Comment: If  the patient is fasting, the ADA then defines impaired fasting glucose as > 100 mg/dL and diabetes as > or equal to 123 mg/dL.    Calcium 8.4  8.4 - 10.2 mg/dL Final    Corrected Calcium 9.1  8.3 - 10.1 mg/dL Final    AST 19  13 - 39 U/L Final    ALT 14  7 - 52 U/L Final    Comment: Specimen collection should occur prior to Sulfasalazine administration due to the potential for falsely depressed results.     Alkaline Phosphatase 113 (*) 34 - 104 U/L Final    Total Protein 5.7 (*) 6.4 - 8.4 g/dL Final    Albumin 3.1 (*) 3.5 - 5.0 g/dL Final    Total Bilirubin 0.37  0.20 - 1.00 mg/dL Final    Comment: Use of this assay is not recommended for patients undergoing treatment with eltrombopag due to the potential for falsely elevated results.  N-acetyl-p-benzoquinone imine (metabolite of Acetaminophen) will generate erroneously low results in samples for patients that have taken an overdose of Acetaminophen.    eGFR 113  ml/min/1.73sq m Final    Narrative:     National Kidney Disease Foundation guidelines for Chronic Kidney Disease (CKD):     Stage 1 with normal or high GFR (GFR > 90 mL/min/1.73 square meters)    Stage 2 Mild CKD (GFR = 60-89 mL/min/1.73 square meters)    Stage 3A Moderate CKD (GFR = 45-59 mL/min/1.73 square meters)    Stage 3B Moderate CKD (GFR = 30-44 mL/min/1.73 square meters)    Stage 4 Severe CKD (GFR = 15-29 mL/min/1.73 square meters)    Stage 5 End Stage CKD (GFR <15 mL/min/1.73 square meters)  Note: GFR calculation is accurate only with a steady state creatinine   BLOOD GAS, VENOUS - Abnormal    pH, Jeremy 7.557 (*) 7.300 - 7.400 Final    pCO2, Jeremy 45.7  42.0 - 50.0 mm Hg Final    pO2, Jeremy 34.1 (*) 35.0 - 45.0 mm Hg Final    HCO3, Jeremy 39.7 (*) 24 - 30 mmol/L Final    Base Excess, Jeremy 15.4  mmol/L Final    O2 Content, Jeremy 14.9  ml/dL Final    O2 HGB, VENOUS 69.6  60.0 - 80.0 % Final   BETA HYDROXYBUTYRATE - Abnormal    Beta- Hydroxybutyrate 0.37 (*) 0.02 - 0.27 mmol/L Final   MAGNESIUM - Abnormal     Magnesium 1.2 (*) 1.9 - 2.7 mg/dL Final   POCT GLUCOSE - Abnormal    POC Glucose 435 (*) 65 - 140 mg/dl Final    Comment: CRITICAL VALUE NOTED-   POCT GLUCOSE - Abnormal    POC Glucose 379 (*) 65 - 140 mg/dl Final   COVID19, INFLUENZA A/B, RSV PCR, [facility] - Normal    SARS-CoV-2 Negative  Negative Final    Comment:      INFLUENZA A PCR Negative  Negative Final    Comment:      INFLUENZA B PCR Negative  Negative Final    Comment:      RSV PCR Negative  Negative Final    Comment:      Narrative:     This test has been performed using the CoV-2/Flu/RSV plus assay on the CX platform. This test has been validated by the  and verified by the performing laboratory.     This test is designed to amplify and detect the following: nucleocapsid (N), envelope (E), and RNA-dependent RNA polymerase (RdRP) genes of the SARS-CoV-2 genome; matrix (M), basic polymerase (PB2), and acidic protein (PA) segments of the influenza A genome; matrix (M) and non-structural protein (NS) segments of the influenza B genome, and the nucleocapsid genes of RSV A and RSV B.     Positive results are indicative of the presence of Flu A, Flu B, RSV, and/or SARS-CoV-2 RNA. Positive results for SARS-CoV-2 or suspected novel influenza should be reported to state, local, or federal health departments according to local reporting requirements.      All results should be assessed in conjunction with clinical presentation and other laboratory markers for clinical management.     FOR PEDIATRIC PATIENTS - copy/paste COVID Guidelines URL to browser: [email or web address]      LACTIC ACID, PLASMA (W/REFLEX IF RESULT > 2.0) - Normal    LACTIC ACID 1.6  0.5 - 2.0 mmol/L Final    Narrative:     Result may be elevated if tourniquet was used during collection.   PROCALCITONIN TEST - Normal    Procalcitonin 0.09  <=0.25 ng/ml Final    Comment: Suspected Lower Respiratory Tract Infection  (LRTI):  - LESS than or EQUAL to 0.25 ng/mL:   low likelihood for bacterial LRTI; antibiotics DISCOURAGED.  - GREATER than 0.25 ng/mL:   increased likelihood for bacterial LRTI; antibiotics ENCOURAGED.    Suspected Sepsis:  - Strongly consider initiating antibiotics in ALL UNSTABLE patients.  - LESS than or EQUAL to 0.5 ng/mL:   low likelihood for bacterial sepsis; antibiotics DISCOURAGED.  - GREATER than 0.5 ng/mL:   increased likelihood for bacterial sepsis; antibiotics ENCOURAGED.  - GREATER than 2 ng/mL:   high risk for severe sepsis / septic shock; antibiotics strongly ENCOURAGED.    Decisions on antibiotic use should not be based solely on Procalcitonin (PCT) levels. If PCT is low but uncertainty exists with stopping antibiotics, repeat PCT in 6-24 hours to confirm the low level. If antibiotics are administered (regardless if initial PCT was high or low), repeat PCT every 1-2 days to consider early antibiotic cessation (when GREATER than 80% decrease from the peak OR when PCT drops below designated cutoffs, whichever comes first), so long as the infection is NOT one that typically requires prolonged treatment durations (e.g., bone/joint infections, endocarditis, Staph. aureus bacteremia).    Situations of FALSE-POSITIVE Procalcitonin values:  1) Newborns < 72 hours old  2) Massive stress from severe trauma / burns, major surgery, acute pancreatitis, cardiogenic / hemorrhagic shock, sickle cell crisis, or other organ perfusion abnormalities  3) Malaria and some Candidal infections  4) Treatment with agents that stimulate cytokines (e.g., OKT3, anti-lymphocyte globulins, alemtuzumab, IL-2, granulocyte transfusion [NOT GCSFs])  5) Chronic renal disease causes elevated baseline levels (consider GREATER than 0.75 ng/mL as an abnormal cut-off); initiating HD/CRRT may cause transient decreases  6) Paraneoplastic syndromes from medullary thyroid or SCLC, some forms of vasculitis, and acute iopep-ke-hepa  "disease    Situations of FALSE-NEGATIVE Procalcitonin values:  1) Too early in clinical course for PCT to have reached its peak (may repeat in 6-24 hours to confirm low level)  2) Localized infection WITHOUT systemic (SIRS / sepsis) response (e.g., an abscess, osteomyelitis, cystitis)  3) Mycobacteria (e.g., Tuberculosis, MAC)  4) Cystic fibrosis exacerbations     PROTIME-INR - Normal    Protime 13.0  12.3 - 15.0 seconds Final    INR 0.95  0.85 - 1.19 Final    Narrative:     INR Therapeutic Range    Indication                                             INR Range      Atrial Fibrillation                                               2.0-3.0  Hypercoagulable State                                    2.0.2.3  Left Ventricular Asist Device                            2.0-3.0  Mechanical Heart Valve                                  -    Aortic(with afib, MI, embolism, HF, LA enlargement,    and/or coagulopathy)                                     2.0-3.0 (2.5-3.5)     Mitral                                                             2.5-3.5  Prosthetic/Bioprosthetic Heart Valve               2.0-3.0  Venous thromboembolism (VTE: VT, PE        2.0-3.0   APTT - Normal    PTT 24  23 - 34 seconds Final    Comment: Therapeutic Heparin Range =  60-90 seconds   HS TROPONIN I 0HR - Normal    hs TnI 0hr 4  \"Refer to ACS Flowchart\"- see link ng/L Final    Comment:                                              Initial (time 0) result  If >=50 ng/L, Myocardial injury suggested ;  Type of myocardial injury and treatment strategy  to be determined.  If 5-49 ng/L, a delta result at 2 hours and or 4 hours will be needed to further evaluate.  If <4 ng/L, and chest pain has been >3 hours since onset, patient may qualify for discharge based on the HEART score in the ED.  If <5 ng/L and <3hours since onset of chest pain, a delta result at 2 hours will be needed to further evaluate.    HS Troponin 99th Percentile URL of a Health Population=12 " ng/L with a 95% Confidence Interval of 8-18 ng/L.    Second Troponin (time 2 hours)  If calculated delta >= 20 ng/L,  Myocardial injury suggested ; Type of myocardial injury and treatment strategy to be determined.  If 5-49 ng/L and the calculated delta is 5-19 ng/L, consult medical service for evaluation.  Continue evaluation for ischemia on ecg and other possible etiology and repeat hs troponin at 4 hours.  If delta is <5 ng/L at 2 hours, consider discharge based on risk stratification via the HEART score (if in ED), or KYLAH risk score in IP/Observation.    HS Troponin 99th Percentile URL of a Health Population=12 ng/L with a 95% Confidence Interval of 8-18 ng/L.   BLOOD CULTURE    Blood Culture Received in Microbiology Lab. Culture in Progress.   Preliminary   BLOOD CULTURE    Blood Culture Received in Microbiology Lab. Culture in Progress.   Preliminary   CLOSTRIDIUM DIFFICILE TOXIN BY PCR WITH EIA   CBC AND DIFFERENTIAL    WBC 7.58  4.31 - 10.16 Thousand/uL Final    RBC 4.47  3.81 - 5.12 Million/uL Final    Hemoglobin 14.0  11.5 - 15.4 g/dL Final    Hematocrit 40.3  34.8 - 46.1 % Final    MCV 90  82 - 98 fL Final    MCH 31.3  26.8 - 34.3 pg Final    MCHC 34.7  31.4 - 37.4 g/dL Final    RDW 12.6  11.6 - 15.1 % Final    MPV 10.7  8.9 - 12.7 fL Final    Platelets 303  149 - 390 Thousands/uL Final    nRBC 0  /100 WBCs Final    Segmented % 65  43 - 75 % Final    Immature Grans % 0  0 - 2 % Final    Lymphocytes % 26  14 - 44 % Final    Monocytes % 7  4 - 12 % Final    Eosinophils Relative 1  0 - 6 % Final    Basophils Relative 1  0 - 1 % Final    Absolute Neutrophils 4.96  1.85 - 7.62 Thousands/µL Final    Absolute Immature Grans 0.02  0.00 - 0.20 Thousand/uL Final    Absolute Lymphocytes 1.98  0.60 - 4.47 Thousands/µL Final    Absolute Monocytes 0.50  0.17 - 1.22 Thousand/µL Final    Eosinophils Absolute 0.08  0.00 - 0.61 Thousand/µL Final    Basophils Absolute 0.04  0.00 - 0.10 Thousands/µL Final   UA W REFLEX TO  MICROSCOPIC WITH REFLEX TO CULTURE   BASIC METABOLIC PANEL    XR knee 4+ vw left injury    (Results Pending)   XR hip/pelv 2-3 vws left if performed    (Results Pending)   XR chest portable    (Results Pending)   CT abdomen pelvis w contrast    (Results Pending)      Medications: Code Status:   Medications   potassium chloride 20 mEq IVPB (premix) (20 mEq Intravenous New Bag 8/15/24 7461)   ondansetron (ZOFRAN) injection 4 mg (4 mg Intravenous Given 8/16/24 0105)   albuterol (PROVENTIL HFA,VENTOLIN HFA) inhaler 2 puff (has no administration in time range)   aspirin chewable tablet 81 mg (has no administration in time range)   atorvastatin (LIPITOR) tablet 80 mg (has no administration in time range)   baclofen tablet 10 mg (has no administration in time range)   butalbital-acetaminophen-caffeine (FIORICET,ESGIC) -40 mg per tablet 1 tablet (has no administration in time range)   diazepam (VALIUM) tablet 5 mg (has no administration in time range)   DULoxetine (CYMBALTA) delayed release capsule 60 mg (has no administration in time range)   fluticasone-vilanterol 200-25 mcg/actuation 1 puff (has no administration in time range)   gabapentin (NEURONTIN) capsule 800 mg (has no administration in time range)   levothyroxine tablet 112 mcg (has no administration in time range)   mirtazapine (REMERON) tablet 7.5 mg (has no administration in time range)   pantoprazole (PROTONIX) EC tablet 40 mg (has no administration in time range)   prazosin (MINIPRESS) capsule 2 mg (has no administration in time range)   multi-electrolyte (PLASMALYTE-A/ISOLYTE-S PH 7.4) IV solution (has no administration in time range)   nicotine (NICODERM CQ) 21 mg/24 hr TD 24 hr patch 1 patch (has no administration in time range)   acetaminophen (TYLENOL) tablet 650 mg (has no administration in time range)   heparin (porcine) subcutaneous injection 5,000 Units (has no administration in time range)   insulin glargine (LANTUS) subcutaneous injection 14  Units 0.14 mL (has no administration in time range)   insulin lispro (HumALOG/ADMELOG) 100 units/mL subcutaneous injection 1-5 Units (has no administration in time range)   insulin lispro (HumALOG/ADMELOG) 100 units/mL subcutaneous injection 1-5 Units (has no administration in time range)   traMADol (ULTRAM) tablet 50 mg (has no administration in time range)   lactated ringers bolus 1,000 mL (0 mL Intravenous Stopped 8/15/24 2248)     Followed by   lactated ringers bolus 1,000 mL (0 mL Intravenous Stopped 8/15/24 2330)   ceftriaxone (ROCEPHIN) 2 g/50 mL in dextrose IVPB (0 mg Intravenous Stopped 8/15/24 2248)   HYDROmorphone (DILAUDID) injection 0.5 mg (0.5 mg Intravenous Given 8/15/24 2229)   acetaminophen (Ofirmev) IVPB 600 mg (0 mg Intravenous Stopped 8/15/24 2334)   potassium chloride (Klor-Con M20) CR tablet 60 mEq (60 mEq Oral Given 8/15/24 2346)   magnesium sulfate 2 g/50 mL IVPB (premix) 2 g (0 g Intravenous Stopped 8/16/24 0110)   oxyCODONE (ROXICODONE) IR tablet 5 mg (5 mg Oral Given 8/16/24 0106)    Code Status: Level 1 - Full Code  Advance Directive and Living Will:      Power of :    POLST:       Orders Placed This Encounter   Procedures    Blood culture #1    Blood culture #2    FLU/RSV/COVID - if FLU/RSV clinically relevant    Clostridium difficile toxin by PCR with EIA    XR knee 4+ vw left injury    XR hip/pelv 2-3 vws left if performed    XR chest portable    CT abdomen pelvis w contrast    CBC and differential    Comprehensive metabolic panel    Lactic acid    Procalcitonin    Protime-INR    APTT    UA w Reflex to Microscopic w Reflex to Culture    Blood gas, Venous    Beta Hydroxybutyrate    Magnesium    HS Troponin 0hr (reflex protocol)    Basic metabolic panel    Hemoglobin A1c w/EAG Estimation (Prechecked if no A1C within 90 days)    CBC (With Platelets)    Magnesium    TSH, 3rd generation    Vitamin B12    Folate    Diet Sami/CHO Controlled; Consistent Carbohydrate Diet Level 3 (6 carb  servings/90 grams CHO/meal)    Vital signs every 15 minutes x 4 after fluid bolus    Fingerstick Glucose (POCT)    24 Hour Telemetry Monitoring    Insulin Subcutaneous Notify Physician    Insulin Subcutaneous Instruction    Hypoglycemia Protocol    Vital Signs per unit routine    Up and OOB as tolerated    I/O    Fingerstick Glucose (POCT) Before meals and at bedtime    Apply SCD or Foot pumps    Fingerstick Glucose (POCT)    Level 1-Full Code: all life saving measures are indicated    Inpatient Consult to Nutrition Services    Inpatient consult to Case Management    ECG 12 lead    ECG 12 lead    ECG 12 lead    INPATIENT ADMISSION     Time reflects when diagnosis was documented in both MDM as applicable and the Disposition within this note       Time User Action Codes Description Comment    8/16/2024 12:45 AM Renan Drake [E43] Severe protein-calorie malnutrition (HCC)     8/16/2024 12:59 AM Yao Morales [A41.9] Sepsis (HCC)     8/16/2024 12:59 AM Yao Morales [E43] Severe protein-calorie malnutrition (HCC)     8/16/2024 12:59 AM Yao Morales Modify [A41.9] Sepsis (HCC)     8/16/2024  1:10 AM Renan Drake [R62.7] Failure to thrive in adult           ED Disposition       ED Disposition   Admit    Condition   Stable    Date/Time   Fri Aug 16, 2024  1:00 AM    Comment   Case was discussed with MEHRAN and the patient's admission status was agreed to be Admission Status: inpatient status to the service of Dr. Drake .               Follow-up Information    None       Patient's Medications   Discharge Prescriptions    No medications on file     No discharge procedures on file.  Prior to Admission Medications   Prescriptions Last Dose Informant Patient Reported? Taking?   Adalimumab (HUMIRA PEN SC)   Yes No   Sig: Inject under the skin   Patient not taking: Reported on 6/3/2024   Albuterol Sulfate (ProAir RespiClick) 108 (90 Base) MCG/ACT AEPB   No No   Sig: Inhale 2 puffs every 6 (six) hours as  "needed (wheezing)   BD PEN NEEDLE LINDY U/F 32G X 4 MM MISC   No No   Sig: Inject under the skin daily   Blood Glucose Monitoring Suppl (OneTouch Verio Reflect) w/Device KIT   No No   Sig: CHECK BLOOD SUGARS FOUR TIMES DAILY. PLEASE SUBSTITUTE WITH APPROPRIATE ALTERNATIVE AS COVERED BY PATIENT'S INSURANCE. DX: E11.65   Patient not taking: Reported on 2024   Cyanocobalamin (VITAMIN B-12 IJ)   Yes No   Sig: Inject as directed   Patient not taking: Reported on 2024   DULoxetine (CYMBALTA) 60 mg delayed release capsule   No No   Sig: Take 1 capsule (60 mg total) by mouth 2 (two) times a day   Fluticasone-Salmeterol (Advair Diskus) 500-50 mcg/dose inhaler   No No   Sig: Inhale 1 puff by mouth 2 times daily.  Rinse mouth after use   Folic Acid 0.8 MG CAPS   Yes No   Sig: Take 0.04 mg by mouth daily.   Patient not taking: Reported on 2024   Galcanezumab-gnlm (Emgality) 120 MG/ML SOAJ   No No   Si injection monthly   Patient not taking: Reported on 2024   Insulin Pen Needle (BD Pen Needle Lindy U/F) 32G X 4 MM MISC   No No   Sig: Use 2 (two) times a day   Insulin Syringe-Needle U-100 31G X 5/16\" 0.3 ML MISC   No No   Sig: Use twice daily   OneTouch Delica Lancets 33G MISC   No No   Sig: Check blood sugars four times daily. Please substitute with appropriate alternative as covered by patient's insurance. Dx: E11.65   OneTouch Ultra test strip   No No   Sig: USE AS DIRECTED TO TEST 3 TIMES A DAY   aspirin 81 mg chewable tablet   Yes No   Sig: Chew 81 mg daily    atorvastatin (LIPITOR) 80 mg tablet   No No   Sig: TAKE 1 TABLET BY MOUTH EVERY DAY   baclofen 10 mg tablet   No No   Sig: Take 1 tablet (10 mg total) by mouth 2 (two) times a day as needed for muscle spasms (headache)   butalbital-acetaminophen-caffeine (FIORICET,ESGIC) -40 mg per tablet   No No   Sig: TAKE 1 TABLET EVERY 6 HOURS AS NEEDED FOR MIGRAINE.  Please limit 3-4 per week, 15 per month.   colestipol (COLESTID) 1 g tablet   No No "   Sig: Take 1 tablet (1 g total) by mouth 2 (two) times a day   diazepam (VALIUM) 5 mg tablet   No No   Sig: TAKE 1 TABLET (5 MG TOTAL) BY MOUTH 2 (TWO) TIMES A DAY AS NEEDED FOR ANXIETY DO NOT TAKE WITHIN 8 HOURS OF TRAMADOL   gabapentin (NEURONTIN) 400 mg capsule   No No   Sig: TAKE 2 CAPSULES BY MOUTH 3 TIMES A DAY   glucose blood (OneTouch Verio) test strip   No No   Sig: Check blood sugars four times daily. Please substitute with appropriate alternative as covered by patient's insurance. Dx: E11.65   Patient not taking: Reported on 2024   insulin NPH-insulin regular (NovoLIN 70/30) 100 units/mL subcutaneous injection   No No   Sig: Inject 20 Units under the skin 2 (two) times a day before meals   ketorolac (TORADOL) 30 mg/mL injection   No No   Si-2 ml IM injection once per 24 hours p.r.n. migraine.  No more than 2 injections per week.   levothyroxine 112 mcg tablet   No No   Sig: TAKE 1 TABLET BY MOUTH EVERY DAY   magnesium Oxide (MAG-OX) 400 mg TABS   No No   Sig: Take 1 tablet (400 mg total) by mouth 2 (two) times a day for 15 days   Patient not taking: Reported on 2024   metFORMIN (GLUCOPHAGE-XR) 500 mg 24 hr tablet   No No   Sig: TAKE 1 TABLET BY MOUTH TWICE A DAY WITH FOOD   mirtazapine (REMERON) 7.5 MG tablet   No No   Sig: Take 1 tablet (7.5 mg total) by mouth daily at bedtime   naloxone (NARCAN) 4 mg/0.1 mL nasal spray   No No   Sig: Administer 1 spray into a nostril. If no response after 2-3 minutes, give another dose in the other nostril using a new spray.   omeprazole (PriLOSEC) 40 MG capsule   No No   Sig: TAKE 1 CAPSULE (40 MG TOTAL) BY MOUTH DAILY.   ondansetron (ZOFRAN) 4 mg tablet   No No   Sig: TAKE 1 TABLET BY MOUTH EVERY 8 HOURS AS NEEDED FOR NAUSEA AND VOMITING   prazosin (MINIPRESS) 2 mg capsule   No No   Sig: Take 1 capsule (2 mg total) by mouth daily at bedtime   prochlorperazine (COMPAZINE) 5 mg tablet   No No   Sig: Take 1 tablet (5 mg total) by mouth every 6 (six) hours  "as needed for nausea or vomiting   topiramate (TOPAMAX) 25 mg tablet   No No   Sig: TAKE 1 TABLET BY MOUTH EVERY DAY   Patient not taking: Reported on 7/23/2024   traMADol (ULTRAM-ER) 200 MG 24 hr tablet   No No   Sig: Take 1 tablet (200 mg total) by mouth daily as needed for moderate pain Do not take within 8 hours of valium.      Facility-Administered Medications: None                        Portions of the record may have been created with voice recognition software. Occasional wrong word or \"sound a like\" substitutions may have occurred due to the inherent limitations of voice recognition software. Read the chart carefully and recognize, using context, where substitutions have occurred.    Electronically signed by:  Riley White  "

## 2024-08-16 NOTE — PROGRESS NOTES
[facility] Internal Medicine Post Admit Check:     Date of visit: 08/16/24    The patient was admitted earlier to the [facility] Internal Medicine Service.  Please see initial intake history and physical for detailed admission history.  This is a supplemental update following a post admit checkup.    Subjective:   Assessed at bedside.  No complaints.  Appears uncomfortable when pressing on abdomen.  Rounded with RN reports multiple bowel movements.    Exam:   No acute distress, nontoxic appearing  Abdomen soft, mildly tender diffusely  Heart regular rate rhythm  Lungs clear to auscultation    CT abdomen pelvis reviewed with findings of bladder distention, diffuse wall thickening of the stomach, diffuse colonic wall thickening.    Assessment and Plan:   Sepsis, POA secondary to UTI and C. difficile.  Continue IV ceftriaxone, follow-up urine culture.  Initiated on oral vancomycin for C. difficile treatment.  Continue to monitor closely.  S/p 30 mL/kg sepsis fluid bolus with stable blood pressure.  Electrolyte abnormalities.  Potassium supplemented, monitor.  Magnesium supplemented, monitor.  Diabetes.  Continue insulin regimen as per H&P.    Additional management per H&P note.    Nazario Nguyen DO

## 2024-08-16 NOTE — SEPSIS NOTE
"  Sepsis Note   Barb Palomo 54 y.o. female MRN: [id number]  Unit/Bed#: [facility] 601-01 Encounter: [id number]       Initial Sepsis Screening       Row Name 08/15/24 2121                Is the patient's history suggestive of a new or worsening infection? Yes (Proceed)  -AR        Suspected source of infection urinary tract infection  -AR        Indicate SIRS criteria Tachycardia > 90 bpm;Tachypnea > 20 resp per min  -AR        Are two or more of the above signs & symptoms of infection both present and new to the patient? Yes (Proceed)  -AR        Assess for evidence of organ dysfunction: Are any of the below criteria present within 6 hours of suspected infection and SIRS criteria that are NOT considered to be chronic conditions? SBP < 90  -AR        Date of presentation of septic shock --        Time of presentation of septic shock --        Fluid Resuscitation: 30 ml/kg IV fluid bolus will be given based on actual body weight  -AR        Is the patient is persistently hypotensive in the hour after fluid bolus administration? If yes, patient meets criteria for vasopressor use. --        Sepsis Note: Click \"NEXT\" below (NOT \"close\") to generate sepsis note based on above information. YES (proceed by clicking \"NEXT\")  -AR                  User Key  (r) = Recorded By, (t) = Taken By, (c) = Cosigned By      Initials Name Provider Type    DO Deana Warren                    Default Flowsheet Data (Last 720 Hours)       Sepsis Reassess       Row Name 08/16/24 0922 08/16/24 0737                Repeat Volume Status and Tissue Perfusion Assessment Performed    Date of Reassessment: 08/16/24  - 08/16/24  -       Time of Reassessment: 0915 -HP 0615  -       Sepsis Reassessment Note: Click \"NEXT\" below (NOT \"close\") to generate sepsis reassessment note. YES (proceed by clicking \"NEXT\")  - YES (proceed by clicking \"NEXT\")  -       Repeat Volume Status and Tissue Perfusion Assessment Performed -- --            "      User Key  (r) = Recorded By, (t) = Taken By, (c) = Cosigned By      Initials Name Provider Type    JOE Drake DO Physician     Nazario Nguyen DO Physician                    Body mass index is 16.1 kg/m².  Wt Readings from Last 1 Encounters:   08/16/24 39.9 kg (88 lb)     IBW (Ideal Body Weight): 50.1 kg    Ideal body weight: 53.9 kg (118 lb 11.8 oz)    BP stable.  S/p 30 mL per kg sepsis fluid bolus.  Continue IV antibiotics and fluids for possible UTI vs GI infection as source.

## 2024-08-16 NOTE — ASSESSMENT & PLAN NOTE
PDMP reviewed, patient maintained on tramadol 200 mg daily as needed; history of chronic cervical/lumbar pain noted  Continue as needed tramadol

## 2024-08-16 NOTE — ASSESSMENT & PLAN NOTE
Magnesium level 1.2 on admission, replacement initiated during ED evaluation, recheck magnesium in a.m. and continue replacement as needed

## 2024-08-16 NOTE — SEPSIS NOTE
"  Sepsis Note   Barb Palomo 54 y.o. female MRN: [id number]  Unit/Bed#: ED 21 Encounter: [id number]       Initial Sepsis Screening       Row Name 08/15/24 2121                Is the patient's history suggestive of a new or worsening infection? Yes (Proceed)  -AR        Suspected source of infection urinary tract infection  -AR        Indicate SIRS criteria Tachycardia > 90 bpm;Tachypnea > 20 resp per min  -AR        Are two or more of the above signs & symptoms of infection both present and new to the patient? Yes (Proceed)  -AR        Assess for evidence of organ dysfunction: Are any of the below criteria present within 6 hours of suspected infection and SIRS criteria that are NOT considered to be chronic conditions? SBP < 90  -AR        Date of presentation of septic shock --        Time of presentation of septic shock --        Fluid Resuscitation: 30 ml/kg IV fluid bolus will be given based on actual body weight  -AR        Is the patient is persistently hypotensive in the hour after fluid bolus administration? If yes, patient meets criteria for vasopressor use. --        Sepsis Note: Click \"NEXT\" below (NOT \"close\") to generate sepsis note based on above information. YES (proceed by clicking \"NEXT\")  -AR                  User Key  (r) = Recorded By, (t) = Taken By, (c) = Cosigned By      Initials Name Provider Type    DO Deana Warren                    Default Flowsheet Data (Last 720 Hours)       Sepsis Reassess       Row Name 08/16/24 0737                   Repeat Volume Status and Tissue Perfusion Assessment Performed    Date of Reassessment: 08/16/24  -        Time of Reassessment: 0615  -        Sepsis Reassessment Note: Click \"NEXT\" below (NOT \"close\") to generate sepsis reassessment note. YES (proceed by clicking \"NEXT\")  -        Repeat Volume Status and Tissue Perfusion Assessment Performed --                  User Key  (r) = Recorded By, (t) = Taken By, (c) = Cosigned By      " Initials Name Provider Type    JOE Drake DO Physician                    Body mass index is 16.1 kg/m².  Wt Readings from Last 1 Encounters:   08/16/24 39.9 kg (88 lb)     IBW (Ideal Body Weight): 50.1 kg    Ideal body weight: 53.9 kg (118 lb 11.8 oz)    After admission patient with recurrent hypotension resolved following 1L NS and 1L Isolyte, unfortunately recurred following this without initial response to further 2L IV isolyte and 25gm 25% albumin. Discussed with ICU team, while awaiting assessment 25gm 5% albumin and additional 1L Isolyte provided with improvement in blood pressure. Seen by ICU team following this, given improved blood pressure with resolution of hypotension decision made to hold off on pressor initiation. Will upgrade to SD level 2 given scenario. UA concerning for UTI for which ceftriaxone to be continued, additionally stool cultures obtained and pending at this time.

## 2024-08-16 NOTE — ED NOTES
Patient incontinent of BM. Gown and bedding changed. Skin Cleansed. Warm blanket provided. Yellow socks placed on feet.     Lolis Carrera RN  08/16/24 3750

## 2024-08-16 NOTE — ASSESSMENT & PLAN NOTE
Continue home dose levothyroxine for now pending TSH result, adjust dose as needed dependent on this

## 2024-08-16 NOTE — ASSESSMENT & PLAN NOTE
Counseled on need for cessation, provide nicotine patch while admitted  Recent CT chest for lung cancer screening 7/30/2024 negative for nodules or masses

## 2024-08-17 LAB
ANION GAP SERPL CALCULATED.3IONS-SCNC: 5 MMOL/L (ref 4–13)
ANION GAP SERPL CALCULATED.3IONS-SCNC: 8 MMOL/L (ref 4–13)
BASOPHILS # BLD AUTO: 0.03 THOUSANDS/ΜL (ref 0–0.1)
BASOPHILS NFR BLD AUTO: 0 % (ref 0–1)
BILIRUB SERPL-MCNC: 0.24 MG/DL (ref 0.2–1)
BUN SERPL-MCNC: 5 MG/DL (ref 5–25)
BUN SERPL-MCNC: 5 MG/DL (ref 5–25)
CALCIUM SERPL-MCNC: 7.2 MG/DL (ref 8.4–10.2)
CALCIUM SERPL-MCNC: 7.2 MG/DL (ref 8.4–10.2)
CHLORIDE SERPL-SCNC: 105 MMOL/L (ref 96–108)
CHLORIDE SERPL-SCNC: 106 MMOL/L (ref 96–108)
CO2 SERPL-SCNC: 32 MMOL/L (ref 21–32)
CO2 SERPL-SCNC: 33 MMOL/L (ref 21–32)
CREAT SERPL-MCNC: 0.23 MG/DL (ref 0.6–1.3)
CREAT SERPL-MCNC: 0.23 MG/DL (ref 0.6–1.3)
EOSINOPHIL # BLD AUTO: 0.14 THOUSAND/ΜL (ref 0–0.61)
EOSINOPHIL NFR BLD AUTO: 2 % (ref 0–6)
ERYTHROCYTE [DISTWIDTH] IN BLOOD BY AUTOMATED COUNT: 13.1 % (ref 11.6–15.1)
GFR SERPL CREATININE-BSD FRML MDRD: 143 ML/MIN/1.73SQ M
GFR SERPL CREATININE-BSD FRML MDRD: 143 ML/MIN/1.73SQ M
GLUCOSE SERPL-MCNC: 129 MG/DL (ref 65–140)
GLUCOSE SERPL-MCNC: 129 MG/DL (ref 65–140)
GLUCOSE SERPL-MCNC: 161 MG/DL (ref 65–140)
GLUCOSE SERPL-MCNC: 202 MG/DL (ref 65–140)
GLUCOSE SERPL-MCNC: 230 MG/DL (ref 65–140)
GLUCOSE SERPL-MCNC: 268 MG/DL (ref 65–140)
GLUCOSE SERPL-MCNC: 72 MG/DL (ref 65–140)
HCT VFR BLD AUTO: 29.8 % (ref 34.8–46.1)
HGB BLD-MCNC: 9.8 G/DL (ref 11.5–15.4)
IMM GRANULOCYTES # BLD AUTO: 0.01 THOUSAND/UL (ref 0–0.2)
IMM GRANULOCYTES NFR BLD AUTO: 0 % (ref 0–2)
LYMPHOCYTES # BLD AUTO: 2.53 THOUSANDS/ΜL (ref 0.6–4.47)
LYMPHOCYTES NFR BLD AUTO: 38 % (ref 14–44)
MAGNESIUM SERPL-MCNC: 1.7 MG/DL (ref 1.9–2.7)
MCH RBC QN AUTO: 31.4 PG (ref 26.8–34.3)
MCHC RBC AUTO-ENTMCNC: 32.9 G/DL (ref 31.4–37.4)
MCV RBC AUTO: 96 FL (ref 82–98)
MONOCYTES # BLD AUTO: 0.41 THOUSAND/ΜL (ref 0.17–1.22)
MONOCYTES NFR BLD AUTO: 6 % (ref 4–12)
NEUTROPHILS # BLD AUTO: 3.6 THOUSANDS/ΜL (ref 1.85–7.62)
NEUTS SEG NFR BLD AUTO: 54 % (ref 43–75)
NRBC BLD AUTO-RTO: 0 /100 WBCS
PLATELET # BLD AUTO: 218 THOUSANDS/UL (ref 149–390)
PMV BLD AUTO: 10.7 FL (ref 8.9–12.7)
POTASSIUM SERPL-SCNC: 2.6 MMOL/L (ref 3.5–5.3)
POTASSIUM SERPL-SCNC: 2.6 MMOL/L (ref 3.5–5.3)
RBC # BLD AUTO: 3.12 MILLION/UL (ref 3.81–5.12)
SODIUM SERPL-SCNC: 144 MMOL/L (ref 135–147)
SODIUM SERPL-SCNC: 145 MMOL/L (ref 135–147)
WBC # BLD AUTO: 6.72 THOUSAND/UL (ref 4.31–10.16)

## 2024-08-17 PROCEDURE — 82948 REAGENT STRIP/BLOOD GLUCOSE: CPT

## 2024-08-17 PROCEDURE — 99233 SBSQ HOSP IP/OBS HIGH 50: CPT | Performed by: STUDENT IN AN ORGANIZED HEALTH CARE EDUCATION/TRAINING PROGRAM

## 2024-08-17 PROCEDURE — 82247 BILIRUBIN TOTAL: CPT | Performed by: STUDENT IN AN ORGANIZED HEALTH CARE EDUCATION/TRAINING PROGRAM

## 2024-08-17 PROCEDURE — 80048 BASIC METABOLIC PNL TOTAL CA: CPT | Performed by: STUDENT IN AN ORGANIZED HEALTH CARE EDUCATION/TRAINING PROGRAM

## 2024-08-17 PROCEDURE — 80048 BASIC METABOLIC PNL TOTAL CA: CPT

## 2024-08-17 PROCEDURE — 97163 PT EVAL HIGH COMPLEX 45 MIN: CPT

## 2024-08-17 PROCEDURE — 85025 COMPLETE CBC W/AUTO DIFF WBC: CPT | Performed by: STUDENT IN AN ORGANIZED HEALTH CARE EDUCATION/TRAINING PROGRAM

## 2024-08-17 PROCEDURE — 83735 ASSAY OF MAGNESIUM: CPT | Performed by: STUDENT IN AN ORGANIZED HEALTH CARE EDUCATION/TRAINING PROGRAM

## 2024-08-17 PROCEDURE — 97167 OT EVAL HIGH COMPLEX 60 MIN: CPT

## 2024-08-17 RX ORDER — MAGNESIUM SULFATE HEPTAHYDRATE 40 MG/ML
2 INJECTION, SOLUTION INTRAVENOUS ONCE
Status: COMPLETED | OUTPATIENT
Start: 2024-08-17 | End: 2024-08-17

## 2024-08-17 RX ORDER — POTASSIUM CHLORIDE 14.9 MG/ML
20 INJECTION INTRAVENOUS ONCE
Status: COMPLETED | OUTPATIENT
Start: 2024-08-17 | End: 2024-08-17

## 2024-08-17 RX ORDER — ALBUMIN, HUMAN INJ 5% 5 %
25 SOLUTION INTRAVENOUS ONCE
Status: COMPLETED | OUTPATIENT
Start: 2024-08-17 | End: 2024-08-17

## 2024-08-17 RX ORDER — POTASSIUM CHLORIDE 1500 MG/1
40 TABLET, EXTENDED RELEASE ORAL 2 TIMES DAILY
Status: COMPLETED | OUTPATIENT
Start: 2024-08-17 | End: 2024-08-17

## 2024-08-17 RX ADMIN — TRAMADOL HYDROCHLORIDE 50 MG: 50 TABLET, COATED ORAL at 08:34

## 2024-08-17 RX ADMIN — SODIUM CHLORIDE, SODIUM GLUCONATE, SODIUM ACETATE, POTASSIUM CHLORIDE AND MAGNESIUM CHLORIDE 150 ML/HR: 526; 502; 368; 37; 30 INJECTION, SOLUTION INTRAVENOUS at 17:25

## 2024-08-17 RX ADMIN — VANCOMYCIN HYDROCHLORIDE 125 MG: 125 CAPSULE ORAL at 05:30

## 2024-08-17 RX ADMIN — DULOXETINE HYDROCHLORIDE 60 MG: 60 CAPSULE, DELAYED RELEASE ORAL at 16:55

## 2024-08-17 RX ADMIN — NICOTINE 1 PATCH: 21 PATCH, EXTENDED RELEASE TRANSDERMAL at 08:22

## 2024-08-17 RX ADMIN — INSULIN LISPRO 2 UNITS: 100 INJECTION, SOLUTION INTRAVENOUS; SUBCUTANEOUS at 21:38

## 2024-08-17 RX ADMIN — MIRTAZAPINE 7.5 MG: 15 TABLET, FILM COATED ORAL at 21:38

## 2024-08-17 RX ADMIN — PRAZOSIN HYDROCHLORIDE 2 MG: 2 CAPSULE ORAL at 21:38

## 2024-08-17 RX ADMIN — PANTOPRAZOLE SODIUM 40 MG: 40 TABLET, DELAYED RELEASE ORAL at 05:29

## 2024-08-17 RX ADMIN — HEPARIN SODIUM 5000 UNITS: 5000 INJECTION INTRAVENOUS; SUBCUTANEOUS at 15:25

## 2024-08-17 RX ADMIN — ATORVASTATIN CALCIUM 80 MG: 80 TABLET, FILM COATED ORAL at 08:23

## 2024-08-17 RX ADMIN — INSULIN LISPRO 1 UNITS: 100 INJECTION, SOLUTION INTRAVENOUS; SUBCUTANEOUS at 11:16

## 2024-08-17 RX ADMIN — LEVOTHYROXINE SODIUM 112 MCG: 112 TABLET ORAL at 05:29

## 2024-08-17 RX ADMIN — GABAPENTIN 800 MG: 400 CAPSULE ORAL at 21:38

## 2024-08-17 RX ADMIN — HEPARIN SODIUM 5000 UNITS: 5000 INJECTION INTRAVENOUS; SUBCUTANEOUS at 21:37

## 2024-08-17 RX ADMIN — TRAMADOL HYDROCHLORIDE 50 MG: 50 TABLET, COATED ORAL at 19:28

## 2024-08-17 RX ADMIN — GABAPENTIN 800 MG: 400 CAPSULE ORAL at 08:23

## 2024-08-17 RX ADMIN — ALBUMIN (HUMAN) 25 G: 12.5 INJECTION, SOLUTION INTRAVENOUS at 02:36

## 2024-08-17 RX ADMIN — POTASSIUM CHLORIDE 40 MEQ: 1500 TABLET, EXTENDED RELEASE ORAL at 16:55

## 2024-08-17 RX ADMIN — DULOXETINE HYDROCHLORIDE 60 MG: 60 CAPSULE, DELAYED RELEASE ORAL at 08:23

## 2024-08-17 RX ADMIN — GABAPENTIN 800 MG: 400 CAPSULE ORAL at 15:25

## 2024-08-17 RX ADMIN — DIAZEPAM 5 MG: 5 TABLET ORAL at 11:17

## 2024-08-17 RX ADMIN — HEPARIN SODIUM 5000 UNITS: 5000 INJECTION INTRAVENOUS; SUBCUTANEOUS at 08:23

## 2024-08-17 RX ADMIN — INSULIN LISPRO 1 UNITS: 100 INJECTION, SOLUTION INTRAVENOUS; SUBCUTANEOUS at 16:54

## 2024-08-17 RX ADMIN — INSULIN GLARGINE 14 UNITS: 100 INJECTION, SOLUTION SUBCUTANEOUS at 21:37

## 2024-08-17 RX ADMIN — POTASSIUM CHLORIDE 40 MEQ: 1500 TABLET, EXTENDED RELEASE ORAL at 10:38

## 2024-08-17 RX ADMIN — ASPIRIN 81 MG CHEWABLE TABLET 81 MG: 81 TABLET CHEWABLE at 08:23

## 2024-08-17 RX ADMIN — MAGNESIUM SULFATE HEPTAHYDRATE 2 G: 40 INJECTION, SOLUTION INTRAVENOUS at 10:40

## 2024-08-17 RX ADMIN — SODIUM CHLORIDE, SODIUM GLUCONATE, SODIUM ACETATE, POTASSIUM CHLORIDE AND MAGNESIUM CHLORIDE 125 ML/HR: 526; 502; 368; 37; 30 INJECTION, SOLUTION INTRAVENOUS at 02:19

## 2024-08-17 RX ADMIN — POTASSIUM CHLORIDE 20 MEQ: 14.9 INJECTION, SOLUTION INTRAVENOUS at 13:59

## 2024-08-17 RX ADMIN — CEFTRIAXONE 1000 MG: 1 INJECTION, POWDER, FOR SOLUTION INTRAMUSCULAR; INTRAVENOUS at 21:38

## 2024-08-17 RX ADMIN — SODIUM CHLORIDE, SODIUM GLUCONATE, SODIUM ACETATE, POTASSIUM CHLORIDE AND MAGNESIUM CHLORIDE 150 ML/HR: 526; 502; 368; 37; 30 INJECTION, SOLUTION INTRAVENOUS at 23:46

## 2024-08-17 RX ADMIN — FLUTICASONE FUROATE AND VILANTEROL TRIFENATATE 1 PUFF: 200; 25 POWDER RESPIRATORY (INHALATION) at 08:24

## 2024-08-17 NOTE — ASSESSMENT & PLAN NOTE
Recurrent C Diff Colitis / Diarrhea   Firs was 9/2022 8/16 C Diff Toxin and Abs Positive , stool     PLAN   - given recurrent , prefer Fidaxomicin 200 mg BID x 10 days , ordered   - IV Fluids + electrolyte repletion   - strict contact isolation & hand hygiene   - rectal tube

## 2024-08-17 NOTE — CONSULTS
Nutrition Recommendations/Interventions:    Pt meets criteria for malnutrition - see separate note.    Added double protein to diet order per pt request to increase PO intake.     Added Glucerna supplement TID with all meals for nutritional and weight support.     Continue to monitor and replete K, Mg, and phosphorus prn.    RD will continue to follow while admitted.

## 2024-08-17 NOTE — ASSESSMENT & PLAN NOTE
Latest Reference Range & Units 08/16/24 04:11   TSH 3RD GENERATON 0.450 - 4.500 uIU/mL 0.661       Continue home dose levothyroxine

## 2024-08-17 NOTE — MALNUTRITION/BMI
This medical record reflects one or more clinical indicators suggestive of malnutrition.    Malnutrition Findings:   Adult Malnutrition type:  (suspect combination of chronic illness and social/environmental factors)  Adult Degree of Malnutrition: Other severe protein calorie malnutrition  Malnutrition Characteristics: Fat loss, Muscle loss                  360 Statement: severe malnutrition r/t suspect combination of chronic illness and social/environmental factors as evidenced by severe temporal muscle loss, severe tricep fat pad loss, at least moderate muscle loss around clavicles and shoulders, at least moderate orbital fat pad loss. Treatment: oral diet and oral nutrition supplements.    BMI Findings:  Adult BMI Classifications: Underweight < 18.5        Body mass index is 16.1 kg/m².     See Nutrition note dated 8/17/24 for additional details.  Completed nutrition assessment is viewable in the nutrition documentation.

## 2024-08-17 NOTE — PLAN OF CARE
Problem: OCCUPATIONAL THERAPY ADULT  Goal: Performs self-care activities at highest level of function for planned discharge setting.  See evaluation for individualized goals.  Description: Treatment Interventions: ADL retraining, Functional transfer training, Endurance training, Cognitive reorientation, Patient/family training, Equipment evaluation/education, Compensatory technique education, Activityengagement          See flowsheet documentation for full assessment, interventions and recommendations.   Note: Limitation: Decreased ADL status, Decreased Safe judgement during ADL, Decreased endurance, Decreased self-care trans, Decreased high-level ADLs  Prognosis: Good  Assessment: Pt is a 54 y.o. female who was admitted to [facility] on 8/15/2024 with C. difficile diarrhea . Patient  has a past medical history of Acute venous embolism and thrombosis of deep vessels of distal lower extremity (HCC), Anemia, Anxiety, Arthritis, Asthma, Cerebral infarction (HCC), Chest pain, Chronic cough, Depression, Diabetes mellitus, type 2 (HCC), DJD (degenerative joint disease), Esophageal reflux, Fibromyalgia, GERD without esophagitis, History of pulmonary embolism, Hyperlipidemia, Hypertension, Hypothyroidism, Iron deficiency, Pancreatitis, Panic attack, Panic disorder, Polyarthritis, PTSD (post-traumatic stress disorder), Sleep difficulties, Stroke syndrome, Thyroid disease, TIA (transient ischemic attack), TIA (transient ischemic attack), Venous embolism and thrombosis of deep vessels of distal lower extremity (HCC), Vitamin B12 deficiency, and Vitamin D deficiency.   At baseline pt was completing adls and mobility independently however ?ability to care for self at home given pt's condition on arrival and reported condition of home. Pt lives alone in 2 [place] home with [facility] available. Currently pt requires moderate assist for overall ADLS and min assist for functional mobility/transfers. Pt currently presents with  impairments in the following categories -steps to enter environment, limited home support, difficulty performing ADLS, difficulty performing IADLS , limited insight into deficits, flat affect, decreased initiation and engagement , health management , and environment activity tolerance, endurance, standing balance/tolerance, sitting balance/tolerance, insight, safety , judgement , attention , and task initiation . These impairments, as well as pt's fatigue, pain, decreased caregiver support, risk for falls, and home environment  limit pt's ability to safely engage in all baseline areas of occupation, includingbathing, dressing, toileting, functional mobility/transfers, community mobility, laundry , driving, house maintenance, medication management, meal prep, cleaning, social participation , and leisure activities  From OT standpoint, recommend Level II resources upon D/C. OT will continue to follow to address the below stated goals.     Rehab Resource Intensity Level, OT: II (Moderate Resource Intensity)          Problem: OCCUPATIONAL THERAPY ADULT  Goal: Performs self-care activities at highest level of function for planned discharge setting.  See evaluation for individualized goals.  Description: Treatment Interventions: ADL retraining, Functional transfer training, Endurance training, Cognitive reorientation, Patient/family training, Equipment evaluation/education, Compensatory technique education, Activityengagement          See flowsheet documentation for full assessment, interventions and recommendations.   Note: Limitation: Decreased ADL status, Decreased Safe judgement during ADL, Decreased endurance, Decreased self-care trans, Decreased high-level ADLs  Prognosis: Good  Assessment: Pt is a 54 y.o. female who was admitted to [facility] on 8/15/2024 with C. difficile diarrhea . Patient  has a past medical history of Acute venous embolism and thrombosis of deep vessels of distal lower extremity  (HCC), Anemia, Anxiety, Arthritis, Asthma, Cerebral infarction (HCC), Chest pain, Chronic cough, Depression, Diabetes mellitus, type 2 (HCC), DJD (degenerative joint disease), Esophageal reflux, Fibromyalgia, GERD without esophagitis, History of pulmonary embolism, Hyperlipidemia, Hypertension, Hypothyroidism, Iron deficiency, Pancreatitis, Panic attack, Panic disorder, Polyarthritis, PTSD (post-traumatic stress disorder), Sleep difficulties, Stroke syndrome, Thyroid disease, TIA (transient ischemic attack), TIA (transient ischemic attack), Venous embolism and thrombosis of deep vessels of distal lower extremity (HCC), Vitamin B12 deficiency, and Vitamin D deficiency.   At baseline pt was completing adls and mobility independently however ?ability to care for self at home given pt's condition on arrival and reported condition of home. Pt lives alone in 2 story home with [facility] available. Currently pt requires moderate assist for overall ADLS and min assist for functional mobility/transfers. Pt currently presents with impairments in the following categories -steps to enter environment, limited home support, difficulty performing ADLS, difficulty performing IADLS , limited insight into deficits, flat affect, decreased initiation and engagement , health management , and environment activity tolerance, endurance, standing balance/tolerance, sitting balance/tolerance, insight, safety , judgement , attention , and task initiation . These impairments, as well as pt's fatigue, pain, decreased caregiver support, risk for falls, and home environment  limit pt's ability to safely engage in all baseline areas of occupation, includingbathing, dressing, toileting, functional mobility/transfers, community mobility, laundry , driving, house maintenance, medication management, meal prep, cleaning, social participation , and leisure activities  From OT standpoint, recommend Level II resources upon D/C. OT will continue to follow to  address the below stated goals.     Rehab Resource Intensity Level, OT: II (Moderate Resource Intensity)

## 2024-08-17 NOTE — PHYSICAL THERAPY NOTE
PHYSICAL THERAPY EVALUATION  NAME:  Barb Palomo  DATE: 08/17/24    AGE:   54 y.o.  Mrn:   [id number]  ADMIT DX:  Severe protein-calorie malnutrition (HCC) [E43]  Failure to thrive in adult [R62.7]  Sepsis (HCC) [A41.9]  Unspecified multiple injuries, initial encounter [T07.XXXA]    Past Medical History:   Diagnosis Date    Acute venous embolism and thrombosis of deep vessels of distal lower extremity (HCC)     Anemia     Anxiety     last assessed 11/20/17    Arthritis     Asthma     Cerebral infarction (HCC)     unspecified, last assessed 11/14/16    Chest pain     last assessed 5/9/17    Chronic cough     last assessed 12/12/13    Depression     Diabetes mellitus, type 2 (HCC)     DJD (degenerative joint disease)     Esophageal reflux     Fibromyalgia     GERD without esophagitis     resolved 5/13/16    History of pulmonary embolism     Hyperlipidemia     Hypertension     Hypothyroidism     Iron deficiency     Pancreatitis     Panic attack     Panic disorder     Polyarthritis     PTSD (post-traumatic stress disorder)     Sleep difficulties     Stroke syndrome     Thyroid disease     TIA (transient ischemic attack)     TIA (transient ischemic attack)     Venous embolism and thrombosis of deep vessels of distal lower extremity (HCC)     Vitamin B12 deficiency     Vitamin D deficiency        Past Surgical History:   Procedure Laterality Date    CARPAL TUNNEL RELEASE Right     neuroplasty decompression of median nerve    CATARACT EXTRACTION      CHOLECYSTECTOMY      ERCP      ERCP      ESOPHAGOGASTRIC FUNDOPLASTY      NISSEN FUNDOPLICATION      PATELLA SURGERY Left 12/2021    VA ESOPHAGOGASTRODUODENOSCOPY TRANSORAL DIAGNOSTIC N/A 11/02/2016    Procedure: EGD AND COLONOSCOPY;  Surgeon: Jatin Shepherd MD;  Location: [facility];  Service: Gastroenterology    VA NDSC WRST SURG W/RLS TRANSVRS CARPL LIGM Right 03/08/2016    Procedure: RELEASE CARPAL TUNNEL ENDOSCOPIC;  Surgeon: Guero Restrepo MD;  Location: [facility];  Service: Orthopedics    HI TENDON SHEATH INCISION Right 03/08/2016    Procedure: RELEASE TRIGGER FINGER RIGHT THUMB;  Surgeon: Guero Restrepo MD;  Location: [facility];  Service: Orthopedics    TONSILLECTOMY AND ADENOIDECTOMY         Length Of Stay: 1    PHYSICAL THERAPY EVALUATION:       08/17/24 0835   Note Type   Note type Evaluation   Pain Assessment   Pain Assessment Tool 0-10   Pain Score 6   Pain Location/Orientation Location: Back   Pain Onset/Description Onset: Ongoing;Frequency: Constant/Continuous;Descriptor: Aching   Effect of Pain on Daily Activities increased pain with activity   Patient's Stated Pain Goal No pain   Hospital Pain Intervention(s) Ambulation/increased activity;Repositioned   Restrictions/Precautions   Weight Bearing Precautions Per Order No   Other Precautions Contact/isolation;Chair Alarm;Bed Alarm;Fall Risk;Pain   Home Living   Type of Home House   Home Layout Multi-level;Bed/bath upstairs;Able to live on main level with bedroom/bathroom;Stairs to enter with rails   Home Equipment Cane   Additional Comments Per chart pt resides alone on the first floor of her home as she is a hoarder and is unable to get to 2nd and 3rd floor. Also per CM Pts home was found with human feces and cat feces in the home   Prior Function   Level of [place] Independent with functional mobility   Lives With Alone   Receives Help From Family   Falls in the last 6 months 1 to 4   Comments Pt reports the use of a SPC for ambulation PTA   General   Family/Caregiver Present No   Cognition   Arousal/Participation Alert   Orientation Level Oriented to person;Oriented to place;Oriented to situation   Memory Decreased recall of precautions   Following Commands Follows one step commands without difficulty   RUE Assessment   RUE Assessment WFL   LUE Assessment   LUE Assessment WFL   RLE Assessment   RLE Assessment WFL   Strength RLE   RLE Overall Strength 4-/5   LLE Assessment   LLE Assessment WFL    Strength LLE   LLE Overall Strength 4-/5   Bed Mobility   Supine to Sit 4  Minimal assistance   Additional items Assist x 1;Increased time required;Verbal cues   Transfers   Sit to Stand 4  Minimal assistance   Additional items Assist x 1;Increased time required;Verbal cues   Stand to Sit 4  Minimal assistance   Additional items Assist x 1;Increased time required;Verbal cues   Additional Comments cues needed for hand placement during transfers   Ambulation/Elevation   Gait pattern Excessively slow;Short stride;Foward flexed;Inconsistent loly   Gait Assistance 4  Minimal assist   Additional items Assist x 1   Assistive Device Rolling walker   Distance 4ft   Balance   Static Sitting Fair -   Static Standing Poor +   Ambulatory Poor +   Endurance Deficit   Endurance Deficit Yes   Endurance Deficit Description fatigue, pain   Activity Tolerance   Activity Tolerance Patient limited by fatigue;Patient limited by pain   Medical Staff Made Aware Ramírez, OT; OT present for co evaluation due to pts current medical presentation   Nurse Made Aware Pt appropriate to be seen and mobilize per nsg   Assessment   Prognosis Good   Problem List Decreased strength;Decreased endurance;Impaired balance;Decreased mobility;Decreased safety awareness;Pain   Assessment Pt is 54 y.o. female seen for PT evaluation s/p admit to [facility] on 8/15/2024. Pt presented w/ weakness and frequent falls.  Pt was admitted with a primary dx of: failure to thrive in adult. PT now consulted for assessment of mobility and d/c needs. Pt with Up and OOB as tolerated  orders.  Pts current co morbidities affecting treatment include:  has a past medical history of Acute venous embolism and thrombosis of deep vessels of distal lower extremity (HCC), Anemia, Anxiety, Arthritis, Asthma, Cerebral infarction (HCC), Chest pain, Chronic cough, Depression, Diabetes mellitus, type 2 (HCC), DJD (degenerative joint disease), Esophageal reflux, Fibromyalgia,  "GERD without esophagitis, History of pulmonary embolism, Hyperlipidemia, Hypertension, Hypothyroidism, Iron deficiency, Pancreatitis, Panic attack, Panic disorder, Polyarthritis, PTSD (post-traumatic stress disorder), Sleep difficulties, Stroke syndrome, Thyroid disease, TIA (transient ischemic attack), TIA (transient ischemic attack), Venous embolism and thrombosis of deep vessels of distal lower extremity (HCC), Vitamin B12 deficiency, and Vitamin D deficiency. . Pts current clinical presentation is Unstable/ Unpredictable (high complexity) due to Ongoing medical management for primary dx, Increased reliance on more restrictive AD compared to baseline, Decreased activity tolerance compared to baseline, Fall risk, Cog status, Continuous pulse oximetry monitoring   .  Upon evaluation, pt currently is requiring Min Ax1 for bed mobility; Min Ax1 for transfers and Min Ax1 for ambulation w/ RW. Pt presents at PT eval functioning below baseline and currently w/ overall mobility deficits 2* to: BLE weakness, impaired balance, decreased endurance, gait deviations, pain, decreased activity tolerance compared to baseline, decreased safety awareness, fall risk. Based on the aforementioned PT evaluation, pt will continue to benefit from skilled Acute PT interventions to address stated impairments; to maximize functional mobility; for ongoing pt/ family training; and DME needs. At conclusion of PT session pt returned back in chair and chair alarm engaged with phone and call bell within reach. PT is currently recommending Level II resource intensity. PT will continue to follow during hospital stay.   Goals   Patient Goals \" to rest\"   [facility] Expiration Date 08/31/24   Short Term Goal #1 In 14 days pt will complete: 1) Bed mobility skills with mod I to increase safety and independence as well as decrease caregiver burden. 2) Functional transfers with mod I to promote increased independence, safety, and QOL. 3) Ambulate 200' using " least restrictive AD with mod I without LOB and stable vitals so that pt can negotiate previous living environment safely and promote independence with functional mobility and return to PLOF. 4) Stair training up/ down 3 step/s using rail/s with mod I so that pt can enter/negotiate previous living environment safely and decrease fall risk. 5) Improve balance grades by 1/2 grade to increase safety with all mobility and decrease fall risk.  6) Improve BLE strength by 1/2 grade to help increase overall functional mobility and decrease fall risk.   Plan   Treatment/Interventions Functional transfer training;LE strengthening/ROM;Elevations;Therapeutic exercise;Endurance training;Patient/family training;Equipment eval/education;Bed mobility;Gait training;Spoke to nursing;OT   PT Frequency 3-5x/wk   Discharge Recommendation   Rehab Resource Intensity Level, PT II (Moderate Resource Intensity)   Equipment Recommended Walker   Walker Package Recommended Wheeled walker   AM-PAC Basic Mobility Inpatient   Turning in Flat Bed Without Bedrails 3   Lying on Back to Sitting on Edge of Flat Bed Without Bedrails 3   Moving Bed to Chair 3   Standing Up From Chair Using Arms 2   Walk in Room 2   Climb 3-5 Stairs With Railing 2   Basic Mobility Inpatient Raw Score 15   Basic Mobility Standardized Score 36.97   [facility] Highest Level Of Mobility   -HL Goal 4: Move to chair/commode   -HL Achieved 4: Move to chair/commode   Modified Liberty Scale   Modified Liberty Scale 4   Barthel Index   Feeding 10   Bathing 0   Grooming Score 5   Dressing Score 5   Bladder Score 0   Bowels Score 0   Toilet Use Score 5   Transfers (Bed/Chair) Score 10   Mobility (Level Surface) Score 0   Stairs Score 0   Barthel Index Score 35   Portions of the documentation may have been created using voice recognition software.Occasional wrong word or sound alike substitutions may have occurred due to the inherent limitations of the voice recognition  software. Read the chart carefully and recognize, using context, where substitutions have occurred.    Javier Abel, PT, DPT

## 2024-08-17 NOTE — PROGRESS NOTES
"[facility]  Progress Note  Name: Barb Palomo I  MRN: [id number]  [facility] Date of Admission: 8/15/2024   Date of Service: 8/17/2024 I Hospital Day: 1    Assessment & Plan   * C. difficile diarrhea  Assessment & Plan  Recurrent C Diff Colitis / Diarrhea   Firs was 9/2022 8/16 C Diff Toxin and Abs Positive , stool     PLAN   - given recurrent , prefer Fidaxomicin 200 mg BID x 10 days , ordered   - IV Fluids + electrolyte repletion   - strict contact isolation & hand hygiene   - rectal tube          Acute cystitis without hematuria  Assessment & Plan  Reported dysuria on admission   Urine + nitrite & leukocytes   Urine Culture in process - initial results grow gram negative rods, enteric like   Continue Ceftriaxone and follow up further culture results / susceptibility       Failure to thrive in adult  Assessment & Plan  Patient presenting with increased generalized weakness and frequent falls, she additionally admits to ongoing diarrhea, recent dysuria, poor oral intake, and at least 70-80 pound weight loss over the past 1 year  Initially hypotensive on ED arrival resolved following IVF bolus.  Await final reads of x-rays however no acute traumatic injuries noted  Labs with multiple derangements including hyperglycemia, hypokalemia and hypomagnesemia with management as per associated problems.  COVID-19/influenza/RSV PCR negative.  UA and stool studies have been ordered and are awaiting collection, will follow-up these once obtained  Broad differential for this including infectious concerns versus occult malignancy not yet identified versus other etiology.  Concerning the patient into recent poor oral intake additionally reports she has not been using insulin due to \"insurance issue\" and inability to self inject due to her neuropathy  Reports ongoing nausea in ED, will obtain CT abdomen/pelvis to better evaluate  Check TSH/B12/folate levels.  CM consult for " "assistance with resources/disposition    Hypomagnesemia  Assessment & Plan  Magnesium level 1.2 on admission, replacement initiated during ED evaluation, recheck magnesium in a.m. and continue replacement as needed    Hypokalemia  Assessment & Plan  Potassium level 2.3 on admission, EKG fortunately without acute changes compared to prior  Suspect due to recent poor oral intake additionally with ongoing diarrhea  Replacement initiated during ED evaluation, will recheck BMP following this and continue replacement as needed  Continue telemetry monitoring until potassium at least 3    Uncontrolled type 2 diabetes mellitus with hyperglycemia (HCC)  Assessment & Plan  Lab Results   Component Value Date    HGBA1C 16.1 (H) 08/16/2024       Recent Labs     08/16/24  2106 08/17/24  0746 08/17/24  1102 08/17/24  1631   POCGLU 252* 72 161* 202*       Blood Sugar Average: Last 72 hrs:  (P) [id number]  Patient markedly hyperglycemic on admission only partially responsive to IV fluids, remainder of labs fortunately not consistent with DKA.  Patient admitting she has not been using her home insulin as she apparently has been unable to fill prescription due to \"insurance issues,\" additionally reports that due to her neuropathy she has difficulty self injecting.  Normal to be on NovoLog 70/30 20 units twice daily  Start Lantus 14 units nightly for now and continue with IV fluids, adjust inpatient insulin regimen as needed been on blood glucose trend, likely start mealtime insulin once better able to tolerate oral diet  A1c 16.1   Initiate hypoglycemia protocol    Opioid dependence with other opioid-induced disorder (HCC)  Assessment & Plan  PDMP reviewed, patient maintained on tramadol 200 mg daily as needed; history of chronic cervical/lumbar pain noted  Continue as needed tramadol    Chronic diarrhea  Assessment & Plan  Longstanding history of chronic diarrhea, patient reporting diarrhea ongoing for past 1 month.    Stool " enteric panel negative  C. difficile positive, see A&P above     Tobacco dependence  Assessment & Plan  Counseled on need for cessation, provide nicotine patch while admitted  Recent CT chest for lung cancer screening 7/30/2024 negative for nodules or masses    Hypothyroidism  Assessment & Plan   Latest Reference Range & Units 08/16/24 04:11   TSH 3RD GENERATON 0.450 - 4.500 uIU/mL 0.661       Continue home dose levothyroxine     GERD without esophagitis  Assessment & Plan  Continue PPI    Severe episode of recurrent major depressive disorder, without psychotic features (HCC)  Assessment & Plan  Mood appearing fairly stable, continue home duloxetine, mirtazapine, as needed Valium               VTE Pharmacologic Prophylaxis: VTE Score: 3 Moderate Risk (Score 3-4) - Pharmacological DVT Prophylaxis Ordered: heparin.    Mobility:   Basic Mobility Inpatient Raw Score: 15  -[facility] Goal: 4: Move to chair/commode  JH-HLM Achieved: 5: Stand (1 or more minutes)  JH-HLM Goal achieved. Continue to encourage appropriate mobility.    Patient Centered Rounds: I performed bedside rounds with nursing staff today.   Discussions with Specialists or Other Care Team Provider:     Education and Discussions with Family / Patient: Patient declined call to .     Total Time Spent on Date of Encounter in care of patient: 50 mins. This time was spent on one or more of the following: performing physical exam; counseling and coordination of care; obtaining or reviewing history; documenting in the medical record; reviewing/ordering tests, medications or procedures; communicating with other healthcare professionals and discussing with patient's family/caregivers.    Current Length of Stay: 1 day(s)  Current Patient Status: Inpatient   Certification Statement: The patient will continue to require additional inpatient hospital stay due to C Diff treatment  Discharge Plan: Anticipate discharge in 48-72 hrs to discharge location to be  "determined pending rehab evaluations.    Code Status: Level 1 - Full Code    Subjective:   Reports diarrhea watery x 2 months , still occurring today \"many times\" today  Ad pain only mild RLQ   No recent fever , per patient had fever about 2 weeks ago   Above A&P explained , decline family contact     Objective:     Vitals:   Temp (24hrs), Av °F (36.7 °C), Min:97.8 °F (36.6 °C), Max:98.3 °F (36.8 °C)    Temp:  [97.8 °F (36.6 °C)-98.3 °F (36.8 °C)] 98 °F (36.7 °C)  HR:  [] 101  Resp:  [12-20] 20  BP: ()/() 135/96  SpO2:  [90 %-97 %] 94 %  Body mass index is 16.1 kg/m².     Input and Output Summary (last 24 hours):     Intake/Output Summary (Last 24 hours) at 2024 1814  Last data filed at 2024 1658  Gross per 24 hour   Intake 680 ml   Output 1000 ml   Net -320 ml       Physical Exam:   Physical Exam   - GEN: underweight Appears well, alert and oriented x 3, pleasant and cooperative, in no acute distress  - HEENT: Anicteric, mucous membranes moist, PERRL and EOMI   - NECK: No lymphadenopathy, JVD or carotid bruits   - HEART: RRR, normal S1 and S2, no murmurs, clicks, gallops or rubs   - LUNGS: Clear to auscultation bilaterally; no wheezes, rales, or rhonchi  - ABDOMEN: mild discomfort to palpation RLQ ; increased  bowel sounds, soft, no no distention, no organomegaly or masses felt on exam.   - EXTREMITIES: Peripheral pulses normal; no clubbing, cyanosis, or edema  - NEURO: No focal findings, CN II-XII are grossly intact.   - Musculoskeletal: 5/5 strength, normal ROM, no swollen or erythematous joints.   - SKIN: Normal without suspicious lesions on exposed skin      Additional Data:     Labs:  Results from last 7 days   Lab Units 24  0440   WBC Thousand/uL 6.72   HEMOGLOBIN g/dL 9.8*   HEMATOCRIT % 29.8*   PLATELETS Thousands/uL 218   SEGS PCT % 54   LYMPHO PCT % 38   MONO PCT % 6   EOS PCT % 2     Results from last 7 days   Lab Units 24  0440 24  0411 08/15/24  6431 "   SODIUM mmol/L 145  144   < > 136   POTASSIUM mmol/L 2.6*  2.6*   < > 2.3*   CHLORIDE mmol/L 105  106   < > 88*   CO2 mmol/L 32  33*   < > 36*   BUN mg/dL 5  5   < > 3*   CREATININE mg/dL 0.23*  0.23*   < > 0.46*   ANION GAP mmol/L 8  5   < > 12   CALCIUM mg/dL 7.2*  7.2*   < > 8.4   ALBUMIN g/dL  --   --  3.1*   TOTAL BILIRUBIN mg/dL 0.24  --  0.37   ALK PHOS U/L  --   --  113*   ALT U/L  --   --  14   AST U/L  --   --  19   GLUCOSE RANDOM mg/dL 129  129   < > 448*    < > = values in this interval not displayed.     Results from last 7 days   Lab Units 08/15/24  2158   INR  0.95     Results from last 7 days   Lab Units 08/17/24  1631 08/17/24  1102 08/17/24  0746 08/16/24  2106 08/16/24  1602 08/16/24  1213 08/16/24  0716 08/16/24  0055 08/15/24  2025   POC GLUCOSE mg/dl 202* 161* 72 252* 273* 314* 247* 379* 435*     Results from last 7 days   Lab Units 08/16/24  0411   HEMOGLOBIN A1C % 16.1*     Results from last 7 days   Lab Units 08/15/24  2341 08/15/24  2233   LACTIC ACID mmol/L  --  1.6   PROCALCITONIN ng/ml 0.09  --        Lines/Drains:  Invasive Devices       Peripheral Intravenous Line  Duration             Peripheral IV 08/15/24 Right;Ventral (anterior) Forearm 1 day    Peripheral IV 08/16/24 Right Forearm 1 day              Drain  Duration             External Urinary Catheter <1 day    Rectal Tube With balloon <1 day                      Telemetry:  Telemetry Orders (From admission, onward)               24 Hour Telemetry Monitoring  Continuous x 24 Hours (Telem)        Question:  Reason for 24 Hour Telemetry  Answer:  Arrhythmias requiring acute medical intervention / PPM or ICD malfunction                     Telemetry Reviewed: Normal Sinus Rhythm  Indication for Continued Telemetry Use: Metabolic/electrolyte disturbance with high probability of dysrhythmia             Imaging: Reviewed radiology reports from this admission including: abdominal/pelvic CT    Recent Cultures (last 7 days):    Results from last 7 days   Lab Units 08/16/24  0219 08/15/24  2158   BLOOD CULTURE   --  No Growth at 24 hrs.  No Growth at 24 hrs.   URINE CULTURE  80,000-89,000 cfu/ml Klebsiella pneumoniae*  --    C DIFF TOXIN B BY PCR  Positive*  --        Last 24 Hours Medication List:   Current Facility-Administered Medications   Medication Dose Route Frequency Provider Last Rate    acetaminophen  650 mg Oral Q6H PRN Renan Drake, DO      albuterol  2 puff Inhalation Q4H PRN Renan Drake, DO      aspirin  81 mg Oral Daily Renan Nguyeni, DO      atorvastatin  80 mg Oral Daily Renan Nguyeni, DO      baclofen  10 mg Oral BID PRN Renan Drake, DO      butalbital-acetaminophen-caffeine  1 tablet Oral Q6H PRN Renan Drake, DO      cefTRIAXone  1,000 mg Intravenous Q24H Renan Drake, DO 1,000 mg (08/16/24 2157)    diazepam  5 mg Oral Q12H PRN Renan Drake, DO      DULoxetine  60 mg Oral BID Renan Drake, DO      fluticasone-vilanterol  1 puff Inhalation Daily Renan Drake, DO      gabapentin  800 mg Oral TID Renan Drake, DO      heparin (porcine)  5,000 Units Subcutaneous Q8H HONEY Renan Drake, DO      insulin glargine  14 Units Subcutaneous HS Renan Drake, DO      insulin lispro  1-5 Units Subcutaneous TID AC Renan Drake, DO      insulin lispro  1-5 Units Subcutaneous HS Renan Drake, DO      levothyroxine  112 mcg Oral Early Morning Renna Drake, DO      mirtazapine  7.5 mg Oral HS Renan Drake, DO      multi-electrolyte  150 mL/hr Intravenous Continuous Ambrose Flannery  mL/hr (08/17/24 1725)    nicotine  1 patch Transdermal Daily Renan Drake, DO      ondansetron  4 mg Intravenous Q6H PRN Renan Drake, DO      pantoprazole  40 mg Oral Early Morning Renan Drake, DO      prazosin  2 mg Oral HS Renna Drake, DO      traMADol  50 mg Oral Q6H PRN Renan Drake, DO          Today, Patient Was Seen By: Ambrose Flannery DO    **Please Note: This note may have been constructed using a voice  recognition system.**

## 2024-08-17 NOTE — PLAN OF CARE
Problem: Nutrition/Hydration-ADULT  Goal: Nutrient/Hydration intake appropriate for improving, restoring or maintaining nutritional needs  Description: Monitor and assess patient's nutrition/hydration status for malnutrition. Collaborate with interdisciplinary team and initiate plan and interventions as ordered.  Monitor patient's weight and dietary intake as ordered or per policy. Utilize nutrition screening tool and intervene as necessary. Determine patient's food preferences and provide high-protein, high-caloric foods as appropriate.     INTERVENTIONS:  - Monitor oral intake, urinary output, labs, and treatment plans  - Assess nutrition and hydration status and recommend course of action  - Evaluate amount of meals eaten  - Assist patient with eating if necessary   - Allow adequate time for meals  - Recommend/ encourage appropriate diets, oral nutritional supplements, and vitamin/mineral supplements  - Order, calculate, and assess calorie counts as needed  - Recommend, monitor, and adjust tube feedings and TPN/PPN based on assessed needs  - Assess need for intravenous fluids  - Provide specific nutrition/hydration education as appropriate  - Include patient/family/caregiver in decisions related to nutrition  Outcome: Progressing     Problem: PAIN - ADULT  Goal: Verbalizes/displays adequate comfort level or baseline comfort level  Description: Interventions:  - Encourage patient to monitor pain and request assistance  - Assess pain using appropriate pain scale  - Administer analgesics based on type and severity of pain and evaluate response  - Implement non-pharmacological measures as appropriate and evaluate response  - Consider cultural and social influences on pain and pain management  - Notify physician/advanced practitioner if interventions unsuccessful or patient reports new pain  Outcome: Progressing     Problem: INFECTION - ADULT  Goal: Absence or prevention of progression during  hospitalization  Description: INTERVENTIONS:  - Assess and monitor for signs and symptoms of infection  - Monitor lab/diagnostic results  - Monitor all insertion sites, i.e. indwelling lines, tubes, and drains  - Monitor endotracheal if appropriate and nasal secretions for changes in amount and color  - [place] appropriate cooling/warming therapies per order  - Administer medications as ordered  - Instruct and encourage patient and family to use good hand hygiene technique  - Identify and instruct in appropriate isolation precautions for identified infection/condition  Outcome: Progressing  Goal: Absence of fever/infection during neutropenic period  Description: INTERVENTIONS:  - Monitor WBC    Outcome: Progressing     Problem: SAFETY ADULT  Goal: Patient will remain free of falls  Description: INTERVENTIONS:  - Educate patient/family on patient safety including physical limitations  - Instruct patient to call for assistance with activity   - Consult OT/PT to assist with strengthening/mobility   - Keep Call bell within reach  - Keep bed low and locked with side rails adjusted as appropriate  - Keep care items and personal belongings within reach  - Initiate and maintain comfort rounds  - Make Fall Risk Sign visible to staff  - Offer Toileting every 2 Hours, in advance of need  - Initiate/Maintain bed alarm  - Obtain necessary fall risk management equipment:   - Apply yellow socks and bracelet for high fall risk patients  - Consider moving patient to room near nurses station  Outcome: Progressing  Goal: Maintain or return to baseline ADL function  Description: INTERVENTIONS:  -  Assess patient's ability to carry out ADLs; assess patient's baseline for ADL function and identify physical deficits which impact ability to perform ADLs (bathing, care of mouth/teeth, toileting, grooming, dressing, etc.)  - Assess/evaluate cause of self-care deficits   - Assess range of motion  - Assess patient's mobility; develop plan if  impaired  - Assess patient's need for assistive devices and provide as appropriate  - Encourage maximum independence but intervene and supervise when necessary  - Involve family in performance of ADLs  - Assess for home care needs following discharge   - Consider OT consult to assist with ADL evaluation and planning for discharge  - Provide patient education as appropriate  Outcome: Progressing  Problem: DISCHARGE PLANNING  Goal: Discharge to home or other facility with appropriate resources  Description: INTERVENTIONS:  - Identify barriers to discharge w/patient and caregiver  - Arrange for needed discharge resources and transportation as appropriate  - Identify discharge learning needs (meds, wound care, etc.)  - Arrange for interpretive services to assist at discharge as needed  - Refer to Case Management Department for coordinating discharge planning if the patient needs post-hospital services based on physician/advanced practitioner order or complex needs related to functional status, cognitive ability, or social support system  Outcome: Progressing     Problem: Knowledge Deficit  Goal: Patient/family/caregiver demonstrates understanding of disease process, treatment plan, medications, and discharge instructions  Description: Complete learning assessment and assess knowledge base.  Interventions:  - Provide teaching at level of understanding  - Provide teaching via preferred learning methods  Outcome: Progressing     Problem: NEUROSENSORY - ADULT  Goal: Achieves stable or improved neurological status  Description: INTERVENTIONS  - Monitor and report changes in neurological status  - Monitor vital signs such as temperature, blood pressure, glucose, and any other labs ordered   - Initiate measures to prevent increased intracranial pressure  - Monitor for seizure activity and implement precautions if appropriate      Outcome: Progressing  Goal: Remains free of injury related to seizures activity  Description:  INTERVENTIONS  - Maintain airway, patient safety  and administer oxygen as ordered  - Monitor patient for seizure activity, document and report duration and description of seizure to physician/advanced practitioner  - If seizure occurs,  ensure patient safety during seizure  - Reorient patient post seizure  - Seizure pads on all 4 side rails  - Instruct patient/family to notify RN of any seizure activity including if an aura is experienced  - Instruct patient/family to call for assistance with activity based on nursing assessment  - Administer anti-seizure medications if ordered    Outcome: Progressing  Goal: Achieves maximal functionality and self care  Description: INTERVENTIONS  - Monitor swallowing and airway patency with patient fatigue and changes in neurological status  - Encourage and assist patient to increase activity and self care.   - Encourage visually impaired, hearing impaired and aphasic patients to use assistive/communication devices  Outcome: Progressing     Problem: METABOLIC, FLUID AND ELECTROLYTES - ADULT  Goal: Electrolytes maintained within normal limits  Description: INTERVENTIONS:  - Monitor labs and assess patient for signs and symptoms of electrolyte imbalances  - Administer electrolyte replacement as ordered  - Monitor response to electrolyte replacements, including repeat lab results as appropriate  - Instruct patient on fluid and nutrition as appropriate  Outcome: Progressing  Goal: Fluid balance maintained  Description: INTERVENTIONS:  - Monitor labs   - Monitor I/O and WT  - Instruct patient on fluid and nutrition as appropriate  - Assess for signs & symptoms of volume excess or deficit  Outcome: Progressing  Goal: Glucose maintained within target range  Description: INTERVENTIONS:  - Monitor Blood Glucose as ordered  - Assess for signs and symptoms of hyperglycemia and hypoglycemia  - Administer ordered medications to maintain glucose within target range  - Assess nutritional intake  and initiate nutrition service referral as needed  Outcome: Progressing        Problem: MUSCULOSKELETAL - ADULT  Goal: Maintain or return mobility to safest level of function  Description: INTERVENTIONS:  - Assess patient's ability to carry out ADLs; assess patient's baseline for ADL function and identify physical deficits which impact ability to perform ADLs (bathing, care of mouth/teeth, toileting, grooming, dressing, etc.)  - Assess/evaluate cause of self-care deficits   - Assess range of motion  - Assess patient's mobility  - Assess patient's need for assistive devices and provide as appropriate  - Encourage maximum independence but intervene and supervise when necessary  - Involve family in performance of ADLs  - Assess for home care needs following discharge   - Consider OT consult to assist with ADL evaluation and planning for discharge  - Provide patient education as appropriate  Outcome: Progressing  Goal: Maintain proper alignment of affected body part  Description: INTERVENTIONS:  - Support, maintain and protect limb and body alignment  - Provide patient/ family with appropriate education  Outcome: Progressing     Problem: Prexisting or High Potential for Compromised Skin Integrity  Goal: Skin integrity is maintained or improved  Description: INTERVENTIONS:  - Identify patients at risk for skin breakdown  - Assess and monitor skin integrity  - Assess and monitor nutrition and hydration status  - Monitor labs   - Assess for incontinence   - Turn and reposition patient  - Assist with mobility/ambulation  - Relieve pressure over bony prominences  - Avoid friction and shearing  - Provide appropriate hygiene as needed including keeping skin clean and dry  - Evaluate need for skin moisturizer/barrier cream  - Collaborate with interdisciplinary team   - Patient/family teaching  - Consider wound care consult   Outcome: Progressing

## 2024-08-17 NOTE — ASSESSMENT & PLAN NOTE
Reported dysuria on admission   Urine + nitrite & leukocytes   Urine Culture in process - initial results grow gram negative rods, enteric like   Continue Ceftriaxone and follow up further culture results / susceptibility

## 2024-08-17 NOTE — OCCUPATIONAL THERAPY NOTE
Occupational Therapy Evaluation     Patient Name: Barb Palomo  Today's Date: 8/17/2024  Problem List  Principal Problem:    C. difficile diarrhea  Active Problems:    Severe episode of recurrent major depressive disorder, without psychotic features (HCC)    GERD without esophagitis    Hypothyroidism    Tobacco dependence    Chronic diarrhea    Opioid dependence with other opioid-induced disorder (HCC)    Uncontrolled type 2 diabetes mellitus with hyperglycemia (HCC)    Hypokalemia    Hypomagnesemia    Failure to thrive in adult    Past Medical History  Past Medical History:   Diagnosis Date    Acute venous embolism and thrombosis of deep vessels of distal lower extremity (HCC)     Anemia     Anxiety     last assessed 11/20/17    Arthritis     Asthma     Cerebral infarction (HCC)     unspecified, last assessed 11/14/16    Chest pain     last assessed 5/9/17    Chronic cough     last assessed 12/12/13    Depression     Diabetes mellitus, type 2 (HCC)     DJD (degenerative joint disease)     Esophageal reflux     Fibromyalgia     GERD without esophagitis     resolved 5/13/16    History of pulmonary embolism     Hyperlipidemia     Hypertension     Hypothyroidism     Iron deficiency     Pancreatitis     Panic attack     Panic disorder     Polyarthritis     PTSD (post-traumatic stress disorder)     Sleep difficulties     Stroke syndrome     Thyroid disease     TIA (transient ischemic attack)     TIA (transient ischemic attack)     Venous embolism and thrombosis of deep vessels of distal lower extremity (HCC)     Vitamin B12 deficiency     Vitamin D deficiency      Past Surgical History  Past Surgical History:   Procedure Laterality Date    CARPAL TUNNEL RELEASE Right     neuroplasty decompression of median nerve    CATARACT EXTRACTION      CHOLECYSTECTOMY      ERCP      ERCP      ESOPHAGOGASTRIC FUNDOPLASTY      NISSEN FUNDOPLICATION      PATELLA SURGERY Left 12/2021    DC ESOPHAGOGASTRODUODENOSCOPY TRANSORAL  "DIAGNOSTIC N/A 11/02/2016    Procedure: EGD AND COLONOSCOPY;  Surgeon: Jatin Shepherd MD;  Location: [facility];  Service: Gastroenterology    MN NDSC WRST SURG W/RLS TRANSVRS CARPL LIGM Right 03/08/2016    Procedure: RELEASE CARPAL TUNNEL ENDOSCOPIC;  Surgeon: Guero Restrepo MD;  Location: [facility];  Service: Orthopedics    MN TENDON SHEATH INCISION Right 03/08/2016    Procedure: RELEASE TRIGGER FINGER RIGHT THUMB;  Surgeon: Guero Restrepo MD;  Location: [facility];  Service: Orthopedics    TONSILLECTOMY AND ADENOIDECTOMY           08/17/24 0825   OT Last Visit   OT Visit Date 08/17/24   Note Type   Note type Evaluation   Pain Assessment   Pain Assessment Tool 0-10   Pain Score 6   Pain Location/Orientation Location: Back   Hospital Pain Intervention(s) Repositioned;Ambulation/increased activity;Emotional support;Relaxation technique   Restrictions/Precautions   Weight Bearing Precautions Per Order No   Other Precautions Contact/isolation;Chair Alarm;Cognitive;Bed Alarm;Fall Risk;Pain   Home Living   Type of Home House   Home Layout Multi-level;Bed/bath upstairs;Able to live on main level with bedroom/bathroom   Additional Comments per family pt hoards garbage, was previously found down in a pile of garbage, home is scattered with cat and human feces/extrement and has no running water   Prior Function   Level of [place] Independent with ADLs;Independent with functional mobility;Needs assistance with IADLS   Lives With (S)  Alone   Receives Help From Family   Falls in the last 6 months 1 to 4   Vocational On disability   Lifestyle   Autonomy I adls and mobility however ?able ability to manage at home given pt's condition and reported condition of home - i iadls - family assists PRN   Reciprocal Relationships limited support   Service to Others disabled   Intrinsic Gratification sedentary   Subjective   Subjective \"I've had the diarrhea for months\"   ADL   Eating Assistance 5  Supervision/Setup   Grooming " Assistance 5  Supervision/Setup   UB Bathing Assistance 4  Minimal Assistance   LB Bathing Assistance 3  Moderate Assistance   UB Dressing Assistance 4  Minimal Assistance   LB Dressing Assistance 3  Moderate Assistance   Toileting Assistance  3  Moderate Assistance   Bed Mobility   Supine to Sit 4  Minimal assistance   Transfers   Sit to Stand 4  Minimal assistance   Stand to Sit 4  Minimal assistance   Functional Mobility   Functional Mobility 4  Minimal assistance   Additional items Hand hold assistance   Balance   Static Sitting Fair   Dynamic Sitting Fair -   Static Standing Fair -   Dynamic Standing Poor +   Ambulatory Poor +   Activity Tolerance   Activity Tolerance Patient limited by fatigue;Patient limited by pain;Treatment limited secondary to medical complications (Comment)   Medical Staff Made Aware PT present for co-eval 2* medical complexity, comorbidities and limited overall tolerance to activities   RUE Assessment   RUE Assessment WFL   LUE Assessment   LUE Assessment WFL   Cognition   Arousal/Participation Alert;Cooperative   Attention Attends with cues to redirect   Orientation Level Oriented to place;Oriented to person;Oriented to time;Oriented to situation   Memory Decreased recall of precautions   Following Commands Follows one step commands with increased time or repetition   Assessment   Limitation Decreased ADL status;Decreased Safe judgement during ADL;Decreased endurance;Decreased self-care trans;Decreased high-level ADLs   Prognosis Good   Assessment Pt is a 54 y.o. female who was admitted to [facility] on 8/15/2024 with C. difficile diarrhea . Patient  has a past medical history of Acute venous embolism and thrombosis of deep vessels of distal lower extremity (HCC), Anemia, Anxiety, Arthritis, Asthma, Cerebral infarction (HCC), Chest pain, Chronic cough, Depression, Diabetes mellitus, type 2 (HCC), DJD (degenerative joint disease), Esophageal reflux, Fibromyalgia, GERD without  esophagitis, History of pulmonary embolism, Hyperlipidemia, Hypertension, Hypothyroidism, Iron deficiency, Pancreatitis, Panic attack, Panic disorder, Polyarthritis, PTSD (post-traumatic stress disorder), Sleep difficulties, Stroke syndrome, Thyroid disease, TIA (transient ischemic attack), TIA (transient ischemic attack), Venous embolism and thrombosis of deep vessels of distal lower extremity (HCC), Vitamin B12 deficiency, and Vitamin D deficiency.   At baseline pt was completing adls and mobility independently however ?ability to care for self at home given pt's condition on arrival and reported condition of home. Pt lives alone in 2 story home with FF available. Currently pt requires moderate assist for overall ADLS and min assist for functional mobility/transfers. Pt currently presents with impairments in the following categories -steps to enter environment, limited home support, difficulty performing ADLS, difficulty performing IADLS , limited insight into deficits, flat affect, decreased initiation and engagement , health management , and environment activity tolerance, endurance, standing balance/tolerance, sitting balance/tolerance, insight, safety , judgement , attention , and task initiation . These impairments, as well as pt's fatigue, pain, decreased caregiver support, risk for falls, and home environment  limit pt's ability to safely engage in all baseline areas of occupation, includingbathing, dressing, toileting, functional mobility/transfers, community mobility, laundry , driving, house maintenance, medication management, meal prep, cleaning, social participation , and leisure activities  From OT standpoint, recommend Level II resources upon D/C. OT will continue to follow to address the below stated goals.   Goals   Patient Goals none stated at this time   LTG Time Frame 10-14   Long Term Goal #1 1) SBA UB/LB adls after setup with use of LHAE PRN  2) SBA toileting and clothing management  3) SBA  bed mobility  4) SBA functional mob/transfers to and from all surfaces with fair to fair+ balance/safety   5) Increase activity tolerance to 30-35min for participation in adls and enjoyable activities  6) Assess DME needs   7) Demonstrate good carryover with safe use of AD during functional tasks   8) Assess DME needs   9) Ongoing functional cognitive assessment to assist with safe d/c recommendations   Plan   Treatment Interventions ADL retraining;Functional transfer training;Endurance training;Cognitive reorientation;Patient/family training;Equipment evaluation/education;Compensatory technique education;Activityengagement   Goal Expiration Date 08/31/24   OT Frequency 2-3x/wk   Discharge Recommendation   Rehab Resource Intensity Level, OT II (Moderate Resource Intensity)   AM-PAC Daily Activity Inpatient   Lower Body Dressing 2   Bathing 2   Toileting 2   Upper Body Dressing 3   Grooming 3   Eating 4   Daily Activity Raw Score 16   Daily Activity Standardized Score (Calc for Raw Score >=11) 35.96   AM-PAC Applied Cognition Inpatient   Following a Speech/Presentation 3   Understanding Ordinary Conversation 4   Taking Medications 3   Remembering Where Things Are Placed or Put Away 3   Remembering List of 4-5 Errands 3   Taking Care of Complicated Tasks 3   Applied Cognition Raw Score 19   Applied Cognition Standardized Score 39.77   End of Consult   Education Provided Yes   Patient Position at End of Consult Bedside chair;Bed/Chair alarm activated;All needs within reach   Nurse Communication Nurse aware of consult       The patient's raw score on the AM-PAC Daily Activity Inpatient Short Form is 16. A raw score of less than 19 suggests the patient may benefit from discharge to post-acute rehabilitation services. Please refer to the recommendation of the Occupational Therapist for safe discharge planning.      Documentation Completed By:    ARMIDA Interiano/L  [employer] Certified - [id number]

## 2024-08-17 NOTE — ASSESSMENT & PLAN NOTE
"Lab Results   Component Value Date    HGBA1C 16.1 (H) 08/16/2024       Recent Labs     08/16/24  2106 08/17/24  0746 08/17/24  1102 08/17/24  1631   POCGLU 252* 72 161* 202*       Blood Sugar Average: Last 72 hrs:  (P) [id number]  Patient markedly hyperglycemic on admission only partially responsive to IV fluids, remainder of labs fortunately not consistent with DKA.  Patient admitting she has not been using her home insulin as she apparently has been unable to fill prescription due to \"insurance issues,\" additionally reports that due to her neuropathy she has difficulty self injecting.  Normal to be on NovoLog 70/30 20 units twice daily  Start Lantus 14 units nightly for now and continue with IV fluids, adjust inpatient insulin regimen as needed been on blood glucose trend, likely start mealtime insulin once better able to tolerate oral diet  A1c 16.1   Initiate hypoglycemia protocol  "

## 2024-08-17 NOTE — PLAN OF CARE
Problem: PHYSICAL THERAPY ADULT  Goal: Performs mobility at highest level of function for planned discharge setting.  See evaluation for individualized goals.  Description: Treatment/Interventions: Functional transfer training, LE strengthening/ROM, Elevations, Therapeutic exercise, Endurance training, Patient/family training, Equipment eval/education, Bed mobility, Gait training, Spoke to nursing, OT  Equipment Recommended: Walker       See flowsheet documentation for full assessment, interventions and recommendations.  Note: Prognosis: Good  Problem List: Decreased strength, Decreased endurance, Impaired balance, Decreased mobility, Decreased safety awareness, Pain  Assessment: Pt is 54 y.o. female seen for PT evaluation s/p admit to [facility] on 8/15/2024. Pt presented w/ weakness and frequent falls.  Pt was admitted with a primary dx of: failure to thrive in adult. PT now consulted for assessment of mobility and d/c needs. Pt with Up and OOB as tolerated  orders.  Pts current co morbidities affecting treatment include:  has a past medical history of Acute venous embolism and thrombosis of deep vessels of distal lower extremity (HCC), Anemia, Anxiety, Arthritis, Asthma, Cerebral infarction (HCC), Chest pain, Chronic cough, Depression, Diabetes mellitus, type 2 (HCC), DJD (degenerative joint disease), Esophageal reflux, Fibromyalgia, GERD without esophagitis, History of pulmonary embolism, Hyperlipidemia, Hypertension, Hypothyroidism, Iron deficiency, Pancreatitis, Panic attack, Panic disorder, Polyarthritis, PTSD (post-traumatic stress disorder), Sleep difficulties, Stroke syndrome, Thyroid disease, TIA (transient ischemic attack), TIA (transient ischemic attack), Venous embolism and thrombosis of deep vessels of distal lower extremity (HCC), Vitamin B12 deficiency, and Vitamin D deficiency. . Pts current clinical presentation is Unstable/ Unpredictable (high complexity) due to Ongoing medical  management for primary dx, Increased reliance on more restrictive AD compared to baseline, Decreased activity tolerance compared to baseline, Fall risk, Cog status, Continuous pulse oximetry monitoring   .  Upon evaluation, pt currently is requiring Min Ax1 for bed mobility; Min Ax1 for transfers and Min Ax1 for ambulation w/ RW. Pt presents at PT eval functioning below baseline and currently w/ overall mobility deficits 2* to: BLE weakness, impaired balance, decreased endurance, gait deviations, pain, decreased activity tolerance compared to baseline, decreased safety awareness, fall risk. Based on the aforementioned PT evaluation, pt will continue to benefit from skilled Acute PT interventions to address stated impairments; to maximize functional mobility; for ongoing pt/ family training; and DME needs. At conclusion of PT session pt returned back in chair and chair alarm engaged with phone and call bell within reach. PT is currently recommending Level II resource intensity. PT will continue to follow during hospital stay.        Rehab Resource Intensity Level, PT: II (Moderate Resource Intensity)    See flowsheet documentation for full assessment.

## 2024-08-18 LAB
ALBUMIN SERPL BCG-MCNC: 3.1 G/DL (ref 3.5–5)
ALP SERPL-CCNC: 61 U/L (ref 34–104)
ALT SERPL W P-5'-P-CCNC: 7 U/L (ref 7–52)
ANION GAP SERPL CALCULATED.3IONS-SCNC: 8 MMOL/L (ref 4–13)
AST SERPL W P-5'-P-CCNC: 11 U/L (ref 13–39)
BACTERIA UR CULT: ABNORMAL
BACTERIA UR CULT: ABNORMAL
BASOPHILS # BLD AUTO: 0.04 THOUSANDS/ΜL (ref 0–0.1)
BASOPHILS NFR BLD AUTO: 1 % (ref 0–1)
BILIRUB SERPL-MCNC: 0.27 MG/DL (ref 0.2–1)
BUN SERPL-MCNC: 3 MG/DL (ref 5–25)
CALCIUM ALBUM COR SERPL-MCNC: 8.2 MG/DL (ref 8.3–10.1)
CALCIUM SERPL-MCNC: 7.5 MG/DL (ref 8.4–10.2)
CHLORIDE SERPL-SCNC: 105 MMOL/L (ref 96–108)
CO2 SERPL-SCNC: 30 MMOL/L (ref 21–32)
CREAT SERPL-MCNC: <0.2 MG/DL (ref 0.6–1.3)
EOSINOPHIL # BLD AUTO: 0.16 THOUSAND/ΜL (ref 0–0.61)
EOSINOPHIL NFR BLD AUTO: 3 % (ref 0–6)
ERYTHROCYTE [DISTWIDTH] IN BLOOD BY AUTOMATED COUNT: 12.7 % (ref 11.6–15.1)
GLUCOSE SERPL-MCNC: 102 MG/DL (ref 65–140)
GLUCOSE SERPL-MCNC: 146 MG/DL (ref 65–140)
GLUCOSE SERPL-MCNC: 166 MG/DL (ref 65–140)
GLUCOSE SERPL-MCNC: 193 MG/DL (ref 65–140)
GLUCOSE SERPL-MCNC: 195 MG/DL (ref 65–140)
HCT VFR BLD AUTO: 30.7 % (ref 34.8–46.1)
HGB BLD-MCNC: 10.4 G/DL (ref 11.5–15.4)
IMM GRANULOCYTES # BLD AUTO: 0.03 THOUSAND/UL (ref 0–0.2)
IMM GRANULOCYTES NFR BLD AUTO: 1 % (ref 0–2)
LYMPHOCYTES # BLD AUTO: 1.96 THOUSANDS/ΜL (ref 0.6–4.47)
LYMPHOCYTES NFR BLD AUTO: 36 % (ref 14–44)
MAGNESIUM SERPL-MCNC: 1.7 MG/DL (ref 1.9–2.7)
MCH RBC QN AUTO: 31.3 PG (ref 26.8–34.3)
MCHC RBC AUTO-ENTMCNC: 33.9 G/DL (ref 31.4–37.4)
MCV RBC AUTO: 93 FL (ref 82–98)
MONOCYTES # BLD AUTO: 0.38 THOUSAND/ΜL (ref 0.17–1.22)
MONOCYTES NFR BLD AUTO: 7 % (ref 4–12)
NEUTROPHILS # BLD AUTO: 2.92 THOUSANDS/ΜL (ref 1.85–7.62)
NEUTS SEG NFR BLD AUTO: 52 % (ref 43–75)
NRBC BLD AUTO-RTO: 0 /100 WBCS
PLATELET # BLD AUTO: 225 THOUSANDS/UL (ref 149–390)
PMV BLD AUTO: 10.8 FL (ref 8.9–12.7)
POTASSIUM SERPL-SCNC: 2.9 MMOL/L (ref 3.5–5.3)
PROT SERPL-MCNC: 4.5 G/DL (ref 6.4–8.4)
RBC # BLD AUTO: 3.32 MILLION/UL (ref 3.81–5.12)
SODIUM SERPL-SCNC: 143 MMOL/L (ref 135–147)
WBC # BLD AUTO: 5.49 THOUSAND/UL (ref 4.31–10.16)

## 2024-08-18 PROCEDURE — 99232 SBSQ HOSP IP/OBS MODERATE 35: CPT | Performed by: INTERNAL MEDICINE

## 2024-08-18 PROCEDURE — 80053 COMPREHEN METABOLIC PANEL: CPT | Performed by: STUDENT IN AN ORGANIZED HEALTH CARE EDUCATION/TRAINING PROGRAM

## 2024-08-18 PROCEDURE — 83735 ASSAY OF MAGNESIUM: CPT | Performed by: STUDENT IN AN ORGANIZED HEALTH CARE EDUCATION/TRAINING PROGRAM

## 2024-08-18 PROCEDURE — 85025 COMPLETE CBC W/AUTO DIFF WBC: CPT | Performed by: STUDENT IN AN ORGANIZED HEALTH CARE EDUCATION/TRAINING PROGRAM

## 2024-08-18 PROCEDURE — 82948 REAGENT STRIP/BLOOD GLUCOSE: CPT

## 2024-08-18 RX ORDER — INSULIN GLARGINE 100 [IU]/ML
18 INJECTION, SOLUTION SUBCUTANEOUS
Status: DISCONTINUED | OUTPATIENT
Start: 2024-08-18 | End: 2024-08-22

## 2024-08-18 RX ORDER — POTASSIUM CHLORIDE 14.9 MG/ML
20 INJECTION INTRAVENOUS ONCE
Status: COMPLETED | OUTPATIENT
Start: 2024-08-18 | End: 2024-08-18

## 2024-08-18 RX ORDER — ALBUMIN, HUMAN INJ 5% 5 %
25 SOLUTION INTRAVENOUS ONCE
Status: COMPLETED | OUTPATIENT
Start: 2024-08-18 | End: 2024-08-18

## 2024-08-18 RX ORDER — POTASSIUM CHLORIDE 1500 MG/1
40 TABLET, EXTENDED RELEASE ORAL 2 TIMES DAILY
Status: DISCONTINUED | OUTPATIENT
Start: 2024-08-18 | End: 2024-08-29

## 2024-08-18 RX ORDER — MAGNESIUM SULFATE HEPTAHYDRATE 40 MG/ML
2 INJECTION, SOLUTION INTRAVENOUS ONCE
Status: COMPLETED | OUTPATIENT
Start: 2024-08-18 | End: 2024-08-18

## 2024-08-18 RX ADMIN — INSULIN LISPRO 1 UNITS: 100 INJECTION, SOLUTION INTRAVENOUS; SUBCUTANEOUS at 09:11

## 2024-08-18 RX ADMIN — FIDAXOMICIN 200 MG: 200 TABLET, FILM COATED ORAL at 21:36

## 2024-08-18 RX ADMIN — SODIUM CHLORIDE, SODIUM GLUCONATE, SODIUM ACETATE, POTASSIUM CHLORIDE AND MAGNESIUM CHLORIDE 150 ML/HR: 526; 502; 368; 37; 30 INJECTION, SOLUTION INTRAVENOUS at 06:36

## 2024-08-18 RX ADMIN — POTASSIUM CHLORIDE 40 MEQ: 1500 TABLET, EXTENDED RELEASE ORAL at 11:18

## 2024-08-18 RX ADMIN — ASPIRIN 81 MG CHEWABLE TABLET 81 MG: 81 TABLET CHEWABLE at 09:10

## 2024-08-18 RX ADMIN — LEVOTHYROXINE SODIUM 112 MCG: 112 TABLET ORAL at 05:05

## 2024-08-18 RX ADMIN — FLUTICASONE FUROATE AND VILANTEROL TRIFENATATE 1 PUFF: 200; 25 POWDER RESPIRATORY (INHALATION) at 09:10

## 2024-08-18 RX ADMIN — INSULIN GLARGINE 18 UNITS: 100 INJECTION, SOLUTION SUBCUTANEOUS at 21:37

## 2024-08-18 RX ADMIN — MAGNESIUM SULFATE HEPTAHYDRATE 2 G: 40 INJECTION, SOLUTION INTRAVENOUS at 11:19

## 2024-08-18 RX ADMIN — ATORVASTATIN CALCIUM 80 MG: 80 TABLET, FILM COATED ORAL at 09:10

## 2024-08-18 RX ADMIN — PANTOPRAZOLE SODIUM 40 MG: 40 TABLET, DELAYED RELEASE ORAL at 05:05

## 2024-08-18 RX ADMIN — POTASSIUM CHLORIDE 20 MEQ: 14.9 INJECTION, SOLUTION INTRAVENOUS at 12:40

## 2024-08-18 RX ADMIN — POTASSIUM CHLORIDE 40 MEQ: 1500 TABLET, EXTENDED RELEASE ORAL at 17:10

## 2024-08-18 RX ADMIN — MIRTAZAPINE 7.5 MG: 15 TABLET, FILM COATED ORAL at 21:36

## 2024-08-18 RX ADMIN — FIDAXOMICIN 200 MG: 200 TABLET, FILM COATED ORAL at 10:48

## 2024-08-18 RX ADMIN — GABAPENTIN 800 MG: 400 CAPSULE ORAL at 21:36

## 2024-08-18 RX ADMIN — NICOTINE 1 PATCH: 21 PATCH, EXTENDED RELEASE TRANSDERMAL at 09:10

## 2024-08-18 RX ADMIN — HEPARIN SODIUM 5000 UNITS: 5000 INJECTION INTRAVENOUS; SUBCUTANEOUS at 21:37

## 2024-08-18 RX ADMIN — ONDANSETRON 4 MG: 2 INJECTION INTRAMUSCULAR; INTRAVENOUS at 14:18

## 2024-08-18 RX ADMIN — DULOXETINE HYDROCHLORIDE 60 MG: 60 CAPSULE, DELAYED RELEASE ORAL at 09:10

## 2024-08-18 RX ADMIN — PRAZOSIN HYDROCHLORIDE 2 MG: 2 CAPSULE ORAL at 21:40

## 2024-08-18 RX ADMIN — HEPARIN SODIUM 5000 UNITS: 5000 INJECTION INTRAVENOUS; SUBCUTANEOUS at 09:13

## 2024-08-18 RX ADMIN — HEPARIN SODIUM 5000 UNITS: 5000 INJECTION INTRAVENOUS; SUBCUTANEOUS at 17:09

## 2024-08-18 RX ADMIN — GABAPENTIN 800 MG: 400 CAPSULE ORAL at 09:10

## 2024-08-18 RX ADMIN — DIAZEPAM 5 MG: 5 TABLET ORAL at 00:36

## 2024-08-18 RX ADMIN — TRAMADOL HYDROCHLORIDE 50 MG: 50 TABLET, COATED ORAL at 20:11

## 2024-08-18 RX ADMIN — SODIUM CHLORIDE, SODIUM GLUCONATE, SODIUM ACETATE, POTASSIUM CHLORIDE AND MAGNESIUM CHLORIDE 150 ML/HR: 526; 502; 368; 37; 30 INJECTION, SOLUTION INTRAVENOUS at 23:01

## 2024-08-18 RX ADMIN — ALBUMIN (HUMAN) 25 G: 12.5 INJECTION, SOLUTION INTRAVENOUS at 00:04

## 2024-08-18 RX ADMIN — DULOXETINE HYDROCHLORIDE 60 MG: 60 CAPSULE, DELAYED RELEASE ORAL at 17:09

## 2024-08-18 RX ADMIN — GABAPENTIN 800 MG: 400 CAPSULE ORAL at 17:09

## 2024-08-18 RX ADMIN — INSULIN LISPRO 1 UNITS: 100 INJECTION, SOLUTION INTRAVENOUS; SUBCUTANEOUS at 21:37

## 2024-08-18 NOTE — ASSESSMENT & PLAN NOTE
Magnesium level 1.2 on admission, replacement ordered  Recheck magnesium in a.m. and continue replacement as needed

## 2024-08-18 NOTE — PLAN OF CARE
Problem: Nutrition/Hydration-ADULT  Goal: Nutrient/Hydration intake appropriate for improving, restoring or maintaining nutritional needs  Description: Monitor and assess patient's nutrition/hydration status for malnutrition. Collaborate with interdisciplinary team and initiate plan and interventions as ordered.  Monitor patient's weight and dietary intake as ordered or per policy. Utilize nutrition screening tool and intervene as necessary. Determine patient's food preferences and provide high-protein, high-caloric foods as appropriate.     INTERVENTIONS:  - Monitor oral intake, urinary output, labs, and treatment plans  - Assess nutrition and hydration status and recommend course of action  - Evaluate amount of meals eaten  - Assist patient with eating if necessary   - Allow adequate time for meals  - Recommend/ encourage appropriate diets, oral nutritional supplements, and vitamin/mineral supplements  - Order, calculate, and assess calorie counts as needed  - Recommend, monitor, and adjust tube feedings and TPN/PPN based on assessed needs  - Assess need for intravenous fluids  - Provide specific nutrition/hydration education as appropriate  - Include patient/family/caregiver in decisions related to nutrition  Outcome: Progressing     Problem: PAIN - ADULT  Goal: Verbalizes/displays adequate comfort level or baseline comfort level  Description: Interventions:  - Encourage patient to monitor pain and request assistance  - Assess pain using appropriate pain scale  - Administer analgesics based on type and severity of pain and evaluate response  - Implement non-pharmacological measures as appropriate and evaluate response  - Consider cultural and social influences on pain and pain management  - Notify physician/advanced practitioner if interventions unsuccessful or patient reports new pain  Outcome: Progressing     Problem: METABOLIC, FLUID AND ELECTROLYTES - ADULT  Goal: Electrolytes maintained within normal  limits  Description: INTERVENTIONS:  - Monitor labs and assess patient for signs and symptoms of electrolyte imbalances  - Administer electrolyte replacement as ordered  - Monitor response to electrolyte replacements, including repeat lab results as appropriate  - Instruct patient on fluid and nutrition as appropriate  Outcome: Progressing  Goal: Fluid balance maintained  Description: INTERVENTIONS:  - Monitor labs   - Monitor I/O and WT  - Instruct patient on fluid and nutrition as appropriate  - Assess for signs & symptoms of volume excess or deficit  Outcome: Progressing  Goal: Glucose maintained within target range  Description: INTERVENTIONS:  - Monitor Blood Glucose as ordered  - Assess for signs and symptoms of hyperglycemia and hypoglycemia  - Administer ordered medications to maintain glucose within target range  - Assess nutritional intake and initiate nutrition service referral as needed  Outcome: Progressing     Problem: MUSCULOSKELETAL - ADULT  Goal: Maintain or return mobility to safest level of function  Description: INTERVENTIONS:  - Assess patient's ability to carry out ADLs; assess patient's baseline for ADL function and identify physical deficits which impact ability to perform ADLs (bathing, care of mouth/teeth, toileting, grooming, dressing, etc.)  - Assess/evaluate cause of self-care deficits   - Assess range of motion  - Assess patient's mobility  - Assess patient's need for assistive devices and provide as appropriate  - Encourage maximum independence but intervene and supervise when necessary  - Involve family in performance of ADLs  - Assess for home care needs following discharge   - Consider OT consult to assist with ADL evaluation and planning for discharge  - Provide patient education as appropriate  Outcome: Progressing  Goal: Maintain proper alignment of affected body part  Description: INTERVENTIONS:  - Support, maintain and protect limb and body alignment  - Provide patient/ family  with appropriate education  Outcome: Progressing

## 2024-08-18 NOTE — ASSESSMENT & PLAN NOTE
"Lab Results   Component Value Date    HGBA1C 16.1 (H) 08/16/2024       Recent Labs     08/17/24  1631 08/17/24 2000 08/17/24  2112 08/18/24  0841   POCGLU 202* 268* 230* 166*         Blood Sugar Average: Last 72 hrs:  (P) [id number]  Patient markedly hyperglycemic on admission only partially responsive to IV fluids, remainder of labs fortunately not consistent with DKA. A1C 16  Patient admitting she has not been using her home insulin as she apparently has been unable to fill prescription due to \"insurance issues,\" additionally reports that due to her neuropathy she has difficulty self injecting.    Typically on NovoLog 70/30 20 units twice daily  Started Lantus HS, will increase to 18 units nightly for now and continue with IV fluids, adjust inpatient insulin regimen as needed been on blood glucose trend, likely start mealtime insulin once better able to tolerate oral diet  Follows with endo outpatient, continue   "

## 2024-08-18 NOTE — ASSESSMENT & PLAN NOTE
Patient presented with generalized weakness, frequent falls.  She has had ongoing diarrhea, dysuria, poor oral intake and reported 70 pound weight loss over the last year.    Initially hypotensive in the ER, status post IV fluids. Labs with multiple metabolic derangements  CT imaging showing mild diffuse colonic wall thickening, mild diffuse wall thickening of stomach  C. difficile found to be positive--has a history of C. difficile in September 2022.    Patient started on Dificid 200 mg x 10 days per protocol on 8/18  Contact precautions  IVF  Supportive care

## 2024-08-18 NOTE — ASSESSMENT & PLAN NOTE
"Patient presenting with increased generalized weakness and frequent falls, she additionally admits to ongoing diarrhea, recent dysuria, poor oral intake, and at least 70-80 pound weight loss over the past 1 year. Has been following with GI, had colonoscopy May 2024 with poor prep--had EGD with esophagitis and gastritis  Labs with multiple derangements including hyperglycemia, hypokalemia and hypomagnesemia  C. difficile positive as above  Patient  reports she has not been using insulin due to \"insurance issue\" and inability to self inject due to her neuropathy  CT abdomen and pelvis without any obvious malignancy  Treatment as per respective issues  PT/OT recommending level 2 resource intensity   "

## 2024-08-18 NOTE — PROGRESS NOTES
"[facility]  Progress Note  Name: Barb Palomo I  MRN: [id number]  [facility] Date of Admission: 8/15/2024   Date of Service: 8/18/2024 I Hospital Day: 2    Assessment & Plan   * C. difficile diarrhea  Assessment & Plan  Patient presented with generalized weakness, frequent falls.  She has had ongoing diarrhea, dysuria, poor oral intake and reported 70 pound weight loss over the last year.    Initially hypotensive in the ER, status post IV fluids. Labs with multiple metabolic derangements  CT imaging showing mild diffuse colonic wall thickening, mild diffuse wall thickening of stomach  C. difficile found to be positive--has a history of C. difficile in September 2022.    Patient started on Dificid 200 mg x 10 days per protocol on 8/18  Contact precautions  IVF  Supportive care         Failure to thrive in adult  Assessment & Plan  Patient presenting with increased generalized weakness and frequent falls, she additionally admits to ongoing diarrhea, recent dysuria, poor oral intake, and at least 70-80 pound weight loss over the past 1 year. Has been following with GI, had colonoscopy May 2024 with poor prep--had EGD with esophagitis and gastritis  Labs with multiple derangements including hyperglycemia, hypokalemia and hypomagnesemia  C. difficile positive as above  Patient  reports she has not been using insulin due to \"insurance issue\" and inability to self inject due to her neuropathy  CT abdomen and pelvis without any obvious malignancy  Treatment as per respective issues  PT/OT recommending level 2 resource intensity     Acute cystitis without hematuria  Assessment & Plan  Reported dysuria on admission   Urine + nitrite & leukocytes, CT imaging with distended bladder suggestive of retention and small amount of air which could be secondary to infection  Urine Culture noted   S/P 3 doses of IV CTX, will discontinue       Hypomagnesemia  Assessment & " "Plan  Magnesium level 1.2 on admission, replacement ordered  Recheck magnesium in a.m. and continue replacement as needed    Hypokalemia  Assessment & Plan  Potassium level 2.3 on admission, EKG fortunately without acute changes compared to prior  Suspect due to recent poor oral intake additionally with ongoing diarrhea  Continue to replete  Continue telemetry monitoring until potassium at least 3    Uncontrolled type 2 diabetes mellitus with hyperglycemia (HCC)  Assessment & Plan  Lab Results   Component Value Date    HGBA1C 16.1 (H) 08/16/2024       Recent Labs     08/17/24  1631 08/17/24 2000 08/17/24  2112 08/18/24  0841   POCGLU 202* 268* 230* 166*         Blood Sugar Average: Last 72 hrs:  (P) [id number]  Patient markedly hyperglycemic on admission only partially responsive to IV fluids, remainder of labs fortunately not consistent with DKA. A1C 16  Patient admitting she has not been using her home insulin as she apparently has been unable to fill prescription due to \"insurance issues,\" additionally reports that due to her neuropathy she has difficulty self injecting.    Typically on NovoLog 70/30 20 units twice daily  Started Lantus HS, will increase to 18 units nightly for now and continue with IV fluids, adjust inpatient insulin regimen as needed been on blood glucose trend, likely start mealtime insulin once better able to tolerate oral diet  Follows with endo outpatient, continue     Opioid dependence with other opioid-induced disorder (HCC)  Assessment & Plan  PDMP reviewed, patient maintained on tramadol 200 mg daily as needed; history of chronic cervical/lumbar pain noted  Continue as needed tramadol    Chronic diarrhea  Assessment & Plan  Longstanding history of chronic diarrhea, patient reporting acute diarrhea x1 month. Follows with GI. Has tried questran/imodium without relief  Stool enteric panel negative  C. difficile positive, see plan above     Tobacco dependence  Assessment & " Plan  Counseled on need for cessation, provide nicotine patch while admitted  Recent CT chest for lung cancer screening 7/30/2024 negative for nodules or masses    Hypothyroidism  Assessment & Plan   Latest Reference Range & Units 08/16/24 04:11   TSH 3RD GENERATON 0.450 - 4.500 uIU/mL 0.661       Continue home dose levothyroxine     GERD without esophagitis  Assessment & Plan  Continue PPI    Severe episode of recurrent major depressive disorder, without psychotic features (HCC)  Assessment & Plan  Mood appearing fairly stable, continue home duloxetine, mirtazapine, as needed Valium               VTE Pharmacologic Prophylaxis: VTE Score: 3 Moderate Risk (Score 3-4) - Pharmacological DVT Prophylaxis Ordered: heparin.    Mobility:   Basic Mobility Inpatient Raw Score: 15  -HLM Goal: 4: Move to chair/commode  JH-HLM Achieved: 2: Bed activities/Dependent transfer  JH-HLM Goal NOT achieved. Continue with multidisciplinary rounding and encourage appropriate mobility to improve upon JH-HLM goals.    Patient Centered Rounds: I performed bedside rounds with nursing staff today.   Discussions with Specialists or Other Care Team Provider: none     Education and Discussions with Family / Patient: Patient declined call to .     Total Time Spent on Date of Encounter in care of patient: 35 mins. This time was spent on one or more of the following: performing physical exam; counseling and coordination of care; obtaining or reviewing history; documenting in the medical record; reviewing/ordering tests, medications or procedures; communicating with other healthcare professionals and discussing with patient's family/caregivers.    Current Length of Stay: 2 day(s)  Current Patient Status: Inpatient   Certification Statement: The patient will continue to require additional inpatient hospital stay due to electrolyte derrangements, tx of c.diff  Discharge Plan: Anticipate discharge in >72 hrs to discharge location to be  determined pending rehab evaluations.    Code Status: Level 1 - Full Code    Subjective:   Doing okay today. Still reports severe ongoing diarrhea.  She was unable to tolerate a rectal tube, it continually fell out.  Denies any urinary symptoms.  Has no abdominal pain.  Unable to tolerate even small bits of food or liquid as it runs directly through her.     Objective:     Vitals:   Temp (24hrs), Av.3 °F (36.8 °C), Min:97.8 °F (36.6 °C), Max:99 °F (37.2 °C)    Temp:  [97.8 °F (36.6 °C)-99 °F (37.2 °C)] 98.4 °F (36.9 °C)  HR:  [101-109] 106  Resp:  [12-20] 17  BP: ()/() 110/77  SpO2:  [94 %-97 %] 95 %  Body mass index is 16.1 kg/m².     Input and Output Summary (last 24 hours):     Intake/Output Summary (Last 24 hours) at 2024 1059  Last data filed at 2024 0636  Gross per 24 hour   Intake 3927.5 ml   Output 3650 ml   Net 277.5 ml       Physical Exam:   Physical Exam  Vitals and nursing note reviewed.   Constitutional:       General: She is not in acute distress.     Appearance: She is ill-appearing.   Cardiovascular:      Rate and Rhythm: Normal rate.   Pulmonary:      Effort: No respiratory distress.   Abdominal:      General: Bowel sounds are normal. There is no distension.      Palpations: Abdomen is soft.      Tenderness: There is no abdominal tenderness.   Skin:     Coloration: Skin is pale.   Neurological:      Mental Status: She is alert and oriented to person, place, and time.   Psychiatric:         Mood and Affect: Mood normal.          Additional Data:     Labs:  Results from last 7 days   Lab Units 24  0510   WBC Thousand/uL 5.49   HEMOGLOBIN g/dL 10.4*   HEMATOCRIT % 30.7*   PLATELETS Thousands/uL 225   SEGS PCT % 52   LYMPHO PCT % 36   MONO PCT % 7   EOS PCT % 3     Results from last 7 days   Lab Units 24  0510   SODIUM mmol/L 143   POTASSIUM mmol/L 2.9*   CHLORIDE mmol/L 105   CO2 mmol/L 30   BUN mg/dL 3*   CREATININE mg/dL <0.20*   ANION GAP mmol/L 8   CALCIUM  mg/dL 7.5*   ALBUMIN g/dL 3.1*   TOTAL BILIRUBIN mg/dL 0.27   ALK PHOS U/L 61   ALT U/L 7   AST U/L 11*   GLUCOSE RANDOM mg/dL 195*     Results from last 7 days   Lab Units 08/15/24  2158   INR  0.95     Results from last 7 days   Lab Units 08/18/24  0841 08/17/24  2112 08/17/24  2000 08/17/24  1631 08/17/24  1102 08/17/24  0746 08/16/24  2106 08/16/24  1602 08/16/24  1213 08/16/24  0716 08/16/24  0055 08/15/24  2025   POC GLUCOSE mg/dl 166* 230* 268* 202* 161* 72 252* 273* 314* 247* 379* 435*     Results from last 7 days   Lab Units 08/16/24  0411   HEMOGLOBIN A1C % 16.1*     Results from last 7 days   Lab Units 08/15/24  2341 08/15/24  2233   LACTIC ACID mmol/L  --  1.6   PROCALCITONIN ng/ml 0.09  --        Lines/Drains:  Invasive Devices       Peripheral Intravenous Line  Duration             Peripheral IV 08/15/24 Right;Ventral (anterior) Forearm 2 days    Peripheral IV 08/16/24 Right Forearm 2 days                      Telemetry:  Telemetry Orders (From admission, onward)               24 Hour Telemetry Monitoring  Continuous x 24 Hours (Telem)        Question:  Reason for 24 Hour Telemetry  Answer:  Arrhythmias requiring acute medical intervention / PPM or ICD malfunction                     Telemetry Reviewed: Normal Sinus Rhythm  Indication for Continued Telemetry Use: Metabolic/electrolyte disturbance with high probability of dysrhythmia             Imaging: No pertinent imaging reviewed.    Recent Cultures (last 7 days):   Results from last 7 days   Lab Units 08/16/24  0219 08/15/24  2158   BLOOD CULTURE   --  No Growth at 48 hrs.  No Growth at 48 hrs.   URINE CULTURE  80,000-89,000 cfu/ml Klebsiella pneumoniae*  20,000-29,000 cfu/ml  --    C DIFF TOXIN B BY PCR  Positive*  --        Last 24 Hours Medication List:   Current Facility-Administered Medications   Medication Dose Route Frequency Provider Last Rate    acetaminophen  650 mg Oral Q6H PRN Renan Drake DO      albuterol  2 puff Inhalation Q4H  PRN Renan Noelle, DO      aspirin  81 mg Oral Daily Renan Nguyeni, DO      atorvastatin  80 mg Oral Daily Renan Nguyeni, DO      baclofen  10 mg Oral BID PRN Renan Nguyeni, DO      butalbital-acetaminophen-caffeine  1 tablet Oral Q6H PRN Renan Suárezieri, DO      diazepam  5 mg Oral Q12H PRN Renan Noelle, DO      DULoxetine  60 mg Oral BID Renan Noelle, DO      fidaxomicin  200 mg Oral Q12H HONEY Holli Hernandez PA-C      fluticasone-vilanterol  1 puff Inhalation Daily Renan Suárezieri, DO      gabapentin  800 mg Oral TID Renan Noelle, DO      heparin (porcine)  5,000 Units Subcutaneous Q8H HONEY Renan Noelle, DO      insulin glargine  18 Units Subcutaneous HS Holli Hernandez PA-C      insulin lispro  1-5 Units Subcutaneous TID AC Renan Noelle, DO      insulin lispro  1-5 Units Subcutaneous HS Renan Noelle, DO      levothyroxine  112 mcg Oral Early Morning Renan Drake, DO      magnesium sulfate  2 g Intravenous Once Holli Hernandez PA-C      mirtazapine  7.5 mg Oral HS Renan Suárezieri, DO      multi-electrolyte  150 mL/hr Intravenous Continuous Holli Hernandez PA-C 150 mL/hr (08/18/24 0636)    nicotine  1 patch Transdermal Daily Renan Noelle, DO      ondansetron  4 mg Intravenous Q6H PRN Renan Noelle, DO      pantoprazole  40 mg Oral Early Morning Renan Noelle, DO      potassium chloride  40 mEq Oral BID Holli Hernandez PA-C      potassium chloride  20 mEq Intravenous Once Holli Hernandez PA-C      prazosin  2 mg Oral HS Renan Suárezieri, DO      traMADol  50 mg Oral Q6H PRN Renan Drake, DO          Today, Patient Was Seen By: Holli Hernandez PA-C    **Please Note: This note may have been constructed using a voice recognition system.**

## 2024-08-18 NOTE — PLAN OF CARE
Problem: Nutrition/Hydration-ADULT  Goal: Nutrient/Hydration intake appropriate for improving, restoring or maintaining nutritional needs  Description: Monitor and assess patient's nutrition/hydration status for malnutrition. Collaborate with interdisciplinary team and initiate plan and interventions as ordered.  Monitor patient's weight and dietary intake as ordered or per policy. Utilize nutrition screening tool and intervene as necessary. Determine patient's food preferences and provide high-protein, high-caloric foods as appropriate.     INTERVENTIONS:  - Monitor oral intake, urinary output, labs, and treatment plans  - Assess nutrition and hydration status and recommend course of action  - Evaluate amount of meals eaten  - Assist patient with eating if necessary   - Allow adequate time for meals  - Recommend/ encourage appropriate diets, oral nutritional supplements, and vitamin/mineral supplements  - Order, calculate, and assess calorie counts as needed  - Recommend, monitor, and adjust tube feedings and TPN/PPN based on assessed needs  - Assess need for intravenous fluids  - Provide specific nutrition/hydration education as appropriate  - Include patient/family/caregiver in decisions related to nutrition  Outcome: Progressing     Problem: PAIN - ADULT  Goal: Verbalizes/displays adequate comfort level or baseline comfort level  Description: Interventions:  - Encourage patient to monitor pain and request assistance  - Assess pain using appropriate pain scale  - Administer analgesics based on type and severity of pain and evaluate response  - Implement non-pharmacological measures as appropriate and evaluate response  - Consider cultural and social influences on pain and pain management  - Notify physician/advanced practitioner if interventions unsuccessful or patient reports new pain  Outcome: Progressing     Problem: INFECTION - ADULT  Goal: Absence or prevention of progression during  hospitalization  Description: INTERVENTIONS:  - Assess and monitor for signs and symptoms of infection  - Monitor lab/diagnostic results  - Monitor all insertion sites, i.e. indwelling lines, tubes, and drains  - Monitor endotracheal if appropriate and nasal secretions for changes in amount and color  - [place] appropriate cooling/warming therapies per order  - Administer medications as ordered  - Instruct and encourage patient and family to use good hand hygiene technique  - Identify and instruct in appropriate isolation precautions for identified infection/condition  Outcome: Progressing  Goal: Absence of fever/infection during neutropenic period  Description: INTERVENTIONS:  - Monitor WBC    Outcome: Progressing     Problem: SAFETY ADULT  Goal: Patient will remain free of falls  Description: INTERVENTIONS:  - Educate patient/family on patient safety including physical limitations  - Instruct patient to call for assistance with activity   - Consult OT/PT to assist with strengthening/mobility   - Keep Call bell within reach  - Keep bed low and locked with side rails adjusted as appropriate  - Keep care items and personal belongings within reach  - Initiate and maintain comfort rounds  - Make Fall Risk Sign visible to staff  - Offer Toileting every 2 Hours, in advance of need  - Initiate/Maintain bed alarm  - Obtain necessary fall risk management equipment:   - Apply yellow socks and bracelet for high fall risk patients  - Consider moving patient to room near nurses station  Outcome: Progressing  Goal: Maintain or return to baseline ADL function  Description: INTERVENTIONS:  -  Assess patient's ability to carry out ADLs; assess patient's baseline for ADL function and identify physical deficits which impact ability to perform ADLs (bathing, care of mouth/teeth, toileting, grooming, dressing, etc.)  - Assess/evaluate cause of self-care deficits   - Assess range of motion  - Assess patient's mobility; develop plan if  impaired  - Assess patient's need for assistive devices and provide as appropriate  - Encourage maximum independence but intervene and supervise when necessary  - Involve family in performance of ADLs  - Assess for home care needs following discharge   - Consider OT consult to assist with ADL evaluation and planning for discharge  - Provide patient education as appropriate  Outcome: Progressing     Problem: DISCHARGE PLANNING  Goal: Discharge to home or other facility with appropriate resources  Description: INTERVENTIONS:  - Identify barriers to discharge w/patient and caregiver  - Arrange for needed discharge resources and transportation as appropriate  - Identify discharge learning needs (meds, wound care, etc.)  - Arrange for interpretive services to assist at discharge as needed  - Refer to Case Management Department for coordinating discharge planning if the patient needs post-hospital services based on physician/advanced practitioner order or complex needs related to functional status, cognitive ability, or social support system  Outcome: Progressing     Problem: Knowledge Deficit  Goal: Patient/family/caregiver demonstrates understanding of disease process, treatment plan, medications, and discharge instructions  Description: Complete learning assessment and assess knowledge base.  Interventions:  - Provide teaching at level of understanding  - Provide teaching via preferred learning methods  Outcome: Progressing     Problem: NEUROSENSORY - ADULT  Goal: Achieves stable or improved neurological status  Description: INTERVENTIONS  - Monitor and report changes in neurological status  - Monitor vital signs such as temperature, blood pressure, glucose, and any other labs ordered   - Initiate measures to prevent increased intracranial pressure  - Monitor for seizure activity and implement precautions if appropriate      Outcome: Progressing  Goal: Remains free of injury related to seizures activity  Description:  INTERVENTIONS  - Maintain airway, patient safety  and administer oxygen as ordered  - Monitor patient for seizure activity, document and report duration and description of seizure to physician/advanced practitioner  - If seizure occurs,  ensure patient safety during seizure  - Reorient patient post seizure  - Seizure pads on all 4 side rails  - Instruct patient/family to notify RN of any seizure activity including if an aura is experienced  - Instruct patient/family to call for assistance with activity based on nursing assessment  - Administer anti-seizure medications if ordered    Outcome: Progressing  Goal: Achieves maximal functionality and self care  Description: INTERVENTIONS  - Monitor swallowing and airway patency with patient fatigue and changes in neurological status  - Encourage and assist patient to increase activity and self care.   - Encourage visually impaired, hearing impaired and aphasic patients to use assistive/communication devices  Outcome: Progressing     Problem: METABOLIC, FLUID AND ELECTROLYTES - ADULT  Goal: Electrolytes maintained within normal limits  Description: INTERVENTIONS:  - Monitor labs and assess patient for signs and symptoms of electrolyte imbalances  - Administer electrolyte replacement as ordered  - Monitor response to electrolyte replacements, including repeat lab results as appropriate  - Instruct patient on fluid and nutrition as appropriate  Outcome: Progressing  Goal: Fluid balance maintained  Description: INTERVENTIONS:  - Monitor labs   - Monitor I/O and WT  - Instruct patient on fluid and nutrition as appropriate  - Assess for signs & symptoms of volume excess or deficit  Outcome: Progressing  Goal: Glucose maintained within target range  Description: INTERVENTIONS:  - Monitor Blood Glucose as ordered  - Assess for signs and symptoms of hyperglycemia and hypoglycemia  - Administer ordered medications to maintain glucose within target range  - Assess nutritional intake  and initiate nutrition service referral as needed  Outcome: Progressing  Problem: MUSCULOSKELETAL - ADULT  Goal: Maintain or return mobility to safest level of function  Description: INTERVENTIONS:  - Assess patient's ability to carry out ADLs; assess patient's baseline for ADL function and identify physical deficits which impact ability to perform ADLs (bathing, care of mouth/teeth, toileting, grooming, dressing, etc.)  - Assess/evaluate cause of self-care deficits   - Assess range of motion  - Assess patient's mobility  - Assess patient's need for assistive devices and provide as appropriate  - Encourage maximum independence but intervene and supervise when necessary  - Involve family in performance of ADLs  - Assess for home care needs following discharge   - Consider OT consult to assist with ADL evaluation and planning for discharge  - Provide patient education as appropriate  Outcome: Progressing  Goal: Maintain proper alignment of affected body part  Description: INTERVENTIONS:  - Support, maintain and protect limb and body alignment  - Provide patient/ family with appropriate education  Outcome: Progressing     Problem: Prexisting or High Potential for Compromised Skin Integrity  Goal: Skin integrity is maintained or improved  Description: INTERVENTIONS:  - Identify patients at risk for skin breakdown  - Assess and monitor skin integrity  - Assess and monitor nutrition and hydration status  - Monitor labs   - Assess for incontinence   - Turn and reposition patient  - Assist with mobility/ambulation  - Relieve pressure over bony prominences  - Avoid friction and shearing  - Provide appropriate hygiene as needed including keeping skin clean and dry  - Evaluate need for skin moisturizer/barrier cream  - Collaborate with interdisciplinary team   - Patient/family teaching  - Consider wound care consult   Outcome: Progressing

## 2024-08-18 NOTE — ASSESSMENT & PLAN NOTE
Longstanding history of chronic diarrhea, patient reporting acute diarrhea x1 month. Follows with GI. Has tried questran/imodium without relief  Stool enteric panel negative  C. difficile positive, see plan above

## 2024-08-18 NOTE — ASSESSMENT & PLAN NOTE
Reported dysuria on admission   Urine + nitrite & leukocytes, CT imaging with distended bladder suggestive of retention and small amount of air which could be secondary to infection  Urine Culture noted   S/P 3 doses of IV CTX, will discontinue

## 2024-08-18 NOTE — ASSESSMENT & PLAN NOTE
Potassium level 2.3 on admission, EKG fortunately without acute changes compared to prior  Suspect due to recent poor oral intake additionally with ongoing diarrhea  Continue to replete  Continue telemetry monitoring until potassium at least 3

## 2024-08-19 LAB
ALBUMIN SERPL BCG-MCNC: 2.9 G/DL (ref 3.5–5)
ALP SERPL-CCNC: 62 U/L (ref 34–104)
ALT SERPL W P-5'-P-CCNC: 8 U/L (ref 7–52)
ANION GAP SERPL CALCULATED.3IONS-SCNC: 6 MMOL/L (ref 4–13)
AST SERPL W P-5'-P-CCNC: 10 U/L (ref 13–39)
BASOPHILS # BLD AUTO: 0.02 THOUSANDS/ΜL (ref 0–0.1)
BASOPHILS NFR BLD AUTO: 0 % (ref 0–1)
BILIRUB SERPL-MCNC: 0.28 MG/DL (ref 0.2–1)
BUN SERPL-MCNC: 5 MG/DL (ref 5–25)
CALCIUM ALBUM COR SERPL-MCNC: 8.6 MG/DL (ref 8.3–10.1)
CALCIUM SERPL-MCNC: 7.7 MG/DL (ref 8.4–10.2)
CHLORIDE SERPL-SCNC: 107 MMOL/L (ref 96–108)
CO2 SERPL-SCNC: 27 MMOL/L (ref 21–32)
CREAT SERPL-MCNC: 0.39 MG/DL (ref 0.6–1.3)
EOSINOPHIL # BLD AUTO: 0.11 THOUSAND/ΜL (ref 0–0.61)
EOSINOPHIL NFR BLD AUTO: 2 % (ref 0–6)
ERYTHROCYTE [DISTWIDTH] IN BLOOD BY AUTOMATED COUNT: 12.8 % (ref 11.6–15.1)
GFR SERPL CREATININE-BSD FRML MDRD: 120 ML/MIN/1.73SQ M
GLUCOSE SERPL-MCNC: 172 MG/DL (ref 65–140)
GLUCOSE SERPL-MCNC: 193 MG/DL (ref 65–140)
GLUCOSE SERPL-MCNC: 276 MG/DL (ref 65–140)
GLUCOSE SERPL-MCNC: 295 MG/DL (ref 65–140)
GLUCOSE SERPL-MCNC: 316 MG/DL (ref 65–140)
HCT VFR BLD AUTO: 32.1 % (ref 34.8–46.1)
HGB BLD-MCNC: 10.7 G/DL (ref 11.5–15.4)
IMM GRANULOCYTES # BLD AUTO: 0.02 THOUSAND/UL (ref 0–0.2)
IMM GRANULOCYTES NFR BLD AUTO: 0 % (ref 0–2)
LYMPHOCYTES # BLD AUTO: 2.03 THOUSANDS/ΜL (ref 0.6–4.47)
LYMPHOCYTES NFR BLD AUTO: 32 % (ref 14–44)
MAGNESIUM SERPL-MCNC: 1.8 MG/DL (ref 1.9–2.7)
MCH RBC QN AUTO: 31.6 PG (ref 26.8–34.3)
MCHC RBC AUTO-ENTMCNC: 33.3 G/DL (ref 31.4–37.4)
MCV RBC AUTO: 95 FL (ref 82–98)
MONOCYTES # BLD AUTO: 0.43 THOUSAND/ΜL (ref 0.17–1.22)
MONOCYTES NFR BLD AUTO: 7 % (ref 4–12)
NEUTROPHILS # BLD AUTO: 3.71 THOUSANDS/ΜL (ref 1.85–7.62)
NEUTS SEG NFR BLD AUTO: 59 % (ref 43–75)
NRBC BLD AUTO-RTO: 0 /100 WBCS
PLATELET # BLD AUTO: 222 THOUSANDS/UL (ref 149–390)
PMV BLD AUTO: 10.6 FL (ref 8.9–12.7)
POTASSIUM SERPL-SCNC: 4.1 MMOL/L (ref 3.5–5.3)
PROT SERPL-MCNC: 4.4 G/DL (ref 6.4–8.4)
RBC # BLD AUTO: 3.39 MILLION/UL (ref 3.81–5.12)
SODIUM SERPL-SCNC: 140 MMOL/L (ref 135–147)
WBC # BLD AUTO: 6.32 THOUSAND/UL (ref 4.31–10.16)

## 2024-08-19 PROCEDURE — 82948 REAGENT STRIP/BLOOD GLUCOSE: CPT

## 2024-08-19 PROCEDURE — 85025 COMPLETE CBC W/AUTO DIFF WBC: CPT | Performed by: INTERNAL MEDICINE

## 2024-08-19 PROCEDURE — 83735 ASSAY OF MAGNESIUM: CPT | Performed by: INTERNAL MEDICINE

## 2024-08-19 PROCEDURE — 99232 SBSQ HOSP IP/OBS MODERATE 35: CPT | Performed by: NURSE PRACTITIONER

## 2024-08-19 PROCEDURE — 80053 COMPREHEN METABOLIC PANEL: CPT | Performed by: INTERNAL MEDICINE

## 2024-08-19 RX ORDER — MAGNESIUM SULFATE HEPTAHYDRATE 40 MG/ML
2 INJECTION, SOLUTION INTRAVENOUS ONCE
Status: COMPLETED | OUTPATIENT
Start: 2024-08-19 | End: 2024-08-19

## 2024-08-19 RX ADMIN — FIDAXOMICIN 200 MG: 200 TABLET, FILM COATED ORAL at 08:04

## 2024-08-19 RX ADMIN — POTASSIUM CHLORIDE 40 MEQ: 1500 TABLET, EXTENDED RELEASE ORAL at 17:42

## 2024-08-19 RX ADMIN — MAGNESIUM SULFATE HEPTAHYDRATE 2 G: 40 INJECTION, SOLUTION INTRAVENOUS at 09:33

## 2024-08-19 RX ADMIN — GABAPENTIN 800 MG: 400 CAPSULE ORAL at 08:04

## 2024-08-19 RX ADMIN — POTASSIUM CHLORIDE 40 MEQ: 1500 TABLET, EXTENDED RELEASE ORAL at 08:03

## 2024-08-19 RX ADMIN — DULOXETINE HYDROCHLORIDE 60 MG: 60 CAPSULE, DELAYED RELEASE ORAL at 08:03

## 2024-08-19 RX ADMIN — ATORVASTATIN CALCIUM 80 MG: 80 TABLET, FILM COATED ORAL at 08:03

## 2024-08-19 RX ADMIN — HEPARIN SODIUM 5000 UNITS: 5000 INJECTION INTRAVENOUS; SUBCUTANEOUS at 08:04

## 2024-08-19 RX ADMIN — BACLOFEN 10 MG: 10 TABLET ORAL at 20:31

## 2024-08-19 RX ADMIN — TRAMADOL HYDROCHLORIDE 50 MG: 50 TABLET, COATED ORAL at 17:42

## 2024-08-19 RX ADMIN — DIAZEPAM 5 MG: 5 TABLET ORAL at 16:07

## 2024-08-19 RX ADMIN — INSULIN LISPRO 2 UNITS: 100 INJECTION, SOLUTION INTRAVENOUS; SUBCUTANEOUS at 22:07

## 2024-08-19 RX ADMIN — HEPARIN SODIUM 5000 UNITS: 5000 INJECTION INTRAVENOUS; SUBCUTANEOUS at 16:08

## 2024-08-19 RX ADMIN — HEPARIN SODIUM 5000 UNITS: 5000 INJECTION INTRAVENOUS; SUBCUTANEOUS at 22:04

## 2024-08-19 RX ADMIN — PRAZOSIN HYDROCHLORIDE 2 MG: 2 CAPSULE ORAL at 22:08

## 2024-08-19 RX ADMIN — GABAPENTIN 800 MG: 400 CAPSULE ORAL at 20:31

## 2024-08-19 RX ADMIN — FLUTICASONE FUROATE AND VILANTEROL TRIFENATATE 1 PUFF: 200; 25 POWDER RESPIRATORY (INHALATION) at 08:04

## 2024-08-19 RX ADMIN — DIAZEPAM 5 MG: 5 TABLET ORAL at 03:26

## 2024-08-19 RX ADMIN — ASPIRIN 81 MG CHEWABLE TABLET 81 MG: 81 TABLET CHEWABLE at 08:03

## 2024-08-19 RX ADMIN — INSULIN LISPRO 1 UNITS: 100 INJECTION, SOLUTION INTRAVENOUS; SUBCUTANEOUS at 08:04

## 2024-08-19 RX ADMIN — GABAPENTIN 800 MG: 400 CAPSULE ORAL at 16:08

## 2024-08-19 RX ADMIN — NICOTINE 1 PATCH: 21 PATCH, EXTENDED RELEASE TRANSDERMAL at 08:03

## 2024-08-19 RX ADMIN — DULOXETINE HYDROCHLORIDE 60 MG: 60 CAPSULE, DELAYED RELEASE ORAL at 17:42

## 2024-08-19 RX ADMIN — LEVOTHYROXINE SODIUM 112 MCG: 112 TABLET ORAL at 05:16

## 2024-08-19 RX ADMIN — INSULIN GLARGINE 18 UNITS: 100 INJECTION, SOLUTION SUBCUTANEOUS at 22:04

## 2024-08-19 RX ADMIN — PANTOPRAZOLE SODIUM 40 MG: 40 TABLET, DELAYED RELEASE ORAL at 05:15

## 2024-08-19 RX ADMIN — MIRTAZAPINE 7.5 MG: 15 TABLET, FILM COATED ORAL at 22:07

## 2024-08-19 RX ADMIN — FIDAXOMICIN 200 MG: 200 TABLET, FILM COATED ORAL at 20:31

## 2024-08-19 RX ADMIN — SODIUM CHLORIDE, SODIUM GLUCONATE, SODIUM ACETATE, POTASSIUM CHLORIDE AND MAGNESIUM CHLORIDE 150 ML/HR: 526; 502; 368; 37; 30 INJECTION, SOLUTION INTRAVENOUS at 06:14

## 2024-08-19 RX ADMIN — INSULIN LISPRO 3 UNITS: 100 INJECTION, SOLUTION INTRAVENOUS; SUBCUTANEOUS at 12:44

## 2024-08-19 NOTE — ASSESSMENT & PLAN NOTE
Overall improved  Suspect due to recent poor oral intake additionally with ongoing diarrhea  Continue to monitor and replete PRN  Can D/C telemetry given normalized K

## 2024-08-19 NOTE — PLAN OF CARE
Problem: Nutrition/Hydration-ADULT  Goal: Nutrient/Hydration intake appropriate for improving, restoring or maintaining nutritional needs  Description: Monitor and assess patient's nutrition/hydration status for malnutrition. Collaborate with interdisciplinary team and initiate plan and interventions as ordered.  Monitor patient's weight and dietary intake as ordered or per policy. Utilize nutrition screening tool and intervene as necessary. Determine patient's food preferences and provide high-protein, high-caloric foods as appropriate.     INTERVENTIONS:  - Monitor oral intake, urinary output, labs, and treatment plans  - Assess nutrition and hydration status and recommend course of action  - Evaluate amount of meals eaten  - Assist patient with eating if necessary   - Allow adequate time for meals  - Recommend/ encourage appropriate diets, oral nutritional supplements, and vitamin/mineral supplements  - Order, calculate, and assess calorie counts as needed  - Recommend, monitor, and adjust tube feedings and TPN/PPN based on assessed needs  - Assess need for intravenous fluids  - Provide specific nutrition/hydration education as appropriate  - Include patient/family/caregiver in decisions related to nutrition  Outcome: Progressing     Problem: METABOLIC, FLUID AND ELECTROLYTES - ADULT  Goal: Electrolytes maintained within normal limits  Description: INTERVENTIONS:  - Monitor labs and assess patient for signs and symptoms of electrolyte imbalances  - Administer electrolyte replacement as ordered  - Monitor response to electrolyte replacements, including repeat lab results as appropriate  - Instruct patient on fluid and nutrition as appropriate  Outcome: Progressing  Goal: Fluid balance maintained  Description: INTERVENTIONS:  - Monitor labs   - Monitor I/O and WT  - Instruct patient on fluid and nutrition as appropriate  - Assess for signs & symptoms of volume excess or deficit  Outcome: Progressing  Goal:  Glucose maintained within target range  Description: INTERVENTIONS:  - Monitor Blood Glucose as ordered  - Assess for signs and symptoms of hyperglycemia and hypoglycemia  - Administer ordered medications to maintain glucose within target range  - Assess nutritional intake and initiate nutrition service referral as needed  Outcome: Progressing

## 2024-08-19 NOTE — ASSESSMENT & PLAN NOTE
"Lab Results   Component Value Date    HGBA1C 16.1 (H) 08/16/2024       Recent Labs     08/18/24  1137 08/18/24  1627 08/18/24  2104 08/19/24  0755   POCGLU 146* 102 193* 172*       Patient markedly hyperglycemic on admission only partially responsive to IV fluids, remainder of labs fortunately not consistent with DKA. A1C 16  Patient admitting she has not been using her home insulin as she apparently has been unable to fill prescription due to \"insurance issues,\" additionally reports that due to her neuropathy she has difficulty self injecting.  Daughter also reports jessica non compliance.   Typically on NovoLog 70/30 20 units twice daily  Started Lantus 18 units HS, likely start mealtime insulin once better able to tolerate oral diet  Will discuss insurance issues with CM  Follows with endo outpatient, continue   "

## 2024-08-19 NOTE — ASSESSMENT & PLAN NOTE
Patient presented with generalized weakness, frequent falls.  She has had ongoing diarrhea, dysuria, poor oral intake and reported 70 pound weight loss over the last year.    Initially hypotensive in the ER, status post IV fluids. Labs with multiple metabolic derangements  CT imaging showing mild diffuse colonic wall thickening, mild diffuse wall thickening of stomach  C. difficile found to be positive--has a history of C. difficile in September 2022.    Patient started on Dificid 200 mg x 10 days per protocol on 8/18  Contact precautions  S/P IVF  Supportive care   PT/OT recommending rehab, CM following.

## 2024-08-19 NOTE — ASSESSMENT & PLAN NOTE
"Patient presenting with increased generalized weakness and frequent falls, she additionally admits to ongoing diarrhea, recent dysuria, poor oral intake, and at least 70-80 pound weight loss over the past 1 year. Has been following with GI, had colonoscopy May 2024 with poor prep--had EGD with esophagitis and gastritis  Labs with multiple derangements including hyperglycemia, hypokalemia and hypomagnesemia  C. difficile positive as above  Patient  reports she has not been using insulin due to \"insurance issue\" and inability to self inject due to her neuropathy  CT abdomen and pelvis without any obvious malignancy  Treatment as per respective issues  PT/OT recommending level 2 resource intensity as above  "

## 2024-08-19 NOTE — PLAN OF CARE
Problem: Nutrition/Hydration-ADULT  Goal: Nutrient/Hydration intake appropriate for improving, restoring or maintaining nutritional needs  Description: Monitor and assess patient's nutrition/hydration status for malnutrition. Collaborate with interdisciplinary team and initiate plan and interventions as ordered.  Monitor patient's weight and dietary intake as ordered or per policy. Utilize nutrition screening tool and intervene as necessary. Determine patient's food preferences and provide high-protein, high-caloric foods as appropriate.     INTERVENTIONS:  - Monitor oral intake, urinary output, labs, and treatment plans  - Assess nutrition and hydration status and recommend course of action  - Evaluate amount of meals eaten  - Assist patient with eating if necessary   - Allow adequate time for meals  - Recommend/ encourage appropriate diets, oral nutritional supplements, and vitamin/mineral supplements  - Order, calculate, and assess calorie counts as needed  - Recommend, monitor, and adjust tube feedings and TPN/PPN based on assessed needs  - Assess need for intravenous fluids  - Provide specific nutrition/hydration education as appropriate  - Include patient/family/caregiver in decisions related to nutrition  Outcome: Progressing     Problem: PAIN - ADULT  Goal: Verbalizes/displays adequate comfort level or baseline comfort level  Description: Interventions:  - Encourage patient to monitor pain and request assistance  - Assess pain using appropriate pain scale  - Administer analgesics based on type and severity of pain and evaluate response  - Implement non-pharmacological measures as appropriate and evaluate response  - Consider cultural and social influences on pain and pain management  - Notify physician/advanced practitioner if interventions unsuccessful or patient reports new pain  Outcome: Progressing     Problem: INFECTION - ADULT  Goal: Absence or prevention of progression during  hospitalization  Description: INTERVENTIONS:  - Assess and monitor for signs and symptoms of infection  - Monitor lab/diagnostic results  - Monitor all insertion sites, i.e. indwelling lines, tubes, and drains  - Monitor endotracheal if appropriate and nasal secretions for changes in amount and color  - [place] appropriate cooling/warming therapies per order  - Administer medications as ordered  - Instruct and encourage patient and family to use good hand hygiene technique  - Identify and instruct in appropriate isolation precautions for identified infection/condition  Outcome: Progressing  Goal: Absence of fever/infection during neutropenic period  Description: INTERVENTIONS:  - Monitor WBC    Outcome: Progressing     Problem: SAFETY ADULT  Goal: Patient will remain free of falls  Description: INTERVENTIONS:  - Educate patient/family on patient safety including physical limitations  - Instruct patient to call for assistance with activity   - Consult OT/PT to assist with strengthening/mobility   - Keep Call bell within reach  - Keep bed low and locked with side rails adjusted as appropriate  - Keep care items and personal belongings within reach  - Initiate and maintain comfort rounds  - Make Fall Risk Sign visible to staff  - Offer Toileting every 2 Hours, in advance of need  - Initiate/Maintain alarm  - Obtain necessary fall risk management equipment  - Apply yellow socks and bracelet for high fall risk patients  - Consider moving patient to room near nurses station  Outcome: Progressing  Goal: Maintain or return to baseline ADL function  Description: INTERVENTIONS:  -  Assess patient's ability to carry out ADLs; assess patient's baseline for ADL function and identify physical deficits which impact ability to perform ADLs (bathing, care of mouth/teeth, toileting, grooming, dressing, etc.)  - Assess/evaluate cause of self-care deficits   - Assess range of motion  - Assess patient's mobility; develop plan if  impaired  - Assess patient's need for assistive devices and provide as appropriate  - Encourage maximum independence but intervene and supervise when necessary  - Involve family in performance of ADLs  - Assess for home care needs following discharge   - Consider OT consult to assist with ADL evaluation and planning for discharge  - Provide patient education as appropriate  Outcome: Progressing  Goal: Maintains/Returns to pre admission functional level  Description: INTERVENTIONS:  - Perform AM-PAC 6 Click Basic Mobility/ Daily Activity assessment daily.  - Set and communicate daily mobility goal to care team and patient/family/caregiver.   - Collaborate with rehabilitation services on mobility goals if consulted  - Perform Range of Motion 3 times a day.  - Reposition patient every 2 hours.  - Dangle patient 3 times a day  - Stand patient 3 times a day  - Ambulate patient 3 times a day  - Out of bed to chair 3 times a day   - Out of bed for meals 3 times a day  - Out of bed for toileting  - Record patient progress and toleration of activity level   Outcome: Progressing     Problem: DISCHARGE PLANNING  Goal: Discharge to home or other facility with appropriate resources  Description: INTERVENTIONS:  - Identify barriers to discharge w/patient and caregiver  - Arrange for needed discharge resources and transportation as appropriate  - Identify discharge learning needs (meds, wound care, etc.)  - Arrange for interpretive services to assist at discharge as needed  - Refer to Case Management Department for coordinating discharge planning if the patient needs post-hospital services based on physician/advanced practitioner order or complex needs related to functional status, cognitive ability, or social support system  Outcome: Progressing     Problem: Knowledge Deficit  Goal: Patient/family/caregiver demonstrates understanding of disease process, treatment plan, medications, and discharge instructions  Description:  Complete learning assessment and assess knowledge base.  Interventions:  - Provide teaching at level of understanding  - Provide teaching via preferred learning methods  Outcome: Progressing     Problem: NEUROSENSORY - ADULT  Goal: Achieves stable or improved neurological status  Description: INTERVENTIONS  - Monitor and report changes in neurological status  - Monitor vital signs such as temperature, blood pressure, glucose, and any other labs ordered   - Initiate measures to prevent increased intracranial pressure  - Monitor for seizure activity and implement precautions if appropriate      Outcome: Progressing  Goal: Remains free of injury related to seizures activity  Description: INTERVENTIONS  - Maintain airway, patient safety  and administer oxygen as ordered  - Monitor patient for seizure activity, document and report duration and description of seizure to physician/advanced practitioner  - If seizure occurs,  ensure patient safety during seizure  - Reorient patient post seizure  - Seizure pads on all 4 side rails  - Instruct patient/family to notify RN of any seizure activity including if an aura is experienced  - Instruct patient/family to call for assistance with activity based on nursing assessment  - Administer anti-seizure medications if ordered    Outcome: Progressing  Goal: Achieves maximal functionality and self care  Description: INTERVENTIONS  - Monitor swallowing and airway patency with patient fatigue and changes in neurological status  - Encourage and assist patient to increase activity and self care.   - Encourage visually impaired, hearing impaired and aphasic patients to use assistive/communication devices  Outcome: Progressing     Problem: METABOLIC, FLUID AND ELECTROLYTES - ADULT  Goal: Electrolytes maintained within normal limits  Description: INTERVENTIONS:  - Monitor labs and assess patient for signs and symptoms of electrolyte imbalances  - Administer electrolyte replacement as  ordered  - Monitor response to electrolyte replacements, including repeat lab results as appropriate  - Instruct patient on fluid and nutrition as appropriate  Outcome: Progressing  Goal: Fluid balance maintained  Description: INTERVENTIONS:  - Monitor labs   - Monitor I/O and WT  - Instruct patient on fluid and nutrition as appropriate  - Assess for signs & symptoms of volume excess or deficit  Outcome: Progressing  Goal: Glucose maintained within target range  Description: INTERVENTIONS:  - Monitor Blood Glucose as ordered  - Assess for signs and symptoms of hyperglycemia and hypoglycemia  - Administer ordered medications to maintain glucose within target range  - Assess nutritional intake and initiate nutrition service referral as needed  Outcome: Progressing     Problem: SKIN/TISSUE INTEGRITY - ADULT  Goal: Skin Integrity remains intact(Skin Breakdown Prevention)  Description: Assess:  -Perform William assessment every shift  -Clean and moisturize skin every day  -Inspect skin when repositioning, toileting, and assisting with ADLS  -Assess under medical devices such as lines every 2 hours  -Assess extremities for adequate circulation and sensation     Bed Management:  -Have minimal linens on bed & keep smooth, unwrinkled  -Change linens as needed when moist or perspiring  -Avoid sitting or lying in one position for more than 2 hours while in bed  -Keep HOB at 30 degrees     Toileting:  -Offer bedside commode  -Assess for incontinence every hour  -Use incontinent care products after each incontinent episode such as moisture barriers, foam cleansers    Activity:  -Mobilize patient 3 times a day  -Encourage activity and walks on unit  -Encourage or provide ROM exercises   -Turn and reposition patient every 2 Hours  -Use appropriate equipment to lift or move patient in bed  -Instruct/ Assist with weight shifting every 15 min when out of bed in chair  -Consider limitation of chair time 2 hour intervals    Skin  Care:  -Avoid use of baby powder, tape, friction and shearing, hot water or constrictive clothing  -Relieve pressure over bony prominences using offloading techniques or foam dressings (If not contraindicated by incontinence)  -Do not massage red bony areas    Next Steps:  -Teach patient strategies to minimize risks    -Consider consults to  interdisciplinary teams  Outcome: Progressing  Goal: Incision(s), wounds(s) or drain site(s) healing without S/S of infection  Description: INTERVENTIONS  - Assess and document dressing, incision, wound bed, drain sites and surrounding tissue  - Provide patient and family education  - Perform skin care/dressing changes  Outcome: Progressing  Goal: Pressure injury heals and does not worsen  Description: Interventions:  - Implement low air loss mattress or specialty surface (Criteria met)  - Apply silicone foam dressing  - Instruct/assist with weight shifting every 15 minutes when in chair   - Limit chair time to 2 hour intervals  - Use special pressure reducing interventions when in chair   - Apply fecal or urinary incontinence containment device   - Perform passive or active ROM  - Turn and reposition patient & offload bony prominences every 2 hours   - Utilize friction reducing device or surface for transfers   - Consider consults to  interdisciplinary teams  - Use incontinent care products after each incontinent episode  - Consider nutrition services referral as needed  Outcome: Progressing     Problem: MUSCULOSKELETAL - ADULT  Goal: Maintain or return mobility to safest level of function  Description: INTERVENTIONS:  - Assess patient's ability to carry out ADLs; assess patient's baseline for ADL function and identify physical deficits which impact ability to perform ADLs (bathing, care of mouth/teeth, toileting, grooming, dressing, etc.)  - Assess/evaluate cause of self-care deficits   - Assess range of motion  - Assess patient's mobility  - Assess patient's need for  assistive devices and provide as appropriate  - Encourage maximum independence but intervene and supervise when necessary  - Involve family in performance of ADLs  - Assess for home care needs following discharge   - Consider OT consult to assist with ADL evaluation and planning for discharge  - Provide patient education as appropriate  Outcome: Progressing  Goal: Maintain proper alignment of affected body part  Description: INTERVENTIONS:  - Support, maintain and protect limb and body alignment  - Provide patient/ family with appropriate education  Outcome: Progressing     Problem: Prexisting or High Potential for Compromised Skin Integrity  Goal: Skin integrity is maintained or improved  Description: INTERVENTIONS:  - Identify patients at risk for skin breakdown  - Assess and monitor skin integrity  - Assess and monitor nutrition and hydration status  - Monitor labs   - Assess for incontinence   - Turn and reposition patient  - Assist with mobility/ambulation  - Relieve pressure over bony prominences  - Avoid friction and shearing  - Provide appropriate hygiene as needed including keeping skin clean and dry  - Evaluate need for skin moisturizer/barrier cream  - Collaborate with interdisciplinary team   - Patient/family teaching  - Consider wound care consult   Outcome: Progressing

## 2024-08-19 NOTE — PROGRESS NOTES
"[facility]  Progress Note  Name: Barb Palomo I  MRN: [id number]  [facility] Date of Admission: 8/15/2024   Date of Service: 8/19/2024 I Hospital Day: 3    Assessment & Plan   * C. difficile diarrhea  Assessment & Plan  Patient presented with generalized weakness, frequent falls.  She has had ongoing diarrhea, dysuria, poor oral intake and reported 70 pound weight loss over the last year.    Initially hypotensive in the ER, status post IV fluids. Labs with multiple metabolic derangements  CT imaging showing mild diffuse colonic wall thickening, mild diffuse wall thickening of stomach  C. difficile found to be positive--has a history of C. difficile in September 2022.    Patient started on Dificid 200 mg x 10 days per protocol on 8/18  Contact precautions  S/P IVF  Supportive care   PT/OT recommending rehab, CM following.         Failure to thrive in adult  Assessment & Plan  Patient presenting with increased generalized weakness and frequent falls, she additionally admits to ongoing diarrhea, recent dysuria, poor oral intake, and at least 70-80 pound weight loss over the past 1 year. Has been following with GI, had colonoscopy May 2024 with poor prep--had EGD with esophagitis and gastritis  Labs with multiple derangements including hyperglycemia, hypokalemia and hypomagnesemia  C. difficile positive as above  Patient  reports she has not been using insulin due to \"insurance issue\" and inability to self inject due to her neuropathy  CT abdomen and pelvis without any obvious malignancy  Treatment as per respective issues  PT/OT recommending level 2 resource intensity as above    Acute cystitis without hematuria  Assessment & Plan  Reported dysuria on admission   Urine + nitrite & leukocytes, CT imaging with distended bladder suggestive of retention and small amount of air which could be secondary to infection  Urine Culture noted   S/P 3 doses of IV " "CTX      Uncontrolled type 2 diabetes mellitus with hyperglycemia (HCC)  Assessment & Plan  Lab Results   Component Value Date    HGBA1C 16.1 (H) 08/16/2024       Recent Labs     08/18/24  1137 08/18/24  1627 08/18/24  2104 08/19/24  0755   POCGLU 146* 102 193* 172*       Patient markedly hyperglycemic on admission only partially responsive to IV fluids, remainder of labs fortunately not consistent with DKA. A1C 16  Patient admitting she has not been using her home insulin as she apparently has been unable to fill prescription due to \"insurance issues,\" additionally reports that due to her neuropathy she has difficulty self injecting.  Daughter also reports jessica non compliance.   Typically on NovoLog 70/30 20 units twice daily  Started Lantus 18 units HS, likely start mealtime insulin once better able to tolerate oral diet  Will discuss insurance issues with CM  Follows with endo outpatient, continue     Chronic diarrhea  Assessment & Plan  Longstanding history of chronic diarrhea, patient reporting acute diarrhea x1 month. Follows with GI. Has tried questran/imodium without relief  Stool enteric panel negative  C. difficile positive, see plan above     GERD without esophagitis  Assessment & Plan  Continue PPI    Hypokalemia  Assessment & Plan  Overall improved  Suspect due to recent poor oral intake additionally with ongoing diarrhea  Continue to monitor and replete PRN  Can D/C telemetry given normalized K     Hypomagnesemia  Assessment & Plan  Improving, still below goal. Additional repletion today.  Recheck magnesium in a.m. and continue replacement as needed    Hypothyroidism  Assessment & Plan   Latest Reference Range & Units 08/16/24 04:11   TSH 3RD GENERATON 0.450 - 4.500 uIU/mL 0.661       Continue home dose levothyroxine     Opioid dependence with other opioid-induced disorder (HCC)  Assessment & Plan  PDMP reviewed, patient maintained on tramadol 200 mg daily as needed; history of chronic cervical/lumbar " pain noted  Continue as needed tramadol    Severe episode of recurrent major depressive disorder, without psychotic features (HCC)  Assessment & Plan  Mood appearing fairly stable, continue home duloxetine, mirtazapine, as needed Valium    Tobacco dependence  Assessment & Plan  Counseled on need for cessation, provide nicotine patch while admitted  Recent CT chest for lung cancer screening 7/30/2024 negative for nodules or masses             VTE Pharmacologic Prophylaxis: VTE Score: 3 Moderate Risk (Score 3-4) - Pharmacological DVT Prophylaxis Ordered: heparin.    Mobility:   Basic Mobility Inpatient Raw Score: 15  -HLM Goal: 4: Move to chair/commode  JH-HLM Achieved: 2: Bed activities/Dependent transfer  JH-HLM Goal NOT achieved. Continue with multidisciplinary rounding and encourage appropriate mobility to improve upon -HLM goals.    Patient Centered Rounds: I performed bedside rounds with nursing staff today.     Discussions with Specialists or Other Care Team Provider: nursing, case management     Education and Discussions with Family / Patient: Updated  (daughter) via phone.    Total Time Spent on Date of Encounter in care of patient: 30 mins. This time was spent on one or more of the following: performing physical exam; counseling and coordination of care; obtaining or reviewing history; documenting in the medical record; reviewing/ordering tests, medications or procedures; communicating with other healthcare professionals and discussing with patient's family/caregivers.    Current Length of Stay: 3 day(s)    Current Patient Status: Inpatient     Certification Statement: The patient will continue to require additional inpatient hospital stay due to monitor stools, electrolytes and BG, dispo    Discharge Plan: Anticipate discharge in 24-48 hrs to rehab facility.    Code Status: Level 1 - Full Code    Subjective:   Continues to report significant diarrhea.  No fevers, chills.  Abdominal pain.   "Appetite is good.  Discussed her insulin, she states that she \"does what she can \"but does not take insulin consistently due to neuropathy and inability to inject herself.    Objective:     Vitals:   Temp (24hrs), Av.6 °F (37 °C), Min:98.1 °F (36.7 °C), Max:98.9 °F (37.2 °C)    Temp:  [98.1 °F (36.7 °C)-98.9 °F (37.2 °C)] 98.1 °F (36.7 °C)  HR:  [] 98  Resp:  [17-20] 17  BP: ()/() 95/67  SpO2:  [95 %-98 %] 98 %  Body mass index is 16.1 kg/m².     Input and Output Summary (last 24 hours):     Intake/Output Summary (Last 24 hours) at 2024 0933  Last data filed at 2024 0802  Gross per 24 hour   Intake 4827 ml   Output 1000 ml   Net 3827 ml       Physical Exam:   Physical Exam  Vitals and nursing note reviewed.   Constitutional:       General: She is not in acute distress.  Cardiovascular:      Rate and Rhythm: Tachycardia present.      Comments: HR in low 100's  Pulmonary:      Breath sounds: Normal breath sounds.   Abdominal:      Palpations: Abdomen is soft.      Tenderness: There is no abdominal tenderness.   Musculoskeletal:         General: No swelling.   Skin:     General: Skin is warm.      Coloration: Skin is pale.   Neurological:      Mental Status: She is alert and oriented to person, place, and time. Mental status is at baseline.   Psychiatric:         Mood and Affect: Mood normal.          Additional Data:     Labs:  Results from last 7 days   Lab Units 24  0522   WBC Thousand/uL 6.32   HEMOGLOBIN g/dL 10.7*   HEMATOCRIT % 32.1*   PLATELETS Thousands/uL 222   SEGS PCT % 59   LYMPHO PCT % 32   MONO PCT % 7   EOS PCT % 2     Results from last 7 days   Lab Units 24  0522   SODIUM mmol/L 140   POTASSIUM mmol/L 4.1   CHLORIDE mmol/L 107   CO2 mmol/L 27   BUN mg/dL 5   CREATININE mg/dL 0.39*   ANION GAP mmol/L 6   CALCIUM mg/dL 7.7*   ALBUMIN g/dL 2.9*   TOTAL BILIRUBIN mg/dL 0.28   ALK PHOS U/L 62   ALT U/L 8   AST U/L 10*   GLUCOSE RANDOM mg/dL 193*     Results from " last 7 days   Lab Units 08/15/24  2158   INR  0.95     Results from last 7 days   Lab Units 08/19/24  0755 08/18/24  2104 08/18/24  1627 08/18/24  1137 08/18/24  0841 08/17/24  2112 08/17/24  2000 08/17/24  1631 08/17/24  1102 08/17/24  0746 08/16/24  2106 08/16/24  1602   POC GLUCOSE mg/dl 172* 193* 102 146* 166* 230* 268* 202* 161* 72 252* 273*     Results from last 7 days   Lab Units 08/16/24  0411   HEMOGLOBIN A1C % 16.1*     Results from last 7 days   Lab Units 08/15/24  2341 08/15/24  2233   LACTIC ACID mmol/L  --  1.6   PROCALCITONIN ng/ml 0.09  --        Lines/Drains:  Invasive Devices       Peripheral Intravenous Line  Duration             Peripheral IV 08/15/24 Right;Ventral (anterior) Forearm 3 days    Peripheral IV 08/16/24 Right Forearm 3 days                          Imaging: No pertinent imaging reviewed.    Recent Cultures (last 7 days):   Results from last 7 days   Lab Units 08/16/24  0219 08/15/24  2158   BLOOD CULTURE   --  No Growth at 72 hrs.  No Growth at 72 hrs.   URINE CULTURE  80,000-89,000 cfu/ml Klebsiella pneumoniae*  20,000-29,000 cfu/ml  --    C DIFF TOXIN B BY PCR  Positive*  --        Last 24 Hours Medication List:   Current Facility-Administered Medications   Medication Dose Route Frequency Provider Last Rate    acetaminophen  650 mg Oral Q6H PRN Renan Drake, DO      albuterol  2 puff Inhalation Q4H PRN Renan Nguyeni, DO      aspirin  81 mg Oral Daily Renan Nguyeni, DO      atorvastatin  80 mg Oral Daily Renan Nguyeni, DO      baclofen  10 mg Oral BID PRN Renan Nguyeni, DO      butalbital-acetaminophen-caffeine  1 tablet Oral Q6H PRN Renan Nguyeni, DO      diazepam  5 mg Oral Q12H PRN Renan Noelle, DO      DULoxetine  60 mg Oral BID Renan Drake, DO      fidaxomicin  200 mg Oral Q12H HONEY Hernandez PA-C      fluticasone-vilanterol  1 puff Inhalation Daily Renan Drake DO      gabapentin  800 mg Oral TID Renan rDake DO      heparin (porcine)  5,000 Units  Subcutaneous Q8H HONEY Renan Drake, DO      insulin glargine  18 Units Subcutaneous HS Holli Hernandez PA-C      insulin lispro  1-5 Units Subcutaneous TID AC Renan Drake, DO      insulin lispro  1-5 Units Subcutaneous HS Renan Drake, DO      levothyroxine  112 mcg Oral Early Morning Renan Drake, DO      magnesium sulfate  2 g Intravenous Once ELISEO Toribio      mirtazapine  7.5 mg Oral HS Renan Drake, DO      nicotine  1 patch Transdermal Daily Renan Drake, DO      ondansetron  4 mg Intravenous Q6H PRN Renan Drake, DO      pantoprazole  40 mg Oral Early Morning Renan Drake, DO      potassium chloride  40 mEq Oral BID Holli Hernandez PA-C      prazosin  2 mg Oral HS Renan Drake, DO      traMADol  50 mg Oral Q6H PRN Renan Drake, DO          Today, Patient Was Seen By: ELISEO Toribio    **Please Note: This note may have been constructed using a voice recognition system.**

## 2024-08-19 NOTE — CASE MANAGEMENT
Case Management Discharge Planning Note    Patient name Barb Palomo  Location [facility] MRN [id number]  : 1970 Date 2024       Current Admission Date: 8/15/2024  Current Admission Diagnosis:C. difficile diarrhea   Patient Active Problem List    Diagnosis Date Noted Date Diagnosed    Hypokalemia 2024     Hypomagnesemia 2024     Failure to thrive in adult 2024     C. difficile diarrhea 2024     Severe protein-calorie malnutrition (HCC) 2024     HHNC (hyperglycemic hyperosmolar nonketotic coma) (Formerly [facility]) 2024     Abdominal pain 2024     Acute cystitis without hematuria 2024     Chronic migraine without aura without status migrainosus, not intractable 2024     Unspecified psychosis not due to a substance or known physiological condition (Formerly [facility]) 2023     Pancolitis (Formerly [facility]) 2023     S/P left knee arthroscopy 10/21/2022     Agoraphobia with panic disorder 2022    Bipolar II disorder (Formerly [facility]) 2022    Depression, unspecified 2022    Polyarthritis, unspecified 2022    Chronic prescription benzodiazepine use 2022     Uncontrolled type 2 diabetes mellitus with hyperglycemia (Formerly [facility]) 2021     Nausea 2021     Weakness of right upper extremity 2021     Neuropathy      Cervical radiculopathy 2020     Lumbar radiculopathy 2020     DDD (degenerative disc disease), cervical 2020     DDD (degenerative disc disease), lumbar 2020     Low back pain with sciatica 2020     Cervical spinal stenosis 2020     Cervical spondylosis without myelopathy 2020     Paresthesia and pain of both upper extremities 2020     Paresthesia of both lower extremities 2020     Chronic pain of both shoulders 2020     Chronic cervical pain 2020     Symptom associated with female genital organs 2020     Female stress  incontinence 05/26/2020     Decreased libido 05/26/2020     ROGERIO positive 05/13/2020     Diplopia 02/04/2020     Chronic migraine without aura, not intractable, without status migrainosus 10/17/2019     Arthralgia of temporomandibular joint 08/22/2019     Carpal tunnel syndrome 08/22/2019     Dysphagia 08/22/2019     Reactive airway disease without complication 02/23/2018     Protein calorie malnutrition (HCC) 08/30/2017     Tobacco dependence 08/30/2017     GERD without esophagitis 08/25/2017     History of decompression of median nerve 08/25/2017     Hypothyroidism 08/25/2017     Chronic pain disorder 11/04/2016     Pancreatic cyst 10/17/2016     Chronic diarrhea 10/04/2016     Chronic fatigue, unspecified 08/24/2016     Severe episode of recurrent major depressive disorder, without psychotic features (HCC) 05/05/2016     Fatty liver 04/08/2016     Opioid dependence with other opioid-induced disorder (HCC) 01/25/2016     Iron deficiency 12/28/2015     Vitamin D deficiency 08/13/2015     Hyperlipidemia 03/15/2013     Insomnia 03/04/2013     Vitamin B12 deficiency 03/04/2013     Post-traumatic stress disorder, unspecified 09/26/2012     Benign essential hypertension 09/26/2012     Temporary cerebral vascular dysfunction 07/30/2009     History of pulmonary embolism 07/30/2009     Irregular menstrual cycle 04/02/2008       LOS (days): 3  Geometric Mean LOS (GMLOS) (days): 3.6  Days to GMLOS:0     OBJECTIVE:  Risk of Unplanned Readmission Score: 25.59   Current admission status: Inpatient   Preferred Pharmacy:   [id number] #[id number], [id number] @[address], [place], [place] [address] [place] [address]  Phone: [phone] Fax: [phone]    [id number] - [place] - [address]  [address]  [place] [address]  Phone: [phone] Fax: [phone]    [facility] - [facility] [address]  [address]  [address]  [place] [address]  Phone: [phone] Fax: [phone]    Primary Care Provider: ELISEO Vee    Primary Insurance: MEDICARE  Secondary Insurance:     DISCHARGE DETAILS:  Other Referral/Resources/Interventions Provided:  Referral Comments: This CM discussed therapy recs with pt. Pt agreeable to STR but is requesting her lower back gets checked prior to making a decision regarding  d/c to a STR. Pt reports severe lower back pain that prevents her from walking. Pt agreed to meet with CM after her lower back is evaluated to discuss STR. Treatment team made aware. CM will continue to follow.      Treatment Team Recommendation: Short Term Rehab, SNF

## 2024-08-19 NOTE — ASSESSMENT & PLAN NOTE
Reported dysuria on admission   Urine + nitrite & leukocytes, CT imaging with distended bladder suggestive of retention and small amount of air which could be secondary to infection  Urine Culture noted   S/P 3 doses of IV CTX

## 2024-08-19 NOTE — ASSESSMENT & PLAN NOTE
PDMP reviewed, patient maintained on tramadol 200 mg daily as needed; history of chronic cervical/lumbar pain noted  Continue as needed tramadol   (3) adequate

## 2024-08-20 PROBLEM — M54.9 BACK PAIN: Status: ACTIVE | Noted: 2024-08-20

## 2024-08-20 PROBLEM — E87.6 HYPOKALEMIA: Status: RESOLVED | Noted: 2024-08-16 | Resolved: 2024-08-20

## 2024-08-20 LAB
ANION GAP SERPL CALCULATED.3IONS-SCNC: 6 MMOL/L (ref 4–13)
ATRIAL RATE: 124 BPM
BACTERIA BLD CULT: NORMAL
BACTERIA BLD CULT: NORMAL
BASOPHILS # BLD AUTO: 0.04 THOUSANDS/ΜL (ref 0–0.1)
BASOPHILS NFR BLD AUTO: 1 % (ref 0–1)
BUN SERPL-MCNC: 5 MG/DL (ref 5–25)
CALCIUM SERPL-MCNC: 8.1 MG/DL (ref 8.4–10.2)
CHLORIDE SERPL-SCNC: 108 MMOL/L (ref 96–108)
CO2 SERPL-SCNC: 24 MMOL/L (ref 21–32)
CREAT SERPL-MCNC: 0.35 MG/DL (ref 0.6–1.3)
EOSINOPHIL # BLD AUTO: 0.08 THOUSAND/ΜL (ref 0–0.61)
EOSINOPHIL NFR BLD AUTO: 2 % (ref 0–6)
ERYTHROCYTE [DISTWIDTH] IN BLOOD BY AUTOMATED COUNT: 12.9 % (ref 11.6–15.1)
GFR SERPL CREATININE-BSD FRML MDRD: 124 ML/MIN/1.73SQ M
GLUCOSE SERPL-MCNC: 117 MG/DL (ref 65–140)
GLUCOSE SERPL-MCNC: 157 MG/DL (ref 65–140)
GLUCOSE SERPL-MCNC: 206 MG/DL (ref 65–140)
GLUCOSE SERPL-MCNC: 296 MG/DL (ref 65–140)
GLUCOSE SERPL-MCNC: 308 MG/DL (ref 65–140)
HCT VFR BLD AUTO: 34 % (ref 34.8–46.1)
HGB BLD-MCNC: 11.3 G/DL (ref 11.5–15.4)
IMM GRANULOCYTES # BLD AUTO: 0.02 THOUSAND/UL (ref 0–0.2)
IMM GRANULOCYTES NFR BLD AUTO: 0 % (ref 0–2)
LYMPHOCYTES # BLD AUTO: 2.34 THOUSANDS/ΜL (ref 0.6–4.47)
LYMPHOCYTES NFR BLD AUTO: 45 % (ref 14–44)
MAGNESIUM SERPL-MCNC: 1.6 MG/DL (ref 1.9–2.7)
MCH RBC QN AUTO: 31.6 PG (ref 26.8–34.3)
MCHC RBC AUTO-ENTMCNC: 33.2 G/DL (ref 31.4–37.4)
MCV RBC AUTO: 95 FL (ref 82–98)
MONOCYTES # BLD AUTO: 0.41 THOUSAND/ΜL (ref 0.17–1.22)
MONOCYTES NFR BLD AUTO: 8 % (ref 4–12)
NEUTROPHILS # BLD AUTO: 2.37 THOUSANDS/ΜL (ref 1.85–7.62)
NEUTS SEG NFR BLD AUTO: 44 % (ref 43–75)
NRBC BLD AUTO-RTO: 0 /100 WBCS
P AXIS: 35 DEGREES
PLATELET # BLD AUTO: 242 THOUSANDS/UL (ref 149–390)
PMV BLD AUTO: 11.5 FL (ref 8.9–12.7)
POTASSIUM SERPL-SCNC: 4 MMOL/L (ref 3.5–5.3)
PR INTERVAL: 128 MS
QRS AXIS: 50 DEGREES
QRSD INTERVAL: 80 MS
QT INTERVAL: 336 MS
QTC INTERVAL: 482 MS
RBC # BLD AUTO: 3.58 MILLION/UL (ref 3.81–5.12)
SODIUM SERPL-SCNC: 138 MMOL/L (ref 135–147)
T WAVE AXIS: 65 DEGREES
VENTRICULAR RATE: 124 BPM
WBC # BLD AUTO: 5.26 THOUSAND/UL (ref 4.31–10.16)
WBC SPEC QL GRAM STN: NORMAL

## 2024-08-20 PROCEDURE — 93010 ELECTROCARDIOGRAM REPORT: CPT | Performed by: INTERNAL MEDICINE

## 2024-08-20 PROCEDURE — 85025 COMPLETE CBC W/AUTO DIFF WBC: CPT | Performed by: NURSE PRACTITIONER

## 2024-08-20 PROCEDURE — 82948 REAGENT STRIP/BLOOD GLUCOSE: CPT

## 2024-08-20 PROCEDURE — 93005 ELECTROCARDIOGRAM TRACING: CPT

## 2024-08-20 PROCEDURE — 83735 ASSAY OF MAGNESIUM: CPT | Performed by: NURSE PRACTITIONER

## 2024-08-20 PROCEDURE — 80048 BASIC METABOLIC PNL TOTAL CA: CPT | Performed by: NURSE PRACTITIONER

## 2024-08-20 PROCEDURE — 99232 SBSQ HOSP IP/OBS MODERATE 35: CPT | Performed by: NURSE PRACTITIONER

## 2024-08-20 RX ORDER — INSULIN LISPRO 100 [IU]/ML
2 INJECTION, SOLUTION INTRAVENOUS; SUBCUTANEOUS
Status: DISCONTINUED | OUTPATIENT
Start: 2024-08-20 | End: 2024-08-22

## 2024-08-20 RX ORDER — LANOLIN ALCOHOL/MO/W.PET/CERES
400 CREAM (GRAM) TOPICAL 2 TIMES DAILY
Status: DISCONTINUED | OUTPATIENT
Start: 2024-08-20 | End: 2024-08-27

## 2024-08-20 RX ORDER — SODIUM CHLORIDE 9 MG/ML
100 INJECTION, SOLUTION INTRAVENOUS CONTINUOUS
Status: DISPENSED | OUTPATIENT
Start: 2024-08-20 | End: 2024-08-21

## 2024-08-20 RX ORDER — MAGNESIUM SULFATE HEPTAHYDRATE 40 MG/ML
2 INJECTION, SOLUTION INTRAVENOUS ONCE
Status: COMPLETED | OUTPATIENT
Start: 2024-08-20 | End: 2024-08-20

## 2024-08-20 RX ORDER — LIDOCAINE 50 MG/G
2 PATCH TOPICAL DAILY
Status: DISCONTINUED | OUTPATIENT
Start: 2024-08-20 | End: 2024-09-16 | Stop reason: HOSPADM

## 2024-08-20 RX ORDER — BACLOFEN 10 MG/1
5 TABLET ORAL 2 TIMES DAILY
Status: DISCONTINUED | OUTPATIENT
Start: 2024-08-20 | End: 2024-09-16 | Stop reason: HOSPADM

## 2024-08-20 RX ORDER — ACETAMINOPHEN 325 MG/1
975 TABLET ORAL EVERY 8 HOURS SCHEDULED
Status: DISCONTINUED | OUTPATIENT
Start: 2024-08-20 | End: 2024-08-30

## 2024-08-20 RX ADMIN — ASPIRIN 81 MG CHEWABLE TABLET 81 MG: 81 TABLET CHEWABLE at 11:15

## 2024-08-20 RX ADMIN — FIDAXOMICIN 200 MG: 200 TABLET, FILM COATED ORAL at 23:43

## 2024-08-20 RX ADMIN — SODIUM CHLORIDE 1000 ML: 0.9 INJECTION, SOLUTION INTRAVENOUS at 00:50

## 2024-08-20 RX ADMIN — ACETAMINOPHEN 975 MG: 325 TABLET ORAL at 11:15

## 2024-08-20 RX ADMIN — GABAPENTIN 800 MG: 400 CAPSULE ORAL at 15:31

## 2024-08-20 RX ADMIN — FIDAXOMICIN 200 MG: 200 TABLET, FILM COATED ORAL at 11:19

## 2024-08-20 RX ADMIN — TRAMADOL HYDROCHLORIDE 50 MG: 50 TABLET, COATED ORAL at 00:40

## 2024-08-20 RX ADMIN — INSULIN LISPRO 3 UNITS: 100 INJECTION, SOLUTION INTRAVENOUS; SUBCUTANEOUS at 21:12

## 2024-08-20 RX ADMIN — MIRTAZAPINE 7.5 MG: 15 TABLET, FILM COATED ORAL at 21:08

## 2024-08-20 RX ADMIN — GABAPENTIN 800 MG: 400 CAPSULE ORAL at 11:17

## 2024-08-20 RX ADMIN — INSULIN LISPRO 1 UNITS: 100 INJECTION, SOLUTION INTRAVENOUS; SUBCUTANEOUS at 08:30

## 2024-08-20 RX ADMIN — INSULIN GLARGINE 18 UNITS: 100 INJECTION, SOLUTION SUBCUTANEOUS at 21:20

## 2024-08-20 RX ADMIN — INSULIN LISPRO 2 UNITS: 100 INJECTION, SOLUTION INTRAVENOUS; SUBCUTANEOUS at 17:44

## 2024-08-20 RX ADMIN — NICOTINE 1 PATCH: 21 PATCH, EXTENDED RELEASE TRANSDERMAL at 11:17

## 2024-08-20 RX ADMIN — BACLOFEN 5 MG: 10 TABLET ORAL at 17:37

## 2024-08-20 RX ADMIN — MAGNESIUM SULFATE HEPTAHYDRATE 2 G: 40 INJECTION, SOLUTION INTRAVENOUS at 11:17

## 2024-08-20 RX ADMIN — DULOXETINE HYDROCHLORIDE 60 MG: 60 CAPSULE, DELAYED RELEASE ORAL at 11:15

## 2024-08-20 RX ADMIN — GABAPENTIN 800 MG: 400 CAPSULE ORAL at 21:10

## 2024-08-20 RX ADMIN — SODIUM CHLORIDE 100 ML/HR: 0.9 INJECTION, SOLUTION INTRAVENOUS at 18:34

## 2024-08-20 RX ADMIN — DULOXETINE HYDROCHLORIDE 60 MG: 60 CAPSULE, DELAYED RELEASE ORAL at 17:38

## 2024-08-20 RX ADMIN — PANTOPRAZOLE SODIUM 40 MG: 40 TABLET, DELAYED RELEASE ORAL at 04:45

## 2024-08-20 RX ADMIN — TRAMADOL HYDROCHLORIDE 50 MG: 50 TABLET, COATED ORAL at 21:12

## 2024-08-20 RX ADMIN — POTASSIUM CHLORIDE 40 MEQ: 1500 TABLET, EXTENDED RELEASE ORAL at 17:37

## 2024-08-20 RX ADMIN — INSULIN LISPRO 2 UNITS: 100 INJECTION, SOLUTION INTRAVENOUS; SUBCUTANEOUS at 17:43

## 2024-08-20 RX ADMIN — ONDANSETRON 4 MG: 2 INJECTION INTRAMUSCULAR; INTRAVENOUS at 21:21

## 2024-08-20 RX ADMIN — INSULIN LISPRO 2 UNITS: 100 INJECTION, SOLUTION INTRAVENOUS; SUBCUTANEOUS at 12:53

## 2024-08-20 RX ADMIN — POTASSIUM CHLORIDE 40 MEQ: 1500 TABLET, EXTENDED RELEASE ORAL at 11:15

## 2024-08-20 RX ADMIN — ATORVASTATIN CALCIUM 80 MG: 80 TABLET, FILM COATED ORAL at 11:16

## 2024-08-20 RX ADMIN — LIDOCAINE 2 PATCH: 700 PATCH TOPICAL at 11:15

## 2024-08-20 RX ADMIN — BACLOFEN 5 MG: 10 TABLET ORAL at 11:16

## 2024-08-20 RX ADMIN — HEPARIN SODIUM 5000 UNITS: 5000 INJECTION INTRAVENOUS; SUBCUTANEOUS at 17:43

## 2024-08-20 RX ADMIN — HEPARIN SODIUM 5000 UNITS: 5000 INJECTION INTRAVENOUS; SUBCUTANEOUS at 11:17

## 2024-08-20 RX ADMIN — SODIUM CHLORIDE 100 ML/HR: 0.9 INJECTION, SOLUTION INTRAVENOUS at 21:12

## 2024-08-20 RX ADMIN — DIAZEPAM 5 MG: 5 TABLET ORAL at 15:31

## 2024-08-20 RX ADMIN — PRAZOSIN HYDROCHLORIDE 2 MG: 2 CAPSULE ORAL at 21:17

## 2024-08-20 RX ADMIN — ACETAMINOPHEN 975 MG: 325 TABLET ORAL at 18:33

## 2024-08-20 RX ADMIN — SODIUM CHLORIDE 500 ML: 0.9 INJECTION, SOLUTION INTRAVENOUS at 11:18

## 2024-08-20 RX ADMIN — MAGNESIUM OXIDE TAB 400 MG (241.3 MG ELEMENTAL MG) 400 MG: 400 (241.3 MG) TAB at 17:38

## 2024-08-20 RX ADMIN — LEVOTHYROXINE SODIUM 112 MCG: 112 TABLET ORAL at 04:44

## 2024-08-20 RX ADMIN — MAGNESIUM OXIDE TAB 400 MG (241.3 MG ELEMENTAL MG) 400 MG: 400 (241.3 MG) TAB at 11:15

## 2024-08-20 RX ADMIN — TRAMADOL HYDROCHLORIDE 50 MG: 50 TABLET, COATED ORAL at 11:15

## 2024-08-20 NOTE — PLAN OF CARE
Problem: Nutrition/Hydration-ADULT  Goal: Nutrient/Hydration intake appropriate for improving, restoring or maintaining nutritional needs  Description: Monitor and assess patient's nutrition/hydration status for malnutrition. Collaborate with interdisciplinary team and initiate plan and interventions as ordered.  Monitor patient's weight and dietary intake as ordered or per policy. Utilize nutrition screening tool and intervene as necessary. Determine patient's food preferences and provide high-protein, high-caloric foods as appropriate.     INTERVENTIONS:  - Monitor oral intake, urinary output, labs, and treatment plans  - Assess nutrition and hydration status and recommend course of action  - Evaluate amount of meals eaten  - Assist patient with eating if necessary   - Allow adequate time for meals  - Recommend/ encourage appropriate diets, oral nutritional supplements, and vitamin/mineral supplements  - Order, calculate, and assess calorie counts as needed  - Recommend, monitor, and adjust tube feedings and TPN/PPN based on assessed needs  - Assess need for intravenous fluids  - Provide specific nutrition/hydration education as appropriate  - Include patient/family/caregiver in decisions related to nutrition  8/20/2024 1657 by Yesy Decker RN  Outcome: Progressing  8/20/2024 1657 by Yesy Decker RN  Outcome: Progressing     Problem: PAIN - ADULT  Goal: Verbalizes/displays adequate comfort level or baseline comfort level  Description: Interventions:  - Encourage patient to monitor pain and request assistance  - Assess pain using appropriate pain scale  - Administer analgesics based on type and severity of pain and evaluate response  - Implement non-pharmacological measures as appropriate and evaluate response  - Consider cultural and social influences on pain and pain management  - Notify physician/advanced practitioner if interventions unsuccessful or patient reports new pain  8/20/2024 1657 by Yesy Decker  RN  Outcome: Progressing  8/20/2024 1657 by Yesy Decker RN  Outcome: Progressing     Problem: INFECTION - ADULT  Goal: Absence or prevention of progression during hospitalization  Description: INTERVENTIONS:  - Assess and monitor for signs and symptoms of infection  - Monitor lab/diagnostic results  - Monitor all insertion sites, i.e. indwelling lines, tubes, and drains  - Monitor endotracheal if appropriate and nasal secretions for changes in amount and color  - [place] appropriate cooling/warming therapies per order  - Administer medications as ordered  - Instruct and encourage patient and family to use good hand hygiene technique  - Identify and instruct in appropriate isolation precautions for identified infection/condition  8/20/2024 1657 by Yesy Decker RN  Outcome: Progressing  8/20/2024 1657 by Yesy Decker RN  Outcome: Progressing  Goal: Absence of fever/infection during neutropenic period  Description: INTERVENTIONS:  - Monitor WBC    8/20/2024 1657 by Yesy Decker RN  Outcome: Progressing  8/20/2024 1657 by Yesy Decker RN  Outcome: Progressing     Problem: SAFETY ADULT  Goal: Patient will remain free of falls  Description: INTERVENTIONS:  - Educate patient/family on patient safety including physical limitations  - Instruct patient to call for assistance with activity   - Consult OT/PT to assist with strengthening/mobility   - Keep Call bell within reach  - Keep bed low and locked with side rails adjusted as appropriate  - Keep care items and personal belongings within reach  - Initiate and maintain comfort rounds  - Make Fall Risk Sign visible to staff  - Offer Toileting every 2 Hours, in advance of need  - Initiate/Maintain alarm  - Obtain necessary fall risk management equipment  - Apply yellow socks and bracelet for high fall risk patients  - Consider moving patient to room near nurses station  8/20/2024 1657 by Yesy Decker RN  Outcome: Progressing  8/20/2024 1657 by Yesy Decker  RN  Outcome: Progressing  Goal: Maintain or return to baseline ADL function  Description: INTERVENTIONS:  -  Assess patient's ability to carry out ADLs; assess patient's baseline for ADL function and identify physical deficits which impact ability to perform ADLs (bathing, care of mouth/teeth, toileting, grooming, dressing, etc.)  - Assess/evaluate cause of self-care deficits   - Assess range of motion  - Assess patient's mobility; develop plan if impaired  - Assess patient's need for assistive devices and provide as appropriate  - Encourage maximum independence but intervene and supervise when necessary  - Involve family in performance of ADLs  - Assess for home care needs following discharge   - Consider OT consult to assist with ADL evaluation and planning for discharge  - Provide patient education as appropriate  8/20/2024 1657 by Yesy Decker RN  Outcome: Progressing  8/20/2024 1657 by Yesy Decker RN  Outcome: Progressing  Goal: Maintains/Returns to pre admission functional level  Description: INTERVENTIONS:  - Perform AM-PAC 6 Click Basic Mobility/ Daily Activity assessment daily.  - Set and communicate daily mobility goal to care team and patient/family/caregiver.   - Collaborate with rehabilitation services on mobility goals if consulted  - Perform Range of Motion 3 times a day.  - Reposition patient every 2 hours.  - Dangle patient 3 times a day  - Stand patient 3 times a day  - Ambulate patient 3 times a day  - Out of bed to chair 3 times a day   - Out of bed for meals 3 times a day  - Out of bed for toileting  - Record patient progress and toleration of activity level   8/20/2024 1657 by Yesy Decker RN  Outcome: Progressing  8/20/2024 1657 by Yesy Decker RN  Outcome: Progressing     Problem: DISCHARGE PLANNING  Goal: Discharge to home or other facility with appropriate resources  Description: INTERVENTIONS:  - Identify barriers to discharge w/patient and caregiver  - Arrange for needed discharge  resources and transportation as appropriate  - Identify discharge learning needs (meds, wound care, etc.)  - Arrange for interpretive services to assist at discharge as needed  - Refer to Case Management Department for coordinating discharge planning if the patient needs post-hospital services based on physician/advanced practitioner order or complex needs related to functional status, cognitive ability, or social support system  8/20/2024 1657 by Yesy Decker RN  Outcome: Progressing  8/20/2024 1657 by Yesy Decker RN  Outcome: Progressing     Problem: Knowledge Deficit  Goal: Patient/family/caregiver demonstrates understanding of disease process, treatment plan, medications, and discharge instructions  Description: Complete learning assessment and assess knowledge base.  Interventions:  - Provide teaching at level of understanding  - Provide teaching via preferred learning methods  8/20/2024 1657 by Yesy Decker RN  Outcome: Progressing  8/20/2024 1657 by Yesy Decker RN  Outcome: Progressing     Problem: NEUROSENSORY - ADULT  Goal: Achieves stable or improved neurological status  Description: INTERVENTIONS  - Monitor and report changes in neurological status  - Monitor vital signs such as temperature, blood pressure, glucose, and any other labs ordered   - Initiate measures to prevent increased intracranial pressure  - Monitor for seizure activity and implement precautions if appropriate      8/20/2024 1657 by Yesy Decker RN  Outcome: Progressing  8/20/2024 1657 by Yesy Decker RN  Outcome: Progressing  Goal: Remains free of injury related to seizures activity  Description: INTERVENTIONS  - Maintain airway, patient safety  and administer oxygen as ordered  - Monitor patient for seizure activity, document and report duration and description of seizure to physician/advanced practitioner  - If seizure occurs,  ensure patient safety during seizure  - Reorient patient post seizure  - Seizure pads on all 4 side  rails  - Instruct patient/family to notify RN of any seizure activity including if an aura is experienced  - Instruct patient/family to call for assistance with activity based on nursing assessment  - Administer anti-seizure medications if ordered    8/20/2024 1657 by Yesy Decker RN  Outcome: Progressing  8/20/2024 1657 by Yesy Decker RN  Outcome: Progressing  Goal: Achieves maximal functionality and self care  Description: INTERVENTIONS  - Monitor swallowing and airway patency with patient fatigue and changes in neurological status  - Encourage and assist patient to increase activity and self care.   - Encourage visually impaired, hearing impaired and aphasic patients to use assistive/communication devices  8/20/2024 1657 by Yesy Decker RN  Outcome: Progressing  8/20/2024 1657 by Yesy Decker RN  Outcome: Progressing     Problem: METABOLIC, FLUID AND ELECTROLYTES - ADULT  Goal: Electrolytes maintained within normal limits  Description: INTERVENTIONS:  - Monitor labs and assess patient for signs and symptoms of electrolyte imbalances  - Administer electrolyte replacement as ordered  - Monitor response to electrolyte replacements, including repeat lab results as appropriate  - Instruct patient on fluid and nutrition as appropriate  8/20/2024 1657 by Yesy Decker RN  Outcome: Progressing  8/20/2024 1657 by Yesy Decker RN  Outcome: Progressing  Goal: Fluid balance maintained  Description: INTERVENTIONS:  - Monitor labs   - Monitor I/O and WT  - Instruct patient on fluid and nutrition as appropriate  - Assess for signs & symptoms of volume excess or deficit  8/20/2024 1657 by Yesy Decker RN  Outcome: Progressing  8/20/2024 1657 by Yesy Decker RN  Outcome: Progressing  Goal: Glucose maintained within target range  Description: INTERVENTIONS:  - Monitor Blood Glucose as ordered  - Assess for signs and symptoms of hyperglycemia and hypoglycemia  - Administer ordered medications to maintain glucose within  target range  - Assess nutritional intake and initiate nutrition service referral as needed  8/20/2024 1657 by Yesy Decker RN  Outcome: Progressing  8/20/2024 1657 by Yesy Decker RN  Outcome: Progressing     Problem: SKIN/TISSUE INTEGRITY - ADULT  Goal: Skin Integrity remains intact(Skin Breakdown Prevention)  Description: Assess:  -Perform William assessment every shift  -Clean and moisturize skin every day  -Inspect skin when repositioning, toileting, and assisting with ADLS  -Assess under medical devices such as lines every 2 hours  -Assess extremities for adequate circulation and sensation     Bed Management:  -Have minimal linens on bed & keep smooth, unwrinkled  -Change linens as needed when moist or perspiring  -Avoid sitting or lying in one position for more than 2 hours while in bed  -Keep HOB at 30 degrees     Toileting:  -Offer bedside commode  -Assess for incontinence every hour  -Use incontinent care products after each incontinent episode such as moisture barriers, foam cleansers    Activity:  -Mobilize patient 3 times a day  -Encourage activity and walks on unit  -Encourage or provide ROM exercises   -Turn and reposition patient every 2 Hours  -Use appropriate equipment to lift or move patient in bed  -Instruct/ Assist with weight shifting every 15 min when out of bed in chair  -Consider limitation of chair time 2 hour intervals    Skin Care:  -Avoid use of baby powder, tape, friction and shearing, hot water or constrictive clothing  -Relieve pressure over bony prominences using offloading techniques or foam dressings (If not contraindicated by incontinence)  -Do not massage red bony areas    Next Steps:  -Teach patient strategies to minimize risks    -Consider consults to  interdisciplinary teams  8/20/2024 1657 by Yesy Decker RN  Outcome: Progressing  8/20/2024 1657 by Yesy Decker RN  Outcome: Progressing  Goal: Incision(s), wounds(s) or drain site(s) healing without S/S of  infection  Description: INTERVENTIONS  - Assess and document dressing, incision, wound bed, drain sites and surrounding tissue  - Provide patient and family education  - Perform skin care/dressing changes  8/20/2024 1657 by Yesy Decker RN  Outcome: Progressing  8/20/2024 1657 by Yesy Decker RN  Outcome: Progressing  Goal: Pressure injury heals and does not worsen  Description: Interventions:  - Implement low air loss mattress or specialty surface (Criteria met)  - Apply silicone foam dressing  - Instruct/assist with weight shifting every 15 minutes when in chair   - Limit chair time to 2 hour intervals  - Use special pressure reducing interventions when in chair   - Apply fecal or urinary incontinence containment device   - Perform passive or active ROM  - Turn and reposition patient & offload bony prominences every 2 hours   - Utilize friction reducing device or surface for transfers   - Consider consults to  interdisciplinary teams  - Use incontinent care products after each incontinent episode  - Consider nutrition services referral as needed  8/20/2024 1657 by Yesy Decker RN  Outcome: Progressing  8/20/2024 1657 by Yesy Decker RN  Outcome: Progressing     Problem: MUSCULOSKELETAL - ADULT  Goal: Maintain or return mobility to safest level of function  Description: INTERVENTIONS:  - Assess patient's ability to carry out ADLs; assess patient's baseline for ADL function and identify physical deficits which impact ability to perform ADLs (bathing, care of mouth/teeth, toileting, grooming, dressing, etc.)  - Assess/evaluate cause of self-care deficits   - Assess range of motion  - Assess patient's mobility  - Assess patient's need for assistive devices and provide as appropriate  - Encourage maximum independence but intervene and supervise when necessary  - Involve family in performance of ADLs  - Assess for home care needs following discharge   - Consider OT consult to assist with ADL evaluation and planning  for discharge  - Provide patient education as appropriate  8/20/2024 1657 by Yesy Decker RN  Outcome: Progressing  8/20/2024 1657 by Yesy Decker RN  Outcome: Progressing  Goal: Maintain proper alignment of affected body part  Description: INTERVENTIONS:  - Support, maintain and protect limb and body alignment  - Provide patient/ family with appropriate education  8/20/2024 1657 by Yesy Decker RN  Outcome: Progressing  8/20/2024 1657 by Yesy Decker RN  Outcome: Progressing     Problem: Prexisting or High Potential for Compromised Skin Integrity  Goal: Skin integrity is maintained or improved  Description: INTERVENTIONS:  - Identify patients at risk for skin breakdown  - Assess and monitor skin integrity  - Assess and monitor nutrition and hydration status  - Monitor labs   - Assess for incontinence   - Turn and reposition patient  - Assist with mobility/ambulation  - Relieve pressure over bony prominences  - Avoid friction and shearing  - Provide appropriate hygiene as needed including keeping skin clean and dry  - Evaluate need for skin moisturizer/barrier cream  - Collaborate with interdisciplinary team   - Patient/family teaching  - Consider wound care consult   8/20/2024 1657 by Yesy Decker RN  Outcome: Progressing  8/20/2024 1657 by Yesy Decker RN  Outcome: Progressing

## 2024-08-20 NOTE — ASSESSMENT & PLAN NOTE
Patient presented with generalized weakness, frequent falls.  She has had ongoing diarrhea, dysuria, poor oral intake and reported 70 pound weight loss over the last year.  Initially hypotensive in the ER, status post IV fluids. Labs with multiple metabolic derangements  CT imaging showing mild diffuse colonic wall thickening, mild diffuse wall thickening of stomach  C. difficile found to be positive--has a history of C. difficile in September 2022.    Patient started on Dificid 200 mg x 10 days per protocol on 8/18  Contact precautions  Appetite is good, bannatrol supplements ordered.   Will order stool chart for close monitoring of stools  Supportive care   PT/OT recommending rehab, CM following.

## 2024-08-20 NOTE — ASSESSMENT & PLAN NOTE
Reported dysuria on admission   Urine + nitrite & leukocytes, CT imaging with distended bladder suggestive of retention and small amount of air which could be secondary to infection  Urine Culture noted, S/P 3 doses of IV CTX

## 2024-08-20 NOTE — H&P
Notified by RN patient reporting dysuria and vital signs revealing tachycardia.  Started IV fluids and obtained a UA.

## 2024-08-20 NOTE — ASSESSMENT & PLAN NOTE
Improving, still below goal. Additional IV repletion today and will add scheduled PO magnesium.   Mag in AM

## 2024-08-20 NOTE — ASSESSMENT & PLAN NOTE
Severe protein-calorie malnutrition 2/2 chronic diarrhea a/e/b fat and muscle loss requiring Nutrition consult, oral diet and nutrition supplements    360 Statement: severe malnutrition r/t suspect combination of chronic illness and social/environmental factors as evidenced by severe temporal muscle loss, severe tricep fat pad loss, at least moderate muscle loss around clavicles and shoulders, at least moderate orbital fat pad loss. Treatment: oral diet and oral nutrition supplements.    BMI Findings:  Adult BMI Classifications: Underweight < 18.5        Body mass index is 16.1 kg/m².

## 2024-08-20 NOTE — ASSESSMENT & PLAN NOTE
"Lab Results   Component Value Date    HGBA1C 16.1 (H) 08/16/2024       Recent Labs     08/19/24  1157 08/19/24 2019 08/19/24  2044 08/20/24  0659   POCGLU 316* 295* 276* 157*       Patient markedly hyperglycemic on admission only partially responsive to IV fluids, remainder of labs fortunately not consistent with DKA. A1C 16  Patient admitting she has not been using her home insulin as she apparently has been unable to fill prescription due to \"insurance issues,\" additionally reports that due to her neuropathy she has difficulty self injecting.  Daughter also reports jessica non compliance.   Typically on NovoLog 70/30 20 units twice daily  Continue Lantus 18 units HS, add humalog 2 units TID with meals + SSI  insurance issues relayed to CM  Follows with endo outpatient, continue   "

## 2024-08-20 NOTE — PROGRESS NOTES
"[facility]  Progress Note  Name: Barb Palomo I  MRN: [id number]  [facility] Date of Admission: 8/15/2024   Date of Service: 8/20/2024 I Hospital Day: 4    Assessment & Plan   * C. difficile diarrhea  Assessment & Plan  Patient presented with generalized weakness, frequent falls.  She has had ongoing diarrhea, dysuria, poor oral intake and reported 70 pound weight loss over the last year.  Initially hypotensive in the ER, status post IV fluids. Labs with multiple metabolic derangements  CT imaging showing mild diffuse colonic wall thickening, mild diffuse wall thickening of stomach  C. difficile found to be positive--has a history of C. difficile in September 2022.    Patient started on Dificid 200 mg x 10 days per protocol on 8/18  Contact precautions  Appetite is good, bannatrol supplements ordered.   Will order stool chart for close monitoring of stools  Supportive care   PT/OT recommending rehab, CM following.         Failure to thrive in adult  Assessment & Plan  Patient presenting with increased generalized weakness and frequent falls, she additionally admits to ongoing diarrhea, recent dysuria, poor oral intake, and at least 70-80 pound weight loss over the past 1 year. Has been following with GI, had colonoscopy May 2024 with poor prep--had EGD with esophagitis and gastritis  Labs with multiple derangements including hyperglycemia, hypokalemia and hypomagnesemia  C. difficile positive as above  Patient  reports she has not been using insulin due to \"insurance issue\" and inability to self inject due to her neuropathy  CT abdomen and pelvis without any obvious malignancy  Treatment as per respective issues  PT/OT recommending rehab.     Acute cystitis without hematuria  Assessment & Plan  Reported dysuria on admission   Urine + nitrite & leukocytes, CT imaging with distended bladder suggestive of retention and small amount of air which could be secondary " "to infection  Urine Culture noted, S/P 3 doses of IV CTX      Back pain  Assessment & Plan  Acute on chronic, likely worsened in setting of recent fall and deconditioning.   CT imaging reviewed, degenerative changes noted.  Add ATC APAP, Schedule Baclofen 5 mg BID, add lido patch. PRN Tramdol on board.   Mobilize as tolerated, PT/OT    Uncontrolled type 2 diabetes mellitus with hyperglycemia (HCC)  Assessment & Plan  Lab Results   Component Value Date    HGBA1C 16.1 (H) 08/16/2024       Recent Labs     08/19/24  1157 08/19/24 2019 08/19/24  2044 08/20/24  0659   POCGLU 316* 295* 276* 157*       Patient markedly hyperglycemic on admission only partially responsive to IV fluids, remainder of labs fortunately not consistent with DKA. A1C 16  Patient admitting she has not been using her home insulin as she apparently has been unable to fill prescription due to \"insurance issues,\" additionally reports that due to her neuropathy she has difficulty self injecting.  Daughter also reports jessica non compliance.   Typically on NovoLog 70/30 20 units twice daily  Continue Lantus 18 units HS, add humalog 2 units TID with meals + SSI  insurance issues relayed to CM  Follows with endo outpatient, continue     Chronic diarrhea  Assessment & Plan  Longstanding history of chronic diarrhea, patient reporting acute diarrhea x1 month. Follows with GI. Has tried questran/imodium without relief  Stool enteric panel negative  C. difficile positive, see plan above     GERD without esophagitis  Assessment & Plan  Continue PPI    Hypokalemia-resolved as of 8/20/2024  Assessment & Plan  Resolved     Hypomagnesemia  Assessment & Plan  Improving, still below goal. Additional IV repletion today and will add scheduled PO magnesium.   Mag in AM    Hypothyroidism  Assessment & Plan   Latest Reference Range & Units 08/16/24 04:11   TSH 3RD GENERATON 0.450 - 4.500 uIU/mL 0.661       Continue home dose levothyroxine     Opioid dependence with other " opioid-induced disorder (HCC)  Assessment & Plan  PDMP reviewed, patient maintained on tramadol 200 mg daily as needed; history of chronic cervical/lumbar pain noted  Continue as needed tramadol and chronic gabapentin dosing    Severe episode of recurrent major depressive disorder, without psychotic features (HCC)  Assessment & Plan  Mood appearing fairly stable, continue home duloxetine, mirtazapine, as needed Valium    Tobacco dependence  Assessment & Plan  Counseled on need for cessation, provide nicotine patch while admitted  Recent CT chest for lung cancer screening 7/30/2024 negative for nodules or masses         Severe protein-calorie malnutrition 2/2 chronic diarrhea a/e/b fat and muscle loss requiring Nutrition consult, oral diet and nutrition supplements     BMI 16.1Malnutrition Findings:   Adult Malnutrition type:  (suspect combination of chronic illness and social/environmental factors)  Adult Degree of Malnutrition: Other severe protein calorie malnutrition  Malnutrition Characteristics: Fat loss, Muscle loss        360 Statement: severe malnutrition r/t suspect combination of chronic illness and social/environmental factors as evidenced by severe temporal muscle loss, severe tricep fat pad loss, at least moderate muscle loss around clavicles and shoulders, at least moderate orbital fat pad loss. Treatment: oral diet and oral nutrition supplements.     BMI Findings:  Adult BMI Classifications: Underweight < 18.5     Body mass index is 16.1 kg/m².   VTE Pharmacologic Prophylaxis: VTE Score: 3 Moderate Risk (Score 3-4) - Pharmacological DVT Prophylaxis Ordered: heparin.    Mobility:   Basic Mobility Inpatient Raw Score: 15  JH-HLM Goal: 4: Move to chair/commode  JH-HLM Achieved: 2: Bed activities/Dependent transfer  JH-HLM Goal NOT achieved. Continue with multidisciplinary rounding and encourage appropriate mobility to improve upon JH-HLM goals.    Patient Centered Rounds: I performed bedside rounds with  nursing staff today.   Discussions with Specialists or Other Care Team Provider: nursing, cm    Education and Discussions with Family / Patient: Attempted to update  (daughter) via phone. Left voicemail.     Total Time Spent on Date of Encounter in care of patient: 30 mins. This time was spent on one or more of the following: performing physical exam; counseling and coordination of care; obtaining or reviewing history; documenting in the medical record; reviewing/ordering tests, medications or procedures; communicating with other healthcare professionals and discussing with patient's family/caregivers.    Current Length of Stay: 4 day(s)  Current Patient Status: Inpatient   Certification Statement: The patient will continue to require additional inpatient hospital stay due to monitor stools and HR, monitor electrolytes, discharge planning   Discharge Plan: Anticipate discharge in 48 hrs to rehab facility.    Code Status: Level 1 - Full Code    Subjective:   Patient continues to complain of diarrhea, her appetite is good and she is tolerating fluids but feels as though as soon as she eats, she has to have a bowel movement.  She has no abdominal pain.  No fevers or chills.  She does also complain of some low back pain which is a chronic problem. She is agreeable for rehab.     Objective:     Vitals:   Temp (24hrs), Av.8 °F (37.1 °C), Min:97.8 °F (36.6 °C), Max:99.2 °F (37.3 °C)    Temp:  [97.8 °F (36.6 °C)-99.2 °F (37.3 °C)] 99.2 °F (37.3 °C)  HR:  [108-124] 114  Resp:  [16-18] 18  BP: (111-172)/() 124/88  SpO2:  [95 %-100 %] 95 %  Body mass index is 16.1 kg/m².     Input and Output Summary (last 24 hours):     Intake/Output Summary (Last 24 hours) at 2024 0942  Last data filed at 2024 0825  Gross per 24 hour   Intake 480 ml   Output 500 ml   Net -20 ml       Physical Exam:   Physical Exam  Vitals and nursing note reviewed.   Cardiovascular:      Rate and Rhythm: Tachycardia present.       Comments: HR in 110's  Pulmonary:      Breath sounds: Normal breath sounds.   Abdominal:      General: Bowel sounds are normal.      Palpations: Abdomen is soft.      Tenderness: There is no abdominal tenderness.   Musculoskeletal:         General: No swelling.   Skin:     General: Skin is warm.   Neurological:      Mental Status: She is alert and oriented to person, place, and time. Mental status is at baseline.   Psychiatric:         Mood and Affect: Mood normal.          Additional Data:     Labs:  Results from last 7 days   Lab Units 08/20/24  0459   WBC Thousand/uL 5.26   HEMOGLOBIN g/dL 11.3*   HEMATOCRIT % 34.0*   PLATELETS Thousands/uL 242   SEGS PCT % 44   LYMPHO PCT % 45*   MONO PCT % 8   EOS PCT % 2     Results from last 7 days   Lab Units 08/20/24  0459 08/19/24  0522   SODIUM mmol/L 138 140   POTASSIUM mmol/L 4.0 4.1   CHLORIDE mmol/L 108 107   CO2 mmol/L 24 27   BUN mg/dL 5 5   CREATININE mg/dL 0.35* 0.39*   ANION GAP mmol/L 6 6   CALCIUM mg/dL 8.1* 7.7*   ALBUMIN g/dL  --  2.9*   TOTAL BILIRUBIN mg/dL  --  0.28   ALK PHOS U/L  --  62   ALT U/L  --  8   AST U/L  --  10*   GLUCOSE RANDOM mg/dL 206* 193*     Results from last 7 days   Lab Units 08/15/24  2158   INR  0.95     Results from last 7 days   Lab Units 08/20/24  0659 08/19/24  2044 08/19/24  2019 08/19/24  1157 08/19/24  0755 08/18/24  2104 08/18/24  1627 08/18/24  1137 08/18/24  0841 08/17/24  2112 08/17/24  2000 08/17/24  1631   POC GLUCOSE mg/dl 157* 276* 295* 316* 172* 193* 102 146* 166* 230* 268* 202*     Results from last 7 days   Lab Units 08/16/24  0411   HEMOGLOBIN A1C % 16.1*     Results from last 7 days   Lab Units 08/15/24  2341 08/15/24  2233   LACTIC ACID mmol/L  --  1.6   PROCALCITONIN ng/ml 0.09  --        Lines/Drains:  Invasive Devices       Peripheral Intravenous Line  Duration             Peripheral IV 08/20/24 Left Forearm <1 day                          Imaging: No pertinent imaging reviewed.    Recent Cultures (last 7  days):   Results from last 7 days   Lab Units 08/16/24  0219 08/15/24  2158   BLOOD CULTURE   --  No Growth After 4 Days.  No Growth After 4 Days.   URINE CULTURE  80,000-89,000 cfu/ml Klebsiella pneumoniae*  20,000-29,000 cfu/ml  --    C DIFF TOXIN B BY PCR  Positive*  --        Last 24 Hours Medication List:   Current Facility-Administered Medications   Medication Dose Route Frequency Provider Last Rate    acetaminophen  975 mg Oral Q8H HONEY ELISEO Toribio      albuterol  2 puff Inhalation Q4H PRN Renan Drake, DO      aspirin  81 mg Oral Daily Renan Drake, DO      atorvastatin  80 mg Oral Daily Renan Drake, DO      baclofen  5 mg Oral BID ELISEO Toribio      butalbital-acetaminophen-caffeine  1 tablet Oral Q6H PRN Renan Drake, DO      diazepam  5 mg Oral Q12H PRN Renan Drake, DO      DULoxetine  60 mg Oral BID Renan Drake, DO      fidaxomicin  200 mg Oral Q12H [facility] Holli Hernandez PA-C      fluticasone-vilanterol  1 puff Inhalation Daily Renan Drake, DO      gabapentin  800 mg Oral TID Renan Drake DO      heparin (porcine)  5,000 Units Subcutaneous Q8H HONEY Renan Drake, DO      insulin glargine  18 Units Subcutaneous HS Holli Hernandez PA-C      insulin lispro  1-5 Units Subcutaneous TID AC Renan Drake,       insulin lispro  1-5 Units Subcutaneous HS Renan Drake DO      insulin lispro  2 Units Subcutaneous TID With Meals ELISEO Toribio      levothyroxine  112 mcg Oral Early Morning Renan Drake DO      lidocaine  2 patch Topical Daily ELISEO Toribio      magnesium Oxide  400 mg Oral BID ELISEO Toribio      magnesium sulfate  2 g Intravenous Once Magda ELISEO Aguiar      mirtazapine  7.5 mg Oral HS Renan Drake, DO      nicotine  1 patch Transdermal Daily Renan Drake DO      ondansetron  4 mg Intravenous Q6H PRN Renan Drake, DO      pantoprazole  40 mg Oral Early Morning Renan Drake, DO      potassium chloride  40 mEq Oral BID Holli  MISBAH Hernandez      prazosin  2 mg Oral HS Renan Drake DO      sodium chloride  500 mL Intravenous Once ELISEO Toribio      traMADol  50 mg Oral Q6H PRN Renan Drake DO          Today, Patient Was Seen By: ELISEO Toribio    **Please Note: This note may have been constructed using a voice recognition system.**

## 2024-08-20 NOTE — ASSESSMENT & PLAN NOTE
"Patient presenting with increased generalized weakness and frequent falls, she additionally admits to ongoing diarrhea, recent dysuria, poor oral intake, and at least 70-80 pound weight loss over the past 1 year. Has been following with GI, had colonoscopy May 2024 with poor prep--had EGD with esophagitis and gastritis  Labs with multiple derangements including hyperglycemia, hypokalemia and hypomagnesemia  C. difficile positive as above  Patient  reports she has not been using insulin due to \"insurance issue\" and inability to self inject due to her neuropathy  CT abdomen and pelvis without any obvious malignancy  Treatment as per respective issues  PT/OT recommending rehab.   "

## 2024-08-20 NOTE — ASSESSMENT & PLAN NOTE
PDMP reviewed, patient maintained on tramadol 200 mg daily as needed; history of chronic cervical/lumbar pain noted  Continue as needed tramadol and chronic gabapentin dosing

## 2024-08-20 NOTE — CASE MANAGEMENT
Case Management Discharge Planning Note    Patient name Barb Palomo  Location [facility]01 MRN [id number]  : 1970 Date 2024       Current Admission Date: 8/15/2024  Current Admission Diagnosis:C. difficile diarrhea   Patient Active Problem List    Diagnosis Date Noted Date Diagnosed    Back pain 2024     Hypomagnesemia 2024     Failure to thrive in adult 2024     C. difficile diarrhea 2024     Severe protein-calorie malnutrition (HCC) 2024     HHNC (hyperglycemic hyperosmolar nonketotic coma) ([facility]) 2024     Abdominal pain 2024     Acute cystitis without hematuria 2024     Chronic migraine without aura without status migrainosus, not intractable 2024     Unspecified psychosis not due to a substance or known physiological condition ([facility]) 2023     Pancolitis ([facility]) 2023     S/P left knee arthroscopy 10/21/2022     Agoraphobia with panic disorder 2022    Bipolar II disorder ([facility]) 2022    Depression, unspecified 2022    Polyarthritis, unspecified 2022    Chronic prescription benzodiazepine use 2022     Uncontrolled type 2 diabetes mellitus with hyperglycemia ([facility]) 2021     Nausea 2021     Weakness of right upper extremity 2021     Neuropathy      Cervical radiculopathy 2020     Lumbar radiculopathy 2020     DDD (degenerative disc disease), cervical 2020     DDD (degenerative disc disease), lumbar 2020     Low back pain with sciatica 2020     Cervical spinal stenosis 2020     Cervical spondylosis without myelopathy 2020     Paresthesia and pain of both upper extremities 2020     Paresthesia of both lower extremities 2020     Chronic pain of both shoulders 2020     Chronic cervical pain 2020     Symptom associated with female genital organs 2020     Female stress  incontinence 05/26/2020     Decreased libido 05/26/2020     ROGERIO positive 05/13/2020     Diplopia 02/04/2020     Chronic migraine without aura, not intractable, without status migrainosus 10/17/2019     Arthralgia of temporomandibular joint 08/22/2019     Carpal tunnel syndrome 08/22/2019     Dysphagia 08/22/2019     Reactive airway disease without complication 02/23/2018     Protein calorie malnutrition (HCC) 08/30/2017     Tobacco dependence 08/30/2017     GERD without esophagitis 08/25/2017     History of decompression of median nerve 08/25/2017     Hypothyroidism 08/25/2017     Chronic pain disorder 11/04/2016     Pancreatic cyst 10/17/2016     Chronic diarrhea 10/04/2016     Chronic fatigue, unspecified 08/24/2016     Severe episode of recurrent major depressive disorder, without psychotic features (HCC) 05/05/2016     Fatty liver 04/08/2016     Opioid dependence with other opioid-induced disorder (HCC) 01/25/2016     Iron deficiency 12/28/2015     Vitamin D deficiency 08/13/2015     Hyperlipidemia 03/15/2013     Insomnia 03/04/2013     Vitamin B12 deficiency 03/04/2013     Post-traumatic stress disorder, unspecified 09/26/2012     Benign essential hypertension 09/26/2012     Temporary cerebral vascular dysfunction 07/30/2009     History of pulmonary embolism 07/30/2009     Irregular menstrual cycle 04/02/2008       LOS (days): 4  Geometric Mean LOS (GMLOS) (days): 3.6  Days to GMLOS:-0.8     OBJECTIVE:  Risk of Unplanned Readmission Score: 25.63         Current admission status: Inpatient   Preferred Pharmacy:   [id number], [id number] [facility] @[address], [place], [place] [address] [place] [address]  Phone: [phone] Fax: [phone]    [id number] - [place] - [address]  [address]  [place] [address]  Phone: [phone] Fax: [phone]    [facility] - [facility] [address]  [address]  [facility]  [address]  [facility] [address]  Phone: [phone] Fax: [phone]    Primary Care Provider: ELISEO Vee    Primary Insurance: MEDICARE  Secondary Insurance:     DISCHARGE DETAILS:    Discharge planning discussed with:: Patient  Freedom of Choice: Yes  Comments - Freedom of Choice: Discussed FOC  CM contacted family/caregiver?: Yes  Were Treatment Team discharge recommendations reviewed with patient/caregiver?: Yes  Did patient/caregiver verbalize understanding of patient care needs?: N/A- going to facility  Were patient/caregiver advised of the risks associated with not following Treatment Team discharge recommendations?: Yes    Other Referral/Resources/Interventions Provided:  Interventions: SNF, Short Term Rehab  Referral Comments: This CM discussed therapy recs with pt. Pt agreeable to STR when cleared for d/c. Pt agreeable to blanket referral for SNF nears home. [place] referral entered in aidin awaiting response.      Treatment Team Recommendation: Short Term Rehab, SNF

## 2024-08-20 NOTE — ASSESSMENT & PLAN NOTE
HPI:   81 year old female coming in with incidental findings on her liver.  She went to the emergency room with some chest discomfort, and had a cardiac work-up.  During her work-up, she had a CTA of her coronary arteries.  This demonstrated some nodules in the liver.  She is seeing me for discussions of this.  She is currently eating well and having bowel movements.  She does have some constipation.  She has no weight gain or weight loss.  She has no alcohol history.  She has no hepatitis history.  There are no other aggravating alleviating factors.  She has no other complaints or issues.        Review of Systems:  Review of Systems   Constitutional: Negative for activity change, appetite change, chills, diaphoresis, fatigue, fever and unexpected weight change.   HENT: Negative for congestion, ear discharge, ear pain, hearing loss, sinus pain, sore throat, trouble swallowing and voice change.    Eyes: Negative for pain, discharge and redness.   Respiratory: Negative for cough, chest tightness, shortness of breath, wheezing and stridor.    Cardiovascular: Negative for chest pain, palpitations and leg swelling.   Gastrointestinal: Positive for constipation. Negative for abdominal distention, abdominal pain, blood in stool, diarrhea, nausea and vomiting.   Endocrine: Negative for cold intolerance, heat intolerance and polyuria.   Genitourinary: Negative for difficulty urinating, flank pain, frequency and urgency.   Musculoskeletal: Negative for arthralgias, back pain, joint swelling and myalgias.   Skin: Negative for color change, pallor, rash and wound.   Allergic/Immunologic: Negative for environmental allergies and food allergies.   Neurological: Negative for dizziness, seizures, facial asymmetry, speech difficulty, weakness, light-headedness and headaches.   Hematological: Negative for adenopathy. Does not bruise/bleed easily.   Psychiatric/Behavioral: Negative for agitation, confusion and dysphoric mood.  Resolved          Past Medical History: Reviewed  Past Medical History:   Diagnosis Date   • Basal cell carcinoma of chin    • Essential (primary) hypertension    • IBS (irritable bowel syndrome)    • Nuclear sclerosis    • Thyroid disease        Past Surgical History: Reviewed  Past Surgical History:   Procedure Laterality Date   • Bladder surgery     • Cholecystectomy     • Cyst removal      Scalp cyst excision   • Hysterectomy         Current Medication: Reviewed and updated  Current Outpatient Medications   Medication Sig Dispense Refill   • isosorbide mononitrate (IMDUR) 30 MG 24 hr tablet Take 1 tablet by mouth daily. 90 tablet 3   • nitroGLYcerin (NITROSTAT) 0.4 MG sublingual tablet Place 1 tablet under the tongue every 5 minutes as needed for Chest pain. 90 tablet 3   • atorvastatin (LIPITOR) 80 MG tablet Take 1 tablet by mouth daily. 90 tablet 3   • aspirin 81 MG EC tablet Take 1 tablet by mouth daily. 90 tablet 3   • Ascorbic Acid (vitamin C) 1000 MG tablet Take 1,000 mg by mouth daily.     • ZINC-VITAMIN B12-VITAMIN C MT Take by mouth daily.     • MELATONIN PO Take by mouth nightly as needed.     • hydroCORTisone (CORTIZONE) 2.5 % cream Apply 1 application topically 3 times daily. Apply to rectal area as needed 30 g 1   • telmisartan (MICARDIS) 40 MG tablet TAKE ONE TABLET BY MOUTH ONE TIME DAILY 90 tablet 2   • Calcium Carb-Cholecalciferol (Oyster Shell Calcium w/D) 500-200 MG-UNIT Tab TAKE ONE TABLET BY MOUTH TWICE DAILY 180 tablet 3   • amLODIPine (NORVASC) 5 MG tablet Take 1 tablet by mouth daily. 90 tablet 1   • fluticasone (FLONASE) 50 MCG/ACT nasal spray Spray 2 sprays in each nostril daily. 16 g 0   • fluticasone-salmeterol 100-50 MCG/DOSE inhaler Inhale 1 puff into the lungs two times daily. 180 each 3   • levothyroxine 125 MCG tablet Take 1 tablet by mouth every morning. 90 tablet 3   • Cholecalciferol 5000 units Cap Take 5,000 Units by mouth daily.       No current facility-administered medications for  this visit.       Allergies: Reviewed   ALLERGIES:  Penicillins    SOCIAL HISTORY: Reviewed  Social History     Socioeconomic History   • Marital status:      Spouse name: Not on file   • Number of children: Not on file   • Years of education: Not on file   • Highest education level: Not on file   Occupational History   • Not on file   Tobacco Use   • Smoking status: Former Smoker     Packs/day: 0.00     Quit date: 2005     Years since quittin.3   • Smokeless tobacco: Never Used   Vaping Use   • Vaping Use: never used   Substance and Sexual Activity   • Alcohol use: Not Currently   • Drug use: Never   • Sexual activity: Not on file   Other Topics Concern   • Not on file   Social History Narrative   • Not on file     Social Determinants of Health     Financial Resource Strain: Not on file   Food Insecurity: Not on file   Transportation Needs: Not on file   Physical Activity: Not on file   Stress: Not on file   Social Connections: Not on file   Intimate Partner Violence: Not At Risk   • Social Determinants: Intimate Partner Violence Past Fear: No   • Social Determinants: Intimate Partner Violence Current Fear: No       Family History: Reviewed  Family History   Problem Relation Age of Onset   • Diabetes Other         maternal side   • Diabetes Mother    • Heart disease Father         MI,  at 82   • Hypertension Father    • Heart disease Sister    • Cancer, Breast Sister 55   • Hypertension Brother    • Heart disease Brother         MI, CABG   • Cancer, Colon Neg Hx    • Cancer, Lung Neg Hx            Physical Exam:  Physical Exam   Constitutional: oriented to person, place, and time. appears well-developed and well-nourished.   HENT:   Head: Normocephalic and atraumatic.   Nose: Nose normal.   Eyes: Conjunctivae and EOM are normal. Right eye exhibits no discharge. Left eye exhibits no discharge. No scleral icterus.   Neck: Normal range of motion. No JVD present. No tracheal deviation present. No  thyromegaly present.   Cardiovascular: Normal rate, regular rhythm and intact distal pulses.   Pulmonary/Chest: Effort normal and breath sounds normal. No respiratory distress.  no wheezes.   Abdominal: Soft.  no distension and no mass. There is no tenderness. There is no rebound and no guarding. No hernia.   Musculoskeletal: Normal range of motion.  no edema, tenderness or deformity.   Lymphadenopathy:  no cervical adenopathy.   Neurological: alert and oriented to person, place, and time.   Skin: Skin is warm and dry. No rash noted.   Psychiatric: normal mood and affect. behavior is normal. Thought content normal.       Imaging Work Up: Reviewed and demonstrated    Date and Time: Exam End: 3/18/2022 10:37 Status: Final result Provider Status: Reviewed   Priority: Routine              Study Result    Narrative & Impression   EXAM: CT SOFT TISSUE OVERREAD     CLINICAL INDICATION: Primary hypertension [I10 (ICD-10-CM)]; Pure  hypercholesterolemia [E78.00 (ICD-10-CM)]; Abdominal aortic aneurysm (AAA)  without rupture (CMS/HCC) [I71.4 (ICD-10-CM)]; Benign essential  hypertension [I10 (ICD-10-CM)]; Precordial pain [R07.2 (ICD-10-CM)]      COMPARISON: Chest radiograph, CT abdomen and pelvis 02/11/2022      FINDINGS:     The limited visualized lungs demonstrate no focal consolidations, pleural  effusions, or pneumothorax. Atelectasis or scarring in the lingula.  Mild  bronchial wall thickening with mosaic groundglass opacity, suggestive of  nonspecific small airway disease.  New mildly spiculated 3 mm nodule within  the inferior lateral segment right middle lobe (series 9 image 32).     Visualized osseous structures appear unremarkable.     Cholecystectomy.  Multiple 1.4 cm and smaller arterially enhancing lesions  throughout the liver. Stable eccentric aortic atherosclerotic plaque.     IMPRESSION:     *    New 3 mm nodule in the right middle lobe with spiculated margins .   Recommend 12 month follow-up with CT chest.  *    Multiple enhancing lesions throughout the liver of uncertain etiology,  with hypervascular metastases not excluded.  Further evaluation with MRI  liver protocol is recommended for definite characterization.  *    Mild bronchial wall thickening with mosaic groundglass opacity,  suggestive of nonspecific small airway disease.       The radiologist's coronary CTA over read is restricted to the noncardiac  structures scanned at the time of coronary CTA.  Any coronary artery or  cardiac pathology present on the current exam will be evaluated and  reported separately by the cardiologist.  Current examination should not be  considered a substitute for conventional chest CT as the entire thorax is  not imaged. Automated exposure control utilized.     Date and Time: Exam End: 2/11/2022 22:29 Status: Final result Provider Status: Open   Priority: STAT              Study Result    Narrative & Impression   EXAM:  CT ABDOMEN PELVIS W CONTRAST     CLINICAL INDICATION: Abdominal pain. Evaluation for small bowel  obstruction.     COMPARISON: None     TECHNIQUE: Contiguous axial images obtained from the xiphoid process to  ischial tuberosities with Omnipaque 350 intravenous contrast.  No oral  contrast.  Coronal and sagittal reconstructions are evaluated. Automated  exposure control utilized.       FINDINGS:   Lower chest: No acute process. Discoid atelectasis in the left lower lobe.  Additional minimal atelectasis in the dependent lungs.  No pleural  effusion.  No pneumothorax.     Liver: Hepatomegaly (19 cm craniocaudal) without focal parenchymal lesion.     Gallbladder and biliary system:  Cholecystectomy.  Probable  postcholecystectomy dilatation of the bile ducts with the common duct  measuring up to 11 mm.     Spleen: Unremarkable     Pancreas: Unremarkable.  No pancreatic duct dilatation.     Adrenal glands: Unremarkable     Kidneys and bladder: Symmetric enhancement of bilateral kidneys.  Subcentimeter hypodense probable  bilateral renal cysts. No dilatation of  the urinary collecting system. Urinary bladder is unremarkable..     Pelvic organs: Uterus is not visualized.  No adnexal mass.     Bowel/Peritoneal cavity: Unremarkable appendix.  Colonic diverticulosis  without diverticulitis.  Small periampullary duodenal diverticulum.   Another larger uncomplicated 4.5 cm duodenal diverticulum at the junction  of 2nd and 3rd portions.  No bowel obstruction.  No free air.  No  peritoneal fluid.     Lymph nodes:  Few enlarged mesenteric lymph node measuring 1.8 x 1.8 cm  (series 2 image 107).  Couple of small 6 mm omental lymph nodes in the  right upper abdomen (2/50-51).  No retroperitoneal lymphadenopathy.     Vessels: Atherosclerotic calcification of the aorta.  Eccentric noncalcific  and ulcerative plaque in the distal descending thoracic aorta.  Bilobed  infrarenal abdominal aortic aneurysm.  The proximal lobule located just  below the renal vessels measures 2.8 cm in maximum diameter.  The distal  lobule measures 4.2 cm in maximum diameter.  Small eccentric noncalcific  plaque within the aneurysm sac.  No retroperitoneal hemorrhage.     Abdominal/Pelvic wall: Bilateral fat-containing inguinal hernias.   Fat-containing umbilical hernia.     Bones: No acute osseous abnormalities.  L5-S1 facet arthropathy.     IMPRESSION:     1.   A grouping of few mildly enlarged nonspecific mesenteric lymph nodes  in the mid pelvis.  Couple of tiny right upper anterior omental lymph nodes  also.  2.   Bilobed infrarenal abdominal aortic aneurysm.  The larger distal  lobule measures 4.2 cm in maximum diameter.  Recommend follow-up.  3.   Colonic diverticulosis.  No bowel obstruction.    4.   Hepatomegaly.  5.   Few additional chronic and postsurgical findings as above                Assessment:   This is a 81 year old female patient with incidental findings of liver masses.  I recommended adequately characterize and then with an MRI of the liver.  We  gave her an order for this today.  In the absence of a significant history, it be unusual to have metastatic disease.  However, it is prudent to work this up to determine this definitively.  We gave her an order for an MRI with a liver protocol.  We will follow up on the results and discuss.  Neck steps are dependent on these results.  She understands this.  All questions were answered.    Thank you for involving me in her care.    -Holly Carter MD  General Surgery and Surgical Oncology  [facility], [place]

## 2024-08-20 NOTE — ASSESSMENT & PLAN NOTE
Acute on chronic, likely worsened in setting of recent fall and deconditioning.   CT imaging reviewed, degenerative changes noted.  Add ATC APAP, Schedule Baclofen 5 mg BID, add lido patch. PRN Tramdol on board.   Mobilize as tolerated, PT/OT

## 2024-08-21 PROBLEM — R00.0 SINUS TACHYCARDIA: Status: ACTIVE | Noted: 2024-08-21

## 2024-08-21 LAB
ANION GAP SERPL CALCULATED.3IONS-SCNC: 5 MMOL/L (ref 4–13)
BASOPHILS # BLD AUTO: 0.03 THOUSANDS/ΜL (ref 0–0.1)
BASOPHILS NFR BLD AUTO: 1 % (ref 0–1)
BUN SERPL-MCNC: 7 MG/DL (ref 5–25)
CALCIUM SERPL-MCNC: 8.2 MG/DL (ref 8.4–10.2)
CHLORIDE SERPL-SCNC: 108 MMOL/L (ref 96–108)
CO2 SERPL-SCNC: 25 MMOL/L (ref 21–32)
CREAT SERPL-MCNC: 0.41 MG/DL (ref 0.6–1.3)
EOSINOPHIL # BLD AUTO: 0.1 THOUSAND/ΜL (ref 0–0.61)
EOSINOPHIL NFR BLD AUTO: 2 % (ref 0–6)
ERYTHROCYTE [DISTWIDTH] IN BLOOD BY AUTOMATED COUNT: 13.1 % (ref 11.6–15.1)
GFR SERPL CREATININE-BSD FRML MDRD: 118 ML/MIN/1.73SQ M
GLUCOSE SERPL-MCNC: 237 MG/DL (ref 65–140)
GLUCOSE SERPL-MCNC: 256 MG/DL (ref 65–140)
GLUCOSE SERPL-MCNC: 260 MG/DL (ref 65–140)
GLUCOSE SERPL-MCNC: 279 MG/DL (ref 65–140)
GLUCOSE SERPL-MCNC: 333 MG/DL (ref 65–140)
GLUCOSE SERPL-MCNC: 389 MG/DL (ref 65–140)
HCT VFR BLD AUTO: 33.6 % (ref 34.8–46.1)
HGB BLD-MCNC: 11 G/DL (ref 11.5–15.4)
IMM GRANULOCYTES # BLD AUTO: 0.02 THOUSAND/UL (ref 0–0.2)
IMM GRANULOCYTES NFR BLD AUTO: 0 % (ref 0–2)
LYMPHOCYTES # BLD AUTO: 2.24 THOUSANDS/ΜL (ref 0.6–4.47)
LYMPHOCYTES NFR BLD AUTO: 34 % (ref 14–44)
MAGNESIUM SERPL-MCNC: 1.6 MG/DL (ref 1.9–2.7)
MCH RBC QN AUTO: 31.6 PG (ref 26.8–34.3)
MCHC RBC AUTO-ENTMCNC: 32.7 G/DL (ref 31.4–37.4)
MCV RBC AUTO: 97 FL (ref 82–98)
MONOCYTES # BLD AUTO: 0.5 THOUSAND/ΜL (ref 0.17–1.22)
MONOCYTES NFR BLD AUTO: 8 % (ref 4–12)
NEUTROPHILS # BLD AUTO: 3.76 THOUSANDS/ΜL (ref 1.85–7.62)
NEUTS SEG NFR BLD AUTO: 55 % (ref 43–75)
NRBC BLD AUTO-RTO: 0 /100 WBCS
PLATELET # BLD AUTO: 259 THOUSANDS/UL (ref 149–390)
PMV BLD AUTO: 11.2 FL (ref 8.9–12.7)
POTASSIUM SERPL-SCNC: 4.6 MMOL/L (ref 3.5–5.3)
RBC # BLD AUTO: 3.48 MILLION/UL (ref 3.81–5.12)
SODIUM SERPL-SCNC: 138 MMOL/L (ref 135–147)
WBC # BLD AUTO: 6.65 THOUSAND/UL (ref 4.31–10.16)

## 2024-08-21 PROCEDURE — 80048 BASIC METABOLIC PNL TOTAL CA: CPT | Performed by: NURSE PRACTITIONER

## 2024-08-21 PROCEDURE — 83735 ASSAY OF MAGNESIUM: CPT | Performed by: NURSE PRACTITIONER

## 2024-08-21 PROCEDURE — 99221 1ST HOSP IP/OBS SF/LOW 40: CPT

## 2024-08-21 PROCEDURE — 82948 REAGENT STRIP/BLOOD GLUCOSE: CPT

## 2024-08-21 PROCEDURE — 85025 COMPLETE CBC W/AUTO DIFF WBC: CPT | Performed by: NURSE PRACTITIONER

## 2024-08-21 PROCEDURE — 99232 SBSQ HOSP IP/OBS MODERATE 35: CPT | Performed by: NURSE PRACTITIONER

## 2024-08-21 RX ORDER — MICONAZOLE NITRATE 20 MG/G
CREAM TOPICAL 2 TIMES DAILY
Status: DISCONTINUED | OUTPATIENT
Start: 2024-08-21 | End: 2024-09-16 | Stop reason: HOSPADM

## 2024-08-21 RX ORDER — PHENAZOPYRIDINE HYDROCHLORIDE 100 MG/1
100 TABLET, FILM COATED ORAL
Status: COMPLETED | OUTPATIENT
Start: 2024-08-21 | End: 2024-08-22

## 2024-08-21 RX ADMIN — ACETAMINOPHEN 975 MG: 325 TABLET ORAL at 01:56

## 2024-08-21 RX ADMIN — BACLOFEN 5 MG: 10 TABLET ORAL at 09:18

## 2024-08-21 RX ADMIN — NICOTINE 1 PATCH: 21 PATCH, EXTENDED RELEASE TRANSDERMAL at 09:19

## 2024-08-21 RX ADMIN — LIDOCAINE 2 PATCH: 700 PATCH TOPICAL at 09:20

## 2024-08-21 RX ADMIN — ACETAMINOPHEN 975 MG: 325 TABLET ORAL at 09:18

## 2024-08-21 RX ADMIN — TRAMADOL HYDROCHLORIDE 50 MG: 50 TABLET, COATED ORAL at 16:30

## 2024-08-21 RX ADMIN — PANTOPRAZOLE SODIUM 40 MG: 40 TABLET, DELAYED RELEASE ORAL at 05:12

## 2024-08-21 RX ADMIN — POTASSIUM CHLORIDE 40 MEQ: 1500 TABLET, EXTENDED RELEASE ORAL at 09:18

## 2024-08-21 RX ADMIN — INSULIN LISPRO 2 UNITS: 100 INJECTION, SOLUTION INTRAVENOUS; SUBCUTANEOUS at 09:00

## 2024-08-21 RX ADMIN — GABAPENTIN 800 MG: 400 CAPSULE ORAL at 16:29

## 2024-08-21 RX ADMIN — SODIUM CHLORIDE 100 ML/HR: 0.9 INJECTION, SOLUTION INTRAVENOUS at 07:49

## 2024-08-21 RX ADMIN — TRAMADOL HYDROCHLORIDE 50 MG: 50 TABLET, COATED ORAL at 22:42

## 2024-08-21 RX ADMIN — FIDAXOMICIN 200 MG: 200 TABLET, FILM COATED ORAL at 09:20

## 2024-08-21 RX ADMIN — ONDANSETRON 4 MG: 2 INJECTION INTRAMUSCULAR; INTRAVENOUS at 16:33

## 2024-08-21 RX ADMIN — ATORVASTATIN CALCIUM 80 MG: 80 TABLET, FILM COATED ORAL at 09:18

## 2024-08-21 RX ADMIN — TRAMADOL HYDROCHLORIDE 50 MG: 50 TABLET, COATED ORAL at 09:19

## 2024-08-21 RX ADMIN — GABAPENTIN 800 MG: 400 CAPSULE ORAL at 22:35

## 2024-08-21 RX ADMIN — DULOXETINE HYDROCHLORIDE 60 MG: 60 CAPSULE, DELAYED RELEASE ORAL at 09:18

## 2024-08-21 RX ADMIN — HEPARIN SODIUM 5000 UNITS: 5000 INJECTION INTRAVENOUS; SUBCUTANEOUS at 09:18

## 2024-08-21 RX ADMIN — DULOXETINE HYDROCHLORIDE 60 MG: 60 CAPSULE, DELAYED RELEASE ORAL at 17:44

## 2024-08-21 RX ADMIN — INSULIN LISPRO 2 UNITS: 100 INJECTION, SOLUTION INTRAVENOUS; SUBCUTANEOUS at 12:30

## 2024-08-21 RX ADMIN — MAGNESIUM OXIDE TAB 400 MG (241.3 MG ELEMENTAL MG) 400 MG: 400 (241.3 MG) TAB at 17:45

## 2024-08-21 RX ADMIN — INSULIN LISPRO 4 UNITS: 100 INJECTION, SOLUTION INTRAVENOUS; SUBCUTANEOUS at 22:37

## 2024-08-21 RX ADMIN — INSULIN LISPRO 2 UNITS: 100 INJECTION, SOLUTION INTRAVENOUS; SUBCUTANEOUS at 17:49

## 2024-08-21 RX ADMIN — HEPARIN SODIUM 5000 UNITS: 5000 INJECTION INTRAVENOUS; SUBCUTANEOUS at 01:56

## 2024-08-21 RX ADMIN — PHENAZOPYRIDINE HYDROCHLORIDE 100 MG: 100 TABLET ORAL at 13:36

## 2024-08-21 RX ADMIN — LEVOTHYROXINE SODIUM 112 MCG: 112 TABLET ORAL at 05:12

## 2024-08-21 RX ADMIN — MAGNESIUM OXIDE TAB 400 MG (241.3 MG ELEMENTAL MG) 400 MG: 400 (241.3 MG) TAB at 09:18

## 2024-08-21 RX ADMIN — PRAZOSIN HYDROCHLORIDE 2 MG: 2 CAPSULE ORAL at 22:41

## 2024-08-21 RX ADMIN — INSULIN LISPRO 2 UNITS: 100 INJECTION, SOLUTION INTRAVENOUS; SUBCUTANEOUS at 17:30

## 2024-08-21 RX ADMIN — ONDANSETRON 4 MG: 2 INJECTION INTRAMUSCULAR; INTRAVENOUS at 09:19

## 2024-08-21 RX ADMIN — BACLOFEN 5 MG: 10 TABLET ORAL at 17:45

## 2024-08-21 RX ADMIN — MICONAZOLE NITRATE: 20 CREAM TOPICAL at 17:48

## 2024-08-21 RX ADMIN — FIDAXOMICIN 200 MG: 200 TABLET, FILM COATED ORAL at 22:36

## 2024-08-21 RX ADMIN — ACETAMINOPHEN 975 MG: 325 TABLET ORAL at 17:44

## 2024-08-21 RX ADMIN — PHENAZOPYRIDINE HYDROCHLORIDE 100 MG: 100 TABLET ORAL at 17:48

## 2024-08-21 RX ADMIN — HEPARIN SODIUM 5000 UNITS: 5000 INJECTION INTRAVENOUS; SUBCUTANEOUS at 17:45

## 2024-08-21 RX ADMIN — INSULIN GLARGINE 18 UNITS: 100 INJECTION, SOLUTION SUBCUTANEOUS at 22:35

## 2024-08-21 RX ADMIN — MIRTAZAPINE 7.5 MG: 15 TABLET, FILM COATED ORAL at 22:41

## 2024-08-21 RX ADMIN — ASPIRIN 81 MG CHEWABLE TABLET 81 MG: 81 TABLET CHEWABLE at 09:18

## 2024-08-21 RX ADMIN — POTASSIUM CHLORIDE 40 MEQ: 1500 TABLET, EXTENDED RELEASE ORAL at 17:44

## 2024-08-21 RX ADMIN — GABAPENTIN 800 MG: 400 CAPSULE ORAL at 09:18

## 2024-08-21 NOTE — CONSULTS
Consultation - Wound Care   Barb Palomo 54 y.o. female MRN: [id number]  Unit/Bed#: [facility] 601-01 Encounter: [id number]    Assessment:  C. difficile diarrhea  Uncontrolled type 2 diabetes with hyperglycemia  Fecal incontinence    Plan:  Bilateral sacral buttocks, perirectal area and labia are dry and intact without skin breakdown or drainage.  No pressure injury to b/l sacro-buttocks. Skin appears mildly irritated due to frequent bowel movements.  Skin to dariusz-rectal area and labia is dry and intact pink colored skin. No tenderness, temperature or texture differences noted upon palpation.   Patient states that she does not like calazime cream and it bothers her skin, will recommend DARLIN antifungal cream to provide lighter protective barrier.   No clinical s/s of infection present to above skin areas assessed. Patient tolerated assessment well, denies pain.    A1C results reviewed with the patient today.     Result of 16.1 noted from 8/16/24  Managed by primary team   Will recommend preventative nursing skin care measures  Pressure relief- offloading of pressure with turning/repositioning as patient medically tolerates, heel elevation, foam wedges for offloading/repositioning, and waffle cushion to chair.  Nutrition is following  Patient verbalized understanding of plan of care.  Epic secure chat wound care team with questions or concerns.   Wound care will sign off at this time, please re-consult if needed.  Discussed with primary RN at bedside.     History of Present Illness:  Patient is a 54-year-old female who was admitted to the hospital with C. difficile diarrhea.  Patient has history of type 2 diabetes, opioid dependence, chronic diarrhea, tobacco dependence, hypothyroidism, GERD, and depression.  Wound care consulted for excoriation to labia bottom from frequent loose bowel movements.  Patient cooperative and agreeable for the assessment.  Patient states that her skin has been getting sore due to her  frequent bowel movements.  Patient states that she does not like the Calazime cream as it is bothering her skin with applications, and is requesting an alternative. Denies fever or chills.    Subjective:    Review of Systems   Constitutional:  Positive for fatigue. Negative for chills and fever.   HENT:  Negative for congestion and sneezing.    Respiratory:  Negative for cough and shortness of breath.    Gastrointestinal:  Positive for diarrhea.   Musculoskeletal:  Positive for gait problem.   Psychiatric/Behavioral:  Negative for agitation.        Historical Information   Past Medical History:   Diagnosis Date    Acute venous embolism and thrombosis of deep vessels of distal lower extremity (HCC)     Anemia     Anxiety     last assessed 11/20/17    Arthritis     Asthma     Cerebral infarction (HCC)     unspecified, last assessed 11/14/16    Chest pain     last assessed 5/9/17    Chronic cough     last assessed 12/12/13    Depression     Diabetes mellitus, type 2 (HCC)     DJD (degenerative joint disease)     Esophageal reflux     Fibromyalgia     GERD without esophagitis     resolved 5/13/16    History of pulmonary embolism     Hyperlipidemia     Hypertension     Hypothyroidism     Iron deficiency     Pancreatitis     Panic attack     Panic disorder     Polyarthritis     PTSD (post-traumatic stress disorder)     Sleep difficulties     Stroke syndrome     Thyroid disease     TIA (transient ischemic attack)     TIA (transient ischemic attack)     Venous embolism and thrombosis of deep vessels of distal lower extremity (HCC)     Vitamin B12 deficiency     Vitamin D deficiency      Past Surgical History:   Procedure Laterality Date    CARPAL TUNNEL RELEASE Right     neuroplasty decompression of median nerve    CATARACT EXTRACTION      CHOLECYSTECTOMY      ERCP      ERCP      ESOPHAGOGASTRIC FUNDOPLASTY      NISSEN FUNDOPLICATION      PATELLA SURGERY Left 12/2021    ND ESOPHAGOGASTRODUODENOSCOPY TRANSORAL DIAGNOSTIC N/A  11/02/2016    Procedure: EGD AND COLONOSCOPY;  Surgeon: Jatin Sehpherd MD;  Location: [facility];  Service: Gastroenterology    MA NDSC WRST SURG W/RLS TRANSVRS CARPL LIGM Right 03/08/2016    Procedure: RELEASE CARPAL TUNNEL ENDOSCOPIC;  Surgeon: Guero Restrepo MD;  Location: [facility];  Service: Orthopedics    MA TENDON SHEATH INCISION Right 03/08/2016    Procedure: RELEASE TRIGGER FINGER RIGHT THUMB;  Surgeon: Guero Restrepo MD;  Location: [facility];  Service: Orthopedics    TONSILLECTOMY AND ADENOIDECTOMY       Social History   Social History     Substance and Sexual Activity   Alcohol Use Not Currently    Alcohol/week: 0.0 standard drinks of alcohol    Comment: last was in 2010 after DUI     Social History     Substance and Sexual Activity   Drug Use No     E-Cigarette/Vaping    E-Cigarette Use Former User     Cartridges/Day 1      E-Cigarette/Vaping Substances    Nicotine Yes     THC No     CBD No     Flavoring No      Social History     Tobacco Use   Smoking Status Every Day    Current packs/day: 1.00    Average packs/day: 1 pack/day for 41.6 years (41.6 ttl pk-yrs)    Types: Cigarettes    Start date: 1/1/1983   Smokeless Tobacco Never   Tobacco Comments    20 + years     Family History:   Family History   Problem Relation Age of Onset    Diabetes Mother         type 2    Heart attack Mother 39        acute MI    Kidney disease Mother         CKD NKF classfication    Depression Mother     Arthritis Mother     Substance Abuse Mother         mother OD in past on MS04    Ulcerative colitis Mother     Schizophrenia Mother     Suicide Attempts Mother     Bipolar disorder Mother     Diabetes Maternal Grandmother     Heart attack Father         acute MI    Psoriasis Father     Cancer Father         gastric cancer    Stroke Father     Crohn's disease Sister     Bipolar disorder Sister     Rheum arthritis Maternal Grandfather     Throat cancer Maternal Grandfather     Suicide Attempts Daughter     Drug abuse  Daughter     Lung cancer Paternal Grandmother     Cancer Paternal Grandfather     No Known Problems Sister     Bipolar disorder Maternal Uncle     Anesthesia problems Neg Hx        Meds/Allergies   current meds:   Current Facility-Administered Medications   Medication Dose Route Frequency    acetaminophen (TYLENOL) tablet 975 mg  975 mg Oral Q8H HONEY    albuterol (PROVENTIL HFA,VENTOLIN HFA) inhaler 2 puff  2 puff Inhalation Q4H PRN    aspirin chewable tablet 81 mg  81 mg Oral Daily    atorvastatin (LIPITOR) tablet 80 mg  80 mg Oral Daily    baclofen tablet 5 mg  5 mg Oral BID    butalbital-acetaminophen-caffeine (FIORICET,ESGIC) -40 mg per tablet 1 tablet  1 tablet Oral Q6H PRN    diazepam (VALIUM) tablet 5 mg  5 mg Oral Q12H PRN    DULoxetine (CYMBALTA) delayed release capsule 60 mg  60 mg Oral BID    fidaxomicin (DIFICID) tablet 200 mg  200 mg Oral Q12H HONEY    fluticasone-vilanterol 200-25 mcg/actuation 1 puff  1 puff Inhalation Daily    gabapentin (NEURONTIN) capsule 800 mg  800 mg Oral TID    heparin (porcine) subcutaneous injection 5,000 Units  5,000 Units Subcutaneous Q8H HONEY    insulin glargine (LANTUS) subcutaneous injection 18 Units 0.18 mL  18 Units Subcutaneous HS    insulin lispro (HumALOG/ADMELOG) 100 units/mL subcutaneous injection 1-5 Units  1-5 Units Subcutaneous TID AC    insulin lispro (HumALOG/ADMELOG) 100 units/mL subcutaneous injection 1-5 Units  1-5 Units Subcutaneous HS    insulin lispro (HumALOG/ADMELOG) 100 units/mL subcutaneous injection 2 Units  2 Units Subcutaneous TID With Meals    levothyroxine tablet 112 mcg  112 mcg Oral Early Morning    lidocaine (LIDODERM) 5 % patch 2 patch  2 patch Topical Daily    magnesium Oxide (MAG-OX) tablet 400 mg  400 mg Oral BID    mirtazapine (REMERON) tablet 7.5 mg  7.5 mg Oral HS    moisture barrier miconazole 2% cream (aka DARLIN MOISTURE BARRIER ANTIFUNGAL CREAM)   Topical BID    nicotine (NICODERM CQ) 21 mg/24 hr TD 24 hr patch 1 patch  1 patch  "Transdermal Daily    ondansetron (ZOFRAN) injection 4 mg  4 mg Intravenous Q6H PRN    pantoprazole (PROTONIX) EC tablet 40 mg  40 mg Oral Early Morning    phenazopyridine (PYRIDIUM) tablet 100 mg  100 mg Oral TID With Meals    potassium chloride (Klor-Con M20) CR tablet 40 mEq  40 mEq Oral BID    prazosin (MINIPRESS) capsule 2 mg  2 mg Oral HS    traMADol (ULTRAM) tablet 50 mg  50 mg Oral Q6H PRN     Allergies   Allergen Reactions    Medical Tape Rash    Lexapro [Escitalopram Oxalate]     Escitalopram Other (See Comments) and Palpitations    Other Hives and Rash     Adhesive Tape       Objective   Vitals: Blood pressure 116/73, pulse 104, temperature 98.6 °F (37 °C), temperature source Oral, resp. rate 18, height 5' 2\" (1.575 m), weight 39.9 kg (88 lb), last menstrual period 03/04/2016, SpO2 97%, not currently breastfeeding.            Physical Exam  Constitutional:       General: She is awake. She is not in acute distress.     Appearance: She is underweight. She is not diaphoretic.   HENT:      Head: Normocephalic and atraumatic.      Right Ear: External ear normal.      Left Ear: External ear normal.   Eyes:      Conjunctiva/sclera: Conjunctivae normal.   Pulmonary:      Effort: Pulmonary effort is normal. No respiratory distress.   Skin:     General: Skin is warm and dry.      Findings: No rash or wound.      Comments: Refer to plan for assessment details.     Right heel is dry and intact with blanchable hyperpigmented scar tissue, and dry skin. Left heel is dry and intact without redness or wounds. No tenderness, temperature/texture differences to the skin of the b/l heels.    Neurological:      Mental Status: She is alert and oriented to person, place, and time.      Gait: Gait abnormal.   Psychiatric:         Behavior: Behavior is cooperative.           Lab, Imaging and other studies: I have personally reviewed pertinent reports.      Code Status: Level 1 - Full Code      Counseling / Coordination of " "Care  Total time spent today:    Total time (face-to-face and non-face-to-face) spent on today's visit was 19 minutes. This includes preparation for the visits (H&P on 8/15/24 and SLIM note from 8/20/24) performance of a medically appropriate history and examination, and orders for medications/treatments or testing.  Discussed assessment findings, and plan of care/recommendations with patients RN.      Cherise Grey, ELISEO, FNP-C, IVAN      Portions of the record may have been created with voice recognition software.  Occasional wrong word or \"sound a like\" substitutions may have occurred due to the inherent limitations of voice recognition software.  Read the chart carefully and recognize, using context, where substitutions have occurred      "

## 2024-08-21 NOTE — ASSESSMENT & PLAN NOTE
Heart rate remains in the low 100s, up into the 120s at times.  Sinus. Has received IV fluids without much improvement.  Continue to encourage oral hydration, would hold on further IV fluids at this time.  Monitor

## 2024-08-21 NOTE — ASSESSMENT & PLAN NOTE
"Lab Results   Component Value Date    HGBA1C 16.1 (H) 08/16/2024       Recent Labs     08/20/24  1134 08/20/24  1629 08/20/24  2036 08/21/24  0716   POCGLU 117 296* 308* 237*       Patient markedly hyperglycemic on admission only partially responsive to IV fluids, remainder of labs fortunately not consistent with DKA. A1C 16  Patient admitting she has not been using her home insulin as she apparently has been unable to fill prescription due to \"insurance issues,\" additionally reports that due to her neuropathy she has difficulty self injecting.  Daughter also reports jessica non compliance.   Typically on NovoLog 70/30 20 units twice daily  Continue Lantus 18 units HS, humalog 2 units TID with meals + SSI  insurance issues relayed to CM  Follows with endo outpatient, continue   "

## 2024-08-21 NOTE — PLAN OF CARE
Problem: Nutrition/Hydration-ADULT  Goal: Nutrient/Hydration intake appropriate for improving, restoring or maintaining nutritional needs  Description: Monitor and assess patient's nutrition/hydration status for malnutrition. Collaborate with interdisciplinary team and initiate plan and interventions as ordered.  Monitor patient's weight and dietary intake as ordered or per policy. Utilize nutrition screening tool and intervene as necessary. Determine patient's food preferences and provide high-protein, high-caloric foods as appropriate.     INTERVENTIONS:  - Monitor oral intake, urinary output, labs, and treatment plans  - Assess nutrition and hydration status and recommend course of action  - Evaluate amount of meals eaten  - Assist patient with eating if necessary   - Allow adequate time for meals  - Recommend/ encourage appropriate diets, oral nutritional supplements, and vitamin/mineral supplements  - Order, calculate, and assess calorie counts as needed  - Recommend, monitor, and adjust tube feedings and TPN/PPN based on assessed needs  - Assess need for intravenous fluids  - Provide specific nutrition/hydration education as appropriate  - Include patient/family/caregiver in decisions related to nutrition  Outcome: Progressing     Problem: PAIN - ADULT  Goal: Verbalizes/displays adequate comfort level or baseline comfort level  Description: Interventions:  - Encourage patient to monitor pain and request assistance  - Assess pain using appropriate pain scale  - Administer analgesics based on type and severity of pain and evaluate response  - Implement non-pharmacological measures as appropriate and evaluate response  - Consider cultural and social influences on pain and pain management  - Notify physician/advanced practitioner if interventions unsuccessful or patient reports new pain  Outcome: Progressing     Problem: INFECTION - ADULT  Goal: Absence or prevention of progression during  hospitalization  Description: INTERVENTIONS:  - Assess and monitor for signs and symptoms of infection  - Monitor lab/diagnostic results  - Monitor all insertion sites, i.e. indwelling lines, tubes, and drains  - Monitor endotracheal if appropriate and nasal secretions for changes in amount and color  - [place] appropriate cooling/warming therapies per order  - Administer medications as ordered  - Instruct and encourage patient and family to use good hand hygiene technique  - Identify and instruct in appropriate isolation precautions for identified infection/condition  Outcome: Progressing  Goal: Absence of fever/infection during neutropenic period  Description: INTERVENTIONS:  - Monitor WBC    Outcome: Progressing     Problem: SAFETY ADULT  Goal: Patient will remain free of falls  Description: INTERVENTIONS:  - Educate patient/family on patient safety including physical limitations  - Instruct patient to call for assistance with activity   - Consult OT/PT to assist with strengthening/mobility   - Keep Call bell within reach  - Keep bed low and locked with side rails adjusted as appropriate  - Keep care items and personal belongings within reach  - Initiate and maintain comfort rounds  - Make Fall Risk Sign visible to staff  - Offer Toileting every 2 Hours, in advance of need  - Initiate/Maintain alarm  - Obtain necessary fall risk management equipment  - Apply yellow socks and bracelet for high fall risk patients  - Consider moving patient to room near nurses station  Outcome: Progressing  Goal: Maintain or return to baseline ADL function  Description: INTERVENTIONS:  -  Assess patient's ability to carry out ADLs; assess patient's baseline for ADL function and identify physical deficits which impact ability to perform ADLs (bathing, care of mouth/teeth, toileting, grooming, dressing, etc.)  - Assess/evaluate cause of self-care deficits   - Assess range of motion  - Assess patient's mobility; develop plan if  impaired  - Assess patient's need for assistive devices and provide as appropriate  - Encourage maximum independence but intervene and supervise when necessary  - Involve family in performance of ADLs  - Assess for home care needs following discharge   - Consider OT consult to assist with ADL evaluation and planning for discharge  - Provide patient education as appropriate  Outcome: Progressing  Goal: Maintains/Returns to pre admission functional level  Description: INTERVENTIONS:  - Perform AM-PAC 6 Click Basic Mobility/ Daily Activity assessment daily.  - Set and communicate daily mobility goal to care team and patient/family/caregiver.   - Collaborate with rehabilitation services on mobility goals if consulted  - Perform Range of Motion 3 times a day.  - Reposition patient every 2 hours.  - Dangle patient 3 times a day  - Stand patient 3 times a day  - Ambulate patient 3 times a day  - Out of bed to chair 3 times a day   - Out of bed for meals 3 times a day  - Out of bed for toileting  - Record patient progress and toleration of activity level   Outcome: Progressing     Problem: DISCHARGE PLANNING  Goal: Discharge to home or other facility with appropriate resources  Description: INTERVENTIONS:  - Identify barriers to discharge w/patient and caregiver  - Arrange for needed discharge resources and transportation as appropriate  - Identify discharge learning needs (meds, wound care, etc.)  - Arrange for interpretive services to assist at discharge as needed  - Refer to Case Management Department for coordinating discharge planning if the patient needs post-hospital services based on physician/advanced practitioner order or complex needs related to functional status, cognitive ability, or social support system  Outcome: Progressing     Problem: Knowledge Deficit  Goal: Patient/family/caregiver demonstrates understanding of disease process, treatment plan, medications, and discharge instructions  Description:  Complete learning assessment and assess knowledge base.  Interventions:  - Provide teaching at level of understanding  - Provide teaching via preferred learning methods  Outcome: Progressing     Problem: NEUROSENSORY - ADULT  Goal: Achieves stable or improved neurological status  Description: INTERVENTIONS  - Monitor and report changes in neurological status  - Monitor vital signs such as temperature, blood pressure, glucose, and any other labs ordered   - Initiate measures to prevent increased intracranial pressure  - Monitor for seizure activity and implement precautions if appropriate      Outcome: Progressing  Goal: Remains free of injury related to seizures activity  Description: INTERVENTIONS  - Maintain airway, patient safety  and administer oxygen as ordered  - Monitor patient for seizure activity, document and report duration and description of seizure to physician/advanced practitioner  - If seizure occurs,  ensure patient safety during seizure  - Reorient patient post seizure  - Seizure pads on all 4 side rails  - Instruct patient/family to notify RN of any seizure activity including if an aura is experienced  - Instruct patient/family to call for assistance with activity based on nursing assessment  - Administer anti-seizure medications if ordered    Outcome: Progressing  Goal: Achieves maximal functionality and self care  Description: INTERVENTIONS  - Monitor swallowing and airway patency with patient fatigue and changes in neurological status  - Encourage and assist patient to increase activity and self care.   - Encourage visually impaired, hearing impaired and aphasic patients to use assistive/communication devices  Outcome: Progressing     Problem: METABOLIC, FLUID AND ELECTROLYTES - ADULT  Goal: Electrolytes maintained within normal limits  Description: INTERVENTIONS:  - Monitor labs and assess patient for signs and symptoms of electrolyte imbalances  - Administer electrolyte replacement as  ordered  - Monitor response to electrolyte replacements, including repeat lab results as appropriate  - Instruct patient on fluid and nutrition as appropriate  Outcome: Progressing  Goal: Fluid balance maintained  Description: INTERVENTIONS:  - Monitor labs   - Monitor I/O and WT  - Instruct patient on fluid and nutrition as appropriate  - Assess for signs & symptoms of volume excess or deficit  Outcome: Progressing  Goal: Glucose maintained within target range  Description: INTERVENTIONS:  - Monitor Blood Glucose as ordered  - Assess for signs and symptoms of hyperglycemia and hypoglycemia  - Administer ordered medications to maintain glucose within target range  - Assess nutritional intake and initiate nutrition service referral as needed  Outcome: Progressing     Problem: SKIN/TISSUE INTEGRITY - ADULT  Goal: Skin Integrity remains intact(Skin Breakdown Prevention)  Description: Assess:  -Perform William assessment every shift  -Clean and moisturize skin every day  -Inspect skin when repositioning, toileting, and assisting with ADLS  -Assess under medical devices such as lines every 2 hours  -Assess extremities for adequate circulation and sensation     Bed Management:  -Have minimal linens on bed & keep smooth, unwrinkled  -Change linens as needed when moist or perspiring  -Avoid sitting or lying in one position for more than 2 hours while in bed  -Keep HOB at 30 degrees     Toileting:  -Offer bedside commode  -Assess for incontinence every hour  -Use incontinent care products after each incontinent episode such as moisture barriers, foam cleansers    Activity:  -Mobilize patient 3 times a day  -Encourage activity and walks on unit  -Encourage or provide ROM exercises   -Turn and reposition patient every 2 Hours  -Use appropriate equipment to lift or move patient in bed  -Instruct/ Assist with weight shifting every 15 min when out of bed in chair  -Consider limitation of chair time 2 hour intervals    Skin  Care:  -Avoid use of baby powder, tape, friction and shearing, hot water or constrictive clothing  -Relieve pressure over bony prominences using offloading techniques or foam dressings (If not contraindicated by incontinence)  -Do not massage red bony areas    Next Steps:  -Teach patient strategies to minimize risks    -Consider consults to  interdisciplinary teams  Outcome: Progressing  Goal: Incision(s), wounds(s) or drain site(s) healing without S/S of infection  Description: INTERVENTIONS  - Assess and document dressing, incision, wound bed, drain sites and surrounding tissue  - Provide patient and family education  - Perform skin care/dressing changes  Outcome: Progressing  Goal: Pressure injury heals and does not worsen  Description: Interventions:  - Implement low air loss mattress or specialty surface (Criteria met)  - Apply silicone foam dressing  - Instruct/assist with weight shifting every 15 minutes when in chair   - Limit chair time to 2 hour intervals  - Use special pressure reducing interventions when in chair   - Apply fecal or urinary incontinence containment device   - Perform passive or active ROM  - Turn and reposition patient & offload bony prominences every 2 hours   - Utilize friction reducing device or surface for transfers   - Consider consults to  interdisciplinary teams  - Use incontinent care products after each incontinent episode  - Consider nutrition services referral as needed  Outcome: Progressing     Problem: MUSCULOSKELETAL - ADULT  Goal: Maintain or return mobility to safest level of function  Description: INTERVENTIONS:  - Assess patient's ability to carry out ADLs; assess patient's baseline for ADL function and identify physical deficits which impact ability to perform ADLs (bathing, care of mouth/teeth, toileting, grooming, dressing, etc.)  - Assess/evaluate cause of self-care deficits   - Assess range of motion  - Assess patient's mobility  - Assess patient's need for  assistive devices and provide as appropriate  - Encourage maximum independence but intervene and supervise when necessary  - Involve family in performance of ADLs  - Assess for home care needs following discharge   - Consider OT consult to assist with ADL evaluation and planning for discharge  - Provide patient education as appropriate  Outcome: Progressing  Goal: Maintain proper alignment of affected body part  Description: INTERVENTIONS:  - Support, maintain and protect limb and body alignment  - Provide patient/ family with appropriate education  Outcome: Progressing     Problem: Prexisting or High Potential for Compromised Skin Integrity  Goal: Skin integrity is maintained or improved  Description: INTERVENTIONS:  - Identify patients at risk for skin breakdown  - Assess and monitor skin integrity  - Assess and monitor nutrition and hydration status  - Monitor labs   - Assess for incontinence   - Turn and reposition patient  - Assist with mobility/ambulation  - Relieve pressure over bony prominences  - Avoid friction and shearing  - Provide appropriate hygiene as needed including keeping skin clean and dry  - Evaluate need for skin moisturizer/barrier cream  - Collaborate with interdisciplinary team   - Patient/family teaching  - Consider wound care consult   Outcome: Progressing

## 2024-08-21 NOTE — DISCHARGE INSTR - OTHER ORDERS
Skin Care Plan:  1-Apply DARLIN Anti-Fungal Cream to B/L Sacro-Buttocks, Kim-Rectal Area, and Labia BID and PRN episodes of incontinence  2-Turn/reposition q2h or when medically stable for pressure re-distribution on skin .  3-Elevate heels to offload pressure.  4-Moisturize skin daily with skin nourishing cream  5-Ehob cushion in chair when out of bed.  6-Preventative Hydraguard to bilateral heels BID and PRN.

## 2024-08-21 NOTE — CASE MANAGEMENT
Case Management Discharge Planning Note    Patient name Barb Palomo  Location [facility]-01 MRN [id number]  : 1970 Date 2024       Current Admission Date: 8/15/2024  Current Admission Diagnosis:C. difficile diarrhea   Patient Active Problem List    Diagnosis Date Noted Date Diagnosed    Sinus tachycardia 2024     Back pain 2024     Hypomagnesemia 2024     Failure to thrive in adult 2024     C. difficile diarrhea 2024     Severe protein-calorie malnutrition (HCC) 2024     HHNC (hyperglycemic hyperosmolar nonketotic coma) ([facility]) 2024     Abdominal pain 2024     Acute cystitis without hematuria 2024     Chronic migraine without aura without status migrainosus, not intractable 2024     Unspecified psychosis not due to a substance or known physiological condition ([facility]) 2023     Pancolitis ([facility]) 2023     S/P left knee arthroscopy 10/21/2022     Agoraphobia with panic disorder 2022    Bipolar II disorder ([facility]) 2022    Depression, unspecified 2022    Polyarthritis, unspecified 2022    Chronic prescription benzodiazepine use 2022     Uncontrolled type 2 diabetes mellitus with hyperglycemia ([facility]) 2021     Nausea 2021     Weakness of right upper extremity 2021     Neuropathy      Cervical radiculopathy 2020     Lumbar radiculopathy 2020     DDD (degenerative disc disease), cervical 2020     DDD (degenerative disc disease), lumbar 2020     Low back pain with sciatica 2020     Cervical spinal stenosis 2020     Cervical spondylosis without myelopathy 2020     Paresthesia and pain of both upper extremities 2020     Paresthesia of both lower extremities 2020     Chronic pain of both shoulders 2020     Chronic cervical pain 2020     Symptom associated with female genital organs  05/26/2020     Female stress incontinence 05/26/2020     Decreased libido 05/26/2020     ROGERIO positive 05/13/2020     Diplopia 02/04/2020     Chronic migraine without aura, not intractable, without status migrainosus 10/17/2019     Arthralgia of temporomandibular joint 08/22/2019     Carpal tunnel syndrome 08/22/2019     Dysphagia 08/22/2019     Reactive airway disease without complication 02/23/2018     Protein calorie malnutrition (HCC) 08/30/2017     Tobacco dependence 08/30/2017     GERD without esophagitis 08/25/2017     History of decompression of median nerve 08/25/2017     Hypothyroidism 08/25/2017     Chronic pain disorder 11/04/2016     Pancreatic cyst 10/17/2016     Chronic diarrhea 10/04/2016     Chronic fatigue, unspecified 08/24/2016     Severe episode of recurrent major depressive disorder, without psychotic features (HCC) 05/05/2016     Fatty liver 04/08/2016     Opioid dependence with other opioid-induced disorder (HCC) 01/25/2016     Iron deficiency 12/28/2015     Vitamin D deficiency 08/13/2015     Hyperlipidemia 03/15/2013     Insomnia 03/04/2013     Vitamin B12 deficiency 03/04/2013     Post-traumatic stress disorder, unspecified 09/26/2012     Benign essential hypertension 09/26/2012     Temporary cerebral vascular dysfunction 07/30/2009     History of pulmonary embolism 07/30/2009     Irregular menstrual cycle 04/02/2008       LOS (days): 5  Geometric Mean LOS (GMLOS) (days): 5.1  Days to GMLOS:-0.3     OBJECTIVE:  Risk of Unplanned Readmission Score: 24.32         Current admission status: Inpatient   Preferred Pharmacy:   [id number], [address] @[address], [place], [place] [address] [place] [address]  Phone: [phone] Fax: [phone]    [id number] - [place] - [address]  [address]  [place] [address]  Phone: [phone] Fax: [phone]    [facility] - [address]  [place] [address] [address] [address]  Phone: [phone] Fax: [phone]    Primary Care Provider: ELISEO Vee    Primary Insurance: MEDICARE  Secondary Insurance:     DISCHARGE DETAILS:    Discharge planning discussed with:: Patient  Freedom of Choice: Yes  Comments - Freedom of Choice: Discussed FOC  CM contacted family/caregiver?: Yes  Were Treatment Team discharge recommendations reviewed with patient/caregiver?: Yes  Did patient/caregiver verbalize understanding of patient care needs?: N/A- going to facility  Were patient/caregiver advised of the risks associated with not following Treatment Team discharge recommendations?: Yes    Other Referral/Resources/Interventions Provided:  Interventions: SNF  Referral Comments: This CM discussed therapy recs with pt. [facility] able to accept and reserved in aidin. Facility will work on finding her an isolation bed. Pt in agreement with dcp.    Treatment Team Recommendation: Short Term Rehab, SNF  Discharge Destination Plan:: SNF

## 2024-08-21 NOTE — ASSESSMENT & PLAN NOTE
Patient presented with generalized weakness, frequent falls.  She has had ongoing diarrhea, dysuria, poor oral intake and reported 70 pound weight loss over the last year.  Initially hypotensive in the ER, status post IV fluids. Labs with multiple metabolic derangements  CT imaging showing mild diffuse colonic wall thickening, mild diffuse wall thickening of stomach  C. difficile found to be positive--has a history of C. difficile in September 2022.    Patient started on Dificid 200 mg x 10 days per protocol on 8/18  Contact precautions  Appetite is good, bannatrol supplements ordered.   Stools are improving, formed as of late in day 8/20.  Stable for discharge. PT/OT recommending rehab, CM following.

## 2024-08-21 NOTE — ASSESSMENT & PLAN NOTE
Acute on chronic, likely worsened in setting of recent fall and deconditioning.   CT imaging reviewed, degenerative changes noted.  Continue ATC APAP, Baclofen 5 mg BID (was taking PRN PTA), lido patch. PRN Tramdol on board.   Mobilize as tolerated, PT/OT

## 2024-08-21 NOTE — ASSESSMENT & PLAN NOTE
"Patient presenting with increased generalized weakness and frequent falls, she additionally admits to ongoing diarrhea, recent dysuria, poor oral intake, and at least 70-80 pound weight loss over the past 1 year. Has been following with GI, had colonoscopy May 2024 with poor prep--had EGD with esophagitis and gastritis  Labs with multiple derangements including hyperglycemia, hypokalemia and hypomagnesemia on admission, improved.   C. difficile positive as above  Patient  reports she has not been using insulin due to \"insurance issue\" and inability to self inject due to her neuropathy  CT abdomen and pelvis without any obvious malignancy  Treatment as per respective issues  PT/OT recommending rehab.   "

## 2024-08-21 NOTE — PROGRESS NOTES
[facility]  Progress Note  Name: Barb Palomo I  MRN: [id number]  Unit/Bed#: [facility]PHP 601-01 I Date of Admission: 8/15/2024   Date of Service: 8/21/2024 I Hospital Day: 5    Assessment & Plan   * C. difficile diarrhea  Assessment & Plan  Patient presented with generalized weakness, frequent falls.  She has had ongoing diarrhea, dysuria, poor oral intake and reported 70 pound weight loss over the last year.  Initially hypotensive in the ER, status post IV fluids. Labs with multiple metabolic derangements  CT imaging showing mild diffuse colonic wall thickening, mild diffuse wall thickening of stomach  C. difficile found to be positive--has a history of C. difficile in September 2022.    Patient started on Dificid 200 mg x 10 days per protocol on 8/18  Contact precautions  Appetite is good, bannatrol supplements ordered.   Stools are improving, formed as of late in day 8/20.  Stable for discharge. PT/OT recommending rehab, CM following.         Sinus tachycardia  Assessment & Plan  Heart rate remains in the low 100s, up into the 120s at times.  Sinus. Has received IV fluids without much improvement.  Continue to encourage oral hydration, would hold on further IV fluids at this time.  Monitor     Acute cystitis without hematuria  Assessment & Plan  Reported dysuria on admission   Urine + nitrite & leukocytes, CT imaging with distended bladder suggestive of retention and small amount of air which could be secondary to infection  Urine Culture noted, S/P 3 doses of IV CTX 8/17 8/21, patient now complaining pain AFTER urination, of this seems to be more related to urine hitting excoriated labia/bottom from frequent stooling. Will continue with medicated cream to this area.   Will trial pyridum x 3 doses  Wound care consult      Back pain  Assessment & Plan  Acute on chronic, likely worsened in setting of recent fall and deconditioning.   CT imaging reviewed, degenerative changes  "noted.  Continue ATC APAP, Baclofen 5 mg BID (was taking PRN PTA), lido patch. PRN Tramdol on board.   Mobilize as tolerated, PT/OT    Uncontrolled type 2 diabetes mellitus with hyperglycemia (HCC)  Assessment & Plan  Lab Results   Component Value Date    HGBA1C 16.1 (H) 08/16/2024       Recent Labs     08/20/24  1134 08/20/24  1629 08/20/24  2036 08/21/24  0716   POCGLU 117 296* 308* 237*       Patient markedly hyperglycemic on admission only partially responsive to IV fluids, remainder of labs fortunately not consistent with DKA. A1C 16  Patient admitting she has not been using her home insulin as she apparently has been unable to fill prescription due to \"insurance issues,\" additionally reports that due to her neuropathy she has difficulty self injecting.  Daughter also reports jessica non compliance.   Typically on NovoLog 70/30 20 units twice daily  Continue Lantus 18 units HS, humalog 2 units TID with meals + SSI  insurance issues relayed to CM  Follows with endo outpatient, continue     Chronic diarrhea  Assessment & Plan  Longstanding history of chronic diarrhea, patient reporting acute diarrhea x1 month. Follows with GI. Has tried questran/imodium without relief  Stool enteric panel negative  C. difficile positive, see plan above     Failure to thrive in adult  Assessment & Plan  Patient presenting with increased generalized weakness and frequent falls, she additionally admits to ongoing diarrhea, recent dysuria, poor oral intake, and at least 70-80 pound weight loss over the past 1 year. Has been following with GI, had colonoscopy May 2024 with poor prep--had EGD with esophagitis and gastritis  Labs with multiple derangements including hyperglycemia, hypokalemia and hypomagnesemia on admission, improved.   C. difficile positive as above  Patient  reports she has not been using insulin due to \"insurance issue\" and inability to self inject due to her neuropathy  CT abdomen and pelvis without any obvious " malignancy  Treatment as per respective issues  PT/OT recommending rehab.     GERD without esophagitis  Assessment & Plan  Continue PPI    Hypomagnesemia  Assessment & Plan  Has received IV repletion.  Now on scheduled oral supplementation.  Can monitor as needed    Hypothyroidism  Assessment & Plan   Latest Reference Range & Units 08/16/24 04:11   TSH 3RD GENERATON 0.450 - 4.500 uIU/mL 0.661       Continue home dose levothyroxine     Opioid dependence with other opioid-induced disorder (HCC)  Assessment & Plan  PDMP reviewed, patient maintained on tramadol 200 mg daily as needed; history of chronic cervical/lumbar pain noted  Continue as needed tramadol and chronic gabapentin dosing    Severe episode of recurrent major depressive disorder, without psychotic features (HCC)  Assessment & Plan  Mood appearing fairly stable, continue home duloxetine, mirtazapine, as needed Valium    Tobacco dependence  Assessment & Plan  Counseled on need for cessation, provide nicotine patch while admitted  Recent CT chest for lung cancer screening 7/30/2024 negative for nodules or masses    Severe protein-calorie malnutrition (HCC)  Assessment & Plan  Severe protein-calorie malnutrition 2/2 chronic diarrhea a/e/b fat and muscle loss requiring Nutrition consult, oral diet and nutrition supplements    360 Statement: severe malnutrition r/t suspect combination of chronic illness and social/environmental factors as evidenced by severe temporal muscle loss, severe tricep fat pad loss, at least moderate muscle loss around clavicles and shoulders, at least moderate orbital fat pad loss. Treatment: oral diet and oral nutrition supplements.    BMI Findings:  Adult BMI Classifications: Underweight < 18.5        Body mass index is 16.1 kg/m².                VTE Pharmacologic Prophylaxis: VTE Score: 3 Moderate Risk (Score 3-4) - Pharmacological DVT Prophylaxis Ordered: heparin.    Mobility:   Basic Mobility Inpatient Raw Score: 15  -[facility] Goal:  4: Move to chair/commode  JH-HLM Achieved: 2: Bed activities/Dependent transfer  JH-HLM Goal NOT achieved. Continue with multidisciplinary rounding and encourage appropriate mobility to improve upon JH-HLM goals.    Patient Centered Rounds: I performed bedside rounds with nursing staff today.   Discussions with Specialists or Other Care Team Provider: nursing, case management     Education and Discussions with Family / Patient: Attempted to update  (daughter) via phone. Left voicemail.     Total Time Spent on Date of Encounter in care of patient: 30 mins. This time was spent on one or more of the following: performing physical exam; counseling and coordination of care; obtaining or reviewing history; documenting in the medical record; reviewing/ordering tests, medications or procedures; communicating with other healthcare professionals and discussing with patient's family/caregivers.    Current Length of Stay: 5 day(s)  Current Patient Status: Inpatient   Certification Statement: The patient will continue to require additional inpatient hospital stay due to pending rehab   Discharge Plan: Anticipate discharge in 24-48 hrs to rehab facility.    Code Status: Level 1 - Full Code    Subjective:   Patient complains of pain after urination, after further discussion, it seems as though this is happening when the urine hits her labia which is quite excoriated/swollen. Otherwise, frequency of stools is improving, stools now soft and somewhat formed, no longer liquid diarrhea.  No abdominal pain.  Appetite is good.    Objective:     Vitals:   Temp (24hrs), Av.5 °F (36.9 °C), Min:98.1 °F (36.7 °C), Max:98.9 °F (37.2 °C)    Temp:  [98.1 °F (36.7 °C)-98.9 °F (37.2 °C)] 98.1 °F (36.7 °C)  HR:  [106-112] 112  Resp:  [16-18] 18  BP: (135-147)/() 147/100  SpO2:  [95 %-96 %] 96 %  Body mass index is 16.1 kg/m².     Input and Output Summary (last 24 hours):     Intake/Output Summary (Last 24 hours) at 2024  1000  Last data filed at 8/21/2024 0919  Gross per 24 hour   Intake 1340 ml   Output 625 ml   Net 715 ml       Physical Exam:   Physical Exam  Vitals and nursing note reviewed.   Constitutional:       General: She is not in acute distress.  Cardiovascular:      Rate and Rhythm: Tachycardia present.      Comments: HR in low 100's  Pulmonary:      Breath sounds: Normal breath sounds.   Abdominal:      General: There is no distension.      Palpations: Abdomen is soft.      Tenderness: There is no abdominal tenderness.   Musculoskeletal:         General: No swelling.   Skin:     General: Skin is warm.      Comments: Labia/bottom with noted excoriation/erythema. Labia appears swollen.    Neurological:      Mental Status: She is alert and oriented to person, place, and time. Mental status is at baseline.   Psychiatric:         Mood and Affect: Mood normal.          Additional Data:     Labs:  Results from last 7 days   Lab Units 08/21/24  0439   WBC Thousand/uL 6.65   HEMOGLOBIN g/dL 11.0*   HEMATOCRIT % 33.6*   PLATELETS Thousands/uL 259   SEGS PCT % 55   LYMPHO PCT % 34   MONO PCT % 8   EOS PCT % 2     Results from last 7 days   Lab Units 08/21/24  0439 08/20/24  0459 08/19/24  0522   SODIUM mmol/L 138   < > 140   POTASSIUM mmol/L 4.6   < > 4.1   CHLORIDE mmol/L 108   < > 107   CO2 mmol/L 25   < > 27   BUN mg/dL 7   < > 5   CREATININE mg/dL 0.41*   < > 0.39*   ANION GAP mmol/L 5   < > 6   CALCIUM mg/dL 8.2*   < > 7.7*   ALBUMIN g/dL  --   --  2.9*   TOTAL BILIRUBIN mg/dL  --   --  0.28   ALK PHOS U/L  --   --  62   ALT U/L  --   --  8   AST U/L  --   --  10*   GLUCOSE RANDOM mg/dL 260*   < > 193*    < > = values in this interval not displayed.     Results from last 7 days   Lab Units 08/15/24  2158   INR  0.95     Results from last 7 days   Lab Units 08/21/24  0716 08/20/24 2036 08/20/24  1629 08/20/24  1134 08/20/24  0659 08/19/24  2044 08/19/24  2019 08/19/24  1157 08/19/24  0755 08/18/24  2104 08/18/24  2368  08/18/24  1137   POC GLUCOSE mg/dl 237* 308* 296* 117 157* 276* 295* 316* 172* 193* 102 146*     Results from last 7 days   Lab Units 08/16/24  0411   HEMOGLOBIN A1C % 16.1*     Results from last 7 days   Lab Units 08/15/24  2341 08/15/24  2233   LACTIC ACID mmol/L  --  1.6   PROCALCITONIN ng/ml 0.09  --        Lines/Drains:  Invasive Devices       Peripheral Intravenous Line  Duration             Peripheral IV 08/20/24 Left Forearm 1 day                          Imaging: No pertinent imaging reviewed.    Recent Cultures (last 7 days):   Results from last 7 days   Lab Units 08/16/24  0219 08/15/24  2158   BLOOD CULTURE   --  No Growth After 5 Days.  No Growth After 5 Days.   URINE CULTURE  80,000-89,000 cfu/ml Klebsiella pneumoniae*  20,000-29,000 cfu/ml  --    C DIFF TOXIN B BY PCR  Positive*  --        Last 24 Hours Medication List:   Current Facility-Administered Medications   Medication Dose Route Frequency Provider Last Rate    acetaminophen  975 mg Oral Q8H [facility] ELISEO Toribio      albuterol  2 puff Inhalation Q4H PRN Renan Drake, DO      aspirin  81 mg Oral Daily Renan Drake, DO      atorvastatin  80 mg Oral Daily Renan Drake, DO      baclofen  5 mg Oral BID ELISEO Toribio      butalbital-acetaminophen-caffeine  1 tablet Oral Q6H PRN Renan Drake, DO      diazepam  5 mg Oral Q12H PRN Renan Drake, DO      DULoxetine  60 mg Oral BID Renan Drake, DO      fidaxomicin  200 mg Oral Q12H HONEY Hernandez PA-C      fluticasone-vilanterol  1 puff Inhalation Daily Renan Drake, DO      gabapentin  800 mg Oral TID Renan Drake DO      heparin (porcine)  5,000 Units Subcutaneous Q8H HONEY Renan Drake DO      insulin glargine  18 Units Subcutaneous HS Holli Hernandez PA-C      insulin lispro  1-5 Units Subcutaneous TID AC Renan Drake DO      insulin lispro  1-5 Units Subcutaneous HS Renan Drake DO      insulin lispro  2 Units Subcutaneous TID With Meals ELISEO Toribio       levothyroxine  112 mcg Oral Early Morning Renan Drake, DO      lidocaine  2 patch Topical Daily ELISEO Toribio      magnesium Oxide  400 mg Oral BID ELISEO Toribio      mirtazapine  7.5 mg Oral HS Renan Drake, DO      nicotine  1 patch Transdermal Daily Renan Drake, DO      ondansetron  4 mg Intravenous Q6H PRN Renan Drake DO      pantoprazole  40 mg Oral Early Morning Renan Drake, DO      phenazopyridine  100 mg Oral TID With Meals ELISEO Toribio      potassium chloride  40 mEq Oral BID Holli Hernandez PA-C      prazosin  2 mg Oral HS Renan Drake DO      sodium chloride  100 mL/hr Intravenous Continuous Jacquie Laguerre  mL/hr (08/21/24 0749)    traMADol  50 mg Oral Q6H PRN Renan Drake DO          Today, Patient Was Seen By: ELISEO Toribio    **Please Note: This note may have been constructed using a voice recognition system.**

## 2024-08-21 NOTE — ASSESSMENT & PLAN NOTE
Reported dysuria on admission   Urine + nitrite & leukocytes, CT imaging with distended bladder suggestive of retention and small amount of air which could be secondary to infection  Urine Culture noted, S/P 3 doses of IV CTX 8/17 8/21, patient now complaining pain AFTER urination, of this seems to be more related to urine hitting excoriated labia/bottom from frequent stooling. Will continue with medicated cream to this area.   Will trial pyridum x 3 doses  Wound care consult

## 2024-08-22 LAB
ANION GAP SERPL CALCULATED.3IONS-SCNC: 5 MMOL/L (ref 4–13)
BUN SERPL-MCNC: 10 MG/DL (ref 5–25)
CALCIUM SERPL-MCNC: 9 MG/DL (ref 8.4–10.2)
CHLORIDE SERPL-SCNC: 104 MMOL/L (ref 96–108)
CO2 SERPL-SCNC: 28 MMOL/L (ref 21–32)
CREAT SERPL-MCNC: 0.39 MG/DL (ref 0.6–1.3)
GFR SERPL CREATININE-BSD FRML MDRD: 120 ML/MIN/1.73SQ M
GLUCOSE SERPL-MCNC: 342 MG/DL (ref 65–140)
GLUCOSE SERPL-MCNC: 346 MG/DL (ref 65–140)
GLUCOSE SERPL-MCNC: 353 MG/DL (ref 65–140)
GLUCOSE SERPL-MCNC: 357 MG/DL (ref 65–140)
GLUCOSE SERPL-MCNC: 366 MG/DL (ref 65–140)
GLUCOSE SERPL-MCNC: 408 MG/DL (ref 65–140)
MAGNESIUM SERPL-MCNC: 1.5 MG/DL (ref 1.9–2.7)
POTASSIUM SERPL-SCNC: 4.6 MMOL/L (ref 3.5–5.3)
SODIUM SERPL-SCNC: 137 MMOL/L (ref 135–147)

## 2024-08-22 PROCEDURE — 80048 BASIC METABOLIC PNL TOTAL CA: CPT | Performed by: NURSE PRACTITIONER

## 2024-08-22 PROCEDURE — 97530 THERAPEUTIC ACTIVITIES: CPT

## 2024-08-22 PROCEDURE — 97535 SELF CARE MNGMENT TRAINING: CPT

## 2024-08-22 PROCEDURE — 82948 REAGENT STRIP/BLOOD GLUCOSE: CPT

## 2024-08-22 PROCEDURE — 83735 ASSAY OF MAGNESIUM: CPT | Performed by: NURSE PRACTITIONER

## 2024-08-22 PROCEDURE — 99232 SBSQ HOSP IP/OBS MODERATE 35: CPT | Performed by: NURSE PRACTITIONER

## 2024-08-22 RX ORDER — INSULIN LISPRO 100 [IU]/ML
5 INJECTION, SOLUTION INTRAVENOUS; SUBCUTANEOUS
Status: DISCONTINUED | OUTPATIENT
Start: 2024-08-22 | End: 2024-08-24

## 2024-08-22 RX ORDER — MAGNESIUM SULFATE HEPTAHYDRATE 40 MG/ML
2 INJECTION, SOLUTION INTRAVENOUS ONCE
Status: COMPLETED | OUTPATIENT
Start: 2024-08-22 | End: 2024-08-22

## 2024-08-22 RX ORDER — INSULIN GLARGINE 100 [IU]/ML
22 INJECTION, SOLUTION SUBCUTANEOUS
Status: DISCONTINUED | OUTPATIENT
Start: 2024-08-22 | End: 2024-08-23

## 2024-08-22 RX ORDER — SODIUM CHLORIDE, SODIUM GLUCONATE, SODIUM ACETATE, POTASSIUM CHLORIDE, MAGNESIUM CHLORIDE, SODIUM PHOSPHATE, DIBASIC, AND POTASSIUM PHOSPHATE .53; .5; .37; .037; .03; .012; .00082 G/100ML; G/100ML; G/100ML; G/100ML; G/100ML; G/100ML; G/100ML
100 INJECTION, SOLUTION INTRAVENOUS CONTINUOUS
Status: DISPENSED | OUTPATIENT
Start: 2024-08-22 | End: 2024-08-24

## 2024-08-22 RX ORDER — SACCHAROMYCES BOULARDII 250 MG
250 CAPSULE ORAL 2 TIMES DAILY
Status: DISCONTINUED | OUTPATIENT
Start: 2024-08-22 | End: 2024-09-09

## 2024-08-22 RX ADMIN — Medication 250 MG: at 10:48

## 2024-08-22 RX ADMIN — PRAZOSIN HYDROCHLORIDE 2 MG: 2 CAPSULE ORAL at 23:20

## 2024-08-22 RX ADMIN — DIAZEPAM 5 MG: 5 TABLET ORAL at 20:36

## 2024-08-22 RX ADMIN — INSULIN GLARGINE 22 UNITS: 100 INJECTION, SOLUTION SUBCUTANEOUS at 23:17

## 2024-08-22 RX ADMIN — Medication 250 MG: at 18:01

## 2024-08-22 RX ADMIN — INSULIN LISPRO 3 UNITS: 100 INJECTION, SOLUTION INTRAVENOUS; SUBCUTANEOUS at 18:03

## 2024-08-22 RX ADMIN — SODIUM CHLORIDE, SODIUM GLUCONATE, SODIUM ACETATE, POTASSIUM CHLORIDE, MAGNESIUM CHLORIDE, SODIUM PHOSPHATE, DIBASIC, AND POTASSIUM PHOSPHATE 100 ML/HR: .53; .5; .37; .037; .03; .012; .00082 INJECTION, SOLUTION INTRAVENOUS at 12:32

## 2024-08-22 RX ADMIN — DULOXETINE HYDROCHLORIDE 60 MG: 60 CAPSULE, DELAYED RELEASE ORAL at 09:06

## 2024-08-22 RX ADMIN — LEVOTHYROXINE SODIUM 112 MCG: 112 TABLET ORAL at 05:03

## 2024-08-22 RX ADMIN — HEPARIN SODIUM 5000 UNITS: 5000 INJECTION INTRAVENOUS; SUBCUTANEOUS at 09:07

## 2024-08-22 RX ADMIN — LIDOCAINE 2 PATCH: 700 PATCH TOPICAL at 09:06

## 2024-08-22 RX ADMIN — FIDAXOMICIN 200 MG: 200 TABLET, FILM COATED ORAL at 20:36

## 2024-08-22 RX ADMIN — INSULIN LISPRO 2 UNITS: 100 INJECTION, SOLUTION INTRAVENOUS; SUBCUTANEOUS at 09:09

## 2024-08-22 RX ADMIN — MAGNESIUM OXIDE TAB 400 MG (241.3 MG ELEMENTAL MG) 400 MG: 400 (241.3 MG) TAB at 09:06

## 2024-08-22 RX ADMIN — INSULIN LISPRO 3 UNITS: 100 INJECTION, SOLUTION INTRAVENOUS; SUBCUTANEOUS at 23:15

## 2024-08-22 RX ADMIN — GABAPENTIN 800 MG: 400 CAPSULE ORAL at 20:36

## 2024-08-22 RX ADMIN — BACLOFEN 5 MG: 10 TABLET ORAL at 18:01

## 2024-08-22 RX ADMIN — ASPIRIN 81 MG CHEWABLE TABLET 81 MG: 81 TABLET CHEWABLE at 09:07

## 2024-08-22 RX ADMIN — INSULIN LISPRO 3 UNITS: 100 INJECTION, SOLUTION INTRAVENOUS; SUBCUTANEOUS at 09:08

## 2024-08-22 RX ADMIN — INSULIN LISPRO 5 UNITS: 100 INJECTION, SOLUTION INTRAVENOUS; SUBCUTANEOUS at 18:03

## 2024-08-22 RX ADMIN — ATORVASTATIN CALCIUM 80 MG: 80 TABLET, FILM COATED ORAL at 09:07

## 2024-08-22 RX ADMIN — ONDANSETRON 4 MG: 2 INJECTION INTRAMUSCULAR; INTRAVENOUS at 23:14

## 2024-08-22 RX ADMIN — ACETAMINOPHEN 975 MG: 325 TABLET ORAL at 00:24

## 2024-08-22 RX ADMIN — PHENAZOPYRIDINE HYDROCHLORIDE 100 MG: 100 TABLET ORAL at 09:11

## 2024-08-22 RX ADMIN — INSULIN LISPRO 4 UNITS: 100 INJECTION, SOLUTION INTRAVENOUS; SUBCUTANEOUS at 11:31

## 2024-08-22 RX ADMIN — SODIUM CHLORIDE, SODIUM GLUCONATE, SODIUM ACETATE, POTASSIUM CHLORIDE, MAGNESIUM CHLORIDE, SODIUM PHOSPHATE, DIBASIC, AND POTASSIUM PHOSPHATE 100 ML/HR: .53; .5; .37; .037; .03; .012; .00082 INJECTION, SOLUTION INTRAVENOUS at 23:15

## 2024-08-22 RX ADMIN — BACLOFEN 5 MG: 10 TABLET ORAL at 09:07

## 2024-08-22 RX ADMIN — DULOXETINE HYDROCHLORIDE 60 MG: 60 CAPSULE, DELAYED RELEASE ORAL at 18:01

## 2024-08-22 RX ADMIN — TRAMADOL HYDROCHLORIDE 50 MG: 50 TABLET, COATED ORAL at 23:13

## 2024-08-22 RX ADMIN — ACETAMINOPHEN 975 MG: 325 TABLET ORAL at 18:01

## 2024-08-22 RX ADMIN — FLUTICASONE FUROATE AND VILANTEROL TRIFENATATE 1 PUFF: 200; 25 POWDER RESPIRATORY (INHALATION) at 09:08

## 2024-08-22 RX ADMIN — FIDAXOMICIN 200 MG: 200 TABLET, FILM COATED ORAL at 09:10

## 2024-08-22 RX ADMIN — ONDANSETRON 4 MG: 2 INJECTION INTRAMUSCULAR; INTRAVENOUS at 15:44

## 2024-08-22 RX ADMIN — MICONAZOLE NITRATE: 20 CREAM TOPICAL at 09:23

## 2024-08-22 RX ADMIN — POTASSIUM CHLORIDE 40 MEQ: 1500 TABLET, EXTENDED RELEASE ORAL at 09:06

## 2024-08-22 RX ADMIN — MAGNESIUM SULFATE HEPTAHYDRATE 2 G: 40 INJECTION, SOLUTION INTRAVENOUS at 10:47

## 2024-08-22 RX ADMIN — ONDANSETRON 4 MG: 2 INJECTION INTRAMUSCULAR; INTRAVENOUS at 09:18

## 2024-08-22 RX ADMIN — ACETAMINOPHEN 975 MG: 325 TABLET ORAL at 09:07

## 2024-08-22 RX ADMIN — MAGNESIUM OXIDE TAB 400 MG (241.3 MG ELEMENTAL MG) 400 MG: 400 (241.3 MG) TAB at 18:01

## 2024-08-22 RX ADMIN — INSULIN LISPRO 5 UNITS: 100 INJECTION, SOLUTION INTRAVENOUS; SUBCUTANEOUS at 11:32

## 2024-08-22 RX ADMIN — GABAPENTIN 800 MG: 400 CAPSULE ORAL at 09:06

## 2024-08-22 RX ADMIN — MIRTAZAPINE 7.5 MG: 15 TABLET, FILM COATED ORAL at 23:13

## 2024-08-22 RX ADMIN — GABAPENTIN 800 MG: 400 CAPSULE ORAL at 15:45

## 2024-08-22 RX ADMIN — ONDANSETRON 4 MG: 2 INJECTION INTRAMUSCULAR; INTRAVENOUS at 00:14

## 2024-08-22 RX ADMIN — PANTOPRAZOLE SODIUM 40 MG: 40 TABLET, DELAYED RELEASE ORAL at 05:03

## 2024-08-22 RX ADMIN — HEPARIN SODIUM 5000 UNITS: 5000 INJECTION INTRAVENOUS; SUBCUTANEOUS at 02:47

## 2024-08-22 RX ADMIN — HEPARIN SODIUM 5000 UNITS: 5000 INJECTION INTRAVENOUS; SUBCUTANEOUS at 17:59

## 2024-08-22 RX ADMIN — NICOTINE 1 PATCH: 21 PATCH, EXTENDED RELEASE TRANSDERMAL at 09:06

## 2024-08-22 RX ADMIN — POTASSIUM CHLORIDE 40 MEQ: 1500 TABLET, EXTENDED RELEASE ORAL at 18:01

## 2024-08-22 RX ADMIN — TRAMADOL HYDROCHLORIDE 50 MG: 50 TABLET, COATED ORAL at 15:45

## 2024-08-22 NOTE — ASSESSMENT & PLAN NOTE
Mood appearing fairly stable, continue home duloxetine, mirtazapine, and minipress, as needed Valium

## 2024-08-22 NOTE — ASSESSMENT & PLAN NOTE
Reported dysuria on admission   Urine + nitrite & leukocytes, CT imaging with distended bladder suggestive of retention and small amount of air which could be secondary to infection  Urine Culture noted, S/P 3 doses of IV CTX 8/17 8/21, reported pain AFTER urination, likely related to urine hitting excoriated labia/bottom from frequent stooling. Will continue with medicated cream to this area- denies pain at this time   S/p pyridum x 3 doses  Wound care consult

## 2024-08-22 NOTE — ASSESSMENT & PLAN NOTE
Has received IV repletion- will repeat IV repletion today.   C/w scheduled oral supplementation.  Repeat level in am given ongoing reports of diarrhea

## 2024-08-22 NOTE — PLAN OF CARE
Problem: OCCUPATIONAL THERAPY ADULT  Goal: Performs self-care activities at highest level of function for planned discharge setting.  See evaluation for individualized goals.  Description: Treatment Interventions: ADL retraining, Functional transfer training, Endurance training, Cognitive reorientation, Patient/family training, Equipment evaluation/education, Compensatory technique education, Activityengagement          See flowsheet documentation for full assessment, interventions and recommendations.   Outcome: Progressing  Note: Limitation: Decreased ADL status, Decreased Safe judgement during ADL, Decreased endurance, Decreased self-care trans, Decreased high-level ADLs  Prognosis: Good  Assessment: Patient participated in Skilled OT session this date with interventions consisting of ADL re training with the use of correct body mechnaics, Energy Conservation techniques, safety awareness and fall prevention techniques,  therapeutic activities to: increase activity tolerance, increase dynamic sit/ stand balance during functional activity , and increase OOB/ sitting tolerance .Upon arrival patient was found supine in bed. Pt demonstrated the following tasks: S sup to sit, CGA STS and 1-2 steps with RW. Pt performed grooming and UB ADLs with S/set up, S for LB bathing, S to doff/don socks. Pt with decreased activity tolerance/endurance. Patient continues to be functioning below baseline level, occupational performance remains limited secondary to factors listed above and increased risk for falls and injury.   From OT standpoint, recommendation at time of d/c would be STR - pending progress (pt with limited social support).  Patient to benefit from continued Occupational Therapy treatment while in the hospital to address deficits as defined above and maximize level of functional independence with ADLs and functional mobility. Pt was left after session with all current needs met. The patient's raw score on the  AM-PAC Daily Activity Inpatient Short Form is 18. A raw score of less than 19 suggests the patient may benefit from discharge to post-acute rehabilitation services. Please refer to the recommendation of the Occupational Therapist for safe discharge planning.     Rehab Resource Intensity Level, OT: II (Moderate Resource Intensity)

## 2024-08-22 NOTE — OCCUPATIONAL THERAPY NOTE
Occupational Therapy Progress Note     Patient Name: Barb Palomo  Today's Date: 8/22/2024  Problem List  Principal Problem:    C. difficile diarrhea  Active Problems:    Severe episode of recurrent major depressive disorder, without psychotic features (HCC)    GERD without esophagitis    Hypothyroidism    Tobacco dependence    Chronic diarrhea    Opioid dependence with other opioid-induced disorder (HCC)    Uncontrolled type 2 diabetes mellitus with hyperglycemia (HCC)    Acute cystitis without hematuria    Severe protein-calorie malnutrition (HCC)    Hypomagnesemia    Failure to thrive in adult    Back pain    Sinus tachycardia            08/22/24 1435   OT Last Visit   OT Visit Date 08/22/24   Note Type   Note Type Treatment   Pain Assessment   Pain Assessment Tool 0-10   Pain Score 5   Pain Location/Orientation Location: Back;Orientation: Bilateral;Location: Foot  (+ hands)   Hospital Pain Intervention(s) Repositioned;Ambulation/increased activity;Emotional support   Restrictions/Precautions   Weight Bearing Precautions Per Order No   Other Precautions Contact/isolation;Chair Alarm;Bed Alarm;Multiple lines;Fall Risk;Pain   Lifestyle   Autonomy I adls and mobility however ?able ability to manage at home given pt's condition and reported condition of home - i iadls - family assists PRN   Reciprocal Relationships limited support   Service to Others disabled   Intrinsic Gratification Loves sushi   ADL   Where Assessed Edge of bed   Grooming Assistance 5  Supervision/Setup   Grooming Deficit Wash/dry face;Wash/dry hands;Brushing hair  (+ washing hair with shampoo cap)   UB Bathing Assistance 5  Supervision/Setup   UB Bathing Deficit Chest;Left arm;Right arm;Abdomen   LB Bathing Assistance 5  Supervision/Setup   LB Bathing Deficit Buttocks;Perineal area;Right upper leg;Left upper leg;Right lower leg including foot;Left lower leg including foot   UB Dressing Assistance 5  Supervision/Setup   UB Dressing Deficit  "Thread LUE;Thread RUE   LB Dressing Assistance 5  Supervision/Setup   LB Dressing Deficit Don/doff L sock;Don/doff R sock   Toileting Assistance  5  Supervision/Setup   Toileting Deficit Perineal hygiene   Bed Mobility   Supine to Sit 5  Supervision   Additional items Bedrails;HOB elevated;Increased time required;LE management   Sit to Supine 5  Supervision   Additional items HOB elevated;Bedrails;Increased time required;LE management   Transfers   Sit to Stand   (CGA)   Additional items Assist x 1;Increased time required   Stand to Sit 5  Supervision   Additional items Increased time required   Additional Comments transfers with RW   Functional Mobility   Additional Comments Pt declines OOB to chair at this time. Took ~ 1-2 steps with CGA   Additional items Rolling walker   Subjective   Subjective \"They're sending me to a rehab\"   Cognition   Arousal/Participation Responsive;Cooperative   Attention Within functional limits   Orientation Level Oriented X4   Memory Within functional limits   Following Commands Follows one step commands without difficulty   Comments Pt pleasant and cooperative t/o session   Activity Tolerance   Activity Tolerance Patient limited by fatigue;Patient limited by pain   Medical Staff Made Aware RN clearance for session   Assessment   Assessment Patient participated in Skilled OT session this date with interventions consisting of ADL re training with the use of correct body mechnaics, Energy Conservation techniques, safety awareness and fall prevention techniques,  therapeutic activities to: increase activity tolerance, increase dynamic sit/ stand balance during functional activity , and increase OOB/ sitting tolerance .Upon arrival patient was found supine in bed. Pt demonstrated the following tasks: S sup to sit, CGA STS and 1-2 steps with RW. Pt performed grooming and UB ADLs with S/set up, S for LB bathing, S to doff/don socks. Pt with decreased activity tolerance/endurance. Patient " continues to be functioning below baseline level, occupational performance remains limited secondary to factors listed above and increased risk for falls and injury.   From OT standpoint, recommendation at time of d/c would be STR - pending progress (pt with limited social support).  Patient to benefit from continued Occupational Therapy treatment while in the hospital to address deficits as defined above and maximize level of functional independence with ADLs and functional mobility. Pt was left after session with all current needs met. The patient's raw score on the AM-PAC Daily Activity Inpatient Short Form is 18. A raw score of less than 19 suggests the patient may benefit from discharge to post-acute rehabilitation services. Please refer to the recommendation of the Occupational Therapist for safe discharge planning.   Plan   Treatment Interventions ADL retraining;Functional transfer training;Endurance training;Patient/family training;Equipment evaluation/education;Compensatory technique education;Continued evaluation;Energy conservation;Activityengagement   Goal Expiration Date 08/31/24   OT Treatment Day 1   OT Frequency 2-3x/wk   Discharge Recommendation   Rehab Resource Intensity Level, OT II (Moderate Resource Intensity)   AM-PAC Daily Activity Inpatient   Lower Body Dressing 2   Bathing 3   Toileting 3   Upper Body Dressing 3   Grooming 3   Eating 4   Daily Activity Raw Score 18   Daily Activity Standardized Score (Calc for Raw Score >=11) 38.66   AM-PAC Applied Cognition Inpatient   Following a Speech/Presentation 4   Understanding Ordinary Conversation 4   Taking Medications 4   Remembering Where Things Are Placed or Put Away 4   Remembering List of 4-5 Errands 3   Taking Care of Complicated Tasks 3   Applied Cognition Raw Score 22   Applied Cognition Standardized Score 47.83     Greer Gupta MS, OTR/L

## 2024-08-22 NOTE — ASSESSMENT & PLAN NOTE
Heart rate 100s-120s at times.  Sinus. Has received IV fluids without much improvement.  Trial IVFs again today given reports of nausea yesterday with poor oral intake and dizziness   Check orthostatic blood pressure  Pt is on minipress which could have a side effect of ST- monitor to determine if dose adjustment needs to be made. She was started on this by her psychiatrist to assist with sleep and eating outpatient   Monitor

## 2024-08-22 NOTE — ASSESSMENT & PLAN NOTE
"Lab Results   Component Value Date    HGBA1C 16.1 (H) 08/16/2024       Recent Labs     08/21/24  1607 08/21/24  2053 08/21/24  2226 08/22/24  0707   POCGLU 279* 333* 389* 353*     Patient markedly hyperglycemic on admission only partially responsive to IV fluids, remainder of labs fortunately not consistent with DKA. A1C 16  Patient admitting she has not been using her home insulin as she apparently has been unable to fill prescription due to \"insurance issues,\" additionally reports that due to her neuropathy she has difficulty self injecting.  Daughter also reports jessica non compliance.   Typically on NovoLog 70/30 20 units twice daily  Increase Lantus to 22 units HS, and humalog 5 units TID with meals + SSI  insurance issues relayed to CM  Follows with endo outpatient, continue   "

## 2024-08-22 NOTE — PLAN OF CARE
Problem: Nutrition/Hydration-ADULT  Goal: Nutrient/Hydration intake appropriate for improving, restoring or maintaining nutritional needs  Description: Monitor and assess patient's nutrition/hydration status for malnutrition. Collaborate with interdisciplinary team and initiate plan and interventions as ordered.  Monitor patient's weight and dietary intake as ordered or per policy. Utilize nutrition screening tool and intervene as necessary. Determine patient's food preferences and provide high-protein, high-caloric foods as appropriate.     INTERVENTIONS:  - Monitor oral intake, urinary output, labs, and treatment plans  - Assess nutrition and hydration status and recommend course of action  - Evaluate amount of meals eaten  - Assist patient with eating if necessary   - Allow adequate time for meals  - Recommend/ encourage appropriate diets, oral nutritional supplements, and vitamin/mineral supplements  - Order, calculate, and assess calorie counts as needed  - Recommend, monitor, and adjust tube feedings and TPN/PPN based on assessed needs  - Assess need for intravenous fluids  - Provide specific nutrition/hydration education as appropriate  - Include patient/family/caregiver in decisions related to nutrition  Outcome: Progressing     Problem: PAIN - ADULT  Goal: Verbalizes/displays adequate comfort level or baseline comfort level  Description: Interventions:  - Encourage patient to monitor pain and request assistance  - Assess pain using appropriate pain scale  - Administer analgesics based on type and severity of pain and evaluate response  - Implement non-pharmacological measures as appropriate and evaluate response  - Consider cultural and social influences on pain and pain management  - Notify physician/advanced practitioner if interventions unsuccessful or patient reports new pain  Outcome: Progressing     Problem: INFECTION - ADULT  Goal: Absence or prevention of progression during  hospitalization  Description: INTERVENTIONS:  - Assess and monitor for signs and symptoms of infection  - Monitor lab/diagnostic results  - Monitor all insertion sites, i.e. indwelling lines, tubes, and drains  - Monitor endotracheal if appropriate and nasal secretions for changes in amount and color  - [place] appropriate cooling/warming therapies per order  - Administer medications as ordered  - Instruct and encourage patient and family to use good hand hygiene technique  - Identify and instruct in appropriate isolation precautions for identified infection/condition  Outcome: Progressing  Goal: Absence of fever/infection during neutropenic period  Description: INTERVENTIONS:  - Monitor WBC    Outcome: Progressing     Problem: SAFETY ADULT  Goal: Patient will remain free of falls  Description: INTERVENTIONS:  - Educate patient/family on patient safety including physical limitations  - Instruct patient to call for assistance with activity   - Consult OT/PT to assist with strengthening/mobility   - Keep Call bell within reach  - Keep bed low and locked with side rails adjusted as appropriate  - Keep care items and personal belongings within reach  - Initiate and maintain comfort rounds  - Make Fall Risk Sign visible to staff  - Offer Toileting every 2 Hours, in advance of need  - Initiate/Maintain alarm  - Obtain necessary fall risk management equipment  - Apply yellow socks and bracelet for high fall risk patients  - Consider moving patient to room near nurses station  Outcome: Progressing  Goal: Maintain or return to baseline ADL function  Description: INTERVENTIONS:  -  Assess patient's ability to carry out ADLs; assess patient's baseline for ADL function and identify physical deficits which impact ability to perform ADLs (bathing, care of mouth/teeth, toileting, grooming, dressing, etc.)  - Assess/evaluate cause of self-care deficits   - Assess range of motion  - Assess patient's mobility; develop plan if  impaired  - Assess patient's need for assistive devices and provide as appropriate  - Encourage maximum independence but intervene and supervise when necessary  - Involve family in performance of ADLs  - Assess for home care needs following discharge   - Consider OT consult to assist with ADL evaluation and planning for discharge  - Provide patient education as appropriate  Outcome: Progressing  Goal: Maintains/Returns to pre admission functional level  Description: INTERVENTIONS:  - Perform AM-PAC 6 Click Basic Mobility/ Daily Activity assessment daily.  - Set and communicate daily mobility goal to care team and patient/family/caregiver.   - Collaborate with rehabilitation services on mobility goals if consulted  - Perform Range of Motion 3 times a day.  - Reposition patient every 2 hours.  - Dangle patient 3 times a day  - Stand patient 3 times a day  - Ambulate patient 3 times a day  - Out of bed to chair 3 times a day   - Out of bed for meals 3 times a day  - Out of bed for toileting  - Record patient progress and toleration of activity level   Outcome: Progressing     Problem: DISCHARGE PLANNING  Goal: Discharge to home or other facility with appropriate resources  Description: INTERVENTIONS:  - Identify barriers to discharge w/patient and caregiver  - Arrange for needed discharge resources and transportation as appropriate  - Identify discharge learning needs (meds, wound care, etc.)  - Arrange for interpretive services to assist at discharge as needed  - Refer to Case Management Department for coordinating discharge planning if the patient needs post-hospital services based on physician/advanced practitioner order or complex needs related to functional status, cognitive ability, or social support system  Outcome: Progressing     Problem: Knowledge Deficit  Goal: Patient/family/caregiver demonstrates understanding of disease process, treatment plan, medications, and discharge instructions  Description:  Complete learning assessment and assess knowledge base.  Interventions:  - Provide teaching at level of understanding  - Provide teaching via preferred learning methods  Outcome: Progressing     Problem: NEUROSENSORY - ADULT  Goal: Achieves stable or improved neurological status  Description: INTERVENTIONS  - Monitor and report changes in neurological status  - Monitor vital signs such as temperature, blood pressure, glucose, and any other labs ordered   - Initiate measures to prevent increased intracranial pressure  - Monitor for seizure activity and implement precautions if appropriate      Outcome: Progressing  Goal: Remains free of injury related to seizures activity  Description: INTERVENTIONS  - Maintain airway, patient safety  and administer oxygen as ordered  - Monitor patient for seizure activity, document and report duration and description of seizure to physician/advanced practitioner  - If seizure occurs,  ensure patient safety during seizure  - Reorient patient post seizure  - Seizure pads on all 4 side rails  - Instruct patient/family to notify RN of any seizure activity including if an aura is experienced  - Instruct patient/family to call for assistance with activity based on nursing assessment  - Administer anti-seizure medications if ordered    Outcome: Progressing  Goal: Achieves maximal functionality and self care  Description: INTERVENTIONS  - Monitor swallowing and airway patency with patient fatigue and changes in neurological status  - Encourage and assist patient to increase activity and self care.   - Encourage visually impaired, hearing impaired and aphasic patients to use assistive/communication devices  Outcome: Progressing     Problem: METABOLIC, FLUID AND ELECTROLYTES - ADULT  Goal: Electrolytes maintained within normal limits  Description: INTERVENTIONS:  - Monitor labs and assess patient for signs and symptoms of electrolyte imbalances  - Administer electrolyte replacement as  ordered  - Monitor response to electrolyte replacements, including repeat lab results as appropriate  - Instruct patient on fluid and nutrition as appropriate  Outcome: Progressing  Goal: Fluid balance maintained  Description: INTERVENTIONS:  - Monitor labs   - Monitor I/O and WT  - Instruct patient on fluid and nutrition as appropriate  - Assess for signs & symptoms of volume excess or deficit  Outcome: Progressing  Goal: Glucose maintained within target range  Description: INTERVENTIONS:  - Monitor Blood Glucose as ordered  - Assess for signs and symptoms of hyperglycemia and hypoglycemia  - Administer ordered medications to maintain glucose within target range  - Assess nutritional intake and initiate nutrition service referral as needed  Outcome: Progressing     Problem: SKIN/TISSUE INTEGRITY - ADULT  Goal: Skin Integrity remains intact(Skin Breakdown Prevention)  Description: Assess:  -Perform William assessment every shift  -Clean and moisturize skin every day  -Inspect skin when repositioning, toileting, and assisting with ADLS  -Assess under medical devices such as lines every 2 hours  -Assess extremities for adequate circulation and sensation     Bed Management:  -Have minimal linens on bed & keep smooth, unwrinkled  -Change linens as needed when moist or perspiring  -Avoid sitting or lying in one position for more than 2 hours while in bed  -Keep HOB at 30 degrees     Toileting:  -Offer bedside commode  -Assess for incontinence every hour  -Use incontinent care products after each incontinent episode such as moisture barriers, foam cleansers    Activity:  -Mobilize patient 3 times a day  -Encourage activity and walks on unit  -Encourage or provide ROM exercises   -Turn and reposition patient every 2 Hours  -Use appropriate equipment to lift or move patient in bed  -Instruct/ Assist with weight shifting every 15 min when out of bed in chair  -Consider limitation of chair time 2 hour intervals    Skin  Care:  -Avoid use of baby powder, tape, friction and shearing, hot water or constrictive clothing  -Relieve pressure over bony prominences using offloading techniques or foam dressings (If not contraindicated by incontinence)  -Do not massage red bony areas    Next Steps:  -Teach patient strategies to minimize risks    -Consider consults to  interdisciplinary teams  Outcome: Progressing  Goal: Incision(s), wounds(s) or drain site(s) healing without S/S of infection  Description: INTERVENTIONS  - Assess and document dressing, incision, wound bed, drain sites and surrounding tissue  - Provide patient and family education  - Perform skin care/dressing changes  Outcome: Progressing  Goal: Pressure injury heals and does not worsen  Description: Interventions:  - Implement low air loss mattress or specialty surface (Criteria met)  - Apply silicone foam dressing  - Instruct/assist with weight shifting every 15 minutes when in chair   - Limit chair time to 2 hour intervals  - Use special pressure reducing interventions when in chair   - Apply fecal or urinary incontinence containment device   - Perform passive or active ROM  - Turn and reposition patient & offload bony prominences every 2 hours   - Utilize friction reducing device or surface for transfers   - Consider consults to  interdisciplinary teams  - Use incontinent care products after each incontinent episode  - Consider nutrition services referral as needed  Outcome: Progressing     Problem: MUSCULOSKELETAL - ADULT  Goal: Maintain or return mobility to safest level of function  Description: INTERVENTIONS:  - Assess patient's ability to carry out ADLs; assess patient's baseline for ADL function and identify physical deficits which impact ability to perform ADLs (bathing, care of mouth/teeth, toileting, grooming, dressing, etc.)  - Assess/evaluate cause of self-care deficits   - Assess range of motion  - Assess patient's mobility  - Assess patient's need for  assistive devices and provide as appropriate  - Encourage maximum independence but intervene and supervise when necessary  - Involve family in performance of ADLs  - Assess for home care needs following discharge   - Consider OT consult to assist with ADL evaluation and planning for discharge  - Provide patient education as appropriate  Outcome: Progressing  Goal: Maintain proper alignment of affected body part  Description: INTERVENTIONS:  - Support, maintain and protect limb and body alignment  - Provide patient/ family with appropriate education  Outcome: Progressing     Problem: Prexisting or High Potential for Compromised Skin Integrity  Goal: Skin integrity is maintained or improved  Description: INTERVENTIONS:  - Identify patients at risk for skin breakdown  - Assess and monitor skin integrity  - Assess and monitor nutrition and hydration status  - Monitor labs   - Assess for incontinence   - Turn and reposition patient  - Assist with mobility/ambulation  - Relieve pressure over bony prominences  - Avoid friction and shearing  - Provide appropriate hygiene as needed including keeping skin clean and dry  - Evaluate need for skin moisturizer/barrier cream  - Collaborate with interdisciplinary team   - Patient/family teaching  - Consider wound care consult   Outcome: Progressing

## 2024-08-22 NOTE — ASSESSMENT & PLAN NOTE
Patient presented with generalized weakness, frequent falls.  She has had ongoing diarrhea, dysuria, poor oral intake and reported 70 pound weight loss over the last year.  Initially hypotensive in the ER, status post IV fluids. Labs with multiple metabolic derangements  CT imaging showing mild diffuse colonic wall thickening, mild diffuse wall thickening of stomach  C. difficile found to be positive--has a history of C. difficile in September 2022.    Patient started on Dificid 200 mg x 10 days per protocol on 8/18  Contact precautions  Appetite is good although reported nausea yesterday which is now resolved  bannatrol supplements ordered.   Stools are improving, formed as of late in day 8/20.  PT/OT recommending rehab, CM following.

## 2024-08-22 NOTE — ASSESSMENT & PLAN NOTE
"Patient presented with increased generalized weakness and frequent falls, ongoing diarrhea, dysuria, poor oral intake, and at least 70-80 pound weight loss over the past 1 year. Has been following with GI, had colonoscopy May 2024 with poor prep--had EGD with esophagitis and gastritis  Labs with multiple derangements including hyperglycemia, hypokalemia and hypomagnesemia on admission, improved.   C. difficile positive as above  Patient  reports she has not been using insulin due to \"insurance issue\" and inability to self inject due to her neuropathy  CT abdomen and pelvis without any obvious malignancy  Treatment as per respective issues  PT/OT recommending rehab.   "

## 2024-08-22 NOTE — ASSESSMENT & PLAN NOTE
Severe protein-calorie malnutrition 2/2 chronic diarrhea a/e/b fat and muscle loss requiring Nutrition consult, oral diet and nutrition supplements    360 Statement: severe malnutrition r/t suspect combination of chronic illness and social/environmental factors as evidenced by severe temporal muscle loss, severe tricep fat pad loss, at least moderate muscle loss around clavicles and shoulders, at least moderate orbital fat pad loss. Treatment: oral diet and oral nutrition supplements.    BMI Findings:  Adult BMI Classifications: Underweight < 18.5        Body mass index is 16.1 kg/m².   C/w supplements  Nutrition consult appreciated  Encourage po intake

## 2024-08-22 NOTE — PROGRESS NOTES
"[facility]  Progress Note  Name: Barb Palomo I  MRN: [id number]  [facility] Date of Admission: 8/15/2024   Date of Service: 8/22/2024 I Hospital Day: 6    Assessment & Plan   * C. difficile diarrhea  Assessment & Plan  Patient presented with generalized weakness, frequent falls.  She has had ongoing diarrhea, dysuria, poor oral intake and reported 70 pound weight loss over the last year.  Initially hypotensive in the ER, status post IV fluids. Labs with multiple metabolic derangements  CT imaging showing mild diffuse colonic wall thickening, mild diffuse wall thickening of stomach  C. difficile found to be positive--has a history of C. difficile in September 2022.    Patient started on Dificid 200 mg x 10 days per protocol on 8/18  Contact precautions  Appetite is good although reported nausea yesterday which is now resolved  bannatrol supplements ordered.   Stools are improving, formed as of late in day 8/20.  PT/OT recommending rehab, CM following.         Failure to thrive in adult  Assessment & Plan  Patient presented with increased generalized weakness and frequent falls, ongoing diarrhea, dysuria, poor oral intake, and at least 70-80 pound weight loss over the past 1 year. Has been following with GI, had colonoscopy May 2024 with poor prep--had EGD with esophagitis and gastritis  Labs with multiple derangements including hyperglycemia, hypokalemia and hypomagnesemia on admission, improved.   C. difficile positive as above  Patient  reports she has not been using insulin due to \"insurance issue\" and inability to self inject due to her neuropathy  CT abdomen and pelvis without any obvious malignancy  Treatment as per respective issues  PT/OT recommending rehab.     Uncontrolled type 2 diabetes mellitus with hyperglycemia (HCC)  Assessment & Plan  Lab Results   Component Value Date    HGBA1C 16.1 (H) 08/16/2024       Recent Labs     08/21/24  1607 " "08/21/24 2053 08/21/24  2226 08/22/24  0707   POCGLU 279* 333* 389* 353*     Patient markedly hyperglycemic on admission only partially responsive to IV fluids, remainder of labs fortunately not consistent with DKA. A1C 16  Patient admitting she has not been using her home insulin as she apparently has been unable to fill prescription due to \"insurance issues,\" additionally reports that due to her neuropathy she has difficulty self injecting.  Daughter also reports jessica non compliance.   Typically on NovoLog 70/30 20 units twice daily  Increase Lantus to 22 units HS, and humalog 5 units TID with meals + SSI  insurance issues relayed to CM  Follows with endo outpatient, continue     Sinus tachycardia  Assessment & Plan  Heart rate 100s-120s at times.  Sinus. Has received IV fluids without much improvement.  Trial IVFs again today given reports of nausea yesterday with poor oral intake and dizziness   Check orthostatic blood pressure  Pt is on minipress which could have a side effect of ST- monitor to determine if dose adjustment needs to be made. She was started on this by her psychiatrist to assist with sleep and eating outpatient   Monitor     Back pain  Assessment & Plan  Acute on chronic, likely worsened in setting of recent fall and deconditioning.   CT imaging reviewed, degenerative changes noted.  Continue ATC APAP, Baclofen 5 mg BID (was taking PRN PTA), lido patch. PRN Tramdol on board.   Mobilize as tolerated, PT/OT    Hypomagnesemia  Assessment & Plan  Has received IV repletion- will repeat IV repletion today.   C/w scheduled oral supplementation.  Repeat level in am given ongoing reports of diarrhea     Severe protein-calorie malnutrition (HCC)  Assessment & Plan  Severe protein-calorie malnutrition 2/2 chronic diarrhea a/e/b fat and muscle loss requiring Nutrition consult, oral diet and nutrition supplements    360 Statement: severe malnutrition r/t suspect combination of chronic illness and " social/environmental factors as evidenced by severe temporal muscle loss, severe tricep fat pad loss, at least moderate muscle loss around clavicles and shoulders, at least moderate orbital fat pad loss. Treatment: oral diet and oral nutrition supplements.    BMI Findings:  Adult BMI Classifications: Underweight < 18.5        Body mass index is 16.1 kg/m².   C/w supplements  Nutrition consult appreciated  Encourage po intake     Acute cystitis without hematuria  Assessment & Plan  Reported dysuria on admission   Urine + nitrite & leukocytes, CT imaging with distended bladder suggestive of retention and small amount of air which could be secondary to infection  Urine Culture noted, S/P 3 doses of IV CTX 8/17 8/21, reported pain AFTER urination, likely related to urine hitting excoriated labia/bottom from frequent stooling. Will continue with medicated cream to this area- denies pain at this time   S/p pyridum x 3 doses  Wound care consult      Opioid dependence with other opioid-induced disorder (HCC)  Assessment & Plan  PDMP reviewed, patient maintained on tramadol 200 mg daily as needed; history of chronic cervical/lumbar pain noted  Continue as needed tramadol and chronic gabapentin dosing    Chronic diarrhea  Assessment & Plan  Longstanding history of chronic diarrhea, patient reporting acute diarrhea x1 month. Follows with GI. Has tried questran/imodium without relief  Stool enteric panel negative  C. difficile positive, see plan above     Tobacco dependence  Assessment & Plan  Counseled on need for cessation, provide nicotine patch while admitted  Recent CT chest for lung cancer screening 7/30/2024 negative for nodules or masses    Hypothyroidism  Assessment & Plan   Latest Reference Range & Units 08/16/24 04:11   TSH 3RD GENERATON 0.450 - 4.500 uIU/mL 0.661       Continue home dose levothyroxine     GERD without esophagitis  Assessment & Plan  Continue PPI    Severe episode of recurrent major depressive  disorder, without psychotic features (HCC)  Assessment & Plan  Mood appearing fairly stable, continue home duloxetine, mirtazapine, and minipress, as needed Valium           VTE Pharmacologic Prophylaxis: VTE Score: 3 Moderate Risk (Score 3-4) - Pharmacological DVT Prophylaxis Ordered: heparin.    Mobility:   Basic Mobility Inpatient Raw Score: 15  JH-HLM Goal: 4: Move to chair/commode  JH-HLM Achieved: 2: Bed activities/Dependent transfer  JH-HLM Goal NOT achieved. Continue with multidisciplinary rounding and encourage appropriate mobility to improve upon JH-HLM goals.    Patient Centered Rounds: I performed bedside rounds with nursing staff today.   Discussions with Specialists or Other Care Team Provider: d/w RN     Education and Discussions with Family / Patient: Attempted to update  (daughter) via phone. Left voicemail.     Total Time Spent on Date of Encounter in care of patient: 35 mins. This time was spent on one or more of the following: performing physical exam; counseling and coordination of care; obtaining or reviewing history; documenting in the medical record; reviewing/ordering tests, medications or procedures; communicating with other healthcare professionals and discussing with patient's family/caregivers.    Current Length of Stay: 6 day(s)  Current Patient Status: Inpatient   Certification Statement: The patient will continue to require additional inpatient hospital stay due to hyperglycemia, nausea and ongoing diarrhea  Discharge Plan: Anticipate discharge in 24-48 hrs to rehab facility.    Code Status: Level 1 - Full Code    Subjective:   Patient is sitting up in bed eating breakfast.  She reports that she had 3 episodes of incontinent stool overnight which she describes as diarrhea but does endorse that the stools becoming more formed and not as watery.  She had another episode of loose stool this morning.  She reports that she was so nauseated yesterday that she was unable to eat  lunch or dinner and that she was feeling extremely lightheaded.  She denies any further episodes of dysuria but does report occasional feelings of pressure.  Denies all other complaints    Objective:     Vitals:   Temp (24hrs), Av.3 °F (36.8 °C), Min:98.1 °F (36.7 °C), Max:98.6 °F (37 °C)    Temp:  [98.1 °F (36.7 °C)-98.6 °F (37 °C)] 98.1 °F (36.7 °C)  HR:  [102-111] 102  Resp:  [16-18] 16  BP: (116-167)/() 121/74  SpO2:  [95 %-97 %] 95 %  Body mass index is 16.1 kg/m².     Input and Output Summary (last 24 hours):   No intake or output data in the 24 hours ending 24 0954    Physical Exam:   Physical Exam  Constitutional:       General: She is not in acute distress.     Appearance: She is cachectic.      Comments: Chronically ill-appearing   Cardiovascular:      Rate and Rhythm: Normal rate and regular rhythm.      Heart sounds: Normal heart sounds. No murmur heard.  Pulmonary:      Effort: Pulmonary effort is normal. No respiratory distress.      Breath sounds: Normal breath sounds. No wheezing or rales.   Abdominal:      General: Bowel sounds are normal. There is no distension.      Palpations: Abdomen is soft.      Tenderness: There is no abdominal tenderness.   Musculoskeletal:         General: No swelling or tenderness.   Skin:     General: Skin is warm and dry.      Findings: No erythema or rash.   Neurological:      Mental Status: She is alert and oriented to person, place, and time. Mental status is at baseline.   Psychiatric:         Mood and Affect: Mood normal.          Additional Data:     Labs:  Results from last 7 days   Lab Units 24  0439   WBC Thousand/uL 6.65   HEMOGLOBIN g/dL 11.0*   HEMATOCRIT % 33.6*   PLATELETS Thousands/uL 259   SEGS PCT % 55   LYMPHO PCT % 34   MONO PCT % 8   EOS PCT % 2     Results from last 7 days   Lab Units 24  0643 24  0459 24  0522   SODIUM mmol/L 137   < > 140   POTASSIUM mmol/L 4.6   < > 4.1   CHLORIDE mmol/L 104   < > 107   CO2  mmol/L 28   < > 27   BUN mg/dL 10   < > 5   CREATININE mg/dL 0.39*   < > 0.39*   ANION GAP mmol/L 5   < > 6   CALCIUM mg/dL 9.0   < > 7.7*   ALBUMIN g/dL  --   --  2.9*   TOTAL BILIRUBIN mg/dL  --   --  0.28   ALK PHOS U/L  --   --  62   ALT U/L  --   --  8   AST U/L  --   --  10*   GLUCOSE RANDOM mg/dL 357*   < > 193*    < > = values in this interval not displayed.     Results from last 7 days   Lab Units 08/15/24  2158   INR  0.95     Results from last 7 days   Lab Units 08/22/24  0707 08/21/24  2226 08/21/24  2053 08/21/24  1607 08/21/24  1116 08/21/24  0716 08/20/24  2036 08/20/24  1629 08/20/24  1134 08/20/24  0659 08/19/24  2044 08/19/24  2019   POC GLUCOSE mg/dl 353* 389* 333* 279* 256* 237* 308* 296* 117 157* 276* 295*     Results from last 7 days   Lab Units 08/16/24  0411   HEMOGLOBIN A1C % 16.1*     Results from last 7 days   Lab Units 08/15/24  2341 08/15/24  2233   LACTIC ACID mmol/L  --  1.6   PROCALCITONIN ng/ml 0.09  --        Lines/Drains:  Invasive Devices       Peripheral Intravenous Line  Duration             Peripheral IV 08/20/24 Left Forearm 2 days                          Imaging: Reviewed radiology reports from this admission including: abdominal/pelvic CT and xray(s)    Recent Cultures (last 7 days):   Results from last 7 days   Lab Units 08/16/24  0219 08/15/24  2158   BLOOD CULTURE   --  No Growth After 5 Days.  No Growth After 5 Days.   URINE CULTURE  80,000-89,000 cfu/ml Klebsiella pneumoniae*  20,000-29,000 cfu/ml  --    C DIFF TOXIN B BY PCR  Positive*  --        Last 24 Hours Medication List:   Current Facility-Administered Medications   Medication Dose Route Frequency Provider Last Rate    acetaminophen  975 mg Oral Q8H ELISEO Parada      albuterol  2 puff Inhalation Q4H PRN Renan Noelle, DO      aspirin  81 mg Oral Daily Renan Drake, DO      atorvastatin  80 mg Oral Daily Renan Drake, DO      baclofen  5 mg Oral BID ELISEO Toribio       butalbital-acetaminophen-caffeine  1 tablet Oral Q6H PRN Renan Drake, DO      diazepam  5 mg Oral Q12H PRN Renan Drake, DO      DULoxetine  60 mg Oral BID Renan Drake, DO      fidaxomicin  200 mg Oral Q12H HONEY Hernandez PA-C      fluticasone-vilanterol  1 puff Inhalation Daily Renan Drake, DO      gabapentin  800 mg Oral TID Renan Drake, DO      heparin (porcine)  5,000 Units Subcutaneous Q8H HONEY Renan Drake, DO      insulin glargine  22 Units Subcutaneous HS ELISEO Trevino      insulin lispro  1-5 Units Subcutaneous TID AC Renan Drake, DO      insulin lispro  1-5 Units Subcutaneous HS Renan Drake, DO      insulin lispro  5 Units Subcutaneous TID With Meals ELISEO Trevino      levothyroxine  112 mcg Oral Early Morning Renan Drake, DO      lidocaine  2 patch Topical Daily Magda ELISEO Aguiar      magnesium Oxide  400 mg Oral BID Magda M Furlan, ELISEO      magnesium sulfate  2 g Intravenous Once ELISEO Trevino      mirtazapine  7.5 mg Oral HS Renan Drake, DO      DARLIN ANTIFUNGAL   Topical BID ELISEO Langley      nicotine  1 patch Transdermal Daily Renan Drake, DO      ondansetron  4 mg Intravenous Q6H PRN Renan Drake, DO      pantoprazole  40 mg Oral Early Morning Renan Drake, DO      potassium chloride  40 mEq Oral BID Holli eHrnandez PA-C      prazosin  2 mg Oral HS Renan Drake, DO      saccharomyces boulardii  250 mg Oral BID ELISEO Trevino      traMADol  50 mg Oral Q6H PRN Renan Drake, DO          Today, Patient Was Seen By: ELISEO Trevino    **Please Note: This note may have been constructed using a voice recognition system.**

## 2024-08-23 LAB
ANION GAP SERPL CALCULATED.3IONS-SCNC: 7 MMOL/L (ref 4–13)
BUN SERPL-MCNC: 8 MG/DL (ref 5–25)
CALCIUM SERPL-MCNC: 8.7 MG/DL (ref 8.4–10.2)
CHLORIDE SERPL-SCNC: 105 MMOL/L (ref 96–108)
CO2 SERPL-SCNC: 27 MMOL/L (ref 21–32)
CREAT SERPL-MCNC: 0.4 MG/DL (ref 0.6–1.3)
ERYTHROCYTE [DISTWIDTH] IN BLOOD BY AUTOMATED COUNT: 13.3 % (ref 11.6–15.1)
GFR SERPL CREATININE-BSD FRML MDRD: 119 ML/MIN/1.73SQ M
GLUCOSE SERPL-MCNC: 304 MG/DL (ref 65–140)
GLUCOSE SERPL-MCNC: 312 MG/DL (ref 65–140)
GLUCOSE SERPL-MCNC: 323 MG/DL (ref 65–140)
GLUCOSE SERPL-MCNC: 355 MG/DL (ref 65–140)
GLUCOSE SERPL-MCNC: 384 MG/DL (ref 65–140)
HCT VFR BLD AUTO: 31.8 % (ref 34.8–46.1)
HGB BLD-MCNC: 10.5 G/DL (ref 11.5–15.4)
MAGNESIUM SERPL-MCNC: 1.8 MG/DL (ref 1.9–2.7)
MCH RBC QN AUTO: 31.9 PG (ref 26.8–34.3)
MCHC RBC AUTO-ENTMCNC: 33 G/DL (ref 31.4–37.4)
MCV RBC AUTO: 97 FL (ref 82–98)
PLATELET # BLD AUTO: 272 THOUSANDS/UL (ref 149–390)
PMV BLD AUTO: 11.6 FL (ref 8.9–12.7)
POTASSIUM SERPL-SCNC: 4.2 MMOL/L (ref 3.5–5.3)
RBC # BLD AUTO: 3.29 MILLION/UL (ref 3.81–5.12)
SODIUM SERPL-SCNC: 139 MMOL/L (ref 135–147)
WBC # BLD AUTO: 4.75 THOUSAND/UL (ref 4.31–10.16)

## 2024-08-23 PROCEDURE — 97116 GAIT TRAINING THERAPY: CPT

## 2024-08-23 PROCEDURE — 99232 SBSQ HOSP IP/OBS MODERATE 35: CPT | Performed by: NURSE PRACTITIONER

## 2024-08-23 PROCEDURE — 80048 BASIC METABOLIC PNL TOTAL CA: CPT | Performed by: NURSE PRACTITIONER

## 2024-08-23 PROCEDURE — 97530 THERAPEUTIC ACTIVITIES: CPT

## 2024-08-23 PROCEDURE — 82948 REAGENT STRIP/BLOOD GLUCOSE: CPT

## 2024-08-23 PROCEDURE — 83735 ASSAY OF MAGNESIUM: CPT | Performed by: NURSE PRACTITIONER

## 2024-08-23 PROCEDURE — 85027 COMPLETE CBC AUTOMATED: CPT | Performed by: NURSE PRACTITIONER

## 2024-08-23 RX ORDER — INSULIN GLARGINE 100 [IU]/ML
25 INJECTION, SOLUTION SUBCUTANEOUS
Status: DISCONTINUED | OUTPATIENT
Start: 2024-08-23 | End: 2024-08-24

## 2024-08-23 RX ADMIN — HEPARIN SODIUM 5000 UNITS: 5000 INJECTION INTRAVENOUS; SUBCUTANEOUS at 16:57

## 2024-08-23 RX ADMIN — NICOTINE 1 PATCH: 21 PATCH, EXTENDED RELEASE TRANSDERMAL at 09:49

## 2024-08-23 RX ADMIN — MICONAZOLE NITRATE: 20 CREAM TOPICAL at 17:01

## 2024-08-23 RX ADMIN — ONDANSETRON 4 MG: 2 INJECTION INTRAMUSCULAR; INTRAVENOUS at 20:16

## 2024-08-23 RX ADMIN — ALBUTEROL SULFATE 2 PUFF: 90 AEROSOL, METERED RESPIRATORY (INHALATION) at 09:55

## 2024-08-23 RX ADMIN — HEPARIN SODIUM 5000 UNITS: 5000 INJECTION INTRAVENOUS; SUBCUTANEOUS at 09:55

## 2024-08-23 RX ADMIN — ASPIRIN 81 MG CHEWABLE TABLET 81 MG: 81 TABLET CHEWABLE at 09:49

## 2024-08-23 RX ADMIN — FLUTICASONE FUROATE AND VILANTEROL TRIFENATATE 1 PUFF: 200; 25 POWDER RESPIRATORY (INHALATION) at 09:53

## 2024-08-23 RX ADMIN — INSULIN GLARGINE 25 UNITS: 100 INJECTION, SOLUTION SUBCUTANEOUS at 22:01

## 2024-08-23 RX ADMIN — LIDOCAINE 2 PATCH: 700 PATCH TOPICAL at 09:50

## 2024-08-23 RX ADMIN — FIDAXOMICIN 200 MG: 200 TABLET, FILM COATED ORAL at 22:02

## 2024-08-23 RX ADMIN — MICONAZOLE NITRATE: 20 CREAM TOPICAL at 09:53

## 2024-08-23 RX ADMIN — MIRTAZAPINE 7.5 MG: 15 TABLET, FILM COATED ORAL at 21:59

## 2024-08-23 RX ADMIN — BACLOFEN 5 MG: 10 TABLET ORAL at 09:55

## 2024-08-23 RX ADMIN — Medication 250 MG: at 09:52

## 2024-08-23 RX ADMIN — ATORVASTATIN CALCIUM 80 MG: 80 TABLET, FILM COATED ORAL at 09:49

## 2024-08-23 RX ADMIN — MAGNESIUM OXIDE TAB 400 MG (241.3 MG ELEMENTAL MG) 400 MG: 400 (241.3 MG) TAB at 16:54

## 2024-08-23 RX ADMIN — DULOXETINE HYDROCHLORIDE 60 MG: 60 CAPSULE, DELAYED RELEASE ORAL at 09:49

## 2024-08-23 RX ADMIN — INSULIN LISPRO 5 UNITS: 100 INJECTION, SOLUTION INTRAVENOUS; SUBCUTANEOUS at 12:34

## 2024-08-23 RX ADMIN — GABAPENTIN 800 MG: 400 CAPSULE ORAL at 22:02

## 2024-08-23 RX ADMIN — GABAPENTIN 800 MG: 400 CAPSULE ORAL at 09:50

## 2024-08-23 RX ADMIN — FIDAXOMICIN 200 MG: 200 TABLET, FILM COATED ORAL at 09:56

## 2024-08-23 RX ADMIN — MAGNESIUM OXIDE TAB 400 MG (241.3 MG ELEMENTAL MG) 400 MG: 400 (241.3 MG) TAB at 09:50

## 2024-08-23 RX ADMIN — HEPARIN SODIUM 5000 UNITS: 5000 INJECTION INTRAVENOUS; SUBCUTANEOUS at 03:21

## 2024-08-23 RX ADMIN — TRAMADOL HYDROCHLORIDE 50 MG: 50 TABLET, COATED ORAL at 12:55

## 2024-08-23 RX ADMIN — ACETAMINOPHEN 975 MG: 325 TABLET ORAL at 16:56

## 2024-08-23 RX ADMIN — PRAZOSIN HYDROCHLORIDE 2 MG: 2 CAPSULE ORAL at 22:03

## 2024-08-23 RX ADMIN — BACLOFEN 5 MG: 10 TABLET ORAL at 16:56

## 2024-08-23 RX ADMIN — DIAZEPAM 5 MG: 5 TABLET ORAL at 16:53

## 2024-08-23 RX ADMIN — SODIUM CHLORIDE, SODIUM GLUCONATE, SODIUM ACETATE, POTASSIUM CHLORIDE, MAGNESIUM CHLORIDE, SODIUM PHOSPHATE, DIBASIC, AND POTASSIUM PHOSPHATE 100 ML/HR: .53; .5; .37; .037; .03; .012; .00082 INJECTION, SOLUTION INTRAVENOUS at 12:36

## 2024-08-23 RX ADMIN — TRAMADOL HYDROCHLORIDE 50 MG: 50 TABLET, COATED ORAL at 22:05

## 2024-08-23 RX ADMIN — ACETAMINOPHEN 975 MG: 325 TABLET ORAL at 09:55

## 2024-08-23 RX ADMIN — INSULIN LISPRO 4 UNITS: 100 INJECTION, SOLUTION INTRAVENOUS; SUBCUTANEOUS at 22:12

## 2024-08-23 RX ADMIN — INSULIN LISPRO 3 UNITS: 100 INJECTION, SOLUTION INTRAVENOUS; SUBCUTANEOUS at 17:01

## 2024-08-23 RX ADMIN — GABAPENTIN 800 MG: 400 CAPSULE ORAL at 16:53

## 2024-08-23 RX ADMIN — INSULIN LISPRO 3 UNITS: 100 INJECTION, SOLUTION INTRAVENOUS; SUBCUTANEOUS at 12:35

## 2024-08-23 RX ADMIN — ONDANSETRON 4 MG: 2 INJECTION INTRAMUSCULAR; INTRAVENOUS at 12:55

## 2024-08-23 RX ADMIN — LEVOTHYROXINE SODIUM 112 MCG: 112 TABLET ORAL at 06:04

## 2024-08-23 RX ADMIN — INSULIN LISPRO 5 UNITS: 100 INJECTION, SOLUTION INTRAVENOUS; SUBCUTANEOUS at 17:02

## 2024-08-23 RX ADMIN — INSULIN LISPRO 5 UNITS: 100 INJECTION, SOLUTION INTRAVENOUS; SUBCUTANEOUS at 10:03

## 2024-08-23 RX ADMIN — Medication 250 MG: at 17:01

## 2024-08-23 RX ADMIN — INSULIN LISPRO 3 UNITS: 100 INJECTION, SOLUTION INTRAVENOUS; SUBCUTANEOUS at 10:03

## 2024-08-23 RX ADMIN — POTASSIUM CHLORIDE 40 MEQ: 1500 TABLET, EXTENDED RELEASE ORAL at 09:49

## 2024-08-23 RX ADMIN — DULOXETINE HYDROCHLORIDE 60 MG: 60 CAPSULE, DELAYED RELEASE ORAL at 16:56

## 2024-08-23 RX ADMIN — ACETAMINOPHEN 975 MG: 325 TABLET ORAL at 03:21

## 2024-08-23 RX ADMIN — PANTOPRAZOLE SODIUM 40 MG: 40 TABLET, DELAYED RELEASE ORAL at 06:04

## 2024-08-23 RX ADMIN — POTASSIUM CHLORIDE 40 MEQ: 1500 TABLET, EXTENDED RELEASE ORAL at 17:00

## 2024-08-23 NOTE — ASSESSMENT & PLAN NOTE
"Lab Results   Component Value Date    HGBA1C 16.1 (H) 08/16/2024       Recent Labs     08/22/24  2312 08/23/24  1002 08/23/24  1204 08/23/24  1637   POCGLU 366* 355* 304* 323*     Patient markedly hyperglycemic on admission only partially responsive to IV fluids, remainder of labs fortunately not consistent with DKA. A1C 16  Patient admitting she has not been using her home insulin as she apparently has been unable to fill prescription due to \"insurance issues,\" additionally reports that due to her neuropathy she has difficulty self injecting.  Daughter also reports jessica non compliance.   Typically on NovoLog 70/30 20 units twice daily  Remains hyperglycemic- Increase Lantus to 25 units HS, and continue with humalog 5 units TID with meals + SSI  insurance issues relayed to CM  Follows with endo outpatient, continue   CCD level 3 diet   "

## 2024-08-23 NOTE — PLAN OF CARE
Problem: Nutrition/Hydration-ADULT  Goal: Nutrient/Hydration intake appropriate for improving, restoring or maintaining nutritional needs  Description: Monitor and assess patient's nutrition/hydration status for malnutrition. Collaborate with interdisciplinary team and initiate plan and interventions as ordered.  Monitor patient's weight and dietary intake as ordered or per policy. Utilize nutrition screening tool and intervene as necessary. Determine patient's food preferences and provide high-protein, high-caloric foods as appropriate.     INTERVENTIONS:  - Monitor oral intake, urinary output, labs, and treatment plans  - Assess nutrition and hydration status and recommend course of action  - Evaluate amount of meals eaten  - Assist patient with eating if necessary   - Allow adequate time for meals  - Recommend/ encourage appropriate diets, oral nutritional supplements, and vitamin/mineral supplements  - Order, calculate, and assess calorie counts as needed  - Recommend, monitor, and adjust tube feedings and TPN/PPN based on assessed needs  - Assess need for intravenous fluids  - Provide specific nutrition/hydration education as appropriate  - Include patient/family/caregiver in decisions related to nutrition  Outcome: Progressing     Problem: PAIN - ADULT  Goal: Verbalizes/displays adequate comfort level or baseline comfort level  Description: Interventions:  - Encourage patient to monitor pain and request assistance  - Assess pain using appropriate pain scale  - Administer analgesics based on type and severity of pain and evaluate response  - Implement non-pharmacological measures as appropriate and evaluate response  - Consider cultural and social influences on pain and pain management  - Notify physician/advanced practitioner if interventions unsuccessful or patient reports new pain  Outcome: Progressing     Problem: INFECTION - ADULT  Goal: Absence or prevention of progression during  hospitalization  Description: INTERVENTIONS:  - Assess and monitor for signs and symptoms of infection  - Monitor lab/diagnostic results  - Monitor all insertion sites, i.e. indwelling lines, tubes, and drains  - Monitor endotracheal if appropriate and nasal secretions for changes in amount and color  - [place] appropriate cooling/warming therapies per order  - Administer medications as ordered  - Instruct and encourage patient and family to use good hand hygiene technique  - Identify and instruct in appropriate isolation precautions for identified infection/condition  Outcome: Progressing  Goal: Absence of fever/infection during neutropenic period  Description: INTERVENTIONS:  - Monitor WBC    Outcome: Progressing     Problem: SAFETY ADULT  Goal: Patient will remain free of falls  Description: INTERVENTIONS:  - Educate patient/family on patient safety including physical limitations  - Instruct patient to call for assistance with activity   - Consult OT/PT to assist with strengthening/mobility   - Keep Call bell within reach  - Keep bed low and locked with side rails adjusted as appropriate  - Keep care items and personal belongings within reach  - Initiate and maintain comfort rounds  - Make Fall Risk Sign visible to staff  - Offer Toileting every 2 Hours, in advance of need  - Initiate/Maintain alarm  - Obtain necessary fall risk management equipment  - Apply yellow socks and bracelet for high fall risk patients  - Consider moving patient to room near nurses station  Outcome: Progressing  Goal: Maintain or return to baseline ADL function  Description: INTERVENTIONS:  -  Assess patient's ability to carry out ADLs; assess patient's baseline for ADL function and identify physical deficits which impact ability to perform ADLs (bathing, care of mouth/teeth, toileting, grooming, dressing, etc.)  - Assess/evaluate cause of self-care deficits   - Assess range of motion  - Assess patient's mobility; develop plan if  impaired  - Assess patient's need for assistive devices and provide as appropriate  - Encourage maximum independence but intervene and supervise when necessary  - Involve family in performance of ADLs  - Assess for home care needs following discharge   - Consider OT consult to assist with ADL evaluation and planning for discharge  - Provide patient education as appropriate  Outcome: Progressing  Goal: Maintains/Returns to pre admission functional level  Description: INTERVENTIONS:  - Perform AM-PAC 6 Click Basic Mobility/ Daily Activity assessment daily.  - Set and communicate daily mobility goal to care team and patient/family/caregiver.   - Collaborate with rehabilitation services on mobility goals if consulted  - Perform Range of Motion 3 times a day.  - Reposition patient every 2 hours.  - Dangle patient 3 times a day  - Stand patient 3 times a day  - Ambulate patient 3 times a day  - Out of bed to chair 3 times a day   - Out of bed for meals 3 times a day  - Out of bed for toileting  - Record patient progress and toleration of activity level   Outcome: Progressing     Problem: DISCHARGE PLANNING  Goal: Discharge to home or other facility with appropriate resources  Description: INTERVENTIONS:  - Identify barriers to discharge w/patient and caregiver  - Arrange for needed discharge resources and transportation as appropriate  - Identify discharge learning needs (meds, wound care, etc.)  - Arrange for interpretive services to assist at discharge as needed  - Refer to Case Management Department for coordinating discharge planning if the patient needs post-hospital services based on physician/advanced practitioner order or complex needs related to functional status, cognitive ability, or social support system  Outcome: Progressing     Problem: Knowledge Deficit  Goal: Patient/family/caregiver demonstrates understanding of disease process, treatment plan, medications, and discharge instructions  Description:  Complete learning assessment and assess knowledge base.  Interventions:  - Provide teaching at level of understanding  - Provide teaching via preferred learning methods  Outcome: Progressing     Problem: NEUROSENSORY - ADULT  Goal: Achieves stable or improved neurological status  Description: INTERVENTIONS  - Monitor and report changes in neurological status  - Monitor vital signs such as temperature, blood pressure, glucose, and any other labs ordered   - Initiate measures to prevent increased intracranial pressure  - Monitor for seizure activity and implement precautions if appropriate      Outcome: Progressing  Goal: Remains free of injury related to seizures activity  Description: INTERVENTIONS  - Maintain airway, patient safety  and administer oxygen as ordered  - Monitor patient for seizure activity, document and report duration and description of seizure to physician/advanced practitioner  - If seizure occurs,  ensure patient safety during seizure  - Reorient patient post seizure  - Seizure pads on all 4 side rails  - Instruct patient/family to notify RN of any seizure activity including if an aura is experienced  - Instruct patient/family to call for assistance with activity based on nursing assessment  - Administer anti-seizure medications if ordered    Outcome: Progressing  Goal: Achieves maximal functionality and self care  Description: INTERVENTIONS  - Monitor swallowing and airway patency with patient fatigue and changes in neurological status  - Encourage and assist patient to increase activity and self care.   - Encourage visually impaired, hearing impaired and aphasic patients to use assistive/communication devices  Outcome: Progressing     Problem: METABOLIC, FLUID AND ELECTROLYTES - ADULT  Goal: Electrolytes maintained within normal limits  Description: INTERVENTIONS:  - Monitor labs and assess patient for signs and symptoms of electrolyte imbalances  - Administer electrolyte replacement as  ordered  - Monitor response to electrolyte replacements, including repeat lab results as appropriate  - Instruct patient on fluid and nutrition as appropriate  Outcome: Progressing  Goal: Fluid balance maintained  Description: INTERVENTIONS:  - Monitor labs   - Monitor I/O and WT  - Instruct patient on fluid and nutrition as appropriate  - Assess for signs & symptoms of volume excess or deficit  Outcome: Progressing  Goal: Glucose maintained within target range  Description: INTERVENTIONS:  - Monitor Blood Glucose as ordered  - Assess for signs and symptoms of hyperglycemia and hypoglycemia  - Administer ordered medications to maintain glucose within target range  - Assess nutritional intake and initiate nutrition service referral as needed  Outcome: Progressing     Problem: SKIN/TISSUE INTEGRITY - ADULT  Goal: Skin Integrity remains intact(Skin Breakdown Prevention)  Description: Assess:  -Perform William assessment every shift  -Clean and moisturize skin every day  -Inspect skin when repositioning, toileting, and assisting with ADLS  -Assess under medical devices such as lines every 2 hours  -Assess extremities for adequate circulation and sensation     Bed Management:  -Have minimal linens on bed & keep smooth, unwrinkled  -Change linens as needed when moist or perspiring  -Avoid sitting or lying in one position for more than 2 hours while in bed  -Keep HOB at 30 degrees     Toileting:  -Offer bedside commode  -Assess for incontinence every hour  -Use incontinent care products after each incontinent episode such as moisture barriers, foam cleansers    Activity:  -Mobilize patient 3 times a day  -Encourage activity and walks on unit  -Encourage or provide ROM exercises   -Turn and reposition patient every 2 Hours  -Use appropriate equipment to lift or move patient in bed  -Instruct/ Assist with weight shifting every 15 min when out of bed in chair  -Consider limitation of chair time 2 hour intervals    Skin  Care:  -Avoid use of baby powder, tape, friction and shearing, hot water or constrictive clothing  -Relieve pressure over bony prominences using offloading techniques or foam dressings (If not contraindicated by incontinence)  -Do not massage red bony areas    Next Steps:  -Teach patient strategies to minimize risks    -Consider consults to  interdisciplinary teams  Outcome: Progressing  Goal: Incision(s), wounds(s) or drain site(s) healing without S/S of infection  Description: INTERVENTIONS  - Assess and document dressing, incision, wound bed, drain sites and surrounding tissue  - Provide patient and family education  - Perform skin care/dressing changes  Outcome: Progressing  Goal: Pressure injury heals and does not worsen  Description: Interventions:  - Implement low air loss mattress or specialty surface (Criteria met)  - Apply silicone foam dressing  - Instruct/assist with weight shifting every 15 minutes when in chair   - Limit chair time to 2 hour intervals  - Use special pressure reducing interventions when in chair   - Apply fecal or urinary incontinence containment device   - Perform passive or active ROM  - Turn and reposition patient & offload bony prominences every 2 hours   - Utilize friction reducing device or surface for transfers   - Consider consults to  interdisciplinary teams  - Use incontinent care products after each incontinent episode  - Consider nutrition services referral as needed  Outcome: Progressing     Problem: MUSCULOSKELETAL - ADULT  Goal: Maintain or return mobility to safest level of function  Description: INTERVENTIONS:  - Assess patient's ability to carry out ADLs; assess patient's baseline for ADL function and identify physical deficits which impact ability to perform ADLs (bathing, care of mouth/teeth, toileting, grooming, dressing, etc.)  - Assess/evaluate cause of self-care deficits   - Assess range of motion  - Assess patient's mobility  - Assess patient's need for  assistive devices and provide as appropriate  - Encourage maximum independence but intervene and supervise when necessary  - Involve family in performance of ADLs  - Assess for home care needs following discharge   - Consider OT consult to assist with ADL evaluation and planning for discharge  - Provide patient education as appropriate  Outcome: Progressing  Goal: Maintain proper alignment of affected body part  Description: INTERVENTIONS:  - Support, maintain and protect limb and body alignment  - Provide patient/ family with appropriate education  Outcome: Progressing     Problem: Prexisting or High Potential for Compromised Skin Integrity  Goal: Skin integrity is maintained or improved  Description: INTERVENTIONS:  - Identify patients at risk for skin breakdown  - Assess and monitor skin integrity  - Assess and monitor nutrition and hydration status  - Monitor labs   - Assess for incontinence   - Turn and reposition patient  - Assist with mobility/ambulation  - Relieve pressure over bony prominences  - Avoid friction and shearing  - Provide appropriate hygiene as needed including keeping skin clean and dry  - Evaluate need for skin moisturizer/barrier cream  - Collaborate with interdisciplinary team   - Patient/family teaching  - Consider wound care consult   Outcome: Progressing

## 2024-08-23 NOTE — PROGRESS NOTES
"[facility]  Progress Note  Name: Barb Palomo I  MRN: [id number]  [facility] Date of Admission: 8/15/2024   Date of Service: 8/23/2024 I Hospital Day: 7    Assessment & Plan   * C. difficile diarrhea  Assessment & Plan  Patient presented with generalized weakness, frequent falls.  She has had ongoing diarrhea, dysuria, poor oral intake and reported 70 pound weight loss over the last year.  Initially hypotensive in the ER, status post IV fluids. Labs with multiple metabolic derangements  CT imaging showing mild diffuse colonic wall thickening, mild diffuse wall thickening of stomach  C. difficile found to be positive--has a history of C. difficile in September 2022.    Patient started on Dificid 200 mg x 10 days per protocol on 8/18  Contact precautions  Appetite is good although reports nausea  bannatrol supplements ordered- will request nutrition increase frequency   Ongoing large volume, watery, incontinent diarrhea- c/w IVFs initiated on 8/22.  PT/OT recommending rehab, CM following.         Failure to thrive in adult  Assessment & Plan  Patient presented with increased generalized weakness and frequent falls, ongoing diarrhea, dysuria, poor oral intake, and at least 70-80 pound weight loss over the past 1 year. Has been following with GI, had colonoscopy May 2024 with poor prep--had EGD with esophagitis and gastritis  Labs with multiple derangements including hyperglycemia, hypokalemia and hypomagnesemia on admission, improved.   C. difficile positive as above  Patient  reports she has not been using insulin due to \"insurance issue\" and inability to self inject due to her neuropathy  CT abdomen and pelvis without any obvious malignancy  Treatment as per respective issues  PT/OT recommending rehab.     Uncontrolled type 2 diabetes mellitus with hyperglycemia (HCC)  Assessment & Plan  Lab Results   Component Value Date    HGBA1C 16.1 (H) 08/16/2024 " "      Recent Labs     08/22/24  2312 08/23/24  1002 08/23/24  1204 08/23/24  1637   POCGLU 366* 355* 304* 323*     Patient markedly hyperglycemic on admission only partially responsive to IV fluids, remainder of labs fortunately not consistent with DKA. A1C 16  Patient admitting she has not been using her home insulin as she apparently has been unable to fill prescription due to \"insurance issues,\" additionally reports that due to her neuropathy she has difficulty self injecting.  Daughter also reports jessica non compliance.   Typically on NovoLog 70/30 20 units twice daily  Remains hyperglycemic- Increase Lantus to 25 units HS, and continue with humalog 5 units TID with meals + SSI  insurance issues relayed to CM  Follows with endo outpatient, continue   CCD level 3 diet     Sinus tachycardia  Assessment & Plan  Heart rate 100s-120s at times.  Sinus. Has received IV fluids without much improvement.  Trialed IVFs again 8/22 given reports of nausea with poor oral intake and dizziness with some improvement to the 110s with HR- will continue with IVFs again today   orthostatic blood pressure negative   Pt is on minipress which could have a side effect of ST- monitor to determine if dose adjustment needs to be made. She was started on this by her psychiatrist to assist with sleep and eating outpatient   Monitor     Back pain  Assessment & Plan  Acute on chronic, likely worsened in setting of recent fall and deconditioning.   CT imaging reviewed, degenerative changes noted.  Continue ATC APAP, Baclofen 5 mg BID (was taking PRN PTA), lido patch. PRN Tramdol on board.   Mobilize as tolerated, PT/OT    Hypomagnesemia  Assessment & Plan  C/w scheduled oral supplementation.  Repeat level in am given ongoing reports of diarrhea     Severe protein-calorie malnutrition (HCC)  Assessment & Plan  Severe protein-calorie malnutrition 2/2 chronic diarrhea a/e/b fat and muscle loss requiring Nutrition consult, oral diet and nutrition " supplements    360 Statement: severe malnutrition r/t suspect combination of chronic illness and social/environmental factors as evidenced by severe temporal muscle loss, severe tricep fat pad loss, at least moderate muscle loss around clavicles and shoulders, at least moderate orbital fat pad loss. Treatment: oral diet and oral nutrition supplements.    BMI Findings:  Adult BMI Classifications: Underweight < 18.5        Body mass index is 16.1 kg/m².   C/w supplements  Nutrition consult appreciated  Encourage po intake     Acute cystitis without hematuria  Assessment & Plan  Reported dysuria on admission   Urine + nitrite & leukocytes, CT imaging with distended bladder suggestive of retention and small amount of air which could be secondary to infection  Urine Culture noted, S/P 3 doses of IV CTX 8/17 8/21, reported pain AFTER urination, likely related to urine hitting excoriated labia/bottom from frequent stooling. Will continue with medicated cream to this area- denies pain at this time   S/p pyridum x 3 doses  Wound care consult      Opioid dependence with other opioid-induced disorder (HCC)  Assessment & Plan  PDMP reviewed, patient maintained on tramadol 200 mg daily as needed; history of chronic cervical/lumbar pain noted  Continue as needed tramadol and chronic gabapentin dosing    Chronic diarrhea  Assessment & Plan  Longstanding history of chronic diarrhea, patient reporting acute diarrhea x1 month. Follows with GI. Has tried questran/imodium without relief  Stool enteric panel negative  C. difficile positive, see plan above     Tobacco dependence  Assessment & Plan  Counseled on need for cessation, provide nicotine patch while admitted  Recent CT chest for lung cancer screening 7/30/2024 negative for nodules or masses    Hypothyroidism  Assessment & Plan   Latest Reference Range & Units 08/16/24 04:11   TSH 3RD GENERATON 0.450 - 4.500 uIU/mL 0.661       Continue home dose levothyroxine     GERD without  esophagitis  Assessment & Plan  Continue PPI    Severe episode of recurrent major depressive disorder, without psychotic features (HCC)  Assessment & Plan  Mood appearing fairly stable, continue home duloxetine, mirtazapine, and minipress, as needed Valium           VTE Pharmacologic Prophylaxis: VTE Score: 3 Moderate Risk (Score 3-4) - Pharmacological DVT Prophylaxis Ordered: heparin.    Mobility:   Basic Mobility Inpatient Raw Score: 19  JH-HLM Goal: 6: Walk 10 steps or more  JH-HLM Achieved: 8: Walk 250 feet ot more  JH-HLM Goal NOT achieved. Continue with multidisciplinary rounding and encourage appropriate mobility to improve upon JH-HLM goals.    Patient Centered Rounds: I performed bedside rounds with nursing staff today.   Discussions with Specialists or Other Care Team Provider: d/w RN and CM     Education and Discussions with Family / Patient: Patient declined call to .     Total Time Spent on Date of Encounter in care of patient: 35 mins. This time was spent on one or more of the following: performing physical exam; counseling and coordination of care; obtaining or reviewing history; documenting in the medical record; reviewing/ordering tests, medications or procedures; communicating with other healthcare professionals and discussing with patient's family/caregivers.    Current Length of Stay: 7 day(s)  Current Patient Status: Inpatient   Certification Statement: The patient will continue to require additional inpatient hospital stay due to ongoing tachycardia, dehydration and diarrhea   Discharge Plan: Anticipate discharge in 24-48 hrs to rehab facility.    Code Status: Level 1 - Full Code    Subjective:   Pt reports she has ongoing diarrhea, large volume per RN incontinent watery stool. Dizziness improved from yesterday but still slightly dizzy today. Denies vomiting but persists with nausea.  Pt overall still not feeling well and discouraged by the amount of diarrhea that persists.      Objective:     Vitals:   Temp (24hrs), Av.2 °F (36.8 °C), Min:97.7 °F (36.5 °C), Max:99 °F (37.2 °C)    Temp:  [97.7 °F (36.5 °C)-99 °F (37.2 °C)] 99 °F (37.2 °C)  HR:  [106-116] 107  Resp:  [16-18] 18  BP: ()/() 127/87  SpO2:  [95 %-98 %] 97 %  Body mass index is 16.1 kg/m².     Input and Output Summary (last 24 hours):     Intake/Output Summary (Last 24 hours) at 2024 1653  Last data filed at 2024 1236  Gross per 24 hour   Intake 2405 ml   Output 4 ml   Net 2401 ml       Physical Exam:   Physical Exam  Constitutional:       General: She is not in acute distress.     Appearance: She is cachectic.      Comments: Chronically ill-appearing   Cardiovascular:      Rate and Rhythm: Normal rate and regular rhythm.      Heart sounds: Normal heart sounds. No murmur heard.  Pulmonary:      Effort: Pulmonary effort is normal. No respiratory distress.      Breath sounds: Normal breath sounds. No wheezing or rales.   Abdominal:      General: Bowel sounds are normal. There is no distension.      Palpations: Abdomen is soft.      Tenderness: There is no abdominal tenderness.   Musculoskeletal:         General: No swelling or tenderness.   Skin:     General: Skin is warm and dry.      Findings: No erythema or rash.   Neurological:      Mental Status: She is alert and oriented to person, place, and time. Mental status is at baseline.   Psychiatric:         Mood and Affect: Mood normal.          Additional Data:     Labs:  Results from last 7 days   Lab Units 24  0435 24  0439   WBC Thousand/uL 4.75 6.65   HEMOGLOBIN g/dL 10.5* 11.0*   HEMATOCRIT % 31.8* 33.6*   PLATELETS Thousands/uL 272 259   SEGS PCT %  --  55   LYMPHO PCT %  --  34   MONO PCT %  --  8   EOS PCT %  --  2     Results from last 7 days   Lab Units 24  0435 24  0459 24  0522   SODIUM mmol/L 139   < > 140   POTASSIUM mmol/L 4.2   < > 4.1   CHLORIDE mmol/L 105   < > 107   CO2 mmol/L 27   < > 27   BUN mg/dL 8    < > 5   CREATININE mg/dL 0.40*   < > 0.39*   ANION GAP mmol/L 7   < > 6   CALCIUM mg/dL 8.7   < > 7.7*   ALBUMIN g/dL  --   --  2.9*   TOTAL BILIRUBIN mg/dL  --   --  0.28   ALK PHOS U/L  --   --  62   ALT U/L  --   --  8   AST U/L  --   --  10*   GLUCOSE RANDOM mg/dL 312*   < > 193*    < > = values in this interval not displayed.         Results from last 7 days   Lab Units 08/23/24  1637 08/23/24  1204 08/23/24  1002 08/22/24  2312 08/22/24  2114 08/22/24  1645 08/22/24  1128 08/22/24  0707 08/21/24  2226 08/21/24  2053 08/21/24  1607 08/21/24  1116   POC GLUCOSE mg/dl 323* 304* 355* 366* 342* 346* 408* 353* 389* 333* 279* 256*               Lines/Drains:  Invasive Devices       Peripheral Intravenous Line  Duration             Peripheral IV 08/20/24 Left Forearm 3 days                          Imaging: Reviewed radiology reports from this admission including: abdominal/pelvic CT    Recent Cultures (last 7 days):         Last 24 Hours Medication List:   Current Facility-Administered Medications   Medication Dose Route Frequency Provider Last Rate    acetaminophen  975 mg Oral Q8H [facility] ELISEO Toribio      albuterol  2 puff Inhalation Q4H PRN Renan Drake, DO      aspirin  81 mg Oral Daily Renan Drake, DO      atorvastatin  80 mg Oral Daily Renan Drake, DO      baclofen  5 mg Oral BID ELISEO Toribio      butalbital-acetaminophen-caffeine  1 tablet Oral Q6H PRN Renan Drake, DO      diazepam  5 mg Oral Q12H PRN Renan Drake, DO      DULoxetine  60 mg Oral BID Renan Drake, DO      fidaxomicin  200 mg Oral Q12H HONEY Hernandez PA-C      fluticasone-vilanterol  1 puff Inhalation Daily Renan Drake, DO      gabapentin  800 mg Oral TID Renan Drake, DO      heparin (porcine)  5,000 Units Subcutaneous Q8H [facility] Renan Drake DO      insulin glargine  25 Units Subcutaneous HS Shanika Iachini, CRNP      insulin lispro  1-5 Units Subcutaneous TID AC Renan Drake DO      insulin lispro  1-5  Units Subcutaneous HS Renan Drake,       insulin lispro  5 Units Subcutaneous TID With Meals ELISEO Trevino      levothyroxine  112 mcg Oral Early Morning Renan Drake,       lidocaine  2 patch Topical Daily Amber M Furlan, ELISEO      magnesium Oxide  400 mg Oral BID Amber M Furlan, ELISEO      mirtazapine  7.5 mg Oral HS Renan Drake DO      DARLIN ANTIFUNGAL   Topical BID ELISEO Langley      multi-electrolyte  100 mL/hr Intravenous Continuous ELISEO Trevino 100 mL/hr (08/23/24 1236)    nicotine  1 patch Transdermal Daily Renan Drake,       ondansetron  4 mg Intravenous Q6H PRN Renan Drake DO      pantoprazole  40 mg Oral Early Morning Renan Drake,       potassium chloride  40 mEq Oral BID Holli Hernandez PA-C      prazosin  2 mg Oral HS Renan Drake DO      saccharomyces boulardii  250 mg Oral BID ELISEO Trevino      traMADol  50 mg Oral Q6H PRN Renan Drake DO          Today, Patient Was Seen By: ELISEO Trevino    **Please Note: This note may have been constructed using a voice recognition system.**

## 2024-08-23 NOTE — ASSESSMENT & PLAN NOTE
Patient presented with generalized weakness, frequent falls.  She has had ongoing diarrhea, dysuria, poor oral intake and reported 70 pound weight loss over the last year.  Initially hypotensive in the ER, status post IV fluids. Labs with multiple metabolic derangements  CT imaging showing mild diffuse colonic wall thickening, mild diffuse wall thickening of stomach  C. difficile found to be positive--has a history of C. difficile in September 2022.    Patient started on Dificid 200 mg x 10 days per protocol on 8/18  Contact precautions  Appetite is good although reports nausea  bannatrol supplements ordered- will request nutrition increase frequency   Ongoing large volume, watery, incontinent diarrhea- c/w IVFs initiated on 8/22.  PT/OT recommending rehab, CM following.

## 2024-08-23 NOTE — ASSESSMENT & PLAN NOTE
Heart rate 100s-120s at times.  Sinus. Has received IV fluids without much improvement.  Trialed IVFs again 8/22 given reports of nausea with poor oral intake and dizziness with some improvement to the 110s with HR- will continue with IVFs again today   orthostatic blood pressure negative   Pt is on minipress which could have a side effect of ST- monitor to determine if dose adjustment needs to be made. She was started on this by her psychiatrist to assist with sleep and eating outpatient   Monitor

## 2024-08-23 NOTE — PHYSICAL THERAPY NOTE
Physical Therapy Progress Note     08/23/24 1425   PT Last Visit   PT Visit Date 08/23/24   Note Type   Note Type Treatment   Pain Assessment   Pain Assessment Tool FLACC   Pain Rating: FLACC (Rest) - Face 1   Pain Rating: FLACC (Rest) - Legs 0   Pain Rating: FLACC (Rest) - Activity 0   Pain Rating: FLACC (Rest) - Cry 1   Pain Rating: FLACC (Rest) - Consolability 0   Score: FLACC (Rest) 2   Pain Rating: FLACC (Activity) - Face 1   Pain Rating: FLACC (Activity) - Legs 1   Pain Rating: FLACC (Activity) - Activity 1   Pain Rating: FLACC (Activity) - Cry 1   Pain Rating: FLACC (Activity) - Consolability 0   Score: FLACC (Activity) 4   Restrictions/Precautions   Other Precautions Contact/isolation;Pain;Fall Risk;Cognitive;Bed Alarm;Chair Alarm;Multiple lines  (c-diff)   Subjective   Subjective Pt encountered supine in bed, pleasant and agreeable to treatment.  Reports ongoing watery BMs today, but no other complaitns at rest besides baseline aches & pain.  She reports sudden pain in L knee with buckling as noed below, but has no worsened residual pain afterwards.   Bed Mobility   Supine to Sit 5  Supervision   Additional items Assist x 1   Sit to Supine 5  Supervision   Additional items Assist x 1   Transfers   Sit to Stand 5  Supervision   Additional items Assist x 1   Stand to Sit 4  Minimal assistance   Additional items Assist x 1   Ambulation/Elevation   Gait pattern Short stride;Inconsistent loly;Decreased foot clearance;Narrow FAYE;L Knee Lizet;Poor UE support   Gait Assistance 4  Minimal assist   Additional items Assist x 1   Assistive Device Rolling walker   Distance 60', L knee buckle, 90', 150'   Balance   Static Sitting Fair +   Static Standing Poor +   Ambulatory Poor +   Activity Tolerance   Activity Tolerance Patient tolerated treatment well;Patient limited by fatigue;Patient limited by pain   Nurse Made Aware yes   Assessment   Prognosis Good   Problem List Decreased strength;Decreased  endurance;Impaired balance;Decreased mobility;Decreased safety awareness;Pain   Assessment Pt performs all trasnfers & gait tasks at similar proficiency compared to eval, but remains RW reliant due to LE joint pain, weakness & impaired balance.  She negotiated obstacles & occasionally bumpted into them with RW, but no LOB noted in these moments.  She did have 1 instance of LLE buckling which she recovered well from with use of RW for support.  Upon return to room, pt assisted with pericare upon return to bed, where she successfully performed & held bride while linen changed underneath her.  Pt encouraged to mobilize with staff outside of PT to maintain present stength & endurance to which she verbalzed understanding.   PT POC & d/c recommendations remain appropriate given lack of home support & possible inability to use most appropriate AD due to hoarding situation within her home.   Goals   Patient Goals to feel better   [facility] Expiration Date 08/31/24   PT Treatment Day 1   Plan   Treatment/Interventions Functional transfer training;LE strengthening/ROM;Elevations;Therapeutic exercise;Endurance training;Patient/family training;Equipment eval/education;Gait training;Bed mobility   Progress Progressing toward goals   PT Frequency 3-5x/wk   Discharge Recommendation   Rehab Resource Intensity Level, PT II (Moderate Resource Intensity)   Equipment Recommended Walker   Walker Package Recommended Wheeled walker   AM-PAC Basic Mobility Inpatient   Turning in Flat Bed Without Bedrails 4   Lying on Back to Sitting on Edge of Flat Bed Without Bedrails 4   Moving Bed to Chair 3   Standing Up From Chair Using Arms 3   Walk in Room 3   Climb 3-5 Stairs With Railing 2   Basic Mobility Inpatient Raw Score 19   Basic Mobility Standardized Score 42.48   [facility] Highest Level Of Mobility   -HLM Goal 6: Walk 10 steps or more   -HLM Achieved 8: Walk 250 feet ot more       Jose Wong PTA    An [facility] Basic Mobility Raw Score  less than 17 suggests pt would benefit from post acute rehab.  Please also refer to the recommendation of the Physical Therapist for safe discharge planning.

## 2024-08-23 NOTE — PLAN OF CARE
Problem: PHYSICAL THERAPY ADULT  Goal: Performs mobility at highest level of function for planned discharge setting.  See evaluation for individualized goals.  Description: Treatment/Interventions: Functional transfer training, LE strengthening/ROM, Elevations, Therapeutic exercise, Endurance training, Patient/family training, Equipment eval/education, Bed mobility, Gait training, Spoke to nursing, OT  Equipment Recommended: Walker       See flowsheet documentation for full assessment, interventions and recommendations.  Outcome: Progressing  Note: Prognosis: Good  Problem List: Decreased strength, Decreased endurance, Impaired balance, Decreased mobility, Decreased safety awareness, Pain  Assessment: Pt performs all trasnfers & gait tasks at similar proficiency compared to eval, but remains RW reliant due to LE joint pain, weakness & impaired balance.  She negotiated obstacles & occasionally bumpted into them with RW, but no LOB noted in these moments.  She did have 1 instance of LLE buckling which she recovered well from with use of RW for support.  Upon return to room, pt assisted with pericare upon return to bed, where she successfully performed & held bride while linen changed underneath her.  Pt encouraged to mobilize with staff outside of PT to maintain present stength & endurance to which she verbalzed understanding.   PT POC & d/c recommendations remain appropriate given lack of home support & possible inability to use most appropriate AD due to hoarding situation within her home.        Rehab Resource Intensity Level, PT: II (Moderate Resource Intensity)    See flowsheet documentation for full assessment.

## 2024-08-24 LAB
ALBUMIN SERPL BCG-MCNC: 2.7 G/DL (ref 3.5–5)
ALP SERPL-CCNC: 131 U/L (ref 34–104)
ALT SERPL W P-5'-P-CCNC: 43 U/L (ref 7–52)
ANION GAP SERPL CALCULATED.3IONS-SCNC: 4 MMOL/L (ref 4–13)
AST SERPL W P-5'-P-CCNC: 45 U/L (ref 13–39)
BILIRUB SERPL-MCNC: 0.21 MG/DL (ref 0.2–1)
BUN SERPL-MCNC: 7 MG/DL (ref 5–25)
CALCIUM ALBUM COR SERPL-MCNC: 8.2 MG/DL (ref 8.3–10.1)
CALCIUM SERPL-MCNC: 7.2 MG/DL (ref 8.4–10.2)
CHLORIDE SERPL-SCNC: 112 MMOL/L (ref 96–108)
CO2 SERPL-SCNC: 24 MMOL/L (ref 21–32)
CREAT SERPL-MCNC: 0.3 MG/DL (ref 0.6–1.3)
ERYTHROCYTE [DISTWIDTH] IN BLOOD BY AUTOMATED COUNT: 13.5 % (ref 11.6–15.1)
GFR SERPL CREATININE-BSD FRML MDRD: 131 ML/MIN/1.73SQ M
GLUCOSE SERPL-MCNC: 308 MG/DL (ref 65–140)
GLUCOSE SERPL-MCNC: 310 MG/DL (ref 65–140)
GLUCOSE SERPL-MCNC: 310 MG/DL (ref 65–140)
GLUCOSE SERPL-MCNC: 343 MG/DL (ref 65–140)
GLUCOSE SERPL-MCNC: 381 MG/DL (ref 65–140)
HCT VFR BLD AUTO: 31.5 % (ref 34.8–46.1)
HGB BLD-MCNC: 10.4 G/DL (ref 11.5–15.4)
MAGNESIUM SERPL-MCNC: 1.4 MG/DL (ref 1.9–2.7)
MCH RBC QN AUTO: 32.3 PG (ref 26.8–34.3)
MCHC RBC AUTO-ENTMCNC: 33 G/DL (ref 31.4–37.4)
MCV RBC AUTO: 98 FL (ref 82–98)
PLATELET # BLD AUTO: 258 THOUSANDS/UL (ref 149–390)
PMV BLD AUTO: 10.8 FL (ref 8.9–12.7)
POTASSIUM SERPL-SCNC: 3.9 MMOL/L (ref 3.5–5.3)
PROT SERPL-MCNC: 4.4 G/DL (ref 6.4–8.4)
RBC # BLD AUTO: 3.22 MILLION/UL (ref 3.81–5.12)
SODIUM SERPL-SCNC: 140 MMOL/L (ref 135–147)
WBC # BLD AUTO: 3.95 THOUSAND/UL (ref 4.31–10.16)

## 2024-08-24 PROCEDURE — 80053 COMPREHEN METABOLIC PANEL: CPT | Performed by: NURSE PRACTITIONER

## 2024-08-24 PROCEDURE — 82948 REAGENT STRIP/BLOOD GLUCOSE: CPT

## 2024-08-24 PROCEDURE — 85027 COMPLETE CBC AUTOMATED: CPT | Performed by: NURSE PRACTITIONER

## 2024-08-24 PROCEDURE — 83735 ASSAY OF MAGNESIUM: CPT | Performed by: NURSE PRACTITIONER

## 2024-08-24 PROCEDURE — 99232 SBSQ HOSP IP/OBS MODERATE 35: CPT | Performed by: PHYSICIAN ASSISTANT

## 2024-08-24 RX ORDER — TRAZODONE HYDROCHLORIDE 50 MG/1
50 TABLET, FILM COATED ORAL
Status: DISCONTINUED | OUTPATIENT
Start: 2024-08-24 | End: 2024-09-16 | Stop reason: HOSPADM

## 2024-08-24 RX ORDER — INSULIN GLARGINE 100 [IU]/ML
32 INJECTION, SOLUTION SUBCUTANEOUS
Status: DISCONTINUED | OUTPATIENT
Start: 2024-08-24 | End: 2024-08-25

## 2024-08-24 RX ORDER — INSULIN LISPRO 100 [IU]/ML
8 INJECTION, SOLUTION INTRAVENOUS; SUBCUTANEOUS
Status: DISCONTINUED | OUTPATIENT
Start: 2024-08-24 | End: 2024-08-25

## 2024-08-24 RX ORDER — MAGNESIUM SULFATE HEPTAHYDRATE 40 MG/ML
2 INJECTION, SOLUTION INTRAVENOUS ONCE
Status: COMPLETED | OUTPATIENT
Start: 2024-08-24 | End: 2024-08-24

## 2024-08-24 RX ADMIN — INSULIN LISPRO 8 UNITS: 100 INJECTION, SOLUTION INTRAVENOUS; SUBCUTANEOUS at 17:20

## 2024-08-24 RX ADMIN — ACETAMINOPHEN 975 MG: 325 TABLET ORAL at 11:17

## 2024-08-24 RX ADMIN — GABAPENTIN 800 MG: 400 CAPSULE ORAL at 08:13

## 2024-08-24 RX ADMIN — MICONAZOLE NITRATE: 20 CREAM TOPICAL at 08:15

## 2024-08-24 RX ADMIN — DULOXETINE HYDROCHLORIDE 60 MG: 60 CAPSULE, DELAYED RELEASE ORAL at 17:17

## 2024-08-24 RX ADMIN — FIDAXOMICIN 200 MG: 200 TABLET, FILM COATED ORAL at 22:19

## 2024-08-24 RX ADMIN — SODIUM CHLORIDE 1000 ML: 0.9 INJECTION, SOLUTION INTRAVENOUS at 02:12

## 2024-08-24 RX ADMIN — HEPARIN SODIUM 5000 UNITS: 5000 INJECTION INTRAVENOUS; SUBCUTANEOUS at 17:18

## 2024-08-24 RX ADMIN — Medication 250 MG: at 17:18

## 2024-08-24 RX ADMIN — LIDOCAINE 2 PATCH: 700 PATCH TOPICAL at 08:14

## 2024-08-24 RX ADMIN — INSULIN LISPRO 3 UNITS: 100 INJECTION, SOLUTION INTRAVENOUS; SUBCUTANEOUS at 22:19

## 2024-08-24 RX ADMIN — ATORVASTATIN CALCIUM 80 MG: 80 TABLET, FILM COATED ORAL at 08:14

## 2024-08-24 RX ADMIN — GABAPENTIN 800 MG: 400 CAPSULE ORAL at 22:17

## 2024-08-24 RX ADMIN — HEPARIN SODIUM 5000 UNITS: 5000 INJECTION INTRAVENOUS; SUBCUTANEOUS at 01:53

## 2024-08-24 RX ADMIN — INSULIN LISPRO 5 UNITS: 100 INJECTION, SOLUTION INTRAVENOUS; SUBCUTANEOUS at 08:16

## 2024-08-24 RX ADMIN — BACLOFEN 5 MG: 10 TABLET ORAL at 17:18

## 2024-08-24 RX ADMIN — MAGNESIUM SULFATE HEPTAHYDRATE 2 G: 40 INJECTION, SOLUTION INTRAVENOUS at 11:19

## 2024-08-24 RX ADMIN — DULOXETINE HYDROCHLORIDE 60 MG: 60 CAPSULE, DELAYED RELEASE ORAL at 08:13

## 2024-08-24 RX ADMIN — ASPIRIN 81 MG CHEWABLE TABLET 81 MG: 81 TABLET CHEWABLE at 08:13

## 2024-08-24 RX ADMIN — SODIUM CHLORIDE, SODIUM GLUCONATE, SODIUM ACETATE, POTASSIUM CHLORIDE, MAGNESIUM CHLORIDE, SODIUM PHOSPHATE, DIBASIC, AND POTASSIUM PHOSPHATE 100 ML/HR: .53; .5; .37; .037; .03; .012; .00082 INJECTION, SOLUTION INTRAVENOUS at 11:32

## 2024-08-24 RX ADMIN — POTASSIUM CHLORIDE 40 MEQ: 1500 TABLET, EXTENDED RELEASE ORAL at 08:14

## 2024-08-24 RX ADMIN — MIRTAZAPINE 7.5 MG: 15 TABLET, FILM COATED ORAL at 22:18

## 2024-08-24 RX ADMIN — DIAZEPAM 5 MG: 5 TABLET ORAL at 22:22

## 2024-08-24 RX ADMIN — MAGNESIUM OXIDE TAB 400 MG (241.3 MG ELEMENTAL MG) 400 MG: 400 (241.3 MG) TAB at 08:13

## 2024-08-24 RX ADMIN — FLUTICASONE FUROATE AND VILANTEROL TRIFENATATE 1 PUFF: 200; 25 POWDER RESPIRATORY (INHALATION) at 08:15

## 2024-08-24 RX ADMIN — ONDANSETRON 4 MG: 2 INJECTION INTRAMUSCULAR; INTRAVENOUS at 14:47

## 2024-08-24 RX ADMIN — INSULIN LISPRO 8 UNITS: 100 INJECTION, SOLUTION INTRAVENOUS; SUBCUTANEOUS at 12:22

## 2024-08-24 RX ADMIN — TRAMADOL HYDROCHLORIDE 50 MG: 50 TABLET, COATED ORAL at 20:36

## 2024-08-24 RX ADMIN — INSULIN LISPRO 3 UNITS: 100 INJECTION, SOLUTION INTRAVENOUS; SUBCUTANEOUS at 17:19

## 2024-08-24 RX ADMIN — ACETAMINOPHEN 975 MG: 325 TABLET ORAL at 17:17

## 2024-08-24 RX ADMIN — MICONAZOLE NITRATE: 20 CREAM TOPICAL at 17:21

## 2024-08-24 RX ADMIN — BACLOFEN 5 MG: 10 TABLET ORAL at 08:13

## 2024-08-24 RX ADMIN — ACETAMINOPHEN 975 MG: 325 TABLET ORAL at 01:53

## 2024-08-24 RX ADMIN — INSULIN LISPRO 4 UNITS: 100 INJECTION, SOLUTION INTRAVENOUS; SUBCUTANEOUS at 12:22

## 2024-08-24 RX ADMIN — PANTOPRAZOLE SODIUM 40 MG: 40 TABLET, DELAYED RELEASE ORAL at 06:25

## 2024-08-24 RX ADMIN — INSULIN GLARGINE 32 UNITS: 100 INJECTION, SOLUTION SUBCUTANEOUS at 22:17

## 2024-08-24 RX ADMIN — Medication 250 MG: at 08:13

## 2024-08-24 RX ADMIN — HEPARIN SODIUM 5000 UNITS: 5000 INJECTION INTRAVENOUS; SUBCUTANEOUS at 11:18

## 2024-08-24 RX ADMIN — GABAPENTIN 800 MG: 400 CAPSULE ORAL at 17:17

## 2024-08-24 RX ADMIN — MAGNESIUM OXIDE TAB 400 MG (241.3 MG ELEMENTAL MG) 400 MG: 400 (241.3 MG) TAB at 17:18

## 2024-08-24 RX ADMIN — FIDAXOMICIN 200 MG: 200 TABLET, FILM COATED ORAL at 08:15

## 2024-08-24 RX ADMIN — LEVOTHYROXINE SODIUM 112 MCG: 112 TABLET ORAL at 06:25

## 2024-08-24 RX ADMIN — INSULIN LISPRO 3 UNITS: 100 INJECTION, SOLUTION INTRAVENOUS; SUBCUTANEOUS at 08:16

## 2024-08-24 RX ADMIN — TRAMADOL HYDROCHLORIDE 50 MG: 50 TABLET, COATED ORAL at 11:32

## 2024-08-24 RX ADMIN — POTASSIUM CHLORIDE 40 MEQ: 1500 TABLET, EXTENDED RELEASE ORAL at 17:17

## 2024-08-24 RX ADMIN — NICOTINE 1 PATCH: 21 PATCH, EXTENDED RELEASE TRANSDERMAL at 08:18

## 2024-08-24 NOTE — ASSESSMENT & PLAN NOTE
"Lab Results   Component Value Date    HGBA1C 16.1 (H) 08/16/2024       Recent Labs     08/23/24  1204 08/23/24  1637 08/23/24  2150 08/24/24  0649   POCGLU 304* 323* 384* 343*       Patient markedly hyperglycemic on admission only partially responsive to IV fluids, remainder of labs fortunately not consistent with DKA. A1C 16  Patient admitting she has not been using her home insulin as she apparently has been unable to fill prescription due to \"insurance issues,\" additionally reports that due to her neuropathy she has difficulty self injecting.  Daughter also reports jessica non compliance.   Typically on NovoLog 70/30 20 units twice daily  Remains hyperglycemic- Increase Lantus to 32 units HS, humalog 8 units TID with meals + SSI  insurance issues relayed to CM  Follows with endo outpatient, continue   CCD level 3 diet   "

## 2024-08-24 NOTE — ASSESSMENT & PLAN NOTE
Mood appearing fairly stable, continue home duloxetine, mirtazapine, as needed Valium  Hold minipress given symptomatic hypotension episodes, tachycardia

## 2024-08-24 NOTE — ASSESSMENT & PLAN NOTE
Heart rate 100s-120s at times.  Sinus.  Has received IV fluids without much improvement.  Trialed IVFs again 8/22 given reports of nausea with poor oral intake and dizziness with some improvement to the 110s with HR- will continue with IVFs again today   orthostatic blood pressure negative   Pt is on minipress which could have a side effect of ST.  Additionally noted she is hypotensive each night around 2/3 AM, I suspect this is likely the underlying cause.  She was started on this by her psychiatrist to assist with sleep and eating outpatient   Will hold minipress for now.  D/w pt can have PRN trazodone for sleep.  Pt agreeable   Monitor

## 2024-08-24 NOTE — PROGRESS NOTES
[facility]  Progress Note  Name: Barb Palomo I  MRN: [id number]  Unit/Bed#: PPHP 601-01 I Date of Admission: 8/15/2024   Date of Service: 8/24/2024 I Hospital Day: 8    Assessment & Plan   * C. difficile diarrhea  Assessment & Plan  Patient presented with generalized weakness, frequent falls.  She has had ongoing diarrhea, dysuria, poor oral intake and reported 70 pound weight loss over the last year.  Initially hypotensive in the ER, status post IV fluids. Labs with multiple metabolic derangements  CT imaging showing mild diffuse colonic wall thickening, mild diffuse wall thickening of stomach  C. difficile found to be positive--has a history of C. difficile in September 2022.    Patient started on Dificid 200 mg x 10 days per protocol on 8/18  Contact precautions  Appetite is good although reports nausea  bannatrol supplements ordered- will request nutrition increase frequency   D/w patient, overall stools appear to be more formed at this time, though still frequent.  Will continue to monitor though seems overall improved   PT/OT recommending rehab, CM following.     Sinus tachycardia  Assessment & Plan  Heart rate 100s-120s at times.  Sinus.  Has received IV fluids without much improvement.  Trialed IVFs again 8/22 given reports of nausea with poor oral intake and dizziness with some improvement to the 110s with HR- will continue with IVFs again today   orthostatic blood pressure negative   Pt is on minipress which could have a side effect of ST.  Additionally noted she is hypotensive each night around 2/3 AM, I suspect this is likely the underlying cause.  She was started on this by her psychiatrist to assist with sleep and eating outpatient   Will hold minipress for now.  D/w pt can have PRN trazodone for sleep.  Pt agreeable   Monitor     Back pain  Assessment & Plan  Acute on chronic, likely worsened in setting of recent fall and deconditioning.   CT imaging  "reviewed, degenerative changes noted.  Continue ATC APAP, Baclofen 5 mg BID (was taking PRN PTA), lido patch. PRN Tramdol on board.   Mobilize as tolerated, PT/OT    Failure to thrive in adult  Assessment & Plan  Patient presented with increased generalized weakness and frequent falls, ongoing diarrhea, dysuria, poor oral intake, and at least 70-80 pound weight loss over the past 1 year. Has been following with GI, had colonoscopy May 2024 with poor prep--had EGD with esophagitis and gastritis  Labs with multiple derangements including hyperglycemia, hypokalemia and hypomagnesemia on admission, improved.   C. difficile positive as above  Patient  reports she has not been using insulin due to \"insurance issue\" and inability to self inject due to her neuropathy  CT abdomen and pelvis without any obvious malignancy  Treatment as per respective issues  PT/OT recommending rehab.     Hypomagnesemia  Assessment & Plan  C/w scheduled oral supplementation.  Will give IV dose on 8/24 with continued low levels  Repeat labs in AM     Severe protein-calorie malnutrition (HCC)  Assessment & Plan  Severe protein-calorie malnutrition 2/2 chronic diarrhea a/e/b fat and muscle loss requiring Nutrition consult, oral diet and nutrition supplements    360 Statement: severe malnutrition r/t suspect combination of chronic illness and social/environmental factors as evidenced by severe temporal muscle loss, severe tricep fat pad loss, at least moderate muscle loss around clavicles and shoulders, at least moderate orbital fat pad loss. Treatment: oral diet and oral nutrition supplements.    BMI Findings:  Adult BMI Classifications: Underweight < 18.5        Body mass index is 16.1 kg/m².   C/w supplements  Nutrition consult appreciated  Encourage po intake     Acute cystitis without hematuria  Assessment & Plan  Reported dysuria on admission   Urine + nitrite & leukocytes, CT imaging with distended bladder suggestive of retention and small " "amount of air which could be secondary to infection  Urine Culture noted, S/P 3 doses of IV CTX 8/17 8/21, reported pain AFTER urination, likely related to urine hitting excoriated labia/bottom from frequent stooling. Will continue with medicated cream to this area- denies pain at this time   S/p pyridum x 3 doses  Wound care consult    Uncontrolled type 2 diabetes mellitus with hyperglycemia (HCC)  Assessment & Plan  Lab Results   Component Value Date    HGBA1C 16.1 (H) 08/16/2024       Recent Labs     08/23/24  1204 08/23/24  1637 08/23/24  2150 08/24/24  0649   POCGLU 304* 323* 384* 343*       Patient markedly hyperglycemic on admission only partially responsive to IV fluids, remainder of labs fortunately not consistent with DKA. A1C 16  Patient admitting she has not been using her home insulin as she apparently has been unable to fill prescription due to \"insurance issues,\" additionally reports that due to her neuropathy she has difficulty self injecting.  Daughter also reports jessica non compliance.   Typically on NovoLog 70/30 20 units twice daily  Remains hyperglycemic- Increase Lantus to 32 units HS, humalog 8 units TID with meals + SSI  insurance issues relayed to CM  Follows with endo outpatient, continue   CCD level 3 diet     Opioid dependence with other opioid-induced disorder (HCC)  Assessment & Plan  PDMP reviewed, patient maintained on tramadol 200 mg daily as needed; history of chronic cervical/lumbar pain noted  Continue as needed tramadol and chronic gabapentin dosing    Chronic diarrhea  Assessment & Plan  Longstanding history of chronic diarrhea, patient reporting acute diarrhea x1 month. Follows with GI. Has tried questran/imodium without relief  Stool enteric panel negative  C. difficile positive, see plan above     Tobacco dependence  Assessment & Plan  Counseled on need for cessation, provide nicotine patch while admitted  Recent CT chest for lung cancer screening 7/30/2024 negative for " nodules or masses    Hypothyroidism  Assessment & Plan  Continue home dose levothyroxine     GERD without esophagitis  Assessment & Plan  Continue PPI    Severe episode of recurrent major depressive disorder, without psychotic features (HCC)  Assessment & Plan  Mood appearing fairly stable, continue home duloxetine, mirtazapine, as needed Valium  Hold minipress given symptomatic hypotension episodes, tachycardia            VTE Pharmacologic Prophylaxis: VTE Score: 3 Moderate Risk (Score 3-4) - Pharmacological DVT Prophylaxis Ordered: heparin.    Mobility:   Basic Mobility Inpatient Raw Score: 19  -HLM Goal: 6: Walk 10 steps or more  JH-HLM Achieved: 3: Sit at edge of bed  JH-HLM Goal NOT achieved. Continue with multidisciplinary rounding and encourage appropriate mobility to improve upon JH-HLM goals.    Patient Centered Rounds: I performed bedside rounds with nursing staff today.   Discussions with Specialists or Other Care Team Provider:     Education and Discussions with Family / Patient: Attempted to update  (daughter) via phone. Unable to contact.    Total Time Spent on Date of Encounter in care of patient: 25 mins. This time was spent on one or more of the following: performing physical exam; counseling and coordination of care; obtaining or reviewing history; documenting in the medical record; reviewing/ordering tests, medications or procedures; communicating with other healthcare professionals and discussing with patient's family/caregivers.    Current Length of Stay: 8 day(s)  Current Patient Status: Inpatient   Certification Statement: The patient will continue to require additional inpatient hospital stay due to c diff, hypotension   Discharge Plan: Anticipate discharge in 24-48 hrs to rehab facility.    Code Status: Level 1 - Full Code    Subjective:   Hypotension episode overnight going to [place], sx with lightheadedness and felt like she would pass out.  Otherwise think stool is a little  more firm, but still going frequently/anytime she eats or drinks.  No nausea today.  Appetite is good     Objective:     Vitals:   Temp (24hrs), Av.5 °F (36.9 °C), Min:97.7 °F (36.5 °C), Max:99 °F (37.2 °C)    Temp:  [97.7 °F (36.5 °C)-99 °F (37.2 °C)] 97.7 °F (36.5 °C)  HR:  [100-116] 109  Resp:  [18] 18  BP: ()/() 120/83  SpO2:  [94 %-98 %] 97 %  Body mass index is 16.1 kg/m².     Input and Output Summary (last 24 hours):     Intake/Output Summary (Last 24 hours) at 2024 1152  Last data filed at 2024 1900  Gross per 24 hour   Intake 1313.33 ml   Output --   Net 1313.33 ml       Physical Exam:   Physical Exam  Vitals reviewed.   Constitutional:       General: She is not in acute distress.     Appearance: She is not toxic-appearing.   HENT:      Head: Normocephalic and atraumatic.   Eyes:      Extraocular Movements: Extraocular movements intact.   Pulmonary:      Effort: Pulmonary effort is normal. No respiratory distress.   Musculoskeletal:         General: Normal range of motion.   Neurological:      General: No focal deficit present.      Mental Status: She is alert and oriented to person, place, and time.   Psychiatric:         Mood and Affect: Mood normal.         Behavior: Behavior normal.         Thought Content: Thought content normal.          Additional Data:     Labs:  Results from last 7 days   Lab Units 24  0746 24  0435 24  0439   WBC Thousand/uL 3.95*   < > 6.65   HEMOGLOBIN g/dL 10.4*   < > 11.0*   HEMATOCRIT % 31.5*   < > 33.6*   PLATELETS Thousands/uL 258   < > 259   SEGS PCT %  --   --  55   LYMPHO PCT %  --   --  34   MONO PCT %  --   --  8   EOS PCT %  --   --  2    < > = values in this interval not displayed.     Results from last 7 days   Lab Units 24  0746   SODIUM mmol/L 140   POTASSIUM mmol/L 3.9   CHLORIDE mmol/L 112*   CO2 mmol/L 24   BUN mg/dL 7   CREATININE mg/dL 0.30*   ANION GAP mmol/L 4   CALCIUM mg/dL 7.2*   ALBUMIN g/dL 2.7*   TOTAL  BILIRUBIN mg/dL 0.21   ALK PHOS U/L 131*   ALT U/L 43   AST U/L 45*   GLUCOSE RANDOM mg/dL 310*         Results from last 7 days   Lab Units 08/24/24  0649 08/23/24  2150 08/23/24  1637 08/23/24  1204 08/23/24  1002 08/22/24  2312 08/22/24  2114 08/22/24  1645 08/22/24  1128 08/22/24  0707 08/21/24  2226 08/21/24  2053   POC GLUCOSE mg/dl 343* 384* 323* 304* 355* 366* 342* 346* 408* 353* 389* 333*               Lines/Drains:  Invasive Devices       Peripheral Intravenous Line  Duration             Peripheral IV 08/20/24 Left Forearm 4 days    Peripheral IV 08/24/24 Right;Ventral (anterior) Forearm <1 day                          Imaging: No pertinent imaging reviewed.    Recent Cultures (last 7 days):         Last 24 Hours Medication List:   Current Facility-Administered Medications   Medication Dose Route Frequency Provider Last Rate    acetaminophen  975 mg Oral Q8H [facility] ELISEO Toribio      albuterol  2 puff Inhalation Q4H PRN Renan Drake, DO      aspirin  81 mg Oral Daily Renan Drake, DO      atorvastatin  80 mg Oral Daily Renan Drake, DO      baclofen  5 mg Oral BID ELISEO Toribio      butalbital-acetaminophen-caffeine  1 tablet Oral Q6H PRN Renan Drake, DO      diazepam  5 mg Oral Q12H PRN Renan Drake, DO      DULoxetine  60 mg Oral BID Renan Drake, DO      fidaxomicin  200 mg Oral Q12H HONEY Holli Hernandez PA-C      fluticasone-vilanterol  1 puff Inhalation Daily Renan Drake, DO      gabapentin  800 mg Oral TID Renan Drake DO      heparin (porcine)  5,000 Units Subcutaneous Q8H HONEY Renan Drake DO      insulin glargine  32 Units Subcutaneous HS Estefany Villalobos PA-C      insulin lispro  1-5 Units Subcutaneous TID AC Renan Drake DO      insulin lispro  1-5 Units Subcutaneous HS Renan Drake DO      insulin lispro  8 Units Subcutaneous TID With Meals Estefany Moren, PA-C      levothyroxine  112 mcg Oral Early Morning Renan Drake,       lidocaine  2 patch Topical Daily  Magda Amaya, ELISEO      magnesium Oxide  400 mg Oral BID Magda Amaya, ELISEO      magnesium sulfate  2 g Intravenous Once Estefany Villalobos PA-C 2 g (08/24/24 1119)    mirtazapine  7.5 mg Oral HS Renan Drake, DO      DARLIN ANTIFUNGAL   Topical BID Cherise Grey, ELISEO      multi-electrolyte  100 mL/hr Intravenous Continuous Estefany Villalobos PA-C 100 mL/hr (08/24/24 1132)    nicotine  1 patch Transdermal Daily Renan Drake, DO      ondansetron  4 mg Intravenous Q6H PRN Renan Drake, DO      pantoprazole  40 mg Oral Early Morning Renan Drake, DO      potassium chloride  40 mEq Oral BID Holli Hernandez PA-C      saccharomyces boulardii  250 mg Oral BID Shanika Knott, CRNP      traMADol  50 mg Oral Q6H PRN Renan Drake, DO      traZODone  50 mg Oral HS PRN Estefany Villalobos PA-C          Today, Patient Was Seen By: Estefany Villaloobs PA-C    **Please Note: This note may have been constructed using a voice recognition system.**

## 2024-08-24 NOTE — ASSESSMENT & PLAN NOTE
Patient presented with generalized weakness, frequent falls.  She has had ongoing diarrhea, dysuria, poor oral intake and reported 70 pound weight loss over the last year.  Initially hypotensive in the ER, status post IV fluids. Labs with multiple metabolic derangements  CT imaging showing mild diffuse colonic wall thickening, mild diffuse wall thickening of stomach  C. difficile found to be positive--has a history of C. difficile in September 2022.    Patient started on Dificid 200 mg x 10 days per protocol on 8/18  Contact precautions  Appetite is good although reports nausea  bannatrol supplements ordered- will request nutrition increase frequency   D/w patient, overall stools appear to be more formed at this time, though still frequent.  Will continue to monitor though seems overall improved   PT/OT recommending rehab, CM following.

## 2024-08-24 NOTE — PLAN OF CARE
Problem: Nutrition/Hydration-ADULT  Goal: Nutrient/Hydration intake appropriate for improving, restoring or maintaining nutritional needs  Description: Monitor and assess patient's nutrition/hydration status for malnutrition. Collaborate with interdisciplinary team and initiate plan and interventions as ordered.  Monitor patient's weight and dietary intake as ordered or per policy. Utilize nutrition screening tool and intervene as necessary. Determine patient's food preferences and provide high-protein, high-caloric foods as appropriate.     INTERVENTIONS:  - Monitor oral intake, urinary output, labs, and treatment plans  - Assess nutrition and hydration status and recommend course of action  - Evaluate amount of meals eaten  - Assist patient with eating if necessary   - Allow adequate time for meals  - Recommend/ encourage appropriate diets, oral nutritional supplements, and vitamin/mineral supplements  - Order, calculate, and assess calorie counts as needed  - Recommend, monitor, and adjust tube feedings and TPN/PPN based on assessed needs  - Assess need for intravenous fluids  - Provide specific nutrition/hydration education as appropriate  - Include patient/family/caregiver in decisions related to nutrition  Outcome: Progressing     Problem: PAIN - ADULT  Goal: Verbalizes/displays adequate comfort level or baseline comfort level  Description: Interventions:  - Encourage patient to monitor pain and request assistance  - Assess pain using appropriate pain scale  - Administer analgesics based on type and severity of pain and evaluate response  - Implement non-pharmacological measures as appropriate and evaluate response  - Consider cultural and social influences on pain and pain management  - Notify physician/advanced practitioner if interventions unsuccessful or patient reports new pain  Outcome: Progressing     Problem: INFECTION - ADULT  Goal: Absence or prevention of progression during  hospitalization  Description: INTERVENTIONS:  - Assess and monitor for signs and symptoms of infection  - Monitor lab/diagnostic results  - Monitor all insertion sites, i.e. indwelling lines, tubes, and drains  - Monitor endotracheal if appropriate and nasal secretions for changes in amount and color  - [place] appropriate cooling/warming therapies per order  - Administer medications as ordered  - Instruct and encourage patient and family to use good hand hygiene technique  - Identify and instruct in appropriate isolation precautions for identified infection/condition  Outcome: Progressing     Problem: SAFETY ADULT  Goal: Patient will remain free of falls  Description: INTERVENTIONS:  - Educate patient/family on patient safety including physical limitations  - Instruct patient to call for assistance with activity   - Consult OT/PT to assist with strengthening/mobility   - Keep Call bell within reach  - Keep bed low and locked with side rails adjusted as appropriate  - Keep care items and personal belongings within reach  - Initiate and maintain comfort rounds  - Make Fall Risk Sign visible to staff  - Offer Toileting every 2 Hours, in advance of need  - Initiate/Maintain alarm  - Obtain necessary fall risk management equipment  - Apply yellow socks and bracelet for high fall risk patients  - Consider moving patient to room near nurses station  Outcome: Progressing  Goal: Maintain or return to baseline ADL function  Description: INTERVENTIONS:  -  Assess patient's ability to carry out ADLs; assess patient's baseline for ADL function and identify physical deficits which impact ability to perform ADLs (bathing, care of mouth/teeth, toileting, grooming, dressing, etc.)  - Assess/evaluate cause of self-care deficits   - Assess range of motion  - Assess patient's mobility; develop plan if impaired  - Assess patient's need for assistive devices and provide as appropriate  - Encourage maximum independence but intervene  and supervise when necessary  - Involve family in performance of ADLs  - Assess for home care needs following discharge   - Consider OT consult to assist with ADL evaluation and planning for discharge  - Provide patient education as appropriate  Outcome: Progressing     Problem: DISCHARGE PLANNING  Goal: Discharge to home or other facility with appropriate resources  Description: INTERVENTIONS:  - Identify barriers to discharge w/patient and caregiver  - Arrange for needed discharge resources and transportation as appropriate  - Identify discharge learning needs (meds, wound care, etc.)  - Arrange for interpretive services to assist at discharge as needed  - Refer to Case Management Department for coordinating discharge planning if the patient needs post-hospital services based on physician/advanced practitioner order or complex needs related to functional status, cognitive ability, or social support system  Outcome: Progressing     Problem: Knowledge Deficit  Goal: Patient/family/caregiver demonstrates understanding of disease process, treatment plan, medications, and discharge instructions  Description: Complete learning assessment and assess knowledge base.  Interventions:  - Provide teaching at level of understanding  - Provide teaching via preferred learning methods  Outcome: Progressing     Problem: NEUROSENSORY - ADULT  Goal: Achieves maximal functionality and self care  Description: INTERVENTIONS  - Monitor swallowing and airway patency with patient fatigue and changes in neurological status  - Encourage and assist patient to increase activity and self care.   - Encourage visually impaired, hearing impaired and aphasic patients to use assistive/communication devices  Outcome: Progressing     Problem: METABOLIC, FLUID AND ELECTROLYTES - ADULT  Goal: Electrolytes maintained within normal limits  Description: INTERVENTIONS:  - Monitor labs and assess patient for signs and symptoms of electrolyte imbalances  -  Administer electrolyte replacement as ordered  - Monitor response to electrolyte replacements, including repeat lab results as appropriate  - Instruct patient on fluid and nutrition as appropriate  Outcome: Progressing  Goal: Fluid balance maintained  Description: INTERVENTIONS:  - Monitor labs   - Monitor I/O and WT  - Instruct patient on fluid and nutrition as appropriate  - Assess for signs & symptoms of volume excess or deficit  Outcome: Progressing     Problem: SKIN/TISSUE INTEGRITY - ADULT  Goal: Skin Integrity remains intact(Skin Breakdown Prevention)  Description: Assess:  -Perform William assessment every shift  -Clean and moisturize skin every day  -Inspect skin when repositioning, toileting, and assisting with ADLS  -Assess under medical devices such as lines every 2 hours  -Assess extremities for adequate circulation and sensation     Bed Management:  -Have minimal linens on bed & keep smooth, unwrinkled  -Change linens as needed when moist or perspiring  -Avoid sitting or lying in one position for more than 2 hours while in bed  -Keep HOB at 30 degrees     Toileting:  -Offer bedside commode  -Assess for incontinence every hour  -Use incontinent care products after each incontinent episode such as moisture barriers, foam cleansers    Activity:  -Mobilize patient 3 times a day  -Encourage activity and walks on unit  -Encourage or provide ROM exercises   -Turn and reposition patient every 2 Hours  -Use appropriate equipment to lift or move patient in bed  -Instruct/ Assist with weight shifting every 15 min when out of bed in chair  -Consider limitation of chair time 2 hour intervals    Skin Care:  -Avoid use of baby powder, tape, friction and shearing, hot water or constrictive clothing  -Relieve pressure over bony prominences using offloading techniques or foam dressings (If not contraindicated by incontinence)  -Do not massage red bony areas    Next Steps:  -Teach patient strategies to minimize risks     -Consider consults to  interdisciplinary teams  Outcome: Progressing     Problem: Prexisting or High Potential for Compromised Skin Integrity  Goal: Skin integrity is maintained or improved  Description: INTERVENTIONS:  - Identify patients at risk for skin breakdown  - Assess and monitor skin integrity  - Assess and monitor nutrition and hydration status  - Monitor labs   - Assess for incontinence   - Turn and reposition patient  - Assist with mobility/ambulation  - Relieve pressure over bony prominences  - Avoid friction and shearing  - Provide appropriate hygiene as needed including keeping skin clean and dry  - Evaluate need for skin moisturizer/barrier cream  - Collaborate with interdisciplinary team   - Patient/family teaching  - Consider wound care consult   Outcome: Progressing

## 2024-08-24 NOTE — ASSESSMENT & PLAN NOTE
C/w scheduled oral supplementation.  Will give IV dose on 8/24 with continued low levels  Repeat labs in AM

## 2024-08-25 PROBLEM — I95.9 HYPOTENSION: Status: ACTIVE | Noted: 2024-08-25

## 2024-08-25 LAB
ANION GAP SERPL CALCULATED.3IONS-SCNC: 8 MMOL/L (ref 4–13)
BUN SERPL-MCNC: 10 MG/DL (ref 5–25)
CALCIUM SERPL-MCNC: 9 MG/DL (ref 8.4–10.2)
CHLORIDE SERPL-SCNC: 102 MMOL/L (ref 96–108)
CO2 SERPL-SCNC: 28 MMOL/L (ref 21–32)
CREAT SERPL-MCNC: 0.46 MG/DL (ref 0.6–1.3)
ERYTHROCYTE [DISTWIDTH] IN BLOOD BY AUTOMATED COUNT: 13.6 % (ref 11.6–15.1)
GFR SERPL CREATININE-BSD FRML MDRD: 113 ML/MIN/1.73SQ M
GLUCOSE SERPL-MCNC: 202 MG/DL (ref 65–140)
GLUCOSE SERPL-MCNC: 206 MG/DL (ref 65–140)
GLUCOSE SERPL-MCNC: 286 MG/DL (ref 65–140)
GLUCOSE SERPL-MCNC: 332 MG/DL (ref 65–140)
GLUCOSE SERPL-MCNC: 361 MG/DL (ref 65–140)
HCT VFR BLD AUTO: 31 % (ref 34.8–46.1)
HGB BLD-MCNC: 10.1 G/DL (ref 11.5–15.4)
MAGNESIUM SERPL-MCNC: 1.7 MG/DL (ref 1.9–2.7)
MCH RBC QN AUTO: 31.3 PG (ref 26.8–34.3)
MCHC RBC AUTO-ENTMCNC: 32.6 G/DL (ref 31.4–37.4)
MCV RBC AUTO: 96 FL (ref 82–98)
PLATELET # BLD AUTO: 304 THOUSANDS/UL (ref 149–390)
PMV BLD AUTO: 11 FL (ref 8.9–12.7)
POTASSIUM SERPL-SCNC: 4.3 MMOL/L (ref 3.5–5.3)
RBC # BLD AUTO: 3.23 MILLION/UL (ref 3.81–5.12)
SODIUM SERPL-SCNC: 138 MMOL/L (ref 135–147)
WBC # BLD AUTO: 9.57 THOUSAND/UL (ref 4.31–10.16)

## 2024-08-25 PROCEDURE — 82948 REAGENT STRIP/BLOOD GLUCOSE: CPT

## 2024-08-25 PROCEDURE — 99222 1ST HOSP IP/OBS MODERATE 55: CPT | Performed by: INTERNAL MEDICINE

## 2024-08-25 PROCEDURE — 80048 BASIC METABOLIC PNL TOTAL CA: CPT | Performed by: PHYSICIAN ASSISTANT

## 2024-08-25 PROCEDURE — 99232 SBSQ HOSP IP/OBS MODERATE 35: CPT | Performed by: PHYSICIAN ASSISTANT

## 2024-08-25 PROCEDURE — 83735 ASSAY OF MAGNESIUM: CPT | Performed by: PHYSICIAN ASSISTANT

## 2024-08-25 PROCEDURE — 85027 COMPLETE CBC AUTOMATED: CPT | Performed by: PHYSICIAN ASSISTANT

## 2024-08-25 RX ORDER — VANCOMYCIN HYDROCHLORIDE 125 MG/1
125 CAPSULE ORAL EVERY 6 HOURS SCHEDULED
Status: DISCONTINUED | OUTPATIENT
Start: 2024-08-25 | End: 2024-09-01

## 2024-08-25 RX ORDER — MAGNESIUM SULFATE HEPTAHYDRATE 40 MG/ML
2 INJECTION, SOLUTION INTRAVENOUS ONCE
Status: COMPLETED | OUTPATIENT
Start: 2024-08-25 | End: 2024-08-26

## 2024-08-25 RX ORDER — INSULIN GLARGINE 100 [IU]/ML
20 INJECTION, SOLUTION SUBCUTANEOUS EVERY 12 HOURS SCHEDULED
Status: DISCONTINUED | OUTPATIENT
Start: 2024-08-25 | End: 2024-08-27

## 2024-08-25 RX ORDER — INSULIN LISPRO 100 [IU]/ML
10 INJECTION, SOLUTION INTRAVENOUS; SUBCUTANEOUS
Status: DISCONTINUED | OUTPATIENT
Start: 2024-08-25 | End: 2024-08-27

## 2024-08-25 RX ADMIN — ONDANSETRON 4 MG: 2 INJECTION INTRAMUSCULAR; INTRAVENOUS at 10:27

## 2024-08-25 RX ADMIN — MAGNESIUM OXIDE TAB 400 MG (241.3 MG ELEMENTAL MG) 400 MG: 400 (241.3 MG) TAB at 17:22

## 2024-08-25 RX ADMIN — ACETAMINOPHEN 975 MG: 325 TABLET ORAL at 10:27

## 2024-08-25 RX ADMIN — DULOXETINE HYDROCHLORIDE 60 MG: 60 CAPSULE, DELAYED RELEASE ORAL at 17:22

## 2024-08-25 RX ADMIN — GABAPENTIN 800 MG: 400 CAPSULE ORAL at 21:23

## 2024-08-25 RX ADMIN — MAGNESIUM SULFATE HEPTAHYDRATE 2 G: 40 INJECTION, SOLUTION INTRAVENOUS at 10:27

## 2024-08-25 RX ADMIN — NICOTINE 1 PATCH: 21 PATCH, EXTENDED RELEASE TRANSDERMAL at 08:16

## 2024-08-25 RX ADMIN — PANTOPRAZOLE SODIUM 40 MG: 40 TABLET, DELAYED RELEASE ORAL at 05:26

## 2024-08-25 RX ADMIN — FLUTICASONE FUROATE AND VILANTEROL TRIFENATATE 1 PUFF: 200; 25 POWDER RESPIRATORY (INHALATION) at 08:19

## 2024-08-25 RX ADMIN — HEPARIN SODIUM 5000 UNITS: 5000 INJECTION INTRAVENOUS; SUBCUTANEOUS at 01:22

## 2024-08-25 RX ADMIN — BACLOFEN 5 MG: 10 TABLET ORAL at 17:22

## 2024-08-25 RX ADMIN — TRAZODONE HYDROCHLORIDE 50 MG: 50 TABLET ORAL at 21:24

## 2024-08-25 RX ADMIN — DULOXETINE HYDROCHLORIDE 60 MG: 60 CAPSULE, DELAYED RELEASE ORAL at 08:15

## 2024-08-25 RX ADMIN — ACETAMINOPHEN 975 MG: 325 TABLET ORAL at 21:25

## 2024-08-25 RX ADMIN — INSULIN LISPRO 10 UNITS: 100 INJECTION, SOLUTION INTRAVENOUS; SUBCUTANEOUS at 17:25

## 2024-08-25 RX ADMIN — INSULIN LISPRO 1 UNITS: 100 INJECTION, SOLUTION INTRAVENOUS; SUBCUTANEOUS at 21:29

## 2024-08-25 RX ADMIN — TRAZODONE HYDROCHLORIDE 50 MG: 50 TABLET ORAL at 01:22

## 2024-08-25 RX ADMIN — HEPARIN SODIUM 5000 UNITS: 5000 INJECTION INTRAVENOUS; SUBCUTANEOUS at 14:39

## 2024-08-25 RX ADMIN — FIDAXOMICIN 200 MG: 200 TABLET, FILM COATED ORAL at 08:16

## 2024-08-25 RX ADMIN — DIAZEPAM 5 MG: 5 TABLET ORAL at 14:45

## 2024-08-25 RX ADMIN — INSULIN LISPRO 2 UNITS: 100 INJECTION, SOLUTION INTRAVENOUS; SUBCUTANEOUS at 12:09

## 2024-08-25 RX ADMIN — INSULIN GLARGINE 20 UNITS: 100 INJECTION, SOLUTION SUBCUTANEOUS at 21:33

## 2024-08-25 RX ADMIN — INSULIN LISPRO 8 UNITS: 100 INJECTION, SOLUTION INTRAVENOUS; SUBCUTANEOUS at 08:18

## 2024-08-25 RX ADMIN — LEVOTHYROXINE SODIUM 112 MCG: 112 TABLET ORAL at 05:26

## 2024-08-25 RX ADMIN — INSULIN LISPRO 8 UNITS: 100 INJECTION, SOLUTION INTRAVENOUS; SUBCUTANEOUS at 12:10

## 2024-08-25 RX ADMIN — Medication 250 MG: at 17:22

## 2024-08-25 RX ADMIN — POTASSIUM CHLORIDE 40 MEQ: 1500 TABLET, EXTENDED RELEASE ORAL at 08:15

## 2024-08-25 RX ADMIN — MICONAZOLE NITRATE: 20 CREAM TOPICAL at 08:26

## 2024-08-25 RX ADMIN — VANCOMYCIN HYDROCHLORIDE 125 MG: 125 CAPSULE ORAL at 19:59

## 2024-08-25 RX ADMIN — BACLOFEN 5 MG: 10 TABLET ORAL at 08:15

## 2024-08-25 RX ADMIN — INSULIN LISPRO 1 UNITS: 100 INJECTION, SOLUTION INTRAVENOUS; SUBCUTANEOUS at 08:18

## 2024-08-25 RX ADMIN — GABAPENTIN 800 MG: 400 CAPSULE ORAL at 08:14

## 2024-08-25 RX ADMIN — Medication 250 MG: at 08:15

## 2024-08-25 RX ADMIN — POTASSIUM CHLORIDE 40 MEQ: 1500 TABLET, EXTENDED RELEASE ORAL at 17:22

## 2024-08-25 RX ADMIN — ASPIRIN 81 MG CHEWABLE TABLET 81 MG: 81 TABLET CHEWABLE at 08:14

## 2024-08-25 RX ADMIN — LIDOCAINE 2 PATCH: 700 PATCH TOPICAL at 08:16

## 2024-08-25 RX ADMIN — HEPARIN SODIUM 5000 UNITS: 5000 INJECTION INTRAVENOUS; SUBCUTANEOUS at 21:25

## 2024-08-25 RX ADMIN — TRAMADOL HYDROCHLORIDE 50 MG: 50 TABLET, COATED ORAL at 21:25

## 2024-08-25 RX ADMIN — INSULIN LISPRO 3 UNITS: 100 INJECTION, SOLUTION INTRAVENOUS; SUBCUTANEOUS at 17:25

## 2024-08-25 RX ADMIN — ATORVASTATIN CALCIUM 80 MG: 80 TABLET, FILM COATED ORAL at 08:15

## 2024-08-25 RX ADMIN — ACETAMINOPHEN 975 MG: 325 TABLET ORAL at 01:22

## 2024-08-25 RX ADMIN — MIRTAZAPINE 7.5 MG: 15 TABLET, FILM COATED ORAL at 21:24

## 2024-08-25 RX ADMIN — MICONAZOLE NITRATE: 20 CREAM TOPICAL at 17:27

## 2024-08-25 RX ADMIN — GABAPENTIN 800 MG: 400 CAPSULE ORAL at 17:22

## 2024-08-25 RX ADMIN — MAGNESIUM OXIDE TAB 400 MG (241.3 MG ELEMENTAL MG) 400 MG: 400 (241.3 MG) TAB at 08:15

## 2024-08-25 NOTE — ASSESSMENT & PLAN NOTE
"Lab Results   Component Value Date    HGBA1C 16.1 (H) 08/16/2024       Recent Labs     08/24/24  1158 08/24/24  1702 08/24/24  2116 08/25/24  0738   POCGLU 381* 310* 308* 206*       Patient markedly hyperglycemic on admission only partially responsive to IV fluids, remainder of labs fortunately not consistent with DKA. A1C 16  Patient admitting she has not been using her home insulin as she apparently has been unable to fill prescription due to \"insurance issues,\" additionally reports that due to her neuropathy she has difficulty self injecting.  Daughter also reports jessica non compliance.   Typically on NovoLog 70/30 20 units twice daily  Remains hyperglycemic- Increase Lantus to 32 units HS, humalog 8 units TID with meals + SSI  insurance issues relayed to CM  Follows with endo outpatient, continue   CCD level 3 diet   "

## 2024-08-25 NOTE — ASSESSMENT & PLAN NOTE
C/w scheduled oral supplementation.  Will give IV dose on 8/24 with continued low levels.  Ordered additional IV mag on 8/25  Repeat labs in AM

## 2024-08-25 NOTE — ASSESSMENT & PLAN NOTE
Patient presented with generalized weakness, frequent falls.  She has had ongoing diarrhea, dysuria, poor oral intake and reported 70 pound weight loss over the last year.  Initially hypotensive in the ER, status post IV fluids. Labs with multiple metabolic derangements  CT imaging showing mild diffuse colonic wall thickening, mild diffuse wall thickening of stomach  C. difficile found to be positive--has a history of C. difficile in September 2022.    Patient started on Dificid 200 mg x 10 days per protocol on 8/18  Contact precautions  Appetite is good although reports nausea  bannatrol supplements ordered- will request nutrition increase frequency   Initially seemed Bms were improving and were becoming more formed, however overnight had 3 episodes of watery stool and one again this AM.   Will consult GI for evaluation   PT/OT recommending rehab, CM following.

## 2024-08-25 NOTE — CONSULTS
Consultation -  [facility]  Barb Palomo 54 y.o. female MRN: [id number]  Unit/Bed#: [facility] 522-01 Encounter: [id number]            Inpatient consult to gastroenterology     Date/Time  8/25/2024 1:59 PM     Performed by  Charo Nguyen DO   Authorized by  Estefany Villalobos PA-C             Reason for Consult / Principal Problem:     C. 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    This appears to be her second episode of C. difficile colitis with the last episode in 2022 which was appropriately treated with vancomycin.  She is on for fidaxomicin x 7 days now with no improvement in her diarrhea.  We discussed changing the treatment regimen to oral vancomycin to see if that more adequately treats the infection.  If no improvement with medical management, may need to consider endoscopic evaluation.  No concerns for fulminant C. difficile colitis.    Switch Fidaxomicin to PO Vancomycin 125mg Q6H x 14 days.  Monitor stool output and frequency.  Continue to monitor for fevers and/or worsening leukocytosis.    Rest of care per primary team.  Thank you for this consultation.   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 difficile (2022), whom we are asked to see in consultation for C. difficile colitis.    Patient initially presented to the hospital on 8/16/2024 with increased  generalized weakness and frequent falls in her home with ongoing diarrhea and poor oral intake with 70 to 80 pound weight loss over the last year.  Infectious stool studies demonstrated C. difficile PCR and toxin positive on 8/16 and she was subsequently started on fidaxomicin twice daily x 10 days on 8/18.  She reports no improvement in her bowel movements since starting this treatment.  She continues to report 8-9 bowel movements daily compared to 1-2 loose bowel movements which is her baseline diarrhea.  This appears to be her second episode of C. difficile.  Of note, she reports being on antibiotics recently in the last month for treatment of a UTI and in the setting of dental extraction.      REVIEW OF SYSTEMS:  10 point ROS reviewed and negative except otherwise noted in the HPI above.     Historical Information   Past Medical History:   Diagnosis Date    Acute venous embolism and thrombosis of deep vessels of distal lower extremity (HCC)     Anemia     Anxiety     last assessed 11/20/17    Arthritis     Asthma     Cerebral infarction (HCC)     unspecified, last assessed 11/14/16    Chest pain     last assessed 5/9/17    Chronic cough     last assessed 12/12/13    Depression     Diabetes mellitus, type 2 (HCC)     DJD (degenerative joint disease)     Esophageal reflux     Fibromyalgia     GERD without esophagitis     resolved 5/13/16    History of pulmonary embolism     Hyperlipidemia     Hypertension     Hypothyroidism     Iron deficiency     Pancreatitis     Panic attack     Panic disorder     Polyarthritis     PTSD (post-traumatic stress disorder)     Sleep difficulties     Stroke syndrome     Thyroid disease     TIA (transient ischemic attack)     TIA (transient ischemic attack)     Venous embolism and thrombosis of deep vessels of distal lower extremity (HCC)     Vitamin B12 deficiency     Vitamin D deficiency      Past Surgical History:   Procedure Laterality Date    CARPAL TUNNEL RELEASE Right      neuroplasty decompression of median nerve    CATARACT EXTRACTION      CHOLECYSTECTOMY      ERCP      ERCP      ESOPHAGOGASTRIC FUNDOPLASTY      NISSEN FUNDOPLICATION      PATELLA SURGERY Left 12/2021    NJ ESOPHAGOGASTRODUODENOSCOPY TRANSORAL DIAGNOSTIC N/A 11/02/2016    Procedure: EGD AND COLONOSCOPY;  Surgeon: Jatin Shepherd MD;  Location: [facility];  Service: Gastroenterology    N[place] NDSC WRST SURG W/RLS TRANSVRS CARPL LIGM Right 03/08/2016    Procedure: RELEASE CARPAL TUNNEL ENDOSCOPIC;  Surgeon: Guero Restrepo MD;  Location: [facility];  Service: Orthopedics    N[place] TENDON SHEATH INCISION Right 03/08/2016    Procedure: RELEASE TRIGGER FINGER RIGHT THUMB;  Surgeon: Guero Restrepo MD;  Location: [facility];  Service: Orthopedics    TONSILLECTOMY AND ADENOIDECTOMY       Social History   Social History     Substance and Sexual Activity   Alcohol Use Not Currently    Alcohol/week: 0.0 standard drinks of alcohol    Comment: last was in 2010 after DUI     Social History     Substance and Sexual Activity   Drug Use No     Social History     Tobacco Use   Smoking Status Every Day    Current packs/day: 1.00    Average packs/day: 1 pack/day for 41.6 years (41.6 ttl pk-yrs)    Types: Cigarettes    Start date: 1/1/1983   Smokeless Tobacco Never   Tobacco Comments    20 + years     Family History   Problem Relation Age of Onset    Diabetes Mother         type 2    Heart attack Mother 39        acute MI    Kidney disease Mother         CKD NKF classfication    Depression Mother     Arthritis Mother     Substance Abuse Mother         mother OD in past on MS04    Ulcerative colitis Mother     Schizophrenia Mother     Suicide Attempts Mother     Bipolar disorder Mother     Diabetes Maternal Grandmother     Heart attack Father         acute MI    Psoriasis Father     Cancer Father         gastric cancer    Stroke Father     Crohn's disease Sister     Bipolar disorder Sister     Rheum arthritis Maternal Grandfather     Throat  "cancer Maternal Grandfather     Suicide Attempts Daughter     Drug abuse Daughter     Lung cancer Paternal Grandmother     Cancer Paternal Grandfather     No Known Problems Sister     Bipolar disorder Maternal Uncle     Anesthesia problems Neg Hx        Meds/Allergies       Medications Prior to Admission:     Adalimumab (HUMIRA PEN SC)    Albuterol Sulfate (ProAir RespiClick) 108 (90 Base) MCG/ACT AEPB    aspirin 81 mg chewable tablet    atorvastatin (LIPITOR) 80 mg tablet    baclofen 10 mg tablet    BD PEN NEEDLE LINDY U/F 32G X 4 MM MISC    Blood Glucose Monitoring Suppl (OneTouch Verio Reflect) w/Device KIT    butalbital-acetaminophen-caffeine (FIORICET,ESGIC) -40 mg per tablet    colestipol (COLESTID) 1 g tablet    Cyanocobalamin (VITAMIN B-12 IJ)    diazepam (VALIUM) 5 mg tablet    DULoxetine (CYMBALTA) 60 mg delayed release capsule    Fluticasone-Salmeterol (Advair Diskus) 500-50 mcg/dose inhaler    Folic Acid 0.8 MG CAPS    gabapentin (NEURONTIN) 400 mg capsule    Galcanezumab-gnlm (Emgality) 120 MG/ML SOAJ    glucose blood (OneTouch Verio) test strip    insulin NPH-insulin regular (NovoLIN 70/30) 100 units/mL subcutaneous injection    Insulin Pen Needle (BD Pen Needle Lindy U/F) 32G X 4 MM MISC    Insulin Syringe-Needle U-100 31G X 5/16\" 0.3 ML MISC    ketorolac (TORADOL) 30 mg/mL injection    levothyroxine 112 mcg tablet    magnesium Oxide (MAG-OX) 400 mg TABS    metFORMIN (GLUCOPHAGE-XR) 500 mg 24 hr tablet    mirtazapine (REMERON) 7.5 MG tablet    naloxone (NARCAN) 4 mg/0.1 mL nasal spray    omeprazole (PriLOSEC) 40 MG capsule    ondansetron (ZOFRAN) 4 mg tablet    OneTouch Delica Lancets 33G MISC    OneTouch Ultra test strip    prazosin (MINIPRESS) 2 mg capsule    prochlorperazine (COMPAZINE) 5 mg tablet    topiramate (TOPAMAX) 25 mg tablet    traMADol (ULTRAM-ER) 200 MG 24 hr tablet  Current Facility-Administered Medications   Medication Dose Route Frequency    acetaminophen (TYLENOL) tablet 975 mg  " 975 mg Oral Q8H HONEY    albuterol (PROVENTIL HFA,VENTOLIN HFA) inhaler 2 puff  2 puff Inhalation Q4H PRN    aspirin chewable tablet 81 mg  81 mg Oral Daily    atorvastatin (LIPITOR) tablet 80 mg  80 mg Oral Daily    baclofen tablet 5 mg  5 mg Oral BID    butalbital-acetaminophen-caffeine (FIORICET,ESGIC) -40 mg per tablet 1 tablet  1 tablet Oral Q6H PRN    diazepam (VALIUM) tablet 5 mg  5 mg Oral Q12H PRN    DULoxetine (CYMBALTA) delayed release capsule 60 mg  60 mg Oral BID    fidaxomicin (DIFICID) tablet 200 mg  200 mg Oral Q12H HONEY    fluticasone-vilanterol 200-25 mcg/actuation 1 puff  1 puff Inhalation Daily    gabapentin (NEURONTIN) capsule 800 mg  800 mg Oral TID    heparin (porcine) subcutaneous injection 5,000 Units  5,000 Units Subcutaneous Q8H HONEY    insulin glargine (LANTUS) subcutaneous injection 32 Units 0.32 mL  32 Units Subcutaneous HS    insulin lispro (HumALOG/ADMELOG) 100 units/mL subcutaneous injection 1-5 Units  1-5 Units Subcutaneous TID AC    insulin lispro (HumALOG/ADMELOG) 100 units/mL subcutaneous injection 1-5 Units  1-5 Units Subcutaneous HS    insulin lispro (HumALOG/ADMELOG) 100 units/mL subcutaneous injection 8 Units  8 Units Subcutaneous TID With Meals    levothyroxine tablet 112 mcg  112 mcg Oral Early Morning    lidocaine (LIDODERM) 5 % patch 2 patch  2 patch Topical Daily    magnesium Oxide (MAG-OX) tablet 400 mg  400 mg Oral BID    mirtazapine (REMERON) tablet 7.5 mg  7.5 mg Oral HS    moisture barrier miconazole 2% cream (aka DARLIN MOISTURE BARRIER ANTIFUNGAL CREAM)   Topical BID    nicotine (NICODERM CQ) 21 mg/24 hr TD 24 hr patch 1 patch  1 patch Transdermal Daily    ondansetron (ZOFRAN) injection 4 mg  4 mg Intravenous Q6H PRN    pantoprazole (PROTONIX) EC tablet 40 mg  40 mg Oral Early Morning    potassium chloride (Klor-Con M20) CR tablet 40 mEq  40 mEq Oral BID    saccharomyces boulardii (FLORASTOR) capsule 250 mg  250 mg Oral BID    traMADol (ULTRAM) tablet 50 mg  50 mg  "Oral Q6H PRN    traZODone (DESYREL) tablet 50 mg  50 mg Oral HS PRN       Allergies   Allergen Reactions    Medical Tape Rash    Lexapro [Escitalopram Oxalate]     Escitalopram Other (See Comments) and Palpitations    Other Hives and Rash     Adhesive Tape         Objective     Blood pressure (!) 84/57, pulse (!) 106, temperature 97.9 °F (36.6 °C), temperature source Oral, resp. rate 18, height 5' 2\" (1.575 m), weight 39.9 kg (88 lb), last menstrual period 03/04/2016, SpO2 97%, not currently breastfeeding. Body mass index is 16.1 kg/m².    PHYSICAL EXAM:    General: Frail and significantly malnourished  Eyes: no conjunctival icterus or pallor  Abdominal: Soft, non-tender, non-distended  Extremities: Warm, no deformities, no edema  Neuro: alert and oriented  Psych: Normal affect    Lab Results:   No results displayed because visit has over 200 results.          Imaging Studies: I have personally reviewed pertinent imaging studies.    Charo Nguyen D.O.  Fellow, PGY-6  Division of Gastroenterology & Hepatology  Available on [employer]  8/25/2024 1:38 PM     "

## 2024-08-25 NOTE — PROGRESS NOTES
[facility]  Progress Note  Name: Barb Palomo I  MRN: [id number]  Unit/Bed#: [facility]PHP 522-01 I Date of Admission: 8/15/2024   Date of Service: 8/25/2024 I Hospital Day: 9    Assessment & Plan   * C. difficile diarrhea  Assessment & Plan  Patient presented with generalized weakness, frequent falls.  She has had ongoing diarrhea, dysuria, poor oral intake and reported 70 pound weight loss over the last year.  Initially hypotensive in the ER, status post IV fluids. Labs with multiple metabolic derangements  CT imaging showing mild diffuse colonic wall thickening, mild diffuse wall thickening of stomach  C. difficile found to be positive--has a history of C. difficile in September 2022.    Patient started on Dificid 200 mg x 10 days per protocol on 8/18  Contact precautions  Appetite is good although reports nausea  bannatrol supplements ordered- will request nutrition increase frequency   Initially seemed Bms were improving and were becoming more formed, however overnight had 3 episodes of watery stool and one again this AM.   Will consult GI for evaluation   PT/OT recommending rehab, CM following.     Hypotension  Assessment & Plan  Intermittent episodes of hypotension, most occurrences happen overnight and question correlation with minipress administration.  This has now been discontinued  Hypotensive again this a.m., however asymptomatic.    Sinus tachycardia  Assessment & Plan  Heart rate 100s-120s at times.  Sinus.  Has received IV fluids without much improvement.  Trialed IVFs again 8/22 given reports of nausea with poor oral intake and dizziness with some improvement to the 110s with HR- will continue with IVFs again today   orthostatic blood pressure negative   Pt is on minipress which could have a side effect of ST.  Additionally noted she is hypotensive each night around 2/3 AM, I suspect this is likely the underlying cause.  She was started on this by her psychiatrist  "to assist with sleep and eating outpatient   Will hold minipress for now.  D/w pt can have PRN trazodone for sleep.  Pt agreeable   Monitor     Back pain  Assessment & Plan  Acute on chronic, likely worsened in setting of recent fall and deconditioning.   CT imaging reviewed, degenerative changes noted.  Continue ATC APAP, Baclofen 5 mg BID (was taking PRN PTA), lido patch. PRN Tramdol on board.   Mobilize as tolerated, PT/OT    Failure to thrive in adult  Assessment & Plan  Patient presented with increased generalized weakness and frequent falls, ongoing diarrhea, dysuria, poor oral intake, and at least 70-80 pound weight loss over the past 1 year. Has been following with GI, had colonoscopy May 2024 with poor prep--had EGD with esophagitis and gastritis  Labs with multiple derangements including hyperglycemia, hypokalemia and hypomagnesemia on admission, improved.   C. difficile positive as above  Patient reports she has not been using insulin due to \"insurance issue\" and inability to self inject due to her neuropathy  CT abdomen and pelvis without any obvious malignancy  Treatment as per respective issues  PT/OT recommending rehab.     Hypomagnesemia  Assessment & Plan  C/w scheduled oral supplementation.  Will give IV dose on 8/24 with continued low levels.  Ordered additional IV mag on 8/25  Repeat labs in AM     Severe protein-calorie malnutrition (HCC)  Assessment & Plan  Severe protein-calorie malnutrition 2/2 chronic diarrhea a/e/b fat and muscle loss requiring Nutrition consult, oral diet and nutrition supplements    360 Statement: severe malnutrition r/t suspect combination of chronic illness and social/environmental factors as evidenced by severe temporal muscle loss, severe tricep fat pad loss, at least moderate muscle loss around clavicles and shoulders, at least moderate orbital fat pad loss. Treatment: oral diet and oral nutrition supplements.    BMI Findings:  Adult BMI Classifications: Underweight " "< 18.5        Body mass index is 16.1 kg/m².   C/w supplements  Nutrition consult appreciated  Encourage po intake     Acute cystitis without hematuria  Assessment & Plan  Reported dysuria on admission   Urine + nitrite & leukocytes, CT imaging with distended bladder suggestive of retention and small amount of air which could be secondary to infection  Urine Culture noted, S/P 3 doses of IV CTX 8/17 8/21, reported pain AFTER urination, likely related to urine hitting excoriated labia/bottom from frequent stooling. Will continue with medicated cream to this area- denies pain at this time   S/p pyridum x 3 doses  Wound care consult    Uncontrolled type 2 diabetes mellitus with hyperglycemia (HCC)  Assessment & Plan  Lab Results   Component Value Date    HGBA1C 16.1 (H) 08/16/2024       Recent Labs     08/24/24  1158 08/24/24  1702 08/24/24  2116 08/25/24  0738   POCGLU 381* 310* 308* 206*       Patient markedly hyperglycemic on admission only partially responsive to IV fluids, remainder of labs fortunately not consistent with DKA. A1C 16  Patient admitting she has not been using her home insulin as she apparently has been unable to fill prescription due to \"insurance issues,\" additionally reports that due to her neuropathy she has difficulty self injecting.  Daughter also reports jessica non compliance.   Typically on NovoLog 70/30 20 units twice daily  Remains hyperglycemic- Increase Lantus to 32 units HS, humalog 8 units TID with meals + SSI  insurance issues relayed to CM  Follows with endo outpatient, continue   CCD level 3 diet     Opioid dependence with other opioid-induced disorder (HCC)  Assessment & Plan  PDMP reviewed, patient maintained on tramadol 200 mg daily as needed; history of chronic cervical/lumbar pain noted  Continue as needed tramadol and chronic gabapentin dosing    Chronic diarrhea  Assessment & Plan  Longstanding history of chronic diarrhea, patient reporting acute diarrhea x1 month. Follows " with GI. Has tried questran/imodium without relief  Stool enteric panel negative  C. difficile positive, see plan above     Tobacco dependence  Assessment & Plan  Counseled on need for cessation, provide nicotine patch while admitted  Recent CT chest for lung cancer screening 7/30/2024 negative for nodules or masses    Hypothyroidism  Assessment & Plan  Continue home dose levothyroxine     GERD without esophagitis  Assessment & Plan  Continue PPI    Severe episode of recurrent major depressive disorder, without psychotic features (HCC)  Assessment & Plan  Mood appearing fairly stable, continue home duloxetine, mirtazapine, as needed Valium  Hold minipress given symptomatic hypotension episodes, tachycardia            VTE Pharmacologic Prophylaxis: VTE Score: 3 Moderate Risk (Score 3-4) - Pharmacological DVT Prophylaxis Ordered: heparin.    Mobility:   Basic Mobility Inpatient Raw Score: 19  JH-HLM Goal: 6: Walk 10 steps or more  JH-HLM Achieved: 1: Laying in bed  JH-HLM Goal NOT achieved. Continue with multidisciplinary rounding and encourage appropriate mobility to improve upon JH-HLM goals.    Patient Centered Rounds: I performed bedside rounds with nursing staff today.   Discussions with Specialists or Other Care Team Provider: RN, GI     Education and Discussions with Family / Patient: Patient declined call to .     Total Time Spent on Date of Encounter in care of patient: 25 mins. This time was spent on one or more of the following: performing physical exam; counseling and coordination of care; obtaining or reviewing history; documenting in the medical record; reviewing/ordering tests, medications or procedures; communicating with other healthcare professionals and discussing with patient's family/caregivers.    Current Length of Stay: 9 day(s)  Current Patient Status: Inpatient   Certification Statement: The patient will continue to require additional inpatient hospital stay due to c diff  diarrhea, appears to be more loose again, will ask GI to evaluate  Discharge Plan: Anticipate discharge in 24-48 hrs to rehab facility.    Code Status: Level 1 - Full Code    Subjective:   Feeling ok this morning, had multiple loose stools overnight and this AM.  Ate all of her breakfast.  Low BP this AM but asymptomatic at this time.     Objective:     Vitals:   Temp (24hrs), Av.4 °F (36.9 °C), Min:97.9 °F (36.6 °C), Max:99 °F (37.2 °C)    Temp:  [97.9 °F (36.6 °C)-99 °F (37.2 °C)] 97.9 °F (36.6 °C)  HR:  [106-118] 106  Resp:  [16-19] 18  BP: ()/(57-97) 84/57  SpO2:  [97 %-99 %] 97 %  Body mass index is 16.1 kg/m².     Input and Output Summary (last 24 hours):     Intake/Output Summary (Last 24 hours) at 2024 0936  Last data filed at 2024 0230  Gross per 24 hour   Intake 240 ml   Output --   Net 240 ml       Physical Exam:   Physical Exam  Vitals reviewed.   Constitutional:       General: She is not in acute distress.     Appearance: She is not toxic-appearing.   HENT:      Head: Normocephalic and atraumatic.   Eyes:      Extraocular Movements: Extraocular movements intact.   Pulmonary:      Effort: Pulmonary effort is normal. No respiratory distress.   Musculoskeletal:         General: Normal range of motion.   Neurological:      General: No focal deficit present.      Mental Status: She is alert and oriented to person, place, and time.   Psychiatric:         Mood and Affect: Mood normal.         Behavior: Behavior normal.         Thought Content: Thought content normal.          Additional Data:     Labs:  Results from last 7 days   Lab Units 24  0526 24  0435 24  0439   WBC Thousand/uL 9.57   < > 6.65   HEMOGLOBIN g/dL 10.1*   < > 11.0*   HEMATOCRIT % 31.0*   < > 33.6*   PLATELETS Thousands/uL 304   < > 259   SEGS PCT %  --   --  55   LYMPHO PCT %  --   --  34   MONO PCT %  --   --  8   EOS PCT %  --   --  2    < > = values in this interval not displayed.     Results from  last 7 days   Lab Units 08/25/24  0526 08/24/24  0746   SODIUM mmol/L 138 140   POTASSIUM mmol/L 4.3 3.9   CHLORIDE mmol/L 102 112*   CO2 mmol/L 28 24   BUN mg/dL 10 7   CREATININE mg/dL 0.46* 0.30*   ANION GAP mmol/L 8 4   CALCIUM mg/dL 9.0 7.2*   ALBUMIN g/dL  --  2.7*   TOTAL BILIRUBIN mg/dL  --  0.21   ALK PHOS U/L  --  131*   ALT U/L  --  43   AST U/L  --  45*   GLUCOSE RANDOM mg/dL 332* 310*         Results from last 7 days   Lab Units 08/25/24  0738 08/24/24  2116 08/24/24  1702 08/24/24  1158 08/24/24  0649 08/23/24  2150 08/23/24  1637 08/23/24  1204 08/23/24  1002 08/22/24  2312 08/22/24  2114 08/22/24  1645   POC GLUCOSE mg/dl 206* 308* 310* 381* 343* 384* 323* 304* 355* 366* 342* 346*               Lines/Drains:  Invasive Devices       Peripheral Intravenous Line  Duration             Peripheral IV 08/24/24 Right;Ventral (anterior) Forearm 1 day                          Imaging: No pertinent imaging reviewed.    Recent Cultures (last 7 days):         Last 24 Hours Medication List:   Current Facility-Administered Medications   Medication Dose Route Frequency Provider Last Rate    acetaminophen  975 mg Oral Q8H [facility] ELISEO Toribio      albuterol  2 puff Inhalation Q4H PRN Renan Drake, DO      aspirin  81 mg Oral Daily Renan Nguyeni, DO      atorvastatin  80 mg Oral Daily Renan Nguyeni, DO      baclofen  5 mg Oral BID ELISEO Toribio      butalbital-acetaminophen-caffeine  1 tablet Oral Q6H PRN Renan Drake, DO      diazepam  5 mg Oral Q12H PRN Renan Noelle, DO      DULoxetine  60 mg Oral BID Renan Drake, DO      fidaxomicin  200 mg Oral Q12H [facility] Holli Hernandez PA-C      fluticasone-vilanterol  1 puff Inhalation Daily Renan Drake, DO      gabapentin  800 mg Oral TID Renan Noelle, DO      heparin (porcine)  5,000 Units Subcutaneous Q8H [facility] Renan Drake DO      insulin glargine  32 Units Subcutaneous HS Estefany Villalobos PA-C      insulin lispro  1-5 Units Subcutaneous TID SHIVA Urrutia  Noelle, DO      insulin lispro  1-5 Units Subcutaneous HS Renan Drake, DO      insulin lispro  8 Units Subcutaneous TID With Meals Estefany Villalobos PA-C      levothyroxine  112 mcg Oral Early Morning Renan Drake, DO      lidocaine  2 patch Topical Daily Magda M Furlan, CRNP      magnesium Oxide  400 mg Oral BID Magda M Furlan, CRNP      magnesium sulfate  2 g Intravenous Once Estefany Villalobos PA-C      mirtazapine  7.5 mg Oral HS Renan Drake, DO      DARLIN ANTIFUNGAL   Topical BID Cherise Grey, CRNP      nicotine  1 patch Transdermal Daily Renan Drake, DO      ondansetron  4 mg Intravenous Q6H PRN Renan Drake, DO      pantoprazole  40 mg Oral Early Morning Renan Drake, DO      potassium chloride  40 mEq Oral BID Holli Hernandez PA-C      saccharomyces boulardii  250 mg Oral BID Shanika Knott, CRNP      traMADol  50 mg Oral Q6H PRN Renan Drake, DO      traZODone  50 mg Oral HS PRN Estefany Villalobos PA-C          Today, Patient Was Seen By: Estefany Villalobos PA-C    **Please Note: This note may have been constructed using a voice recognition system.**

## 2024-08-25 NOTE — ASSESSMENT & PLAN NOTE
Intermittent episodes of hypotension, most occurrences happen overnight and question correlation with minipress administration.  This has now been discontinued  Hypotensive again this a.m., however asymptomatic.

## 2024-08-26 LAB
ANION GAP SERPL CALCULATED.3IONS-SCNC: 9 MMOL/L (ref 4–13)
BUN SERPL-MCNC: 12 MG/DL (ref 5–25)
CALCIUM SERPL-MCNC: 9.3 MG/DL (ref 8.4–10.2)
CHLORIDE SERPL-SCNC: 106 MMOL/L (ref 96–108)
CO2 SERPL-SCNC: 28 MMOL/L (ref 21–32)
CREAT SERPL-MCNC: 0.49 MG/DL (ref 0.6–1.3)
ERYTHROCYTE [DISTWIDTH] IN BLOOD BY AUTOMATED COUNT: 14.2 % (ref 11.6–15.1)
GFR SERPL CREATININE-BSD FRML MDRD: 111 ML/MIN/1.73SQ M
GLUCOSE SERPL-MCNC: 154 MG/DL (ref 65–140)
GLUCOSE SERPL-MCNC: 169 MG/DL (ref 65–140)
GLUCOSE SERPL-MCNC: 172 MG/DL (ref 65–140)
GLUCOSE SERPL-MCNC: 173 MG/DL (ref 65–140)
GLUCOSE SERPL-MCNC: 217 MG/DL (ref 65–140)
HCT VFR BLD AUTO: 31.5 % (ref 34.8–46.1)
HGB BLD-MCNC: 10.4 G/DL (ref 11.5–15.4)
MAGNESIUM SERPL-MCNC: 1.7 MG/DL (ref 1.9–2.7)
MCH RBC QN AUTO: 31.9 PG (ref 26.8–34.3)
MCHC RBC AUTO-ENTMCNC: 33 G/DL (ref 31.4–37.4)
MCV RBC AUTO: 97 FL (ref 82–98)
PLATELET # BLD AUTO: 281 THOUSANDS/UL (ref 149–390)
PMV BLD AUTO: 11.4 FL (ref 8.9–12.7)
POTASSIUM SERPL-SCNC: 4.5 MMOL/L (ref 3.5–5.3)
RBC # BLD AUTO: 3.26 MILLION/UL (ref 3.81–5.12)
SODIUM SERPL-SCNC: 143 MMOL/L (ref 135–147)
WBC # BLD AUTO: 8.25 THOUSAND/UL (ref 4.31–10.16)

## 2024-08-26 PROCEDURE — 82948 REAGENT STRIP/BLOOD GLUCOSE: CPT

## 2024-08-26 PROCEDURE — 83735 ASSAY OF MAGNESIUM: CPT | Performed by: PHYSICIAN ASSISTANT

## 2024-08-26 PROCEDURE — 99232 SBSQ HOSP IP/OBS MODERATE 35: CPT | Performed by: PHYSICIAN ASSISTANT

## 2024-08-26 PROCEDURE — 97116 GAIT TRAINING THERAPY: CPT

## 2024-08-26 PROCEDURE — 99232 SBSQ HOSP IP/OBS MODERATE 35: CPT | Performed by: INTERNAL MEDICINE

## 2024-08-26 PROCEDURE — 97530 THERAPEUTIC ACTIVITIES: CPT

## 2024-08-26 PROCEDURE — 85027 COMPLETE CBC AUTOMATED: CPT | Performed by: PHYSICIAN ASSISTANT

## 2024-08-26 PROCEDURE — 80048 BASIC METABOLIC PNL TOTAL CA: CPT | Performed by: PHYSICIAN ASSISTANT

## 2024-08-26 RX ORDER — MAGNESIUM SULFATE HEPTAHYDRATE 40 MG/ML
2 INJECTION, SOLUTION INTRAVENOUS ONCE
Status: COMPLETED | OUTPATIENT
Start: 2024-08-26 | End: 2024-08-26

## 2024-08-26 RX ORDER — SODIUM CHLORIDE 9 MG/ML
75 INJECTION, SOLUTION INTRAVENOUS CONTINUOUS
Status: DISCONTINUED | OUTPATIENT
Start: 2024-08-26 | End: 2024-08-27

## 2024-08-26 RX ADMIN — GABAPENTIN 800 MG: 400 CAPSULE ORAL at 08:13

## 2024-08-26 RX ADMIN — HEPARIN SODIUM 5000 UNITS: 5000 INJECTION INTRAVENOUS; SUBCUTANEOUS at 15:04

## 2024-08-26 RX ADMIN — DULOXETINE HYDROCHLORIDE 60 MG: 60 CAPSULE, DELAYED RELEASE ORAL at 17:53

## 2024-08-26 RX ADMIN — MAGNESIUM OXIDE TAB 400 MG (241.3 MG ELEMENTAL MG) 400 MG: 400 (241.3 MG) TAB at 17:53

## 2024-08-26 RX ADMIN — INSULIN LISPRO 1 UNITS: 100 INJECTION, SOLUTION INTRAVENOUS; SUBCUTANEOUS at 16:35

## 2024-08-26 RX ADMIN — FLUTICASONE FUROATE AND VILANTEROL TRIFENATATE 1 PUFF: 200; 25 POWDER RESPIRATORY (INHALATION) at 08:11

## 2024-08-26 RX ADMIN — VANCOMYCIN HYDROCHLORIDE 125 MG: 125 CAPSULE ORAL at 11:19

## 2024-08-26 RX ADMIN — ACETAMINOPHEN 975 MG: 325 TABLET ORAL at 15:04

## 2024-08-26 RX ADMIN — MICONAZOLE NITRATE: 20 CREAM TOPICAL at 08:11

## 2024-08-26 RX ADMIN — VANCOMYCIN HYDROCHLORIDE 125 MG: 125 CAPSULE ORAL at 23:52

## 2024-08-26 RX ADMIN — TRAZODONE HYDROCHLORIDE 50 MG: 50 TABLET ORAL at 23:51

## 2024-08-26 RX ADMIN — VANCOMYCIN HYDROCHLORIDE 125 MG: 125 CAPSULE ORAL at 17:53

## 2024-08-26 RX ADMIN — DULOXETINE HYDROCHLORIDE 60 MG: 60 CAPSULE, DELAYED RELEASE ORAL at 08:14

## 2024-08-26 RX ADMIN — INSULIN GLARGINE 20 UNITS: 100 INJECTION, SOLUTION SUBCUTANEOUS at 20:56

## 2024-08-26 RX ADMIN — INSULIN LISPRO 1 UNITS: 100 INJECTION, SOLUTION INTRAVENOUS; SUBCUTANEOUS at 11:20

## 2024-08-26 RX ADMIN — ONDANSETRON 4 MG: 2 INJECTION INTRAMUSCULAR; INTRAVENOUS at 16:15

## 2024-08-26 RX ADMIN — INSULIN LISPRO 10 UNITS: 100 INJECTION, SOLUTION INTRAVENOUS; SUBCUTANEOUS at 16:34

## 2024-08-26 RX ADMIN — GABAPENTIN 800 MG: 400 CAPSULE ORAL at 20:44

## 2024-08-26 RX ADMIN — INSULIN LISPRO 1 UNITS: 100 INJECTION, SOLUTION INTRAVENOUS; SUBCUTANEOUS at 08:13

## 2024-08-26 RX ADMIN — SODIUM CHLORIDE 75 ML/HR: 0.9 INJECTION, SOLUTION INTRAVENOUS at 10:16

## 2024-08-26 RX ADMIN — POTASSIUM CHLORIDE 40 MEQ: 1500 TABLET, EXTENDED RELEASE ORAL at 17:53

## 2024-08-26 RX ADMIN — ATORVASTATIN CALCIUM 80 MG: 80 TABLET, FILM COATED ORAL at 08:14

## 2024-08-26 RX ADMIN — POTASSIUM CHLORIDE 40 MEQ: 1500 TABLET, EXTENDED RELEASE ORAL at 08:14

## 2024-08-26 RX ADMIN — ONDANSETRON 4 MG: 2 INJECTION INTRAMUSCULAR; INTRAVENOUS at 00:51

## 2024-08-26 RX ADMIN — HEPARIN SODIUM 5000 UNITS: 5000 INJECTION INTRAVENOUS; SUBCUTANEOUS at 05:12

## 2024-08-26 RX ADMIN — ASPIRIN 81 MG CHEWABLE TABLET 81 MG: 81 TABLET CHEWABLE at 08:14

## 2024-08-26 RX ADMIN — PANTOPRAZOLE SODIUM 40 MG: 40 TABLET, DELAYED RELEASE ORAL at 05:11

## 2024-08-26 RX ADMIN — BACLOFEN 5 MG: 10 TABLET ORAL at 17:53

## 2024-08-26 RX ADMIN — TRAMADOL HYDROCHLORIDE 50 MG: 50 TABLET, COATED ORAL at 13:40

## 2024-08-26 RX ADMIN — INSULIN LISPRO 1 UNITS: 100 INJECTION, SOLUTION INTRAVENOUS; SUBCUTANEOUS at 20:57

## 2024-08-26 RX ADMIN — BACLOFEN 5 MG: 10 TABLET ORAL at 08:14

## 2024-08-26 RX ADMIN — GABAPENTIN 800 MG: 400 CAPSULE ORAL at 16:34

## 2024-08-26 RX ADMIN — Medication 250 MG: at 08:14

## 2024-08-26 RX ADMIN — VANCOMYCIN HYDROCHLORIDE 125 MG: 125 CAPSULE ORAL at 05:11

## 2024-08-26 RX ADMIN — ONDANSETRON 4 MG: 2 INJECTION INTRAMUSCULAR; INTRAVENOUS at 09:52

## 2024-08-26 RX ADMIN — Medication 250 MG: at 17:53

## 2024-08-26 RX ADMIN — LEVOTHYROXINE SODIUM 112 MCG: 112 TABLET ORAL at 05:11

## 2024-08-26 RX ADMIN — DIAZEPAM 5 MG: 5 TABLET ORAL at 09:57

## 2024-08-26 RX ADMIN — MAGNESIUM SULFATE HEPTAHYDRATE 2 G: 40 INJECTION, SOLUTION INTRAVENOUS at 10:07

## 2024-08-26 RX ADMIN — LIDOCAINE 2 PATCH: 700 PATCH TOPICAL at 08:12

## 2024-08-26 RX ADMIN — INSULIN LISPRO 10 UNITS: 100 INJECTION, SOLUTION INTRAVENOUS; SUBCUTANEOUS at 11:20

## 2024-08-26 RX ADMIN — INSULIN LISPRO 10 UNITS: 100 INJECTION, SOLUTION INTRAVENOUS; SUBCUTANEOUS at 08:18

## 2024-08-26 RX ADMIN — MIRTAZAPINE 7.5 MG: 15 TABLET, FILM COATED ORAL at 20:44

## 2024-08-26 RX ADMIN — TRAMADOL HYDROCHLORIDE 50 MG: 50 TABLET, COATED ORAL at 20:44

## 2024-08-26 RX ADMIN — MAGNESIUM OXIDE TAB 400 MG (241.3 MG ELEMENTAL MG) 400 MG: 400 (241.3 MG) TAB at 08:14

## 2024-08-26 RX ADMIN — ACETAMINOPHEN 975 MG: 325 TABLET ORAL at 20:46

## 2024-08-26 RX ADMIN — NICOTINE 1 PATCH: 21 PATCH, EXTENDED RELEASE TRANSDERMAL at 08:17

## 2024-08-26 RX ADMIN — INSULIN GLARGINE 20 UNITS: 100 INJECTION, SOLUTION SUBCUTANEOUS at 08:21

## 2024-08-26 RX ADMIN — VANCOMYCIN HYDROCHLORIDE 125 MG: 125 CAPSULE ORAL at 00:51

## 2024-08-26 RX ADMIN — ACETAMINOPHEN 975 MG: 325 TABLET ORAL at 05:11

## 2024-08-26 NOTE — OCCUPATIONAL THERAPY NOTE
Occupational Therapy cx  c      Patient Name: Barb Palomo  Today's Date: 8/26/2024 08/26/24 1410   OT Last Visit   OT Visit Date 08/26/24   Note Type   Note Type Treatment   Cancel Reasons Other  (pt w another provider at time of attempted treatment. Will f/u as able.)         Holli Zabala, SELINA, OTR/L

## 2024-08-26 NOTE — ASSESSMENT & PLAN NOTE
C/w scheduled oral supplementation.  Ordering additional IV mag with persistent low mag   Repeat labs in AM

## 2024-08-26 NOTE — PLAN OF CARE
Problem: Nutrition/Hydration-ADULT  Goal: Nutrient/Hydration intake appropriate for improving, restoring or maintaining nutritional needs  Description: Monitor and assess patient's nutrition/hydration status for malnutrition. Collaborate with interdisciplinary team and initiate plan and interventions as ordered.  Monitor patient's weight and dietary intake as ordered or per policy. Utilize nutrition screening tool and intervene as necessary. Determine patient's food preferences and provide high-protein, high-caloric foods as appropriate.     INTERVENTIONS:  - Monitor oral intake, urinary output, labs, and treatment plans  - Assess nutrition and hydration status and recommend course of action  - Evaluate amount of meals eaten  - Assist patient with eating if necessary   - Allow adequate time for meals  - Recommend/ encourage appropriate diets, oral nutritional supplements, and vitamin/mineral supplements  - Order, calculate, and assess calorie counts as needed  - Recommend, monitor, and adjust tube feedings and TPN/PPN based on assessed needs  - Assess need for intravenous fluids  - Provide specific nutrition/hydration education as appropriate  - Include patient/family/caregiver in decisions related to nutrition  Outcome: Progressing     Problem: PAIN - ADULT  Goal: Verbalizes/displays adequate comfort level or baseline comfort level  Description: Interventions:  - Encourage patient to monitor pain and request assistance  - Assess pain using appropriate pain scale  - Administer analgesics based on type and severity of pain and evaluate response  - Implement non-pharmacological measures as appropriate and evaluate response  - Consider cultural and social influences on pain and pain management  - Notify physician/advanced practitioner if interventions unsuccessful or patient reports new pain  Outcome: Progressing     Problem: INFECTION - ADULT  Goal: Absence or prevention of progression during  hospitalization  Description: INTERVENTIONS:  - Assess and monitor for signs and symptoms of infection  - Monitor lab/diagnostic results  - Monitor all insertion sites, i.e. indwelling lines, tubes, and drains  - Monitor endotracheal if appropriate and nasal secretions for changes in amount and color  - [place] appropriate cooling/warming therapies per order  - Administer medications as ordered  - Instruct and encourage patient and family to use good hand hygiene technique  - Identify and instruct in appropriate isolation precautions for identified infection/condition  Outcome: Progressing  Goal: Absence of fever/infection during neutropenic period  Description: INTERVENTIONS:  - Monitor WBC    Outcome: Progressing     Problem: SAFETY ADULT  Goal: Patient will remain free of falls  Description: INTERVENTIONS:  - Educate patient/family on patient safety including physical limitations  - Instruct patient to call for assistance with activity   - Consult OT/PT to assist with strengthening/mobility   - Keep Call bell within reach  - Keep bed low and locked with side rails adjusted as appropriate  - Keep care items and personal belongings within reach  - Initiate and maintain comfort rounds  - Make Fall Risk Sign visible to staff  - Offer Toileting every 2 Hours, in advance of need  - Initiate/Maintain alarm  - Obtain necessary fall risk management equipment  - Apply yellow socks and bracelet for high fall risk patients  - Consider moving patient to room near nurses station  Outcome: Progressing  Goal: Maintain or return to baseline ADL function  Description: INTERVENTIONS:  -  Assess patient's ability to carry out ADLs; assess patient's baseline for ADL function and identify physical deficits which impact ability to perform ADLs (bathing, care of mouth/teeth, toileting, grooming, dressing, etc.)  - Assess/evaluate cause of self-care deficits   - Assess range of motion  - Assess patient's mobility; develop plan if  impaired  - Assess patient's need for assistive devices and provide as appropriate  - Encourage maximum independence but intervene and supervise when necessary  - Involve family in performance of ADLs  - Assess for home care needs following discharge   - Consider OT consult to assist with ADL evaluation and planning for discharge  - Provide patient education as appropriate  Outcome: Progressing  Goal: Maintains/Returns to pre admission functional level  Description: INTERVENTIONS:  - Perform AM-PAC 6 Click Basic Mobility/ Daily Activity assessment daily.  - Set and communicate daily mobility goal to care team and patient/family/caregiver.   - Collaborate with rehabilitation services on mobility goals if consulted  - Perform Range of Motion 3 times a day.  - Reposition patient every 2 hours.  - Dangle patient 3 times a day  - Stand patient 3 times a day  - Ambulate patient 3 times a day  - Out of bed to chair 3 times a day   - Out of bed for meals 3 times a day  - Out of bed for toileting  - Record patient progress and toleration of activity level   Outcome: Progressing     Problem: DISCHARGE PLANNING  Goal: Discharge to home or other facility with appropriate resources  Description: INTERVENTIONS:  - Identify barriers to discharge w/patient and caregiver  - Arrange for needed discharge resources and transportation as appropriate  - Identify discharge learning needs (meds, wound care, etc.)  - Arrange for interpretive services to assist at discharge as needed  - Refer to Case Management Department for coordinating discharge planning if the patient needs post-hospital services based on physician/advanced practitioner order or complex needs related to functional status, cognitive ability, or social support system  Outcome: Progressing     Problem: Knowledge Deficit  Goal: Patient/family/caregiver demonstrates understanding of disease process, treatment plan, medications, and discharge instructions  Description:  Complete learning assessment and assess knowledge base.  Interventions:  - Provide teaching at level of understanding  - Provide teaching via preferred learning methods  Outcome: Progressing     Problem: NEUROSENSORY - ADULT  Goal: Achieves stable or improved neurological status  Description: INTERVENTIONS  - Monitor and report changes in neurological status  - Monitor vital signs such as temperature, blood pressure, glucose, and any other labs ordered   - Initiate measures to prevent increased intracranial pressure  - Monitor for seizure activity and implement precautions if appropriate      Outcome: Progressing  Goal: Remains free of injury related to seizures activity  Description: INTERVENTIONS  - Maintain airway, patient safety  and administer oxygen as ordered  - Monitor patient for seizure activity, document and report duration and description of seizure to physician/advanced practitioner  - If seizure occurs,  ensure patient safety during seizure  - Reorient patient post seizure  - Seizure pads on all 4 side rails  - Instruct patient/family to notify RN of any seizure activity including if an aura is experienced  - Instruct patient/family to call for assistance with activity based on nursing assessment  - Administer anti-seizure medications if ordered    Outcome: Progressing  Goal: Achieves maximal functionality and self care  Description: INTERVENTIONS  - Monitor swallowing and airway patency with patient fatigue and changes in neurological status  - Encourage and assist patient to increase activity and self care.   - Encourage visually impaired, hearing impaired and aphasic patients to use assistive/communication devices  Outcome: Progressing     Problem: METABOLIC, FLUID AND ELECTROLYTES - ADULT  Goal: Electrolytes maintained within normal limits  Description: INTERVENTIONS:  - Monitor labs and assess patient for signs and symptoms of electrolyte imbalances  - Administer electrolyte replacement as  ordered  - Monitor response to electrolyte replacements, including repeat lab results as appropriate  - Instruct patient on fluid and nutrition as appropriate  Outcome: Progressing  Goal: Fluid balance maintained  Description: INTERVENTIONS:  - Monitor labs   - Monitor I/O and WT  - Instruct patient on fluid and nutrition as appropriate  - Assess for signs & symptoms of volume excess or deficit  Outcome: Progressing  Goal: Glucose maintained within target range  Description: INTERVENTIONS:  - Monitor Blood Glucose as ordered  - Assess for signs and symptoms of hyperglycemia and hypoglycemia  - Administer ordered medications to maintain glucose within target range  - Assess nutritional intake and initiate nutrition service referral as needed  Outcome: Progressing     Problem: SKIN/TISSUE INTEGRITY - ADULT  Goal: Skin Integrity remains intact(Skin Breakdown Prevention)  Description: Assess:  -Perform William assessment every shift  -Clean and moisturize skin every day  -Inspect skin when repositioning, toileting, and assisting with ADLS  -Assess under medical devices such as lines every 2 hours  -Assess extremities for adequate circulation and sensation     Bed Management:  -Have minimal linens on bed & keep smooth, unwrinkled  -Change linens as needed when moist or perspiring  -Avoid sitting or lying in one position for more than 2 hours while in bed  -Keep HOB at 30 degrees     Toileting:  -Offer bedside commode  -Assess for incontinence every hour  -Use incontinent care products after each incontinent episode such as moisture barriers, foam cleansers    Activity:  -Mobilize patient 3 times a day  -Encourage activity and walks on unit  -Encourage or provide ROM exercises   -Turn and reposition patient every 2 Hours  -Use appropriate equipment to lift or move patient in bed  -Instruct/ Assist with weight shifting every 15 min when out of bed in chair  -Consider limitation of chair time 2 hour intervals    Skin  Care:  -Avoid use of baby powder, tape, friction and shearing, hot water or constrictive clothing  -Relieve pressure over bony prominences using offloading techniques or foam dressings (If not contraindicated by incontinence)  -Do not massage red bony areas    Next Steps:  -Teach patient strategies to minimize risks    -Consider consults to  interdisciplinary teams  Outcome: Progressing  Goal: Incision(s), wounds(s) or drain site(s) healing without S/S of infection  Description: INTERVENTIONS  - Assess and document dressing, incision, wound bed, drain sites and surrounding tissue  - Provide patient and family education  - Perform skin care/dressing changes  Outcome: Progressing  Goal: Pressure injury heals and does not worsen  Description: Interventions:  - Implement low air loss mattress or specialty surface (Criteria met)  - Apply silicone foam dressing  - Instruct/assist with weight shifting every 15 minutes when in chair   - Limit chair time to 2 hour intervals  - Use special pressure reducing interventions when in chair   - Apply fecal or urinary incontinence containment device   - Perform passive or active ROM  - Turn and reposition patient & offload bony prominences every 2 hours   - Utilize friction reducing device or surface for transfers   - Consider consults to  interdisciplinary teams  - Use incontinent care products after each incontinent episode  - Consider nutrition services referral as needed  Outcome: Progressing     Problem: MUSCULOSKELETAL - ADULT  Goal: Maintain or return mobility to safest level of function  Description: INTERVENTIONS:  - Assess patient's ability to carry out ADLs; assess patient's baseline for ADL function and identify physical deficits which impact ability to perform ADLs (bathing, care of mouth/teeth, toileting, grooming, dressing, etc.)  - Assess/evaluate cause of self-care deficits   - Assess range of motion  - Assess patient's mobility  - Assess patient's need for  assistive devices and provide as appropriate  - Encourage maximum independence but intervene and supervise when necessary  - Involve family in performance of ADLs  - Assess for home care needs following discharge   - Consider OT consult to assist with ADL evaluation and planning for discharge  - Provide patient education as appropriate  Outcome: Progressing  Goal: Maintain proper alignment of affected body part  Description: INTERVENTIONS:  - Support, maintain and protect limb and body alignment  - Provide patient/ family with appropriate education  Outcome: Progressing     Problem: Prexisting or High Potential for Compromised Skin Integrity  Goal: Skin integrity is maintained or improved  Description: INTERVENTIONS:  - Identify patients at risk for skin breakdown  - Assess and monitor skin integrity  - Assess and monitor nutrition and hydration status  - Monitor labs   - Assess for incontinence   - Turn and reposition patient  - Assist with mobility/ambulation  - Relieve pressure over bony prominences  - Avoid friction and shearing  - Provide appropriate hygiene as needed including keeping skin clean and dry  - Evaluate need for skin moisturizer/barrier cream  - Collaborate with interdisciplinary team   - Patient/family teaching  - Consider wound care consult   Outcome: Progressing

## 2024-08-26 NOTE — ASSESSMENT & PLAN NOTE
Intermittent episodes of hypotension, most occurrences happen overnight and question correlation with minipress administration.  This has now been discontinued

## 2024-08-26 NOTE — PHYSICAL THERAPY NOTE
PHYSICAL THERAPY NOTE          Patient Name: Barb Palomo  Today's Date: 8/26/2024 08/26/24 1408   PT Last Visit   PT Visit Date 08/26/24   Note Type   Note Type Treatment   Pain Assessment   Pain Assessment Tool 0-10   Pain Score 6   Pain Location/Orientation Location: Back;Orientation: Lower   Pain Onset/Description Onset: Ongoing;Frequency: Constant/Continuous;Descriptor: Discomfort   Effect of Pain on Daily Activities limits comfort and mobility   Patient's Stated Pain Goal No pain   Hospital Pain Intervention(s) Repositioned;Ambulation/increased activity;Emotional support   Restrictions/Precautions   Weight Bearing Precautions Per Order No   Other Precautions Contact/isolation;Pain;Fall Risk;Cognitive;Bed Alarm;Chair Alarm;Multiple lines  (C. Diff)   General   Chart Reviewed Yes   Family/Caregiver Present No   Cognition   Overall Cognitive Status WFL   Arousal/Participation Alert;Cooperative   Attention Within functional limits   Orientation Level Oriented X4   Memory Within functional limits   Following Commands Follows one step commands without difficulty   Comments Patient is pleasant and cooperative. Emotional throughout session due to current function and future plans   Subjective   Subjective Patient agreeable to PT tx   Bed Mobility   Rolling R 5  Supervision   Rolling L 5  Supervision   Supine to Sit 5  Supervision   Sit to Supine 5  Supervision   Additional Comments rolling due to BM on arrival - increased time for linen change and hygiene   Transfers   Sit to Stand 5  Supervision   Stand to Sit 5  Supervision   Additional Comments RW - patient requesting adult brief for ambulation due to frequent BMs.   Ambulation/Elevation   Gait pattern Short stride;Inconsistent loly;Decreased foot clearance;Narrow FAYE;L Knee Lizet;Poor UE support   Gait Assistance 4  Minimal assist  (Mod A for LOB x2)   Additional items  Assist x 1;Verbal cues   Assistive Device Rolling walker   Distance 50'x4  (x1 sitting rest period x2 standing rest periods)   Ambulation/Elevation Additional Comments Min assist throughout, but patient does experience x2 LOB requiring Mod Ax1 to steady   Balance   Static Sitting Fair +   Dynamic Sitting Fair   Static Standing Fair -   Dynamic Standing Poor +   Ambulatory Poor +   Endurance Deficit   Endurance Deficit Yes   Endurance Deficit Description fatigue, weakness   Activity Tolerance   Activity Tolerance Patient tolerated treatment well;Patient limited by fatigue   Nurse Made Aware RN cleared   Assessment   Prognosis Good   Problem List Decreased strength;Decreased endurance;Impaired balance;Decreased mobility;Decreased safety awareness;Pain   Assessment Patient received in bed. Patient agreeable to therapy, but noting incontinence of bowels. Patient performs rolling in bed with supervision for hygiene and linen change. Patient requesting adult brief for ambulation.  Patient performs bed mobility with supervision. Patient performs functional transfers with supervision using rolling walker. Patient performs ambulation with minimal assistance x1 using rolling walker, 50'x4 with x1 seated rest and x2 standing rests. Patient with x2 losses of balance requiring Mod Ax1 to steady.  Patient incontinent of bowels during ambulation. Increased time for hygiene post ambulation.  Patient left in bed with all needs met and call bell/personal items within reach. The patient's AM-PAC Basic Mobility Inpatient Short Form Raw Score is 16 showing further need for skilled PT services in order to improve functional mobility, decrease need for assistance, and return to PLOF. PT is recommending Level 2 - Moderate Resource Intensity on d/c from hospital.  Will continue to follow as able.   Goals   Patient Goals to feel better   PT Treatment Day 2   Plan   Treatment/Interventions Functional transfer training;LE  strengthening/ROM;Elevations;Therapeutic exercise;Endurance training;Patient/family training;Equipment eval/education;Gait training;Bed mobility   Progress Progressing toward goals   PT Frequency 3-5x/wk   Discharge Recommendation   Rehab Resource Intensity Level, PT II (Moderate Resource Intensity)   Equipment Recommended Walker   Walker Package Recommended Wheeled walker   AM-PAC Basic Mobility Inpatient   Turning in Flat Bed Without Bedrails 3   Lying on Back to Sitting on Edge of Flat Bed Without Bedrails 3   Moving Bed to Chair 3   Standing Up From Chair Using Arms 3   Walk in Room 2   Climb 3-5 Stairs With Railing 2   Basic Mobility Inpatient Raw Score 16   Basic Mobility Standardized Score 38.32   [facility] Highest Level Of Mobility   -[facility] Goal 5: Stand one or more mins   -HLM Achieved 7: Walk 25 feet or more   End of Consult   Patient Position at End of Consult All needs within reach;Supine;Bed/Chair alarm activated       Mary Ann Salvador, PT, DPT

## 2024-08-26 NOTE — PROGRESS NOTES
Progress Note- Barb Palomo 54 y.o. female MRN: [id number]    Unit/Bed#: [facility] 522-01 Encounter: [id number]      Assessment and Plan:    Ms. 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 GI consulted for acute on chronic diarrhea.    # Clostridium difficile Infection: Patient initially presented with complaints on worsening weakness and frequent falls on 8/20.  Patient also with 70-80 lbs weight loss and chronic, watery diarrhea. Admits to difficulty following the death of her  and also been dealing with “insurance issues” leading to difficulty with her insulin. Labs on admission revealed severe hyperglycemia with electrolyte abnormalities. CT AP revealed mild diffuse wall thickening vs under distension of the colon as well as the stomach. In the past, patient has followed with GI for her unintentional weight loss and diarrhea. She has EGD/colonoscopy performed 5/2024 which was unrevealing although random colon biopsies were negative and patient had a very poor prep limiting sufficient examination. This admission, patient had PCR and EIA testing completed for C.Diff and both were found to be positive on 8/16 indicative of active infection. She had been on antibiotics last month for a dental procedure recently started on for Doxy mycin and completed a 7-day course but experienced no improvement in her diarrhea.  Therefore over the weekend, GI consulted and patient has now been transitioned to oral vancomycin.  Plan to continue on a 14-day course and monitor for symptom improvement.      Plan:  Continue on PO Vancomycin 125 mg q6 hours x 14 days  Avoid anti-diarrheals; can begin Banatrol if needed with meals  Continue IVF hydration with Isolyte @ 75 cc/hr; monitor and replete electrolytes as needed  No endoscopic  intervention warranted; consider colonoscopy/sigmoidoscopy if symptoms not improving  Maintain contact precautions with strict handwashing  Hold PPI therapy; resume when symptoms resolved  Avoid use of probiotics for the prevention of recurrence   In the future, will require Cdiff prophylaxis with all antibiotic therapy   Additional symptom management per primary team     _______________________________________________________    Subjective:     Patient seen and evaluated at bedside.  Sitting in bed comfortably in no acute distress or visible discomfort.  Admits to ongoing frequent bowel movements.  States that she had 3 loose stools overnight.  Admits to urgency and frequency.  Denies abdominal pain.  No nausea/vomiting.  Offers no additional complaints at this time.    Medication Administration - last 24 hours from 08/25/2024 0958 to 08/26/2024 0958         Date/Time Order Dose Route Action Action by     08/26/2024 0952 EDT ondansetron (ZOFRAN) injection 4 mg 4 mg Intravenous Given Lorie Moulton     08/26/2024 0051 EDT ondansetron (ZOFRAN) injection 4 mg 4 mg Intravenous Given Raquel Ruano RN     08/25/2024 1027 EDT ondansetron (ZOFRAN) injection 4 mg 4 mg Intravenous Given Jose Manuel RN     08/26/2024 0814 EDT aspirin chewable tablet 81 mg 81 mg Oral Given Lorie Moulton     08/26/2024 0814 EDT atorvastatin (LIPITOR) tablet 80 mg 80 mg Oral Given Lorie Moulton     08/26/2024 0957 EDT diazepam (VALIUM) tablet 5 mg 5 mg Oral Given Lorie Moulton     08/25/2024 1445 EDT diazepam (VALIUM) tablet 5 mg 5 mg Oral Given Jose Manuel RN     08/26/2024 0814 EDT DULoxetine (CYMBALTA) delayed release capsule 60 mg 60 mg Oral Given Lorie Moulton     08/25/2024 1722 EDT DULoxetine (CYMBALTA) delayed release capsule 60 mg 60 mg Oral Given Jose Manuel RN     08/26/2024 0811 EDT fluticasone-vilanterol 200-25 mcg/actuation 1 puff 1 puff Inhalation Given Lorie Moulton     08/26/2024 0511 EDT levothyroxine tablet  112 mcg 112 mcg [facility]     08/25/2024 2124 EDT mirtazapine (REMERON) tablet 7.5 mg 7.5 mg Oral Given [facility], RN     08/26/2024 0511 EDT pantoprazole (PROTONIX) EC tablet 40 mg 40 [facility]     08/26/2024 0817 EDT nicotine (NICODERM CQ) 21 mg/24 hr TD 24 hr patch 1 patch 1 patch Transdermal Medication Applied Lorie Moulton     08/26/2024 0812 EDT nicotine (NICODERM CQ) 21 mg/24 hr TD 24 hr patch 1 patch 1 patch Transdermal Patch Removed Lorie Moulton     08/26/2024 0512 EDT heparin (porcine) subcutaneous injection 5,000 Units 5,000 Units Subcutaneous [facility]     08/25/2024 2125 EDT heparin (porcine) subcutaneous injection 5,000 Units 5,000 Units Subcutaneous Given [facility], RN     08/25/2024 1439 EDT heparin (porcine) subcutaneous injection 5,000 Units 5,000 Units Subcutaneous Given Jose Manuel, RN     08/26/2024 0813 EDT insulin lispro (HumALOG/ADMELOG) 100 units/mL subcutaneous injection 1-5 Units 1 Units Subcutaneous Given Lorie Moulton     08/25/2024 1725 EDT insulin lispro (HumALOG/ADMELOG) 100 units/mL subcutaneous injection 1-5 Units 3 Units Subcutaneous Given Jose Manuel, RN     08/25/2024 1209 EDT insulin lispro (HumALOG/ADMELOG) 100 units/mL subcutaneous injection 1-5 Units 2 Units Subcutaneous Given Jose Manuel, RN     08/25/2024 2129 EDT insulin lispro (HumALOG/ADMELOG) 100 units/mL subcutaneous injection 1-5 Units 1 Units Subcutaneous Given [facility], RN     08/25/2024 2125 EDT traMADol (ULTRAM) tablet 50 mg 50 mg Oral Given [facility], RN     08/26/2024 0813 EDT gabapentin (NEURONTIN) capsule 800 mg 800 mg Oral Given Lorie Moulton     08/25/2024 2123 EDT gabapentin (NEURONTIN) capsule 800 mg 800 mg Oral Given Okofmarcio Ruano RN     08/25/2024 1722 EDT gabapentin (NEURONTIN) capsule 800 mg 800 mg Oral Given Jose Manuel RN     08/26/2024 0814 EDT potassium chloride (Klor-Con M20) CR tablet 40 mEq 40 mEq Oral Given  Lorie Moulton     08/25/2024 1722 EDT potassium chloride (Klor-Con M20) CR tablet 40 mEq 40 mEq Oral Given Jose Manuel RN     08/26/2024 0814 EDT magnesium Oxide (MAG-OX) tablet 400 mg 400 mg Oral Given Lorie Moulton     08/25/2024 1722 EDT magnesium Oxide (MAG-OX) tablet 400 mg 400 mg Oral Given Jose Manuel RN     08/26/2024 0511 EDT acetaminophen (TYLENOL) tablet 975 mg 975 mg Oral Given Okofoh Ampangel, RN     08/25/2024 2125 EDT acetaminophen (TYLENOL) tablet 975 mg 975 mg Oral Given Okofoh Ampangel, RN     08/25/2024 1027 EDT acetaminophen (TYLENOL) tablet 975 mg 975 mg Oral Given Jose Manuel, RN     08/26/2024 0814 EDT baclofen tablet 5 mg 5 mg Oral Given Lorie Moulton     08/25/2024 1722 EDT baclofen tablet 5 mg 5 mg Oral Given Jose Manuel RN     08/26/2024 0812 EDT lidocaine (LIDODERM) 5 % patch 2 patch 2 patch Topical Medication Applied Lorie Moulton     08/25/2024 2007 EDT lidocaine (LIDODERM) 5 % patch 2 patch 2 patch Topical Patch Removed Okofoh Ampangel, RN     08/26/2024 0811 EDT moisture barrier miconazole 2% cream (aka DARLIN MOISTURE BARRIER ANTIFUNGAL CREAM) -- Topical Given Lorie Moulton     08/25/2024 1727 EDT moisture barrier miconazole 2% cream (aka DARLIN MOISTURE BARRIER ANTIFUNGAL CREAM) -- Topical Given Jose Manuel RN     08/26/2024 0814 EDT saccharomyces boulardii (FLORASTOR) capsule 250 mg 250 mg Oral Given Lorie Moulton     08/25/2024 1722 EDT saccharomyces boulardii (FLORASTOR) capsule 250 mg 250 mg Oral Given Jose Manuel RN     08/25/2024 1724 EDT insulin lispro (HumALOG/ADMELOG) 100 units/mL subcutaneous injection 8 Units 0 Units Subcutaneous Hold Jose Manuel RN     08/25/2024 1210 EDT insulin lispro (HumALOG/ADMELOG) 100 units/mL subcutaneous injection 8 Units 8 Units Subcutaneous Given Jose Manuel RN     08/25/2024 2124 EDT traZODone (DESYREL) tablet 50 mg 50 mg Oral Given Raquel Ruano RN     08/26/2024 0705 EDT magnesium sulfate 2 g/50 mL IVPB  "(premix) 2 g 0 g Intravenous Stopped Dodie Herron, CAITLYN     08/25/2024 1027 EDT magnesium sulfate 2 g/50 mL IVPB (premix) 2 g 2 g Intravenous New Bag Jose Maunel, CAITLYN     08/26/2024 0511 EDT vancomycin (VANCOCIN) capsule 125 mg 125 mg Oral Given Okofoh Alden, CAITLYN     08/26/2024 0051 EDT vancomycin (VANCOCIN) capsule 125 mg 125 mg Oral Given Okofoh Alden, CAITLYN     08/25/2024 1959 EDT vancomycin (VANCOCIN) capsule 125 mg 125 mg Oral Given Okofoh Alden, CAITLYN     08/26/2024 0821 EDT insulin glargine (LANTUS) subcutaneous injection 20 Units 0.2 mL 20 Units Subcutaneous Given Lorie Moulton     08/25/2024 2133 EDT insulin glargine (LANTUS) subcutaneous injection 20 Units 0.2 mL 20 Units Subcutaneous Given Raquel Ruano, CAITLYN     08/26/2024 0818 EDT insulin lispro (HumALOG/ADMELOG) 100 units/mL subcutaneous injection 10 Units 10 Units Subcutaneous Given Lorie Moulton     08/25/2024 1725 EDT insulin lispro (HumALOG/ADMELOG) 100 units/mL subcutaneous injection 10 Units 10 Units Subcutaneous Given Jose Manuel RN            Objective:     Vitals: Blood pressure 102/72, pulse (!) 119, temperature 98.7 °F (37.1 °C), resp. rate 16, height 5' 2\" (1.575 m), weight 39.9 kg (88 lb), last menstrual period 03/04/2016, SpO2 99%, not currently breastfeeding.,Body mass index is 16.1 kg/m².      Intake/Output Summary (Last 24 hours) at 8/26/2024 0958  Last data filed at 8/26/2024 0824  Gross per 24 hour   Intake 1770 ml   Output --   Net 1770 ml       Physical Exam  Constitutional:       General: She is not in acute distress.     Appearance: She is normal weight. She is not ill-appearing or toxic-appearing.   HENT:      Head: Normocephalic and atraumatic.      Nose: Nose normal. No congestion.   Eyes:      General: No scleral icterus.  Cardiovascular:      Rate and Rhythm: Normal rate.      Pulses: Normal pulses.   Pulmonary:      Effort: Pulmonary effort is normal.   Abdominal:      General: Abdomen is flat. Bowel sounds are normal. " There is no distension.      Tenderness: There is no abdominal tenderness.      Hernia: No hernia is present.   Musculoskeletal:      Cervical back: Normal range of motion.   Skin:     General: Skin is warm.   Neurological:      Mental Status: She is alert and oriented to person, place, and time.   Psychiatric:         Mood and Affect: Mood normal.         Behavior: Behavior normal.         Invasive Devices       Peripheral Intravenous Line  Duration             Peripheral IV 08/24/24 Right;Ventral (anterior) Forearm 2 days                    Lab Results:  No results displayed because visit has over 200 results.          Imaging Studies: I have personally reviewed pertinent imaging studies.

## 2024-08-26 NOTE — PROGRESS NOTES
[facility]  Progress Note  Name: Barb Palomo I  MRN: [id number]  Unit/Bed#: [facility] 522-01 I Date of Admission: 8/15/2024   Date of Service: 8/26/2024 I Hospital Day: 10    Assessment & Plan   * C. difficile diarrhea  Assessment & Plan  Patient presented with generalized weakness, frequent falls.  She has had ongoing diarrhea, dysuria, poor oral intake and reported 70 pound weight loss over the last year.  Initially hypotensive in the ER, status post IV fluids. Labs with multiple metabolic derangements  CT imaging showing mild diffuse colonic wall thickening, mild diffuse wall thickening of stomach  C. difficile found to be positive--has a history of C. difficile in September 2022.    Patient started on Dificid 200 mg x 10 days per protocol on 8/18 as this is recurrent infection  Initially seemed BMs were improving and were becoming more formed, however again having frequent watery stools  GI consulted, recommended to switch to PO Vanco 125 mg q6hr x 14 days.  Will monitor.  Will resume IV fluids 8/26 with persistent watery diarrhea   Contact precautions  Appetite is good   Bannatrol supplements ordered- will request nutrition increase frequency   PT/OT recommending rehab, CM following.     Hypotension  Assessment & Plan  Intermittent episodes of hypotension, most occurrences happen overnight and question correlation with minipress administration.  This has now been discontinued    Sinus tachycardia  Assessment & Plan  Heart rate 100s-120s at times.  Sinus.  Has received IV fluids without much improvement.  Trialed IVFs again 8/22 given reports of nausea with poor oral intake and dizziness with some improvement to the 110s with HR- will continue with IVFs again today   orthostatic blood pressure negative   Pt is on minipress which could have a side effect of ST.  Additionally noted she is hypotensive each night around 2/3 AM, I suspect this is likely the underlying cause.   "She was started on this by her psychiatrist to assist with sleep and eating outpatient   Will hold minipress for now.  D/w pt can have PRN trazodone for sleep.  Pt agreeable   Monitor     Back pain  Assessment & Plan  Acute on chronic, likely worsened in setting of recent fall and deconditioning.   CT imaging reviewed, degenerative changes noted.  Continue ATC APAP, Baclofen 5 mg BID (was taking PRN PTA), lido patch. PRN Tramdol on board.   Mobilize as tolerated, PT/OT    Failure to thrive in adult  Assessment & Plan  Patient presented with increased generalized weakness and frequent falls, ongoing diarrhea, dysuria, poor oral intake, and at least 70-80 pound weight loss over the past 1 year. Has been following with GI, had colonoscopy May 2024 with poor prep--had EGD with esophagitis and gastritis  Labs with multiple derangements including hyperglycemia, hypokalemia and hypomagnesemia on admission, improved.   C. difficile positive as above  Patient reports she has not been using insulin due to \"insurance issue\" and inability to self inject due to her neuropathy  CT abdomen and pelvis without any obvious malignancy  Treatment as per respective issues  PT/OT recommending rehab.     Hypomagnesemia  Assessment & Plan  C/w scheduled oral supplementation.  Ordering additional IV mag with persistent low mag   Repeat labs in AM     Severe protein-calorie malnutrition (HCC)  Assessment & Plan  Severe protein-calorie malnutrition 2/2 chronic diarrhea a/e/b fat and muscle loss requiring Nutrition consult, oral diet and nutrition supplements    360 Statement: severe malnutrition r/t suspect combination of chronic illness and social/environmental factors as evidenced by severe temporal muscle loss, severe tricep fat pad loss, at least moderate muscle loss around clavicles and shoulders, at least moderate orbital fat pad loss. Treatment: oral diet and oral nutrition supplements.    BMI Findings:  Adult BMI Classifications: " "Underweight < 18.5        Body mass index is 16.1 kg/m².   C/w supplements  Nutrition consult appreciated  Encourage po intake     Acute cystitis without hematuria  Assessment & Plan  Reported dysuria on admission   Urine + nitrite & leukocytes, CT imaging with distended bladder suggestive of retention and small amount of air which could be secondary to infection  Urine Culture noted, S/P 3 doses of IV CTX 8/17 8/21, reported pain AFTER urination, likely related to urine hitting excoriated labia/bottom from frequent stooling. Will continue with medicated cream to this area- denies pain at this time   S/p pyridum x 3 doses  Wound care consult    Uncontrolled type 2 diabetes mellitus with hyperglycemia (HCC)  Assessment & Plan  Lab Results   Component Value Date    HGBA1C 16.1 (H) 08/16/2024       Recent Labs     08/25/24  1223 08/25/24  1538 08/25/24  2047 08/26/24  0614   POCGLU 286* 361* 202* 173*       Patient markedly hyperglycemic on admission only partially responsive to IV fluids, remainder of labs fortunately not consistent with DKA. A1C 16  Patient admitting she has not been using her home insulin as she apparently has been unable to fill prescription due to \"insurance issues,\" additionally reports that due to her neuropathy she has difficulty self injecting.  Daughter also reports jessica non compliance.   Typically on NovoLog 70/30 20 units twice daily  Remains hyperglycemic despite increasing basal insulin and lispro  Changed Lantus to 20 units BID.  Lispro 10 units TID AC   insurance issues relayed to CM  Follows with endo outpatient, continue   CCD level 3 diet     Opioid dependence with other opioid-induced disorder (HCC)  Assessment & Plan  PDMP reviewed, patient maintained on tramadol 200 mg daily as needed; history of chronic cervical/lumbar pain noted  Continue as needed tramadol and chronic gabapentin dosing    Chronic diarrhea  Assessment & Plan  Longstanding history of chronic diarrhea, patient " reporting acute diarrhea x1 month. Follows with GI. Has tried questran/imodium without relief  Stool enteric panel negative  C. difficile positive, see plan above     Tobacco dependence  Assessment & Plan  Counseled on need for cessation, provide nicotine patch while admitted  Recent CT chest for lung cancer screening 7/30/2024 negative for nodules or masses    Hypothyroidism  Assessment & Plan  Continue home dose levothyroxine     GERD without esophagitis  Assessment & Plan  Continue PPI    Severe episode of recurrent major depressive disorder, without psychotic features (HCC)  Assessment & Plan  Mood appearing fairly stable, continue home duloxetine, mirtazapine, as needed Valium  Hold minipress given symptomatic hypotension episodes, tachycardia            VTE Pharmacologic Prophylaxis: VTE Score: 3 Moderate Risk (Score 3-4) - Pharmacological DVT Prophylaxis Ordered: heparin.    Mobility:   Basic Mobility Inpatient Raw Score: 19  JH-HLM Goal: 6: Walk 10 steps or more  JH-HLM Achieved: 3: Sit at edge of bed  JH-HLM Goal NOT achieved. Continue with multidisciplinary rounding and encourage appropriate mobility to improve upon JH-HLM goals.    Patient Centered Rounds: I performed bedside rounds with nursing staff today.   Discussions with Specialists or Other Care Team Provider: CM    Education and Discussions with Family / Patient: Updated  (daughter) via phone.    Total Time Spent on Date of Encounter in care of patient: 25 mins. This time was spent on one or more of the following: performing physical exam; counseling and coordination of care; obtaining or reviewing history; documenting in the medical record; reviewing/ordering tests, medications or procedures; communicating with other healthcare professionals and discussing with patient's family/caregivers.    Current Length of Stay: 10 day(s)  Current Patient Status: Inpatient   Certification Statement: The patient will continue to require  additional inpatient hospital stay due to c diff diarrhea, IV fluids, rehab planning  Discharge Plan: Anticipate discharge in 48 hrs to rehab facility.    Code Status: Level 1 - Full Code    Subjective:   No acute complaints, still having frequent bouts of diarrhea -- went 3/4 times overnight.  Tolerating diet ok.     Objective:     Vitals:   Temp (24hrs), Av.6 °F (37 °C), Min:98.3 °F (36.8 °C), Max:98.7 °F (37.1 °C)    Temp:  [98.3 °F (36.8 °C)-98.7 °F (37.1 °C)] 98.7 °F (37.1 °C)  HR:  [114-119] 119  Resp:  [16-18] 16  BP: (102-129)/(72-90) 102/72  SpO2:  [94 %-99 %] 99 %  Body mass index is 16.1 kg/m².     Input and Output Summary (last 24 hours):     Intake/Output Summary (Last 24 hours) at 2024 1013  Last data filed at 2024 1002  Gross per 24 hour   Intake 2070 ml   Output --   Net 2070 ml       Physical Exam:   Physical Exam  Vitals reviewed.   Constitutional:       General: She is not in acute distress.     Appearance: She is underweight. She is not toxic-appearing.   HENT:      Head: Normocephalic and atraumatic.   Eyes:      Extraocular Movements: Extraocular movements intact.   Cardiovascular:      Rate and Rhythm: Tachycardia present.   Pulmonary:      Effort: Pulmonary effort is normal. No respiratory distress.   Musculoskeletal:         General: Normal range of motion.   Neurological:      General: No focal deficit present.      Mental Status: She is alert and oriented to person, place, and time.   Psychiatric:         Mood and Affect: Mood normal.         Behavior: Behavior normal.         Thought Content: Thought content normal.          Additional Data:     Labs:  Results from last 7 days   Lab Units 24  0514 24  0435 24  0439   WBC Thousand/uL 8.25   < > 6.65   HEMOGLOBIN g/dL 10.4*   < > 11.0*   HEMATOCRIT % 31.5*   < > 33.6*   PLATELETS Thousands/uL 281   < > 259   SEGS PCT %  --   --  55   LYMPHO PCT %  --   --  34   MONO PCT %  --   --  8   EOS PCT %  --   --  2     < > = values in this interval not displayed.     Results from last 7 days   Lab Units 08/26/24  0514 08/25/24  0526 08/24/24  0746   SODIUM mmol/L 143   < > 140   POTASSIUM mmol/L 4.5   < > 3.9   CHLORIDE mmol/L 106   < > 112*   CO2 mmol/L 28   < > 24   BUN mg/dL 12   < > 7   CREATININE mg/dL 0.49*   < > 0.30*   ANION GAP mmol/L 9   < > 4   CALCIUM mg/dL 9.3   < > 7.2*   ALBUMIN g/dL  --   --  2.7*   TOTAL BILIRUBIN mg/dL  --   --  0.21   ALK PHOS U/L  --   --  131*   ALT U/L  --   --  43   AST U/L  --   --  45*   GLUCOSE RANDOM mg/dL 217*   < > 310*    < > = values in this interval not displayed.         Results from last 7 days   Lab Units 08/26/24  0614 08/25/24  2047 08/25/24  1538 08/25/24  1223 08/25/24  0738 08/24/24  2116 08/24/24  1702 08/24/24  1158 08/24/24  0649 08/23/24  2150 08/23/24  1637 08/23/24  1204   POC GLUCOSE mg/dl 173* 202* 361* 286* 206* 308* 310* 381* 343* 384* 323* 304*               Lines/Drains:  Invasive Devices       Peripheral Intravenous Line  Duration             Peripheral IV 08/24/24 Right;Ventral (anterior) Forearm 2 days                          Imaging: No pertinent imaging reviewed.    Recent Cultures (last 7 days):         Last 24 Hours Medication List:   Current Facility-Administered Medications   Medication Dose Route Frequency Provider Last Rate    acetaminophen  975 mg Oral Q[facility]H [facility] ELISEO Toribio      albuterol  2 puff Inhalation Q4H PRN Renan Drake, DO      aspirin  81 mg Oral Daily Renan Drake, DO      atorvastatin  80 mg Oral Daily Renan Drake, DO      baclofen  5 mg Oral BID ELISEO Toribio      butalbital-acetaminophen-caffeine  1 tablet Oral Q6H PRN Renan Drake, DO      diazepam  5 mg Oral Q12H PRN Renan Drake, DO      DULoxetine  60 mg Oral BID Renan Drake, DO      fluticasone-vilanterol  1 puff Inhalation Daily Renan Drake DO      gabapentin  800 mg Oral TID Renan Drake DO      heparin (porcine)  5,000 Units Subcutaneous Q8H  HONEY Renan Drake, DO      insulin glargine  20 Units Subcutaneous Q12H HONEY Estefany Villalobos PA-C      insulin lispro  1-5 Units Subcutaneous TID AC Renan Drake, DO      insulin lispro  1-5 Units Subcutaneous HS Renan Drake, DO      insulin lispro  10 Units Subcutaneous TID With Meals Estefany Villalobos PA-C      levothyroxine  112 mcg Oral Early Morning Renan Drake, DO      lidocaine  2 patch Topical Daily Magda Amaya, MARCNP      magnesium Oxide  400 mg Oral BID Magda ANASTACIO Amaya, MARCNP      magnesium sulfate  2 g Intravenous Once Estefany Villalobos PA-C 2 g (08/26/24 1007)    mirtazapine  7.5 mg Oral HS Renan Drake, DO      DARLIN ANTIFUNGAL   Topical BID Cherise Grey, ELISEO      nicotine  1 patch Transdermal Daily Renan Drake, DO      ondansetron  4 mg Intravenous Q6H PRN Renan Drake, DO      pantoprazole  40 mg Oral Early Morning Renan Drake, DO      potassium chloride  40 mEq Oral BID Holli Hernandez PA-C      saccharomyces boulardii  250 mg Oral BID Shanika Knott, MARCNP      sodium chloride  75 mL/hr Intravenous Continuous Estefany Villalobos PA-C      traMADol  50 mg Oral Q6H PRN Renan Drake, DO      traZODone  50 mg Oral HS PRN Estefany Villalobos PA-C      vancomycin oral (capsules or solution)  125 mg Oral Q6H [facility] Estefany Villalobos PA-C          Today, Patient Was Seen By: Estefany Villalobos PA-C    **Please Note: This note may have been constructed using a voice recognition system.**

## 2024-08-26 NOTE — PLAN OF CARE
Problem: PHYSICAL THERAPY ADULT  Goal: Performs mobility at highest level of function for planned discharge setting.  See evaluation for individualized goals.  Description: Treatment/Interventions: Functional transfer training, LE strengthening/ROM, Elevations, Therapeutic exercise, Endurance training, Patient/family training, Equipment eval/education, Bed mobility, Gait training, Spoke to nursing, OT  Equipment Recommended: Walker       See flowsheet documentation for full assessment, interventions and recommendations.  Outcome: Progressing  Note: Prognosis: Good  Problem List: Decreased strength, Decreased endurance, Impaired balance, Decreased mobility, Decreased safety awareness, Pain  Assessment: Patient received in bed. Patient agreeable to therapy, but noting incontinence of bowels. Patient performs rolling in bed with supervision for hygiene and linen change. Patient requesting adult brief for ambulation.  Patient performs bed mobility with supervision. Patient performs functional transfers with supervision using rolling walker. Patient performs ambulation with minimal assistance x1 using rolling walker, 50'x4 with x1 seated rest and x2 standing rests. Patient with x2 losses of balance requiring Mod Ax1 to steady.  Patient incontinent of bowels during ambulation. Increased time for hygiene post ambulation.  Patient left in bed with all needs met and call bell/personal items within reach. The patient's AM-PAC Basic Mobility Inpatient Short Form Raw Score is 16 showing further need for skilled PT services in order to improve functional mobility, decrease need for assistance, and return to PLOF. PT is recommending Level 2 - Moderate Resource Intensity on d/c from hospital.  Will continue to follow as able.        Rehab Resource Intensity Level, PT: II (Moderate Resource Intensity)    See flowsheet documentation for full assessment.

## 2024-08-26 NOTE — ASSESSMENT & PLAN NOTE
"Lab Results   Component Value Date    HGBA1C 16.1 (H) 08/16/2024       Recent Labs     08/25/24  1223 08/25/24  1538 08/25/24  2047 08/26/24  0614   POCGLU 286* 361* 202* 173*       Patient markedly hyperglycemic on admission only partially responsive to IV fluids, remainder of labs fortunately not consistent with DKA. A1C 16  Patient admitting she has not been using her home insulin as she apparently has been unable to fill prescription due to \"insurance issues,\" additionally reports that due to her neuropathy she has difficulty self injecting.  Daughter also reports jessica non compliance.   Typically on NovoLog 70/30 20 units twice daily  Remains hyperglycemic despite increasing basal insulin and lispro  Changed Lantus to 20 units BID.  Lispro 10 units TID AC   insurance issues relayed to CM  Follows with endo outpatient, continue   CCD level 3 diet   "

## 2024-08-26 NOTE — ASSESSMENT & PLAN NOTE
Patient presented with generalized weakness, frequent falls.  She has had ongoing diarrhea, dysuria, poor oral intake and reported 70 pound weight loss over the last year.  Initially hypotensive in the ER, status post IV fluids. Labs with multiple metabolic derangements  CT imaging showing mild diffuse colonic wall thickening, mild diffuse wall thickening of stomach  C. difficile found to be positive--has a history of C. difficile in September 2022.    Patient started on Dificid 200 mg x 10 days per protocol on 8/18 as this is recurrent infection  Initially seemed BMs were improving and were becoming more formed, however again having frequent watery stools  GI consulted, recommended to switch to PO Vanco 125 mg q6hr x 14 days.  Will monitor.  Will resume IV fluids 8/26 with persistent watery diarrhea   Contact precautions  Appetite is good   Bannatrol supplements ordered- will request nutrition increase frequency   PT/OT recommending rehab, CM following.

## 2024-08-27 LAB
ANION GAP SERPL CALCULATED.3IONS-SCNC: 7 MMOL/L (ref 4–13)
BASOPHILS # BLD AUTO: 0.09 THOUSANDS/ΜL (ref 0–0.1)
BASOPHILS NFR BLD AUTO: 1 % (ref 0–1)
BUN SERPL-MCNC: 12 MG/DL (ref 5–25)
CALCIUM SERPL-MCNC: 9.4 MG/DL (ref 8.4–10.2)
CHLORIDE SERPL-SCNC: 108 MMOL/L (ref 96–108)
CO2 SERPL-SCNC: 29 MMOL/L (ref 21–32)
CREAT SERPL-MCNC: 0.51 MG/DL (ref 0.6–1.3)
EOSINOPHIL # BLD AUTO: 0.13 THOUSAND/ΜL (ref 0–0.61)
EOSINOPHIL NFR BLD AUTO: 2 % (ref 0–6)
ERYTHROCYTE [DISTWIDTH] IN BLOOD BY AUTOMATED COUNT: 14.3 % (ref 11.6–15.1)
GFR SERPL CREATININE-BSD FRML MDRD: 110 ML/MIN/1.73SQ M
GLUCOSE SERPL-MCNC: 189 MG/DL (ref 65–140)
GLUCOSE SERPL-MCNC: 244 MG/DL (ref 65–140)
GLUCOSE SERPL-MCNC: 265 MG/DL (ref 65–140)
GLUCOSE SERPL-MCNC: 57 MG/DL (ref 65–140)
GLUCOSE SERPL-MCNC: 88 MG/DL (ref 65–140)
HCT VFR BLD AUTO: 32.7 % (ref 34.8–46.1)
HGB BLD-MCNC: 10.6 G/DL (ref 11.5–15.4)
IMM GRANULOCYTES # BLD AUTO: 0.03 THOUSAND/UL (ref 0–0.2)
IMM GRANULOCYTES NFR BLD AUTO: 0 % (ref 0–2)
LYMPHOCYTES # BLD AUTO: 3.23 THOUSANDS/ΜL (ref 0.6–4.47)
LYMPHOCYTES NFR BLD AUTO: 47 % (ref 14–44)
MAGNESIUM SERPL-MCNC: 1.7 MG/DL (ref 1.9–2.7)
MCH RBC QN AUTO: 31.9 PG (ref 26.8–34.3)
MCHC RBC AUTO-ENTMCNC: 32.4 G/DL (ref 31.4–37.4)
MCV RBC AUTO: 99 FL (ref 82–98)
MONOCYTES # BLD AUTO: 0.58 THOUSAND/ΜL (ref 0.17–1.22)
MONOCYTES NFR BLD AUTO: 9 % (ref 4–12)
NEUTROPHILS # BLD AUTO: 2.75 THOUSANDS/ΜL (ref 1.85–7.62)
NEUTS SEG NFR BLD AUTO: 41 % (ref 43–75)
NRBC BLD AUTO-RTO: 0 /100 WBCS
PLATELET # BLD AUTO: 306 THOUSANDS/UL (ref 149–390)
PMV BLD AUTO: 10.7 FL (ref 8.9–12.7)
POTASSIUM SERPL-SCNC: 3.9 MMOL/L (ref 3.5–5.3)
RBC # BLD AUTO: 3.32 MILLION/UL (ref 3.81–5.12)
SODIUM SERPL-SCNC: 144 MMOL/L (ref 135–147)
WBC # BLD AUTO: 6.81 THOUSAND/UL (ref 4.31–10.16)

## 2024-08-27 PROCEDURE — 97530 THERAPEUTIC ACTIVITIES: CPT

## 2024-08-27 PROCEDURE — 85025 COMPLETE CBC W/AUTO DIFF WBC: CPT | Performed by: PHYSICIAN ASSISTANT

## 2024-08-27 PROCEDURE — 82948 REAGENT STRIP/BLOOD GLUCOSE: CPT

## 2024-08-27 PROCEDURE — 97116 GAIT TRAINING THERAPY: CPT

## 2024-08-27 PROCEDURE — 83735 ASSAY OF MAGNESIUM: CPT | Performed by: PHYSICIAN ASSISTANT

## 2024-08-27 PROCEDURE — 97535 SELF CARE MNGMENT TRAINING: CPT

## 2024-08-27 PROCEDURE — 99232 SBSQ HOSP IP/OBS MODERATE 35: CPT | Performed by: INTERNAL MEDICINE

## 2024-08-27 PROCEDURE — 80048 BASIC METABOLIC PNL TOTAL CA: CPT | Performed by: PHYSICIAN ASSISTANT

## 2024-08-27 RX ORDER — INSULIN GLARGINE 100 [IU]/ML
15 INJECTION, SOLUTION SUBCUTANEOUS EVERY 12 HOURS SCHEDULED
Status: DISCONTINUED | OUTPATIENT
Start: 2024-08-27 | End: 2024-08-29

## 2024-08-27 RX ORDER — INSULIN LISPRO 100 [IU]/ML
7 INJECTION, SOLUTION INTRAVENOUS; SUBCUTANEOUS
Status: DISCONTINUED | OUTPATIENT
Start: 2024-08-27 | End: 2024-08-29

## 2024-08-27 RX ORDER — MAGNESIUM SULFATE HEPTAHYDRATE 40 MG/ML
2 INJECTION, SOLUTION INTRAVENOUS ONCE
Status: COMPLETED | OUTPATIENT
Start: 2024-08-27 | End: 2024-08-28

## 2024-08-27 RX ORDER — SODIUM CHLORIDE 450 MG/100ML
125 INJECTION, SOLUTION INTRAVENOUS CONTINUOUS
Status: DISPENSED | OUTPATIENT
Start: 2024-08-27 | End: 2024-08-28

## 2024-08-27 RX ADMIN — PANTOPRAZOLE SODIUM 40 MG: 40 TABLET, DELAYED RELEASE ORAL at 06:07

## 2024-08-27 RX ADMIN — LEVOTHYROXINE SODIUM 112 MCG: 112 TABLET ORAL at 06:07

## 2024-08-27 RX ADMIN — GABAPENTIN 800 MG: 400 CAPSULE ORAL at 21:51

## 2024-08-27 RX ADMIN — HEPARIN SODIUM 5000 UNITS: 5000 INJECTION INTRAVENOUS; SUBCUTANEOUS at 06:07

## 2024-08-27 RX ADMIN — ACETAMINOPHEN 975 MG: 325 TABLET ORAL at 21:50

## 2024-08-27 RX ADMIN — VANCOMYCIN HYDROCHLORIDE 125 MG: 125 CAPSULE ORAL at 11:26

## 2024-08-27 RX ADMIN — DIAZEPAM 5 MG: 5 TABLET ORAL at 14:45

## 2024-08-27 RX ADMIN — MICONAZOLE NITRATE: 20 CREAM TOPICAL at 17:11

## 2024-08-27 RX ADMIN — VANCOMYCIN HYDROCHLORIDE 125 MG: 125 CAPSULE ORAL at 06:08

## 2024-08-27 RX ADMIN — ACETAMINOPHEN 975 MG: 325 TABLET ORAL at 14:22

## 2024-08-27 RX ADMIN — SODIUM CHLORIDE 125 ML/HR: 0.45 INJECTION, SOLUTION INTRAVENOUS at 18:40

## 2024-08-27 RX ADMIN — HEPARIN SODIUM 5000 UNITS: 5000 INJECTION INTRAVENOUS; SUBCUTANEOUS at 14:22

## 2024-08-27 RX ADMIN — VANCOMYCIN HYDROCHLORIDE 125 MG: 125 CAPSULE ORAL at 17:11

## 2024-08-27 RX ADMIN — BACLOFEN 5 MG: 10 TABLET ORAL at 17:10

## 2024-08-27 RX ADMIN — INSULIN GLARGINE 15 UNITS: 100 INJECTION, SOLUTION SUBCUTANEOUS at 09:32

## 2024-08-27 RX ADMIN — MIRTAZAPINE 7.5 MG: 15 TABLET, FILM COATED ORAL at 21:51

## 2024-08-27 RX ADMIN — SODIUM CHLORIDE 125 ML/HR: 0.45 INJECTION, SOLUTION INTRAVENOUS at 10:25

## 2024-08-27 RX ADMIN — VANCOMYCIN HYDROCHLORIDE 125 MG: 125 CAPSULE ORAL at 23:08

## 2024-08-27 RX ADMIN — ACETAMINOPHEN 975 MG: 325 TABLET ORAL at 06:07

## 2024-08-27 RX ADMIN — INSULIN LISPRO 2 UNITS: 100 INJECTION, SOLUTION INTRAVENOUS; SUBCUTANEOUS at 11:27

## 2024-08-27 RX ADMIN — NICOTINE 1 PATCH: 21 PATCH, EXTENDED RELEASE TRANSDERMAL at 09:18

## 2024-08-27 RX ADMIN — INSULIN LISPRO 2 UNITS: 100 INJECTION, SOLUTION INTRAVENOUS; SUBCUTANEOUS at 17:12

## 2024-08-27 RX ADMIN — INSULIN LISPRO 7 UNITS: 100 INJECTION, SOLUTION INTRAVENOUS; SUBCUTANEOUS at 11:27

## 2024-08-27 RX ADMIN — Medication 250 MG: at 09:16

## 2024-08-27 RX ADMIN — DULOXETINE HYDROCHLORIDE 60 MG: 60 CAPSULE, DELAYED RELEASE ORAL at 09:16

## 2024-08-27 RX ADMIN — BACLOFEN 5 MG: 10 TABLET ORAL at 09:16

## 2024-08-27 RX ADMIN — TRAMADOL HYDROCHLORIDE 50 MG: 50 TABLET, COATED ORAL at 19:42

## 2024-08-27 RX ADMIN — INSULIN LISPRO 1 UNITS: 100 INJECTION, SOLUTION INTRAVENOUS; SUBCUTANEOUS at 21:50

## 2024-08-27 RX ADMIN — POTASSIUM CHLORIDE 40 MEQ: 1500 TABLET, EXTENDED RELEASE ORAL at 17:10

## 2024-08-27 RX ADMIN — MAGNESIUM SULFATE HEPTAHYDRATE 2 G: 40 INJECTION, SOLUTION INTRAVENOUS at 09:16

## 2024-08-27 RX ADMIN — FLUTICASONE FUROATE AND VILANTEROL TRIFENATATE 1 PUFF: 200; 25 POWDER RESPIRATORY (INHALATION) at 09:18

## 2024-08-27 RX ADMIN — ONDANSETRON 4 MG: 2 INJECTION INTRAMUSCULAR; INTRAVENOUS at 01:04

## 2024-08-27 RX ADMIN — POTASSIUM CHLORIDE 40 MEQ: 1500 TABLET, EXTENDED RELEASE ORAL at 09:16

## 2024-08-27 RX ADMIN — MAGNESIUM OXIDE TAB 400 MG (241.3 MG ELEMENTAL MG) 400 MG: 400 (241.3 MG) TAB at 09:16

## 2024-08-27 RX ADMIN — LIDOCAINE 2 PATCH: 700 PATCH TOPICAL at 09:17

## 2024-08-27 RX ADMIN — ONDANSETRON 4 MG: 2 INJECTION INTRAMUSCULAR; INTRAVENOUS at 09:18

## 2024-08-27 RX ADMIN — GABAPENTIN 800 MG: 400 CAPSULE ORAL at 17:10

## 2024-08-27 RX ADMIN — ASPIRIN 81 MG CHEWABLE TABLET 81 MG: 81 TABLET CHEWABLE at 09:16

## 2024-08-27 RX ADMIN — INSULIN LISPRO 7 UNITS: 100 INJECTION, SOLUTION INTRAVENOUS; SUBCUTANEOUS at 17:12

## 2024-08-27 RX ADMIN — MICONAZOLE NITRATE: 20 CREAM TOPICAL at 09:19

## 2024-08-27 RX ADMIN — ONDANSETRON 4 MG: 2 INJECTION INTRAMUSCULAR; INTRAVENOUS at 19:42

## 2024-08-27 RX ADMIN — HEPARIN SODIUM 5000 UNITS: 5000 INJECTION INTRAVENOUS; SUBCUTANEOUS at 21:49

## 2024-08-27 RX ADMIN — DULOXETINE HYDROCHLORIDE 60 MG: 60 CAPSULE, DELAYED RELEASE ORAL at 17:10

## 2024-08-27 RX ADMIN — INSULIN GLARGINE 15 UNITS: 100 INJECTION, SOLUTION SUBCUTANEOUS at 21:50

## 2024-08-27 RX ADMIN — TRAZODONE HYDROCHLORIDE 50 MG: 50 TABLET ORAL at 23:09

## 2024-08-27 RX ADMIN — ATORVASTATIN CALCIUM 80 MG: 80 TABLET, FILM COATED ORAL at 09:16

## 2024-08-27 RX ADMIN — GABAPENTIN 800 MG: 400 CAPSULE ORAL at 09:16

## 2024-08-27 RX ADMIN — Medication 250 MG: at 17:10

## 2024-08-27 NOTE — PLAN OF CARE
Problem: SAFETY ADULT  Goal: Patient will remain free of falls  Description: INTERVENTIONS:  - Educate patient/family on patient safety including physical limitations  - Instruct patient to call for assistance with activity   - Consult OT/PT to assist with strengthening/mobility   - Keep Call bell within reach  - Keep bed low and locked with side rails adjusted as appropriate  - Keep care items and personal belongings within reach  - Initiate and maintain comfort rounds  - Make Fall Risk Sign visible to staff  - Offer Toileting every 2 Hours, in advance of need  - Initiate/Maintain alarm  - Obtain necessary fall risk management equipment  - Apply yellow socks and bracelet for high fall risk patients  - Consider moving patient to room near nurses station  Outcome: Progressing     Problem: NEUROSENSORY - ADULT  Goal: Remains free of injury related to seizures activity  Description: INTERVENTIONS  - Maintain airway, patient safety  and administer oxygen as ordered  - Monitor patient for seizure activity, document and report duration and description of seizure to physician/advanced practitioner  - If seizure occurs,  ensure patient safety during seizure  - Reorient patient post seizure  - Seizure pads on all 4 side rails  - Instruct patient/family to notify RN of any seizure activity including if an aura is experienced  - Instruct patient/family to call for assistance with activity based on nursing assessment  - Administer anti-seizure medications if ordered    Outcome: Progressing     Problem: Prexisting or High Potential for Compromised Skin Integrity  Goal: Skin integrity is maintained or improved  Description: INTERVENTIONS:  - Identify patients at risk for skin breakdown  - Assess and monitor skin integrity  - Assess and monitor nutrition and hydration status  - Monitor labs   - Assess for incontinence   - Turn and reposition patient  - Assist with mobility/ambulation  - Relieve pressure over bony prominences  -  Avoid friction and shearing  - Provide appropriate hygiene as needed including keeping skin clean and dry  - Evaluate need for skin moisturizer/barrier cream  - Collaborate with interdisciplinary team   - Patient/family teaching  - Consider wound care consult   Outcome: Progressing

## 2024-08-27 NOTE — OCCUPATIONAL THERAPY NOTE
Occupational Therapy Progress Note     Patient Name: Barb Palomo  Today's Date: 8/27/2024  Problem List  Principal Problem:    C. difficile diarrhea  Active Problems:    Severe episode of recurrent major depressive disorder, without psychotic features (HCC)    GERD without esophagitis    Hypothyroidism    Tobacco dependence    Chronic diarrhea    Opioid dependence with other opioid-induced disorder (HCC)    Uncontrolled type 2 diabetes mellitus with hyperglycemia (HCC)    Acute cystitis without hematuria    Severe protein-calorie malnutrition (HCC)    Hypomagnesemia    Failure to thrive in adult    Back pain    Sinus tachycardia    Hypotension          08/27/24 0952   OT Last Visit   OT Visit Date 08/27/24   Note Type   Note Type Treatment   Pain Assessment   Pain Assessment Tool 0-10   Pain Score No Pain   Restrictions/Precautions   Weight Bearing Precautions Per Order No   Other Precautions Contact/isolation;Chair Alarm;Bed Alarm;Fall Risk  (C. Diff)   ADL   Where Assessed Supine, bed   Grooming Assistance 5  Supervision/Setup   Grooming Deficit   (mouthcare, only mouthwash)   UB Bathing Assistance 3  Moderate Assistance   UB Bathing Deficit Setup;Increased time to complete;Chest;Right arm;Left arm;Abdomen   UB Bathing Comments mod A 2' fatigue and nausea   LB Bathing Assistance 2  Maximal Assistance   LB Bathing Deficit Perineal area;Buttocks;Left upper leg;Right lower leg including foot;Left lower leg including foot;Right upper leg   LB Bathing Comments max A for Le and perineal hygiene   UB Dressing Assistance 3  Moderate Assistance   UB Dressing Deficit Increased time to complete;Thread RUE;Thread LUE   UB Dressing Comments mod a for threading 2' Lines   LB Dressing Assistance 3  Moderate Assistance   LB Dressing Deficit Don/doff R sock;Don/doff L sock   Toileting Assistance  1  Total Assistance   Toileting Deficit Perineal hygiene   Bed Mobility   Supine to Sit 5  Supervision   Additional items  Increased time required   Sit to Supine 5  Supervision   Additional items Increased time required   Additional Comments found and left in bed w all needs in reach and alarm on.   Transfers   Sit to Stand 5  Supervision   Stand to Sit 5  Supervision   Additional Comments rw   Functional Mobility   Functional Mobility   (cga)   Additional Comments household distance w rw   Additional items Rolling walker   Cognition   Overall Cognitive Status WFL   Arousal/Participation Alert;Cooperative;Responsive   Attention Within functional limits   Orientation Level Oriented X4   Memory Within functional limits   Following Commands Follows all commands and directions without difficulty   Activity Tolerance   Activity Tolerance Patient limited by fatigue   Medical Staff Made Aware rn   Assessment   Assessment Pt seen on this date for OT session focusing on ADL retraining,  body mechanics, transfer retraining, increasing activity tolerance/endurance and EOB sitting to increase ability to participate in ADL/functional tasks. Pt was found in bed and was left in bed w/ all needs within reach, bed alarm on. Pt completed toileting tasks while supine w total A. Rolled to R and L w S for mgmt. Completed ADLs while supine- UB w mod A, Lb w varying mod-max A. STS w S c rw for support, fm w cga w rw for support.  Pt w/ improvements in attn to task however is still limited 2* decreased ADL/High-level ADL status, decreased activity tolerance/endurance,  decreased self-care trans, decreased safety awareness and insight to condition.   The patient's raw score on the AM-PAC Daily Activity Inpatient Short Form is 16. A raw score of less than 19 suggests the patient may benefit from discharge to post-acute rehabilitation services. Please refer to the recommendation of the Occupational Therapist for safe discharge planning.   Recommending pt D/C to level 2 when medically stable. Pt will continue to benefit from acute OT services to meet goals.    Plan   Treatment Interventions ADL retraining;Functional transfer training;Endurance training;Energy conservation;Activityengagement;Compensatory technique education;Equipment evaluation/education   Goal Expiration Date 08/31/24   OT Treatment Day 2   OT Frequency 2-3x/wk   Discharge Recommendation   Rehab Resource Intensity Level, OT II (Moderate Resource Intensity)   AM-PAC Daily Activity Inpatient   Lower Body Dressing 2   Bathing 2   Toileting 2   Upper Body Dressing 3   Grooming 3   Eating 4   Daily Activity Raw Score 16   Daily Activity Standardized Score (Calc for Raw Score >=11) 35.96   AM-PAC Applied Cognition Inpatient   Following a Speech/Presentation 4   Understanding Ordinary Conversation 4   Taking Medications 4   Remembering Where Things Are Placed or Put Away 4   Remembering List of 4-5 Errands 4   Taking Care of Complicated Tasks 3   Applied Cognition Raw Score 23   Applied Cognition Standardized Score 53.08   Modified Sujatha Scale   Modified Sujatha Scale 4   End of Consult   Education Provided Yes   Patient Position at End of Consult Supine;All needs within reach   Nurse Communication Nurse aware of consult       SELINA Bailon, OTR/L

## 2024-08-27 NOTE — PHYSICAL THERAPY NOTE
"   PT Treatment       08/27/24 1601   PT Last Visit   PT Visit Date 08/27/24   Note Type   Note Type Treatment   Pain Assessment   Pain Assessment Tool 0-10   Restrictions/Precautions   Other Precautions (S)  Contact/isolation;Fall Risk;Multiple lines  (cdiff; rectal tube)   General   Chart Reviewed Yes   Response to Previous Treatment Patient with no complaints from previous session.   Family/Caregiver Present No   Cognition   Overall Cognitive Status WFL   Orientation Level Oriented X4   Subjective   Subjective \"I was on the toilet for 2 hours; I really wanted the rectal tube to work\"   Bed Mobility   Supine to Sit 5  Supervision   Additional items HOB elevated;Increased time required   Sit to Supine 5  Supervision   Additional items Increased time required   Additional Comments patient in bed pre and post mobility   Transfers   Sit to Stand 5  Supervision   Additional items Verbal cues;Increased time required   Stand to Sit 5  Supervision   Additional items Increased time required;Verbal cues   Additional Comments 2 sit<-->stand transfers with RW; demonstrates safe hand placement   Ambulation/Elevation   Gait pattern Excessively slow;Short stride;Decreased foot clearance   Gait Assistance 4  Minimal assist   Additional items Assist x 1;Verbal cues   Assistive Device Rolling walker   Distance 100 feet x 2 w/ 2 standing rest breaks   Ambulation/Elevation Additional Comments gait training with RW; VC to increase LE clearance and stride length; gait speed reduced   Balance   Static Sitting Fair +   Static Standing Fair -   Ambulatory Poor +   Endurance Deficit   Endurance Deficit Yes   Endurance Deficit Description fatigue, weakness, gross diarrhea   Activity Tolerance   Activity Tolerance Patient tolerated treatment well   Nurse Made Aware RN (abdi) aware patient leaking stool from rectal tube; in presense of RN while patient was in stance the rectal tube fell out   Exercises   Balance training  static stance with " "RW x 5/6 minutes while dariusz-care/change of linens/gown performed in setting of rectal tube falling out   Assessment   Prognosis Good   Problem List Decreased strength;Decreased endurance;Impaired balance;Decreased mobility   Assessment PT initiated treatment session in order to assist patient in achieving goals to improve gait training and overall activity tolerance. Patient continues to present with frailty (88 lb; 5'2\") and grossly deconditioned. She continues to have active c.diff (reports spending 2 hours on toilet today; rectal tube was placed but fell out) which interferes with her ability to manage self care and functional mobility task requires increased time. She require min-AX1 to ambulate with use of RW. Throughout treatment session patient required both verbal and tactile cuing to improve safety, efficiency, and mechanics of mobility in addition to hands on assistance for all aspects of functional mobility. Additionally, she required increased time to execute specific mobility tasks with rest breaks in between secondary to gross fatigue and weakness. At her baseline she resides alone with limited support. She would benefit from rehab upon d/c. Patient will continue to benefit from continued skilled PT this admission to achieve maximal function and safety.   Goals   Patient Goals to stop having bowel movements   STG Expiration Date 08/31/24   PT Treatment Day 3   Plan   Treatment/Interventions Functional transfer training;LE strengthening/ROM;Therapeutic exercise;Endurance training;Patient/family training;Equipment eval/education;Bed mobility;Gait training;OT;Spoke to nursing   Progress Progressing toward goals   PT Frequency 3-5x/wk   Discharge Recommendation   Rehab Resource Intensity Level, PT II (Moderate Resource Intensity)   AM-PAC Basic Mobility Inpatient   Turning in Flat Bed Without Bedrails 3   Lying on Back to Sitting on Edge of Flat Bed Without Bedrails 3   Moving Bed to Chair 3   Standing Up " From Chair Using Arms 3   Walk in Room 3   Climb 3-5 Stairs With Railing 3   Basic Mobility Inpatient Raw Score 18   Basic Mobility Standardized Score 41.05   [facility] Highest Level Of Mobility   -HLM Goal 6: Walk 10 steps or more   -HLM Achieved 7: Walk 25 feet or more         The patient's AM-PAC Basic Mobility Inpatient Standardized Score is less than 42.9, suggesting this patient may benefit from discharge to post-acute rehabilitation services. Please also refer to the recommendation of the Physical Therapist for safe discharge planning.    ZACHARY HURTADO PT, DPT

## 2024-08-27 NOTE — PLAN OF CARE
"  Problem: PHYSICAL THERAPY ADULT  Goal: Performs mobility at highest level of function for planned discharge setting.  See evaluation for individualized goals.  Description: Treatment/Interventions: Functional transfer training, LE strengthening/ROM, Elevations, Therapeutic exercise, Endurance training, Patient/family training, Equipment eval/education, Bed mobility, Gait training, Spoke to nursing, OT  Equipment Recommended: Walker       See flowsheet documentation for full assessment, interventions and recommendations.  Outcome: Progressing  Note: Prognosis: Good  Problem List: Decreased strength, Decreased endurance, Impaired balance, Decreased mobility  Assessment: PT initiated treatment session in order to assist patient in achieving goals to improve gait training and overall activity tolerance. Patient continues to present with frailty (88 lb; 5'2\") and grossly deconditioned. She continues to have active c.diff (reports spending 2 hours on toilet today; rectal tube was placed but fell out) which interferes with her ability to manage self care and functional mobility task requires increased time. She require min-AX1 to ambulate with use of RW. Throughout treatment session patient required both verbal and tactile cuing to improve safety, efficiency, and mechanics of mobility in addition to hands on assistance for all aspects of functional mobility. Additionally, she required increased time to execute specific mobility tasks with rest breaks in between secondary to gross fatigue and weakness. At her baseline she resides alone with limited support. She would benefit from rehab upon d/c. Patient will continue to benefit from continued skilled PT this admission to achieve maximal function and safety.        Rehab Resource Intensity Level, PT: II (Moderate Resource Intensity)    See flowsheet documentation for full assessment.        "

## 2024-08-27 NOTE — PLAN OF CARE
Problem: OCCUPATIONAL THERAPY ADULT  Goal: Performs self-care activities at highest level of function for planned discharge setting.  See evaluation for individualized goals.  Description: Treatment Interventions: ADL retraining, Functional transfer training, Endurance training, Cognitive reorientation, Patient/family training, Equipment evaluation/education, Compensatory technique education, Activityengagement          See flowsheet documentation for full assessment, interventions and recommendations.   Outcome: Progressing  Note: Limitation: Decreased ADL status, Decreased Safe judgement during ADL, Decreased endurance, Decreased self-care trans, Decreased high-level ADLs  Prognosis: Good  Assessment: Pt seen on this date for OT session focusing on ADL retraining,  body mechanics, transfer retraining, increasing activity tolerance/endurance and EOB sitting to increase ability to participate in ADL/functional tasks. Pt was found in bed and was left in bed w/ all needs within reach, bed alarm on. Pt completed toileting tasks while supine w total A. Rolled to R and L w S for mgmt. Completed ADLs while supine- UB w mod A, Lb w varying mod-max A. STS w S c rw for support, fm w cga w rw for support.  Pt w/ improvements in attn to task however is still limited 2* decreased ADL/High-level ADL status, decreased activity tolerance/endurance,  decreased self-care trans, decreased safety awareness and insight to condition.   The patient's raw score on the AM-PAC Daily Activity Inpatient Short Form is 16. A raw score of less than 19 suggests the patient may benefit from discharge to post-acute rehabilitation services. Please refer to the recommendation of the Occupational Therapist for safe discharge planning.   Recommending pt D/C to level 2 when medically stable. Pt will continue to benefit from acute OT services to meet goals.     Rehab Resource Intensity Level, OT: II (Moderate Resource Intensity)

## 2024-08-27 NOTE — PROGRESS NOTES
[facility]  Progress Note  Name: Barb Palomo I  MRN: [id number]  Unit/Bed#: [facility] 522-01 I Date of Admission: 8/15/2024   Date of Service: 8/27/2024 I Hospital Day: 11    Assessment & Plan   * C. difficile diarrhea  Assessment & Plan  Patient presented with generalized weakness, frequent falls.  She has had ongoing diarrhea, dysuria, poor oral intake and reported 70 pound weight loss over the last year.  Initially hypotensive in the ER, status post IV fluids. Labs with multiple metabolic derangements  CT imaging showing mild diffuse colonic wall thickening, mild diffuse wall thickening of stomach  C. difficile found to be positive--has a history of C. difficile in September 2022.    Patient started on Dificid 200 mg x 10 days per protocol on 8/18 as this is recurrent infection  Initially seemed BMs were improving and were becoming more formed, however again having frequent watery stools  GI consulted, recommended to switch to PO Vanco 125 mg q6hr x 14 days.  Will monitor.  Will resume IV fluids 8/26 with persistent watery diarrhea   Contact precautions  Appetite is good   Bannatrol supplements ordered- will request nutrition increase frequency   PT/OT recommending rehab, CM following.   Stop lactose and artificial sweeteners  Continue to monitor in hospital due to worsening dehydration, change IVF to 1/2NS and increase rate to 125ml/hour    Hypotension  Assessment & Plan  Intermittent episodes of hypotension, most occurrences happen overnight and question correlation with minipress administration.  This has now been discontinued    Sinus tachycardia  Assessment & Plan  Heart rate 100s-120s at times.  Sinus.  Has received IV fluids without much improvement.  Trialed IVFs again 8/22 given reports of nausea with poor oral intake and dizziness with some improvement to the 110s with HR- will continue with IVFs again today   orthostatic blood pressure negative   Pt is on  "minipress which could have a side effect of ST.  Additionally noted she is hypotensive each night around 2/3 AM, I suspect this is likely the underlying cause.  She was started on this by her psychiatrist to assist with sleep and eating outpatient   Will hold minipress for now.  D/w pt can have PRN trazodone for sleep.  Pt agreeable   Monitor     Back pain  Assessment & Plan  Acute on chronic, likely worsened in setting of recent fall and deconditioning.   CT imaging reviewed, degenerative changes noted.  Continue ATC APAP, Baclofen 5 mg BID (was taking PRN PTA), lido patch. PRN Tramdol on board.   Mobilize as tolerated, PT/OT    Failure to thrive in adult  Assessment & Plan  Patient presented with increased generalized weakness and frequent falls, ongoing diarrhea, dysuria, poor oral intake, and at least 70-80 pound weight loss over the past 1 year. Has been following with GI, had colonoscopy May 2024 with poor prep--had EGD with esophagitis and gastritis  Labs with multiple derangements including hyperglycemia, hypokalemia and hypomagnesemia on admission, improved.   C. difficile positive as above  Patient reports she has not been using insulin due to \"insurance issue\" and inability to self inject due to her neuropathy  CT abdomen and pelvis without any obvious malignancy  Treatment as per respective issues  PT/OT recommending rehab.     Hypomagnesemia  Assessment & Plan  Hold oral supplementation due to diarrhea  Ordering additional IV mag with persistent low mag   Repeat labs in AM     Severe protein-calorie malnutrition (HCC)  Assessment & Plan  Severe protein-calorie malnutrition 2/2 chronic diarrhea a/e/b fat and muscle loss requiring Nutrition consult, oral diet and nutrition supplements    360 Statement: severe malnutrition r/t suspect combination of chronic illness and social/environmental factors as evidenced by severe temporal muscle loss, severe tricep fat pad loss, at least moderate muscle loss around " "clavicles and shoulders, at least moderate orbital fat pad loss. Treatment: oral diet and oral nutrition supplements.    BMI Findings:  Adult BMI Classifications: Underweight < 18.5        Body mass index is 16.1 kg/m².   C/w supplements  Nutrition consult appreciated  Encourage po intake     Acute cystitis without hematuria  Assessment & Plan  Reported dysuria on admission   Urine + nitrite & leukocytes, CT imaging with distended bladder suggestive of retention and small amount of air which could be secondary to infection  Urine Culture noted, S/P 3 doses of IV CTX 8/17 8/21, reported pain AFTER urination, likely related to urine hitting excoriated labia/bottom from frequent stooling. Will continue with medicated cream to this area- denies pain at this time   S/p pyridum x 3 doses  Wound care consult    Uncontrolled type 2 diabetes mellitus with hyperglycemia (HCC)  Assessment & Plan  Lab Results   Component Value Date    HGBA1C 16.1 (H) 08/16/2024       Recent Labs     08/26/24  1626 08/26/24  2023 08/27/24  0657 08/27/24  1016   POCGLU 169* 172* 88 244*       Patient markedly hyperglycemic on admission only partially responsive to IV fluids, remainder of labs fortunately not consistent with DKA. A1C 16  Patient admitting she has not been using her home insulin as she apparently has been unable to fill prescription due to \"insurance issues,\" additionally reports that due to her neuropathy she has difficulty self injecting.  Daughter also reports jessica non compliance.   Typically on NovoLog 70/30 20 units twice daily  Remains hyperglycemic despite increasing basal insulin and lispro  Changed Lantus to 20 units BID.  Lispro 10 units TID AC   insurance issues relayed to CM  Follows with endo outpatient, continue   CCD level 3 diet     Opioid dependence with other opioid-induced disorder (HCC)  Assessment & Plan  PDMP reviewed, patient maintained on tramadol 200 mg daily as needed; history of chronic cervical/lumbar " pain noted  Continue as needed tramadol and chronic gabapentin dosing    Chronic diarrhea  Assessment & Plan  Longstanding history of chronic diarrhea, patient reporting acute diarrhea x1 month. Follows with GI. Has tried questran/imodium without relief  Stool enteric panel negative  C. difficile positive, see plan above     Tobacco dependence  Assessment & Plan  Counseled on need for cessation, provide nicotine patch while admitted  Recent CT chest for lung cancer screening 7/30/2024 negative for nodules or masses    Hypothyroidism  Assessment & Plan  Continue home dose levothyroxine     GERD without esophagitis  Assessment & Plan  Continue PPI    Severe episode of recurrent major depressive disorder, without psychotic features (HCC)  Assessment & Plan  Mood appearing fairly stable, continue home duloxetine, mirtazapine, as needed Valium  Hold minipress given symptomatic hypotension episodes, tachycardia              VTE Pharmacologic Prophylaxis: VTE Score: 3 Moderate Risk (Score 3-4) - Pharmacological DVT Prophylaxis Ordered: heparin.    Mobility:   Basic Mobility Inpatient Raw Score: 18  JH-HLM Goal: 6: Walk 10 steps or more  JH-HLM Achieved: 1: Laying in bed  JH-HLM Goal achieved. Continue to encourage appropriate mobility.    Patient Centered Rounds: I performed bedside rounds with nursing staff today.   Discussions with Specialists or Other Care Team Provider: nurseBO      Total Time Spent on Date of Encounter in care of patient: 40 mins. This time was spent on one or more of the following: performing physical exam; counseling and coordination of care; obtaining or reviewing history; documenting in the medical record; reviewing/ordering tests, medications or procedures; communicating with other healthcare professionals and discussing with patient's family/caregivers.    Current Length of Stay: 11 day(s)  Current Patient Status: Inpatient   Certification Statement: The patient will continue to require  additional inpatient hospital stay due to diarrhea  Discharge Plan: Anticipate discharge in 24-48 hrs to home.    Code Status: Level 1 - Full Code    Subjective:   Reports continue loose stools. Appetite is good. Denies abdominal pain or nausea.    Objective:     Vitals:   Temp (24hrs), Av.2 °F (36.8 °C), Min:97.4 °F (36.3 °C), Max:98.7 °F (37.1 °C)    Temp:  [97.4 °F (36.3 °C)-98.7 °F (37.1 °C)] 97.4 °F (36.3 °C)  HR:  [110-119] 119  Resp:  [16-18] 18  BP: (108-133)/(74-95) 133/95  SpO2:  [95 %-98 %] 98 %  Body mass index is 16.1 kg/m².     Input and Output Summary (last 24 hours):     Intake/Output Summary (Last 24 hours) at 2024 1315  Last data filed at 2024 0851  Gross per 24 hour   Intake 2182 ml   Output --   Net 2182 ml       Physical Exam:   Physical Exam  Constitutional:       Appearance: She is ill-appearing.   HENT:      Head: Normocephalic and atraumatic.      Nose: Nose normal.   Eyes:      Extraocular Movements: Extraocular movements intact.   Cardiovascular:      Rate and Rhythm: Normal rate and regular rhythm.   Pulmonary:      Effort: Pulmonary effort is normal.      Breath sounds: No wheezing or rales.   Abdominal:      General: There is no distension.      Palpations: Abdomen is soft.      Tenderness: There is no abdominal tenderness.   Musculoskeletal:      Right lower leg: No edema.      Left lower leg: No edema.   Skin:     General: Skin is warm and dry.   Neurological:      General: No focal deficit present.      Mental Status: She is alert and oriented to person, place, and time.   Psychiatric:         Mood and Affect: Mood normal.         Behavior: Behavior normal.          Additional Data:     Labs:  Results from last 7 days   Lab Units 24  0552   WBC Thousand/uL 6.81   HEMOGLOBIN g/dL 10.6*   HEMATOCRIT % 32.7*   PLATELETS Thousands/uL 306   SEGS PCT % 41*   LYMPHO PCT % 47*   MONO PCT % 9   EOS PCT % 2     Results from last 7 days   Lab Units 24  0552  08/25/24  0526 08/24/24  0746   SODIUM mmol/L 144   < > 140   POTASSIUM mmol/L 3.9   < > 3.9   CHLORIDE mmol/L 108   < > 112*   CO2 mmol/L 29   < > 24   BUN mg/dL 12   < > 7   CREATININE mg/dL 0.51*   < > 0.30*   ANION GAP mmol/L 7   < > 4   CALCIUM mg/dL 9.4   < > 7.2*   ALBUMIN g/dL  --   --  2.7*   TOTAL BILIRUBIN mg/dL  --   --  0.21   ALK PHOS U/L  --   --  131*   ALT U/L  --   --  43   AST U/L  --   --  45*   GLUCOSE RANDOM mg/dL 57*   < > 310*    < > = values in this interval not displayed.         Results from last 7 days   Lab Units 08/27/24  1016 08/27/24  0657 08/26/24 2023 08/26/24  1626 08/26/24  1040 08/26/24  0614 08/25/24  2047 08/25/24  1538 08/25/24  1223 08/25/24  0738 08/24/24  2116 08/24/24  1702   POC GLUCOSE mg/dl 244* 88 172* 169* 154* 173* 202* 361* 286* 206* 308* 310*               Lines/Drains:  Invasive Devices       Peripheral Intravenous Line  Duration             Peripheral IV 08/26/24 Proximal;Right;Ventral (anterior) Forearm 1 day                          Imaging: No pertinent imaging reviewed.    Recent Cultures (last 7 days):         Last 24 Hours Medication List:   Current Facility-Administered Medications   Medication Dose Route Frequency Provider Last Rate    acetaminophen  975 mg Oral Q8H [facility] ELISEO Toribio      albuterol  2 puff Inhalation Q4H PRN Renan Drake, DO      aspirin  81 mg Oral Daily Renan Drake, DO      atorvastatin  80 mg Oral Daily Renan Drake, DO      baclofen  5 mg Oral BID ELISEO Toribio      butalbital-acetaminophen-caffeine  1 tablet Oral Q6H PRN Renan Drake, DO      diazepam  5 mg Oral Q12H PRN Renan Drake, DO      DULoxetine  60 mg Oral BID Renan Drake, DO      fluticasone-vilanterol  1 puff Inhalation Daily Renan Drake, DO      gabapentin  800 mg Oral TID Renan Drake DO      heparin (porcine)  5,000 Units Subcutaneous Q8H [facility] Renan Drake DO      insulin glargine  15 Units Subcutaneous Q12H [facility] Abhay Hayes MD       insulin lispro  1-5 Units Subcutaneous TID AC Renan Drake, DO      insulin lispro  1-5 Units Subcutaneous HS Renan Drake, DO      insulin lispro  7 Units Subcutaneous TID With Meals Abhay Hayes MD      levothyroxine  112 mcg Oral Early Morning Renan Drake, DO      lidocaine  2 patch Topical Daily Magda Amaya, CRNP      mirtazapine  7.5 mg Oral HS Renan Drake, DO      DARLIN ANTIFUNGAL   Topical BID hCerise Grey, ELISEO      nicotine  1 patch Transdermal Daily Renan Drake, DO      ondansetron  4 mg Intravenous Q6H PRN Renan Drake, DO      pantoprazole  40 mg Oral Early Morning Renan Drkae, DO      potassium chloride  40 mEq Oral BID Holli Hernandez PA-C      saccharomyces boulardii  250 mg Oral BID ELISEO Trevino      sodium chloride  125 mL/hr Intravenous Continuous Abhay Hayes  mL/hr (08/27/24 1025)    traMADol  50 mg Oral Q6H PRN Renan Drake, DO      traZODone  50 mg Oral HS PRN Estefany Villalobos PA-C      vancomycin oral (capsules or solution)  125 mg Oral Q6H [facility] Estefany Villalobos PA-C          Today, Patient Was Seen By: Abhay Hayes MD    **Please Note: This note may have been constructed using a voice recognition system.**

## 2024-08-27 NOTE — ASSESSMENT & PLAN NOTE
Hold oral supplementation due to diarrhea  Ordering additional IV mag with persistent low mag   Repeat labs in AM

## 2024-08-27 NOTE — ASSESSMENT & PLAN NOTE
"Lab Results   Component Value Date    HGBA1C 16.1 (H) 08/16/2024       Recent Labs     08/26/24  1626 08/26/24 2023 08/27/24  0657 08/27/24  1016   POCGLU 169* 172* 88 244*       Patient markedly hyperglycemic on admission only partially responsive to IV fluids, remainder of labs fortunately not consistent with DKA. A1C 16  Patient admitting she has not been using her home insulin as she apparently has been unable to fill prescription due to \"insurance issues,\" additionally reports that due to her neuropathy she has difficulty self injecting.  Daughter also reports jessica non compliance.   Typically on NovoLog 70/30 20 units twice daily  Remains hyperglycemic despite increasing basal insulin and lispro  Changed Lantus to 20 units BID.  Lispro 10 units TID AC   insurance issues relayed to CM  Follows with endo outpatient, continue   CCD level 3 diet   "

## 2024-08-27 NOTE — ASSESSMENT & PLAN NOTE
Patient presented with generalized weakness, frequent falls.  She has had ongoing diarrhea, dysuria, poor oral intake and reported 70 pound weight loss over the last year.  Initially hypotensive in the ER, status post IV fluids. Labs with multiple metabolic derangements  CT imaging showing mild diffuse colonic wall thickening, mild diffuse wall thickening of stomach  C. difficile found to be positive--has a history of C. difficile in September 2022.    Patient started on Dificid 200 mg x 10 days per protocol on 8/18 as this is recurrent infection  Initially seemed BMs were improving and were becoming more formed, however again having frequent watery stools  GI consulted, recommended to switch to PO Vanco 125 mg q6hr x 14 days.  Will monitor.  Will resume IV fluids 8/26 with persistent watery diarrhea   Contact precautions  Appetite is good   Bannatrol supplements ordered- will request nutrition increase frequency   PT/OT recommending rehab, CM following.   Stop lactose and artificial sweeteners  Continue to monitor in hospital due to worsening dehydration, change IVF to 1/2NS and increase rate to 125ml/hour

## 2024-08-27 NOTE — WOUND OSTOMY CARE
Consult Note - Wound   Barb Palomo 54 y.o. female MRN: [id number]  Unit/Bed#: [facility] 522-01 Encounter: [id number]        History and Present Illness:  Patient is a 55 yo female that was admitted to [facility] for treatment of c Dif diarrhea. Patient has a PMH of CVA, DVT/PE, GERD, IBS, 2DM. Patient is a min assist for turning and repositioning. Patient is incontinent of bowel and bladder. On assessment, patient is seen lying on regular mattress.      Wound Care was consulted for sacral wound.     Assessment Findings:   B/L heels are dry intact and yenny with no skin loss or wounds present. Recommend preventative Hydraguard Cream and proper offloading/ repositioning.      B/L sacro-buttocks and kim-rectal area is dry, intact, pink in color and blanches. No skin loss or wounds present. Recommend continuing with DARLIN Anti-Fungal Cream to area.           No induration, fluctuance, odor, warmth/temperature differences, redness, or purulence noted to the above noted wounds and skin areas assessed. New dressings applied per orders listed below. Patient tolerated well- no s/s of non-verbal pain or discomfort observed during the encounter. Bedside nurse aware of plan of care..    Orders listed below and wound care will sign off, call or Secure Chat Message with questions.     Skin Care Plan:  1-Apply DARLIN Anti-Fungal Cream to B/L Sacro-Buttocks, Kim-Rectal Area, and Labia BID and PRN episodes of incontinence  2-Turn/reposition q2h or when medically stable for pressure re-distribution on skin .  3-Elevate heels to offload pressure.  4-Moisturize skin daily with skin nourishing cream  5-Ehob cushion in chair when out of bed.  6-Preventative Hydraguard to bilateral heels BID and PRN.               Holli Sanchez RN, BSN, CWOCN

## 2024-08-28 LAB
ANION GAP SERPL CALCULATED.3IONS-SCNC: 5 MMOL/L (ref 4–13)
BUN SERPL-MCNC: 15 MG/DL (ref 5–25)
CALCIUM SERPL-MCNC: 8.9 MG/DL (ref 8.4–10.2)
CHLORIDE SERPL-SCNC: 107 MMOL/L (ref 96–108)
CO2 SERPL-SCNC: 27 MMOL/L (ref 21–32)
CREAT SERPL-MCNC: 0.35 MG/DL (ref 0.6–1.3)
ERYTHROCYTE [DISTWIDTH] IN BLOOD BY AUTOMATED COUNT: 14 % (ref 11.6–15.1)
GFR SERPL CREATININE-BSD FRML MDRD: 124 ML/MIN/1.73SQ M
GLUCOSE SERPL-MCNC: 122 MG/DL (ref 65–140)
GLUCOSE SERPL-MCNC: 182 MG/DL (ref 65–140)
GLUCOSE SERPL-MCNC: 191 MG/DL (ref 65–140)
GLUCOSE SERPL-MCNC: 204 MG/DL (ref 65–140)
HCT VFR BLD AUTO: 34.7 % (ref 34.8–46.1)
HGB BLD-MCNC: 11.1 G/DL (ref 11.5–15.4)
MAGNESIUM SERPL-MCNC: 1.4 MG/DL (ref 1.9–2.7)
MCH RBC QN AUTO: 32.3 PG (ref 26.8–34.3)
MCHC RBC AUTO-ENTMCNC: 32 G/DL (ref 31.4–37.4)
MCV RBC AUTO: 101 FL (ref 82–98)
PHOSPHATE SERPL-MCNC: 3.3 MG/DL (ref 2.7–4.5)
PLATELET # BLD AUTO: 315 THOUSANDS/UL (ref 149–390)
PMV BLD AUTO: 11 FL (ref 8.9–12.7)
POTASSIUM SERPL-SCNC: 4.5 MMOL/L (ref 3.5–5.3)
RBC # BLD AUTO: 3.44 MILLION/UL (ref 3.81–5.12)
SODIUM SERPL-SCNC: 139 MMOL/L (ref 135–147)
WBC # BLD AUTO: 5.61 THOUSAND/UL (ref 4.31–10.16)

## 2024-08-28 PROCEDURE — 83735 ASSAY OF MAGNESIUM: CPT | Performed by: INTERNAL MEDICINE

## 2024-08-28 PROCEDURE — 99232 SBSQ HOSP IP/OBS MODERATE 35: CPT | Performed by: INTERNAL MEDICINE

## 2024-08-28 PROCEDURE — 80048 BASIC METABOLIC PNL TOTAL CA: CPT | Performed by: INTERNAL MEDICINE

## 2024-08-28 PROCEDURE — 85027 COMPLETE CBC AUTOMATED: CPT | Performed by: INTERNAL MEDICINE

## 2024-08-28 PROCEDURE — 87329 GIARDIA AG IA: CPT | Performed by: STUDENT IN AN ORGANIZED HEALTH CARE EDUCATION/TRAINING PROGRAM

## 2024-08-28 PROCEDURE — 84100 ASSAY OF PHOSPHORUS: CPT | Performed by: INTERNAL MEDICINE

## 2024-08-28 PROCEDURE — 82948 REAGENT STRIP/BLOOD GLUCOSE: CPT

## 2024-08-28 RX ORDER — SODIUM CHLORIDE 450 MG/100ML
75 INJECTION, SOLUTION INTRAVENOUS CONTINUOUS
Status: DISPENSED | OUTPATIENT
Start: 2024-08-28 | End: 2024-08-30

## 2024-08-28 RX ORDER — MAGNESIUM SULFATE HEPTAHYDRATE 40 MG/ML
4 INJECTION, SOLUTION INTRAVENOUS ONCE
Status: COMPLETED | OUTPATIENT
Start: 2024-08-28 | End: 2024-08-29

## 2024-08-28 RX ADMIN — Medication 250 MG: at 08:03

## 2024-08-28 RX ADMIN — INSULIN LISPRO 7 UNITS: 100 INJECTION, SOLUTION INTRAVENOUS; SUBCUTANEOUS at 11:47

## 2024-08-28 RX ADMIN — ACETAMINOPHEN 975 MG: 325 TABLET ORAL at 21:39

## 2024-08-28 RX ADMIN — ACETAMINOPHEN 975 MG: 325 TABLET ORAL at 13:35

## 2024-08-28 RX ADMIN — VANCOMYCIN HYDROCHLORIDE 125 MG: 125 CAPSULE ORAL at 06:10

## 2024-08-28 RX ADMIN — ATORVASTATIN CALCIUM 80 MG: 80 TABLET, FILM COATED ORAL at 08:03

## 2024-08-28 RX ADMIN — POTASSIUM CHLORIDE 40 MEQ: 1500 TABLET, EXTENDED RELEASE ORAL at 17:01

## 2024-08-28 RX ADMIN — GABAPENTIN 800 MG: 400 CAPSULE ORAL at 15:40

## 2024-08-28 RX ADMIN — GABAPENTIN 800 MG: 400 CAPSULE ORAL at 21:38

## 2024-08-28 RX ADMIN — INSULIN GLARGINE 15 UNITS: 100 INJECTION, SOLUTION SUBCUTANEOUS at 08:56

## 2024-08-28 RX ADMIN — VANCOMYCIN HYDROCHLORIDE 125 MG: 125 CAPSULE ORAL at 17:00

## 2024-08-28 RX ADMIN — SODIUM CHLORIDE 125 ML/HR: 0.45 INJECTION, SOLUTION INTRAVENOUS at 21:33

## 2024-08-28 RX ADMIN — ACETAMINOPHEN 975 MG: 325 TABLET ORAL at 06:07

## 2024-08-28 RX ADMIN — ASPIRIN 81 MG CHEWABLE TABLET 81 MG: 81 TABLET CHEWABLE at 08:03

## 2024-08-28 RX ADMIN — TRAZODONE HYDROCHLORIDE 50 MG: 50 TABLET ORAL at 21:38

## 2024-08-28 RX ADMIN — FLUTICASONE FUROATE AND VILANTEROL TRIFENATATE 1 PUFF: 200; 25 POWDER RESPIRATORY (INHALATION) at 07:51

## 2024-08-28 RX ADMIN — HEPARIN SODIUM 5000 UNITS: 5000 INJECTION INTRAVENOUS; SUBCUTANEOUS at 13:35

## 2024-08-28 RX ADMIN — ONDANSETRON 4 MG: 2 INJECTION INTRAMUSCULAR; INTRAVENOUS at 02:42

## 2024-08-28 RX ADMIN — INSULIN LISPRO 7 UNITS: 100 INJECTION, SOLUTION INTRAVENOUS; SUBCUTANEOUS at 18:20

## 2024-08-28 RX ADMIN — INSULIN GLARGINE 15 UNITS: 100 INJECTION, SOLUTION SUBCUTANEOUS at 21:38

## 2024-08-28 RX ADMIN — INSULIN LISPRO 7 UNITS: 100 INJECTION, SOLUTION INTRAVENOUS; SUBCUTANEOUS at 07:50

## 2024-08-28 RX ADMIN — VANCOMYCIN HYDROCHLORIDE 125 MG: 125 CAPSULE ORAL at 11:38

## 2024-08-28 RX ADMIN — SODIUM CHLORIDE 125 ML/HR: 0.45 INJECTION, SOLUTION INTRAVENOUS at 13:23

## 2024-08-28 RX ADMIN — BACLOFEN 5 MG: 10 TABLET ORAL at 17:00

## 2024-08-28 RX ADMIN — BACLOFEN 5 MG: 10 TABLET ORAL at 08:03

## 2024-08-28 RX ADMIN — LIDOCAINE 2 PATCH: 700 PATCH TOPICAL at 08:03

## 2024-08-28 RX ADMIN — DULOXETINE HYDROCHLORIDE 60 MG: 60 CAPSULE, DELAYED RELEASE ORAL at 17:00

## 2024-08-28 RX ADMIN — POTASSIUM CHLORIDE 40 MEQ: 1500 TABLET, EXTENDED RELEASE ORAL at 08:04

## 2024-08-28 RX ADMIN — LEVOTHYROXINE SODIUM 112 MCG: 112 TABLET ORAL at 06:07

## 2024-08-28 RX ADMIN — MIRTAZAPINE 7.5 MG: 15 TABLET, FILM COATED ORAL at 21:38

## 2024-08-28 RX ADMIN — INSULIN LISPRO 1 UNITS: 100 INJECTION, SOLUTION INTRAVENOUS; SUBCUTANEOUS at 07:50

## 2024-08-28 RX ADMIN — HEPARIN SODIUM 5000 UNITS: 5000 INJECTION INTRAVENOUS; SUBCUTANEOUS at 06:07

## 2024-08-28 RX ADMIN — TRAMADOL HYDROCHLORIDE 50 MG: 50 TABLET, COATED ORAL at 11:40

## 2024-08-28 RX ADMIN — ONDANSETRON 4 MG: 2 INJECTION INTRAMUSCULAR; INTRAVENOUS at 11:40

## 2024-08-28 RX ADMIN — SODIUM CHLORIDE 125 ML/HR: 0.45 INJECTION, SOLUTION INTRAVENOUS at 02:48

## 2024-08-28 RX ADMIN — MAGNESIUM SULFATE HEPTAHYDRATE 4 G: 40 INJECTION, SOLUTION INTRAVENOUS at 08:01

## 2024-08-28 RX ADMIN — INSULIN LISPRO 1 UNITS: 100 INJECTION, SOLUTION INTRAVENOUS; SUBCUTANEOUS at 11:47

## 2024-08-28 RX ADMIN — PANTOPRAZOLE SODIUM 40 MG: 40 TABLET, DELAYED RELEASE ORAL at 06:07

## 2024-08-28 RX ADMIN — MICONAZOLE NITRATE: 20 CREAM TOPICAL at 18:20

## 2024-08-28 RX ADMIN — TRAMADOL HYDROCHLORIDE 50 MG: 50 TABLET, COATED ORAL at 02:40

## 2024-08-28 RX ADMIN — MICONAZOLE NITRATE: 20 CREAM TOPICAL at 08:57

## 2024-08-28 RX ADMIN — Medication 250 MG: at 17:00

## 2024-08-28 RX ADMIN — HEPARIN SODIUM 5000 UNITS: 5000 INJECTION INTRAVENOUS; SUBCUTANEOUS at 21:38

## 2024-08-28 RX ADMIN — DULOXETINE HYDROCHLORIDE 60 MG: 60 CAPSULE, DELAYED RELEASE ORAL at 08:03

## 2024-08-28 RX ADMIN — NICOTINE 1 PATCH: 21 PATCH, EXTENDED RELEASE TRANSDERMAL at 08:08

## 2024-08-28 RX ADMIN — GABAPENTIN 800 MG: 400 CAPSULE ORAL at 08:03

## 2024-08-28 NOTE — PROGRESS NOTES
Progress Note- Barb Palomo 54 y.o. female MRN: [id number]    Unit/Bed#: [facility] 522-01 Encounter: [id number]      Assessment and Plan:    Ms. 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 GI consulted for acute on chronic diarrhea.     # Clostridium difficile Infection: Patient initially presented with complaints on worsening weakness and frequent falls on 8/20.  Patient also with 70-80 lbs weight loss and chronic, watery diarrhea. Admits to difficulty following the death of her  and also been dealing with “insurance issues” leading to difficulty with her insulin. Labs on admission revealed severe hyperglycemia with electrolyte abnormalities. CT AP revealed mild diffuse wall thickening vs under distension of the colon as well as the stomach. In the past, patient has followed with GI for her unintentional weight loss and diarrhea. She has EGD/colonoscopy performed 5/2024 which was unrevealing although random colon biopsies were negative and patient had a very poor prep limiting sufficient examination. This admission, patient had PCR and EIA testing completed for C.Diff and both were found to be positive on 8/16 indicative of active infection. She had been on antibiotics last month for a dental procedure recently started on for Doxy mycin and completed a 7-day course but experienced no improvement in her diarrhea.  Therefore over the weekend, GI consulted and patient has now been transitioned to oral vancomycin. Today, stool improving in consistency but continue to remain frequent. Ultimately, plan to continue on a 14-day course and monitor for symptom improvement. If patient with no improvement in stool frequency, will need to consider flex sig vs colonoscopy with biopsies.      Plan:  Continue on PO Vancomycin  125 mg q6 hours x 14 days  Avoid anti-diarrheals; continue to restrict lactulose and continue Banatrol if needed with meals  Giardia stool testing ordered; follow up on results  Continue IVF hydration with Isolyte @ 150 cc/hr; monitor and replete electrolytes as needed  No endoscopic intervention warranted; consider colonoscopy/sigmoidoscopy if symptoms not improving  Maintain contact precautions with strict handwashing  Hold PPI therapy; resume when symptoms resolved  Avoid use of probiotics for the prevention of recurrence   In the future, will require Cdiff prophylaxis with all antibiotic therapy   Additional symptom management per primary team   ______________________________________________________________________    Subjective:     Patient seen and evaluated bedside.  Sitting in bed comfortably in no acute distress or visible discomfort.  Does admit to ongoing bowel movements.  Admits to having frequent bowel movements overnight.  States that bowel movements have continued to improve in consistency.  No longer having liquid stools.  Oral intake does worsen/trigger bowel movements.  Denies any fever/chills.  Admits to very mild right lower quadrant pain.  Offers no additional complaints.    Medication Administration - last 24 hours from 08/27/2024 0808 to 08/28/2024 0808         Date/Time Order Dose Route Action Action by     08/28/2024 0242 EDT ondansetron (ZOFRAN) injection 4 mg 4 mg Intravenous Given Mana Vuong RN     08/27/2024 1942 EDT ondansetron (ZOFRAN) injection 4 mg 4 mg Intravenous Given Desirae Vargas RN     08/27/2024 0918 EDT ondansetron (ZOFRAN) injection 4 mg 4 mg Intravenous Given Yasmine Strickland RN     08/28/2024 0803 EDT aspirin chewable tablet 81 mg 81 mg Oral Given Phuong Morelos RN     08/27/2024 0916 EDT aspirin chewable tablet 81 mg 81 mg Oral Given Yasmine Strickland RN     08/28/2024 0803 EDT atorvastatin (LIPITOR) tablet 80 mg 80 mg Oral Given Phuong Morelos RN      08/27/2024 0916 EDT atorvastatin (LIPITOR) tablet 80 mg 80 mg Oral Given Yasmine Strickland RN     08/27/2024 1445 EDT diazepam (VALIUM) tablet 5 mg 5 mg Oral Given Yasmine Strickland RN     08/28/2024 0803 EDT DULoxetine (CYMBALTA) delayed release capsule 60 mg 60 mg Oral Given Phuong Morelos RN     08/27/2024 1710 EDT DULoxetine (CYMBALTA) delayed release capsule 60 mg 60 mg Oral Given Yasmine Strickland RN     08/27/2024 0916 EDT DULoxetine (CYMBALTA) delayed release capsule 60 mg 60 mg Oral Given Yasmine Strickland RN     08/28/2024 0751 EDT fluticasone-vilanterol 200-25 mcg/actuation 1 puff 1 puff Inhalation Given Phuong Morelos RN     08/27/2024 0918 EDT fluticasone-vilanterol 200-25 mcg/actuation 1 puff 1 puff Inhalation Given Yasmine Strickland RN     08/28/2024 0607 EDT levothyroxine tablet 112 mcg 112 mcg Oral Given Desirae Vargas RN     08/27/2024 2151 EDT mirtazapine (REMERON) tablet 7.5 mg 7.5 mg Oral Given Desirae Vargas RN     08/28/2024 0607 EDT pantoprazole (PROTONIX) EC tablet 40 mg 40 mg Oral Given Desirae Vargas RN     08/28/2024 0803 EDT nicotine (NICODERM CQ) 21 mg/24 hr TD 24 hr patch 1 patch 1 patch Transdermal Patch Removed Phuong Morelos RN     08/27/2024 0918 EDT nicotine (NICODERM CQ) 21 mg/24 hr TD 24 hr patch 1 patch 1 patch Transdermal Medication Applied Yasmine Strickland RN     08/27/2024 0917 EDT nicotine (NICODERM CQ) 21 mg/24 hr TD 24 hr patch 1 patch 1 patch Transdermal Patch Removed Yasmine Strickland RN     08/28/2024 0607 EDT heparin (porcine) subcutaneous injection 5,000 Units 5,000 Units Subcutaneous Given Desirae Vargas RN     08/27/2024 2149 EDT heparin (porcine) subcutaneous injection 5,000 Units 5,000 Units Subcutaneous Given Desirae Vargas RN     08/27/2024 1422 EDT heparin (porcine) subcutaneous injection 5,000 Units 5,000 Units Subcutaneous Given Yasmine Strickland RN     08/28/2024 0750 EDT insulin lispro  (HumALOG/ADMELOG) 100 units/mL subcutaneous injection 1-5 Units 1 Units Subcutaneous Given Phuong Morelos, CAITLYN     08/27/2024 1712 EDT insulin lispro (HumALOG/ADMELOG) 100 units/mL subcutaneous injection 1-5 Units 2 Units Subcutaneous Given Yasmine Strickland RN     08/27/2024 1127 EDT insulin lispro (HumALOG/ADMELOG) 100 units/mL subcutaneous injection 1-5 Units 2 Units Subcutaneous Given Yasmine Strickland RN     08/27/2024 2150 EDT insulin lispro (HumALOG/ADMELOG) 100 units/mL subcutaneous injection 1-5 Units 1 Units Subcutaneous Given Desirae Vargas, RN     08/28/2024 0240 EDT traMADol (ULTRAM) tablet 50 mg 50 mg Oral Given Mana Vuong RN     08/27/2024 1942 EDT traMADol (ULTRAM) tablet 50 mg 50 mg Oral Given Desirae Vargas RN     08/28/2024 0803 EDT gabapentin (NEURONTIN) capsule 800 mg 800 mg Oral Given Phuong Morelos RN     08/27/2024 2151 EDT gabapentin (NEURONTIN) capsule 800 mg 800 mg Oral Given Desirae Vargas, CAITLYN     08/27/2024 1710 EDT gabapentin (NEURONTIN) capsule 800 mg 800 mg Oral Given Yasmine Strickland RN     08/27/2024 0916 EDT gabapentin (NEURONTIN) capsule 800 mg 800 mg Oral Given Yasmine Strickland RN     08/28/2024 0804 EDT potassium chloride (Klor-Con M20) CR tablet 40 mEq 40 mEq Oral Given Phuong Morelos RN     08/27/2024 1710 EDT potassium chloride (Klor-Con M20) CR tablet 40 mEq 40 mEq Oral Given Yasmine Strickland RN     08/27/2024 0916 EDT potassium chloride (Klor-Con M20) CR tablet 40 mEq 40 mEq Oral Given Yasmine Strickland RN     08/27/2024 0916 EDT magnesium Oxide (MAG-OX) tablet 400 mg 400 mg Oral Given Yasmine Strickland RN     08/28/2024 0607 EDT acetaminophen (TYLENOL) tablet 975 mg 975 mg Oral Given Desirae Vargas RN     08/27/2024 2150 EDT acetaminophen (TYLENOL) tablet 975 mg 975 mg Oral Given Desirae Vargas RN     08/27/2024 1422 EDT acetaminophen (TYLENOL) tablet 975 mg 975 mg Oral Given Yasmine Strickland RN      08/28/2024 0803 EDT baclofen tablet 5 mg 5 mg Oral Given Phuong Morelos, CAITLYN     08/27/2024 1710 EDT baclofen tablet 5 mg 5 mg Oral Given Yasmine M Marco A, CAITLYN     08/27/2024 0916 EDT baclofen tablet 5 mg 5 mg Oral Given Yasmine CHAVES PalomoFermin, CAITLYN     08/28/2024 0803 EDT lidocaine (LIDODERM) 5 % patch 2 patch 2 patch Topical Medication Applied Phuong Morelos, CAITLYN     08/27/2024 2152 EDT lidocaine (LIDODERM) 5 % patch 2 patch 2 patch Topical Patch Removed Desirae Vargas, CAITLYN     08/27/2024 0917 EDT lidocaine (LIDODERM) 5 % patch 2 patch 2 patch Topical Medication Applied Yasmine M Marco A, CAITLYN     08/27/2024 1711 EDT moisture barrier miconazole 2% cream (aka DARLIN MOISTURE BARRIER ANTIFUNGAL CREAM) -- Topical Given Yasmine CHAVES CAITLYN Strickland     08/27/2024 0919 EDT moisture barrier miconazole 2% cream (aka DARLIN MOISTURE BARRIER ANTIFUNGAL CREAM) -- Topical Given Yasmine CHAVES Marco A, CAITLYN     08/28/2024 0803 EDT saccharomyces boulardii (FLORASTOR) capsule 250 mg 250 mg Oral Given Phuong Morelos, CAITLYN     08/27/2024 1710 EDT saccharomyces boulardii (FLORASTOR) capsule 250 mg 250 mg Oral Given Yasmine M Marco A, CAITLYN     08/27/2024 0916 EDT saccharomyces boulardii (FLORASTOR) capsule 250 mg 250 mg Oral Given Yasmine CHAVES Marco A, CAITLYN     08/27/2024 2309 EDT traZODone (DESYREL) tablet 50 mg 50 mg Oral Given Desirae Vargas, CAITLYN     08/28/2024 0610 EDT vancomycin (VANCOCIN) capsule 125 mg 125 mg Oral Given Desirae Vargas, CAITLYN     08/27/2024 2308 EDT vancomycin (VANCOCIN) capsule 125 mg 125 mg Oral Given Desirae Vargas, CAITLYN     08/27/2024 1711 EDT vancomycin (VANCOCIN) capsule 125 mg 125 mg Oral Given Yasmine M CAITLYN Strickland     08/27/2024 1126 EDT vancomycin (VANCOCIN) capsule 125 mg 125 mg Oral Given Yasmine Strickland RN     08/27/2024 0842 EDT insulin lispro (HumALOG/ADMELOG) 100 units/mL subcutaneous injection 10 Units 10 Units Subcutaneous Not Given Yasmine Strickladn RN     08/27/2024 1840 EDT  "sodium chloride 0.9 % infusion 0 mL/hr Intravenous Stopped Yasmine Strickland, CAITLYN     08/28/2024 0114 EDT magnesium sulfate 2 g/50 mL IVPB (premix) 2 g 0 g Intravenous Stopped Desirae Vargas, CAITLYN     08/27/2024 0916 EDT magnesium sulfate 2 g/50 mL IVPB (premix) 2 g 2 g Intravenous New Bag Yasmine Strickland RN     08/27/2024 2150 EDT insulin glargine (LANTUS) subcutaneous injection 15 Units 0.15 mL 15 Units Subcutaneous Given Desirae Vargas, CAITLYN     08/27/2024 0932 EDT insulin glargine (LANTUS) subcutaneous injection 15 Units 0.15 mL 15 Units Subcutaneous Given Yasmine Strickland RN     08/28/2024 0750 EDT insulin lispro (HumALOG/ADMELOG) 100 units/mL subcutaneous injection 7 Units 7 Units Subcutaneous Given Phuong Morelos RN     08/27/2024 1712 EDT insulin lispro (HumALOG/ADMELOG) 100 units/mL subcutaneous injection 7 Units 7 Units Subcutaneous Given Yasmine Strickland RN     08/27/2024 1127 EDT insulin lispro (HumALOG/ADMELOG) 100 units/mL subcutaneous injection 7 Units 7 Units Subcutaneous Given Yasmine Strickland RN     08/28/2024 0248 EDT sodium chloride infusion 0.45 % 125 mL/hr Intravenous New Bag Mana Vuong, CAITLYN     08/27/2024 1840 EDT sodium chloride infusion 0.45 % 125 mL/hr Intravenous New Bag Yasmine Strickland RN     08/27/2024 1025 EDT sodium chloride infusion 0.45 % 125 mL/hr Intravenous New Bag Yasmine Strickland RN     08/28/2024 0801 EDT magnesium sulfate 4 g/100 mL IVPB (premix) 4 g 4 g Intravenous New Bag Phuong Morelos RN            Objective:     Vitals: Blood pressure 107/78, pulse (!) 121, temperature 97.9 °F (36.6 °C), resp. rate 17, height 5' 2\" (1.575 m), weight 39.9 kg (88 lb), last menstrual period 03/04/2016, SpO2 98%, not currently breastfeeding.,Body mass index is 16.1 kg/m².      Intake/Output Summary (Last 24 hours) at 8/28/2024 0808  Last data filed at 8/28/2024 0601  Gross per 24 hour   Intake 3530 ml   Output --   Net 3530 ml       Physical " Exam  Constitutional:       General: She is not in acute distress.     Appearance: She is normal weight. She is not ill-appearing or toxic-appearing.   HENT:      Head: Normocephalic and atraumatic.      Nose: Nose normal. No congestion.   Eyes:      General: No scleral icterus.  Cardiovascular:      Rate and Rhythm: Normal rate.      Pulses: Normal pulses.   Pulmonary:      Effort: Pulmonary effort is normal.   Abdominal:      General: Abdomen is flat. Bowel sounds are normal. There is no distension.      Tenderness: There is no abdominal tenderness.      Hernia: No hernia is present.   Musculoskeletal:         General: Normal range of motion.      Cervical back: Normal range of motion.   Skin:     General: Skin is warm.      Coloration: Skin is not jaundiced or pale.   Neurological:      Mental Status: She is alert and oriented to person, place, and time.   Psychiatric:         Mood and Affect: Mood normal.         Behavior: Behavior normal.           Invasive Devices       Peripheral Intravenous Line  Duration             Peripheral IV 08/26/24 Proximal;Right;Ventral (anterior) Forearm 1 day                    Lab Results:  No results displayed because visit has over 200 results.          Imaging Studies: I have personally reviewed pertinent imaging studies.

## 2024-08-28 NOTE — PROGRESS NOTES
[facility]  Progress Note  Name: Barb Palomo I  MRN: [id number]  Unit/Bed#: [facility]PHP 522-01 I Date of Admission: 8/15/2024   Date of Service: 8/28/2024 I Hospital Day: 12    Assessment & Plan   * C. difficile diarrhea  Assessment & Plan  Patient presented with generalized weakness, frequent falls.  She has had ongoing diarrhea, dysuria, poor oral intake and reported 70 pound weight loss over the last year.  Initially hypotensive in the ER, status post IV fluids. Labs with multiple metabolic derangements  CT imaging showing mild diffuse colonic wall thickening, mild diffuse wall thickening of stomach  C. difficile found to be positive--has a history of C. difficile in September 2022.    Patient started on Dificid 200 mg x 10 days per protocol on 8/18 as this is recurrent infection  Initially seemed BMs were improving and were becoming more formed, however again having frequent watery stools  GI consulted, recommended to switch to PO Vanco 125 mg q6hr x 14 days.   Contact precautions  Appetite is good   Stop lactose and artificial sweeteners  Continue banatrol  Continue IV hydration today  Reports that her stools are less watery, more soft stools today    Hypotension  Assessment & Plan  Intermittent episodes of hypotension, most occurrences happen overnight and question correlation with minipress administration.  This has now been discontinued    Sinus tachycardia  Assessment & Plan  Heart rate 100s-120s at times.  Sinus.  Has received IV fluids without much improvement.  Trialed IVFs again 8/22 given reports of nausea with poor oral intake and dizziness with some improvement to the 110s with HR- will continue with IVFs again today   orthostatic blood pressure negative   Pt is on minipress which could have a side effect of ST.  Additionally noted she is hypotensive each night around 2/3 AM, I suspect this is likely the underlying cause.  She was started on this by her  "psychiatrist to assist with sleep and eating outpatient   Will hold minipress for now.  D/w pt can have PRN trazodone for sleep.  Pt agreeable   Monitor     Back pain  Assessment & Plan  Acute on chronic, likely worsened in setting of recent fall and deconditioning.   CT imaging reviewed, degenerative changes noted.  Continue ATC APAP, Baclofen 5 mg BID (was taking PRN PTA), lido patch. PRN Tramdol on board.   Mobilize as tolerated, PT/OT    Failure to thrive in adult  Assessment & Plan  Patient presented with increased generalized weakness and frequent falls, ongoing diarrhea, dysuria, poor oral intake, and at least 70-80 pound weight loss over the past 1 year. Has been following with GI, had colonoscopy May 2024 with poor prep--had EGD with esophagitis and gastritis  Labs with multiple derangements including hyperglycemia, hypokalemia and hypomagnesemia on admission, improved.   C. difficile positive as above  Patient reports she has not been using insulin due to \"insurance issue\" and inability to self inject due to her neuropathy  CT abdomen and pelvis without any obvious malignancy  Treatment as per respective issues  PT/OT recommending rehab.     Hypomagnesemia  Assessment & Plan  Hold oral supplementation due to diarrhea  Ordering additional IV mag with persistent low mag   Repeat labs in AM     Severe protein-calorie malnutrition (HCC)  Assessment & Plan  Severe protein-calorie malnutrition 2/2 chronic diarrhea a/e/b fat and muscle loss requiring Nutrition consult, oral diet and nutrition supplements    360 Statement: severe malnutrition r/t suspect combination of chronic illness and social/environmental factors as evidenced by severe temporal muscle loss, severe tricep fat pad loss, at least moderate muscle loss around clavicles and shoulders, at least moderate orbital fat pad loss. Treatment: oral diet and oral nutrition supplements.    BMI Findings:  Adult BMI Classifications: Underweight < 18.5      " "  Body mass index is 16.1 kg/m².   C/w supplements  Nutrition consult appreciated  Encourage po intake     Acute cystitis without hematuria  Assessment & Plan  Reported dysuria on admission   Urine + nitrite & leukocytes, CT imaging with distended bladder suggestive of retention and small amount of air which could be secondary to infection  Urine Culture noted, S/P 3 doses of IV CTX 8/17 8/21, reported pain AFTER urination, likely related to urine hitting excoriated labia/bottom from frequent stooling. Will continue with medicated cream to this area- denies pain at this time   S/p pyridum x 3 doses  Wound care consult    Uncontrolled type 2 diabetes mellitus with hyperglycemia (HCC)  Assessment & Plan  Lab Results   Component Value Date    HGBA1C 16.1 (H) 08/16/2024       Recent Labs     08/27/24  1528 08/27/24  2106 08/28/24  0623 08/28/24  1115   POCGLU 265* 189* 191* 182*       Patient markedly hyperglycemic on admission only partially responsive to IV fluids, remainder of labs fortunately not consistent with DKA. A1C 16  Patient admitting she has not been using her home insulin as she apparently has been unable to fill prescription due to \"insurance issues,\" additionally reports that due to her neuropathy she has difficulty self injecting.  Daughter also reports jessica non compliance.   Typically on NovoLog 70/30 20 units twice daily  Diabetic diet  Continue basal/prandial insulin while inpatient, adjust PRN  Sliding scale coverage    Opioid dependence with other opioid-induced disorder (HCC)  Assessment & Plan  PDMP reviewed, patient maintained on tramadol 200 mg daily as needed; history of chronic cervical/lumbar pain noted  Continue as needed tramadol and chronic gabapentin dosing    Chronic diarrhea  Assessment & Plan  Longstanding history of chronic diarrhea, patient reporting acute diarrhea x1 month. Follows with GI. Has tried questran/imodium without relief  Stool enteric panel negative  C. difficile " positive, see plan above   Giardia antibody pending    Tobacco dependence  Assessment & Plan  Counseled on need for cessation, provide nicotine patch while admitted  Recent CT chest for lung cancer screening 7/30/2024 negative for nodules or masses    Hypothyroidism  Assessment & Plan  Continue home dose levothyroxine     GERD without esophagitis  Assessment & Plan  Continue PPI    Severe episode of recurrent major depressive disorder, without psychotic features (HCC)  Assessment & Plan  Mood appearing fairly stable, continue home duloxetine, mirtazapine, as needed Valium  Hold minipress given symptomatic hypotension episodes, tachycardia              VTE Pharmacologic Prophylaxis: VTE Score: 3 Moderate Risk (Score 3-4) - Pharmacological DVT Prophylaxis Ordered: heparin.    Mobility:   Basic Mobility Inpatient Raw Score: 23  JH-HLM Goal: 7: Walk 25 feet or more  JH-HLM Achieved: 7: Walk 25 feet or more  JH-HLM Goal achieved. Continue to encourage appropriate mobility.    Patient Centered Rounds: I performed bedside rounds with nursing staff today.   Discussions with Specialists or Other Care Team Provider: nurse, CM    Education and Discussions with Family / Patient:  will update family later today.     Total Time Spent on Date of Encounter in care of patient: 40 mins. This time was spent on one or more of the following: performing physical exam; counseling and coordination of care; obtaining or reviewing history; documenting in the medical record; reviewing/ordering tests, medications or procedures; communicating with other healthcare professionals and discussing with patient's family/caregivers.    Current Length of Stay: 12 day(s)  Current Patient Status: Inpatient   Certification Statement: The patient will continue to require additional inpatient hospital stay due to diarrhea  Discharge Plan: Anticipate discharge in 24-48 hrs to rehab facility.    Code Status: Level 1 - Full Code    Subjective:   Denies any new  complaints. Reports that she dislikes her current diet orders. Counseled that she should avoid artificial sweeteners and lactose when she has diarrhea. Had multiple Bms last night but reports that her stools are getting more soft as opposed to liquid now    Objective:     Vitals:   Temp (24hrs), Av.3 °F (36.8 °C), Min:97.9 °F (36.6 °C), Max:98.7 °F (37.1 °C)    Temp:  [97.9 °F (36.6 °C)-98.7 °F (37.1 °C)] 97.9 °F (36.6 °C)  HR:  [107-121] 121  Resp:  [17-18] 17  BP: (107-150)/() 107/78  SpO2:  [97 %-98 %] 98 %  Body mass index is 16.1 kg/m².     Input and Output Summary (last 24 hours):     Intake/Output Summary (Last 24 hours) at 2024 1249  Last data filed at 2024 1027  Gross per 24 hour   Intake 3604.17 ml   Output --   Net 3604.17 ml       Physical Exam:   Physical Exam  Constitutional:       Appearance: Normal appearance.   HENT:      Head: Normocephalic and atraumatic.      Nose: Nose normal.   Eyes:      Extraocular Movements: Extraocular movements intact.   Pulmonary:      Effort: Pulmonary effort is normal.   Abdominal:      General: There is no distension.      Palpations: Abdomen is soft.      Tenderness: There is no abdominal tenderness.   Neurological:      Mental Status: She is alert and oriented to person, place, and time.   Psychiatric:         Mood and Affect: Mood normal.         Behavior: Behavior normal.          Additional Data:     Labs:  Results from last 7 days   Lab Units 24  0524  0552   WBC Thousand/uL 5.61 6.81   HEMOGLOBIN g/dL 11.1* 10.6*   HEMATOCRIT % 34.7* 32.7*   PLATELETS Thousands/uL 315 306   SEGS PCT %  --  41*   LYMPHO PCT %  --  47*   MONO PCT %  --  9   EOS PCT %  --  2     Results from last 7 days   Lab Units 24  0528 24  0526 24  0746   SODIUM mmol/L 139   < > 140   POTASSIUM mmol/L 4.5   < > 3.9   CHLORIDE mmol/L 107   < > 112*   CO2 mmol/L 27   < > 24   BUN mg/dL 15   < > 7   CREATININE mg/dL 0.35*   < > 0.30*   ANION  GAP mmol/L 5   < > 4   CALCIUM mg/dL 8.9   < > 7.2*   ALBUMIN g/dL  --   --  2.7*   TOTAL BILIRUBIN mg/dL  --   --  0.21   ALK PHOS U/L  --   --  131*   ALT U/L  --   --  43   AST U/L  --   --  45*   GLUCOSE RANDOM mg/dL 204*   < > 310*    < > = values in this interval not displayed.         Results from last 7 days   Lab Units 08/28/24  1115 08/28/24  0623 08/27/24  2106 08/27/24  1528 08/27/24  1016 08/27/24  0657 08/26/24  2023 08/26/24  1626 08/26/24  1040 08/26/24  0614 08/25/24  2047 08/25/24  1538   POC GLUCOSE mg/dl 182* 191* 189* 265* 244* 88 172* 169* 154* 173* 202* 361*               Lines/Drains:  Invasive Devices       Peripheral Intravenous Line  Duration             Peripheral IV 08/26/24 Proximal;Right;Ventral (anterior) Forearm 2 days                          Imaging: No pertinent imaging reviewed.    Recent Cultures (last 7 days):         Last 24 Hours Medication List:   Current Facility-Administered Medications   Medication Dose Route Frequency Provider Last Rate    acetaminophen  975 mg Oral Q8H Counts include 234 beds at the [facility] ELISEO Toribio      albuterol  2 puff Inhalation Q4H PRN Renan Drake, DO      aspirin  81 mg Oral Daily Renan Drake, DO      atorvastatin  80 mg Oral Daily Renan Drake, DO      baclofen  5 mg Oral BID ELISEO Toribio      butalbital-acetaminophen-caffeine  1 tablet Oral Q6H PRN Renan Drake, DO      diazepam  5 mg Oral Q12H PRN Renan Drake, DO      DULoxetine  60 mg Oral BID Renan Drake, DO      fluticasone-vilanterol  1 puff Inhalation Daily Renan Drake, DO      gabapentin  800 mg Oral TID Renan Drake DO      heparin (porcine)  5,000 Units Subcutaneous Q8H Counts include 234 beds at the [facility] Renan Drake DO      insulin glargine  15 Units Subcutaneous Q12H Counts include 234 beds at the [facility] Abhay Hayes MD      insulin lispro  1-5 Units Subcutaneous TID  Renan Noelle, DO      insulin lispro  1-5 Units Subcutaneous HS Renan Drake, DO      insulin lispro  7 Units Subcutaneous TID With Meals Abhay Hayes MD       levothyroxine  112 mcg Oral Early Morning Renan Drake, DO      lidocaine  2 patch Topical Daily Magda Amaya, CRNP      mirtazapine  7.5 mg Oral HS Renan Drake, DO      DARLIN ANTIFUNGAL   Topical BID Cherise Maurice Grey, MARCNP      nicotine  1 patch Transdermal Daily Renan Drake, DO      ondansetron  4 mg Intravenous Q6H PRN Renan Drake, DO      pantoprazole  40 mg Oral Early Morning Renan Drake, DO      potassium chloride  40 mEq Oral BID Holli Hernandez PA-C      saccharomyces boulardii  250 mg Oral BID Shanika Knott, MARCNP      sodium chloride  125 mL/hr Intravenous Continuous Abhay Hayes MD      traMADol  50 mg Oral Q6H PRN Renan Drake, DO      traZODone  50 mg Oral HS PRN Estefany Villalobos PA-C      vancomycin oral (capsules or solution)  125 mg Oral Q6H [facility] Estefany Villalobos PA-C          Today, Patient Was Seen By: Abhay Hayes MD    **Please Note: This note may have been constructed using a voice recognition system.**

## 2024-08-28 NOTE — ASSESSMENT & PLAN NOTE
"Lab Results   Component Value Date    HGBA1C 16.1 (H) 08/16/2024       Recent Labs     08/27/24  1528 08/27/24  2106 08/28/24  0623 08/28/24  1115   POCGLU 265* 189* 191* 182*       Patient markedly hyperglycemic on admission only partially responsive to IV fluids, remainder of labs fortunately not consistent with DKA. A1C 16  Patient admitting she has not been using her home insulin as she apparently has been unable to fill prescription due to \"insurance issues,\" additionally reports that due to her neuropathy she has difficulty self injecting.  Daughter also reports jessica non compliance.   Typically on NovoLog 70/30 20 units twice daily  Diabetic diet  Continue basal/prandial insulin while inpatient, adjust PRN  Sliding scale coverage  "

## 2024-08-28 NOTE — ASSESSMENT & PLAN NOTE
Longstanding history of chronic diarrhea, patient reporting acute diarrhea x1 month. Follows with GI. Has tried questran/imodium without relief  Stool enteric panel negative  C. difficile positive, see plan above   Giardia antibody pending

## 2024-08-29 PROBLEM — M54.9 BACK PAIN: Status: RESOLVED | Noted: 2024-08-20 | Resolved: 2024-08-29

## 2024-08-29 LAB
ANION GAP SERPL CALCULATED.3IONS-SCNC: 6 MMOL/L (ref 4–13)
BUN SERPL-MCNC: 13 MG/DL (ref 5–25)
CALCIUM SERPL-MCNC: 9 MG/DL (ref 8.4–10.2)
CHLORIDE SERPL-SCNC: 108 MMOL/L (ref 96–108)
CO2 SERPL-SCNC: 26 MMOL/L (ref 21–32)
CREAT SERPL-MCNC: 0.45 MG/DL (ref 0.6–1.3)
ERYTHROCYTE [DISTWIDTH] IN BLOOD BY AUTOMATED COUNT: 14.1 % (ref 11.6–15.1)
G LAMBLIA AG STL QL IA: NEGATIVE
GFR SERPL CREATININE-BSD FRML MDRD: 114 ML/MIN/1.73SQ M
GLUCOSE SERPL-MCNC: 119 MG/DL (ref 65–140)
GLUCOSE SERPL-MCNC: 193 MG/DL (ref 65–140)
GLUCOSE SERPL-MCNC: 194 MG/DL (ref 65–140)
GLUCOSE SERPL-MCNC: 211 MG/DL (ref 65–140)
GLUCOSE SERPL-MCNC: 220 MG/DL (ref 65–140)
GLUCOSE SERPL-MCNC: 269 MG/DL (ref 65–140)
GLUCOSE SERPL-MCNC: 49 MG/DL (ref 65–140)
GLUCOSE SERPL-MCNC: 55 MG/DL (ref 65–140)
GLUCOSE SERPL-MCNC: 84 MG/DL (ref 65–140)
HCT VFR BLD AUTO: 31.8 % (ref 34.8–46.1)
HGB BLD-MCNC: 10.2 G/DL (ref 11.5–15.4)
MAGNESIUM SERPL-MCNC: 1.6 MG/DL (ref 1.9–2.7)
MCH RBC QN AUTO: 31.8 PG (ref 26.8–34.3)
MCHC RBC AUTO-ENTMCNC: 32.1 G/DL (ref 31.4–37.4)
MCV RBC AUTO: 99 FL (ref 82–98)
PHOSPHATE SERPL-MCNC: 4.6 MG/DL (ref 2.7–4.5)
PLATELET # BLD AUTO: 322 THOUSANDS/UL (ref 149–390)
PMV BLD AUTO: 11.5 FL (ref 8.9–12.7)
POTASSIUM SERPL-SCNC: 4.4 MMOL/L (ref 3.5–5.3)
RBC # BLD AUTO: 3.21 MILLION/UL (ref 3.81–5.12)
SODIUM SERPL-SCNC: 140 MMOL/L (ref 135–147)
WBC # BLD AUTO: 7.23 THOUSAND/UL (ref 4.31–10.16)

## 2024-08-29 PROCEDURE — 82948 REAGENT STRIP/BLOOD GLUCOSE: CPT

## 2024-08-29 PROCEDURE — 83735 ASSAY OF MAGNESIUM: CPT | Performed by: INTERNAL MEDICINE

## 2024-08-29 PROCEDURE — 85027 COMPLETE CBC AUTOMATED: CPT | Performed by: INTERNAL MEDICINE

## 2024-08-29 PROCEDURE — 84100 ASSAY OF PHOSPHORUS: CPT | Performed by: INTERNAL MEDICINE

## 2024-08-29 PROCEDURE — 99232 SBSQ HOSP IP/OBS MODERATE 35: CPT | Performed by: INTERNAL MEDICINE

## 2024-08-29 PROCEDURE — 80048 BASIC METABOLIC PNL TOTAL CA: CPT | Performed by: INTERNAL MEDICINE

## 2024-08-29 RX ORDER — MAGNESIUM SULFATE HEPTAHYDRATE 40 MG/ML
4 INJECTION, SOLUTION INTRAVENOUS ONCE
Status: COMPLETED | OUTPATIENT
Start: 2024-08-29 | End: 2024-08-30

## 2024-08-29 RX ORDER — DEXTROSE MONOHYDRATE 25 G/50ML
INJECTION, SOLUTION INTRAVENOUS
Status: COMPLETED
Start: 2024-08-29 | End: 2024-08-29

## 2024-08-29 RX ORDER — INSULIN LISPRO 100 [IU]/ML
3 INJECTION, SOLUTION INTRAVENOUS; SUBCUTANEOUS
Status: DISCONTINUED | OUTPATIENT
Start: 2024-08-29 | End: 2024-08-30

## 2024-08-29 RX ORDER — DEXTROSE MONOHYDRATE 25 G/50ML
12.5 INJECTION, SOLUTION INTRAVENOUS ONCE
Status: COMPLETED | OUTPATIENT
Start: 2024-08-29 | End: 2024-08-29

## 2024-08-29 RX ORDER — POTASSIUM CHLORIDE 1500 MG/1
20 TABLET, EXTENDED RELEASE ORAL 2 TIMES DAILY
Status: DISCONTINUED | OUTPATIENT
Start: 2024-08-29 | End: 2024-08-30

## 2024-08-29 RX ORDER — INSULIN LISPRO 100 [IU]/ML
1-5 INJECTION, SOLUTION INTRAVENOUS; SUBCUTANEOUS
Status: DISCONTINUED | OUTPATIENT
Start: 2024-08-29 | End: 2024-08-30

## 2024-08-29 RX ORDER — INSULIN GLARGINE 100 [IU]/ML
8 INJECTION, SOLUTION SUBCUTANEOUS
Status: DISCONTINUED | OUTPATIENT
Start: 2024-08-29 | End: 2024-08-30

## 2024-08-29 RX ADMIN — DULOXETINE HYDROCHLORIDE 60 MG: 60 CAPSULE, DELAYED RELEASE ORAL at 17:10

## 2024-08-29 RX ADMIN — MIRTAZAPINE 7.5 MG: 15 TABLET, FILM COATED ORAL at 22:15

## 2024-08-29 RX ADMIN — DIAZEPAM 5 MG: 5 TABLET ORAL at 13:38

## 2024-08-29 RX ADMIN — INSULIN GLARGINE 8 UNITS: 100 INJECTION, SOLUTION SUBCUTANEOUS at 22:16

## 2024-08-29 RX ADMIN — HEPARIN SODIUM 5000 UNITS: 5000 INJECTION INTRAVENOUS; SUBCUTANEOUS at 22:16

## 2024-08-29 RX ADMIN — INSULIN LISPRO 3 UNITS: 100 INJECTION, SOLUTION INTRAVENOUS; SUBCUTANEOUS at 15:55

## 2024-08-29 RX ADMIN — SODIUM CHLORIDE 125 ML/HR: 0.45 INJECTION, SOLUTION INTRAVENOUS at 13:34

## 2024-08-29 RX ADMIN — INSULIN LISPRO 2 UNITS: 100 INJECTION, SOLUTION INTRAVENOUS; SUBCUTANEOUS at 22:16

## 2024-08-29 RX ADMIN — MICONAZOLE NITRATE: 20 CREAM TOPICAL at 08:38

## 2024-08-29 RX ADMIN — ACETAMINOPHEN 975 MG: 325 TABLET ORAL at 06:51

## 2024-08-29 RX ADMIN — ASPIRIN 81 MG CHEWABLE TABLET 81 MG: 81 TABLET CHEWABLE at 08:05

## 2024-08-29 RX ADMIN — ATORVASTATIN CALCIUM 80 MG: 80 TABLET, FILM COATED ORAL at 08:05

## 2024-08-29 RX ADMIN — POTASSIUM CHLORIDE 20 MEQ: 1500 TABLET, EXTENDED RELEASE ORAL at 17:10

## 2024-08-29 RX ADMIN — VANCOMYCIN HYDROCHLORIDE 125 MG: 125 CAPSULE ORAL at 06:52

## 2024-08-29 RX ADMIN — GABAPENTIN 800 MG: 400 CAPSULE ORAL at 22:15

## 2024-08-29 RX ADMIN — INSULIN LISPRO 7 UNITS: 100 INJECTION, SOLUTION INTRAVENOUS; SUBCUTANEOUS at 08:38

## 2024-08-29 RX ADMIN — VANCOMYCIN HYDROCHLORIDE 125 MG: 125 CAPSULE ORAL at 17:23

## 2024-08-29 RX ADMIN — INSULIN LISPRO 1 UNITS: 100 INJECTION, SOLUTION INTRAVENOUS; SUBCUTANEOUS at 15:54

## 2024-08-29 RX ADMIN — DEXTROSE MONOHYDRATE 12.5 G: 25 INJECTION, SOLUTION INTRAVENOUS at 01:00

## 2024-08-29 RX ADMIN — POTASSIUM CHLORIDE 40 MEQ: 1500 TABLET, EXTENDED RELEASE ORAL at 08:05

## 2024-08-29 RX ADMIN — TRAZODONE HYDROCHLORIDE 50 MG: 50 TABLET ORAL at 22:15

## 2024-08-29 RX ADMIN — ACETAMINOPHEN 975 MG: 325 TABLET ORAL at 22:15

## 2024-08-29 RX ADMIN — BACLOFEN 5 MG: 10 TABLET ORAL at 08:05

## 2024-08-29 RX ADMIN — DULOXETINE HYDROCHLORIDE 60 MG: 60 CAPSULE, DELAYED RELEASE ORAL at 08:05

## 2024-08-29 RX ADMIN — FLUTICASONE FUROATE AND VILANTEROL TRIFENATATE 1 PUFF: 200; 25 POWDER RESPIRATORY (INHALATION) at 07:39

## 2024-08-29 RX ADMIN — VANCOMYCIN HYDROCHLORIDE 125 MG: 125 CAPSULE ORAL at 00:21

## 2024-08-29 RX ADMIN — Medication 250 MG: at 17:10

## 2024-08-29 RX ADMIN — TRAMADOL HYDROCHLORIDE 50 MG: 50 TABLET, COATED ORAL at 12:06

## 2024-08-29 RX ADMIN — VANCOMYCIN HYDROCHLORIDE 125 MG: 125 CAPSULE ORAL at 23:40

## 2024-08-29 RX ADMIN — MAGNESIUM SULFATE HEPTAHYDRATE 4 G: 40 INJECTION, SOLUTION INTRAVENOUS at 10:29

## 2024-08-29 RX ADMIN — SODIUM CHLORIDE 125 ML/HR: 0.45 INJECTION, SOLUTION INTRAVENOUS at 05:53

## 2024-08-29 RX ADMIN — HEPARIN SODIUM 5000 UNITS: 5000 INJECTION INTRAVENOUS; SUBCUTANEOUS at 06:51

## 2024-08-29 RX ADMIN — NICOTINE 1 PATCH: 21 PATCH, EXTENDED RELEASE TRANSDERMAL at 08:05

## 2024-08-29 RX ADMIN — MICONAZOLE NITRATE: 20 CREAM TOPICAL at 17:24

## 2024-08-29 RX ADMIN — BACLOFEN 5 MG: 10 TABLET ORAL at 17:10

## 2024-08-29 RX ADMIN — ACETAMINOPHEN 975 MG: 325 TABLET ORAL at 13:32

## 2024-08-29 RX ADMIN — LEVOTHYROXINE SODIUM 112 MCG: 112 TABLET ORAL at 06:52

## 2024-08-29 RX ADMIN — GABAPENTIN 800 MG: 400 CAPSULE ORAL at 15:54

## 2024-08-29 RX ADMIN — HEPARIN SODIUM 5000 UNITS: 5000 INJECTION INTRAVENOUS; SUBCUTANEOUS at 13:32

## 2024-08-29 RX ADMIN — GABAPENTIN 800 MG: 400 CAPSULE ORAL at 08:05

## 2024-08-29 RX ADMIN — INSULIN LISPRO 1 UNITS: 100 INJECTION, SOLUTION INTRAVENOUS; SUBCUTANEOUS at 06:51

## 2024-08-29 RX ADMIN — Medication 250 MG: at 08:05

## 2024-08-29 RX ADMIN — LIDOCAINE 2 PATCH: 700 PATCH TOPICAL at 08:06

## 2024-08-29 RX ADMIN — ONDANSETRON 4 MG: 2 INJECTION INTRAMUSCULAR; INTRAVENOUS at 12:06

## 2024-08-29 RX ADMIN — VANCOMYCIN HYDROCHLORIDE 125 MG: 125 CAPSULE ORAL at 11:09

## 2024-08-29 RX ADMIN — PANTOPRAZOLE SODIUM 40 MG: 40 TABLET, DELAYED RELEASE ORAL at 06:52

## 2024-08-29 RX ADMIN — INSULIN LISPRO 3 UNITS: 100 INJECTION, SOLUTION INTRAVENOUS; SUBCUTANEOUS at 13:23

## 2024-08-29 NOTE — PROGRESS NOTES
[facility]  Progress Note  Name: Barb Palomo I  MRN: [id number]  Unit/Bed#: PPHP 522-01 I Date of Admission: 8/15/2024   Date of Service: 8/29/2024 I Hospital Day: 13    Assessment & Plan   * C. difficile diarrhea  Assessment & Plan  Patient presented with generalized weakness, frequent falls.  She has had ongoing diarrhea, dysuria, poor oral intake and reported 70 pound weight loss over the last year.  Initially hypotensive in the ER, status post IV fluids. Labs with multiple metabolic derangements  CT imaging showing mild diffuse colonic wall thickening, mild diffuse wall thickening of stomach  C. difficile found to be positive--has a history of C. difficile in September 2022.    Patient started on Dificid 200 mg x 10 days per protocol on 8/18 as this is recurrent infection  Initially seemed BMs were improving and were becoming more formed, however again having frequent watery stools  GI consulted  Contact precautions  Appetite is good   avoiding lactose and artificial sweeteners  Continue banatrol  Continue IV hydration today - will decrease her fluids to 75ml/hour  Reports that her stools are less watery, more soft stools today  Continue vancomycin    Hypotension  Assessment & Plan  Intermittent episodes of hypotension, most occurrences happen overnight and question correlation with minipress administration.  This has now been discontinued    Sinus tachycardia  Assessment & Plan  Heart rate 100s-120s at times.  Sinus.  Has received IV fluids without much improvement.  Trialed IVFs again 8/22 given reports of nausea with poor oral intake and dizziness with some improvement to the 110s with HR- will continue with IVFs again today   orthostatic blood pressure negative   Pt is on minipress which could have a side effect of ST.  Additionally noted she is hypotensive each night around 2/3 AM, I suspect this is likely the underlying cause.  She was started on this by her  "psychiatrist to assist with sleep and eating outpatient   Will hold minipress for now.  D/w pt can have PRN trazodone for sleep.  Pt agreeable   Monitor     Failure to thrive in adult  Assessment & Plan  Patient presented with increased generalized weakness and frequent falls, ongoing diarrhea, dysuria, poor oral intake, and at least 70-80 pound weight loss over the past 1 year. Has been following with GI, had colonoscopy May 2024 with poor prep--had EGD with esophagitis and gastritis  Labs with multiple derangements including hyperglycemia, hypokalemia and hypomagnesemia on admission, improved.   C. difficile positive as above  Patient reports she has not been using insulin due to \"insurance issue\" and inability to self inject due to her neuropathy  CT abdomen and pelvis without any obvious malignancy  Treatment as per respective issues  PT/OT recommending rehab.     Hypomagnesemia  Assessment & Plan  Hold oral supplementation due to diarrhea  Ordering additional IV mag with persistent low mag   Repeat labs in AM     Severe protein-calorie malnutrition (HCC)  Assessment & Plan  Severe protein-calorie malnutrition 2/2 chronic diarrhea a/e/b fat and muscle loss requiring Nutrition consult, oral diet and nutrition supplements    360 Statement: severe malnutrition r/t suspect combination of chronic illness and social/environmental factors as evidenced by severe temporal muscle loss, severe tricep fat pad loss, at least moderate muscle loss around clavicles and shoulders, at least moderate orbital fat pad loss. Treatment: oral diet and oral nutrition supplements.    BMI Findings:  Adult BMI Classifications: Underweight < 18.5        Body mass index is 16.1 kg/m².   C/w supplements  Nutrition consult appreciated  Encourage po intake     Acute cystitis without hematuria  Assessment & Plan  Reported dysuria on admission   Urine + nitrite & leukocytes, CT imaging with distended bladder suggestive of retention and small " "amount of air which could be secondary to infection  Urine Culture noted, S/P 3 doses of IV CTX 8/17 8/21, reported pain AFTER urination, likely related to urine hitting excoriated labia/bottom from frequent stooling. Will continue with medicated cream to this area- denies pain at this time   S/p pyridum x 3 doses  Wound care consult    Uncontrolled type 2 diabetes mellitus with hyperglycemia (HCC)  Assessment & Plan  Lab Results   Component Value Date    HGBA1C 16.1 (H) 08/16/2024       Recent Labs     08/29/24  0053 08/29/24  0126 08/29/24  0646 08/29/24  1102   POCGLU 55* 193* 220* 119       Patient markedly hyperglycemic on admission only partially responsive to IV fluids, remainder of labs fortunately not consistent with DKA. A1C 16  Patient admitting she has not been using her home insulin as she apparently has been unable to fill prescription due to \"insurance issues,\" additionally reports that due to her neuropathy she has difficulty self injecting.  Daughter also reports jessica non compliance.   Typically on NovoLog 70/30 20 units twice daily  Diabetic diet  Continue basal/prandial insulin while inpatient, adjust PRN  Sliding scale coverage    Opioid dependence with other opioid-induced disorder (HCC)  Assessment & Plan  PDMP reviewed, patient maintained on tramadol 200 mg daily as needed; history of chronic cervical/lumbar pain noted  Continue as needed tramadol and chronic gabapentin dosing    Chronic diarrhea  Assessment & Plan  Longstanding history of chronic diarrhea, patient reporting acute diarrhea x1 month. Follows with GI. Has tried questran/imodium without relief  Stool enteric panel negative  C. difficile positive, see plan above   Giardia antibody pending    Tobacco dependence  Assessment & Plan  Counseled on need for cessation, provide nicotine patch while admitted  Recent CT chest for lung cancer screening 7/30/2024 negative for nodules or masses    GERD without esophagitis  Assessment & " Plan  Continue PPI    Severe episode of recurrent major depressive disorder, without psychotic features (HCC)  Assessment & Plan  Mood appearing fairly stable, continue home duloxetine, mirtazapine, as needed Valium  Hold minipress given symptomatic hypotension episodes, tachycardia              VTE Pharmacologic Prophylaxis: VTE Score: 3 Moderate Risk (Score 3-4) - Pharmacological DVT Prophylaxis Ordered: heparin.    Mobility:   Basic Mobility Inpatient Raw Score: 24  JH-HLM Goal: 8: Walk 250 feet or more  JH-HLM Achieved: 8: Walk 250 feet ot more  JH-HLM Goal achieved. Continue to encourage appropriate mobility.    Patient Centered Rounds: I performed bedside rounds with nursing staff today.   Discussions with Specialists or Other Care Team Provider: nurse, CM, GI      Total Time Spent on Date of Encounter in care of patient: 40 mins. This time was spent on one or more of the following: performing physical exam; counseling and coordination of care; obtaining or reviewing history; documenting in the medical record; reviewing/ordering tests, medications or procedures; communicating with other healthcare professionals and discussing with patient's family/caregivers.    Current Length of Stay: 13 day(s)  Current Patient Status: Inpatient   Certification Statement: The patient will continue to require additional inpatient hospital stay due to diarhea  Discharge Plan: Anticipate discharge in 24-48 hrs to rehab facility.    Code Status: Level 1 - Full Code    Subjective:   Denies any new complaints. Still having frequent episodes, large stool volume, soft and liquid stools intermittent    Objective:     Vitals:   Temp (24hrs), Av.2 °F (36.8 °C), Min:97.6 °F (36.4 °C), Max:98.7 °F (37.1 °C)    Temp:  [97.6 °F (36.4 °C)-98.7 °F (37.1 °C)] 98.7 °F (37.1 °C)  HR:  [108-113] 108  Resp:  [16-18] 16  BP: (104-142)/() 104/73  SpO2:  [95 %-98 %] 96 %  Body mass index is 16.1 kg/m².     Input and Output Summary (last 24  hours):     Intake/Output Summary (Last 24 hours) at 8/29/2024 1359  Last data filed at 8/29/2024 1333  Gross per 24 hour   Intake 3700.84 ml   Output 2150 ml   Net 1550.84 ml       Physical Exam:   Physical Exam  Constitutional:       Appearance: Normal appearance.   HENT:      Head: Normocephalic and atraumatic.      Nose: Nose normal.   Eyes:      Extraocular Movements: Extraocular movements intact.   Cardiovascular:      Rate and Rhythm: Normal rate and regular rhythm.   Pulmonary:      Effort: Pulmonary effort is normal.      Breath sounds: No wheezing or rales.   Abdominal:      General: There is no distension.      Palpations: Abdomen is soft.      Tenderness: There is no abdominal tenderness.   Neurological:      Mental Status: She is alert and oriented to person, place, and time.   Psychiatric:         Mood and Affect: Mood normal.         Behavior: Behavior normal.          Additional Data:     Labs:  Results from last 7 days   Lab Units 08/29/24  0458 08/28/24  0528 08/27/24  0552   WBC Thousand/uL 7.23   < > 6.81   HEMOGLOBIN g/dL 10.2*   < > 10.6*   HEMATOCRIT % 31.8*   < > 32.7*   PLATELETS Thousands/uL 322   < > 306   SEGS PCT %  --   --  41*   LYMPHO PCT %  --   --  47*   MONO PCT %  --   --  9   EOS PCT %  --   --  2    < > = values in this interval not displayed.     Results from last 7 days   Lab Units 08/29/24  0458 08/25/24  0526 08/24/24  0746   SODIUM mmol/L 140   < > 140   POTASSIUM mmol/L 4.4   < > 3.9   CHLORIDE mmol/L 108   < > 112*   CO2 mmol/L 26   < > 24   BUN mg/dL 13   < > 7   CREATININE mg/dL 0.45*   < > 0.30*   ANION GAP mmol/L 6   < > 4   CALCIUM mg/dL 9.0   < > 7.2*   ALBUMIN g/dL  --   --  2.7*   TOTAL BILIRUBIN mg/dL  --   --  0.21   ALK PHOS U/L  --   --  131*   ALT U/L  --   --  43   AST U/L  --   --  45*   GLUCOSE RANDOM mg/dL 211*   < > 310*    < > = values in this interval not displayed.         Results from last 7 days   Lab Units 08/29/24  1102 08/29/24  0646  08/29/24  0126 08/29/24  0053 08/29/24  0027 08/28/24  2116 08/28/24  1541 08/28/24  1115 08/28/24  0623 08/27/24  2106 08/27/24  1528 08/27/24  1016   POC GLUCOSE mg/dl 119 220* 193* 55* 49* 84 122 182* 191* 189* 265* 244*               Lines/Drains:  Invasive Devices       Peripheral Intravenous Line  Duration             Peripheral IV 08/26/24 Proximal;Right;Ventral (anterior) Forearm 3 days                          Imaging: No pertinent imaging reviewed.    Recent Cultures (last 7 days):         Last 24 Hours Medication List:   Current Facility-Administered Medications   Medication Dose Route Frequency Provider Last Rate    acetaminophen  975 mg Oral Q8H [facility] ELISEO Toribio      albuterol  2 puff Inhalation Q4H PRN Renan Drake,       aspirin  81 mg Oral Daily Renan Drake, DO      atorvastatin  80 mg Oral Daily Renan Drake,       baclofen  5 mg Oral BID ELISEO Toribio      butalbital-acetaminophen-caffeine  1 tablet Oral Q6H PRN Renan Drake, DO      diazepam  5 mg Oral Q12H PRN Renan Drake, DO      DULoxetine  60 mg Oral BID Renan Drake,       fluticasone-vilanterol  1 puff Inhalation Daily Renan Drake,       gabapentin  800 mg Oral TID Renan Drake DO      heparin (porcine)  5,000 Units Subcutaneous Q8H [facility] Renan Drake DO      insulin glargine  8 Units Subcutaneous HS Abhay Hayes MD      insulin lispro  1-5 Units Subcutaneous TID  Abhay Hayes MD      insulin lispro  1-5 Units Subcutaneous HS Abhay Hayes MD      insulin lispro  3 Units Subcutaneous TID With Meals Abhay Hayes MD      levothyroxine  112 mcg Oral Early Morning Renan Drake DO      lidocaine  2 patch Topical Daily ELISEO Toribio      magnesium sulfate  4 g Intravenous Once Abhay Hayes MD 4 g (08/29/24 1029)    mirtazapine  7.5 mg Oral HS Renan Drake DO      DARLIN ANTIFUNGAL   Topical BID ELISEO Langley      nicotine  1 patch Transdermal Daily Renan Drake DO       ondansetron  4 mg Intravenous Q6H PRN Renan Drake, DO      pantoprazole  40 mg Oral Early Morning Renan Drake, DO      potassium chloride  20 mEq Oral BID Abhay Hayes MD      saccharomyces boulardii  250 mg Oral BID Shanika Knott, CRNP      sodium chloride  75 mL/hr Intravenous Continuous Abhay Hayes  mL/hr (08/29/24 1334)    traMADol  50 mg Oral Q6H PRN Renan Drake, DO      traZODone  50 mg Oral HS PRN Estefany Villalobos PA-C      vancomycin oral (capsules or solution)  125 mg Oral Q6H [facility] Estefany Villalobos PA-C          Today, Patient Was Seen By: Abhay Hayes MD    **Please Note: This note may have been constructed using a voice recognition system.**

## 2024-08-29 NOTE — ASSESSMENT & PLAN NOTE
"Lab Results   Component Value Date    HGBA1C 16.1 (H) 08/16/2024       Recent Labs     08/29/24  0053 08/29/24  0126 08/29/24  0646 08/29/24  1102   POCGLU 55* 193* 220* 119       Patient markedly hyperglycemic on admission only partially responsive to IV fluids, remainder of labs fortunately not consistent with DKA. A1C 16  Patient admitting she has not been using her home insulin as she apparently has been unable to fill prescription due to \"insurance issues,\" additionally reports that due to her neuropathy she has difficulty self injecting.  Daughter also reports jessica non compliance.   Typically on NovoLog 70/30 20 units twice daily  Diabetic diet  Continue basal/prandial insulin while inpatient, adjust PRN  Sliding scale coverage  "

## 2024-08-29 NOTE — QUICK NOTE
GASTROENTEROLOGY QUICK NOTE:     Patient seen and evaluated at bedside.  Continues to have ongoing frequent, loose bowel movements.  Plan to continue on vancomycin course to completion.  Patient did have prior colonoscopy 5/7/2024 with random biopsies that were negative for microscopic colitis.  Therefore, no indication for repeat colonoscopy at this time while inpatient. Will require EGD/colonoscopy though outpatient to evaluate for weight loss.  However, if patient has ongoing symptoms despite completion of both previously completed fidaxomicin and now vancomycin, would need to consider possibility of fecal microbiota transplant.  Will continue to monitor stool output and trend abdominal examination.  Patient understanding and agreeable to this.    Dalia Soriano DO, PGY-5  [facility] Fellow

## 2024-08-29 NOTE — PLAN OF CARE
Problem: Nutrition/Hydration-ADULT  Goal: Nutrient/Hydration intake appropriate for improving, restoring or maintaining nutritional needs  Description: Monitor and assess patient's nutrition/hydration status for malnutrition. Collaborate with interdisciplinary team and initiate plan and interventions as ordered.  Monitor patient's weight and dietary intake as ordered or per policy. Utilize nutrition screening tool and intervene as necessary. Determine patient's food preferences and provide high-protein, high-caloric foods as appropriate.     INTERVENTIONS:  - Monitor oral intake, urinary output, labs, and treatment plans  - Assess nutrition and hydration status and recommend course of action  - Evaluate amount of meals eaten  - Assist patient with eating if necessary   - Allow adequate time for meals  - Recommend/ encourage appropriate diets, oral nutritional supplements, and vitamin/mineral supplements  - Order, calculate, and assess calorie counts as needed  - Recommend, monitor, and adjust tube feedings and TPN/PPN based on assessed needs  - Assess need for intravenous fluids  - Provide specific nutrition/hydration education as appropriate  - Include patient/family/caregiver in decisions related to nutrition  Outcome: Progressing     Problem: PAIN - ADULT  Goal: Verbalizes/displays adequate comfort level or baseline comfort level  Description: Interventions:  - Encourage patient to monitor pain and request assistance  - Assess pain using appropriate pain scale  - Administer analgesics based on type and severity of pain and evaluate response  - Implement non-pharmacological measures as appropriate and evaluate response  - Consider cultural and social influences on pain and pain management  - Notify physician/advanced practitioner if interventions unsuccessful or patient reports new pain  Outcome: Progressing     Problem: INFECTION - ADULT  Goal: Absence or prevention of progression during  hospitalization  Description: INTERVENTIONS:  - Assess and monitor for signs and symptoms of infection  - Monitor lab/diagnostic results  - Monitor all insertion sites, i.e. indwelling lines, tubes, and drains  - Monitor endotracheal if appropriate and nasal secretions for changes in amount and color  - [place] appropriate cooling/warming therapies per order  - Administer medications as ordered  - Instruct and encourage patient and family to use good hand hygiene technique  - Identify and instruct in appropriate isolation precautions for identified infection/condition  Outcome: Progressing  Goal: Absence of fever/infection during neutropenic period  Description: INTERVENTIONS:  - Monitor WBC    Outcome: Progressing     Problem: SAFETY ADULT  Goal: Patient will remain free of falls  Description: INTERVENTIONS:  - Educate patient/family on patient safety including physical limitations  - Instruct patient to call for assistance with activity   - Consult OT/PT to assist with strengthening/mobility   - Keep Call bell within reach  - Keep bed low and locked with side rails adjusted as appropriate  - Keep care items and personal belongings within reach  - Initiate and maintain comfort rounds  - Make Fall Risk Sign visible to staff  - Offer Toileting every 2 Hours, in advance of need  - Initiate/Maintain alarm  - Obtain necessary fall risk management equipment  - Apply yellow socks and bracelet for high fall risk patients  - Consider moving patient to room near nurses station  Outcome: Progressing  Goal: Maintain or return to baseline ADL function  Description: INTERVENTIONS:  -  Assess patient's ability to carry out ADLs; assess patient's baseline for ADL function and identify physical deficits which impact ability to perform ADLs (bathing, care of mouth/teeth, toileting, grooming, dressing, etc.)  - Assess/evaluate cause of self-care deficits   - Assess range of motion  - Assess patient's mobility; develop plan if  impaired  - Assess patient's need for assistive devices and provide as appropriate  - Encourage maximum independence but intervene and supervise when necessary  - Involve family in performance of ADLs  - Assess for home care needs following discharge   - Consider OT consult to assist with ADL evaluation and planning for discharge  - Provide patient education as appropriate  Outcome: Progressing  Goal: Maintains/Returns to pre admission functional level  Description: INTERVENTIONS:  - Perform AM-PAC 6 Click Basic Mobility/ Daily Activity assessment daily.  - Set and communicate daily mobility goal to care team and patient/family/caregiver.   - Collaborate with rehabilitation services on mobility goals if consulted  - Perform Range of Motion 3 times a day.  - Reposition patient every 2 hours.  - Dangle patient 3 times a day  - Stand patient 3 times a day  - Ambulate patient 3 times a day  - Out of bed to chair 3 times a day   - Out of bed for meals 3 times a day  - Out of bed for toileting  - Record patient progress and toleration of activity level   Outcome: Progressing     Problem: DISCHARGE PLANNING  Goal: Discharge to home or other facility with appropriate resources  Description: INTERVENTIONS:  - Identify barriers to discharge w/patient and caregiver  - Arrange for needed discharge resources and transportation as appropriate  - Identify discharge learning needs (meds, wound care, etc.)  - Arrange for interpretive services to assist at discharge as needed  - Refer to Case Management Department for coordinating discharge planning if the patient needs post-hospital services based on physician/advanced practitioner order or complex needs related to functional status, cognitive ability, or social support system  Outcome: Progressing     Problem: Knowledge Deficit  Goal: Patient/family/caregiver demonstrates understanding of disease process, treatment plan, medications, and discharge instructions  Description:  Complete learning assessment and assess knowledge base.  Interventions:  - Provide teaching at level of understanding  - Provide teaching via preferred learning methods  Outcome: Progressing     Problem: NEUROSENSORY - ADULT  Goal: Achieves stable or improved neurological status  Description: INTERVENTIONS  - Monitor and report changes in neurological status  - Monitor vital signs such as temperature, blood pressure, glucose, and any other labs ordered   - Initiate measures to prevent increased intracranial pressure  - Monitor for seizure activity and implement precautions if appropriate      Outcome: Progressing  Goal: Remains free of injury related to seizures activity  Description: INTERVENTIONS  - Maintain airway, patient safety  and administer oxygen as ordered  - Monitor patient for seizure activity, document and report duration and description of seizure to physician/advanced practitioner  - If seizure occurs,  ensure patient safety during seizure  - Reorient patient post seizure  - Seizure pads on all 4 side rails  - Instruct patient/family to notify RN of any seizure activity including if an aura is experienced  - Instruct patient/family to call for assistance with activity based on nursing assessment  - Administer anti-seizure medications if ordered    Outcome: Progressing  Goal: Achieves maximal functionality and self care  Description: INTERVENTIONS  - Monitor swallowing and airway patency with patient fatigue and changes in neurological status  - Encourage and assist patient to increase activity and self care.   - Encourage visually impaired, hearing impaired and aphasic patients to use assistive/communication devices  Outcome: Progressing     Problem: METABOLIC, FLUID AND ELECTROLYTES - ADULT  Goal: Electrolytes maintained within normal limits  Description: INTERVENTIONS:  - Monitor labs and assess patient for signs and symptoms of electrolyte imbalances  - Administer electrolyte replacement as  ordered  - Monitor response to electrolyte replacements, including repeat lab results as appropriate  - Instruct patient on fluid and nutrition as appropriate  Outcome: Progressing  Goal: Fluid balance maintained  Description: INTERVENTIONS:  - Monitor labs   - Monitor I/O and WT  - Instruct patient on fluid and nutrition as appropriate  - Assess for signs & symptoms of volume excess or deficit  Outcome: Progressing  Goal: Glucose maintained within target range  Description: INTERVENTIONS:  - Monitor Blood Glucose as ordered  - Assess for signs and symptoms of hyperglycemia and hypoglycemia  - Administer ordered medications to maintain glucose within target range  - Assess nutritional intake and initiate nutrition service referral as needed  Outcome: Progressing     Problem: SKIN/TISSUE INTEGRITY - ADULT  Goal: Skin Integrity remains intact(Skin Breakdown Prevention)  Description: Assess:  -Perform William assessment every shift  -Clean and moisturize skin every day  -Inspect skin when repositioning, toileting, and assisting with ADLS  -Assess under medical devices such as lines every 2 hours  -Assess extremities for adequate circulation and sensation     Bed Management:  -Have minimal linens on bed & keep smooth, unwrinkled  -Change linens as needed when moist or perspiring  -Avoid sitting or lying in one position for more than 2 hours while in bed  -Keep HOB at 30 degrees     Toileting:  -Offer bedside commode  -Assess for incontinence every hour  -Use incontinent care products after each incontinent episode such as moisture barriers, foam cleansers    Activity:  -Mobilize patient 3 times a day  -Encourage activity and walks on unit  -Encourage or provide ROM exercises   -Turn and reposition patient every 2 Hours  -Use appropriate equipment to lift or move patient in bed  -Instruct/ Assist with weight shifting every 15 min when out of bed in chair  -Consider limitation of chair time 2 hour intervals    Skin  Care:  -Avoid use of baby powder, tape, friction and shearing, hot water or constrictive clothing  -Relieve pressure over bony prominences using offloading techniques or foam dressings (If not contraindicated by incontinence)  -Do not massage red bony areas    Next Steps:  -Teach patient strategies to minimize risks    -Consider consults to  interdisciplinary teams  Outcome: Progressing  Goal: Incision(s), wounds(s) or drain site(s) healing without S/S of infection  Description: INTERVENTIONS  - Assess and document dressing, incision, wound bed, drain sites and surrounding tissue  - Provide patient and family education  - Perform skin care/dressing changes  Outcome: Progressing  Goal: Pressure injury heals and does not worsen  Description: Interventions:  - Implement low air loss mattress or specialty surface (Criteria met)  - Apply silicone foam dressing  - Instruct/assist with weight shifting every 15 minutes when in chair   - Limit chair time to 2 hour intervals  - Use special pressure reducing interventions when in chair   - Apply fecal or urinary incontinence containment device   - Perform passive or active ROM  - Turn and reposition patient & offload bony prominences every 2 hours   - Utilize friction reducing device or surface for transfers   - Consider consults to  interdisciplinary teams  - Use incontinent care products after each incontinent episode  - Consider nutrition services referral as needed  Outcome: Progressing     Problem: MUSCULOSKELETAL - ADULT  Goal: Maintain or return mobility to safest level of function  Description: INTERVENTIONS:  - Assess patient's ability to carry out ADLs; assess patient's baseline for ADL function and identify physical deficits which impact ability to perform ADLs (bathing, care of mouth/teeth, toileting, grooming, dressing, etc.)  - Assess/evaluate cause of self-care deficits   - Assess range of motion  - Assess patient's mobility  - Assess patient's need for  assistive devices and provide as appropriate  - Encourage maximum independence but intervene and supervise when necessary  - Involve family in performance of ADLs  - Assess for home care needs following discharge   - Consider OT consult to assist with ADL evaluation and planning for discharge  - Provide patient education as appropriate  Outcome: Progressing  Goal: Maintain proper alignment of affected body part  Description: INTERVENTIONS:  - Support, maintain and protect limb and body alignment  - Provide patient/ family with appropriate education  Outcome: Progressing     Problem: Prexisting or High Potential for Compromised Skin Integrity  Goal: Skin integrity is maintained or improved  Description: INTERVENTIONS:  - Identify patients at risk for skin breakdown  - Assess and monitor skin integrity  - Assess and monitor nutrition and hydration status  - Monitor labs   - Assess for incontinence   - Turn and reposition patient  - Assist with mobility/ambulation  - Relieve pressure over bony prominences  - Avoid friction and shearing  - Provide appropriate hygiene as needed including keeping skin clean and dry  - Evaluate need for skin moisturizer/barrier cream  - Collaborate with interdisciplinary team   - Patient/family teaching  - Consider wound care consult   Outcome: Progressing

## 2024-08-29 NOTE — ASSESSMENT & PLAN NOTE
Patient presented with generalized weakness, frequent falls.  She has had ongoing diarrhea, dysuria, poor oral intake and reported 70 pound weight loss over the last year.  Initially hypotensive in the ER, status post IV fluids. Labs with multiple metabolic derangements  CT imaging showing mild diffuse colonic wall thickening, mild diffuse wall thickening of stomach  C. difficile found to be positive--has a history of C. difficile in September 2022.    Patient started on Dificid 200 mg x 10 days per protocol on 8/18 as this is recurrent infection  Initially seemed BMs were improving and were becoming more formed, however again having frequent watery stools  GI consulted  Contact precautions  Appetite is good   avoiding lactose and artificial sweeteners  Continue banatrol  Continue IV hydration today - will decrease her fluids to 75ml/hour  Reports that her stools are less watery, more soft stools today  Continue vancomycin

## 2024-08-30 LAB
ANION GAP SERPL CALCULATED.3IONS-SCNC: 5 MMOL/L (ref 4–13)
ATRIAL RATE: 106 BPM
BUN SERPL-MCNC: 14 MG/DL (ref 5–25)
CALCIUM SERPL-MCNC: 8.6 MG/DL (ref 8.4–10.2)
CHLORIDE SERPL-SCNC: 107 MMOL/L (ref 96–108)
CO2 SERPL-SCNC: 25 MMOL/L (ref 21–32)
CREAT SERPL-MCNC: 0.63 MG/DL (ref 0.6–1.3)
GFR SERPL CREATININE-BSD FRML MDRD: 102 ML/MIN/1.73SQ M
GLUCOSE SERPL-MCNC: 250 MG/DL (ref 65–140)
GLUCOSE SERPL-MCNC: 271 MG/DL (ref 65–140)
GLUCOSE SERPL-MCNC: 309 MG/DL (ref 65–140)
GLUCOSE SERPL-MCNC: 331 MG/DL (ref 65–140)
GLUCOSE SERPL-MCNC: 341 MG/DL (ref 65–140)
MAGNESIUM SERPL-MCNC: 1.7 MG/DL (ref 1.9–2.7)
P AXIS: 53 DEGREES
POTASSIUM SERPL-SCNC: 4.6 MMOL/L (ref 3.5–5.3)
PR INTERVAL: 116 MS
QRS AXIS: 77 DEGREES
QRSD INTERVAL: 78 MS
QT INTERVAL: 356 MS
QTC INTERVAL: 472 MS
SODIUM SERPL-SCNC: 137 MMOL/L (ref 135–147)
T WAVE AXIS: 71 DEGREES
VENTRICULAR RATE: 106 BPM

## 2024-08-30 PROCEDURE — 82653 EL-1 FECAL QUANTITATIVE: CPT | Performed by: STUDENT IN AN ORGANIZED HEALTH CARE EDUCATION/TRAINING PROGRAM

## 2024-08-30 PROCEDURE — 82948 REAGENT STRIP/BLOOD GLUCOSE: CPT

## 2024-08-30 PROCEDURE — 93005 ELECTROCARDIOGRAM TRACING: CPT

## 2024-08-30 PROCEDURE — 99232 SBSQ HOSP IP/OBS MODERATE 35: CPT | Performed by: INTERNAL MEDICINE

## 2024-08-30 PROCEDURE — 83735 ASSAY OF MAGNESIUM: CPT | Performed by: INTERNAL MEDICINE

## 2024-08-30 PROCEDURE — 93010 ELECTROCARDIOGRAM REPORT: CPT | Performed by: INTERNAL MEDICINE

## 2024-08-30 PROCEDURE — 80048 BASIC METABOLIC PNL TOTAL CA: CPT | Performed by: INTERNAL MEDICINE

## 2024-08-30 RX ORDER — SODIUM CHLORIDE 450 MG/100ML
75 INJECTION, SOLUTION INTRAVENOUS CONTINUOUS
Status: DISCONTINUED | OUTPATIENT
Start: 2024-08-30 | End: 2024-08-31

## 2024-08-30 RX ORDER — INSULIN GLARGINE 100 [IU]/ML
12 INJECTION, SOLUTION SUBCUTANEOUS
Status: DISCONTINUED | OUTPATIENT
Start: 2024-08-30 | End: 2024-09-01

## 2024-08-30 RX ORDER — MAGNESIUM SULFATE HEPTAHYDRATE 40 MG/ML
4 INJECTION, SOLUTION INTRAVENOUS ONCE
Status: COMPLETED | OUTPATIENT
Start: 2024-08-30 | End: 2024-08-30

## 2024-08-30 RX ORDER — INSULIN LISPRO 100 [IU]/ML
4 INJECTION, SOLUTION INTRAVENOUS; SUBCUTANEOUS
Status: DISCONTINUED | OUTPATIENT
Start: 2024-08-30 | End: 2024-08-31

## 2024-08-30 RX ORDER — MAGNESIUM SULFATE 1 G/100ML
1 INJECTION INTRAVENOUS ONCE
Status: COMPLETED | OUTPATIENT
Start: 2024-08-30 | End: 2024-08-30

## 2024-08-30 RX ORDER — INSULIN LISPRO 100 [IU]/ML
1-5 INJECTION, SOLUTION INTRAVENOUS; SUBCUTANEOUS
Status: DISCONTINUED | OUTPATIENT
Start: 2024-08-30 | End: 2024-09-08

## 2024-08-30 RX ORDER — ACETAMINOPHEN 325 MG/1
650 TABLET ORAL EVERY 6 HOURS PRN
Status: DISCONTINUED | OUTPATIENT
Start: 2024-08-30 | End: 2024-09-16 | Stop reason: HOSPADM

## 2024-08-30 RX ADMIN — GABAPENTIN 800 MG: 400 CAPSULE ORAL at 15:50

## 2024-08-30 RX ADMIN — Medication 250 MG: at 16:41

## 2024-08-30 RX ADMIN — HEPARIN SODIUM 5000 UNITS: 5000 INJECTION INTRAVENOUS; SUBCUTANEOUS at 06:05

## 2024-08-30 RX ADMIN — DULOXETINE HYDROCHLORIDE 60 MG: 60 CAPSULE, DELAYED RELEASE ORAL at 16:41

## 2024-08-30 RX ADMIN — MIRTAZAPINE 7.5 MG: 15 TABLET, FILM COATED ORAL at 21:12

## 2024-08-30 RX ADMIN — TRAZODONE HYDROCHLORIDE 50 MG: 50 TABLET ORAL at 23:28

## 2024-08-30 RX ADMIN — DIAZEPAM 5 MG: 5 TABLET ORAL at 21:13

## 2024-08-30 RX ADMIN — ASPIRIN 81 MG CHEWABLE TABLET 81 MG: 81 TABLET CHEWABLE at 09:10

## 2024-08-30 RX ADMIN — VANCOMYCIN HYDROCHLORIDE 125 MG: 125 CAPSULE ORAL at 06:05

## 2024-08-30 RX ADMIN — BACLOFEN 5 MG: 10 TABLET ORAL at 09:10

## 2024-08-30 RX ADMIN — POTASSIUM CHLORIDE 20 MEQ: 1500 TABLET, EXTENDED RELEASE ORAL at 09:10

## 2024-08-30 RX ADMIN — SODIUM CHLORIDE 75 ML/HR: 0.45 INJECTION, SOLUTION INTRAVENOUS at 18:25

## 2024-08-30 RX ADMIN — NICOTINE 1 PATCH: 21 PATCH, EXTENDED RELEASE TRANSDERMAL at 10:16

## 2024-08-30 RX ADMIN — INSULIN LISPRO 4 UNITS: 100 INJECTION, SOLUTION INTRAVENOUS; SUBCUTANEOUS at 16:36

## 2024-08-30 RX ADMIN — INSULIN LISPRO 3 UNITS: 100 INJECTION, SOLUTION INTRAVENOUS; SUBCUTANEOUS at 11:18

## 2024-08-30 RX ADMIN — INSULIN LISPRO 4 UNITS: 100 INJECTION, SOLUTION INTRAVENOUS; SUBCUTANEOUS at 21:14

## 2024-08-30 RX ADMIN — MICONAZOLE NITRATE: 20 CREAM TOPICAL at 16:38

## 2024-08-30 RX ADMIN — INSULIN LISPRO 2 UNITS: 100 INJECTION, SOLUTION INTRAVENOUS; SUBCUTANEOUS at 07:32

## 2024-08-30 RX ADMIN — BACLOFEN 5 MG: 10 TABLET ORAL at 16:41

## 2024-08-30 RX ADMIN — MICONAZOLE NITRATE: 20 CREAM TOPICAL at 10:17

## 2024-08-30 RX ADMIN — HEPARIN SODIUM 5000 UNITS: 5000 INJECTION INTRAVENOUS; SUBCUTANEOUS at 15:51

## 2024-08-30 RX ADMIN — TRAMADOL HYDROCHLORIDE 50 MG: 50 TABLET, COATED ORAL at 09:10

## 2024-08-30 RX ADMIN — MAGNESIUM SULFATE HEPTAHYDRATE 1 G: 1 INJECTION, SOLUTION INTRAVENOUS at 06:21

## 2024-08-30 RX ADMIN — DULOXETINE HYDROCHLORIDE 60 MG: 60 CAPSULE, DELAYED RELEASE ORAL at 09:10

## 2024-08-30 RX ADMIN — INSULIN LISPRO 3 UNITS: 100 INJECTION, SOLUTION INTRAVENOUS; SUBCUTANEOUS at 16:35

## 2024-08-30 RX ADMIN — ONDANSETRON 4 MG: 2 INJECTION INTRAMUSCULAR; INTRAVENOUS at 11:18

## 2024-08-30 RX ADMIN — INSULIN GLARGINE 12 UNITS: 100 INJECTION, SOLUTION SUBCUTANEOUS at 21:13

## 2024-08-30 RX ADMIN — Medication 250 MG: at 09:10

## 2024-08-30 RX ADMIN — VANCOMYCIN HYDROCHLORIDE 125 MG: 125 CAPSULE ORAL at 16:42

## 2024-08-30 RX ADMIN — INSULIN LISPRO 2 UNITS: 100 INJECTION, SOLUTION INTRAVENOUS; SUBCUTANEOUS at 11:18

## 2024-08-30 RX ADMIN — VANCOMYCIN HYDROCHLORIDE 125 MG: 125 CAPSULE ORAL at 23:28

## 2024-08-30 RX ADMIN — PANTOPRAZOLE SODIUM 40 MG: 40 TABLET, DELAYED RELEASE ORAL at 06:05

## 2024-08-30 RX ADMIN — VANCOMYCIN HYDROCHLORIDE 125 MG: 125 CAPSULE ORAL at 11:18

## 2024-08-30 RX ADMIN — INSULIN LISPRO 3 UNITS: 100 INJECTION, SOLUTION INTRAVENOUS; SUBCUTANEOUS at 07:33

## 2024-08-30 RX ADMIN — ATORVASTATIN CALCIUM 80 MG: 80 TABLET, FILM COATED ORAL at 09:10

## 2024-08-30 RX ADMIN — SODIUM CHLORIDE 75 ML/HR: 0.45 INJECTION, SOLUTION INTRAVENOUS at 01:53

## 2024-08-30 RX ADMIN — ALBUTEROL SULFATE 2 PUFF: 90 AEROSOL, METERED RESPIRATORY (INHALATION) at 10:16

## 2024-08-30 RX ADMIN — ACETAMINOPHEN 975 MG: 325 TABLET ORAL at 06:05

## 2024-08-30 RX ADMIN — LIDOCAINE 2 PATCH: 700 PATCH TOPICAL at 09:19

## 2024-08-30 RX ADMIN — FLUTICASONE FUROATE AND VILANTEROL TRIFENATATE 1 PUFF: 200; 25 POWDER RESPIRATORY (INHALATION) at 10:16

## 2024-08-30 RX ADMIN — HEPARIN SODIUM 5000 UNITS: 5000 INJECTION INTRAVENOUS; SUBCUTANEOUS at 21:13

## 2024-08-30 RX ADMIN — MAGNESIUM SULFATE HEPTAHYDRATE 4 G: 40 INJECTION, SOLUTION INTRAVENOUS at 09:19

## 2024-08-30 RX ADMIN — GABAPENTIN 800 MG: 400 CAPSULE ORAL at 21:13

## 2024-08-30 RX ADMIN — LEVOTHYROXINE SODIUM 112 MCG: 112 TABLET ORAL at 06:05

## 2024-08-30 RX ADMIN — GABAPENTIN 800 MG: 400 CAPSULE ORAL at 09:10

## 2024-08-30 NOTE — ASSESSMENT & PLAN NOTE
"Lab Results   Component Value Date    HGBA1C 16.1 (H) 08/16/2024       Recent Labs     08/29/24  1542 08/29/24  2046 08/30/24  0544 08/30/24  1058   POCGLU 194* 269* 250* 271*       Patient markedly hyperglycemic on admission only partially responsive to IV fluids, remainder of labs fortunately not consistent with DKA. A1C 16  Patient admitting she has not been using her home insulin as she apparently has been unable to fill prescription due to \"insurance issues,\" additionally reports that due to her neuropathy she has difficulty self injecting.  Daughter also reports jessica non compliance.   Typically on NovoLog 70/30 20 units twice daily  Diabetic diet  Continue basal/prandial insulin while inpatient, adjust PRN  Sliding scale coverage  "

## 2024-08-30 NOTE — PROGRESS NOTES
Progress Note- Barb Palomo 54 y.o. female MRN: [id number]    Unit/Bed#: [facility] 522-01 Encounter: [id number]      Assessment and Plan:    Ms. 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 GI consulted for acute on chronic diarrhea.    Acute on Chronic Diarrhea  Clostridium difficile Infection  Chronic Pancreatitis   Failure to Thrive   Weight Loss      # Clostridium difficile Infection: Patient initially presented with complaints on worsening weakness and frequent falls on 8/20.  Patient also with 70-80 lbs weight loss and chronic, watery diarrhea. Admits to difficulty following the death of her  and also been dealing with “insurance issues” leading to difficulty with her insulin. Labs on admission revealed severe hyperglycemia with electrolyte abnormalities. CT AP revealed mild diffuse wall thickening vs under distension of the colon as well as the stomach. In the past, patient has followed with GI for her unintentional weight loss and diarrhea. She has EGD/colonoscopy performed 5/2024 which was unrevealing although random colon biopsies were negative and patient had a very poor prep limiting sufficient examination. This admission, patient had PCR and EIA testing completed for C.Diff and both were found to be positive on 8/16 indicative of active infection. She had been on antibiotics last month for a dental procedure and had been started on for Fidaxomycin and completed a 7-day course but experienced no improvement in her diarrhea.  Therefore, GI consulted and patient has now been transitioned to oral vancomycin.  Stool has been fluctuating in consistency and frequency.  Today, patient admits that her stools are starting to become more formed after that continue to remain frequent. Ultimately, plan to continue  on a 14-day course and monitor for symptom improvement. If patient with no improvement in stool frequency despite treatment of both Fidaxomycin as well as vancomycin, would also consider FMT.     Plan:  Continue on PO Vancomycin 125 mg q6 hours x 14 days  Avoid anti-diarrheals; continue to restrict lactulose and continue Banatrol if needed with meals  Giardia stool testing ordered; follow up on results  Continue IVF hydration with Isolyte @ 150 cc/hr; monitor and replete electrolytes as needed  No endoscopic intervention warranted; consider colonoscopy/sigmoidoscopy and possible FMT if symptoms not improving  Maintain contact precautions with strict handwashing  Hold PPI therapy; resume when symptoms resolved  Avoid use of probiotics for the prevention of recurrence   In the future, will require Cdiff prophylaxis with all antibiotic therapy   Additional symptom management per primary team     # Chronic Pancreatitis: Patient admits to prior history of pancreatitis in the past which was previously attributed to gallstones resulting in cholecystectomy.  Patient also lifelong smoker and has CT imaging demonstrating evidence of chronic pancreatitis.  CT on admission demonstrating diffuse parenchymal atrophy and prominence of the main duct measuring up to 4 mL at the level of the pancreatic head.  Patient with multiple parenchymal calcifications in the pancreatic head.  Possible that the patient has pancreatic insufficiency as a result.  Pancreatic elastase has been ordered to see if patient would benefit from pancreatic enzymes.  However, do not believe that this is substantially contributing to patient's diarrhea.    # Weight Loss: Patient with significant weight loss prior to admission.  Admits to losing 70 to 80 pounds unintentionally over the last couple of months.  Likely result of uncontrolled diabetes as well as C. difficile infection.  Did have prior EGD/colonoscopy completed 5/2024.  Would recommend repeat be  considered on outpatient basis.    ______________________________________________________________________    Subjective:     Patient seen and evaluated bedside.  Sitting in bed comfortably in no acute distress or visible discomfort.  Admits to ongoing bowel movements.  States his stools are starting to improve in consistency.  Becoming more formed.  Admits to mild right lower quadrant abdominal pain but states that this has been persistent for months.  Denies any fever/chills.  Tolerating diet.  Offers no additional complaints.    Medication Administration - last 24 hours from 08/29/2024 0857 to 08/30/2024 0857         Date/Time Order Dose Route Action Action by     08/29/2024 1206 EDT ondansetron (ZOFRAN) injection 4 mg 4 mg Intravenous Given Phuong Morelos RN     08/29/2024 1338 EDT diazepam (VALIUM) tablet 5 mg 5 mg Oral Given Phuong Morelos RN     08/29/2024 1710 EDT DULoxetine (CYMBALTA) delayed release capsule 60 mg 60 mg Oral Given Phuong Morelos RN     08/30/2024 0605 EDT levothyroxine tablet 112 mcg 112 mcg Oral Given Desirae Vargas RN     08/29/2024 2215 EDT mirtazapine (REMERON) tablet 7.5 mg 7.5 mg Oral Given Aissatou Means RN     08/30/2024 0605 EDT pantoprazole (PROTONIX) EC tablet 40 mg 40 mg Oral Given Desirae Vargas RN     08/30/2024 0605 EDT heparin (porcine) subcutaneous injection 5,000 Units 5,000 Units Subcutaneous Given Desirae Vargas RN     08/29/2024 2216 EDT heparin (porcine) subcutaneous injection 5,000 Units 5,000 Units Subcutaneous Given Aissatou Means RN     08/29/2024 1332 EDT heparin (porcine) subcutaneous injection 5,000 Units 5,000 Units Subcutaneous Given Phuong Morelos RN     08/29/2024 1206 EDT traMADol (ULTRAM) tablet 50 mg 50 mg Oral Given Phuong Morelos RN     08/29/2024 2215 EDT gabapentin (NEURONTIN) capsule 800 mg 800 mg Oral Given Aissatou Means RN     08/29/2024 1554 EDT gabapentin (NEURONTIN) capsule 800 mg 800 mg Oral Given Phuong ROJAS  CAITLYN Morelos     08/30/2024 0605 EDT acetaminophen (TYLENOL) tablet 975 mg 975 mg Oral Given Desirae Vargas RN     08/29/2024 2215 EDT acetaminophen (TYLENOL) tablet 975 mg 975 mg Oral Given iAssatou Means RN     08/29/2024 1332 EDT acetaminophen (TYLENOL) tablet 975 mg 975 mg Oral Given Phuong Morelos RN     08/29/2024 1710 EDT baclofen tablet 5 mg 5 mg Oral Given Phuong Morelos RN     08/29/2024 2137 EDT lidocaine (LIDODERM) 5 % patch 2 patch 2 patch Topical Patch Removed Desirae Vargas RN     08/29/2024 1724 EDT moisture barrier miconazole 2% cream (aka DARLIN MOISTURE BARRIER ANTIFUNGAL CREAM) -- Topical Given Phuong Morelos RN     08/29/2024 1710 EDT saccharomyces boulardii (FLORASTOR) capsule 250 mg 250 mg Oral Given Phuong Morelos RN     08/29/2024 2215 EDT traZODone (DESYREL) tablet 50 mg 50 mg Oral Given Aissatou Means RN     08/30/2024 0605 EDT vancomycin (VANCOCIN) capsule 125 mg 125 mg Oral Given Desirae Vargas RN     08/29/2024 2340 EDT vancomycin (VANCOCIN) capsule 125 mg 125 mg Oral Given Desirae Vargas RN     08/29/2024 1723 EDT vancomycin (VANCOCIN) capsule 125 mg 125 mg Oral Given Phuong Morelos RN     08/29/2024 1109 EDT vancomycin (VANCOCIN) capsule 125 mg 125 mg Oral Given Phuong Morelos RN     08/29/2024 1010 EDT insulin glargine (LANTUS) subcutaneous injection 15 Units 0.15 mL 15 Units Subcutaneous Not Given Puhong Morelos RN     08/30/2024 0153 EDT sodium chloride infusion 0.45 % 75 mL/hr Intravenous New Bag Desirae Vargas RN     08/29/2024 1405 EDT sodium chloride infusion 0.45 % 75 mL/hr Intravenous Rate/Dose Change Phuong Morelos RN     08/29/2024 1334 EDT sodium chloride infusion 0.45 % 125 mL/hr Intravenous New Bag Phuong Morelos RN     08/29/2024 1333 EDT sodium chloride infusion 0.45 % 0 mL/hr Intravenous Stopped Phuong Morelos RN     08/29/2024 1029 EDT magnesium sulfate 4 g/100 mL IVPB (premix) 4 g 4 g Intravenous New Bag  "Melvi Segovia RN     08/29/2024 1710 EDT potassium chloride (Klor-Con M20) CR tablet 20 mEq 20 mEq Oral Given Phuong Morelos RN     08/29/2024 2216 EDT insulin glargine (LANTUS) subcutaneous injection 8 Units 0.08 mL 8 Units Subcutaneous Given Aissatou Means RN     08/30/2024 0733 EDT insulin lispro (HumALOG/ADMELOG) 100 units/mL subcutaneous injection 3 Units 3 Units Subcutaneous Given Desirae Vargas RN     08/29/2024 1555 EDT insulin lispro (HumALOG/ADMELOG) 100 units/mL subcutaneous injection 3 Units 3 Units Subcutaneous Given Phuong Morelos RN     08/29/2024 1323 EDT insulin lispro (HumALOG/ADMELOG) 100 units/mL subcutaneous injection 3 Units 3 Units Subcutaneous Given Phuong Morelos RN     08/30/2024 0732 EDT insulin lispro (HumALOG/ADMELOG) 100 units/mL subcutaneous injection 1-5 Units 2 Units Subcutaneous Given Desirae Vargas RN     08/29/2024 1554 EDT insulin lispro (HumALOG/ADMELOG) 100 units/mL subcutaneous injection 1-5 Units 1 Units Subcutaneous Given Phuong Morelos RN     08/29/2024 1103 EDT insulin lispro (HumALOG/ADMELOG) 100 units/mL subcutaneous injection 1-5 Units -- Subcutaneous Not Given Phuong Morelos RN     08/29/2024 2216 EDT insulin lispro (HumALOG/ADMELOG) 100 units/mL subcutaneous injection 1-5 Units 2 Units Subcutaneous Given Aissatou Means RN     08/30/2024 0621 EDT magnesium sulfate IVPB (premix) SOLN 1 g 1 g Intravenous New Bag Desirae Vargas RN            Objective:     Vitals: Blood pressure 100/73, pulse (!) 117, temperature 98.6 °F (37 °C), resp. rate 20, height 5' 2\" (1.575 m), weight 39.9 kg (88 lb), last menstrual period 03/04/2016, SpO2 98%, not currently breastfeeding.,Body mass index is 16.1 kg/m².      Intake/Output Summary (Last 24 hours) at 8/30/2024 0857  Last data filed at 8/30/2024 0153  Gross per 24 hour   Intake 3162.5 ml   Output --   Net 3162.5 ml       Physical Exam  Constitutional:       General: She is not in acute " distress.     Appearance: She is not ill-appearing or toxic-appearing.      Comments: Frail and cachectic appearing   HENT:      Head: Normocephalic and atraumatic.      Nose: Nose normal. No congestion.   Eyes:      General: No scleral icterus.  Cardiovascular:      Rate and Rhythm: Normal rate.      Pulses: Normal pulses.   Pulmonary:      Effort: Pulmonary effort is normal. No respiratory distress.      Breath sounds: No wheezing.   Abdominal:      General: Abdomen is flat. There is no distension.      Tenderness: There is no abdominal tenderness. There is no guarding.      Hernia: No hernia is present.   Musculoskeletal:         General: Normal range of motion.      Cervical back: Normal range of motion.   Skin:     General: Skin is warm.      Coloration: Skin is not jaundiced or pale.   Neurological:      Mental Status: She is alert and oriented to person, place, and time.   Psychiatric:         Mood and Affect: Mood normal.         Behavior: Behavior normal.           Invasive Devices       Peripheral Intravenous Line  Duration             Peripheral IV 08/30/24 Left;Ventral (anterior) Forearm <1 day                    Lab Results:  No results displayed because visit has over 200 results.          Imaging Studies: I have personally reviewed pertinent imaging studies.

## 2024-08-30 NOTE — ASSESSMENT & PLAN NOTE
Patient presented with generalized weakness, frequent falls.  She has had ongoing diarrhea, dysuria, poor oral intake and reported 70 pound weight loss over the last year.  Initially hypotensive in the ER, status post IV fluids. Labs with multiple metabolic derangements  CT imaging showing mild diffuse colonic wall thickening, mild diffuse wall thickening of stomach  C. difficile found to be positive--has a history of C. difficile in September 2022.    Patient started on Dificid 200 mg x 10 days per protocol on 8/18 as this is recurrent infection  Initially seemed BMs were improving and were becoming more formed, however again having frequent watery stools  GI consulted  Contact precautions  Appetite is good   avoiding lactose and artificial sweeteners  Continue banatrol  Continue IV hydration today  Multiple loose stools overnight and this morning  Continue vancomycin  GI to evaluate for pancreatic insufficiency as a contributing factor to her diarrhea - stool elastase is pending

## 2024-08-30 NOTE — CASE MANAGEMENT
Case Management Progress Note    Patient name Barb Palomo  Location [facility] 522/[facility] 522-01 MRN [id number]  : 1970 Date 2024       LOS (days): 14  Geometric Mean LOS (GMLOS) (days): 5.1  Days to GMLOS:-9.3        Pt's LOS is 14 days. Pt is not yet medically ready for d/c. Pt continues to experience diarrhea due to C.diff bacterium. Pt is on IV ABX for this.    Pt remains recommended for SNF rehab for her aftercare plan. Pt has been referred to and is accepted for placement at [facility] located in [facility].    Once pt is medically cleared for d/c by [facility], pt will admit to [facility] for rehab. Pt has Medicare for healthcare coverage; no authorization from insurance is required for pt's admission to [facility].    CM to follow.

## 2024-08-30 NOTE — ASSESSMENT & PLAN NOTE
Longstanding history of chronic diarrhea, patient reporting acute diarrhea x1 month. Follows with GI. Has tried questran/imodium without relief  Stool enteric panel negative  C. difficile positive, see plan above   Giardia antibody pending, stool elastase is pending

## 2024-08-30 NOTE — PROGRESS NOTES
[facility]  Progress Note  Name: Barb Palomo I  MRN: [id number]  Unit/Bed#: [facility] 522-01 I Date of Admission: 8/15/2024   Date of Service: 8/30/2024 I Hospital Day: 14    Assessment & Plan   * C. difficile diarrhea  Assessment & Plan  Patient presented with generalized weakness, frequent falls.  She has had ongoing diarrhea, dysuria, poor oral intake and reported 70 pound weight loss over the last year.  Initially hypotensive in the ER, status post IV fluids. Labs with multiple metabolic derangements  CT imaging showing mild diffuse colonic wall thickening, mild diffuse wall thickening of stomach  C. difficile found to be positive--has a history of C. difficile in September 2022.    Patient started on Dificid 200 mg x 10 days per protocol on 8/18 as this is recurrent infection  Initially seemed BMs were improving and were becoming more formed, however again having frequent watery stools  GI consulted  Contact precautions  Appetite is good   avoiding lactose and artificial sweeteners  Continue banatrol  Continue IV hydration today  Multiple loose stools overnight and this morning  Continue vancomycin  GI to evaluate for pancreatic insufficiency as a contributing factor to her diarrhea - stool elastase is pending    Hypotension  Assessment & Plan  Intermittent episodes of hypotension, most occurrences happen overnight and question correlation with minipress administration.  This has now been discontinued    Sinus tachycardia  Assessment & Plan  Heart rate 100s-120s at times.  Sinus.  Has received IV fluids without much improvement.  Trialed IVFs again 8/22 given reports of nausea with poor oral intake and dizziness with some improvement to the 110s with HR- will continue with IVFs again today   orthostatic blood pressure negative   Pt is on minipress which could have a side effect of ST.  Additionally noted she is hypotensive each night around 2/3 AM, I suspect this is likely  "the underlying cause.  She was started on this by her psychiatrist to assist with sleep and eating outpatient   Will hold minipress for now.  D/w pt can have PRN trazodone for sleep.  Pt agreeable   Monitor     Failure to thrive in adult  Assessment & Plan  Patient presented with increased generalized weakness and frequent falls, ongoing diarrhea, dysuria, poor oral intake, and at least 70-80 pound weight loss over the past 1 year. Has been following with GI, had colonoscopy May 2024 with poor prep--had EGD with esophagitis and gastritis  Labs with multiple derangements including hyperglycemia, hypokalemia and hypomagnesemia on admission, improved.   C. difficile positive as above  Patient reports she has not been using insulin due to \"insurance issue\" and inability to self inject due to her neuropathy  CT abdomen and pelvis without any obvious malignancy  Treatment as per respective issues  PT/OT recommending rehab.     Hypomagnesemia  Assessment & Plan  Hold oral supplementation due to diarrhea  Ordering additional IV mag with persistent low mag   Repeat labs in AM     Severe protein-calorie malnutrition (HCC)  Assessment & Plan  Severe protein-calorie malnutrition 2/2 chronic diarrhea a/e/b fat and muscle loss requiring Nutrition consult, oral diet and nutrition supplements    360 Statement: severe malnutrition r/t suspect combination of chronic illness and social/environmental factors as evidenced by severe temporal muscle loss, severe tricep fat pad loss, at least moderate muscle loss around clavicles and shoulders, at least moderate orbital fat pad loss. Treatment: oral diet and oral nutrition supplements.    BMI Findings:  Adult BMI Classifications: Underweight < 18.5        Body mass index is 16.1 kg/m².   C/w supplements  Nutrition consult appreciated  Encourage po intake     Acute cystitis without hematuria  Assessment & Plan  Reported dysuria on admission   Urine + nitrite & leukocytes, CT imaging with " "distended bladder suggestive of retention and small amount of air which could be secondary to infection  Urine Culture noted, S/P 3 doses of IV CTX 8/17 8/21, reported pain AFTER urination, likely related to urine hitting excoriated labia/bottom from frequent stooling. Will continue with medicated cream to this area- denies pain at this time   S/p pyridum x 3 doses  Wound care consult    Uncontrolled type 2 diabetes mellitus with hyperglycemia (HCC)  Assessment & Plan  Lab Results   Component Value Date    HGBA1C 16.1 (H) 08/16/2024       Recent Labs     08/29/24  1542 08/29/24  2046 08/30/24  0544 08/30/24  1058   POCGLU 194* 269* 250* 271*       Patient markedly hyperglycemic on admission only partially responsive to IV fluids, remainder of labs fortunately not consistent with DKA. A1C 16  Patient admitting she has not been using her home insulin as she apparently has been unable to fill prescription due to \"insurance issues,\" additionally reports that due to her neuropathy she has difficulty self injecting.  Daughter also reports jessica non compliance.   Typically on NovoLog 70/30 20 units twice daily  Diabetic diet  Continue basal/prandial insulin while inpatient, adjust PRN  Sliding scale coverage    Opioid dependence with other opioid-induced disorder (HCC)  Assessment & Plan  PDMP reviewed, patient maintained on tramadol 200 mg daily as needed; history of chronic cervical/lumbar pain noted  Continue as needed tramadol and chronic gabapentin dosing    Chronic diarrhea  Assessment & Plan  Longstanding history of chronic diarrhea, patient reporting acute diarrhea x1 month. Follows with GI. Has tried questran/imodium without relief  Stool enteric panel negative  C. difficile positive, see plan above   Giardia antibody pending, stool elastase is pending    Tobacco dependence  Assessment & Plan  Counseled on need for cessation, provide nicotine patch while admitted  Recent CT chest for lung cancer screening " 2024 negative for nodules or masses    GERD without esophagitis  Assessment & Plan  Continue PPI    Severe episode of recurrent major depressive disorder, without psychotic features (HCC)  Assessment & Plan  Mood appearing fairly stable, continue home duloxetine, mirtazapine, as needed Valium  Hold minipress given symptomatic hypotension episodes, tachycardia              VTE Pharmacologic Prophylaxis: VTE Score: 3 Moderate Risk (Score 3-4) - Pharmacological DVT Prophylaxis Ordered: heparin.    Mobility:   Basic Mobility Inpatient Raw Score: 24  JH-HLM Goal: 8: Walk 250 feet or more  JH-HLM Achieved: 8: Walk 250 feet ot more  JH-HLM Goal achieved. Continue to encourage appropriate mobility.    Patient Centered Rounds: I performed bedside rounds with nursing staff today.   Discussions with Specialists or Other Care Team Provider: nurseBO    Total Time Spent on Date of Encounter in care of patient: 40 mins. This time was spent on one or more of the following: performing physical exam; counseling and coordination of care; obtaining or reviewing history; documenting in the medical record; reviewing/ordering tests, medications or procedures; communicating with other healthcare professionals and discussing with patient's family/caregivers.    Current Length of Stay: 14 day(s)  Current Patient Status: Inpatient   Certification Statement: The patient will continue to require additional inpatient hospital stay due to diarrhea  Discharge Plan: Anticipate discharge in 48-72 hrs to rehab facility.    Code Status: Level 1 - Full Code    Subjective:   Reports that she continues to have diarrhea. Multiple episodes overnight and this morning. Has abdominal pain, no nausea or vomiting. Appetite is good.    Objective:     Vitals:   Temp (24hrs), Av.4 °F (36.9 °C), Min:98.2 °F (36.8 °C), Max:98.6 °F (37 °C)    Temp:  [98.2 °F (36.8 °C)-98.6 °F (37 °C)] 98.6 °F (37 °C)  HR:  [109-117] 117  Resp:  [18-20] 20  BP:  (100-108)/(72-76) 100/73  SpO2:  [96 %-98 %] 98 %  Body mass index is 16.1 kg/m².     Input and Output Summary (last 24 hours):     Intake/Output Summary (Last 24 hours) at 8/30/2024 1224  Last data filed at 8/30/2024 1223  Gross per 24 hour   Intake 3722.5 ml   Output --   Net 3722.5 ml       Physical Exam:   Physical Exam  Constitutional:       Appearance: Normal appearance.   HENT:      Head: Normocephalic and atraumatic.      Nose: Nose normal.   Eyes:      Extraocular Movements: Extraocular movements intact.   Cardiovascular:      Rate and Rhythm: Normal rate and regular rhythm.   Pulmonary:      Effort: Pulmonary effort is normal.   Abdominal:      General: There is no distension.      Palpations: Abdomen is soft.      Tenderness: There is no abdominal tenderness.   Musculoskeletal:      Right lower leg: No edema.      Left lower leg: No edema.   Skin:     General: Skin is warm and dry.   Neurological:      General: No focal deficit present.      Mental Status: She is alert and oriented to person, place, and time.   Psychiatric:         Mood and Affect: Mood normal.         Behavior: Behavior normal.          Additional Data:     Labs:  Results from last 7 days   Lab Units 08/29/24  0458 08/28/24  0528 08/27/24  0552   WBC Thousand/uL 7.23   < > 6.81   HEMOGLOBIN g/dL 10.2*   < > 10.6*   HEMATOCRIT % 31.8*   < > 32.7*   PLATELETS Thousands/uL 322   < > 306   SEGS PCT %  --   --  41*   LYMPHO PCT %  --   --  47*   MONO PCT %  --   --  9   EOS PCT %  --   --  2    < > = values in this interval not displayed.     Results from last 7 days   Lab Units 08/30/24  0226 08/25/24  0526 08/24/24  0746   SODIUM mmol/L 137   < > 140   POTASSIUM mmol/L 4.6   < > 3.9   CHLORIDE mmol/L 107   < > 112*   CO2 mmol/L 25   < > 24   BUN mg/dL 14   < > 7   CREATININE mg/dL 0.63   < > 0.30*   ANION GAP mmol/L 5   < > 4   CALCIUM mg/dL 8.6   < > 7.2*   ALBUMIN g/dL  --   --  2.7*   TOTAL BILIRUBIN mg/dL  --   --  0.21   ALK PHOS U/L   --   --  131*   ALT U/L  --   --  43   AST U/L  --   --  45*   GLUCOSE RANDOM mg/dL 331*   < > 310*    < > = values in this interval not displayed.         Results from last 7 days   Lab Units 08/30/24  1058 08/30/24  0544 08/29/24  2046 08/29/24  1542 08/29/24  1102 08/29/24  0646 08/29/24  0126 08/29/24  0053 08/29/24  0027 08/28/24  2116 08/28/24  1541 08/28/24  1115   POC GLUCOSE mg/dl 271* 250* 269* 194* 119 220* 193* 55* 49* 84 122 182*               Lines/Drains:  Invasive Devices       Peripheral Intravenous Line  Duration             Peripheral IV 08/30/24 Left;Ventral (anterior) Forearm <1 day                          Imaging: No pertinent imaging reviewed.    Recent Cultures (last 7 days):         Last 24 Hours Medication List:   Current Facility-Administered Medications   Medication Dose Route Frequency Provider Last Rate    acetaminophen  650 mg Oral Q6H PRN Abhay Hayes MD      albuterol  2 puff Inhalation Q4H PRN Renan Drake DO      aspirin  81 mg Oral Daily Renan Drake, DO      atorvastatin  80 mg Oral Daily Renan Drake, DO      baclofen  5 mg Oral BID ELISEO Toribio      butalbital-acetaminophen-caffeine  1 tablet Oral Q6H PRN Renan Drake, DO      diazepam  5 mg Oral Q12H PRN Renan Drake, DO      DULoxetine  60 mg Oral BID Renan Drake, DO      fluticasone-vilanterol  1 puff Inhalation Daily Renan Drake, DO      gabapentin  800 mg Oral TID Renan Drake DO      heparin (porcine)  5,000 Units Subcutaneous Q8H [facility] Renan Drake DO      insulin glargine  12 Units Subcutaneous HS Abhay Hayes MD      insulin lispro  1-5 Units Subcutaneous TID AC Abhay Hayes MD      insulin lispro  1-5 Units Subcutaneous HS Abhay Hayes MD      insulin lispro  4 Units Subcutaneous TID With Meals Abhay Hayes MD      levothyroxine  112 mcg Oral Early Morning Renan Drake DO      lidocaine  2 patch Topical Daily ELISEO Toribio      magnesium sulfate  4 g Intravenous Once  Abhay Hayes MD 4 g (08/30/24 0919)    mirtazapine  7.5 mg Oral HS Renan Drake, DO      DARLIN ANTIFUNGAL   Topical BID Cherise Grey, ELISEO      nicotine  1 patch Transdermal Daily Renan Drake, DO      ondansetron  4 mg Intravenous Q6H PRN eRnan Drake, DO      pantoprazole  40 mg Oral Early Morning Renan Drake, DO      saccharomyces boulardii  250 mg Oral BID ELISEO Trevino      sodium chloride  75 mL/hr Intravenous Continuous Abhay Hayes MD      traMADol  50 mg Oral Q6H PRN Renan Drake, DO      traZODone  50 mg Oral HS PRN Estefany Villalobos PA-C      vancomycin oral (capsules or solution)  125 mg Oral Q6H [facility] Estefany Villalobos PA-C          Today, Patient Was Seen By: Abhay Hayes MD    **Please Note: This note may have been constructed using a voice recognition system.**

## 2024-08-31 LAB
ANION GAP SERPL CALCULATED.3IONS-SCNC: 6 MMOL/L (ref 4–13)
BUN SERPL-MCNC: 12 MG/DL (ref 5–25)
CALCIUM SERPL-MCNC: 9.3 MG/DL (ref 8.4–10.2)
CHLORIDE SERPL-SCNC: 107 MMOL/L (ref 96–108)
CO2 SERPL-SCNC: 29 MMOL/L (ref 21–32)
CREAT SERPL-MCNC: 0.38 MG/DL (ref 0.6–1.3)
GFR SERPL CREATININE-BSD FRML MDRD: 121 ML/MIN/1.73SQ M
GLUCOSE SERPL-MCNC: 130 MG/DL (ref 65–140)
GLUCOSE SERPL-MCNC: 160 MG/DL (ref 65–140)
GLUCOSE SERPL-MCNC: 202 MG/DL (ref 65–140)
GLUCOSE SERPL-MCNC: 277 MG/DL (ref 65–140)
GLUCOSE SERPL-MCNC: 297 MG/DL (ref 65–140)
MAGNESIUM SERPL-MCNC: 1.7 MG/DL (ref 1.9–2.7)
POTASSIUM SERPL-SCNC: 4 MMOL/L (ref 3.5–5.3)
SODIUM SERPL-SCNC: 142 MMOL/L (ref 135–147)

## 2024-08-31 PROCEDURE — 99232 SBSQ HOSP IP/OBS MODERATE 35: CPT | Performed by: INTERNAL MEDICINE

## 2024-08-31 PROCEDURE — 82948 REAGENT STRIP/BLOOD GLUCOSE: CPT

## 2024-08-31 PROCEDURE — 80048 BASIC METABOLIC PNL TOTAL CA: CPT | Performed by: INTERNAL MEDICINE

## 2024-08-31 PROCEDURE — 83735 ASSAY OF MAGNESIUM: CPT | Performed by: INTERNAL MEDICINE

## 2024-08-31 RX ORDER — INSULIN LISPRO 100 [IU]/ML
8 INJECTION, SOLUTION INTRAVENOUS; SUBCUTANEOUS
Status: DISCONTINUED | OUTPATIENT
Start: 2024-08-31 | End: 2024-09-03

## 2024-08-31 RX ORDER — MAGNESIUM SULFATE HEPTAHYDRATE 40 MG/ML
2 INJECTION, SOLUTION INTRAVENOUS ONCE
Status: COMPLETED | OUTPATIENT
Start: 2024-08-31 | End: 2024-08-31

## 2024-08-31 RX ADMIN — DIAZEPAM 5 MG: 5 TABLET ORAL at 14:36

## 2024-08-31 RX ADMIN — HEPARIN SODIUM 5000 UNITS: 5000 INJECTION INTRAVENOUS; SUBCUTANEOUS at 22:06

## 2024-08-31 RX ADMIN — ACETAMINOPHEN 650 MG: 325 TABLET ORAL at 17:12

## 2024-08-31 RX ADMIN — INSULIN LISPRO 3 UNITS: 100 INJECTION, SOLUTION INTRAVENOUS; SUBCUTANEOUS at 22:05

## 2024-08-31 RX ADMIN — TRAMADOL HYDROCHLORIDE 50 MG: 50 TABLET, COATED ORAL at 22:04

## 2024-08-31 RX ADMIN — INSULIN GLARGINE 12 UNITS: 100 INJECTION, SOLUTION SUBCUTANEOUS at 22:06

## 2024-08-31 RX ADMIN — Medication 250 MG: at 17:10

## 2024-08-31 RX ADMIN — DULOXETINE HYDROCHLORIDE 60 MG: 60 CAPSULE, DELAYED RELEASE ORAL at 17:10

## 2024-08-31 RX ADMIN — FLUTICASONE FUROATE AND VILANTEROL TRIFENATATE 1 PUFF: 200; 25 POWDER RESPIRATORY (INHALATION) at 09:38

## 2024-08-31 RX ADMIN — TRAZODONE HYDROCHLORIDE 50 MG: 50 TABLET ORAL at 22:04

## 2024-08-31 RX ADMIN — GABAPENTIN 800 MG: 400 CAPSULE ORAL at 15:54

## 2024-08-31 RX ADMIN — VANCOMYCIN HYDROCHLORIDE 125 MG: 125 CAPSULE ORAL at 11:36

## 2024-08-31 RX ADMIN — MICONAZOLE NITRATE: 20 CREAM TOPICAL at 09:12

## 2024-08-31 RX ADMIN — NICOTINE 1 PATCH: 21 PATCH, EXTENDED RELEASE TRANSDERMAL at 08:40

## 2024-08-31 RX ADMIN — SODIUM CHLORIDE 75 ML/HR: 0.45 INJECTION, SOLUTION INTRAVENOUS at 05:30

## 2024-08-31 RX ADMIN — INSULIN LISPRO 4 UNITS: 100 INJECTION, SOLUTION INTRAVENOUS; SUBCUTANEOUS at 08:41

## 2024-08-31 RX ADMIN — TRAMADOL HYDROCHLORIDE 50 MG: 50 TABLET, COATED ORAL at 05:27

## 2024-08-31 RX ADMIN — GABAPENTIN 800 MG: 400 CAPSULE ORAL at 22:04

## 2024-08-31 RX ADMIN — GABAPENTIN 800 MG: 400 CAPSULE ORAL at 08:40

## 2024-08-31 RX ADMIN — Medication 250 MG: at 08:41

## 2024-08-31 RX ADMIN — MIRTAZAPINE 7.5 MG: 15 TABLET, FILM COATED ORAL at 22:04

## 2024-08-31 RX ADMIN — VANCOMYCIN HYDROCHLORIDE 125 MG: 125 CAPSULE ORAL at 05:35

## 2024-08-31 RX ADMIN — ASPIRIN 81 MG CHEWABLE TABLET 81 MG: 81 TABLET CHEWABLE at 08:40

## 2024-08-31 RX ADMIN — HEPARIN SODIUM 5000 UNITS: 5000 INJECTION INTRAVENOUS; SUBCUTANEOUS at 05:27

## 2024-08-31 RX ADMIN — BACLOFEN 5 MG: 10 TABLET ORAL at 08:40

## 2024-08-31 RX ADMIN — MAGNESIUM SULFATE HEPTAHYDRATE 2 G: 40 INJECTION, SOLUTION INTRAVENOUS at 08:39

## 2024-08-31 RX ADMIN — PANTOPRAZOLE SODIUM 40 MG: 40 TABLET, DELAYED RELEASE ORAL at 05:27

## 2024-08-31 RX ADMIN — LIDOCAINE 2 PATCH: 700 PATCH TOPICAL at 08:40

## 2024-08-31 RX ADMIN — VANCOMYCIN HYDROCHLORIDE 125 MG: 125 CAPSULE ORAL at 17:13

## 2024-08-31 RX ADMIN — INSULIN LISPRO 3 UNITS: 100 INJECTION, SOLUTION INTRAVENOUS; SUBCUTANEOUS at 11:36

## 2024-08-31 RX ADMIN — DULOXETINE HYDROCHLORIDE 60 MG: 60 CAPSULE, DELAYED RELEASE ORAL at 08:40

## 2024-08-31 RX ADMIN — INSULIN LISPRO 1 UNITS: 100 INJECTION, SOLUTION INTRAVENOUS; SUBCUTANEOUS at 15:55

## 2024-08-31 RX ADMIN — LEVOTHYROXINE SODIUM 112 MCG: 112 TABLET ORAL at 05:27

## 2024-08-31 RX ADMIN — VANCOMYCIN HYDROCHLORIDE 125 MG: 125 CAPSULE ORAL at 23:12

## 2024-08-31 RX ADMIN — ONDANSETRON 4 MG: 2 INJECTION INTRAMUSCULAR; INTRAVENOUS at 14:36

## 2024-08-31 RX ADMIN — INSULIN LISPRO 8 UNITS: 100 INJECTION, SOLUTION INTRAVENOUS; SUBCUTANEOUS at 11:37

## 2024-08-31 RX ADMIN — ATORVASTATIN CALCIUM 80 MG: 80 TABLET, FILM COATED ORAL at 08:40

## 2024-08-31 RX ADMIN — MICONAZOLE NITRATE: 20 CREAM TOPICAL at 17:17

## 2024-08-31 RX ADMIN — BACLOFEN 5 MG: 10 TABLET ORAL at 17:10

## 2024-08-31 RX ADMIN — HEPARIN SODIUM 5000 UNITS: 5000 INJECTION INTRAVENOUS; SUBCUTANEOUS at 13:04

## 2024-08-31 RX ADMIN — INSULIN LISPRO 8 UNITS: 100 INJECTION, SOLUTION INTRAVENOUS; SUBCUTANEOUS at 17:12

## 2024-08-31 RX ADMIN — TRAMADOL HYDROCHLORIDE 50 MG: 50 TABLET, COATED ORAL at 15:54

## 2024-08-31 NOTE — PROGRESS NOTES
[facility]  Progress Note  Name: Barb Palomo I  MRN: [id number]  Unit/Bed#: [facility] 522-01 I Date of Admission: 8/15/2024   Date of Service: 8/31/2024 I Hospital Day: 15    Assessment & Plan   * C. difficile diarrhea  Assessment & Plan  Patient presented with generalized weakness, frequent falls.  She has had ongoing diarrhea, dysuria, poor oral intake and reported 70 pound weight loss over the last year.  Initially hypotensive in the ER, status post IV fluids. Labs with multiple metabolic derangements  CT imaging showing mild diffuse colonic wall thickening, mild diffuse wall thickening of stomach  C. difficile found to be positive--has a history of C. difficile in September 2022.    Patient started on Dificid 200 mg x 10 days per protocol on 8/18 as this is recurrent infection  Initially seemed BMs were improving and were becoming more formed, however again having frequent watery stools  GI consulted  Contact precautions  Appetite is good   avoiding lactose and artificial sweeteners  Continue banatrol  Multiple loose stools overnight and this morning  Continue vancomycin  GI to evaluate for pancreatic insufficiency as a contributing factor to her diarrhea - stool elastase is pending  Possible FMT this coming week    Hypotension  Assessment & Plan  Intermittent episodes of hypotension, most occurrences happen overnight and question correlation with minipress administration.  This has now been discontinued    Sinus tachycardia  Assessment & Plan  Heart rate 100s-120s at times.  Sinus.  Has received IV fluids without much improvement.  Trialed IVFs again 8/22 given reports of nausea with poor oral intake and dizziness with some improvement to the 110s with HR- will continue with IVFs again today   orthostatic blood pressure negative   Pt is on minipress which could have a side effect of ST.  Additionally noted she is hypotensive each night around 2/3 AM, I suspect this is  "likely the underlying cause.  She was started on this by her psychiatrist to assist with sleep and eating outpatient   Will hold minipress for now.  D/w pt can have PRN trazodone for sleep.  Pt agreeable   Monitor     Failure to thrive in adult  Assessment & Plan  Patient presented with increased generalized weakness and frequent falls, ongoing diarrhea, dysuria, poor oral intake, and at least 70-80 pound weight loss over the past 1 year. Has been following with GI, had colonoscopy May 2024 with poor prep--had EGD with esophagitis and gastritis  Labs with multiple derangements including hyperglycemia, hypokalemia and hypomagnesemia on admission, improved.   C. difficile positive as above  Patient reports she has not been using insulin due to \"insurance issue\" and inability to self inject due to her neuropathy  CT abdomen and pelvis without any obvious malignancy  Treatment as per respective issues  PT/OT recommending rehab.     Hypomagnesemia  Assessment & Plan  Hold oral supplementation due to diarrhea  Ordering additional IV mag with persistent low mag   Repeat labs in AM     Severe protein-calorie malnutrition (HCC)  Assessment & Plan  Severe protein-calorie malnutrition 2/2 chronic diarrhea a/e/b fat and muscle loss requiring Nutrition consult, oral diet and nutrition supplements    360 Statement: severe malnutrition r/t suspect combination of chronic illness and social/environmental factors as evidenced by severe temporal muscle loss, severe tricep fat pad loss, at least moderate muscle loss around clavicles and shoulders, at least moderate orbital fat pad loss. Treatment: oral diet and oral nutrition supplements.    BMI Findings:  Adult BMI Classifications: Underweight < 18.5        Body mass index is 16.1 kg/m².   C/w supplements  Nutrition consult appreciated  Encourage po intake     Acute cystitis without hematuria  Assessment & Plan  Reported dysuria on admission   Urine + nitrite & leukocytes, CT imaging " "with distended bladder suggestive of retention and small amount of air which could be secondary to infection  Urine Culture noted, S/P 3 doses of IV CTX 8/17 8/21, reported pain AFTER urination, likely related to urine hitting excoriated labia/bottom from frequent stooling. Will continue with medicated cream to this area- denies pain at this time   S/p pyridum x 3 doses  Wound care consult    Uncontrolled type 2 diabetes mellitus with hyperglycemia (HCC)  Assessment & Plan  Lab Results   Component Value Date    HGBA1C 16.1 (H) 08/16/2024       Recent Labs     08/30/24  1600 08/30/24 2035 08/31/24  0604 08/31/24  1047   POCGLU 309* 341* 130 277*       Patient markedly hyperglycemic on admission only partially responsive to IV fluids, remainder of labs fortunately not consistent with DKA. A1C 16  Patient admitting she has not been using her home insulin as she apparently has been unable to fill prescription due to \"insurance issues,\" additionally reports that due to her neuropathy she has difficulty self injecting.  Daughter also reports jessica non compliance.   Typically on NovoLog 70/30 20 units twice daily  Diabetic diet  Continue basal/prandial insulin while inpatient, adjust PRN  Sliding scale coverage    Opioid dependence with other opioid-induced disorder (HCC)  Assessment & Plan  PDMP reviewed, patient maintained on tramadol 200 mg daily as needed; history of chronic cervical/lumbar pain noted  Continue as needed tramadol and chronic gabapentin dosing    Chronic diarrhea  Assessment & Plan  Longstanding history of chronic diarrhea, patient reporting acute diarrhea x1 month. Follows with GI. Has tried questran/imodium without relief  Stool enteric panel negative  C. difficile positive, see plan above   Giardia antibody pending, stool elastase is pending    Tobacco dependence  Assessment & Plan  Counseled on need for cessation, provide nicotine patch while admitted  Recent CT chest for lung cancer screening " 2024 negative for nodules or masses    GERD without esophagitis  Assessment & Plan  Continue PPI    Severe episode of recurrent major depressive disorder, without psychotic features (HCC)  Assessment & Plan  Mood appearing fairly stable, continue home duloxetine, mirtazapine, as needed Valium  Hold minipress given symptomatic hypotension episodes, tachycardia              VTE Pharmacologic Prophylaxis: VTE Score: 3 Moderate Risk (Score 3-4) - Pharmacological DVT Prophylaxis Ordered: heparin.    Mobility:   Basic Mobility Inpatient Raw Score: 24  JH-HLM Goal: 8: Walk 250 feet or more  JH-HLM Achieved: 7: Walk 25 feet or more  JH-HLM Goal achieved. Continue to encourage appropriate mobility.    Patient Centered Rounds: I performed bedside rounds with nursing staff today.   Discussions with Specialists or Other Care Team Provider: nurseBO    Total Time Spent on Date of Encounter in care of patient: 40 mins. This time was spent on one or more of the following: performing physical exam; counseling and coordination of care; obtaining or reviewing history; documenting in the medical record; reviewing/ordering tests, medications or procedures; communicating with other healthcare professionals and discussing with patient's family/caregivers.    Current Length of Stay: 15 day(s)  Current Patient Status: Inpatient   Certification Statement: The patient will continue to require additional inpatient hospital stay due to diarrhea, possible FMT  Discharge Plan: Anticipate discharge in 48-72 hrs to rehab facility.    Code Status: Level 1 - Full Code    Subjective:   Denies any new complaints. Appetite is good but still having multiple Bms through the day and night    Objective:     Vitals:   Temp (24hrs), Av.2 °F (36.8 °C), Min:97.9 °F (36.6 °C), Max:98.6 °F (37 °C)    Temp:  [97.9 °F (36.6 °C)-98.6 °F (37 °C)] 97.9 °F (36.6 °C)  HR:  [102-107] 107  Resp:  [16-20] 16  BP: ()/() 95/65  SpO2:  [97 %-99 %] 97  %  Body mass index is 16.1 kg/m².     Input and Output Summary (last 24 hours):     Intake/Output Summary (Last 24 hours) at 8/31/2024 1325  Last data filed at 8/31/2024 1001  Gross per 24 hour   Intake 3080 ml   Output 500 ml   Net 2580 ml       Physical Exam:   Physical Exam  Constitutional:       Appearance: Normal appearance.   HENT:      Head: Normocephalic and atraumatic.      Nose: Nose normal.   Eyes:      Extraocular Movements: Extraocular movements intact.   Cardiovascular:      Rate and Rhythm: Normal rate and regular rhythm.   Pulmonary:      Effort: Pulmonary effort is normal.   Abdominal:      Palpations: Abdomen is soft.   Skin:     General: Skin is warm and dry.   Neurological:      Mental Status: She is alert and oriented to person, place, and time.   Psychiatric:         Mood and Affect: Mood normal.         Behavior: Behavior normal.          Additional Data:     Labs:  Results from last 7 days   Lab Units 08/29/24  0458 08/28/24  0528 08/27/24  0552   WBC Thousand/uL 7.23   < > 6.81   HEMOGLOBIN g/dL 10.2*   < > 10.6*   HEMATOCRIT % 31.8*   < > 32.7*   PLATELETS Thousands/uL 322   < > 306   SEGS PCT %  --   --  41*   LYMPHO PCT %  --   --  47*   MONO PCT %  --   --  9   EOS PCT %  --   --  2    < > = values in this interval not displayed.     Results from last 7 days   Lab Units 08/31/24  0508   SODIUM mmol/L 142   POTASSIUM mmol/L 4.0   CHLORIDE mmol/L 107   CO2 mmol/L 29   BUN mg/dL 12   CREATININE mg/dL 0.38*   ANION GAP mmol/L 6   CALCIUM mg/dL 9.3   GLUCOSE RANDOM mg/dL 160*         Results from last 7 days   Lab Units 08/31/24  1047 08/31/24  0604 08/30/24  2035 08/30/24  1600 08/30/24  1058 08/30/24  0544 08/29/24  2046 08/29/24  1542 08/29/24  1102 08/29/24  0646 08/29/24  0126 08/29/24  0053   POC GLUCOSE mg/dl 277* 130 341* 309* 271* 250* 269* 194* 119 220* 193* 55*               Lines/Drains:  Invasive Devices       Peripheral Intravenous Line  Duration             Peripheral IV  08/30/24 Left;Ventral (anterior) Forearm 1 day                          Imaging: No pertinent imaging reviewed.    Recent Cultures (last 7 days):         Last 24 Hours Medication List:   Current Facility-Administered Medications   Medication Dose Route Frequency Provider Last Rate    acetaminophen  650 mg Oral Q6H PRN Abhay Hayes MD      albuterol  2 puff Inhalation Q4H PRN Renan Drake, DO      aspirin  81 mg Oral Daily Renan Drake, DO      atorvastatin  80 mg Oral Daily Renan Drake, DO      baclofen  5 mg Oral BID Magda M Furlan, CRNP      butalbital-acetaminophen-caffeine  1 tablet Oral Q6H PRN Renan Drake, DO      diazepam  5 mg Oral Q12H PRN Renan Drake, DO      DULoxetine  60 mg Oral BID Renan Drake, DO      fluticasone-vilanterol  1 puff Inhalation Daily Renan Drake, DO      gabapentin  800 mg Oral TID Renan Drake, DO      heparin (porcine)  5,000 Units Subcutaneous Q8H HONEY Renan Drake, DO      insulin glargine  12 Units Subcutaneous HS Abhay Hayes MD      insulin lispro  1-5 Units Subcutaneous TID AC Abhay Hayes MD      insulin lispro  1-5 Units Subcutaneous HS Abhay Hayes MD      insulin lispro  8 Units Subcutaneous TID With Meals Abhay Hayes MD      levothyroxine  112 mcg Oral Early Morning Renan Drake, DO      lidocaine  2 patch Topical Daily Magda Amaya, ELISEO      mirtazapine  7.5 mg Oral HS Renan Drake, DO      DARLIN ANTIFUNGAL   Topical BID ELISEO Langley      nicotine  1 patch Transdermal Daily Renan Drake, DO      ondansetron  4 mg Intravenous Q6H PRN Renan Drake, DO      pantoprazole  40 mg Oral Early Morning Renan Drake, DO      saccharomyces boulardii  250 mg Oral BID ELISEO Trevino      traMADol  50 mg Oral Q6H PRN Renan Drake, DO      traZODone  50 mg Oral HS PRN Estefany Villalobos PA-C      vancomycin oral (capsules or solution)  125 mg Oral Q6H [facility] Estefany Villalobos PA-C          Today, Patient Was Seen By: Abhay EUBANKS  MD Abigail    **Please Note: This note may have been constructed using a voice recognition system.**

## 2024-08-31 NOTE — CASE MANAGEMENT
Case Management Discharge Planning Note    Patient name Barb Palomo  Location [facility] 522-01 MRN [id number]  : 1970 Date 2024       Current Admission Date: 8/15/2024  Current Admission Diagnosis:C. difficile diarrhea   Patient Active Problem List    Diagnosis Date Noted Date Diagnosed    Hypotension 2024     Sinus tachycardia 2024     Hypomagnesemia 2024     Failure to thrive in adult 2024     C. difficile diarrhea 2024     Severe protein-calorie malnutrition (HCC) 2024     HHNC (hyperglycemic hyperosmolar nonketotic coma) ([facility]) 2024     Abdominal pain 2024     Acute cystitis without hematuria 2024     Chronic migraine without aura without status migrainosus, not intractable 2024     Unspecified psychosis not due to a substance or known physiological condition ([facility]) 2023     Pancolitis ([facility]) 2023     S/P left knee arthroscopy 10/21/2022     Agoraphobia with panic disorder 2022    Bipolar II disorder ([facility]) 2022    Depression, unspecified 2022    Polyarthritis, unspecified 2022    Chronic prescription benzodiazepine use 2022     Uncontrolled type 2 diabetes mellitus with hyperglycemia ([facility]) 2021     Nausea 2021     Weakness of right upper extremity 2021     Neuropathy      Cervical radiculopathy 2020     Lumbar radiculopathy 2020     DDD (degenerative disc disease), cervical 2020     DDD (degenerative disc disease), lumbar 2020     Low back pain with sciatica 2020     Cervical spinal stenosis 2020     Cervical spondylosis without myelopathy 2020     Paresthesia and pain of both upper extremities 2020     Paresthesia of both lower extremities 2020     Chronic pain of both shoulders 2020     Chronic cervical pain 2020     Symptom associated with female genital organs  05/26/2020     Female stress incontinence 05/26/2020     Decreased libido 05/26/2020     ROGERIO positive 05/13/2020     Diplopia 02/04/2020     Chronic migraine without aura, not intractable, without status migrainosus 10/17/2019     Arthralgia of temporomandibular joint 08/22/2019     Carpal tunnel syndrome 08/22/2019     Dysphagia 08/22/2019     Reactive airway disease without complication 02/23/2018     Protein calorie malnutrition (HCC) 08/30/2017     Tobacco dependence 08/30/2017     GERD without esophagitis 08/25/2017     History of decompression of median nerve 08/25/2017     Hypothyroidism 08/25/2017     Chronic pain disorder 11/04/2016     Pancreatic cyst 10/17/2016     Chronic diarrhea 10/04/2016     Chronic fatigue, unspecified 08/24/2016     Severe episode of recurrent major depressive disorder, without psychotic features (HCC) 05/05/2016     Fatty liver 04/08/2016     Opioid dependence with other opioid-induced disorder (HCC) 01/25/2016     Iron deficiency 12/28/2015     Vitamin D deficiency 08/13/2015     Hyperlipidemia 03/15/2013     Insomnia 03/04/2013     Vitamin B12 deficiency 03/04/2013     Post-traumatic stress disorder, unspecified 09/26/2012     Benign essential hypertension 09/26/2012     Temporary cerebral vascular dysfunction 07/30/2009     History of pulmonary embolism 07/30/2009     Irregular menstrual cycle 04/02/2008       LOS (days): 15  Geometric Mean LOS (GMLOS) (days): 5.1  Days to GMLOS:-10.5     OBJECTIVE:  Risk of Unplanned Readmission Score: 26.17         Current admission status: Inpatient   Preferred Pharmacy:   [id number], [address] @[address], [place], [place] [address] [place] [address]  Phone: [phone] Fax: [phone]    [id number] - [place] - [address]  [address]  [place] [address]  Phone: [phone] Fax: [phone]    [facility] - [address]  [place]  [address] [address]  Phone: [phone] Fax: [phone]    Primary Care Provider: ELISEO Vee    Primary Insurance: MEDICARE  Secondary Insurance:     DISCHARGE DETAILS:    Additional Comments: CONSUELO CM was made aware by provider that pt is not medically stable for d/c at this time.

## 2024-08-31 NOTE — ASSESSMENT & PLAN NOTE
"Lab Results   Component Value Date    HGBA1C 16.1 (H) 08/16/2024       Recent Labs     08/30/24  1600 08/30/24  2035 08/31/24  0604 08/31/24  1047   POCGLU 309* 341* 130 277*       Patient markedly hyperglycemic on admission only partially responsive to IV fluids, remainder of labs fortunately not consistent with DKA. A1C 16  Patient admitting she has not been using her home insulin as she apparently has been unable to fill prescription due to \"insurance issues,\" additionally reports that due to her neuropathy she has difficulty self injecting.  Daughter also reports jessica non compliance.   Typically on NovoLog 70/30 20 units twice daily  Diabetic diet  Continue basal/prandial insulin while inpatient, adjust PRN  Sliding scale coverage  "

## 2024-09-01 ENCOUNTER — APPOINTMENT (INPATIENT)
Dept: RADIOLOGY | Facility: HOSPITAL | Age: 54
DRG: 871 | End: 2024-09-01
Payer: MEDICARE

## 2024-09-01 LAB
ALBUMIN SERPL BCG-MCNC: 3.6 G/DL (ref 3.5–5)
ALP SERPL-CCNC: 197 U/L (ref 34–104)
ALT SERPL W P-5'-P-CCNC: 59 U/L (ref 7–52)
ANION GAP SERPL CALCULATED.3IONS-SCNC: 7 MMOL/L (ref 4–13)
AST SERPL W P-5'-P-CCNC: 42 U/L (ref 13–39)
BILIRUB DIRECT SERPL-MCNC: 0.08 MG/DL (ref 0–0.2)
BILIRUB SERPL-MCNC: 0.3 MG/DL (ref 0.2–1)
BUN SERPL-MCNC: 13 MG/DL (ref 5–25)
CALCIUM SERPL-MCNC: 9.3 MG/DL (ref 8.4–10.2)
CHLORIDE SERPL-SCNC: 101 MMOL/L (ref 96–108)
CO2 SERPL-SCNC: 30 MMOL/L (ref 21–32)
CREAT SERPL-MCNC: 0.54 MG/DL (ref 0.6–1.3)
GFR SERPL CREATININE-BSD FRML MDRD: 108 ML/MIN/1.73SQ M
GLUCOSE SERPL-MCNC: 147 MG/DL (ref 65–140)
GLUCOSE SERPL-MCNC: 236 MG/DL (ref 65–140)
GLUCOSE SERPL-MCNC: 240 MG/DL (ref 65–140)
GLUCOSE SERPL-MCNC: 266 MG/DL (ref 65–140)
GLUCOSE SERPL-MCNC: 296 MG/DL (ref 65–140)
MAGNESIUM SERPL-MCNC: 1.4 MG/DL (ref 1.9–2.7)
PHOSPHATE SERPL-MCNC: 4.5 MG/DL (ref 2.7–4.5)
POTASSIUM SERPL-SCNC: 4 MMOL/L (ref 3.5–5.3)
PROT SERPL-MCNC: 5.9 G/DL (ref 6.4–8.4)
SODIUM SERPL-SCNC: 138 MMOL/L (ref 135–147)

## 2024-09-01 PROCEDURE — 87015 SPECIMEN INFECT AGNT CONCNTJ: CPT | Performed by: STUDENT IN AN ORGANIZED HEALTH CARE EDUCATION/TRAINING PROGRAM

## 2024-09-01 PROCEDURE — 82948 REAGENT STRIP/BLOOD GLUCOSE: CPT

## 2024-09-01 PROCEDURE — 99222 1ST HOSP IP/OBS MODERATE 55: CPT | Performed by: STUDENT IN AN ORGANIZED HEALTH CARE EDUCATION/TRAINING PROGRAM

## 2024-09-01 PROCEDURE — 80048 BASIC METABOLIC PNL TOTAL CA: CPT | Performed by: INTERNAL MEDICINE

## 2024-09-01 PROCEDURE — NC001 PR NO CHARGE: Performed by: STUDENT IN AN ORGANIZED HEALTH CARE EDUCATION/TRAINING PROGRAM

## 2024-09-01 PROCEDURE — 87206 SMEAR FLUORESCENT/ACID STAI: CPT | Performed by: STUDENT IN AN ORGANIZED HEALTH CARE EDUCATION/TRAINING PROGRAM

## 2024-09-01 PROCEDURE — 84100 ASSAY OF PHOSPHORUS: CPT | Performed by: INTERNAL MEDICINE

## 2024-09-01 PROCEDURE — 74018 RADEX ABDOMEN 1 VIEW: CPT

## 2024-09-01 PROCEDURE — 83735 ASSAY OF MAGNESIUM: CPT | Performed by: INTERNAL MEDICINE

## 2024-09-01 PROCEDURE — 80076 HEPATIC FUNCTION PANEL: CPT | Performed by: INTERNAL MEDICINE

## 2024-09-01 PROCEDURE — 99232 SBSQ HOSP IP/OBS MODERATE 35: CPT | Performed by: INTERNAL MEDICINE

## 2024-09-01 PROCEDURE — 99233 SBSQ HOSP IP/OBS HIGH 50: CPT | Performed by: INTERNAL MEDICINE

## 2024-09-01 RX ORDER — VANCOMYCIN HYDROCHLORIDE 250 MG/1
500 CAPSULE ORAL EVERY 6 HOURS SCHEDULED
Status: DISCONTINUED | OUTPATIENT
Start: 2024-09-02 | End: 2024-09-05

## 2024-09-01 RX ORDER — MAGNESIUM SULFATE HEPTAHYDRATE 40 MG/ML
4 INJECTION, SOLUTION INTRAVENOUS ONCE
Status: COMPLETED | OUTPATIENT
Start: 2024-09-01 | End: 2024-09-01

## 2024-09-01 RX ORDER — INSULIN GLARGINE 100 [IU]/ML
18 INJECTION, SOLUTION SUBCUTANEOUS
Status: DISCONTINUED | OUTPATIENT
Start: 2024-09-01 | End: 2024-09-02

## 2024-09-01 RX ORDER — ENOXAPARIN SODIUM 100 MG/ML
40 INJECTION SUBCUTANEOUS
Status: DISCONTINUED | OUTPATIENT
Start: 2024-09-02 | End: 2024-09-16 | Stop reason: HOSPADM

## 2024-09-01 RX ADMIN — GABAPENTIN 800 MG: 400 CAPSULE ORAL at 08:44

## 2024-09-01 RX ADMIN — LEVOTHYROXINE SODIUM 112 MCG: 112 TABLET ORAL at 05:22

## 2024-09-01 RX ADMIN — DIAZEPAM 5 MG: 5 TABLET ORAL at 10:12

## 2024-09-01 RX ADMIN — HEPARIN SODIUM 5000 UNITS: 5000 INJECTION INTRAVENOUS; SUBCUTANEOUS at 13:08

## 2024-09-01 RX ADMIN — TRAZODONE HYDROCHLORIDE 50 MG: 50 TABLET ORAL at 23:19

## 2024-09-01 RX ADMIN — ASPIRIN 81 MG CHEWABLE TABLET 81 MG: 81 TABLET CHEWABLE at 08:44

## 2024-09-01 RX ADMIN — ONDANSETRON 4 MG: 2 INJECTION INTRAMUSCULAR; INTRAVENOUS at 16:39

## 2024-09-01 RX ADMIN — INSULIN LISPRO 8 UNITS: 100 INJECTION, SOLUTION INTRAVENOUS; SUBCUTANEOUS at 12:28

## 2024-09-01 RX ADMIN — HEPARIN SODIUM 5000 UNITS: 5000 INJECTION INTRAVENOUS; SUBCUTANEOUS at 21:55

## 2024-09-01 RX ADMIN — DULOXETINE HYDROCHLORIDE 60 MG: 60 CAPSULE, DELAYED RELEASE ORAL at 08:45

## 2024-09-01 RX ADMIN — BUTALBITAL, ACETAMINOPHEN AND CAFFEINE 1 TABLET: 50; 325; 40 TABLET ORAL at 11:39

## 2024-09-01 RX ADMIN — INSULIN LISPRO 8 UNITS: 100 INJECTION, SOLUTION INTRAVENOUS; SUBCUTANEOUS at 16:42

## 2024-09-01 RX ADMIN — VANCOMYCIN HYDROCHLORIDE 125 MG: 125 CAPSULE ORAL at 17:01

## 2024-09-01 RX ADMIN — TRAMADOL HYDROCHLORIDE 50 MG: 50 TABLET, COATED ORAL at 21:52

## 2024-09-01 RX ADMIN — INSULIN LISPRO 2 UNITS: 100 INJECTION, SOLUTION INTRAVENOUS; SUBCUTANEOUS at 07:30

## 2024-09-01 RX ADMIN — BACLOFEN 5 MG: 10 TABLET ORAL at 08:44

## 2024-09-01 RX ADMIN — GABAPENTIN 800 MG: 400 CAPSULE ORAL at 21:52

## 2024-09-01 RX ADMIN — LIDOCAINE 2 PATCH: 700 PATCH TOPICAL at 08:45

## 2024-09-01 RX ADMIN — INSULIN GLARGINE 18 UNITS: 100 INJECTION, SOLUTION SUBCUTANEOUS at 21:51

## 2024-09-01 RX ADMIN — INSULIN LISPRO 8 UNITS: 100 INJECTION, SOLUTION INTRAVENOUS; SUBCUTANEOUS at 07:30

## 2024-09-01 RX ADMIN — Medication 250 MG: at 08:44

## 2024-09-01 RX ADMIN — INSULIN LISPRO 2 UNITS: 100 INJECTION, SOLUTION INTRAVENOUS; SUBCUTANEOUS at 16:42

## 2024-09-01 RX ADMIN — VANCOMYCIN HYDROCHLORIDE 125 MG: 125 CAPSULE ORAL at 13:08

## 2024-09-01 RX ADMIN — TRAMADOL HYDROCHLORIDE 50 MG: 50 TABLET, COATED ORAL at 08:47

## 2024-09-01 RX ADMIN — VANCOMYCIN HYDROCHLORIDE 500 MG: 250 CAPSULE ORAL at 23:19

## 2024-09-01 RX ADMIN — FLUTICASONE FUROATE AND VILANTEROL TRIFENATATE 1 PUFF: 200; 25 POWDER RESPIRATORY (INHALATION) at 08:45

## 2024-09-01 RX ADMIN — MIRTAZAPINE 7.5 MG: 15 TABLET, FILM COATED ORAL at 21:52

## 2024-09-01 RX ADMIN — HEPARIN SODIUM 5000 UNITS: 5000 INJECTION INTRAVENOUS; SUBCUTANEOUS at 05:23

## 2024-09-01 RX ADMIN — GABAPENTIN 800 MG: 400 CAPSULE ORAL at 16:39

## 2024-09-01 RX ADMIN — ONDANSETRON 4 MG: 2 INJECTION INTRAMUSCULAR; INTRAVENOUS at 10:58

## 2024-09-01 RX ADMIN — BACLOFEN 5 MG: 10 TABLET ORAL at 17:00

## 2024-09-01 RX ADMIN — BUTALBITAL, ACETAMINOPHEN AND CAFFEINE 1 TABLET: 50; 325; 40 TABLET ORAL at 21:51

## 2024-09-01 RX ADMIN — ATORVASTATIN CALCIUM 80 MG: 80 TABLET, FILM COATED ORAL at 08:45

## 2024-09-01 RX ADMIN — Medication 250 MG: at 17:01

## 2024-09-01 RX ADMIN — MICONAZOLE NITRATE: 20 CREAM TOPICAL at 17:01

## 2024-09-01 RX ADMIN — MAGNESIUM SULFATE HEPTAHYDRATE 4 G: 40 INJECTION, SOLUTION INTRAVENOUS at 06:46

## 2024-09-01 RX ADMIN — DULOXETINE HYDROCHLORIDE 60 MG: 60 CAPSULE, DELAYED RELEASE ORAL at 17:01

## 2024-09-01 RX ADMIN — PANTOPRAZOLE SODIUM 40 MG: 40 TABLET, DELAYED RELEASE ORAL at 05:22

## 2024-09-01 RX ADMIN — NICOTINE 1 PATCH: 21 PATCH, EXTENDED RELEASE TRANSDERMAL at 08:45

## 2024-09-01 RX ADMIN — INSULIN LISPRO 2 UNITS: 100 INJECTION, SOLUTION INTRAVENOUS; SUBCUTANEOUS at 21:56

## 2024-09-01 RX ADMIN — VANCOMYCIN HYDROCHLORIDE 125 MG: 125 CAPSULE ORAL at 05:23

## 2024-09-01 RX ADMIN — MICONAZOLE NITRATE: 20 CREAM TOPICAL at 09:54

## 2024-09-01 NOTE — PROGRESS NOTES
[facility]  Progress Note  Name: Barb Palomo I  MRN: [id number]  Unit/Bed#: [facility]PHP 522-01 I Date of Admission: 8/15/2024   Date of Service: 9/1/2024 I Hospital Day: 16    Assessment & Plan   * C. difficile diarrhea  Assessment & Plan  Patient presented with generalized weakness, frequent falls.  She has had ongoing diarrhea, dysuria, poor oral intake and reported 70 pound weight loss over the last year.  Initially hypotensive in the ER, status post IV fluids. Labs with multiple metabolic derangements  CT imaging showing mild diffuse colonic wall thickening, mild diffuse wall thickening of stomach  C. difficile found to be positive--has a history of C. difficile in September 2022.    Patient started on Dificid 200 mg x 10 days per protocol on 8/18 as this is recurrent infection  Initially seemed BMs were improving and were becoming more formed, however again having frequent watery stools  GI consulted  Contact precautions  Appetite is good   No improvement with avoiding lactose and artificial sweeteners  Continue banatrol  Still having multiple loose stools overnight and this morning  Continue vancomycin  GI to evaluate for pancreatic insufficiency as a contributing factor to her diarrhea - stool elastase is pending  Possible FMT this coming week    Hypotension  Assessment & Plan  Intermittent episodes of hypotension, most occurrences happen overnight and question correlation with minipress administration.  This has now been discontinued    Sinus tachycardia  Assessment & Plan  Heart rate 100s-120s at times.  Sinus.  Has received IV fluids without much improvement.  Trialed IVFs again 8/22 given reports of nausea with poor oral intake and dizziness with some improvement to the 110s with HR- will continue with IVFs again today   orthostatic blood pressure negative   Pt is on minipress which could have a side effect of ST.  Additionally noted she is hypotensive each night  "around 2/3 AM, I suspect this is likely the underlying cause.  She was started on this by her psychiatrist to assist with sleep and eating outpatient   Will hold minipress for now.  D/w pt can have PRN trazodone for sleep.  Pt agreeable   Monitor     Failure to thrive in adult  Assessment & Plan  Patient presented with increased generalized weakness and frequent falls, ongoing diarrhea, dysuria, poor oral intake, and at least 70-80 pound weight loss over the past 1 year. Has been following with GI, had colonoscopy May 2024 with poor prep--had EGD with esophagitis and gastritis  Labs with multiple derangements including hyperglycemia, hypokalemia and hypomagnesemia on admission, improved.   C. difficile positive as above  Patient reports she has not been using insulin due to \"insurance issue\" and inability to self inject due to her neuropathy  CT abdomen and pelvis without any obvious malignancy  Treatment as per respective issues  PT/OT recommending rehab.     Hypomagnesemia  Assessment & Plan  Hold oral supplementation due to diarrhea  Ordering additional IV mag with persistent low mag   Repeat labs in AM     Severe protein-calorie malnutrition (HCC)  Assessment & Plan  Severe protein-calorie malnutrition 2/2 chronic diarrhea a/e/b fat and muscle loss requiring Nutrition consult, oral diet and nutrition supplements    360 Statement: severe malnutrition r/t suspect combination of chronic illness and social/environmental factors as evidenced by severe temporal muscle loss, severe tricep fat pad loss, at least moderate muscle loss around clavicles and shoulders, at least moderate orbital fat pad loss. Treatment: oral diet and oral nutrition supplements.    BMI Findings:  Adult BMI Classifications: Underweight < 18.5        Body mass index is 16.1 kg/m².   C/w supplements  Nutrition consult appreciated  Encourage po intake     Acute cystitis without hematuria  Assessment & Plan  Reported dysuria on admission   Urine + " "nitrite & leukocytes, CT imaging with distended bladder suggestive of retention and small amount of air which could be secondary to infection  Urine Culture noted, S/P 3 doses of IV CTX 8/17 8/21, reported pain AFTER urination, likely related to urine hitting excoriated labia/bottom from frequent stooling. Will continue with medicated cream to this area- denies pain at this time   S/p pyridum x 3 doses  Wound care consult    Uncontrolled type 2 diabetes mellitus with hyperglycemia (HCC)  Assessment & Plan  Lab Results   Component Value Date    HGBA1C 16.1 (H) 08/16/2024       Recent Labs     08/31/24  1535 08/31/24  2049 09/01/24  0636 09/01/24  1054   POCGLU 202* 297* 266* 147*       Patient markedly hyperglycemic on admission only partially responsive to IV fluids, remainder of labs fortunately not consistent with DKA. A1C 16  Patient admitting she has not been using her home insulin as she apparently has been unable to fill prescription due to \"insurance issues,\" additionally reports that due to her neuropathy she has difficulty self injecting.  Daughter also reports jessica non compliance.   Typically on NovoLog 70/30 20 units twice daily  Diabetic diet  Continue basal/prandial insulin while inpatient, adjust PRN  Sliding scale coverage    Opioid dependence with other opioid-induced disorder (HCC)  Assessment & Plan  PDMP reviewed, patient maintained on tramadol 200 mg daily as needed; history of chronic cervical/lumbar pain noted  Continue as needed tramadol and chronic gabapentin dosing    Chronic diarrhea  Assessment & Plan  Longstanding history of chronic diarrhea, patient reporting acute diarrhea x1 month. Follows with GI. Has tried questran/imodium without relief  Stool enteric panel negative  C. difficile positive, see plan above   Giardia antigen is negative  stool elastase is pending    Tobacco dependence  Assessment & Plan  Counseled on need for cessation, provide nicotine patch while admitted  Recent " CT chest for lung cancer screening 2024 negative for nodules or masses    GERD without esophagitis  Assessment & Plan  Continue PPI    Severe episode of recurrent major depressive disorder, without psychotic features (HCC)  Assessment & Plan  Mood appearing fairly stable, continue home duloxetine, mirtazapine, as needed Valium  Hold minipress given symptomatic hypotension episodes, tachycardia              VTE Pharmacologic Prophylaxis: VTE Score: 3 Moderate Risk (Score 3-4) - Pharmacological DVT Prophylaxis Ordered: heparin.    Mobility:   Basic Mobility Inpatient Raw Score: 24  JH-HLM Goal: 8: Walk 250 feet or more  JH-HLM Achieved: 7: Walk 25 feet or more  JH-HLM Goal achieved. Continue to encourage appropriate mobility.    Patient Centered Rounds: I performed bedside rounds with nursing staff today.   Discussions with Specialists or Other Care Team Provider: nurseBO      Total Time Spent on Date of Encounter in care of patient: 40 mins. This time was spent on one or more of the following: performing physical exam; counseling and coordination of care; obtaining or reviewing history; documenting in the medical record; reviewing/ordering tests, medications or procedures; communicating with other healthcare professionals and discussing with patient's family/caregivers.    Current Length of Stay: 16 day(s)  Current Patient Status: Inpatient   Certification Statement: The patient will continue to require additional inpatient hospital stay due to diarrhea  Discharge Plan: Anticipate discharge in >72 hrs to rehab facility.    Code Status: Level 1 - Full Code    Subjective:   Reports that she continues to have multiple loose Bms during the day and night. Tolerating her diet    Objective:     Vitals:   Temp (24hrs), Av.3 °F (36.8 °C), Min:97.6 °F (36.4 °C), Max:99.1 °F (37.3 °C)    Temp:  [97.6 °F (36.4 °C)-99.1 °F (37.3 °C)] 97.6 °F (36.4 °C)  HR:  [108-113] 113  Resp:  [16-20] 16  BP: ()/()  92/64  SpO2:  [94 %-99 %] 94 %  Body mass index is 16.1 kg/m².     Input and Output Summary (last 24 hours):     Intake/Output Summary (Last 24 hours) at 9/1/2024 1321  Last data filed at 9/1/2024 0822  Gross per 24 hour   Intake 1221.25 ml   Output --   Net 1221.25 ml       Physical Exam:   Physical Exam  Constitutional:       Appearance: Normal appearance.   HENT:      Head: Normocephalic and atraumatic.      Nose: Nose normal.   Eyes:      Extraocular Movements: Extraocular movements intact.   Cardiovascular:      Rate and Rhythm: Normal rate and regular rhythm.   Pulmonary:      Effort: Pulmonary effort is normal.   Musculoskeletal:      Right lower leg: No edema.      Left lower leg: No edema.   Skin:     General: Skin is warm and dry.   Neurological:      Mental Status: She is alert and oriented to person, place, and time.   Psychiatric:         Mood and Affect: Mood normal.         Behavior: Behavior normal.          Additional Data:     Labs:  Results from last 7 days   Lab Units 08/29/24  0458 08/28/24  0528 08/27/24  0552   WBC Thousand/uL 7.23   < > 6.81   HEMOGLOBIN g/dL 10.2*   < > 10.6*   HEMATOCRIT % 31.8*   < > 32.7*   PLATELETS Thousands/uL 322   < > 306   SEGS PCT %  --   --  41*   LYMPHO PCT %  --   --  47*   MONO PCT %  --   --  9   EOS PCT %  --   --  2    < > = values in this interval not displayed.     Results from last 7 days   Lab Units 09/01/24  0525   SODIUM mmol/L 138   POTASSIUM mmol/L 4.0   CHLORIDE mmol/L 101   CO2 mmol/L 30   BUN mg/dL 13   CREATININE mg/dL 0.54*   ANION GAP mmol/L 7   CALCIUM mg/dL 9.3   ALBUMIN g/dL 3.6   TOTAL BILIRUBIN mg/dL 0.30   ALK PHOS U/L 197*   ALT U/L 59*   AST U/L 42*   GLUCOSE RANDOM mg/dL 296*         Results from last 7 days   Lab Units 09/01/24  1054 09/01/24  0636 08/31/24  2049 08/31/24  1535 08/31/24  1047 08/31/24  0604 08/30/24  2035 08/30/24  1600 08/30/24  1058 08/30/24  0544 08/29/24  2046 08/29/24  1542   POC GLUCOSE mg/dl 147* 266* 297* 202*  277* 130 341* 309* 271* 250* 269* 194*               Lines/Drains:  Invasive Devices       Peripheral Intravenous Line  Duration             Peripheral IV 08/30/24 Left;Ventral (anterior) Forearm 2 days                          Imaging: No pertinent imaging reviewed.    Recent Cultures (last 7 days):         Last 24 Hours Medication List:   Current Facility-Administered Medications   Medication Dose Route Frequency Provider Last Rate    acetaminophen  650 mg Oral Q6H PRN Abhay Hayes MD      albuterol  2 puff Inhalation Q4H PRN Renan Drake, DO      aspirin  81 mg Oral Daily Renan Drake, DO      atorvastatin  80 mg Oral Daily Renan Drake, DO      baclofen  5 mg Oral BID Magda Amaya, CRNP      butalbital-acetaminophen-caffeine  1 tablet Oral Q6H PRN Renan Drake, DO      diazepam  5 mg Oral Q12H PRN Renan Drake, DO      DULoxetine  60 mg Oral BID Renan Drake, DO      fluticasone-vilanterol  1 puff Inhalation Daily Renan Drake, DO      gabapentin  800 mg Oral TID Renan Drake DO      heparin (porcine)  5,000 Units Subcutaneous Q8H [facility] Renan Drake, DO      insulin glargine  18 Units Subcutaneous HS Abhay Hayes MD      insulin lispro  1-5 Units Subcutaneous TID AC Abhay Hayes MD      insulin lispro  1-5 Units Subcutaneous HS Abhay Hayes MD      insulin lispro  8 Units Subcutaneous TID With Meals Abhay Hayes MD      levothyroxine  112 mcg Oral Early Morning Renan Drake, DO      lidocaine  2 patch Topical Daily Magda Amaya, ELISEO      mirtazapine  7.5 mg Oral HS Renan Drake, DO      DARLIN ANTIFUNGAL   Topical BID Cherise Grey, ELISEO      nicotine  1 patch Transdermal Daily Renan Drake, DO      ondansetron  4 mg Intravenous Q6H PRN Renan Drake, DO      pantoprazole  40 mg Oral Early Morning Renan Drake, DO      saccharomyces boulardii  250 mg Oral BID MARC TrevinoNP      traMADol  50 mg Oral Q6H PRN Renan Drake, DO      traZODone  50 mg Oral HS  PRN Estefany Villalobos PA-C      vancomycin oral (capsules or solution)  125 mg Oral Q6H HONEY Estefany Villalobos PA-C          Today, Patient Was Seen By: Abhay Hayes MD    **Please Note: This note may have been constructed using a voice recognition system.**

## 2024-09-01 NOTE — ASSESSMENT & PLAN NOTE
"Lab Results   Component Value Date    HGBA1C 16.1 (H) 08/16/2024       Recent Labs     08/31/24  1535 08/31/24  2049 09/01/24  0636 09/01/24  1054   POCGLU 202* 297* 266* 147*       Patient markedly hyperglycemic on admission only partially responsive to IV fluids, remainder of labs fortunately not consistent with DKA. A1C 16  Patient admitting she has not been using her home insulin as she apparently has been unable to fill prescription due to \"insurance issues,\" additionally reports that due to her neuropathy she has difficulty self injecting.  Daughter also reports jessica non compliance.   Typically on NovoLog 70/30 20 units twice daily  Diabetic diet  Continue basal/prandial insulin while inpatient, adjust PRN  Sliding scale coverage  " support person/patient

## 2024-09-01 NOTE — ASSESSMENT & PLAN NOTE
Patient presented with generalized weakness, frequent falls.  She has had ongoing diarrhea, dysuria, poor oral intake and reported 70 pound weight loss over the last year.  Initially hypotensive in the ER, status post IV fluids. Labs with multiple metabolic derangements  CT imaging showing mild diffuse colonic wall thickening, mild diffuse wall thickening of stomach  C. difficile found to be positive--has a history of C. difficile in September 2022.    Patient started on Dificid 200 mg x 10 days per protocol on 8/18 as this is recurrent infection  Initially seemed BMs were improving and were becoming more formed, however again having frequent watery stools  GI consulted  Contact precautions  Appetite is good   No improvement with avoiding lactose and artificial sweeteners  Continue banatrol  Still having multiple loose stools overnight and this morning  Continue vancomycin  GI to evaluate for pancreatic insufficiency as a contributing factor to her diarrhea - stool elastase is pending  Possible FMT this coming week

## 2024-09-01 NOTE — PROGRESS NOTES
"  [facility]  Progress Note  Encounter: [id number]     PATIENT INFO     Name: Barb Palomo  YOB: 1970   Age: 54 y.o.   Sex: female   MRN: [id number]  [facility]     ASSESSMENT & PLAN     Barb Palomo is a 54 y.o. 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 difficile infection 2022 who presented with complaint of generalized weakness, frequent falls, and diarrhea, found to have recurrent C. difficile infection.     She was started on Dificid however, symptoms do not improve and she was later switched to vancomycin starting on 8/25.  C. difficile was last checked and positive on 8/16.    She continues to have multiple bowel movements.  She feels bloated, and has abdominal cramping.  Slight distention on exam today. Ongoing tachycardia which seems like may be a chronic issue for her per review of previous office visit vitals, however, cannot rule out that this is not related to her C. difficile infection.    Recurrent C. difficile infection  Acute on chronic diarrhea  Chronic pancreatitis  Failure to thrive   Hypomagnesemia  BMI 16  -Continue oral vancomycin treatment  -Please check KUB given abdominal distention  -Recommend ID consultation to discuss utility of repeating C. difficile testing, potential alternate infectious etiologies that have not been pursued, and guidance with antibiotic management in anticipation of likely FMT sometime this week  -Continue supportive care with IV hydration as needed and encouraging p.o. intake  -Awaiting results of fecal elastase  Rest of care per primary team       SUBJECTIVE     Patient evaluated at bedside.  States she \"is having maybe 2 less bowel movements per day.\"  She continues to have a good appetite, however, intermittent nausea.  She endorses bloating and gas.  Occasional abdominal pain and cramping.     OBJECTIVE     Objective   Blood pressure " "92/64, pulse (!) 113, temperature 97.6 °F (36.4 °C), temperature source Oral, resp. rate 16, height 5' 2\" (1.575 m), weight 39.9 kg (88 lb), last menstrual period 03/04/2016, SpO2 94%, not currently breastfeeding. Body mass index is 16.1 kg/m².    Intake/Output Summary (Last 24 hours) at 9/1/2024 1227  Last data filed at 9/1/2024 0822  Gross per 24 hour   Intake 1221.25 ml   Output --   Net 1221.25 ml     Medication Administration - last 24 hours from 08/31/2024 1227 to 09/01/2024 1227         Date/Time Order Dose Route Action Action by     09/01/2024 1058 EDT ondansetron (ZOFRAN) injection 4 mg 4 mg Intravenous Given Romina Carlson RN     08/31/2024 1436 EDT ondansetron (ZOFRAN) injection 4 mg 4 mg Intravenous Given Romina Carlson RN     09/01/2024 0844 EDT aspirin chewable tablet 81 mg 81 mg Oral Given Romina Carlson RN     09/01/2024 0845 EDT atorvastatin (LIPITOR) tablet 80 mg 80 mg Oral Given Romina Carlson RN     09/01/2024 1139 EDT butalbital-acetaminophen-caffeine (FIORICET,ESGIC) -40 mg per tablet 1 tablet 1 tablet Oral Given Romina Carlson RN     09/01/2024 1012 EDT diazepam (VALIUM) tablet 5 mg 5 mg Oral Given Romina Carlson RN     08/31/2024 1436 EDT diazepam (VALIUM) tablet 5 mg 5 mg Oral Given Romina Carlson RN     09/01/2024 0845 EDT DULoxetine (CYMBALTA) delayed release capsule 60 mg 60 mg Oral Given Romina Carlson RN     08/31/2024 1710 EDT DULoxetine (CYMBALTA) delayed release capsule 60 mg 60 mg Oral Given Susanna Coronel RN     09/01/2024 0845 EDT fluticasone-vilanterol 200-25 mcg/actuation 1 puff 1 puff Inhalation Given Romina Carlson RN     09/01/2024 0522 EDT levothyroxine tablet 112 mcg 112 mcg Oral Given Aissatou Means RN     08/31/2024 2204 EDT mirtazapine (REMERON) tablet 7.5 mg 7.5 mg Oral Given Aissatou Means RN     09/01/2024 0522 EDT pantoprazole (PROTONIX) EC tablet 40 mg 40 mg Oral Given Aissatou Means RN     09/01/2024 0954 EDT nicotine (NICODERM CQ) 21 mg/24 " hr TD 24 hr patch 1 patch 1 patch Transdermal Patch Removed Romina Carlson RN     09/01/2024 0845 EDT nicotine (NICODERM CQ) 21 mg/24 hr TD 24 hr patch 1 patch 1 patch Transdermal Medication Applied Romina Carlson RN     09/01/2024 0523 EDT heparin (porcine) subcutaneous injection 5,000 Units 5,000 Units Subcutaneous Given Aissatou Means, CAITLYN     08/31/2024 2206 EDT heparin (porcine) subcutaneous injection 5,000 Units 5,000 Units Subcutaneous Given Aissatou Means, CAITLYN     08/31/2024 1304 EDT heparin (porcine) subcutaneous injection 5,000 Units 5,000 Units Subcutaneous Given Romina Carlson RN     09/01/2024 0847 EDT traMADol (ULTRAM) tablet 50 mg 50 mg Oral Given Romina Carlson RN     08/31/2024 2204 EDT traMADol (ULTRAM) tablet 50 mg 50 mg Oral Given Aissatou Means RN     08/31/2024 1554 EDT traMADol (ULTRAM) tablet 50 mg 50 mg Oral Given Susanna Coronel, CAITLYN     09/01/2024 0844 EDT gabapentin (NEURONTIN) capsule 800 mg 800 mg Oral Given Romina Carlson RN     08/31/2024 2204 EDT gabapentin (NEURONTIN) capsule 800 mg 800 mg Oral Given Aissatou Means, CAITLYN     08/31/2024 1554 EDT gabapentin (NEURONTIN) capsule 800 mg 800 mg Oral Given Susanna Coronel, CAITLYN     09/01/2024 0844 EDT baclofen tablet 5 mg 5 mg Oral Given Romina Carlson RN     08/31/2024 1710 EDT baclofen tablet 5 mg 5 mg Oral Given Susanna Coronel RN     09/01/2024 0845 EDT lidocaine (LIDODERM) 5 % patch 2 patch 2 patch Topical Medication Applied Romina Carlson RN     08/31/2024 2205 EDT lidocaine (LIDODERM) 5 % patch 2 patch 2 patch Topical Patch Removed Aissatou Means RN     09/01/2024 0954 EDT moisture barrier miconazole 2% cream (aka DARLIN MOISTURE BARRIER ANTIFUNGAL CREAM) -- Topical Given Romina Carlson RN     08/31/2024 1717 EDT moisture barrier miconazole 2% cream (aka DARLIN MOISTURE BARRIER ANTIFUNGAL CREAM) -- Topical Given Susanna Coronel RN     09/01/2024 0844 EDT saccharomyces boulardii (FLORASTOR) capsule 250 mg 250 mg Oral Given  Romina Carlson RN     08/31/2024 1710 EDT saccharomyces boulardii (FLORASTOR) capsule 250 mg 250 mg Oral Given Susanna Coronel RN     08/31/2024 2204 EDT traZODone (DESYREL) tablet 50 mg 50 mg Oral Given Aissatou Means RN     09/01/2024 0523 EDT vancomycin (VANCOCIN) capsule 125 mg 125 mg Oral Given Aissatou Means RN     08/31/2024 2312 EDT vancomycin (VANCOCIN) capsule 125 mg 125 mg Oral Given Aissatou Means RN     08/31/2024 1713 EDT vancomycin (VANCOCIN) capsule 125 mg 125 mg Oral Given Susanna Coronel RN     08/31/2024 2206 EDT insulin glargine (LANTUS) subcutaneous injection 12 Units 0.12 mL 12 Units Subcutaneous Given Aissatou Means RN     09/01/2024 1059 EDT insulin lispro (HumALOG/ADMELOG) 100 units/mL subcutaneous injection 1-5 Units -- Subcutaneous Not Given Romina Carlson RN     09/01/2024 0730 EDT insulin lispro (HumALOG/ADMELOG) 100 units/mL subcutaneous injection 1-5 Units 2 Units Subcutaneous Given Romina Carlson RN     08/31/2024 1555 EDT insulin lispro (HumALOG/ADMELOG) 100 units/mL subcutaneous injection 1-5 Units 1 Units Subcutaneous Given Susanna Coronel RN     08/31/2024 2205 EDT insulin lispro (HumALOG/ADMELOG) 100 units/mL subcutaneous injection 1-5 Units 3 Units Subcutaneous Given Aissatou Means RN     08/31/2024 1330 EDT sodium chloride infusion 0.45 % 0 mL/hr Intravenous Stopped Roimna Carlson RN     08/31/2024 1712 EDT acetaminophen (TYLENOL) tablet 650 mg 650 mg Oral Given Susanna Coronel RN     09/01/2024 0730 EDT insulin lispro (HumALOG/ADMELOG) 100 units/mL subcutaneous injection 8 Units 8 Units Subcutaneous Given Romina Carlson RN     08/31/2024 1712 EDT insulin lispro (HumALOG/ADMELOG) 100 units/mL subcutaneous injection 8 Units 8 Units Subcutaneous Given Susanna Coronel RN     09/01/2024 0646 EDT magnesium sulfate 4 g/100 mL IVPB (premix) 4 g 4 g Intravenous New Bag Aissatou Means RN            General Appearance:   Alert, thin   HEENT:   Normocephalic,  atraumatic, anicteric     Lungs:   Equal chest rise, respirations unlabored    Heart:   Tachycardia   Abdomen:   Soft, generalized mild tenderness to palpation, slight distention   Rectal:   Deferred    Extremities:   No cyanosis, clubbing or edema    Neuro:   Moves all 4 extremities    Skin:   No jaundice, rashes, or lesions      Invasive Devices       Peripheral Intravenous Line  Duration             Peripheral IV 08/30/24 Left;Ventral (anterior) Forearm 2 days                     LABORATORY RESULTS     No results displayed because visit has over 200 results.           IMAGING RESULTS     XR knee 4+ vw left injury    Result Date: 8/16/2024  Narrative: XR KNEE 4+ VW LEFT INJURY INDICATION: Trauma. COMPARISON: 6/17/2020 FINDINGS: No acute fracture or dislocation. Patella old healed trauma. Anterior upper prepatellar curvilinear 3.1 cm metallic foreign body. Prepatellar soft tissue swelling and joint effusion. Proximal medial tibial metaphysis pedunculated osteochondroma again noted. Medial compartment mild narrowing. Osteopenia. No lytic or blastic osseous lesion. Unremarkable soft tissues.     Impression: No acute osseous abnormality. Patella old healed trauma. Anterior prepatellar 3.1 cm curvilinear metallic foreign body. Prepatellar soft tissue swelling and joint effusion. Computerized Assisted Algorithm (CAA) may have been used to analyze all applicable images. The study was marked in EPIC for significant notification. Workstation performed: [id number]     XR hip/pelv 2-3 vws left if performed    Result Date: 8/16/2024  Narrative: XR HIP/PELV 2-3 VWS LEFT  W PELVIS IF PERFORMED INDICATION: Trauma. COMPARISON: 6/17/2020 FINDINGS: No acute fracture or dislocation. No significant hip degenerative changes. No lytic or blastic osseous lesion. Unremarkable soft tissues. Unremarkable visualized lumbar spine.     Impression: No acute osseous abnormality. Computerized Assisted Algorithm (CAA) may have been used to  analyze all applicable images. Workstation performed: [id number]     XR chest portable    Result Date: 8/16/2024  Narrative: XR CHEST PORTABLE INDICATION: Sepsis. COMPARISON: Chest radiograph August 24, 2017 FINDINGS: Clear lungs. No pneumothorax or pleural effusion. Normal cardiomediastinal silhouette. Bones are unremarkable for age. Normal upper abdomen.     Impression: No acute cardiopulmonary disease. Workstation performed: [id number]     US bedside procedure    Result Date: 8/16/2024  Narrative: [id number]    CT abdomen pelvis w contrast    Result Date: 8/16/2024  Narrative: CT ABDOMEN AND PELVIS WITH IV CONTRAST INDICATION: Abdominal pain and nausea, weight loss over several months, evaluating for ?malignancy vs other etioiogy. COMPARISON: CT abdomen and pelvis on 10/29/2021. TECHNIQUE: CT examination of the abdomen and pelvis was performed. Multiplanar 2D reformatted images were created from the source data. This examination, like all CT scans performed in the [facility], was performed utilizing techniques to minimize radiation dose exposure, including the use of iterative reconstruction and automated exposure control. Radiation dose length product (DLP) for this visit: 338 mGy-cm IV Contrast: 85 mL of iohexol (OMNIPAQUE) Enteric Contrast: Not administered. FINDINGS: ABDOMEN LOWER CHEST: Minimal dependent changes. LIVER/BILIARY TREE: Hepatic steatosis. No suspicious mass. Normal hepatic contours. No biliary dilation. GALLBLADDER: Post cholecystectomy. SPLEEN: Unremarkable. PANCREAS: Diffuse parenchymal atrophy and prominence of the main duct measuring up to 4 mm at the level of the pancreatic head. Multiple parenchymal calcifications in the pancreatic head. The findings likely represent the sequela of chronic pancreatitis. ADRENAL GLANDS: Unremarkable. KIDNEYS/URETERS: Unremarkable. No hydronephrosis. STOMACH AND BOWEL: Stomach is moderately distended. There  is mild diffuse gastric wall thickening and hyperemia in the fundus and body. Mild diffuse colonic wall thickening versus underdistention. No bowel obstruction. APPENDIX: No findings to suggest appendicitis. ABDOMINOPELVIC CAVITY: No ascites. No pneumoperitoneum. No lymphadenopathy. VESSELS: Atherosclerosis without abdominal aortic aneurysm. PELVIS REPRODUCTIVE ORGANS: Unremarkable for patient's age. URINARY BLADDER: Markedly distended. There is a small amount of intravesical air. ABDOMINAL WALL/INGUINAL REGIONS: Unremarkable. BONES: No acute fracture or suspicious osseous lesion. Spinal degenerative changes.     Impression: 1.  Markedly distended urinary bladder suggestive of urinary retention. There is a small amount of intravesical air which could be secondary to infection. Other possibilities include recent instrumentation or fistula. 2.  Mild diffuse wall thickening of moderately distended stomach may reflect gastritis. 3.  Mild diffuse colonic wall thickening versus underdistention. Correlate clinically. The study was marked in EPIC for immediate notification. Workstation performed: [id number]     Mammo screening bilateral w 3d & cad    Result Date: 8/6/2024  Narrative: DIAGNOSIS: Encounter for screening mammogram for malignant neoplasm of breast TECHNIQUE: Digital screening mammography was performed. Computer Aided Detection (CAD) analyzed all applicable images. COMPARISONS: Prior breast imaging dated: 06/26/2019 and 04/04/2016 RELEVANT HISTORY: Family Breast Cancer History: No known family history of breast cancer. Family Medical History: No known relevant family medical history. Personal History: No known relevant hormone history. No known relevant surgical history. No known relevant medical history. The patient is scheduled in a reminder system for screening mammography. 8-10% of cancers will be missed on mammography. Management of a palpable abnormality must be based on clinical grounds.  Patients will be  notified of their results via letter from our facility. Accredited by [employer] and FDA. RISK ASSESSMENT: 5 Year Tyrer-Cuzick: 0.78% 10 Year Tyrer-Cuzick: 1.69% Lifetime Tyrer-Cuzick: 6.34% TISSUE DENSITY: The breasts are extremely dense, which lowers the sensitivity of mammography. INDICATION: Barb Palomo is a 54 y.o. female presenting for screening mammography. FINDINGS: There are no suspicious masses, grouped microcalcifications or areas of architectural distortion. The skin and nipple areolar complex are unremarkable. Increase in breast density since 2019 attributable to marked weight loss.     Impression: No mammographic evidence of malignancy. ASSESSMENT/BI-RADS CATEGORY: Left: 1 - Negative Right: 1 - Negative Overall: 1 - Negative RECOMMENDATION:      - Routine screening mammogram in 1 year for both breasts. Workstation ID: [id number]

## 2024-09-01 NOTE — ASSESSMENT & PLAN NOTE
Longstanding history of chronic diarrhea, patient reporting acute diarrhea x1 month. Follows with GI. Has tried questran/imodium without relief  Stool enteric panel negative  C. difficile positive, see plan above   Giardia antigen is negative  stool elastase is pending

## 2024-09-01 NOTE — CONSULTS
Consultation - Infectious Disease   Barb Palomo 54 y.o. female MRN: [id number]  Unit/Bed#: [facility] 522-01 Encounter: [id number]      IMPRESSION & RECOMMENDATIONS:     1.  C. difficile colitis.  Patient presented with weakness, weight loss, acute on chronic diarrhea.  8/16 C. difficile PCR and toxin EIA positive.  Continues to have significant loose stools despite 7 days of fidaxomicin and now 7 days of p.o. vancomycin.  She remains nontoxic without fever or leukocytosis.  Abdominal exam benign.  Unclear if ongoing symptoms are related to C. difficile alone or an alternative etiology as below.  Given recent positive testing there is low utility to repeat C. difficile testing.   -For now, continue p.o. vancomycin but will increase dose to 500 mg every 6 hours   -Continue Banatrol   -Consider Questran although this would need to be  from vancomycin administration   -GI follow-up ongoing, consideration for fecal transplant    2.  Acute on chronic diarrhea.  The patient has had diarrhea and weight loss for the past year but reports diarrhea is usually 2-4 times daily.  She continues to have up to 10 stools daily since admission with minimal improvement on C. difficile treatment.  Fecal white blood cells negative.  Additional infectious testing including stool O&P, Giardia antigen, enteric panel negative.  Unclear if ongoing symptoms are solely due to C. difficile.  The patient has a diagnosis of chronic pancreatitis to be contributing.  Poorly controlled diabetes may also lead to diarrhea.  She may also be having postinfectious/functional diarrhea   -Continue C. difficile treatment for now   -Appreciate GI recommendations   -Recommend flex sigmoidoscopy to evaluate for objective signs of ongoing C. difficile colitis such as pseudomembranes   -Repeat CT A/P    3.  Failure to thrive, severe protein calorie malnutrition.  BMI 16.  The patient has had diarrhea and weight loss for the past year.  Status post EGD  and colonoscopy May, colon biopsies without pathologic abnormalities.    4.  Poorly controlled type 2 diabetes with hyperglycemia.  Hemoglobin A1c 16.1%.  Patient was not using insulin at home.  Glycemic management per primary team    5.  Bacteriuria.  Patient received 3 doses of IV ceftriaxone.  CT imaging showed a distended bladder suggestive of retention which is likely contributing to her symptoms.  Per report pain was happening after urination likely due to skin irritation.  In the setting of C. difficile as above, avoid unnecessary antibiotics.    I have discussed the above management plan to increase p.o. vancomycin, repeat CT and check flex sig discussed with the primary team and GI fellow.  ID will follow.    I have performed an extensive review of the medical records in Epic including review of the notes, radiographs, and laboratory results     HISTORY OF PRESENT ILLNESS:  Reason for Consult: C. difficile colitis  HPI: Barb Palomo is a 54 y.o. woman with a history of prior CVA, DVT/PE, GERD, IBS, poorly controlled type 2 diabetes mellitus, hypothyroidism, depression/anxiety, chronic pain, C. difficile in 2022, chronic pancreatitis who presented to the hospitals ED on 8/15/2024 with worsening diarrhea, weakness, falls.  At home the patient was not taking her insulin due to insurance issues.  She reports chronic diarrhea and weight loss over the course of 1 year but she states that current diarrhea is much worse.  The patient has received multiple courses of antibiotics over the past few weeks including for a dental infection and 2 episodes of possible UTI.  On presentation the patient was afebrile without leukocytosis. CT A/P showed mild diffuse wall thickening of the colon.  She received 3 days of ceftriaxone for possible UTI and urine culture grew Klebsiella pneumoniae.  8/16 C. difficile PCR and toxin EIA were positive and she was started on p.o. fidaxomicin.  The patient received 7 days of fidaxomicin.   Due to lack of improvement, she was seen by GI and switched to p.o. vancomycin 125 mg every 6 hours 8/25.  The patient reports ongoing diarrhea up to 10 times daily.  She states that her stools are ranging from watery to soft.  Stool white blood cells were negative.  Additional stool testing including stool O&P, stool enteric panel, Giardia antigen were negative.  ID is consulted for further evaluation.  GI is considering fecal transplant next week.    REVIEW OF SYSTEMS:  A complete review of systems is negative other than that noted in the HPI.    PAST MEDICAL HISTORY:  Past Medical History:   Diagnosis Date    Acute venous embolism and thrombosis of deep vessels of distal lower extremity (HCC)     Anemia     Anxiety     last assessed 11/20/17    Arthritis     Asthma     Cerebral infarction (HCC)     unspecified, last assessed 11/14/16    Chest pain     last assessed 5/9/17    Chronic cough     last assessed 12/12/13    Depression     Diabetes mellitus, type 2 (HCC)     DJD (degenerative joint disease)     Esophageal reflux     Fibromyalgia     GERD without esophagitis     resolved 5/13/16    History of pulmonary embolism     Hyperlipidemia     Hypertension     Hypothyroidism     Iron deficiency     Pancreatitis     Panic attack     Panic disorder     Polyarthritis     PTSD (post-traumatic stress disorder)     Sleep difficulties     Stroke syndrome     Thyroid disease     TIA (transient ischemic attack)     TIA (transient ischemic attack)     Venous embolism and thrombosis of deep vessels of distal lower extremity (HCC)     Vitamin B12 deficiency     Vitamin D deficiency      Past Surgical History:   Procedure Laterality Date    CARPAL TUNNEL RELEASE Right     neuroplasty decompression of median nerve    CATARACT EXTRACTION      CHOLECYSTECTOMY      ERCP      ERCP      ESOPHAGOGASTRIC FUNDOPLASTY      NISSEN FUNDOPLICATION      PATELLA SURGERY Left 12/2021    MT ESOPHAGOGASTRODUODENOSCOPY TRANSORAL DIAGNOSTIC N/A  2016    Procedure: EGD AND COLONOSCOPY;  Surgeon: Jatin Shepherd MD;  Location: [facility];  Service: Gastroenterology    LA NDSC WRST SURG W/RLS TRANSVRS CARPL LIGM Right 2016    Procedure: RELEASE CARPAL TUNNEL ENDOSCOPIC;  Surgeon: Guero Restrepo MD;  Location: [facility];  Service: Orthopedics    LA TENDON SHEATH INCISION Right 2016    Procedure: RELEASE TRIGGER FINGER RIGHT THUMB;  Surgeon: Guero Restrepo MD;  Location: [facility];  Service: Orthopedics    TONSILLECTOMY AND ADENOIDECTOMY         FAMILY HISTORY:  Non-contributory    SOCIAL HISTORY:  Social History   Social History     Substance and Sexual Activity   Alcohol Use Not Currently    Alcohol/week: 0.0 standard drinks of alcohol    Comment: last was in  after DUI     Social History     Substance and Sexual Activity   Drug Use No     Social History     Tobacco Use   Smoking Status Every Day    Current packs/day: 1.00    Average packs/day: 1 pack/day for 41.7 years (41.7 ttl pk-yrs)    Types: Cigarettes    Start date: 1983   Smokeless Tobacco Never   Tobacco Comments    20 + years       ALLERGIES:  Allergies   Allergen Reactions    Medical Tape Rash    Lexapro [Escitalopram Oxalate]     Escitalopram Other (See Comments) and Palpitations    Other Hives and Rash     Adhesive Tape       MEDICATIONS:  All current active medications have been reviewed.    PHYSICAL EXAM:  Temp:  [97.6 °F (36.4 °C)-99.1 °F (37.3 °C)] 97.9 °F (36.6 °C)  HR:  [104-115] 109  Resp:  [16-20] 17  BP: ()/() 130/85  SpO2:  [94 %-99 %] 99 %  Temp (24hrs), Av.2 °F (36.8 °C), Min:97.6 °F (36.4 °C), Max:99.1 °F (37.3 °C)  Current: Temperature: 97.9 °F (36.6 °C)    Intake/Output Summary (Last 24 hours) at 2024 7566  Last data filed at 2024 1301  Gross per 24 hour   Intake 1080 ml   Output --   Net 1080 ml       General Appearance:  Chronically ill-appearing but no acute distress   Head:  Normocephalic, without obvious abnormality,  atraumatic   Eyes:  Conjunctiva pink and sclera anicteric, both eyes   Nose: Nares normal, mucosa normal, no drainage   Throat: Oropharynx moist without lesions   Neck: Supple, symmetrical, no adenopathy, no tenderness/mass/nodules   Back:   Symmetric, no curvature, ROM normal, no CVA tenderness   Lungs:   Clear to auscultation bilaterally, respirations unlabored   Chest Wall:  No tenderness or deformity   Heart:  RRR; no murmur, rub or gallop   Abdomen:   Soft, nondistended but with right sided tenderness to palpation   Extremities: No cyanosis, clubbing or edema   Skin: No rashes or lesions. No draining wounds noted.   Lymph nodes: Cervical, supraclavicular nodes normal   Neurologic: Alert and oriented times 3, extremity strength 5/5 and symmetric       LABS, IMAGING, & OTHER STUDIES:  Lab Results:  I have personally reviewed pertinent labs.  Results from last 7 days   Lab Units 08/29/24  0458 08/28/24  0528 08/27/24  0552   WBC Thousand/uL 7.23 5.61 6.81   HEMOGLOBIN g/dL 10.2* 11.1* 10.6*   PLATELETS Thousands/uL 322 315 306     Results from last 7 days   Lab Units 09/01/24  0525 08/31/24  0508 08/30/24  0226   SODIUM mmol/L 138 142 137   POTASSIUM mmol/L 4.0 4.0 4.6   CHLORIDE mmol/L 101 107 107   CO2 mmol/L 30 29 25   BUN mg/dL 13 12 14   CREATININE mg/dL 0.54* 0.38* 0.63   EGFR ml/min/1.73sq m 108 121 102   CALCIUM mg/dL 9.3 9.3 8.6   AST U/L 42*  --   --    ALT U/L 59*  --   --    ALK PHOS U/L 197*  --   --                            Imaging Studies:   I have personally reviewed pertinent imaging study reports and images in PACS.  CT A/P 8/16-distended urinary bladder, mild diffuse wall thickening of the colon

## 2024-09-02 ENCOUNTER — APPOINTMENT (INPATIENT)
Dept: RADIOLOGY | Facility: HOSPITAL | Age: 54
DRG: 871 | End: 2024-09-02
Payer: MEDICARE

## 2024-09-02 LAB
ANION GAP SERPL CALCULATED.3IONS-SCNC: 7 MMOL/L (ref 4–13)
BUN SERPL-MCNC: 16 MG/DL (ref 5–25)
CALCIUM SERPL-MCNC: 9.2 MG/DL (ref 8.4–10.2)
CHLORIDE SERPL-SCNC: 103 MMOL/L (ref 96–108)
CO2 SERPL-SCNC: 29 MMOL/L (ref 21–32)
CREAT SERPL-MCNC: 0.42 MG/DL (ref 0.6–1.3)
ERYTHROCYTE [DISTWIDTH] IN BLOOD BY AUTOMATED COUNT: 14.5 % (ref 11.6–15.1)
GFR SERPL CREATININE-BSD FRML MDRD: 117 ML/MIN/1.73SQ M
GLUCOSE SERPL-MCNC: 220 MG/DL (ref 65–140)
GLUCOSE SERPL-MCNC: 256 MG/DL (ref 65–140)
GLUCOSE SERPL-MCNC: 260 MG/DL (ref 65–140)
GLUCOSE SERPL-MCNC: 293 MG/DL (ref 65–140)
GLUCOSE SERPL-MCNC: 348 MG/DL (ref 65–140)
HCT VFR BLD AUTO: 35.2 % (ref 34.8–46.1)
HGB BLD-MCNC: 11.4 G/DL (ref 11.5–15.4)
MAGNESIUM SERPL-MCNC: 1.5 MG/DL (ref 1.9–2.7)
MCH RBC QN AUTO: 32.3 PG (ref 26.8–34.3)
MCHC RBC AUTO-ENTMCNC: 32.4 G/DL (ref 31.4–37.4)
MCV RBC AUTO: 100 FL (ref 82–98)
PHOSPHATE SERPL-MCNC: 4.5 MG/DL (ref 2.7–4.5)
PLATELET # BLD AUTO: 293 THOUSANDS/UL (ref 149–390)
PMV BLD AUTO: 10.8 FL (ref 8.9–12.7)
POTASSIUM SERPL-SCNC: 3.8 MMOL/L (ref 3.5–5.3)
RBC # BLD AUTO: 3.53 MILLION/UL (ref 3.81–5.12)
SODIUM SERPL-SCNC: 139 MMOL/L (ref 135–147)
WBC # BLD AUTO: 5.61 THOUSAND/UL (ref 4.31–10.16)

## 2024-09-02 PROCEDURE — 84100 ASSAY OF PHOSPHORUS: CPT | Performed by: INTERNAL MEDICINE

## 2024-09-02 PROCEDURE — 99232 SBSQ HOSP IP/OBS MODERATE 35: CPT | Performed by: INTERNAL MEDICINE

## 2024-09-02 PROCEDURE — 85027 COMPLETE CBC AUTOMATED: CPT | Performed by: INTERNAL MEDICINE

## 2024-09-02 PROCEDURE — 74176 CT ABD & PELVIS W/O CONTRAST: CPT

## 2024-09-02 PROCEDURE — 80048 BASIC METABOLIC PNL TOTAL CA: CPT | Performed by: INTERNAL MEDICINE

## 2024-09-02 PROCEDURE — 83735 ASSAY OF MAGNESIUM: CPT | Performed by: INTERNAL MEDICINE

## 2024-09-02 PROCEDURE — 82948 REAGENT STRIP/BLOOD GLUCOSE: CPT

## 2024-09-02 RX ORDER — INSULIN GLARGINE 100 [IU]/ML
24 INJECTION, SOLUTION SUBCUTANEOUS
Status: DISCONTINUED | OUTPATIENT
Start: 2024-09-02 | End: 2024-09-03

## 2024-09-02 RX ORDER — MAGNESIUM SULFATE HEPTAHYDRATE 40 MG/ML
4 INJECTION, SOLUTION INTRAVENOUS ONCE
Status: COMPLETED | OUTPATIENT
Start: 2024-09-02 | End: 2024-09-02

## 2024-09-02 RX ADMIN — GABAPENTIN 800 MG: 400 CAPSULE ORAL at 08:11

## 2024-09-02 RX ADMIN — FLUTICASONE FUROATE AND VILANTEROL TRIFENATATE 1 PUFF: 200; 25 POWDER RESPIRATORY (INHALATION) at 08:13

## 2024-09-02 RX ADMIN — MICONAZOLE NITRATE: 20 CREAM TOPICAL at 08:13

## 2024-09-02 RX ADMIN — VANCOMYCIN HYDROCHLORIDE 500 MG: 250 CAPSULE ORAL at 13:06

## 2024-09-02 RX ADMIN — ATORVASTATIN CALCIUM 80 MG: 80 TABLET, FILM COATED ORAL at 08:11

## 2024-09-02 RX ADMIN — GABAPENTIN 800 MG: 400 CAPSULE ORAL at 21:36

## 2024-09-02 RX ADMIN — Medication 250 MG: at 17:18

## 2024-09-02 RX ADMIN — MICONAZOLE NITRATE 1 APPLICATION: 20 CREAM TOPICAL at 17:15

## 2024-09-02 RX ADMIN — VANCOMYCIN HYDROCHLORIDE 500 MG: 250 CAPSULE ORAL at 06:37

## 2024-09-02 RX ADMIN — INSULIN LISPRO 2 UNITS: 100 INJECTION, SOLUTION INTRAVENOUS; SUBCUTANEOUS at 08:10

## 2024-09-02 RX ADMIN — ASPIRIN 81 MG CHEWABLE TABLET 81 MG: 81 TABLET CHEWABLE at 08:11

## 2024-09-02 RX ADMIN — INSULIN GLARGINE 24 UNITS: 100 INJECTION, SOLUTION SUBCUTANEOUS at 21:37

## 2024-09-02 RX ADMIN — DIAZEPAM 5 MG: 5 TABLET ORAL at 20:16

## 2024-09-02 RX ADMIN — TRAMADOL HYDROCHLORIDE 50 MG: 50 TABLET, COATED ORAL at 21:36

## 2024-09-02 RX ADMIN — INSULIN LISPRO 2 UNITS: 100 INJECTION, SOLUTION INTRAVENOUS; SUBCUTANEOUS at 11:52

## 2024-09-02 RX ADMIN — ENOXAPARIN SODIUM 40 MG: 40 INJECTION SUBCUTANEOUS at 08:11

## 2024-09-02 RX ADMIN — BACLOFEN 5 MG: 10 TABLET ORAL at 17:18

## 2024-09-02 RX ADMIN — INSULIN LISPRO 8 UNITS: 100 INJECTION, SOLUTION INTRAVENOUS; SUBCUTANEOUS at 15:48

## 2024-09-02 RX ADMIN — DULOXETINE HYDROCHLORIDE 60 MG: 60 CAPSULE, DELAYED RELEASE ORAL at 17:18

## 2024-09-02 RX ADMIN — BUTALBITAL, ACETAMINOPHEN AND CAFFEINE 1 TABLET: 50; 325; 40 TABLET ORAL at 15:12

## 2024-09-02 RX ADMIN — Medication 250 MG: at 08:11

## 2024-09-02 RX ADMIN — INSULIN LISPRO 4 UNITS: 100 INJECTION, SOLUTION INTRAVENOUS; SUBCUTANEOUS at 21:37

## 2024-09-02 RX ADMIN — INSULIN LISPRO 3 UNITS: 100 INJECTION, SOLUTION INTRAVENOUS; SUBCUTANEOUS at 15:48

## 2024-09-02 RX ADMIN — MIRTAZAPINE 7.5 MG: 15 TABLET, FILM COATED ORAL at 21:36

## 2024-09-02 RX ADMIN — BACLOFEN 5 MG: 10 TABLET ORAL at 08:11

## 2024-09-02 RX ADMIN — TRAZODONE HYDROCHLORIDE 50 MG: 50 TABLET ORAL at 23:04

## 2024-09-02 RX ADMIN — LIDOCAINE 2 PATCH: 700 PATCH TOPICAL at 08:12

## 2024-09-02 RX ADMIN — VANCOMYCIN HYDROCHLORIDE 500 MG: 250 CAPSULE ORAL at 23:04

## 2024-09-02 RX ADMIN — LEVOTHYROXINE SODIUM 112 MCG: 112 TABLET ORAL at 06:37

## 2024-09-02 RX ADMIN — MAGNESIUM SULFATE HEPTAHYDRATE 4 G: 40 INJECTION, SOLUTION INTRAVENOUS at 09:16

## 2024-09-02 RX ADMIN — TRAMADOL HYDROCHLORIDE 50 MG: 50 TABLET, COATED ORAL at 13:17

## 2024-09-02 RX ADMIN — PANTOPRAZOLE SODIUM 40 MG: 40 TABLET, DELAYED RELEASE ORAL at 06:37

## 2024-09-02 RX ADMIN — INSULIN LISPRO 8 UNITS: 100 INJECTION, SOLUTION INTRAVENOUS; SUBCUTANEOUS at 08:10

## 2024-09-02 RX ADMIN — GABAPENTIN 800 MG: 400 CAPSULE ORAL at 15:12

## 2024-09-02 RX ADMIN — VANCOMYCIN HYDROCHLORIDE 500 MG: 250 CAPSULE ORAL at 17:18

## 2024-09-02 RX ADMIN — ONDANSETRON 4 MG: 2 INJECTION INTRAMUSCULAR; INTRAVENOUS at 13:17

## 2024-09-02 RX ADMIN — NICOTINE 1 PATCH: 21 PATCH, EXTENDED RELEASE TRANSDERMAL at 08:13

## 2024-09-02 RX ADMIN — INSULIN LISPRO 8 UNITS: 100 INJECTION, SOLUTION INTRAVENOUS; SUBCUTANEOUS at 11:52

## 2024-09-02 RX ADMIN — DULOXETINE HYDROCHLORIDE 60 MG: 60 CAPSULE, DELAYED RELEASE ORAL at 08:10

## 2024-09-02 NOTE — ASSESSMENT & PLAN NOTE
"Lab Results   Component Value Date    HGBA1C 16.1 (H) 08/16/2024       Recent Labs     09/01/24  1533 09/01/24  2132 09/02/24  0655 09/02/24  1048   POCGLU 240* 236* 220* 260*       Patient markedly hyperglycemic on admission only partially responsive to IV fluids, remainder of labs fortunately not consistent with DKA. A1C 16  Patient admitting she has not been using her home insulin as she apparently has been unable to fill prescription due to \"insurance issues,\" additionally reports that due to her neuropathy she has difficulty self injecting.  Daughter also reports jessica non compliance.   Typically on NovoLog 70/30 20 units twice daily  Diabetic diet  Continue basal/prandial insulin while inpatient, adjust PRN  Sliding scale coverage  "

## 2024-09-02 NOTE — PROGRESS NOTES
[facility]  Progress Note  Name: Barb Palomo I  MRN: [id number]  Unit/Bed#: [facility] 522-01 I Date of Admission: 8/15/2024   Date of Service: 9/2/2024 I Hospital Day: 17    Assessment & Plan   * C. difficile diarrhea  Assessment & Plan  Patient presented with generalized weakness, frequent falls.  She has had ongoing diarrhea, dysuria, poor oral intake and reported 70 pound weight loss over the last year.  Initially hypotensive in the ER, status post IV fluids. Labs with multiple metabolic derangements  CT imaging showing mild diffuse colonic wall thickening, mild diffuse wall thickening of stomach  C. difficile found to be positive--has a history of C. difficile in September 2022.    Patient started on Dificid 200 mg x 10 days per protocol on 8/18 as this is recurrent infection  Initially seemed BMs were improving and were becoming more formed, however again having frequent watery stools  GI consulted  Contact precautions  Appetite is good   No improvement with avoiding lactose and artificial sweeteners  Continue banatrol  Still having multiple loose stools overnight and this morning  Continue vancomycin, increased to 500mg  Checking CT abdomen and pelvis: follow up results  GI to evaluate for pancreatic insufficiency as a contributing factor to her diarrhea - stool elastase is pending  Possible FMT this coming week    Hypotension  Assessment & Plan  Intermittent episodes of hypotension, most occurrences happen overnight and question correlation with minipress administration.  This has now been discontinued    Sinus tachycardia  Assessment & Plan  Heart rate 100s-120s at times.  Sinus.  Has received IV fluids without much improvement.  Trialed IVFs again 8/22 given reports of nausea with poor oral intake and dizziness with some improvement to the 110s with HR- will continue with IVFs again today   orthostatic blood pressure negative   Pt is on minipress which could have a  "side effect of ST.  Additionally noted she is hypotensive each night around 2/3 AM, I suspect this is likely the underlying cause.  She was started on this by her psychiatrist to assist with sleep and eating outpatient   Will hold minipress for now.  D/w pt can have PRN trazodone for sleep.  Pt agreeable   Monitor     Failure to thrive in adult  Assessment & Plan  Patient presented with increased generalized weakness and frequent falls, ongoing diarrhea, dysuria, poor oral intake, and at least 70-80 pound weight loss over the past 1 year. Has been following with GI, had colonoscopy May 2024 with poor prep--had EGD with esophagitis and gastritis  Labs with multiple derangements including hyperglycemia, hypokalemia and hypomagnesemia on admission, improved.   C. difficile positive as above  Patient reports she has not been using insulin due to \"insurance issue\" and inability to self inject due to her neuropathy  CT abdomen and pelvis without any obvious malignancy  Treatment as per respective issues  PT/OT recommending rehab.     Hypomagnesemia  Assessment & Plan  Hold oral supplementation due to diarrhea  Ordering additional IV mag with persistent low mag   Repeat labs in AM     Severe protein-calorie malnutrition (HCC)  Assessment & Plan  Severe protein-calorie malnutrition 2/2 chronic diarrhea a/e/b fat and muscle loss requiring Nutrition consult, oral diet and nutrition supplements    360 Statement: severe malnutrition r/t suspect combination of chronic illness and social/environmental factors as evidenced by severe temporal muscle loss, severe tricep fat pad loss, at least moderate muscle loss around clavicles and shoulders, at least moderate orbital fat pad loss. Treatment: oral diet and oral nutrition supplements.    BMI Findings:  Adult BMI Classifications: Underweight < 18.5        Body mass index is 16.45 kg/m².   C/w supplements  Nutrition consult appreciated  Encourage po intake     Acute cystitis without " "hematuria  Assessment & Plan  Reported dysuria on admission   Urine + nitrite & leukocytes, CT imaging with distended bladder suggestive of retention and small amount of air which could be secondary to infection  Urine Culture noted, S/P 3 doses of IV CTX 8/17 8/21, reported pain AFTER urination, likely related to urine hitting excoriated labia/bottom from frequent stooling. Will continue with medicated cream to this area- denies pain at this time   S/p pyridum x 3 doses  Wound care consult    Uncontrolled type 2 diabetes mellitus with hyperglycemia (HCC)  Assessment & Plan  Lab Results   Component Value Date    HGBA1C 16.1 (H) 08/16/2024       Recent Labs     09/01/24  1533 09/01/24  2132 09/02/24  0655 09/02/24  1048   POCGLU 240* 236* 220* 260*       Patient markedly hyperglycemic on admission only partially responsive to IV fluids, remainder of labs fortunately not consistent with DKA. A1C 16  Patient admitting she has not been using her home insulin as she apparently has been unable to fill prescription due to \"insurance issues,\" additionally reports that due to her neuropathy she has difficulty self injecting.  Daughter also reports jessica non compliance.   Typically on NovoLog 70/30 20 units twice daily  Diabetic diet  Continue basal/prandial insulin while inpatient, adjust PRN  Sliding scale coverage    Opioid dependence with other opioid-induced disorder (HCC)  Assessment & Plan  PDMP reviewed, patient maintained on tramadol 200 mg daily as needed; history of chronic cervical/lumbar pain noted  Continue as needed tramadol and chronic gabapentin dosing    Chronic diarrhea  Assessment & Plan  Longstanding history of chronic diarrhea, patient reporting acute diarrhea x1 month. Follows with GI. Has tried questran/imodium without relief  Stool enteric panel negative  C. difficile positive, see plan above   Giardia antigen is negative  stool elastase is pending    Tobacco dependence  Assessment & Plan  Counseled " on need for cessation, provide nicotine patch while admitted  Recent CT chest for lung cancer screening 2024 negative for nodules or masses    GERD without esophagitis  Assessment & Plan  Continue PPI    Severe episode of recurrent major depressive disorder, without psychotic features (HCC)  Assessment & Plan  Mood appearing fairly stable, continue home duloxetine, mirtazapine, as needed Valium  Hold minipress given symptomatic hypotension episodes, tachycardia              VTE Pharmacologic Prophylaxis: VTE Score: 3 Moderate Risk (Score 3-4) - Pharmacological DVT Prophylaxis Ordered: enoxaparin (Lovenox).    Mobility:   Basic Mobility Inpatient Raw Score: 24  JH-HLM Goal: 8: Walk 250 feet or more  JH-HLM Achieved: 7: Walk 25 feet or more  JH-HLM Goal achieved. Continue to encourage appropriate mobility.    Patient Centered Rounds: I performed bedside rounds with nursing staff today.   Discussions with Specialists or Other Care Team Provider: nurse, CM, GI    Total Time Spent on Date of Encounter in care of patient: 40 mins. This time was spent on one or more of the following: performing physical exam; counseling and coordination of care; obtaining or reviewing history; documenting in the medical record; reviewing/ordering tests, medications or procedures; communicating with other healthcare professionals and discussing with patient's family/caregivers.    Current Length of Stay: 17 day(s)  Current Patient Status: Inpatient   Certification Statement: The patient will continue to require additional inpatient hospital stay due to CT scan today, possible FMT  Discharge Plan: Anticipate discharge in >72 hrs to rehab facility.    Code Status: Level 1 - Full Code    Subjective:   Reports that she is still having multiple watery Bms throughout the day and night    Objective:     Vitals:   Temp (24hrs), Av.9 °F (36.6 °C), Min:97.8 °F (36.6 °C), Max:97.9 °F (36.6 °C)    Temp:  [97.8 °F (36.6 °C)-97.9 °F (36.6 °C)]  97.8 °F (36.6 °C)  HR:  [104-114] 104  Resp:  [16-17] 16  BP: ()/() 95/74  SpO2:  [91 %-99 %] 91 %  Body mass index is 16.45 kg/m².     Input and Output Summary (last 24 hours):     Intake/Output Summary (Last 24 hours) at 9/2/2024 1518  Last data filed at 9/2/2024 0700  Gross per 24 hour   Intake 1260 ml   Output --   Net 1260 ml       Physical Exam:   Physical Exam  Constitutional:       Appearance: Normal appearance.   HENT:      Head: Normocephalic and atraumatic.      Nose: Nose normal.   Eyes:      Extraocular Movements: Extraocular movements intact.   Cardiovascular:      Rate and Rhythm: Normal rate and regular rhythm.   Pulmonary:      Effort: Pulmonary effort is normal.   Musculoskeletal:      Right lower leg: No edema.   Skin:     General: Skin is warm and dry.   Neurological:      Mental Status: She is alert and oriented to person, place, and time.   Psychiatric:         Mood and Affect: Mood normal.         Behavior: Behavior normal.          Additional Data:     Labs:  Results from last 7 days   Lab Units 09/02/24  0706 08/28/24  0528 08/27/24  0552   WBC Thousand/uL 5.61   < > 6.81   HEMOGLOBIN g/dL 11.4*   < > 10.6*   HEMATOCRIT % 35.2   < > 32.7*   PLATELETS Thousands/uL 293   < > 306   SEGS PCT %  --   --  41*   LYMPHO PCT %  --   --  47*   MONO PCT %  --   --  9   EOS PCT %  --   --  2    < > = values in this interval not displayed.     Results from last 7 days   Lab Units 09/02/24  0706 09/01/24  0525   SODIUM mmol/L 139 138   POTASSIUM mmol/L 3.8 4.0   CHLORIDE mmol/L 103 101   CO2 mmol/L 29 30   BUN mg/dL 16 13   CREATININE mg/dL 0.42* 0.54*   ANION GAP mmol/L 7 7   CALCIUM mg/dL 9.2 9.3   ALBUMIN g/dL  --  3.6   TOTAL BILIRUBIN mg/dL  --  0.30   ALK PHOS U/L  --  197*   ALT U/L  --  59*   AST U/L  --  42*   GLUCOSE RANDOM mg/dL 256* 296*         Results from last 7 days   Lab Units 09/02/24  1048 09/02/24  0655 09/01/24  2132 09/01/24  1533 09/01/24  1054 09/01/24  0636  08/31/24  2049 08/31/24  1535 08/31/24  1047 08/31/24  0604 08/30/24  2035 08/30/24  1600   POC GLUCOSE mg/dl 260* 220* 236* 240* 147* 266* 297* 202* 277* 130 341* 309*               Lines/Drains:  Invasive Devices       Peripheral Intravenous Line  Duration             Peripheral IV 08/30/24 Left;Ventral (anterior) Forearm 3 days                          Imaging: No pertinent imaging reviewed.    Recent Cultures (last 7 days):         Last 24 Hours Medication List:   Current Facility-Administered Medications   Medication Dose Route Frequency Provider Last Rate    acetaminophen  650 mg Oral Q6H PRN Abhay Hayes MD      albuterol  2 puff Inhalation Q4H PRN Renan Drake, DO      aspirin  81 mg Oral Daily Renan Drake, DO      atorvastatin  80 mg Oral Daily Renan Drake, DO      baclofen  5 mg Oral BID ELISEO Toribio      butalbital-acetaminophen-caffeine  1 tablet Oral Q6H PRN Renan Drake, DO      diazepam  5 mg Oral Q12H PRN Renan Drake, DO      DULoxetine  60 mg Oral BID Renan Drake, DO      enoxaparin  40 mg Subcutaneous Q24H [facility] Abhay Hayes MD      fluticasone-vilanterol  1 puff Inhalation Daily Renan Drake, DO      gabapentin  800 mg Oral TID Renan Drake DO      insulin glargine  24 Units Subcutaneous HS Abhay Hayes MD      insulin lispro  1-5 Units Subcutaneous TID AC Abhay Hayes MD      insulin lispro  1-5 Units Subcutaneous HS Abhay Hayes MD      insulin lispro  8 Units Subcutaneous TID With Meals Abhay Hayes MD      levothyroxine  112 mcg Oral Early Morning Renan Drake, DO      lidocaine  2 patch Topical Daily ELISEO Toribio      magnesium sulfate  4 g Intravenous Once Abhay Hayes MD 4 g (09/02/24 0916)    mirtazapine  7.5 mg Oral HS Renan Drake DO      DARLIN ANTIFUNGAL   Topical BID ELISEO Langley      nicotine  1 patch Transdermal Daily Renan Drake DO      ondansetron  4 mg Intravenous Q6H PRN Renan Drake, DO      pantoprazole   40 mg Oral Early Morning Renan Drake,       saccharomyces boulardii  250 mg Oral BID ELISEO Trevino      traMADol  50 mg Oral Q6H PRN Renan Drake DO      traZODone  50 mg Oral HS PRN Estefany Villalobos PA-C      vancomycin oral (capsules or solution)  500 mg Oral Q6H [facility] Corinne Us MD          Today, Patient Was Seen By: Abhay Hayes MD    **Please Note: This note may have been constructed using a voice recognition system.**

## 2024-09-02 NOTE — ASSESSMENT & PLAN NOTE
Patient presented with generalized weakness, frequent falls.  She has had ongoing diarrhea, dysuria, poor oral intake and reported 70 pound weight loss over the last year.  Initially hypotensive in the ER, status post IV fluids. Labs with multiple metabolic derangements  CT imaging showing mild diffuse colonic wall thickening, mild diffuse wall thickening of stomach  C. difficile found to be positive--has a history of C. difficile in September 2022.    Patient started on Dificid 200 mg x 10 days per protocol on 8/18 as this is recurrent infection  Initially seemed BMs were improving and were becoming more formed, however again having frequent watery stools  GI consulted  Contact precautions  Appetite is good   No improvement with avoiding lactose and artificial sweeteners  Continue banatrol  Still having multiple loose stools overnight and this morning  Continue vancomycin, increased to 500mg  Checking CT abdomen and pelvis: follow up results  GI to evaluate for pancreatic insufficiency as a contributing factor to her diarrhea - stool elastase is pending  Possible FMT this coming week

## 2024-09-02 NOTE — ASSESSMENT & PLAN NOTE
Severe protein-calorie malnutrition 2/2 chronic diarrhea a/e/b fat and muscle loss requiring Nutrition consult, oral diet and nutrition supplements    360 Statement: severe malnutrition r/t suspect combination of chronic illness and social/environmental factors as evidenced by severe temporal muscle loss, severe tricep fat pad loss, at least moderate muscle loss around clavicles and shoulders, at least moderate orbital fat pad loss. Treatment: oral diet and oral nutrition supplements.    BMI Findings:  Adult BMI Classifications: Underweight < 18.5        Body mass index is 16.45 kg/m².   C/w supplements  Nutrition consult appreciated  Encourage po intake

## 2024-09-03 LAB
ANION GAP SERPL CALCULATED.3IONS-SCNC: 7 MMOL/L (ref 4–13)
BUN SERPL-MCNC: 15 MG/DL (ref 5–25)
CALCIUM SERPL-MCNC: 9.2 MG/DL (ref 8.4–10.2)
CHLORIDE SERPL-SCNC: 100 MMOL/L (ref 96–108)
CO2 SERPL-SCNC: 29 MMOL/L (ref 21–32)
CREAT SERPL-MCNC: 0.61 MG/DL (ref 0.6–1.3)
ELASTASE PANC STL-MCNT: 5.5 UG/G
ERYTHROCYTE [DISTWIDTH] IN BLOOD BY AUTOMATED COUNT: 14.5 % (ref 11.6–15.1)
GFR SERPL CREATININE-BSD FRML MDRD: 103 ML/MIN/1.73SQ M
GLUCOSE SERPL-MCNC: 237 MG/DL (ref 65–140)
GLUCOSE SERPL-MCNC: 259 MG/DL (ref 65–140)
GLUCOSE SERPL-MCNC: 283 MG/DL (ref 65–140)
GLUCOSE SERPL-MCNC: 355 MG/DL (ref 65–140)
GLUCOSE SERPL-MCNC: 356 MG/DL (ref 65–140)
HCT VFR BLD AUTO: 32.5 % (ref 34.8–46.1)
HGB BLD-MCNC: 10.6 G/DL (ref 11.5–15.4)
MAGNESIUM SERPL-MCNC: 1.7 MG/DL (ref 1.9–2.7)
MCH RBC QN AUTO: 32.5 PG (ref 26.8–34.3)
MCHC RBC AUTO-ENTMCNC: 32.6 G/DL (ref 31.4–37.4)
MCV RBC AUTO: 100 FL (ref 82–98)
PLATELET # BLD AUTO: 283 THOUSANDS/UL (ref 149–390)
PMV BLD AUTO: 10.7 FL (ref 8.9–12.7)
POTASSIUM SERPL-SCNC: 4.3 MMOL/L (ref 3.5–5.3)
RBC # BLD AUTO: 3.26 MILLION/UL (ref 3.81–5.12)
SODIUM SERPL-SCNC: 136 MMOL/L (ref 135–147)
TREPONEMA PALLIDUM IGG+IGM AB [PRESENCE] IN SERUM OR PLASMA BY IMMUNOASSAY: NORMAL
WBC # BLD AUTO: 6.16 THOUSAND/UL (ref 4.31–10.16)

## 2024-09-03 PROCEDURE — 86780 TREPONEMA PALLIDUM: CPT | Performed by: STUDENT IN AN ORGANIZED HEALTH CARE EDUCATION/TRAINING PROGRAM

## 2024-09-03 PROCEDURE — 97110 THERAPEUTIC EXERCISES: CPT

## 2024-09-03 PROCEDURE — 86708 HEPATITIS A ANTIBODY: CPT | Performed by: STUDENT IN AN ORGANIZED HEALTH CARE EDUCATION/TRAINING PROGRAM

## 2024-09-03 PROCEDURE — 97164 PT RE-EVAL EST PLAN CARE: CPT

## 2024-09-03 PROCEDURE — 99233 SBSQ HOSP IP/OBS HIGH 50: CPT | Performed by: INTERNAL MEDICINE

## 2024-09-03 PROCEDURE — 87389 HIV-1 AG W/HIV-1&-2 AB AG IA: CPT | Performed by: STUDENT IN AN ORGANIZED HEALTH CARE EDUCATION/TRAINING PROGRAM

## 2024-09-03 PROCEDURE — 87340 HEPATITIS B SURFACE AG IA: CPT | Performed by: STUDENT IN AN ORGANIZED HEALTH CARE EDUCATION/TRAINING PROGRAM

## 2024-09-03 PROCEDURE — 99232 SBSQ HOSP IP/OBS MODERATE 35: CPT | Performed by: INTERNAL MEDICINE

## 2024-09-03 PROCEDURE — 99232 SBSQ HOSP IP/OBS MODERATE 35: CPT | Performed by: FAMILY MEDICINE

## 2024-09-03 PROCEDURE — 86704 HEP B CORE ANTIBODY TOTAL: CPT | Performed by: STUDENT IN AN ORGANIZED HEALTH CARE EDUCATION/TRAINING PROGRAM

## 2024-09-03 PROCEDURE — 85027 COMPLETE CBC AUTOMATED: CPT | Performed by: FAMILY MEDICINE

## 2024-09-03 PROCEDURE — 97116 GAIT TRAINING THERAPY: CPT

## 2024-09-03 PROCEDURE — 83735 ASSAY OF MAGNESIUM: CPT | Performed by: FAMILY MEDICINE

## 2024-09-03 PROCEDURE — 82948 REAGENT STRIP/BLOOD GLUCOSE: CPT

## 2024-09-03 PROCEDURE — 86803 HEPATITIS C AB TEST: CPT | Performed by: STUDENT IN AN ORGANIZED HEALTH CARE EDUCATION/TRAINING PROGRAM

## 2024-09-03 PROCEDURE — 86706 HEP B SURFACE ANTIBODY: CPT | Performed by: STUDENT IN AN ORGANIZED HEALTH CARE EDUCATION/TRAINING PROGRAM

## 2024-09-03 PROCEDURE — 80048 BASIC METABOLIC PNL TOTAL CA: CPT | Performed by: FAMILY MEDICINE

## 2024-09-03 RX ORDER — MAGNESIUM SULFATE HEPTAHYDRATE 40 MG/ML
4 INJECTION, SOLUTION INTRAVENOUS ONCE
Status: COMPLETED | OUTPATIENT
Start: 2024-09-03 | End: 2024-09-03

## 2024-09-03 RX ORDER — INSULIN LISPRO 100 [IU]/ML
10 INJECTION, SOLUTION INTRAVENOUS; SUBCUTANEOUS
Status: DISCONTINUED | OUTPATIENT
Start: 2024-09-03 | End: 2024-09-05

## 2024-09-03 RX ORDER — CHOLESTYRAMINE LIGHT 4 G/5.7G
4 POWDER, FOR SUSPENSION ORAL 2 TIMES DAILY
Status: DISCONTINUED | OUTPATIENT
Start: 2024-09-03 | End: 2024-09-16 | Stop reason: HOSPADM

## 2024-09-03 RX ORDER — INSULIN GLARGINE 100 [IU]/ML
30 INJECTION, SOLUTION SUBCUTANEOUS
Status: DISCONTINUED | OUTPATIENT
Start: 2024-09-03 | End: 2024-09-08

## 2024-09-03 RX ADMIN — INSULIN GLARGINE 30 UNITS: 100 INJECTION, SOLUTION SUBCUTANEOUS at 21:58

## 2024-09-03 RX ADMIN — VANCOMYCIN HYDROCHLORIDE 500 MG: 250 CAPSULE ORAL at 05:25

## 2024-09-03 RX ADMIN — GABAPENTIN 800 MG: 400 CAPSULE ORAL at 08:09

## 2024-09-03 RX ADMIN — Medication 250 MG: at 17:40

## 2024-09-03 RX ADMIN — LEVOTHYROXINE SODIUM 112 MCG: 112 TABLET ORAL at 05:25

## 2024-09-03 RX ADMIN — VANCOMYCIN HYDROCHLORIDE 500 MG: 250 CAPSULE ORAL at 11:29

## 2024-09-03 RX ADMIN — GABAPENTIN 800 MG: 400 CAPSULE ORAL at 21:59

## 2024-09-03 RX ADMIN — ENOXAPARIN SODIUM 40 MG: 40 INJECTION SUBCUTANEOUS at 08:12

## 2024-09-03 RX ADMIN — INSULIN LISPRO 2 UNITS: 100 INJECTION, SOLUTION INTRAVENOUS; SUBCUTANEOUS at 22:00

## 2024-09-03 RX ADMIN — BACLOFEN 5 MG: 10 TABLET ORAL at 08:10

## 2024-09-03 RX ADMIN — BUTALBITAL, ACETAMINOPHEN AND CAFFEINE 1 TABLET: 50; 325; 40 TABLET ORAL at 01:33

## 2024-09-03 RX ADMIN — INSULIN LISPRO 10 UNITS: 100 INJECTION, SOLUTION INTRAVENOUS; SUBCUTANEOUS at 17:37

## 2024-09-03 RX ADMIN — INSULIN LISPRO 2 UNITS: 100 INJECTION, SOLUTION INTRAVENOUS; SUBCUTANEOUS at 17:37

## 2024-09-03 RX ADMIN — MICONAZOLE NITRATE: 20 CREAM TOPICAL at 09:29

## 2024-09-03 RX ADMIN — INSULIN LISPRO 8 UNITS: 100 INJECTION, SOLUTION INTRAVENOUS; SUBCUTANEOUS at 07:53

## 2024-09-03 RX ADMIN — DULOXETINE HYDROCHLORIDE 60 MG: 60 CAPSULE, DELAYED RELEASE ORAL at 17:39

## 2024-09-03 RX ADMIN — ONDANSETRON 4 MG: 2 INJECTION INTRAMUSCULAR; INTRAVENOUS at 09:21

## 2024-09-03 RX ADMIN — INSULIN LISPRO 8 UNITS: 100 INJECTION, SOLUTION INTRAVENOUS; SUBCUTANEOUS at 11:30

## 2024-09-03 RX ADMIN — LIDOCAINE 2 PATCH: 700 PATCH TOPICAL at 08:07

## 2024-09-03 RX ADMIN — FLUTICASONE FUROATE AND VILANTEROL TRIFENATATE 1 PUFF: 200; 25 POWDER RESPIRATORY (INHALATION) at 09:19

## 2024-09-03 RX ADMIN — TRAMADOL HYDROCHLORIDE 50 MG: 50 TABLET, COATED ORAL at 14:26

## 2024-09-03 RX ADMIN — VANCOMYCIN HYDROCHLORIDE 500 MG: 250 CAPSULE ORAL at 23:04

## 2024-09-03 RX ADMIN — PANTOPRAZOLE SODIUM 40 MG: 40 TABLET, DELAYED RELEASE ORAL at 05:25

## 2024-09-03 RX ADMIN — BACLOFEN 5 MG: 10 TABLET ORAL at 17:39

## 2024-09-03 RX ADMIN — GABAPENTIN 800 MG: 400 CAPSULE ORAL at 17:40

## 2024-09-03 RX ADMIN — BUTALBITAL, ACETAMINOPHEN AND CAFFEINE 1 TABLET: 50; 325; 40 TABLET ORAL at 09:21

## 2024-09-03 RX ADMIN — ATORVASTATIN CALCIUM 80 MG: 80 TABLET, FILM COATED ORAL at 08:10

## 2024-09-03 RX ADMIN — Medication 250 MG: at 08:10

## 2024-09-03 RX ADMIN — CHOLESTYRAMINE 4 G: 4 POWDER, FOR SUSPENSION ORAL at 18:19

## 2024-09-03 RX ADMIN — ONDANSETRON 4 MG: 2 INJECTION INTRAMUSCULAR; INTRAVENOUS at 01:33

## 2024-09-03 RX ADMIN — ONDANSETRON 4 MG: 2 INJECTION INTRAMUSCULAR; INTRAVENOUS at 19:51

## 2024-09-03 RX ADMIN — TRAZODONE HYDROCHLORIDE 50 MG: 50 TABLET ORAL at 23:04

## 2024-09-03 RX ADMIN — MICONAZOLE NITRATE: 20 CREAM TOPICAL at 17:41

## 2024-09-03 RX ADMIN — BUTALBITAL, ACETAMINOPHEN AND CAFFEINE 1 TABLET: 50; 325; 40 TABLET ORAL at 19:51

## 2024-09-03 RX ADMIN — NICOTINE 1 PATCH: 21 PATCH, EXTENDED RELEASE TRANSDERMAL at 08:12

## 2024-09-03 RX ADMIN — VANCOMYCIN HYDROCHLORIDE 500 MG: 250 CAPSULE ORAL at 17:40

## 2024-09-03 RX ADMIN — INSULIN LISPRO 4 UNITS: 100 INJECTION, SOLUTION INTRAVENOUS; SUBCUTANEOUS at 06:28

## 2024-09-03 RX ADMIN — DULOXETINE HYDROCHLORIDE 60 MG: 60 CAPSULE, DELAYED RELEASE ORAL at 08:10

## 2024-09-03 RX ADMIN — ASPIRIN 81 MG CHEWABLE TABLET 81 MG: 81 TABLET CHEWABLE at 08:08

## 2024-09-03 RX ADMIN — MAGNESIUM SULFATE HEPTAHYDRATE 4 G: 40 INJECTION, SOLUTION INTRAVENOUS at 07:53

## 2024-09-03 RX ADMIN — TRAMADOL HYDROCHLORIDE 50 MG: 50 TABLET, COATED ORAL at 08:10

## 2024-09-03 RX ADMIN — MIRTAZAPINE 7.5 MG: 15 TABLET, FILM COATED ORAL at 21:59

## 2024-09-03 RX ADMIN — MICONAZOLE NITRATE: 20 CREAM TOPICAL at 09:22

## 2024-09-03 RX ADMIN — INSULIN LISPRO 3 UNITS: 100 INJECTION, SOLUTION INTRAVENOUS; SUBCUTANEOUS at 11:29

## 2024-09-03 NOTE — ASSESSMENT & PLAN NOTE
Patient presented with generalized weakness, frequent falls.  She has had ongoing diarrhea, dysuria, poor oral intake and reported 70 pound weight loss over the last year.  Initially hypotensive in the ER, status post IV fluids. Labs with multiple metabolic derangements  CT imaging showing mild diffuse colonic wall thickening, mild diffuse wall thickening of stomach  C. difficile found to be positive--has a history of C. difficile in September 2022.    Patient started on Dificid 200 mg x 10 days per protocol on 8/18 as this is recurrent infection  Initially seemed BMs were improving and were becoming more formed, however again having frequent watery stools  GI consulted  Contact precautions  Appetite is good   No improvement with avoiding lactose and artificial sweeteners  Continue banatrol  Still having multiple loose stools overnight and this morning  Continue vancomycin, increased to 500mg-ID on board  Checking CT abdomen and pelvis: -CT is concerning for proctocolitis  GI to evaluate for pancreatic insufficiency as a contributing factor to her diarrhea - stool pancreatic elastase is pending  Possible FMT this week if the symptoms does not improve

## 2024-09-03 NOTE — PHYSICAL THERAPY NOTE
PHYSICAL THERAPY RE-EVALUATION/ Tx session   NAME:  Barb Palomo  DATE: 09/03/24    AGE:   54 y.o.  Mrn:   [id number]  ADMIT DX:  Severe protein-calorie malnutrition (HCC) [E43]  Failure to thrive in adult [R62.7]  Sepsis (HCC) [A41.9]  Unspecified multiple injuries, initial encounter [T07.XXXA]    Past Medical History:   Diagnosis Date    Acute venous embolism and thrombosis of deep vessels of distal lower extremity (HCC)     Anemia     Anxiety     last assessed 11/20/17    Arthritis     Asthma     Cerebral infarction (HCC)     unspecified, last assessed 11/14/16    Chest pain     last assessed 5/9/17    Chronic cough     last assessed 12/12/13    Depression     Diabetes mellitus, type 2 (HCC)     DJD (degenerative joint disease)     Esophageal reflux     Fibromyalgia     GERD without esophagitis     resolved 5/13/16    History of pulmonary embolism     Hyperlipidemia     Hypertension     Hypothyroidism     Iron deficiency     Pancreatitis     Panic attack     Panic disorder     Polyarthritis     PTSD (post-traumatic stress disorder)     Sleep difficulties     Stroke syndrome     Thyroid disease     TIA (transient ischemic attack)     TIA (transient ischemic attack)     Venous embolism and thrombosis of deep vessels of distal lower extremity (HCC)     Vitamin B12 deficiency     Vitamin D deficiency      Past Surgical History:   Procedure Laterality Date    CARPAL TUNNEL RELEASE Right     neuroplasty decompression of median nerve    CATARACT EXTRACTION      CHOLECYSTECTOMY      ERCP      ERCP      ESOPHAGOGASTRIC FUNDOPLASTY      NISSEN FUNDOPLICATION      PATELLA SURGERY Left 12/2021    DC ESOPHAGOGASTRODUODENOSCOPY TRANSORAL DIAGNOSTIC N/A 11/02/2016    Procedure: EGD AND COLONOSCOPY;  Surgeon: Jatin Shepherd MD;  Location: [facility];  Service: Gastroenterology    DC NDSC WRST SURG W/RLS TRANSVRS CARPL LIGM Right 03/08/2016    Procedure: RELEASE CARPAL TUNNEL ENDOSCOPIC;  Surgeon: Guero Restrepo MD;   "Location: [facility];  Service: Orthopedics    VA TENDON SHEATH INCISION Right 03/08/2016    Procedure: RELEASE TRIGGER FINGER RIGHT THUMB;  Surgeon: Guero Restrepo MD;  Location: [facility];  Service: Orthopedics    TONSILLECTOMY AND ADENOIDECTOMY         Length Of Stay: 18  PHYSICAL THERAPY EVALUATION :    09/03/24 1530   PT Last Visit   PT Visit Date 09/03/24   Note Type   Note type Re-Evaluation  (+ tx)   Pain Assessment   Pain Assessment Tool 0-10   Pain Score 4   Pain Location/Orientation Location: Abdomen;Location: Generalized;Location: Back   Pain Radiating Towards back   Pain Onset/Description Onset: Ongoing   Patient's Stated Pain Goal No pain   Multiple Pain Sites No   Restrictions/Precautions   Weight Bearing Precautions Per Order No   Other Precautions Contact/isolation;Fall Risk;Multiple lines  (c-diff)   Home Living   Additional Comments see IE for details   Prior Function   Comments see IE   Cognition   Overall Cognitive Status WFL   Arousal/Participation Alert   Attention Within functional limits   Orientation Level Oriented X4   Memory Within functional limits   Following Commands Follows one step commands without difficulty   Comments cooperative; frustrated w/ ongoing diarrhea and tearful at times - does express hope and encouragement she will get better adn see \"new [facility]\" in future   RUE Assessment   RUE Assessment WFL   LUE Assessment   LUE Assessment WFL   RLE Assessment   RLE Assessment X   Strength RLE   RLE Overall Strength 4-/5  (generalized weakness throughout)   LLE Assessment   LLE Assessment X   Strength LLE   LLE Overall Strength 4-/5  (generalized weakness throughout)   Bed Mobility   Supine to Sit 5  Supervision   Additional items Increased time required   Sit to Supine 5  Supervision   Additional items Increased time required;Impulsive   Additional Comments pt can become impulsive 2* diarrhea/ urgence to use toilet- however becomes dizzy on transition and required 2-3 " "min seated rest upon sitting for dizziness to resolve. pt was educated in fall prevention/ sitting prior to going to BR 2* dizziness and was reassured we could assist.   Transfers   Sit to Stand 5  Supervision  (> CGA for safety w/ fatigue following freqnent BM)   Additional items Verbal cues;Impulsive   Stand to Sit 5  Supervision   Toilet transfer 5  Supervision   Additional items Verbal cues  (cues for safety/ to slow down)   Additional Comments w/o AD in room - pt impulsive 2* urgency / BM- does not use RW in room 2* \"it gets in way and takes too much time\"- educated on safety and slowing loly- BSC might be of benefit- however pt refusing 2* wanting to sit on actual toilet   Ambulation/Elevation   Gait Assistance 4  Minimal assist   Assistive Device Rolling walker   Distance 15'x2 to and from BR   Balance   Static Sitting Fair +   Dynamic Sitting Fair   Static Standing Fair -   Dynamic Standing Poor +   Ambulatory Poor +   Endurance Deficit   Endurance Deficit Yes   Endurance Deficit Description gross weakness and fatiuge   Activity Tolerance   Activity Tolerance Patient tolerated treatment well;Other (Comment)  (frequent diarrhea/ x2 during re-eval + tx session; pt assisteed w/ cleaning / pericare)   Medical Staff Made Aware RN and CM   Nurse Made Aware yes   Assessment   Prognosis Fair   Problem List Decreased strength;Decreased endurance;Impaired balance;Decreased mobility;Decreased skin integrity;Pain   Assessment Pt seen for Pt re- eval (goals and POC expiration) + tx session as documented below. Pt admitted to [facility] on 8/15 from home w/ frequent fall and weakness acute on chronic diarrhea dx at that time w/ failure to thrive.  Pt ultimately  found to have C-diff infection;  w/ chronic pancreatitis; uncontrolled diabetes; acute cystitis+ weight loss of >70lbs.  Pt seen by GI and ID and tentative plan for FMT later this week. In addition pt w/ social/ financial barriers to d/c/ reports \"hoarder situation\" " in home and can only access 1st floor + significant depression and financial barriers to caring for self and obtaining medications prior to admission.   Pt has been followed by PT since IE on 8/17 w/ 3 sessions completed since then w/ limitations in progress 2* frequent diarrhea; fatigue; balance/ strength deficits + motivations barriers. On re-eval today pt was pleasant and focused on recovery- taking about her new granddaughter in [place]. Was able to perform bed skills w/ close S; becomes dizzy on initial transition but dizziness does resolve w/ 2-3 mins of sitting. Transfers and gait continue to fluctuate between CGA and Cali for balance and LE weakness and fatigue. Pt becomes fatigued and overwhelmed when bouts of diarrhea occur (x2 this session) and was offered reassurance by PT.   Pt continues to function below baseline and currently w/ overall mobility deficits 2* to: decreased LE strength/AROM;  gross generalized  weakness/ deconditioning; decreased endurance; decreased activity tolerance;  impaired balance; fatigue; frail; intermittent dizziness. (Please find additional objective findings from PT assessment regarding body systems outlined above.) Pt will continue to benefit from skilled PT interventions to address stated impairments; to maximize functional potential; for ongoing pt/ family training; and DME needs.  PT is recommending PT Resource Intensity Level  2 (rehab) on d/c 28 ongoing function below baseline; fall risk; gross weakness and lack of social support for safe d/c at [facility].      PT POC and goals updated. See below for additional tx details.   Goals   Patient Goals get better; stop having diarrhea; see her new grandchild   STG Expiration Date 09/17/24  (goals remain appropriate w/ extended time frame)   PT Treatment Day 4   Plan   Treatment/Interventions ADL retraining;Functional transfer training;LE strengthening/ROM;Elevations;Therapeutic exercise;Endurance training;Patient/family  "training;Equipment eval/education;Bed mobility;Gait training;Compensatory technique education;Spoke to nursing;Spoke to case management;Spoke to advanced practitioner;OT   PT Frequency 2-3x/wk   Discharge Recommendation   Rehab Resource Intensity Level, PT II (Moderate Resource Intensity)   Equipment Recommended Walker   Walker Package Recommended Wheeled walker   AM-PAC Basic Mobility Inpatient   Turning in Flat Bed Without Bedrails 4   Lying on Back to Sitting on Edge of Flat Bed Without Bedrails 4   Moving Bed to Chair 3   Standing Up From Chair Using Arms 3   Walk in Room 3   Climb 3-5 Stairs With Railing 2   Basic Mobility Inpatient Raw Score 19   Basic Mobility Standardized Score 42.48   [facility] Highest Level Of Mobility   -HL Goal 6: Walk 10 steps or more   -HLM Achieved 7: Walk 25 feet or more   Additional Treatment Session   Start Time 1507   End Time 1530   Treatment Assessment gait training additional 100+80+80' following brief and gown changes following BM- pt required brief placed prior to ambualtion w/ RW in spear- pt required Cali by completion of gait training for LE weakness/ instability w/ near LOB 2* same. Pt instructed in seated HEP including LAQ and marching to continue to improve endurance. Pt does express anxiety and is tearful when asked about long term plans stating she misses her spouse (who passed) and daughter who moved to [place]; she is hopeful to see her \"new grandaughter\" and this PT offered encouragement. pt repors looking forward to feeling better and going to rehab. pt seated in chair w/ coffee post session w/ all needs in reach and alarm intact.   Additional Treatment Day 1   Exercises   Knee AROM Long Arc Quad Sitting;15 reps  (x2)   Ankle Pumps Sitting;25 reps  (x2)   Marching Sitting;20 reps  (x2)   Balance training  STS x5 w/ RW   End of Consult   Patient Position at End of Consult Bed/Chair alarm activated;All needs within reach     The patient's AM-PAC Basic " Mobility Inpatient Short Form Raw Score is 19. A Raw score of less than or equal to 16 suggests the patient may benefit from discharge to post-acute rehabilitation services. Please also refer to the recommendation of the Physical Therapist for safe discharge planning.

## 2024-09-03 NOTE — ASSESSMENT & PLAN NOTE
"Patient presented with increased generalized weakness and frequent falls, ongoing diarrhea, dysuria, poor oral intake, and at least 70-80 pound weight loss over the past 1 year. Has been following with GI, had colonoscopy May 2024 with poor prep--had EGD with esophagitis and gastritis  Labs with multiple derangements including hyperglycemia, hypokalemia and hypomagnesemia on admission, improved.   C. difficile positive as above  Patient reports she has not been using insulin due to \"insurance issue\" and inability to self inject due to her neuropathy  CT abdomen and pelvis without any obvious malignancy  Treatment as per respective issues  PT/OT recommending rehab.   Will consult endocrine for further management-patient reported that she is always hungry at home and eats fairly good amount of food  "

## 2024-09-03 NOTE — PROGRESS NOTES
Progress Note- Barb Palomo 54 y.o. female MRN: [id number]    Unit/Bed#: [facility] 522-01 Encounter: [id number]      Assessment and Plan:    Ms. 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 GI consulted for acute on chronic diarrhea.     Acute on Chronic Diarrhea  Clostridium difficile Infection  Chronic Pancreatitis   Failure to Thrive   Weight Loss      # Clostridium difficile Infection: Patient initially presented with complaints on worsening weakness and frequent falls on 8/20.  Patient also with 70-80 lbs weight loss and chronic, watery diarrhea. Admits to difficulty following the death of her  and also been dealing with “insurance issues” leading to difficulty with her insulin. Labs on admission revealed severe hyperglycemia with electrolyte abnormalities. CT AP revealed mild diffuse wall thickening vs under distension of the colon as well as the stomach. In the past, patient has followed with GI for her unintentional weight loss and diarrhea. She has EGD/colonoscopy performed 5/2024 which was unrevealing although random colon biopsies were negative and patient had a very poor prep limiting sufficient examination. This admission, patient had PCR and EIA testing completed for C.Diff and both were found to be positive on 8/16 indicative of active infection. She had been on antibiotics last month for a dental procedure and had been started on for Fidaxomycin and completed a 7-day course but experienced no improvement in her diarrhea.  Therefore, GI consulted and patient has now been transitioned to oral vancomycin.  Unfortunately, patient with ongoing frequent stools.  Over the weekend, ID consulted in vancomycin dose increased.  Given lack of improvement in stool frequency despite treatment of both Fidaxomycin  as well as vancomycin, will plan for FMT this admission, tentatively later this week.     Plan:  Continue on PO Vancomycin 500 mg q6 hours; ID consulted and appreciate recommendation  Avoid anti-diarrheals; continue to restrict lactulose and continue Banatrol if needed with meals  Begin on Questran, orders placed  Will plan for flex sig/colonoscopy at time of FMT, tentatively later this week  Continue IVF hydration with Isolyte @ 150 cc/hr; monitor and replete electrolytes as needed  Maintain contact precautions with strict handwashing  Hold PPI therapy; resume when symptoms resolved  Avoid use of probiotics for the prevention of recurrence   In the future, will require Cdiff prophylaxis with all antibiotic therapy   Additional symptom management per primary team      # Chronic Pancreatitis: Patient admits to prior history of pancreatitis in the past which was previously attributed to gallstones resulting in cholecystectomy.  Patient also lifelong smoker and has CT imaging demonstrating evidence of chronic pancreatitis.  CT on admission demonstrating diffuse parenchymal atrophy and prominence of the main duct measuring up to 4 mL at the level of the pancreatic head.  Patient with multiple parenchymal calcifications in the pancreatic head.  Possible that the patient has pancreatic insufficiency as a result.  Pancreatic elastase has been ordered to see if patient would benefit from pancreatic enzymes.  However, do not believe that this is substantially contributing to patient's diarrhea.     # Weight Loss: Patient with significant weight loss prior to admission.  Admits to losing 70 to 80 pounds unintentionally over the last couple of months.  Likely result of uncontrolled diabetes as well as C. difficile infection.  Did have prior EGD/colonoscopy completed 5/2024.  Would recommend repeat be considered on outpatient basis.       ______________________________________________________________________    Subjective:      Evaluated bedside.  Sitting in bed comfortably in no acute distress or visible discomfort.  However, patient tearful on examination.  Admits to having ongoing frequent stools.  States that she is tired.  Expresses frustration for lack of improvement in current symptoms.    Medication Administration - last 24 hours from 09/02/2024 1419 to 09/03/2024 1419         Date/Time Order Dose Route Action Action by     09/03/2024 0921 EDT ondansetron (ZOFRAN) injection 4 mg 4 mg Intravenous Given Flaca Smith RN     09/03/2024 0133 EDT ondansetron (ZOFRAN) injection 4 mg 4 mg Intravenous Given Aisastou Means RN     09/03/2024 0808 EDT aspirin chewable tablet 81 mg 81 mg Oral Given Flaca Smith RN     09/03/2024 0810 EDT atorvastatin (LIPITOR) tablet 80 mg 80 mg Oral Given Flaca Smith RN     09/03/2024 0921 EDT butalbital-acetaminophen-caffeine (FIORICET,ESGIC) -40 mg per tablet 1 tablet 1 tablet Oral Given Flaca Smith RN     09/03/2024 0133 EDT butalbital-acetaminophen-caffeine (FIORICET,ESGIC) -40 mg per tablet 1 tablet 1 tablet Oral Given Aissatou Means RN     09/02/2024 1512 EDT butalbital-acetaminophen-caffeine (FIORICET,ESGIC) -40 mg per tablet 1 tablet 1 tablet Oral Given Desirae Fernandes     09/02/2024 2016 EDT diazepam (VALIUM) tablet 5 mg 5 mg Oral Given Aissatou Means RN     09/03/2024 0810 EDT DULoxetine (CYMBALTA) delayed release capsule 60 mg 60 mg Oral Given Flaca Smith RN     09/02/2024 1718 EDT DULoxetine (CYMBALTA) delayed release capsule 60 mg 60 mg Oral Given Desirae Fernandes     09/03/2024 0919 EDT fluticasone-vilanterol 200-25 mcg/actuation 1 puff 1 puff Inhalation Given Flaca Smith RN     09/03/2024 0525 EDT levothyroxine tablet 112 mcg 112 mcg Oral Given Hong Keating     09/02/2024 2136 EDT mirtazapine (REMERON) tablet 7.5 mg 7.5 mg Oral Given Aissatou Means RN     09/03/2024 0553 EDT pantoprazole (PROTONIX) EC tablet 40 mg 40 mg Oral Given Hong  Zuri     09/03/2024 0812 EDT nicotine (NICODERM CQ) 21 mg/24 hr TD 24 hr patch 1 patch 1 patch Transdermal Medication Applied Flaca Smith RN     09/03/2024 0807 EDT nicotine (NICODERM CQ) 21 mg/24 hr TD 24 hr patch 1 patch 1 patch Transdermal Patch Removed Flaca Smith RN     09/03/2024 0810 EDT traMADol (ULTRAM) tablet 50 mg 50 mg Oral Given Flaca Smith RN     09/02/2024 2136 EDT traMADol (ULTRAM) tablet 50 mg 50 mg Oral Given Aissatou Means RN     09/03/2024 0809 EDT gabapentin (NEURONTIN) capsule 800 mg 800 mg Oral Given Flaca Smith RN     09/02/2024 2136 EDT gabapentin (NEURONTIN) capsule 800 mg 800 mg Oral Given Aissatou Means RN     09/02/2024 1512 EDT gabapentin (NEURONTIN) capsule 800 mg 800 mg Oral Given Desirae Fernandes     09/03/2024 0810 EDT baclofen tablet 5 mg 5 mg Oral Given Flaca Smith RN     09/02/2024 1718 EDT baclofen tablet 5 mg 5 mg Oral Given Desirae Fernandes     09/03/2024 0807 EDT lidocaine (LIDODERM) 5 % patch 2 patch 2 patch Topical Medication Applied Flaca Smith RN     09/02/2024 2135 EDT lidocaine (LIDODERM) 5 % patch 2 patch 2 patch Topical Patch Removed Aissatou Means RN     09/03/2024 0929 EDT moisture barrier miconazole 2% cream (aka DARLIN MOISTURE BARRIER ANTIFUNGAL CREAM) -- Topical Given Flaca Smith RN     09/03/2024 0922 EDT moisture barrier miconazole 2% cream (aka DARLIN MOISTURE BARRIER ANTIFUNGAL CREAM) -- Topical Given Flaca Smith RN     09/02/2024 1715 EDT moisture barrier miconazole 2% cream (aka DARLIN MOISTURE BARRIER ANTIFUNGAL CREAM) 1 Application Topical Given Desirae Fernandes     09/03/2024 0810 EDT saccharomyces boulardii (FLORASTOR) capsule 250 mg 250 mg Oral Given Flaca Smith RN     09/02/2024 1718 EDT saccharomyces boulardii (FLORASTOR) capsule 250 mg 250 mg Oral Given Desirae Fernandes     09/02/2024 2304 EDT traZODone (DESYREL) tablet 50 mg 50 mg Oral Given Aissatou Means RN     09/03/2024 1129 EDT insulin lispro  (HumALOG/ADMELOG) 100 units/mL subcutaneous injection 1-5 Units 3 Units Subcutaneous Given Flaca Smith RN     09/03/2024 0628 EDT insulin lispro (HumALOG/ADMELOG) 100 units/mL subcutaneous injection 1-5 Units 4 Units Subcutaneous Given Hong Keating     09/02/2024 1548 EDT insulin lispro (HumALOG/ADMELOG) 100 units/mL subcutaneous injection 1-5 Units 3 Units Subcutaneous Given Desirae Fernandes     09/02/2024 2137 EDT insulin lispro (HumALOG/ADMELOG) 100 units/mL subcutaneous injection 1-5 Units 4 Units Subcutaneous Given Aissatou Means RN     09/03/2024 1130 EDT insulin lispro (HumALOG/ADMELOG) 100 units/mL subcutaneous injection 8 Units 8 Units Subcutaneous Given Flaca Smith RN     09/03/2024 0753 EDT insulin lispro (HumALOG/ADMELOG) 100 units/mL subcutaneous injection 8 Units 8 Units Subcutaneous Given Flaca Smith RN     09/02/2024 1548 EDT insulin lispro (HumALOG/ADMELOG) 100 units/mL subcutaneous injection 8 Units 8 Units Subcutaneous Given Desirae Fernandes     09/03/2024 0812 EDT enoxaparin (LOVENOX) subcutaneous injection 40 mg 40 mg Subcutaneous Given Flaca Smith RN     09/03/2024 1129 EDT vancomycin (VANCOCIN) capsule 500 mg 500 mg Oral Given Flaca Smith RN     09/03/2024 0525 EDT vancomycin (VANCOCIN) capsule 500 mg 500 mg Oral Given Hong Keating     09/02/2024 2304 EDT vancomycin (VANCOCIN) capsule 500 mg 500 mg Oral Given Aissatou Means RN     09/02/2024 1718 EDT vancomycin (VANCOCIN) capsule 500 mg 500 mg Oral Given Desirae Fernandes     09/02/2024 2004 EDT magnesium sulfate 4 g/100 mL IVPB (premix) 4 g 0 g Intravenous Stopped Aissatou Means RN     09/02/2024 2137 EDT insulin glargine (LANTUS) subcutaneous injection 24 Units 0.24 mL 24 Units Subcutaneous Given Aissatou Means RN     09/03/2024 0753 EDT magnesium sulfate 4 g/100 mL IVPB (premix) 4 g 4 g Intravenous New Bag Flaca Smith RN            Objective:     Vitals: Blood pressure 114/81, pulse (!) 110, temperature  "98.2 °F (36.8 °C), resp. rate 18, height 5' 2\" (1.575 m), weight 41.7 kg (92 lb), last menstrual period 03/04/2016, SpO2 99%, not currently breastfeeding.,Body mass index is 16.83 kg/m².      Intake/Output Summary (Last 24 hours) at 9/3/2024 1419  Last data filed at 9/3/2024 0815  Gross per 24 hour   Intake 1360 ml   Output --   Net 1360 ml       Physical Exam  Constitutional:       General: She is not in acute distress.     Appearance: She is not ill-appearing or toxic-appearing.      Comments: Frail and cachectic appearing   HENT:      Head: Normocephalic.      Nose: Nose normal. No congestion.   Eyes:      General: No scleral icterus.  Cardiovascular:      Rate and Rhythm: Normal rate.   Pulmonary:      Effort: Pulmonary effort is normal.   Abdominal:      General: Abdomen is flat. Bowel sounds are normal. There is no distension.      Tenderness: There is no abdominal tenderness.   Musculoskeletal:      Right lower leg: No edema.      Left lower leg: No edema.   Skin:     General: Skin is warm.      Coloration: Skin is not jaundiced or pale.   Neurological:      Mental Status: She is alert and oriented to person, place, and time.   Psychiatric:         Mood and Affect: Mood normal.         Behavior: Behavior normal.           Invasive Devices       Peripheral Intravenous Line  Duration             Peripheral IV 09/03/24 Right;Ventral (anterior) Forearm <1 day                    Lab Results:  No results displayed because visit has over 200 results.          Imaging Studies: I have personally reviewed pertinent imaging studies.      "

## 2024-09-03 NOTE — PROGRESS NOTES
[facility]  Progress Note  Name: Barb Palomo I  MRN: [id number]  Unit/Bed#: [facility] 522-01 I Date of Admission: 8/15/2024   Date of Service: 9/3/2024 I Hospital Day: 18    Assessment & Plan   * C. difficile diarrhea  Assessment & Plan  Patient presented with generalized weakness, frequent falls.  She has had ongoing diarrhea, dysuria, poor oral intake and reported 70 pound weight loss over the last year.  Initially hypotensive in the ER, status post IV fluids. Labs with multiple metabolic derangements  CT imaging showing mild diffuse colonic wall thickening, mild diffuse wall thickening of stomach  C. difficile found to be positive--has a history of C. difficile in September 2022.    Patient started on Dificid 200 mg x 10 days per protocol on 8/18 as this is recurrent infection  Initially seemed BMs were improving and were becoming more formed, however again having frequent watery stools  GI consulted  Contact precautions  Appetite is good   No improvement with avoiding lactose and artificial sweeteners  Continue banatrol  Still having multiple loose stools overnight and this morning  Continue vancomycin, increased to 500mg-ID on board  Checking CT abdomen and pelvis: -CT is concerning for proctocolitis  GI to evaluate for pancreatic insufficiency as a contributing factor to her diarrhea - stool pancreatic elastase is pending  Possible FMT this week if the symptoms does not improve    Hypotension  Assessment & Plan  Intermittent episodes of hypotension, most occurrences happen overnight and question correlation with minipress administration.  This has now been discontinued    Sinus tachycardia  Assessment & Plan  Heart rate 100s-120s at times.  Sinus.  Has received IV fluids without much improvement.  Trialed IVFs again 8/22 given reports of nausea with poor oral intake and dizziness with some improvement to the 110s with HR- will continue with IVFs again today   orthostatic  "blood pressure negative   Pt is on minipress which could have a side effect of ST.  Additionally noted she is hypotensive each night around 2/3 AM, I suspect this is likely the underlying cause.  She was started on this by her psychiatrist to assist with sleep and eating outpatient   Will hold minipress for now.  D/w pt can have PRN trazodone for sleep.  Pt agreeable   Monitor     Failure to thrive in adult  Assessment & Plan  Patient presented with increased generalized weakness and frequent falls, ongoing diarrhea, dysuria, poor oral intake, and at least 70-80 pound weight loss over the past 1 year. Has been following with GI, had colonoscopy May 2024 with poor prep--had EGD with esophagitis and gastritis  Labs with multiple derangements including hyperglycemia, hypokalemia and hypomagnesemia on admission, improved.   C. difficile positive as above  Patient reports she has not been using insulin due to \"insurance issue\" and inability to self inject due to her neuropathy  CT abdomen and pelvis without any obvious malignancy  Treatment as per respective issues  PT/OT recommending rehab.   Will consult endocrine for further management-patient reported that she is always hungry at home and eats fairly good amount of food    Hypomagnesemia  Assessment & Plan  Hold oral supplementation due to diarrhea  Check magnesium level and replace as needed    Severe protein-calorie malnutrition (HCC)  Assessment & Plan  Severe protein-calorie malnutrition 2/2 chronic diarrhea a/e/b fat and muscle loss requiring Nutrition consult, oral diet and nutrition supplements    360 Statement: severe malnutrition r/t suspect combination of chronic illness and social/environmental factors as evidenced by severe temporal muscle loss, severe tricep fat pad loss, at least moderate muscle loss around clavicles and shoulders, at least moderate orbital fat pad loss. Treatment: oral diet and oral nutrition supplements.    BMI Findings:  Adult BMI " "Classifications: Underweight < 18.5        Body mass index is 16.83 kg/m².   C/w supplements  Nutrition consult appreciated  Encourage po intake     Acute cystitis without hematuria  Assessment & Plan  Reported dysuria on admission   Urine + nitrite & leukocytes, CT imaging with distended bladder suggestive of retention and small amount of air which could be secondary to infection  Urine Culture noted, S/P 3 doses of IV CTX 8/17 8/21, reported pain AFTER urination, likely related to urine hitting excoriated labia/bottom from frequent stooling. Will continue with medicated cream to this area- denies pain at this time   S/p pyridum x 3 doses  Wound care consult    Uncontrolled type 2 diabetes mellitus with hyperglycemia (HCC)  Assessment & Plan  Lab Results   Component Value Date    HGBA1C 16.1 (H) 08/16/2024       Recent Labs     09/02/24  2036 09/03/24  0617 09/03/24  1114 09/03/24  1605   POCGLU 348* 355* 283* 259*       Patient markedly hyperglycemic on admission only partially responsive to IV fluids, remainder of labs fortunately not consistent with DKA. A1C 16  Patient admitting she has not been using her home insulin as she apparently has been unable to fill prescription due to \"insurance issues,\" additionally reports that due to her neuropathy she has difficulty self injecting.  Daughter also reports jessica non compliance.   Typically on NovoLog 70/30 20 units twice daily  Diabetic diet-change to regular diet recently  Continue basal/prandial insulin while inpatient, adjust PRN  Sliding scale coverage  Consult endocrinology for further manage    Opioid dependence with other opioid-induced disorder (HCC)  Assessment & Plan  PDMP reviewed, patient maintained on tramadol 200 mg daily as needed; history of chronic cervical/lumbar pain noted  Continue as needed tramadol and chronic gabapentin dosing    Chronic diarrhea  Assessment & Plan  Longstanding history of chronic diarrhea, patient reporting acute diarrhea " x1 month. Follows with GI. Has tried questran/imodium without relief  Stool enteric panel negative  C. difficile positive, see plan above   Giardia antigen is negative  stool elastase is pending-    Tobacco dependence  Assessment & Plan  Counseled on need for cessation, provide nicotine patch while admitted  Recent CT chest for lung cancer screening 7/30/2024 negative for nodules or masses    GERD without esophagitis  Assessment & Plan  Continue PPI    Severe episode of recurrent major depressive disorder, without psychotic features (HCC)  Assessment & Plan  Mood appearing fairly stable, continue home duloxetine, mirtazapine, as needed Valium  Hold minipress given symptomatic hypotension episodes, tachycardia                VTE Pharmacologic Prophylaxis: VTE Score: 3 Moderate Risk (Score 3-4) - Pharmacological DVT Prophylaxis Ordered: enoxaparin (Lovenox).    Mobility:   Basic Mobility Inpatient Raw Score: 24  JH-HLM Goal: 8: Walk 250 feet or more  JH-HLM Achieved: 7: Walk 25 feet or more  JH-HLM Goal achieved. Continue to encourage appropriate mobility.    Patient Centered Rounds: Performed nursing rounds  Discussions with Specialists or Other Care Team Provider: gi /infectious disease     Education and Discussions with Family / Patient: Updated  (daughter) via phone.    Total Time Spent on Date of Encounter in care of patient: 35 mins. This time was spent on one or more of the following: performing physical exam; counseling and coordination of care; obtaining or reviewing history; documenting in the medical record; reviewing/ordering tests, medications or procedures; communicating with other healthcare professionals and discussing with patient's family/caregivers.    Current Length of Stay: 18 day(s)  Current Patient Status: Inpatient   Certification Statement: The patient will continue to require additional inpatient hospital stay due to persistent diarrhea  Discharge Plan: Anticipate discharge in  >72 hrs to rehab facility.    Code Status: Level 1 - Full Code    Subjective:   Patient seen and examined.  Still with diarrhea.  Patient reported that she is feeling hungry and she always eats.  Currently on a regular diet.  Her blood sugar is persistently elevated discussed with endocrinology who adjusted the insulin for today.  Discussed with GI.  Starting on Questran  Objective:     Vitals:   Temp (24hrs), Av.5 °F (36.9 °C), Min:98.2 °F (36.8 °C), Max:99 °F (37.2 °C)    Temp:  [98.2 °F (36.8 °C)-99 °F (37.2 °C)] 98.2 °F (36.8 °C)  HR:  [110-118] 115  Resp:  [16-18] 18  BP: (111-118)/(78-81) 118/81  SpO2:  [98 %-99 %] 98 %  Body mass index is 16.83 kg/m².     Input and Output Summary (last 24 hours):     Intake/Output Summary (Last 24 hours) at 9/3/2024 1628  Last data filed at 9/3/2024 0815  Gross per 24 hour   Intake 1360 ml   Output --   Net 1360 ml       Physical Exam:   Physical Exam  Constitutional:       General: She is not in acute distress.     Comments: Cachectic appearing female   HENT:      Head: Normocephalic and atraumatic.      Nose: Nose normal.   Eyes:      General: No scleral icterus.  Cardiovascular:      Rate and Rhythm: Normal rate and regular rhythm.      Heart sounds: No murmur heard.  Pulmonary:      Effort: Pulmonary effort is normal. No respiratory distress.      Breath sounds: No stridor.   Abdominal:      General: Bowel sounds are normal. There is no distension.      Tenderness: There is no abdominal tenderness.   Musculoskeletal:         General: No swelling. Normal range of motion.   Skin:     General: Skin is warm.   Neurological:      Mental Status: She is alert. Mental status is at baseline.      Cranial Nerves: No cranial nerve deficit.          Additional Data:     Labs:  Results from last 7 days   Lab Units 24  0637   WBC Thousand/uL 6.16   HEMOGLOBIN g/dL 10.6*   HEMATOCRIT % 32.5*   PLATELETS Thousands/uL 283     Results from last 7 days   Lab Units 24  0637  09/02/24  0706 09/01/24  0525   SODIUM mmol/L 136   < > 138   POTASSIUM mmol/L 4.3   < > 4.0   CHLORIDE mmol/L 100   < > 101   CO2 mmol/L 29   < > 30   BUN mg/dL 15   < > 13   CREATININE mg/dL 0.61   < > 0.54*   ANION GAP mmol/L 7   < > 7   CALCIUM mg/dL 9.2   < > 9.3   ALBUMIN g/dL  --   --  3.6   TOTAL BILIRUBIN mg/dL  --   --  0.30   ALK PHOS U/L  --   --  197*   ALT U/L  --   --  59*   AST U/L  --   --  42*   GLUCOSE RANDOM mg/dL 356*   < > 296*    < > = values in this interval not displayed.         Results from last 7 days   Lab Units 09/03/24  1605 09/03/24  1114 09/03/24  0617 09/02/24  2036 09/02/24  1522 09/02/24  1048 09/02/24  0655 09/01/24  2132 09/01/24  1533 09/01/24  1054 09/01/24  0636 08/31/24  2049   POC GLUCOSE mg/dl 259* 283* 355* 348* 293* 260* 220* 236* 240* 147* 266* 297*               Lines/Drains:  Invasive Devices       Peripheral Intravenous Line  Duration             Peripheral IV 09/03/24 Right;Ventral (anterior) Forearm <1 day                          Imaging: Reviewed radiology reports from this admission including: abdominal/pelvic CT    Recent Cultures (last 7 days):         Last 24 Hours Medication List:   Current Facility-Administered Medications   Medication Dose Route Frequency Provider Last Rate    acetaminophen  650 mg Oral Q6H PRN Abhay Hayes MD      albuterol  2 puff Inhalation Q4H PRN Renan Drake, DO      aspirin  81 mg Oral Daily Renan Drake, DO      atorvastatin  80 mg Oral Daily Renan Drake, DO      baclofen  5 mg Oral BID ELISEO Toribio      butalbital-acetaminophen-caffeine  1 tablet Oral Q6H PRN Renan Drake, DO      diazepam  5 mg Oral Q12H PRN Renan Drake, DO      DULoxetine  60 mg Oral BID Renan Drake, DO      enoxaparin  40 mg Subcutaneous Q24H [facility] Abhay Hayes MD      fluticasone-vilanterol  1 puff Inhalation Daily Renan Drake DO      gabapentin  800 mg Oral TID Renan Drake DO      insulin glargine  30 Units Subcutaneous HS  Holli Lopes, DO      insulin lispro  1-5 Units Subcutaneous TID AC Abhay Hayes MD      insulin lispro  1-5 Units Subcutaneous HS Abhay Hayes MD      insulin lispro  10 Units Subcutaneous TID With Meals Holli Lopes, DO      levothyroxine  112 mcg Oral Early Morning Renan Drake, DO      lidocaine  2 patch Topical Daily Magda Amaya, CRNP      mirtazapine  7.5 mg Oral HS Renan Drake, DO      DARLIN ANTIFUNGAL   Topical BID Cherise Grey, CRNP      nicotine  1 patch Transdermal Daily Renan Drake, DO      ondansetron  4 mg Intravenous Q6H PRN Renan Drake, DO      pantoprazole  40 mg Oral Early Morning Renan Drake, DO      saccharomyces boulardii  250 mg Oral BID Shanika Knott, CRNP      traMADol  50 mg Oral Q6H PRN Renan Drake, DO      traZODone  50 mg Oral HS PRN Estefany Villalobos PA-C      vancomycin oral (capsules or solution)  500 mg Oral Q6H [facility] Corinne Us MD          Today, Patient Was Seen By: Britney Arce MD    **Please Note: This note may have been constructed using a voice recognition system.**

## 2024-09-03 NOTE — PLAN OF CARE
"  Problem: PHYSICAL THERAPY ADULT  Goal: Performs mobility at highest level of function for planned discharge setting.  See evaluation for individualized goals.  Description: Treatment/Interventions: Functional transfer training, LE strengthening/ROM, Elevations, Therapeutic exercise, Endurance training, Patient/family training, Equipment eval/education, Bed mobility, Gait training, Spoke to nursing, OT  Equipment Recommended: Walker       See flowsheet documentation for full assessment, interventions and recommendations.  Note: Prognosis: Fair  Problem List: Decreased strength, Decreased endurance, Impaired balance, Decreased mobility, Decreased skin integrity, Pain  Assessment: Pt seen for Pt re- eval (goals and POC expiration) + tx session as documented below. Pt admitted to [facility] on 8/15 from home w/ frequent fall and weakness acute on chronic diarrhea dx at that time w/ failure to thrive.  Pt ultimately  found to have C-diff infection;  w/ chronic pancreatitis; uncontrolled diabetes; acute cystitis+ weight loss of >70lbs.  Pt seen by GI and ID and tentative plan for FMT later this week. In addition pt w/ social/ financial barriers to d/c/ reports \"hoarder situation\" in home and can only access 1st floor + significant depression and financial barriers to caring for self and obtaining medications prior to admission.   Pt has been followed by PT since IE on 8/17 w/ 3 sessions completed since then w/ limitations in progress 2* frequent diarrhea; fatigue; balance/ strength deficits + motivations barriers. On re-eval today pt was pleasant and focused on recovery- taking about her new granddaughter in [place]. Was able to perform bed skills w/ close S; becomes dizzy on initial transition but dizziness does resolve w/ 2-3 mins of sitting. Transfers and gait continue to fluctuate between CGA and Cali for balance and LE weakness and fatigue. Pt becomes fatigued and overwhelmed when bouts of diarrhea occur (x2 this " session) and was offered reassurance by PT.   Pt continues to function below baseline and currently w/ overall mobility deficits 2* to: decreased LE strength/AROM;  gross generalized  weakness/ deconditioning; decreased endurance; decreased activity tolerance;  impaired balance; fatigue; frail; intermittent dizziness. (Please find additional objective findings from PT assessment regarding body systems outlined above.) Pt will continue to benefit from skilled PT interventions to address stated impairments; to maximize functional potential; for ongoing pt/ family training; and DME needs.  PT is recommending PT Resource Intensity Level  2 (rehab) on d/c 28 ongoing function below baseline; fall risk; gross weakness and lack of social support for safe d/c at [facility].      PT POC and goals updated. See below for additional tx details.        Rehab Resource Intensity Level, PT: II (Moderate Resource Intensity)    See flowsheet documentation for full assessment.

## 2024-09-03 NOTE — ASSESSMENT & PLAN NOTE
"Lab Results   Component Value Date    HGBA1C 16.1 (H) 08/16/2024       Recent Labs     09/02/24  2036 09/03/24  0617 09/03/24  1114 09/03/24  1605   POCGLU 348* 355* 283* 259*       Patient markedly hyperglycemic on admission only partially responsive to IV fluids, remainder of labs fortunately not consistent with DKA. A1C 16  Patient admitting she has not been using her home insulin as she apparently has been unable to fill prescription due to \"insurance issues,\" additionally reports that due to her neuropathy she has difficulty self injecting.  Daughter also reports jessica non compliance.   Typically on NovoLog 70/30 20 units twice daily  Diabetic diet-change to regular diet recently  Continue basal/prandial insulin while inpatient, adjust PRN  Sliding scale coverage  Consult endocrinology for further manage  "

## 2024-09-03 NOTE — ASSESSMENT & PLAN NOTE
Severe protein-calorie malnutrition 2/2 chronic diarrhea a/e/b fat and muscle loss requiring Nutrition consult, oral diet and nutrition supplements    360 Statement: severe malnutrition r/t suspect combination of chronic illness and social/environmental factors as evidenced by severe temporal muscle loss, severe tricep fat pad loss, at least moderate muscle loss around clavicles and shoulders, at least moderate orbital fat pad loss. Treatment: oral diet and oral nutrition supplements.    BMI Findings:  Adult BMI Classifications: Underweight < 18.5        Body mass index is 16.83 kg/m².   C/w supplements  Nutrition consult appreciated  Encourage po intake

## 2024-09-03 NOTE — ASSESSMENT & PLAN NOTE
Longstanding history of chronic diarrhea, patient reporting acute diarrhea x1 month. Follows with GI. Has tried questran/imodium without relief  Stool enteric panel negative  C. difficile positive, see plan above   Giardia antigen is negative  stool elastase is pending-

## 2024-09-03 NOTE — PROGRESS NOTES
Progress Note - Infectious Disease   Barb Palomo 54 y.o. female MRN: [id number]  [facility] Encounter: [id number]      Impression/Plan:  1.  C. difficile colitis.  Patient presented with weakness, weight loss, acute on chronic diarrhea.  8/16 C. difficile PCR and toxin EIA positive.  Continues to have significant loose stools despite 7 days of fidaxomicin and now 7 days of p.o. vancomycin.  She remains nontoxic without fever or leukocytosis.  Abdominal exam benign.  Unclear if ongoing symptoms are related to C. difficile alone or an alternative etiology as below.  Given recent positive testing there is low utility to repeat C. difficile testing.  Still having significant diarrhea              -Continue high-dose oral vancomycin              -Continue Banatrol              -GI follow-up ongoing, consideration for fecal transplant     2.  Acute on chronic diarrhea.  The patient has had diarrhea and weight loss for the past year but reports diarrhea is usually 2-4 times daily.  She continues to have up to 10 stools daily since admission with minimal improvement on C. difficile treatment.  Fecal white blood cells negative.  Additional infectious testing including stool O&P, Giardia antigen, enteric panel negative.  Unclear if ongoing symptoms are solely due to C. difficile.  The patient has a diagnosis of chronic pancreatitis to be contributing.  Poorly controlled diabetes may also lead to diarrhea.  She may also be having postinfectious/functional diarrhea.  Continues to have substantial diarrhea.  Repeat CT of the abdomen pelvis with colonic thickening.              -Treatment for C. difficile as above              -Close GI follow-up  -Consideration for endoscopy to look for ongoing evidence of pseudomembranes or other inflammatory changes     3.  Failure to thrive, severe protein calorie malnutrition.  BMI 16.  The patient has had diarrhea and weight loss for the past year.  Status post EGD and  colonoscopy May, colon biopsies without pathologic abnormalities.     4.  Poorly controlled type 2 diabetes with hyperglycemia.  Hemoglobin A1c 16.1%.  Patient was not using insulin at home.  Glycemic management per primary team     5.  Bacteriuria.  Patient received 3 doses of IV ceftriaxone.  CT imaging showed a distended bladder suggestive of retention which is likely contributing to her symptoms.  Per report pain was happening after urination likely due to skin irritation.  In the setting of C. difficile as above, avoid unnecessary antibiotics.    Discussed with the primary service the plan to continue the high-dose oral vancomycin for now and they agree with the plan.    Antibiotics:  High-dose oral vancomycin 2    Subjective:  Patient has no fever, chills, sweats; no nausea, vomiting, still having significant amounts of diarrhea; no cough, shortness of breath; no increased abdominal pain. No new symptoms.    Objective:  Vitals:  Temp:  [97.8 °F (36.6 °C)-99 °F (37.2 °C)] 98.2 °F (36.8 °C)  HR:  [] 110  Resp:  [16-18] 18  BP: (111-121)/(78-85) 114/81  SpO2:  [98 %-100 %] 99 %  Temp (24hrs), Av.2 °F (36.8 °C), Min:97.8 °F (36.6 °C), Max:99 °F (37.2 °C)  Current: Temperature: 98.2 °F (36.8 °C)    Physical Exam:   General Appearance:  Alert, interactive, nontoxic, no acute distress.   Throat: Oropharynx moist without lesions.    Lungs:   Clear to auscultation bilaterally; no wheezes, rhonchi or rales; respirations unlabored   Heart:  RRR; no murmur, rub or gallop   Abdomen:   Soft, non-tender, non-distended, positive bowel sounds.     Extremities: No clubbing, cyanosis or edema   Skin: No new rashes or lesions. No draining wounds noted.       Labs, Imaging, & Other studies:   All pertinent labs and imaging studies were personally reviewed  Results from last 7 days   Lab Units 24  0637 24  0706 24  0458   WBC Thousand/uL 6.16 5.61 7.23   HEMOGLOBIN g/dL 10.6* 11.4* 10.2*   PLATELETS  Thousands/uL 283 293 322     Results from last 7 days   Lab Units 09/03/24  0637 09/02/24  0706 09/01/24  0525   SODIUM mmol/L 136 139 138   POTASSIUM mmol/L 4.3 3.8 4.0   CHLORIDE mmol/L 100 103 101   CO2 mmol/L 29 29 30   BUN mg/dL 15 16 13   CREATININE mg/dL 0.61 0.42* 0.54*   EGFR ml/min/1.73sq m 103 117 108   CALCIUM mg/dL 9.2 9.2 9.3   AST U/L  --   --  42*   ALT U/L  --   --  59*   ALK PHOS U/L  --   --  197*       X-ray abdomen.  Marked distention of the stomach.    CT abdomen pelvis.  Colonic thickening.    Images personally reviewed by me in PACS and interpreted by me

## 2024-09-03 NOTE — CASE MANAGEMENT
Case Management Discharge Planning Note    Patient name Barb Palomo  Location [facility]-01 MRN [id number]  : 1970 Date 9/3/2024       Current Admission Date: 8/15/2024  Current Admission Diagnosis:C. difficile diarrhea   Patient Active Problem List    Diagnosis Date Noted Date Diagnosed    Hypotension 2024     Sinus tachycardia 2024     Hypomagnesemia 2024     Failure to thrive in adult 2024     C. difficile diarrhea 2024     Severe protein-calorie malnutrition (HCC) 2024     HHNC (hyperglycemic hyperosmolar nonketotic coma) ([facility]) 2024     Abdominal pain 2024     Acute cystitis without hematuria 2024     Chronic migraine without aura without status migrainosus, not intractable 2024     Unspecified psychosis not due to a substance or known physiological condition ([facility]) 2023     Pancolitis ([facility]) 2023     S/P left knee arthroscopy 10/21/2022     Agoraphobia with panic disorder 2022    Bipolar II disorder ([facility]) 2022    Depression, unspecified 2022    Polyarthritis, unspecified 2022    Chronic prescription benzodiazepine use 2022     Uncontrolled type 2 diabetes mellitus with hyperglycemia ([facility]) 2021     Nausea 2021     Weakness of right upper extremity 2021     Neuropathy      Cervical radiculopathy 2020     Lumbar radiculopathy 2020     DDD (degenerative disc disease), cervical 2020     DDD (degenerative disc disease), lumbar 2020     Low back pain with sciatica 2020     Cervical spinal stenosis 2020     Cervical spondylosis without myelopathy 2020     Paresthesia and pain of both upper extremities 2020     Paresthesia of both lower extremities 2020     Chronic pain of both shoulders 2020     Chronic cervical pain 2020     Symptom associated with female genital organs  05/26/2020     Female stress incontinence 05/26/2020     Decreased libido 05/26/2020     ROGERIO positive 05/13/2020     Diplopia 02/04/2020     Chronic migraine without aura, not intractable, without status migrainosus 10/17/2019     Arthralgia of temporomandibular joint 08/22/2019     Carpal tunnel syndrome 08/22/2019     Dysphagia 08/22/2019     Reactive airway disease without complication 02/23/2018     Protein calorie malnutrition (HCC) 08/30/2017     Tobacco dependence 08/30/2017     GERD without esophagitis 08/25/2017     History of decompression of median nerve 08/25/2017     Hypothyroidism 08/25/2017     Chronic pain disorder 11/04/2016     Pancreatic cyst 10/17/2016     Chronic diarrhea 10/04/2016     Chronic fatigue, unspecified 08/24/2016     Severe episode of recurrent major depressive disorder, without psychotic features (HCC) 05/05/2016     Fatty liver 04/08/2016     Opioid dependence with other opioid-induced disorder (HCC) 01/25/2016     Iron deficiency 12/28/2015     Vitamin D deficiency 08/13/2015     Hyperlipidemia 03/15/2013     Insomnia 03/04/2013     Vitamin B12 deficiency 03/04/2013     Post-traumatic stress disorder, unspecified 09/26/2012     Benign essential hypertension 09/26/2012     Temporary cerebral vascular dysfunction 07/30/2009     History of pulmonary embolism 07/30/2009     Irregular menstrual cycle 04/02/2008       LOS (days): 18  Geometric Mean LOS (GMLOS) (days): 5.1  Days to GMLOS:-13.3     OBJECTIVE:  Risk of Unplanned Readmission Score: 27.08         Current admission status: Inpatient   Preferred Pharmacy:   [id number] #[id number], [address] @[address], [place], [place] [address] [place] [address]  Phone: [phone] Fax: [phone]    [id number] - [place] - [address]  [address]  [place] [address]  Phone: [phone] Fax: [phone]    [facility] - [address]  [facility] [address] [address]  Phone: [phone] Fax: [phone]    Primary Care Provider: ELISEO Vee    Primary Insurance: MEDICARE  Secondary Insurance:     DISCHARGE DETAILS:    Other Referral/Resources/Interventions Provided:  Interventions: Short Term Rehab  Referral Comments: discussed pt during coordination rounds with [facility], pt not yet medically ready for discharge at this time. Plan for pt to admit to [facility] for rehab when medically cleared pending open bed availability at time of discharge. CM team will continue to follow.    Treatment Team Recommendation: Short Term Rehab  Discharge Destination Plan:: Short Term Rehab

## 2024-09-04 LAB
ALBUMIN SERPL BCG-MCNC: 3.7 G/DL (ref 3.5–5)
ALP SERPL-CCNC: 207 U/L (ref 34–104)
ALT SERPL W P-5'-P-CCNC: 97 U/L (ref 7–52)
ANION GAP SERPL CALCULATED.3IONS-SCNC: 8 MMOL/L (ref 4–13)
AST SERPL W P-5'-P-CCNC: 57 U/L (ref 13–39)
BILIRUB SERPL-MCNC: 0.28 MG/DL (ref 0.2–1)
BUN SERPL-MCNC: 15 MG/DL (ref 5–25)
CALCIUM SERPL-MCNC: 9.2 MG/DL (ref 8.4–10.2)
CHLORIDE SERPL-SCNC: 105 MMOL/L (ref 96–108)
CO2 SERPL-SCNC: 26 MMOL/L (ref 21–32)
CREAT SERPL-MCNC: 0.49 MG/DL (ref 0.6–1.3)
ERYTHROCYTE [DISTWIDTH] IN BLOOD BY AUTOMATED COUNT: 14.4 % (ref 11.6–15.1)
GFR SERPL CREATININE-BSD FRML MDRD: 111 ML/MIN/1.73SQ M
GLUCOSE SERPL-MCNC: 183 MG/DL (ref 65–140)
GLUCOSE SERPL-MCNC: 214 MG/DL (ref 65–140)
GLUCOSE SERPL-MCNC: 263 MG/DL (ref 65–140)
GLUCOSE SERPL-MCNC: 272 MG/DL (ref 65–140)
GLUCOSE SERPL-MCNC: 285 MG/DL (ref 65–140)
HAV AB SER QL IA: NORMAL
HBV CORE AB SER QL: NORMAL
HBV SURFACE AB SER-ACNC: <3 MIU/ML
HBV SURFACE AG SER QL: NORMAL
HCT VFR BLD AUTO: 32.7 % (ref 34.8–46.1)
HCV AB SER QL: NORMAL
HGB BLD-MCNC: 10.7 G/DL (ref 11.5–15.4)
HIV 1+2 AB+HIV1 P24 AG SERPL QL IA: NORMAL
HIV 2 AB SERPL QL IA: NORMAL
HIV1 AB SERPL QL IA: NORMAL
HIV1 P24 AG SERPL QL IA: NORMAL
MAGNESIUM SERPL-MCNC: 1.6 MG/DL (ref 1.9–2.7)
MCH RBC QN AUTO: 32.3 PG (ref 26.8–34.3)
MCHC RBC AUTO-ENTMCNC: 32.7 G/DL (ref 31.4–37.4)
MCV RBC AUTO: 99 FL (ref 82–98)
PLATELET # BLD AUTO: 285 THOUSANDS/UL (ref 149–390)
PMV BLD AUTO: 10.7 FL (ref 8.9–12.7)
POTASSIUM SERPL-SCNC: 4.3 MMOL/L (ref 3.5–5.3)
PROT SERPL-MCNC: 5.9 G/DL (ref 6.4–8.4)
RBC # BLD AUTO: 3.31 MILLION/UL (ref 3.81–5.12)
SODIUM SERPL-SCNC: 139 MMOL/L (ref 135–147)
WBC # BLD AUTO: 5.01 THOUSAND/UL (ref 4.31–10.16)

## 2024-09-04 PROCEDURE — 99222 1ST HOSP IP/OBS MODERATE 55: CPT | Performed by: INTERNAL MEDICINE

## 2024-09-04 PROCEDURE — 85027 COMPLETE CBC AUTOMATED: CPT | Performed by: FAMILY MEDICINE

## 2024-09-04 PROCEDURE — 99233 SBSQ HOSP IP/OBS HIGH 50: CPT | Performed by: INTERNAL MEDICINE

## 2024-09-04 PROCEDURE — 99232 SBSQ HOSP IP/OBS MODERATE 35: CPT | Performed by: INTERNAL MEDICINE

## 2024-09-04 PROCEDURE — 82948 REAGENT STRIP/BLOOD GLUCOSE: CPT

## 2024-09-04 PROCEDURE — 83735 ASSAY OF MAGNESIUM: CPT | Performed by: FAMILY MEDICINE

## 2024-09-04 PROCEDURE — 80053 COMPREHEN METABOLIC PANEL: CPT | Performed by: FAMILY MEDICINE

## 2024-09-04 PROCEDURE — 99232 SBSQ HOSP IP/OBS MODERATE 35: CPT | Performed by: FAMILY MEDICINE

## 2024-09-04 PROCEDURE — 86337 INSULIN ANTIBODIES: CPT | Performed by: STUDENT IN AN ORGANIZED HEALTH CARE EDUCATION/TRAINING PROGRAM

## 2024-09-04 RX ORDER — MAGNESIUM SULFATE HEPTAHYDRATE 40 MG/ML
2 INJECTION, SOLUTION INTRAVENOUS ONCE
Status: COMPLETED | OUTPATIENT
Start: 2024-09-04 | End: 2024-09-04

## 2024-09-04 RX ADMIN — PANTOPRAZOLE SODIUM 40 MG: 40 TABLET, DELAYED RELEASE ORAL at 05:50

## 2024-09-04 RX ADMIN — GABAPENTIN 800 MG: 400 CAPSULE ORAL at 17:53

## 2024-09-04 RX ADMIN — ASPIRIN 81 MG CHEWABLE TABLET 81 MG: 81 TABLET CHEWABLE at 09:24

## 2024-09-04 RX ADMIN — Medication 250 MG: at 17:53

## 2024-09-04 RX ADMIN — ONDANSETRON 4 MG: 2 INJECTION INTRAMUSCULAR; INTRAVENOUS at 20:34

## 2024-09-04 RX ADMIN — PANCRELIPASE 24000 UNITS: 120000; 24000; 76000 CAPSULE, DELAYED RELEASE PELLETS ORAL at 20:34

## 2024-09-04 RX ADMIN — VANCOMYCIN HYDROCHLORIDE 500 MG: 250 CAPSULE ORAL at 17:54

## 2024-09-04 RX ADMIN — INSULIN LISPRO 10 UNITS: 100 INJECTION, SOLUTION INTRAVENOUS; SUBCUTANEOUS at 17:51

## 2024-09-04 RX ADMIN — TRAMADOL HYDROCHLORIDE 50 MG: 50 TABLET, COATED ORAL at 07:49

## 2024-09-04 RX ADMIN — VANCOMYCIN HYDROCHLORIDE 500 MG: 250 CAPSULE ORAL at 23:47

## 2024-09-04 RX ADMIN — LIDOCAINE 2 PATCH: 700 PATCH TOPICAL at 09:25

## 2024-09-04 RX ADMIN — ONDANSETRON 4 MG: 2 INJECTION INTRAMUSCULAR; INTRAVENOUS at 09:43

## 2024-09-04 RX ADMIN — MICONAZOLE NITRATE: 20 CREAM TOPICAL at 18:12

## 2024-09-04 RX ADMIN — INSULIN LISPRO 2 UNITS: 100 INJECTION, SOLUTION INTRAVENOUS; SUBCUTANEOUS at 07:44

## 2024-09-04 RX ADMIN — DULOXETINE HYDROCHLORIDE 60 MG: 60 CAPSULE, DELAYED RELEASE ORAL at 09:25

## 2024-09-04 RX ADMIN — DULOXETINE HYDROCHLORIDE 60 MG: 60 CAPSULE, DELAYED RELEASE ORAL at 17:53

## 2024-09-04 RX ADMIN — NICOTINE 1 PATCH: 21 PATCH, EXTENDED RELEASE TRANSDERMAL at 09:25

## 2024-09-04 RX ADMIN — TRAMADOL HYDROCHLORIDE 50 MG: 50 TABLET, COATED ORAL at 22:07

## 2024-09-04 RX ADMIN — INSULIN LISPRO 2 UNITS: 100 INJECTION, SOLUTION INTRAVENOUS; SUBCUTANEOUS at 22:11

## 2024-09-04 RX ADMIN — MIRTAZAPINE 7.5 MG: 15 TABLET, FILM COATED ORAL at 22:07

## 2024-09-04 RX ADMIN — ATORVASTATIN CALCIUM 80 MG: 80 TABLET, FILM COATED ORAL at 09:25

## 2024-09-04 RX ADMIN — GABAPENTIN 800 MG: 400 CAPSULE ORAL at 09:24

## 2024-09-04 RX ADMIN — VANCOMYCIN HYDROCHLORIDE 500 MG: 250 CAPSULE ORAL at 11:20

## 2024-09-04 RX ADMIN — INSULIN LISPRO 10 UNITS: 100 INJECTION, SOLUTION INTRAVENOUS; SUBCUTANEOUS at 11:21

## 2024-09-04 RX ADMIN — GABAPENTIN 800 MG: 400 CAPSULE ORAL at 22:08

## 2024-09-04 RX ADMIN — BUTALBITAL, ACETAMINOPHEN AND CAFFEINE 1 TABLET: 50; 325; 40 TABLET ORAL at 09:25

## 2024-09-04 RX ADMIN — INSULIN GLARGINE 30 UNITS: 100 INJECTION, SOLUTION SUBCUTANEOUS at 22:07

## 2024-09-04 RX ADMIN — LEVOTHYROXINE SODIUM 112 MCG: 112 TABLET ORAL at 05:50

## 2024-09-04 RX ADMIN — Medication 250 MG: at 09:25

## 2024-09-04 RX ADMIN — DIAZEPAM 5 MG: 5 TABLET ORAL at 10:49

## 2024-09-04 RX ADMIN — BACLOFEN 5 MG: 10 TABLET ORAL at 17:53

## 2024-09-04 RX ADMIN — TRAMADOL HYDROCHLORIDE 50 MG: 50 TABLET, COATED ORAL at 14:54

## 2024-09-04 RX ADMIN — BACLOFEN 5 MG: 10 TABLET ORAL at 09:24

## 2024-09-04 RX ADMIN — MAGNESIUM SULFATE HEPTAHYDRATE 2 G: 40 INJECTION, SOLUTION INTRAVENOUS at 20:20

## 2024-09-04 RX ADMIN — VANCOMYCIN HYDROCHLORIDE 500 MG: 250 CAPSULE ORAL at 05:52

## 2024-09-04 RX ADMIN — MICONAZOLE NITRATE 1 APPLICATION: 20 CREAM TOPICAL at 09:29

## 2024-09-04 RX ADMIN — INSULIN LISPRO 1 UNITS: 100 INJECTION, SOLUTION INTRAVENOUS; SUBCUTANEOUS at 11:21

## 2024-09-04 RX ADMIN — TRAZODONE HYDROCHLORIDE 50 MG: 50 TABLET ORAL at 20:34

## 2024-09-04 RX ADMIN — INSULIN LISPRO 10 UNITS: 100 INJECTION, SOLUTION INTRAVENOUS; SUBCUTANEOUS at 07:44

## 2024-09-04 RX ADMIN — CHOLESTYRAMINE 4 G: 4 POWDER, FOR SUSPENSION ORAL at 09:26

## 2024-09-04 RX ADMIN — INSULIN LISPRO 2 UNITS: 100 INJECTION, SOLUTION INTRAVENOUS; SUBCUTANEOUS at 17:50

## 2024-09-04 RX ADMIN — ENOXAPARIN SODIUM 40 MG: 40 INJECTION SUBCUTANEOUS at 09:24

## 2024-09-04 RX ADMIN — FLUTICASONE FUROATE AND VILANTEROL TRIFENATATE 1 PUFF: 200; 25 POWDER RESPIRATORY (INHALATION) at 07:44

## 2024-09-04 RX ADMIN — CHOLESTYRAMINE 4 G: 4 POWDER, FOR SUSPENSION ORAL at 17:57

## 2024-09-04 RX ADMIN — ACETAMINOPHEN 650 MG: 325 TABLET ORAL at 11:20

## 2024-09-04 NOTE — PROGRESS NOTES
[facility]  Progress Note  Name: Barb Palomo I  MRN: [id number]  Unit/Bed#: [facility] 522-01 I Date of Admission: 8/15/2024   Date of Service: 9/4/2024 I Hospital Day: 19    Assessment & Plan   * C. difficile diarrhea  Assessment & Plan  Patient presented with generalized weakness, frequent falls.  She has had ongoing diarrhea, dysuria, poor oral intake and reported 70 pound weight loss over the last year.  Initially hypotensive in the ER, status post IV fluids. Labs with multiple metabolic derangements  CT imaging showing mild diffuse colonic wall thickening, mild diffuse wall thickening of stomach  C. difficile found to be positive--has a history of C. difficile in September 2022.    Patient started on Dificid 200 mg x 10 days per protocol on 8/18 as this is recurrent infection  Initially seemed BMs were improving and were becoming more formed, however again having frequent watery stools  GI consulted  Contact precautions  Appetite is good   No improvement with avoiding lactose and artificial sweeteners  Continue banatrol  Still having multiple loose stools overnight and this morning  Continue vancomycin, increased to 500mg-ID on board  Checking CT abdomen and pelvis: -CT is concerning for proctocolitis  GI to evaluate for pancreatic insufficiency as a contributing factor to her diarrhea - stool pancreatic elastase is low and started on Creon  Possible FMT this week if the symptoms does not improve-tentative plan for Friday    Hypotension  Assessment & Plan  Intermittent episodes of hypotension, most occurrences happen overnight and question correlation with minipress administration.  This has now been discontinued    Sinus tachycardia  Assessment & Plan  Heart rate 100s-120s at times.  Sinus.  Has received IV fluids without much improvement.  Trialed IVFs again 8/22 given reports of nausea with poor oral intake and dizziness with some improvement to the 110s with HR- will  "continue with IVFs again today   orthostatic blood pressure negative   Pt is on minipress which could have a side effect of ST.  Additionally noted she is hypotensive each night around 2/3 AM, I suspect this is likely the underlying cause.  She was started on this by her psychiatrist to assist with sleep and eating outpatient   Will hold minipress for now.  D/w pt can have PRN trazodone for sleep.  Pt agreeable   Monitor     Failure to thrive in adult  Assessment & Plan  Patient presented with increased generalized weakness and frequent falls, ongoing diarrhea, dysuria, poor oral intake, and at least 70-80 pound weight loss over the past 1 year. Has been following with GI, had colonoscopy May 2024 with poor prep--had EGD with esophagitis and gastritis  Labs with multiple derangements including hyperglycemia, hypokalemia and hypomagnesemia on admission, improved.   C. difficile positive as above  Patient reports she has not been using insulin due to \"insurance issue\" and inability to self inject due to her neuropathy  CT abdomen and pelvis without any obvious malignancy  Treatment as per respective issues  PT/OT recommending rehab.   Will consult endocrine for further management-patient reported that she is always hungry at home and eats fairly good amount of food-consult appreciated    Hypomagnesemia  Assessment & Plan  Hold oral supplementation due to diarrhea  Check magnesium level and replace as needed    Severe protein-calorie malnutrition (HCC)  Assessment & Plan  Severe protein-calorie malnutrition 2/2 chronic diarrhea a/e/b fat and muscle loss requiring Nutrition consult, oral diet and nutrition supplements    360 Statement: severe malnutrition r/t suspect combination of chronic illness and social/environmental factors as evidenced by severe temporal muscle loss, severe tricep fat pad loss, at least moderate muscle loss around clavicles and shoulders, at least moderate orbital fat pad loss. Treatment: oral " "diet and oral nutrition supplements.    BMI Findings:  Adult BMI Classifications: Underweight < 18.5        Body mass index is 16.68 kg/m².   C/w supplements  Nutrition consult appreciated  Encourage po intake     Acute cystitis without hematuria  Assessment & Plan  Reported dysuria on admission   Urine + nitrite & leukocytes, CT imaging with distended bladder suggestive of retention and small amount of air which could be secondary to infection  Urine Culture noted, S/P 3 doses of IV CTX 8/17 8/21, reported pain AFTER urination, likely related to urine hitting excoriated labia/bottom from frequent stooling. Will continue with medicated cream to this area- denies pain at this time   S/p pyridum x 3 doses  Wound care consult    Uncontrolled type 2 diabetes mellitus with hyperglycemia (HCC)  Assessment & Plan  Lab Results   Component Value Date    HGBA1C 16.1 (H) 08/16/2024       Recent Labs     09/03/24  2045 09/04/24  0633 09/04/24  1044 09/04/24  1521   POCGLU 237* 263* 183* 214*       Patient markedly hyperglycemic on admission only partially responsive to IV fluids, remainder of labs fortunately not consistent with DKA. A1C 16  Patient admitting she has not been using her home insulin as she apparently has been unable to fill prescription due to \"insurance issues,\" additionally reports that due to her neuropathy she has difficulty self injecting.  Daughter also reports jessica non compliance.   Typically on NovoLog 70/30 20 units twice daily  Diabetic diet-change to regular diet recently  Continue basal/prandial insulin while inpatient, adjust PRN  Sliding scale coverage   Endocrinology  consultation appreciated    Opioid dependence with other opioid-induced disorder (HCC)  Assessment & Plan  PDMP reviewed, patient maintained on tramadol 200 mg daily as needed; history of chronic cervical/lumbar pain noted  Continue as needed tramadol and chronic gabapentin dosing    Chronic diarrhea  Assessment & " Plan  Longstanding history of chronic diarrhea, patient reporting acute diarrhea x1 month. Follows with GI. Has tried questran/imodium without relief  Stool enteric panel negative  C. difficile positive, see plan above   Giardia antigen is negative  stool elastase is low started on Creon per GI    Tobacco dependence  Assessment & Plan  Counseled on need for cessation, provide nicotine patch while admitted  Recent CT chest for lung cancer screening 7/30/2024 negative for nodules or masses    GERD without esophagitis  Assessment & Plan  Continue PPI    Severe episode of recurrent major depressive disorder, without psychotic features (HCC)  Assessment & Plan  Mood appearing fairly stable, continue home duloxetine, mirtazapine, as needed Valium  Hold minipress given symptomatic hypotension episodes, tachycardia                VTE Pharmacologic Prophylaxis: VTE Score: 3 Moderate Risk (Score 3-4) - Pharmacological DVT Prophylaxis Ordered: enoxaparin (Lovenox).    Mobility:   Basic Mobility Inpatient Raw Score: 20  JH-HLM Goal: 6: Walk 10 steps or more  JH-HLM Achieved: 7: Walk 25 feet or more  JH-HLM Goal achieved. Continue to encourage appropriate mobility.    Patient Centered Rounds: I performed bedside rounds with nursing staff today.   Discussions with Specialists or Other Care Team Provider: Infectious disease and gastroenterology    Education and Discussions with Family / Patient: Updated  (daughter) via phone.    Total Time Spent on Date of Encounter in care of patient: 35 mins. This time was spent on one or more of the following: performing physical exam; counseling and coordination of care; obtaining or reviewing history; documenting in the medical record; reviewing/ordering tests, medications or procedures; communicating with other healthcare professionals and discussing with patient's family/caregivers.    Current Length of Stay: 19 day(s)  Current Patient Status: Inpatient   Certification  Statement: The patient will continue to require additional inpatient hospital stay due to persistent   Discharge Plan: Anticipate discharge in 48-72 hrs to rehab facility.    Code Status: Level 1 - Full Code    Subjective:     Patient seen and examined.  Still with persistent diarrhea.  GI started on Creon.  Discussed with ID and GI  Plan for colonoscopy and FMT possibly on Friday  Objective:     Vitals:   Temp (24hrs), Av.9 °F (37.2 °C), Min:98.1 °F (36.7 °C), Max:100 °F (37.8 °C)    Temp:  [98.1 °F (36.7 °C)-100 °F (37.8 °C)] 98.1 °F (36.7 °C)  HR:  [106-113] 106  Resp:  [14-18] 14  BP: (112-136)/(82-96) 119/83  SpO2:  [98 %] 98 %  Body mass index is 16.68 kg/m².     Input and Output Summary (last 24 hours):     Intake/Output Summary (Last 24 hours) at 2024 1905  Last data filed at 2024 1839  Gross per 24 hour   Intake 820 ml   Output --   Net 820 ml       Physical Exam:   Physical Exam  Constitutional:       Comments: Cachetic appearing female    HENT:      Head: Normocephalic and atraumatic.      Nose: Nose normal.   Eyes:      General: No scleral icterus.  Cardiovascular:      Rate and Rhythm: Normal rate and regular rhythm.      Heart sounds: No murmur heard.  Pulmonary:      Effort: No respiratory distress.   Abdominal:      General: There is no distension.   Musculoskeletal:         General: Normal range of motion.   Skin:     General: Skin is warm.   Neurological:      Mental Status: She is alert. Mental status is at baseline.          Additional Data:     Labs:  Results from last 7 days   Lab Units 24  0603   WBC Thousand/uL 5.01   HEMOGLOBIN g/dL 10.7*   HEMATOCRIT % 32.7*   PLATELETS Thousands/uL 285     Results from last 7 days   Lab Units 24  0603   SODIUM mmol/L 139   POTASSIUM mmol/L 4.3   CHLORIDE mmol/L 105   CO2 mmol/L 26   BUN mg/dL 15   CREATININE mg/dL 0.49*   ANION GAP mmol/L 8   CALCIUM mg/dL 9.2   ALBUMIN g/dL 3.7   TOTAL BILIRUBIN mg/dL 0.28   ALK PHOS U/L 207*   ALT  U/L 97*   AST U/L 57*   GLUCOSE RANDOM mg/dL 285*         Results from last 7 days   Lab Units 09/04/24  1521 09/04/24  1044 09/04/24  0633 09/03/24  2045 09/03/24  1605 09/03/24  1114 09/03/24  0617 09/02/24  2036 09/02/24  1522 09/02/24  1048 09/02/24  0655 09/01/24  2132   POC GLUCOSE mg/dl 214* 183* 263* 237* 259* 283* 355* 348* 293* 260* 220* 236*               Lines/Drains:  Invasive Devices       Peripheral Intravenous Line  Duration             Peripheral IV 09/03/24 Right;Ventral (anterior) Forearm 1 day                          Imaging:  no new images to review    Recent Cultures (last 7 days):         Last 24 Hours Medication List:   Current Facility-Administered Medications   Medication Dose Route Frequency Provider Last Rate    acetaminophen  650 mg Oral Q6H PRN Abhay Hayes MD      albuterol  2 puff Inhalation Q4H PRN Renan Drake, DO      aspirin  81 mg Oral Daily Renan Drake, DO      atorvastatin  80 mg Oral Daily Renan Drake, DO      baclofen  5 mg Oral BID ELISEO Toribio      butalbital-acetaminophen-caffeine  1 tablet Oral Q6H PRN Renan Drake, DO      cholestyramine sugar free  4 g Oral BID Dalia Soriano, DO      diazepam  5 mg Oral Q12H PRN Renan Drake, DO      DULoxetine  60 mg Oral BID Renan Drake, DO      enoxaparin  40 mg Subcutaneous Q24H [facility] Abhay Hayes MD      fluticasone-vilanterol  1 puff Inhalation Daily Renan Drake DO      gabapentin  800 mg Oral TID Renan Drake DO      insulin glargine  30 Units Subcutaneous HS Holli Lopes, DO      insulin lispro  1-5 Units Subcutaneous TID AC Abhay Hayes MD      insulin lispro  1-5 Units Subcutaneous HS Abhay Hayes MD      insulin lispro  10 Units Subcutaneous TID With Meals Holli Lopes, DO      levothyroxine  112 mcg Oral Early Morning Renan Drake, DO      lidocaine  2 patch Topical Daily ELISEO Toribio      magnesium sulfate  2 g Intravenous Once MD letitia Robersonzapine   7.5 mg Oral HS Renan Drake, DO      DARLIN ANTIFUNGAL   Topical BID Chreise Grey, ELISEO      nicotine  1 patch Transdermal Daily Renan Draek, DO      ondansetron  4 mg Intravenous Q6H PRN Renan Drake, DO      pancrelipase (Lip-Prot-Amyl)  24,000 Units Oral TID With Meals Dalia Soriano, DO      pantoprazole  40 mg Oral Early Morning Renan Drake, DO      saccharomyces boulardii  250 mg Oral BID ELISEO Trevino      traMADol  50 mg Oral Q6H PRN Renan Drake, DO      traZODone  50 mg Oral HS PRN Estefany Villalobos PA-C      vancomycin oral (capsules or solution)  500 mg Oral Q6H [facility] Corinne Us MD          Today, Patient Was Seen By: Britney Arce MD    **Please Note: This note may have been constructed using a voice recognition system.**

## 2024-09-04 NOTE — PROGRESS NOTES
Progress Note - Infectious Disease   Barb Palomo 54 y.o. female MRN: [id number]  [facility] Encounter: [id number]      Impression/Plan:  1.  C. difficile colitis.  Patient presented with weakness, weight loss, acute on chronic diarrhea.  8/16 C. difficile PCR and toxin EIA positive.  Continues to have significant loose stools despite 7 days of fidaxomicin and now 7 days of p.o. vancomycin.  She remains nontoxic without fever or leukocytosis.  Abdominal exam benign.  Unclear if ongoing symptoms are related to C. difficile alone or an alternative etiology as below.  Given recent positive testing there is low utility to repeat C. difficile testing.  Still having significant diarrhea              -Continue high-dose oral vancomycin              -Continue Banatrol  -Careful use of Questran              -GI follow-up ongoing, consideration for fecal transplant     2.  Acute on chronic diarrhea.  The patient has had diarrhea and weight loss for the past year but reports diarrhea is usually 2-4 times daily.  She continues to have up to 10 stools daily since admission with minimal improvement on C. difficile treatment.  Fecal white blood cells negative.  Additional infectious testing including stool O&P, Giardia antigen, enteric panel negative.  Unclear if ongoing symptoms are solely due to C. difficile.  The patient has a diagnosis of chronic pancreatitis to be contributing.  Poorly controlled diabetes may also lead to diarrhea.  She may also be having postinfectious/functional diarrhea.  Continues to have substantial diarrhea.  Repeat CT of the abdomen pelvis with colonic thickening.  Pancreatic insufficiency may also be playing a role in the chronic process.              -Treatment for C. difficile as above              -Close GI follow-up  -May need to add pancreatic enzymes.  -Consideration for endoscopy to look for ongoing evidence of pseudomembranes or other inflammatory changes     3.  Failure to  thrive, severe protein calorie malnutrition.  BMI 16.  The patient has had diarrhea and weight loss for the past year.  Status post EGD and colonoscopy May, colon biopsies without pathologic abnormalities.     4.  Poorly controlled type 2 diabetes with hyperglycemia.  Hemoglobin A1c 16.1%.  Patient was not using insulin at home.  Glycemic management per primary team     5.  Bacteriuria.  Patient received 3 doses of IV ceftriaxone.  CT imaging showed a distended bladder suggestive of retention which is likely contributing to her symptoms.  Per report pain was happening after urination likely due to skin irritation.  In the setting of C. difficile as above, avoid unnecessary antibiotics.    Discussed with the primary service the plan to continue the high-dose oral vancomycin and the Questran and they agree with the plan.    Antibiotics:  High-dose oral vancomycin 3    Subjective:  Patient has no fever, chills, sweats; no nausea, vomiting, but still having diarrhea; no cough, shortness of breath; no increased abdominal pain. No new symptoms.    Objective:  Vitals:  Temp:  [98.1 °F (36.7 °C)-99.3 °F (37.4 °C)] 99.3 °F (37.4 °C)  HR:  [110-115] 113  Resp:  [14-18] 14  BP: (112-136)/(81-96) 112/82  SpO2:  [98 %] 98 %  Temp (24hrs), Av.5 °F (36.9 °C), Min:98.1 °F (36.7 °C), Max:99.3 °F (37.4 °C)  Current: Temperature: 99.3 °F (37.4 °C)    Physical Exam:   General Appearance:  Alert, interactive, nontoxic, no acute distress.   Throat: Oropharynx moist without lesions.    Lungs:   Clear to auscultation bilaterally; no wheezes, rhonchi or rales; respirations unlabored   Heart:  Tachycardic; no murmur, rub or gallop   Abdomen:   Soft, non-tender, non-distended, positive bowel sounds.     Extremities: No clubbing, cyanosis or edema   Skin: No new rashes or lesions. No draining wounds noted.       Labs, Imaging, & Other studies:   All pertinent labs and imaging studies were personally reviewed  Results from last 7 days   Lab  Units 09/04/24  0603 09/03/24  0637 09/02/24  0706   WBC Thousand/uL 5.01 6.16 5.61   HEMOGLOBIN g/dL 10.7* 10.6* 11.4*   PLATELETS Thousands/uL 285 283 293     Results from last 7 days   Lab Units 09/04/24  0603 09/03/24  0637 09/02/24  0706 09/01/24  0525   SODIUM mmol/L 139 136 139 138   POTASSIUM mmol/L 4.3 4.3 3.8 4.0   CHLORIDE mmol/L 105 100 103 101   CO2 mmol/L 26 29 29 30   BUN mg/dL 15 15 16 13   CREATININE mg/dL 0.49* 0.61 0.42* 0.54*   EGFR ml/min/1.73sq m 111 103 117 108   CALCIUM mg/dL 9.2 9.2 9.2 9.3   AST U/L 57*  --   --  42*   ALT U/L 97*  --   --  59*   ALK PHOS U/L 207*  --   --  197*

## 2024-09-04 NOTE — ASSESSMENT & PLAN NOTE
Patient presented with generalized weakness, frequent falls.  She has had ongoing diarrhea, dysuria, poor oral intake and reported 70 pound weight loss over the last year.  Initially hypotensive in the ER, status post IV fluids. Labs with multiple metabolic derangements  CT imaging showing mild diffuse colonic wall thickening, mild diffuse wall thickening of stomach  C. difficile found to be positive--has a history of C. difficile in September 2022.    Patient started on Dificid 200 mg x 10 days per protocol on 8/18 as this is recurrent infection  Initially seemed BMs were improving and were becoming more formed, however again having frequent watery stools  GI consulted  Contact precautions  Appetite is good   No improvement with avoiding lactose and artificial sweeteners  Continue banatrol  Still having multiple loose stools overnight and this morning  Continue vancomycin, increased to 500mg-ID on board  Checking CT abdomen and pelvis: -CT is concerning for proctocolitis  GI to evaluate for pancreatic insufficiency as a contributing factor to her diarrhea - stool pancreatic elastase is low and started on Creon  Possible FMT this week if the symptoms does not improve-tentative plan for Friday

## 2024-09-04 NOTE — PROGRESS NOTES
Progress Note- Barb Palomo 54 y.o. female MRN: [id number]    Unit/Bed#: [facility] 522-01 Encounter: [id number]      Assessment and Plan:    Ms. 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 GI consulted for acute on chronic diarrhea.     Acute on Chronic Diarrhea  Clostridium difficile Infection  Chronic Pancreatitis   Failure to Thrive   Weight Loss      # Clostridium difficile Infection: Patient initially presented with complaints on worsening weakness and frequent falls on 8/20.  Patient also with 70-80 lbs weight loss and chronic, watery diarrhea. Admits to difficulty following the death of her  and also been dealing with “insurance issues” leading to difficulty with her insulin. Labs on admission revealed severe hyperglycemia with electrolyte abnormalities. CT AP revealed mild diffuse wall thickening vs under distension of the colon as well as the stomach. In the past, patient has followed with GI for her unintentional weight loss and diarrhea. She has EGD/colonoscopy performed 5/2024 which was unrevealing although random colon biopsies were negative and patient had a very poor prep limiting sufficient examination. This admission, patient had PCR and EIA testing completed for C.Diff and both were found to be positive on 8/16 indicative of active infection. She had been on antibiotics last month for a dental procedure and had been started on for Fidaxomycin and completed a 7-day course but experienced no improvement in her diarrhea.  Therefore, GI consulted and patient has now been transitioned to oral vancomycin.  Unfortunately, patient with ongoing frequent stools.  Over the weekend, ID consulted in vancomycin dose increased.  Given lack of improvement in stool frequency despite treatment of both Fidaxomycin  as well as vancomycin, will plan for FMT this admission, tentatively Friday     Plan:  Continue on PO Vancomycin 500 mg q6 hours; ID consulted and appreciate recommendation  Avoid anti-diarrheals; continue to restrict lactulose and continue Banatrol if needed with meals  Continue on Questran and begin on Creon TID with meals  Will plan for colonoscopy at time of FMT, tentatively Friday - clear liquid diet tomorrow in preparation  Encourage PO hydration; monitor and replete electrolytes as needed  Maintain contact precautions with strict handwashing  Hold PPI therapy; resume when symptoms resolved  Avoid use of probiotics for the prevention of recurrence   In the future, will require Cdiff prophylaxis with all antibiotic therapy   Additional symptom management per primary team      # Chronic Pancreatitis: Patient admits to prior history of pancreatitis in the past which was previously attributed to gallstones resulting in cholecystectomy.  Patient also lifelong smoker and has CT imaging demonstrating evidence of chronic pancreatitis.  CT on admission demonstrating diffuse parenchymal atrophy and prominence of the main duct measuring up to 4 mL at the level of the pancreatic head.  Patient with multiple parenchymal calcifications in the pancreatic head.  Pancreatic elastase levels found to be extremely low.  Possible dilutional component due to diarrhea.  However, we will begin on Creon with meals and monitor for improvement.      # Weight Loss: Patient with significant weight loss prior to admission.  Admits to losing 70 to 80 pounds unintentionally over the last couple of months.  Likely result of uncontrolled diabetes as well as C. difficile infection.  Did have prior EGD/colonoscopy completed 5/2024.  Would recommend repeat be considered on outpatient basis.    ______________________________________________________________________    Subjective:     Patient seen and evaluated bedside.  Lying in bed comfortably in  no acute distress or visible discomfort.  Admits to ongoing frequent stools.  Stools have continued throughout the night.  Episodes of incontinence.  Denies any significant abdominal pain.  No fever/chills.  No nausea/vomiting.  Tolerating diet.    Medication Administration - last 24 hours from 09/03/2024 1733 to 09/04/2024 1733         Date/Time Order Dose Route Action Action by     09/04/2024 0943 EDT ondansetron (ZOFRAN) injection 4 mg 4 mg Intravenous Given Rei Suarez RN     09/03/2024 1951 EDT ondansetron (ZOFRAN) injection 4 mg 4 mg Intravenous Given Hong Keating     09/04/2024 0924 EDT aspirin chewable tablet 81 mg 81 mg Oral Given Rei Suarez RN     09/04/2024 0925 EDT atorvastatin (LIPITOR) tablet 80 mg 80 mg Oral Given Rei Suarez RN     09/04/2024 0925 EDT butalbital-acetaminophen-caffeine (FIORICET,ESGIC) -40 mg per tablet 1 tablet 1 tablet Oral Given Rei Suarez RN     09/03/2024 1951 EDT butalbital-acetaminophen-caffeine (FIORICET,ESGIC) -40 mg per tablet 1 tablet 1 tablet Oral Given Hong Keating     09/04/2024 1049 EDT diazepam (VALIUM) tablet 5 mg 5 mg Oral Given Lorie Moulton     09/04/2024 0925 EDT DULoxetine (CYMBALTA) delayed release capsule 60 mg 60 mg Oral Given Rei Suarez RN     09/03/2024 1739 EDT DULoxetine (CYMBALTA) delayed release capsule 60 mg 60 mg Oral Given Flaca Smith RN     09/04/2024 0744 EDT fluticasone-vilanterol 200-25 mcg/actuation 1 puff 1 puff Inhalation Given Lorie Moulton     09/04/2024 0550 EDT levothyroxine tablet 112 mcg 112 mcg Oral Given Rei Suarez RN     09/03/2024 2159 EDT mirtazapine (REMERON) tablet 7.5 mg 7.5 mg Oral Given Hnog Keating     09/04/2024 0550 EDT pantoprazole (PROTONIX) EC tablet 40 mg 40 mg Oral Given Rei Suarez RN     09/04/2024 0925 EDT nicotine (NICODERM CQ) 21 mg/24 hr TD 24 hr patch 1 patch 1 patch Transdermal Medication Applied Rei Suarez RN     09/04/2024 0903 EDT nicotine  (NICODERM CQ) 21 mg/24 hr TD 24 hr patch 1 patch 1 patch Transdermal Patch Removed Rei M Erick, CAITLYN     09/04/2024 1454 EDT traMADol (ULTRAM) tablet 50 mg 50 mg Oral Given Lorie Moulton     09/04/2024 0749 EDT traMADol (ULTRAM) tablet 50 mg 50 mg Oral Given Lorie Moulton     09/04/2024 0924 EDT gabapentin (NEURONTIN) capsule 800 mg 800 mg Oral Given Rei M Erick, CAITLYN     09/03/2024 2159 EDT gabapentin (NEURONTIN) capsule 800 mg 800 mg Oral Given Hong Keating     09/03/2024 1740 EDT gabapentin (NEURONTIN) capsule 800 mg 800 mg Oral Given Flaca Smith RN     09/04/2024 0924 EDT baclofen tablet 5 mg 5 mg Oral Given Rei M Ercik, CAITLYN     09/03/2024 1739 EDT baclofen tablet 5 mg 5 mg Oral Given Flaca Smith RN     09/04/2024 0925 EDT lidocaine (LIDODERM) 5 % patch 2 patch 2 patch Topical Medication Applied Rei M Erick, CAITLYN     09/03/2024 1958 EDT lidocaine (LIDODERM) 5 % patch 2 patch 2 patch Topical Patch Removed Hong Keating     09/04/2024 0929 EDT moisture barrier miconazole 2% cream (aka DARLIN MOISTURE BARRIER ANTIFUNGAL CREAM) 1 Application Topical Given Rei M Erick, CAITLYN     09/03/2024 1741 EDT moisture barrier miconazole 2% cream (aka DARLIN MOISTURE BARRIER ANTIFUNGAL CREAM) -- Topical Given Flaca Smith RN     09/04/2024 0925 EDT saccharomyces boulardii (FLORASTOR) capsule 250 mg 250 mg Oral Given Rei ANASTACIO CATILYN Suarez     09/03/2024 1740 EDT saccharomyces boulardii (FLORASTOR) capsule 250 mg 250 mg Oral Given Flaca Smith RN     09/03/2024 2304 EDT traZODone (DESYREL) tablet 50 mg 50 mg Oral Given Hong Keating     09/04/2024 1121 EDT insulin lispro (HumALOG/ADMELOG) 100 units/mL subcutaneous injection 1-5 Units 1 Units Subcutaneous Given Lorie Moulton     09/04/2024 0744 EDT insulin lispro (HumALOG/ADMELOG) 100 units/mL subcutaneous injection 1-5 Units 2 Units Subcutaneous Given Lorie Moulton     09/03/2024 1737 EDT insulin lispro (HumALOG/ADMELOG) 100 units/mL subcutaneous injection 1-5  "Units 2 Units Subcutaneous Given Flaca Smith RN     09/03/2024 2200 EDT insulin lispro (HumALOG/ADMELOG) 100 units/mL subcutaneous injection 1-5 Units 2 Units Subcutaneous Given Hong Keating     09/04/2024 1120 EDT acetaminophen (TYLENOL) tablet 650 mg 650 mg Oral Given Lorie Moulton     09/04/2024 0924 EDT enoxaparin (LOVENOX) subcutaneous injection 40 mg 40 mg Subcutaneous Given Rei Suarez RN     09/04/2024 1120 EDT vancomycin (VANCOCIN) capsule 500 mg 500 mg Oral Given Lorie Moulton     09/04/2024 0552 EDT vancomycin (VANCOCIN) capsule 500 mg 500 mg Oral Given Rei Suarez RN     09/03/2024 2304 EDT vancomycin (VANCOCIN) capsule 500 mg 500 mg Oral Given Hong Keating     09/03/2024 1740 EDT vancomycin (VANCOCIN) capsule 500 mg 500 mg Oral Given Flaca Smith RN     09/03/2024 2158 EDT insulin glargine (LANTUS) subcutaneous injection 30 Units 0.3 mL 30 Units Subcutaneous Given Hong Keating     09/04/2024 1121 EDT insulin lispro (HumALOG/ADMELOG) 100 units/mL subcutaneous injection 10 Units 10 Units Subcutaneous Given Lorie Moulton     09/04/2024 0744 EDT insulin lispro (HumALOG/ADMELOG) 100 units/mL subcutaneous injection 10 Units 10 Units Subcutaneous Given Lorie Moulton     09/03/2024 1737 EDT insulin lispro (HumALOG/ADMELOG) 100 units/mL subcutaneous injection 10 Units 10 Units Subcutaneous Given Flaca Smith RN     09/04/2024 0926 EDT cholestyramine sugar free (QUESTRAN LIGHT) packet 4 g 4 g Oral Given Rei Suarez RN     09/03/2024 1819 EDT cholestyramine sugar free (QUESTRAN LIGHT) packet 4 g 4 g Oral Given Flaca Smith RN            Objective:     Vitals: Blood pressure 119/83, pulse (!) 106, temperature 98.1 °F (36.7 °C), resp. rate 14, height 5' 2\" (1.575 m), weight 41.4 kg (91 lb 3.2 oz), last menstrual period 03/04/2016, SpO2 98%, not currently breastfeeding.,Body mass index is 16.68 kg/m².      Intake/Output Summary (Last 24 hours) at 9/4/2024 3119  Last data filed at " 9/4/2024 1300  Gross per 24 hour   Intake 580 ml   Output --   Net 580 ml       Physical Exam  Constitutional:       General: She is not in acute distress.     Appearance: She is not ill-appearing or toxic-appearing.      Comments: Frail and cachetic appearing   HENT:      Mouth/Throat:      Mouth: Mucous membranes are moist.      Pharynx: Oropharynx is clear. No oropharyngeal exudate.   Eyes:      General: No scleral icterus.  Cardiovascular:      Rate and Rhythm: Normal rate.      Pulses: Normal pulses.   Pulmonary:      Effort: Pulmonary effort is normal.   Abdominal:      General: Abdomen is flat. Bowel sounds are normal. There is no distension.      Tenderness: There is no abdominal tenderness.      Hernia: No hernia is present.   Musculoskeletal:         General: Normal range of motion.      Cervical back: Normal range of motion.   Skin:     General: Skin is warm.      Coloration: Skin is not jaundiced or pale.   Neurological:      Mental Status: She is alert and oriented to person, place, and time.   Psychiatric:         Mood and Affect: Mood normal.         Behavior: Behavior normal.           Invasive Devices       Peripheral Intravenous Line  Duration             Peripheral IV 09/03/24 Right;Ventral (anterior) Forearm 1 day                    Lab Results:  No results displayed because visit has over 200 results.          Imaging Studies: I have personally reviewed pertinent imaging studies.

## 2024-09-04 NOTE — ASSESSMENT & PLAN NOTE
Longstanding history of chronic diarrhea, patient reporting acute diarrhea x1 month. Follows with GI. Has tried questran/imodium without relief  Stool enteric panel negative  C. difficile positive, see plan above   Giardia antigen is negative  stool elastase is low started on Creon per GI

## 2024-09-04 NOTE — PLAN OF CARE
Problem: Nutrition/Hydration-ADULT  Goal: Nutrient/Hydration intake appropriate for improving, restoring or maintaining nutritional needs  Description: Monitor and assess patient's nutrition/hydration status for malnutrition. Collaborate with interdisciplinary team and initiate plan and interventions as ordered.  Monitor patient's weight and dietary intake as ordered or per policy. Utilize nutrition screening tool and intervene as necessary. Determine patient's food preferences and provide high-protein, high-caloric foods as appropriate.     INTERVENTIONS:  - Monitor oral intake, urinary output, labs, and treatment plans  - Assess nutrition and hydration status and recommend course of action  - Evaluate amount of meals eaten  - Assist patient with eating if necessary   - Allow adequate time for meals  - Recommend/ encourage appropriate diets, oral nutritional supplements, and vitamin/mineral supplements  - Order, calculate, and assess calorie counts as needed  - Recommend, monitor, and adjust tube feedings and TPN/PPN based on assessed needs  - Assess need for intravenous fluids  - Provide specific nutrition/hydration education as appropriate  - Include patient/family/caregiver in decisions related to nutrition  Outcome: Progressing     Problem: PAIN - ADULT  Goal: Verbalizes/displays adequate comfort level or baseline comfort level  Description: Interventions:  - Encourage patient to monitor pain and request assistance  - Assess pain using appropriate pain scale  - Administer analgesics based on type and severity of pain and evaluate response  - Implement non-pharmacological measures as appropriate and evaluate response  - Consider cultural and social influences on pain and pain management  - Notify physician/advanced practitioner if interventions unsuccessful or patient reports new pain  Outcome: Progressing     Problem: INFECTION - ADULT  Goal: Absence or prevention of progression during  hospitalization  Description: INTERVENTIONS:  - Assess and monitor for signs and symptoms of infection  - Monitor lab/diagnostic results  - Monitor all insertion sites, i.e. indwelling lines, tubes, and drains  - Monitor endotracheal if appropriate and nasal secretions for changes in amount and color  - [place] appropriate cooling/warming therapies per order  - Administer medications as ordered  - Instruct and encourage patient and family to use good hand hygiene technique  - Identify and instruct in appropriate isolation precautions for identified infection/condition  Outcome: Progressing  Goal: Absence of fever/infection during neutropenic period  Description: INTERVENTIONS:  - Monitor WBC    Outcome: Progressing     Problem: SAFETY ADULT  Goal: Patient will remain free of falls  Description: INTERVENTIONS:  - Educate patient/family on patient safety including physical limitations  - Instruct patient to call for assistance with activity   - Consult OT/PT to assist with strengthening/mobility   - Keep Call bell within reach  - Keep bed low and locked with side rails adjusted as appropriate  - Keep care items and personal belongings within reach  - Initiate and maintain comfort rounds  - Make Fall Risk Sign visible to staff  - Offer Toileting every 2 Hours, in advance of need  - Initiate/Maintain alarm  - Obtain necessary fall risk management equipment  - Apply yellow socks and bracelet for high fall risk patients  - Consider moving patient to room near nurses station  Outcome: Progressing  Goal: Maintain or return to baseline ADL function  Description: INTERVENTIONS:  -  Assess patient's ability to carry out ADLs; assess patient's baseline for ADL function and identify physical deficits which impact ability to perform ADLs (bathing, care of mouth/teeth, toileting, grooming, dressing, etc.)  - Assess/evaluate cause of self-care deficits   - Assess range of motion  - Assess patient's mobility; develop plan if  impaired  - Assess patient's need for assistive devices and provide as appropriate  - Encourage maximum independence but intervene and supervise when necessary  - Involve family in performance of ADLs  - Assess for home care needs following discharge   - Consider OT consult to assist with ADL evaluation and planning for discharge  - Provide patient education as appropriate  Outcome: Progressing  Goal: Maintains/Returns to pre admission functional level  Description: INTERVENTIONS:  - Perform AM-PAC 6 Click Basic Mobility/ Daily Activity assessment daily.  - Set and communicate daily mobility goal to care team and patient/family/caregiver.   - Collaborate with rehabilitation services on mobility goals if consulted  - Perform Range of Motion 3 times a day.  - Reposition patient every 2 hours.  - Dangle patient 3 times a day  - Stand patient 3 times a day  - Ambulate patient 3 times a day  - Out of bed to chair 3 times a day   - Out of bed for meals 3 times a day  - Out of bed for toileting  - Record patient progress and toleration of activity level   Outcome: Progressing     Problem: DISCHARGE PLANNING  Goal: Discharge to home or other facility with appropriate resources  Description: INTERVENTIONS:  - Identify barriers to discharge w/patient and caregiver  - Arrange for needed discharge resources and transportation as appropriate  - Identify discharge learning needs (meds, wound care, etc.)  - Arrange for interpretive services to assist at discharge as needed  - Refer to Case Management Department for coordinating discharge planning if the patient needs post-hospital services based on physician/advanced practitioner order or complex needs related to functional status, cognitive ability, or social support system  Outcome: Progressing     Problem: Knowledge Deficit  Goal: Patient/family/caregiver demonstrates understanding of disease process, treatment plan, medications, and discharge instructions  Description:  Complete learning assessment and assess knowledge base.  Interventions:  - Provide teaching at level of understanding  - Provide teaching via preferred learning methods  Outcome: Progressing     Problem: NEUROSENSORY - ADULT  Goal: Achieves stable or improved neurological status  Description: INTERVENTIONS  - Monitor and report changes in neurological status  - Monitor vital signs such as temperature, blood pressure, glucose, and any other labs ordered   - Initiate measures to prevent increased intracranial pressure  - Monitor for seizure activity and implement precautions if appropriate      Outcome: Progressing  Goal: Remains free of injury related to seizures activity  Description: INTERVENTIONS  - Maintain airway, patient safety  and administer oxygen as ordered  - Monitor patient for seizure activity, document and report duration and description of seizure to physician/advanced practitioner  - If seizure occurs,  ensure patient safety during seizure  - Reorient patient post seizure  - Seizure pads on all 4 side rails  - Instruct patient/family to notify RN of any seizure activity including if an aura is experienced  - Instruct patient/family to call for assistance with activity based on nursing assessment  - Administer anti-seizure medications if ordered    Outcome: Progressing  Goal: Achieves maximal functionality and self care  Description: INTERVENTIONS  - Monitor swallowing and airway patency with patient fatigue and changes in neurological status  - Encourage and assist patient to increase activity and self care.   - Encourage visually impaired, hearing impaired and aphasic patients to use assistive/communication devices  Outcome: Progressing     Problem: METABOLIC, FLUID AND ELECTROLYTES - ADULT  Goal: Electrolytes maintained within normal limits  Description: INTERVENTIONS:  - Monitor labs and assess patient for signs and symptoms of electrolyte imbalances  - Administer electrolyte replacement as  ordered  - Monitor response to electrolyte replacements, including repeat lab results as appropriate  - Instruct patient on fluid and nutrition as appropriate  Outcome: Progressing  Goal: Fluid balance maintained  Description: INTERVENTIONS:  - Monitor labs   - Monitor I/O and WT  - Instruct patient on fluid and nutrition as appropriate  - Assess for signs & symptoms of volume excess or deficit  Outcome: Progressing  Goal: Glucose maintained within target range  Description: INTERVENTIONS:  - Monitor Blood Glucose as ordered  - Assess for signs and symptoms of hyperglycemia and hypoglycemia  - Administer ordered medications to maintain glucose within target range  - Assess nutritional intake and initiate nutrition service referral as needed  Outcome: Progressing     Problem: SKIN/TISSUE INTEGRITY - ADULT  Goal: Skin Integrity remains intact(Skin Breakdown Prevention)  Description: Assess:  -Perform William assessment every shift  -Clean and moisturize skin every day  -Inspect skin when repositioning, toileting, and assisting with ADLS  -Assess under medical devices such as lines every 2 hours  -Assess extremities for adequate circulation and sensation     Bed Management:  -Have minimal linens on bed & keep smooth, unwrinkled  -Change linens as needed when moist or perspiring  -Avoid sitting or lying in one position for more than 2 hours while in bed  -Keep HOB at 30 degrees     Toileting:  -Offer bedside commode  -Assess for incontinence every hour  -Use incontinent care products after each incontinent episode such as moisture barriers, foam cleansers    Activity:  -Mobilize patient 3 times a day  -Encourage activity and walks on unit  -Encourage or provide ROM exercises   -Turn and reposition patient every 2 Hours  -Use appropriate equipment to lift or move patient in bed  -Instruct/ Assist with weight shifting every 15 min when out of bed in chair  -Consider limitation of chair time 2 hour intervals    Skin  Care:  -Avoid use of baby powder, tape, friction and shearing, hot water or constrictive clothing  -Relieve pressure over bony prominences using offloading techniques or foam dressings (If not contraindicated by incontinence)  -Do not massage red bony areas    Next Steps:  -Teach patient strategies to minimize risks    -Consider consults to  interdisciplinary teams  Outcome: Progressing  Goal: Incision(s), wounds(s) or drain site(s) healing without S/S of infection  Description: INTERVENTIONS  - Assess and document dressing, incision, wound bed, drain sites and surrounding tissue  - Provide patient and family education  - Perform skin care/dressing changes  Outcome: Progressing  Goal: Pressure injury heals and does not worsen  Description: Interventions:  - Implement low air loss mattress or specialty surface (Criteria met)  - Apply silicone foam dressing  - Instruct/assist with weight shifting every 15 minutes when in chair   - Limit chair time to 2 hour intervals  - Use special pressure reducing interventions when in chair   - Apply fecal or urinary incontinence containment device   - Perform passive or active ROM  - Turn and reposition patient & offload bony prominences every 2 hours   - Utilize friction reducing device or surface for transfers   - Consider consults to  interdisciplinary teams  - Use incontinent care products after each incontinent episode  - Consider nutrition services referral as needed  Outcome: Progressing     Problem: MUSCULOSKELETAL - ADULT  Goal: Maintain or return mobility to safest level of function  Description: INTERVENTIONS:  - Assess patient's ability to carry out ADLs; assess patient's baseline for ADL function and identify physical deficits which impact ability to perform ADLs (bathing, care of mouth/teeth, toileting, grooming, dressing, etc.)  - Assess/evaluate cause of self-care deficits   - Assess range of motion  - Assess patient's mobility  - Assess patient's need for  assistive devices and provide as appropriate  - Encourage maximum independence but intervene and supervise when necessary  - Involve family in performance of ADLs  - Assess for home care needs following discharge   - Consider OT consult to assist with ADL evaluation and planning for discharge  - Provide patient education as appropriate  Outcome: Progressing  Goal: Maintain proper alignment of affected body part  Description: INTERVENTIONS:  - Support, maintain and protect limb and body alignment  - Provide patient/ family with appropriate education  Outcome: Progressing     Problem: Prexisting or High Potential for Compromised Skin Integrity  Goal: Skin integrity is maintained or improved  Description: INTERVENTIONS:  - Identify patients at risk for skin breakdown  - Assess and monitor skin integrity  - Assess and monitor nutrition and hydration status  - Monitor labs   - Assess for incontinence   - Turn and reposition patient  - Assist with mobility/ambulation  - Relieve pressure over bony prominences  - Avoid friction and shearing  - Provide appropriate hygiene as needed including keeping skin clean and dry  - Evaluate need for skin moisturizer/barrier cream  - Collaborate with interdisciplinary team   - Patient/family teaching  - Consider wound care consult   Outcome: Progressing

## 2024-09-04 NOTE — ASSESSMENT & PLAN NOTE
"Lab Results   Component Value Date    HGBA1C 16.1 (H) 08/16/2024       Recent Labs     09/03/24  2045 09/04/24  0633 09/04/24  1044 09/04/24  1521   POCGLU 237* 263* 183* 214*       Patient markedly hyperglycemic on admission only partially responsive to IV fluids, remainder of labs fortunately not consistent with DKA. A1C 16  Patient admitting she has not been using her home insulin as she apparently has been unable to fill prescription due to \"insurance issues,\" additionally reports that due to her neuropathy she has difficulty self injecting.  Daughter also reports jessica non compliance.   Typically on NovoLog 70/30 20 units twice daily  Diabetic diet-change to regular diet recently  Continue basal/prandial insulin while inpatient, adjust PRN  Sliding scale coverage   Endocrinology  consultation appreciated  "

## 2024-09-04 NOTE — ASSESSMENT & PLAN NOTE
"Patient presented with increased generalized weakness and frequent falls, ongoing diarrhea, dysuria, poor oral intake, and at least 70-80 pound weight loss over the past 1 year. Has been following with GI, had colonoscopy May 2024 with poor prep--had EGD with esophagitis and gastritis  Labs with multiple derangements including hyperglycemia, hypokalemia and hypomagnesemia on admission, improved.   C. difficile positive as above  Patient reports she has not been using insulin due to \"insurance issue\" and inability to self inject due to her neuropathy  CT abdomen and pelvis without any obvious malignancy  Treatment as per respective issues  PT/OT recommending rehab.   Will consult endocrine for further management-patient reported that she is always hungry at home and eats fairly good amount of food-consult appreciated  "

## 2024-09-04 NOTE — ASSESSMENT & PLAN NOTE
Severe protein-calorie malnutrition 2/2 chronic diarrhea a/e/b fat and muscle loss requiring Nutrition consult, oral diet and nutrition supplements    360 Statement: severe malnutrition r/t suspect combination of chronic illness and social/environmental factors as evidenced by severe temporal muscle loss, severe tricep fat pad loss, at least moderate muscle loss around clavicles and shoulders, at least moderate orbital fat pad loss. Treatment: oral diet and oral nutrition supplements.    BMI Findings:  Adult BMI Classifications: Underweight < 18.5        Body mass index is 16.68 kg/m².   C/w supplements  Nutrition consult appreciated  Encourage po intake

## 2024-09-04 NOTE — CONSULTS
=Endocrinology Consultation  Barb Palomo 54 y.o. female MRN: [id number]  Unit/Bed#: [facility] 522-01 Encounter: [id number]      Assessment & Plan     Assessment:  This is a 54 y.o.-year-old female with significantly uncontrolled type 2 diabetes with hyperglycemia.  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  Patient has never been able to obtain antibody testing or lipodystrophy testing due to cost.  Admitted with weakness. Falls, failure to thrive, being treated for C. difficile.    Additionally, patient did have a random 6 AM cortisol of 5.4, which is not diagnostic given timing but warrants further consideration for adrenal insufficiency.  There is some documentation of patient having a low morning cortisol back in 2016 for which she was briefly treated with hydrocortisone.    Plan:  --Continue Lantus 30 units nightly, lispro 10 units with meals, Algorithm 2/2 correction  --Patient reports excellent appetite and that she is disappointed she can no longer order as much food as on prior admission.  She is not being restricted in diet due to her sarcopenia and BMI 16  --Will obtain 8 AM cortisol.  Patient may ultimately require cosyntropin stimulation test  --Hypoglycemia protocol  --Will continue to follow and make adjustments as appropriate  --, A lot of patient's glycemic control does come down to her outpatient noncompliance due to inability to draw up from vials due to her neuropathy, and lack of Medicare coverage along with a limited income, limited mobility,, limited transport, and underlying agoraphobia.  Patient may be a candidate    CC: Diabetes Consult    History of Present Illness     HPI: Barb Palomo is a 54 y.o. year old female with type 2 diabetes for 20+ years, thinks diagnosed around 2021 on Mixed insulin therapy, with complication of neuropathy, retinopathy, CVA. Recent A1c 16.1% 8/16/2024. Also with history of DVT/PE, anemia, anxiety, arthritis, depression, GERD,  "fibromyalgia, HTN, HLD, hypothyroidism, chronic pancreatitis, PTSD, TIA, CVA, vitamin deficiency, chronic opioid dependence.    Patient originally presented to ED 8/16 with generalized weakness, falls, diarrhea, reported 80 pounds of weight loss.  Is undergoing treatment for C. difficile colitis, acute on chronic diarrhea, failure to thrive,  BMI 16, bacteriuria    Endocrinology consulted for assistance with glycemic management.  Patient had stopped using her insulin outpatient due to \"insurance issues\" and inability to self inject due to neuropathy.  Patient was placed on basal bolus insulin regimen which has been adjusted by primary.  Yesterday discussed with primary, recommended increasing Lantus to 30, lispro 10 units with meals.    Patient follows with [facility]. [facility]'s endocrinology/ADAM Ruff.  Last seen in office 6/3/2024.  Insulin regimen at the time was metformin 500 mg twice daily, Novolin 70/30 20 units before breakfast and dinner.  Neuropathy, retinopathy, CVA.  There was concern for noncompliance with medications and rare glucose checks.  Patient's A1c has been above 12% since at least 2018, frequently undetectably high.  Patient reports she  Insulin after that appointment because her insurance did not want to cover the 70/30 pens because she does not have any medication coverage, and she can no longer drop vials due to her neuropathy.    Per endocrine office weights, patient weighed 109 in 7/2020, 133 in 9/2021, 117 in 9/2022, 90 in 6/2024.  Current weight 91 pounds.  Patient does report that she was diagnosed when she weighed almost 200 pounds and shows pictures on her phone of her appearing to be at that weight however this does look like at least a decade ago.    Appears to have previously been on Actos, discontinued due to lower extremity swelling.  Previously on acarbose for?Partial lipodystrophy, patient unable to afford the genetic testing.    She was previously ordered type 1 diabetes with " antibody testing, does not appear to have obtained the labs.  Based off prior records, has had significantly high insulin requirements for her size, up to several 100 units a day.    There does appear to have been some past history of low cortisol treated with hydrocortisone?  2016 morning cortisol 5.6.  This hospitalization, 6 AM cortisol was 5.4 on 8/16/2024.    Inpatient consult to Endocrinology  Consult performed by: Holli Lopes DO  Consult ordered by: Britney Arce MD          Review of Systems   Constitutional:  Positive for unexpected weight change. Negative for activity change and appetite change.   Gastrointestinal:  Positive for diarrhea.   Neurological:         +neuropathy    Psychiatric/Behavioral:          Reports agoraphobia, anxiety        Historical Information   Past Medical History:   Diagnosis Date    Acute venous embolism and thrombosis of deep vessels of distal lower extremity (HCC)     Anemia     Anxiety     last assessed 11/20/17    Arthritis     Asthma     Cerebral infarction (HCC)     unspecified, last assessed 11/14/16    Chest pain     last assessed 5/9/17    Chronic cough     last assessed 12/12/13    Depression     Diabetes mellitus, type 2 (HCC)     DJD (degenerative joint disease)     Esophageal reflux     Fibromyalgia     GERD without esophagitis     resolved 5/13/16    History of pulmonary embolism     Hyperlipidemia     Hypertension     Hypothyroidism     Iron deficiency     Pancreatitis     Panic attack     Panic disorder     Polyarthritis     PTSD (post-traumatic stress disorder)     Sleep difficulties     Stroke syndrome     Thyroid disease     TIA (transient ischemic attack)     TIA (transient ischemic attack)     Venous embolism and thrombosis of deep vessels of distal lower extremity (HCC)     Vitamin B12 deficiency     Vitamin D deficiency      Past Surgical History:   Procedure Laterality Date    CARPAL TUNNEL RELEASE Right     neuroplasty decompression of median  nerve    CATARACT EXTRACTION      CHOLECYSTECTOMY      ERCP      ERCP      ESOPHAGOGASTRIC FUNDOPLASTY      NISSEN FUNDOPLICATION      PATELLA SURGERY Left 12/2021    MO ESOPHAGOGASTRODUODENOSCOPY TRANSORAL DIAGNOSTIC N/A 11/02/2016    Procedure: EGD AND COLONOSCOPY;  Surgeon: Jatin Shepherd MD;  Location: [facility];  Service: Gastroenterology    MO NDSC WRST SURG W/RLS TRANSVRS CARPL LIGM Right 03/08/2016    Procedure: RELEASE CARPAL TUNNEL ENDOSCOPIC;  Surgeon: Guero Restrepo MD;  Location: [facility];  Service: Orthopedics    MO TENDON SHEATH INCISION Right 03/08/2016    Procedure: RELEASE TRIGGER FINGER RIGHT THUMB;  Surgeon: Guero Restrepo MD;  Location: [facility];  Service: Orthopedics    TONSILLECTOMY AND ADENOIDECTOMY       Social History   Social History     Substance and Sexual Activity   Alcohol Use Not Currently    Alcohol/week: 0.0 standard drinks of alcohol    Comment: last was in 2010 after DUI     Social History     Substance and Sexual Activity   Drug Use No     Social History     Tobacco Use   Smoking Status Every Day    Current packs/day: 1.00    Average packs/day: 1 pack/day for 41.7 years (41.7 ttl pk-yrs)    Types: Cigarettes    Start date: 1/1/1983   Smokeless Tobacco Never   Tobacco Comments    20 + years     Family History:   Family History   Problem Relation Age of Onset    Diabetes Mother         type 2    Heart attack Mother 39        acute MI    Kidney disease Mother         CKD NKF classfication    Depression Mother     Arthritis Mother     Substance Abuse Mother         mother OD in past on MS04    Ulcerative colitis Mother     Schizophrenia Mother     Suicide Attempts Mother     Bipolar disorder Mother     Diabetes Maternal Grandmother     Heart attack Father         acute MI    Psoriasis Father     Cancer Father         gastric cancer    Stroke Father     Crohn's disease Sister     Bipolar disorder Sister     Rheum arthritis Maternal Grandfather     Throat cancer Maternal  Grandfather     Suicide Attempts Daughter     Drug abuse Daughter     Lung cancer Paternal Grandmother     Cancer Paternal Grandfather     No Known Problems Sister     Bipolar disorder Maternal Uncle     Anesthesia problems Neg Hx        Meds/Allergies   Current Facility-Administered Medications   Medication Dose Route Frequency Provider Last Rate Last Admin    acetaminophen (TYLENOL) tablet 650 mg  650 mg Oral Q6H PRN Abhay Hayes MD   650 mg at 09/04/24 1120    albuterol (PROVENTIL HFA,VENTOLIN HFA) inhaler 2 puff  2 puff Inhalation Q4H PRN Renan Drake DO   2 puff at 08/30/24 1016    aspirin chewable tablet 81 mg  81 mg Oral Daily Renan Drake DO   81 mg at 09/04/24 0924    atorvastatin (LIPITOR) tablet 80 mg  80 mg Oral Daily Renan Drake DO   80 mg at 09/04/24 0925    baclofen tablet 5 mg  5 mg Oral BID Magda ELISEO Aguiar   5 mg at 09/04/24 0924    butalbital-acetaminophen-caffeine (FIORICET,ESGIC) -40 mg per tablet 1 tablet  1 tablet Oral Q6H PRN Renan Drake DO   1 tablet at 09/04/24 0925    cholestyramine sugar free (QUESTRAN LIGHT) packet 4 g  4 g Oral BID Dalia Soriano, DO   4 g at 09/04/24 0926    diazepam (VALIUM) tablet 5 mg  5 mg Oral Q12H PRN Renan Drake DO   5 mg at 09/04/24 1049    DULoxetine (CYMBALTA) delayed release capsule 60 mg  60 mg Oral BID Renan Drake DO   60 mg at 09/04/24 0925    enoxaparin (LOVENOX) subcutaneous injection 40 mg  40 mg Subcutaneous Q24H [facility] Abhay Hayes MD   40 mg at 09/04/24 0924    fluticasone-vilanterol 200-25 mcg/actuation 1 puff  1 puff Inhalation Daily Renan Drake DO   1 puff at 09/04/24 0744    gabapentin (NEURONTIN) capsule 800 mg  800 mg Oral TID Renan Drake DO   800 mg at 09/04/24 0924    insulin glargine (LANTUS) subcutaneous injection 30 Units 0.3 mL  30 Units Subcutaneous HS Holli Lopes DO   30 Units at 09/03/24 4041    insulin lispro (HumALOG/ADMELOG) 100 units/mL subcutaneous injection 1-5 Units  1-5 Units  Subcutaneous TID AC Abhay Hayes MD   1 Units at 09/04/24 1121    insulin lispro (HumALOG/ADMELOG) 100 units/mL subcutaneous injection 1-5 Units  1-5 Units Subcutaneous HS Abhay Hayes MD   2 Units at 09/03/24 2200    insulin lispro (HumALOG/ADMELOG) 100 units/mL subcutaneous injection 10 Units  10 Units Subcutaneous TID With Meals Holli Lopes, DO   10 Units at 09/04/24 1121    levothyroxine tablet 112 mcg  112 mcg Oral Early Morning Renan Drake, DO   112 mcg at 09/04/24 0550    lidocaine (LIDODERM) 5 % patch 2 patch  2 patch Topical Daily ELISEO Toribio   2 patch at 09/04/24 0925    mirtazapine (REMERON) tablet 7.5 mg  7.5 mg Oral HS Renan Drake DO   7.5 mg at 09/03/24 2159    moisture barrier miconazole 2% cream (aka DARLIN MOISTURE BARRIER ANTIFUNGAL CREAM)   Topical BID ELISEO Langley   1 Application at 09/04/24 0929    nicotine (NICODERM CQ) 21 mg/24 hr TD 24 hr patch 1 patch  1 patch Transdermal Daily Renan Drake DO   1 patch at 09/04/24 0925    ondansetron (ZOFRAN) injection 4 mg  4 mg Intravenous Q6H PRN Renan Drake, DO   4 mg at 09/04/24 0943    pantoprazole (PROTONIX) EC tablet 40 mg  40 mg Oral Early Morning Renan Drake DO   40 mg at 09/04/24 0550    saccharomyces boulardii (FLORASTOR) capsule 250 mg  250 mg Oral BID ELISEO Trevino   250 mg at 09/04/24 0925    traMADol (ULTRAM) tablet 50 mg  50 mg Oral Q6H PRN Renan Drake DO   50 mg at 09/04/24 0749    traZODone (DESYREL) tablet 50 mg  50 mg Oral HS PRN Estefany Villalobos PA-C   50 mg at 09/03/24 2304    vancomycin (VANCOCIN) capsule 500 mg  500 mg Oral Q6H [facility] Corinne Us MD   500 mg at 09/04/24 1120     Allergies   Allergen Reactions    Medical Tape Rash    Lexapro [Escitalopram Oxalate]     Escitalopram Other (See Comments) and Palpitations    Other Hives and Rash     Adhesive Tape       Objective   Vitals: Blood pressure 112/82, pulse (!) 113, temperature 100 °F (37.8 °C), resp. rate 14, height  "5' 2\" (1.575 m), weight 41.4 kg (91 lb 3.2 oz), last menstrual period 03/04/2016, SpO2 98%, not currently breastfeeding.    Intake/Output Summary (Last 24 hours) at 9/4/2024 1429  Last data filed at 9/4/2024 1300  Gross per 24 hour   Intake 580 ml   Output --   Net 580 ml     Invasive Devices       Peripheral Intravenous Line  Duration             Peripheral IV 09/03/24 Right;Ventral (anterior) Forearm 1 day                    Physical Exam  Constitutional:       General: She is not in acute distress.     Appearance: She is cachectic. She is not ill-appearing, toxic-appearing or diaphoretic.   HENT:      Head: Normocephalic and atraumatic.      Nose: Nose normal.   Eyes:      Extraocular Movements: Extraocular movements intact.      Conjunctiva/sclera: Conjunctivae normal.      Pupils: Pupils are equal, round, and reactive to light.   Pulmonary:      Effort: Pulmonary effort is normal. No respiratory distress.   Abdominal:      General: There is no distension.   Musculoskeletal:         General: No deformity.      Right lower leg: No edema.      Left lower leg: No edema.   Skin:     General: Skin is warm and dry.   Neurological:      General: No focal deficit present.      Mental Status: She is alert. Mental status is at baseline.   Psychiatric:         Mood and Affect: Mood normal.         Behavior: Behavior normal. Behavior is cooperative.         Thought Content: Thought content normal.         The history was obtained from the review of the chart, patient.    Lab Results:       Lab Results   Component Value Date    WBC 5.01 09/04/2024    HGB 10.7 (L) 09/04/2024    HCT 32.7 (L) 09/04/2024    MCV 99 (H) 09/04/2024     09/04/2024     Lab Results   Component Value Date/Time    BUN 15 09/04/2024 06:03 AM    BUN 15 01/15/2024 02:16 PM     07/16/2016 11:21 AM    K 4.3 09/04/2024 06:03 AM    K 4.6 01/15/2024 02:16 PM     09/04/2024 06:03 AM    CL 92 (L) 01/15/2024 02:16 PM    CO2 26 09/04/2024 06:03 " "AM    CO2 23 01/15/2024 02:16 PM    CREATININE 0.49 (L) 09/04/2024 06:03 AM    CREATININE 0.69 01/15/2024 02:16 PM    AST 57 (H) 09/04/2024 06:03 AM    AST 17 01/15/2024 02:16 PM    ALT 97 (H) 09/04/2024 06:03 AM    ALT 19 01/15/2024 02:16 PM    TP 5.9 (L) 09/04/2024 06:03 AM    TP 6.7 01/15/2024 02:16 PM    ALB 3.7 09/04/2024 06:03 AM    ALB 4.3 01/15/2024 02:16 PM     No results for input(s): \"CHOL\", \"HDL\", \"LDL\", \"TRIG\", \"VLDL\" in the last 72 hours.  No results found for: \"MICROALBUR\", \"[id number]\"  POC Glucose (mg/dl)   Date Value   09/04/2024 183 (H)   09/04/2024 263 (H)   09/03/2024 237 (H)   09/03/2024 259 (H)   09/03/2024 283 (H)   09/03/2024 355 (H)   09/02/2024 348 (H)   09/02/2024 293 (H)   09/02/2024 260 (H)   09/02/2024 220 (H)       Imaging Studies: I have personally reviewed pertinent reports.      Holli Lopes DO  Endocrinology PGY-5  ~A morning cortisol is only interpretable if drawn at 8am.~    Please Epic[employer] questions to the physician covering the \"[facility] Call\" Role. Thank you.   "

## 2024-09-05 LAB
ANION GAP SERPL CALCULATED.3IONS-SCNC: 7 MMOL/L (ref 4–13)
BUN SERPL-MCNC: 15 MG/DL (ref 5–25)
CALCIUM SERPL-MCNC: 8.9 MG/DL (ref 8.4–10.2)
CHLORIDE SERPL-SCNC: 106 MMOL/L (ref 96–108)
CO2 SERPL-SCNC: 26 MMOL/L (ref 21–32)
CORTIS AM PEAK SERPL-MCNC: 1.8 UG/DL (ref 6.7–22.6)
CREAT SERPL-MCNC: 0.42 MG/DL (ref 0.6–1.3)
CRYPTOSP STL QL ACID FAST STN: NORMAL
GFR SERPL CREATININE-BSD FRML MDRD: 117 ML/MIN/1.73SQ M
GLUCOSE SERPL-MCNC: 110 MG/DL (ref 65–140)
GLUCOSE SERPL-MCNC: 148 MG/DL (ref 65–140)
GLUCOSE SERPL-MCNC: 165 MG/DL (ref 65–140)
GLUCOSE SERPL-MCNC: 168 MG/DL (ref 65–140)
GLUCOSE SERPL-MCNC: 288 MG/DL (ref 65–140)
GLUCOSE SERPL-MCNC: 35 MG/DL (ref 65–140)
GLUCOSE SERPL-MCNC: 51 MG/DL (ref 65–140)
I BELLI SPEC QL ACID FAST MOD KINY STN: NORMAL
MAGNESIUM SERPL-MCNC: 2.3 MG/DL (ref 1.9–2.7)
POTASSIUM SERPL-SCNC: 3.8 MMOL/L (ref 3.5–5.3)
SODIUM SERPL-SCNC: 139 MMOL/L (ref 135–147)

## 2024-09-05 PROCEDURE — 82533 TOTAL CORTISOL: CPT | Performed by: STUDENT IN AN ORGANIZED HEALTH CARE EDUCATION/TRAINING PROGRAM

## 2024-09-05 PROCEDURE — 99233 SBSQ HOSP IP/OBS HIGH 50: CPT | Performed by: INTERNAL MEDICINE

## 2024-09-05 PROCEDURE — 99232 SBSQ HOSP IP/OBS MODERATE 35: CPT | Performed by: INTERNAL MEDICINE

## 2024-09-05 PROCEDURE — 80048 BASIC METABOLIC PNL TOTAL CA: CPT | Performed by: FAMILY MEDICINE

## 2024-09-05 PROCEDURE — 82948 REAGENT STRIP/BLOOD GLUCOSE: CPT

## 2024-09-05 PROCEDURE — 99232 SBSQ HOSP IP/OBS MODERATE 35: CPT | Performed by: FAMILY MEDICINE

## 2024-09-05 RX ORDER — INSULIN GLARGINE 100 [IU]/ML
15 INJECTION, SOLUTION SUBCUTANEOUS ONCE
Status: COMPLETED | OUTPATIENT
Start: 2024-09-05 | End: 2024-09-05

## 2024-09-05 RX ADMIN — GABAPENTIN 800 MG: 400 CAPSULE ORAL at 17:41

## 2024-09-05 RX ADMIN — NICOTINE 1 PATCH: 21 PATCH, EXTENDED RELEASE TRANSDERMAL at 09:17

## 2024-09-05 RX ADMIN — PANCRELIPASE 24000 UNITS: 120000; 24000; 76000 CAPSULE, DELAYED RELEASE PELLETS ORAL at 17:42

## 2024-09-05 RX ADMIN — ONDANSETRON 4 MG: 2 INJECTION INTRAMUSCULAR; INTRAVENOUS at 13:52

## 2024-09-05 RX ADMIN — GABAPENTIN 800 MG: 400 CAPSULE ORAL at 21:17

## 2024-09-05 RX ADMIN — TRAMADOL HYDROCHLORIDE 50 MG: 50 TABLET, COATED ORAL at 09:16

## 2024-09-05 RX ADMIN — TRAZODONE HYDROCHLORIDE 50 MG: 50 TABLET ORAL at 21:21

## 2024-09-05 RX ADMIN — Medication 250 MG: at 09:17

## 2024-09-05 RX ADMIN — Medication 250 MG: at 17:41

## 2024-09-05 RX ADMIN — INSULIN LISPRO 1 UNITS: 100 INJECTION, SOLUTION INTRAVENOUS; SUBCUTANEOUS at 21:04

## 2024-09-05 RX ADMIN — BACLOFEN 5 MG: 10 TABLET ORAL at 09:17

## 2024-09-05 RX ADMIN — POLYETHYLENE GLYCOL 3350, SODIUM SULFATE ANHYDROUS, SODIUM BICARBONATE, SODIUM CHLORIDE, POTASSIUM CHLORIDE 4000 ML: 236; 22.74; 6.74; 5.86; 2.97 POWDER, FOR SOLUTION ORAL at 17:41

## 2024-09-05 RX ADMIN — ATORVASTATIN CALCIUM 80 MG: 80 TABLET, FILM COATED ORAL at 09:16

## 2024-09-05 RX ADMIN — DIAZEPAM 5 MG: 5 TABLET ORAL at 21:21

## 2024-09-05 RX ADMIN — DULOXETINE HYDROCHLORIDE 60 MG: 60 CAPSULE, DELAYED RELEASE ORAL at 09:16

## 2024-09-05 RX ADMIN — ENOXAPARIN SODIUM 40 MG: 40 INJECTION SUBCUTANEOUS at 09:17

## 2024-09-05 RX ADMIN — GABAPENTIN 800 MG: 400 CAPSULE ORAL at 09:16

## 2024-09-05 RX ADMIN — ASPIRIN 81 MG CHEWABLE TABLET 81 MG: 81 TABLET CHEWABLE at 09:17

## 2024-09-05 RX ADMIN — ONDANSETRON 4 MG: 2 INJECTION INTRAMUSCULAR; INTRAVENOUS at 19:53

## 2024-09-05 RX ADMIN — BACLOFEN 5 MG: 10 TABLET ORAL at 17:41

## 2024-09-05 RX ADMIN — PANCRELIPASE 24000 UNITS: 120000; 24000; 76000 CAPSULE, DELAYED RELEASE PELLETS ORAL at 07:18

## 2024-09-05 RX ADMIN — TRAMADOL HYDROCHLORIDE 50 MG: 50 TABLET, COATED ORAL at 18:02

## 2024-09-05 RX ADMIN — PANTOPRAZOLE SODIUM 40 MG: 40 TABLET, DELAYED RELEASE ORAL at 05:39

## 2024-09-05 RX ADMIN — INSULIN LISPRO 3 UNITS: 100 INJECTION, SOLUTION INTRAVENOUS; SUBCUTANEOUS at 07:18

## 2024-09-05 RX ADMIN — CHOLESTYRAMINE 4 G: 4 POWDER, FOR SUSPENSION ORAL at 17:42

## 2024-09-05 RX ADMIN — MICONAZOLE NITRATE: 20 CREAM TOPICAL at 17:42

## 2024-09-05 RX ADMIN — DULOXETINE HYDROCHLORIDE 60 MG: 60 CAPSULE, DELAYED RELEASE ORAL at 17:40

## 2024-09-05 RX ADMIN — MIRTAZAPINE 7.5 MG: 15 TABLET, FILM COATED ORAL at 21:16

## 2024-09-05 RX ADMIN — BUTALBITAL, ACETAMINOPHEN AND CAFFEINE 1 TABLET: 50; 325; 40 TABLET ORAL at 13:52

## 2024-09-05 RX ADMIN — BUTALBITAL, ACETAMINOPHEN AND CAFFEINE 1 TABLET: 50; 325; 40 TABLET ORAL at 19:53

## 2024-09-05 RX ADMIN — VANCOMYCIN HYDROCHLORIDE 500 MG: 250 CAPSULE ORAL at 05:39

## 2024-09-05 RX ADMIN — DIAZEPAM 5 MG: 5 TABLET ORAL at 09:16

## 2024-09-05 RX ADMIN — LEVOTHYROXINE SODIUM 112 MCG: 112 TABLET ORAL at 05:39

## 2024-09-05 RX ADMIN — INSULIN GLARGINE 15 UNITS: 100 INJECTION, SOLUTION SUBCUTANEOUS at 21:16

## 2024-09-05 RX ADMIN — PANCRELIPASE 24000 UNITS: 120000; 24000; 76000 CAPSULE, DELAYED RELEASE PELLETS ORAL at 11:17

## 2024-09-05 RX ADMIN — INSULIN LISPRO 10 UNITS: 100 INJECTION, SOLUTION INTRAVENOUS; SUBCUTANEOUS at 07:18

## 2024-09-05 RX ADMIN — CHOLESTYRAMINE 4 G: 4 POWDER, FOR SUSPENSION ORAL at 09:19

## 2024-09-05 RX ADMIN — LIDOCAINE 2 PATCH: 700 PATCH TOPICAL at 09:17

## 2024-09-05 RX ADMIN — FLUTICASONE FUROATE AND VILANTEROL TRIFENATATE 1 PUFF: 200; 25 POWDER RESPIRATORY (INHALATION) at 09:18

## 2024-09-05 RX ADMIN — MICONAZOLE NITRATE: 20 CREAM TOPICAL at 09:24

## 2024-09-05 NOTE — PROGRESS NOTES
Progress Note - Infectious Disease   Barb Palomo 54 y.o. female MRN: [id number]  Unit/Bed#: [facility] 522-01 Encounter: [id number]      Impression/Plan:  1.  C. difficile colitis.  Patient presented with weakness, weight loss, acute on chronic diarrhea.  8/16 C. difficile PCR and toxin EIA positive.  Continues to have significant loose stools despite 7 days of fidaxomicin and now 7 days of p.o. vancomycin.  She remains nontoxic without fever or leukocytosis.  Abdominal exam benign.  Unclear if ongoing symptoms are related to C. difficile alone or an alternative etiology as below.  Given recent positive testing there is low utility to repeat C. difficile testing.  Still having significant diarrhea              -Holding high-dose oral vancomycin in anticipation of fecal transplant tomorrow              -Continue Banatrol  -Careful use of Questran              -Patient for colonoscopy with fecal transplant tomorrow     2.  Acute on chronic diarrhea.  The patient has had diarrhea and weight loss for the past year but reports diarrhea is usually 2-4 times daily.  She continues to have up to 10 stools daily since admission with minimal improvement on C. difficile treatment.  Fecal white blood cells negative.  Additional infectious testing including stool O&P, Giardia antigen, enteric panel negative.  Unclear if ongoing symptoms are solely due to C. difficile.  The patient has a diagnosis of chronic pancreatitis to be contributing.  Poorly controlled diabetes may also lead to diarrhea.  She may also be having postinfectious/functional diarrhea.  Continues to have substantial diarrhea.  Repeat CT of the abdomen pelvis with colonic thickening.  Pancreatic insufficiency may also be playing a role in the chronic process.              -Treatment for C. difficile as above              -Close GI follow-up  -Continue pancreatic enzymes  -Patient for colonoscopy with fecal transplant tomorrow     3.  Failure to thrive, severe  protein calorie malnutrition.  BMI 16.  The patient has had diarrhea and weight loss for the past year.  Status post EGD and colonoscopy May, colon biopsies without pathologic abnormalities.     4.  Poorly controlled type 2 diabetes with hyperglycemia.  Hemoglobin A1c 16.1%.  Patient was not using insulin at home.  Glycemic management per primary team     5.  Bacteriuria.  Patient received 3 doses of IV ceftriaxone.  CT imaging showed a distended bladder suggestive of retention which is likely contributing to her symptoms.  Per report pain was happening after urination likely due to skin irritation.  In the setting of C. difficile as above, avoid unnecessary antibiotics.    Discussed with the primary service the plan to proceed with the colonoscopy and fecal transplant tomorrow and they agree with the plan.    Antibiotics:  High-dose oral vancomycin 4    Subjective:  Patient has no fever, chills, sweats; no nausea, vomiting, but still substantial diarrhea; no cough, shortness of breath; no increased abdominal pain. No new symptoms.    Objective:  Vitals:  Temp:  [98.1 °F (36.7 °C)-100 °F (37.8 °C)] 98.1 °F (36.7 °C)  HR:  [104-115] 104  Resp:  [14-16] 14  BP: ()/(65-89) 94/65  SpO2:  [97 %-100 %] 97 %  Temp (24hrs), Av.6 °F (37 °C), Min:98.1 °F (36.7 °C), Max:100 °F (37.8 °C)  Current: Temperature: 98.1 °F (36.7 °C)    Physical Exam:   General Appearance:  Alert, interactive, nontoxic, no acute distress.   Throat: Oropharynx moist without lesions.    Lungs:   Clear to auscultation bilaterally; no wheezes, rhonchi or rales; respirations unlabored   Heart:  Tachycardic; no murmur, rub or gallop   Abdomen:   Soft, non-tender, non-distended, positive bowel sounds.     Extremities: No clubbing, cyanosis or edema   Skin: No new rashes or lesions. No draining wounds noted.       Labs, Imaging, & Other studies:   All pertinent labs and imaging studies were personally reviewed  Results from last 7 days   Lab Units  09/04/24  0603 09/03/24  0637 09/02/24  0706   WBC Thousand/uL 5.01 6.16 5.61   HEMOGLOBIN g/dL 10.7* 10.6* 11.4*   PLATELETS Thousands/uL 285 283 293     Results from last 7 days   Lab Units 09/05/24  0808 09/04/24  0603 09/03/24  0637 09/02/24  0706 09/01/24  0525   SODIUM mmol/L 139 139 136   < > 138   POTASSIUM mmol/L 3.8 4.3 4.3   < > 4.0   CHLORIDE mmol/L 106 105 100   < > 101   CO2 mmol/L 26 26 29   < > 30   BUN mg/dL 15 15 15   < > 13   CREATININE mg/dL 0.42* 0.49* 0.61   < > 0.54*   EGFR ml/min/1.73sq m 117 111 103   < > 108   CALCIUM mg/dL 8.9 9.2 9.2   < > 9.3   AST U/L  --  57*  --   --  42*   ALT U/L  --  97*  --   --  59*   ALK PHOS U/L  --  207*  --   --  197*    < > = values in this interval not displayed.

## 2024-09-05 NOTE — ASSESSMENT & PLAN NOTE
"Lab Results   Component Value Date    HGBA1C 16.1 (H) 08/16/2024       Recent Labs     09/05/24  0943 09/05/24  1003 09/05/24  1031 09/05/24  1547   POCGLU 35* 51* 110 148*       Patient markedly hyperglycemic on admission only partially responsive to IV fluids, remainder of labs fortunately not consistent with DKA. A1C 16  Patient admitting she has not been using her home insulin as she apparently has been unable to fill prescription due to \"insurance issues,\" additionally reports that due to her neuropathy she has difficulty self injecting.  Daughter also reports jessica non compliance.   Typically on NovoLog 70/30 20 units twice daily  Diabetic diet-change to regular diet recently  Continue basal/prandial insulin while inpatient, adjust PRN  Sliding scale coverage   Endocrinology  consultation appreciated-hypoglycemia noted.  Insulin dose adjusted  "

## 2024-09-05 NOTE — ASSESSMENT & PLAN NOTE
Reported dysuria on admission   Urine + nitrite & leukocytes, CT imaging with distended bladder suggestive of retention and small amount of air which could be secondary to infection  Urine Culture noted, S/P 3 doses of IV CTX 8/17 8/21, reported pain AFTER urination, likely related to urine hitting excoriated labia/bottom from frequent stooling. Will continue with medicated cream to this area- denies pain at this time   S/p pyridum x 3 doses  Wound care consult appreciated

## 2024-09-05 NOTE — QUICK NOTE
Glycemic trends reviewed  Patient for colonoscopy tomorrow, clear liquids today  Patient hypoglycemic this morning after breakfast in the setting of receiving full breakfast insulin while being placed on clear liquid diet    Will stop mealtime Humalog  Continue Lantus 30 units nightly, continue Algorithm 2/2

## 2024-09-05 NOTE — PHYSICIAN ADVISOR
Current patient class: Inpatient  The patient is currently on Hospital Day: 22      The patient was admitted to the hospital at  1:02 AM on 8/16/24 for the following diagnosis:  Severe protein-calorie malnutrition (HCC) [E43]  Failure to thrive in adult [R62.7]  Sepsis (HCC) [A41.9]  Unspecified multiple injuries, initial encounter [T07.XXXA]     CMS OUTLIER STAY REVIEW    After review of the relevant documentation, labs, vital signs and test results, the patient is appropriate for CONTINUED INPATIENT ADMISSION.     The patient continues to remain hospitalized receiving acute medical care.  The patient has surpassed the expected duration of stay, however given the clinical condition, need for further acute care management, the patient is appropriate to remain in an inpatient status.  The patient still being actively managed, and does have unresolved medical issues requiring further hospitalization.      This review is conducted at 20 days, to help satisfy the requirements for significant outlier stay review as per CMS.  Given the current condition of this patient, the patient satisfies this review was determination for continued inpatient stay.    Rationale is as follows:    The patient is a 54 yrs old Female who presented to the ED at 8/15/2024  8:23 PM with a chief complaint of Fall (Pt arrived via EMS. Per EMS, fall today and yesterday, numbness L leg, lower back pain, and hyperglycemic. Pt reports numbness in L arm from previous stroke.).  Patient came in with failure to thrive.  Had severe diarrhea which is still persistent.  Patient got 10 days of Dificid but now is getting vancomycin at a higher dose every 6 hours for persistent diarrhea.  GI also started the patient on Creon.  Plan for colonoscopy possibly on this Friday given the persistence of her symptoms.  Given the above the patient continues to receive acute ongoing inpatient medical care    The patient’s vitals on arrival were   ED Triage Vitals    Temperature Pulse Respirations Blood Pressure SpO2   08/15/24 2030 08/15/24 2030 08/15/24 2030 08/15/24 2030 08/15/24 2030   98 °F (36.7 °C) 104 18 (!) 73/53 98 %      Temp Source Heart Rate Source Patient Position - Orthostatic VS BP Location FiO2 (%)   08/15/24 2030 08/15/24 2030 08/15/24 2030 08/15/24 2030 --   Oral Monitor Sitting Right arm       Pain Score       08/15/24 2229       8           Past Medical History:   Diagnosis Date    Acute venous embolism and thrombosis of deep vessels of distal lower extremity (HCC)     Anemia     Anxiety     last assessed 11/20/17    Arthritis     Asthma     Cerebral infarction (HCC)     unspecified, last assessed 11/14/16    Chest pain     last assessed 5/9/17    Chronic cough     last assessed 12/12/13    Depression     Diabetes mellitus, type 2 (HCC)     DJD (degenerative joint disease)     Esophageal reflux     Fibromyalgia     GERD without esophagitis     resolved 5/13/16    History of pulmonary embolism     Hyperlipidemia     Hypertension     Hypothyroidism     Iron deficiency     Pancreatitis     Panic attack     Panic disorder     Polyarthritis     PTSD (post-traumatic stress disorder)     Sleep difficulties     Stroke syndrome     Thyroid disease     TIA (transient ischemic attack)     TIA (transient ischemic attack)     Venous embolism and thrombosis of deep vessels of distal lower extremity (HCC)     Vitamin B12 deficiency     Vitamin D deficiency      Past Surgical History:   Procedure Laterality Date    CARPAL TUNNEL RELEASE Right     neuroplasty decompression of median nerve    CATARACT EXTRACTION      CHOLECYSTECTOMY      ERCP      ERCP      ESOPHAGOGASTRIC FUNDOPLASTY      NISSEN FUNDOPLICATION      PATELLA SURGERY Left 12/2021    MI ESOPHAGOGASTRODUODENOSCOPY TRANSORAL DIAGNOSTIC N/A 11/02/2016    Procedure: EGD AND COLONOSCOPY;  Surgeon: Jatin Shepherd MD;  Location: [facility];  Service: Gastroenterology    MI NDSC WRST SURG W/RLS TRANSVRS CARPL LIGM Right  03/08/2016    Procedure: RELEASE CARPAL TUNNEL ENDOSCOPIC;  Surgeon: Guero Restrepo MD;  Location: [facility];  Service: Orthopedics    RI TENDON SHEATH INCISION Right 03/08/2016    Procedure: RELEASE TRIGGER FINGER RIGHT THUMB;  Surgeon: Guero Restrepo MD;  Location: [facility];  Service: Orthopedics    TONSILLECTOMY AND ADENOIDECTOMY             Consults have been placed to:   IP CONSULT TO NUTRITION SERVICES  IP CONSULT TO CASE MANAGEMENT  IP CONSULT TO GASTROENTEROLOGY  IP CONSULT TO INFECTIOUS DISEASES  IP CONSULT TO ENDOCRINOLOGY    Vitals:    09/04/24 2100 09/04/24 2341 09/05/24 0528 09/05/24 0717   BP:  126/89  94/65   BP Location:    Right arm   Pulse: (!) 115 (!) 107  104   Resp:  16  14   Temp:  98.1 °F (36.7 °C)  98.1 °F (36.7 °C)   TempSrc:    Oral   SpO2: 100% 98%  97%   Weight:   41.7 kg (91 lb 14.9 oz)    Height:           Most recent labs:    Recent Labs     09/04/24  0603   WBC 5.01   HGB 10.7*   HCT 32.7*      K 4.3   CALCIUM 9.2   BUN 15   CREATININE 0.49*   AST 57*   ALT 97*   ALKPHOS 207*       Scheduled Meds:  Current Facility-Administered Medications   Medication Dose Route Frequency Provider Last Rate    acetaminophen  650 mg Oral Q6H PRN Abhay Hayes MD      albuterol  2 puff Inhalation Q4H PRN Renan Drake DO      aspirin  81 mg Oral Daily Renan Drake DO      atorvastatin  80 mg Oral Daily Renan Drake DO      baclofen  5 mg Oral BID ELISEO Toribio      butalbital-acetaminophen-caffeine  1 tablet Oral Q6H PRN Renan Drake DO      cholestyramine sugar free  4 g Oral BID Dalia Soriano, DO      diazepam  5 mg Oral Q12H PRN Renan Drake DO      DULoxetine  60 mg Oral BID Renan Drake, DO      enoxaparin  40 mg Subcutaneous Q24H [facility] Abhay Hayes MD      fluticasone-vilanterol  1 puff Inhalation Daily Renan Drake DO      gabapentin  800 mg Oral TID Renan Drake DO      insulin glargine  30 Units Subcutaneous HS Holli Lopes DO      insulin  lispro  1-5 Units Subcutaneous TID AC Abhay Hayes MD      insulin lispro  1-5 Units Subcutaneous HS Abhay Hayes MD      insulin lispro  10 Units Subcutaneous TID With Meals Holli Lopes, DO      levothyroxine  112 mcg Oral Early Morning Renan Drake, DO      lidocaine  2 patch Topical Daily Magda Amaya, ELISEO      mirtazapine  7.5 mg Oral HS Renan Drake, DO      DARLIN ANTIFUNGAL   Topical BID Cherise Grey, MARCNP      nicotine  1 patch Transdermal Daily Renan Drake, DO      ondansetron  4 mg Intravenous Q6H PRN Renan Drake, DO      pancrelipase (Lip-Prot-Amyl)  24,000 Units Oral TID With Meals Dalia Soriano, DO      pantoprazole  40 mg Oral Early Morning Renan Drake, DO      saccharomyces boulardii  250 mg Oral BID ELISEO Trevino      traMADol  50 mg Oral Q6H PRN Renan Drake, DO      traZODone  50 mg Oral HS PRN Estefany Villalobos PA-C      vancomycin oral (capsules or solution)  500 mg Oral Q6H [facility] Corinne Us MD       Continuous Infusions:   PRN Meds:.  acetaminophen    albuterol    butalbital-acetaminophen-caffeine    diazepam    ondansetron    traMADol    traZODone    Surgical procedures (if appropriate):

## 2024-09-05 NOTE — PHYSICAL THERAPY NOTE
PHYSICAL THERAPY NOTE          Patient Name: Barb Palomo  Today's Date: 9/5/2024 09/05/24 0858   PT Last Visit   PT Visit Date 09/05/24   Note Type   Note Type Cancelled Session   Cancel Reasons Medical status       RN recommending hold on skilled PT at this time due to medical status and low blood sugar. PT will continue to follow as able.    Mary Ann Salvador, PT, DPT

## 2024-09-05 NOTE — OCCUPATIONAL THERAPY NOTE
Occupational Therapy Cancel        Patient Name: Barb Palomo  Today's Date: 9/5/2024 09/05/24 1411   OT Last Visit   OT Visit Date 09/05/24   Note Type   Note type Cancelled Session   Cancel Reasons Refusal   Additional Comments Attempted to see pt this afternoon for OT. Pt stating she does not feel well and has a headache. OT will continue to follow pt as appropraite.       BRANDON English, OTR/L

## 2024-09-05 NOTE — ASSESSMENT & PLAN NOTE
Severe protein-calorie malnutrition 2/2 chronic diarrhea a/e/b fat and muscle loss requiring Nutrition consult, oral diet and nutrition supplements    360 Statement: severe malnutrition r/t suspect combination of chronic illness and social/environmental factors as evidenced by severe temporal muscle loss, severe tricep fat pad loss, at least moderate muscle loss around clavicles and shoulders, at least moderate orbital fat pad loss. Treatment: oral diet and oral nutrition supplements.    BMI Findings:  Adult BMI Classifications: Underweight < 18.5        Body mass index is 16.81 kg/m².   C/w supplements  Nutrition consult appreciated  Encourage po intake

## 2024-09-05 NOTE — PROGRESS NOTES
Progress Note- Barb Palomo 54 y.o. female MRN: [id number]    Unit/Bed#: [facility] 522-01 Encounter: [id number]      Assessment and Plan:    Ms. 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 GI consulted for acute on chronic diarrhea.     Acute on Chronic Diarrhea  Clostridium difficile Infection  Chronic Pancreatitis   Failure to Thrive   Weight Loss      # Clostridium difficile Infection: Patient initially presented with complaints on worsening weakness and frequent falls on 8/20.  Patient also with 70-80 lbs weight loss and chronic, watery diarrhea. Admits to difficulty following the death of her  and also been dealing with “insurance issues” leading to difficulty with her insulin. Labs on admission revealed severe hyperglycemia with electrolyte abnormalities. CT AP revealed mild diffuse wall thickening vs under distension of the colon as well as the stomach. In the past, patient has followed with GI for her unintentional weight loss and diarrhea. She has EGD/colonoscopy performed 5/2024 which was unrevealing although random colon biopsies were negative and patient had a very poor prep limiting sufficient examination. This admission, patient had PCR and EIA testing completed for C.Diff and both were found to be positive on 8/16 indicative of active infection. She had been on antibiotics last month for a dental procedure and had been started on for Fidaxomycin and completed a 7-day course but experienced no improvement in her diarrhea.  Therefore, GI consulted and patient has now been transitioned to oral vancomycin.  Unfortunately, patient with ongoing frequent stools.  Over the weekend, ID consulted in vancomycin dose increased.  Given lack of improvement in stool frequency despite treatment of both Fidaxomycin  as well as vancomycin, will plan for colonoscopy with FMT this admission tomorrow.     Plan:  Hold vancomycin and additional antibiotics today in anticipation of colonoscopy with FMT tomorrow  Clear liquid diet today and NPO at midnight; orders for Golytely bowel prep placed  Avoid anti-diarrheals; continue to restrict lactulose and continue Banatrol if needed with meals  Continue on Questran and begin on Creon TID with meals  Will plan for colonoscopy at time of FMT, tentatively Friday - clear liquid diet tomorrow in preparation  Encourage PO hydration; monitor and replete electrolytes as needed  Maintain contact precautions with strict handwashing  Hold PPI therapy; resume when symptoms resolved  Avoid use of probiotics for the prevention of recurrence   In the future, will require Cdiff prophylaxis with all antibiotic therapy   Additional symptom management per primary team      # Chronic Pancreatitis: Patient admits to prior history of pancreatitis in the past which was previously attributed to gallstones resulting in cholecystectomy.  Patient also lifelong smoker and has CT imaging demonstrating evidence of chronic pancreatitis.  CT on admission demonstrating diffuse parenchymal atrophy and prominence of the main duct measuring up to 4 mL at the level of the pancreatic head.  Patient with multiple parenchymal calcifications in the pancreatic head.  Pancreatic elastase levels found to be extremely low.  Possible dilutional component due to diarrhea.  However, we will begin on Creon with meals and monitor for improvement.       # Weight Loss: Patient with significant weight loss prior to admission.  Admits to losing 70 to 80 pounds unintentionally over the last couple of months.  Likely result of uncontrolled diabetes as well as C. difficile infection.  Did have prior EGD/colonoscopy completed 5/2024.  Would recommend repeat be considered on outpatient  basis.    ______________________________________________________________________    Subjective:     Patient seen and evaluated bedside.  Patient tearful upon examination today.  Very upset about current diet.  Discussed need to begin on clear liquid diet today for colonoscopy tomorrow.  Also discussed plan for bowel prep today.  Patient remains agreeable.  Denies any significant abdominal pain.  No nausea/vomiting.    Medication Administration - last 24 hours from 09/04/2024 1250 to 09/05/2024 1250         Date/Time Order Dose Route Action Action by     09/04/2024 2034 EDT ondansetron (ZOFRAN) injection 4 mg 4 mg Intravenous Given Kristi Sanchez RN     09/05/2024 0917 EDT aspirin chewable tablet 81 mg 81 mg Oral Given Rei Suarez RN     09/05/2024 0916 EDT atorvastatin (LIPITOR) tablet 80 mg 80 mg Oral Given Rei Suarez RN     09/05/2024 0916 EDT diazepam (VALIUM) tablet 5 mg 5 mg Oral Given Rei Suarez RN     09/05/2024 0916 EDT DULoxetine (CYMBALTA) delayed release capsule 60 mg 60 mg Oral Given Rei Suarez RN     09/04/2024 1753 EDT DULoxetine (CYMBALTA) delayed release capsule 60 mg 60 mg Oral Given Kim Salazar RN     09/05/2024 0918 EDT fluticasone-vilanterol 200-25 mcg/actuation 1 puff 1 puff Inhalation Given Rei Suarez RN     09/05/2024 0539 EDT levothyroxine tablet 112 mcg 112 mcg Oral Given Rei Suarez RN     09/04/2024 2207 EDT mirtazapine (REMERON) tablet 7.5 mg 7.5 mg Oral Given Kristi Sanchez RN     09/05/2024 0539 EDT pantoprazole (PROTONIX) EC tablet 40 mg 40 mg Oral Given Rei Suarez RN     09/05/2024 0917 EDT nicotine (NICODERM CQ) 21 mg/24 hr TD 24 hr patch 1 patch 1 patch Transdermal Medication Applied Rei Suarez RN     09/05/2024 0859 EDT nicotine (NICODERM CQ) 21 mg/24 hr TD 24 hr patch 1 patch 1 patch Transdermal Patch Removed Rei Suarez RN     09/05/2024 0916 EDT traMADol (ULTRAM) tablet 50 mg 50 mg Oral Given Rei Suarez RN      09/04/2024 2207 EDT traMADol (ULTRAM) tablet 50 mg 50 mg Oral Given Kristi Sanchez RN     09/04/2024 1454 EDT traMADol (ULTRAM) tablet 50 mg 50 mg Oral Given Lorie Kenney     09/05/2024 0916 EDT gabapentin (NEURONTIN) capsule 800 mg 800 mg Oral Given Rei Suarez RN     09/04/2024 2208 EDT gabapentin (NEURONTIN) capsule 800 mg 800 mg Oral Given Kristi Sanchez, CAITLYN     09/04/2024 1753 EDT gabapentin (NEURONTIN) capsule 800 mg 800 mg Oral Given Kim Salazar RN     09/05/2024 0917 EDT baclofen tablet 5 mg 5 mg Oral Given Rei Suarez, CAITLYN     09/04/2024 1753 EDT baclofen tablet 5 mg 5 mg Oral Given Kim Salazar, CAITLYN     09/05/2024 0917 EDT lidocaine (LIDODERM) 5 % patch 2 patch 2 patch Topical Medication Applied Rei Suarez RN     09/04/2024 2035 EDT lidocaine (LIDODERM) 5 % patch 2 patch 2 patch Topical Patch Removed Kristi Sanchez, CAITLYN     09/05/2024 0924 EDT moisture barrier miconazole 2% cream (aka DARLIN MOISTURE BARRIER ANTIFUNGAL CREAM) -- Topical Given Rei Suarez RN     09/04/2024 1812 EDT moisture barrier miconazole 2% cream (aka DARLIN MOISTURE BARRIER ANTIFUNGAL CREAM) -- Topical Given Kristi Sanchez RN     09/05/2024 0917 EDT saccharomyces boulardii (FLORASTOR) capsule 250 mg 250 mg Oral Given Rei Suarez RN     09/04/2024 1753 EDT saccharomyces boulardii (FLORASTOR) capsule 250 mg 250 mg Oral Given Kim Salazar RN     09/04/2024 2034 EDT traZODone (DESYREL) tablet 50 mg 50 mg Oral Given Kristi Sanchez RN     09/05/2024 1055 EDT insulin lispro (HumALOG/ADMELOG) 100 units/mL subcutaneous injection 1-5 Units -- Subcutaneous Not Given Rei Suarez RN     09/05/2024 0718 EDT insulin lispro (HumALOG/ADMELOG) 100 units/mL subcutaneous injection 1-5 Units 3 Units Subcutaneous Given Rei Suarez RN     09/04/2024 1750 EDT insulin lispro (HumALOG/ADMELOG) 100 units/mL subcutaneous injection 1-5 Units 2 Units Subcutaneous Given Kim Salazar RN      09/04/2024 2211 EDT insulin lispro (HumALOG/ADMELOG) 100 units/mL subcutaneous injection 1-5 Units 2 Units Subcutaneous Given Kristi Sanchez RN     09/05/2024 0917 EDT enoxaparin (LOVENOX) subcutaneous injection 40 mg 40 mg Subcutaneous Given Rei Suarez RN     09/05/2024 0539 EDT vancomycin (VANCOCIN) capsule 500 mg 500 mg Oral Given Rei Suarez RN     09/04/2024 2347 EDT vancomycin (VANCOCIN) capsule 500 mg 500 mg Oral Given Kristi Sanchez RN     09/04/2024 1754 EDT vancomycin (VANCOCIN) capsule 500 mg 500 mg Oral Given Kim Salazar RN     09/04/2024 2207 EDT insulin glargine (LANTUS) subcutaneous injection 30 Units 0.3 mL 30 Units Subcutaneous Given Kristi Sanchez RN     09/05/2024 1117 EDT insulin lispro (HumALOG/ADMELOG) 100 units/mL subcutaneous injection 10 Units 10 Units Subcutaneous Not Given Rei Suarez RN     09/05/2024 0718 EDT insulin lispro (HumALOG/ADMELOG) 100 units/mL subcutaneous injection 10 Units 10 Units Subcutaneous Given Rei Suarez RN     09/04/2024 1751 EDT insulin lispro (HumALOG/ADMELOG) 100 units/mL subcutaneous injection 10 Units 10 Units Subcutaneous Given Kim Salazar RN     09/05/2024 0919 EDT cholestyramine sugar free (QUESTRAN LIGHT) packet 4 g 4 g Oral Given Rei Suarez RN     09/04/2024 1757 EDT cholestyramine sugar free (QUESTRAN LIGHT) packet 4 g 4 g Oral Given Kim Salazar RN     09/05/2024 1117 EDT pancrelipase (Lip-Prot-Amyl) (CREON) delayed release capsule 24,000 Units 24,000 Units Oral Given Rei Suarez RN     09/05/2024 0718 EDT pancrelipase (Lip-Prot-Amyl) (CREON) delayed release capsule 24,000 Units 24,000 Units Oral Given Rei Suarez RN     09/04/2024 2034 EDT pancrelipase (Lip-Prot-Amyl) (CREON) delayed release capsule 24,000 Units 24,000 Units Oral Given Kristi Sanchez RN     09/04/2024 2200 EDT magnesium sulfate 2 g/50 mL IVPB (premix) 2 g 0 g Intravenous Stopped Kristi Sanchez RN     09/04/2024  "2020 EDT magnesium sulfate 2 g/50 mL IVPB (premix) 2 g 2 g Intravenous New Bag Kristi Sanchez RN            Objective:     Vitals: Blood pressure 94/65, pulse 104, temperature 98.1 °F (36.7 °C), temperature source Oral, resp. rate 14, height 5' 2\" (1.575 m), weight 41.7 kg (91 lb 14.9 oz), last menstrual period 03/04/2016, SpO2 97%, not currently breastfeeding.,Body mass index is 16.81 kg/m².      Intake/Output Summary (Last 24 hours) at 9/5/2024 1250  Last data filed at 9/5/2024 0900  Gross per 24 hour   Intake 1241.67 ml   Output 1 ml   Net 1240.67 ml       Physical Exam  Constitutional:       General: She is not in acute distress.     Appearance: She is not ill-appearing or toxic-appearing.      Comments: Frail and cachectic appearing   HENT:      Head: Normocephalic and atraumatic.      Nose: Nose normal. No congestion.   Eyes:      General: No scleral icterus.  Cardiovascular:      Rate and Rhythm: Normal rate.      Pulses: Normal pulses.   Pulmonary:      Effort: Pulmonary effort is normal.   Abdominal:      General: Abdomen is flat. Bowel sounds are normal. There is no distension.      Tenderness: There is no abdominal tenderness.      Hernia: No hernia is present.   Musculoskeletal:         General: Normal range of motion.      Cervical back: Normal range of motion.   Skin:     General: Skin is warm.      Capillary Refill: Capillary refill takes less than 2 seconds.      Coloration: Skin is not pale.   Neurological:      Mental Status: She is alert and oriented to person, place, and time.   Psychiatric:         Mood and Affect: Mood normal.         Behavior: Behavior normal.           Invasive Devices       Peripheral Intravenous Line  Duration             Peripheral IV 09/03/24 Right;Ventral (anterior) Forearm 2 days                    Lab Results:  No results displayed because visit has over 200 results.          Imaging Studies: I have personally reviewed pertinent imaging studies.      "

## 2024-09-05 NOTE — PROGRESS NOTES
[facility]  Progress Note  Name: Barb Palomo I  MRN: [id number]  Unit/Bed#: [facility] 522-01 I Date of Admission: 8/15/2024   Date of Service: 9/5/2024 I Hospital Day: 20    Assessment & Plan   * C. difficile diarrhea  Assessment & Plan  Patient presented with generalized weakness, frequent falls.  She has had ongoing diarrhea, dysuria, poor oral intake and reported 70 pound weight loss over the last year.  Initially hypotensive in the ER, status post IV fluids. Labs with multiple metabolic derangements  CT imaging showing mild diffuse colonic wall thickening, mild diffuse wall thickening of stomach  C. difficile found to be positive--has a history of C. difficile in September 2022.    Patient started on Dificid 200 mg x 10 days per protocol on 8/18 as this is recurrent infection  Initially seemed BMs were improving and were becoming more formed, however again having frequent watery stools  GI consulted  Contact precautions  Appetite is good   No improvement with avoiding lactose and artificial sweeteners  Continue banatrol  Still having multiple loose stools overnight and this morning  Continue vancomycin, increased to 500mg-ID on board  Checking CT abdomen and pelvis: -CT is concerning for proctocolitis  GI to evaluate for pancreatic insufficiency as a contributing factor to her diarrhea - stool pancreatic elastase is low and started on Creon  Possible FMT this week if the symptoms does not improve-tentative plan for Friday    Hypotension  Assessment & Plan  Intermittent episodes of hypotension, most occurrences happen overnight and question correlation with minipress administration.  This has now been discontinued    Sinus tachycardia  Assessment & Plan  Heart rate 100s-120s at times.  Sinus.  Has received IV fluids without much improvement.  Trialed IVFs again 8/22 given reports of nausea with poor oral intake and dizziness with some improvement to the 110s with HR- will  "continue with IVFs again today   orthostatic blood pressure negative   Pt is on minipress which could have a side effect of ST.  Additionally noted she is hypotensive each night around 2/3 AM, I suspect this is likely the underlying cause.  She was started on this by her psychiatrist to assist with sleep and eating outpatient   Will hold minipress for now.  D/w pt can have PRN trazodone for sleep.  Pt agreeable   Monitor     Failure to thrive in adult  Assessment & Plan  Patient presented with increased generalized weakness and frequent falls, ongoing diarrhea, dysuria, poor oral intake, and at least 70-80 pound weight loss over the past 1 year. Has been following with GI, had colonoscopy May 2024 with poor prep--had EGD with esophagitis and gastritis  Labs with multiple derangements including hyperglycemia, hypokalemia and hypomagnesemia on admission, improved.   C. difficile positive as above  Patient reports she has not been using insulin due to \"insurance issue\" and inability to self inject due to her neuropathy  CT abdomen and pelvis without any obvious malignancy  Treatment as per respective issues  PT/OT recommending rehab.   Will consult endocrine for further management-patient reported that she is always hungry at home and eats fairly good amount of food-consult appreciated    Hypomagnesemia  Assessment & Plan  Hold oral supplementation due to diarrhea  Check magnesium level and replace as needed    Severe protein-calorie malnutrition (HCC)  Assessment & Plan  Severe protein-calorie malnutrition 2/2 chronic diarrhea a/e/b fat and muscle loss requiring Nutrition consult, oral diet and nutrition supplements    360 Statement: severe malnutrition r/t suspect combination of chronic illness and social/environmental factors as evidenced by severe temporal muscle loss, severe tricep fat pad loss, at least moderate muscle loss around clavicles and shoulders, at least moderate orbital fat pad loss. Treatment: oral " "diet and oral nutrition supplements.    BMI Findings:  Adult BMI Classifications: Underweight < 18.5        Body mass index is 16.81 kg/m².   C/w supplements  Nutrition consult appreciated  Encourage po intake     Acute cystitis without hematuria  Assessment & Plan  Reported dysuria on admission   Urine + nitrite & leukocytes, CT imaging with distended bladder suggestive of retention and small amount of air which could be secondary to infection  Urine Culture noted, S/P 3 doses of IV CTX 8/17 8/21, reported pain AFTER urination, likely related to urine hitting excoriated labia/bottom from frequent stooling. Will continue with medicated cream to this area- denies pain at this time   S/p pyridum x 3 doses  Wound care consult appreciated     Uncontrolled type 2 diabetes mellitus with hyperglycemia (HCC)  Assessment & Plan  Lab Results   Component Value Date    HGBA1C 16.1 (H) 08/16/2024       Recent Labs     09/05/24  0943 09/05/24  1003 09/05/24  1031 09/05/24  1547   POCGLU 35* 51* 110 148*       Patient markedly hyperglycemic on admission only partially responsive to IV fluids, remainder of labs fortunately not consistent with DKA. A1C 16  Patient admitting she has not been using her home insulin as she apparently has been unable to fill prescription due to \"insurance issues,\" additionally reports that due to her neuropathy she has difficulty self injecting.  Daughter also reports jessica non compliance.   Typically on NovoLog 70/30 20 units twice daily  Diabetic diet-change to regular diet recently  Continue basal/prandial insulin while inpatient, adjust PRN  Sliding scale coverage   Endocrinology  consultation appreciated-hypoglycemia noted.  Insulin dose adjusted    Opioid dependence with other opioid-induced disorder (HCC)  Assessment & Plan  PDMP reviewed, patient maintained on tramadol 200 mg daily as needed; history of chronic cervical/lumbar pain noted  Continue as needed tramadol and chronic gabapentin " dosing    Chronic diarrhea  Assessment & Plan  Longstanding history of chronic diarrhea, patient reporting acute diarrhea x1 month. Follows with GI. Has tried questran/imodium without relief  Stool enteric panel negative  C. difficile positive, see plan above   Giardia antigen is negative  stool elastase is low started on Creon per GI    Tobacco dependence  Assessment & Plan  Counseled on need for cessation, provide nicotine patch while admitted  Recent CT chest for lung cancer screening 7/30/2024 negative for nodules or masses    GERD without esophagitis  Assessment & Plan  Continue PPI    Severe episode of recurrent major depressive disorder, without psychotic features (HCC)  Assessment & Plan  Mood appearing fairly stable, continue home duloxetine, mirtazapine, as needed Valium  Hold minipress given symptomatic hypotension episodes, tachycardia                VTE Pharmacologic Prophylaxis: VTE Score: 3 Moderate Risk (Score 3-4) - Pharmacological DVT Prophylaxis Ordered: enoxaparin (Lovenox).    Mobility:   Basic Mobility Inpatient Raw Score: 23  JH-HLM Goal: 7: Walk 25 feet or more  JH-HLM Achieved: 7: Walk 25 feet or more  JH-HLM Goal achieved. Continue to encourage appropriate mobility.    Patient Centered Rounds: I performed bedside rounds with nursing staff today.   Discussions with Specialists or Other Care Team Provider:     Education and Discussions with Family / Patient: Updated  (daughter) via phone.    Total Time Spent on Date of Encounter in care of patient: 35 mins. This time was spent on one or more of the following: performing physical exam; counseling and coordination of care; obtaining or reviewing history; documenting in the medical record; reviewing/ordering tests, medications or procedures; communicating with other healthcare professionals and discussing with patient's family/caregivers.    Current Length of Stay: 20 day(s)  Current Patient Status: Inpatient   Certification  Statement: The patient will continue to require additional inpatient hospital stay due to persistent diarrhea  Discharge Plan:     Code Status: Level 1 - Full Code    Subjective:   Patient seen and examined.  Patient is currently undergoing prep for colonoscopy.  Patient was on clear liquid and was pretty upset that she was not getting as much as Jell-O as she wants.  Hypoglycemia noted.  Endocrinology adjusted the insulin dosage    Objective:     Vitals:   Temp (24hrs), Av °F (36.7 °C), Min:97.9 °F (36.6 °C), Max:98.1 °F (36.7 °C)    Temp:  [97.9 °F (36.6 °C)-98.1 °F (36.7 °C)] 97.9 °F (36.6 °C)  HR:  [102-115] 102  Resp:  [14-16] 14  BP: ()/(65-89) 125/88  SpO2:  [97 %-100 %] 97 %  Body mass index is 16.81 kg/m².     Input and Output Summary (last 24 hours):     Intake/Output Summary (Last 24 hours) at 2024 1758  Last data filed at 2024 0900  Gross per 24 hour   Intake 881.67 ml   Output 1 ml   Net 880.67 ml       Physical Exam:   Physical Exam  Constitutional:       General: She is not in acute distress.     Appearance: She is not ill-appearing.      Comments: Cachetic  appearing male   HENT:      Head: Normocephalic and atraumatic.      Nose: Nose normal.   Eyes:      General: No scleral icterus.  Cardiovascular:      Rate and Rhythm: Normal rate and regular rhythm.      Heart sounds: No murmur heard.  Pulmonary:      Effort: Pulmonary effort is normal. No respiratory distress.   Abdominal:      General: There is no distension.      Tenderness: There is no abdominal tenderness.   Musculoskeletal:         General: No swelling. Normal range of motion.   Skin:     General: Skin is warm.   Neurological:      Mental Status: She is alert. Mental status is at baseline.          Additional Data:     Labs:  Results from last 7 days   Lab Units 24  0603   WBC Thousand/uL 5.01   HEMOGLOBIN g/dL 10.7*   HEMATOCRIT % 32.7*   PLATELETS Thousands/uL 285     Results from last 7 days   Lab Units  09/05/24  0808 09/04/24  0603   SODIUM mmol/L 139 139   POTASSIUM mmol/L 3.8 4.3   CHLORIDE mmol/L 106 105   CO2 mmol/L 26 26   BUN mg/dL 15 15   CREATININE mg/dL 0.42* 0.49*   ANION GAP mmol/L 7 8   CALCIUM mg/dL 8.9 9.2   ALBUMIN g/dL  --  3.7   TOTAL BILIRUBIN mg/dL  --  0.28   ALK PHOS U/L  --  207*   ALT U/L  --  97*   AST U/L  --  57*   GLUCOSE RANDOM mg/dL 168* 285*         Results from last 7 days   Lab Units 09/05/24  1547 09/05/24  1031 09/05/24  1003 09/05/24  0943 09/05/24  0634 09/04/24  2026 09/04/24  1521 09/04/24  1044 09/04/24  0633 09/03/24  2045 09/03/24  1605 09/03/24  1114   POC GLUCOSE mg/dl 148* 110 51* 35* 288* 272* 214* 183* 263* 237* 259* 283*               Lines/Drains:  Invasive Devices       Peripheral Intravenous Line  Duration             Peripheral IV 09/03/24 Right;Ventral (anterior) Forearm 2 days                          Imaging: No pertinent imaging reviewed.    Recent Cultures (last 7 days):         Last 24 Hours Medication List:   Current Facility-Administered Medications   Medication Dose Route Frequency Provider Last Rate    acetaminophen  650 mg Oral Q6H PRN Abhay Hayes MD      albuterol  2 puff Inhalation Q4H PRN Renan Drake DO      aspirin  81 mg Oral Daily Renan Drake DO      atorvastatin  80 mg Oral Daily Renan Drake, DO      baclofen  5 mg Oral BID ELISEO Toribio      butalbital-acetaminophen-caffeine  1 tablet Oral Q6H PRN Renan Drake DO      cholestyramine sugar free  4 g Oral BID Dalia Soriano, DO      diazepam  5 mg Oral Q12H PRN Renan Drake, DO      DULoxetine  60 mg Oral BID Renan Drake DO      enoxaparin  40 mg Subcutaneous Q24H [facility] Abhay Hayes MD      fluticasone-vilanterol  1 puff Inhalation Daily Renan Drake, DO      gabapentin  800 mg Oral TID Renan Noelle, DO      insulin glargine  30 Units Subcutaneous HS Holli Lopes,       insulin lispro  1-5 Units Subcutaneous TID AC Abhay Hayes MD      insulin lispro  1-5  Units Subcutaneous HS Abhay Hayes MD      levothyroxine  112 mcg Oral Early Morning Renan Drake, DO      lidocaine  2 patch Topical Daily Magda ANASTACIO Amaya, ELISEO      mirtazapine  7.5 mg Oral HS Renan Drake, DO      DARLIN ANTIFUNGAL   Topical BID Cherise Grey, ELISEO      nicotine  1 patch Transdermal Daily Renan Drake, DO      ondansetron  4 mg Intravenous Q6H PRN Renan Drake, DO      pancrelipase (Lip-Prot-Amyl)  24,000 Units Oral TID With Meals Dalia Soriano, DO      pantoprazole  40 mg Oral Early Morning Renan Drake, DO      saccharomyces boulardii  250 mg Oral BID ELISEO Trevino      traMADol  50 mg Oral Q6H PRN Renan Drake, DO      traZODone  50 mg Oral HS PRN Estefany Villalobos PA-C          Today, Patient Was Seen By: Britney Arce MD    **Please Note: This note may have been constructed using a voice recognition system.**

## 2024-09-06 ENCOUNTER — APPOINTMENT (INPATIENT)
Dept: GASTROENTEROLOGY | Facility: HOSPITAL | Age: 54
DRG: 871 | End: 2024-09-06
Payer: MEDICARE

## 2024-09-06 ENCOUNTER — ANESTHESIA (INPATIENT)
Dept: GASTROENTEROLOGY | Facility: HOSPITAL | Age: 54
End: 2024-09-06
Payer: MEDICARE

## 2024-09-06 ENCOUNTER — ANESTHESIA EVENT (INPATIENT)
Dept: GASTROENTEROLOGY | Facility: HOSPITAL | Age: 54
End: 2024-09-06
Payer: MEDICARE

## 2024-09-06 LAB
ANION GAP SERPL CALCULATED.3IONS-SCNC: 12 MMOL/L (ref 4–13)
BUN SERPL-MCNC: 7 MG/DL (ref 5–25)
CALCIUM SERPL-MCNC: 9.2 MG/DL (ref 8.4–10.2)
CHLORIDE SERPL-SCNC: 105 MMOL/L (ref 96–108)
CO2 SERPL-SCNC: 28 MMOL/L (ref 21–32)
CREAT SERPL-MCNC: 0.4 MG/DL (ref 0.6–1.3)
ERYTHROCYTE [DISTWIDTH] IN BLOOD BY AUTOMATED COUNT: 14.4 % (ref 11.6–15.1)
GFR SERPL CREATININE-BSD FRML MDRD: 119 ML/MIN/1.73SQ M
GLUCOSE SERPL-MCNC: 111 MG/DL (ref 65–140)
GLUCOSE SERPL-MCNC: 150 MG/DL (ref 65–140)
GLUCOSE SERPL-MCNC: 173 MG/DL (ref 65–140)
GLUCOSE SERPL-MCNC: 181 MG/DL (ref 65–140)
GLUCOSE SERPL-MCNC: 190 MG/DL (ref 65–140)
GLUCOSE SERPL-MCNC: 322 MG/DL (ref 65–140)
GLUCOSE SERPL-MCNC: 44 MG/DL (ref 65–140)
GLUCOSE SERPL-MCNC: 49 MG/DL (ref 65–140)
GLUCOSE SERPL-MCNC: 71 MG/DL (ref 65–140)
HCT VFR BLD AUTO: 35 % (ref 34.8–46.1)
HGB BLD-MCNC: 11.7 G/DL (ref 11.5–15.4)
MAGNESIUM SERPL-MCNC: 1.4 MG/DL (ref 1.9–2.7)
MCH RBC QN AUTO: 32.3 PG (ref 26.8–34.3)
MCHC RBC AUTO-ENTMCNC: 33.4 G/DL (ref 31.4–37.4)
MCV RBC AUTO: 97 FL (ref 82–98)
PLATELET # BLD AUTO: 281 THOUSANDS/UL (ref 149–390)
PMV BLD AUTO: 10.8 FL (ref 8.9–12.7)
POTASSIUM SERPL-SCNC: 3 MMOL/L (ref 3.5–5.3)
RBC # BLD AUTO: 3.62 MILLION/UL (ref 3.81–5.12)
SODIUM SERPL-SCNC: 145 MMOL/L (ref 135–147)
WBC # BLD AUTO: 9.83 THOUSAND/UL (ref 4.31–10.16)

## 2024-09-06 PROCEDURE — 82948 REAGENT STRIP/BLOOD GLUCOSE: CPT

## 2024-09-06 PROCEDURE — 83735 ASSAY OF MAGNESIUM: CPT | Performed by: FAMILY MEDICINE

## 2024-09-06 PROCEDURE — 0DBE8ZX EXCISION OF LARGE INTESTINE, VIA NATURAL OR ARTIFICIAL OPENING ENDOSCOPIC, DIAGNOSTIC: ICD-10-PCS | Performed by: INTERNAL MEDICINE

## 2024-09-06 PROCEDURE — 80048 BASIC METABOLIC PNL TOTAL CA: CPT | Performed by: FAMILY MEDICINE

## 2024-09-06 PROCEDURE — 99233 SBSQ HOSP IP/OBS HIGH 50: CPT | Performed by: INTERNAL MEDICINE

## 2024-09-06 PROCEDURE — 45380 COLONOSCOPY AND BIOPSY: CPT | Performed by: INTERNAL MEDICINE

## 2024-09-06 PROCEDURE — 85027 COMPLETE CBC AUTOMATED: CPT | Performed by: FAMILY MEDICINE

## 2024-09-06 PROCEDURE — 3E0H8GC INTRODUCTION OF OTHER THERAPEUTIC SUBSTANCE INTO LOWER GI, VIA NATURAL OR ARTIFICIAL OPENING ENDOSCOPIC: ICD-10-PCS | Performed by: INTERNAL MEDICINE

## 2024-09-06 PROCEDURE — 99232 SBSQ HOSP IP/OBS MODERATE 35: CPT | Performed by: FAMILY MEDICINE

## 2024-09-06 PROCEDURE — 88305 TISSUE EXAM BY PATHOLOGIST: CPT | Performed by: PATHOLOGY

## 2024-09-06 PROCEDURE — XW0H7X8 INTRODUCTION OF BROAD CONSORTIUM MICROBIOTA-BASED LIVE BIOTHERAPEUTIC SUSPENSION INTO LOWER GI, VIA NATURAL OR ARTIFICIAL OPENING, NEW TECHNOLOGY GROUP 8: ICD-10-PCS | Performed by: INTERNAL MEDICINE

## 2024-09-06 RX ORDER — POTASSIUM CHLORIDE 1500 MG/1
40 TABLET, EXTENDED RELEASE ORAL ONCE
Status: COMPLETED | OUTPATIENT
Start: 2024-09-06 | End: 2024-09-06

## 2024-09-06 RX ORDER — DEXTROSE MONOHYDRATE 25 G/50ML
50 INJECTION, SOLUTION INTRAVENOUS ONCE
Status: COMPLETED | OUTPATIENT
Start: 2024-09-06 | End: 2024-09-06

## 2024-09-06 RX ORDER — DEXTROSE MONOHYDRATE 25 G/50ML
INJECTION, SOLUTION INTRAVENOUS
Status: COMPLETED
Start: 2024-09-06 | End: 2024-09-06

## 2024-09-06 RX ORDER — LIDOCAINE HYDROCHLORIDE 10 MG/ML
INJECTION, SOLUTION EPIDURAL; INFILTRATION; INTRACAUDAL; PERINEURAL AS NEEDED
Status: DISCONTINUED | OUTPATIENT
Start: 2024-09-06 | End: 2024-09-07

## 2024-09-06 RX ORDER — PROPOFOL 10 MG/ML
INJECTION, EMULSION INTRAVENOUS AS NEEDED
Status: DISCONTINUED | OUTPATIENT
Start: 2024-09-06 | End: 2024-09-07

## 2024-09-06 RX ORDER — DEXTROSE MONOHYDRATE AND SODIUM CHLORIDE 5; .45 G/100ML; G/100ML
75 INJECTION, SOLUTION INTRAVENOUS CONTINUOUS
Status: DISCONTINUED | OUTPATIENT
Start: 2024-09-06 | End: 2024-09-07

## 2024-09-06 RX ORDER — PHENYLEPHRINE HCL IN 0.9% NACL 1 MG/10 ML
SYRINGE (ML) INTRAVENOUS AS NEEDED
Status: DISCONTINUED | OUTPATIENT
Start: 2024-09-06 | End: 2024-09-07

## 2024-09-06 RX ORDER — MAGNESIUM SULFATE HEPTAHYDRATE 40 MG/ML
4 INJECTION, SOLUTION INTRAVENOUS ONCE
Status: COMPLETED | OUTPATIENT
Start: 2024-09-06 | End: 2024-09-06

## 2024-09-06 RX ORDER — SODIUM CHLORIDE 9 MG/ML
INJECTION, SOLUTION INTRAVENOUS CONTINUOUS PRN
Status: DISCONTINUED | OUTPATIENT
Start: 2024-09-06 | End: 2024-09-07

## 2024-09-06 RX ADMIN — LEVOTHYROXINE SODIUM 112 MCG: 112 TABLET ORAL at 05:00

## 2024-09-06 RX ADMIN — DEXTROSE MONOHYDRATE 50 ML: 25 INJECTION, SOLUTION INTRAVENOUS at 09:30

## 2024-09-06 RX ADMIN — ONDANSETRON 4 MG: 2 INJECTION INTRAMUSCULAR; INTRAVENOUS at 20:19

## 2024-09-06 RX ADMIN — DEXTROSE AND SODIUM CHLORIDE 75 ML/HR: 5; .45 INJECTION, SOLUTION INTRAVENOUS at 09:35

## 2024-09-06 RX ADMIN — ONDANSETRON 4 MG: 2 INJECTION INTRAMUSCULAR; INTRAVENOUS at 04:16

## 2024-09-06 RX ADMIN — PROPOFOL 50 MG: 10 INJECTION, EMULSION INTRAVENOUS at 15:10

## 2024-09-06 RX ADMIN — GABAPENTIN 800 MG: 400 CAPSULE ORAL at 23:22

## 2024-09-06 RX ADMIN — MIRTAZAPINE 7.5 MG: 15 TABLET, FILM COATED ORAL at 22:01

## 2024-09-06 RX ADMIN — BUTALBITAL, ACETAMINOPHEN AND CAFFEINE 1 TABLET: 50; 325; 40 TABLET ORAL at 04:26

## 2024-09-06 RX ADMIN — POTASSIUM CHLORIDE 40 MEQ: 1500 TABLET, EXTENDED RELEASE ORAL at 10:55

## 2024-09-06 RX ADMIN — PROPOFOL 50 MG: 10 INJECTION, EMULSION INTRAVENOUS at 15:12

## 2024-09-06 RX ADMIN — MAGNESIUM SULFATE HEPTAHYDRATE 4 G: 40 INJECTION, SOLUTION INTRAVENOUS at 18:47

## 2024-09-06 RX ADMIN — PROPOFOL 50 MG: 10 INJECTION, EMULSION INTRAVENOUS at 15:23

## 2024-09-06 RX ADMIN — PROPOFOL 100 MG: 10 INJECTION, EMULSION INTRAVENOUS at 15:08

## 2024-09-06 RX ADMIN — Medication 250 MG: at 09:16

## 2024-09-06 RX ADMIN — NICOTINE 1 PATCH: 21 PATCH, EXTENDED RELEASE TRANSDERMAL at 09:10

## 2024-09-06 RX ADMIN — PROPOFOL 50 MG: 10 INJECTION, EMULSION INTRAVENOUS at 15:18

## 2024-09-06 RX ADMIN — INSULIN LISPRO 3 UNITS: 100 INJECTION, SOLUTION INTRAVENOUS; SUBCUTANEOUS at 22:01

## 2024-09-06 RX ADMIN — ASPIRIN 81 MG CHEWABLE TABLET 81 MG: 81 TABLET CHEWABLE at 09:16

## 2024-09-06 RX ADMIN — Medication 250 MG: at 17:00

## 2024-09-06 RX ADMIN — Medication 100 MCG: at 15:17

## 2024-09-06 RX ADMIN — PANCRELIPASE 24000 UNITS: 120000; 24000; 76000 CAPSULE, DELAYED RELEASE PELLETS ORAL at 16:56

## 2024-09-06 RX ADMIN — BACLOFEN 5 MG: 10 TABLET ORAL at 17:00

## 2024-09-06 RX ADMIN — DEXTROSE MONOHYDRATE 50 ML: 25 INJECTION, SOLUTION INTRAVENOUS at 04:26

## 2024-09-06 RX ADMIN — PROPOFOL 50 MG: 10 INJECTION, EMULSION INTRAVENOUS at 15:21

## 2024-09-06 RX ADMIN — ENOXAPARIN SODIUM 40 MG: 40 INJECTION SUBCUTANEOUS at 09:10

## 2024-09-06 RX ADMIN — PROPOFOL 50 MG: 10 INJECTION, EMULSION INTRAVENOUS at 15:16

## 2024-09-06 RX ADMIN — BACLOFEN 5 MG: 10 TABLET ORAL at 09:16

## 2024-09-06 RX ADMIN — PROPOFOL 50 MG: 10 INJECTION, EMULSION INTRAVENOUS at 15:31

## 2024-09-06 RX ADMIN — CHOLESTYRAMINE 4 G: 4 POWDER, FOR SUSPENSION ORAL at 17:00

## 2024-09-06 RX ADMIN — FLUTICASONE FUROATE AND VILANTEROL TRIFENATATE 1 PUFF: 200; 25 POWDER RESPIRATORY (INHALATION) at 09:10

## 2024-09-06 RX ADMIN — LIDOCAINE 2 PATCH: 700 PATCH TOPICAL at 09:11

## 2024-09-06 RX ADMIN — GABAPENTIN 800 MG: 400 CAPSULE ORAL at 16:56

## 2024-09-06 RX ADMIN — MICONAZOLE NITRATE: 20 CREAM TOPICAL at 17:00

## 2024-09-06 RX ADMIN — DULOXETINE HYDROCHLORIDE 60 MG: 60 CAPSULE, DELAYED RELEASE ORAL at 17:00

## 2024-09-06 RX ADMIN — SODIUM CHLORIDE: 9 INJECTION, SOLUTION INTRAVENOUS at 15:02

## 2024-09-06 RX ADMIN — TRAMADOL HYDROCHLORIDE 50 MG: 50 TABLET, COATED ORAL at 09:15

## 2024-09-06 RX ADMIN — LIDOCAINE HYDROCHLORIDE 30 MG: 10 INJECTION, SOLUTION EPIDURAL; INFILTRATION; INTRACAUDAL; PERINEURAL at 15:08

## 2024-09-06 RX ADMIN — MICONAZOLE NITRATE: 20 CREAM TOPICAL at 09:20

## 2024-09-06 RX ADMIN — Medication 100 MCG: at 15:22

## 2024-09-06 RX ADMIN — DULOXETINE HYDROCHLORIDE 60 MG: 60 CAPSULE, DELAYED RELEASE ORAL at 09:16

## 2024-09-06 RX ADMIN — TRAZODONE HYDROCHLORIDE 50 MG: 50 TABLET ORAL at 23:22

## 2024-09-06 RX ADMIN — GABAPENTIN 800 MG: 400 CAPSULE ORAL at 09:16

## 2024-09-06 RX ADMIN — PANTOPRAZOLE SODIUM 40 MG: 40 TABLET, DELAYED RELEASE ORAL at 05:00

## 2024-09-06 RX ADMIN — PROPOFOL 50 MG: 10 INJECTION, EMULSION INTRAVENOUS at 15:27

## 2024-09-06 RX ADMIN — INSULIN GLARGINE 30 UNITS: 100 INJECTION, SOLUTION SUBCUTANEOUS at 22:05

## 2024-09-06 NOTE — CASE MANAGEMENT
Case Management Progress Note    Patient name Barb Palomo  Location [facility] 522/[facility] 522-01 MRN [id number]  : 1970 Date 2024       LOS (days): 21  Geometric Mean LOS (GMLOS) (days): 5.1  Days to GMLOS:-16.3        Pt's LOS is 21 days. Pt is not medically ready for d/c. Pt continues to experience loose stools due to C.diff bacterium. Pt has been switched to PO ABX, but is still on IV fluids for hydration. Pt is planned to received a fecal implant on 24.     Pt remains recommended for SNF rehab for her aftercare plan. Pt has been referred to and is accepted for placement at [facility] located in [facility].     Once pt is medically cleared for d/c by [facility], pt will admit to [facility] for rehab. Pt has Medicare for healthcare coverage; no authorization from insurance is required for pt's admission to [facility].     CM to follow.

## 2024-09-06 NOTE — PHYSICAL THERAPY NOTE
PHYSICAL THERAPY NOTE          Patient Name: Barb Palomo  Today's Date: 9/6/2024 09/06/24 1342   PT Last Visit   PT Visit Date 09/06/24   Note Type   Note Type Cancelled Session   Cancel Reasons Other       Pt pending fecal transplant today. PT will continue to follow as able.    Mary Ann Salvador, PT, DPT

## 2024-09-06 NOTE — PLAN OF CARE
Problem: PAIN - ADULT  Goal: Verbalizes/displays adequate comfort level or baseline comfort level  Description: Interventions:  - Encourage patient to monitor pain and request assistance  - Assess pain using appropriate pain scale  - Administer analgesics based on type and severity of pain and evaluate response  - Implement non-pharmacological measures as appropriate and evaluate response  - Consider cultural and social influences on pain and pain management  - Notify physician/advanced practitioner if interventions unsuccessful or patient reports new pain  Outcome: Progressing     Problem: INFECTION - ADULT  Goal: Absence or prevention of progression during hospitalization  Description: INTERVENTIONS:  - Assess and monitor for signs and symptoms of infection  - Monitor lab/diagnostic results  - Monitor all insertion sites, i.e. indwelling lines, tubes, and drains  - Monitor endotracheal if appropriate and nasal secretions for changes in amount and color  - Cayey appropriate cooling/warming therapies per order  - Administer medications as ordered  - Instruct and encourage patient and family to use good hand hygiene technique  - Identify and instruct in appropriate isolation precautions for identified infection/condition  Outcome: Progressing     Problem: NEUROSENSORY - ADULT  Goal: Achieves stable or improved neurological status  Description: INTERVENTIONS  - Monitor and report changes in neurological status  - Monitor vital signs such as temperature, blood pressure, glucose, and any other labs ordered   - Initiate measures to prevent increased intracranial pressure  - Monitor for seizure activity and implement precautions if appropriate      Outcome: Progressing     Problem: METABOLIC, FLUID AND ELECTROLYTES - ADULT  Goal: Electrolytes maintained within normal limits  Description: INTERVENTIONS:  - Monitor labs and assess patient for signs and symptoms of electrolyte imbalances  - Administer electrolyte  replacement as ordered  - Monitor response to electrolyte replacements, including repeat lab results as appropriate  - Instruct patient on fluid and nutrition as appropriate  Outcome: Progressing     Problem: SKIN/TISSUE INTEGRITY - ADULT  Goal: Skin Integrity remains intact(Skin Breakdown Prevention)  Description: Assess:  -Perform William assessment every shift  -Clean and moisturize skin every day  -Inspect skin when repositioning, toileting, and assisting with ADLS  -Assess under medical devices such as lines every 2 hours  -Assess extremities for adequate circulation and sensation     Bed Management:  -Have minimal linens on bed & keep smooth, unwrinkled  -Change linens as needed when moist or perspiring  -Avoid sitting or lying in one position for more than 2 hours while in bed  -Keep HOB at 30 degrees     Toileting:  -Offer bedside commode  -Assess for incontinence every hour  -Use incontinent care products after each incontinent episode such as moisture barriers, foam cleansers    Activity:  -Mobilize patient 3 times a day  -Encourage activity and walks on unit  -Encourage or provide ROM exercises   -Turn and reposition patient every 2 Hours  -Use appropriate equipment to lift or move patient in bed  -Instruct/ Assist with weight shifting every 15 min when out of bed in chair  -Consider limitation of chair time 2 hour intervals    Skin Care:  -Avoid use of baby powder, tape, friction and shearing, hot water or constrictive clothing  -Relieve pressure over bony prominences using offloading techniques or foam dressings (If not contraindicated by incontinence)  -Do not massage red bony areas    Next Steps:  -Teach patient strategies to minimize risks    -Consider consults to  interdisciplinary teams  Outcome: Progressing

## 2024-09-06 NOTE — QUICK NOTE
Glycemic trends reviewed     Yesterday she was on clears  Today she is n.p.o. for colonoscopy/fecal transplant    patient's basal insulin was decreased to half dose Lantus last night of 15 units by primary; unfortunately patient still hypoglycemic this morning to 44 units at 4 AM  Ultimately, this does point to a large component of patient's poor glycemic control it is due to large and variable dietary intake    Anticipate patient being able to resume diet post procedurally  Recommend maintaining Lantus 30 units tonight at this time as anticipate patient will resume eating immediately   Will hold off on mealtime  bolus insulin of 10 units in light of recent hypoglycemia  Continue Algorithm 2/2 correctional scale at this time    Endocrinology will continue to follow

## 2024-09-06 NOTE — PROGRESS NOTES
Progress Note - Infectious Disease   Barb Palomo 54 y.o. female MRN: [id number]  Unit/Bed#: [facility] 522-01 Encounter: [id number]      Impression/Plan:  1.  C. difficile colitis.  Patient presented with weakness, weight loss, acute on chronic diarrhea.  8/16 C. difficile PCR and toxin EIA positive.  Continues to have significant loose stools despite 7 days of fidaxomicin and now 7 days of p.o. vancomycin.  She remains nontoxic without fever or leukocytosis.  Abdominal exam benign.  Unclear if ongoing symptoms are related to C. difficile alone or an alternative etiology as below.  Given recent positive testing there is low utility to repeat C. difficile testing.  Still having significant diarrhea              -Holding high-dose oral vancomycin awaiting fecal transplant              -Continue Banatrol  -Careful use of Questran              -Patient for colonoscopy with fecal transplant today     2.  Acute on chronic diarrhea.  The patient has had diarrhea and weight loss for the past year but reports diarrhea is usually 2-4 times daily.  She continues to have up to 10 stools daily since admission with minimal improvement on C. difficile treatment.  Fecal white blood cells negative.  Additional infectious testing including stool O&P, Giardia antigen, enteric panel negative.  Unclear if ongoing symptoms are solely due to C. difficile.  The patient has a diagnosis of chronic pancreatitis to be contributing.  Poorly controlled diabetes may also lead to diarrhea.  She may also be having postinfectious/functional diarrhea.  Continues to have substantial diarrhea.  Repeat CT of the abdomen pelvis with colonic thickening.  Pancreatic insufficiency may also be playing a role in the chronic process.              -Treatment for C. difficile as above              -Close GI follow-up  -Continue pancreatic enzymes  -Patient for colonoscopy with fecal transplant later today     3.  Failure to thrive, severe protein calorie  malnutrition.  BMI 16.  The patient has had diarrhea and weight loss for the past year.  Status post EGD and colonoscopy May, colon biopsies without pathologic abnormalities.     4.  Poorly controlled type 2 diabetes with hyperglycemia.  Hemoglobin A1c 16.1%.  Patient was not using insulin at home.  Glycemic management per primary team     5.  Bacteriuria.  Patient received 3 doses of IV ceftriaxone.  CT imaging showed a distended bladder suggestive of retention which is likely contributing to her symptoms.  Per report pain was happening after urination likely due to skin irritation.  In the setting of C. difficile as above, avoid unnecessary antibiotics.    Discussed with the primary service the plan to hold on the oral vancomycin awaiting fecal transplantation and they agree with the plan.    Antibiotics:  Holding high-dose oral vancomycin    Subjective:  Patient has no fever, chills, sweats; no nausea, vomiting, still having diarrhea; no cough, shortness of breath; no pain. No new symptoms.  Completed bowel prep.    Objective:  Vitals:  Temp:  [97.9 °F (36.6 °C)-98.5 °F (36.9 °C)] 98.2 °F (36.8 °C)  HR:  [] 97  Resp:  [17-18] 18  BP: (103-125)/(70-88) 108/75  SpO2:  [97 %] 97 %  Temp (24hrs), Av.2 °F (36.8 °C), Min:97.9 °F (36.6 °C), Max:98.5 °F (36.9 °C)  Current: Temperature: 98.2 °F (36.8 °C)    Physical Exam:   General Appearance:  Alert, interactive, nontoxic, no acute distress.   Throat: Oropharynx moist without lesions.    Lungs:   Clear to auscultation bilaterally; no wheezes, rhonchi or rales; respirations unlabored   Heart:  RRR; no murmur, rub or gallop   Abdomen:   Soft, non-tender, non-distended, positive bowel sounds.     Extremities: No clubbing, cyanosis or edema   Skin: No new rashes or lesions. No draining wounds noted.       Labs, Imaging, & Other studies:   All pertinent labs and imaging studies were personally reviewed  Results from last 7 days   Lab Units 24  0568  09/04/24  0603 09/03/24  0637   WBC Thousand/uL 9.83 5.01 6.16   HEMOGLOBIN g/dL 11.7 10.7* 10.6*   PLATELETS Thousands/uL 281 285 283     Results from last 7 days   Lab Units 09/06/24  0457 09/05/24  0808 09/04/24  0603 09/02/24  0706 09/01/24  0525   SODIUM mmol/L 145 139 139   < > 138   POTASSIUM mmol/L 3.0* 3.8 4.3   < > 4.0   CHLORIDE mmol/L 105 106 105   < > 101   CO2 mmol/L 28 26 26   < > 30   BUN mg/dL 7 15 15   < > 13   CREATININE mg/dL 0.40* 0.42* 0.49*   < > 0.54*   EGFR ml/min/1.73sq m 119 117 111   < > 108   CALCIUM mg/dL 9.2 8.9 9.2   < > 9.3   AST U/L  --   --  57*  --  42*   ALT U/L  --   --  97*  --  59*   ALK PHOS U/L  --   --  207*  --  197*    < > = values in this interval not displayed.

## 2024-09-06 NOTE — ASSESSMENT & PLAN NOTE
"Patient presented with increased generalized weakness and frequent falls, ongoing diarrhea, dysuria, poor oral intake, and at least 70-80 pound weight loss over the past 1 year. Has been following with GI, had colonoscopy May 2024 with poor prep--had EGD with esophagitis and gastritis  Labs with multiple derangements including hyperglycemia, hypokalemia and hypomagnesemia on admission, improved.   C. difficile positive as above  Patient reports she has not been using insulin due to \"insurance issue\" and inability to self inject due to her neuropathy  CT abdomen and pelvis without any obvious malignancy  Treatment as per respective issues  PT/OT recommending rehab.   Endocrinology evaluation appreciated.  "

## 2024-09-06 NOTE — ANESTHESIA PREPROCEDURE EVALUATION
Procedure:  COLONOSCOPY    Relevant Problems   CARDIO   (+) Benign essential hypertension   (+) Chronic migraine without aura without status migrainosus, not intractable   (+) Chronic migraine without aura, not intractable, without status migrainosus   (+) Hyperlipidemia      ENDO   (+) Hypothyroidism      GI/HEPATIC   (+) Dysphagia   (+) Fatty liver   (+) GERD without esophagitis   (+) Pancreatic cyst      MUSCULOSKELETAL   (+) Cervical spondylosis without myelopathy   (+) DDD (degenerative disc disease), cervical   (+) DDD (degenerative disc disease), lumbar   (+) Low back pain with sciatica      NEURO/PSYCH   (+) Agoraphobia with panic disorder   (+) Chronic cervical pain   (+) Chronic migraine without aura without status migrainosus, not intractable   (+) Chronic migraine without aura, not intractable, without status migrainosus   (+) Chronic pain disorder   (+) Chronic pain of both shoulders   (+) Depression, unspecified   (+) Diplopia   (+) Paresthesia of both lower extremities   (+) Post-traumatic stress disorder, unspecified   (+) Severe episode of recurrent major depressive disorder, without psychotic features (HCC)   (+) Weakness of right upper extremity     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 She states that she still has the numbness in her left face and left arm. Her pupil are very minimally reactive to light and she states that her eyes have always been like that.      Radiculopathy to both hands, arms, legs and feet. States that her left foot is the worst with numbness and weakness. She does get burning pain in that leg as well. She does describe full body weakness. She is following outpatient for this.     She has had HHC in the past. She has diplopia and dysphagia. DVT and PE in the past currently on aspirin. Asthma with inhaler.     Physical Exam    Airway    Mallampati score: I  TM Distance: >3 FB  Neck ROM: full     Dental    upper dentures and lower dentures    Cardiovascular  Cardiovascular exam  normal    Pulmonary  Pulmonary exam normal     Other Findings  post-pubertal.      Anesthesia Plan  ASA Score- 3     Anesthesia Type- IV sedation with anesthesia with ASA Monitors.         Additional Monitors:     Airway Plan:            Plan Factors-Exercise tolerance (METS): >4 METS.    Chart reviewed. EKG reviewed.  Existing labs reviewed. Patient summary reviewed.    Patient is not a current smoker.  Patient did not smoke on day of surgery.    Obstructive sleep apnea risk education given perioperatively.        Induction- intravenous.    Postoperative Plan-     Perioperative Resuscitation Plan - Level 1 - Full Code.       Informed Consent- Anesthetic plan and risks discussed with patient.  I personally reviewed this patient with the CRNA. Discussed and agreed on the Anesthesia Plan with the CRNA..

## 2024-09-06 NOTE — ASSESSMENT & PLAN NOTE
"Lab Results   Component Value Date    HGBA1C 16.1 (H) 08/16/2024       Recent Labs     09/06/24  0928 09/06/24  1011 09/06/24  1047 09/06/24  1649   POCGLU 49* 190* 150* 71       Patient markedly hyperglycemic on admission only partially responsive to IV fluids, remainder of labs fortunately not consistent with DKA. A1C 16  Patient admitting she has not been using her home insulin as she apparently has been unable to fill prescription due to \"insurance issues,\" additionally reports that due to her neuropathy she has difficulty self injecting.  Daughter also reports jessica non compliance.   Typically on NovoLog 70/30 20 units twice daily  Diabetic diet-change to regular diet recently  Continue basal/prandial insulin while inpatient, adjust PRN  Sliding scale coverage  Noted overnight.  Decrease the dose of basal insulin.  Patient was on clear liquid diet.    "

## 2024-09-06 NOTE — PLAN OF CARE
Problem: Nutrition/Hydration-ADULT  Goal: Nutrient/Hydration intake appropriate for improving, restoring or maintaining nutritional needs  Description: Monitor and assess patient's nutrition/hydration status for malnutrition. Collaborate with interdisciplinary team and initiate plan and interventions as ordered.  Monitor patient's weight and dietary intake as ordered or per policy. Utilize nutrition screening tool and intervene as necessary. Determine patient's food preferences and provide high-protein, high-caloric foods as appropriate.     INTERVENTIONS:  - Monitor oral intake, urinary output, labs, and treatment plans  - Assess nutrition and hydration status and recommend course of action  - Evaluate amount of meals eaten  - Assist patient with eating if necessary   - Allow adequate time for meals  - Recommend/ encourage appropriate diets, oral nutritional supplements, and vitamin/mineral supplements  - Order, calculate, and assess calorie counts as needed  - Recommend, monitor, and adjust tube feedings and TPN/PPN based on assessed needs  - Assess need for intravenous fluids  - Provide specific nutrition/hydration education as appropriate  - Include patient/family/caregiver in decisions related to nutrition  Outcome: Progressing     Problem: PAIN - ADULT  Goal: Verbalizes/displays adequate comfort level or baseline comfort level  Description: Interventions:  - Encourage patient to monitor pain and request assistance  - Assess pain using appropriate pain scale  - Administer analgesics based on type and severity of pain and evaluate response  - Implement non-pharmacological measures as appropriate and evaluate response  - Consider cultural and social influences on pain and pain management  - Notify physician/advanced practitioner if interventions unsuccessful or patient reports new pain  Outcome: Progressing     Problem: SAFETY ADULT  Goal: Patient will remain free of falls  Description: INTERVENTIONS:  -  Educate patient/family on patient safety including physical limitations  - Instruct patient to call for assistance with activity   - Consult OT/PT to assist with strengthening/mobility   - Keep Call bell within reach  - Keep bed low and locked with side rails adjusted as appropriate  - Keep care items and personal belongings within reach  - Initiate and maintain comfort rounds  - Make Fall Risk Sign visible to staff  - Offer Toileting every 2 Hours, in advance of need  - Initiate/Maintain alarm  - Obtain necessary fall risk management equipment  - Apply yellow socks and bracelet for high fall risk patients  - Consider moving patient to room near nurses station  Outcome: Progressing     Problem: METABOLIC, FLUID AND ELECTROLYTES - ADULT  Goal: Fluid balance maintained  Description: INTERVENTIONS:  - Monitor labs   - Monitor I/O and WT  - Instruct patient on fluid and nutrition as appropriate  - Assess for signs & symptoms of volume excess or deficit  Outcome: Progressing     Problem: METABOLIC, FLUID AND ELECTROLYTES - ADULT  Goal: Glucose maintained within target range  Description: INTERVENTIONS:  - Monitor Blood Glucose as ordered  - Assess for signs and symptoms of hyperglycemia and hypoglycemia  - Administer ordered medications to maintain glucose within target range  - Assess nutritional intake and initiate nutrition service referral as needed  Outcome: Progressing

## 2024-09-06 NOTE — PROGRESS NOTES
[facility]  Progress Note  Name: Barb Palomo I  MRN: [id number]  Unit/Bed#: [facility] 522-01 I Date of Admission: 8/15/2024   Date of Service: 9/6/2024 I Hospital Day: 21    Assessment & Plan   * C. difficile diarrhea  Assessment & Plan  Patient presented with generalized weakness, frequent falls.  She has had ongoing diarrhea, dysuria, poor oral intake and reported 70 pound weight loss over the last year.  Initially hypotensive in the ER, status post IV fluids. Labs with multiple metabolic derangements  CT imaging showing mild diffuse colonic wall thickening, mild diffuse wall thickening of stomach  C. difficile found to be positive--has a history of C. difficile in September 2022.    Patient started on Dificid 200 mg x 10 days per protocol on 8/18 as this is recurrent infection  Initially seemed BMs were improving and were becoming more formed, however again having frequent watery stools  GI consulted  Contact precautions  Appetite is good   No improvement with avoiding lactose and artificial sweeteners  Continue banatrol  Still having multiple loose stools overnight and this morning  Continue vancomycin, increased to 500mg-ID on board  Checking CT abdomen and pelvis: -CT is concerning for proctocolitis  GI to evaluate for pancreatic insufficiency as a contributing factor to her diarrhea - stool pancreatic elastase is low and started on Creon  Status post colonoscopy and FMT today.  Patient tolerated the procedure    Hypotension  Assessment & Plan  Intermittent episodes of hypotension, most occurrences happen overnight and question correlation with minipress administration.  This has now been discontinued    Sinus tachycardia  Assessment & Plan  Heart rate 100s-120s at times.  Sinus.  Has received IV fluids without much improvement.  Trialed IVFs again 8/22 given reports of nausea with poor oral intake and dizziness with some improvement to the 110s with HR- will continue  "with IVFs again today   orthostatic blood pressure negative   Pt is on minipress which could have a side effect of ST.  Additionally noted she is hypotensive each night around 2/3 AM, I suspect this is likely the underlying cause.  She was started on this by her psychiatrist to assist with sleep and eating outpatient   Will hold minipress for now.  D/w pt can have PRN trazodone for sleep.  Pt agreeable   Monitor     Failure to thrive in adult  Assessment & Plan  Patient presented with increased generalized weakness and frequent falls, ongoing diarrhea, dysuria, poor oral intake, and at least 70-80 pound weight loss over the past 1 year. Has been following with GI, had colonoscopy May 2024 with poor prep--had EGD with esophagitis and gastritis  Labs with multiple derangements including hyperglycemia, hypokalemia and hypomagnesemia on admission, improved.   C. difficile positive as above  Patient reports she has not been using insulin due to \"insurance issue\" and inability to self inject due to her neuropathy  CT abdomen and pelvis without any obvious malignancy  Treatment as per respective issues  PT/OT recommending rehab.   Endocrinology evaluation appreciated.    Hypomagnesemia  Assessment & Plan  Hold oral supplementation due to diarrhea  Check magnesium level and replace as needed    Severe protein-calorie malnutrition (HCC)  Assessment & Plan  Severe protein-calorie malnutrition 2/2 chronic diarrhea a/e/b fat and muscle loss requiring Nutrition consult, oral diet and nutrition supplements    360 Statement: severe malnutrition r/t suspect combination of chronic illness and social/environmental factors as evidenced by severe temporal muscle loss, severe tricep fat pad loss, at least moderate muscle loss around clavicles and shoulders, at least moderate orbital fat pad loss. Treatment: oral diet and oral nutrition supplements.    BMI Findings:  Adult BMI Classifications: Underweight < 18.5        Body mass index is " "16.45 kg/m².   C/w supplements  Nutrition consult appreciated  Encourage po intake     Acute cystitis without hematuria  Assessment & Plan  Reported dysuria on admission   Urine + nitrite & leukocytes, CT imaging with distended bladder suggestive of retention and small amount of air which could be secondary to infection  Urine Culture noted, S/P 3 doses of IV CTX 8/17 8/21, reported pain AFTER urination, likely related to urine hitting excoriated labia/bottom from frequent stooling. Will continue with medicated cream to this area- denies pain at this time   S/p pyridum x 3 doses  Wound care consult appreciated     Uncontrolled type 2 diabetes mellitus with hyperglycemia (HCC)  Assessment & Plan  Lab Results   Component Value Date    HGBA1C 16.1 (H) 08/16/2024       Recent Labs     09/06/24  0928 09/06/24  1011 09/06/24  1047 09/06/24  1649   POCGLU 49* 190* 150* 71       Patient markedly hyperglycemic on admission only partially responsive to IV fluids, remainder of labs fortunately not consistent with DKA. A1C 16  Patient admitting she has not been using her home insulin as she apparently has been unable to fill prescription due to \"insurance issues,\" additionally reports that due to her neuropathy she has difficulty self injecting.  Daughter also reports jessica non compliance.   Typically on NovoLog 70/30 20 units twice daily  Diabetic diet-change to regular diet recently  Continue basal/prandial insulin while inpatient, adjust PRN  Sliding scale coverage  Noted overnight.  Decrease the dose of basal insulin.  Patient was on clear liquid diet.      Opioid dependence with other opioid-induced disorder (HCC)  Assessment & Plan  PDMP reviewed, patient maintained on tramadol 200 mg daily as needed; history of chronic cervical/lumbar pain noted  Continue as needed tramadol and chronic gabapentin dosing    Chronic diarrhea  Assessment & Plan  Longstanding history of chronic diarrhea, patient reporting acute diarrhea x1 " month. Follows with GI. Has tried questran/imodium without relief  Stool enteric panel negative  C. difficile positive, see plan above   Giardia antigen is negative  stool elastase is low started on Creon per GI    Tobacco dependence  Assessment & Plan  Counseled on need for cessation, provide nicotine patch while admitted  Recent CT chest for lung cancer screening 7/30/2024 negative for nodules or masses    GERD without esophagitis  Assessment & Plan  Continue PPI    Severe episode of recurrent major depressive disorder, without psychotic features (HCC)  Assessment & Plan  Mood appearing fairly stable, continue home duloxetine, mirtazapine, as needed Valium  Hold minipress given symptomatic hypotension episodes, tachycardia                VTE Pharmacologic Prophylaxis: VTE Score: 3 Moderate Risk (Score 3-4) - Pharmacological DVT Prophylaxis Ordered: enoxaparin (Lovenox).    Mobility:   Basic Mobility Inpatient Raw Score: 23  JH-HLM Goal: 7: Walk 25 feet or more  JH-HLM Achieved: 7: Walk 25 feet or more  JH-HLM Goal achieved. Continue to encourage appropriate mobility.    Patient Centered Rounds: I performed bedside rounds with nursing staff today.   Discussions with Specialists or Other Care Team Provider  : Attempted to update  (daughter) via phone. Left voicemail.     Education and Discussions with Family / Patient:     Total Time Spent on Date of Encounter in care of patient: 35 mins. This time was spent on one or more of the following: performing physical exam; counseling and coordination of care; obtaining or reviewing history; documenting in the medical record; reviewing/ordering tests, medications or procedures; communicating with other healthcare professionals and discussing with patient's family/caregivers.    Current Length of Stay: 21 day(s)  Current Patient Status: Inpatient   Certification Statement: The patient will continue to require additional inpatient hospital stay due to pending  improvement in her diarrhea  Discharge Plan: Anticipate discharge in >72 hrs to rehab facility.    Code Status: Level 1 - Full Code    Subjective:   Patient seen and examined.  No events overnight.  Status post colonoscopy with FMT.  As soon as the patient was brought back to the room he she is asking for a diet.  Wants double protein and double vegetables    Objective:     Vitals:   Temp (24hrs), Av °F (36.7 °C), Min:97.6 °F (36.4 °C), Max:98.5 °F (36.9 °C)    Temp:  [97.6 °F (36.4 °C)-98.5 °F (36.9 °C)] 98.1 °F (36.7 °C)  HR:  [] 107  Resp:  [17-18] 18  BP: (103-129)/(70-96) 129/96  SpO2:  [97 %-99 %] 98 %  Body mass index is 16.45 kg/m².     Input and Output Summary (last 24 hours):     Intake/Output Summary (Last 24 hours) at 2024 1821  Last data filed at 2024 1535  Gross per 24 hour   Intake 100 ml   Output --   Net 100 ml       Physical Exam:   Physical Exam  Constitutional:       General: She is not in acute distress.     Appearance: She is not ill-appearing.      Comments: Cachectic appearing female   HENT:      Head: Normocephalic and atraumatic.      Nose: Nose normal.   Eyes:      General: No scleral icterus.  Cardiovascular:      Rate and Rhythm: Normal rate and regular rhythm.      Heart sounds: No murmur heard.  Pulmonary:      Effort: Pulmonary effort is normal. No respiratory distress.   Abdominal:      General: Bowel sounds are normal.   Skin:     General: Skin is warm.   Neurological:      Mental Status: She is alert. Mental status is at baseline.          Additional Data:     Labs:  Results from last 7 days   Lab Units 24  0457   WBC Thousand/uL 9.83   HEMOGLOBIN g/dL 11.7   HEMATOCRIT % 35.0   PLATELETS Thousands/uL 281     Results from last 7 days   Lab Units 24  0457 24  0808 24  0603   SODIUM mmol/L 145   < > 139   POTASSIUM mmol/L 3.0*   < > 4.3   CHLORIDE mmol/L 105   < > 105   CO2 mmol/L 28   < > 26   BUN mg/dL 7   < > 15   CREATININE mg/dL 0.40*    < > 0.49*   ANION GAP mmol/L 12   < > 8   CALCIUM mg/dL 9.2   < > 9.2   ALBUMIN g/dL  --   --  3.7   TOTAL BILIRUBIN mg/dL  --   --  0.28   ALK PHOS U/L  --   --  207*   ALT U/L  --   --  97*   AST U/L  --   --  57*   GLUCOSE RANDOM mg/dL 173*   < > 285*    < > = values in this interval not displayed.         Results from last 7 days   Lab Units 09/06/24  1649 09/06/24  1047 09/06/24  1011 09/06/24  0928 09/06/24  0600 09/06/24  0455 09/06/24  0421 09/05/24  2108 09/05/24  1547 09/05/24  1031 09/05/24  1003 09/05/24  0943   POC GLUCOSE mg/dl 71 150* 190* 49* 111 181* 44* 165* 148* 110 51* 35*               Lines/Drains:  Invasive Devices       Peripheral Intravenous Line  Duration             Peripheral IV 09/03/24 Right;Ventral (anterior) Forearm 3 days                          Imaging: No pertinent imaging reviewed.    Recent Cultures (last 7 days):         Last 24 Hours Medication List:   Current Facility-Administered Medications   Medication Dose Route Frequency Provider Last Rate    acetaminophen  650 mg Oral Q6H PRN Abhay Hayes MD      albuterol  2 puff Inhalation Q4H PRN Renan Drake, DO      aspirin  81 mg Oral Daily Renan Drake, DO      atorvastatin  80 mg Oral Daily Renan Drake, DO      baclofen  5 mg Oral BID ELISEO Toribio      butalbital-acetaminophen-caffeine  1 tablet Oral Q6H PRN Renan Drake, DO      cholestyramine sugar free  4 g Oral BID Dalia Soriano, DO      dextrose 5 % and sodium chloride 0.45 %  75 mL/hr Intravenous Continuous Britney Arce MD 75 mL/hr (09/06/24 0935)    diazepam  5 mg Oral Q12H PRN Renan Drake, DO      DULoxetine  60 mg Oral BID Renan Drake, DO      enoxaparin  40 mg Subcutaneous Q24H HONEY Abhay Hayes MD      fluticasone-vilanterol  1 puff Inhalation Daily Renan Drake, DO      gabapentin  800 mg Oral TID Renan Drake, DO      insulin glargine  30 Units Subcutaneous HS Holli Lopes, DO      insulin lispro  1-5 Units Subcutaneous TID AC  Abhay Hayes MD      insulin lispro  1-5 Units Subcutaneous HS Abhay Hayes MD      levothyroxine  112 mcg Oral Early Morning Renan Drake, DO      lidocaine  2 patch Topical Daily Magda Amaya, ELISEO      magnesium sulfate  4 g Intravenous Once Britney Arce MD      mirtazapine  7.5 mg Oral HS Renan Drake, DO      DARLIN ANTIFUNGAL   Topical BID Cherise Grey, ELISEO      nicotine  1 patch Transdermal Daily Renan Drake, DO      ondansetron  4 mg Intravenous Q6H PRN Renan Drake, DO      pancrelipase (Lip-Prot-Amyl)  24,000 Units Oral TID With Meals Dalia Soriano, DO      pantoprazole  40 mg Oral Early Morning Renan Drake, DO      saccharomyces boulardii  250 mg Oral BID ELISEO Trevino      traMADol  50 mg Oral Q6H PRN Renan Drake, DO      traZODone  50 mg Oral HS PRN Estefany Villalobos PA-C       Facility-Administered Medications Ordered in Other Encounters   Medication Dose Route Frequency Provider Last Rate    lidocaine (PF)   Intravenous PRN Bell Shayy, CRNA      phenylephrine   Intravenous PRN Bell Shayy, CRNA      propofol   Intravenous PRN Bell Shayy, CRNA      sodium chloride   Intravenous Continuous PRN Bell Shayy, CRNA Stopped (09/06/24 1535)        Today, Patient Was Seen By: Britney Arce MD    **Please Note: This note may have been constructed using a voice recognition system.**

## 2024-09-07 LAB
ANION GAP SERPL CALCULATED.3IONS-SCNC: 8 MMOL/L (ref 4–13)
BUN SERPL-MCNC: 10 MG/DL (ref 5–25)
CALCIUM SERPL-MCNC: 8.5 MG/DL (ref 8.4–10.2)
CHLORIDE SERPL-SCNC: 104 MMOL/L (ref 96–108)
CO2 SERPL-SCNC: 26 MMOL/L (ref 21–32)
CREAT SERPL-MCNC: 0.54 MG/DL (ref 0.6–1.3)
ERYTHROCYTE [DISTWIDTH] IN BLOOD BY AUTOMATED COUNT: 14.9 % (ref 11.6–15.1)
GFR SERPL CREATININE-BSD FRML MDRD: 108 ML/MIN/1.73SQ M
GLUCOSE SERPL-MCNC: 126 MG/DL (ref 65–140)
GLUCOSE SERPL-MCNC: 167 MG/DL (ref 65–140)
GLUCOSE SERPL-MCNC: 244 MG/DL (ref 65–140)
GLUCOSE SERPL-MCNC: 292 MG/DL (ref 65–140)
GLUCOSE SERPL-MCNC: 399 MG/DL (ref 65–140)
HCT VFR BLD AUTO: 30.8 % (ref 34.8–46.1)
HGB BLD-MCNC: 9.9 G/DL (ref 11.5–15.4)
MAGNESIUM SERPL-MCNC: 1.9 MG/DL (ref 1.9–2.7)
MCH RBC QN AUTO: 32.1 PG (ref 26.8–34.3)
MCHC RBC AUTO-ENTMCNC: 32.1 G/DL (ref 31.4–37.4)
MCV RBC AUTO: 100 FL (ref 82–98)
PLATELET # BLD AUTO: 259 THOUSANDS/UL (ref 149–390)
PMV BLD AUTO: 10.9 FL (ref 8.9–12.7)
POTASSIUM SERPL-SCNC: 3.7 MMOL/L (ref 3.5–5.3)
RBC # BLD AUTO: 3.08 MILLION/UL (ref 3.81–5.12)
SODIUM SERPL-SCNC: 138 MMOL/L (ref 135–147)
WBC # BLD AUTO: 7.89 THOUSAND/UL (ref 4.31–10.16)

## 2024-09-07 PROCEDURE — 82948 REAGENT STRIP/BLOOD GLUCOSE: CPT

## 2024-09-07 PROCEDURE — 83735 ASSAY OF MAGNESIUM: CPT | Performed by: FAMILY MEDICINE

## 2024-09-07 PROCEDURE — 99232 SBSQ HOSP IP/OBS MODERATE 35: CPT | Performed by: INTERNAL MEDICINE

## 2024-09-07 PROCEDURE — 99233 SBSQ HOSP IP/OBS HIGH 50: CPT | Performed by: INTERNAL MEDICINE

## 2024-09-07 PROCEDURE — 99232 SBSQ HOSP IP/OBS MODERATE 35: CPT | Performed by: FAMILY MEDICINE

## 2024-09-07 PROCEDURE — 80048 BASIC METABOLIC PNL TOTAL CA: CPT | Performed by: FAMILY MEDICINE

## 2024-09-07 PROCEDURE — 85027 COMPLETE CBC AUTOMATED: CPT | Performed by: FAMILY MEDICINE

## 2024-09-07 RX ORDER — INSULIN LISPRO 100 [IU]/ML
10 INJECTION, SOLUTION INTRAVENOUS; SUBCUTANEOUS
Status: DISCONTINUED | OUTPATIENT
Start: 2024-09-07 | End: 2024-09-07

## 2024-09-07 RX ORDER — INSULIN LISPRO 100 [IU]/ML
10 INJECTION, SOLUTION INTRAVENOUS; SUBCUTANEOUS
Status: DISCONTINUED | OUTPATIENT
Start: 2024-09-07 | End: 2024-09-08

## 2024-09-07 RX ORDER — INSULIN LISPRO 100 [IU]/ML
5 INJECTION, SOLUTION INTRAVENOUS; SUBCUTANEOUS
Status: DISCONTINUED | OUTPATIENT
Start: 2024-09-07 | End: 2024-09-07

## 2024-09-07 RX ADMIN — Medication 250 MG: at 17:02

## 2024-09-07 RX ADMIN — INSULIN LISPRO 10 UNITS: 100 INJECTION, SOLUTION INTRAVENOUS; SUBCUTANEOUS at 17:05

## 2024-09-07 RX ADMIN — INSULIN LISPRO 2 UNITS: 100 INJECTION, SOLUTION INTRAVENOUS; SUBCUTANEOUS at 21:06

## 2024-09-07 RX ADMIN — PANTOPRAZOLE SODIUM 40 MG: 40 TABLET, DELAYED RELEASE ORAL at 05:28

## 2024-09-07 RX ADMIN — ENOXAPARIN SODIUM 40 MG: 40 INJECTION SUBCUTANEOUS at 08:30

## 2024-09-07 RX ADMIN — INSULIN LISPRO 1 UNITS: 100 INJECTION, SOLUTION INTRAVENOUS; SUBCUTANEOUS at 17:04

## 2024-09-07 RX ADMIN — INSULIN LISPRO 10 UNITS: 100 INJECTION, SOLUTION INTRAVENOUS; SUBCUTANEOUS at 08:32

## 2024-09-07 RX ADMIN — BUTALBITAL, ACETAMINOPHEN AND CAFFEINE 1 TABLET: 50; 325; 40 TABLET ORAL at 10:56

## 2024-09-07 RX ADMIN — LEVOTHYROXINE SODIUM 112 MCG: 112 TABLET ORAL at 05:28

## 2024-09-07 RX ADMIN — CHOLESTYRAMINE 4 G: 4 POWDER, FOR SUSPENSION ORAL at 17:04

## 2024-09-07 RX ADMIN — MICONAZOLE NITRATE: 20 CREAM TOPICAL at 09:29

## 2024-09-07 RX ADMIN — TRAMADOL HYDROCHLORIDE 50 MG: 50 TABLET, COATED ORAL at 15:38

## 2024-09-07 RX ADMIN — BACLOFEN 5 MG: 10 TABLET ORAL at 17:02

## 2024-09-07 RX ADMIN — CHOLESTYRAMINE 4 G: 4 POWDER, FOR SUSPENSION ORAL at 08:39

## 2024-09-07 RX ADMIN — TRAZODONE HYDROCHLORIDE 50 MG: 50 TABLET ORAL at 22:53

## 2024-09-07 RX ADMIN — GABAPENTIN 800 MG: 400 CAPSULE ORAL at 21:04

## 2024-09-07 RX ADMIN — INSULIN GLARGINE 30 UNITS: 100 INJECTION, SOLUTION SUBCUTANEOUS at 21:03

## 2024-09-07 RX ADMIN — GABAPENTIN 800 MG: 400 CAPSULE ORAL at 15:38

## 2024-09-07 RX ADMIN — ONDANSETRON 4 MG: 2 INJECTION INTRAMUSCULAR; INTRAVENOUS at 10:56

## 2024-09-07 RX ADMIN — Medication 250 MG: at 08:31

## 2024-09-07 RX ADMIN — LIDOCAINE 2 PATCH: 700 PATCH TOPICAL at 08:31

## 2024-09-07 RX ADMIN — DULOXETINE HYDROCHLORIDE 60 MG: 60 CAPSULE, DELAYED RELEASE ORAL at 08:30

## 2024-09-07 RX ADMIN — FLUTICASONE FUROATE AND VILANTEROL TRIFENATATE 1 PUFF: 200; 25 POWDER RESPIRATORY (INHALATION) at 08:31

## 2024-09-07 RX ADMIN — DULOXETINE HYDROCHLORIDE 60 MG: 60 CAPSULE, DELAYED RELEASE ORAL at 17:02

## 2024-09-07 RX ADMIN — TRAMADOL HYDROCHLORIDE 50 MG: 50 TABLET, COATED ORAL at 08:38

## 2024-09-07 RX ADMIN — MIRTAZAPINE 7.5 MG: 15 TABLET, FILM COATED ORAL at 21:04

## 2024-09-07 RX ADMIN — PANCRELIPASE 24000 UNITS: 120000; 24000; 76000 CAPSULE, DELAYED RELEASE PELLETS ORAL at 12:20

## 2024-09-07 RX ADMIN — PANCRELIPASE 24000 UNITS: 120000; 24000; 76000 CAPSULE, DELAYED RELEASE PELLETS ORAL at 08:31

## 2024-09-07 RX ADMIN — MICONAZOLE NITRATE: 20 CREAM TOPICAL at 17:10

## 2024-09-07 RX ADMIN — GABAPENTIN 800 MG: 400 CAPSULE ORAL at 08:30

## 2024-09-07 RX ADMIN — INSULIN LISPRO 10 UNITS: 100 INJECTION, SOLUTION INTRAVENOUS; SUBCUTANEOUS at 12:20

## 2024-09-07 RX ADMIN — BUTALBITAL, ACETAMINOPHEN AND CAFFEINE 1 TABLET: 50; 325; 40 TABLET ORAL at 21:03

## 2024-09-07 RX ADMIN — INSULIN LISPRO 3 UNITS: 100 INJECTION, SOLUTION INTRAVENOUS; SUBCUTANEOUS at 07:24

## 2024-09-07 RX ADMIN — DIAZEPAM 5 MG: 5 TABLET ORAL at 13:38

## 2024-09-07 RX ADMIN — BACLOFEN 5 MG: 10 TABLET ORAL at 08:30

## 2024-09-07 RX ADMIN — ASPIRIN 81 MG CHEWABLE TABLET 81 MG: 81 TABLET CHEWABLE at 08:30

## 2024-09-07 RX ADMIN — ATORVASTATIN CALCIUM 80 MG: 80 TABLET, FILM COATED ORAL at 08:30

## 2024-09-07 RX ADMIN — NICOTINE 1 PATCH: 21 PATCH, EXTENDED RELEASE TRANSDERMAL at 08:31

## 2024-09-07 RX ADMIN — PANCRELIPASE 24000 UNITS: 120000; 24000; 76000 CAPSULE, DELAYED RELEASE PELLETS ORAL at 17:04

## 2024-09-07 NOTE — ASSESSMENT & PLAN NOTE
Lab Results   Component Value Date    HGBA1C 16.1 (H) 08/16/2024     Recent Labs     09/06/24  1649 09/06/24  2128 09/07/24  0706 09/07/24  1046   POCGLU 71 322* 292* 126   Blood Sugar Average: Last 72 hrs:  (P) 163.25    Uncontrolled type 2 diabetes mellitus with hyperglycemia.  Home regimen as prescribed: metformin 500 mg twice daily, Novolin 70/30 20 units before breakfast and dinner.  Of note, patient had issues with her insurance and could not obtain insulin.  Most recent A1c 16.1%  Overnight, hyperglycemia up to 326 in the last 24 hours on fingerstick.  Reports she is eating well.    Continue with Lantus increased at 40 units daily  Continue mealtime insulin lispro 10 units 3 times daily  Continue with correction insulin before meals and at bedtime- today, adjusted to algorithm 4  Check a 2 AM fingerstick glucose to assess for missed hypoglycemia episodes leading to hyperglycemia via physiologic response  We will continue to monitor, and make adjustments based on sugar levels  Added carbohydrate level 2 to diet  Hypoglycemia protocol

## 2024-09-07 NOTE — ASSESSMENT & PLAN NOTE
"Lab Results   Component Value Date    HGBA1C 16.1 (H) 08/16/2024       Recent Labs     09/06/24  2128 09/07/24  0706 09/07/24  1046 09/07/24  1617   POCGLU 322* 292* 126 167*       Patient markedly hyperglycemic on admission only partially responsive to IV fluids, remainder of labs fortunately not consistent with DKA. A1C 16  Patient admitting she has not been using her home insulin as she apparently has been unable to fill prescription due to \"insurance issues,\" additionally reports that due to her neuropathy she has difficulty self injecting.  Daughter also reports jessica non compliance.   Typically on NovoLog 70/30 20 units twice daily  Diabetic diet-change to regular diet recently  Continue basal/prandial insulin while inpatient, adjust PRN  Sliding scale coverage  Insulin regimen adjusted by endocrinology.  Monitor blood sugar  "

## 2024-09-07 NOTE — ASSESSMENT & PLAN NOTE
Severe protein-calorie malnutrition 2/2 chronic diarrhea a/e/b fat and muscle loss requiring Nutrition consult, oral diet and nutrition supplements    360 Statement: severe malnutrition r/t suspect combination of chronic illness and social/environmental factors as evidenced by severe temporal muscle loss, severe tricep fat pad loss, at least moderate muscle loss around clavicles and shoulders, at least moderate orbital fat pad loss. Treatment: oral diet and oral nutrition supplements.    BMI Findings:  Adult BMI Classifications: Underweight < 18.5        Body mass index is 16.73 kg/m².   C/w supplements  Nutrition consult appreciated  Encourage po intake

## 2024-09-07 NOTE — PROGRESS NOTES
Progress Note - Infectious Disease   Barb Palomo 54 y.o. female MRN: [id number]  Unit/Bed#: [facility] 522-01 Encounter: [id number]      Impression/Plan:  1.  C. difficile colitis.  Patient presented with weakness, weight loss, acute on chronic diarrhea.  8/16 C. difficile PCR and toxin EIA positive.  Continues to have significant loose stools despite 7 days of fidaxomicin and now 7 days of p.o. vancomycin.  She remains nontoxic without fever or leukocytosis.  Abdominal exam benign.  Unclear if ongoing symptoms are related to C. difficile alone or an alternative etiology as below.  Given recent positive testing there is low utility to repeat C. difficile testing.  Still having significant diarrhea.  Patient status post fecal transplantation although the prep seems to have been inadequate.  Still with ongoing diarrhea.              -Holding high-dose oral vancomycin for now  -If patient does not improve with fecal transplant may need to repeat the transplant after a more aggressive prep              -Continue Banatrol  -Careful use of Questran     2.  Acute on chronic diarrhea.  The patient has had diarrhea and weight loss for the past year but reports diarrhea is usually 2-4 times daily.  She continues to have up to 10 stools daily since admission with minimal improvement on C. difficile treatment.  Fecal white blood cells negative.  Additional infectious testing including stool O&P, Giardia antigen, enteric panel negative.  Unclear if ongoing symptoms are solely due to C. difficile.  The patient has a diagnosis of chronic pancreatitis to be contributing.  Poorly controlled diabetes may also lead to diarrhea.  She may also be having postinfectious/functional diarrhea.  Continues to have substantial diarrhea.  Repeat CT of the abdomen pelvis with colonic thickening.  Pancreatic insufficiency may also be playing a role in the chronic process.              -Treatment for C. difficile as above              -Close GI  follow-up  -Continue pancreatic enzymes  -With inadequate prep, patient and ongoing diarrhea, patient may need repeat fecal transplant     3.  Failure to thrive, severe protein calorie malnutrition.  BMI 16.  The patient has had diarrhea and weight loss for the past year.  Status post EGD and colonoscopy May, colon biopsies without pathologic abnormalities.     4.  Poorly controlled type 2 diabetes with hyperglycemia.  Hemoglobin A1c 16.1%.  Patient was not using insulin at home.  Glycemic management per primary team     5.  Bacteriuria.  Patient received 3 doses of IV ceftriaxone.  CT imaging showed a distended bladder suggestive of retention which is likely contributing to her symptoms.  Per report pain was happening after urination likely due to skin irritation.  In the setting of C. difficile as above, avoid unnecessary antibiotics.    Discussed with the primary service the plan to continue holding oral vancomycin to see how the patient does and they agree with the plan.    Antibiotics:  Holding high-dose oral vancomycin    Subjective:  Patient has no fever, chills, sweats; no nausea, vomiting, still having diarrhea; no cough, shortness of breath; no pain. No new symptoms.  Patient underwent fecal transplantation yesterday    Objective:  Vitals:  Temp:  [97.3 °F (36.3 °C)-98.2 °F (36.8 °C)] 97.3 °F (36.3 °C)  HR:  [] 104  Resp:  [18] 18  BP: (100-129)/(70-96) 100/70  SpO2:  [95 %-99 %] 99 %  Temp (24hrs), Av.8 °F (36.6 °C), Min:97.3 °F (36.3 °C), Max:98.2 °F (36.8 °C)  Current: Temperature: (!) 97.3 °F (36.3 °C)    Physical Exam:   General Appearance:  Alert, interactive, nontoxic, no acute distress.   Throat: Oropharynx moist without lesions.    Lungs:   Clear to auscultation bilaterally; no wheezes, rhonchi or rales; respirations unlabored   Heart:  Tachycardic; no murmur, rub or gallop   Abdomen:   Soft, non-tender, non-distended, positive bowel sounds.     Extremities: No clubbing, cyanosis or  edema   Skin: No new rashes or lesions. No draining wounds noted.       Labs, Imaging, & Other studies:   All pertinent labs and imaging studies were personally reviewed  Results from last 7 days   Lab Units 09/07/24  0435 09/06/24  0457 09/04/24  0603   WBC Thousand/uL 7.89 9.83 5.01   HEMOGLOBIN g/dL 9.9* 11.7 10.7*   PLATELETS Thousands/uL 259 281 285     Results from last 7 days   Lab Units 09/07/24  0435 09/06/24  0457 09/05/24  0808 09/04/24  0603 09/02/24  0706 09/01/24  0525   SODIUM mmol/L 138 145 139 139   < > 138   POTASSIUM mmol/L 3.7 3.0* 3.8 4.3   < > 4.0   CHLORIDE mmol/L 104 105 106 105   < > 101   CO2 mmol/L 26 28 26 26   < > 30   BUN mg/dL 10 7 15 15   < > 13   CREATININE mg/dL 0.54* 0.40* 0.42* 0.49*   < > 0.54*   EGFR ml/min/1.73sq m 108 119 117 111   < > 108   CALCIUM mg/dL 8.5 9.2 8.9 9.2   < > 9.3   AST U/L  --   --   --  57*  --  42*   ALT U/L  --   --   --  97*  --  59*   ALK PHOS U/L  --   --   --  207*  --  197*    < > = values in this interval not displayed.

## 2024-09-07 NOTE — PROGRESS NOTES
[facility]  Endocrinology - Progress Note  Name: Barb Palomo I  MRN: [id number]  Unit/Bed#: [facility] 522-01 I Date of Admission: 8/15/2024   Date of Service: 9/7/2024 I Hospital Day: 22    Uncontrolled type 2 diabetes mellitus with hyperglycemia (HCC)  Assessment & Plan  Lab Results   Component Value Date    HGBA1C 16.1 (H) 08/16/2024     Recent Labs     09/06/24  1649 09/06/24  2128 09/07/24  0706 09/07/24  1046   POCGLU 71 322* 292* 126   Blood Sugar Average: Last 72 hrs:  (P) 163.25    Uncontrolled type 2 diabetes mellitus with hyperglycemia.  Home regimen as prescribed: metformin 500 mg twice daily, Novolin 70/30 20 units before breakfast and dinner.  Of note, patient had issues with her insurance and could not obtain insulin.  Most recent A1c 16.1%  Overnight, hyperglycemia up to 322 on fingerstick, 399 on chemistry panel.  Did have a hypoglycemia recorded of 49 at 9 AM yesterday.  Reports that and when hypoglycemic, feels diaphoretic and tremulous.  Reports she is eating well.    Continue with Lantus 30 units daily  Adding mealtime insulin lispro 10 units 3 times daily  Continue with sliding scale insulin  We will continue to monitor, and make adjustments based on sugar levels  Hypoglycemia protocol    * C. difficile diarrhea  Assessment & Plan  Arrived with reports of diarrhea.  Patient did not improve on Pradaxa Meissen and vancomycin.  On September 6 underwent fecal transplantation  Followed by gastroenterology and infectious disease         Current Length of Stay: 22 day(s)  Current Patient Status: Inpatient   Discharge Plan:  Pending further GI assessment and management.  Will continue to monitor glucose for further adjustments.  Final disposition by primary team.    Code Status: Level 1 - Full Code    Subjective:   Patient reports feeling well.  Reports that when her sugars were in the low 40s she felt sweaty and tremulous.  Reports she is eating food  appropriately.    Objective:     Vitals:   Temp (24hrs), Av.7 °F (36.5 °C), Min:97.3 °F (36.3 °C), Max:98.1 °F (36.7 °C)      Temp:  [97.3 °F (36.3 °C)-98.1 °F (36.7 °C)] 97.3 °F (36.3 °C)  HR:  [101-107] 104  Resp:  [16-18] 16  BP: (100-129)/(70-96) 100/70  SpO2:  [95 %-99 %] 99 %  Body mass index is 16.73 kg/m².     Input and Output Summary (last 24 hours):     Intake/Output Summary (Last 24 hours) at 2024 1440  Last data filed at 2024 1230  Gross per 24 hour   Intake 1417 ml   Output --   Net 1417 ml       Physical Exam:   Physical Exam  Constitutional:       General: She is not in acute distress.     Appearance: Normal appearance. She is underweight. She is not ill-appearing, toxic-appearing or diaphoretic.   HENT:      Head: Normocephalic and atraumatic.   Eyes:      General: No scleral icterus.     Conjunctiva/sclera: Conjunctivae normal.   Cardiovascular:      Rate and Rhythm: Normal rate and regular rhythm.   Pulmonary:      Effort: Pulmonary effort is normal. No respiratory distress.   Abdominal:      General: Abdomen is flat. There is no distension.   Musculoskeletal:         General: Normal range of motion.      Cervical back: Normal range of motion.   Skin:     Coloration: Skin is not jaundiced or pale.   Neurological:      Mental Status: She is alert and oriented to person, place, and time. Mental status is at baseline.   Psychiatric:         Mood and Affect: Mood normal.         Behavior: Behavior normal.          Additional Data:     Labs:  Results from last 7 days   Lab Units 24  0435   WBC Thousand/uL 7.89   HEMOGLOBIN g/dL 9.9*   HEMATOCRIT % 30.8*   PLATELETS Thousands/uL 259     Results from last 7 days   Lab Units 24  0435 24  0808 24  0603   SODIUM mmol/L 138   < > 139   POTASSIUM mmol/L 3.7   < > 4.3   CHLORIDE mmol/L 104   < > 105   CO2 mmol/L 26   < > 26   BUN mg/dL 10   < > 15   CREATININE mg/dL 0.54*   < > 0.49*   ANION GAP mmol/L 8   < > 8   CALCIUM  mg/dL 8.5   < > 9.2   ALBUMIN g/dL  --   --  3.7   TOTAL BILIRUBIN mg/dL  --   --  0.28   ALK PHOS U/L  --   --  207*   ALT U/L  --   --  97*   AST U/L  --   --  57*   GLUCOSE RANDOM mg/dL 399*   < > 285*    < > = values in this interval not displayed.         Results from last 7 days   Lab Units 09/07/24  1046 09/07/24  0706 09/06/24  2128 09/06/24  1649 09/06/24  1047 09/06/24  1011 09/06/24  0928 09/06/24  0600 09/06/24  0455 09/06/24  0421 09/05/24  2108 09/05/24  1547   POC GLUCOSE mg/dl 126 292* 322* 71 150* 190* 49* 111 181* 44* 165* 148*               Lines/Drains:  Invasive Devices       Peripheral Intravenous Line  Duration             Peripheral IV 09/03/24 Right;Ventral (anterior) Forearm 4 days                             Imaging: Reviewed radiology reports from this admission including: abdominal/pelvic CT    Recent Cultures (last 7 days):         Last 24 Hours Medication List:   Current Facility-Administered Medications   Medication Dose Route Frequency Provider Last Rate    acetaminophen  650 mg Oral Q6H PRN Abhay Hayes MD      albuterol  2 puff Inhalation Q4H PRN Renan Drake, DO      aspirin  81 mg Oral Daily Renan Drake, DO      atorvastatin  80 mg Oral Daily Renan Drake, DO      baclofen  5 mg Oral BID ELISEO Toribio      butalbital-acetaminophen-caffeine  1 tablet Oral Q6H PRN Renan Drake, DO      cholestyramine sugar free  4 g Oral BID Dalia Soriano, DO      diazepam  5 mg Oral Q12H PRN Renan Drake, DO      DULoxetine  60 mg Oral BID Renan Drake, DO      enoxaparin  40 mg Subcutaneous Q24H [facility] Abhay Hayes MD      fluticasone-vilanterol  1 puff Inhalation Daily Renan Drake, DO      gabapentin  800 mg Oral TID Renan Drake, DO      insulin glargine  30 Units Subcutaneous HS Holli Lopes, DO      insulin lispro  1-5 Units Subcutaneous TID AC Abhay Hayes MD      insulin lispro  1-5 Units Subcutaneous HS Abhay Hayes MD      insulin lispro  10 Units  Subcutaneous TID With Meals Savage Linder MD      levothyroxine  112 mcg Oral Early Morning Renan Drake, DO      lidocaine  2 patch Topical Daily Magda Davenportjane, ELISEO      mirtazapine  7.5 mg Oral HS Renan Drake, DO      DARLIN ANTIFUNGAL   Topical BID Cherise Grey, ELISEO      nicotine  1 patch Transdermal Daily Renan Drake, DO      ondansetron  4 mg Intravenous Q6H PRN Renan Drake, DO      pancrelipase (Lip-Prot-Amyl)  24,000 Units Oral TID With Meals Dalia Soriano, DO      pantoprazole  40 mg Oral Early Morning Renan Drake, DO      saccharomyces boulardii  250 mg Oral BID ELISEO Trevino      traMADol  50 mg Oral Q6H PRN Renan Drake, DO      traZODone  50 mg Oral HS PRN Estefany Villalobos PA-C          Today, Patient Was Seen By:   Savage Linder MD  Endocrinology fellow, PGY-4    **Please Note: This note may have been constructed using a voice recognition system.**

## 2024-09-07 NOTE — PROGRESS NOTES
[facility]  Progress Note  Name: Barb Palomo I  MRN: [id number]  Unit/Bed#: [facility] 522-01 I Date of Admission: 8/15/2024   Date of Service: 9/7/2024 I Hospital Day: 22    Assessment & Plan   * C. difficile diarrhea  Assessment & Plan  Patient presented with generalized weakness, frequent falls.  She has had ongoing diarrhea, dysuria, poor oral intake and reported 70 pound weight loss over the last year.  Initially hypotensive in the ER, status post IV fluids. Labs with multiple metabolic derangements  CT imaging showing mild diffuse colonic wall thickening, mild diffuse wall thickening of stomach  C. difficile found to be positive--has a history of C. difficile in September 2022.    Patient started on Dificid 200 mg x 10 days per protocol on 8/18 as this is recurrent infection  Initially seemed BMs were improving and were becoming more formed, however again having frequent watery stools  GI consulted  Contact precautions  Appetite is good   No improvement with avoiding lactose and artificial sweeteners  Continue banatrol  Still having multiple loose stools overnight and this morning  Continue vancomycin, increased to 500mg-ID on board  Checking CT abdomen and pelvis: -CT is concerning for proctocolitis  GI to evaluate for pancreatic insufficiency as a contributing factor to her diarrhea - stool pancreatic elastase is low and started on Creon  Status post colonoscopy and FMT  on Friday .  Patient tolerated the procedure    Hypotension  Assessment & Plan  Intermittent episodes of hypotension, most occurrences happen overnight and question correlation with minipress administration.  This has now been discontinued    Sinus tachycardia  Assessment & Plan  Heart rate 100s-120s at times.  Sinus.  Has received IV fluids without much improvement.  Trialed IVFs again 8/22 given reports of nausea with poor oral intake and dizziness with some improvement to the 110s with HR- will  "continue with IVFs again today   orthostatic blood pressure negative   Pt is on minipress which could have a side effect of ST.  Additionally noted she is hypotensive each night around 2/3 AM, I suspect this is likely the underlying cause.  She was started on this by her psychiatrist to assist with sleep and eating outpatient   Will hold minipress for now.  D/w pt can have PRN trazodone for sleep.  Pt agreeable   Monitor     Failure to thrive in adult  Assessment & Plan  Patient presented with increased generalized weakness and frequent falls, ongoing diarrhea, dysuria, poor oral intake, and at least 70-80 pound weight loss over the past 1 year. Has been following with GI, had colonoscopy May 2024 with poor prep--had EGD with esophagitis and gastritis  Labs with multiple derangements including hyperglycemia, hypokalemia and hypomagnesemia on admission, improved.   C. difficile positive as above  Patient reports she has not been using insulin due to \"insurance issue\" and inability to self inject due to her neuropathy  CT abdomen and pelvis without any obvious malignancy  Treatment as per respective issues  PT/OT recommending rehab.   Endocrinology evaluation appreciated.    Hypomagnesemia  Assessment & Plan  Hold oral supplementation due to diarrhea  Check magnesium level and replace as needed    Severe protein-calorie malnutrition (HCC)  Assessment & Plan  Severe protein-calorie malnutrition 2/2 chronic diarrhea a/e/b fat and muscle loss requiring Nutrition consult, oral diet and nutrition supplements    360 Statement: severe malnutrition r/t suspect combination of chronic illness and social/environmental factors as evidenced by severe temporal muscle loss, severe tricep fat pad loss, at least moderate muscle loss around clavicles and shoulders, at least moderate orbital fat pad loss. Treatment: oral diet and oral nutrition supplements.    BMI Findings:  Adult BMI Classifications: Underweight < 18.5        Body mass " "index is 16.73 kg/m².   C/w supplements  Nutrition consult appreciated  Encourage po intake     Acute cystitis without hematuria  Assessment & Plan  Reported dysuria on admission   Urine + nitrite & leukocytes, CT imaging with distended bladder suggestive of retention and small amount of air which could be secondary to infection  Urine Culture noted, S/P 3 doses of IV CTX 8/17 8/21, reported pain AFTER urination, likely related to urine hitting excoriated labia/bottom from frequent stooling. Will continue with medicated cream to this area- denies pain at this time   S/p pyridum x 3 doses  Wound care consult appreciated     Uncontrolled type 2 diabetes mellitus with hyperglycemia (HCC)  Assessment & Plan  Lab Results   Component Value Date    HGBA1C 16.1 (H) 08/16/2024       Recent Labs     09/06/24  2128 09/07/24  0706 09/07/24  1046 09/07/24  1617   POCGLU 322* 292* 126 167*       Patient markedly hyperglycemic on admission only partially responsive to IV fluids, remainder of labs fortunately not consistent with DKA. A1C 16  Patient admitting she has not been using her home insulin as she apparently has been unable to fill prescription due to \"insurance issues,\" additionally reports that due to her neuropathy she has difficulty self injecting.  Daughter also reports jessica non compliance.   Typically on NovoLog 70/30 20 units twice daily  Diabetic diet-change to regular diet recently  Continue basal/prandial insulin while inpatient, adjust PRN  Sliding scale coverage  Insulin regimen adjusted by endocrinology.  Monitor blood sugar    Opioid dependence with other opioid-induced disorder (HCC)  Assessment & Plan  PDMP reviewed, patient maintained on tramadol 200 mg daily as needed; history of chronic cervical/lumbar pain noted  Continue as needed tramadol and chronic gabapentin dosing    Chronic diarrhea  Assessment & Plan  Longstanding history of chronic diarrhea, patient reporting acute diarrhea x1 month. Follows " with GI. Has tried questran/imodium without relief  Stool enteric panel negative  C. difficile positive, see plan above   Giardia antigen is negative  Stool elastase is low started on Creon per GI    Tobacco dependence  Assessment & Plan  Counseled on need for cessation, provide nicotine patch while admitted  Recent CT chest for lung cancer screening 7/30/2024 negative for nodules or masses    GERD without esophagitis  Assessment & Plan  Continue PPI    Severe episode of recurrent major depressive disorder, without psychotic features (HCC)  Assessment & Plan  Mood appearing fairly stable, continue home duloxetine, mirtazapine, as needed Valium  Hold minipress given symptomatic hypotension episodes, tachycardia                VTE Pharmacologic Prophylaxis: VTE Score: 3 Moderate Risk (Score 3-4) - Pharmacological DVT Prophylaxis Ordered: enoxaparin (Lovenox).    Mobility:   Basic Mobility Inpatient Raw Score: 23  JH-HLM Goal: 7: Walk 25 feet or more  JH-HLM Achieved: 6: Walk 10 steps or more  JH-HLM Goal achieved. Continue to encourage appropriate mobility.    Patient Centered Rounds: I performed bedside rounds with nursing staff today.   Discussions with Specialists or Other Care Team Provider:     Education and Discussions with Family / Patient: Updated  (daughter) via phone.    Total Time Spent on Date of Encounter in care of patient: 35 mins. This time was spent on one or more of the following: performing physical exam; counseling and coordination of care; obtaining or reviewing history; documenting in the medical record; reviewing/ordering tests, medications or procedures; communicating with other healthcare professionals and discussing with patient's family/caregivers.    Current Length of Stay: 22 day(s)  Current Patient Status: Inpatient   Certification Statement: The patient will continue to require additional inpatient hospital stay due to diarrhea  Discharge Plan:     Code Status: Level 1 -  Full Code    Subjective:   Patient seen and examined.  Patient still with diarrhea.    Objective:     Vitals:   Temp (24hrs), Av.6 °F (36.4 °C), Min:97.3 °F (36.3 °C), Max:97.8 °F (36.6 °C)    Temp:  [97.3 °F (36.3 °C)-97.8 °F (36.6 °C)] 97.8 °F (36.6 °C)  HR:  [101-105] 105  Resp:  [15-16] 15  BP: (100-112)/(70-79) 108/73  SpO2:  [95 %-99 %] 97 %  Body mass index is 16.73 kg/m².     Input and Output Summary (last 24 hours):     Intake/Output Summary (Last 24 hours) at 2024 1734  Last data filed at 2024 1230  Gross per 24 hour   Intake 1317 ml   Output --   Net 1317 ml       Physical Exam:   Physical Exam  Constitutional:       General: She is not in acute distress.     Appearance: She is not ill-appearing.      Comments: Cachectic appearing female   HENT:      Head: Normocephalic.      Nose: Nose normal.   Eyes:      General: No scleral icterus.  Cardiovascular:      Rate and Rhythm: Normal rate and regular rhythm.      Heart sounds: No murmur heard.  Pulmonary:      Effort: Pulmonary effort is normal. No respiratory distress.   Abdominal:      General: Bowel sounds are normal. There is no distension.      Tenderness: There is no abdominal tenderness.   Musculoskeletal:         General: No swelling. Normal range of motion.   Skin:     General: Skin is warm.      Coloration: Skin is not jaundiced.   Neurological:      Mental Status: She is alert. Mental status is at baseline.          Additional Data:     Labs:  Results from last 7 days   Lab Units 24  0435   WBC Thousand/uL 7.89   HEMOGLOBIN g/dL 9.9*   HEMATOCRIT % 30.8*   PLATELETS Thousands/uL 259     Results from last 7 days   Lab Units 24  0435 24  0808 24  0603   SODIUM mmol/L 138   < > 139   POTASSIUM mmol/L 3.7   < > 4.3   CHLORIDE mmol/L 104   < > 105   CO2 mmol/L 26   < > 26   BUN mg/dL 10   < > 15   CREATININE mg/dL 0.54*   < > 0.49*   ANION GAP mmol/L 8   < > 8   CALCIUM mg/dL 8.5   < > 9.2   ALBUMIN g/dL  --   --   3.7   TOTAL BILIRUBIN mg/dL  --   --  0.28   ALK PHOS U/L  --   --  207*   ALT U/L  --   --  97*   AST U/L  --   --  57*   GLUCOSE RANDOM mg/dL 399*   < > 285*    < > = values in this interval not displayed.         Results from last 7 days   Lab Units 09/07/24  1617 09/07/24  1046 09/07/24  0706 09/06/24  2128 09/06/24  1649 09/06/24  1047 09/06/24  1011 09/06/24  0928 09/06/24  0600 09/06/24  0455 09/06/24  0421 09/05/24  2108   POC GLUCOSE mg/dl 167* 126 292* 322* 71 150* 190* 49* 111 181* 44* 165*               Lines/Drains:  Invasive Devices       Peripheral Intravenous Line  Duration             Peripheral IV 09/03/24 Right;Ventral (anterior) Forearm 4 days                          Imaging: No pertinent imaging reviewed.    Recent Cultures (last 7 days):         Last 24 Hours Medication List:   Current Facility-Administered Medications   Medication Dose Route Frequency Provider Last Rate    acetaminophen  650 mg Oral Q6H PRN Abhay Hayes MD      albuterol  2 puff Inhalation Q4H PRN Renan Drake, DO      aspirin  81 mg Oral Daily Renan Drake, DO      atorvastatin  80 mg Oral Daily Renan Drake, DO      baclofen  5 mg Oral BID ELISEO Toribio      butalbital-acetaminophen-caffeine  1 tablet Oral Q6H PRN Renan Drake, DO      cholestyramine sugar free  4 g Oral BID Dalia Soriano, DO      diazepam  5 mg Oral Q12H PRN Renan Drake, DO      DULoxetine  60 mg Oral BID Renan Drake, DO      enoxaparin  40 mg Subcutaneous Q24H [facility] Abhay Hayes MD      fluticasone-vilanterol  1 puff Inhalation Daily Renan Drake, DO      gabapentin  800 mg Oral TID Renan Drake, DO      insulin glargine  30 Units Subcutaneous HS Holli Lopes, DO      insulin lispro  1-5 Units Subcutaneous TID  Abhay Hayes MD      insulin lispro  1-5 Units Subcutaneous HS Abhay Hayes MD      insulin lispro  10 Units Subcutaneous TID With Meals Savage Linder MD      levothyroxine  112 mcg Oral Early  Morning Renan Drake, DO      lidocaine  2 patch Topical Daily Magda Amaya, CRNP      mirtazapine  7.5 mg Oral HS Renan Drake, DO      DARLIN ANTIFUNGAL   Topical BID Cherise Grey, ELISEO      nicotine  1 patch Transdermal Daily Renan Drake, DO      ondansetron  4 mg Intravenous Q6H PRN Renan Drake, DO      pancrelipase (Lip-Prot-Amyl)  24,000 Units Oral TID With Meals Dalia Soriano, DO      pantoprazole  40 mg Oral Early Morning Renan Drake, DO      saccharomyces boulardii  250 mg Oral BID Shanika Knott, MARCNP      traMADol  50 mg Oral Q6H PRN Renan Drake, DO      traZODone  50 mg Oral HS PRN Estefany Villalobos PA-C          Today, Patient Was Seen By: Britney Arce MD    **Please Note: This note may have been constructed using a voice recognition system.**

## 2024-09-07 NOTE — ASSESSMENT & PLAN NOTE
Arrived with reports of diarrhea.  Patient did not improve on Pradaxa Meissen and vancomycin.  On September 6 underwent fecal transplantation  Reports various bowel movements overnight, mushy stool and not watery.  Reports she is still eating well and keeping hydrated.  Followed by gastroenterology and infectious disease

## 2024-09-07 NOTE — ASSESSMENT & PLAN NOTE
Patient presented with generalized weakness, frequent falls.  She has had ongoing diarrhea, dysuria, poor oral intake and reported 70 pound weight loss over the last year.  Initially hypotensive in the ER, status post IV fluids. Labs with multiple metabolic derangements  CT imaging showing mild diffuse colonic wall thickening, mild diffuse wall thickening of stomach  C. difficile found to be positive--has a history of C. difficile in September 2022.    Patient started on Dificid 200 mg x 10 days per protocol on 8/18 as this is recurrent infection  Initially seemed BMs were improving and were becoming more formed, however again having frequent watery stools  GI consulted  Contact precautions  Appetite is good   No improvement with avoiding lactose and artificial sweeteners  Continue banatrol  Still having multiple loose stools overnight and this morning  Continue vancomycin, increased to 500mg-ID on board  Checking CT abdomen and pelvis: -CT is concerning for proctocolitis  GI to evaluate for pancreatic insufficiency as a contributing factor to her diarrhea - stool pancreatic elastase is low and started on Creon  Status post colonoscopy and FMT  on Friday .  Patient tolerated the procedure

## 2024-09-07 NOTE — PROGRESS NOTES
Progress Note - [facility]  Barb Palomo 54 y.o. female MRN: [id number]  Unit/Bed#: [facility] 522-01 Encounter: [id number]      ASSESSMENT & PLAN:      Ms. 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 GI consulted for acute on chronic diarrhea.     Acute on Chronic Diarrhea  Clostridium difficile Infection  Chronic Pancreatitis   Failure to Thrive   Weight Loss      # Clostridium difficile Infection: Patient initially presented with complaints on worsening weakness and frequent falls on 8/20.  Patient also with 70-80 lbs weight loss and chronic, watery diarrhea. Admits to difficulty following the death of her  and also been dealing with “insurance issues” leading to difficulty with her insulin. Labs on admission revealed severe hyperglycemia with electrolyte abnormalities. CT AP revealed mild diffuse wall thickening vs under distension of the colon as well as the stomach. In the past, patient has followed with GI for her unintentional weight loss and diarrhea. She has EGD/colonoscopy performed 5/2024 which was unrevealing although random colon biopsies were negative and patient had a very poor prep limiting sufficient examination. This admission, patient had PCR and EIA testing completed for C.Diff and both were found to be positive on 8/16 indicative of active infection. She had been on antibiotics last month for a dental procedure and had been started on for Fidaxomycin and completed a 7-day course but experienced no improvement in her diarrhea.  Given lack of improvement in stool frequency despite treatment of both Fidaxomycin as well as vancomycin, patient underwent colonoscopy with FMT 9/6. Bowel prep was inadequate leading to suboptimal visualization. Today, patient reports  ongoing diarrhea which is suspected to improve over the coming days. ID has discontinued vancomycin.      Plan:  Monitor stool output over weekd  Continue regular diet   Repeat colonoscopy in 3 months due to inadequate bowel prep and personal history of polyps  Continue on Questran and begin on Creon TID with meals  Encourage PO hydration; monitor and replete electrolytes as needed  Maintain contact precautions with strict handwashing  Hold PPI therapy; resume when symptoms resolved  Avoid use of probiotics for the prevention of recurrence   In the future, will require Cdiff prophylaxis with all antibiotic therapy   Additional symptom management per primary team      # Chronic Pancreatitis: Patient admits to prior history of pancreatitis in the past which was previously attributed to gallstones resulting in cholecystectomy.  Patient also lifelong smoker and has CT imaging demonstrating evidence of chronic pancreatitis.  CT on admission demonstrating diffuse parenchymal atrophy and prominence of the main duct measuring up to 4 mL at the level of the pancreatic head.  Patient with multiple parenchymal calcifications in the pancreatic head.  Pancreatic elastase levels found to be extremely low.  Possible dilutional component due to diarrhea.  However, we will begin on Creon with meals and monitor for improvement.       # Weight Loss: Patient with significant weight loss prior to admission.  Admits to losing 70 to 80 pounds unintentionally over the last couple of months.  Likely result of uncontrolled diabetes as well as C. difficile infection.  Did have prior EGD/colonoscopy completed 5/2024.  Would recommend repeat be considered on outpatient basis.    Rest of care per primary team.  ______________________________________________________________________    SUBJECTIVE: Patient seen and examined at bedside.  No acute vents overnight.  She reports ongoing diarrhea, reports at least 6 episodes overnight with a larger  watery bowel movement this morning.  She still has some mild abdominal cramping.  She is tolerating p.o. intake at this time.  Reassured patient that it will take some time to see improvement in diarrhea after the FMT yesterday.      REVIEW OF SYSTEMS: Negative other than stated above.       Historical Information   Past Medical History:   Diagnosis Date    Acute venous embolism and thrombosis of deep vessels of distal lower extremity (HCC)     Anemia     Anxiety     last assessed 11/20/17    Arthritis     Asthma     Cerebral infarction (HCC)     unspecified, last assessed 11/14/16    Chest pain     last assessed 5/9/17    Chronic cough     last assessed 12/12/13    Depression     Diabetes mellitus, type 2 (HCC)     DJD (degenerative joint disease)     Esophageal reflux     Fibromyalgia     GERD without esophagitis     resolved 5/13/16    History of pulmonary embolism     Hyperlipidemia     Hypertension     Hypothyroidism     Iron deficiency     Pancreatitis     Panic attack     Panic disorder     Polyarthritis     PTSD (post-traumatic stress disorder)     Sleep difficulties     Stroke syndrome     Thyroid disease     TIA (transient ischemic attack)     TIA (transient ischemic attack)     Venous embolism and thrombosis of deep vessels of distal lower extremity (HCC)     Vitamin B12 deficiency     Vitamin D deficiency      Past Surgical History:   Procedure Laterality Date    CARPAL TUNNEL RELEASE Right     neuroplasty decompression of median nerve    CATARACT EXTRACTION      CHOLECYSTECTOMY      ERCP      ERCP      ESOPHAGOGASTRIC FUNDOPLASTY      NISSEN FUNDOPLICATION      PATELLA SURGERY Left 12/2021    ND ESOPHAGOGASTRODUODENOSCOPY TRANSORAL DIAGNOSTIC N/A 11/02/2016    Procedure: EGD AND COLONOSCOPY;  Surgeon: Jatin Shepherd MD;  Location: [facility];  Service: Gastroenterology    ND NDSC WRST SURG W/RLS TRANSVRS CARPL LIGM Right 03/08/2016    Procedure: RELEASE CARPAL TUNNEL ENDOSCOPIC;  Surgeon: Guero Restrepo  MD;  Location: [facility];  Service: Orthopedics    UT TENDON SHEATH INCISION Right 03/08/2016    Procedure: RELEASE TRIGGER FINGER RIGHT THUMB;  Surgeon: Guero Restrepo MD;  Location: [facility];  Service: Orthopedics    TONSILLECTOMY AND ADENOIDECTOMY       Social History   Social History     Substance and Sexual Activity   Alcohol Use Not Currently    Alcohol/week: 0.0 standard drinks of alcohol    Comment: last was in 2010 after DUI     Social History     Substance and Sexual Activity   Drug Use No     Social History     Tobacco Use   Smoking Status Every Day    Current packs/day: 1.00    Average packs/day: 1 pack/day for 41.7 years (41.7 ttl pk-yrs)    Types: Cigarettes    Start date: 1/1/1983   Smokeless Tobacco Never   Tobacco Comments    20 + years     Family History   Problem Relation Age of Onset    Diabetes Mother         type 2    Heart attack Mother 39        acute MI    Kidney disease Mother         CKD NKF classfication    Depression Mother     Arthritis Mother     Substance Abuse Mother         mother OD in past on MS04    Ulcerative colitis Mother     Schizophrenia Mother     Suicide Attempts Mother     Bipolar disorder Mother     Diabetes Maternal Grandmother     Heart attack Father         acute MI    Psoriasis Father     Cancer Father         gastric cancer    Stroke Father     Crohn's disease Sister     Bipolar disorder Sister     Rheum arthritis Maternal Grandfather     Throat cancer Maternal Grandfather     Suicide Attempts Daughter     Drug abuse Daughter     Lung cancer Paternal Grandmother     Cancer Paternal Grandfather     No Known Problems Sister     Bipolar disorder Maternal Uncle     Anesthesia problems Neg Hx          Meds/Allergies     Medications Prior to Admission:     Adalimumab (HUMIRA PEN SC)    Albuterol Sulfate (ProAir RespiClick) 108 (90 Base) MCG/ACT AEPB    aspirin 81 mg chewable tablet    atorvastatin (LIPITOR) 80 mg tablet    baclofen 10 mg tablet    BD PEN NEEDLE  "LINDY U/F 32G X 4 MM MISC    Blood Glucose Monitoring Suppl (OneTouch Verio Reflect) w/Device KIT    butalbital-acetaminophen-caffeine (FIORICET,ESGIC) -40 mg per tablet    colestipol (COLESTID) 1 g tablet    Cyanocobalamin (VITAMIN B-12 IJ)    diazepam (VALIUM) 5 mg tablet    DULoxetine (CYMBALTA) 60 mg delayed release capsule    Fluticasone-Salmeterol (Advair Diskus) 500-50 mcg/dose inhaler    Folic Acid 0.8 MG CAPS    gabapentin (NEURONTIN) 400 mg capsule    Galcanezumab-gnlm (Emgality) 120 MG/ML SOAJ    glucose blood (OneTouch Verio) test strip    insulin NPH-insulin regular (NovoLIN 70/30) 100 units/mL subcutaneous injection    Insulin Pen Needle (BD Pen Needle Lindy U/F) 32G X 4 MM MISC    Insulin Syringe-Needle U-100 31G X 5/16\" 0.3 ML MISC    ketorolac (TORADOL) 30 mg/mL injection    levothyroxine 112 mcg tablet    magnesium Oxide (MAG-OX) 400 mg TABS    metFORMIN (GLUCOPHAGE-XR) 500 mg 24 hr tablet    mirtazapine (REMERON) 7.5 MG tablet    naloxone (NARCAN) 4 mg/0.1 mL nasal spray    omeprazole (PriLOSEC) 40 MG capsule    ondansetron (ZOFRAN) 4 mg tablet    OneTouch Delica Lancets 33G MISC    OneTouch Ultra test strip    prazosin (MINIPRESS) 2 mg capsule    prochlorperazine (COMPAZINE) 5 mg tablet    topiramate (TOPAMAX) 25 mg tablet    traMADol (ULTRAM-ER) 200 MG 24 hr tablet  Current Facility-Administered Medications   Medication Dose Route Frequency    acetaminophen (TYLENOL) tablet 650 mg  650 mg Oral Q6H PRN    albuterol (PROVENTIL HFA,VENTOLIN HFA) inhaler 2 puff  2 puff Inhalation Q4H PRN    aspirin chewable tablet 81 mg  81 mg Oral Daily    atorvastatin (LIPITOR) tablet 80 mg  80 mg Oral Daily    baclofen tablet 5 mg  5 mg Oral BID    butalbital-acetaminophen-caffeine (FIORICET,ESGIC) -40 mg per tablet 1 tablet  1 tablet Oral Q6H PRN    cholestyramine sugar free (QUESTRAN LIGHT) packet 4 g  4 g Oral BID    diazepam (VALIUM) tablet 5 mg  5 mg Oral Q12H PRN    DULoxetine (CYMBALTA) delayed " "release capsule 60 mg  60 mg Oral BID    enoxaparin (LOVENOX) subcutaneous injection 40 mg  40 mg Subcutaneous Q24H [facility]    fluticasone-vilanterol 200-25 mcg/actuation 1 puff  1 puff Inhalation Daily    gabapentin (NEURONTIN) capsule 800 mg  800 mg Oral TID    insulin glargine (LANTUS) subcutaneous injection 30 Units 0.3 mL  30 Units Subcutaneous HS    insulin lispro (HumALOG/ADMELOG) 100 units/mL subcutaneous injection 1-5 Units  1-5 Units Subcutaneous TID AC    insulin lispro (HumALOG/ADMELOG) 100 units/mL subcutaneous injection 1-5 Units  1-5 Units Subcutaneous HS    insulin lispro (HumALOG/ADMELOG) 100 units/mL subcutaneous injection 10 Units  10 Units Subcutaneous TID With Meals    levothyroxine tablet 112 mcg  112 mcg Oral Early Morning    lidocaine (LIDODERM) 5 % patch 2 patch  2 patch Topical Daily    mirtazapine (REMERON) tablet 7.5 mg  7.5 mg Oral HS    moisture barrier miconazole 2% cream (aka DARLIN MOISTURE BARRIER ANTIFUNGAL CREAM)   Topical BID    nicotine (NICODERM CQ) 21 mg/24 hr TD 24 hr patch 1 patch  1 patch Transdermal Daily    ondansetron (ZOFRAN) injection 4 mg  4 mg Intravenous Q6H PRN    pancrelipase (Lip-Prot-Amyl) (CREON) delayed release capsule 24,000 Units  24,000 Units Oral TID With Meals    pantoprazole (PROTONIX) EC tablet 40 mg  40 mg Oral Early Morning    saccharomyces boulardii (FLORASTOR) capsule 250 mg  250 mg Oral BID    traMADol (ULTRAM) tablet 50 mg  50 mg Oral Q6H PRN    traZODone (DESYREL) tablet 50 mg  50 mg Oral HS PRN       Allergies   Allergen Reactions    Medical Tape Rash    Lexapro [Escitalopram Oxalate]     Escitalopram Other (See Comments) and Palpitations    Other Hives and Rash     Adhesive Tape         Objective     Blood pressure 100/70, pulse 104, temperature (!) 97.3 °F (36.3 °C), temperature source Oral, resp. rate 16, height 5' 2\" (1.575 m), weight 41.5 kg (91 lb 7.9 oz), last menstrual period 03/04/2016, SpO2 99%, not currently breastfeeding. Body mass index " is 16.73 kg/m².      Intake/Output Summary (Last 24 hours) at 9/7/2024 1147  Last data filed at 9/7/2024 0736  Gross per 24 hour   Intake 937 ml   Output --   Net 937 ml       PHYSICAL EXAM:  General: No apparent distress, resting comfortably   Head: Normocephalic, atraumatic  Eyes: Anicteric, no conjunctival erythema  Neck: Trachea midline, no visible lymphadenopathy or goiter  Respiratory: Non-labored respirations, symmetric thorax expansion  Cardiovascular: Extremities appear well-perfused  Abdomen:  Non-distended  Extremities: Moves extremities spontaneously  Skin: No visible rashes, wounds, or jaundice  Neuro: No gross focal deficits, no aphasia      LAB RESULTS:  No results displayed because visit has over 200 results.            IMAGING STUDIES: I have personally reviewed pertinent imaging studies.      Igor Kessler DO   Gastroenterology Fellow, PGY-4  [facility]

## 2024-09-07 NOTE — ANESTHESIA POSTPROCEDURE EVALUATION
Post-Op Assessment Note    CV Status:  Stable  Pain Score: 0    Pain management: adequate       Mental Status:  Alert and awake   Hydration Status:  Euvolemic   PONV Controlled:  Controlled   Airway Patency:  Patent     Post Op Vitals Reviewed: Yes    No anethesia notable event occurred.    Staff: CRNA     Reason for prolonged intubation > 24 hours:  N/aReason for prolonged intubation > 48 hours:  N/a          BP   97/58   Temp 98.1   Pulse  86   Resp   12   SpO2   100

## 2024-09-08 LAB
ANION GAP SERPL CALCULATED.3IONS-SCNC: 7 MMOL/L (ref 4–13)
BUN SERPL-MCNC: 15 MG/DL (ref 5–25)
CALCIUM SERPL-MCNC: 8.7 MG/DL (ref 8.4–10.2)
CHLORIDE SERPL-SCNC: 106 MMOL/L (ref 96–108)
CO2 SERPL-SCNC: 28 MMOL/L (ref 21–32)
CREAT SERPL-MCNC: 0.47 MG/DL (ref 0.6–1.3)
ERYTHROCYTE [DISTWIDTH] IN BLOOD BY AUTOMATED COUNT: 14.5 % (ref 11.6–15.1)
GFR SERPL CREATININE-BSD FRML MDRD: 113 ML/MIN/1.73SQ M
GLUCOSE SERPL-MCNC: 141 MG/DL (ref 65–140)
GLUCOSE SERPL-MCNC: 192 MG/DL (ref 65–140)
GLUCOSE SERPL-MCNC: 259 MG/DL (ref 65–140)
GLUCOSE SERPL-MCNC: 326 MG/DL (ref 65–140)
GLUCOSE SERPL-MCNC: 336 MG/DL (ref 65–140)
HCT VFR BLD AUTO: 31 % (ref 34.8–46.1)
HGB BLD-MCNC: 10 G/DL (ref 11.5–15.4)
MCH RBC QN AUTO: 32.3 PG (ref 26.8–34.3)
MCHC RBC AUTO-ENTMCNC: 32.3 G/DL (ref 31.4–37.4)
MCV RBC AUTO: 100 FL (ref 82–98)
PLATELET # BLD AUTO: 266 THOUSANDS/UL (ref 149–390)
PMV BLD AUTO: 11.2 FL (ref 8.9–12.7)
POTASSIUM SERPL-SCNC: 4 MMOL/L (ref 3.5–5.3)
RBC # BLD AUTO: 3.1 MILLION/UL (ref 3.81–5.12)
SODIUM SERPL-SCNC: 141 MMOL/L (ref 135–147)
WBC # BLD AUTO: 5.64 THOUSAND/UL (ref 4.31–10.16)

## 2024-09-08 PROCEDURE — 85027 COMPLETE CBC AUTOMATED: CPT | Performed by: FAMILY MEDICINE

## 2024-09-08 PROCEDURE — 99233 SBSQ HOSP IP/OBS HIGH 50: CPT | Performed by: INTERNAL MEDICINE

## 2024-09-08 PROCEDURE — 99232 SBSQ HOSP IP/OBS MODERATE 35: CPT | Performed by: INTERNAL MEDICINE

## 2024-09-08 PROCEDURE — 82948 REAGENT STRIP/BLOOD GLUCOSE: CPT

## 2024-09-08 PROCEDURE — 99232 SBSQ HOSP IP/OBS MODERATE 35: CPT | Performed by: FAMILY MEDICINE

## 2024-09-08 PROCEDURE — 80048 BASIC METABOLIC PNL TOTAL CA: CPT | Performed by: FAMILY MEDICINE

## 2024-09-08 RX ORDER — INSULIN LISPRO 100 [IU]/ML
10 INJECTION, SOLUTION INTRAVENOUS; SUBCUTANEOUS
Status: DISCONTINUED | OUTPATIENT
Start: 2024-09-08 | End: 2024-09-16 | Stop reason: HOSPADM

## 2024-09-08 RX ORDER — INSULIN GLARGINE 100 [IU]/ML
40 INJECTION, SOLUTION SUBCUTANEOUS
Status: DISCONTINUED | OUTPATIENT
Start: 2024-09-08 | End: 2024-09-11

## 2024-09-08 RX ORDER — INSULIN LISPRO 100 [IU]/ML
2-12 INJECTION, SOLUTION INTRAVENOUS; SUBCUTANEOUS
Status: DISCONTINUED | OUTPATIENT
Start: 2024-09-08 | End: 2024-09-12

## 2024-09-08 RX ORDER — INSULIN LISPRO 100 [IU]/ML
15 INJECTION, SOLUTION INTRAVENOUS; SUBCUTANEOUS
Status: DISCONTINUED | OUTPATIENT
Start: 2024-09-08 | End: 2024-09-08

## 2024-09-08 RX ADMIN — INSULIN LISPRO 3 UNITS: 100 INJECTION, SOLUTION INTRAVENOUS; SUBCUTANEOUS at 08:22

## 2024-09-08 RX ADMIN — INSULIN LISPRO 10 UNITS: 100 INJECTION, SOLUTION INTRAVENOUS; SUBCUTANEOUS at 17:05

## 2024-09-08 RX ADMIN — TRAMADOL HYDROCHLORIDE 50 MG: 50 TABLET, COATED ORAL at 21:52

## 2024-09-08 RX ADMIN — BACLOFEN 5 MG: 10 TABLET ORAL at 08:21

## 2024-09-08 RX ADMIN — GABAPENTIN 800 MG: 400 CAPSULE ORAL at 16:13

## 2024-09-08 RX ADMIN — PANTOPRAZOLE SODIUM 40 MG: 40 TABLET, DELAYED RELEASE ORAL at 05:06

## 2024-09-08 RX ADMIN — ATORVASTATIN CALCIUM 80 MG: 80 TABLET, FILM COATED ORAL at 08:21

## 2024-09-08 RX ADMIN — CHOLESTYRAMINE 4 G: 4 POWDER, FOR SUSPENSION ORAL at 17:04

## 2024-09-08 RX ADMIN — ONDANSETRON 4 MG: 2 INJECTION INTRAMUSCULAR; INTRAVENOUS at 16:13

## 2024-09-08 RX ADMIN — INSULIN LISPRO 15 UNITS: 100 INJECTION, SOLUTION INTRAVENOUS; SUBCUTANEOUS at 11:36

## 2024-09-08 RX ADMIN — Medication 250 MG: at 17:03

## 2024-09-08 RX ADMIN — DIAZEPAM 5 MG: 5 TABLET ORAL at 11:09

## 2024-09-08 RX ADMIN — MICONAZOLE NITRATE: 20 CREAM TOPICAL at 08:21

## 2024-09-08 RX ADMIN — GABAPENTIN 800 MG: 400 CAPSULE ORAL at 08:21

## 2024-09-08 RX ADMIN — Medication 250 MG: at 08:21

## 2024-09-08 RX ADMIN — ONDANSETRON 4 MG: 2 INJECTION INTRAMUSCULAR; INTRAVENOUS at 10:02

## 2024-09-08 RX ADMIN — PANCRELIPASE 24000 UNITS: 120000; 24000; 76000 CAPSULE, DELAYED RELEASE PELLETS ORAL at 11:37

## 2024-09-08 RX ADMIN — GABAPENTIN 800 MG: 400 CAPSULE ORAL at 21:51

## 2024-09-08 RX ADMIN — CHOLESTYRAMINE 4 G: 4 POWDER, FOR SUSPENSION ORAL at 08:21

## 2024-09-08 RX ADMIN — BACLOFEN 5 MG: 10 TABLET ORAL at 17:03

## 2024-09-08 RX ADMIN — PANCRELIPASE 24000 UNITS: 120000; 24000; 76000 CAPSULE, DELAYED RELEASE PELLETS ORAL at 08:23

## 2024-09-08 RX ADMIN — TRAZODONE HYDROCHLORIDE 50 MG: 50 TABLET ORAL at 21:52

## 2024-09-08 RX ADMIN — MIRTAZAPINE 7.5 MG: 15 TABLET, FILM COATED ORAL at 21:51

## 2024-09-08 RX ADMIN — BUTALBITAL, ACETAMINOPHEN AND CAFFEINE 1 TABLET: 50; 325; 40 TABLET ORAL at 14:26

## 2024-09-08 RX ADMIN — ENOXAPARIN SODIUM 40 MG: 40 INJECTION SUBCUTANEOUS at 08:21

## 2024-09-08 RX ADMIN — FLUTICASONE FUROATE AND VILANTEROL TRIFENATATE 1 PUFF: 200; 25 POWDER RESPIRATORY (INHALATION) at 08:21

## 2024-09-08 RX ADMIN — INSULIN LISPRO 10 UNITS: 100 INJECTION, SOLUTION INTRAVENOUS; SUBCUTANEOUS at 08:22

## 2024-09-08 RX ADMIN — INSULIN GLARGINE 40 UNITS: 100 INJECTION, SOLUTION SUBCUTANEOUS at 21:51

## 2024-09-08 RX ADMIN — DULOXETINE HYDROCHLORIDE 60 MG: 60 CAPSULE, DELAYED RELEASE ORAL at 17:03

## 2024-09-08 RX ADMIN — LEVOTHYROXINE SODIUM 112 MCG: 112 TABLET ORAL at 05:06

## 2024-09-08 RX ADMIN — ASPIRIN 81 MG CHEWABLE TABLET 81 MG: 81 TABLET CHEWABLE at 08:20

## 2024-09-08 RX ADMIN — PANCRELIPASE 24000 UNITS: 120000; 24000; 76000 CAPSULE, DELAYED RELEASE PELLETS ORAL at 17:03

## 2024-09-08 RX ADMIN — TRAMADOL HYDROCHLORIDE 50 MG: 50 TABLET, COATED ORAL at 16:13

## 2024-09-08 RX ADMIN — DULOXETINE HYDROCHLORIDE 60 MG: 60 CAPSULE, DELAYED RELEASE ORAL at 08:21

## 2024-09-08 RX ADMIN — INSULIN LISPRO 2 UNITS: 100 INJECTION, SOLUTION INTRAVENOUS; SUBCUTANEOUS at 21:52

## 2024-09-08 RX ADMIN — INSULIN LISPRO 6 UNITS: 100 INJECTION, SOLUTION INTRAVENOUS; SUBCUTANEOUS at 17:05

## 2024-09-08 RX ADMIN — LIDOCAINE 2 PATCH: 700 PATCH TOPICAL at 08:21

## 2024-09-08 RX ADMIN — NICOTINE 1 PATCH: 21 PATCH, EXTENDED RELEASE TRANSDERMAL at 08:21

## 2024-09-08 RX ADMIN — MICONAZOLE NITRATE: 20 CREAM TOPICAL at 17:01

## 2024-09-08 RX ADMIN — TRAMADOL HYDROCHLORIDE 50 MG: 50 TABLET, COATED ORAL at 10:01

## 2024-09-08 NOTE — ASSESSMENT & PLAN NOTE
Longstanding history of chronic diarrhea, patient reporting acute diarrhea x1 month. Follows with GI. Has tried questran/imodium without relief  Stool enteric panel negative  C. difficile positive, see plan above   Giardia antigen is negative  Stool elastase is low started on Creon per GI  Still with persistent diarrhea

## 2024-09-08 NOTE — PLAN OF CARE
Problem: Nutrition/Hydration-ADULT  Goal: Nutrient/Hydration intake appropriate for improving, restoring or maintaining nutritional needs  Description: Monitor and assess patient's nutrition/hydration status for malnutrition. Collaborate with interdisciplinary team and initiate plan and interventions as ordered.  Monitor patient's weight and dietary intake as ordered or per policy. Utilize nutrition screening tool and intervene as necessary. Determine patient's food preferences and provide high-protein, high-caloric foods as appropriate.     INTERVENTIONS:  - Monitor oral intake, urinary output, labs, and treatment plans  - Assess nutrition and hydration status and recommend course of action  - Evaluate amount of meals eaten  - Assist patient with eating if necessary   - Allow adequate time for meals  - Recommend/ encourage appropriate diets, oral nutritional supplements, and vitamin/mineral supplements  - Order, calculate, and assess calorie counts as needed  - Recommend, monitor, and adjust tube feedings and TPN/PPN based on assessed needs  - Assess need for intravenous fluids  - Provide specific nutrition/hydration education as appropriate  - Include patient/family/caregiver in decisions related to nutrition  Outcome: Progressing     Problem: PAIN - ADULT  Goal: Verbalizes/displays adequate comfort level or baseline comfort level  Description: Interventions:  - Encourage patient to monitor pain and request assistance  - Assess pain using appropriate pain scale  - Administer analgesics based on type and severity of pain and evaluate response  - Implement non-pharmacological measures as appropriate and evaluate response  - Consider cultural and social influences on pain and pain management  - Notify physician/advanced practitioner if interventions unsuccessful or patient reports new pain  Outcome: Progressing     Problem: INFECTION - ADULT  Goal: Absence or prevention of progression during  hospitalization  Description: INTERVENTIONS:  - Assess and monitor for signs and symptoms of infection  - Monitor lab/diagnostic results  - Monitor all insertion sites, i.e. indwelling lines, tubes, and drains  - Monitor endotracheal if appropriate and nasal secretions for changes in amount and color  - [place] appropriate cooling/warming therapies per order  - Administer medications as ordered  - Instruct and encourage patient and family to use good hand hygiene technique  - Identify and instruct in appropriate isolation precautions for identified infection/condition  Outcome: Progressing  Goal: Absence of fever/infection during neutropenic period  Description: INTERVENTIONS:  - Monitor WBC    Outcome: Progressing     Problem: SAFETY ADULT  Goal: Patient will remain free of falls  Description: INTERVENTIONS:  - Educate patient/family on patient safety including physical limitations  - Instruct patient to call for assistance with activity   - Consult OT/PT to assist with strengthening/mobility   - Keep Call bell within reach  - Keep bed low and locked with side rails adjusted as appropriate  - Keep care items and personal belongings within reach  - Initiate and maintain comfort rounds  - Make Fall Risk Sign visible to staff  - Offer Toileting every 2 Hours, in advance of need  - Initiate/Maintain alarm  - Obtain necessary fall risk management equipment  - Apply yellow socks and bracelet for high fall risk patients  - Consider moving patient to room near nurses station  Outcome: Progressing  Goal: Maintain or return to baseline ADL function  Description: INTERVENTIONS:  -  Assess patient's ability to carry out ADLs; assess patient's baseline for ADL function and identify physical deficits which impact ability to perform ADLs (bathing, care of mouth/teeth, toileting, grooming, dressing, etc.)  - Assess/evaluate cause of self-care deficits   - Assess range of motion  - Assess patient's mobility; develop plan if  impaired  - Assess patient's need for assistive devices and provide as appropriate  - Encourage maximum independence but intervene and supervise when necessary  - Involve family in performance of ADLs  - Assess for home care needs following discharge   - Consider OT consult to assist with ADL evaluation and planning for discharge  - Provide patient education as appropriate  Outcome: Progressing  Goal: Maintains/Returns to pre admission functional level  Description: INTERVENTIONS:  - Perform AM-PAC 6 Click Basic Mobility/ Daily Activity assessment daily.  - Set and communicate daily mobility goal to care team and patient/family/caregiver.   - Collaborate with rehabilitation services on mobility goals if consulted  - Perform Range of Motion 3 times a day.  - Reposition patient every 2 hours.  - Dangle patient 3 times a day  - Stand patient 3 times a day  - Ambulate patient 3 times a day  - Out of bed to chair 3 times a day   - Out of bed for meals 3 times a day  - Out of bed for toileting  - Record patient progress and toleration of activity level   Outcome: Progressing     Problem: DISCHARGE PLANNING  Goal: Discharge to home or other facility with appropriate resources  Description: INTERVENTIONS:  - Identify barriers to discharge w/patient and caregiver  - Arrange for needed discharge resources and transportation as appropriate  - Identify discharge learning needs (meds, wound care, etc.)  - Arrange for interpretive services to assist at discharge as needed  - Refer to Case Management Department for coordinating discharge planning if the patient needs post-hospital services based on physician/advanced practitioner order or complex needs related to functional status, cognitive ability, or social support system  Outcome: Progressing     Problem: Knowledge Deficit  Goal: Patient/family/caregiver demonstrates understanding of disease process, treatment plan, medications, and discharge instructions  Description:  Complete learning assessment and assess knowledge base.  Interventions:  - Provide teaching at level of understanding  - Provide teaching via preferred learning methods  Outcome: Progressing     Problem: NEUROSENSORY - ADULT  Goal: Achieves stable or improved neurological status  Description: INTERVENTIONS  - Monitor and report changes in neurological status  - Monitor vital signs such as temperature, blood pressure, glucose, and any other labs ordered   - Initiate measures to prevent increased intracranial pressure  - Monitor for seizure activity and implement precautions if appropriate      Outcome: Progressing  Goal: Remains free of injury related to seizures activity  Description: INTERVENTIONS  - Maintain airway, patient safety  and administer oxygen as ordered  - Monitor patient for seizure activity, document and report duration and description of seizure to physician/advanced practitioner  - If seizure occurs,  ensure patient safety during seizure  - Reorient patient post seizure  - Seizure pads on all 4 side rails  - Instruct patient/family to notify RN of any seizure activity including if an aura is experienced  - Instruct patient/family to call for assistance with activity based on nursing assessment  - Administer anti-seizure medications if ordered    Outcome: Progressing  Goal: Achieves maximal functionality and self care  Description: INTERVENTIONS  - Monitor swallowing and airway patency with patient fatigue and changes in neurological status  - Encourage and assist patient to increase activity and self care.   - Encourage visually impaired, hearing impaired and aphasic patients to use assistive/communication devices  Outcome: Progressing     Problem: METABOLIC, FLUID AND ELECTROLYTES - ADULT  Goal: Electrolytes maintained within normal limits  Description: INTERVENTIONS:  - Monitor labs and assess patient for signs and symptoms of electrolyte imbalances  - Administer electrolyte replacement as  ordered  - Monitor response to electrolyte replacements, including repeat lab results as appropriate  - Instruct patient on fluid and nutrition as appropriate  Outcome: Progressing  Goal: Fluid balance maintained  Description: INTERVENTIONS:  - Monitor labs   - Monitor I/O and WT  - Instruct patient on fluid and nutrition as appropriate  - Assess for signs & symptoms of volume excess or deficit  Outcome: Progressing  Goal: Glucose maintained within target range  Description: INTERVENTIONS:  - Monitor Blood Glucose as ordered  - Assess for signs and symptoms of hyperglycemia and hypoglycemia  - Administer ordered medications to maintain glucose within target range  - Assess nutritional intake and initiate nutrition service referral as needed  Outcome: Progressing     Problem: SKIN/TISSUE INTEGRITY - ADULT  Goal: Skin Integrity remains intact(Skin Breakdown Prevention)  Description: Assess:  -Perform William assessment every shift  -Clean and moisturize skin every day  -Inspect skin when repositioning, toileting, and assisting with ADLS  -Assess under medical devices such as lines every 2 hours  -Assess extremities for adequate circulation and sensation     Bed Management:  -Have minimal linens on bed & keep smooth, unwrinkled  -Change linens as needed when moist or perspiring  -Avoid sitting or lying in one position for more than 2 hours while in bed  -Keep HOB at 30 degrees     Toileting:  -Offer bedside commode  -Assess for incontinence every hour  -Use incontinent care products after each incontinent episode such as moisture barriers, foam cleansers    Activity:  -Mobilize patient 3 times a day  -Encourage activity and walks on unit  -Encourage or provide ROM exercises   -Turn and reposition patient every 2 Hours  -Use appropriate equipment to lift or move patient in bed  -Instruct/ Assist with weight shifting every 15 min when out of bed in chair  -Consider limitation of chair time 2 hour intervals    Skin  Care:  -Avoid use of baby powder, tape, friction and shearing, hot water or constrictive clothing  -Relieve pressure over bony prominences using offloading techniques or foam dressings (If not contraindicated by incontinence)  -Do not massage red bony areas    Next Steps:  -Teach patient strategies to minimize risks    -Consider consults to  interdisciplinary teams  Outcome: Progressing  Goal: Incision(s), wounds(s) or drain site(s) healing without S/S of infection  Description: INTERVENTIONS  - Assess and document dressing, incision, wound bed, drain sites and surrounding tissue  - Provide patient and family education  - Perform skin care/dressing changes  Outcome: Progressing  Goal: Pressure injury heals and does not worsen  Description: Interventions:  - Implement low air loss mattress or specialty surface (Criteria met)  - Apply silicone foam dressing  - Instruct/assist with weight shifting every 15 minutes when in chair   - Limit chair time to 2 hour intervals  - Use special pressure reducing interventions when in chair   - Apply fecal or urinary incontinence containment device   - Perform passive or active ROM  - Turn and reposition patient & offload bony prominences every 2 hours   - Utilize friction reducing device or surface for transfers   - Consider consults to  interdisciplinary teams  - Use incontinent care products after each incontinent episode  - Consider nutrition services referral as needed  Outcome: Progressing     Problem: MUSCULOSKELETAL - ADULT  Goal: Maintain or return mobility to safest level of function  Description: INTERVENTIONS:  - Assess patient's ability to carry out ADLs; assess patient's baseline for ADL function and identify physical deficits which impact ability to perform ADLs (bathing, care of mouth/teeth, toileting, grooming, dressing, etc.)  - Assess/evaluate cause of self-care deficits   - Assess range of motion  - Assess patient's mobility  - Assess patient's need for  assistive devices and provide as appropriate  - Encourage maximum independence but intervene and supervise when necessary  - Involve family in performance of ADLs  - Assess for home care needs following discharge   - Consider OT consult to assist with ADL evaluation and planning for discharge  - Provide patient education as appropriate  Outcome: Progressing  Goal: Maintain proper alignment of affected body part  Description: INTERVENTIONS:  - Support, maintain and protect limb and body alignment  - Provide patient/ family with appropriate education  Outcome: Progressing     Problem: Prexisting or High Potential for Compromised Skin Integrity  Goal: Skin integrity is maintained or improved  Description: INTERVENTIONS:  - Identify patients at risk for skin breakdown  - Assess and monitor skin integrity  - Assess and monitor nutrition and hydration status  - Monitor labs   - Assess for incontinence   - Turn and reposition patient  - Assist with mobility/ambulation  - Relieve pressure over bony prominences  - Avoid friction and shearing  - Provide appropriate hygiene as needed including keeping skin clean and dry  - Evaluate need for skin moisturizer/barrier cream  - Collaborate with interdisciplinary team   - Patient/family teaching  - Consider wound care consult   Outcome: Progressing

## 2024-09-08 NOTE — ASSESSMENT & PLAN NOTE
"Lab Results   Component Value Date    HGBA1C 16.1 (H) 08/16/2024       Recent Labs     09/07/24  2042 09/08/24  0616 09/08/24  1105 09/08/24  1544   POCGLU 244* 326* 141* 259*       Patient markedly hyperglycemic on admission only partially responsive to IV fluids, remainder of labs fortunately not consistent with DKA. A1C 16  Patient admitting she has not been using her home insulin as she apparently has been unable to fill prescription due to \"insurance issues,\" additionally reports that due to her neuropathy she has difficulty self injecting.  Daughter also reports jessica non compliance.   Typically on NovoLog 70/30 20 units twice daily  Diabetic diet-change to regular diet recently  Continue basal/prandial insulin while inpatient, adjust PRN  Sliding scale coverage  Insulin regimen adjusted by endocrinology.  Monitor blood sugar  Patient was very upset when endocrinology change the diet order  "

## 2024-09-08 NOTE — PROGRESS NOTES
Progress Note - Infectious Disease   Barb Palomo 54 y.o. female MRN: [id number]  Unit/Bed#: [facility] 522-01 Encounter: [id number]      Impression/Plan:  1.  C. difficile colitis.  Patient presented with weakness, weight loss, acute on chronic diarrhea.  8/16 C. difficile PCR and toxin EIA positive.  Continues to have significant loose stools despite 7 days of fidaxomicin and now 7 days of p.o. vancomycin.  She remains nontoxic without fever or leukocytosis.  Abdominal exam benign.  Unclear if ongoing symptoms are related to C. difficile alone or an alternative etiology as below.  Given recent positive testing there is low utility to repeat C. difficile testing.  Still having significant diarrhea.  Patient status post fecal transplantation although the prep seems to have been inadequate.  Still with ongoing diarrhea.              -Holding high-dose oral vancomycin for now  -If patient does not improve with fecal transplant may need to repeat the transplant after a more aggressive prep  -Careful use of Questran  -GI follow-up     2.  Acute on chronic diarrhea.  The patient has had diarrhea and weight loss for the past year but reports diarrhea is usually 2-4 times daily.  She continues to have up to 10 stools daily since admission with minimal improvement on C. difficile treatment.  Fecal white blood cells negative.  Additional infectious testing including stool O&P, Giardia antigen, enteric panel negative.  Unclear if ongoing symptoms are solely due to C. difficile.  The patient has a diagnosis of chronic pancreatitis to be contributing.  Poorly controlled diabetes may also lead to diarrhea.  She may also be having postinfectious/functional diarrhea.  Continues to have substantial diarrhea.  Repeat CT of the abdomen pelvis with colonic thickening.  Pancreatic insufficiency may also be playing a role in the chronic process.  Status post fecal transplantation              -Treatment for C. difficile as above               -Close GI follow-up  -Continue pancreatic enzymes  -With inadequate prep, patient and ongoing diarrhea, patient may need repeat fecal transplant     3.  Failure to thrive, severe protein calorie malnutrition.  BMI 16.  The patient has had diarrhea and weight loss for the past year.  Status post EGD and colonoscopy May, colon biopsies without pathologic abnormalities.     4.  Poorly controlled type 2 diabetes with hyperglycemia.  Hemoglobin A1c 16.1%.  Patient was not using insulin at home.  Glycemic management per primary team     5.  Bacteriuria.  Patient received 3 doses of IV ceftriaxone.  CT imaging showed a distended bladder suggestive of retention which is likely contributing to her symptoms.  Per report pain was happening after urination likely due to skin irritation.  In the setting of C. difficile as above, avoid unnecessary antibiotics.    Discussed with the primary service the plan to continue to monitor off oral vancomycin for now and they agree with the plan    Antibiotics:  Holding high-dose oral vancomycin  Posttransplant day 2    Subjective:  Patient has no fever, chills, sweats; no nausea, vomiting, diarrhea; no cough, shortness of breath; no pain. No new symptoms.    Objective:  Vitals:  Temp:  [97.3 °F (36.3 °C)-98.3 °F (36.8 °C)] 98.3 °F (36.8 °C)  HR:  [104-111] 111  Resp:  [15-16] 16  BP: (100-123)/(70-84) 123/84  SpO2:  [95 %-99 %] 95 %  Temp (24hrs), Av.8 °F (36.6 °C), Min:97.3 °F (36.3 °C), Max:98.3 °F (36.8 °C)  Current: Temperature: 98.3 °F (36.8 °C)    Physical Exam:   General Appearance:  Alert, interactive, nontoxic, no acute distress.   Throat: Oropharynx moist without lesions.    Lungs:   Clear to auscultation bilaterally; no wheezes, rhonchi or rales; respirations unlabored   Heart:  RRR; no murmur, rub or gallop   Abdomen:   Soft, non-tender, non-distended, positive bowel sounds.     Extremities: No clubbing, cyanosis or edema   Skin: No new rashes or lesions. No draining  wounds noted.       Labs, Imaging, & Other studies:   All pertinent labs and imaging studies were personally reviewed  Results from last 7 days   Lab Units 09/08/24 0443 09/07/24 0435 09/06/24  0457   WBC Thousand/uL 5.64 7.89 9.83   HEMOGLOBIN g/dL 10.0* 9.9* 11.7   PLATELETS Thousands/uL 266 259 281     Results from last 7 days   Lab Units 09/08/24 0443 09/07/24 0435 09/06/24 0457 09/05/24  0808 09/04/24  0603   SODIUM mmol/L 141 138 145   < > 139   POTASSIUM mmol/L 4.0 3.7 3.0*   < > 4.3   CHLORIDE mmol/L 106 104 105   < > 105   CO2 mmol/L 28 26 28   < > 26   BUN mg/dL 15 10 7   < > 15   CREATININE mg/dL 0.47* 0.54* 0.40*   < > 0.49*   EGFR ml/min/1.73sq m 113 108 119   < > 111   CALCIUM mg/dL 8.7 8.5 9.2   < > 9.2   AST U/L  --   --   --   --  57*   ALT U/L  --   --   --   --  97*   ALK PHOS U/L  --   --   --   --  207*    < > = values in this interval not displayed.

## 2024-09-08 NOTE — PROGRESS NOTES
[facility]  Progress Note  Name: Barb Palomo I  MRN: [id number]  Unit/Bed#: [facility] 522-01 I Date of Admission: 8/15/2024   Date of Service: 9/8/2024 I Hospital Day: 23    Assessment & Plan   * C. difficile diarrhea  Assessment & Plan  Patient presented with generalized weakness, frequent falls.  She has had ongoing diarrhea, dysuria, poor oral intake and reported 70 pound weight loss over the last year.  Initially hypotensive in the ER, status post IV fluids. Labs with multiple metabolic derangements  CT imaging showing mild diffuse colonic wall thickening, mild diffuse wall thickening of stomach  C. difficile found to be positive--has a history of C. difficile in September 2022.    Patient started on Dificid 200 mg x 10 days per protocol on 8/18 as this is recurrent infection  Initially seemed BMs were improving and were becoming more formed, however again having frequent watery stools  GI consulted  Contact precautions  Appetite is good   No improvement with avoiding lactose and artificial sweeteners  Continue banatrol  Still having multiple loose stools overnight and this morning  Continue vancomycin, increased to 500mg-ID on board  Checking CT abdomen and pelvis: -CT is concerning for proctocolitis  GI to evaluate for pancreatic insufficiency as a contributing factor to her diarrhea - stool pancreatic elastase is low and started on Creon  Status post colonoscopy and FMT  on Friday .  Patient tolerated the procedure    Hypotension  Assessment & Plan  Intermittent episodes of hypotension, most occurrences happen overnight and question correlation with minipress administration.  This has now been discontinued    Sinus tachycardia  Assessment & Plan  Heart rate 100s-120s at times.  Sinus.  Has received IV fluids without much improvement.  Trialed IVFs again 8/22 given reports of nausea with poor oral intake and dizziness with some improvement to the 110s with HR- will  "continue with IVFs again today   orthostatic blood pressure negative   Pt is on minipress which could have a side effect of ST.  Additionally noted she is hypotensive each night around 2/3 AM, I suspect this is likely the underlying cause.  She was started on this by her psychiatrist to assist with sleep and eating outpatient   Will hold minipress for now.  D/w pt can have PRN trazodone for sleep.  Pt agreeable   Monitor     Failure to thrive in adult  Assessment & Plan  Patient presented with increased generalized weakness and frequent falls, ongoing diarrhea, dysuria, poor oral intake, and at least 70-80 pound weight loss over the past 1 year. Has been following with GI, had colonoscopy May 2024 with poor prep--had EGD with esophagitis and gastritis  Labs with multiple derangements including hyperglycemia, hypokalemia and hypomagnesemia on admission, improved.   C. difficile positive as above  Patient reports she has not been using insulin due to \"insurance issue\" and inability to self inject due to her neuropathy  CT abdomen and pelvis without any obvious malignancy  Treatment as per respective issues  PT/OT recommending rehab.   Endocrinology evaluation appreciated.    Hypomagnesemia  Assessment & Plan  Hold oral supplementation due to diarrhea  Check magnesium level and replace as needed    Severe protein-calorie malnutrition (HCC)  Assessment & Plan  Severe protein-calorie malnutrition 2/2 chronic diarrhea a/e/b fat and muscle loss requiring Nutrition consult, oral diet and nutrition supplements    360 Statement: severe malnutrition r/t suspect combination of chronic illness and social/environmental factors as evidenced by severe temporal muscle loss, severe tricep fat pad loss, at least moderate muscle loss around clavicles and shoulders, at least moderate orbital fat pad loss. Treatment: oral diet and oral nutrition supplements.    BMI Findings:  Adult BMI Classifications: Underweight < 18.5        Body mass " "index is 17.42 kg/m².   C/w supplements  Nutrition consult appreciated  Encourage po intake     Acute cystitis without hematuria  Assessment & Plan  Reported dysuria on admission   Urine + nitrite & leukocytes, CT imaging with distended bladder suggestive of retention and small amount of air which could be secondary to infection  Urine Culture noted, S/P 3 doses of IV CTX 8/17 8/21, reported pain AFTER urination, likely related to urine hitting excoriated labia/bottom from frequent stooling. Will continue with medicated cream to this area- denies pain at this time   S/p pyridum x 3 doses  Wound care consult appreciated     Uncontrolled type 2 diabetes mellitus with hyperglycemia (HCC)  Assessment & Plan  Lab Results   Component Value Date    HGBA1C 16.1 (H) 08/16/2024       Recent Labs     09/07/24  2042 09/08/24  0616 09/08/24  1105 09/08/24  1544   POCGLU 244* 326* 141* 259*       Patient markedly hyperglycemic on admission only partially responsive to IV fluids, remainder of labs fortunately not consistent with DKA. A1C 16  Patient admitting she has not been using her home insulin as she apparently has been unable to fill prescription due to \"insurance issues,\" additionally reports that due to her neuropathy she has difficulty self injecting.  Daughter also reports jessica non compliance.   Typically on NovoLog 70/30 20 units twice daily  Diabetic diet-change to regular diet recently  Continue basal/prandial insulin while inpatient, adjust PRN  Sliding scale coverage  Insulin regimen adjusted by endocrinology.  Monitor blood sugar  Patient was very upset when endocrinology change the diet order    Opioid dependence with other opioid-induced disorder (HCC)  Assessment & Plan  PDMP reviewed, patient maintained on tramadol 200 mg daily as needed; history of chronic cervical/lumbar pain noted  Continue as needed tramadol and chronic gabapentin dosing    Chronic diarrhea  Assessment & Plan  Longstanding history of " chronic diarrhea, patient reporting acute diarrhea x1 month. Follows with GI. Has tried questran/imodium without relief  Stool enteric panel negative  C. difficile positive, see plan above   Giardia antigen is negative  Stool elastase is low started on Creon per GI  Still with persistent diarrhea    Tobacco dependence  Assessment & Plan  Counseled on need for cessation, provide nicotine patch while admitted  Recent CT chest for lung cancer screening 7/30/2024 negative for nodules or masses    GERD without esophagitis  Assessment & Plan  Continue PPI    Severe episode of recurrent major depressive disorder, without psychotic features (HCC)  Assessment & Plan  Mood appearing fairly stable, continue home duloxetine, mirtazapine, as needed Valium  Hold minipress given symptomatic hypotension episodes, tachycardia                VTE Pharmacologic Prophylaxis: VTE Score: 3 Low Risk (Score 0-2) - Encourage Ambulation.    Mobility:   Basic Mobility Inpatient Raw Score: 23  JH-HLM Goal: 7: Walk 25 feet or more  JH-HLM Achieved: 6: Walk 10 steps or more  JH-HLM Goal achieved. Continue to encourage appropriate mobility.    Patient Centered Rounds: I performed bedside rounds with nursing staff today.   Discussions with Specialists or Other Care Team Provider:     Education and Discussions with Family / Patient: Updated patient    Total Time Spent on Date of Encounter in care of patient: 35 mins. This time was spent on one or more of the following: performing physical exam; counseling and coordination of care; obtaining or reviewing history; documenting in the medical record; reviewing/ordering tests, medications or procedures; communicating with other healthcare professionals and discussing with patient's family/caregivers.    Current Length of Stay: 23 day(s)  Current Patient Status: Inpatient   Certification Statement: The patient will continue to require additional inpatient hospital stay due to persistent  diarrhea  Discharge Plan:     Code Status: Level 1 - Full Code    Subjective:   Patient seen and examined.  Patient was very upset and crying in the room since endocrinology change his diet.  Discussed with endocrinology and able to change back to double protein and double vegetables..  Currently patient is on regular diet with double protein and double vegetables    Objective:     Vitals:   Temp (24hrs), Av.4 °F (37.4 °C), Min:98.3 °F (36.8 °C), Max:100.8 °F (38.2 °C)    Temp:  [98.3 °F (36.8 °C)-100.8 °F (38.2 °C)] 100.8 °F (38.2 °C)  HR:  [107-111] 107  Resp:  [16-18] 18  BP: (110-123)/(78-88) 110/78  SpO2:  [95 %-98 %] 98 %  Body mass index is 17.42 kg/m².     Input and Output Summary (last 24 hours):     Intake/Output Summary (Last 24 hours) at 2024 1908  Last data filed at 2024 1823  Gross per 24 hour   Intake 1660 ml   Output --   Net 1660 ml       Physical Exam:   Physical Exam  Constitutional:       General: She is not in acute distress.     Appearance: She is not ill-appearing.      Comments: Chronically sick appearing female   HENT:      Head: Normocephalic.      Nose: Nose normal.   Eyes:      General: No scleral icterus.  Cardiovascular:      Rate and Rhythm: Normal rate and regular rhythm.      Heart sounds: No murmur heard.  Pulmonary:      Effort: Pulmonary effort is normal. No respiratory distress.   Abdominal:      General: Bowel sounds are normal. There is no distension.      Tenderness: There is abdominal tenderness (Mild periumbilical tenderness). There is no guarding or rebound.   Musculoskeletal:         General: No swelling. Normal range of motion.      Right lower leg: No edema.      Left lower leg: No edema.   Neurological:      Mental Status: She is alert. Mental status is at baseline.          Additional Data:     Labs:  Results from last 7 days   Lab Units 24  0443   WBC Thousand/uL 5.64   HEMOGLOBIN g/dL 10.0*   HEMATOCRIT % 31.0*   PLATELETS Thousands/uL 266      Results from last 7 days   Lab Units 09/08/24  0443 09/05/24  0808 09/04/24  0603   SODIUM mmol/L 141   < > 139   POTASSIUM mmol/L 4.0   < > 4.3   CHLORIDE mmol/L 106   < > 105   CO2 mmol/L 28   < > 26   BUN mg/dL 15   < > 15   CREATININE mg/dL 0.47*   < > 0.49*   ANION GAP mmol/L 7   < > 8   CALCIUM mg/dL 8.7   < > 9.2   ALBUMIN g/dL  --   --  3.7   TOTAL BILIRUBIN mg/dL  --   --  0.28   ALK PHOS U/L  --   --  207*   ALT U/L  --   --  97*   AST U/L  --   --  57*   GLUCOSE RANDOM mg/dL 336*   < > 285*    < > = values in this interval not displayed.         Results from last 7 days   Lab Units 09/08/24  1544 09/08/24  1105 09/08/24  0616 09/07/24  2042 09/07/24  1617 09/07/24  1046 09/07/24  0706 09/06/24  2128 09/06/24  1649 09/06/24  1047 09/06/24  1011 09/06/24  0928   POC GLUCOSE mg/dl 259* 141* 326* 244* 167* 126 292* 322* 71 150* 190* 49*               Lines/Drains:  Invasive Devices       Peripheral Intravenous Line  Duration             Peripheral IV 09/08/24 Dorsal (posterior);Left Hand <1 day                          Imaging: No pertinent imaging reviewed.    Recent Cultures (last 7 days):         Last 24 Hours Medication List:   Current Facility-Administered Medications   Medication Dose Route Frequency Provider Last Rate    acetaminophen  650 mg Oral Q6H PRN Abhay Hayes MD      albuterol  2 puff Inhalation Q4H PRN Renan Drake, DO      aspirin  81 mg Oral Daily Renan Drake, DO      atorvastatin  80 mg Oral Daily Renan Drake, DO      baclofen  5 mg Oral BID ELISEO Toribio      butalbital-acetaminophen-caffeine  1 tablet Oral Q6H PRN Renan Drake, DO      cholestyramine sugar free  4 g Oral BID Dalia Soriano, DO      diazepam  5 mg Oral Q12H PRN Renan Drake, DO      DULoxetine  60 mg Oral BID Renan Drake DO      enoxaparin  40 mg Subcutaneous Q24H [facility] Abhay Hayes MD      fluticasone-vilanterol  1 puff Inhalation Daily Renan Drake DO      gabapentin  800 mg Oral TID  Renan Drake, DO      insulin glargine  40 Units Subcutaneous HS Savage Linder MD      insulin lispro  10 Units Subcutaneous TID With Meals Savage Linder MD      insulin lispro  2-12 Units Subcutaneous 4x Daily (AC & HS) Savage Linder MD      levothyroxine  112 mcg Oral Early Morning Renan Drake, DO      lidocaine  2 patch Topical Daily Magda ANASTACIO Amaya, CRNP      mirtazapine  7.5 mg Oral HS Renan Drake, DO      DARLIN ANTIFUNGAL   Topical BID Cherise Grey, CRNP      nicotine  1 patch Transdermal Daily Renan Drake, DO      ondansetron  4 mg Intravenous Q6H PRN Renan Drake, DO      pancrelipase (Lip-Prot-Amyl)  24,000 Units Oral TID With Meals Dalia Soriano, DO      pantoprazole  40 mg Oral Early Morning Renan Drake, DO      saccharomyces boulardii  250 mg Oral BID Shanika Knott, CRNP      traMADol  50 mg Oral Q6H PRN Renan Drake, DO      traZODone  50 mg Oral HS PRN Estefany Villalobos PA-C          Today, Patient Was Seen By: Britney Arce MD    **Please Note: This note may have been constructed using a voice recognition system.**

## 2024-09-08 NOTE — PROGRESS NOTES
[facility]  Endocrinology - Progress Note  Name: Barb Palomo I  MRN: [id number]  Unit/Bed#: PPHP 522-01 I Date of Admission: 8/15/2024   Date of Service: 9/8/2024 I Hospital Day: 23    Uncontrolled type 2 diabetes mellitus with hyperglycemia (HCC)  Assessment & Plan  Lab Results   Component Value Date    HGBA1C 16.1 (H) 08/16/2024     Recent Labs     09/06/24  1649 09/06/24  2128 09/07/24  0706 09/07/24  1046   POCGLU 71 322* 292* 126   Blood Sugar Average: Last 72 hrs:  (P) 163.25    Uncontrolled type 2 diabetes mellitus with hyperglycemia.  Home regimen as prescribed: metformin 500 mg twice daily, Novolin 70/30 20 units before breakfast and dinner.  Of note, patient had issues with her insurance and could not obtain insulin.  Most recent A1c 16.1%  Overnight, hyperglycemia up to 326 in the last 24 hours on fingerstick.  Reports she is eating well.    Continue with Lantus increased at 40 units daily  Continue mealtime insulin lispro 10 units 3 times daily  Continue with correction insulin before meals and at bedtime- today, adjusted to algorithm 4  Check a 2 AM fingerstick glucose to assess for missed hypoglycemia episodes leading to hyperglycemia via physiologic response  We will continue to monitor, and make adjustments based on sugar levels  Added carbohydrate level 2 to diet  Hypoglycemia protocol    * C. difficile diarrhea  Assessment & Plan  Arrived with reports of diarrhea.  Patient did not improve on Pradaxa Meissen and vancomycin.  On September 6 underwent fecal transplantation  Reports various bowel movements overnight, mushy stool and not watery.  Reports she is still eating well and keeping hydrated.  Followed by gastroenterology and infectious disease         Current Length of Stay: 23 day(s)  Current Patient Status: Inpatient   Discharge Plan:  Pending further GI assessment and management.  Will continue to monitor glucose for further adjustments.  Final  disposition by primary team.    Code Status: Level 1 - Full Code    Subjective:   Patient reports feeling tired.  Reports that overnight she had multiple bowel movements that were soft/mushy and not watery that kept her up overnight.  Otherwise, reports she is eating food appropriately and keeping hydrated.  Reports avoiding juice and desserts.    Objective:     Vitals:   Temp (24hrs), Av °F (37.2 °C), Min:97.8 °F (36.6 °C), Max:100.8 °F (38.2 °C)      Temp:  [97.8 °F (36.6 °C)-100.8 °F (38.2 °C)] 100.8 °F (38.2 °C)  HR:  [105-111] 107  Resp:  [15-16] 16  BP: (108-123)/(73-88) 110/78  SpO2:  [95 %-98 %] 98 %  Body mass index is 17.42 kg/m².     Input and Output Summary (last 24 hours):     Intake/Output Summary (Last 24 hours) at 2024 1534  Last data filed at 2024 1138  Gross per 24 hour   Intake 780 ml   Output --   Net 780 ml       Physical Exam:   Physical Exam  Constitutional:       General: She is not in acute distress.     Appearance: Normal appearance. She is underweight. She is not ill-appearing, toxic-appearing or diaphoretic.   HENT:      Head: Normocephalic and atraumatic.   Eyes:      General: No scleral icterus.     Conjunctiva/sclera: Conjunctivae normal.   Cardiovascular:      Rate and Rhythm: Normal rate and regular rhythm.   Pulmonary:      Effort: Pulmonary effort is normal. No respiratory distress.   Abdominal:      General: Abdomen is flat. There is no distension.   Musculoskeletal:         General: Normal range of motion.      Cervical back: Normal range of motion.   Skin:     Coloration: Skin is not jaundiced or pale.   Neurological:      Mental Status: She is alert and oriented to person, place, and time. Mental status is at baseline.   Psychiatric:         Mood and Affect: Mood normal.         Behavior: Behavior normal.          Additional Data:     Labs:  Results from last 7 days   Lab Units 24  0443   WBC Thousand/uL 5.64   HEMOGLOBIN g/dL 10.0*   HEMATOCRIT % 31.0*    PLATELETS Thousands/uL 266     Results from last 7 days   Lab Units 09/08/24  0443 09/05/24  0808 09/04/24  0603   SODIUM mmol/L 141   < > 139   POTASSIUM mmol/L 4.0   < > 4.3   CHLORIDE mmol/L 106   < > 105   CO2 mmol/L 28   < > 26   BUN mg/dL 15   < > 15   CREATININE mg/dL 0.47*   < > 0.49*   ANION GAP mmol/L 7   < > 8   CALCIUM mg/dL 8.7   < > 9.2   ALBUMIN g/dL  --   --  3.7   TOTAL BILIRUBIN mg/dL  --   --  0.28   ALK PHOS U/L  --   --  207*   ALT U/L  --   --  97*   AST U/L  --   --  57*   GLUCOSE RANDOM mg/dL 336*   < > 285*    < > = values in this interval not displayed.         Results from last 7 days   Lab Units 09/08/24  1105 09/08/24  0616 09/07/24  2042 09/07/24  1617 09/07/24  1046 09/07/24  0706 09/06/24  2128 09/06/24  1649 09/06/24  1047 09/06/24  1011 09/06/24  0928 09/06/24  0600   POC GLUCOSE mg/dl 141* 326* 244* 167* 126 292* 322* 71 150* 190* 49* 111               Lines/Drains:  Invasive Devices       Peripheral Intravenous Line  Duration             Peripheral IV 09/08/24 Dorsal (posterior);Left Hand <1 day                             Imaging: Reviewed radiology reports from this admission including: abdominal/pelvic CT    Recent Cultures (last 7 days):         Last 24 Hours Medication List:   Current Facility-Administered Medications   Medication Dose Route Frequency Provider Last Rate    acetaminophen  650 mg Oral Q6H PRN Abhay Hayes MD      albuterol  2 puff Inhalation Q4H PRN Renan Drake,       aspirin  81 mg Oral Daily Renan Drake, DO      atorvastatin  80 mg Oral Daily Renan Drake, DO      baclofen  5 mg Oral BID ELISEO Toribio      butalbital-acetaminophen-caffeine  1 tablet Oral Q6H PRN Renan Drake, DO      cholestyramine sugar free  4 g Oral BID Dalia Soriano, DO      diazepam  5 mg Oral Q12H PRN Renan Drake, DO      DULoxetine  60 mg Oral BID Renan Drake,       enoxaparin  40 mg Subcutaneous Q24H [facility] Abhay Hayes MD      fluticasone-vilanterol  1  puff Inhalation Daily Renan Drake, DO      gabapentin  800 mg Oral TID Renan Drake, DO      insulin glargine  40 Units Subcutaneous HS Savage Linder MD      insulin lispro  10 Units Subcutaneous TID With Meals Savage Linder MD      insulin lispro  2-12 Units Subcutaneous 4x Daily (AC & HS) Savage Linder MD      levothyroxine  112 mcg Oral Early Morning Renan Drake, DO      lidocaine  2 patch Topical Daily Magda Amaya, CRNP      mirtazapine  7.5 mg Oral HS Renan Drake, DO      DARLIN ANTIFUNGAL   Topical BID Cherise Grey, MARCNP      nicotine  1 patch Transdermal Daily Renan Drake, DO      ondansetron  4 mg Intravenous Q6H PRN Renan Drake, DO      pancrelipase (Lip-Prot-Amyl)  24,000 Units Oral TID With Meals Dalia Soriano, DO      pantoprazole  40 mg Oral Early Morning Renan Drkae, DO      saccharomyces boulardii  250 mg Oral BID Shanika Knott, CRNP      traMADol  50 mg Oral Q6H PRN Renan Drake, DO      traZODone  50 mg Oral HS PRN Estefany Villalobos PA-C          Today, Patient Was Seen By:   Savage Linder MD  Endocrinology fellow, PGY-4    **Please Note: This note may have been constructed using a voice recognition system.**

## 2024-09-08 NOTE — ASSESSMENT & PLAN NOTE
Severe protein-calorie malnutrition 2/2 chronic diarrhea a/e/b fat and muscle loss requiring Nutrition consult, oral diet and nutrition supplements    360 Statement: severe malnutrition r/t suspect combination of chronic illness and social/environmental factors as evidenced by severe temporal muscle loss, severe tricep fat pad loss, at least moderate muscle loss around clavicles and shoulders, at least moderate orbital fat pad loss. Treatment: oral diet and oral nutrition supplements.    BMI Findings:  Adult BMI Classifications: Underweight < 18.5        Body mass index is 17.42 kg/m².   C/w supplements  Nutrition consult appreciated  Encourage po intake

## 2024-09-08 NOTE — PROGRESS NOTES
Progress Note - [facility]  Barb Palomo 54 y.o. female MRN: [id number]  Unit/Bed#: [facility] [place] 522-01 Encounter: [id number]      ASSESSMENT & PLAN:      Ms. 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 GI consulted for acute on chronic diarrhea.     Acute on Chronic Diarrhea  Clostridium difficile Infection  Chronic Pancreatitis   Failure to Thrive   Weight Loss      # Clostridium difficile Infection: Patient initially presented with complaints on worsening weakness and frequent falls on 8/20.  Patient also with 70-80 lbs weight loss and chronic, watery diarrhea. Admits to difficulty following the death of her  and also been dealing with “insurance issues” leading to difficulty with her insulin. Labs on admission revealed severe hyperglycemia with electrolyte abnormalities. CT AP revealed mild diffuse wall thickening vs under distension of the colon as well as the stomach. In the past, patient has followed with GI for her unintentional weight loss and diarrhea. She has EGD/colonoscopy performed 5/2024 which was unrevealing although random colon biopsies were negative and patient had a very poor prep limiting sufficient examination. This admission, patient had PCR and EIA testing completed for C.Diff and both were found to be positive on 8/16 indicative of active infection. She had been on antibiotics last month for a dental procedure and had been started on for Fidaxomycin and completed a 7-day course but experienced no improvement in her diarrhea.  Given lack of improvement in stool frequency despite treatment of both Fidaxomycin as well as vancomycin, patient underwent colonoscopy with FMT 9/6. Bowel prep was inadequate leading to suboptimal visualization. ID has now discontinued  vancomycin. Today, patient continues to report ongoing diarrhea without significant improvement in symptoms.      Plan:  Monitor stool output   Continue regular diet   Repeat colonoscopy in 3 months due to inadequate bowel prep and personal history of polyps  Continue on Questran and begin on Creon TID with meals  Encourage PO hydration; monitor and replete electrolytes as needed  Maintain contact precautions with strict handwashing  Hold PPI therapy; resume when symptoms resolved  Avoid use of probiotics for the prevention of recurrence   In the future, will require Cdiff prophylaxis with all antibiotic therapy   Additional symptom management per primary team      # Chronic Pancreatitis: Patient admits to prior history of pancreatitis in the past which was previously attributed to gallstones resulting in cholecystectomy.  Patient also lifelong smoker and has CT imaging demonstrating evidence of chronic pancreatitis.  CT on admission demonstrating diffuse parenchymal atrophy and prominence of the main duct measuring up to 4 mL at the level of the pancreatic head.  Patient with multiple parenchymal calcifications in the pancreatic head.  Pancreatic elastase levels found to be extremely low.  Possible dilutional component due to diarrhea.  However, we will begin on Creon with meals and monitor for improvement.       # Weight Loss: Patient with significant weight loss prior to admission.  Admits to losing 70 to 80 pounds unintentionally over the last couple of months.  Likely result of uncontrolled diabetes as well as C. difficile infection.  Did have prior EGD/colonoscopy completed 5/2024.  Would recommend repeat be considered on outpatient basis.    Rest of care per primary team.  ______________________________________________________________________    SUBJECTIVE: Patient seen and examined at bedside. No acute events overnight. She continues to report ongoing diarrhea waking up throughout the night.       REVIEW OF  SYSTEMS: Negative other than stated above.       Historical Information   Past Medical History:   Diagnosis Date    Acute venous embolism and thrombosis of deep vessels of distal lower extremity (HCC)     Anemia     Anxiety     last assessed 11/20/17    Arthritis     Asthma     Cerebral infarction (HCC)     unspecified, last assessed 11/14/16    Chest pain     last assessed 5/9/17    Chronic cough     last assessed 12/12/13    Depression     Diabetes mellitus, type 2 (HCC)     DJD (degenerative joint disease)     Esophageal reflux     Fibromyalgia     GERD without esophagitis     resolved 5/13/16    History of pulmonary embolism     Hyperlipidemia     Hypertension     Hypothyroidism     Iron deficiency     Pancreatitis     Panic attack     Panic disorder     Polyarthritis     PTSD (post-traumatic stress disorder)     Sleep difficulties     Stroke syndrome     Thyroid disease     TIA (transient ischemic attack)     TIA (transient ischemic attack)     Venous embolism and thrombosis of deep vessels of distal lower extremity (HCC)     Vitamin B12 deficiency     Vitamin D deficiency      Past Surgical History:   Procedure Laterality Date    CARPAL TUNNEL RELEASE Right     neuroplasty decompression of median nerve    CATARACT EXTRACTION      CHOLECYSTECTOMY      ERCP      ERCP      ESOPHAGOGASTRIC FUNDOPLASTY      NISSEN FUNDOPLICATION      PATELLA SURGERY Left 12/2021    CT ESOPHAGOGASTRODUODENOSCOPY TRANSORAL DIAGNOSTIC N/A 11/02/2016    Procedure: EGD AND COLONOSCOPY;  Surgeon: Jatin Shepherd MD;  Location: [facility];  Service: Gastroenterology    CT NDSC WRST SURG W/RLS TRANSVRS CARPL LIGM Right 03/08/2016    Procedure: RELEASE CARPAL TUNNEL ENDOSCOPIC;  Surgeon: Guero Restrepo MD;  Location: [facility];  Service: Orthopedics    CT TENDON SHEATH INCISION Right 03/08/2016    Procedure: RELEASE TRIGGER FINGER RIGHT THUMB;  Surgeon: Guero Restrepo MD;  Location: [facility];  Service: Orthopedics    TONSILLECTOMY AND  ADENOIDECTOMY       Social History   Social History     Substance and Sexual Activity   Alcohol Use Not Currently    Alcohol/week: 0.0 standard drinks of alcohol    Comment: last was in 2010 after DUI     Social History     Substance and Sexual Activity   Drug Use No     Social History     Tobacco Use   Smoking Status Every Day    Current packs/day: 1.00    Average packs/day: 1 pack/day for 41.7 years (41.7 ttl pk-yrs)    Types: Cigarettes    Start date: 1/1/1983   Smokeless Tobacco Never   Tobacco Comments    20 + years     Family History   Problem Relation Age of Onset    Diabetes Mother         type 2    Heart attack Mother 39        acute MI    Kidney disease Mother         CKD NKF classfication    Depression Mother     Arthritis Mother     Substance Abuse Mother         mother OD in past on MS04    Ulcerative colitis Mother     Schizophrenia Mother     Suicide Attempts Mother     Bipolar disorder Mother     Diabetes Maternal Grandmother     Heart attack Father         acute MI    Psoriasis Father     Cancer Father         gastric cancer    Stroke Father     Crohn's disease Sister     Bipolar disorder Sister     Rheum arthritis Maternal Grandfather     Throat cancer Maternal Grandfather     Suicide Attempts Daughter     Drug abuse Daughter     Lung cancer Paternal Grandmother     Cancer Paternal Grandfather     No Known Problems Sister     Bipolar disorder Maternal Uncle     Anesthesia problems Neg Hx          Meds/Allergies     Medications Prior to Admission:     Adalimumab (HUMIRA PEN SC)    Albuterol Sulfate (ProAir RespiClick) 108 (90 Base) MCG/ACT AEPB    aspirin 81 mg chewable tablet    atorvastatin (LIPITOR) 80 mg tablet    baclofen 10 mg tablet    BD PEN NEEDLE LINDY U/F 32G X 4 MM MISC    Blood Glucose Monitoring Suppl (OneTouch Verio Reflect) w/Device KIT    butalbital-acetaminophen-caffeine (FIORICET,ESGIC) -40 mg per tablet    colestipol (COLESTID) 1 g tablet    Cyanocobalamin (VITAMIN B-12  "IJ)    diazepam (VALIUM) 5 mg tablet    DULoxetine (CYMBALTA) 60 mg delayed release capsule    Fluticasone-Salmeterol (Advair Diskus) 500-50 mcg/dose inhaler    Folic Acid 0.8 MG CAPS    gabapentin (NEURONTIN) 400 mg capsule    Galcanezumab-gnlm (Emgality) 120 MG/ML SOAJ    glucose blood (OneTouch Verio) test strip    insulin NPH-insulin regular (NovoLIN 70/30) 100 units/mL subcutaneous injection    Insulin Pen Needle (BD Pen Needle Kimberli U/F) 32G X 4 MM MISC    Insulin Syringe-Needle U-100 31G X 5/16\" 0.3 ML MISC    ketorolac (TORADOL) 30 mg/mL injection    levothyroxine 112 mcg tablet    magnesium Oxide (MAG-OX) 400 mg TABS    metFORMIN (GLUCOPHAGE-XR) 500 mg 24 hr tablet    mirtazapine (REMERON) 7.5 MG tablet    naloxone (NARCAN) 4 mg/0.1 mL nasal spray    omeprazole (PriLOSEC) 40 MG capsule    ondansetron (ZOFRAN) 4 mg tablet    OneTouch Delica Lancets 33G MISC    OneTouch Ultra test strip    prazosin (MINIPRESS) 2 mg capsule    prochlorperazine (COMPAZINE) 5 mg tablet    topiramate (TOPAMAX) 25 mg tablet    traMADol (ULTRAM-ER) 200 MG 24 hr tablet  Current Facility-Administered Medications   Medication Dose Route Frequency    acetaminophen (TYLENOL) tablet 650 mg  650 mg Oral Q6H PRN    albuterol (PROVENTIL HFA,VENTOLIN HFA) inhaler 2 puff  2 puff Inhalation Q4H PRN    aspirin chewable tablet 81 mg  81 mg Oral Daily    atorvastatin (LIPITOR) tablet 80 mg  80 mg Oral Daily    baclofen tablet 5 mg  5 mg Oral BID    butalbital-acetaminophen-caffeine (FIORICET,ESGIC) -40 mg per tablet 1 tablet  1 tablet Oral Q6H PRN    cholestyramine sugar free (QUESTRAN LIGHT) packet 4 g  4 g Oral BID    diazepam (VALIUM) tablet 5 mg  5 mg Oral Q12H PRN    DULoxetine (CYMBALTA) delayed release capsule 60 mg  60 mg Oral BID    enoxaparin (LOVENOX) subcutaneous injection 40 mg  40 mg Subcutaneous Q24H HONEY    fluticasone-vilanterol 200-25 mcg/actuation 1 puff  1 puff Inhalation Daily    gabapentin (NEURONTIN) capsule 800 mg  800 " "mg Oral TID    insulin glargine (LANTUS) subcutaneous injection 40 Units 0.4 mL  40 Units Subcutaneous HS    insulin lispro (HumALOG/ADMELOG) 100 units/mL subcutaneous injection 1-5 Units  1-5 Units Subcutaneous TID AC    insulin lispro (HumALOG/ADMELOG) 100 units/mL subcutaneous injection 1-5 Units  1-5 Units Subcutaneous HS    insulin lispro (HumALOG/ADMELOG) 100 units/mL subcutaneous injection 15 Units  15 Units Subcutaneous TID With Meals    levothyroxine tablet 112 mcg  112 mcg Oral Early Morning    lidocaine (LIDODERM) 5 % patch 2 patch  2 patch Topical Daily    mirtazapine (REMERON) tablet 7.5 mg  7.5 mg Oral HS    moisture barrier miconazole 2% cream (aka DARLIN MOISTURE BARRIER ANTIFUNGAL CREAM)   Topical BID    nicotine (NICODERM CQ) 21 mg/24 hr TD 24 hr patch 1 patch  1 patch Transdermal Daily    ondansetron (ZOFRAN) injection 4 mg  4 mg Intravenous Q6H PRN    pancrelipase (Lip-Prot-Amyl) (CREON) delayed release capsule 24,000 Units  24,000 Units Oral TID With Meals    pantoprazole (PROTONIX) EC tablet 40 mg  40 mg Oral Early Morning    saccharomyces boulardii (FLORASTOR) capsule 250 mg  250 mg Oral BID    traMADol (ULTRAM) tablet 50 mg  50 mg Oral Q6H PRN    traZODone (DESYREL) tablet 50 mg  50 mg Oral HS PRN       Allergies   Allergen Reactions    Medical Tape Rash    Lexapro [Escitalopram Oxalate]     Escitalopram Other (See Comments) and Palpitations    Other Hives and Rash     Adhesive Tape         Objective     Blood pressure 120/88, pulse (!) 111, temperature 99.2 °F (37.3 °C), resp. rate 16, height 5' 2\" (1.575 m), weight 43.2 kg (95 lb 3.8 oz), last menstrual period 03/04/2016, SpO2 95%, not currently breastfeeding. Body mass index is 17.42 kg/m².      Intake/Output Summary (Last 24 hours) at 9/8/2024 1109  Last data filed at 9/8/2024 1005  Gross per 24 hour   Intake 780 ml   Output --   Net 780 ml       PHYSICAL EXAM:  General: No apparent distress, resting comfortably   Head: Normocephalic, " atraumatic  Eyes: Anicteric, no conjunctival erythema  Neck: Trachea midline, no visible lymphadenopathy or goiter  Respiratory: Non-labored respirations, symmetric thorax expansion  Cardiovascular: Extremities appear well-perfused  Abdomen: Non-distended, non-tender  Extremities: Moves extremities spontaneously  Skin: No visible rashes, wounds, or jaundice  Neuro: No gross focal deficits, no aphasia      LAB RESULTS:  No results displayed because visit has over 200 results.            IMAGING STUDIES: I have personally reviewed pertinent imaging studies.      Igor Kessler DO   Gastroenterology Fellow, PGY-4  [facility]

## 2024-09-09 LAB
ANION GAP SERPL CALCULATED.3IONS-SCNC: 6 MMOL/L (ref 4–13)
BUN SERPL-MCNC: 17 MG/DL (ref 5–25)
CALCIUM SERPL-MCNC: 8.8 MG/DL (ref 8.4–10.2)
CHLORIDE SERPL-SCNC: 107 MMOL/L (ref 96–108)
CO2 SERPL-SCNC: 26 MMOL/L (ref 21–32)
CREAT SERPL-MCNC: 0.45 MG/DL (ref 0.6–1.3)
ERYTHROCYTE [DISTWIDTH] IN BLOOD BY AUTOMATED COUNT: 14.4 % (ref 11.6–15.1)
GFR SERPL CREATININE-BSD FRML MDRD: 114 ML/MIN/1.73SQ M
GLUCOSE SERPL-MCNC: 100 MG/DL (ref 65–140)
GLUCOSE SERPL-MCNC: 124 MG/DL (ref 65–140)
GLUCOSE SERPL-MCNC: 136 MG/DL (ref 65–140)
GLUCOSE SERPL-MCNC: 160 MG/DL (ref 65–140)
GLUCOSE SERPL-MCNC: 233 MG/DL (ref 65–140)
GLUCOSE SERPL-MCNC: 283 MG/DL (ref 65–140)
GLUCOSE SERPL-MCNC: 76 MG/DL (ref 65–140)
HCT VFR BLD AUTO: 32.6 % (ref 34.8–46.1)
HGB BLD-MCNC: 10.6 G/DL (ref 11.5–15.4)
MCH RBC QN AUTO: 32.7 PG (ref 26.8–34.3)
MCHC RBC AUTO-ENTMCNC: 32.5 G/DL (ref 31.4–37.4)
MCV RBC AUTO: 101 FL (ref 82–98)
PLATELET # BLD AUTO: 261 THOUSANDS/UL (ref 149–390)
PMV BLD AUTO: 10.6 FL (ref 8.9–12.7)
POTASSIUM SERPL-SCNC: 4.3 MMOL/L (ref 3.5–5.3)
RBC # BLD AUTO: 3.24 MILLION/UL (ref 3.81–5.12)
SODIUM SERPL-SCNC: 139 MMOL/L (ref 135–147)
WBC # BLD AUTO: 8.19 THOUSAND/UL (ref 4.31–10.16)

## 2024-09-09 PROCEDURE — 80048 BASIC METABOLIC PNL TOTAL CA: CPT | Performed by: FAMILY MEDICINE

## 2024-09-09 PROCEDURE — 97530 THERAPEUTIC ACTIVITIES: CPT

## 2024-09-09 PROCEDURE — 82948 REAGENT STRIP/BLOOD GLUCOSE: CPT

## 2024-09-09 PROCEDURE — 99232 SBSQ HOSP IP/OBS MODERATE 35: CPT | Performed by: FAMILY MEDICINE

## 2024-09-09 PROCEDURE — 85027 COMPLETE CBC AUTOMATED: CPT | Performed by: FAMILY MEDICINE

## 2024-09-09 PROCEDURE — 99233 SBSQ HOSP IP/OBS HIGH 50: CPT | Performed by: INTERNAL MEDICINE

## 2024-09-09 PROCEDURE — 97112 NEUROMUSCULAR REEDUCATION: CPT

## 2024-09-09 PROCEDURE — 97116 GAIT TRAINING THERAPY: CPT

## 2024-09-09 PROCEDURE — 99232 SBSQ HOSP IP/OBS MODERATE 35: CPT | Performed by: INTERNAL MEDICINE

## 2024-09-09 RX ORDER — FAMOTIDINE 20 MG/1
20 TABLET, FILM COATED ORAL 2 TIMES DAILY
Status: DISCONTINUED | OUTPATIENT
Start: 2024-09-09 | End: 2024-09-16 | Stop reason: HOSPADM

## 2024-09-09 RX ORDER — DICYCLOMINE HYDROCHLORIDE 10 MG/1
10 CAPSULE ORAL
Status: DISCONTINUED | OUTPATIENT
Start: 2024-09-09 | End: 2024-09-16 | Stop reason: HOSPADM

## 2024-09-09 RX ADMIN — GABAPENTIN 800 MG: 400 CAPSULE ORAL at 17:59

## 2024-09-09 RX ADMIN — INSULIN LISPRO 10 UNITS: 100 INJECTION, SOLUTION INTRAVENOUS; SUBCUTANEOUS at 07:42

## 2024-09-09 RX ADMIN — ENOXAPARIN SODIUM 40 MG: 40 INJECTION SUBCUTANEOUS at 09:30

## 2024-09-09 RX ADMIN — CHOLESTYRAMINE 4 G: 4 POWDER, FOR SUSPENSION ORAL at 18:00

## 2024-09-09 RX ADMIN — PANTOPRAZOLE SODIUM 40 MG: 40 TABLET, DELAYED RELEASE ORAL at 05:08

## 2024-09-09 RX ADMIN — TRAZODONE HYDROCHLORIDE 50 MG: 50 TABLET ORAL at 22:11

## 2024-09-09 RX ADMIN — INSULIN LISPRO 10 UNITS: 100 INJECTION, SOLUTION INTRAVENOUS; SUBCUTANEOUS at 11:32

## 2024-09-09 RX ADMIN — NICOTINE 1 PATCH: 21 PATCH, EXTENDED RELEASE TRANSDERMAL at 09:31

## 2024-09-09 RX ADMIN — GABAPENTIN 800 MG: 400 CAPSULE ORAL at 22:10

## 2024-09-09 RX ADMIN — PANCRELIPASE 24000 UNITS: 120000; 24000; 76000 CAPSULE, DELAYED RELEASE PELLETS ORAL at 07:41

## 2024-09-09 RX ADMIN — FLUTICASONE FUROATE AND VILANTEROL TRIFENATATE 1 PUFF: 200; 25 POWDER RESPIRATORY (INHALATION) at 09:34

## 2024-09-09 RX ADMIN — BACLOFEN 5 MG: 10 TABLET ORAL at 09:30

## 2024-09-09 RX ADMIN — PANCRELIPASE 24000 UNITS: 120000; 24000; 76000 CAPSULE, DELAYED RELEASE PELLETS ORAL at 11:31

## 2024-09-09 RX ADMIN — DICYCLOMINE HYDROCHLORIDE 10 MG: 10 CAPSULE ORAL at 17:59

## 2024-09-09 RX ADMIN — LEVOTHYROXINE SODIUM 112 MCG: 112 TABLET ORAL at 05:08

## 2024-09-09 RX ADMIN — INSULIN GLARGINE 40 UNITS: 100 INJECTION, SOLUTION SUBCUTANEOUS at 22:10

## 2024-09-09 RX ADMIN — MICONAZOLE NITRATE: 20 CREAM TOPICAL at 09:39

## 2024-09-09 RX ADMIN — BACLOFEN 5 MG: 10 TABLET ORAL at 17:59

## 2024-09-09 RX ADMIN — ONDANSETRON 4 MG: 2 INJECTION INTRAMUSCULAR; INTRAVENOUS at 13:12

## 2024-09-09 RX ADMIN — LIDOCAINE 2 PATCH: 700 PATCH TOPICAL at 09:30

## 2024-09-09 RX ADMIN — Medication 250 MG: at 09:30

## 2024-09-09 RX ADMIN — ASPIRIN 81 MG CHEWABLE TABLET 81 MG: 81 TABLET CHEWABLE at 09:30

## 2024-09-09 RX ADMIN — INSULIN LISPRO 10 UNITS: 100 INJECTION, SOLUTION INTRAVENOUS; SUBCUTANEOUS at 18:03

## 2024-09-09 RX ADMIN — BUTALBITAL, ACETAMINOPHEN AND CAFFEINE 1 TABLET: 50; 325; 40 TABLET ORAL at 07:39

## 2024-09-09 RX ADMIN — ONDANSETRON 4 MG: 2 INJECTION INTRAMUSCULAR; INTRAVENOUS at 19:32

## 2024-09-09 RX ADMIN — MIRTAZAPINE 7.5 MG: 15 TABLET, FILM COATED ORAL at 22:11

## 2024-09-09 RX ADMIN — PANCRELIPASE 24000 UNITS: 120000; 24000; 76000 CAPSULE, DELAYED RELEASE PELLETS ORAL at 17:59

## 2024-09-09 RX ADMIN — ATORVASTATIN CALCIUM 80 MG: 80 TABLET, FILM COATED ORAL at 09:30

## 2024-09-09 RX ADMIN — TRAMADOL HYDROCHLORIDE 50 MG: 50 TABLET, COATED ORAL at 22:10

## 2024-09-09 RX ADMIN — MICONAZOLE NITRATE: 20 CREAM TOPICAL at 18:03

## 2024-09-09 RX ADMIN — FAMOTIDINE 20 MG: 20 TABLET, FILM COATED ORAL at 18:02

## 2024-09-09 RX ADMIN — DULOXETINE HYDROCHLORIDE 60 MG: 60 CAPSULE, DELAYED RELEASE ORAL at 09:30

## 2024-09-09 RX ADMIN — DULOXETINE HYDROCHLORIDE 60 MG: 60 CAPSULE, DELAYED RELEASE ORAL at 17:59

## 2024-09-09 RX ADMIN — INSULIN LISPRO 2 UNITS: 100 INJECTION, SOLUTION INTRAVENOUS; SUBCUTANEOUS at 22:18

## 2024-09-09 RX ADMIN — GABAPENTIN 800 MG: 400 CAPSULE ORAL at 09:30

## 2024-09-09 RX ADMIN — DICYCLOMINE HYDROCHLORIDE 10 MG: 10 CAPSULE ORAL at 22:17

## 2024-09-09 RX ADMIN — CHOLESTYRAMINE 4 G: 4 POWDER, FOR SUSPENSION ORAL at 09:47

## 2024-09-09 RX ADMIN — DIAZEPAM 5 MG: 5 TABLET ORAL at 09:46

## 2024-09-09 RX ADMIN — INSULIN LISPRO 4 UNITS: 100 INJECTION, SOLUTION INTRAVENOUS; SUBCUTANEOUS at 11:32

## 2024-09-09 NOTE — PROGRESS NOTES
[facility]  Progress Note  Name: Barb Palomo I  MRN: [id number]  Unit/Bed#: PPHP 522-01 I Date of Admission: 8/15/2024   Date of Service: 9/9/2024 I Hospital Day: 24    Assessment & Plan   * C. difficile diarrhea  Assessment & Plan  Patient presented with generalized weakness, frequent falls.  She has had ongoing diarrhea, dysuria, poor oral intake and reported 70 pound weight loss over the last year.  Initially hypotensive in the ER, status post IV fluids. Labs with multiple metabolic derangements  CT imaging showing mild diffuse colonic wall thickening, mild diffuse wall thickening of stomach  C. difficile found to be positive--has a history of C. difficile in September 2022.    Patient started on Dificid 200 mg x 10 days per protocol on 8/18 as this is recurrent infection  Initially seemed BMs were improving and were becoming more formed, however again having frequent watery stools  GI consulted  Contact precautions  Appetite is good   No improvement with avoiding lactose and artificial sweeteners  Continue banatrol  Still having multiple loose stools overnight and this morning  Continue vancomycin, increased to 500mg-ID on board  Checking CT abdomen and pelvis: -CT is concerning for proctocolitis  GI to evaluate for pancreatic insufficiency as a contributing factor to her diarrhea - stool pancreatic elastase is low and started on Creon  Status post colonoscopy and FMT  on Friday .  Patient tolerated the procedure    Hypotension  Assessment & Plan  Intermittent episodes of hypotension, most occurrences happen overnight and question correlation with minipress administration.  This has now been discontinued    Sinus tachycardia  Assessment & Plan  Heart rate 100s-120s at times.  Sinus.  Has received IV fluids without much improvement.  Trialed IVFs again 8/22 given reports of nausea with poor oral intake and dizziness with some improvement to the 110s with HR- will  "continue with IVFs again today   orthostatic blood pressure negative   Pt is on minipress which could have a side effect of ST.  Additionally noted she is hypotensive each night around 2/3 AM, I suspect this is likely the underlying cause.  She was started on this by her psychiatrist to assist with sleep and eating outpatient   Will hold minipress for now.  D/w pt can have PRN trazodone for sleep.  Pt agreeable   Monitor   Tachycardia improving    Failure to thrive in adult  Assessment & Plan  Patient presented with increased generalized weakness and frequent falls, ongoing diarrhea, dysuria, poor oral intake, and at least 70-80 pound weight loss over the past 1 year. Has been following with GI, had colonoscopy May 2024 with poor prep--had EGD with esophagitis and gastritis  Labs with multiple derangements including hyperglycemia, hypokalemia and hypomagnesemia on admission, improved.   C. difficile positive as above  Patient reports she has not been using insulin due to \"insurance issue\" and inability to self inject due to her neuropathy  CT abdomen and pelvis without any obvious malignancy  Treatment as per respective issues  PT/OT recommending rehab.   Endocrinology evaluation appreciated.    Hypomagnesemia  Assessment & Plan  Hold oral supplementation due to diarrhea  Check magnesium level and replace as needed    Severe protein-calorie malnutrition (HCC)  Assessment & Plan  Severe protein-calorie malnutrition 2/2 chronic diarrhea a/e/b fat and muscle loss requiring Nutrition consult, oral diet and nutrition supplements    360 Statement: severe malnutrition r/t suspect combination of chronic illness and social/environmental factors as evidenced by severe temporal muscle loss, severe tricep fat pad loss, at least moderate muscle loss around clavicles and shoulders, at least moderate orbital fat pad loss. Treatment: oral diet and oral nutrition supplements.    BMI Findings:  Adult BMI Classifications: Underweight " "< 18.5        Body mass index is 17.26 kg/m².   C/w supplements  Nutrition consult appreciated  Encourage po intake     Acute cystitis without hematuria  Assessment & Plan  Reported dysuria on admission   Urine + nitrite & leukocytes, CT imaging with distended bladder suggestive of retention and small amount of air which could be secondary to infection  Urine Culture noted, S/P 3 doses of IV CTX 8/17 8/21, reported pain AFTER urination, likely related to urine hitting excoriated labia/bottom from frequent stooling. Will continue with medicated cream to this area- denies pain at this time   S/p pyridum x 3 doses  Wound care consult appreciated     Uncontrolled type 2 diabetes mellitus with hyperglycemia (HCC)  Assessment & Plan  Lab Results   Component Value Date    HGBA1C 16.1 (H) 08/16/2024       Recent Labs     09/09/24  0601 09/09/24  0933 09/09/24  1124 09/09/24  1615   POCGLU 100 76 233* 124       Patient markedly hyperglycemic on admission only partially responsive to IV fluids, remainder of labs fortunately not consistent with DKA. A1C 16  Patient admitting she has not been using her home insulin as she apparently has been unable to fill prescription due to \"insurance issues,\" additionally reports that due to her neuropathy she has difficulty self injecting.  Daughter also reports jessica non compliance.   Typically on NovoLog 70/30 20 units twice daily  Diabetic diet-change to regular diet recently  Continue basal/prandial insulin while inpatient, adjust PRN  Sliding scale coverage  Insulin regimen adjusted by endocrinology.  Monitor blood sugar  Monitor blood sugar.  Patient do not have a prescription plan needed-rehab placement at the time of discharge    Opioid dependence with other opioid-induced disorder (HCC)  Assessment & Plan  PDMP reviewed, patient maintained on tramadol 200 mg daily as needed; history of chronic cervical/lumbar pain noted  Continue as needed tramadol and chronic gabapentin " dosing    Chronic diarrhea  Assessment & Plan  Longstanding history of chronic diarrhea, patient reporting acute diarrhea x1 month. Follows with GI. Has tried questran/imodium without relief  Stool enteric panel negative  C. difficile positive, see plan above   Giardia antigen is negative  Stool elastase is low started on Creon per GI  Diarrhea improving.  As per patient more formed stools    Tobacco dependence  Assessment & Plan  Counseled on need for cessation, provide nicotine patch while admitted  Recent CT chest for lung cancer screening 7/30/2024 negative for nodules or masses    GERD without esophagitis  Assessment & Plan  Continue PPI    Severe episode of recurrent major depressive disorder, without psychotic features (HCC)  Assessment & Plan  Mood appearing fairly stable, continue home duloxetine, mirtazapine, as needed Valium  Hold minipress given symptomatic hypotension episodes, tachycardia                VTE Pharmacologic Prophylaxis: VTE Score: 3 Moderate Risk (Score 3-4) - Pharmacological DVT Prophylaxis Ordered: enoxaparin (Lovenox).    Mobility:   Basic Mobility Inpatient Raw Score: 17  JH-HLM Goal: 5: Stand one or more mins  JH-HLM Achieved: 6: Walk 10 steps or more  JH-HLM Goal achieved. Continue to encourage appropriate mobility.    Patient Centered Rounds: I performed bedside rounds with nursing staff today.   Discussions with Specialists or Other Care Team Provider:     Education and Discussions with Family / Patient: Attempted to update  (daughter) via phone. Left voicemail.     Total Time Spent on Date of Encounter in care of patient: 35 mins. This time was spent on one or more of the following: performing physical exam; counseling and coordination of care; obtaining or reviewing history; documenting in the medical record; reviewing/ordering tests, medications or procedures; communicating with other healthcare professionals and discussing with patient's  family/caregivers.    Current Length of Stay: 24 day(s)  Current Patient Status: Inpatient   Certification Statement: The patient will continue to require additional inpatient hospital stay due to diarrhea  Discharge Plan:     Code Status: Level 1 - Full Code    Subjective:       Objective:     Vitals:   Temp (24hrs), Av.4 °F (36.9 °C), Min:98.3 °F (36.8 °C), Max:98.4 °F (36.9 °C)    Temp:  [98.3 °F (36.8 °C)-98.4 °F (36.9 °C)] 98.3 °F (36.8 °C)  HR:  [105-106] 106  Resp:  [18-20] 20  BP: (113-128)/(80-91) 128/91  SpO2:  [97 %-99 %] 99 %  Body mass index is 17.26 kg/m².     Input and Output Summary (last 24 hours):     Intake/Output Summary (Last 24 hours) at 2024 1909  Last data filed at 2024 1305  Gross per 24 hour   Intake 660 ml   Output --   Net 660 ml       Physical Exam:   Physical Exam  Constitutional:       General: She is not in acute distress.     Appearance: She is not ill-appearing.      Comments: Chronically ill-appearing female   HENT:      Head: Normocephalic.      Nose: Nose normal.   Eyes:      General: No scleral icterus.  Cardiovascular:      Rate and Rhythm: Normal rate and regular rhythm.      Heart sounds: No murmur heard.  Pulmonary:      Effort: No respiratory distress.   Abdominal:      General: Bowel sounds are normal. There is no distension.      Tenderness: There is abdominal tenderness (Mild periumbilical tenderness).   Skin:     General: Skin is warm.      Coloration: Skin is not jaundiced.   Neurological:      Mental Status: She is alert. Mental status is at baseline.          Additional Data:     Labs:  Results from last 7 days   Lab Units 24  1039   WBC Thousand/uL 8.19   HEMOGLOBIN g/dL 10.6*   HEMATOCRIT % 32.6*   PLATELETS Thousands/uL 261     Results from last 7 days   Lab Units 24  1039 24  0808 24  0603   SODIUM mmol/L 139   < > 139   POTASSIUM mmol/L 4.3   < > 4.3   CHLORIDE mmol/L 107   < > 105   CO2 mmol/L 26   < > 26   BUN mg/dL 17   < >  15   CREATININE mg/dL 0.45*   < > 0.49*   ANION GAP mmol/L 6   < > 8   CALCIUM mg/dL 8.8   < > 9.2   ALBUMIN g/dL  --   --  3.7   TOTAL BILIRUBIN mg/dL  --   --  0.28   ALK PHOS U/L  --   --  207*   ALT U/L  --   --  97*   AST U/L  --   --  57*   GLUCOSE RANDOM mg/dL 136   < > 285*    < > = values in this interval not displayed.         Results from last 7 days   Lab Units 09/09/24  1615 09/09/24  1124 09/09/24  0933 09/09/24  0601 09/09/24  0155 09/08/24  2043 09/08/24  1544 09/08/24  1105 09/08/24  0616 09/07/24  2042 09/07/24  1617 09/07/24  1046   POC GLUCOSE mg/dl 124 233* 76 100 283* 192* 259* 141* 326* 244* 167* 126               Lines/Drains:  Invasive Devices       Peripheral Intravenous Line  Duration             Peripheral IV 09/08/24 Dorsal (posterior);Left Hand 1 day                          Imaging: No pertinent imaging reviewed.    Recent Cultures (last 7 days):         Last 24 Hours Medication List:   Current Facility-Administered Medications   Medication Dose Route Frequency Provider Last Rate    acetaminophen  650 mg Oral Q6H PRN Abhay Hayes MD      albuterol  2 puff Inhalation Q4H PRN Renan Drake, DO      aspirin  81 mg Oral Daily Renan Drake, DO      atorvastatin  80 mg Oral Daily Renan Drake, DO      baclofen  5 mg Oral BID ELISEO Toribio      butalbital-acetaminophen-caffeine  1 tablet Oral Q6H PRN Renan Drake, DO      cholestyramine sugar free  4 g Oral BID Dalia Sorinao, DO      diazepam  5 mg Oral Q12H PRN Renan Drake, DO      dicyclomine  10 mg Oral 4x Daily (AC & HS) Sumit Orozco MD      DULoxetine  60 mg Oral BID Renan Drake, DO      enoxaparin  40 mg Subcutaneous Q24H HONEY Abhay Hayes MD      famotidine  20 mg Oral BID Sumit Orozco MD      fluticasone-vilanterol  1 puff Inhalation Daily Renan Drake, DO      gabapentin  800 mg Oral TID Renan Drake DO      insulin glargine  40 Units Subcutaneous HS Savage Linder MD      insulin lispro   10 Units Subcutaneous TID With Meals Savage Linder MD      insulin lispro  2-12 Units Subcutaneous 4x Daily (AC & HS) Savage Linder MD      levothyroxine  112 mcg Oral Early Morning Renan Drake, DO      lidocaine  2 patch Topical Daily ELISEO Toribio      mirtazapine  7.5 mg Oral HS Renan Drake, DO      DARLIN ANTIFUNGAL   Topical BID ELISEO Langley      nicotine  1 patch Transdermal Daily Renan Drake, DO      ondansetron  4 mg Intravenous Q6H PRN Renan Drake, DO      pancrelipase (Lip-Prot-Amyl)  24,000 Units Oral TID With Meals Dalia Soriano, DO      traMADol  50 mg Oral Q6H PRN Renan Drake, DO      traZODone  50 mg Oral HS PRN Estefany Villalobos PA-C          Today, Patient Was Seen By: Britney Arce MD    **Please Note: This note may have been constructed using a voice recognition system.**

## 2024-09-09 NOTE — PHYSICAL THERAPY NOTE
"                                                                                  PHYSICAL THERAPY NOTE          Patient Name: Barb Palomo  Today's Date: 9/9/2024 09/09/24 1357   PT Last Visit   PT Visit Date 09/09/24   Note Type   Note Type Treatment   Pain Assessment   Pain Assessment Tool 0-10   Pain Score 3   Restrictions/Precautions   Weight Bearing Precautions Per Order No   Other Precautions Contact/isolation;Chair Alarm;Bed Alarm;Fall Risk;Pain   General   Chart Reviewed Yes   Response to Previous Treatment Patient with no complaints from previous session.   Family/Caregiver Present No   Cognition   Overall Cognitive Status WFL   Arousal/Participation Alert;Cooperative;Responsive   Attention Within functional limits   Orientation Level Oriented X4   Memory Within functional limits   Following Commands Follows one step commands without difficulty   Comments Pt pleasant and cooperative to todays treatment session. Required some encouragement, noted that she was feeling upset lately (contacted  on patients behalf)   Subjective   Subjective \"I havent been up out of bed for awhile\"   Bed Mobility   Supine to Sit 5  Supervision  (CGA)   Additional items Increased time required;Verbal cues   Sit to Supine 5  Supervision  (CGA)   Additional items Increased time required;Verbal cues   Additional Comments received supine in bed, impulsive   Transfers   Sit to Stand 5  Supervision  (CGA)   Additional items Increased time required;Verbal cues   Stand to Sit 5  Supervision  (CGA)   Additional items Increased time required;Verbal cues   Additional Comments use of RW to perform STS at edge of bed, no AD use in the bathroom (CGA, occasional sway)   Ambulation/Elevation   Gait pattern Forward Flexion;Short stride;Excessively slow;Inconsistent loly   Gait Assistance 4  Minimal assist   Additional items Assist x 1;Verbal cues   Assistive Device Rolling walker   Distance 100 feet + 100 feet   Stair " Management Assistance Not tested   Ambulation/Elevation Additional Comments Patient demonstrated the ability to ambulate 100 feet + 100 feet with RW, requiring intermittent verbal cues for RW management and a standing rest break due to generalized LE weakness and decreased activity tolerance.   Balance   Static Sitting Fair +   Dynamic Sitting Fair   Static Standing Fair -   Dynamic Standing Poor +   Ambulatory Poor +   Endurance Deficit   Endurance Deficit Yes   Endurance Deficit Description generalized LE weakness, decreased activity tolerance   Activity Tolerance   Activity Tolerance Patient tolerated treatment well;Treatment limited secondary to medical complications (Comment)  (continued loose stool (c.diff))   Medical Staff Made Aware PT Mary Ann   Nurse Made Aware RN cleared   Exercises   Neuro re-ed Prior to mobility patient required MinAx1 to perform hygiene and cleaning due to continued loose stool. Patient demonstrated the ability to maintain static/dynamic standing balance for ~5 minutes prior to ambulation and 5 mins following ambulation, to perform proper cleaning/hygiene; required MinAx1 due to inconsistent LOB / mild sway and to assist with cleaning.   Assessment   Prognosis Fair   Problem List Decreased strength;Decreased endurance;Impaired balance;Decreased mobility;Decreased skin integrity;Pain   Assessment PT initiated treatment session in order to assist patient in achieving goals to improve transfers, gait training, and overall activity tolerance. Patient demonstrated progress toward achieving functional mobility goals as evidenced by improving activity tolerance, ambulation, and overall mobility. Patient pleasant, cooperative, and agreeable to today's treatment session. Patient received supine in bed. Throughout treatment session patient required both verbal and tactile cuing to improve safety, efficiency, and mechanics of mobility in addition to hands on assistance for all aspects of functional  mobility. Additionally, pt required increased time to execute specific mobility tasks with rest breaks in between secondary to gross fatigue and weakness. Patient is currently CGA bed mobility and transfers, and MinAx1 ambulation with RW. Prior to mobility patient required MinAx1 to perform hygiene and cleaning due to continued loose stool. Patient demonstrated the ability to maintain static/dynamic standing balance for ~5 minutes prior to ambulation and following ambulation, to perform proper cleaning/hygiene; required MinAx1 due to inconsistent LOB / mild sway and to assist with cleaning. Patient demonstrated the ability to ambulate 100 feet + 100 feet with RW, requiring intermittent verbal cues for RW management and a standing rest break due to generalized LE weakness and decreased activity tolerance. Patient was educated on the benefits of mobility during hospital stay to improve activity tolerance and endurance. Patient referred to  (non-Yarsanism) on patients behalf due to comments about feeling down lately; patient in agreement and in good understanding. Patient left supine in bed with alarm and all needs met. Patient will benefit from continued skilled physical therapy to address gait / balance dysfunction, decreased activity tolerance, and generalized weakness. The patients AM-PAC Basic Mobility Inpatient Short From Raw Score is 17 .  Based on AM-PAC scoring and patient presentation, PT currently recommending Level II (Moderate Resource Intensity). Please also refer to the recommendation of the Physical Therapist for safe discharge planning.   Barriers to Discharge Inaccessible home environment   Goals   Patient Goals to get better, to stop having loose stools   STG Expiration Date 09/17/24   PT Treatment Day 5   Plan   Treatment/Interventions ADL retraining;LE strengthening/ROM;Functional transfer training;Elevations;Therapeutic exercise;Endurance training;Patient/family training;Bed mobility;Gait  training;Spoke to nursing   Progress Progressing toward goals   PT Frequency 2-3x/wk   Discharge Recommendation   Rehab Resource Intensity Level, PT II (Moderate Resource Intensity)   AM-PAC Basic Mobility Inpatient   Turning in Flat Bed Without Bedrails 3   Lying on Back to Sitting on Edge of Flat Bed Without Bedrails 3   Moving Bed to Chair 3   Standing Up From Chair Using Arms 3   Walk in Room 3   Climb 3-5 Stairs With Railing 2   Basic Mobility Inpatient Raw Score 17   Basic Mobility Standardized Score 39.67   [facility] Highest Level Of Mobility   -[facility] Goal 5: Stand one or more mins   -HLM Achieved 7: Walk 25 feet or more   Education   Education Provided Mobility training;Assistive device   Patient Demonstrates acceptance/verbal understanding   End of Consult   Patient Position at End of Consult Bed/Chair alarm activated;All needs within reach;Supine     Alberta Terry PT, DPT

## 2024-09-09 NOTE — PLAN OF CARE
Problem: PAIN - ADULT  Goal: Verbalizes/displays adequate comfort level or baseline comfort level  Description: Interventions:  - Encourage patient to monitor pain and request assistance  - Assess pain using appropriate pain scale  - Administer analgesics based on type and severity of pain and evaluate response  - Implement non-pharmacological measures as appropriate and evaluate response  - Consider cultural and social influences on pain and pain management  - Notify physician/advanced practitioner if interventions unsuccessful or patient reports new pain  Outcome: Not Progressing     Problem: SKIN/TISSUE INTEGRITY - ADULT  Goal: Skin Integrity remains intact(Skin Breakdown Prevention)  Description: Assess:  -Perform William assessment every shift  -Clean and moisturize skin every day  -Inspect skin when repositioning, toileting, and assisting with ADLS  -Assess under medical devices such as lines every 2 hours  -Assess extremities for adequate circulation and sensation     Bed Management:  -Have minimal linens on bed & keep smooth, unwrinkled  -Change linens as needed when moist or perspiring  -Avoid sitting or lying in one position for more than 2 hours while in bed  -Keep HOB at 30 degrees     Toileting:  -Offer bedside commode  -Assess for incontinence every hour  -Use incontinent care products after each incontinent episode such as moisture barriers, foam cleansers    Activity:  -Mobilize patient 3 times a day  -Encourage activity and walks on unit  -Encourage or provide ROM exercises   -Turn and reposition patient every 2 Hours  -Use appropriate equipment to lift or move patient in bed  -Instruct/ Assist with weight shifting every 15 min when out of bed in chair  -Consider limitation of chair time 2 hour intervals    Skin Care:  -Avoid use of baby powder, tape, friction and shearing, hot water or constrictive clothing  -Relieve pressure over bony prominences using offloading techniques or foam dressings (If  not contraindicated by incontinence)  -Do not massage red bony areas    Next Steps:  -Teach patient strategies to minimize risks    -Consider consults to  interdisciplinary teams  Outcome: Not Progressing     Problem: SKIN/TISSUE INTEGRITY - ADULT  Goal: Incision(s), wounds(s) or drain site(s) healing without S/S of infection  Description: INTERVENTIONS  - Assess and document dressing, incision, wound bed, drain sites and surrounding tissue  - Provide patient and family education  - Perform skin care/dressing changes  Outcome: Not Progressing     Problem: SKIN/TISSUE INTEGRITY - ADULT  Goal: Pressure injury heals and does not worsen  Description: Interventions:  - Implement low air loss mattress or specialty surface (Criteria met)  - Apply silicone foam dressing  - Instruct/assist with weight shifting every 15 minutes when in chair   - Limit chair time to 2 hour intervals  - Use special pressure reducing interventions when in chair   - Apply fecal or urinary incontinence containment device   - Perform passive or active ROM  - Turn and reposition patient & offload bony prominences every 2 hours   - Utilize friction reducing device or surface for transfers   - Consider consults to  interdisciplinary teams  - Use incontinent care products after each incontinent episode  - Consider nutrition services referral as needed  Outcome: Not Progressing

## 2024-09-09 NOTE — PROGRESS NOTES
Progress Note- Barb Palomo 54 y.o. female MRN: [id number]    Unit/Bed#: [facility] 522-01 Encounter: [id number]      Assessment and Plan:    Chronic diarrhea  -Likely component of autonomic diarrhea given her secretory type symptoms of diarrhea at night and stool incontinence.  She has uncontrolled diabetes with her most recent A1c above 16 as she was not taking insulin at home.  -Pancreatic fecal elastase low at 5.5.  She was subsequently started on Creon.  -Status postcholecystectomy and currently on Questran.  -Biopsies from a colonoscopy were negative for microscopic colitis.  -Will discontinue her Protonix and probiotic with as needed Pepcid as replacement of Protonix.  -Avoid NSAIDs where possible. Patient reports taking them daily prior to admission although not in the weeks prior to admission.  -Contact CM to ensure adequate access to insulin after discharge.  -Has overall had improvement in diarrhea after the above changes. Will need repeat colonoscopy in 3 months due to inadequate bowel prep and personal history of polys.    Clostridioides difficile infection  -Diagnosed 8/16. Now s/p 7 days fidoxamycin and 7 days oral vamcomycin. S/p FMT 9/6/24. No leukocytosis or fever.   -Continue to monitor off antibiotics. Appears to be at around baseline stool output, improved from presentation.   -Will discontinue her Protonix and probiotic with as needed Pepcid as replacement of Protonix.    Chronic pancreatitis  -History of gallstone pancreatitis with CT imaging consistent with chronic pancreatitis.  -Pancreatic fecal elastase low at 5.5.  She was subsequently started on Creon.     ______________________________________________________________________    Subjective:     Reports some improvement in the frequency and consistency of bowel movements although with continued stool incontinence, which is her baseline over the past 2 years. Had multiple bowel movements overnight while in bed. Reports that it is more formed  now, no longer watery and varies between soft and small ribbons of stool. Improved from presentation when she was having over 10 water bowel movements per day.  Also reports feeling persistently hungry, even after eating.  She has gained weight in the last few days, although overall has lost around 50 to 60 pounds over the last 18 months.    Medication Administration - last 24 hours from 09/08/2024 1003 to 09/09/2024 1003         Date/Time Order Dose Route Action Action by     09/08/2024 1613 EDT ondansetron (ZOFRAN) injection 4 mg 4 mg Intravenous Given Dodie Herron RN     09/09/2024 0930 EDT aspirin chewable tablet 81 mg 81 mg Oral Given Henry Garza RN     09/09/2024 0930 EDT atorvastatin (LIPITOR) tablet 80 mg 80 mg Oral Given Henry Garza RN     09/09/2024 0739 EDT butalbital-acetaminophen-caffeine (FIORICET,ESGIC) -40 mg per tablet 1 tablet 1 tablet Oral Given Kim Salazar RN     09/08/2024 1426 EDT butalbital-acetaminophen-caffeine (FIORICET,ESGIC) -40 mg per tablet 1 tablet 1 tablet Oral Given Dodie Herron RN     09/09/2024 0946 EDT diazepam (VALIUM) tablet 5 mg 5 mg Oral Given Henry Garza RN     09/08/2024 1109 EDT diazepam (VALIUM) tablet 5 mg 5 mg Oral Given Dodie Herron RN     09/09/2024 0930 EDT DULoxetine (CYMBALTA) delayed release capsule 60 mg 60 mg Oral Given Henry Garza RN     09/08/2024 1703 EDT DULoxetine (CYMBALTA) delayed release capsule 60 mg 60 mg Oral Given Dodie Herron RN     09/09/2024 0934 EDT fluticasone-vilanterol 200-25 mcg/actuation 1 puff 1 puff Inhalation Given Henry Garza RN     09/09/2024 0508 EDT levothyroxine tablet 112 mcg 112 mcg Oral Given Kristi Sanchez, CAITLYN     09/08/2024 2151 EDT mirtazapine (REMERON) tablet 7.5 mg 7.5 mg Oral Given Kristi Sanchez RN     09/09/2024 0508 EDT pantoprazole (PROTONIX) EC tablet 40 mg 40 mg Oral Given Kristi Sanchez RN     09/09/2024 0931 EDT nicotine (NICODERM CQ) 21 mg/24 hr TD 24 hr patch 1  patch 1 patch Transdermal Medication Applied Henry Garza RN     09/09/2024 0930 EDT nicotine (NICODERM CQ) 21 mg/24 hr TD 24 hr patch 1 patch 1 patch Transdermal Patch Removed Henry Garza RN     09/08/2024 2152 EDT traMADol (ULTRAM) tablet 50 mg 50 mg Oral Given Kristi Sanchez, CAITLYN     09/08/2024 1613 EDT traMADol (ULTRAM) tablet 50 mg 50 mg Oral Given Dodie Herron, CAITLYN     09/09/2024 0930 EDT gabapentin (NEURONTIN) capsule 800 mg 800 mg Oral Given Henry Garza RN     09/08/2024 2151 EDT gabapentin (NEURONTIN) capsule 800 mg 800 mg Oral Given Kristi Sanchez, CAITLYN     09/08/2024 1613 EDT gabapentin (NEURONTIN) capsule 800 mg 800 mg Oral Given Dodie Herron RN     09/09/2024 0930 EDT baclofen tablet 5 mg 5 mg Oral Given Henry Garza RN     09/08/2024 1703 EDT baclofen tablet 5 mg 5 mg Oral Given Dodie Herron, CAITLYN     09/09/2024 0930 EDT lidocaine (LIDODERM) 5 % patch 2 patch 2 patch Topical Medication Applied Henry Garza RN     09/08/2024 2151 EDT lidocaine (LIDODERM) 5 % patch 2 patch 2 patch Topical Patch Removed Kristi Sanchez, CAITLYN     09/09/2024 0939 EDT moisture barrier miconazole 2% cream (aka DARLIN MOISTURE BARRIER ANTIFUNGAL CREAM) -- Topical Given Henry Garza RN     09/08/2024 1701 EDT moisture barrier miconazole 2% cream (aka DARLIN MOISTURE BARRIER ANTIFUNGAL CREAM) -- Topical Given Dodie Herron RN     09/09/2024 0930 EDT saccharomyces boulardii (FLORASTOR) capsule 250 mg 250 mg Oral Given Henry Garza RN     09/08/2024 1703 EDT saccharomyces boulardii (FLORASTOR) capsule 250 mg 250 mg Oral Given Dodie Herron RN     09/08/2024 2152 EDT traZODone (DESYREL) tablet 50 mg 50 mg Oral Given Kristi Sanchez, CAITLYN     09/08/2024 1106 EDT insulin lispro (HumALOG/ADMELOG) 100 units/mL subcutaneous injection 1-5 Units 0 Units Subcutaneous Hold Dodie Herron RN     09/09/2024 0930 EDT enoxaparin (LOVENOX) subcutaneous injection 40 mg 40 mg Subcutaneous Given Henry Garza RN     09/09/2024  "0947 EDT cholestyramine sugar free (QUESTRAN LIGHT) packet 4 g 4 g Oral Given Henry Garza RN     09/08/2024 1704 EDT cholestyramine sugar free (QUESTRAN LIGHT) packet 4 g 4 g Oral Given Dodie Herron RN     09/09/2024 0741 EDT pancrelipase (Lip-Prot-Amyl) (CREON) delayed release capsule 24,000 Units 24,000 Units Oral Given Kim Salazar RN     09/08/2024 1703 EDT pancrelipase (Lip-Prot-Amyl) (CREON) delayed release capsule 24,000 Units 24,000 Units Oral Given oDdie Herron RN     09/08/2024 1137 EDT pancrelipase (Lip-Prot-Amyl) (CREON) delayed release capsule 24,000 Units 24,000 Units Oral Given Dodie Herron RN     09/08/2024 2151 EDT insulin glargine (LANTUS) subcutaneous injection 40 Units 0.4 mL 40 Units Subcutaneous Given Kristi Sanchez RN     09/08/2024 1136 EDT insulin lispro (HumALOG/ADMELOG) 100 units/mL subcutaneous injection 15 Units 15 Units Subcutaneous Given Dodie Herron RN     09/09/2024 0742 EDT insulin lispro (HumALOG/ADMELOG) 100 units/mL subcutaneous injection 10 Units 10 Units Subcutaneous Given Kim Salazar RN     09/08/2024 1705 EDT insulin lispro (HumALOG/ADMELOG) 100 units/mL subcutaneous injection 10 Units 10 Units Subcutaneous Given Dodie Herron RN     09/09/2024 0603 EDT insulin lispro (HumALOG/ADMELOG) 100 units/mL subcutaneous injection 2-12 Units -- Subcutaneous Not Given Kristi Sanchez RN     09/08/2024 2152 EDT insulin lispro (HumALOG/ADMELOG) 100 units/mL subcutaneous injection 2-12 Units 2 Units Subcutaneous Given Kristi Sanchez RN     09/08/2024 1705 EDT insulin lispro (HumALOG/ADMELOG) 100 units/mL subcutaneous injection 2-12 Units 6 Units Subcutaneous Given Dodie Herron RN            Objective:     Vitals: Blood pressure 113/80, pulse (!) 106, temperature 98.4 °F (36.9 °C), resp. rate 18, height 5' 2\" (1.575 m), weight 42.8 kg (94 lb 5.7 oz), last menstrual period 03/04/2016, SpO2 98%, not currently breastfeeding.,Body mass index is " 17.26 kg/m².      Intake/Output Summary (Last 24 hours) at 9/9/2024 1003  Last data filed at 9/8/2024 2201  Gross per 24 hour   Intake 1960 ml   Output --   Net 1960 ml       Physical Exam:   General Appearance: Awake and alert, tearful.  Abdomen: Soft, no masses or no organomegaly. RUQ tenderness without guarding or rebound.    Invasive Devices       Peripheral Intravenous Line  Duration             Peripheral IV 09/08/24 Dorsal (posterior);Left Hand 1 day                    Lab Results:  No results displayed because visit has over 200 results.          Imaging Studies: I have personally reviewed pertinent imaging studies.

## 2024-09-09 NOTE — ASSESSMENT & PLAN NOTE
Heart rate 100s-120s at times.  Sinus.  Has received IV fluids without much improvement.  Trialed IVFs again 8/22 given reports of nausea with poor oral intake and dizziness with some improvement to the 110s with HR- will continue with IVFs again today   orthostatic blood pressure negative   Pt is on minipress which could have a side effect of ST.  Additionally noted she is hypotensive each night around 2/3 AM, I suspect this is likely the underlying cause.  She was started on this by her psychiatrist to assist with sleep and eating outpatient   Will hold minipress for now.  D/w pt can have PRN trazodone for sleep.  Pt agreeable   Monitor   Tachycardia improving

## 2024-09-09 NOTE — PLAN OF CARE
Problem: PHYSICAL THERAPY ADULT  Goal: Performs mobility at highest level of function for planned discharge setting.  See evaluation for individualized goals.  Description: Treatment/Interventions: Functional transfer training, LE strengthening/ROM, Elevations, Therapeutic exercise, Endurance training, Patient/family training, Equipment eval/education, Bed mobility, Gait training, Spoke to nursing, OT  Equipment Recommended: Walker       See flowsheet documentation for full assessment, interventions and recommendations.  9/9/2024 1524 by Alberta Terry PT  Outcome: Progressing  Note: Prognosis: Fair  Problem List: Decreased strength, Decreased endurance, Impaired balance, Decreased mobility, Decreased skin integrity, Pain  Assessment: PT initiated treatment session in order to assist patient in achieving goals to improve transfers, gait training, and overall activity tolerance. Patient demonstrated progress toward achieving functional mobility goals as evidenced by improving activity tolerance, ambulation, and overall mobility. Patient pleasant, cooperative, and agreeable to today's treatment session. Patient received supine in bed. Throughout treatment session patient required both verbal and tactile cuing to improve safety, efficiency, and mechanics of mobility in addition to hands on assistance for all aspects of functional mobility. Additionally, pt required increased time to execute specific mobility tasks with rest breaks in between secondary to gross fatigue and weakness. Patient is currently CGA bed mobility and transfers, and MinAx1 ambulation with RW. Prior to mobility patient required MinAx1 to perform hygiene and cleaning due to continued loose stool. Patient demonstrated the ability to maintain static/dynamic standing balance for ~5 minutes prior to ambulation and following ambulation, to perform proper cleaning/hygiene; required MinAx1 due to inconsistent LOB / mild sway and to assist with  cleaning. Patient demonstrated the ability to ambulate 100 feet + 100 feet with RW, requiring intermittent verbal cues for RW management and a standing rest break due to generalized LE weakness and decreased activity tolerance. Patient was educated on the benefits of mobility during hospital stay to improve activity tolerance and endurance. Patient referred to  (non-Yazdanism) on patients behalf due to comments about feeling down lately; patient in agreement and in good understanding. Patient left supine in bed with alarm and all needs met. Patient will benefit from continued skilled physical therapy to address gait / balance dysfunction, decreased activity tolerance, and generalized weakness. The patients AM-PAC Basic Mobility Inpatient Short From Raw Score is 17 .  Based on AM-PAC scoring and patient presentation, PT currently recommending Level II (Moderate Resource Intensity). Please also refer to the recommendation of the Physical Therapist for safe discharge planning.  Barriers to Discharge: Inaccessible home environment     Rehab Resource Intensity Level, PT: II (Moderate Resource Intensity)    See flowsheet documentation for full assessment.

## 2024-09-09 NOTE — ASSESSMENT & PLAN NOTE
Longstanding history of chronic diarrhea, patient reporting acute diarrhea x1 month. Follows with GI. Has tried questran/imodium without relief  Stool enteric panel negative  C. difficile positive, see plan above   Giardia antigen is negative  Stool elastase is low started on Creon per GI  Diarrhea improving.  As per patient more formed stools

## 2024-09-09 NOTE — ASSESSMENT & PLAN NOTE
Severe protein-calorie malnutrition 2/2 chronic diarrhea a/e/b fat and muscle loss requiring Nutrition consult, oral diet and nutrition supplements    360 Statement: severe malnutrition r/t suspect combination of chronic illness and social/environmental factors as evidenced by severe temporal muscle loss, severe tricep fat pad loss, at least moderate muscle loss around clavicles and shoulders, at least moderate orbital fat pad loss. Treatment: oral diet and oral nutrition supplements.    BMI Findings:  Adult BMI Classifications: Underweight < 18.5        Body mass index is 17.26 kg/m².   C/w supplements  Nutrition consult appreciated  Encourage po intake    Did The Patient Refuse Pain Medications?: No

## 2024-09-09 NOTE — PROGRESS NOTES
Pastoral Care Progress Note    2024  Patient: Barb Palomo : 1970  Admission Date & Time: 8/15/2024 2023  MRN: [id number] [facility]: [id number]                     Chaplaincy Interventions Utilized:   Empowerment: Clarified, confirmed, or reviewed information from treatment team , Encouraged focus on present, Encouraged self-care, and Provided anxiety containment    Exploration: Explored emotional needs & resources and Explored spiritual needs & resources    Collaboration: Encouraged adherence to treatment plan    Relationship Building: Cultivated a relationship of care and support and Listened empathically    Ritual:        24 1500   Clinical Encounter Type   Visited With Patient   Routine Visit Introduction   Referral From Nurse   Orthodox Encounters   Orthodox Needs Sacred Text   Patient Spiritual Encounters   Spiritual Assessment 3   Suffering Severity 3   Fear Level 1   Coping 3             Chaplaincy Outcomes Achieved:  Expressed gratitude and Expressed intermediate hope     met with the patient and listened empathetically as she laments.   provided a Bible at patient's request.       Spiritual Coping Strategies Utilized:   Spiritual practices, Positive spiritual reframing, and Respect for own body     remains available.

## 2024-09-09 NOTE — ASSESSMENT & PLAN NOTE
"Lab Results   Component Value Date    HGBA1C 16.1 (H) 08/16/2024       Recent Labs     09/09/24  0601 09/09/24  0933 09/09/24  1124 09/09/24  1615   POCGLU 100 76 233* 124       Patient markedly hyperglycemic on admission only partially responsive to IV fluids, remainder of labs fortunately not consistent with DKA. A1C 16  Patient admitting she has not been using her home insulin as she apparently has been unable to fill prescription due to \"insurance issues,\" additionally reports that due to her neuropathy she has difficulty self injecting.  Daughter also reports jessica non compliance.   Typically on NovoLog 70/30 20 units twice daily  Diabetic diet-change to regular diet recently  Continue basal/prandial insulin while inpatient, adjust PRN  Sliding scale coverage  Insulin regimen adjusted by endocrinology.  Monitor blood sugar  Monitor blood sugar.  Patient do not have a prescription plan needed-rehab placement at the time of discharge  "

## 2024-09-10 LAB
ANION GAP SERPL CALCULATED.3IONS-SCNC: 5 MMOL/L (ref 4–13)
BUN SERPL-MCNC: 15 MG/DL (ref 5–25)
CALCIUM SERPL-MCNC: 8.9 MG/DL (ref 8.4–10.2)
CHLORIDE SERPL-SCNC: 110 MMOL/L (ref 96–108)
CO2 SERPL-SCNC: 25 MMOL/L (ref 21–32)
CREAT SERPL-MCNC: 0.46 MG/DL (ref 0.6–1.3)
GFR SERPL CREATININE-BSD FRML MDRD: 113 ML/MIN/1.73SQ M
GLUCOSE SERPL-MCNC: 134 MG/DL (ref 65–140)
GLUCOSE SERPL-MCNC: 137 MG/DL (ref 65–140)
GLUCOSE SERPL-MCNC: 143 MG/DL (ref 65–140)
GLUCOSE SERPL-MCNC: 150 MG/DL (ref 65–140)
GLUCOSE SERPL-MCNC: 184 MG/DL (ref 65–140)
GLUCOSE SERPL-MCNC: 301 MG/DL (ref 65–140)
MAGNESIUM SERPL-MCNC: 1.6 MG/DL (ref 1.9–2.7)
POTASSIUM SERPL-SCNC: 4 MMOL/L (ref 3.5–5.3)
SODIUM SERPL-SCNC: 140 MMOL/L (ref 135–147)

## 2024-09-10 PROCEDURE — 97168 OT RE-EVAL EST PLAN CARE: CPT

## 2024-09-10 PROCEDURE — 82948 REAGENT STRIP/BLOOD GLUCOSE: CPT

## 2024-09-10 PROCEDURE — 99233 SBSQ HOSP IP/OBS HIGH 50: CPT | Performed by: INTERNAL MEDICINE

## 2024-09-10 PROCEDURE — 83735 ASSAY OF MAGNESIUM: CPT | Performed by: FAMILY MEDICINE

## 2024-09-10 PROCEDURE — 80048 BASIC METABOLIC PNL TOTAL CA: CPT | Performed by: FAMILY MEDICINE

## 2024-09-10 RX ORDER — DIPHENOXYLATE HCL/ATROPINE 2.5-.025MG
1 TABLET ORAL 4 TIMES DAILY
Status: DISCONTINUED | OUTPATIENT
Start: 2024-09-10 | End: 2024-09-16 | Stop reason: HOSPADM

## 2024-09-10 RX ORDER — MAGNESIUM SULFATE HEPTAHYDRATE 40 MG/ML
2 INJECTION, SOLUTION INTRAVENOUS ONCE
Status: COMPLETED | OUTPATIENT
Start: 2024-09-10 | End: 2024-09-10

## 2024-09-10 RX ORDER — METOPROLOL SUCCINATE 25 MG/1
12.5 TABLET, EXTENDED RELEASE ORAL DAILY
Status: DISCONTINUED | OUTPATIENT
Start: 2024-09-10 | End: 2024-09-16

## 2024-09-10 RX ADMIN — DICYCLOMINE HYDROCHLORIDE 10 MG: 10 CAPSULE ORAL at 08:43

## 2024-09-10 RX ADMIN — DIPHENOXYLATE HYDROCHLORIDE AND ATROPINE SULFATE 1 TABLET: .025; 2.5 TABLET ORAL at 22:20

## 2024-09-10 RX ADMIN — DIPHENOXYLATE HYDROCHLORIDE AND ATROPINE SULFATE 1 TABLET: .025; 2.5 TABLET ORAL at 11:19

## 2024-09-10 RX ADMIN — INSULIN LISPRO 10 UNITS: 100 INJECTION, SOLUTION INTRAVENOUS; SUBCUTANEOUS at 09:26

## 2024-09-10 RX ADMIN — NICOTINE 1 PATCH: 21 PATCH, EXTENDED RELEASE TRANSDERMAL at 08:39

## 2024-09-10 RX ADMIN — INSULIN LISPRO 10 UNITS: 100 INJECTION, SOLUTION INTRAVENOUS; SUBCUTANEOUS at 15:49

## 2024-09-10 RX ADMIN — PANCRELIPASE 24000 UNITS: 120000; 24000; 76000 CAPSULE, DELAYED RELEASE PELLETS ORAL at 11:11

## 2024-09-10 RX ADMIN — DIPHENOXYLATE HYDROCHLORIDE AND ATROPINE SULFATE 1 TABLET: .025; 2.5 TABLET ORAL at 08:38

## 2024-09-10 RX ADMIN — PANCRELIPASE 24000 UNITS: 120000; 24000; 76000 CAPSULE, DELAYED RELEASE PELLETS ORAL at 15:43

## 2024-09-10 RX ADMIN — ONDANSETRON 4 MG: 2 INJECTION INTRAMUSCULAR; INTRAVENOUS at 16:31

## 2024-09-10 RX ADMIN — MIRTAZAPINE 7.5 MG: 15 TABLET, FILM COATED ORAL at 22:19

## 2024-09-10 RX ADMIN — INSULIN LISPRO 10 UNITS: 100 INJECTION, SOLUTION INTRAVENOUS; SUBCUTANEOUS at 12:40

## 2024-09-10 RX ADMIN — CHOLESTYRAMINE 4 G: 4 POWDER, FOR SUSPENSION ORAL at 08:34

## 2024-09-10 RX ADMIN — DICYCLOMINE HYDROCHLORIDE 10 MG: 10 CAPSULE ORAL at 22:18

## 2024-09-10 RX ADMIN — MICONAZOLE NITRATE: 20 CREAM TOPICAL at 17:04

## 2024-09-10 RX ADMIN — LEVOTHYROXINE SODIUM 112 MCG: 112 TABLET ORAL at 05:52

## 2024-09-10 RX ADMIN — DIPHENOXYLATE HYDROCHLORIDE AND ATROPINE SULFATE 1 TABLET: .025; 2.5 TABLET ORAL at 17:03

## 2024-09-10 RX ADMIN — MICONAZOLE NITRATE: 20 CREAM TOPICAL at 08:50

## 2024-09-10 RX ADMIN — BACLOFEN 5 MG: 10 TABLET ORAL at 17:03

## 2024-09-10 RX ADMIN — INSULIN GLARGINE 40 UNITS: 100 INJECTION, SOLUTION SUBCUTANEOUS at 22:20

## 2024-09-10 RX ADMIN — BUTALBITAL, ACETAMINOPHEN AND CAFFEINE 1 TABLET: 50; 325; 40 TABLET ORAL at 15:48

## 2024-09-10 RX ADMIN — ATORVASTATIN CALCIUM 80 MG: 80 TABLET, FILM COATED ORAL at 08:37

## 2024-09-10 RX ADMIN — TRAMADOL HYDROCHLORIDE 50 MG: 50 TABLET, COATED ORAL at 10:19

## 2024-09-10 RX ADMIN — DULOXETINE HYDROCHLORIDE 60 MG: 60 CAPSULE, DELAYED RELEASE ORAL at 17:03

## 2024-09-10 RX ADMIN — MAGNESIUM SULFATE HEPTAHYDRATE 2 G: 40 INJECTION, SOLUTION INTRAVENOUS at 14:07

## 2024-09-10 RX ADMIN — GABAPENTIN 800 MG: 400 CAPSULE ORAL at 22:18

## 2024-09-10 RX ADMIN — TRAMADOL HYDROCHLORIDE 50 MG: 50 TABLET, COATED ORAL at 20:34

## 2024-09-10 RX ADMIN — CHOLESTYRAMINE 4 G: 4 POWDER, FOR SUSPENSION ORAL at 17:03

## 2024-09-10 RX ADMIN — ENOXAPARIN SODIUM 40 MG: 40 INJECTION SUBCUTANEOUS at 08:39

## 2024-09-10 RX ADMIN — DULOXETINE HYDROCHLORIDE 60 MG: 60 CAPSULE, DELAYED RELEASE ORAL at 08:37

## 2024-09-10 RX ADMIN — METOPROLOL SUCCINATE 12.5 MG: 25 TABLET, EXTENDED RELEASE ORAL at 14:16

## 2024-09-10 RX ADMIN — BACLOFEN 5 MG: 10 TABLET ORAL at 08:37

## 2024-09-10 RX ADMIN — DIAZEPAM 5 MG: 5 TABLET ORAL at 10:19

## 2024-09-10 RX ADMIN — DICYCLOMINE HYDROCHLORIDE 10 MG: 10 CAPSULE ORAL at 15:42

## 2024-09-10 RX ADMIN — DICYCLOMINE HYDROCHLORIDE 10 MG: 10 CAPSULE ORAL at 11:11

## 2024-09-10 RX ADMIN — FAMOTIDINE 20 MG: 20 TABLET, FILM COATED ORAL at 17:03

## 2024-09-10 RX ADMIN — TRAZODONE HYDROCHLORIDE 50 MG: 50 TABLET ORAL at 22:44

## 2024-09-10 RX ADMIN — ASPIRIN 81 MG CHEWABLE TABLET 81 MG: 81 TABLET CHEWABLE at 08:38

## 2024-09-10 RX ADMIN — GABAPENTIN 800 MG: 400 CAPSULE ORAL at 15:42

## 2024-09-10 RX ADMIN — PANCRELIPASE 24000 UNITS: 120000; 24000; 76000 CAPSULE, DELAYED RELEASE PELLETS ORAL at 08:34

## 2024-09-10 RX ADMIN — INSULIN LISPRO 2 UNITS: 100 INJECTION, SOLUTION INTRAVENOUS; SUBCUTANEOUS at 15:49

## 2024-09-10 RX ADMIN — BUTALBITAL, ACETAMINOPHEN AND CAFFEINE 1 TABLET: 50; 325; 40 TABLET ORAL at 22:23

## 2024-09-10 RX ADMIN — GABAPENTIN 800 MG: 400 CAPSULE ORAL at 08:37

## 2024-09-10 RX ADMIN — FLUTICASONE FUROATE AND VILANTEROL TRIFENATATE 1 PUFF: 200; 25 POWDER RESPIRATORY (INHALATION) at 08:40

## 2024-09-10 RX ADMIN — FAMOTIDINE 20 MG: 20 TABLET, FILM COATED ORAL at 08:38

## 2024-09-10 RX ADMIN — LIDOCAINE 2 PATCH: 700 PATCH TOPICAL at 08:44

## 2024-09-10 NOTE — PROGRESS NOTES
Progress Note - Hospitalist   Name: Barb Palomo 54 y.o. female I MRN: [id number]  [facility] I Date of Admission: 8/15/2024   Date of Service: 9/10/2024 I Hospital Day: 25    Assessment & Plan  C. difficile diarrhea  Patient presented with generalized weakness, frequent falls.  She has had ongoing diarrhea, dysuria, poor oral intake and reported 70 pound weight loss over the last year.  Initially hypotensive in the ER, status post IV fluids. Labs with multiple metabolic derangements  CT imaging showing mild diffuse colonic wall thickening, mild diffuse wall thickening of stomach  C. difficile found to be positive--has a history of C. difficile in September 2022.    Patient started on Dificid 200 mg x 10 days per protocol on 8/18 as this is recurrent infection  Initially seemed BMs were improving and were becoming more formed, however again having frequent watery stools  GI consulted: Now s/p 7 days fidoxamycin and 7 days oral vamcomycin. S/p FMT 9/6/24. No leukocytosis or fever.   -Continue to monitor off antibiotics. Appears to be at around baseline stool output, improved from presentation.   -Will discontinue her Protonix and probiotic with as needed Pepcid as replacement of Protonix.  Contact precautions  Appetite is good   Chronic diarrhea  Longstanding history of chronic diarrhea, patient reporting acute diarrhea x1 month. Follows with GI. Has tried questran/imodium without relief    -Likely component of autonomic diarrhea given her secretory type symptoms of diarrhea at night and stool incontinence.  She has uncontrolled diabetes with her most recent A1c above 16 as she was not taking insulin at home.   -Pancreatic fecal elastase low at 5.5.  She was subsequently started on Creon.  -Status postcholecystectomy and currently on Questran.  -Biopsies from a colonoscopy were negative for microscopic colitis.  Stool enteric panel negative  C. difficile positive, see plan above   Giardia antigen is  "negative  Diarrhea improving.  As per patient more formed stools  Severe episode of recurrent major depressive disorder, without psychotic features (HCC)  Mood appearing fairly stable, continue home duloxetine, mirtazapine, as needed Valium  Hold minipress given symptomatic hypotension episodes, tachycardia   GERD without esophagitis  Change Protonix to Pepcid as needed secondary to C. difficile  Tobacco dependence  Counseled on need for cessation, provide nicotine patch while admitted  Recent CT chest for lung cancer screening 7/30/2024 negative for nodules or masses  Opioid dependence with other opioid-induced disorder (HCC)  PDMP reviewed, patient maintained on tramadol 200 mg daily as needed; history of chronic cervical/lumbar pain noted  Continue as needed tramadol and chronic gabapentin dosing  Uncontrolled type 2 diabetes mellitus with hyperglycemia (HCC)  Lab Results   Component Value Date    HGBA1C 16.1 (H) 08/16/2024       Recent Labs     09/09/24  2020 09/10/24  0154 09/10/24  0632 09/10/24  1053   POCGLU 160* 301* 134 137     Patient markedly hyperglycemic on admission only partially responsive to IV fluids, remainder of labs fortunately not consistent with DKA. A1C 16  Patient admitting she has not been using her home insulin as she apparently has been unable to fill prescription due to \"insurance issues,\" additionally reports that due to her neuropathy she has difficulty self injecting.  Daughter also reports jessica non compliance.   Typically on NovoLog 70/30 20 units twice daily  Diabetic diet-change to regular diet recently  Continue basal/prandial insulin while inpatient, adjust PRN  Sliding scale coverage  Insulin regimen adjusted by endocrinology.  Monitor blood sugar  Monitor blood sugar.  Patient do not have a prescription plan needed-rehab placement at the time of discharge   consulted-patient to apply for Medicare D- Nov  Acute cystitis without hematuria  Reported dysuria on " "admission   Urine + nitrite & leukocytes, CT imaging with distended bladder suggestive of retention and small amount of air which could be secondary to infection  Urine Culture noted, S/P 3 doses of IV CTX 8/17 8/21, reported pain AFTER urination, likely related to urine hitting excoriated labia/bottom from frequent stooling. Will continue with medicated cream to this area- denies pain at this time   S/p pyridum x 3 doses  Wound care consult appreciated   Severe protein-calorie malnutrition (HCC)  Severe protein-calorie malnutrition 2/2 chronic diarrhea a/e/b fat and muscle loss requiring Nutrition consult, oral diet and nutrition supplements    360 Statement: severe malnutrition r/t suspect combination of chronic illness and social/environmental factors as evidenced by severe temporal muscle loss, severe tricep fat pad loss, at least moderate muscle loss around clavicles and shoulders, at least moderate orbital fat pad loss. Treatment: oral diet and oral nutrition supplements.    BMI Findings:  Adult BMI Classifications: Underweight < 18.5        Body mass index is 17.19 kg/m².   C/w supplements  Nutrition consult appreciated  Encourage po intake   Hypomagnesemia  Hold oral supplementation due to diarrhea  Check magnesium level and replace as needed  Failure to thrive in adult  Patient presented with increased generalized weakness and frequent falls, ongoing diarrhea, dysuria, poor oral intake, and at least 70-80 pound weight loss over the past 1 year. Has been following with GI, had colonoscopy May 2024 with poor prep--had EGD with esophagitis and gastritis  Labs with multiple derangements including hyperglycemia, hypokalemia and hypomagnesemia on admission, improved.   C. difficile positive as above  Patient reports she has not been using insulin due to \"insurance issue\" and inability to self inject due to her neuropathy  CT abdomen and pelvis without any obvious malignancy  Treatment as per respective " issues  PT/OT recommending rehab.   Endocrinology evaluation appreciated.  Sinus tachycardia  Heart rate 100s-120s at times.  Sinus.  Has received IV fluids without much improvement.  Trialed IVFs again 8/22 given reports of nausea with poor oral intake and dizziness with some improvement to the 110s with HR- will continue with IVFs again today   orthostatic blood pressure negative   Pt is on minipress which could have a side effect of ST.  Additionally noted she is hypotensive each night around 2/3 AM, I suspect this is likely the underlying cause.  She was started on this by her psychiatrist to assist with sleep and eating outpatient   Will hold minipress for now.  D/w pt can have PRN trazodone for sleep.  Pt agreeable   Monitor   Tachycardia improving  Hypotension  Intermittent episodes of hypotension, most occurrences happen overnight and question correlation with minipress administration.  This has now been discontinued    VTE Pharmacologic Prophylaxis: VTE Score: 3 Moderate Risk (Score 3-4) - Pharmacological DVT Prophylaxis Ordered: enoxaparin (Lovenox).    Mobility:   Basic Mobility Inpatient Raw Score: 23  JH-HLM Goal: 7: Walk 25 feet or more  JH-HLM Achieved: 7: Walk 25 feet or more  JH-HLM Goal NOT achieved. Continue with multidisciplinary rounding and encourage appropriate mobility to improve upon JH-HLM goals.    Patient Centered Rounds: I performed bedside rounds with nursing staff today.   Discussions with Specialists or Other Care Team Provider: yes    Education and Discussions with Family / Patient:   .     Current Length of Stay: 25 day(s)  Current Patient Status: Inpatient   Certification Statement: The patient will continue to require additional inpatient hospital stay due to ongoing treatment  Discharge Plan: Anticipate discharge in 24-48 hrs to discharge location to be determined pending rehab evaluations.    Code Status: Level 1 - Full Code    Subjective   Resting in bed, due to head accidents  "overnight, but this morning no diarrhea.  No nausea or vomiting.  No abdominal pain.  Appetite is still good.  No diarrhea is not watery start becoming \"mushy\" per patient    Objective     Vitals:   Temp (24hrs), Av.8 °F (36.6 °C), Min:97.4 °F (36.3 °C), Max:98.3 °F (36.8 °C)    Temp:  [97.4 °F (36.3 °C)-98.3 °F (36.8 °C)] 97.8 °F (36.6 °C)  HR:  [106-117] 107  Resp:  [18-20] 18  BP: (112-128)/(81-91) 112/81  SpO2:  [96 %-99 %] 96 %  Body mass index is 17.19 kg/m².     Input and Output Summary (last 24 hours):     Intake/Output Summary (Last 24 hours) at 9/10/2024 1353  Last data filed at 9/10/2024 0852  Gross per 24 hour   Intake 597 ml   Output --   Net 597 ml       Physical Exam  Constitutional:       Appearance: She is ill-appearing.   HENT:      Mouth/Throat:      Mouth: Mucous membranes are moist.   Eyes:      Pupils: Pupils are equal, round, and reactive to light.   Cardiovascular:      Rate and Rhythm: Tachycardia present.   Pulmonary:      Effort: Pulmonary effort is normal.      Breath sounds: Normal breath sounds.   Abdominal:      General: Abdomen is flat. Bowel sounds are normal.      Palpations: Abdomen is soft.   Musculoskeletal:         General: Normal range of motion.      Cervical back: Normal range of motion.   Skin:     General: Skin is warm.   Neurological:      General: No focal deficit present.      Mental Status: She is alert.   Psychiatric:         Mood and Affect: Mood normal.          Lines/Drains:  Lines/Drains/Airways       Active Status       None                            Lab Results: I have reviewed the following results: CBC/BMP:   .     09/10/24  0541   SODIUM 140   K 4.0   *   CO2 25   BUN 15   CREATININE 0.46*   GLUC 150*   MG 1.6*       Results from last 7 days   Lab Units 24  1039   WBC Thousand/uL 8.19   HEMOGLOBIN g/dL 10.6*   HEMATOCRIT % 32.6*   PLATELETS Thousands/uL 261     Results from last 7 days   Lab Units 09/10/24  0541 24  0808 24  0603 "   SODIUM mmol/L 140   < > 139   POTASSIUM mmol/L 4.0   < > 4.3   CHLORIDE mmol/L 110*   < > 105   CO2 mmol/L 25   < > 26   BUN mg/dL 15   < > 15   CREATININE mg/dL 0.46*   < > 0.49*   ANION GAP mmol/L 5   < > 8   CALCIUM mg/dL 8.9   < > 9.2   ALBUMIN g/dL  --   --  3.7   TOTAL BILIRUBIN mg/dL  --   --  0.28   ALK PHOS U/L  --   --  207*   ALT U/L  --   --  97*   AST U/L  --   --  57*   GLUCOSE RANDOM mg/dL 150*   < > 285*    < > = values in this interval not displayed.         Results from last 7 days   Lab Units 09/10/24  1053 09/10/24  0632 09/10/24  0154 09/09/24  2020 09/09/24  1615 09/09/24  1124 09/09/24  0933 09/09/24  0601 09/09/24  0155 09/08/24  2043 09/08/24  1544 09/08/24  1105   POC GLUCOSE mg/dl 137 134 301* 160* 124 233* 76 100 283* 192* 259* 141*               Recent Cultures (last 7 days):         Imaging Review: Reviewed radiology reports from this admission including: CT abdomen/pelvis.  Other Studies: No additional pertinent studies reviewed.    Last 24 Hours Medication List:     Current Facility-Administered Medications:     acetaminophen (TYLENOL) tablet 650 mg, Q6H PRN    albuterol (PROVENTIL HFA,VENTOLIN HFA) inhaler 2 puff, Q4H PRN    aspirin chewable tablet 81 mg, Daily    atorvastatin (LIPITOR) tablet 80 mg, Daily    baclofen tablet 5 mg, BID    butalbital-acetaminophen-caffeine (FIORICET,ESGIC) -40 mg per tablet 1 tablet, Q6H PRN    cholestyramine sugar free (QUESTRAN LIGHT) packet 4 g, BID    diazepam (VALIUM) tablet 5 mg, Q12H PRN    dicyclomine (BENTYL) capsule 10 mg, 4x Daily (AC & HS)    diphenoxylate-atropine (LOMOTIL) 2.5-0.025 mg per tablet 1 tablet, 4x Daily    DULoxetine (CYMBALTA) delayed release capsule 60 mg, BID    enoxaparin (LOVENOX) subcutaneous injection 40 mg, Q24H HONEY    famotidine (PEPCID) tablet 20 mg, BID    fluticasone-vilanterol 200-25 mcg/actuation 1 puff, Daily    gabapentin (NEURONTIN) capsule 800 mg, TID    insulin glargine (LANTUS) subcutaneous  injection 40 Units 0.4 mL, HS    insulin lispro (HumALOG/ADMELOG) 100 units/mL subcutaneous injection 10 Units, TID With Meals    insulin lispro (HumALOG/ADMELOG) 100 units/mL subcutaneous injection 2-12 Units, 4x Daily (AC & HS) **AND** Fingerstick Glucose (POCT), 4x Daily AC and at bedtime    levothyroxine tablet 112 mcg, Early Morning    lidocaine (LIDODERM) 5 % patch 2 patch, Daily    magnesium sulfate 2 g/50 mL IVPB (premix) 2 g, Once    metoprolol succinate (TOPROL-XL) 24 hr tablet 12.5 mg, Daily    mirtazapine (REMERON) tablet 7.5 mg, HS    moisture barrier miconazole 2% cream (aka DARLIN MOISTURE BARRIER ANTIFUNGAL CREAM), BID    nicotine (NICODERM CQ) 21 mg/24 hr TD 24 hr patch 1 patch, Daily    ondansetron (ZOFRAN) injection 4 mg, Q6H PRN    pancrelipase (Lip-Prot-Amyl) (CREON) delayed release capsule 24,000 Units, TID With Meals    traMADol (ULTRAM) tablet 50 mg, Q6H PRN    traZODone (DESYREL) tablet 50 mg, HS PRN    Administrative Statements   Today, Patient Was Seen By: Wanda Rubio MD  I have spent a total time of 35 minutes in caring for this patient on the day of the visit/encounter including Counseling / Coordination of care.    **Please Note: This note may have been constructed using a voice recognition system.**

## 2024-09-10 NOTE — ASSESSMENT & PLAN NOTE
Severe protein-calorie malnutrition 2/2 chronic diarrhea a/e/b fat and muscle loss requiring Nutrition consult, oral diet and nutrition supplements    360 Statement: severe malnutrition r/t suspect combination of chronic illness and social/environmental factors as evidenced by severe temporal muscle loss, severe tricep fat pad loss, at least moderate muscle loss around clavicles and shoulders, at least moderate orbital fat pad loss. Treatment: oral diet and oral nutrition supplements.    BMI Findings:  Adult BMI Classifications: Underweight < 18.5        Body mass index is 17.19 kg/m².   C/w supplements  Nutrition consult appreciated  Encourage po intake

## 2024-09-10 NOTE — ASSESSMENT & PLAN NOTE
Longstanding history of chronic diarrhea, patient reporting acute diarrhea x1 month. Follows with GI. Has tried questran/imodium without relief    -Likely component of autonomic diarrhea given her secretory type symptoms of diarrhea at night and stool incontinence.  She has uncontrolled diabetes with her most recent A1c above 16 as she was not taking insulin at home.   -Pancreatic fecal elastase low at 5.5.  She was subsequently started on Creon.  -Status postcholecystectomy and currently on Questran.  -Biopsies from a colonoscopy were negative for microscopic colitis.  Stool enteric panel negative  C. difficile positive, see plan above   Giardia antigen is negative  Diarrhea improving.  As per patient more formed stools

## 2024-09-10 NOTE — OCCUPATIONAL THERAPY NOTE
Occupational Therapy Progress Note     Patient Name: Barb Palomo  Today's Date: 9/10/2024  Problem List  Principal Problem:    C. difficile diarrhea  Active Problems:    Severe episode of recurrent major depressive disorder, without psychotic features (HCC)    GERD without esophagitis    Tobacco dependence    Chronic diarrhea    Opioid dependence with other opioid-induced disorder (HCC)    Uncontrolled type 2 diabetes mellitus with hyperglycemia (HCC)    Acute cystitis without hematuria    Severe protein-calorie malnutrition (HCC)    Hypomagnesemia    Failure to thrive in adult    Sinus tachycardia    Hypotension             09/10/24 1345   OT Last Visit   OT Visit Date 09/10/24   Note Type   Note Type Treatment   Pain Assessment   Pain Assessment Tool 0-10   Pain Score No Pain   Restrictions/Precautions   Weight Bearing Precautions Per Order No   Other Precautions Contact/isolation;Chair Alarm;Fall Risk;Pain   ADL   Grooming Assistance 5  Supervision/Setup   Grooming Deficit Brushing hair   LB Dressing Assistance 4  Minimal Assistance   LB Dressing Deficit   (assist w donning adult diaper)   Toileting Assistance  4  Minimal Assistance   Toileting Deficit Perineal hygiene;Clothing management up;Clothing management down   Toileting Comments pt found w BM on aretha pad, pt was aware. Pt able to manage majority of perineal hygiene w min a.   Functional Standing Tolerance   Time 3 min   Activity standing at sink for grooming tasks.   Comments stands w CGA w unilateral support on rw while performing grooming tasks.   Bed Mobility   Supine to Sit 5  Supervision   Sit to Supine 5  Supervision   Transfers   Sit to Stand 5  Supervision   Stand to Sit 5  Supervision   Additional Comments pt is able to complete transfers with S, w rw for support.   Functional Mobility   Functional Mobility 5  Supervision   Additional Comments community distance. does require standing rest breaks.   Additional items Rolling walker    Cognition   Overall Cognitive Status WFL   Arousal/Participation Alert;Cooperative;Responsive   Attention Within functional limits   Orientation Level Oriented X4   Memory Within functional limits   Following Commands Follows all commands and directions without difficulty   Activity Tolerance   Activity Tolerance Patient tolerated treatment well   Medical Staff Made Aware rn   Assessment   Assessment Pt seen today for re-eval 2' goal expiration. See EI for PLOF and home setup. Pt performed toileting and lb dressing with min A today. UB ADL's likely S. Pt completed transfers and FM w S w rw for support, though she does fatigue and require standing rest breaks t/o session. She was able to stand at the sink for about 5 mins while completing grooming tasks. She used rw for support, but was able to stand w/o A from therapist.   The patient's raw score on the AM-PAC Daily Activity Inpatient Short Form is 21. A raw score of greater than or equal to 19 suggests the patient may benefit from discharge to home. Please refer to the recommendation of the Occupational Therapist for safe discharge planning. Pt should d/c to home w home OT when stable. Will cont to follow 2-3x/wk for 10-14 days to meet goals.   Plan   Treatment Interventions ADL retraining;Functional transfer training;Endurance training;Energy conservation;Activityengagement   Goal Expiration Date 09/24/24   OT Treatment Day 3   OT Frequency 2-3x/wk   Discharge Recommendation   Rehab Resource Intensity Level, OT III (Minimum Resource Intensity)   AM-PAC Daily Activity Inpatient   Lower Body Dressing 3   Bathing 3   Toileting 3   Upper Body Dressing 4   Grooming 4   Eating 4   Daily Activity Raw Score 21   Daily Activity Standardized Score (Calc for Raw Score >=11) 44.27   AM-PAC Applied Cognition Inpatient   Following a Speech/Presentation 4   Understanding Ordinary Conversation 4   Taking Medications 4   Remembering Where Things Are Placed or Put Away 4    Remembering List of 4-5 Errands 4   Taking Care of Complicated Tasks 4   Applied Cognition Raw Score 24   Applied Cognition Standardized Score 62.21   Modified Converse Scale   Modified Converse Scale 3   End of Consult   Education Provided Yes   Patient Position at End of Consult Supine;All needs within reach   Nurse Communication Nurse aware of consult         Holli Zabala, SELINA, OTR/L

## 2024-09-10 NOTE — PROGRESS NOTES
Progress Note - Infectious Disease   Barb Palomo 54 y.o. female MRN: [id number]  Unit/Bed#: [facility] 522-01 Encounter: [id number]      Impression/Plan:  1.  C. difficile colitis.  Patient presented with weakness, weight loss, acute on chronic diarrhea.  8/16 C. difficile PCR and toxin EIA positive.  Continues to have significant loose stools despite 7 days of fidaxomicin and now 7 days of p.o. vancomycin.  She remains nontoxic without fever or leukocytosis.  Abdominal exam benign.  Unclear if ongoing symptoms are related to C. difficile alone or an alternative etiology as below.  Given recent positive testing there is low utility to repeat C. difficile testing.  Still having significant diarrhea.  Patient status post fecal transplantation although the prep seems to have been inadequate.  Still with ongoing diarrhea although some improvement.  -Hold on the additional oral vancomycin for now  -Careful use of Lomotil as per GI  -If patient does not improve with fecal transplant may need to repeat the transplant after a more aggressive prep  -Careful use of Questran as per GI  -Close GI follow-up     2.  Acute on chronic diarrhea.  The patient has had diarrhea and weight loss for the past year but reports diarrhea is usually 2-4 times daily.  She continues to have up to 10 stools daily since admission with minimal improvement on C. difficile treatment.  Fecal white blood cells negative.  Additional infectious testing including stool O&P, Giardia antigen, enteric panel negative.  Unclear if ongoing symptoms are solely due to C. difficile.  The patient has a diagnosis of chronic pancreatitis to be contributing.  Poorly controlled diabetes may also lead to diarrhea.  She may also be having postinfectious/functional diarrhea.  Continues to have substantial diarrhea.  Repeat CT of the abdomen pelvis with colonic thickening.  Pancreatic insufficiency may also be playing a role in the chronic process.  Status post fecal  transplantation with some improvement in the diarrhea.  Patient started on Lomotil.              -Lomotil as per GI              -Close GI follow-up  -Continue pancreatic enzymes  -With inadequate prep, patient and ongoing diarrhea, patient may need repeat fecal transplant  -Will hold on any additional oral vancomycin for now     3.  Failure to thrive, severe protein calorie malnutrition.  BMI 16.  The patient has had diarrhea and weight loss for the past year.  Status post EGD and colonoscopy May, colon biopsies without pathologic abnormalities.     4.  Poorly controlled type 2 diabetes with hyperglycemia.  Hemoglobin A1c 16.1%.  Patient was not using insulin at home.  Glycemic management per primary team     5.  Bacteriuria.  Patient received 3 doses of IV ceftriaxone.  CT imaging showed a distended bladder suggestive of retention which is likely contributing to her symptoms.  Per report pain was happening after urination likely due to skin irritation.  In the setting of C. difficile as above, avoid unnecessary antibiotics.  Stressed with the patient in detail the need to avoid antibiotics unless absolutely necessary    Discussed with the primary service the plan to monitor off all antibiotics and they agree with the plan    Antibiotics:  Off antibiotics  Posttransplant day 4    Subjective:  Patient has no fever, chills, sweats; no nausea, vomiting, still with ongoing diarrhea although the stool apparently has more formed with most of the bowel movements; no cough, shortness of breath; no pain. No new symptoms.  Started on Lomotil by GI    Objective:  Vitals:  Temp:  [97.4 °F (36.3 °C)-98.3 °F (36.8 °C)] 97.8 °F (36.6 °C)  HR:  [106-117] 107  Resp:  [18-20] 18  BP: (112-128)/(81-91) 112/81  SpO2:  [96 %-99 %] 96 %  Temp (24hrs), Av.8 °F (36.6 °C), Min:97.4 °F (36.3 °C), Max:98.3 °F (36.8 °C)  Current: Temperature: 97.8 °F (36.6 °C)    Physical Exam:   General Appearance:  Alert, interactive, nontoxic, no acute  distress.   Throat: Oropharynx moist without lesions.    Lungs:   Clear to auscultation bilaterally; no wheezes, rhonchi or rales; respirations unlabored   Heart:  RRR; no murmur, rub or gallop   Abdomen:   Soft, non-tender, non-distended, positive bowel sounds.     Extremities: No clubbing, cyanosis or edema   Skin: No new rashes or lesions. No draining wounds noted.       Labs, Imaging, & Other studies:   All pertinent labs and imaging studies were personally reviewed  Results from last 7 days   Lab Units 09/09/24  1039 09/08/24  0443 09/07/24  0435   WBC Thousand/uL 8.19 5.64 7.89   HEMOGLOBIN g/dL 10.6* 10.0* 9.9*   PLATELETS Thousands/uL 261 266 259     Results from last 7 days   Lab Units 09/10/24  0541 09/09/24  1039 09/08/24  0443 09/05/24  0808 09/04/24  0603   SODIUM mmol/L 140 139 141   < > 139   POTASSIUM mmol/L 4.0 4.3 4.0   < > 4.3   CHLORIDE mmol/L 110* 107 106   < > 105   CO2 mmol/L 25 26 28   < > 26   BUN mg/dL 15 17 15   < > 15   CREATININE mg/dL 0.46* 0.45* 0.47*   < > 0.49*   EGFR ml/min/1.73sq m 113 114 113   < > 111   CALCIUM mg/dL 8.9 8.8 8.7   < > 9.2   AST U/L  --   --   --   --  57*   ALT U/L  --   --   --   --  97*   ALK PHOS U/L  --   --   --   --  207*    < > = values in this interval not displayed.

## 2024-09-10 NOTE — PLAN OF CARE
Problem: Nutrition/Hydration-ADULT  Goal: Nutrient/Hydration intake appropriate for improving, restoring or maintaining nutritional needs  Description: Monitor and assess patient's nutrition/hydration status for malnutrition. Collaborate with interdisciplinary team and initiate plan and interventions as ordered.  Monitor patient's weight and dietary intake as ordered or per policy. Utilize nutrition screening tool and intervene as necessary. Determine patient's food preferences and provide high-protein, high-caloric foods as appropriate.     INTERVENTIONS:  - Monitor oral intake, urinary output, labs, and treatment plans  - Assess nutrition and hydration status and recommend course of action  - Evaluate amount of meals eaten  - Assist patient with eating if necessary   - Allow adequate time for meals  - Recommend/ encourage appropriate diets, oral nutritional supplements, and vitamin/mineral supplements  - Order, calculate, and assess calorie counts as needed  - Recommend, monitor, and adjust tube feedings and TPN/PPN based on assessed needs  - Assess need for intravenous fluids  - Provide specific nutrition/hydration education as appropriate  - Include patient/family/caregiver in decisions related to nutrition  Outcome: Progressing     Problem: PAIN - ADULT  Goal: Verbalizes/displays adequate comfort level or baseline comfort level  Description: Interventions:  - Encourage patient to monitor pain and request assistance  - Assess pain using appropriate pain scale  - Administer analgesics based on type and severity of pain and evaluate response  - Implement non-pharmacological measures as appropriate and evaluate response  - Consider cultural and social influences on pain and pain management  - Notify physician/advanced practitioner if interventions unsuccessful or patient reports new pain  Outcome: Progressing     Problem: INFECTION - ADULT  Goal: Absence or prevention of progression during  hospitalization  Description: INTERVENTIONS:  - Assess and monitor for signs and symptoms of infection  - Monitor lab/diagnostic results  - Monitor all insertion sites, i.e. indwelling lines, tubes, and drains  - Monitor endotracheal if appropriate and nasal secretions for changes in amount and color  - Sargent appropriate cooling/warming therapies per order  - Administer medications as ordered  - Instruct and encourage patient and family to use good hand hygiene technique  - Identify and instruct in appropriate isolation precautions for identified infection/condition  Outcome: Progressing  Goal: Absence of fever/infection during neutropenic period  Description: INTERVENTIONS:  - Monitor WBC    Outcome: Progressing     Problem: SAFETY ADULT  Goal: Patient will remain free of falls  Description: INTERVENTIONS:  - Educate patient/family on patient safety including physical limitations  - Instruct patient to call for assistance with activity   - Consult OT/PT to assist with strengthening/mobility   - Keep Call bell within reach  - Keep bed low and locked with side rails adjusted as appropriate  - Keep care items and personal belongings within reach  - Initiate and maintain comfort rounds  - Make Fall Risk Sign visible to staff  - Offer Toileting every 2 Hours, in advance of need  - Initiate/Maintain alarm  - Obtain necessary fall risk management equipment  - Apply yellow socks and bracelet for high fall risk patients  - Consider moving patient to room near nurses station  Outcome: Progressing  Goal: Maintain or return to baseline ADL function  Description: INTERVENTIONS:  -  Assess patient's ability to carry out ADLs; assess patient's baseline for ADL function and identify physical deficits which impact ability to perform ADLs (bathing, care of mouth/teeth, toileting, grooming, dressing, etc.)  - Assess/evaluate cause of self-care deficits   - Assess range of motion  - Assess patient's mobility; develop plan if  impaired  - Assess patient's need for assistive devices and provide as appropriate  - Encourage maximum independence but intervene and supervise when necessary  - Involve family in performance of ADLs  - Assess for home care needs following discharge   - Consider OT consult to assist with ADL evaluation and planning for discharge  - Provide patient education as appropriate  Outcome: Progressing  Goal: Maintains/Returns to pre admission functional level  Description: INTERVENTIONS:  - Perform AM-PAC 6 Click Basic Mobility/ Daily Activity assessment daily.  - Set and communicate daily mobility goal to care team and patient/family/caregiver.   - Collaborate with rehabilitation services on mobility goals if consulted  - Perform Range of Motion 3 times a day.  - Reposition patient every 2 hours.  - Dangle patient 3 times a day  - Stand patient 3 times a day  - Ambulate patient 3 times a day  - Out of bed to chair 3 times a day   - Out of bed for meals 3 times a day  - Out of bed for toileting  - Record patient progress and toleration of activity level   Outcome: Progressing     Problem: DISCHARGE PLANNING  Goal: Discharge to home or other facility with appropriate resources  Description: INTERVENTIONS:  - Identify barriers to discharge w/patient and caregiver  - Arrange for needed discharge resources and transportation as appropriate  - Identify discharge learning needs (meds, wound care, etc.)  - Arrange for interpretive services to assist at discharge as needed  - Refer to Case Management Department for coordinating discharge planning if the patient needs post-hospital services based on physician/advanced practitioner order or complex needs related to functional status, cognitive ability, or social support system  Outcome: Progressing     Problem: Knowledge Deficit  Goal: Patient/family/caregiver demonstrates understanding of disease process, treatment plan, medications, and discharge instructions  Description:  Complete learning assessment and assess knowledge base.  Interventions:  - Provide teaching at level of understanding  - Provide teaching via preferred learning methods  Outcome: Progressing     Problem: NEUROSENSORY - ADULT  Goal: Achieves stable or improved neurological status  Description: INTERVENTIONS  - Monitor and report changes in neurological status  - Monitor vital signs such as temperature, blood pressure, glucose, and any other labs ordered   - Initiate measures to prevent increased intracranial pressure  - Monitor for seizure activity and implement precautions if appropriate      Outcome: Progressing  Goal: Remains free of injury related to seizures activity  Description: INTERVENTIONS  - Maintain airway, patient safety  and administer oxygen as ordered  - Monitor patient for seizure activity, document and report duration and description of seizure to physician/advanced practitioner  - If seizure occurs,  ensure patient safety during seizure  - Reorient patient post seizure  - Seizure pads on all 4 side rails  - Instruct patient/family to notify RN of any seizure activity including if an aura is experienced  - Instruct patient/family to call for assistance with activity based on nursing assessment  - Administer anti-seizure medications if ordered    Outcome: Progressing  Goal: Achieves maximal functionality and self care  Description: INTERVENTIONS  - Monitor swallowing and airway patency with patient fatigue and changes in neurological status  - Encourage and assist patient to increase activity and self care.   - Encourage visually impaired, hearing impaired and aphasic patients to use assistive/communication devices  Outcome: Progressing     Problem: METABOLIC, FLUID AND ELECTROLYTES - ADULT  Goal: Electrolytes maintained within normal limits  Description: INTERVENTIONS:  - Monitor labs and assess patient for signs and symptoms of electrolyte imbalances  - Administer electrolyte replacement as  ordered  - Monitor response to electrolyte replacements, including repeat lab results as appropriate  - Instruct patient on fluid and nutrition as appropriate  Outcome: Progressing  Goal: Fluid balance maintained  Description: INTERVENTIONS:  - Monitor labs   - Monitor I/O and WT  - Instruct patient on fluid and nutrition as appropriate  - Assess for signs & symptoms of volume excess or deficit  Outcome: Progressing  Goal: Glucose maintained within target range  Description: INTERVENTIONS:  - Monitor Blood Glucose as ordered  - Assess for signs and symptoms of hyperglycemia and hypoglycemia  - Administer ordered medications to maintain glucose within target range  - Assess nutritional intake and initiate nutrition service referral as needed  Outcome: Progressing     Problem: SKIN/TISSUE INTEGRITY - ADULT  Goal: Skin Integrity remains intact(Skin Breakdown Prevention)  Description: Assess:  -Perform William assessment every shift  -Clean and moisturize skin every day  -Inspect skin when repositioning, toileting, and assisting with ADLS  -Assess under medical devices such as lines every 2 hours  -Assess extremities for adequate circulation and sensation     Bed Management:  -Have minimal linens on bed & keep smooth, unwrinkled  -Change linens as needed when moist or perspiring  -Avoid sitting or lying in one position for more than 2 hours while in bed  -Keep HOB at 30 degrees     Toileting:  -Offer bedside commode  -Assess for incontinence every hour  -Use incontinent care products after each incontinent episode such as moisture barriers, foam cleansers    Activity:  -Mobilize patient 3 times a day  -Encourage activity and walks on unit  -Encourage or provide ROM exercises   -Turn and reposition patient every 2 Hours  -Use appropriate equipment to lift or move patient in bed  -Instruct/ Assist with weight shifting every 15 min when out of bed in chair  -Consider limitation of chair time 2 hour intervals    Skin  Care:  -Avoid use of baby powder, tape, friction and shearing, hot water or constrictive clothing  -Relieve pressure over bony prominences using offloading techniques or foam dressings (If not contraindicated by incontinence)  -Do not massage red bony areas    Next Steps:  -Teach patient strategies to minimize risks    -Consider consults to  interdisciplinary teams  Outcome: Progressing  Goal: Incision(s), wounds(s) or drain site(s) healing without S/S of infection  Description: INTERVENTIONS  - Assess and document dressing, incision, wound bed, drain sites and surrounding tissue  - Provide patient and family education  - Perform skin care/dressing changes  Outcome: Progressing  Goal: Pressure injury heals and does not worsen  Description: Interventions:  - Implement low air loss mattress or specialty surface (Criteria met)  - Apply silicone foam dressing  - Instruct/assist with weight shifting every 15 minutes when in chair   - Limit chair time to 2 hour intervals  - Use special pressure reducing interventions when in chair   - Apply fecal or urinary incontinence containment device   - Perform passive or active ROM  - Turn and reposition patient & offload bony prominences every 2 hours   - Utilize friction reducing device or surface for transfers   - Consider consults to  interdisciplinary teams  - Use incontinent care products after each incontinent episode  - Consider nutrition services referral as needed  Outcome: Progressing     Problem: MUSCULOSKELETAL - ADULT  Goal: Maintain or return mobility to safest level of function  Description: INTERVENTIONS:  - Assess patient's ability to carry out ADLs; assess patient's baseline for ADL function and identify physical deficits which impact ability to perform ADLs (bathing, care of mouth/teeth, toileting, grooming, dressing, etc.)  - Assess/evaluate cause of self-care deficits   - Assess range of motion  - Assess patient's mobility  - Assess patient's need for  assistive devices and provide as appropriate  - Encourage maximum independence but intervene and supervise when necessary  - Involve family in performance of ADLs  - Assess for home care needs following discharge   - Consider OT consult to assist with ADL evaluation and planning for discharge  - Provide patient education as appropriate  Outcome: Progressing  Goal: Maintain proper alignment of affected body part  Description: INTERVENTIONS:  - Support, maintain and protect limb and body alignment  - Provide patient/ family with appropriate education  Outcome: Progressing     Problem: Prexisting or High Potential for Compromised Skin Integrity  Goal: Skin integrity is maintained or improved  Description: INTERVENTIONS:  - Identify patients at risk for skin breakdown  - Assess and monitor skin integrity  - Assess and monitor nutrition and hydration status  - Monitor labs   - Assess for incontinence   - Turn and reposition patient  - Assist with mobility/ambulation  - Relieve pressure over bony prominences  - Avoid friction and shearing  - Provide appropriate hygiene as needed including keeping skin clean and dry  - Evaluate need for skin moisturizer/barrier cream  - Collaborate with interdisciplinary team   - Patient/family teaching  - Consider wound care consult   Outcome: Progressing

## 2024-09-10 NOTE — PROGRESS NOTES
Progress Note- Barb Palomo 54 y.o. female MRN: [id number]    Unit/Bed#: [facility] 522-01 Encounter: [id number]      Assessment and Plan:    Chronic diarrhea  -Likely component of autonomic diarrhea given her secretory type symptoms of diarrhea at night and stool incontinence.  She has uncontrolled diabetes with her most recent A1c above 16 as she was not taking insulin at home. Patient does not want a diabetic diet leading to some blood sugar lability.   -Pancreatic fecal elastase low at 5.5.  She was subsequently started on Creon.  -Status postcholecystectomy and currently on Questran.  -Biopsies from a colonoscopy were negative for microscopic colitis.  -Will discontinue her Protonix and probiotic with as needed Pepcid as replacement of Protonix.  -Avoid NSAIDs where possible. Patient reports taking them daily prior to admission although not in the weeks prior to admission.  -Would consider alternative to metformin in outpatient setting given significant GI side effects.  -Increase fiber.  -Contact CM to ensure adequate access to insulin after discharge. She has no prescription insurance. Case management recommended signing up for Medicare part D in during open enrollment in November.  -Has overall had improvement in diarrhea after the above changes. Will need repeat colonoscopy in 3 months due to inadequate bowel prep and personal history of polys.     Clostridioides difficile infection  -Diagnosed 8/16. Now s/p 7 days fidoxamycin and 7 days oral vamcomycin. S/p FMT 9/6/24. No leukocytosis or fever.   -Continue to monitor off antibiotics. Appears to be at around baseline stool output, improved from presentation.   -Will discontinue her Protonix and probiotic with as needed Pepcid as replacement of Protonix.     Chronic pancreatitis  -History of gallstone pancreatitis with CT imaging consistent with chronic pancreatitis.  -Pancreatic fecal elastase low at 5.5.  She was subsequently started on Creon.      ______________________________________________________________________    Subjective:     Reports continued improvement in frequency and consistency of stools. States that at her baseline as far back as several years ago she was having 6-7 sudden uncontrollable bowel movement per day with incontinence. Has been eating and drinking well. Feels overall better but concerned about her medications and transportation when in the outpatient setting.    Medication Administration - last 24 hours from 09/09/2024 1031 to 09/10/2024 1031         Date/Time Order Dose Route Action Action by     09/09/2024 1932 EDT ondansetron (ZOFRAN) injection 4 mg 4 mg Intravenous Given Maurice Tian RN     09/09/2024 1312 EDT ondansetron (ZOFRAN) injection 4 mg 4 mg Intravenous Given Kim Salazar RN     09/10/2024 0838 EDT aspirin chewable tablet 81 mg 81 mg Oral Given Phuong Morelos RN     09/10/2024 0837 EDT atorvastatin (LIPITOR) tablet 80 mg 80 mg Oral Given Phuong Morelos RN     09/10/2024 1019 EDT diazepam (VALIUM) tablet 5 mg 5 mg Oral Given Phuong Morelos RN     09/10/2024 0837 EDT DULoxetine (CYMBALTA) delayed release capsule 60 mg 60 mg Oral Given Phuong Morelos RN     09/09/2024 1759 EDT DULoxetine (CYMBALTA) delayed release capsule 60 mg 60 mg Oral Given Kim Salazar RN     09/10/2024 0840 EDT fluticasone-vilanterol 200-25 mcg/actuation 1 puff 1 puff Inhalation Given Phuong Morelos RN     09/10/2024 0552 EDT levothyroxine tablet 112 mcg 112 mcg Oral Given Maurice Tian RN     09/09/2024 2211 EDT mirtazapine (REMERON) tablet 7.5 mg 7.5 mg Oral Given Maurice Tian RN     09/10/2024 0839 EDT nicotine (NICODERM CQ) 21 mg/24 hr TD 24 hr patch 1 patch 1 patch Transdermal Medication Applied Phuong Morelos RN     09/10/2024 0841 EDT nicotine (NICODERM CQ) 21 mg/24 hr TD 24 hr patch 1 patch 1 patch Transdermal Patch Removed Phuong Morelos RN     09/10/2024 1019 EDT traMADol (ULTRAM)  tablet 50 mg 50 mg Oral Given Phuong Morelos RN     09/09/2024 2210 EDT traMADol (ULTRAM) tablet 50 mg 50 mg Oral Given Maurice Tian RN     09/10/2024 0837 EDT gabapentin (NEURONTIN) capsule 800 mg 800 mg Oral Given Puhong Morelos RN     09/09/2024 2210 EDT gabapentin (NEURONTIN) capsule 800 mg 800 mg Oral Given Maurice Tian RN     09/09/2024 1759 EDT gabapentin (NEURONTIN) capsule 800 mg 800 mg Oral Given Kim Salazar RN     09/10/2024 0837 EDT baclofen tablet 5 mg 5 mg Oral Given Phuong Morelos RN     09/09/2024 1759 EDT baclofen tablet 5 mg 5 mg Oral Given Kim Salazar RN     09/10/2024 0844 EDT lidocaine (LIDODERM) 5 % patch 2 patch 2 patch Topical Medication Applied Phuong Morelos RN     09/09/2024 2210 EDT lidocaine (LIDODERM) 5 % patch 2 patch 2 patch Topical Patch Removed Maurice Tian RN     09/10/2024 0850 EDT moisture barrier miconazole 2% cream (aka DARLIN MOISTURE BARRIER ANTIFUNGAL CREAM) -- Topical Given Phuong Morelos RN     09/09/2024 1803 EDT moisture barrier miconazole 2% cream (aka DARLIN MOISTURE BARRIER ANTIFUNGAL CREAM) -- Topical Given Kim Salazar RN     09/09/2024 2211 EDT traZODone (DESYREL) tablet 50 mg 50 mg Oral Given Maurice Tian RN     09/10/2024 0839 EDT enoxaparin (LOVENOX) subcutaneous injection 40 mg 40 mg Subcutaneous Given Phuong Morelos RN     09/10/2024 0834 EDT cholestyramine sugar free (QUESTRAN LIGHT) packet 4 g 4 g Oral Given Phuong Morelos RN     09/09/2024 1800 EDT cholestyramine sugar free (QUESTRAN LIGHT) packet 4 g 4 g Oral Given Kim Salazar RN     09/10/2024 0834 EDT pancrelipase (Lip-Prot-Amyl) (CREON) delayed release capsule 24,000 Units 24,000 Units Oral Given Phuong Morelos RN     09/09/2024 1759 EDT pancrelipase (Lip-Prot-Amyl) (CREON) delayed release capsule 24,000 Units 24,000 Units Oral Given Kim Salazar RN     09/09/2024 1131 EDT pancrelipase (Lip-Prot-Amyl) (CREON) delayed  release capsule 24,000 Units 24,000 Units Oral Given Kim Salazar RN     09/09/2024 2210 EDT insulin glargine (LANTUS) subcutaneous injection 40 Units 0.4 mL 40 Units Subcutaneous Given Maurice Tian RN     09/10/2024 0926 EDT insulin lispro (HumALOG/ADMELOG) 100 units/mL subcutaneous injection 10 Units 10 Units Subcutaneous Given Phuong Morelos RN     09/09/2024 1803 EDT insulin lispro (HumALOG/ADMELOG) 100 units/mL subcutaneous injection 10 Units 10 Units Subcutaneous Given Kim Salazar RN     09/09/2024 1132 EDT insulin lispro (HumALOG/ADMELOG) 100 units/mL subcutaneous injection 10 Units 10 Units Subcutaneous Given Kim Salazar RN     09/10/2024 0716 EDT insulin lispro (HumALOG/ADMELOG) 100 units/mL subcutaneous injection 2-12 Units -- Subcutaneous Not Given Phuong Morelos RN     09/09/2024 2218 EDT insulin lispro (HumALOG/ADMELOG) 100 units/mL subcutaneous injection 2-12 Units 2 Units Subcutaneous Given Maurice Tian RN     09/09/2024 1625 EDT insulin lispro (HumALOG/ADMELOG) 100 units/mL subcutaneous injection 2-12 Units -- Subcutaneous Not Given Kim Salazar RN     09/09/2024 1132 EDT insulin lispro (HumALOG/ADMELOG) 100 units/mL subcutaneous injection 2-12 Units 4 Units Subcutaneous Given Kim Salazar RN     09/10/2024 0838 EDT famotidine (PEPCID) tablet 20 mg 20 mg Oral Given Phuong Morelos RN     09/09/2024 1802 EDT famotidine (PEPCID) tablet 20 mg 20 mg Oral Given Kim Salazar RN     09/09/2024 1320 EDT famotidine (PEPCID) tablet 20 mg 20 mg Oral Not Given Kim Salazar RN     09/10/2024 0843 EDT dicyclomine (BENTYL) capsule 10 mg 10 mg Oral Given Phuong Morelos RN     09/09/2024 2217 EDT dicyclomine (BENTYL) capsule 10 mg 10 mg Oral Given Maurice Tian RN     09/09/2024 5520 EDT dicyclomine (BENTYL) capsule 10 mg 10 mg Oral Given Kim Salazar RN     09/10/2024 7990 EDT diphenoxylate-atropine (LOMOTIL) 2.5-0.025 mg per tablet 1  "tablet 1 tablet Oral Given Phuong Morelos RN            Objective:     Vitals: Blood pressure 112/81, pulse (!) 107, temperature 97.8 °F (36.6 °C), resp. rate 18, height 5' 2\" (1.575 m), weight 42.6 kg (94 lb), last menstrual period 03/04/2016, SpO2 96%, not currently breastfeeding.,Body mass index is 17.19 kg/m².      Intake/Output Summary (Last 24 hours) at 9/10/2024 1031  Last data filed at 9/10/2024 0852  Gross per 24 hour   Intake 777 ml   Output --   Net 777 ml       Physical Exam:   General Appearance: Awake and alert, in no acute distress  Abdomen: Soft, non-tender, non-distended; bowel sounds normal; no masses or no organomegaly    Invasive Devices       Peripheral Intravenous Line  Duration             Peripheral IV 09/08/24 Dorsal (posterior);Left Hand 2 days                    Lab Results:  No results displayed because visit has over 200 results.          Imaging Studies: I have personally reviewed pertinent imaging studies.     "

## 2024-09-10 NOTE — ASSESSMENT & PLAN NOTE
"Lab Results   Component Value Date    HGBA1C 16.1 (H) 08/16/2024       Recent Labs     09/09/24  2020 09/10/24  0154 09/10/24  0632 09/10/24  1053   POCGLU 160* 301* 134 137     Patient markedly hyperglycemic on admission only partially responsive to IV fluids, remainder of labs fortunately not consistent with DKA. A1C 16  Patient admitting she has not been using her home insulin as she apparently has been unable to fill prescription due to \"insurance issues,\" additionally reports that due to her neuropathy she has difficulty self injecting.  Daughter also reports jessica non compliance.   Typically on NovoLog 70/30 20 units twice daily  Diabetic diet-change to regular diet recently  Continue basal/prandial insulin while inpatient, adjust PRN  Sliding scale coverage  Insulin regimen adjusted by endocrinology.  Monitor blood sugar  Monitor blood sugar.  Patient do not have a prescription plan needed-rehab placement at the time of discharge   consulted-patient to apply for Medicare D- Nov  "

## 2024-09-10 NOTE — ASSESSMENT & PLAN NOTE
Patient presented with generalized weakness, frequent falls.  She has had ongoing diarrhea, dysuria, poor oral intake and reported 70 pound weight loss over the last year.  Initially hypotensive in the ER, status post IV fluids. Labs with multiple metabolic derangements  CT imaging showing mild diffuse colonic wall thickening, mild diffuse wall thickening of stomach  C. difficile found to be positive--has a history of C. difficile in September 2022.    Patient started on Dificid 200 mg x 10 days per protocol on 8/18 as this is recurrent infection  Initially seemed BMs were improving and were becoming more formed, however again having frequent watery stools  GI consulted: Now s/p 7 days fidoxamycin and 7 days oral vamcomycin. S/p FMT 9/6/24. No leukocytosis or fever.   -Continue to monitor off antibiotics. Appears to be at around baseline stool output, improved from presentation.   -Will discontinue her Protonix and probiotic with as needed Pepcid as replacement of Protonix.  Contact precautions  Appetite is good

## 2024-09-11 PROBLEM — R82.71 BACTERIURIA: Status: ACTIVE | Noted: 2024-09-11

## 2024-09-11 LAB
ANION GAP SERPL CALCULATED.3IONS-SCNC: 7 MMOL/L (ref 4–13)
ATRIAL RATE: 107 BPM
BASOPHILS # BLD AUTO: 0.06 THOUSANDS/ΜL (ref 0–0.1)
BASOPHILS NFR BLD AUTO: 1 % (ref 0–1)
BUN SERPL-MCNC: 16 MG/DL (ref 5–25)
CALCIUM SERPL-MCNC: 9.2 MG/DL (ref 8.4–10.2)
CHLORIDE SERPL-SCNC: 107 MMOL/L (ref 96–108)
CO2 SERPL-SCNC: 27 MMOL/L (ref 21–32)
CREAT SERPL-MCNC: 0.47 MG/DL (ref 0.6–1.3)
EOSINOPHIL # BLD AUTO: 0.24 THOUSAND/ΜL (ref 0–0.61)
EOSINOPHIL NFR BLD AUTO: 3 % (ref 0–6)
ERYTHROCYTE [DISTWIDTH] IN BLOOD BY AUTOMATED COUNT: 14.5 % (ref 11.6–15.1)
GFR SERPL CREATININE-BSD FRML MDRD: 113 ML/MIN/1.73SQ M
GLUCOSE SERPL-MCNC: 207 MG/DL (ref 65–140)
GLUCOSE SERPL-MCNC: 254 MG/DL (ref 65–140)
GLUCOSE SERPL-MCNC: 322 MG/DL (ref 65–140)
GLUCOSE SERPL-MCNC: 76 MG/DL (ref 65–140)
GLUCOSE SERPL-MCNC: 82 MG/DL (ref 65–140)
GLUCOSE SERPL-MCNC: 91 MG/DL (ref 65–140)
GLUCOSE SERPL-MCNC: 97 MG/DL (ref 65–140)
HCT VFR BLD AUTO: 29.8 % (ref 34.8–46.1)
HGB BLD-MCNC: 9.6 G/DL (ref 11.5–15.4)
IMM GRANULOCYTES # BLD AUTO: 0.02 THOUSAND/UL (ref 0–0.2)
IMM GRANULOCYTES NFR BLD AUTO: 0 % (ref 0–2)
LYMPHOCYTES # BLD AUTO: 2.92 THOUSANDS/ΜL (ref 0.6–4.47)
LYMPHOCYTES NFR BLD AUTO: 35 % (ref 14–44)
MAGNESIUM SERPL-MCNC: 1.9 MG/DL (ref 1.9–2.7)
MCH RBC QN AUTO: 32.3 PG (ref 26.8–34.3)
MCHC RBC AUTO-ENTMCNC: 32.2 G/DL (ref 31.4–37.4)
MCV RBC AUTO: 100 FL (ref 82–98)
MONOCYTES # BLD AUTO: 0.71 THOUSAND/ΜL (ref 0.17–1.22)
MONOCYTES NFR BLD AUTO: 9 % (ref 4–12)
NEUTROPHILS # BLD AUTO: 4.34 THOUSANDS/ΜL (ref 1.85–7.62)
NEUTS SEG NFR BLD AUTO: 52 % (ref 43–75)
NRBC BLD AUTO-RTO: 0 /100 WBCS
P AXIS: 40 DEGREES
PLATELET # BLD AUTO: 243 THOUSANDS/UL (ref 149–390)
PMV BLD AUTO: 11.5 FL (ref 8.9–12.7)
POTASSIUM SERPL-SCNC: 4 MMOL/L (ref 3.5–5.3)
PR INTERVAL: 114 MS
QRS AXIS: 73 DEGREES
QRSD INTERVAL: 86 MS
QT INTERVAL: 352 MS
QTC INTERVAL: 469 MS
RBC # BLD AUTO: 2.97 MILLION/UL (ref 3.81–5.12)
SODIUM SERPL-SCNC: 141 MMOL/L (ref 135–147)
T WAVE AXIS: 65 DEGREES
TSH SERPL DL<=0.05 MIU/L-ACNC: 2.36 UIU/ML (ref 0.45–4.5)
VENTRICULAR RATE: 107 BPM
WBC # BLD AUTO: 8.29 THOUSAND/UL (ref 4.31–10.16)

## 2024-09-11 PROCEDURE — 84443 ASSAY THYROID STIM HORMONE: CPT | Performed by: INTERNAL MEDICINE

## 2024-09-11 PROCEDURE — 83735 ASSAY OF MAGNESIUM: CPT | Performed by: INTERNAL MEDICINE

## 2024-09-11 PROCEDURE — 93005 ELECTROCARDIOGRAM TRACING: CPT

## 2024-09-11 PROCEDURE — 99233 SBSQ HOSP IP/OBS HIGH 50: CPT | Performed by: INTERNAL MEDICINE

## 2024-09-11 PROCEDURE — 85025 COMPLETE CBC W/AUTO DIFF WBC: CPT | Performed by: INTERNAL MEDICINE

## 2024-09-11 PROCEDURE — 99232 SBSQ HOSP IP/OBS MODERATE 35: CPT | Performed by: INTERNAL MEDICINE

## 2024-09-11 PROCEDURE — 80048 BASIC METABOLIC PNL TOTAL CA: CPT | Performed by: INTERNAL MEDICINE

## 2024-09-11 PROCEDURE — 93010 ELECTROCARDIOGRAM REPORT: CPT | Performed by: INTERNAL MEDICINE

## 2024-09-11 PROCEDURE — 88305 TISSUE EXAM BY PATHOLOGIST: CPT | Performed by: PATHOLOGY

## 2024-09-11 PROCEDURE — 82948 REAGENT STRIP/BLOOD GLUCOSE: CPT

## 2024-09-11 RX ORDER — INSULIN GLARGINE 100 [IU]/ML
35 INJECTION, SOLUTION SUBCUTANEOUS
Status: DISCONTINUED | OUTPATIENT
Start: 2024-09-11 | End: 2024-09-13

## 2024-09-11 RX ADMIN — TRAMADOL HYDROCHLORIDE 50 MG: 50 TABLET, COATED ORAL at 21:20

## 2024-09-11 RX ADMIN — DIPHENOXYLATE HYDROCHLORIDE AND ATROPINE SULFATE 1 TABLET: .025; 2.5 TABLET ORAL at 16:50

## 2024-09-11 RX ADMIN — INSULIN LISPRO 10 UNITS: 100 INJECTION, SOLUTION INTRAVENOUS; SUBCUTANEOUS at 16:49

## 2024-09-11 RX ADMIN — GABAPENTIN 800 MG: 400 CAPSULE ORAL at 21:20

## 2024-09-11 RX ADMIN — INSULIN GLARGINE 35 UNITS: 100 INJECTION, SOLUTION SUBCUTANEOUS at 21:19

## 2024-09-11 RX ADMIN — GABAPENTIN 800 MG: 400 CAPSULE ORAL at 08:05

## 2024-09-11 RX ADMIN — ACETAMINOPHEN 650 MG: 325 TABLET ORAL at 14:06

## 2024-09-11 RX ADMIN — TRAZODONE HYDROCHLORIDE 50 MG: 50 TABLET ORAL at 21:20

## 2024-09-11 RX ADMIN — LIDOCAINE 2 PATCH: 700 PATCH TOPICAL at 08:05

## 2024-09-11 RX ADMIN — DIPHENOXYLATE HYDROCHLORIDE AND ATROPINE SULFATE 1 TABLET: .025; 2.5 TABLET ORAL at 08:05

## 2024-09-11 RX ADMIN — PANCRELIPASE 24000 UNITS: 120000; 24000; 76000 CAPSULE, DELAYED RELEASE PELLETS ORAL at 16:49

## 2024-09-11 RX ADMIN — DICYCLOMINE HYDROCHLORIDE 10 MG: 10 CAPSULE ORAL at 06:02

## 2024-09-11 RX ADMIN — PANCRELIPASE 24000 UNITS: 120000; 24000; 76000 CAPSULE, DELAYED RELEASE PELLETS ORAL at 08:02

## 2024-09-11 RX ADMIN — FAMOTIDINE 20 MG: 20 TABLET, FILM COATED ORAL at 16:50

## 2024-09-11 RX ADMIN — DIPHENOXYLATE HYDROCHLORIDE AND ATROPINE SULFATE 1 TABLET: .025; 2.5 TABLET ORAL at 21:20

## 2024-09-11 RX ADMIN — GABAPENTIN 800 MG: 400 CAPSULE ORAL at 16:50

## 2024-09-11 RX ADMIN — MICONAZOLE NITRATE: 20 CREAM TOPICAL at 16:51

## 2024-09-11 RX ADMIN — FLUTICASONE FUROATE AND VILANTEROL TRIFENATATE 1 PUFF: 200; 25 POWDER RESPIRATORY (INHALATION) at 08:02

## 2024-09-11 RX ADMIN — METOPROLOL SUCCINATE 12.5 MG: 25 TABLET, EXTENDED RELEASE ORAL at 12:13

## 2024-09-11 RX ADMIN — CHOLESTYRAMINE 4 G: 4 POWDER, FOR SUSPENSION ORAL at 08:02

## 2024-09-11 RX ADMIN — ATORVASTATIN CALCIUM 80 MG: 80 TABLET, FILM COATED ORAL at 08:04

## 2024-09-11 RX ADMIN — DULOXETINE HYDROCHLORIDE 60 MG: 60 CAPSULE, DELAYED RELEASE ORAL at 08:04

## 2024-09-11 RX ADMIN — CHOLESTYRAMINE 4 G: 4 POWDER, FOR SUSPENSION ORAL at 16:51

## 2024-09-11 RX ADMIN — DICYCLOMINE HYDROCHLORIDE 10 MG: 10 CAPSULE ORAL at 21:20

## 2024-09-11 RX ADMIN — DIPHENOXYLATE HYDROCHLORIDE AND ATROPINE SULFATE 1 TABLET: .025; 2.5 TABLET ORAL at 12:15

## 2024-09-11 RX ADMIN — INSULIN LISPRO 4 UNITS: 100 INJECTION, SOLUTION INTRAVENOUS; SUBCUTANEOUS at 16:49

## 2024-09-11 RX ADMIN — ASPIRIN 81 MG CHEWABLE TABLET 81 MG: 81 TABLET CHEWABLE at 08:04

## 2024-09-11 RX ADMIN — INSULIN LISPRO 6 UNITS: 100 INJECTION, SOLUTION INTRAVENOUS; SUBCUTANEOUS at 12:16

## 2024-09-11 RX ADMIN — INSULIN LISPRO 10 UNITS: 100 INJECTION, SOLUTION INTRAVENOUS; SUBCUTANEOUS at 12:55

## 2024-09-11 RX ADMIN — NICOTINE 1 PATCH: 21 PATCH, EXTENDED RELEASE TRANSDERMAL at 08:16

## 2024-09-11 RX ADMIN — FAMOTIDINE 20 MG: 20 TABLET, FILM COATED ORAL at 08:04

## 2024-09-11 RX ADMIN — BUTALBITAL, ACETAMINOPHEN AND CAFFEINE 1 TABLET: 50; 325; 40 TABLET ORAL at 10:38

## 2024-09-11 RX ADMIN — BACLOFEN 5 MG: 10 TABLET ORAL at 16:50

## 2024-09-11 RX ADMIN — ONDANSETRON 4 MG: 2 INJECTION INTRAMUSCULAR; INTRAVENOUS at 12:15

## 2024-09-11 RX ADMIN — BACLOFEN 5 MG: 10 TABLET ORAL at 08:04

## 2024-09-11 RX ADMIN — MIRTAZAPINE 7.5 MG: 15 TABLET, FILM COATED ORAL at 21:20

## 2024-09-11 RX ADMIN — LEVOTHYROXINE SODIUM 112 MCG: 112 TABLET ORAL at 06:02

## 2024-09-11 RX ADMIN — DICYCLOMINE HYDROCHLORIDE 10 MG: 10 CAPSULE ORAL at 16:49

## 2024-09-11 RX ADMIN — ENOXAPARIN SODIUM 40 MG: 40 INJECTION SUBCUTANEOUS at 08:05

## 2024-09-11 RX ADMIN — Medication 1 PACKET: at 18:24

## 2024-09-11 RX ADMIN — DICYCLOMINE HYDROCHLORIDE 10 MG: 10 CAPSULE ORAL at 10:38

## 2024-09-11 RX ADMIN — DULOXETINE HYDROCHLORIDE 60 MG: 60 CAPSULE, DELAYED RELEASE ORAL at 16:50

## 2024-09-11 RX ADMIN — TRAMADOL HYDROCHLORIDE 50 MG: 50 TABLET, COATED ORAL at 08:24

## 2024-09-11 RX ADMIN — MICONAZOLE NITRATE: 20 CREAM TOPICAL at 08:25

## 2024-09-11 RX ADMIN — ONDANSETRON 4 MG: 2 INJECTION INTRAMUSCULAR; INTRAVENOUS at 18:25

## 2024-09-11 RX ADMIN — INSULIN LISPRO 8 UNITS: 100 INJECTION, SOLUTION INTRAVENOUS; SUBCUTANEOUS at 21:21

## 2024-09-11 RX ADMIN — PANCRELIPASE 24000 UNITS: 120000; 24000; 76000 CAPSULE, DELAYED RELEASE PELLETS ORAL at 12:16

## 2024-09-11 NOTE — ASSESSMENT & PLAN NOTE
Malnutrition Findings:   Adult Malnutrition type:  (suspect combination of chronic illness and social/environmental factors)  Adult Degree of Malnutrition: Other severe protein calorie malnutrition  Malnutrition Characteristics: Fat loss, Muscle loss                  360 Statement: severe malnutrition r/t suspect combination of chronic illness and social/environmental factors as evidenced by severe temporal muscle loss, severe tricep fat pad loss, at least moderate muscle loss around clavicles and shoulders, at least moderate orbital fat pad loss. Treatment: oral diet and oral nutrition supplements.    BMI Findings:  Adult BMI Classifications: Underweight < 18.5        Body mass index is 17.45 kg/m².

## 2024-09-11 NOTE — ASSESSMENT & PLAN NOTE
Patient presented with weakness, weight loss, acute on chronic diarrhea.  8/16 C. difficile PCR and toxin EIA positive.  Continues to have significant loose stools despite 7 days of fidaxomicin and now 7 days of p.o. vancomycin.  She remains nontoxic without fever or leukocytosis.  Abdominal exam benign.  Unclear if ongoing symptoms are related to C. difficile alone or an alternative etiology as below.  Given recent positive testing there is low utility to repeat C. difficile testing.  Still having significant diarrhea.  Patient status post fecal transplantation although the prep seems to have been inadequate.  Still with ongoing diarrhea although some improvement.  -Hold on the additional oral vancomycin for now  -Careful use of Lomotil as per GI  -If patient does not improve with fecal transplant may need to repeat the transplant after a more aggressive prep  -Careful use of Questran as per GI  -Close GI follow-up

## 2024-09-11 NOTE — ASSESSMENT & PLAN NOTE
Patient received 3 doses of IV ceftriaxone. CT imaging showed a distended bladder suggestive of retention which is likely contributing to her symptoms. Per report pain was happening after urination likely due to skin irritation. In the setting of C. difficile as above, avoid unnecessary antibiotics. Stressed with the patient in detail the need to avoid antibiotics unless absolutely necessary

## 2024-09-11 NOTE — ASSESSMENT & PLAN NOTE
Longstanding history of chronic diarrhea, patient reporting acute diarrhea x1 month. Follows with GI. Has tried questran/imodium without relief    -Likely component of autonomic diarrhea given her secretory type symptoms of diarrhea at night and stool incontinence.  She has uncontrolled diabetes with her most recent A1c above 16 as she was not taking insulin at home.   -Pancreatic fecal elastase low at 5.5.  She was subsequently started on Creon.  -Status postcholecystectomy and currently on Questran.  -Biopsies from a colonoscopy were negative for microscopic colitis.  Stool enteric panel negative  C. difficile positive, see plan above   Giardia antigen is negative  Also started on lometil  Diarrhea improving.  As per patient more formed stools

## 2024-09-11 NOTE — PROGRESS NOTES
Progress Note - Infectious Disease   Name: Barb Palomo 54 y.o. female I MRN: [id number]  Unit/Bed#: [facility] 522-01 I Date of Admission: 8/15/2024   Date of Service: 9/11/2024 I Hospital Day: 26    Assessment & Plan  C. difficile diarrhea  Patient presented with weakness, weight loss, acute on chronic diarrhea.  8/16 C. difficile PCR and toxin EIA positive.  Continues to have significant loose stools despite 7 days of fidaxomicin and now 7 days of p.o. vancomycin.  She remains nontoxic without fever or leukocytosis.  Abdominal exam benign.  Unclear if ongoing symptoms are related to C. difficile alone or an alternative etiology as below.  Given recent positive testing there is low utility to repeat C. difficile testing.  Still having significant diarrhea.  Patient status post fecal transplantation although the prep seems to have been inadequate.  Still with ongoing diarrhea although some improvement.  -Hold on the additional oral vancomycin for now  -Careful use of Lomotil as per GI  -If patient does not improve with fecal transplant may need to repeat the transplant after a more aggressive prep  -Careful use of Questran as per GI  -Close GI follow-up  Chronic diarrhea   The patient has had diarrhea and weight loss for the past year but reports diarrhea is usually 2-4 times daily.  She continues to have up to 10 stools daily since admission with minimal improvement on C. difficile treatment.  Fecal white blood cells negative.  Additional infectious testing including stool O&P, Giardia antigen, enteric panel negative.  Unclear if ongoing symptoms are solely due to C. difficile.  The patient has a diagnosis of chronic pancreatitis to be contributing.  Poorly controlled diabetes may also lead to diarrhea.  She may also be having postinfectious/functional diarrhea.  Continues to have substantial diarrhea.  Repeat CT of the abdomen pelvis with colonic thickening.  Pancreatic insufficiency may also be playing a role in  the chronic process.  Status post fecal transplantation with some improvement in the diarrhea.  Patient started on Lomotil.              -Lomotil as per GI              -Close GI follow-up  -Continue pancreatic enzymes  -With inadequate prep, patient and ongoing diarrhea, patient may need repeat fecal transplant  -Will hold on any additional oral vancomycin for now  Opioid dependence with other opioid-induced disorder (HCC)      Severe protein-calorie malnutrition (HCC)  Malnutrition Findings:   Adult Malnutrition type:  (suspect combination of chronic illness and social/environmental factors)  Adult Degree of Malnutrition: Other severe protein calorie malnutrition  Malnutrition Characteristics: Fat loss, Muscle loss                  360 Statement: severe malnutrition r/t suspect combination of chronic illness and social/environmental factors as evidenced by severe temporal muscle loss, severe tricep fat pad loss, at least moderate muscle loss around clavicles and shoulders, at least moderate orbital fat pad loss. Treatment: oral diet and oral nutrition supplements.    BMI Findings:  Adult BMI Classifications: Underweight < 18.5        Body mass index is 17.45 kg/m².     Failure to thrive in adult    Bacteriuria  Patient received 3 doses of IV ceftriaxone. CT imaging showed a distended bladder suggestive of retention which is likely contributing to her symptoms. Per report pain was happening after urination likely due to skin irritation. In the setting of C. difficile as above, avoid unnecessary antibiotics. Stressed with the patient in detail the need to avoid antibiotics unless absolutely necessary   Uncontrolled type 2 diabetes mellitus with hyperglycemia (HCC)  Lab Results   Component Value Date    HGBA1C 16.1 (H) 08/16/2024       Recent Labs     09/10/24  2049 09/11/24  0319 09/11/24  0614 09/11/24  0737   POCGLU 143* 97 76 82       Blood Sugar Average: Last 72 hrs:  (P) 169.0447573838966330    Hemoglobin A1c  16.1%. Patient was not using insulin at home. Glycemic management per primary team       History of Present Illness   Patient with decreased diarrhea and improved formed stool.  Not having any current abdominal pain.  No fever chills or sweats, no nausea or vomiting.  She is eating well.    Objective      Temp:  [99.7 °F (37.6 °C)-99.9 °F (37.7 °C)] 99.7 °F (37.6 °C)  HR:  [] 116  Resp:  [18] 18  BP: ()/(68-72) 93/69  O2 Device: None (Room air)          I/O last 24 hours:  In: 1167 [P.O.:1167]  Out: -   Lines/Drains/Airways       Active Status       None                  Physical Exam     Objective:  Vitals:  Temp:  [99.7 °F (37.6 °C)-99.9 °F (37.7 °C)] 99.7 °F (37.6 °C)  HR:  [] 116  Resp:  [18] 18  BP: ()/(68-72) 93/69  SpO2:  [96 %-98 %] 98 %  Temp (24hrs), Av.8 °F (37.7 °C), Min:99.7 °F (37.6 °C), Max:99.9 °F (37.7 °C)  Current: Temperature: 99.7 °F (37.6 °C)    Physical Exam:   General Appearance:  Alert, interactive, nontoxic, no acute distress.   Throat: Oropharynx moist without lesions.    Lungs:   Clear to auscultation bilaterally; no wheezes, rhonchi or rales; respirations unlabored   Heart:  RRR; no murmur, rub or gallop   Abdomen:   Soft, non-tender, non-distended, positive bowel sounds.     Extremities: No clubbing, cyanosis or edema   Skin: No new rashes or lesions. No draining wounds noted.       Lab Results: I have reviewed the following results:   Recent Labs     24  0439   WBC 8.29   HGB 9.6*   HCT 29.8*      SODIUM 141   K 4.0      CO2 27   BUN 16   CREATININE 0.47*   GLUC 91   MG 1.9     Micro:  Lab Results   Component Value Date    BLOODCX No Growth After 5 Days. 08/15/2024    BLOODCX No Growth After 5 Days. 08/15/2024    BLOODCX No Growth After 5 Days. 2016    BLOODCX No Growth After 5 Days. 2016    URINECX 80,000-89,000 cfu/ml Klebsiella pneumoniae (A) 2024    URINECX 20,000-29,000 cfu/ml 2024    URINECX >100,000 cfu/ml  Klebsiella pneumoniae (A) 05/23/2024     Imaging Review: No pertinent imaging studies reviewed.  Other Studies: No additional pertinent studies reviewed.    Administrative Statements   I have spent a total time of 50 minutes in caring for this patient on the day of the visit/encounter including Prognosis, Risks and benefits of tx options, Importance of tx compliance, Risk factor reductions, Impressions, Counseling / Coordination of care, Documenting in the medical record, Obtaining or reviewing history  , and Communicating with other healthcare professionals .

## 2024-09-11 NOTE — PROGRESS NOTES
Progress Note - Hospitalist   Name: Barb Palomo 54 y.o. female I MRN: [id number]  [facility] I Date of Admission: 8/15/2024   Date of Service: 9/11/2024 I Hospital Day: 26    Assessment & Plan  C. difficile diarrhea  Patient presented with generalized weakness, frequent falls.  She has had ongoing diarrhea, dysuria, poor oral intake and reported 70 pound weight loss over the last year.  Initially hypotensive in the ER, status post IV fluids. Labs with multiple metabolic derangements  CT imaging showing mild diffuse colonic wall thickening, mild diffuse wall thickening of stomach  C. difficile found to be positive--has a history of C. difficile in September 2022.    Patient started on Dificid 200 mg x 10 days per protocol on 8/18 as this is recurrent infection  Initially seemed BMs were improving and were becoming more formed, however again having frequent watery stools  GI consulted: Now s/p 7 days fidoxamycin and 7 days oral vamcomycin. S/p FMT 9/6/24. No leukocytosis or fever.   -Continue to monitor off antibiotics. Appears to be at around baseline stool output, improved from presentation.   -Will discontinue her Protonix and probiotic with as needed Pepcid as replacement of Protonix.  Contact precautions  Appetite is good   Chronic diarrhea  Longstanding history of chronic diarrhea, patient reporting acute diarrhea x1 month. Follows with GI. Has tried questran/imodium without relief    -Likely component of autonomic diarrhea given her secretory type symptoms of diarrhea at night and stool incontinence.  She has uncontrolled diabetes with her most recent A1c above 16 as she was not taking insulin at home.   -Pancreatic fecal elastase low at 5.5.  She was subsequently started on Creon.  -Status postcholecystectomy and currently on Questran.  -Biopsies from a colonoscopy were negative for microscopic colitis.  Stool enteric panel negative  C. difficile positive, see plan above   Giardia antigen is  "negative  Also started on lometil  Diarrhea improving.  As per patient more formed stools  Severe episode of recurrent major depressive disorder, without psychotic features (HCC)  Mood appearing fairly stable, continue home duloxetine, mirtazapine, as needed Valium  Hold minipress given symptomatic hypotension episodes, tachycardia   GERD without esophagitis  Change Protonix to Pepcid as needed secondary to C. difficile  Tobacco dependence  Counseled on need for cessation, provide nicotine patch while admitted  Recent CT chest for lung cancer screening 7/30/2024 negative for nodules or masses  Opioid dependence with other opioid-induced disorder (HCC)  PDMP reviewed, patient maintained on tramadol 200 mg daily as needed; history of chronic cervical/lumbar pain noted  Continue as needed tramadol and chronic gabapentin dosing  Uncontrolled type 2 diabetes mellitus with hyperglycemia (HCC)  Lab Results   Component Value Date    HGBA1C 16.1 (H) 08/16/2024       Recent Labs     09/11/24  0614 09/11/24  0737 09/11/24  1102 09/11/24  1553   POCGLU 76 82 254* 207*     Patient markedly hyperglycemic on admission only partially responsive to IV fluids, remainder of labs fortunately not consistent with DKA. A1C 16  Patient admitting she has not been using her home insulin as she apparently has been unable to fill prescription due to \"insurance issues,\" additionally reports that due to her neuropathy she has difficulty self injecting.  Daughter also reports jessica non compliance.   Typically on NovoLog 70/30 20 units twice daily  Diabetic diet-change to regular diet recently  Continue basal/prandial insulin while inpatient, adjust PRN  Sliding scale coverage  Insulin regimen adjusted by endocrinology.  Monitor blood sugar  Monitor blood sugar.  Patient do not have a prescription plan needed-rehab placement at the time of discharge   consulted-patient to apply for Medicare D- Nov  Acute cystitis without " "hematuria  Reported dysuria on admission   Urine + nitrite & leukocytes, CT imaging with distended bladder suggestive of retention and small amount of air which could be secondary to infection  Urine Culture noted, S/P 3 doses of IV CTX 8/17 8/21, reported pain AFTER urination, likely related to urine hitting excoriated labia/bottom from frequent stooling. Will continue with medicated cream to this area- denies pain at this time   S/p pyridum x 3 doses  Wound care consult appreciated   Severe protein-calorie malnutrition (HCC)  Severe protein-calorie malnutrition 2/2 chronic diarrhea a/e/b fat and muscle loss requiring Nutrition consult, oral diet and nutrition supplements    360 Statement: severe malnutrition r/t suspect combination of chronic illness and social/environmental factors as evidenced by severe temporal muscle loss, severe tricep fat pad loss, at least moderate muscle loss around clavicles and shoulders, at least moderate orbital fat pad loss. Treatment: oral diet and oral nutrition supplements.    BMI Findings:  Adult BMI Classifications: Underweight < 18.5        Body mass index is 17.45 kg/m².   C/w supplements  Nutrition consult appreciated  Encourage po intake   Hypomagnesemia  Hold oral supplementation due to diarrhea  Check magnesium level and replace as needed  Failure to thrive in adult  Patient presented with increased generalized weakness and frequent falls, ongoing diarrhea, dysuria, poor oral intake, and at least 70-80 pound weight loss over the past 1 year. Has been following with GI, had colonoscopy May 2024 with poor prep--had EGD with esophagitis and gastritis  Labs with multiple derangements including hyperglycemia, hypokalemia and hypomagnesemia on admission, improved.   C. difficile positive as above  Patient reports she has not been using insulin due to \"insurance issue\" and inability to self inject due to her neuropathy  CT abdomen and pelvis without any obvious " malignancy  Treatment as per respective issues  PT/OT recommending rehab.   Endocrinology evaluation appreciated.  Sinus tachycardia  Heart rate 100s-120s at times.  Sinus.  Has received IV fluids without much improvement.  Trialed IVFs again 8/22 given reports of nausea with poor oral intake and dizziness with some improvement to the 110s with HR- will continue with IVFs again today   orthostatic blood pressure negative   Pt is on minipress which could have a side effect of ST.  Additionally noted she is hypotensive each night around 2/3 AM, I suspect this is likely the underlying cause.  She was started on this by her psychiatrist to assist with sleep and eating outpatient   Will hold minipress for now.  D/w pt can have PRN trazodone for sleep.  Pt agreeable   Monitor   Tachycardia improving  Hypotension  Intermittent episodes of hypotension, most occurrences happen overnight and question correlation with minipress administration.  This has now been discontinued  Bacteriuria      VTE Pharmacologic Prophylaxis: VTE Score: 3 Moderate Risk (Score 3-4) - Pharmacological DVT Prophylaxis Ordered: enoxaparin (Lovenox).    Mobility:   Basic Mobility Inpatient Raw Score: 23  JH-HLM Goal: 7: Walk 25 feet or more  JH-HLM Achieved: 8: Walk 250 feet ot more  JH-HLM Goal achieved. Continue to encourage appropriate mobility.    Patient Centered Rounds: I performed bedside rounds with nursing staff today.   Discussions with Specialists or Other Care Team Provider: yes      Education and Discussions with Family / Patient:   .     Current Length of Stay: 26 day(s)  Current Patient Status: Inpatient   Certification Statement: The patient will continue to require additional inpatient hospital stay due to on going treatment  Discharge Plan: Anticipate discharge in 24-48 hrs to discharge location to be determined pending rehab evaluations.    Code Status: Level 1 - Full Code    Subjective   Patient feeling better today, still diarrhea  but stool more formed.  In a better mood today.  Not nausea vomiting or diarrhea    Objective     Vitals:   Temp (24hrs), Av.4 °F (37.4 °C), Min:98.6 °F (37 °C), Max:99.9 °F (37.7 °C)    Temp:  [98.6 °F (37 °C)-99.9 °F (37.7 °C)] 98.6 °F (37 °C)  HR:  [] 105  Resp:  [16-18] 16  BP: ()/(63-73) 90/63  SpO2:  [96 %-98 %] 97 %  Body mass index is 17.45 kg/m².     Input and Output Summary (last 24 hours):     Intake/Output Summary (Last 24 hours) at 2024  Last data filed at 9/10/2024 2001  Gross per 24 hour   Intake 570 ml   Output --   Net 570 ml       Physical Exam  Constitutional:       Appearance: She is ill-appearing.   HENT:      Mouth/Throat:      Mouth: Mucous membranes are moist.   Eyes:      Pupils: Pupils are equal, round, and reactive to light.   Cardiovascular:      Rate and Rhythm: Tachycardia present.   Pulmonary:      Effort: Pulmonary effort is normal.      Breath sounds: Normal breath sounds.   Abdominal:      General: Bowel sounds are normal.      Palpations: Abdomen is soft.   Musculoskeletal:         General: Normal range of motion.      Cervical back: Normal range of motion.   Skin:     General: Skin is warm.   Neurological:      General: No focal deficit present.      Mental Status: She is alert and oriented to person, place, and time.   Psychiatric:         Mood and Affect: Mood normal.          Lines/Drains:  Lines/Drains/Airways       Active Status       None                            Lab Results: I have reviewed the following results:    Results from last 7 days   Lab Units 24  0439   WBC Thousand/uL 8.29   HEMOGLOBIN g/dL 9.6*   HEMATOCRIT % 29.8*   PLATELETS Thousands/uL 243   SEGS PCT % 52   LYMPHO PCT % 35   MONO PCT % 9   EOS PCT % 3     Results from last 7 days   Lab Units 24  0439   SODIUM mmol/L 141   POTASSIUM mmol/L 4.0   CHLORIDE mmol/L 107   CO2 mmol/L 27   BUN mg/dL 16   CREATININE mg/dL 0.47*   ANION GAP mmol/L 7   CALCIUM mg/dL 9.2   GLUCOSE  RANDOM mg/dL 91         Results from last 7 days   Lab Units 09/11/24  1553 09/11/24  1102 09/11/24  0737 09/11/24  0614 09/11/24  0319 09/10/24  2049 09/10/24  1534 09/10/24  1053 09/10/24  0632 09/10/24  0154 09/09/24  2020 09/09/24  1615   POC GLUCOSE mg/dl 207* 254* 82 76 97 143* 184* 137 134 301* 160* 124               Recent Cultures (last 7 days):         Imaging Review: No pertinent imaging studies reviewed.  Other Studies: No additional pertinent studies reviewed.    Last 24 Hours Medication List:     Current Facility-Administered Medications:     acetaminophen (TYLENOL) tablet 650 mg, Q6H PRN    albuterol (PROVENTIL HFA,VENTOLIN HFA) inhaler 2 puff, Q4H PRN    aspirin chewable tablet 81 mg, Daily    atorvastatin (LIPITOR) tablet 80 mg, Daily    baclofen tablet 5 mg, BID    butalbital-acetaminophen-caffeine (FIORICET,ESGIC) -40 mg per tablet 1 tablet, Q6H PRN    cholestyramine sugar free (QUESTRAN LIGHT) packet 4 g, BID    diazepam (VALIUM) tablet 5 mg, Q12H PRN    dicyclomine (BENTYL) capsule 10 mg, 4x Daily (AC & HS)    diphenoxylate-atropine (LOMOTIL) 2.5-0.025 mg per tablet 1 tablet, 4x Daily    DULoxetine (CYMBALTA) delayed release capsule 60 mg, BID    enoxaparin (LOVENOX) subcutaneous injection 40 mg, Q24H HONEY    famotidine (PEPCID) tablet 20 mg, BID    fluticasone-vilanterol 200-25 mcg/actuation 1 puff, Daily    gabapentin (NEURONTIN) capsule 800 mg, TID    insulin glargine (LANTUS) subcutaneous injection 35 Units 0.35 mL, HS    insulin lispro (HumALOG/ADMELOG) 100 units/mL subcutaneous injection 10 Units, TID With Meals    insulin lispro (HumALOG/ADMELOG) 100 units/mL subcutaneous injection 2-12 Units, 4x Daily (AC & HS) **AND** Fingerstick Glucose (POCT), 4x Daily AC and at bedtime    levothyroxine tablet 112 mcg, Early Morning    lidocaine (LIDODERM) 5 % patch 2 patch, Daily    metoprolol succinate (TOPROL-XL) 24 hr tablet 12.5 mg, Daily    mirtazapine (REMERON) tablet 7.5 mg, HS     moisture barrier miconazole 2% cream (aka DARLIN MOISTURE BARRIER ANTIFUNGAL CREAM), BID    nicotine (NICODERM CQ) 21 mg/24 hr TD 24 hr patch 1 patch, Daily    ondansetron (ZOFRAN) injection 4 mg, Q6H PRN    pancrelipase (Lip-Prot-Amyl) (CREON) delayed release capsule 24,000 Units, TID With Meals    traMADol (ULTRAM) tablet 50 mg, Q6H PRN    traZODone (DESYREL) tablet 50 mg, HS PRN    Administrative Statements   Today, Patient Was Seen By: Wanda Rubio MD  I have spent a total time of 20 minutes in caring for this patient on the day of the visit/encounter including Counseling / Coordination of care.    **Please Note: This note may have been constructed using a voice recognition system.**

## 2024-09-11 NOTE — ASSESSMENT & PLAN NOTE
The patient has had diarrhea and weight loss for the past year but reports diarrhea is usually 2-4 times daily.  She continues to have up to 10 stools daily since admission with minimal improvement on C. difficile treatment.  Fecal white blood cells negative.  Additional infectious testing including stool O&P, Giardia antigen, enteric panel negative.  Unclear if ongoing symptoms are solely due to C. difficile.  The patient has a diagnosis of chronic pancreatitis to be contributing.  Poorly controlled diabetes may also lead to diarrhea.  She may also be having postinfectious/functional diarrhea.  Continues to have substantial diarrhea.  Repeat CT of the abdomen pelvis with colonic thickening.  Pancreatic insufficiency may also be playing a role in the chronic process.  Status post fecal transplantation with some improvement in the diarrhea.  Patient started on Lomotil.              -Lomotil as per GI              -Close GI follow-up  -Continue pancreatic enzymes  -With inadequate prep, patient and ongoing diarrhea, patient may need repeat fecal transplant  -Will hold on any additional oral vancomycin for now

## 2024-09-11 NOTE — PROGRESS NOTES
Progress Note- Barb Palomo 54 y.o. female MRN: [id number]    Unit/Bed#: [facility] 522-01 Encounter: [id number]      Assessment and Plan:    Chronic diarrhea  -Likely component of autonomic diarrhea given her secretory type symptoms of diarrhea at night and stool incontinence.  She has uncontrolled diabetes with her most recent A1c above 16 as she was not taking insulin at home. Patient does not want a diabetic diet leading to some blood sugar lability.   -Pancreatic fecal elastase low at 5.5.  She was subsequently started on Creon.  -Status postcholecystectomy and currently on Questran.  -Biopsies from a colonoscopy were negative for microscopic colitis.  -Will discontinue her Protonix and probiotic with as needed Pepcid as replacement of Protonix.  -Avoid NSAIDs where possible. Patient reports taking them daily prior to admission although not in the weeks prior to admission.  -Would consider alternative to metformin in outpatient setting given significant GI side effects.  -Increase fiber.  -Can consider amitriptyline to help with diarrhea given anticholinergic effects.   -Contact CM to ensure adequate access to insulin after discharge. She has no prescription insurance. Case management recommended signing up for Medicare part D in during open enrollment in November. Discussed with her options for less expensive insulin such as [employer]. She follows with endocrinology and will discuss with them.  -Has overall had improvement in diarrhea after the above changes. Will need repeat colonoscopy in 3 months due to inadequate bowel prep and personal history of polys.     Clostridioides difficile infection  -Diagnosed 8/16. Now s/p 7 days fidoxamycin and 7 days oral vamcomycin. S/p FMT 9/6/24. No leukocytosis or fever.   -Continue to monitor off antibiotics. Appears to be at around baseline stool output, improved from presentation.   -Will discontinue her Protonix and probiotic with as needed Pepcid as replacement of  Protonix.     Chronic pancreatitis  -History of gallstone pancreatitis with CT imaging consistent with chronic pancreatitis.  -Pancreatic fecal elastase low at 5.5.  She was subsequently started on Creon with good effect.     ______________________________________________________________________    Subjective:     Has only had 2 bowel movements overnight. Also notes stools are more formed. Happy with her progress. Continues to eat and drink well without any difficulty. Feels that she is getting better day by day with the most significant improvement starting after she was prescribed pancreatic enzymes.    Medication Administration - last 24 hours from 09/10/2024 1041 to 09/11/2024 1041         Date/Time Order Dose Route Action Action by     09/10/2024 1631 EDT ondansetron (ZOFRAN) injection 4 mg 4 mg Intravenous Given Mark Sell     09/11/2024 0804 EDT aspirin chewable tablet 81 mg 81 mg Oral Given Alize Lou RN     09/11/2024 0804 EDT atorvastatin (LIPITOR) tablet 80 mg 80 mg Oral Given Alize Lou RN     09/11/2024 1038 EDT butalbital-acetaminophen-caffeine (FIORICET,ESGIC) -40 mg per tablet 1 tablet 1 tablet Oral Given Alize Lou RN     09/10/2024 2223 EDT butalbital-acetaminophen-caffeine (FIORICET,ESGIC) -40 mg per tablet 1 tablet 1 tablet Oral Given Hermes Ocampo     09/10/2024 1548 EDT butalbital-acetaminophen-caffeine (FIORICET,ESGIC) -40 mg per tablet 1 tablet 1 tablet Oral Given Phuong Morelos RN     09/11/2024 0804 EDT DULoxetine (CYMBALTA) delayed release capsule 60 mg 60 mg Oral Given Alize Lou RN     09/10/2024 1703 EDT DULoxetine (CYMBALTA) delayed release capsule 60 mg 60 mg Oral Given Phuong Morelos RN     09/11/2024 0802 EDT fluticasone-vilanterol 200-25 mcg/actuation 1 puff 1 puff Inhalation Given Alize Lou RN     09/11/2024 0602 EDT levothyroxine tablet 112 mcg 112 mcg Oral Given Esha Whitfield RN     09/10/2024 2218 EDT mirtazapine  (REMERON) tablet 7.5 mg 7.5 mg Oral Given Hermes Ocampo     09/11/2024 0816 EDT nicotine (NICODERM CQ) 21 mg/24 hr TD 24 hr patch 1 patch 1 patch Transdermal Medication Applied Alize Carmine, RN     09/11/2024 0815 EDT nicotine (NICODERM CQ) 21 mg/24 hr TD 24 hr patch 1 patch 1 patch Transdermal Patch Removed Alize Lou RN     09/11/2024 0824 EDT traMADol (ULTRAM) tablet 50 mg 50 mg Oral Given Alize Carmine, RN     09/10/2024 2034 EDT traMADol (ULTRAM) tablet 50 mg 50 mg Oral Given Hermes Ocampo     09/11/2024 0805 EDT gabapentin (NEURONTIN) capsule 800 mg 800 mg Oral Given Alize Lou RN     09/10/2024 2218 EDT gabapentin (NEURONTIN) capsule 800 mg 800 mg Oral Given Hermes Ocampo     09/10/2024 1542 EDT gabapentin (NEURONTIN) capsule 800 mg 800 mg Oral Given Phuong Morelos RN     09/11/2024 0804 EDT baclofen tablet 5 mg 5 mg Oral Given Alize Lou RN     09/10/2024 1703 EDT baclofen tablet 5 mg 5 mg Oral Given Phuong Morelos RN     09/11/2024 0805 EDT lidocaine (LIDODERM) 5 % patch 2 patch 2 patch Topical Medication Applied Alize Carmine, RN     09/10/2024 2012 EDT lidocaine (LIDODERM) 5 % patch 2 patch 2 patch Topical Patch Removed Hermes Ocampo     09/11/2024 0825 EDT moisture barrier miconazole 2% cream (aka DARLIN MOISTURE BARRIER ANTIFUNGAL CREAM) -- Topical Given Alize Lou RN     09/10/2024 1704 EDT moisture barrier miconazole 2% cream (aka DARLIN MOISTURE BARRIER ANTIFUNGAL CREAM) -- Topical Given Phuong Morelos RN     09/10/2024 2244 EDT traZODone (DESYREL) tablet 50 mg 50 mg Oral Given Hermes Ocampo     09/11/2024 0805 EDT enoxaparin (LOVENOX) subcutaneous injection 40 mg 40 mg Subcutaneous Given Alize Lou RN     09/11/2024 0802 EDT cholestyramine sugar free (QUESTRAN LIGHT) packet 4 g 4 g Oral Given Alize Lou RN     09/10/2024 1703 EDT cholestyramine sugar free (QUESTRAN LIGHT) packet 4 g 4 g Oral Given Phuong Morelos RN     09/11/2024 0802  EDT pancrelipase (Lip-Prot-Amyl) (CREON) delayed release capsule 24,000 Units 24,000 Units Oral Given Alize Lou RN     09/10/2024 1543 EDT pancrelipase (Lip-Prot-Amyl) (CREON) delayed release capsule 24,000 Units 24,000 Units Oral Given Phuong Morelos RN     09/10/2024 1111 EDT pancrelipase (Lip-Prot-Amyl) (CREON) delayed release capsule 24,000 Units 24,000 Units Oral Given Phuong Morelos RN     09/10/2024 2220 EDT insulin glargine (LANTUS) subcutaneous injection 40 Units 0.4 mL 40 Units Subcutaneous Given Hermes Ocampo     09/11/2024 0826 EDT insulin lispro (HumALOG/ADMELOG) 100 units/mL subcutaneous injection 10 Units 0 Units Subcutaneous Hold Violet Cano     09/10/2024 1549 EDT insulin lispro (HumALOG/ADMELOG) 100 units/mL subcutaneous injection 10 Units 10 Units Subcutaneous Given Phuong Morelos RN     09/10/2024 1240 EDT insulin lispro (HumALOG/ADMELOG) 100 units/mL subcutaneous injection 10 Units 10 Units Subcutaneous Given Phuong Morelos RN     09/11/2024 0630 EDT insulin lispro (HumALOG/ADMELOG) 100 units/mL subcutaneous injection 2-12 Units -- Subcutaneous Not Given Hermes Ocampo     09/10/2024 2209 EDT insulin lispro (HumALOG/ADMELOG) 100 units/mL subcutaneous injection 2-12 Units 0 Units Subcutaneous Not Given Hermes Ocampo     09/10/2024 1549 EDT insulin lispro (HumALOG/ADMELOG) 100 units/mL subcutaneous injection 2-12 Units 2 Units Subcutaneous Given Phuong Morelos RN     09/10/2024 1110 EDT insulin lispro (HumALOG/ADMELOG) 100 units/mL subcutaneous injection 2-12 Units -- Subcutaneous Not Given Phuong Morelos RN     09/11/2024 0804 EDT famotidine (PEPCID) tablet 20 mg 20 mg Oral Given Alize Lou RN     09/10/2024 1703 EDT famotidine (PEPCID) tablet 20 mg 20 mg Oral Given Phuong Morelos RN     09/11/2024 1038 EDT dicyclomine (BENTYL) capsule 10 mg 10 mg Oral Given Alize Lou RN     09/11/2024 0602 EDT dicyclomine (BENTYL) capsule 10 mg 10 mg Oral  "Given Esha Whitfield, CAITLYN     09/10/2024 2218 EDT dicyclomine (BENTYL) capsule 10 mg 10 mg Oral Given Hermes Ocampo     09/10/2024 1542 EDT dicyclomine (BENTYL) capsule 10 mg 10 mg Oral Given Phuong Morelos RN     09/10/2024 1111 EDT dicyclomine (BENTYL) capsule 10 mg 10 mg Oral Given Phuong Morelos RN     09/11/2024 0805 EDT diphenoxylate-atropine (LOMOTIL) 2.5-0.025 mg per tablet 1 tablet 1 tablet Oral Given Alize Lou RN     09/10/2024 2220 EDT diphenoxylate-atropine (LOMOTIL) 2.5-0.025 mg per tablet 1 tablet 1 tablet Oral Given Hermes Ocampo     09/10/2024 1703 EDT diphenoxylate-atropine (LOMOTIL) 2.5-0.025 mg per tablet 1 tablet 1 tablet Oral Given Phuong Morelos RN     09/10/2024 1119 EDT diphenoxylate-atropine (LOMOTIL) 2.5-0.025 mg per tablet 1 tablet 1 tablet Oral Given Phuong Morelos RN     09/10/2024 1407 EDT magnesium sulfate 2 g/50 mL IVPB (premix) 2 g 2 g Intravenous New Bag Phuong Morelos RN     09/11/2024 0806 EDT metoprolol succinate (TOPROL-XL) 24 hr tablet 12.5 mg 25 mg Oral Not Given Alize Lou RN     09/10/2024 1416 EDT metoprolol succinate (TOPROL-XL) 24 hr tablet 12.5 mg 12.5 mg Oral Given Phuong Morelos RN            Objective:     Vitals: Blood pressure 105/73, pulse (!) 116, temperature 99.7 °F (37.6 °C), resp. rate 18, height 5' 2\" (1.575 m), weight 43.3 kg (95 lb 6.4 oz), last menstrual period 03/04/2016, SpO2 98%, not currently breastfeeding.,Body mass index is 17.45 kg/m².      Intake/Output Summary (Last 24 hours) at 9/11/2024 1041  Last data filed at 9/10/2024 2001  Gross per 24 hour   Intake 570 ml   Output --   Net 570 ml       Physical Exam:   General Appearance: Awake and alert, in no acute distress  Abdomen: Soft, non-tender, non-distended; bowel sounds normal; no masses or no organomegaly    Invasive Devices       Peripheral Intravenous Line  Duration             Peripheral IV 09/10/24 Right;Ventral (anterior) Forearm <1 day              "       Lab Results:  No results displayed because visit has over 200 results.          Imaging Studies: I have personally reviewed pertinent imaging studies.

## 2024-09-11 NOTE — ASSESSMENT & PLAN NOTE
Lab Results   Component Value Date    HGBA1C 16.1 (H) 08/16/2024       Recent Labs     09/10/24  2049 09/11/24  0319 09/11/24  0614 09/11/24  0737   POCGLU 143* 97 76 82       Blood Sugar Average: Last 72 hrs:  (P) 169.0876406063582086

## 2024-09-11 NOTE — ASSESSMENT & PLAN NOTE
Severe protein-calorie malnutrition 2/2 chronic diarrhea a/e/b fat and muscle loss requiring Nutrition consult, oral diet and nutrition supplements    360 Statement: severe malnutrition r/t suspect combination of chronic illness and social/environmental factors as evidenced by severe temporal muscle loss, severe tricep fat pad loss, at least moderate muscle loss around clavicles and shoulders, at least moderate orbital fat pad loss. Treatment: oral diet and oral nutrition supplements.    BMI Findings:  Adult BMI Classifications: Underweight < 18.5        Body mass index is 17.45 kg/m².   C/w supplements  Nutrition consult appreciated  Encourage po intake

## 2024-09-12 ENCOUNTER — APPOINTMENT (INPATIENT)
Dept: NON INVASIVE DIAGNOSTICS | Facility: HOSPITAL | Age: 54
DRG: 871 | End: 2024-09-12
Payer: MEDICARE

## 2024-09-12 LAB
AORTIC ROOT: 3.2 CM
APICAL FOUR CHAMBER EJECTION FRACTION: 59 %
BSA FOR ECHO PROCEDURE: 1.39 M2
E WAVE DECELERATION TIME: 143 MS
E/A RATIO: 0.8
FRACTIONAL SHORTENING: 30 (ref 28–44)
GLUCOSE SERPL-MCNC: 134 MG/DL (ref 65–140)
GLUCOSE SERPL-MCNC: 146 MG/DL (ref 65–140)
GLUCOSE SERPL-MCNC: 188 MG/DL (ref 65–140)
GLUCOSE SERPL-MCNC: 37 MG/DL (ref 65–140)
GLUCOSE SERPL-MCNC: 52 MG/DL (ref 65–140)
GLUCOSE SERPL-MCNC: 73 MG/DL (ref 65–140)
GLUCOSE SERPL-MCNC: 77 MG/DL (ref 65–140)
GLUCOSE SERPL-MCNC: 94 MG/DL (ref 65–140)
INTERVENTRICULAR SEPTUM IN DIASTOLE (PARASTERNAL SHORT AXIS VIEW): 1.2 CM
INTERVENTRICULAR SEPTUM: 1.2 CM (ref 0.6–1.1)
LAAS-AP2: 17.1 CM2
LAAS-AP4: 16.7 CM2
LEFT ATRIUM SIZE: 3.3 CM
LEFT ATRIUM VOLUME (MOD BIPLANE): 49 ML
LEFT ATRIUM VOLUME INDEX (MOD BIPLANE): 35 ML/M2
LEFT INTERNAL DIMENSION IN SYSTOLE: 2.3 CM (ref 2.1–4)
LEFT VENTRICULAR INTERNAL DIMENSION IN DIASTOLE: 3.3 CM (ref 3.5–6)
LEFT VENTRICULAR POSTERIOR WALL IN END DIASTOLE: 1.2 CM
LEFT VENTRICULAR STROKE VOLUME: 28 ML
LVSV (TEICH): 28 ML
MV E'TISSUE VEL-LAT: 9 CM/S
MV E'TISSUE VEL-SEP: 7 CM/S
MV PEAK A VEL: 0.76 M/S
MV PEAK E VEL: 61 CM/S
MV STENOSIS PRESSURE HALF TIME: 42 MS
MV VALVE AREA P 1/2 METHOD: 5.24
RIGHT ATRIAL 2D VOLUME: 19 ML
RIGHT ATRIUM AREA SYSTOLE A4C: 10.1 CM2
RIGHT VENTRICLE ID DIMENSION: 2.4 CM
SL CV LEFT ATRIUM LENGTH A2C: 4.6 CM
SL CV LV EF: 59
SL CV PED ECHO LEFT VENTRICLE DIASTOLIC VOLUME (MOD BIPLANE) 2D: 46 ML
SL CV PED ECHO LEFT VENTRICLE SYSTOLIC VOLUME (MOD BIPLANE) 2D: 18 ML
TRICUSPID ANNULAR PLANE SYSTOLIC EXCURSION: 1.8 CM

## 2024-09-12 PROCEDURE — 82948 REAGENT STRIP/BLOOD GLUCOSE: CPT

## 2024-09-12 PROCEDURE — 99232 SBSQ HOSP IP/OBS MODERATE 35: CPT | Performed by: INTERNAL MEDICINE

## 2024-09-12 PROCEDURE — 99233 SBSQ HOSP IP/OBS HIGH 50: CPT | Performed by: INTERNAL MEDICINE

## 2024-09-12 PROCEDURE — 93321 DOPPLER ECHO F-UP/LMTD STD: CPT | Performed by: INTERNAL MEDICINE

## 2024-09-12 PROCEDURE — 97530 THERAPEUTIC ACTIVITIES: CPT

## 2024-09-12 PROCEDURE — 93325 DOPPLER ECHO COLOR FLOW MAPG: CPT

## 2024-09-12 PROCEDURE — 93321 DOPPLER ECHO F-UP/LMTD STD: CPT

## 2024-09-12 PROCEDURE — 93308 TTE F-UP OR LMTD: CPT | Performed by: INTERNAL MEDICINE

## 2024-09-12 PROCEDURE — 99222 1ST HOSP IP/OBS MODERATE 55: CPT | Performed by: INTERNAL MEDICINE

## 2024-09-12 PROCEDURE — 93325 DOPPLER ECHO COLOR FLOW MAPG: CPT | Performed by: INTERNAL MEDICINE

## 2024-09-12 PROCEDURE — 93308 TTE F-UP OR LMTD: CPT

## 2024-09-12 PROCEDURE — 97535 SELF CARE MNGMENT TRAINING: CPT

## 2024-09-12 RX ORDER — COSYNTROPIN 0.25 MG/ML
0.25 INJECTION, POWDER, FOR SOLUTION INTRAMUSCULAR; INTRAVENOUS ONCE
Status: DISCONTINUED | OUTPATIENT
Start: 2024-09-12 | End: 2024-09-12

## 2024-09-12 RX ORDER — INSULIN LISPRO 100 [IU]/ML
1-6 INJECTION, SOLUTION INTRAVENOUS; SUBCUTANEOUS
Status: DISCONTINUED | OUTPATIENT
Start: 2024-09-12 | End: 2024-09-16 | Stop reason: HOSPADM

## 2024-09-12 RX ORDER — INSULIN LISPRO 100 [IU]/ML
1-5 INJECTION, SOLUTION INTRAVENOUS; SUBCUTANEOUS
Status: DISCONTINUED | OUTPATIENT
Start: 2024-09-12 | End: 2024-09-16 | Stop reason: HOSPADM

## 2024-09-12 RX ORDER — COSYNTROPIN 0.25 MG/ML
0.25 INJECTION, POWDER, FOR SOLUTION INTRAMUSCULAR; INTRAVENOUS ONCE
Status: COMPLETED | OUTPATIENT
Start: 2024-09-13 | End: 2024-09-13

## 2024-09-12 RX ADMIN — ACETAMINOPHEN 650 MG: 325 TABLET ORAL at 13:39

## 2024-09-12 RX ADMIN — DIPHENOXYLATE HYDROCHLORIDE AND ATROPINE SULFATE 1 TABLET: .025; 2.5 TABLET ORAL at 21:35

## 2024-09-12 RX ADMIN — DIPHENOXYLATE HYDROCHLORIDE AND ATROPINE SULFATE 1 TABLET: .025; 2.5 TABLET ORAL at 11:15

## 2024-09-12 RX ADMIN — FAMOTIDINE 20 MG: 20 TABLET, FILM COATED ORAL at 17:36

## 2024-09-12 RX ADMIN — GABAPENTIN 800 MG: 400 CAPSULE ORAL at 08:17

## 2024-09-12 RX ADMIN — BUTALBITAL, ACETAMINOPHEN AND CAFFEINE 1 TABLET: 50; 325; 40 TABLET ORAL at 11:15

## 2024-09-12 RX ADMIN — DIAZEPAM 5 MG: 5 TABLET ORAL at 13:42

## 2024-09-12 RX ADMIN — ASPIRIN 81 MG CHEWABLE TABLET 81 MG: 81 TABLET CHEWABLE at 08:17

## 2024-09-12 RX ADMIN — MICONAZOLE NITRATE 1 APPLICATION: 20 CREAM TOPICAL at 08:42

## 2024-09-12 RX ADMIN — PANCRELIPASE 24000 UNITS: 120000; 24000; 76000 CAPSULE, DELAYED RELEASE PELLETS ORAL at 16:47

## 2024-09-12 RX ADMIN — ATORVASTATIN CALCIUM 80 MG: 80 TABLET, FILM COATED ORAL at 08:17

## 2024-09-12 RX ADMIN — GABAPENTIN 800 MG: 400 CAPSULE ORAL at 16:50

## 2024-09-12 RX ADMIN — TRAMADOL HYDROCHLORIDE 50 MG: 50 TABLET, COATED ORAL at 08:17

## 2024-09-12 RX ADMIN — INSULIN LISPRO 10 UNITS: 100 INJECTION, SOLUTION INTRAVENOUS; SUBCUTANEOUS at 17:41

## 2024-09-12 RX ADMIN — CHOLESTYRAMINE 4 G: 4 POWDER, FOR SUSPENSION ORAL at 08:21

## 2024-09-12 RX ADMIN — DICYCLOMINE HYDROCHLORIDE 10 MG: 10 CAPSULE ORAL at 06:02

## 2024-09-12 RX ADMIN — INSULIN LISPRO 10 UNITS: 100 INJECTION, SOLUTION INTRAVENOUS; SUBCUTANEOUS at 07:31

## 2024-09-12 RX ADMIN — FLUTICASONE FUROATE AND VILANTEROL TRIFENATATE 1 PUFF: 200; 25 POWDER RESPIRATORY (INHALATION) at 08:21

## 2024-09-12 RX ADMIN — Medication 1 PACKET: at 17:41

## 2024-09-12 RX ADMIN — CHOLESTYRAMINE 4 G: 4 POWDER, FOR SUSPENSION ORAL at 17:39

## 2024-09-12 RX ADMIN — DICYCLOMINE HYDROCHLORIDE 10 MG: 10 CAPSULE ORAL at 16:50

## 2024-09-12 RX ADMIN — Medication 1 PACKET: at 08:14

## 2024-09-12 RX ADMIN — MIRTAZAPINE 7.5 MG: 15 TABLET, FILM COATED ORAL at 21:35

## 2024-09-12 RX ADMIN — DICYCLOMINE HYDROCHLORIDE 10 MG: 10 CAPSULE ORAL at 10:53

## 2024-09-12 RX ADMIN — ONDANSETRON 4 MG: 2 INJECTION INTRAMUSCULAR; INTRAVENOUS at 20:18

## 2024-09-12 RX ADMIN — LEVOTHYROXINE SODIUM 112 MCG: 112 TABLET ORAL at 06:02

## 2024-09-12 RX ADMIN — DIPHENOXYLATE HYDROCHLORIDE AND ATROPINE SULFATE 1 TABLET: .025; 2.5 TABLET ORAL at 08:17

## 2024-09-12 RX ADMIN — ONDANSETRON 4 MG: 2 INJECTION INTRAMUSCULAR; INTRAVENOUS at 10:53

## 2024-09-12 RX ADMIN — FAMOTIDINE 20 MG: 20 TABLET, FILM COATED ORAL at 08:17

## 2024-09-12 RX ADMIN — MICONAZOLE NITRATE: 20 CREAM TOPICAL at 17:49

## 2024-09-12 RX ADMIN — PANCRELIPASE 24000 UNITS: 120000; 24000; 76000 CAPSULE, DELAYED RELEASE PELLETS ORAL at 11:15

## 2024-09-12 RX ADMIN — BACLOFEN 5 MG: 10 TABLET ORAL at 08:20

## 2024-09-12 RX ADMIN — INSULIN GLARGINE 35 UNITS: 100 INJECTION, SOLUTION SUBCUTANEOUS at 21:36

## 2024-09-12 RX ADMIN — DIPHENOXYLATE HYDROCHLORIDE AND ATROPINE SULFATE 1 TABLET: .025; 2.5 TABLET ORAL at 17:36

## 2024-09-12 RX ADMIN — TRAZODONE HYDROCHLORIDE 50 MG: 50 TABLET ORAL at 21:55

## 2024-09-12 RX ADMIN — NICOTINE 1 PATCH: 21 PATCH, EXTENDED RELEASE TRANSDERMAL at 08:15

## 2024-09-12 RX ADMIN — DULOXETINE HYDROCHLORIDE 60 MG: 60 CAPSULE, DELAYED RELEASE ORAL at 17:36

## 2024-09-12 RX ADMIN — METOPROLOL SUCCINATE 12.5 MG: 25 TABLET, EXTENDED RELEASE ORAL at 08:18

## 2024-09-12 RX ADMIN — PANCRELIPASE 24000 UNITS: 120000; 24000; 76000 CAPSULE, DELAYED RELEASE PELLETS ORAL at 07:31

## 2024-09-12 RX ADMIN — INSULIN LISPRO 10 UNITS: 100 INJECTION, SOLUTION INTRAVENOUS; SUBCUTANEOUS at 13:21

## 2024-09-12 RX ADMIN — DULOXETINE HYDROCHLORIDE 60 MG: 60 CAPSULE, DELAYED RELEASE ORAL at 08:17

## 2024-09-12 RX ADMIN — DICYCLOMINE HYDROCHLORIDE 10 MG: 10 CAPSULE ORAL at 21:36

## 2024-09-12 RX ADMIN — GABAPENTIN 800 MG: 400 CAPSULE ORAL at 21:36

## 2024-09-12 RX ADMIN — ENOXAPARIN SODIUM 40 MG: 40 INJECTION SUBCUTANEOUS at 08:15

## 2024-09-12 RX ADMIN — BACLOFEN 5 MG: 10 TABLET ORAL at 17:36

## 2024-09-12 NOTE — ASSESSMENT & PLAN NOTE
Lab Results   Component Value Date    HGBA1C 16.1 (H) 08/16/2024       Recent Labs     09/12/24  0225 09/12/24  0228 09/12/24  0316 09/12/24  0611   POCGLU 37* 73 188* 134       Blood Sugar Average: Last 72 hrs:  (P) 154.7428432379988797    Tighten diabetic control as per the primary service

## 2024-09-12 NOTE — ASSESSMENT & PLAN NOTE
Severe protein-calorie malnutrition 2/2 chronic diarrhea a/e/b fat and muscle loss requiring Nutrition consult, oral diet and nutrition supplements    360 Statement: severe malnutrition r/t suspect combination of chronic illness and social/environmental factors as evidenced by severe temporal muscle loss, severe tricep fat pad loss, at least moderate muscle loss around clavicles and shoulders, at least moderate orbital fat pad loss. Treatment: oral diet and oral nutrition supplements.    BMI Findings:  Adult BMI Classifications: Underweight < 18.5        Body mass index is 17.38 kg/m².   C/w supplements  Nutrition consult appreciated  Encourage po intake

## 2024-09-12 NOTE — QUICK NOTE
Pt with improving stool frequency and consistency. Continued incontinence likely in the setting of autonomic diarrhea. GI will sign off at this time. Follow up arranged. Please reach out via EC to on-call fellow for further GI needs.    Sumit Orozco  GI Fellow

## 2024-09-12 NOTE — ASSESSMENT & PLAN NOTE
Patient with persistent sinus tachycardia.  Failure thought to be hypotension, side effect of Minipress since patient was volume resuscitated and Minipress held  She also has multiple issues ongoing uncontrolled labile blood sugar, autonomic/C. difficile diarrhea.  Her appetite is normal and blood pressure has remained fairly stable lately but continues to have tachycardia.  She is not septic at this time.  TSH checked which was normal hence cardiology was consulted, to review any underlying explanation for sinus tachycardia

## 2024-09-12 NOTE — ASSESSMENT & PLAN NOTE
The patient has had diarrhea and weight loss for the past year but reports diarrhea is usually 2-4 times daily.  She continues to have up to 10 stools daily since admission with minimal improvement on C. difficile treatment.  Fecal white blood cells negative.  Additional infectious testing including stool O&P, Giardia antigen, enteric panel negative.  Unclear if ongoing symptoms are solely due to C. difficile.  The patient has a diagnosis of chronic pancreatitis to be contributing.  Poorly controlled diabetes may also lead to diarrhea.  She may also be having postinfectious/functional diarrhea.  Continues to have substantial diarrhea.  Repeat CT of the abdomen pelvis with colonic thickening.  Pancreatic insufficiency may also be playing a role in the chronic process.  Status post fecal transplantation with some improvement in the diarrhea.  Patient started on Lomotil.              -Lomotil as per GI              -Close GI follow-up  -Continue pancreatic enzymes  -With inadequate prep, patient and ongoing diarrhea, patient may need repeat fecal transplant if does not continue to improve  -Will hold on any additional oral vancomycin for now

## 2024-09-12 NOTE — ASSESSMENT & PLAN NOTE
Malnutrition Findings:   Adult Malnutrition type:  (suspect combination of chronic illness and social/environmental factors)  Adult Degree of Malnutrition: Other severe protein calorie malnutrition  Malnutrition Characteristics: Fat loss, Muscle loss                  360 Statement: severe malnutrition r/t suspect combination of chronic illness and social/environmental factors as evidenced by severe temporal muscle loss, severe tricep fat pad loss, at least moderate muscle loss around clavicles and shoulders, at least moderate orbital fat pad loss. Treatment: oral diet and oral nutrition supplements.    BMI Findings:  Adult BMI Classifications: Underweight < 18.5        Body mass index is 17.52 kg/m².

## 2024-09-12 NOTE — PROGRESS NOTES
Progress Note - Infectious Disease   Name: Barb Palomo 54 y.o. female I MRN: [id number]  Unit/Bed#: [facility] 522-01 I Date of Admission: 8/15/2024   Date of Service: 9/12/2024 I Hospital Day: 27    Assessment & Plan  C. difficile diarrhea  Patient presented with weakness, weight loss, acute on chronic diarrhea.  8/16 C. difficile PCR and toxin EIA positive.  Continues to have significant loose stools despite 7 days of fidaxomicin and now 7 days of p.o. vancomycin.  She remains nontoxic without fever or leukocytosis.  Abdominal exam benign.  Unclear if ongoing symptoms are related to C. difficile alone or an alternative etiology as below.  Given recent positive testing there is low utility to repeat C. difficile testing.  Still having significant diarrhea.  Patient status post fecal transplantation although the prep seems to have been inadequate.  Still with ongoing diarrhea although some improvement.  -Hold on the additional oral vancomycin for now  -Careful use of Lomotil as per GI  -If patient does not continue to improve with fecal transplant may need to repeat the transplant after a more aggressive prep  -Careful use of Questran as per GI  -Close GI follow-up  Chronic diarrhea   The patient has had diarrhea and weight loss for the past year but reports diarrhea is usually 2-4 times daily.  She continues to have up to 10 stools daily since admission with minimal improvement on C. difficile treatment.  Fecal white blood cells negative.  Additional infectious testing including stool O&P, Giardia antigen, enteric panel negative.  Unclear if ongoing symptoms are solely due to C. difficile.  The patient has a diagnosis of chronic pancreatitis to be contributing.  Poorly controlled diabetes may also lead to diarrhea.  She may also be having postinfectious/functional diarrhea.  Continues to have substantial diarrhea.  Repeat CT of the abdomen pelvis with colonic thickening.  Pancreatic insufficiency may also be playing  a role in the chronic process.  Status post fecal transplantation with some improvement in the diarrhea.  Patient started on Lomotil.              -Lomotil as per GI              -Close GI follow-up  -Continue pancreatic enzymes  -With inadequate prep, patient and ongoing diarrhea, patient may need repeat fecal transplant if does not continue to improve  -Will hold on any additional oral vancomycin for now  Severe protein-calorie malnutrition (HCC)  Malnutrition Findings:   Adult Malnutrition type:  (suspect combination of chronic illness and social/environmental factors)  Adult Degree of Malnutrition: Other severe protein calorie malnutrition  Malnutrition Characteristics: Fat loss, Muscle loss                  360 Statement: severe malnutrition r/t suspect combination of chronic illness and social/environmental factors as evidenced by severe temporal muscle loss, severe tricep fat pad loss, at least moderate muscle loss around clavicles and shoulders, at least moderate orbital fat pad loss. Treatment: oral diet and oral nutrition supplements.    BMI Findings:  Adult BMI Classifications: Underweight < 18.5        Body mass index is 17.52 kg/m².     Failure to thrive in adult    Bacteriuria  Patient received 3 doses of IV ceftriaxone. CT imaging showed a distended bladder suggestive of retention which is likely contributing to her symptoms. Per report pain was happening after urination likely due to skin irritation. In the setting of C. difficile as above, avoid unnecessary antibiotics. Stressed with the patient in detail the need to avoid antibiotics unless absolutely necessary   Uncontrolled type 2 diabetes mellitus with hyperglycemia (HCC)  Lab Results   Component Value Date    HGBA1C 16.1 (H) 08/16/2024       Recent Labs     09/12/24  0225 09/12/24  0228 09/12/24  0316 09/12/24  0611   POCGLU 37* 73 188* 134       Blood Sugar Average: Last 72 hrs:  (P) [id number]    Tighten diabetic control as per the  primary service  Severe episode of recurrent major depressive disorder, without psychotic features (HCC)    Tobacco dependence    Sinus tachycardia  Unclear etiology.  Perhaps all related to volume issues although the patient is eating and drinking without difficulty.  Possibly medication effect and the Minipress has been discontinued.  -Volume management  -Adjust medications  -Serial exams    Discussed with the primary service the plan to continue to monitor off the vancomycin and they agree with the plan    History of Present Illness   Patient is very upset about her change in her diet.  She is still having frequent liquid stool.  She is not having any increased abdominal pain.  No fever chills or sweats, no nausea or vomiting although she did have some nausea yesterday.    Objective      Temp:  [98.3 °F (36.8 °C)-98.6 °F (37 °C)] 98.3 °F (36.8 °C)  HR:  [102-114] 110  Resp:  [16-18] 18  BP: ()/(63-83) 115/82  O2 Device: None (Room air)          No intake/output data recorded.  Lines/Drains/Airways       Active Status       None                  Physical Exam     Objective:  Vitals:  Temp:  [98.3 °F (36.8 °C)-98.6 °F (37 °C)] 98.3 °F (36.8 °C)  HR:  [102-114] 110  Resp:  [16-18] 18  BP: ()/(63-83) 115/82  SpO2:  [94 %-97 %] 97 %  Temp (24hrs), Av.5 °F (36.9 °C), Min:98.3 °F (36.8 °C), Max:98.6 °F (37 °C)  Current: Temperature: 98.3 °F (36.8 °C)    Physical Exam:   General Appearance:  Alert, interactive, nontoxic, no acute distress.   Throat: Oropharynx moist without lesions.    Lungs:   Clear to auscultation bilaterally; no wheezes, rhonchi or rales; respirations unlabored   Heart:  Tachycardic; no murmur, rub or gallop   Abdomen:   Soft, non-tender, non-distended, positive bowel sounds.     Extremities: No clubbing, cyanosis or edema   Skin: No new rashes or lesions. No draining wounds noted.       Lab Results: I have reviewed the following results:   Recent Labs     24  0439   WBC 8.29    HGB 9.6*   HCT 29.8*      SODIUM 141   K 4.0      CO2 27   BUN 16   CREATININE 0.47*   GLUC 91   MG 1.9     Micro:  Lab Results   Component Value Date    BLOODCX No Growth After 5 Days. 08/15/2024    BLOODCX No Growth After 5 Days. 08/15/2024    BLOODCX No Growth After 5 Days. 09/04/2016    BLOODCX No Growth After 5 Days. 09/04/2016    URINECX 80,000-89,000 cfu/ml Klebsiella pneumoniae (A) 08/16/2024    URINECX 20,000-29,000 cfu/ml 08/16/2024    URINECX >100,000 cfu/ml Klebsiella pneumoniae (A) 05/23/2024     Imaging Review: No pertinent imaging studies reviewed.  Other Studies: No additional pertinent studies reviewed.

## 2024-09-12 NOTE — ASSESSMENT & PLAN NOTE
Patient presented with generalized weakness, frequent falls.  She has had ongoing diarrhea, dysuria, poor oral intake and reported 70 pound weight loss over the last year.  Initially hypotensive in the ER, status post IV fluids. Labs with multiple metabolic derangements  CT imaging showing mild diffuse colonic wall thickening, mild diffuse wall thickening of stomach  C. difficile found to be positive--has a history of C. difficile in September 2022.    Patient started on Dificid 200 mg x 10 days per protocol on 8/18 as this is recurrent infection  Initially seemed BMs were improving and were becoming more formed, however again having frequent watery stools  GI consulted: Now s/p 7 days fidoxamycin and 7 days oral vamcomycin. S/p FMT 9/6/24. No leukocytosis or fever.   -Continue to monitor off antibiotics. Appears to be at around baseline stool output, improved from presentation.   - discontinue her Protonix and probiotic with as needed Pepcid as replacement of Protonix.  Contact precautions  Appetite is good   GI signed off

## 2024-09-12 NOTE — ASSESSMENT & PLAN NOTE
continue home duloxetine, mirtazapine, as needed Valium  Hold minipress given symptomatic hypotension episodes, tachycardia

## 2024-09-12 NOTE — PLAN OF CARE
Problem: PAIN - ADULT  Goal: Verbalizes/displays adequate comfort level or baseline comfort level  Description: Interventions:  - Encourage patient to monitor pain and request assistance  - Assess pain using appropriate pain scale  - Administer analgesics based on type and severity of pain and evaluate response  - Implement non-pharmacological measures as appropriate and evaluate response  - Consider cultural and social influences on pain and pain management  - Notify physician/advanced practitioner if interventions unsuccessful or patient reports new pain  Outcome: Progressing     Problem: SAFETY ADULT  Goal: Patient will remain free of falls  Description: INTERVENTIONS:  - Educate patient/family on patient safety including physical limitations  - Instruct patient to call for assistance with activity   - Consult OT/PT to assist with strengthening/mobility   - Keep Call bell within reach  - Keep bed low and locked with side rails adjusted as appropriate  - Keep care items and personal belongings within reach  - Initiate and maintain comfort rounds  - Make Fall Risk Sign visible to staff  - Offer Toileting every 2 Hours, in advance of need  - Initiate/Maintain alarm  - Obtain necessary fall risk management equipment  - Apply yellow socks and bracelet for high fall risk patients  - Consider moving patient to room near nurses station  Outcome: Progressing     Problem: SAFETY ADULT  Goal: Maintains/Returns to pre admission functional level  Description: INTERVENTIONS:  - Perform AM-PAC 6 Click Basic Mobility/ Daily Activity assessment daily.  - Set and communicate daily mobility goal to care team and patient/family/caregiver.   - Collaborate with rehabilitation services on mobility goals if consulted  - Perform Range of Motion 3 times a day.  - Reposition patient every 2 hours.  - Dangle patient 3 times a day  - Stand patient 3 times a day  - Ambulate patient 3 times a day  - Out of bed to chair 3 times a day   - Out  of bed for meals 3 times a day  - Out of bed for toileting  - Record patient progress and toleration of activity level   Outcome: Progressing

## 2024-09-12 NOTE — ASSESSMENT & PLAN NOTE
"Lab Results   Component Value Date    HGBA1C 16.1 (H) 08/16/2024       Recent Labs     09/12/24  0316 09/12/24  0611 09/12/24  1040 09/12/24  1529   POCGLU 188* 134 94 77     Patient markedly hyperglycemic on admission only partially responsive to IV fluids, remainder of labs fortunately not consistent with DKA. A1C 16  Patient admitting she has not been using her home insulin as she apparently has been unable to fill prescription due to \"insurance issues,\" additionally reports that due to her neuropathy she has difficulty self injecting.  Daughter also reports jessica non compliance.   Typically on NovoLog 70/30 20 units twice daily  Diabetic diet-changed to regaular than to lactose free  Insulin regimen adjusted by endocrinology.  Endocrine follow-up requested today given labile blood sugar  Also educated patient by myself, GI as well as  regarding her options of obtaining insulin.  Patient has to apply for Medicaid he in November and meanwhile she can obtain insulin from [facility] which is the cheapest option.  Patient can do a curbside pickup if she has agoraphobia to go inside [facility] as she states it is a crowded place    "

## 2024-09-12 NOTE — ASSESSMENT & PLAN NOTE
Unclear etiology.  Perhaps all related to volume issues although the patient is eating and drinking without difficulty.  Possibly medication effect and the Minipress has been discontinued.  -Volume management  -Adjust medications  -Serial exams

## 2024-09-12 NOTE — CONSULTS
Reason for Consult / Principal Problem: Sinus tachycardia    Physician Requesting Consult:  Wanda Rubio MD    Cardiologist:       Assessment and Plan      Current Problem List   Principal Problem:    C. difficile diarrhea  Active Problems:    Severe episode of recurrent major depressive disorder, without psychotic features (HCC)    GERD without esophagitis    Tobacco dependence    Chronic diarrhea    Opioid dependence with other opioid-induced disorder (HCC)    Uncontrolled type 2 diabetes mellitus with hyperglycemia (HCC)    Acute cystitis without hematuria    Severe protein-calorie malnutrition (HCC)    Hypomagnesemia    Failure to thrive in adult    Sinus tachycardia    Hypotension    Bacteriuria    Assessment/Plan:  Barb Palomo is a(n) 54 y.o. female with PMH of poorly controlled DM II, pancreatitis, PE/DVT (no ac), CVA, HTN, and HLD admitted for generalized weakness 2/2 to chronic diarrhea.       Plan:  Sinus tachycardia  Physiologic response to hypovolemia 2/2 ongoing diarrhea  Diarrhea likely multifactorial: C. diff, uncontrolled DM, chronic pancreatitis  No arrhythmia detected on EKG  TSH wnl @ 2.35  Continue management per primary team         Subjective     CC: Sinus tachycardia    HPI: Barb Palomo 54 y.o. year old female with PMH of poorly controlled DM II, pancreatitis, PE/DVT (no ac), CVA, HTN, and HLD who presented with generalized weakness causing multiple falls. Patient has had ongoing diarrhea with an associated 70-80 lbs weight loss in the last year, current BMI 17.5, despite having good appetite and eating well as per the patient. She reports diet mostly consists of canned food at home. Patient was found to have C.diff infection s/p fidaxomicin, vancomycin, and fecal transplant. Despite treatments, she continues to have diarrhea, sometimes while sleeping. Patient also has longstanding DM II, poorly controlled with an HbA1c of 16 and chronic pancreatitis, fecal pancreatic  elastase 5.5.    Today patient feels her condition has gotten slightly better since coming to the hospital. She reports 5 episodes of diarrhea since 2AM last night. Patient states she has good appetite and finished all her breakfast. She reported having midsternal chest pain radiating to her R arm while her IV was being manipulated yesterday day, but none currently. She endorses mild SOB not related to exertion and pedal edema. She reports occasional palpitations but endorses having frequent panic attacks.       Family History:   Family History   Problem Relation Age of Onset    Diabetes Mother         type 2    Heart attack Mother 39        acute MI    Kidney disease Mother         CKD NKF classfication    Depression Mother     Arthritis Mother     Substance Abuse Mother         mother OD in past on MS04    Ulcerative colitis Mother     Schizophrenia Mother     Suicide Attempts Mother     Bipolar disorder Mother     Diabetes Maternal Grandmother     Heart attack Father         acute MI    Psoriasis Father     Cancer Father         gastric cancer    Stroke Father     Crohn's disease Sister     Bipolar disorder Sister     Rheum arthritis Maternal Grandfather     Throat cancer Maternal Grandfather     Suicide Attempts Daughter     Drug abuse Daughter     Lung cancer Paternal Grandmother     Cancer Paternal Grandfather     No Known Problems Sister     Bipolar disorder Maternal Uncle     Anesthesia problems Neg Hx      Historical Information   Past Medical History:   Diagnosis Date    Acute venous embolism and thrombosis of deep vessels of distal lower extremity (HCC)     Anemia     Anxiety     last assessed 11/20/17    Arthritis     Asthma     Cerebral infarction (HCC)     unspecified, last assessed 11/14/16    Chest pain     last assessed 5/9/17    Chronic cough     last assessed 12/12/13    Depression     Diabetes mellitus, type 2 (HCC)     DJD (degenerative joint disease)     Esophageal reflux     Fibromyalgia      GERD without esophagitis     resolved 5/13/16    History of pulmonary embolism     Hyperlipidemia     Hypertension     Hypothyroidism     Iron deficiency     Pancreatitis     Panic attack     Panic disorder     Polyarthritis     PTSD (post-traumatic stress disorder)     Sleep difficulties     Stroke syndrome     Thyroid disease     TIA (transient ischemic attack)     TIA (transient ischemic attack)     Venous embolism and thrombosis of deep vessels of distal lower extremity (HCC)     Vitamin B12 deficiency     Vitamin D deficiency      Past Surgical History:   Procedure Laterality Date    CARPAL TUNNEL RELEASE Right     neuroplasty decompression of median nerve    CATARACT EXTRACTION      CHOLECYSTECTOMY      ERCP      ERCP      ESOPHAGOGASTRIC FUNDOPLASTY      NISSEN FUNDOPLICATION      PATELLA SURGERY Left 12/2021    TX ESOPHAGOGASTRODUODENOSCOPY TRANSORAL DIAGNOSTIC N/A 11/02/2016    Procedure: EGD AND COLONOSCOPY;  Surgeon: Jatin Shepherd MD;  Location: [facility];  Service: Gastroenterology    TX NDSC WRST SURG W/RLS TRANSVRS CARPL LIGM Right 03/08/2016    Procedure: RELEASE CARPAL TUNNEL ENDOSCOPIC;  Surgeon: Guero Restrepo MD;  Location: [facility];  Service: Orthopedics    TX TENDON SHEATH INCISION Right 03/08/2016    Procedure: RELEASE TRIGGER FINGER RIGHT THUMB;  Surgeon: Guero Restrepo MD;  Location: [facility];  Service: Orthopedics    TONSILLECTOMY AND ADENOIDECTOMY       Social History   Social History     Substance and Sexual Activity   Alcohol Use Not Currently    Alcohol/week: 0.0 standard drinks of alcohol    Comment: last was in 2010 after DUI     Social History     Substance and Sexual Activity   Drug Use No     Social History     Tobacco Use   Smoking Status Every Day    Current packs/day: 1.00    Average packs/day: 1 pack/day for 41.7 years (41.7 ttl pk-yrs)    Types: Cigarettes    Start date: 1/1/1983   Smokeless Tobacco Never   Tobacco Comments    20 + years     Family History:   Family  History   Problem Relation Age of Onset    Diabetes Mother         type 2    Heart attack Mother 39        acute MI    Kidney disease Mother         CKD NKF classfication    Depression Mother     Arthritis Mother     Substance Abuse Mother         mother OD in past on MS04    Ulcerative colitis Mother     Schizophrenia Mother     Suicide Attempts Mother     Bipolar disorder Mother     Diabetes Maternal Grandmother     Heart attack Father         acute MI    Psoriasis Father     Cancer Father         gastric cancer    Stroke Father     Crohn's disease Sister     Bipolar disorder Sister     Rheum arthritis Maternal Grandfather     Throat cancer Maternal Grandfather     Suicide Attempts Daughter     Drug abuse Daughter     Lung cancer Paternal Grandmother     Cancer Paternal Grandfather     No Known Problems Sister     Bipolar disorder Maternal Uncle     Anesthesia problems Neg Hx        Review of Systems:  Review of Systems   Constitutional:  Positive for fatigue.   Respiratory:  Positive for shortness of breath.    Cardiovascular:  Positive for chest pain and leg swelling.   Gastrointestinal:  Positive for diarrhea.   Musculoskeletal:  Positive for arthralgias.   Neurological:  Positive for weakness.           Scheduled Meds:  Current Facility-Administered Medications   Medication Dose Route Frequency Provider Last Rate    acetaminophen  650 mg Oral Q6H PRN Abhya Hayes MD      albuterol  2 puff Inhalation Q4H PRN Renan Drake, DO      aspirin  81 mg Oral Daily Renan Drake, DO      atorvastatin  80 mg Oral Daily Renan Drake, DO      baclofen  5 mg Oral BID ELISEO Toribio      butalbital-acetaminophen-caffeine  1 tablet Oral Q6H PRN Renan Drake, DO      cholestyramine sugar free  4 g Oral BID Dalia Soriano, DO      diazepam  5 mg Oral Q12H PRN Renan Drake, DO      dicyclomine  10 mg Oral 4x Daily (AC & HS) Sumit Orozco MD      diphenoxylate-atropine  1 tablet Oral 4x Daily Sumit Orozco  MD      DULoxetine  60 mg Oral BID Renan Drake DO      enoxaparin  40 mg Subcutaneous Q24H [facility] Abhay Hayes MD      famotidine  20 mg Oral BID Sumit Orozco MD      fluticasone-vilanterol  1 puff Inhalation Daily Renan Drake DO      gabapentin  800 mg Oral TID Renan Drake DO      insulin glargine  35 Units Subcutaneous HS Wanda Rubio MD      insulin lispro  10 Units Subcutaneous TID With Meals Savage Linder MD      insulin lispro  2-12 Units Subcutaneous 4x Daily (AC & HS) Savage Linder MD      levothyroxine  112 mcg Oral Early Morning Renan Drake DO      lidocaine  2 patch Topical Daily ELISEO Toribio      metoprolol succinate  12.5 mg Oral Daily Wanda Rubio MD      mirtazapine  7.5 mg Oral HS Renan Drake DO      DARLIN ANTIFUNGAL   Topical BID ELISEO Langley      nicotine  1 patch Transdermal Daily Renan Drake DO      ondansetron  4 mg Intravenous Q6H PRN Renan Drake DO      pancrelipase (Lip-Prot-Amyl)  24,000 Units Oral TID With Meals Dalia Soriano, DO      psyllium  1 packet Oral BID Sumit Orozco MD      traMADol  50 mg Oral Q6H PRN Renan Drake DO      traZODone  50 mg Oral HS PRN Estefany Villalobos PA-C       Continuous Infusions:   PRN Meds:.  acetaminophen    albuterol    butalbital-acetaminophen-caffeine    diazepam    ondansetron    traMADol    traZODone    current meds:   Current Facility-Administered Medications:     acetaminophen (TYLENOL) tablet 650 mg, Q6H PRN    albuterol (PROVENTIL HFA,VENTOLIN HFA) inhaler 2 puff, Q4H PRN    aspirin chewable tablet 81 mg, Daily    atorvastatin (LIPITOR) tablet 80 mg, Daily    baclofen tablet 5 mg, BID    butalbital-acetaminophen-caffeine (FIORICET,ESGIC) -40 mg per tablet 1 tablet, Q6H PRN    cholestyramine sugar free (QUESTRAN LIGHT) packet 4 g, BID    diazepam (VALIUM) tablet 5 mg, Q12H PRN    dicyclomine (BENTYL) capsule 10 mg, 4x Daily (AC & HS)     diphenoxylate-atropine (LOMOTIL) 2.5-0.025 mg per tablet 1 tablet, 4x Daily    DULoxetine (CYMBALTA) delayed release capsule 60 mg, BID    enoxaparin (LOVENOX) subcutaneous injection 40 mg, Q24H HONEY    famotidine (PEPCID) tablet 20 mg, BID    fluticasone-vilanterol 200-25 mcg/actuation 1 puff, Daily    gabapentin (NEURONTIN) capsule 800 mg, TID    insulin glargine (LANTUS) subcutaneous injection 35 Units 0.35 mL, HS    insulin lispro (HumALOG/ADMELOG) 100 units/mL subcutaneous injection 10 Units, TID With Meals    insulin lispro (HumALOG/ADMELOG) 100 units/mL subcutaneous injection 2-12 Units, 4x Daily (AC & HS) **AND** Fingerstick Glucose (POCT), 4x Daily AC and at bedtime    levothyroxine tablet 112 mcg, Early Morning    lidocaine (LIDODERM) 5 % patch 2 patch, Daily    metoprolol succinate (TOPROL-XL) 24 hr tablet 12.5 mg, Daily    mirtazapine (REMERON) tablet 7.5 mg, HS    moisture barrier miconazole 2% cream (aka DARLIN MOISTURE BARRIER ANTIFUNGAL CREAM), BID    nicotine (NICODERM CQ) 21 mg/24 hr TD 24 hr patch 1 patch, Daily    ondansetron (ZOFRAN) injection 4 mg, Q6H PRN    pancrelipase (Lip-Prot-Amyl) (CREON) delayed release capsule 24,000 Units, TID With Meals    psyllium (METAMUCIL) 1 packet, BID    traMADol (ULTRAM) tablet 50 mg, Q6H PRN    traZODone (DESYREL) tablet 50 mg, HS PRN    Allergies   Allergen Reactions    Medical Tape Rash    Lexapro [Escitalopram Oxalate]     Escitalopram Other (See Comments) and Palpitations    Other Hives and Rash     Adhesive Tape       Objective   Vitals: Temp (24hrs), Av.5 °F (36.9 °C), Min:98.3 °F (36.8 °C), Max:98.6 °F (37 °C)  Current: Temperature: 98.3 °F (36.8 °C)  Patient Vitals for the past 24 hrs:   BP Temp Temp src Pulse Resp SpO2 Weight   24 0817 -- -- -- -- -- 97 % --   24 0813 115/82 -- -- (!) 110 -- 97 % --   24 0729 (!) 88/63 98.3 °F (36.8 °C) -- 102 18 94 % --   24 0611 -- -- -- -- -- -- 43.5 kg (95 lb 12.8 oz)   24 1424  121/83 98.5 °F (36.9 °C) Oral (!) 110 18 97 % --   09/11/24 1554 90/63 98.6 °F (37 °C) -- 105 16 97 % --   09/11/24 1500 -- -- -- 104 -- 97 % --   09/11/24 1221 105/70 -- -- (!) 114 -- 97 % --   09/11/24 1213 105/70 -- -- (!) 114 -- -- --   09/11/24 1040 105/73 -- -- -- -- -- --    Body mass index is 17.52 kg/m².  Orthostatic Blood Pressures      Flowsheet Row Most Recent Value   Blood Pressure 115/82 filed at 09/12/2024 0813   Patient Position - Orthostatic VS Lying filed at 09/11/2024 2242                Invasive Devices       Peripheral Intravenous Line  Duration             Peripheral IV 09/10/24 Right;Ventral (anterior) Forearm 1 day                    Physical Exam:  Physical Exam  Constitutional:       Appearance: She is ill-appearing.   Eyes:      Extraocular Movements: Extraocular movements intact.      Conjunctiva/sclera: Conjunctivae normal.   Cardiovascular:      Rate and Rhythm: Regular rhythm. Tachycardia present.      Pulses: Normal pulses.      Heart sounds: Normal heart sounds.   Pulmonary:      Effort: Pulmonary effort is normal.      Breath sounds: Normal breath sounds.   Neurological:      Mental Status: She is alert.             Lab Results:   Results from last 7 days   Lab Units 09/11/24 0439 09/09/24 1039 09/08/24 0443 09/07/24 0435 09/06/24  0457   WBC Thousand/uL 8.29 8.19 5.64 7.89 9.83   HEMOGLOBIN g/dL 9.6* 10.6* 10.0* 9.9* 11.7   HEMATOCRIT % 29.8* 32.6* 31.0* 30.8* 35.0   PLATELETS Thousands/uL 243 261 266 259 281   SEGS PCT % 52  --   --   --   --    MONO PCT % 9  --   --   --   --    EOS PCT % 3  --   --   --   --       Results from last 7 days   Lab Units 09/11/24  0439 09/10/24  0541 09/09/24  1039 09/08/24  0443 09/07/24  0435 09/06/24  0457   SODIUM mmol/L 141 140 139 141 138 145   POTASSIUM mmol/L 4.0 4.0 4.3 4.0 3.7 3.0*   CHLORIDE mmol/L 107 110* 107 106 104 105   CO2 mmol/L 27 25 26 28 26 28   BUN mg/dL 16 15 17 15 10 7   CREATININE mg/dL 0.47* 0.46* 0.45* 0.47* 0.54*  0.40*   CALCIUM mg/dL 9.2 8.9 8.8 8.7 8.5 9.2   MAGNESIUM mg/dL 1.9 1.6*  --   --  1.9 1.4*   EGFR ml/min/1.73sq m 113 113 114 113 108 119       Lab Results   Component Value Date    HAV Non-reactive 09/03/2024    HEPBCAB Non-reactive 09/03/2024    HEPCAB Non-reactive 09/03/2024       Lab Results   Component Value Date    HGBA1C 16.1 (H) 08/16/2024    HGBA1C  08/18/2023      Comment:      HbA1c to high to read    HGBA1C 11.8 (H) 10/04/2022     Lab Results   Component Value Date    CHOL 265 (H) 10/22/2016    CHOL 309 (H) 07/16/2016    CHOL 284 (H) 04/02/2016      Lab Results   Component Value Date    HDL 89 07/06/2022    HDL 54 10/22/2016    HDL 49 07/16/2016      Lab Results   Component Value Date    LDLCALC 86 07/06/2022    LDLCALC 109 (H) 05/07/2016    LDLCALC 131 (H) 02/26/2014      Lab Results   Component Value Date    TRIG 157 (H) 07/06/2022    TRIG 163 (H) 08/25/2017    TRIG 284 (H) 10/22/2016       Shannon Richards MD  PGY-1 Family Medicine [place]  09/12/24 1:11 PM

## 2024-09-12 NOTE — PROGRESS NOTES
"Endocrinoloy Progress Note   Barb Palomo 54 y.o. female MRN: [id number]  [facility] En[facility]: [id number]      CC: diabetes f/u    Subjective:   Barb Palomo is a 54 y.o. year old female with uncontrolled type 2 diabetes, medication noncompliance, admitted with C. difficile infection.    Feels well. frustrated that is getting less food than she wants.  Not sure why this is the case, she has actually had her regular diet expanded to double portions in addition to dietary supplements.     Patient did have hypoglycemic episode overnight, in the setting of being hyperglycemic yesterday and requiring significant correctional insulin.    Objective:     Vitals: Blood pressure 116/81, pulse 104, temperature 98.8 °F (37.1 °C), resp. rate 18, height 5' 2\" (1.575 m), weight 43.1 kg (95 lb), last menstrual period 03/04/2016, SpO2 96%, not currently breastfeeding.,Body mass index is 17.38 kg/m².      Intake/Output Summary (Last 24 hours) at 9/12/2024 1556  Last data filed at 9/12/2024 1301  Gross per 24 hour   Intake 1440 ml   Output --   Net 1440 ml       Physical Exam:  General Appearance: awake, appears stated age and cooperative  Head: Normocephalic, without obvious abnormality, atraumatic  Extremities: moves all extremities  Skin: Skin color and temperature normal.   Pulm: no labored breathing    Lab, Imaging and other studies: No pertinent imaging studies reviewed.    POC Glucose (mg/dl)   Date Value   09/12/2024 77   09/12/2024 94   09/12/2024 134   09/12/2024 188 (H)   09/12/2024 73   09/12/2024 37 (LL)   09/12/2024 52 (L)   09/11/2024 322 (H)   09/11/2024 207 (H)   09/11/2024 254 (H)       Assessment and plan:  -Diabetes Mellitus type II, uncontrolled, with hyperglycemia and hypoglycemia   A1c 16.1% 8/16/2024-   Prior home regimen Novolin 70/30 mix 20 units twice daily, metformin, off of insulin reportedly for months  -C. difficile infection  -Acute on chronic diarrhea  -Medication nonadherence due " "to multiple personal and social factors  -Low morning cortisol screen   9/5/24 8am 1.8   8/16/24 6am 5.4   2016 8am  5.6     --Agree with current dose of Lantus 35 units nightly  --Continue mealtime 10 units of Humalog  --Correctional scale was adjusted down to Algorithm 3/2  --In view of the intermittent hypoglycemia, low morning cortisol, will obtain cosyntropin stimulation test tonight to screen for adrenal insufficiency.  --Hypoglycemia protocol  --Will continue to follow and make adjustments as appropriate  -- Patient will require follow-up with her endocrinologist Dr. Myers.  --Patient's medication noncompliance multifactorial in nature.  It appears case management is working with her on Medicare part D application for November.  While [facility] would be the cheapest source of insulin, patient obtains her medications from [facility] pharmacy as they do home delivery and she reports agoraphobia.  Cannot draw up vials anymore due to her neuropathy/palsy.    Holli Lopes DO  Endocrinology PGY-5  ~A morning cortisol is only interpretable if drawn at 8am.~    Please Epic[employer]ureChat questions to the physician covering the \"[facility] Call\" Role. Thank you.   "

## 2024-09-12 NOTE — PLAN OF CARE
Problem: OCCUPATIONAL THERAPY ADULT  Goal: Performs self-care activities at highest level of function for planned discharge setting.  See evaluation for individualized goals.  Description: Treatment Interventions: ADL retraining, Functional transfer training, Endurance training, Cognitive reorientation, Patient/family training, Equipment evaluation/education, Compensatory technique education, Activityengagement          See flowsheet documentation for full assessment, interventions and recommendations.   Outcome: Progressing  Note: Limitation: Decreased ADL status, Decreased Safe judgement during ADL, Decreased endurance, Decreased self-care trans, Decreased high-level ADLs  Prognosis: Good  Assessment: Pt seen on this date for OT session focusing on ADL retraining,  body mechanics, transfer retraining, increasing activity tolerance/endurance and EOB sitting to increase ability to participate in ADL/functional tasks. Pt was found in bed and was left in chair w/ all needs within reach, chair alarm on. Pt completed transfers w S, FM w min Ax1 w/o AD. Stood at sink w CGA, occasional min a while completing adls. Ub w S for bathing, mod I for dressing, lb w min a for both bathing and dressing. Pt w/ improvements in activity tolerance, transfer ability, adl task completion, however is still limited 2* decreased ADL/High-level ADL status, decreased activity tolerance/endurance, decreased cognition, decreased self-care trans.   The patient's raw score on the AM-PAC Daily Activity Inpatient Short Form is 21. A raw score of greater than or equal to 19 suggests the patient may benefit from discharge to home. Please refer to the recommendation of the Occupational Therapist for safe discharge planning. Pt w inaccessible home enviro and no support at home, therefore rec d/c to level 2 when stable.  Pt will continue to benefit from acute OT services to meet goals.     Rehab Resource Intensity Level, OT: II (Moderate Resource  Intensity)

## 2024-09-12 NOTE — RESTORATIVE TECHNICIAN NOTE
Restorative Technician Note      Patient Name: Barb Palomo     Restorative Tech Visit Date: 09/12/24  Note Type: Mobility  Patient Position Upon Consult: Supine  Activity Performed: Ambulated  Assistive Device: Other (Comment) (none)  Patient Position at End of Consult: Other (comment) (in restoom)

## 2024-09-12 NOTE — PROGRESS NOTES
Progress Note - Hospitalist   Name: Barb Palomo 54 y.o. female I MRN: [id number]  [facility] I Date of Admission: 8/15/2024   Date of Service: 9/12/2024 I Hospital Day: 27    Assessment & Plan  C. difficile diarrhea  Patient presented with generalized weakness, frequent falls.  She has had ongoing diarrhea, dysuria, poor oral intake and reported 70 pound weight loss over the last year.  Initially hypotensive in the ER, status post IV fluids. Labs with multiple metabolic derangements  CT imaging showing mild diffuse colonic wall thickening, mild diffuse wall thickening of stomach  C. difficile found to be positive--has a history of C. difficile in September 2022.    Patient started on Dificid 200 mg x 10 days per protocol on 8/18 as this is recurrent infection  Initially seemed BMs were improving and were becoming more formed, however again having frequent watery stools  GI consulted: Now s/p 7 days fidoxamycin and 7 days oral vamcomycin. S/p FMT 9/6/24. No leukocytosis or fever.   -Continue to monitor off antibiotics. Appears to be at around baseline stool output, improved from presentation.   - discontinue her Protonix and probiotic with as needed Pepcid as replacement of Protonix.  Contact precautions  Appetite is good   GI signed off  Chronic diarrhea  Longstanding history of chronic diarrhea, patient reporting acute diarrhea x1 month. Follows with GI. Has tried questran/imodium without relief    -Likely component of autonomic diarrhea given her secretory type symptoms of diarrhea at night and stool incontinence.  She has uncontrolled diabetes with her most recent A1c above 16 as she was not taking insulin at home.   -Pancreatic fecal elastase low at 5.5.  She was subsequently started on Creon.  -Status postcholecystectomy and currently on Questran.  -Biopsies from a colonoscopy were negative for microscopic colitis.  Stool enteric panel negative  C. difficile positive, see plan above   Giardia  "antigen is negative  Also started on lometil  Diarrhea improving.  As per patient was having more stool but again started having diarrhea.  But diarrhea is not documented in flowsheet.  I spoke with patient's nurse and patient as well to inform nursing staff if patient really has diarrhea for proper assessment  Severe episode of recurrent major depressive disorder, without psychotic features (HCC)    continue home duloxetine, mirtazapine, as needed Valium  Hold minipress given symptomatic hypotension episodes, tachycardia   GERD without esophagitis  Change Protonix to Pepcid as needed secondary to C. difficile  Tobacco dependence  Counseled on need for cessation, provide nicotine patch while admitted  Recent CT chest for lung cancer screening 7/30/2024 negative for nodules or masses  Opioid dependence with other opioid-induced disorder (HCC)  PDMP reviewed, patient maintained on tramadol 200 mg daily as needed; history of chronic cervical/lumbar pain noted  Continue as needed tramadol and chronic gabapentin dosing  Uncontrolled type 2 diabetes mellitus with hyperglycemia (HCC)  Lab Results   Component Value Date    HGBA1C 16.1 (H) 08/16/2024       Recent Labs     09/12/24  0316 09/12/24  0611 09/12/24  1040 09/12/24  1529   POCGLU 188* 134 94 77     Patient markedly hyperglycemic on admission only partially responsive to IV fluids, remainder of labs fortunately not consistent with DKA. A1C 16  Patient admitting she has not been using her home insulin as she apparently has been unable to fill prescription due to \"insurance issues,\" additionally reports that due to her neuropathy she has difficulty self injecting.  Daughter also reports jessica non compliance.   Typically on NovoLog 70/30 20 units twice daily  Diabetic diet-changed to regaular than to lactose free  Insulin regimen adjusted by endocrinology.  Endocrine follow-up requested today given labile blood sugar  Also educated patient by myself, GI as well as "  regarding her options of obtaining insulin.  Patient has to apply for Medicaid he in November and meanwhile she can obtain insulin from [facility] which is the cheapest option.  Patient can do a curbside pickup if she has agoraphobia to go inside [facility] as she states it is a crowded place    Acute cystitis without hematuria  Reported dysuria on admission   Urine + nitrite & leukocytes, CT imaging with distended bladder suggestive of retention and small amount of air which could be secondary to infection  Urine Culture noted, S/P 3 doses of IV CTX 8/17 8/21, reported pain AFTER urination, likely related to urine hitting excoriated labia/bottom from frequent stooling. Will continue with medicated cream to this area- denies pain at this time   S/p pyridum x 3 doses  Wound care consult appreciated   Severe protein-calorie malnutrition (HCC)  Severe protein-calorie malnutrition 2/2 chronic diarrhea a/e/b fat and muscle loss requiring Nutrition consult, oral diet and nutrition supplements    360 Statement: severe malnutrition r/t suspect combination of chronic illness and social/environmental factors as evidenced by severe temporal muscle loss, severe tricep fat pad loss, at least moderate muscle loss around clavicles and shoulders, at least moderate orbital fat pad loss. Treatment: oral diet and oral nutrition supplements.    BMI Findings:  Adult BMI Classifications: Underweight < 18.5        Body mass index is 17.38 kg/m².   C/w supplements  Nutrition consult appreciated  Encourage po intake   Hypomagnesemia  Hold oral supplementation due to diarrhea  Check magnesium level and replace as needed  Failure to thrive in adult  Patient presented with increased generalized weakness and frequent falls, ongoing diarrhea, dysuria, poor oral intake, and at least 70-80 pound weight loss over the past 1 year. Has been following with GI, had colonoscopy May 2024 with poor prep--had EGD with esophagitis and  "gastritis  Labs with multiple derangements including hyperglycemia, hypokalemia and hypomagnesemia on admission, improved.   C. difficile positive as above  Patient reports she has not been using insulin due to \"insurance issue\" and inability to self inject due to her neuropathy  CT abdomen and pelvis without any obvious malignancy  Treatment as per respective issues  PT/OT recommending rehab.   Endocrinology evaluation appreciated.  Sinus tachycardia  Patient with persistent sinus tachycardia.  Failure thought to be hypotension, side effect of Minipress since patient was volume resuscitated and Minipress held  She also has multiple issues ongoing uncontrolled labile blood sugar, autonomic/C. difficile diarrhea.  Her appetite is normal and blood pressure has remained fairly stable lately but continues to have tachycardia.  She is not septic at this time.  TSH checked which was normal hence cardiology was consulted, to review any underlying explanation for sinus tachycardia  Hypotension  Intermittent episodes of hypotension, most occurrences happen overnight and question correlation with minipress administration.  This has now been discontinued  Bacteriuria      VTE Pharmacologic Prophylaxis: VTE Score: 3 Moderate Risk (Score 3-4) - Pharmacological DVT Prophylaxis Ordered: enoxaparin (Lovenox).    Mobility:   Basic Mobility Inpatient Raw Score: 23  JH-HLM Goal: 7: Walk 25 feet or more  JH-HLM Achieved: 7: Walk 25 feet or more  JH-HLM Goal NOT achieved. Continue with multidisciplinary rounding and encourage appropriate mobility to improve upon JH-HLM goals.    Patient Centered Rounds: I performed bedside rounds with nursing staff today.   Discussions with Specialists or Other Care Team Provider: yes    Education and Discussions with Family / Patient:     Current Length of Stay: 27 day(s)  Current Patient Status: Inpatient   Certification Statement: The patient will continue to require additional inpatient hospital " stay due to ongoing treatment  Discharge Plan: Anticipate discharge in 24-48 hrs to rehab facility.    Code Status: Level 1 - Full Code    Subjective   Patient resting in bed, stated that she has ongoing diarrhea, however it was not charted in flowsheet so I requested patient to inform the nursing staff if she does have diarrhea, so we need to look at the frequency and consistency.  She verbalized understanding.  Also had long discussion regarding her insulin when she gets discharged.  After back-and-forth discussion she agreed to go to [facility] to obtain insulin.  Other than that denies any chest pain, shortness of breath    Objective     Vitals:   Temp (24hrs), Av.7 °F (37.1 °C), Min:98.3 °F (36.8 °C), Max:99.2 °F (37.3 °C)    Temp:  [98.3 °F (36.8 °C)-99.2 °F (37.3 °C)] 98.8 °F (37.1 °C)  HR:  [100-110] 104  Resp:  [18] 18  BP: ()/(63-83) 116/81  SpO2:  [94 %-99 %] 96 %  Body mass index is 17.38 kg/m².     Input and Output Summary (last 24 hours):     Intake/Output Summary (Last 24 hours) at 2024 1838  Last data filed at 2024 1301  Gross per 24 hour   Intake 1440 ml   Output 400 ml   Net 1040 ml       Physical Exam  Constitutional:       Comments: Cachectic   HENT:      Head: Normocephalic.      Mouth/Throat:      Mouth: Mucous membranes are moist.   Eyes:      Pupils: Pupils are equal, round, and reactive to light.   Cardiovascular:      Rate and Rhythm: Tachycardia present.   Pulmonary:      Effort: Pulmonary effort is normal.      Breath sounds: Normal breath sounds.   Abdominal:      General: Bowel sounds are normal.      Palpations: Abdomen is soft.   Musculoskeletal:         General: Normal range of motion.      Cervical back: Normal range of motion.   Skin:     General: Skin is warm.   Neurological:      Mental Status: She is alert and oriented to person, place, and time.   Psychiatric:         Mood and Affect: Mood normal.          Lines/Drains:  Lines/Drains/Airways       Active Status        None                            Lab Results: I have reviewed the following results: CBC/BMP: No new results in last 24 hours.    Results from last 7 days   Lab Units 09/11/24  0439   WBC Thousand/uL 8.29   HEMOGLOBIN g/dL 9.6*   HEMATOCRIT % 29.8*   PLATELETS Thousands/uL 243   SEGS PCT % 52   LYMPHO PCT % 35   MONO PCT % 9   EOS PCT % 3     Results from last 7 days   Lab Units 09/11/24  0439   SODIUM mmol/L 141   POTASSIUM mmol/L 4.0   CHLORIDE mmol/L 107   CO2 mmol/L 27   BUN mg/dL 16   CREATININE mg/dL 0.47*   ANION GAP mmol/L 7   CALCIUM mg/dL 9.2   GLUCOSE RANDOM mg/dL 91         Results from last 7 days   Lab Units 09/12/24  1529 09/12/24  1040 09/12/24  0611 09/12/24  0316 09/12/24  0228 09/12/24  0225 09/12/24  0157 09/11/24  2028 09/11/24  1553 09/11/24  1102 09/11/24  0737 09/11/24  0614   POC GLUCOSE mg/dl 77 94 134 188* 73 37* 52* 322* 207* 254* 82 76               Recent Cultures (last 7 days):         Imaging Review: No pertinent imaging studies reviewed.  Other Studies: No additional pertinent studies reviewed.    Last 24 Hours Medication List:     Current Facility-Administered Medications:     acetaminophen (TYLENOL) tablet 650 mg, Q6H PRN    albuterol (PROVENTIL HFA,VENTOLIN HFA) inhaler 2 puff, Q4H PRN    aspirin chewable tablet 81 mg, Daily    atorvastatin (LIPITOR) tablet 80 mg, Daily    baclofen tablet 5 mg, BID    butalbital-acetaminophen-caffeine (FIORICET,ESGIC) -40 mg per tablet 1 tablet, Q6H PRN    cholestyramine sugar free (QUESTRAN LIGHT) packet 4 g, BID    [START ON 9/13/2024] cosyntropin (CORTROSYN) injection 0.25 mg, Once    diazepam (VALIUM) tablet 5 mg, Q12H PRN    dicyclomine (BENTYL) capsule 10 mg, 4x Daily (AC & HS)    diphenoxylate-atropine (LOMOTIL) 2.5-0.025 mg per tablet 1 tablet, 4x Daily    DULoxetine (CYMBALTA) delayed release capsule 60 mg, BID    enoxaparin (LOVENOX) subcutaneous injection 40 mg, Q24H HONEY    famotidine (PEPCID) tablet 20 mg, BID     fluticasone-vilanterol 200-25 mcg/actuation 1 puff, Daily    gabapentin (NEURONTIN) capsule 800 mg, TID    insulin glargine (LANTUS) subcutaneous injection 35 Units 0.35 mL, HS    insulin lispro (HumALOG/ADMELOG) 100 units/mL subcutaneous injection 1-5 Units, HS    insulin lispro (HumALOG/ADMELOG) 100 units/mL subcutaneous injection 1-6 Units, TID AC **AND** Fingerstick Glucose (POCT), TID AC    insulin lispro (HumALOG/ADMELOG) 100 units/mL subcutaneous injection 10 Units, TID With Meals    levothyroxine tablet 112 mcg, Early Morning    lidocaine (LIDODERM) 5 % patch 2 patch, Daily    metoprolol succinate (TOPROL-XL) 24 hr tablet 12.5 mg, Daily    mirtazapine (REMERON) tablet 7.5 mg, HS    moisture barrier miconazole 2% cream (aka DARLIN MOISTURE BARRIER ANTIFUNGAL CREAM), BID    nicotine (NICODERM CQ) 21 mg/24 hr TD 24 hr patch 1 patch, Daily    ondansetron (ZOFRAN) injection 4 mg, Q6H PRN    pancrelipase (Lip-Prot-Amyl) (CREON) delayed release capsule 24,000 Units, TID With Meals    psyllium (METAMUCIL) 1 packet, BID    traMADol (ULTRAM) tablet 50 mg, Q6H PRN    traZODone (DESYREL) tablet 50 mg, HS PRN    Administrative Statements   Today, Patient Was Seen By: Wanda Rubio MD  I have spent a total time of 20 minutes in caring for this patient on the day of the visit/encounter including Counseling / Coordination of care.    **Please Note: This note may have been constructed using a voice recognition system.**

## 2024-09-12 NOTE — ASSESSMENT & PLAN NOTE
Longstanding history of chronic diarrhea, patient reporting acute diarrhea x1 month. Follows with GI. Has tried questran/imodium without relief    -Likely component of autonomic diarrhea given her secretory type symptoms of diarrhea at night and stool incontinence.  She has uncontrolled diabetes with her most recent A1c above 16 as she was not taking insulin at home.   -Pancreatic fecal elastase low at 5.5.  She was subsequently started on Creon.  -Status postcholecystectomy and currently on Questran.  -Biopsies from a colonoscopy were negative for microscopic colitis.  Stool enteric panel negative  C. difficile positive, see plan above   Giardia antigen is negative  Also started on lometil  Diarrhea improving.  As per patient was having more stool but again started having diarrhea.  But diarrhea is not documented in flowsheet.  I spoke with patient's nurse and patient as well to inform nursing staff if patient really has diarrhea for proper assessment

## 2024-09-12 NOTE — OCCUPATIONAL THERAPY NOTE
Occupational Therapy Progress Note     Patient Name: Barb Palomo  Today's Date: 9/12/2024  Problem List  Principal Problem:    C. difficile diarrhea  Active Problems:    Severe episode of recurrent major depressive disorder, without psychotic features (HCC)    GERD without esophagitis    Tobacco dependence    Chronic diarrhea    Opioid dependence with other opioid-induced disorder (HCC)    Uncontrolled type 2 diabetes mellitus with hyperglycemia (HCC)    Acute cystitis without hematuria    Severe protein-calorie malnutrition (HCC)    Hypomagnesemia    Failure to thrive in adult    Sinus tachycardia    Hypotension    Bacteriuria             09/12/24 0958   OT Last Visit   OT Visit Date 09/12/24   Note Type   Note Type Treatment   Pain Assessment   Pain Assessment Tool 0-10   Pain Score No Pain   Restrictions/Precautions   Weight Bearing Precautions Per Order No   Other Precautions Contact/isolation   ADL   Where Assessed Standing at sink   Grooming Assistance 5  Supervision/Setup   Grooming Deficit Teeth care;Brushing hair   Grooming Comments able to complete while standing unsupported @ sink   UB Bathing Assistance 5  Supervision/Setup   UB Bathing Deficit Increased time to complete;Chest;Left arm;Abdomen;Right arm   LB Bathing Assistance 4  Minimal Assistance   LB Bathing Deficit Perineal area;Buttocks;Right upper leg;Left upper leg;Right lower leg including foot;Left lower leg including foot   LB Bathing Comments min A for washing lower LE   UB Dressing Assistance 6  Modified independent   UB Dressing Deficit Thread RUE;Thread LUE;Pull over head   LB Dressing Assistance 4  Minimal Assistance   LB Dressing Deficit Don/doff L sock;Don/doff R sock   Functional Standing Tolerance   Time 10 min   Activity standing at sink for bathing   Comments occasional min A for higher level dynmaic standing tasks.   Transfers   Sit to Stand 4  Minimal assistance   Stand to Sit 5  Supervision   Functional Mobility    Functional Mobility 4  Minimal assistance   Additional Comments w/o AD   Cognition   Overall Cognitive Status WFL   Arousal/Participation Alert;Responsive;Cooperative   Attention Within functional limits   Orientation Level Oriented X4   Memory Within functional limits   Following Commands Follows all commands and directions without difficulty   Activity Tolerance   Activity Tolerance Patient tolerated treatment well   Medical Staff Made Aware rn   Assessment   Assessment Pt seen on this date for OT session focusing on ADL retraining,  body mechanics, transfer retraining, increasing activity tolerance/endurance and EOB sitting to increase ability to participate in ADL/functional tasks. Pt was found in bed and was left in chair w/ all needs within reach, chair alarm on. Pt completed transfers w S, FM w min Ax1 w/o AD. Stood at sink w CGA, occasional min a while completing adls. Ub w S for bathing, mod I for dressing, lb w min a for both bathing and dressing. Pt w/ improvements in activity tolerance, transfer ability, adl task completion, however is still limited 2* decreased ADL/High-level ADL status, decreased activity tolerance/endurance, decreased cognition, decreased self-care trans.   The patient's raw score on the AM-PAC Daily Activity Inpatient Short Form is 21. A raw score of greater than or equal to 19 suggests the patient may benefit from discharge to home. Please refer to the recommendation of the Occupational Therapist for safe discharge planning. Pt w inaccessible home enviro and no support at home, therefore rec d/c to level 2 when stable.  Pt will continue to benefit from acute OT services to meet goals.   Plan   Treatment Interventions ADL retraining;Functional transfer training;Endurance training;Patient/family training;Equipment evaluation/education;Activityengagement;Energy conservation   Goal Expiration Date 09/24/24   OT Treatment Day 4   OT Frequency 2-3x/wk   Discharge Recommendation   Rehab  Resource Intensity Level, OT II (Moderate Resource Intensity)   AM-PAC Daily Activity Inpatient   Lower Body Dressing 3   Bathing 3   Toileting 3   Upper Body Dressing 4   Grooming 4   Eating 4   Daily Activity Raw Score 21   Daily Activity Standardized Score (Calc for Raw Score >=11) 44.27   AM-PAC Applied Cognition Inpatient   Following a Speech/Presentation 4   Understanding Ordinary Conversation 4   Taking Medications 4   Remembering Where Things Are Placed or Put Away 4   Remembering List of 4-5 Errands 4   Taking Care of Complicated Tasks 4   Applied Cognition Raw Score 24   Applied Cognition Standardized Score 62.21   Modified [place] Scale   Modified [place] Scale 4   End of Consult   Education Provided Yes   Patient Position at End of Consult Bedside chair;Bed/Chair alarm activated;All needs within reach   Nurse Communication Nurse aware of consult         SELINA Bailon, OTR/L

## 2024-09-12 NOTE — ASSESSMENT & PLAN NOTE
Patient presented with weakness, weight loss, acute on chronic diarrhea.  8/16 C. difficile PCR and toxin EIA positive.  Continues to have significant loose stools despite 7 days of fidaxomicin and now 7 days of p.o. vancomycin.  She remains nontoxic without fever or leukocytosis.  Abdominal exam benign.  Unclear if ongoing symptoms are related to C. difficile alone or an alternative etiology as below.  Given recent positive testing there is low utility to repeat C. difficile testing.  Still having significant diarrhea.  Patient status post fecal transplantation although the prep seems to have been inadequate.  Still with ongoing diarrhea although some improvement.  -Hold on the additional oral vancomycin for now  -Careful use of Lomotil as per GI  -If patient does not continue to improve with fecal transplant may need to repeat the transplant after a more aggressive prep  -Careful use of Questran as per GI  -Close GI follow-up

## 2024-09-13 LAB
ANION GAP SERPL CALCULATED.3IONS-SCNC: 6 MMOL/L (ref 4–13)
BASOPHILS # BLD AUTO: 0.06 THOUSANDS/ΜL (ref 0–0.1)
BASOPHILS NFR BLD AUTO: 1 % (ref 0–1)
BUN SERPL-MCNC: 20 MG/DL (ref 5–25)
CALCIUM SERPL-MCNC: 9.2 MG/DL (ref 8.4–10.2)
CHLORIDE SERPL-SCNC: 107 MMOL/L (ref 96–108)
CO2 SERPL-SCNC: 30 MMOL/L (ref 21–32)
CORTIS SERPL-MCNC: 24.5 UG/DL
CORTIS SERPL-MCNC: 3.4 UG/DL
CREAT SERPL-MCNC: 0.55 MG/DL (ref 0.6–1.3)
EOSINOPHIL # BLD AUTO: 0.2 THOUSAND/ΜL (ref 0–0.61)
EOSINOPHIL NFR BLD AUTO: 4 % (ref 0–6)
ERYTHROCYTE [DISTWIDTH] IN BLOOD BY AUTOMATED COUNT: 14.2 % (ref 11.6–15.1)
GFR SERPL CREATININE-BSD FRML MDRD: 107 ML/MIN/1.73SQ M
GLUCOSE SERPL-MCNC: 145 MG/DL (ref 65–140)
GLUCOSE SERPL-MCNC: 164 MG/DL (ref 65–140)
GLUCOSE SERPL-MCNC: 283 MG/DL (ref 65–140)
GLUCOSE SERPL-MCNC: 323 MG/DL (ref 65–140)
GLUCOSE SERPL-MCNC: 347 MG/DL (ref 65–140)
GLUCOSE SERPL-MCNC: 54 MG/DL (ref 65–140)
GLUCOSE SERPL-MCNC: 55 MG/DL (ref 65–140)
GLUCOSE SERPL-MCNC: 63 MG/DL (ref 65–140)
HCT VFR BLD AUTO: 32.9 % (ref 34.8–46.1)
HGB BLD-MCNC: 10.7 G/DL (ref 11.5–15.4)
IMM GRANULOCYTES # BLD AUTO: 0.01 THOUSAND/UL (ref 0–0.2)
IMM GRANULOCYTES NFR BLD AUTO: 0 % (ref 0–2)
LYMPHOCYTES # BLD AUTO: 2.28 THOUSANDS/ΜL (ref 0.6–4.47)
LYMPHOCYTES NFR BLD AUTO: 45 % (ref 14–44)
MAGNESIUM SERPL-MCNC: 1.7 MG/DL (ref 1.9–2.7)
MCH RBC QN AUTO: 32.8 PG (ref 26.8–34.3)
MCHC RBC AUTO-ENTMCNC: 32.5 G/DL (ref 31.4–37.4)
MCV RBC AUTO: 101 FL (ref 82–98)
MONOCYTES # BLD AUTO: 0.49 THOUSAND/ΜL (ref 0.17–1.22)
MONOCYTES NFR BLD AUTO: 10 % (ref 4–12)
NEUTROPHILS # BLD AUTO: 2.04 THOUSANDS/ΜL (ref 1.85–7.62)
NEUTS SEG NFR BLD AUTO: 40 % (ref 43–75)
NRBC BLD AUTO-RTO: 0 /100 WBCS
PLATELET # BLD AUTO: 270 THOUSANDS/UL (ref 149–390)
PMV BLD AUTO: 10.7 FL (ref 8.9–12.7)
POTASSIUM SERPL-SCNC: 4.2 MMOL/L (ref 3.5–5.3)
RBC # BLD AUTO: 3.26 MILLION/UL (ref 3.81–5.12)
SODIUM SERPL-SCNC: 143 MMOL/L (ref 135–147)
WBC # BLD AUTO: 5.08 THOUSAND/UL (ref 4.31–10.16)

## 2024-09-13 PROCEDURE — 82948 REAGENT STRIP/BLOOD GLUCOSE: CPT

## 2024-09-13 PROCEDURE — 97116 GAIT TRAINING THERAPY: CPT

## 2024-09-13 PROCEDURE — 97530 THERAPEUTIC ACTIVITIES: CPT

## 2024-09-13 PROCEDURE — 83735 ASSAY OF MAGNESIUM: CPT | Performed by: INTERNAL MEDICINE

## 2024-09-13 PROCEDURE — 99233 SBSQ HOSP IP/OBS HIGH 50: CPT | Performed by: INTERNAL MEDICINE

## 2024-09-13 PROCEDURE — 82024 ASSAY OF ACTH: CPT | Performed by: STUDENT IN AN ORGANIZED HEALTH CARE EDUCATION/TRAINING PROGRAM

## 2024-09-13 PROCEDURE — 99232 SBSQ HOSP IP/OBS MODERATE 35: CPT | Performed by: INTERNAL MEDICINE

## 2024-09-13 PROCEDURE — 97535 SELF CARE MNGMENT TRAINING: CPT

## 2024-09-13 PROCEDURE — 82533 TOTAL CORTISOL: CPT | Performed by: STUDENT IN AN ORGANIZED HEALTH CARE EDUCATION/TRAINING PROGRAM

## 2024-09-13 PROCEDURE — 85025 COMPLETE CBC W/AUTO DIFF WBC: CPT | Performed by: INTERNAL MEDICINE

## 2024-09-13 PROCEDURE — 80048 BASIC METABOLIC PNL TOTAL CA: CPT | Performed by: INTERNAL MEDICINE

## 2024-09-13 RX ORDER — INSULIN GLARGINE 100 [IU]/ML
30 INJECTION, SOLUTION SUBCUTANEOUS
Status: DISCONTINUED | OUTPATIENT
Start: 2024-09-13 | End: 2024-09-15

## 2024-09-13 RX ADMIN — DICYCLOMINE HYDROCHLORIDE 10 MG: 10 CAPSULE ORAL at 21:08

## 2024-09-13 RX ADMIN — DIPHENOXYLATE HYDROCHLORIDE AND ATROPINE SULFATE 1 TABLET: .025; 2.5 TABLET ORAL at 21:08

## 2024-09-13 RX ADMIN — INSULIN LISPRO 10 UNITS: 100 INJECTION, SOLUTION INTRAVENOUS; SUBCUTANEOUS at 11:24

## 2024-09-13 RX ADMIN — INSULIN LISPRO 10 UNITS: 100 INJECTION, SOLUTION INTRAVENOUS; SUBCUTANEOUS at 09:24

## 2024-09-13 RX ADMIN — ENOXAPARIN SODIUM 40 MG: 40 INJECTION SUBCUTANEOUS at 09:22

## 2024-09-13 RX ADMIN — TRAZODONE HYDROCHLORIDE 50 MG: 50 TABLET ORAL at 21:08

## 2024-09-13 RX ADMIN — FAMOTIDINE 20 MG: 20 TABLET, FILM COATED ORAL at 09:24

## 2024-09-13 RX ADMIN — GABAPENTIN 800 MG: 400 CAPSULE ORAL at 09:24

## 2024-09-13 RX ADMIN — DULOXETINE HYDROCHLORIDE 60 MG: 60 CAPSULE, DELAYED RELEASE ORAL at 09:23

## 2024-09-13 RX ADMIN — MICONAZOLE NITRATE: 20 CREAM TOPICAL at 09:53

## 2024-09-13 RX ADMIN — GABAPENTIN 800 MG: 400 CAPSULE ORAL at 17:22

## 2024-09-13 RX ADMIN — DICYCLOMINE HYDROCHLORIDE 10 MG: 10 CAPSULE ORAL at 06:00

## 2024-09-13 RX ADMIN — DIPHENOXYLATE HYDROCHLORIDE AND ATROPINE SULFATE 1 TABLET: .025; 2.5 TABLET ORAL at 17:22

## 2024-09-13 RX ADMIN — ONDANSETRON 4 MG: 2 INJECTION INTRAMUSCULAR; INTRAVENOUS at 13:23

## 2024-09-13 RX ADMIN — LIDOCAINE 1 PATCH: 700 PATCH TOPICAL at 09:23

## 2024-09-13 RX ADMIN — GABAPENTIN 800 MG: 400 CAPSULE ORAL at 21:07

## 2024-09-13 RX ADMIN — TRAMADOL HYDROCHLORIDE 50 MG: 50 TABLET, COATED ORAL at 13:24

## 2024-09-13 RX ADMIN — Medication 1 PACKET: at 17:22

## 2024-09-13 RX ADMIN — NICOTINE 1 PATCH: 21 PATCH, EXTENDED RELEASE TRANSDERMAL at 09:37

## 2024-09-13 RX ADMIN — ACETAMINOPHEN 650 MG: 325 TABLET ORAL at 09:26

## 2024-09-13 RX ADMIN — INSULIN GLARGINE 30 UNITS: 100 INJECTION, SOLUTION SUBCUTANEOUS at 21:08

## 2024-09-13 RX ADMIN — ASPIRIN 81 MG CHEWABLE TABLET 81 MG: 81 TABLET CHEWABLE at 09:24

## 2024-09-13 RX ADMIN — PANCRELIPASE 24000 UNITS: 120000; 24000; 76000 CAPSULE, DELAYED RELEASE PELLETS ORAL at 11:23

## 2024-09-13 RX ADMIN — INSULIN LISPRO 5 UNITS: 100 INJECTION, SOLUTION INTRAVENOUS; SUBCUTANEOUS at 09:34

## 2024-09-13 RX ADMIN — DIPHENOXYLATE HYDROCHLORIDE AND ATROPINE SULFATE 1 TABLET: .025; 2.5 TABLET ORAL at 11:23

## 2024-09-13 RX ADMIN — Medication 1 PACKET: at 09:17

## 2024-09-13 RX ADMIN — DIAZEPAM 5 MG: 5 TABLET ORAL at 09:52

## 2024-09-13 RX ADMIN — ONDANSETRON 4 MG: 2 INJECTION INTRAMUSCULAR; INTRAVENOUS at 05:54

## 2024-09-13 RX ADMIN — FLUTICASONE FUROATE AND VILANTEROL TRIFENATATE 1 PUFF: 200; 25 POWDER RESPIRATORY (INHALATION) at 09:17

## 2024-09-13 RX ADMIN — CHOLESTYRAMINE 4 G: 4 POWDER, FOR SUSPENSION ORAL at 09:18

## 2024-09-13 RX ADMIN — DULOXETINE HYDROCHLORIDE 60 MG: 60 CAPSULE, DELAYED RELEASE ORAL at 17:22

## 2024-09-13 RX ADMIN — COSYNTROPIN 0.25 MG: 0.25 INJECTION, POWDER, LYOPHILIZED, FOR SOLUTION INTRAMUSCULAR; INTRAVENOUS at 05:50

## 2024-09-13 RX ADMIN — PANCRELIPASE 24000 UNITS: 120000; 24000; 76000 CAPSULE, DELAYED RELEASE PELLETS ORAL at 09:22

## 2024-09-13 RX ADMIN — DICYCLOMINE HYDROCHLORIDE 10 MG: 10 CAPSULE ORAL at 11:23

## 2024-09-13 RX ADMIN — CHOLESTYRAMINE 4 G: 4 POWDER, FOR SUSPENSION ORAL at 17:21

## 2024-09-13 RX ADMIN — TRAMADOL HYDROCHLORIDE 50 MG: 50 TABLET, COATED ORAL at 21:08

## 2024-09-13 RX ADMIN — MICONAZOLE NITRATE: 20 CREAM TOPICAL at 17:24

## 2024-09-13 RX ADMIN — MIRTAZAPINE 7.5 MG: 15 TABLET, FILM COATED ORAL at 21:07

## 2024-09-13 RX ADMIN — DICYCLOMINE HYDROCHLORIDE 10 MG: 10 CAPSULE ORAL at 17:22

## 2024-09-13 RX ADMIN — BACLOFEN 5 MG: 10 TABLET ORAL at 17:21

## 2024-09-13 RX ADMIN — TRAMADOL HYDROCHLORIDE 50 MG: 50 TABLET, COATED ORAL at 05:53

## 2024-09-13 RX ADMIN — FAMOTIDINE 20 MG: 20 TABLET, FILM COATED ORAL at 17:22

## 2024-09-13 RX ADMIN — BACLOFEN 5 MG: 10 TABLET ORAL at 09:24

## 2024-09-13 RX ADMIN — PANCRELIPASE 24000 UNITS: 120000; 24000; 76000 CAPSULE, DELAYED RELEASE PELLETS ORAL at 17:21

## 2024-09-13 RX ADMIN — LEVOTHYROXINE SODIUM 112 MCG: 112 TABLET ORAL at 05:50

## 2024-09-13 RX ADMIN — DIPHENOXYLATE HYDROCHLORIDE AND ATROPINE SULFATE 1 TABLET: .025; 2.5 TABLET ORAL at 09:24

## 2024-09-13 RX ADMIN — INSULIN LISPRO 10 UNITS: 100 INJECTION, SOLUTION INTRAVENOUS; SUBCUTANEOUS at 17:23

## 2024-09-13 RX ADMIN — INSULIN LISPRO 5 UNITS: 100 INJECTION, SOLUTION INTRAVENOUS; SUBCUTANEOUS at 11:23

## 2024-09-13 RX ADMIN — INSULIN LISPRO 4 UNITS: 100 INJECTION, SOLUTION INTRAVENOUS; SUBCUTANEOUS at 17:22

## 2024-09-13 RX ADMIN — ATORVASTATIN CALCIUM 80 MG: 80 TABLET, FILM COATED ORAL at 09:24

## 2024-09-13 NOTE — QUICK NOTE
Glycemic trends reviewed  Unfortunately patient again hypoglycemic in the morning while hyperglycemic during the day  Again a lot of this does appear to be diet driven    Fortunately, patient underwent cosyntropin stimulation test this morning and despite a low random cortisol of 3.4, did appropriately stimulate to cortisol 24.5 at the 30-minute johnna, ruling out adrenal insufficiency    Will further decrease Lantus to 30 units tonight  Continue mealtime 10 units of lispro  Continue Algorithm 3/2 correction scale

## 2024-09-13 NOTE — PLAN OF CARE
Problem: OCCUPATIONAL THERAPY ADULT  Goal: Performs self-care activities at highest level of function for planned discharge setting.  See evaluation for individualized goals.  Description: Treatment Interventions: ADL retraining, Functional transfer training, Endurance training, Cognitive reorientation, Patient/family training, Equipment evaluation/education, Compensatory technique education, Activityengagement          See flowsheet documentation for full assessment, interventions and recommendations.   Outcome: Progressing  Note: Limitation: Decreased ADL status, Decreased Safe judgement during ADL, Decreased endurance, Decreased self-care trans, Decreased high-level ADLs  Prognosis: Good  Assessment: Pt seen for skilled OT treatment session on this date w/ interventions focusing on ADL participation, activity tolerance, standing tolerance, standing balance, transfer skills, and fxnl mobility. Pt was agreeable and willing to participate in session. Pt engaged in the following tasks: S STS; Min A LB dressing, functional mobility. In comparison to previous session, pt demonstrated improvements in functional mobility as she was able to ambulate without AD, though continues to require Min A and standing rest break. Pt required verbal cues for correct technique, frequent rest periods, and ocassional safety reminders. Pt continues to be functioning below baseline level as occupational performance remains limited by decreased ADL status, decreased activity tolerance, decreased endurance, decreased standing tolerance, decreased standing balance, decreased transfer skills, decreased fxnl mobility, pain, and generalized weakness . The patient's raw score on the -PAC Daily Activity Inpatient Short Form is 19. A raw score of greater than or equal to 19 suggests the patient may benefit from discharge to home, however due to decreased ADL participation, standing tolerance/balance, functional mobility as well as medical  status continue to recommend post-acute rehabilitation services. Please refer to the recommendation of the Occupational Therapist for safe discharge planning.From OT standpoint, recommend Level II (Moderate Resource Intensity) at time of d/c. Pt will benefit from continued OT treatment while in acute care to address deficits as defined above and maximize level of functional independence with ADLs and functional mobility. Pt left OOB in chair w/ all needs within reach at end of session.     Rehab Resource Intensity Level, OT: II (Moderate Resource Intensity)

## 2024-09-13 NOTE — ASSESSMENT & PLAN NOTE
"Lab Results   Component Value Date    HGBA1C 16.1 (H) 08/16/2024       Recent Labs     09/13/24  0606 09/13/24  0627 09/13/24  0932 09/13/24  1035   POCGLU 55* 63* 323* 347*     Patient markedly hyperglycemic on admission only partially responsive to IV fluids, remainder of labs fortunately not consistent with DKA. A1C 16  Patient admitting she has not been using her home insulin as she apparently has been unable to fill prescription due to \"insurance issues,\" additionally reports that due to her neuropathy she has difficulty self injecting.  Daughter also reports jessica non compliance.   Typically on NovoLog 70/30 20 units twice daily  Diabetic diet-changed to regaular than to lactose free  Insulin regimen adjusted by endocrinology.  Endocrine follow-up requested ,given labile blood sugar  Also educated patient by myself, GI as well as  regarding her options of obtaining insulin.  Patient has to apply for Medicaid he in November and meanwhile she can obtain insulin from [facility] which is the cheapest option.  Patient can do a curbside pickup if she has agoraphobia to [place] it [place] a crowded place    Per endocrinology ACTH stimulation test today given low cortisol  "

## 2024-09-13 NOTE — PROGRESS NOTES
Progress Note - Infectious Disease   Name: Barb Palomo 54 y.o. female I MRN: [id number]  Unit/Bed#: [facility] 522-01 I Date of Admission: 8/15/2024   Date of Service: 9/13/2024 I Hospital Day: 28    Assessment & Plan  C. difficile diarrhea  Patient presented with weakness, weight loss, acute on chronic diarrhea.  8/16 C. difficile PCR and toxin EIA positive.  Continues to have significant loose stools despite 7 days of fidaxomicin and now 7 days of p.o. vancomycin.  She remains nontoxic without fever or leukocytosis.  Abdominal exam benign.  Unclear if ongoing symptoms are related to C. difficile alone or an alternative etiology as below.  Given recent positive testing there is low utility to repeat C. difficile testing.  Still having significant diarrhea.  Patient status post fecal transplantation although the prep seems to have been inadequate.  Still with ongoing diarrhea although some improvement.  -Hold on the additional oral vancomycin for now  -Careful use of Lomotil as per GI  -If patient does not continue to improve with fecal transplant may need to repeat the transplant after a more aggressive prep  -Careful use of Questran as per GI  -Close GI follow-up  Chronic diarrhea   The patient has had diarrhea and weight loss for the past year but reports diarrhea is usually 2-4 times daily.  She continues to have up to 10 stools daily since admission with minimal improvement on C. difficile treatment.  Fecal white blood cells negative.  Additional infectious testing including stool O&P, Giardia antigen, enteric panel negative.  Unclear if ongoing symptoms are solely due to C. difficile.  The patient has a diagnosis of chronic pancreatitis to be contributing.  Poorly controlled diabetes may also lead to diarrhea.  She may also be having postinfectious/functional diarrhea.  Continues to have substantial diarrhea.  Repeat CT of the abdomen pelvis with colonic thickening.  Pancreatic insufficiency may also be playing  a role in the chronic process.  Status post fecal transplantation with some improvement in the diarrhea.  Patient started on Lomotil.              -Lomotil as per GI              -Close GI follow-up  -Continue pancreatic enzymes  -With inadequate prep, patient and ongoing diarrhea, patient may need repeat fecal transplant if does not continue to improve  -Will hold on any additional oral vancomycin for now  Bacteriuria  Patient received 3 doses of IV ceftriaxone. CT imaging showed a distended bladder suggestive of retention which is likely contributing to her symptoms. Per report pain was happening after urination likely due to skin irritation. In the setting of C. difficile as above, avoid unnecessary antibiotics. Stressed with the patient in detail the need to avoid antibiotics unless absolutely necessary   Uncontrolled type 2 diabetes mellitus with hyperglycemia (HCC)  Lab Results   Component Value Date    HGBA1C 16.1 (H) 08/16/2024       Recent Labs     09/12/24  2047 09/13/24  0140 09/13/24  0606 09/13/24  0627   POCGLU 146* 164* 55* 63*       Blood Sugar Average: Last 72 hrs:  (P) [id number]    Tighten diabetic control as per the primary service  Sinus tachycardia  Unclear etiology.  Perhaps all related to volume issues although the patient is eating and drinking without difficulty.  Possibly medication effect and the Minipress has been discontinued.  -Volume management  -Adjust medications  -Serial exams  -Additional workup as per the primary    Discussed with the primary service the plan to monitor off antibiotics and they agree with the plan    Infectious disease service will see the patient again 9/16/2024 if not discharged.  Please message me over the weekend if questions.      History of Present Illness   Patient still having some diarrhea and has tachycardia.  The heart rate is down a bit today.  No fever chills or sweats, no vomiting.    Objective      Temp:  [97.8 °F (36.6 °C)-99.2 °F (37.3  °C)] 98.1 °F (36.7 °C)  HR:  [] 108  Resp:  [18-19] 19  BP: ()/(68-95) 102/68  O2 Device: None (Room air)          I/O last 24 hours:  In: 1800 [P.O.:1800]  Out: 600 [Urine:600]  Lines/Drains/Airways       Active Status       None                  Physical Exam     Objective:  Vitals:  Temp:  [97.8 °F (36.6 °C)-99.2 °F (37.3 °C)] 98.1 °F (36.7 °C)  HR:  [] 108  Resp:  [18-19] 19  BP: ()/(68-95) 102/68  SpO2:  [92 %-99 %] 95 %  Temp (24hrs), Av.4 °F (36.9 °C), Min:97.8 °F (36.6 °C), Max:99.2 °F (37.3 °C)  Current: Temperature: 98.1 °F (36.7 °C)    Physical Exam:   General Appearance:  Alert, interactive, nontoxic, no acute distress.   Throat: Oropharynx moist without lesions.    Lungs:   Clear to auscultation bilaterally; no wheezes, rhonchi or rales; respirations unlabored   Heart:  Tachycardic; no murmur, rub or gallop   Abdomen:   Soft, non-tender, non-distended, positive bowel sounds.     Extremities: No clubbing, cyanosis or edema   Skin: No new rashes or lesions. No draining wounds noted.       Lab Results: I have reviewed the following results:   Recent Labs     24  0455   WBC 5.08   HGB 10.7*   HCT 32.9*      SODIUM 143   K 4.2      CO2 30   BUN 20   CREATININE 0.55*   GLUC 54*   MG 1.7*     Micro:  Lab Results   Component Value Date    BLOODCX No Growth After 5 Days. 08/15/2024    BLOODCX No Growth After 5 Days. 08/15/2024    BLOODCX No Growth After 5 Days. 2016    BLOODCX No Growth After 5 Days. 2016    URINECX 80,000-89,000 cfu/ml Klebsiella pneumoniae (A) 2024    URINECX 20,000-29,000 cfu/ml 2024    URINECX >100,000 cfu/ml Klebsiella pneumoniae (A) 2024     Imaging Review: No pertinent imaging studies reviewed.  Other Studies: No additional pertinent studies reviewed.

## 2024-09-13 NOTE — ASSESSMENT & PLAN NOTE
Unclear etiology.  Perhaps all related to volume issues although the patient is eating and drinking without difficulty.  Possibly medication effect and the Minipress has been discontinued.  -Volume management  -Adjust medications  -Serial exams  -Additional workup as per the primary

## 2024-09-13 NOTE — PHYSICAL THERAPY NOTE
"                                                                                  PHYSICAL THERAPY NOTE          Patient Name: Barb Palomo  Today's Date: 9/13/2024 09/13/24 0956   PT Last Visit   PT Visit Date 09/13/24   Note Type   Note Type Treatment   End of Consult   Patient Position at End of Consult Supine;All needs within reach   Pain Assessment   Pain Assessment Tool 0-10   Pain Score 6   Pain Location/Orientation Orientation: Left;Location: Knee;Orientation: Right;Location: Hip   Pain Onset/Description Onset: Ongoing   Effect of Pain on Daily Activities limits functional mobility   Patient's Stated Pain Goal No pain   Hospital Pain Intervention(s) Ambulation/increased activity   Multiple Pain Sites No   Restrictions/Precautions   Weight Bearing Precautions Per Order No   Other Precautions Contact/isolation;Cognitive;Fall Risk;Pain   General   Chart Reviewed Yes   Response to Previous Treatment Patient with no complaints from previous session.   Family/Caregiver Present No   Cognition   Overall Cognitive Status WFL   Arousal/Participation Alert;Responsive;Cooperative   Attention Within functional limits   Orientation Level Oriented X4   Memory Within functional limits   Following Commands Follows all commands and directions without difficulty   Comments pleasant and cooperative   Subjective   Subjective \"I would love to get up for another walk, amanda been in this bed for way too long\"   Five Times Sit To Stand   Time (Seconds) 13.78 Seconds   Timed Up and Go   TUG Trial 1 (Seconds) 12.57 Seconds   Bed Mobility   Supine to Sit 5  Supervision   Additional items Verbal cues;Increased time required   Sit to Supine 5  Supervision   Additional items Increased time required;Verbal cues   Additional Comments left supine in bed with all needs met   Transfers   Sit to Stand 5  Supervision   Additional items Increased time required;Verbal cues   Stand to Sit 5  Supervision   Additional items Increased time " required;Verbal cues   Additional Comments no AD, 8 STS throughout treatment session, verbal cues for hand placement   Ambulation/Elevation   Gait pattern Forward Flexion;Wide FAYE;Decreased foot clearance;Inconsistent loly;Excessively slow;Knees flexed   Gait Assistance 5  Supervision   Additional items Verbal cues   Assistive Device None   Distance 300' + 200'   Stair Management Assistance 3  Moderate assist   Additional items Assist x 1;Verbal cues;Tactile cues   Stair Management Technique One rail R;Step to pattern;Foreward;Nonreciprocal  (1 with use of hand rail, 1 with modA HHA)   Number of Stairs   (2+2)   Ambulation/Elevation Additional Comments Patient demonstrated the ability to ambulate 300' + 200' with no AD and occasional standing rest breaks due to limited endurance., requiring intermittent verbal cues to navigate the halls safely. Patient able to negotiate 2 stairs, once with use of hand rail and once with ModAx1 HHA. Patient with difficulties negotiating the stairs due to increase in b/l LE pain, generalized LE weakness, and decreased endurance.   Balance   Static Sitting Fair +   Dynamic Sitting Fair +   Static Standing Fair   Dynamic Standing Fair -   Ambulatory Fair -   Endurance Deficit   Endurance Deficit Yes   Endurance Deficit Description generalized LE weakness, decreased activity tolerance   Activity Tolerance   Activity Tolerance Patient tolerated treatment well;Patient limited by fatigue;Patient limited by pain   Nurse Made Aware RN Cleared   Assessment   Prognosis Fair   Problem List Decreased strength;Decreased endurance;Impaired balance;Decreased mobility;Decreased skin integrity;Pain   Assessment PT initiated treatment session in order to assist patient in achieving goals to improve transfers, gait training, and overall activity tolerance. Patient demonstrated progress toward achieving functional mobility goals as evidenced by improving activity tolerance, ambulation, and overall  mobility. Patient pleasant, cooperative, and agreeable to today's treatment session. Patient received OOB in bedside chair. Throughout treatment session patient required both verbal and tactile cuing to improve safety, efficiency, and mechanics of mobility in addition to hands on assistance for all aspects of functional mobility. Additionally, he required increased time to execute specific mobility tasks with rest breaks in between secondary to gross fatigue and weakness. Patient is currently supervision bed mobility, transfers, and ambulation. Patient demonstrated the ability to ambulate 300' + 200' with no AD and occasional standing rest breaks due to limited endurance, requiring intermittent verbal cues to navigate the halls safely. Patient able to negotiate 2 stairs, once with use of hand rail and once with ModAx1 HHA. Patient with difficulties negotiating the stairs due to increase in b/l LE pain, generalized LE weakness, and decreased endurance. Patient left supine in bed with all needs met. Patient will benefit from continued skilled physical therapy to address gait / balance dysfunction, decreased activity tolerance, and generalized weakness. The patients AM-PAC Basic Mobility Inpatient Short From Raw Score is 17 .  Based on AM-PAC scoring and patient presentation, PT currently recommending Level II (Moderate Resource Intensity). Please also refer to the recommendation of the Physical Therapist for safe discharge planning.   Barriers to Discharge Inaccessible home environment   Goals   Patient Goals to get better   STG Expiration Date 09/17/24   PT Treatment Day 6   Plan   Treatment/Interventions ADL retraining;Functional transfer training;LE strengthening/ROM;Elevations;Endurance training;Therapeutic exercise;Patient/family training;Bed mobility;Gait training;Spoke to nursing   Progress Progressing toward goals   PT Frequency 2-3x/wk   Discharge Recommendation   Rehab Resource Intensity Level, PT II  (Moderate Resource Intensity)   Equipment Recommended Walker   Walker Package Recommended Wheeled walker   AM-PAC Basic Mobility Inpatient   Turning in Flat Bed Without Bedrails 3   Lying on Back to Sitting on Edge of Flat Bed Without Bedrails 3   Moving Bed to Chair 3   Standing Up From Chair Using Arms 3   Walk in Room 3   Climb 3-5 Stairs With Railing 2   Basic Mobility Inpatient Raw Score 17   Basic Mobility Standardized Score 39.67   [facility] Highest Level Of Mobility   -HLM Goal 5: Stand one or more mins   -HLM Achieved 8: Walk 250 feet ot more   Education   Education Provided Mobility training;Assistive device   Patient Demonstrates acceptance/verbal understanding   End of Consult   Patient Position at End of Consult Supine;All needs within reach     Alberta Terry PT, DPT

## 2024-09-13 NOTE — PLAN OF CARE
Problem: Nutrition/Hydration-ADULT  Goal: Nutrient/Hydration intake appropriate for improving, restoring or maintaining nutritional needs  Description: Monitor and assess patient's nutrition/hydration status for malnutrition. Collaborate with interdisciplinary team and initiate plan and interventions as ordered.  Monitor patient's weight and dietary intake as ordered or per policy. Utilize nutrition screening tool and intervene as necessary. Determine patient's food preferences and provide high-protein, high-caloric foods as appropriate.     INTERVENTIONS:  - Monitor oral intake, urinary output, labs, and treatment plans  - Assess nutrition and hydration status and recommend course of action  - Evaluate amount of meals eaten  - Assist patient with eating if necessary   - Allow adequate time for meals  - Recommend/ encourage appropriate diets, oral nutritional supplements, and vitamin/mineral supplements  - Order, calculate, and assess calorie counts as needed  - Recommend, monitor, and adjust tube feedings and TPN/PPN based on assessed needs  - Assess need for intravenous fluids  - Provide specific nutrition/hydration education as appropriate  - Include patient/family/caregiver in decisions related to nutrition  Outcome: Progressing     Problem: PAIN - ADULT  Goal: Verbalizes/displays adequate comfort level or baseline comfort level  Description: Interventions:  - Encourage patient to monitor pain and request assistance  - Assess pain using appropriate pain scale  - Administer analgesics based on type and severity of pain and evaluate response  - Implement non-pharmacological measures as appropriate and evaluate response  - Consider cultural and social influences on pain and pain management  - Notify physician/advanced practitioner if interventions unsuccessful or patient reports new pain  Outcome: Progressing     Problem: INFECTION - ADULT  Goal: Absence or prevention of progression during  hospitalization  Description: INTERVENTIONS:  - Assess and monitor for signs and symptoms of infection  - Monitor lab/diagnostic results  - Monitor all insertion sites, i.e. indwelling lines, tubes, and drains  - Monitor endotracheal if appropriate and nasal secretions for changes in amount and color  - Parachute appropriate cooling/warming therapies per order  - Administer medications as ordered  - Instruct and encourage patient and family to use good hand hygiene technique  - Identify and instruct in appropriate isolation precautions for identified infection/condition  Outcome: Progressing  Goal: Absence of fever/infection during neutropenic period  Description: INTERVENTIONS:  - Monitor WBC    Outcome: Progressing     Problem: SAFETY ADULT  Goal: Patient will remain free of falls  Description: INTERVENTIONS:  - Educate patient/family on patient safety including physical limitations  - Instruct patient to call for assistance with activity   - Consult OT/PT to assist with strengthening/mobility   - Keep Call bell within reach  - Keep bed low and locked with side rails adjusted as appropriate  - Keep care items and personal belongings within reach  - Initiate and maintain comfort rounds  - Make Fall Risk Sign visible to staff  - Offer Toileting every 2 Hours, in advance of need  - Initiate/Maintain alarm  - Obtain necessary fall risk management equipment  - Apply yellow socks and bracelet for high fall risk patients  - Consider moving patient to room near nurses station  Outcome: Progressing  Goal: Maintain or return to baseline ADL function  Description: INTERVENTIONS:  -  Assess patient's ability to carry out ADLs; assess patient's baseline for ADL function and identify physical deficits which impact ability to perform ADLs (bathing, care of mouth/teeth, toileting, grooming, dressing, etc.)  - Assess/evaluate cause of self-care deficits   - Assess range of motion  - Assess patient's mobility; develop plan if  impaired  - Assess patient's need for assistive devices and provide as appropriate  - Encourage maximum independence but intervene and supervise when necessary  - Involve family in performance of ADLs  - Assess for home care needs following discharge   - Consider OT consult to assist with ADL evaluation and planning for discharge  - Provide patient education as appropriate  Outcome: Progressing  Goal: Maintains/Returns to pre admission functional level  Description: INTERVENTIONS:  - Perform AM-PAC 6 Click Basic Mobility/ Daily Activity assessment daily.  - Set and communicate daily mobility goal to care team and patient/family/caregiver.   - Collaborate with rehabilitation services on mobility goals if consulted  - Perform Range of Motion 3 times a day.  - Reposition patient every 2 hours.  - Dangle patient 3 times a day  - Stand patient 3 times a day  - Ambulate patient 3 times a day  - Out of bed to chair 3 times a day   - Out of bed for meals 3 times a day  - Out of bed for toileting  - Record patient progress and toleration of activity level   Outcome: Progressing     Problem: DISCHARGE PLANNING  Goal: Discharge to home or other facility with appropriate resources  Description: INTERVENTIONS:  - Identify barriers to discharge w/patient and caregiver  - Arrange for needed discharge resources and transportation as appropriate  - Identify discharge learning needs (meds, wound care, etc.)  - Arrange for interpretive services to assist at discharge as needed  - Refer to Case Management Department for coordinating discharge planning if the patient needs post-hospital services based on physician/advanced practitioner order or complex needs related to functional status, cognitive ability, or social support system  Outcome: Progressing     Problem: Knowledge Deficit  Goal: Patient/family/caregiver demonstrates understanding of disease process, treatment plan, medications, and discharge instructions  Description:  Complete learning assessment and assess knowledge base.  Interventions:  - Provide teaching at level of understanding  - Provide teaching via preferred learning methods  Outcome: Progressing     Problem: NEUROSENSORY - ADULT  Goal: Achieves stable or improved neurological status  Description: INTERVENTIONS  - Monitor and report changes in neurological status  - Monitor vital signs such as temperature, blood pressure, glucose, and any other labs ordered   - Initiate measures to prevent increased intracranial pressure  - Monitor for seizure activity and implement precautions if appropriate      Outcome: Progressing  Goal: Remains free of injury related to seizures activity  Description: INTERVENTIONS  - Maintain airway, patient safety  and administer oxygen as ordered  - Monitor patient for seizure activity, document and report duration and description of seizure to physician/advanced practitioner  - If seizure occurs,  ensure patient safety during seizure  - Reorient patient post seizure  - Seizure pads on all 4 side rails  - Instruct patient/family to notify RN of any seizure activity including if an aura is experienced  - Instruct patient/family to call for assistance with activity based on nursing assessment  - Administer anti-seizure medications if ordered    Outcome: Progressing  Goal: Achieves maximal functionality and self care  Description: INTERVENTIONS  - Monitor swallowing and airway patency with patient fatigue and changes in neurological status  - Encourage and assist patient to increase activity and self care.   - Encourage visually impaired, hearing impaired and aphasic patients to use assistive/communication devices  Outcome: Progressing     Problem: METABOLIC, FLUID AND ELECTROLYTES - ADULT  Goal: Electrolytes maintained within normal limits  Description: INTERVENTIONS:  - Monitor labs and assess patient for signs and symptoms of electrolyte imbalances  - Administer electrolyte replacement as  ordered  - Monitor response to electrolyte replacements, including repeat lab results as appropriate  - Instruct patient on fluid and nutrition as appropriate  Outcome: Progressing  Goal: Fluid balance maintained  Description: INTERVENTIONS:  - Monitor labs   - Monitor I/O and WT  - Instruct patient on fluid and nutrition as appropriate  - Assess for signs & symptoms of volume excess or deficit  Outcome: Progressing  Goal: Glucose maintained within target range  Description: INTERVENTIONS:  - Monitor Blood Glucose as ordered  - Assess for signs and symptoms of hyperglycemia and hypoglycemia  - Administer ordered medications to maintain glucose within target range  - Assess nutritional intake and initiate nutrition service referral as needed  Outcome: Progressing     Problem: SKIN/TISSUE INTEGRITY - ADULT  Goal: Skin Integrity remains intact(Skin Breakdown Prevention)  Description: Assess:  -Perform William assessment every shift  -Clean and moisturize skin every day  -Inspect skin when repositioning, toileting, and assisting with ADLS  -Assess under medical devices such as lines every 2 hours  -Assess extremities for adequate circulation and sensation     Bed Management:  -Have minimal linens on bed & keep smooth, unwrinkled  -Change linens as needed when moist or perspiring  -Avoid sitting or lying in one position for more than 2 hours while in bed  -Keep HOB at 30 degrees     Toileting:  -Offer bedside commode  -Assess for incontinence every hour  -Use incontinent care products after each incontinent episode such as moisture barriers, foam cleansers    Activity:  -Mobilize patient 3 times a day  -Encourage activity and walks on unit  -Encourage or provide ROM exercises   -Turn and reposition patient every 2 Hours  -Use appropriate equipment to lift or move patient in bed  -Instruct/ Assist with weight shifting every 15 min when out of bed in chair  -Consider limitation of chair time 2 hour intervals    Skin  Care:  -Avoid use of baby powder, tape, friction and shearing, hot water or constrictive clothing  -Relieve pressure over bony prominences using offloading techniques or foam dressings (If not contraindicated by incontinence)  -Do not massage red bony areas    Next Steps:  -Teach patient strategies to minimize risks    -Consider consults to  interdisciplinary teams  Outcome: Progressing  Goal: Incision(s), wounds(s) or drain site(s) healing without S/S of infection  Description: INTERVENTIONS  - Assess and document dressing, incision, wound bed, drain sites and surrounding tissue  - Provide patient and family education  - Perform skin care/dressing changes  Outcome: Progressing  Goal: Pressure injury heals and does not worsen  Description: Interventions:  - Implement low air loss mattress or specialty surface (Criteria met)  - Apply silicone foam dressing  - Instruct/assist with weight shifting every 15 minutes when in chair   - Limit chair time to 2 hour intervals  - Use special pressure reducing interventions when in chair   - Apply fecal or urinary incontinence containment device   - Perform passive or active ROM  - Turn and reposition patient & offload bony prominences every 2 hours   - Utilize friction reducing device or surface for transfers   - Consider consults to  interdisciplinary teams  - Use incontinent care products after each incontinent episode  - Consider nutrition services referral as needed  Outcome: Progressing     Problem: MUSCULOSKELETAL - ADULT  Goal: Maintain or return mobility to safest level of function  Description: INTERVENTIONS:  - Assess patient's ability to carry out ADLs; assess patient's baseline for ADL function and identify physical deficits which impact ability to perform ADLs (bathing, care of mouth/teeth, toileting, grooming, dressing, etc.)  - Assess/evaluate cause of self-care deficits   - Assess range of motion  - Assess patient's mobility  - Assess patient's need for  assistive devices and provide as appropriate  - Encourage maximum independence but intervene and supervise when necessary  - Involve family in performance of ADLs  - Assess for home care needs following discharge   - Consider OT consult to assist with ADL evaluation and planning for discharge  - Provide patient education as appropriate  Outcome: Progressing  Goal: Maintain proper alignment of affected body part  Description: INTERVENTIONS:  - Support, maintain and protect limb and body alignment  - Provide patient/ family with appropriate education  Outcome: Progressing     Problem: Prexisting or High Potential for Compromised Skin Integrity  Goal: Skin integrity is maintained or improved  Description: INTERVENTIONS:  - Identify patients at risk for skin breakdown  - Assess and monitor skin integrity  - Assess and monitor nutrition and hydration status  - Monitor labs   - Assess for incontinence   - Turn and reposition patient  - Assist with mobility/ambulation  - Relieve pressure over bony prominences  - Avoid friction and shearing  - Provide appropriate hygiene as needed including keeping skin clean and dry  - Evaluate need for skin moisturizer/barrier cream  - Collaborate with interdisciplinary team   - Patient/family teaching  - Consider wound care consult   Outcome: Progressing

## 2024-09-13 NOTE — PLAN OF CARE
Problem: PHYSICAL THERAPY ADULT  Goal: Performs mobility at highest level of function for planned discharge setting.  See evaluation for individualized goals.  Description: Treatment/Interventions: Functional transfer training, LE strengthening/ROM, Elevations, Therapeutic exercise, Endurance training, Patient/family training, Equipment eval/education, Bed mobility, Gait training, Spoke to nursing, OT  Equipment Recommended: Walker       See flowsheet documentation for full assessment, interventions and recommendations.  Outcome: Progressing  Note: Prognosis: Fair  Problem List: Decreased strength, Decreased endurance, Impaired balance, Decreased mobility, Decreased skin integrity, Pain  Assessment: PT initiated treatment session in order to assist patient in achieving goals to improve transfers, gait training, and overall activity tolerance. Patient demonstrated progress toward achieving functional mobility goals as evidenced by improving activity tolerance, ambulation, and overall mobility. Patient pleasant, cooperative, and agreeable to today's treatment session. Patient received OOB in bedside chair. Throughout treatment session patient required both verbal and tactile cuing to improve safety, efficiency, and mechanics of mobility in addition to hands on assistance for all aspects of functional mobility. Additionally, he required increased time to execute specific mobility tasks with rest breaks in between secondary to gross fatigue and weakness. Patient is currently supervision bed mobility, transfers, and ambulation. Patient demonstrated the ability to ambulate 300' + 200' with no AD and occasional standing rest breaks due to limited endurance, requiring intermittent verbal cues to navigate the halls safely. Patient able to negotiate 2 stairs, once with use of hand rail and once with ModAx1 HHA. Patient with difficulties negotiating the stairs due to increase in b/l LE pain, generalized LE weakness, and  decreased endurance. Patient left supine in bed with all needs met. Patient will benefit from continued skilled physical therapy to address gait / balance dysfunction, decreased activity tolerance, and generalized weakness. The patients AM-PAC Basic Mobility Inpatient Short From Raw Score is 17 .  Based on AM-PAC scoring and patient presentation, PT currently recommending Level II (Moderate Resource Intensity). Please also refer to the recommendation of the Physical Therapist for safe discharge planning.  Barriers to Discharge: Inaccessible home environment     Rehab Resource Intensity Level, PT: II (Moderate Resource Intensity)    See flowsheet documentation for full assessment.

## 2024-09-13 NOTE — ASSESSMENT & PLAN NOTE
Patient with persistent sinus tachycardia.  Failure thought to be hypotension, side effect of Minipress since patient was volume resuscitated and Minipress held  She also has multiple issues ongoing uncontrolled labile blood sugar, autonomic/C. difficile diarrhea.  Her appetite is normal and blood pressure has remained fairly stable lately but continues to have tachycardia.  She is not septic at this time.  TSH checked which was normal hence cardiology was consulted, to review any underlying explanation for sinus tachycardia  Echo normal  Sinus tachycardia improving

## 2024-09-13 NOTE — OCCUPATIONAL THERAPY NOTE
Occupational Therapy Progress Note     Patient Name: Barb Palomo  Today's Date: 9/13/2024  Problem List  Principal Problem:    C. difficile diarrhea  Active Problems:    Severe episode of recurrent major depressive disorder, without psychotic features (HCC)    GERD without esophagitis    Tobacco dependence    Chronic diarrhea    Opioid dependence with other opioid-induced disorder (HCC)    Uncontrolled type 2 diabetes mellitus with hyperglycemia (HCC)    Acute cystitis without hematuria    Severe protein-calorie malnutrition (HCC)    Hypomagnesemia    Failure to thrive in adult    Sinus tachycardia    Hypotension    Bacteriuria          09/13/24 0845   OT Last Visit   OT Visit Date 09/13/24   Note Type   Note Type Treatment   Pain Assessment   Pain Assessment Tool 0-10   Pain Score 7   Pain Location/Orientation Orientation: Lower;Location: Back;Orientation: Right;Location: Knee   Patient's Stated Pain Goal No pain   Hospital Pain Intervention(s) Repositioned;Ambulation/increased activity   Restrictions/Precautions   Weight Bearing Precautions Per Order No   Other Precautions Contact/isolation;Pain   Lifestyle   Autonomy I adls and mobility however ?able ability to manage at home given pt's condition and reported condition of home - i iadls - family assists PRN   Reciprocal Relationships limited support   Service to Others disabled   Intrinsic Gratification Loves sushi   ADL   Where Assessed Standing at sink   LB Dressing Assistance 4  Minimal Assistance   LB Dressing Deficit Fasteners   LB Dressing Comments Pt able to don brief and pull over hips standing at sink no AD. Pt able to manage clothing up as therapist provides Min A to adjust fasteners   Functional Standing Tolerance   Time 5 minutes   Activity LB dressing standing at sink   Comments Pt stands for 5 minutes no AD w/ occasional Min A for higher level dynamic standing tasks   Bed Mobility   Additional Comments Pt greeted standing in bathroom and left  "OOB in chair w/ all needs within reach   Transfers   Stand to Sit 5  Supervision   Additional items Increased time required;Verbal cues   Additional Comments no AD. VC to posterior scoot in chair   Functional Mobility   Functional Mobility 4  Minimal assistance   Additional Comments Pt performs long household distance functional mobility in hallway, no AD. 1 minute standing rest break.   Subjective   Subjective \"I have arthritis in both knees but the R hurts more so I tend to rely on the L side\"   Cognition   Overall Cognitive Status WFL   Arousal/Participation Alert;Responsive;Cooperative   Attention Within functional limits   Orientation Level Oriented X4   Memory Within functional limits   Following Commands Follows all commands and directions without difficulty   Comments Pt is pleasant and cooperative   Activity Tolerance   Activity Tolerance Patient tolerated treatment well   Medical Staff Made Aware RN cleared   Assessment   Assessment Pt seen for skilled OT treatment session on this date w/ interventions focusing on ADL participation, activity tolerance, standing tolerance, standing balance, transfer skills, and fxnl mobility. Pt was agreeable and willing to participate in session. Pt engaged in the following tasks: S STS; Min A LB dressing, functional mobility. In comparison to previous session, pt demonstrated improvements in functional mobility as she was able to ambulate without AD, though continues to require Min A and standing rest break. Pt required verbal cues for correct technique, frequent rest periods, and ocassional safety reminders. Pt continues to be functioning below baseline level as occupational performance remains limited by decreased ADL status, decreased activity tolerance, decreased endurance, decreased standing tolerance, decreased standing balance, decreased transfer skills, decreased fxnl mobility, pain, and generalized weakness . The patient's raw score on the AM-PAC Daily Activity " Inpatient Short Form is 19. A raw score of greater than or equal to 19 suggests the patient may benefit from discharge to home, however due to decreased ADL participation, standing tolerance/balance, functional mobility as well as medical status continue to recommend post-acute rehabilitation services. Please refer to the recommendation of the Occupational Therapist for safe discharge planning.From OT standpoint, recommend Level II (Moderate Resource Intensity) at time of d/c. Pt will benefit from continued OT treatment while in acute care to address deficits as defined above and maximize level of functional independence with ADLs and functional mobility. Pt left OOB in chair w/ all needs within reach at end of session.   Plan   Treatment Interventions ADL retraining;Functional transfer training;UE strengthening/ROM;Endurance training;Patient/family training;Equipment evaluation/education;Compensatory technique education;Continued evaluation;Energy conservation;Activityengagement   Goal Expiration Date 09/27/24   OT Treatment Day 5   OT Frequency 2-3x/wk   Discharge Recommendation   Rehab Resource Intensity Level, OT II (Moderate Resource Intensity)   AM-PAC Daily Activity Inpatient   Lower Body Dressing 3   Bathing 3   Toileting 3   Upper Body Dressing 3   Grooming 3   Eating 4   Daily Activity Raw Score 19   Daily Activity Standardized Score (Calc for Raw Score >=11) 40.22   AM-PAC Applied Cognition Inpatient   Following a Speech/Presentation 3   Understanding Ordinary Conversation 4   Taking Medications 3   Remembering Where Things Are Placed or Put Away 3   Remembering List of 4-5 Errands 3   Taking Care of Complicated Tasks 3   Applied Cognition Raw Score 19   Applied Cognition Standardized Score 39.77   End of Consult   Education Provided Yes   Patient Position at End of Consult Bedside chair;All needs within reach   Nurse Communication Nurse aware of consult     SELINA Butler, OTR/L

## 2024-09-13 NOTE — CASE MANAGEMENT
Case Management Progress Note    Patient name Barb Palomo  Location [facility]-01 MRN [id number]  : 1970 Date 2024       LOS (days): 28  Geometric Mean LOS (GMLOS) (days): 5.1  Days to GMLOS:-23.5        Pt's LOS is 28 days. Pt is still not yet medically ready for d/c. Pt continues to experience complications due to C.diff bacterium. Pt has been switched to PO ABX, but is still on IV fluids for hydration. Pt is now S/P Fecal Transplant from 24.     Pt remains recommended for SNF rehab placement for her aftercare plan. Pt has been referred to and is accepted for placement at [facility] located in [facility].     Once pt is medically cleared for d/c by [facility], pt will admit to [facility] for rehab. Pt has Medicare for healthcare coverage; no authorization from insurance is required for pt's admission to [facility].     CM to follow.

## 2024-09-13 NOTE — ASSESSMENT & PLAN NOTE
Lab Results   Component Value Date    HGBA1C 16.1 (H) 08/16/2024       Recent Labs     09/12/24  2047 09/13/24  0140 09/13/24  0606 09/13/24 0627   POCGLU 146* 164* 55* 63*       Blood Sugar Average: Last 72 hrs:  (P) [id number]    Tighten diabetic control as per the primary service

## 2024-09-13 NOTE — PROGRESS NOTES
Progress Note - Hospitalist   Name: Barb Palomo 54 y.o. female I MRN: [id number]  Unit/Bed#: [facility] 522-01 I Date of Admission: 8/15/2024   Date of Service: 9/13/2024 I Hospital Day: 28    Assessment & Plan  C. difficile diarrhea  Patient presented with generalized weakness, frequent falls.  She has had ongoing diarrhea, dysuria, poor oral intake and reported 70 pound weight loss over the last year.  Initially hypotensive in the ER, status post IV fluids. Labs with multiple metabolic derangements  CT imaging showing mild diffuse colonic wall thickening, mild diffuse wall thickening of stomach  C. difficile found to be positive--has a history of C. difficile in September 2022.    Patient started on Dificid 200 mg x 10 days per protocol on 8/18 as this is recurrent infection  Initially seemed BMs were improving and were becoming more formed, however again having frequent watery stools  GI consulted: Now s/p 7 days fidoxamycin and 7 days oral vamcomycin. S/p FMT 9/6/24. No leukocytosis or fever.   -Continue to monitor off antibiotics. Appears to be at around baseline stool output, improved from presentation.   - discontinue her Protonix and probiotic with as needed Pepcid as replacement of Protonix.  Contact precautions  Appetite is good   GI signed off  Chronic diarrhea  Longstanding history of chronic diarrhea, patient reporting acute diarrhea x1 month. Follows with GI. Has tried questran/imodium without relief    -Likely component of autonomic diarrhea given her secretory type symptoms of diarrhea at night and stool incontinence.  She has uncontrolled diabetes with her most recent A1c above 16 as she was not taking insulin at home.   -Pancreatic fecal elastase low at 5.5.  She was subsequently started on Creon.  -Status postcholecystectomy and currently on Questran.  -Biopsies from a colonoscopy were negative for microscopic colitis.  Stool enteric panel negative  C. difficile positive, see plan above   Giardia  "antigen is negative  Also started on lometil  Diarrhea improving.  As per patient was having more stool but again started having diarrhea.  But diarrhea is not documented in flowsheet.  I spoke with patient's nurse and patient as well to inform nursing staff if patient really has diarrhea for proper assessment  Severe episode of recurrent major depressive disorder, without psychotic features (HCC)    continue home duloxetine, mirtazapine, as needed Valium  Hold minipress given symptomatic hypotension episodes, tachycardia   GERD without esophagitis  Change Protonix to Pepcid as needed secondary to C. difficile  Tobacco dependence  Counseled on need for cessation, provide nicotine patch while admitted  Recent CT chest for lung cancer screening 7/30/2024 negative for nodules or masses  Opioid dependence with other opioid-induced disorder (HCC)  PDMP reviewed, patient maintained on tramadol 200 mg daily as needed; history of chronic cervical/lumbar pain noted  Continue as needed tramadol and chronic gabapentin dosing  Uncontrolled type 2 diabetes mellitus with hyperglycemia (HCC)  Lab Results   Component Value Date    HGBA1C 16.1 (H) 08/16/2024       Recent Labs     09/13/24  0606 09/13/24  0627 09/13/24  0932 09/13/24  1035   POCGLU 55* 63* 323* 347*     Patient markedly hyperglycemic on admission only partially responsive to IV fluids, remainder of labs fortunately not consistent with DKA. A1C 16  Patient admitting she has not been using her home insulin as she apparently has been unable to fill prescription due to \"insurance issues,\" additionally reports that due to her neuropathy she has difficulty self injecting.  Daughter also reports jessica non compliance.   Typically on NovoLog 70/30 20 units twice daily  Diabetic diet-changed to regaular than to lactose free  Insulin regimen adjusted by endocrinology.  Endocrine follow-up requested ,given labile blood sugar  Also educated patient by myself, GI as well as social " worker regarding her options of obtaining insulin.  Patient has to apply for Medicaid he in November and meanwhile she can obtain insulin from [employer] which is the cheapest option.  Patient can do a curbside pickup if she has agoraphobia to go inside [facility] as she states it is a crowded place    Per endocrinology ACTH stimulation test today given low cortisol  Acute cystitis without hematuria  Reported dysuria on admission   Urine + nitrite & leukocytes, CT imaging with distended bladder suggestive of retention and small amount of air which could be secondary to infection  Urine Culture noted, S/P 3 doses of IV CTX 8/17 8/21, reported pain AFTER urination, likely related to urine hitting excoriated labia/bottom from frequent stooling. Will continue with medicated cream to this area- denies pain at this time   S/p pyridum x 3 doses  Wound care consult appreciated   Severe protein-calorie malnutrition (HCC)  Severe protein-calorie malnutrition 2/2 chronic diarrhea a/e/b fat and muscle loss requiring Nutrition consult, oral diet and nutrition supplements    360 Statement: severe malnutrition r/t suspect combination of chronic illness and social/environmental factors as evidenced by severe temporal muscle loss, severe tricep fat pad loss, at least moderate muscle loss around clavicles and shoulders, at least moderate orbital fat pad loss. Treatment: oral diet and oral nutrition supplements.    BMI Findings:  Adult BMI Classifications: Underweight < 18.5        Body mass index is 17.7 kg/m².   C/w supplements  Nutrition consult appreciated  Encourage po intake   Hypomagnesemia  Hold oral supplementation due to diarrhea  Check magnesium level and replace as needed  Failure to thrive in adult  Patient presented with increased generalized weakness and frequent falls, ongoing diarrhea, dysuria, poor oral intake, and at least 70-80 pound weight loss over the past 1 year. Has been following with GI, had colonoscopy May 2024  "with poor prep--had EGD with esophagitis and gastritis  Labs with multiple derangements including hyperglycemia, hypokalemia and hypomagnesemia on admission, improved.   C. difficile positive as above  Patient reports she has not been using insulin due to \"insurance issue\" and inability to self inject due to her neuropathy  CT abdomen and pelvis without any obvious malignancy  Treatment as per respective issues  PT/OT recommending rehab.   Endocrinology evaluation appreciated.  Sinus tachycardia  Patient with persistent sinus tachycardia.  Failure thought to be hypotension, side effect of Minipress since patient was volume resuscitated and Minipress held  She also has multiple issues ongoing uncontrolled labile blood sugar, autonomic/C. difficile diarrhea.  Her appetite is normal and blood pressure has remained fairly stable lately but continues to have tachycardia.  She is not septic at this time.  TSH checked which was normal hence cardiology was consulted, to review any underlying explanation for sinus tachycardia  Echo normal  Sinus tachycardia improving  Hypotension  Intermittent episodes of hypotension, most occurrences happen overnight and question correlation with minipress administration.  This has now been discontinued  Bacteriuria      VTE Pharmacologic Prophylaxis: VTE Score: 3 Moderate Risk (Score 3-4) - Pharmacological DVT Prophylaxis Ordered: enoxaparin (Lovenox).    Mobility:   Basic Mobility Inpatient Raw Score: 17  JH-HLM Goal: 5: Stand one or more mins  JH-HLM Achieved: 8: Walk 250 feet ot more  JH-HLM Goal NOT achieved. Continue with multidisciplinary rounding and encourage appropriate mobility to improve upon JH-HLM goals.    Patient Centered Rounds: I performed bedside rounds with nursing staff today.   Discussions with Specialists or Other Care Team Provider: yes    Education and Discussions with Family / Patient:  Left voicemail for daughter Zahira.     Current Length of Stay: 28 " day(s)  Current Patient Status: Inpatient   Certification Statement: The patient will continue to require additional inpatient hospital stay due to ongoing treatment  Discharge Plan: Anticipate discharge in 24-48 hrs to discharge location to be determined pending rehab evaluations.    Code Status: Level 1 - Full Code    Subjective   Resting in bed, diarrhea episode has decreased significantly as per patient and stool becoming more formed.  In a better mood today.  No new complaints.    Objective     Vitals:   Temp (24hrs), Av.2 °F (36.8 °C), Min:97.8 °F (36.6 °C), Max:98.8 °F (37.1 °C)    Temp:  [97.8 °F (36.6 °C)-98.8 °F (37.1 °C)] 98.1 °F (36.7 °C)  HR:  [] 124  Resp:  [18-19] 19  BP: ()/(67-95) 98/67  SpO2:  [92 %-99 %] 97 %  Body mass index is 17.7 kg/m².     Input and Output Summary (last 24 hours):     Intake/Output Summary (Last 24 hours) at 2024 1417  Last data filed at 2024 0712  Gross per 24 hour   Intake 360 ml   Output 200 ml   Net 160 ml       Physical Exam  Constitutional:       Comments: Cachectic   HENT:      Mouth/Throat:      Mouth: Mucous membranes are moist.   Eyes:      Pupils: Pupils are equal, round, and reactive to light.   Cardiovascular:      Rate and Rhythm: Tachycardia present.   Pulmonary:      Effort: Pulmonary effort is normal.      Breath sounds: Normal breath sounds.   Abdominal:      General: Bowel sounds are normal.      Palpations: Abdomen is soft.   Musculoskeletal:         General: Normal range of motion.      Cervical back: Normal range of motion.   Skin:     General: Skin is warm.   Neurological:      General: No focal deficit present.      Mental Status: She is alert and oriented to person, place, and time.   Psychiatric:         Mood and Affect: Mood normal.          Lines/Drains:  Lines/Drains/Airways       Active Status       None                            Lab Results: I have reviewed the following results: CBC/BMP:   .     24  0455   WBC  5.08   HGB 10.7*   HCT 32.9*      SODIUM 143   K 4.2      CO2 30   BUN 20   CREATININE 0.55*   GLUC 54*   MG 1.7*       Results from last 7 days   Lab Units 09/13/24  0455   WBC Thousand/uL 5.08   HEMOGLOBIN g/dL 10.7*   HEMATOCRIT % 32.9*   PLATELETS Thousands/uL 270   SEGS PCT % 40*   LYMPHO PCT % 45*   MONO PCT % 10   EOS PCT % 4     Results from last 7 days   Lab Units 09/13/24  0455   SODIUM mmol/L 143   POTASSIUM mmol/L 4.2   CHLORIDE mmol/L 107   CO2 mmol/L 30   BUN mg/dL 20   CREATININE mg/dL 0.55*   ANION GAP mmol/L 6   CALCIUM mg/dL 9.2   GLUCOSE RANDOM mg/dL 54*         Results from last 7 days   Lab Units 09/13/24  1035 09/13/24  0932 09/13/24  0627 09/13/24  0606 09/13/24  0140 09/12/24  2047 09/12/24  1529 09/12/24  1040 09/12/24  0611 09/12/24  0316 09/12/24  0228 09/12/24  0225   POC GLUCOSE mg/dl 347* 323* 63* 55* 164* 146* 77 94 134 188* 73 37*               Recent Cultures (last 7 days):         Imaging Review: No pertinent imaging studies reviewed.  Other Studies: No additional pertinent studies reviewed.    Last 24 Hours Medication List:     Current Facility-Administered Medications:     acetaminophen (TYLENOL) tablet 650 mg, Q6H PRN    albuterol (PROVENTIL HFA,VENTOLIN HFA) inhaler 2 puff, Q4H PRN    aspirin chewable tablet 81 mg, Daily    atorvastatin (LIPITOR) tablet 80 mg, Daily    baclofen tablet 5 mg, BID    butalbital-acetaminophen-caffeine (FIORICET,ESGIC) -40 mg per tablet 1 tablet, Q6H PRN    cholestyramine sugar free (QUESTRAN LIGHT) packet 4 g, BID    diazepam (VALIUM) tablet 5 mg, Q12H PRN    dicyclomine (BENTYL) capsule 10 mg, 4x Daily (AC & HS)    diphenoxylate-atropine (LOMOTIL) 2.5-0.025 mg per tablet 1 tablet, 4x Daily    DULoxetine (CYMBALTA) delayed release capsule 60 mg, BID    enoxaparin (LOVENOX) subcutaneous injection 40 mg, Q24H HONEY    famotidine (PEPCID) tablet 20 mg, BID    fluticasone-vilanterol 200-25 mcg/actuation 1 puff, Daily    gabapentin  (NEURONTIN) capsule 800 mg, TID    insulin glargine (LANTUS) subcutaneous injection 35 Units 0.35 mL, HS    insulin lispro (HumALOG/ADMELOG) 100 units/mL subcutaneous injection 1-5 Units, HS    insulin lispro (HumALOG/ADMELOG) 100 units/mL subcutaneous injection 1-6 Units, TID AC **AND** Fingerstick Glucose (POCT), TID AC    insulin lispro (HumALOG/ADMELOG) 100 units/mL subcutaneous injection 10 Units, TID With Meals    levothyroxine tablet 112 mcg, Early Morning    lidocaine (LIDODERM) 5 % patch 2 patch, Daily    metoprolol succinate (TOPROL-XL) 24 hr tablet 12.5 mg, Daily    mirtazapine (REMERON) tablet 7.5 mg, HS    moisture barrier miconazole 2% cream (aka DARLIN MOISTURE BARRIER ANTIFUNGAL CREAM), BID    nicotine (NICODERM CQ) 21 mg/24 hr TD 24 hr patch 1 patch, Daily    ondansetron (ZOFRAN) injection 4 mg, Q6H PRN    pancrelipase (Lip-Prot-Amyl) (CREON) delayed release capsule 24,000 Units, TID With Meals    psyllium (METAMUCIL) 1 packet, BID    traMADol (ULTRAM) tablet 50 mg, Q6H PRN    traZODone (DESYREL) tablet 50 mg, HS PRN    Administrative Statements   Today, Patient Was Seen By: Wanda Rubio MD  I have spent a total time of 25 minutes in caring for this patient on the day of the visit/encounter including Counseling / Coordination of care.    **Please Note: This note may have been constructed using a voice recognition system.**

## 2024-09-13 NOTE — ASSESSMENT & PLAN NOTE
Severe protein-calorie malnutrition 2/2 chronic diarrhea a/e/b fat and muscle loss requiring Nutrition consult, oral diet and nutrition supplements    360 Statement: severe malnutrition r/t suspect combination of chronic illness and social/environmental factors as evidenced by severe temporal muscle loss, severe tricep fat pad loss, at least moderate muscle loss around clavicles and shoulders, at least moderate orbital fat pad loss. Treatment: oral diet and oral nutrition supplements.    BMI Findings:  Adult BMI Classifications: Underweight < 18.5        Body mass index is 17.7 kg/m².   C/w supplements  Nutrition consult appreciated  Encourage po intake

## 2024-09-14 LAB
ACTH PLAS-MCNC: 16.4 PG/ML (ref 7.2–63.3)
GLUCOSE SERPL-MCNC: 132 MG/DL (ref 65–140)
GLUCOSE SERPL-MCNC: 149 MG/DL (ref 65–140)
GLUCOSE SERPL-MCNC: 167 MG/DL (ref 65–140)
GLUCOSE SERPL-MCNC: 169 MG/DL (ref 65–140)
GLUCOSE SERPL-MCNC: 259 MG/DL (ref 65–140)

## 2024-09-14 PROCEDURE — 82948 REAGENT STRIP/BLOOD GLUCOSE: CPT

## 2024-09-14 PROCEDURE — 99233 SBSQ HOSP IP/OBS HIGH 50: CPT | Performed by: INTERNAL MEDICINE

## 2024-09-14 RX ADMIN — DICYCLOMINE HYDROCHLORIDE 10 MG: 10 CAPSULE ORAL at 05:20

## 2024-09-14 RX ADMIN — INSULIN LISPRO 1 UNITS: 100 INJECTION, SOLUTION INTRAVENOUS; SUBCUTANEOUS at 17:12

## 2024-09-14 RX ADMIN — INSULIN GLARGINE 30 UNITS: 100 INJECTION, SOLUTION SUBCUTANEOUS at 21:34

## 2024-09-14 RX ADMIN — MIRTAZAPINE 7.5 MG: 15 TABLET, FILM COATED ORAL at 21:34

## 2024-09-14 RX ADMIN — CHOLESTYRAMINE 4 G: 4 POWDER, FOR SUSPENSION ORAL at 08:18

## 2024-09-14 RX ADMIN — DIAZEPAM 5 MG: 5 TABLET ORAL at 10:03

## 2024-09-14 RX ADMIN — Medication 1 PACKET: at 17:11

## 2024-09-14 RX ADMIN — INSULIN LISPRO 10 UNITS: 100 INJECTION, SOLUTION INTRAVENOUS; SUBCUTANEOUS at 08:16

## 2024-09-14 RX ADMIN — FAMOTIDINE 20 MG: 20 TABLET, FILM COATED ORAL at 08:11

## 2024-09-14 RX ADMIN — DICYCLOMINE HYDROCHLORIDE 10 MG: 10 CAPSULE ORAL at 17:15

## 2024-09-14 RX ADMIN — TRAMADOL HYDROCHLORIDE 50 MG: 50 TABLET, COATED ORAL at 11:29

## 2024-09-14 RX ADMIN — ONDANSETRON 4 MG: 2 INJECTION INTRAMUSCULAR; INTRAVENOUS at 19:46

## 2024-09-14 RX ADMIN — FLUTICASONE FUROATE AND VILANTEROL TRIFENATATE 1 PUFF: 200; 25 POWDER RESPIRATORY (INHALATION) at 08:18

## 2024-09-14 RX ADMIN — LEVOTHYROXINE SODIUM 112 MCG: 112 TABLET ORAL at 05:20

## 2024-09-14 RX ADMIN — DIPHENOXYLATE HYDROCHLORIDE AND ATROPINE SULFATE 1 TABLET: .025; 2.5 TABLET ORAL at 11:29

## 2024-09-14 RX ADMIN — FAMOTIDINE 20 MG: 20 TABLET, FILM COATED ORAL at 17:11

## 2024-09-14 RX ADMIN — ONDANSETRON 4 MG: 2 INJECTION INTRAMUSCULAR; INTRAVENOUS at 11:29

## 2024-09-14 RX ADMIN — PANCRELIPASE 24000 UNITS: 120000; 24000; 76000 CAPSULE, DELAYED RELEASE PELLETS ORAL at 17:13

## 2024-09-14 RX ADMIN — DIPHENOXYLATE HYDROCHLORIDE AND ATROPINE SULFATE 1 TABLET: .025; 2.5 TABLET ORAL at 08:11

## 2024-09-14 RX ADMIN — PANCRELIPASE 24000 UNITS: 120000; 24000; 76000 CAPSULE, DELAYED RELEASE PELLETS ORAL at 11:29

## 2024-09-14 RX ADMIN — DULOXETINE HYDROCHLORIDE 60 MG: 60 CAPSULE, DELAYED RELEASE ORAL at 17:11

## 2024-09-14 RX ADMIN — INSULIN LISPRO 10 UNITS: 100 INJECTION, SOLUTION INTRAVENOUS; SUBCUTANEOUS at 17:12

## 2024-09-14 RX ADMIN — ENOXAPARIN SODIUM 40 MG: 40 INJECTION SUBCUTANEOUS at 08:14

## 2024-09-14 RX ADMIN — ATORVASTATIN CALCIUM 80 MG: 80 TABLET, FILM COATED ORAL at 08:10

## 2024-09-14 RX ADMIN — NICOTINE 1 PATCH: 21 PATCH, EXTENDED RELEASE TRANSDERMAL at 08:12

## 2024-09-14 RX ADMIN — DIPHENOXYLATE HYDROCHLORIDE AND ATROPINE SULFATE 1 TABLET: .025; 2.5 TABLET ORAL at 17:11

## 2024-09-14 RX ADMIN — DULOXETINE HYDROCHLORIDE 60 MG: 60 CAPSULE, DELAYED RELEASE ORAL at 08:10

## 2024-09-14 RX ADMIN — ACETAMINOPHEN 650 MG: 325 TABLET ORAL at 16:38

## 2024-09-14 RX ADMIN — MICONAZOLE NITRATE 1 APPLICATION: 20 CREAM TOPICAL at 08:13

## 2024-09-14 RX ADMIN — BACLOFEN 5 MG: 10 TABLET ORAL at 08:10

## 2024-09-14 RX ADMIN — INSULIN LISPRO 1 UNITS: 100 INJECTION, SOLUTION INTRAVENOUS; SUBCUTANEOUS at 11:30

## 2024-09-14 RX ADMIN — DIPHENOXYLATE HYDROCHLORIDE AND ATROPINE SULFATE 1 TABLET: .025; 2.5 TABLET ORAL at 21:34

## 2024-09-14 RX ADMIN — PANCRELIPASE 24000 UNITS: 120000; 24000; 76000 CAPSULE, DELAYED RELEASE PELLETS ORAL at 08:14

## 2024-09-14 RX ADMIN — DICYCLOMINE HYDROCHLORIDE 10 MG: 10 CAPSULE ORAL at 21:35

## 2024-09-14 RX ADMIN — GABAPENTIN 800 MG: 400 CAPSULE ORAL at 08:11

## 2024-09-14 RX ADMIN — CHOLESTYRAMINE 4 G: 4 POWDER, FOR SUSPENSION ORAL at 17:13

## 2024-09-14 RX ADMIN — ASPIRIN 81 MG CHEWABLE TABLET 81 MG: 81 TABLET CHEWABLE at 08:11

## 2024-09-14 RX ADMIN — DICYCLOMINE HYDROCHLORIDE 10 MG: 10 CAPSULE ORAL at 11:29

## 2024-09-14 RX ADMIN — TRAZODONE HYDROCHLORIDE 50 MG: 50 TABLET ORAL at 23:20

## 2024-09-14 RX ADMIN — GABAPENTIN 800 MG: 400 CAPSULE ORAL at 17:16

## 2024-09-14 RX ADMIN — GABAPENTIN 800 MG: 400 CAPSULE ORAL at 21:35

## 2024-09-14 RX ADMIN — BACLOFEN 5 MG: 10 TABLET ORAL at 17:11

## 2024-09-14 RX ADMIN — INSULIN LISPRO 10 UNITS: 100 INJECTION, SOLUTION INTRAVENOUS; SUBCUTANEOUS at 11:30

## 2024-09-14 NOTE — PLAN OF CARE
Problem: Nutrition/Hydration-ADULT  Goal: Nutrient/Hydration intake appropriate for improving, restoring or maintaining nutritional needs  Description: Monitor and assess patient's nutrition/hydration status for malnutrition. Collaborate with interdisciplinary team and initiate plan and interventions as ordered.  Monitor patient's weight and dietary intake as ordered or per policy. Utilize nutrition screening tool and intervene as necessary. Determine patient's food preferences and provide high-protein, high-caloric foods as appropriate.     INTERVENTIONS:  - Monitor oral intake, urinary output, labs, and treatment plans  - Assess nutrition and hydration status and recommend course of action  - Evaluate amount of meals eaten  - Assist patient with eating if necessary   - Allow adequate time for meals  - Recommend/ encourage appropriate diets, oral nutritional supplements, and vitamin/mineral supplements  - Order, calculate, and assess calorie counts as needed  - Recommend, monitor, and adjust tube feedings and TPN/PPN based on assessed needs  - Assess need for intravenous fluids  - Provide specific nutrition/hydration education as appropriate  - Include patient/family/caregiver in decisions related to nutrition  Outcome: Progressing     Problem: PAIN - ADULT  Goal: Verbalizes/displays adequate comfort level or baseline comfort level  Description: Interventions:  - Encourage patient to monitor pain and request assistance  - Assess pain using appropriate pain scale  - Administer analgesics based on type and severity of pain and evaluate response  - Implement non-pharmacological measures as appropriate and evaluate response  - Consider cultural and social influences on pain and pain management  - Notify physician/advanced practitioner if interventions unsuccessful or patient reports new pain  Outcome: Progressing     Problem: INFECTION - ADULT  Goal: Absence or prevention of progression during  hospitalization  Description: INTERVENTIONS:  - Assess and monitor for signs and symptoms of infection  - Monitor lab/diagnostic results  - Monitor all insertion sites, i.e. indwelling lines, tubes, and drains  - Monitor endotracheal if appropriate and nasal secretions for changes in amount and color  - [place] appropriate cooling/warming therapies per order  - Administer medications as ordered  - Instruct and encourage patient and family to use good hand hygiene technique  - Identify and instruct in appropriate isolation precautions for identified infection/condition  Outcome: Progressing  Goal: Absence of fever/infection during neutropenic period  Description: INTERVENTIONS:  - Monitor WBC    Outcome: Progressing     Problem: SAFETY ADULT  Goal: Patient will remain free of falls  Description: INTERVENTIONS:  - Educate patient/family on patient safety including physical limitations  - Instruct patient to call for assistance with activity   - Consult OT/PT to assist with strengthening/mobility   - Keep Call bell within reach  - Keep bed low and locked with side rails adjusted as appropriate  - Keep care items and personal belongings within reach  - Initiate and maintain comfort rounds  - Make Fall Risk Sign visible to staff  - Offer Toileting every 2 Hours, in advance of need  - Initiate/Maintain alarm  - Obtain necessary fall risk management equipment  - Apply yellow socks and bracelet for high fall risk patients  - Consider moving patient to room near nurses station  Outcome: Progressing  Goal: Maintain or return to baseline ADL function  Description: INTERVENTIONS:  -  Assess patient's ability to carry out ADLs; assess patient's baseline for ADL function and identify physical deficits which impact ability to perform ADLs (bathing, care of mouth/teeth, toileting, grooming, dressing, etc.)  - Assess/evaluate cause of self-care deficits   - Assess range of motion  - Assess patient's mobility; develop plan if  impaired  - Assess patient's need for assistive devices and provide as appropriate  - Encourage maximum independence but intervene and supervise when necessary  - Involve family in performance of ADLs  - Assess for home care needs following discharge   - Consider OT consult to assist with ADL evaluation and planning for discharge  - Provide patient education as appropriate  Outcome: Progressing  Goal: Maintains/Returns to pre admission functional level  Description: INTERVENTIONS:  - Perform AM-PAC 6 Click Basic Mobility/ Daily Activity assessment daily.  - Set and communicate daily mobility goal to care team and patient/family/caregiver.   - Collaborate with rehabilitation services on mobility goals if consulted  - Perform Range of Motion 3 times a day.  - Reposition patient every 2 hours.  - Dangle patient 3 times a day  - Stand patient 3 times a day  - Ambulate patient 3 times a day  - Out of bed to chair 3 times a day   - Out of bed for meals 3 times a day  - Out of bed for toileting  - Record patient progress and toleration of activity level   Outcome: Progressing     Problem: DISCHARGE PLANNING  Goal: Discharge to home or other facility with appropriate resources  Description: INTERVENTIONS:  - Identify barriers to discharge w/patient and caregiver  - Arrange for needed discharge resources and transportation as appropriate  - Identify discharge learning needs (meds, wound care, etc.)  - Arrange for interpretive services to assist at discharge as needed  - Refer to Case Management Department for coordinating discharge planning if the patient needs post-hospital services based on physician/advanced practitioner order or complex needs related to functional status, cognitive ability, or social support system  Outcome: Progressing     Problem: Knowledge Deficit  Goal: Patient/family/caregiver demonstrates understanding of disease process, treatment plan, medications, and discharge instructions  Description:  Complete learning assessment and assess knowledge base.  Interventions:  - Provide teaching at level of understanding  - Provide teaching via preferred learning methods  Outcome: Progressing     Problem: NEUROSENSORY - ADULT  Goal: Achieves stable or improved neurological status  Description: INTERVENTIONS  - Monitor and report changes in neurological status  - Monitor vital signs such as temperature, blood pressure, glucose, and any other labs ordered   - Initiate measures to prevent increased intracranial pressure  - Monitor for seizure activity and implement precautions if appropriate      Outcome: Progressing  Goal: Remains free of injury related to seizures activity  Description: INTERVENTIONS  - Maintain airway, patient safety  and administer oxygen as ordered  - Monitor patient for seizure activity, document and report duration and description of seizure to physician/advanced practitioner  - If seizure occurs,  ensure patient safety during seizure  - Reorient patient post seizure  - Seizure pads on all 4 side rails  - Instruct patient/family to notify RN of any seizure activity including if an aura is experienced  - Instruct patient/family to call for assistance with activity based on nursing assessment  - Administer anti-seizure medications if ordered    Outcome: Progressing  Goal: Achieves maximal functionality and self care  Description: INTERVENTIONS  - Monitor swallowing and airway patency with patient fatigue and changes in neurological status  - Encourage and assist patient to increase activity and self care.   - Encourage visually impaired, hearing impaired and aphasic patients to use assistive/communication devices  Outcome: Progressing     Problem: METABOLIC, FLUID AND ELECTROLYTES - ADULT  Goal: Electrolytes maintained within normal limits  Description: INTERVENTIONS:  - Monitor labs and assess patient for signs and symptoms of electrolyte imbalances  - Administer electrolyte replacement as  ordered  - Monitor response to electrolyte replacements, including repeat lab results as appropriate  - Instruct patient on fluid and nutrition as appropriate  Outcome: Progressing  Goal: Fluid balance maintained  Description: INTERVENTIONS:  - Monitor labs   - Monitor I/O and WT  - Instruct patient on fluid and nutrition as appropriate  - Assess for signs & symptoms of volume excess or deficit  Outcome: Progressing  Goal: Glucose maintained within target range  Description: INTERVENTIONS:  - Monitor Blood Glucose as ordered  - Assess for signs and symptoms of hyperglycemia and hypoglycemia  - Administer ordered medications to maintain glucose within target range  - Assess nutritional intake and initiate nutrition service referral as needed  Outcome: Progressing     Problem: SKIN/TISSUE INTEGRITY - ADULT  Goal: Skin Integrity remains intact(Skin Breakdown Prevention)  Description: Assess:  -Perform William assessment every shift  -Clean and moisturize skin every day  -Inspect skin when repositioning, toileting, and assisting with ADLS  -Assess under medical devices such as lines every 2 hours  -Assess extremities for adequate circulation and sensation     Bed Management:  -Have minimal linens on bed & keep smooth, unwrinkled  -Change linens as needed when moist or perspiring  -Avoid sitting or lying in one position for more than 2 hours while in bed  -Keep HOB at 30 degrees     Toileting:  -Offer bedside commode  -Assess for incontinence every hour  -Use incontinent care products after each incontinent episode such as moisture barriers, foam cleansers    Activity:  -Mobilize patient 3 times a day  -Encourage activity and walks on unit  -Encourage or provide ROM exercises   -Turn and reposition patient every 2 Hours  -Use appropriate equipment to lift or move patient in bed  -Instruct/ Assist with weight shifting every 15 min when out of bed in chair  -Consider limitation of chair time 2 hour intervals    Skin  Care:  -Avoid use of baby powder, tape, friction and shearing, hot water or constrictive clothing  -Relieve pressure over bony prominences using offloading techniques or foam dressings (If not contraindicated by incontinence)  -Do not massage red bony areas    Next Steps:  -Teach patient strategies to minimize risks    -Consider consults to  interdisciplinary teams  Outcome: Progressing  Goal: Incision(s), wounds(s) or drain site(s) healing without S/S of infection  Description: INTERVENTIONS  - Assess and document dressing, incision, wound bed, drain sites and surrounding tissue  - Provide patient and family education  - Perform skin care/dressing changes  Outcome: Progressing  Goal: Pressure injury heals and does not worsen  Description: Interventions:  - Implement low air loss mattress or specialty surface (Criteria met)  - Apply silicone foam dressing  - Instruct/assist with weight shifting every 15 minutes when in chair   - Limit chair time to 2 hour intervals  - Use special pressure reducing interventions when in chair   - Apply fecal or urinary incontinence containment device   - Perform passive or active ROM  - Turn and reposition patient & offload bony prominences every 2 hours   - Utilize friction reducing device or surface for transfers   - Consider consults to  interdisciplinary teams  - Use incontinent care products after each incontinent episode  - Consider nutrition services referral as needed  Outcome: Progressing     Problem: MUSCULOSKELETAL - ADULT  Goal: Maintain or return mobility to safest level of function  Description: INTERVENTIONS:  - Assess patient's ability to carry out ADLs; assess patient's baseline for ADL function and identify physical deficits which impact ability to perform ADLs (bathing, care of mouth/teeth, toileting, grooming, dressing, etc.)  - Assess/evaluate cause of self-care deficits   - Assess range of motion  - Assess patient's mobility  - Assess patient's need for  assistive devices and provide as appropriate  - Encourage maximum independence but intervene and supervise when necessary  - Involve family in performance of ADLs  - Assess for home care needs following discharge   - Consider OT consult to assist with ADL evaluation and planning for discharge  - Provide patient education as appropriate  Outcome: Progressing  Goal: Maintain proper alignment of affected body part  Description: INTERVENTIONS:  - Support, maintain and protect limb and body alignment  - Provide patient/ family with appropriate education  Outcome: Progressing     Problem: Prexisting or High Potential for Compromised Skin Integrity  Goal: Skin integrity is maintained or improved  Description: INTERVENTIONS:  - Identify patients at risk for skin breakdown  - Assess and monitor skin integrity  - Assess and monitor nutrition and hydration status  - Monitor labs   - Assess for incontinence   - Turn and reposition patient  - Assist with mobility/ambulation  - Relieve pressure over bony prominences  - Avoid friction and shearing  - Provide appropriate hygiene as needed including keeping skin clean and dry  - Evaluate need for skin moisturizer/barrier cream  - Collaborate with interdisciplinary team   - Patient/family teaching  - Consider wound care consult   Outcome: Progressing

## 2024-09-14 NOTE — ASSESSMENT & PLAN NOTE
Severe protein-calorie malnutrition 2/2 chronic diarrhea a/e/b fat and muscle loss requiring Nutrition consult, oral diet and nutrition supplements    360 Statement: severe malnutrition r/t suspect combination of chronic illness and social/environmental factors as evidenced by severe temporal muscle loss, severe tricep fat pad loss, at least moderate muscle loss around clavicles and shoulders, at least moderate orbital fat pad loss. Treatment: oral diet and oral nutrition supplements.    BMI Findings:  Adult BMI Classifications: Underweight < 18.5        Body mass index is 18.1 kg/m².   C/w supplements  Nutrition consult appreciated  Encourage po intake    DISPLAY PLAN FREE TEXT

## 2024-09-14 NOTE — ASSESSMENT & PLAN NOTE
"Lab Results   Component Value Date    HGBA1C 16.1 (H) 08/16/2024       Recent Labs     09/14/24  0246 09/14/24  0614 09/14/24  1048 09/14/24  1537   POCGLU 259* 132 169* 167*     Patient markedly hyperglycemic on admission only partially responsive to IV fluids, remainder of labs fortunately not consistent with DKA. A1C 16  Patient admitting she has not been using her home insulin as she apparently has been unable to fill prescription due to \"insurance issues,\" additionally reports that due to her neuropathy she has difficulty self injecting.  Daughter also reports jessica non compliance.   Typically on NovoLog 70/30 20 units twice daily  Diabetic diet-changed to regaular than to lactose free  Insulin regimen adjusted by endocrinology.  Endocrine follow-up requested ,given labile blood sugar  Also educated patient by myself, GI as well as  regarding her options of obtaining insulin.  Patient has to apply for Medicaid he in November and meanwhile she can obtain insulin from [facility] which is the cheapest option.  Patient can do a curbside pickup if she has agoraphobia to go inside [facility] as she states it is a crowded place    Per endocrinology ACTH stimulation test ordered given low cortisol- normal  Insuilin titrated by endocrinology  "

## 2024-09-14 NOTE — PROGRESS NOTES
Progress Note - Hospitalist   Name: Barb Palomo 54 y.o. female I MRN: [id number]  [facility] I Date of Admission: 8/15/2024   Date of Service: 9/14/2024 I Hospital Day: 29    Assessment & Plan  C. difficile diarrhea  Patient presented with generalized weakness, frequent falls.  She has had ongoing diarrhea, dysuria, poor oral intake and reported 70 pound weight loss over the last year.  Initially hypotensive in the ER, status post IV fluids. Labs with multiple metabolic derangements  CT imaging showing mild diffuse colonic wall thickening, mild diffuse wall thickening of stomach  C. difficile found to be positive--has a history of C. difficile in September 2022.    Patient started on Dificid 200 mg x 10 days per protocol on 8/18 as this is recurrent infection  Initially seemed BMs were improving and were becoming more formed, however again having frequent watery stools  GI consulted: Now s/p 7 days fidoxamycin and 7 days oral vamcomycin. S/p FMT 9/6/24. No leukocytosis or fever.   -Continue to monitor off antibiotics. Appears to be at around baseline stool output, improved from presentation.   - discontinue her Protonix and probiotic with as needed Pepcid as replacement of Protonix.  Contact precautions  Appetite is good   GI signed off  Chronic diarrhea  Longstanding history of chronic diarrhea, patient reporting acute diarrhea x1 month. Follows with GI. Has tried questran/imodium without relief    -Likely component of autonomic diarrhea given her secretory type symptoms of diarrhea at night and stool incontinence.  She has uncontrolled diabetes with her most recent A1c above 16 as she was not taking insulin at home.   -Pancreatic fecal elastase low at 5.5.  She was subsequently started on Creon.  -Status postcholecystectomy and currently on Questran.  -Biopsies from a colonoscopy were negative for microscopic colitis.  Stool enteric panel negative  C. difficile positive, see plan above   Giardia  "antigen is negative  Also started on lometil  Diarrhea improving.    Severe episode of recurrent major depressive disorder, without psychotic features (HCC)    continue home duloxetine, mirtazapine, as needed Valium  Hold minipress given symptomatic hypotension episodes, tachycardia   GERD without esophagitis  Change Protonix to Pepcid as needed secondary to C. difficile  Tobacco dependence  Counseled on need for cessation, provide nicotine patch while admitted  Recent CT chest for lung cancer screening 7/30/2024 negative for nodules or masses  Opioid dependence with other opioid-induced disorder (HCC)  PDMP reviewed, patient maintained on tramadol 200 mg daily as needed; history of chronic cervical/lumbar pain noted  Continue as needed tramadol and chronic gabapentin dosing  Uncontrolled type 2 diabetes mellitus with hyperglycemia (HCC)  Lab Results   Component Value Date    HGBA1C 16.1 (H) 08/16/2024       Recent Labs     09/14/24  0246 09/14/24  0614 09/14/24  1048 09/14/24  1537   POCGLU 259* 132 169* 167*     Patient markedly hyperglycemic on admission only partially responsive to IV fluids, remainder of labs fortunately not consistent with DKA. A1C 16  Patient admitting she has not been using her home insulin as she apparently has been unable to fill prescription due to \"insurance issues,\" additionally reports that due to her neuropathy she has difficulty self injecting.  Daughter also reports jessica non compliance.   Typically on NovoLog 70/30 20 units twice daily  Diabetic diet-changed to regaular than to lactose free  Insulin regimen adjusted by endocrinology.  Endocrine follow-up requested ,given labile blood sugar  Also educated patient by myself, GI as well as  regarding her options of obtaining insulin.  Patient has to apply for Medicaid he in November and meanwhile she can obtain insulin from [employer] which is the cheapest option.  Patient can do a curbside pickup if she has agoraphobia to " go inside [facility] as she states it is a crowded place    Per endocrinology ACTH stimulation test ordered given low cortisol- normal  Insuilin titrated by endocrinology  Acute cystitis without hematuria  Reported dysuria on admission   Urine + nitrite & leukocytes, CT imaging with distended bladder suggestive of retention and small amount of air which could be secondary to infection  Urine Culture noted, S/P 3 doses of IV CTX 8/17 8/21, reported pain AFTER urination, likely related to urine hitting excoriated labia/bottom from frequent stooling. Will continue with medicated cream to this area- denies pain at this time   S/p pyridum x 3 doses  Wound care consult appreciated   Severe protein-calorie malnutrition (HCC)  Severe protein-calorie malnutrition 2/2 chronic diarrhea a/e/b fat and muscle loss requiring Nutrition consult, oral diet and nutrition supplements    360 Statement: severe malnutrition r/t suspect combination of chronic illness and social/environmental factors as evidenced by severe temporal muscle loss, severe tricep fat pad loss, at least moderate muscle loss around clavicles and shoulders, at least moderate orbital fat pad loss. Treatment: oral diet and oral nutrition supplements.    BMI Findings:  Adult BMI Classifications: Underweight < 18.5        Body mass index is 18.1 kg/m².   C/w supplements  Nutrition consult appreciated  Encourage po intake   Hypomagnesemia  Hold oral supplementation due to diarrhea  Check magnesium level and replace as needed  Failure to thrive in adult  Patient presented with increased generalized weakness and frequent falls, ongoing diarrhea, dysuria, poor oral intake, and at least 70-80 pound weight loss over the past 1 year. Has been following with GI, had colonoscopy May 2024 with poor prep--had EGD with esophagitis and gastritis  Labs with multiple derangements including hyperglycemia, hypokalemia and hypomagnesemia on admission, improved.   C. difficile positive as  "above  Patient reports she has not been using insulin due to \"insurance issue\" and inability to self inject due to her neuropathy  CT abdomen and pelvis without any obvious malignancy  Treatment as per respective issues  PT/OT recommending rehab.   Endocrinology evaluation appreciated.  Sinus tachycardia  Patient with persistent sinus tachycardia.  Failure thought to be hypotension, side effect of Minipress since patient was volume resuscitated and Minipress held  She also has multiple issues ongoing uncontrolled labile blood sugar, autonomic/C. difficile diarrhea.  Her appetite is normal and blood pressure has remained fairly stable lately but continues to have tachycardia.  She is not septic at this time.  TSH checked which was normal hence cardiology was consulted, to review any underlying explanation for sinus tachycardia  Echo normal  Sinus tachycardia improving  Hypotension  Intermittent episodes of hypotension, most occurrences happen overnight and question correlation with minipress administration.  This has now been discontinued  Bacteriuria      VTE Pharmacologic Prophylaxis: VTE Score: 3 Moderate Risk (Score 3-4) - Pharmacological DVT Prophylaxis Ordered: enoxaparin (Lovenox).    Mobility:   Basic Mobility Inpatient Raw Score: 17  JH-HLM Goal: 5: Stand one or more mins  JH-HLM Achieved: 6: Walk 10 steps or more  JH-HLM Goal NOT achieved. Continue with multidisciplinary rounding and encourage appropriate mobility to improve upon JH-HLM goals.    Patient Centered Rounds: I performed bedside rounds with nursing staff today.   Discussions with Specialists or Other Care Team Provider: yes    Education and Discussions with Family / Patient:  left Vm to daughter.     Current Length of Stay: 29 day(s)  Current Patient Status: Inpatient   Certification Statement: The patient will continue to require additional inpatient hospital stay due to ongoing treatment  Discharge Plan: Anticipate discharge in 24-48 hrs to " rehab facility.    Code Status: Level 1 - Full Code    Subjective   Sitting in bed, diarrhea much improved, tolerating diet.  Again discussed about her labile blood sugar and diet.  She understands, pending endocrinology to give final insulin recommendation prior to discharge    Objective     Vitals:   Temp (24hrs), Av °F (36.7 °C), Min:97.5 °F (36.4 °C), Max:98.5 °F (36.9 °C)    Temp:  [97.5 °F (36.4 °C)-98.5 °F (36.9 °C)] 98.5 °F (36.9 °C)  HR:  [] 100  Resp:  [16-18] 18  BP: ()/(61-76) 105/71  SpO2:  [95 %-98 %] 96 %  Body mass index is 18.1 kg/m².     Input and Output Summary (last 24 hours):     Intake/Output Summary (Last 24 hours) at 2024 1613  Last data filed at 2024 0222  Gross per 24 hour   Intake 1700 ml   Output --   Net 1700 ml       Physical Exam  Constitutional:       Comments: cachetic   HENT:      Mouth/Throat:      Mouth: Mucous membranes are moist.   Eyes:      Pupils: Pupils are equal, round, and reactive to light.   Cardiovascular:      Rate and Rhythm: Normal rate.      Comments: Intermittent sinus tachycardia  Pulmonary:      Effort: Pulmonary effort is normal.      Breath sounds: Normal breath sounds.   Abdominal:      General: Bowel sounds are normal.      Palpations: Abdomen is soft.   Musculoskeletal:         General: Normal range of motion.      Cervical back: Normal range of motion.   Skin:     General: Skin is warm.   Neurological:      General: No focal deficit present.      Mental Status: She is alert and oriented to person, place, and time.   Psychiatric:         Mood and Affect: Mood normal.          Lines/Drains:  Lines/Drains/Airways       Active Status       None                            Lab Results: I have reviewed the following results: CBC/BMP: No new results in last 24 hours.    Results from last 7 days   Lab Units 24  0455   WBC Thousand/uL 5.08   HEMOGLOBIN g/dL 10.7*   HEMATOCRIT % 32.9*   PLATELETS Thousands/uL 270   SEGS PCT % 40*    LYMPHO PCT % 45*   MONO PCT % 10   EOS PCT % 4     Results from last 7 days   Lab Units 09/13/24  0455   SODIUM mmol/L 143   POTASSIUM mmol/L 4.2   CHLORIDE mmol/L 107   CO2 mmol/L 30   BUN mg/dL 20   CREATININE mg/dL 0.55*   ANION GAP mmol/L 6   CALCIUM mg/dL 9.2   GLUCOSE RANDOM mg/dL 54*         Results from last 7 days   Lab Units 09/14/24  1537 09/14/24  1048 09/14/24  0614 09/14/24  0246 09/13/24  2042 09/13/24  1558 09/13/24  1035 09/13/24  0932 09/13/24  0627 09/13/24  0606 09/13/24  0140 09/12/24  2047   POC GLUCOSE mg/dl 167* 169* 132 259* 145* 283* 347* 323* 63* 55* 164* 146*               Recent Cultures (last 7 days):         Imaging Review: No pertinent imaging studies reviewed.  Other Studies: No additional pertinent studies reviewed.    Last 24 Hours Medication List:     Current Facility-Administered Medications:     acetaminophen (TYLENOL) tablet 650 mg, Q6H PRN    albuterol (PROVENTIL HFA,VENTOLIN HFA) inhaler 2 puff, Q4H PRN    aspirin chewable tablet 81 mg, Daily    atorvastatin (LIPITOR) tablet 80 mg, Daily    baclofen tablet 5 mg, BID    butalbital-acetaminophen-caffeine (FIORICET,ESGIC) -40 mg per tablet 1 tablet, Q6H PRN    cholestyramine sugar free (QUESTRAN LIGHT) packet 4 g, BID    diazepam (VALIUM) tablet 5 mg, Q12H PRN    dicyclomine (BENTYL) capsule 10 mg, 4x Daily (AC & HS)    diphenoxylate-atropine (LOMOTIL) 2.5-0.025 mg per tablet 1 tablet, 4x Daily    DULoxetine (CYMBALTA) delayed release capsule 60 mg, BID    enoxaparin (LOVENOX) subcutaneous injection 40 mg, Q24H HONEY    famotidine (PEPCID) tablet 20 mg, BID    fluticasone-vilanterol 200-25 mcg/actuation 1 puff, Daily    gabapentin (NEURONTIN) capsule 800 mg, TID    insulin glargine (LANTUS) subcutaneous injection 30 Units 0.3 mL, HS    insulin lispro (HumALOG/ADMELOG) 100 units/mL subcutaneous injection 1-5 Units, HS    insulin lispro (HumALOG/ADMELOG) 100 units/mL subcutaneous injection 1-6 Units, TID AC **AND**  Fingerstick Glucose (POCT), TID AC    insulin lispro (HumALOG/ADMELOG) 100 units/mL subcutaneous injection 10 Units, TID With Meals    levothyroxine tablet 112 mcg, Early Morning    lidocaine (LIDODERM) 5 % patch 2 patch, Daily    metoprolol succinate (TOPROL-XL) 24 hr tablet 12.5 mg, Daily    mirtazapine (REMERON) tablet 7.5 mg, HS    moisture barrier miconazole 2% cream (aka DARLIN MOISTURE BARRIER ANTIFUNGAL CREAM), BID    nicotine (NICODERM CQ) 21 mg/24 hr TD 24 hr patch 1 patch, Daily    ondansetron (ZOFRAN) injection 4 mg, Q6H PRN    pancrelipase (Lip-Prot-Amyl) (CREON) delayed release capsule 24,000 Units, TID With Meals    psyllium (METAMUCIL) 1 packet, BID    traMADol (ULTRAM) tablet 50 mg, Q6H PRN    traZODone (DESYREL) tablet 50 mg, HS PRN    Administrative Statements   Today, Patient Was Seen By: Wanda Rubio MD  I have spent a total time of 20 minutes in caring for this patient on the day of the visit/encounter including Counseling / Coordination of care.    **Please Note: This note may have been constructed using a voice recognition system.**

## 2024-09-14 NOTE — ASSESSMENT & PLAN NOTE
Longstanding history of chronic diarrhea, patient reporting acute diarrhea x1 month. Follows with GI. Has tried questran/imodium without relief    -Likely component of autonomic diarrhea given her secretory type symptoms of diarrhea at night and stool incontinence.  She has uncontrolled diabetes with her most recent A1c above 16 as she was not taking insulin at home.   -Pancreatic fecal elastase low at 5.5.  She was subsequently started on Creon.  -Status postcholecystectomy and currently on Questran.  -Biopsies from a colonoscopy were negative for microscopic colitis.  Stool enteric panel negative  C. difficile positive, see plan above   Giardia antigen is negative  Also started on lometil  Diarrhea improving.

## 2024-09-15 LAB
GLUCOSE SERPL-MCNC: 123 MG/DL (ref 65–140)
GLUCOSE SERPL-MCNC: 139 MG/DL (ref 65–140)
GLUCOSE SERPL-MCNC: 208 MG/DL (ref 65–140)
GLUCOSE SERPL-MCNC: 223 MG/DL (ref 65–140)
GLUCOSE SERPL-MCNC: 309 MG/DL (ref 65–140)
GLUCOSE SERPL-MCNC: 67 MG/DL (ref 65–140)
GLUCOSE SERPL-MCNC: 71 MG/DL (ref 65–140)
GLUCOSE SERPL-MCNC: 78 MG/DL (ref 65–140)
INSULIN AB SER-ACNC: <5 UU/ML

## 2024-09-15 PROCEDURE — 82948 REAGENT STRIP/BLOOD GLUCOSE: CPT

## 2024-09-15 PROCEDURE — 99233 SBSQ HOSP IP/OBS HIGH 50: CPT | Performed by: INTERNAL MEDICINE

## 2024-09-15 RX ORDER — INSULIN GLARGINE 100 [IU]/ML
25 INJECTION, SOLUTION SUBCUTANEOUS
Status: DISCONTINUED | OUTPATIENT
Start: 2024-09-15 | End: 2024-09-16 | Stop reason: HOSPADM

## 2024-09-15 RX ORDER — ONDANSETRON 4 MG/1
4 TABLET, ORALLY DISINTEGRATING ORAL EVERY 6 HOURS PRN
Status: DISCONTINUED | OUTPATIENT
Start: 2024-09-15 | End: 2024-09-16 | Stop reason: HOSPADM

## 2024-09-15 RX ORDER — METOPROLOL SUCCINATE 25 MG/1
12.5 TABLET, EXTENDED RELEASE ORAL DAILY
Qty: 15 TABLET | Refills: 0 | Status: CANCELLED | OUTPATIENT
Start: 2024-09-16 | End: 2024-10-16

## 2024-09-15 RX ADMIN — DIPHENOXYLATE HYDROCHLORIDE AND ATROPINE SULFATE 1 TABLET: .025; 2.5 TABLET ORAL at 21:23

## 2024-09-15 RX ADMIN — CHOLESTYRAMINE 4 G: 4 POWDER, FOR SUSPENSION ORAL at 08:21

## 2024-09-15 RX ADMIN — CHOLESTYRAMINE 4 G: 4 POWDER, FOR SUSPENSION ORAL at 17:27

## 2024-09-15 RX ADMIN — INSULIN LISPRO 2 UNITS: 100 INJECTION, SOLUTION INTRAVENOUS; SUBCUTANEOUS at 17:30

## 2024-09-15 RX ADMIN — ONDANSETRON 4 MG: 4 TABLET, ORALLY DISINTEGRATING ORAL at 10:25

## 2024-09-15 RX ADMIN — TRAZODONE HYDROCHLORIDE 50 MG: 50 TABLET ORAL at 22:25

## 2024-09-15 RX ADMIN — MICONAZOLE NITRATE: 20 CREAM TOPICAL at 17:31

## 2024-09-15 RX ADMIN — DULOXETINE HYDROCHLORIDE 60 MG: 60 CAPSULE, DELAYED RELEASE ORAL at 08:00

## 2024-09-15 RX ADMIN — MIRTAZAPINE 7.5 MG: 15 TABLET, FILM COATED ORAL at 21:23

## 2024-09-15 RX ADMIN — INSULIN GLARGINE 25 UNITS: 100 INJECTION, SOLUTION SUBCUTANEOUS at 21:28

## 2024-09-15 RX ADMIN — FAMOTIDINE 20 MG: 20 TABLET, FILM COATED ORAL at 08:01

## 2024-09-15 RX ADMIN — PANCRELIPASE 24000 UNITS: 120000; 24000; 76000 CAPSULE, DELAYED RELEASE PELLETS ORAL at 15:49

## 2024-09-15 RX ADMIN — INSULIN LISPRO 5 UNITS: 100 INJECTION, SOLUTION INTRAVENOUS; SUBCUTANEOUS at 07:59

## 2024-09-15 RX ADMIN — INSULIN LISPRO 10 UNITS: 100 INJECTION, SOLUTION INTRAVENOUS; SUBCUTANEOUS at 11:15

## 2024-09-15 RX ADMIN — BACLOFEN 5 MG: 10 TABLET ORAL at 17:29

## 2024-09-15 RX ADMIN — BACLOFEN 5 MG: 10 TABLET ORAL at 08:00

## 2024-09-15 RX ADMIN — MICONAZOLE NITRATE: 20 CREAM TOPICAL at 08:21

## 2024-09-15 RX ADMIN — PANCRELIPASE 24000 UNITS: 120000; 24000; 76000 CAPSULE, DELAYED RELEASE PELLETS ORAL at 08:04

## 2024-09-15 RX ADMIN — INSULIN LISPRO 10 UNITS: 100 INJECTION, SOLUTION INTRAVENOUS; SUBCUTANEOUS at 17:31

## 2024-09-15 RX ADMIN — PANCRELIPASE 24000 UNITS: 120000; 24000; 76000 CAPSULE, DELAYED RELEASE PELLETS ORAL at 11:15

## 2024-09-15 RX ADMIN — FAMOTIDINE 20 MG: 20 TABLET, FILM COATED ORAL at 17:30

## 2024-09-15 RX ADMIN — DULOXETINE HYDROCHLORIDE 60 MG: 60 CAPSULE, DELAYED RELEASE ORAL at 17:29

## 2024-09-15 RX ADMIN — GABAPENTIN 800 MG: 400 CAPSULE ORAL at 15:48

## 2024-09-15 RX ADMIN — ENOXAPARIN SODIUM 40 MG: 40 INJECTION SUBCUTANEOUS at 08:00

## 2024-09-15 RX ADMIN — NICOTINE 1 PATCH: 21 PATCH, EXTENDED RELEASE TRANSDERMAL at 08:01

## 2024-09-15 RX ADMIN — TRAMADOL HYDROCHLORIDE 50 MG: 50 TABLET, COATED ORAL at 22:25

## 2024-09-15 RX ADMIN — Medication 1 PACKET: at 17:27

## 2024-09-15 RX ADMIN — ATORVASTATIN CALCIUM 80 MG: 80 TABLET, FILM COATED ORAL at 08:00

## 2024-09-15 RX ADMIN — DIPHENOXYLATE HYDROCHLORIDE AND ATROPINE SULFATE 1 TABLET: .025; 2.5 TABLET ORAL at 08:00

## 2024-09-15 RX ADMIN — LEVOTHYROXINE SODIUM 112 MCG: 112 TABLET ORAL at 05:00

## 2024-09-15 RX ADMIN — GABAPENTIN 800 MG: 400 CAPSULE ORAL at 21:23

## 2024-09-15 RX ADMIN — DIPHENOXYLATE HYDROCHLORIDE AND ATROPINE SULFATE 1 TABLET: .025; 2.5 TABLET ORAL at 17:29

## 2024-09-15 RX ADMIN — DICYCLOMINE HYDROCHLORIDE 10 MG: 10 CAPSULE ORAL at 21:23

## 2024-09-15 RX ADMIN — DICYCLOMINE HYDROCHLORIDE 10 MG: 10 CAPSULE ORAL at 11:14

## 2024-09-15 RX ADMIN — GABAPENTIN 800 MG: 400 CAPSULE ORAL at 08:14

## 2024-09-15 RX ADMIN — INSULIN LISPRO 2 UNITS: 100 INJECTION, SOLUTION INTRAVENOUS; SUBCUTANEOUS at 21:25

## 2024-09-15 RX ADMIN — TRAMADOL HYDROCHLORIDE 50 MG: 50 TABLET, COATED ORAL at 08:00

## 2024-09-15 RX ADMIN — DIAZEPAM 5 MG: 5 TABLET ORAL at 11:14

## 2024-09-15 RX ADMIN — ASPIRIN 81 MG CHEWABLE TABLET 81 MG: 81 TABLET CHEWABLE at 08:00

## 2024-09-15 RX ADMIN — FLUTICASONE FUROATE AND VILANTEROL TRIFENATATE 1 PUFF: 200; 25 POWDER RESPIRATORY (INHALATION) at 08:04

## 2024-09-15 RX ADMIN — DICYCLOMINE HYDROCHLORIDE 10 MG: 10 CAPSULE ORAL at 05:00

## 2024-09-15 RX ADMIN — DICYCLOMINE HYDROCHLORIDE 10 MG: 10 CAPSULE ORAL at 15:48

## 2024-09-15 RX ADMIN — DIPHENOXYLATE HYDROCHLORIDE AND ATROPINE SULFATE 1 TABLET: .025; 2.5 TABLET ORAL at 11:14

## 2024-09-15 RX ADMIN — ONDANSETRON 4 MG: 2 INJECTION INTRAMUSCULAR; INTRAVENOUS at 09:51

## 2024-09-15 RX ADMIN — TRAMADOL HYDROCHLORIDE 50 MG: 50 TABLET, COATED ORAL at 15:48

## 2024-09-15 NOTE — CASE MANAGEMENT
Case Management Discharge Planning Note    Patient name Barb Palomo  [facility] MRN [id number]  : 1970 Date 9/15/2024       Current Admission Date: 8/15/2024  Current Admission Diagnosis:C. difficile diarrhea   Patient Active Problem List    Diagnosis Date Noted Date Diagnosed    Bacteriuria 2024     Hypotension 2024     Sinus tachycardia 2024     Hypomagnesemia 2024     Failure to thrive in adult 2024     C. difficile diarrhea 2024     Severe protein-calorie malnutrition (HCC) 2024     HHNC (hyperglycemic hyperosmolar nonketotic coma) ([facility]) 2024     Abdominal pain 2024     Acute cystitis without hematuria 2024     Chronic migraine without aura without status migrainosus, not intractable 2024     Unspecified psychosis not due to a substance or known physiological condition ([facility]) 2023     Pancolitis ([facility]) 2023     S/P left knee arthroscopy 10/21/2022     Agoraphobia with panic disorder 2022    Bipolar II disorder ([facility]) 2022    Depression, unspecified 2022    Polyarthritis, unspecified 2022    Chronic prescription benzodiazepine use 2022     Uncontrolled type 2 diabetes mellitus with hyperglycemia ([facility]) 2021     Nausea 2021     Weakness of right upper extremity 2021     Neuropathy      Cervical radiculopathy 2020     Lumbar radiculopathy 2020     DDD (degenerative disc disease), cervical 2020     DDD (degenerative disc disease), lumbar 2020     Low back pain with sciatica 2020     Cervical spinal stenosis 2020     Cervical spondylosis without myelopathy 2020     Paresthesia and pain of both upper extremities 2020     Paresthesia of both lower extremities 2020     Chronic pain of both shoulders 2020     Chronic cervical pain 2020     Symptom associated  with female genital organs 05/26/2020     Female stress incontinence 05/26/2020     Decreased libido 05/26/2020     ROGERIO positive 05/13/2020     Diplopia 02/04/2020     Chronic migraine without aura, not intractable, without status migrainosus 10/17/2019     Arthralgia of temporomandibular joint 08/22/2019     Carpal tunnel syndrome 08/22/2019     Dysphagia 08/22/2019     Reactive airway disease without complication 02/23/2018     Protein calorie malnutrition (HCC) 08/30/2017     Tobacco dependence 08/30/2017     GERD without esophagitis 08/25/2017     History of decompression of median nerve 08/25/2017     Hypothyroidism 08/25/2017     Chronic pain disorder 11/04/2016     Pancreatic cyst 10/17/2016     Chronic diarrhea 10/04/2016     Chronic fatigue, unspecified 08/24/2016     Severe episode of recurrent major depressive disorder, without psychotic features (HCC) 05/05/2016     Fatty liver 04/08/2016     Opioid dependence with other opioid-induced disorder (HCC) 01/25/2016     Iron deficiency 12/28/2015     Vitamin D deficiency 08/13/2015     Hyperlipidemia 03/15/2013     Insomnia 03/04/2013     Vitamin B12 deficiency 03/04/2013     Post-traumatic stress disorder, unspecified 09/26/2012     Benign essential hypertension 09/26/2012     Temporary cerebral vascular dysfunction 07/30/2009     History of pulmonary embolism 07/30/2009     Irregular menstrual cycle 04/02/2008       LOS (days): 30  Geometric Mean LOS (GMLOS) (days): 5.1  Days to GMLOS:-25.5     OBJECTIVE:  Risk of Unplanned Readmission Score: 29.81         Current admission status: Inpatient   Preferred Pharmacy:   [id number], [address] @[address], [place], [place] [address] [place] [address]  Phone: [phone] Fax: [phone]    [id number] - [place] - [email or web address]  [email or web address]  [place] [address]  Phone: [phone] Fax: [phone]    [facility]  [place], [place] - [address]  [address]  [facility] [address]  [facility] [address]  Phone: [phone] Fax: [phone]    Primary Care Provider: ELISEO Vee    Primary Insurance: MEDICARE  Secondary Insurance:     DISCHARGE DETAILS:    Discharge planning discussed with:: Patient  Freedom of Choice: Yes  Comments - Freedom of Choice: Discussed FOC    Other Referral/Resources/Interventions Provided:  Interventions: Short Term Rehab  Referral Comments: TC to [facility], ann-marie Ramirez, Nursing supervisor, unable to accept patient today.  Informed this CM to contact admission liason in AM 9/16/24    Transport at Discharge : Wheelchair van  Dispatcher Contacted: Yes  Number/Name of Dispatcher: [employer]  Transported by (Company and Unit #): [employer]  ETA of Transport (Date): 09/16/24  ETA of Transport (Time): 1100

## 2024-09-15 NOTE — ASSESSMENT & PLAN NOTE
"Patient presented with increased generalized weakness and frequent falls, ongoing diarrhea, dysuria, poor oral intake, and at least 70-80 pound weight loss over the past 1 year. Has been following with GI, had colonoscopy May 2024 with poor prep--had EGD with esophagitis and gastritis  Labs with multiple derangements including hyperglycemia, hypokalemia and hypomagnesemia on admission, improved.   C. difficile positive as above  Patient reports she has not been using insulin due to \"insurance issue\" and inability to self inject due to her neuropathy  CT abdomen and pelvis without any obvious malignancy  Treatment as per respective issues  PT/OT recommending rehab.   Endocrinology evaluation appreciated.  " Rehabilitation services

## 2024-09-15 NOTE — PROGRESS NOTES
Progress Note - Hospitalist   Name: Barb Palomo 54 y.o. female I MRN: [id number]  Unit/Bed#: [facility] 522-01 I Date of Admission: 8/15/2024   Date of Service: 9/15/2024 I Hospital Day: 30    Assessment & Plan  C. difficile diarrhea  Patient presented with generalized weakness, frequent falls.  She has had ongoing diarrhea, dysuria, poor oral intake and reported 70 pound weight loss over the last year.  Initially hypotensive in the ER, status post IV fluids. Labs with multiple metabolic derangements  CT imaging showing mild diffuse colonic wall thickening, mild diffuse wall thickening of stomach  C. difficile found to be positive--has a history of C. difficile in September 2022.    Patient started on Dificid 200 mg x 10 days per protocol on 8/18 as this is recurrent infection  Initially seemed BMs were improving and were becoming more formed, however again having frequent watery stools  GI consulted: Now s/p 7 days fidoxamycin and 7 days oral vamcomycin. S/p FMT 9/6/24. No leukocytosis or fever.   -Continue to monitor off antibiotics. Appears to be at around baseline stool output, improved from presentation.   - discontinue her Protonix and probiotic with as needed Pepcid as replacement of Protonix.  Contact precautions  Appetite is good   GI signed off  Chronic diarrhea  Longstanding history of chronic diarrhea, patient reporting acute diarrhea x1 month. Follows with GI. Has tried questran/imodium without relief    -Likely component of autonomic diarrhea given her secretory type symptoms of diarrhea at night and stool incontinence.  She has uncontrolled diabetes with her most recent A1c above 16 as she was not taking insulin at home.   -Pancreatic fecal elastase low at 5.5.  She was subsequently started on Creon.  -Status postcholecystectomy and currently on Questran.  -Biopsies from a colonoscopy were negative for microscopic colitis.  Stool enteric panel negative  C. difficile positive, see plan above   Giardia  "antigen is negative  Also started on lometil  Diarrhea improving.    GI signed off  Severe episode of recurrent major depressive disorder, without psychotic features (HCC)    continue home duloxetine, mirtazapine, as needed Valium  Hold minipress given symptomatic hypotension episodes, tachycardia   GERD without esophagitis  Change Protonix to Pepcid as needed secondary to C. difficile  Tobacco dependence  Counseled on need for cessation, provide nicotine patch while admitted  Recent CT chest for lung cancer screening 7/30/2024 negative for nodules or masses  Opioid dependence with other opioid-induced disorder (HCC)  PDMP reviewed, patient maintained on tramadol 200 mg daily as needed; history of chronic cervical/lumbar pain noted  Continue as needed tramadol and chronic gabapentin dosing  Uncontrolled type 2 diabetes mellitus with hyperglycemia (HCC)  Lab Results   Component Value Date    HGBA1C 16.1 (H) 08/16/2024       Recent Labs     09/15/24  0657 09/15/24  0719 09/15/24  0748 09/15/24  1056   POCGLU 67 78 123 139     Patient markedly hyperglycemic on admission only partially responsive to IV fluids, remainder of labs fortunately not consistent with DKA. A1C 16  Patient admitting she has not been using her home insulin as she apparently has been unable to fill prescription due to \"insurance issues,\" additionally reports that due to her neuropathy she has difficulty self injecting.  Daughter also reports jessica non compliance.   Typically on NovoLog 70/30 20 units twice daily  Diabetic diet-changed to regaular than to lactose free  Insulin regimen adjusted by endocrinology.  Endocrine follow-up requested ,given labile blood sugar  Also educated patient by myself, GI as well as  regarding her options of obtaining insulin.  Patient has to apply for Medicaid he in November and meanwhile she can obtain insulin from [facility] which is the cheapest option.  Patient can do a curbside pickup if she has " agoraphobia to go inside [facility] as she states it is a crowded place    Per endocrinology ACTH stimulation test ordered given low cortisol- normal  Insuilin titrated by endocrinology: Commended decreasing Lantus to 25 nightly  Acute cystitis without hematuria  Reported dysuria on admission   Urine + nitrite & leukocytes, CT imaging with distended bladder suggestive of retention and small amount of air which could be secondary to infection  Urine Culture noted, S/P 3 doses of IV CTX 8/17 8/21, reported pain AFTER urination, likely related to urine hitting excoriated labia/bottom from frequent stooling. Will continue with medicated cream to this area- denies pain at this time   S/p pyridum x 3 doses  Wound care consult appreciated   Severe protein-calorie malnutrition (HCC)  Severe protein-calorie malnutrition 2/2 chronic diarrhea a/e/b fat and muscle loss requiring Nutrition consult, oral diet and nutrition supplements    360 Statement: severe malnutrition r/t suspect combination of chronic illness and social/environmental factors as evidenced by severe temporal muscle loss, severe tricep fat pad loss, at least moderate muscle loss around clavicles and shoulders, at least moderate orbital fat pad loss. Treatment: oral diet and oral nutrition supplements.    BMI Findings:  Adult BMI Classifications: Underweight < 18.5        Body mass index is 18.1 kg/m².   C/w supplements  Nutrition consult appreciated  Encourage po intake   Hypomagnesemia  Hold oral supplementation due to diarrhea  Check magnesium level and replace as needed  Failure to thrive in adult  Patient presented with increased generalized weakness and frequent falls, ongoing diarrhea, dysuria, poor oral intake, and at least 70-80 pound weight loss over the past 1 year. Has been following with GI, had colonoscopy May 2024 with poor prep--had EGD with esophagitis and gastritis  Labs with multiple derangements including hyperglycemia, hypokalemia and  "hypomagnesemia on admission, improved.   C. difficile positive as above  Patient reports she has not been using insulin due to \"insurance issue\" and inability to self inject due to her neuropathy  CT abdomen and pelvis without any obvious malignancy  Treatment as per respective issues  PT/OT recommending rehab.   Endocrinology evaluation appreciated.  Sinus tachycardia  Patient with persistent sinus tachycardia.  Failure thought to be hypotension, side effect of Minipress since patient was volume resuscitated and Minipress held  She also has multiple issues ongoing uncontrolled labile blood sugar, autonomic/C. difficile diarrhea.  Her appetite is normal and blood pressure has remained fairly stable lately but continues to have tachycardia.  She is not septic at this time.  TSH checked which was normal hence cardiology was consulted, to review any underlying explanation for sinus tachycardia  Echo normal  Sinus tachycardia improving  Hypotension  Intermittent episodes of hypotension, most occurrences happen overnight and question correlation with minipress administration.  This has now been discontinued  Bacteriuria      VTE Pharmacologic Prophylaxis: VTE Score: 3 Moderate Risk (Score 3-4) - Pharmacological DVT Prophylaxis Ordered: enoxaparin (Lovenox).    Mobility:   Basic Mobility Inpatient Raw Score: 17  JH-HLM Goal: 5: Stand one or more mins  JH-HLM Achieved: 6: Walk 10 steps or more  JH-HLM Goal NOT achieved. Continue with multidisciplinary rounding and encourage appropriate mobility to improve upon JH-HLM goals.    Patient Centered Rounds: I performed bedside rounds with nursing staff today.   Discussions with Specialists or Other Care Team Provider: yes    Education and Discussions with Family / Patient:  left message to daughter.     Current Length of Stay: 30 day(s)  Current Patient Status: Inpatient   Certification Statement: The patient will continue to require additional inpatient hospital stay due to " rehab pending in am  Discharge Plan: Anticipate discharge tomorrow to rehab facility.    Code Status: Level 1 - Full Code    Subjective   Resting in bed, no new complaints today.  Again discussed regarding proper diet and Lantus.  She she agrees also advised if p.o. oral intake is not good then to cut into half, 5 minutes before meals    Objective     Vitals:   Temp (24hrs), Av.2 °F (36.8 °C), Min:97.5 °F (36.4 °C), Max:98.5 °F (36.9 °C)    Temp:  [97.5 °F (36.4 °C)-98.5 °F (36.9 °C)] 97.5 °F (36.4 °C)  HR:  [100-108] 100  Resp:  [16-18] 16  BP: ()/(57-71) 93/60  SpO2:  [95 %-97 %] 97 %  Body mass index is 18.1 kg/m².     Input and Output Summary (last 24 hours):     Intake/Output Summary (Last 24 hours) at 9/15/2024 1338  Last data filed at 9/15/2024 0823  Gross per 24 hour   Intake 960 ml   Output --   Net 960 ml       Physical Exam  Constitutional:       Comments: Cachectic   HENT:      Head: Normocephalic.      Mouth/Throat:      Mouth: Mucous membranes are moist.   Eyes:      Pupils: Pupils are equal, round, and reactive to light.   Cardiovascular:      Rate and Rhythm: Normal rate.   Pulmonary:      Effort: Pulmonary effort is normal.      Breath sounds: Normal breath sounds.   Abdominal:      General: Bowel sounds are normal.      Palpations: Abdomen is soft.   Musculoskeletal:         General: Normal range of motion.      Cervical back: Normal range of motion.   Skin:     General: Skin is warm.   Neurological:      General: No focal deficit present.      Mental Status: She is alert and oriented to person, place, and time.   Psychiatric:         Mood and Affect: Mood normal.          Lines/Drains:  Lines/Drains/Airways       Active Status       None                            Lab Results: I have reviewed the following results: CBC/BMP: No new results in last 24 hours.    Results from last 7 days   Lab Units 24  0455   WBC Thousand/uL 5.08   HEMOGLOBIN g/dL 10.7*   HEMATOCRIT % 32.9*    PLATELETS Thousands/uL 270   SEGS PCT % 40*   LYMPHO PCT % 45*   MONO PCT % 10   EOS PCT % 4     Results from last 7 days   Lab Units 09/13/24  0455   SODIUM mmol/L 143   POTASSIUM mmol/L 4.2   CHLORIDE mmol/L 107   CO2 mmol/L 30   BUN mg/dL 20   CREATININE mg/dL 0.55*   ANION GAP mmol/L 6   CALCIUM mg/dL 9.2   GLUCOSE RANDOM mg/dL 54*         Results from last 7 days   Lab Units 09/15/24  1056 09/15/24  0748 09/15/24  0719 09/15/24  0657 09/15/24  0613 09/15/24  0213 09/14/24  2103 09/14/24  1537 09/14/24  1048 09/14/24  0614 09/14/24  0246 09/13/24  2042   POC GLUCOSE mg/dl 139 123 78 67 71 309* 149* 167* 169* 132 259* 145*               Recent Cultures (last 7 days):         Imaging Review: No pertinent imaging studies reviewed.  Other Studies: No additional pertinent studies reviewed.    Last 24 Hours Medication List:     Current Facility-Administered Medications:     acetaminophen (TYLENOL) tablet 650 mg, Q6H PRN    albuterol (PROVENTIL HFA,VENTOLIN HFA) inhaler 2 puff, Q4H PRN    aspirin chewable tablet 81 mg, Daily    atorvastatin (LIPITOR) tablet 80 mg, Daily    baclofen tablet 5 mg, BID    butalbital-acetaminophen-caffeine (FIORICET,ESGIC) -40 mg per tablet 1 tablet, Q6H PRN    cholestyramine sugar free (QUESTRAN LIGHT) packet 4 g, BID    diazepam (VALIUM) tablet 5 mg, Q12H PRN    dicyclomine (BENTYL) capsule 10 mg, 4x Daily (AC & HS)    diphenoxylate-atropine (LOMOTIL) 2.5-0.025 mg per tablet 1 tablet, 4x Daily    DULoxetine (CYMBALTA) delayed release capsule 60 mg, BID    enoxaparin (LOVENOX) subcutaneous injection 40 mg, Q24H HONEY    famotidine (PEPCID) tablet 20 mg, BID    fluticasone-vilanterol 200-25 mcg/actuation 1 puff, Daily    gabapentin (NEURONTIN) capsule 800 mg, TID    insulin glargine (LANTUS) subcutaneous injection 25 Units 0.25 mL, HS    insulin lispro (HumALOG/ADMELOG) 100 units/mL subcutaneous injection 1-5 Units, HS    insulin lispro (HumALOG/ADMELOG) 100 units/mL subcutaneous  injection 1-6 Units, TID AC **AND** Fingerstick Glucose (POCT), TID AC    insulin lispro (HumALOG/ADMELOG) 100 units/mL subcutaneous injection 10 Units, TID With Meals    levothyroxine tablet 112 mcg, Early Morning    lidocaine (LIDODERM) 5 % patch 2 patch, Daily    metoprolol succinate (TOPROL-XL) 24 hr tablet 12.5 mg, Daily    mirtazapine (REMERON) tablet 7.5 mg, HS    moisture barrier miconazole 2% cream (aka DARLIN MOISTURE BARRIER ANTIFUNGAL CREAM), BID    nicotine (NICODERM CQ) 21 mg/24 hr TD 24 hr patch 1 patch, Daily    ondansetron (ZOFRAN) injection 4 mg, Q6H PRN    ondansetron (ZOFRAN-ODT) dispersible tablet 4 mg, Q6H PRN    pancrelipase (Lip-Prot-Amyl) (CREON) delayed release capsule 24,000 Units, TID With Meals    psyllium (METAMUCIL) 1 packet, BID    traMADol (ULTRAM) tablet 50 mg, Q6H PRN    traZODone (DESYREL) tablet 50 mg, HS PRN    Administrative Statements   Today, Patient Was Seen By: Wanda Rubio MD  I have spent a total time of 25 minutes in caring for this patient on the day of the visit/encounter including Counseling / Coordination of care.    **Please Note: This note may have been constructed using a voice recognition system.**

## 2024-09-15 NOTE — CASE MANAGEMENT
Case Management Discharge Planning Note    Patient name Barb Palomo  Location [facility] 522/[facility] 522-01 MRN [id number]  : 1970 Date 9/15/2024       Current Admission Date: 8/15/2024  Current Admission Diagnosis:C. difficile diarrhea   Patient Active Problem List    Diagnosis Date Noted Date Diagnosed    Bacteriuria 2024     Hypotension 2024     Sinus tachycardia 2024     Hypomagnesemia 2024     Failure to thrive in adult 2024     C. difficile diarrhea 2024     Severe protein-calorie malnutrition (HCC) 2024     HHNC (hyperglycemic hyperosmolar nonketotic coma) ([facility]) 2024     Abdominal pain 2024     Acute cystitis without hematuria 2024     Chronic migraine without aura without status migrainosus, not intractable 2024     Unspecified psychosis not due to a substance or known physiological condition ([facility]) 2023     Pancolitis ([facility]) 2023     S/P left knee arthroscopy 10/21/2022     Agoraphobia with panic disorder 2022    Bipolar II disorder ([facility]) 2022    Depression, unspecified 2022    Polyarthritis, unspecified 2022    Chronic prescription benzodiazepine use 2022     Uncontrolled type 2 diabetes mellitus with hyperglycemia ([facility]) 2021     Nausea 2021     Weakness of right upper extremity 2021     Neuropathy      Cervical radiculopathy 2020     Lumbar radiculopathy 2020     DDD (degenerative disc disease), cervical 2020     DDD (degenerative disc disease), lumbar 2020     Low back pain with sciatica 2020     Cervical spinal stenosis 2020     Cervical spondylosis without myelopathy 2020     Paresthesia and pain of both upper extremities 2020     Paresthesia of both lower extremities 2020     Chronic pain of both shoulders 2020     Chronic cervical pain 2020     Symptom associated  with female genital organs 05/26/2020     Female stress incontinence 05/26/2020     Decreased libido 05/26/2020     ROGERIO positive 05/13/2020     Diplopia 02/04/2020     Chronic migraine without aura, not intractable, without status migrainosus 10/17/2019     Arthralgia of temporomandibular joint 08/22/2019     Carpal tunnel syndrome 08/22/2019     Dysphagia 08/22/2019     Reactive airway disease without complication 02/23/2018     Protein calorie malnutrition (HCC) 08/30/2017     Tobacco dependence 08/30/2017     GERD without esophagitis 08/25/2017     History of decompression of median nerve 08/25/2017     Hypothyroidism 08/25/2017     Chronic pain disorder 11/04/2016     Pancreatic cyst 10/17/2016     Chronic diarrhea 10/04/2016     Chronic fatigue, unspecified 08/24/2016     Severe episode of recurrent major depressive disorder, without psychotic features (HCC) 05/05/2016     Fatty liver 04/08/2016     Opioid dependence with other opioid-induced disorder (HCC) 01/25/2016     Iron deficiency 12/28/2015     Vitamin D deficiency 08/13/2015     Hyperlipidemia 03/15/2013     Insomnia 03/04/2013     Vitamin B12 deficiency 03/04/2013     Post-traumatic stress disorder, unspecified 09/26/2012     Benign essential hypertension 09/26/2012     Temporary cerebral vascular dysfunction 07/30/2009     History of pulmonary embolism 07/30/2009     Irregular menstrual cycle 04/02/2008       LOS (days): 30  Geometric Mean LOS (GMLOS) (days): 5.1  Days to GMLOS:-25.3     OBJECTIVE:  Risk of Unplanned Readmission Score: 29.55         Current admission status: Inpatient   Preferred Pharmacy:   [id number], [address] @[facility] [address], [place], [place] [address] [place] [address]  Phone: [phone] Fax: [phone]    [id number] - [place] - [address]  [address]  [place] [address]  Phone: [phone] Fax: [phone]    [facility]  [place], [place] - [address]  [address]  [address] [address]  Phone: [phone] Fax: [phone]    Primary Care Provider: ELISEO Vee    Primary Insurance: MEDICARE  Secondary Insurance:     DISCHARGE DETAILS: Message sent to [facility], inquiring as to whether they can accept patient today, awaiting response    1100- TC to [facility], unable to leave , no employee answered the phone to inquire as to whether they can accept today

## 2024-09-15 NOTE — ASSESSMENT & PLAN NOTE
Severe protein-calorie malnutrition 2/2 chronic diarrhea a/e/b fat and muscle loss requiring Nutrition consult, oral diet and nutrition supplements    360 Statement: severe malnutrition r/t suspect combination of chronic illness and social/environmental factors as evidenced by severe temporal muscle loss, severe tricep fat pad loss, at least moderate muscle loss around clavicles and shoulders, at least moderate orbital fat pad loss. Treatment: oral diet and oral nutrition supplements.    BMI Findings:  Adult BMI Classifications: Underweight < 18.5        Body mass index is 18.1 kg/m².   C/w supplements  Nutrition consult appreciated  Encourage po intake

## 2024-09-15 NOTE — ASSESSMENT & PLAN NOTE
"Lab Results   Component Value Date    HGBA1C 16.1 (H) 08/16/2024       Recent Labs     09/15/24  0657 09/15/24  0719 09/15/24  0748 09/15/24  1056   POCGLU 67 78 123 139     Patient markedly hyperglycemic on admission only partially responsive to IV fluids, remainder of labs fortunately not consistent with DKA. A1C 16  Patient admitting she has not been using her home insulin as she apparently has been unable to fill prescription due to \"insurance issues,\" additionally reports that due to her neuropathy she has difficulty self injecting.  Daughter also reports jessica non compliance.   Typically on NovoLog 70/30 20 units twice daily  Diabetic diet-changed to regaular than to lactose free  Insulin regimen adjusted by endocrinology.  Endocrine follow-up requested ,given labile blood sugar  Also educated patient by myself, GI as well as  regarding her options of obtaining insulin.  Patient has to apply for Medicaid he in November and meanwhile she can obtain insulin from [facility] which is the cheapest option.  Patient can do a curbside pickup if she has agoraphobia to go inside [facility] as she states it is a crowded place    Per endocrinology ACTH stimulation test ordered given low cortisol- normal  Insuilin titrated by endocrinology: Commended decreasing Lantus to 25 nightly  "

## 2024-09-15 NOTE — CASE MANAGEMENT
Case Management Discharge Planning Note    Patient name Barb Palomo  Location [facility] 522/[facility] 522-01 MRN [id number]  : 1970 Date 9/15/2024       Current Admission Date: 8/15/2024  Current Admission Diagnosis:C. difficile diarrhea   Patient Active Problem List    Diagnosis Date Noted Date Diagnosed    Bacteriuria 2024     Hypotension 2024     Sinus tachycardia 2024     Hypomagnesemia 2024     Failure to thrive in adult 2024     C. difficile diarrhea 2024     Severe protein-calorie malnutrition (HCC) 2024     HHNC (hyperglycemic hyperosmolar nonketotic coma) ([facility]) 2024     Abdominal pain 2024     Acute cystitis without hematuria 2024     Chronic migraine without aura without status migrainosus, not intractable 2024     Unspecified psychosis not due to a substance or known physiological condition ([facility]) 2023     Pancolitis ([facility]) 2023     S/P left knee arthroscopy 10/21/2022     Agoraphobia with panic disorder 2022    Bipolar II disorder ([facility]) 2022    Depression, unspecified 2022    Polyarthritis, unspecified 2022    Chronic prescription benzodiazepine use 2022     Uncontrolled type 2 diabetes mellitus with hyperglycemia ([facility]) 2021     Nausea 2021     Weakness of right upper extremity 2021     Neuropathy      Cervical radiculopathy 2020     Lumbar radiculopathy 2020     DDD (degenerative disc disease), cervical 2020     DDD (degenerative disc disease), lumbar 2020     Low back pain with sciatica 2020     Cervical spinal stenosis 2020     Cervical spondylosis without myelopathy 2020     Paresthesia and pain of both upper extremities 2020     Paresthesia of both lower extremities 2020     Chronic pain of both shoulders 2020     Chronic cervical pain 2020     Symptom associated  with female genital organs 05/26/2020     Female stress incontinence 05/26/2020     Decreased libido 05/26/2020     ROGERIO positive 05/13/2020     Diplopia 02/04/2020     Chronic migraine without aura, not intractable, without status migrainosus 10/17/2019     Arthralgia of temporomandibular joint 08/22/2019     Carpal tunnel syndrome 08/22/2019     Dysphagia 08/22/2019     Reactive airway disease without complication 02/23/2018     Protein calorie malnutrition (HCC) 08/30/2017     Tobacco dependence 08/30/2017     GERD without esophagitis 08/25/2017     History of decompression of median nerve 08/25/2017     Hypothyroidism 08/25/2017     Chronic pain disorder 11/04/2016     Pancreatic cyst 10/17/2016     Chronic diarrhea 10/04/2016     Chronic fatigue, unspecified 08/24/2016     Severe episode of recurrent major depressive disorder, without psychotic features (HCC) 05/05/2016     Fatty liver 04/08/2016     Opioid dependence with other opioid-induced disorder (HCC) 01/25/2016     Iron deficiency 12/28/2015     Vitamin D deficiency 08/13/2015     Hyperlipidemia 03/15/2013     Insomnia 03/04/2013     Vitamin B12 deficiency 03/04/2013     Post-traumatic stress disorder, unspecified 09/26/2012     Benign essential hypertension 09/26/2012     Temporary cerebral vascular dysfunction 07/30/2009     History of pulmonary embolism 07/30/2009     Irregular menstrual cycle 04/02/2008       LOS (days): 30  Geometric Mean LOS (GMLOS) (days): 5.1  Days to GMLOS:-25.3     OBJECTIVE:  Risk of Unplanned Readmission Score: 29.55         Current admission status: Inpatient   Preferred Pharmacy:   [id number], [address] @[facility] [address], [place], [place] [address] [place] [address]  Phone: [phone] Fax: [phone]    [id number] - [place] - [address]  [address]  [place] [address]  Phone: [phone] Fax: [phone]    [facility]  [place], [place] - [address]  [address] [address]  Phone: [phone] Fax: [phone]    Primary Care Provider: ELISEO Vee    Primary Insurance: MEDICARE  Secondary Insurance:     DISCHARGE DETAILS:    IMM Given (Date):: 09/15/24  IMM Given to:: Patient     IMM reviewed with patient, patient agrees with discharge determination.

## 2024-09-15 NOTE — ASSESSMENT & PLAN NOTE
Longstanding history of chronic diarrhea, patient reporting acute diarrhea x1 month. Follows with GI. Has tried questran/imodium without relief    -Likely component of autonomic diarrhea given her secretory type symptoms of diarrhea at night and stool incontinence.  She has uncontrolled diabetes with her most recent A1c above 16 as she was not taking insulin at home.   -Pancreatic fecal elastase low at 5.5.  She was subsequently started on Creon.  -Status postcholecystectomy and currently on Questran.  -Biopsies from a colonoscopy were negative for microscopic colitis.  Stool enteric panel negative  C. difficile positive, see plan above   Giardia antigen is negative  Also started on lometil  Diarrhea improving.    GI signed off

## 2024-09-15 NOTE — QUICK NOTE
Noted to be hypoglycemic overnight, with fasting glucose as low as 67. Sugars improved to 135 after breakfast. Pre-breakfast HumaLog was held.     Recommend decreasing Lantus to 25 units qhs.   Continue HumaLog 10 units three times daily before meals.   Decrease HumaLog dose by 50% if patient unsure whether she will eat her entire meal.  Recommend close follow up with endocriology at discharge.

## 2024-09-16 ENCOUNTER — TELEPHONE (OUTPATIENT)
Dept: GASTROENTEROLOGY | Facility: CLINIC | Age: 54
End: 2024-09-16

## 2024-09-16 VITALS
RESPIRATION RATE: 18 BRPM | DIASTOLIC BLOOD PRESSURE: 72 MMHG | BODY MASS INDEX: 18.34 KG/M2 | HEART RATE: 108 BPM | WEIGHT: 99.65 LBS | TEMPERATURE: 97.4 F | SYSTOLIC BLOOD PRESSURE: 109 MMHG | OXYGEN SATURATION: 97 % | HEIGHT: 62 IN

## 2024-09-16 PROBLEM — N30.00 ACUTE CYSTITIS WITHOUT HEMATURIA: Status: RESOLVED | Noted: 2024-05-06 | Resolved: 2024-09-16

## 2024-09-16 PROBLEM — A04.72 C. DIFFICILE DIARRHEA: Status: RESOLVED | Noted: 2024-08-16 | Resolved: 2024-09-16

## 2024-09-16 LAB
GLUCOSE SERPL-MCNC: 118 MG/DL (ref 65–140)
GLUCOSE SERPL-MCNC: 143 MG/DL (ref 65–140)
GLUCOSE SERPL-MCNC: 171 MG/DL (ref 65–140)
GLUCOSE SERPL-MCNC: 360 MG/DL (ref 65–140)

## 2024-09-16 PROCEDURE — 82948 REAGENT STRIP/BLOOD GLUCOSE: CPT

## 2024-09-16 PROCEDURE — 97530 THERAPEUTIC ACTIVITIES: CPT

## 2024-09-16 PROCEDURE — 97535 SELF CARE MNGMENT TRAINING: CPT

## 2024-09-16 PROCEDURE — 99232 SBSQ HOSP IP/OBS MODERATE 35: CPT | Performed by: INTERNAL MEDICINE

## 2024-09-16 PROCEDURE — 99239 HOSP IP/OBS DSCHRG MGMT >30: CPT | Performed by: INTERNAL MEDICINE

## 2024-09-16 RX ORDER — INSULIN LISPRO 100 [IU]/ML
10 INJECTION, SOLUTION INTRAVENOUS; SUBCUTANEOUS
Qty: 9 ML | Refills: 0
Start: 2024-09-16 | End: 2024-10-16

## 2024-09-16 RX ORDER — INSULIN GLARGINE 100 [IU]/ML
25 INJECTION, SOLUTION SUBCUTANEOUS
Qty: 7.5 ML | Refills: 0
Start: 2024-09-16 | End: 2024-10-16

## 2024-09-16 RX ORDER — FAMOTIDINE 20 MG/1
20 TABLET, FILM COATED ORAL 2 TIMES DAILY
Qty: 60 TABLET | Refills: 0
Start: 2024-09-16 | End: 2024-10-16

## 2024-09-16 RX ORDER — DIPHENOXYLATE HCL/ATROPINE 2.5-.025MG
1 TABLET ORAL 4 TIMES DAILY
Qty: 20 TABLET | Refills: 0 | Status: SHIPPED | OUTPATIENT
Start: 2024-09-16 | End: 2024-09-17 | Stop reason: SDUPTHER

## 2024-09-16 RX ORDER — NICOTINE 21 MG/24HR
1 PATCH, TRANSDERMAL 24 HOURS TRANSDERMAL DAILY
Qty: 28 PATCH | Refills: 0
Start: 2024-09-16

## 2024-09-16 RX ORDER — DIAZEPAM 5 MG
5 TABLET ORAL EVERY 12 HOURS PRN
Qty: 10 TABLET | Refills: 0 | Status: SHIPPED | OUTPATIENT
Start: 2024-09-16 | End: 2024-09-21

## 2024-09-16 RX ORDER — DICYCLOMINE HYDROCHLORIDE 10 MG/1
10 CAPSULE ORAL
Qty: 60 CAPSULE | Refills: 0
Start: 2024-09-16 | End: 2024-10-01

## 2024-09-16 RX ORDER — BACLOFEN 5 MG/1
5 TABLET ORAL 2 TIMES DAILY
Qty: 60 TABLET | Refills: 0
Start: 2024-09-16 | End: 2024-10-16

## 2024-09-16 RX ORDER — TRAMADOL HYDROCHLORIDE 200 MG/1
200 TABLET, EXTENDED RELEASE ORAL DAILY PRN
Qty: 10 TABLET | Refills: 0 | Status: SHIPPED | OUTPATIENT
Start: 2024-09-16 | End: 2024-09-26 | Stop reason: SDUPTHER

## 2024-09-16 RX ORDER — CHOLESTYRAMINE LIGHT 4 G/5.7G
4 POWDER, FOR SUSPENSION ORAL 2 TIMES DAILY
Qty: 60 PACKET | Refills: 0
Start: 2024-09-16 | End: 2024-10-16

## 2024-09-16 RX ORDER — LIDOCAINE 50 MG/G
2 PATCH TOPICAL DAILY
Qty: 60 PATCH | Refills: 0
Start: 2024-09-16 | End: 2024-09-17

## 2024-09-16 RX ADMIN — BUTALBITAL, ACETAMINOPHEN AND CAFFEINE 1 TABLET: 50; 325; 40 TABLET ORAL at 05:39

## 2024-09-16 RX ADMIN — FLUTICASONE FUROATE AND VILANTEROL TRIFENATATE 1 PUFF: 200; 25 POWDER RESPIRATORY (INHALATION) at 09:29

## 2024-09-16 RX ADMIN — ATORVASTATIN CALCIUM 80 MG: 80 TABLET, FILM COATED ORAL at 09:25

## 2024-09-16 RX ADMIN — MICONAZOLE NITRATE: 20 CREAM TOPICAL at 09:27

## 2024-09-16 RX ADMIN — DULOXETINE HYDROCHLORIDE 60 MG: 60 CAPSULE, DELAYED RELEASE ORAL at 09:24

## 2024-09-16 RX ADMIN — DICYCLOMINE HYDROCHLORIDE 10 MG: 10 CAPSULE ORAL at 11:47

## 2024-09-16 RX ADMIN — DIPHENOXYLATE HYDROCHLORIDE AND ATROPINE SULFATE 1 TABLET: .025; 2.5 TABLET ORAL at 09:24

## 2024-09-16 RX ADMIN — LEVOTHYROXINE SODIUM 112 MCG: 112 TABLET ORAL at 05:39

## 2024-09-16 RX ADMIN — ONDANSETRON 4 MG: 4 TABLET, ORALLY DISINTEGRATING ORAL at 13:59

## 2024-09-16 RX ADMIN — DIPHENOXYLATE HYDROCHLORIDE AND ATROPINE SULFATE 1 TABLET: .025; 2.5 TABLET ORAL at 11:47

## 2024-09-16 RX ADMIN — DICYCLOMINE HYDROCHLORIDE 10 MG: 10 CAPSULE ORAL at 05:39

## 2024-09-16 RX ADMIN — ENOXAPARIN SODIUM 40 MG: 40 INJECTION SUBCUTANEOUS at 09:24

## 2024-09-16 RX ADMIN — GABAPENTIN 800 MG: 400 CAPSULE ORAL at 09:25

## 2024-09-16 RX ADMIN — INSULIN LISPRO 1 UNITS: 100 INJECTION, SOLUTION INTRAVENOUS; SUBCUTANEOUS at 11:47

## 2024-09-16 RX ADMIN — ASPIRIN 81 MG CHEWABLE TABLET 81 MG: 81 TABLET CHEWABLE at 09:24

## 2024-09-16 RX ADMIN — FAMOTIDINE 20 MG: 20 TABLET, FILM COATED ORAL at 09:25

## 2024-09-16 RX ADMIN — PANCRELIPASE 24000 UNITS: 120000; 24000; 76000 CAPSULE, DELAYED RELEASE PELLETS ORAL at 11:47

## 2024-09-16 RX ADMIN — INSULIN LISPRO 10 UNITS: 100 INJECTION, SOLUTION INTRAVENOUS; SUBCUTANEOUS at 11:47

## 2024-09-16 RX ADMIN — INSULIN LISPRO 10 UNITS: 100 INJECTION, SOLUTION INTRAVENOUS; SUBCUTANEOUS at 07:24

## 2024-09-16 RX ADMIN — CHOLESTYRAMINE 4 G: 4 POWDER, FOR SUSPENSION ORAL at 09:28

## 2024-09-16 RX ADMIN — BACLOFEN 5 MG: 10 TABLET ORAL at 09:24

## 2024-09-16 RX ADMIN — NICOTINE 1 PATCH: 21 PATCH, EXTENDED RELEASE TRANSDERMAL at 09:25

## 2024-09-16 RX ADMIN — PANCRELIPASE 24000 UNITS: 120000; 24000; 76000 CAPSULE, DELAYED RELEASE PELLETS ORAL at 07:24

## 2024-09-16 RX ADMIN — DIAZEPAM 5 MG: 5 TABLET ORAL at 11:47

## 2024-09-16 NOTE — ASSESSMENT & PLAN NOTE
Intermittent episodes of hypotension, most occurrences happen overnight and question correlation with minipress administration.  This has now been discontinued, also has autonomic dysfunction so does have intermittent hypotension but patient remains asymptomatic during low blood pressure and high heart rate.

## 2024-09-16 NOTE — ASSESSMENT & PLAN NOTE
"Lab Results   Component Value Date    HGBA1C 16.1 (H) 08/16/2024       Recent Labs     09/16/24  0156 09/16/24  0613 09/16/24  0959 09/16/24  1037   POCGLU 360* 143* 118 171*     Patient markedly hyperglycemic on admission only partially responsive to IV fluids, remainder of labs fortunately not consistent with DKA. A1C 16  Patient admitting she has not been using her home insulin as she apparently has been unable to fill prescription due to \"insurance issues,\" additionally reports that due to her neuropathy she has difficulty self injecting.  Daughter also reports jessica non compliance.   Typically on NovoLog 70/30 20 units twice daily  Diabetic diet-changed to regaular than to lactose free  Insulin regimen adjusted by endocrinology.  Endocrine follow-up requested ,given labile blood sugar  Also educated patient by myself, GI as well as  regarding her options of obtaining insulin.  Patient has to apply for Medicaid he in November and meanwhile she can obtain insulin from [employer] which is the cheapest option.  Patient can do a curbside pickup if she has agoraphobia to go inside [facility] as she states it is a crowded place    Per endocrinology ACTH stimulation test ordered given low cortisol- normal.  Patient has low blood sugar.  Mostly driven by her oral intake.  Insuilin titrated by endocrinology: Commended decreasing Lantus to 25 nightly, titrate 3 meal insulin based on what she eats.  Per endocrinology okay to be discharged on current dose of insulin.  Diet consistency renal enforced and encouraged.  Patient will be followed by endocrinology as outpatient  "

## 2024-09-16 NOTE — PROGRESS NOTES
Progress Note - Infectious Disease   Name: Barb Palomo 54 y.o. female I MRN: [id number]  Unit/Bed#: [facility] 522-01 I Date of Admission: 8/15/2024   Date of Service: 9/16/2024 I Hospital Day: 31    Assessment & Plan  C. difficile diarrhea  Patient presented with weakness, weight loss, acute on chronic diarrhea.  8/16 C. difficile PCR and toxin EIA positive.  Continues to have significant loose stools despite 7 days of fidaxomicin and now 7 days of p.o. vancomycin.  She remains nontoxic without fever or leukocytosis.  Abdominal exam benign.  Patient status post fecal transplantation although the prep seems to have been inadequate.  Still with ongoing frequent stools.  Stools are definitely more formed  -Hold on the additional oral vancomycin for now  -Careful use of Lomotil as per GI  -If patient does not continue to improve with fecal transplant may need to repeat the transplant after a more aggressive prep  -Careful use of Questran as per GI  -Close GI follow-up  Chronic diarrhea  Appears to be multifactorial but is slowly improving with having been treated with oral vancomycin, fecal transplant, on pancreatic enzyme supplementation, and Lomotil.  Suspected significant autonomic component also.  Fecal white blood cells negative.  Additional infectious testing including stool O&P, Giardia antigen, enteric panel negative.  Unclear if ongoing symptoms are solely due to C. difficile.  Poorly controlled diabetes likely contributing.  She may also be having postinfectious/functional diarrhea.    Repeat CT of the abdomen pelvis with colonic thickening.  Status post fecal transplantation with some improvement in the diarrhea.  Patient started on Lomotil.              -Lomotil as per GI              -Close GI follow-up  -Continue pancreatic enzymes  -With inadequate prep, patient and ongoing diarrhea, patient may need repeat fecal transplant if does not continue to improve  -Will hold on any additional oral vancomycin for  now  Bacteriuria  Patient received 3 doses of IV ceftriaxone. CT imaging showed a distended bladder suggestive of retention which is likely contributing to her symptoms. Per report pain was happening after urination likely due to skin irritation. In the setting of C. difficile as above, avoid unnecessary antibiotics. Stressed with the patient in detail the need to avoid antibiotics unless absolutely necessary   Uncontrolled type 2 diabetes mellitus with hyperglycemia (HCC)  Lab Results   Component Value Date    HGBA1C 16.1 (H) 2024       Recent Labs     09/15/24  1551 09/15/24  2116 24  0156 24  0613   POCGLU 208* 223* 360* 143*       Blood Sugar Average: Last 72 hrs:  (P) [id number]    Tighten diabetic control as per the primary service  Sinus tachycardia  Unclear etiology.  Perhaps all related to volume issues although the patient is eating and drinking without difficulty.  Possibly medication effect and the Minipress has been discontinued.  -Volume management  -Adjust medications  -Serial exams  -Additional workup as per the primary  Tobacco dependence    Discussed with primary service the plan to continue to monitor off all antibiotics for now and they agree with the plan.      History of Present Illness   Patient without fever chills or sweats.  Denies any nausea vomiting.  Her stools are still frequent but more formed.  No abdominal pain.    Objective      Temp:  [97.4 °F (36.3 °C)-98 °F (36.7 °C)] 97.4 °F (36.3 °C)  HR:  [] 99  Resp:  [15-16] 16  BP: ()/() 89/60  O2 Device: None (Room air)          I/O last 24 hours:  In: 1200 [P.O.:1200]  Out: -   Lines/Drains/Airways       Active Status       None                  Physical Exam     Objective:  Vitals:  Temp:  [97.4 °F (36.3 °C)-98 °F (36.7 °C)] 97.4 °F (36.3 °C)  HR:  [] 99  Resp:  [15-16] 16  BP: ()/() 89/60  SpO2:  [95 %-99 %] 96 %  Temp (24hrs), Av.7 °F (36.5 °C), Min:97.4 °F (36.3 °C),  "Max:98 °F (36.7 °C)  Current: Temperature: (!) 97.4 °F (36.3 °C)    Physical Exam:   General Appearance:  Alert, interactive, nontoxic, no acute distress.   Throat: Oropharynx moist without lesions.    Lungs:   Clear to auscultation bilaterally; no wheezes, rhonchi or rales; respirations unlabored   Heart:  RRR; no murmur, rub or gallop   Abdomen:   Soft, non-tender, non-distended, positive bowel sounds.     Extremities: No clubbing, cyanosis or edema   Skin: No new rashes or lesions. No draining wounds noted.       Lab Results: I have reviewed the following results:   No results for input(s): \"WBC\", \"HGB\", \"HCT\", \"PLT\", \"BANDSPCT\", \"SODIUM\", \"K\", \"CL\", \"CO2\", \"BUN\", \"CREATININE\", \"GLUC\", \"CAIONIZED\", \"MG\", \"PHOS\", \"AST\", \"ALT\", \"ALB\", \"TBILI\", \"DBILI\", \"ALKPHOS\", \"PTT\", \"INR\", \"HSTNI0\", \"HSTNI2\", \"BNP\", \"LACTICACID\" in the last 72 hours.  Micro:  Lab Results   Component Value Date    BLOODCX No Growth After 5 Days. 08/15/2024    BLOODCX No Growth After 5 Days. 08/15/2024    BLOODCX No Growth After 5 Days. 09/04/2016    BLOODCX No Growth After 5 Days. 09/04/2016    URINECX 80,000-89,000 cfu/ml Klebsiella pneumoniae (A) 08/16/2024    URINECX 20,000-29,000 cfu/ml 08/16/2024    URINECX >100,000 cfu/ml Klebsiella pneumoniae (A) 05/23/2024     Imaging Review: No pertinent imaging studies reviewed.  Other Studies: No additional pertinent studies reviewed.      "

## 2024-09-16 NOTE — TELEPHONE ENCOUNTER
----- Message from Kenisha SEGURA sent at 9/16/2024  1:48 PM EDT -----  Please schedule 3 month repeat colonoscopy per Dr Honeycutt.  DX:  inadequate prep and personal history of colon polyps.  Thank you.

## 2024-09-16 NOTE — ASSESSMENT & PLAN NOTE
continue home duloxetine, mirtazapine, as needed Valium  Depressed DC'd given hypotension/tachycardia

## 2024-09-16 NOTE — ASSESSMENT & PLAN NOTE
Longstanding history of chronic diarrhea, patient reporting acute diarrhea x1 month. Follows with GI. Has tried questran/imodium without relief    -Likely component of autonomic diarrhea given her secretory type symptoms of diarrhea at night and stool incontinence.  She has uncontrolled diabetes with her most recent A1c above 16 as she was not taking insulin at home.   -Pancreatic fecal elastase low at 5.5.  She was subsequently started on Creon.  -Status postcholecystectomy and currently on Questran.  -Biopsies from a colonoscopy were negative for microscopic colitis.  Stool enteric panel negative  C. difficile positive, see plan above   Giardia antigen is negative  Also started on lometil  Still with variable bowel movements sometimes loose but most of the time as become formed stool  GI signed off

## 2024-09-16 NOTE — ASSESSMENT & PLAN NOTE
Patient presented with generalized weakness, frequent falls.  She has had ongoing diarrhea, dysuria, poor oral intake and reported 70 pound weight loss over the last year.  Initially hypotensive in the ER, status post IV fluids. Labs with multiple metabolic derangements  CT imaging showing mild diffuse colonic wall thickening, mild diffuse wall thickening of stomach  C. difficile found to be positive--has a history of C. difficile in September 2022.    Patient started on Dificid 200 mg x 10 days per protocol on 8/18 as this is recurrent infection  Initially seemed BMs were improving and were becoming more formed, however again having frequent watery stools  GI consulted: Now s/p 7 days fidoxamycin and 7 days oral vamcomycin. S/p FMT 9/6/24. No leukocytosis or fever.   -Continue to monitor off antibiotics. Appears to be at around baseline stool output, improved from presentation.   - discontinue her Protonix and probiotic with as needed Pepcid as replacement of Protonix.  Contact precautions  Appetite is good   GI signed off and ID signed off.

## 2024-09-16 NOTE — CASE MANAGEMENT
Case Management Discharge Note    Patient name Barb Palomo  Location [facility] 522/[facility] 522-01 MRN [id number]  : 1970 Date 2024       Current Admission Date: 8/15/2024  Current Admission Diagnosis:C. difficile diarrhea      LOS (days): 31  Geometric Mean LOS (GMLOS) (days): 5.1  Days to GMLOS:-26.4     OBJECTIVE:  Risk of Unplanned Readmission Score: 29.9   Current admission status: Inpatient   Primary Insurance: MEDICARE    DISCHARGE DETAILS:  Discharge planning discussed with:: Patient  Freedom of Choice: Yes  Were Treatment Team discharge recommendations reviewed with patient/caregiver?: Yes  Did patient/caregiver verbalize understanding of patient care needs?: Yes  Were patient/caregiver advised of the risks associated with not following Treatment Team discharge recommendations?: Yes    Treatment Team Recommendation: Short Term Rehab  Discharge Destination Plan:: Short Term Rehab  Transport at Discharge : Stretcher van  Dispatcher Contacted: Yes  Number/Name of Dispatcher: [phone]  Transported by (Company and Unit #): [employer]  ETA of Transport (Date): 24  ETA of Transport (Time): 1400 (2:00PM)  Transfer Mode: Stretcher    Additional Comments: Pt is cleared for d/c by MEHRAN Rubio. CAITLYN Kelly was notified of pt's d/c order. Pt is accepted for short-term skilled rehab placement at [facility] located in [facility]. CM arranged Stretcher Van transport via [facility] for 2:00PM pickup this day to transport pt to SNF. The pt was informed of d/c. IMM signed by pt on 9/15/24. No PCS for transport required. Chart copy for SNF requested from RN. No further CM needs.    Accepting Facility Name, City & State : [facility] / [place], [place]  Receiving Facility/Agency Phone Number: [phone]  Facility/Agency Fax Number: [phone]

## 2024-09-16 NOTE — DISCHARGE SUMMARY
Discharge Summary - Hospitalist   Name: Barb Palomo 54 y.o. female I MRN: [id number]  [facility] I Date of Admission: 8/15/2024   Date of Service: 9/16/2024 I Hospital Day: 31     Assessment & Plan  C. difficile diarrhea  Patient presented with generalized weakness, frequent falls.  She has had ongoing diarrhea, dysuria, poor oral intake and reported 70 pound weight loss over the last year.  Initially hypotensive in the ER, status post IV fluids. Labs with multiple metabolic derangements  CT imaging showing mild diffuse colonic wall thickening, mild diffuse wall thickening of stomach  C. difficile found to be positive--has a history of C. difficile in September 2022.    Patient started on Dificid 200 mg x 10 days per protocol on 8/18 as this is recurrent infection  Initially seemed BMs were improving and were becoming more formed, however again having frequent watery stools  GI consulted: Now s/p 7 days fidoxamycin and 7 days oral vamcomycin. S/p FMT 9/6/24. No leukocytosis or fever.   -Continue to monitor off antibiotics. Appears to be at around baseline stool output, improved from presentation.   - discontinue her Protonix and probiotic with as needed Pepcid as replacement of Protonix.  Contact precautions  Appetite is good   GI signed off and ID signed off.  Chronic diarrhea  Longstanding history of chronic diarrhea, patient reporting acute diarrhea x1 month. Follows with GI. Has tried questran/imodium without relief    -Likely component of autonomic diarrhea given her secretory type symptoms of diarrhea at night and stool incontinence.  She has uncontrolled diabetes with her most recent A1c above 16 as she was not taking insulin at home.   -Pancreatic fecal elastase low at 5.5.  She was subsequently started on Creon.  -Status postcholecystectomy and currently on Questran.  -Biopsies from a colonoscopy were negative for microscopic colitis.  Stool enteric panel negative  C. difficile positive,  "see plan above   Giardia antigen is negative  Also started on lometil  Still with variable bowel movements sometimes loose but most of the time as become formed stool  GI signed off  Severe episode of recurrent major depressive disorder, without psychotic features (HCC)    continue home duloxetine, mirtazapine, as needed Valium  Depressed DC'd given hypotension/tachycardia  GERD without esophagitis  Change Protonix to Pepcid as needed secondary to C. difficile  Tobacco dependence  Counseled on need for cessation, provide nicotine patch while admitted  Recent CT chest for lung cancer screening 7/30/2024 negative for nodules or masses  Opioid dependence with other opioid-induced disorder (HCC)  PDMP reviewed, patient maintained on tramadol 200 mg daily as needed; history of chronic cervical/lumbar pain noted  Continue as needed tramadol and chronic gabapentin dosing  Uncontrolled type 2 diabetes mellitus with hyperglycemia (HCC)  Lab Results   Component Value Date    HGBA1C 16.1 (H) 08/16/2024       Recent Labs     09/16/24  0156 09/16/24  0613 09/16/24  0959 09/16/24  1037   POCGLU 360* 143* 118 171*     Patient markedly hyperglycemic on admission only partially responsive to IV fluids, remainder of labs fortunately not consistent with DKA. A1C 16  Patient admitting she has not been using her home insulin as she apparently has been unable to fill prescription due to \"insurance issues,\" additionally reports that due to her neuropathy she has difficulty self injecting.  Daughter also reports jessica non compliance.   Typically on NovoLog 70/30 20 units twice daily  Diabetic diet-changed to regaular than to lactose free  Insulin regimen adjusted by endocrinology.  Endocrine follow-up requested ,given labile blood sugar  Also educated patient by myself, GI as well as  regarding her options of obtaining insulin.  Patient has to apply for Medicaid he in November and meanwhile she can obtain insulin from [employer] " which is the cheapest option.  Patient can do a curbside pickup if she has agoraphobia to go inside [facility] as she states it is a crowded place    Per endocrinology ACTH stimulation test ordered given low cortisol- normal.  Patient has low blood sugar.  Mostly driven by her oral intake.  Insuilin titrated by endocrinology: Commended decreasing Lantus to 25 nightly, titrate 3 meal insulin based on what she eats.  Per endocrinology okay to be discharged on current dose of insulin.  Diet consistency renal enforced and encouraged.  Patient will be followed by endocrinology as outpatient  Acute cystitis without hematuria  Reported dysuria on admission   Urine + nitrite & leukocytes, CT imaging with distended bladder suggestive of retention and small amount of air which could be secondary to infection  Urine Culture noted, S/P 3 doses of IV CTX 8/17 8/21, reported pain AFTER urination, likely related to urine hitting excoriated labia/bottom from frequent stooling. Will continue with medicated cream to this area- denies pain at this time   S/p pyridum x 3 doses  Wound care consult appreciated   Severe protein-calorie malnutrition (HCC)  Severe protein-calorie malnutrition 2/2 chronic diarrhea a/e/b fat and muscle loss requiring Nutrition consult, oral diet and nutrition supplements    360 Statement: severe malnutrition r/t suspect combination of chronic illness and social/environmental factors as evidenced by severe temporal muscle loss, severe tricep fat pad loss, at least moderate muscle loss around clavicles and shoulders, at least moderate orbital fat pad loss. Treatment: oral diet and oral nutrition supplements.    BMI Findings:  Adult BMI Classifications: Underweight < 18.5        Body mass index is 18.23 kg/m².   C/w supplements  Nutrition consult appreciated  Encourage po intake   Hypomagnesemia  Hold oral supplementation due to diarrhea  Check magnesium level and replace as needed  Failure to thrive in  "adult  Patient presented with increased generalized weakness and frequent falls, ongoing diarrhea, dysuria, poor oral intake, and at least 70-80 pound weight loss over the past 1 year. Has been following with GI, had colonoscopy May 2024 with poor prep--had EGD with esophagitis and gastritis  Labs with multiple derangements including hyperglycemia, hypokalemia and hypomagnesemia on admission, improved.   C. difficile positive as above  Patient reports she has not been using insulin due to \"insurance issue\" and inability to self inject due to her neuropathy  CT abdomen and pelvis without any obvious malignancy  Treatment as per respective issues  PT/OT recommending rehab.-Patient going to rehab  Endocrinology evaluation appreciated.  Sinus tachycardia  Patient with persistent sinus tachycardia.  Failure thought to be hypotension, side effect of Minipress since patient was volume resuscitated and Minipress held  She also has multiple issues ongoing uncontrolled labile blood sugar, autonomic/C. difficile diarrhea.  Her appetite is normal and blood pressure has remained fairly stable lately but continues to have tachycardia.  She is not septic at this time.  TSH checked which was normal hence cardiology was consulted, to review any underlying explanation for sinus tachycardia  Echo normal  Sinus tachycardia improving, it is intermittent.  Hypotension  Intermittent episodes of hypotension, most occurrences happen overnight and question correlation with minipress administration.  This has now been discontinued, also has autonomic dysfunction so does have intermittent hypotension but patient remains asymptomatic during low blood pressure and high heart rate.  Bacteriuria       Medical Problems       Resolved Problems  Date Reviewed: 9/16/2024            Resolved    Hypokalemia 8/20/2024     Resolved by  ELISEO Toribio    Back pain 8/29/2024     Resolved by  Abhay Hayes MD        Discharging Physician / " "Practitioner: Wanda Rubio MD  PCP: ELISEO Vee  Admission Date:   Admission Orders (From admission, onward)       Ordered        24 0102  INPATIENT ADMISSION  Once                          Discharge Date: 24    Consultations During Hospital Stay:  Infectious disease and GI, endocrinology    Procedures Performed:   Fecal transplant    Significant Findings / Test Results:       Incidental Findings:          Test Results Pending at Discharge (will require follow up):        Outpatient Tests Requested:      Complications:  none continues to have labile blood sugar and vital signs due to autonomic dysfunction    Reason for Admission: Generalized weakness and frequent falls     Hospital Course:   Barb Palomo is a 54 y.o. female patient who originally presented to the hospital on 8/15/2024  Per HPI- \"54 y.o. 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  Patient states she has noticed an ongoing decline for nearly the past 1 year since her   with at least 70-80 pound weight loss during this span and ongoing chronic diarrhea for which she has been following with gastroenterology and underwent colonoscopy 3/2024 which did not reveal overt large mass however this was severely limited by poor bowel prep.  For the past 1 month in particular however she has noticed increasing generalized weakness which she states has been complicated by her ongoing neuropathy, additionally with some decreased appetite.  She states she has been taking her oral medications but admits she has not been using her insulin as she has been " "unable to obtain refills due to an \"insurance issue,\" additionally reporting she had difficulty self injecting herself due to her neuropathy.  Additionally she reports frequent falls during the span including a fall 8/14/2024 striking her right leg but for which she was able to ambulate afterwards without reported head strike or loss of consciousness, and due to his ongoing decline presented for evaluation.  She reports ongoing chronic watery diarrhea without melena/medic easier and additionally reports recent development of dysuria but denies fever/chills, chest pain/pressure, shortness of breath, or other systemic symptoms; does report some abdominal pain and nausea to me.   During ED evaluation her labs were significant for marked hyperglycemia without elevated anion gap or acidosis, also with hypokalemia and hypomagnesemia noted on labs for which replacement for this was initiated, and x-rays of the right hip/knee were obtained awaiting final radiologist read but to wet read without acute fracture or dislocation visualized in these areas.  Due to her frequent falls and overall picture she is admitted for further workup and management as above.\"           Please see above list of diagnoses and related plan for additional information.     Condition at Discharge: stable    Discharge Day Visit / Exam:   * Please refer to separate progress note for these details *    Discussion with Family: Attempted to update  (daughter) via phone. Left voicemail.     Discharge instructions/Information to patient and family:   See after visit summary for information provided to patient and family.      Provisions for Follow-Up Care:  See after visit summary for information related to follow-up care and any pertinent home health orders.      Mobility at time of Discharge:   Basic Mobility Inpatient Raw Score: 17  JH-HLM Goal: 5: Stand one or more mins  JH-HLM Achieved: 7: Walk 25 feet or more  HLM Goal NOT achieved. " Continue to encourage mobility in post discharge setting.     Disposition:   Acute Rehab at [facility]    Planned Readmission: no    Discharge Medications:  See after visit summary for reconciled discharge medications provided to patient and/or family.      Administrative Statements   Discharge Statement:  I have spent a total time of 35 minutes in caring for this patient on the day of the visit/encounter. >30 minutes of time was spent on: Counseling / Coordination of care.    **Please Note: This note may have been constructed using a voice recognition system**

## 2024-09-16 NOTE — CASE MANAGEMENT
Case Management Discharge Note    Patient name Barb Palomo  Location [facility]-01 MRN [id number]  : 1970 Date 2024       Current Admission Date: 8/15/2024  Current Admission Diagnosis:C. difficile diarrhea      LOS (days): 31  Geometric Mean LOS (GMLOS) (days): 5.1  Days to GMLOS:-26.3     OBJECTIVE:  Risk of Unplanned Readmission Score: 27.59   Current admission status: Inpatient   Primary Insurance: MEDICARE    DISCHARGE DETAILS:  Discharge planning discussed with:: Patient  Freedom of Choice: Yes  Were Treatment Team discharge recommendations reviewed with patient/caregiver?: Yes  Did patient/caregiver verbalize understanding of patient care needs?: Yes  Were patient/caregiver advised of the risks associated with not following Treatment Team discharge recommendations?: Yes    Treatment Team Recommendation: Short Term Rehab  Discharge Destination Plan:: Short Term Rehab  Transport at Discharge : Wheelchair van  Dispatcher Contacted: Yes  Number/Name of Dispatcher: [phone]  Transported by (Company and Unit #): [facility]  ETA of Transport (Date): 24  ETA of Transport (Time): 1100 (11:00AM)  Transfer Mode: Wheelchair    Additional Comments: Pt is cleared for d/c by MEHRAN Rubio. CAITLYN Kelly was notified of pt's d/c order. Pt is accepted for short-term skilled rehab placement at [facility] located in [facility]. CM arranged Wheelchair Van transport via [facility] for 11:00AM pickup this day to transport pt to SNF. The pt was informed of d/c. IMM signed by pt on 9/15/24. No PCS for transport required. Chart copy for SNF requested from RN. No further CM needs.    Accepting Facility Name, City & State : [facility] / [place], [place]  Receiving Facility/Agency Phone Number: [phone]  Facility/Agency Fax Number: [phone]

## 2024-09-16 NOTE — ASSESSMENT & PLAN NOTE
Patient presented with weakness, weight loss, acute on chronic diarrhea.  8/16 C. difficile PCR and toxin EIA positive.  Continues to have significant loose stools despite 7 days of fidaxomicin and now 7 days of p.o. vancomycin.  She remains nontoxic without fever or leukocytosis.  Abdominal exam benign.  Patient status post fecal transplantation although the prep seems to have been inadequate.  Still with ongoing frequent stools.  Stools are definitely more formed  -Hold on the additional oral vancomycin for now  -Careful use of Lomotil as per GI  -If patient does not continue to improve with fecal transplant may need to repeat the transplant after a more aggressive prep  -Careful use of Questran as per GI  -Close GI follow-up

## 2024-09-16 NOTE — ASSESSMENT & PLAN NOTE
"Patient presented with increased generalized weakness and frequent falls, ongoing diarrhea, dysuria, poor oral intake, and at least 70-80 pound weight loss over the past 1 year. Has been following with GI, had colonoscopy May 2024 with poor prep--had EGD with esophagitis and gastritis  Labs with multiple derangements including hyperglycemia, hypokalemia and hypomagnesemia on admission, improved.   C. difficile positive as above  Patient reports she has not been using insulin due to \"insurance issue\" and inability to self inject due to her neuropathy  CT abdomen and pelvis without any obvious malignancy  Treatment as per respective issues  PT/OT recommending rehab.-Patient going to rehab  Endocrinology evaluation appreciated.  "

## 2024-09-16 NOTE — ASSESSMENT & PLAN NOTE
Appears to be multifactorial but is slowly improving with having been treated with oral vancomycin, fecal transplant, on pancreatic enzyme supplementation, and Lomotil.  Suspected significant autonomic component also.  Fecal white blood cells negative.  Additional infectious testing including stool O&P, Giardia antigen, enteric panel negative.  Unclear if ongoing symptoms are solely due to C. difficile.  Poorly controlled diabetes likely contributing.  She may also be having postinfectious/functional diarrhea.    Repeat CT of the abdomen pelvis with colonic thickening.  Status post fecal transplantation with some improvement in the diarrhea.  Patient started on Lomotil.              -Lomotil as per GI              -Close GI follow-up  -Continue pancreatic enzymes  -With inadequate prep, patient and ongoing diarrhea, patient may need repeat fecal transplant if does not continue to improve  -Will hold on any additional oral vancomycin for now

## 2024-09-16 NOTE — ASSESSMENT & PLAN NOTE
Patient with persistent sinus tachycardia.  Failure thought to be hypotension, side effect of Minipress since patient was volume resuscitated and Minipress held  She also has multiple issues ongoing uncontrolled labile blood sugar, autonomic/C. difficile diarrhea.  Her appetite is normal and blood pressure has remained fairly stable lately but continues to have tachycardia.  She is not septic at this time.  TSH checked which was normal hence cardiology was consulted, to review any underlying explanation for sinus tachycardia  Echo normal  Sinus tachycardia improving, it is intermittent.

## 2024-09-16 NOTE — ASSESSMENT & PLAN NOTE
Severe protein-calorie malnutrition 2/2 chronic diarrhea a/e/b fat and muscle loss requiring Nutrition consult, oral diet and nutrition supplements    360 Statement: severe malnutrition r/t suspect combination of chronic illness and social/environmental factors as evidenced by severe temporal muscle loss, severe tricep fat pad loss, at least moderate muscle loss around clavicles and shoulders, at least moderate orbital fat pad loss. Treatment: oral diet and oral nutrition supplements.    BMI Findings:  Adult BMI Classifications: Underweight < 18.5        Body mass index is 18.23 kg/m².   C/w supplements  Nutrition consult appreciated  Encourage po intake

## 2024-09-16 NOTE — OCCUPATIONAL THERAPY NOTE
Occupational Therapy Progress Note     Patient Name: Barb Palomo  Today's Date: 9/16/2024  Problem List  Principal Problem:    C. difficile diarrhea  Active Problems:    Severe episode of recurrent major depressive disorder, without psychotic features (HCC)    GERD without esophagitis    Tobacco dependence    Chronic diarrhea    Opioid dependence with other opioid-induced disorder (HCC)    Uncontrolled type 2 diabetes mellitus with hyperglycemia (HCC)    Acute cystitis without hematuria    Severe protein-calorie malnutrition (HCC)    Hypomagnesemia    Failure to thrive in adult    Sinus tachycardia    Hypotension    Bacteriuria           09/16/24 1053   OT Last Visit   OT Visit Date 09/16/24   Note Type   Note Type Treatment   Pain Assessment   Pain Assessment Tool 0-10   Pain Score No Pain   Restrictions/Precautions   Weight Bearing Precautions Per Order No   Other Precautions Contact/isolation;Fall Risk   ADL   Where Assessed Standing at sink   Grooming Assistance 5  Supervision/Setup   Grooming Deficit Brushing hair   UB Dressing Assistance 6  Modified independent   UB Dressing Deficit Thread RUE;Thread LUE   Toileting Assistance  4  Minimal Assistance   Toileting Deficit Perineal hygiene   Toileting Comments pt able to stand at sink with S while completing perineal hygiene, which was completed w min a for posterior thoroughness   Bed Mobility   Supine to Sit 6  Modified independent   Sit to Supine 6  Modified independent   Additional Comments found and left in  bed.   Transfers   Sit to Stand 5  Supervision   Stand to Sit 5  Supervision   Additional Comments w/o AD   Functional Mobility   Functional Mobility 5  Supervision   Additional Comments household distance, w rw vs wall support.   Additional items Rolling walker   Cognition   Overall Cognitive Status WFL   Arousal/Participation Alert;Responsive;Cooperative   Attention Within functional limits   Orientation Level Oriented X4   Memory Within  functional limits   Following Commands Follows all commands and directions without difficulty   Comments pt cooperative w overall G safety awareness and insight to condition t/o sessoion.   Activity Tolerance   Activity Tolerance Patient tolerated treatment well   Medical Staff Made Aware RN   Assessment   Assessment Pt seen on this date for OT session focusing on ADL retraining, body mechanics, transfer retraining, increasing activity tolerance/endurance and EOB sitting to increase ability to participate in ADL/functional tasks. Pt was found in bed and was left in bed w/ all needs within reach. Pt completed bed mob and sts w mod I/ s w/o AD, FM to bathroom w min A/S from therapist (TIMOTHY). Toileting tasks completed w min A, grooming w S.  Pt w/ improvements in activity tolerance, transfer ability, adl task completion, however is still limited 2* decreased ADL/High-level ADL status, decreased activity tolerance/endurance, decreased self-care trans, decreased safety awareness and insight to condition.   The patient's raw score on the AM-PAC Daily Activity Inpatient Short Form is 18. A raw score of less than 19 suggests the patient may benefit from discharge to post-acute rehabilitation services. Please refer to the recommendation of the Occupational Therapist for safe discharge planning.  Recommending pt D/C to level 2  when medically stable. Pt will continue to benefit from acute OT services to meet goals.   Plan   Treatment Interventions ADL retraining;Functional transfer training;Endurance training;Activityengagement;Energy conservation   Goal Expiration Date 09/27/24   OT Treatment Day 6   OT Frequency 2-3x/wk   Discharge Recommendation   Rehab Resource Intensity Level, OT II (Moderate Resource Intensity)   AM-PAC Daily Activity Inpatient   Lower Body Dressing 2   Bathing 2   Toileting 3   Upper Body Dressing 3   Grooming 4   Eating 4   Daily Activity Raw Score 18   Daily Activity Standardized Score (Calc for Raw  Score >=11) 38.66   AM-PAC Applied Cognition Inpatient   Following a Speech/Presentation 4   Understanding Ordinary Conversation 4   Taking Medications 4   Remembering Where Things Are Placed or Put Away 3   Remembering List of 4-5 Errands 3   Taking Care of Complicated Tasks 3   Applied Cognition Raw Score 21   Applied Cognition Standardized Score 44.3   Modified [place] Scale   Modified [place] Scale 4   End of Consult   Education Provided Yes   Patient Position at End of Consult Supine;All needs within reach   Nurse Communication Nurse aware of consult         SELINA Bailon, OTR/L

## 2024-09-16 NOTE — ASSESSMENT & PLAN NOTE
Lab Results   Component Value Date    HGBA1C 16.1 (H) 08/16/2024       Recent Labs     09/15/24  1551 09/15/24  2116 09/16/24  0156 09/16/24  0613   POCGLU 208* 223* 360* 143*       Blood Sugar Average: Last 72 hrs:  (P) [id number]    Tighten diabetic control as per the primary service

## 2024-09-17 ENCOUNTER — NURSING HOME VISIT (OUTPATIENT)
Dept: GERIATRICS | Facility: OTHER | Age: 54
End: 2024-09-17
Payer: MEDICARE

## 2024-09-17 ENCOUNTER — PATIENT OUTREACH (OUTPATIENT)
Dept: CASE MANAGEMENT | Facility: OTHER | Age: 54
End: 2024-09-17

## 2024-09-17 ENCOUNTER — TELEPHONE (OUTPATIENT)
Dept: GASTROENTEROLOGY | Facility: CLINIC | Age: 54
End: 2024-09-17

## 2024-09-17 DIAGNOSIS — R13.12 OROPHARYNGEAL DYSPHAGIA: ICD-10-CM

## 2024-09-17 DIAGNOSIS — K21.00 GASTROESOPHAGEAL REFLUX DISEASE WITH ESOPHAGITIS WITHOUT HEMORRHAGE: ICD-10-CM

## 2024-09-17 DIAGNOSIS — R62.7 FAILURE TO THRIVE IN ADULT: ICD-10-CM

## 2024-09-17 DIAGNOSIS — Z59.819 HOUSING INSECURITY: Primary | ICD-10-CM

## 2024-09-17 DIAGNOSIS — K52.9 CHRONIC DIARRHEA: ICD-10-CM

## 2024-09-17 DIAGNOSIS — E11.65 UNCONTROLLED TYPE 2 DIABETES MELLITUS WITH HYPERGLYCEMIA (HCC): ICD-10-CM

## 2024-09-17 DIAGNOSIS — G89.4 CHRONIC PAIN DISORDER: ICD-10-CM

## 2024-09-17 DIAGNOSIS — E03.9 ACQUIRED HYPOTHYROIDISM: ICD-10-CM

## 2024-09-17 DIAGNOSIS — F31.81 BIPOLAR II DISORDER (HCC): ICD-10-CM

## 2024-09-17 DIAGNOSIS — I10 BENIGN ESSENTIAL HYPERTENSION: ICD-10-CM

## 2024-09-17 DIAGNOSIS — R26.2 AMBULATORY DYSFUNCTION: ICD-10-CM

## 2024-09-17 DIAGNOSIS — K52.9 CHRONIC DIARRHEA: Primary | ICD-10-CM

## 2024-09-17 DIAGNOSIS — G43.709 CHRONIC MIGRAINE WITHOUT AURA WITHOUT STATUS MIGRAINOSUS, NOT INTRACTABLE: ICD-10-CM

## 2024-09-17 DIAGNOSIS — G62.9 NEUROPATHY: ICD-10-CM

## 2024-09-17 PROBLEM — M79.7 FIBROMYALGIA: Status: ACTIVE | Noted: 2022-09-14

## 2024-09-17 PROBLEM — G43.909 MIGRAINE, UNSPECIFIED, NOT INTRACTABLE, WITHOUT STATUS MIGRAINOSUS: Status: ACTIVE | Noted: 2022-09-14

## 2024-09-17 PROBLEM — Z86.73 PERSONAL HISTORY OF TRANSIENT ISCHEMIC ATTACK (TIA), AND CEREBRAL INFARCTION WITHOUT RESIDUAL DEFICITS: Status: ACTIVE | Noted: 2022-09-14

## 2024-09-17 PROBLEM — G89.29 OTHER CHRONIC PAIN: Status: ACTIVE | Noted: 2022-09-14

## 2024-09-17 PROBLEM — E11.69 TYPE 2 DIABETES MELLITUS WITH OTHER SPECIFIED COMPLICATION (HCC): Status: ACTIVE | Noted: 2022-09-14

## 2024-09-17 PROCEDURE — 99306 1ST NF CARE HIGH MDM 50: CPT | Performed by: INTERNAL MEDICINE

## 2024-09-17 RX ORDER — LIDOCAINE 4 G/G
PATCH TOPICAL
COMMUNITY

## 2024-09-17 RX ORDER — DIPHENOXYLATE HCL/ATROPINE 2.5-.025MG
1 TABLET ORAL 4 TIMES DAILY
Qty: 20 TABLET | Refills: 0 | Status: SHIPPED | OUTPATIENT
Start: 2024-09-17 | End: 2024-09-22

## 2024-09-17 SDOH — ECONOMIC STABILITY - HOUSING INSECURITY: HOUSING INSTABILITY UNSPECIFIED: Z59.819

## 2024-09-17 NOTE — ASSESSMENT & PLAN NOTE
Reported about 70-80 lbs weight loss over year  Followed by GI   Due to chronic diarrhea   Had colonoscopy in May but due to poor prep not properly completed. Also had EGD showed esophagitis and gastritis   Continue to encourage oral intake  Continue mirtazapine   Had CT of abdomen and pelvis as per report and no obvious signs of malignancy

## 2024-09-17 NOTE — ASSESSMENT & PLAN NOTE
Was found to have c diff   Was treated for Cdiff with antibiotics  Diarrhea has improved but still having some as per patient   Patient had then been treated with FMT on 9/6/24 with some improvement.   She had also protonix discontinued due to increased risk of Cdiff with PPI   She was followed by ID and GI during her stay  Continue follow up with GI as out patient   Continue dicyclomine, cholestyramine, lomotil and creon  Continue monitoring symptoms   Continue with supportive care   Continue to encourage oral intake, patient has had significant weight lost

## 2024-09-17 NOTE — ASSESSMENT & PLAN NOTE
Multifactorial - weight, diarrhea and continue decline  Had a fall at home  Continue with safety measures   Continue with fall precautions

## 2024-09-17 NOTE — PROGRESS NOTES
[facility] Nursing home notes  SHORT TERM REHAB       NAME: Barb Palomo  AGE: 54 y.o. SEX: female    DATE OF ENCOUNTER: 9/17/2024    Assessment and Plan   Chronic diarrhea  Was found to have c diff   Was treated for Cdiff with antibiotics  Diarrhea has improved but still having some as per patient   Patient had then been treated with FMT on 9/6/24 with some improvement.   She had also protonix discontinued due to increased risk of Cdiff with PPI   She was followed by ID and GI during her stay  Continue follow up with GI as out patient   Continue dicyclomine, cholestyramine, lomotil and creon  Continue monitoring symptoms   Continue with supportive care   Continue to encourage oral intake, patient has had significant weight lost     Ambulatory dysfunction  Multifactorial - weight, diarrhea and continue decline  Had a fall at home  Continue with safety measures   Continue with fall precautions     Benign essential hypertension  BP reviewed from facility records, acceptable range   Not on meds currently   Continue monitoring BP        Bipolar II disorder (HCC)  Continue with duloxetine and mirtazapine   Continue with supportive care  Continue monitoring symptoms    Chronic migraine without aura without status migrainosus, not intractable  Continue with prn fioricet  Continue monitoring symptoms   No reported issues at present time     Dysphagia  Continue with CCD diet regular texture with thin consistency  Continue monitoring for aspiration  Continue to encourage oral intake     Failure to thrive in adult  Reported about 70-80 lbs weight loss over year  Followed by GI   Due to chronic diarrhea   Had colonoscopy in May but due to poor prep not properly completed. Also had EGD showed esophagitis and gastritis   Continue to encourage oral intake  Continue mirtazapine   Had CT of abdomen and pelvis as per report and no obvious signs of malignancy       Chronic pain disorder  Continue with tramadol    Continue with baclofen and valium   Continue monitoring symptoms  Continue with activity as tolerable  PT/OT eval and treat     Gastroesophageal reflux disease with esophagitis without hemorrhage  Now on pepcid due to cdiff (protonix discontinued)  Continue monitoring symptoms       Hypothyroidism  Continue with levothyroxine  TSH in 9/11/24 normal  Continue monitoring TSH as out patient     Neuropathy  Chronic due to her DMII  Continue with baclofen, duloxetine and gabapentin  Continue monitoring symptoms     Uncontrolled type 2 diabetes mellitus with hyperglycemia (HCC)    Lab Results   Component Value Date    HGBA1C 16.1 (H) 08/16/2024   Was not compliant with meds  Restarted  Will continue lantus and humalog  Continue with CCD diet  Continue monitoring blood sugars closely   Avoid hypoglycemia         Chief Complaint     No new complains     History of Present Illness     55 yo female seen for admission to [facility] after hospitalization. As per patient she had a fall at home and was having some back pain. Her nephew came by and convinced her to go to the hospital. She was managed for fall and chronic diarrhea. She was evaluated by GI and ID. She was found to have cdiff. She was managed with antibiotics. She was also found to have a UTI for which she was managed for. She continued to have diarrhea and overall weight loss and failure to thrive. She then had FMT on 9/6/24 with improvement of diarrhea and had medication adjustments. She was also hypotensive and tachycardic and improved with diarrhea treatment but still has some tachycardia. She also had stopped her DMII meds and found to have uncontrolled glucose levels and was started back on medications. She was also evaluated by endocrinology during her stay. Once she was stable she was discharged to [facility] for therapy. Reviewed labs and imaging reports.     PMHx     Past Medical History:   Diagnosis Date    Acute venous embolism and thrombosis of  deep vessels of distal lower extremity (HCC)     Anemia     Anxiety     last assessed 11/20/17    Arthritis     Asthma     Cerebral infarction (HCC)     unspecified, last assessed 11/14/16    Chest pain     last assessed 5/9/17    Chronic cough     last assessed 12/12/13    Depression     Diabetes mellitus, type 2 (HCC)     DJD (degenerative joint disease)     Esophageal reflux     Fibromyalgia     GERD without esophagitis     resolved 5/13/16    History of pulmonary embolism     Hyperlipidemia     Hypertension     Hypothyroidism     Iron deficiency     Pancreatitis     Panic attack     Panic disorder     Polyarthritis     PTSD (post-traumatic stress disorder)     Sleep difficulties     Stroke syndrome     Thyroid disease     TIA (transient ischemic attack)     TIA (transient ischemic attack)     Venous embolism and thrombosis of deep vessels of distal lower extremity (HCC)     Vitamin B12 deficiency     Vitamin D deficiency      Past Surgical History:   Procedure Laterality Date    CARPAL TUNNEL RELEASE Right     neuroplasty decompression of median nerve    CATARACT EXTRACTION      CHOLECYSTECTOMY      ERCP      ERCP      ESOPHAGOGASTRIC FUNDOPLASTY      NISSEN FUNDOPLICATION      PATELLA SURGERY Left 12/2021    OH ESOPHAGOGASTRODUODENOSCOPY TRANSORAL DIAGNOSTIC N/A 11/02/2016    Procedure: EGD AND COLONOSCOPY;  Surgeon: Jatin Shepherd MD;  Location: [facility];  Service: Gastroenterology    OH NDSC WRST SURG W/RLS TRANSVRS CARPL LIGM Right 03/08/2016    Procedure: RELEASE CARPAL TUNNEL ENDOSCOPIC;  Surgeon: Guero Restrepo MD;  Location: [facility];  Service: Orthopedics    OH TENDON SHEATH INCISION Right 03/08/2016    Procedure: RELEASE TRIGGER FINGER RIGHT THUMB;  Surgeon: Guero Restrepo MD;  Location: [facility];  Service: Orthopedics    TONSILLECTOMY AND ADENOIDECTOMY       Family History   Problem Relation Age of Onset    Diabetes Mother         type 2    Heart attack Mother 39        acute MI    Kidney  disease Mother         CKD NKF classfication    Depression Mother     Arthritis Mother     Substance Abuse Mother         mother OD in past on MS04    Ulcerative colitis Mother     Schizophrenia Mother     Suicide Attempts Mother     Bipolar disorder Mother     Diabetes Maternal Grandmother     Heart attack Father         acute MI    Psoriasis Father     Cancer Father         gastric cancer    Stroke Father     Crohn's disease Sister     Bipolar disorder Sister     Rheum arthritis Maternal Grandfather     Throat cancer Maternal Grandfather     Suicide Attempts Daughter     Drug abuse Daughter     Lung cancer Paternal Grandmother     Cancer Paternal Grandfather     No Known Problems Sister     Bipolar disorder Maternal Uncle     Anesthesia problems Neg Hx      Social History     Socioeconomic History    Marital status: /Civil Union     Spouse name: None    Number of children: 1    Years of education: technical school    Highest education level: Associate degree: occupational, technical, or vocational program   Occupational History    Occupation: unemployed     Comment: SSDI   Tobacco Use    Smoking status: Every Day     Current packs/day: 1.00     Average packs/day: 1 pack/day for 41.7 years (41.7 ttl pk-yrs)     Types: Cigarettes     Start date: 1/1/1983    Smokeless tobacco: Never    Tobacco comments:     20 + years   Vaping Use    Vaping status: Former    Substances: Nicotine   Substance and Sexual Activity    Alcohol use: Not Currently     Alcohol/week: 0.0 standard drinks of alcohol     Comment: last was in 2010 after DUI    Drug use: No    Sexual activity: Yes     Partners: Male   Other Topics Concern    None   Social History Narrative    No coffee consumption     Social Determinants of Health     Financial Resource Strain: High Risk (2/12/2021)    Overall Financial Resource Strain (CARDIA)     Difficulty of Paying Living Expenses: Very hard   Food Insecurity: Patient Unable To Answer (8/16/2024)     Hunger Vital Sign     Worried About Running Out of Food in the Last Year: Patient unable to answer     Ran Out of Food in the Last Year: Patient unable to answer   Transportation Needs: No Transportation Needs (8/16/2024)    PRAPARE - Transportation     Lack of Transportation (Medical): No     Lack of Transportation (Non-Medical): No   Recent Concern: Transportation Needs - Unmet Transportation Needs (6/20/2024)    PRAPARE - Transportation     Lack of Transportation (Medical): Yes     Lack of Transportation (Non-Medical): No   Physical Activity: Inactive (8/19/2020)    Exercise Vital Sign     Days of Exercise per Week: 0 days     Minutes of Exercise per Session: 0 min   Stress: Stress Concern Present (8/19/2020)    [employer] - Occupational Stress Questionnaire     Feeling of Stress : Rather much   Social Connections: Moderately Isolated (8/19/2020)    Social Connection and Isolation Panel [NHANES]     Frequency of Communication with Friends and Family: Three times a week     Frequency of Social Gatherings with Friends and Family: Never     Attends Scientology Services: Never     Active Member of Clubs or Organizations: No     Attends Club or Organization Meetings: Never     Marital Status:    Intimate Partner Violence: Not At Risk (8/19/2020)    Humiliation, Afraid, Rape, and Kick questionnaire     Fear of Current or Ex-Partner: No     Emotionally Abused: No     Physically Abused: No     Sexually Abused: No   Housing Stability: High Risk (8/16/2024)    Housing Stability Vital Sign     Unable to Pay for Housing in the Last Year: Yes     Number of Times Moved in the Last Year: 1     Homeless in the Last Year: No     Allergies   Allergen Reactions    Medical Tape Rash    Lexapro [Escitalopram Oxalate]     Escitalopram Other (See Comments) and Palpitations    Other Hives and Rash     Adhesive Tape       Review of Systems     Diarrhea better but still some small amount with some formed  stool   Reports stool incontinence   weakness  All other review of system negative        Objective   Vital signs:  BP: 114/65  HR: 112  RR: 22  TEMP: 97.8F  SAT.: 99%on RA     PHYSICAL EXAM:  GENERAL: no acute distress, frail   SKIN: warm, dry, no rash, no cyanosis  HEENT: normocephalic, atraumatic, no JVD, no Thyromegaly, no lymphadenopathy  LUNGS: CTA, no wheezing, no rales, expanded equally, no chest tenderness   HEART: normal rhythm, tachycardia, no murmur, no gallop  ABDOMEN: soft non tender non distended bs+, no guarding or rebound tenderness  :  no suprapubic tenderness  MUSCULOSKELETAL: strength about 4+/5 all extremities, ROM within normal, no leg edema, no calf tenderness  NEUROLOGY: awake, alert, Ox3, able to recall 3/3 object. CN2-12 intact.   PSYCH: cooperative, pleasant.       Pertinent Laboratory/Diagnostic Studies:  Recent labs and diagnostic tests reviewed in nursing home EMR    Current Medications   Medications reviewed and signed off on nursing home EMR.    I have spent a total time of 62 minutes on 09/17/24 in caring for this patient including Patient and family education, Counseling / Coordination of care, Documenting in the medical record, Reviewing / ordering tests, medicine, procedures  , and Obtaining or reviewing history  .

## 2024-09-17 NOTE — ASSESSMENT & PLAN NOTE
Chronic due to her DMII  Continue with baclofen, duloxetine and gabapentin  Continue monitoring symptoms

## 2024-09-17 NOTE — ASSESSMENT & PLAN NOTE
Continue with duloxetine and mirtazapine   Continue with supportive care  Continue monitoring symptoms

## 2024-09-17 NOTE — PROGRESS NOTES
OP CM rcvd SDOH referral on pt.  OP CM has worked with pt several times in the past.   Pt is now in a nursing home.  Please reconsult OP CM for any psychosocial needs s/p discharge from rehab.

## 2024-09-17 NOTE — ASSESSMENT & PLAN NOTE
BP reviewed from facility records, acceptable range   Not on meds currently   Continue monitoring BP       Occupational Therapy Evaluation  Observation/Bedded OP    ASSESSMENT:   Patient seen on  nursing unit.  Patient presents below baseline which was modified independent with functional household mobility and activities of daily living.  For safe return to prior living situation the patient needs to be at a supervision  level for ADLs.     The patient now presents with impairments in activity tolerance, balance, limited knowledge of compensatory strategies, pain, safety awareness and strength which impact safe and effective performance indressing, toileting, functional transfers/mobility, shopping and leisure activities .  Patient is currently functioning at moderate assist  for ADL skills and moderate assist  for IADLs.Pt states at home he could perform LB dressing some times but took him a long time and tired him out. Often feels dizzy with standing and this has contrubuted to past falls.    Further skilled occupational therapy services are reasonable and necessary to address the above impairments and performance deficits to maximize the patient's independence            PLAN AND RECOMMENDATIONS:   Recommendations for Discharge:  Recommendations for Discharge: OT WI: Sub-acute nursing home, Home therapy      Plan:   Continue skilled OT, including the following Treatment Interventions: ADL retraining;Functional transfer training;UE strengthening/ROM;Endurance training;Patient/Family training;Equipment eval/education;Neuro muscular reeducation;Fine motor coordination activities;Compensatory technique education;Continued evaluation (07/14/20 1133)      OT Frequency: Once a day;6 days/week (07/14/20 1133)    Amount: 0-30 minutes  Duration: los    Treatment Plan for Next Session: adl's                 EQUIPMENT:      PT/OT Mobility Equipment for Discharge: owns quad cane (07/14/20 1133)     DIAGNOSIS:   1. Hypokalemia    2. Hypomagnesemia    3. Generalized abdominal pain    4. Weakness           PRECAUTIONS:    Precautions  Other Precautions: covid pending       EDUCATION:   On this date, the patient was educated on safe use of equipment, self-care activities, fall prevention and energy conservation/pacing strategies.  The response to education was: Verbalizes understanding, Demonstrates understanding and Needs reinforcement.     SUBJECTIVE:           OBJECTIVE:   Self Cares/ADLs:    Self Cares/ADL's  Footwear Assistance: (Declined to attempt to take socks off as tires him too much) (07/14/20 1133)      Household Mobility:    Household Mobility  Supine to Sit: Modified Independent (07/14/20 1133)  Sit to Supine: Modified Independent (07/14/20 1133)  Stand Pivot Transfers: Touching/Steadying Assistance (07/14/20 1133)  Sitting - Dynamic: Modified Independent (07/14/20 1133)  Standing - Dynamic: Touching/Steadying Assistance (07/14/20 1133)    Home Management:    not addressed during today's OT session     GOALS:   Short Term Goals to Be Reviewed On: 07/17/20 (07/14/20 1133)  Short Term Goals Are The Same as Discharge Goals: Yes (07/14/20 1133)  Goal Agreement: Patient agrees with goals and treatment plan (07/14/20 1133)  Bathing Discharge Goal 1: MI LB (07/14/20 1133)  Dressing Discharge Goal 1: MI using AE as needed (07/14/20 1133)  Toileting Discharge Goal 1: MI using quad cane (07/14/20 1133)  UE Function Discharge Goal 1: demo good understanding of energy conservation techniques (07/14/20 1133)  Other Discharge Goal 1: Increase B UE gross strength to 4/5 (07/14/20 1133)       EVALUATION CODE JUSTIFICATION:   Eval Code:  $ OT Eval:  Low Complexity: 1 Procedure  Problems/Co-morbidities:   Patient Active Problem List   Diagnosis   • Bilateral chronic knee pain   • Depression   • Chronic sinusitis   • Dyslipidemia   • Vitamin D deficiency   • Mood disorder (CMS/HCC)   • SONI (generalized anxiety disorder)   • Chronic right shoulder pain   • Type 2 diabetes mellitus with diabetic polyneuropathy, with long-term current use  of insulin (CMS/HCC)   • Colon polyp   • GERD (gastroesophageal reflux disease)   • Bipolar 2 disorder (CMS/HCC)   • Chronic midline low back pain with bilateral sciatica   • Urge incontinence of urine   • Pulmonary nodule   • Cigarette nicotine dependence without complication   • Orthostatic hypotension   • Chest pain   • Simple chronic bronchitis (CMS/HCC)   • Syncope and collapse   • NAFLD (nonalcoholic fatty liver disease)   • Hypokalemia   • Generalized weakness   • Fall     Personal Occupations Profile Affected: bathing/showering, toileting/toilet hygiene, lower body dressing, functional mobility/transfers, home establishment/managements  Task Modification: Significant task modification  Clinical decision making: Low - Patient has few limitations (1-3), comorbidities and/or complexities, as noted in problem focused assessment noted above, that impact their occupational profile.  Resulting in few treatment options and no task modification consistent with low clinical decision making complexity.      INSURANCE:   Primary Insurance: [facility]/BC[employer] [place]  Secondary Insurance: N/A     BILLING INFORMATION:   Timed Procedures:     Untimed Procedures: $ OT Eval:  Low Complexity: 1 Procedure   Total Treatment Time: OT Time Spent: 24 minutes   Timed Treatment Minutes: OBS Patients Only:  OT Timed Code TX Minutes: 0 minutes     Note sent for required physician co-signature: Yes         Is the patient currently receiving skilled home care services (RN or therapy): No - The patient does not have traditional Medicare.  No ABN required.    The co-signature indicates that the physician certifies the need for OT furnished under this plan of treatment while under his/her care.

## 2024-09-17 NOTE — ASSESSMENT & PLAN NOTE
Lab Results   Component Value Date    HGBA1C 16.1 (H) 08/16/2024   Was not compliant with meds  Restarted  Will continue lantus and humalog  Continue with CCD diet  Continue monitoring blood sugars closely   Avoid hypoglycemia

## 2024-09-17 NOTE — ASSESSMENT & PLAN NOTE
Continue with tramadol   Continue with baclofen and valium   Continue monitoring symptoms  Continue with activity as tolerable  PT/OT eval and treat

## 2024-09-17 NOTE — ASSESSMENT & PLAN NOTE
Continue with CCD diet regular texture with thin consistency  Continue monitoring for aspiration  Continue to encourage oral intake

## 2024-09-18 ENCOUNTER — NURSING HOME VISIT (OUTPATIENT)
Dept: GERIATRICS | Facility: OTHER | Age: 54
End: 2024-09-18
Payer: MEDICARE

## 2024-09-18 ENCOUNTER — TELEPHONE (OUTPATIENT)
Age: 54
End: 2024-09-18

## 2024-09-18 DIAGNOSIS — E03.9 ACQUIRED HYPOTHYROIDISM: ICD-10-CM

## 2024-09-18 DIAGNOSIS — E43 SEVERE PROTEIN-CALORIE MALNUTRITION (HCC): ICD-10-CM

## 2024-09-18 DIAGNOSIS — K86.89 PANCREATIC INSUFFICIENCY: ICD-10-CM

## 2024-09-18 DIAGNOSIS — G89.4 CHRONIC PAIN DISORDER: ICD-10-CM

## 2024-09-18 DIAGNOSIS — I10 BENIGN ESSENTIAL HYPERTENSION: ICD-10-CM

## 2024-09-18 DIAGNOSIS — F33.2 SEVERE EPISODE OF RECURRENT MAJOR DEPRESSIVE DISORDER, WITHOUT PSYCHOTIC FEATURES (HCC): ICD-10-CM

## 2024-09-18 DIAGNOSIS — E11.65 UNCONTROLLED TYPE 2 DIABETES MELLITUS WITH HYPERGLYCEMIA (HCC): Primary | ICD-10-CM

## 2024-09-18 PROCEDURE — 99309 SBSQ NF CARE MODERATE MDM 30: CPT | Performed by: NURSE PRACTITIONER

## 2024-09-18 NOTE — TELEPHONE ENCOUNTER
Contacted patient for Medication Management  to verify needs of services in attempts to offer patient an appointment with Delilah CARDONA at [facility]. Writer verified N/A - NO ANSWER. Writer NINA and left callback number [phone]; option 3.    1st call attempt

## 2024-09-23 ENCOUNTER — NURSING HOME VISIT (OUTPATIENT)
Dept: GERIATRICS | Facility: OTHER | Age: 54
End: 2024-09-23
Payer: MEDICARE

## 2024-09-23 DIAGNOSIS — F33.2 SEVERE EPISODE OF RECURRENT MAJOR DEPRESSIVE DISORDER, WITHOUT PSYCHOTIC FEATURES (HCC): Primary | ICD-10-CM

## 2024-09-23 DIAGNOSIS — K86.89 PANCREATIC INSUFFICIENCY: ICD-10-CM

## 2024-09-23 DIAGNOSIS — G89.4 CHRONIC PAIN DISORDER: ICD-10-CM

## 2024-09-23 DIAGNOSIS — E43 SEVERE PROTEIN-CALORIE MALNUTRITION (HCC): ICD-10-CM

## 2024-09-23 PROCEDURE — 99309 SBSQ NF CARE MODERATE MDM 30: CPT | Performed by: NURSE PRACTITIONER

## 2024-09-25 NOTE — TELEPHONE ENCOUNTER
"Behavioral Health Outpatient Intake Questions    Referred By   : DEMI PRUITT    Please advise interviewee that they need to answer all questions truthfully to allow for best care, and any misrepresentations of information may affect their ability to be seen at this clinic   => Was this discussed? Yes     If Minor Child (under age 18)    Who is/are the legal guardian(s) of the child?     Is there a custody agreement? No     If \"YES\"- Custody orders must be obtained prior to scheduling the first appointment  In addition, Consent to Treatment must be signed by all legal guardians prior to scheduling the first appointment    If \"NO\"- Consent to Treatment must be signed by all legal guardians prior to scheduling the first appointment    Behavioral Health Outpatient Intake History -     Presenting Problem (in patient's own words):     Pt stated she has PTSD, anxiety, depression, Bipolar 2, severe panic attacks. Previous psychiatrist before Delilah Louis, had a baby.  Pt is currently in rehab for physical therapy, and they monitoring and managing pt's psychiatric medications.    Pt stated that rehab facility is very bad and daughter wants to move pt out of the facility.    Are there any communication barriers for this patient?     No                                               If yes, please describe barriers:   If there is a unique situation, please refer to Jeremy Landaverde/Sherry Smith for final determination.    Are you taking any psychiatric medications? Yes     If \"YES\" -What are they  (pt unable to state names of medications)    If \"YES\" -Who prescribes? Pt unknown of provider name, [facility]     Has the Patient previously received outpatient Talk Therapy or Medication Management from [facility]  Yes        If \"YES\"- When, Where and with Whom? Delilah Louis, discharged in January 2024        If \"NO\" -Has Patient received these services elsewhere?       If \"YES\" -When, Where, and with Whom?     Has the " "Patient abused alcohol or other substances in the last 6 months ? No  No concerns of substance abuse are reported.     If \"YES\" -What substance, How much, How often?     If illegal substance: Refer to [facility] (for MIGDALIA) or [facility].   If Alcohol in excess of 10 drinks per week:  Refer to [facility] (for MIGDALIA) or [facility]    Legal History-     Is this treatment court ordered? No   If \"yes \"send to :  Talk Therapy : Send to Jeremy Landaverde/Sherry Smith for final determination   Med Management: Send to Dr Nielsen for final determination     Has the Patient been convicted of a felony?  No   If \"Yes\" send to -When, What?  Talk Therapy: Send to Jeremy Smith for final determination   Med Management: Send to Dr Nielsen for final determination     ACCEPTED as a patient Yes  If \"Yes\" Appointment Date:     Ena Martinez, 10/15 @ 1130 & 11/15 @ 0800    Referred Elsewhere? No  If “Yes” - (Where? Ex: [facility], [facility], [facility], [facility], etc.)       Name of Insurance Co:MEDICARE A & B  Insurance ID#[id number]   Insurance Phone #  If ins is primary or secondary? PRIMARY  If patient is a minor, parents information such as Name, D.O.B of guarantor.  "

## 2024-09-26 ENCOUNTER — HOSPITAL ENCOUNTER (OUTPATIENT)
Dept: RADIOLOGY | Facility: HOSPITAL | Age: 54
End: 2024-09-26
Payer: MEDICARE

## 2024-09-26 DIAGNOSIS — G89.29 CHRONIC MIDLINE LOW BACK PAIN WITHOUT SCIATICA: ICD-10-CM

## 2024-09-26 DIAGNOSIS — M54.2 NECK PAIN: ICD-10-CM

## 2024-09-26 DIAGNOSIS — R68.81 EARLY SATIETY: ICD-10-CM

## 2024-09-26 DIAGNOSIS — G89.4 CHRONIC PAIN SYNDROME: ICD-10-CM

## 2024-09-26 DIAGNOSIS — M54.50 CHRONIC MIDLINE LOW BACK PAIN WITHOUT SCIATICA: ICD-10-CM

## 2024-09-26 PROCEDURE — A9541 TC99M SULFUR COLLOID: HCPCS

## 2024-09-26 PROCEDURE — 78264 GASTRIC EMPTYING IMG STUDY: CPT

## 2024-09-26 RX ORDER — TRAMADOL HYDROCHLORIDE 200 MG/1
200 TABLET, EXTENDED RELEASE ORAL DAILY PRN
Qty: 10 TABLET | Refills: 0 | Status: SHIPPED | OUTPATIENT
Start: 2024-09-26 | End: 2024-10-06

## 2024-10-02 ENCOUNTER — NURSING HOME VISIT (OUTPATIENT)
Dept: GERIATRICS | Facility: OTHER | Age: 54
End: 2024-10-02
Payer: MEDICARE

## 2024-10-02 ENCOUNTER — TELEPHONE (OUTPATIENT)
Dept: PSYCHIATRY | Facility: CLINIC | Age: 54
End: 2024-10-02

## 2024-10-02 DIAGNOSIS — F33.2 SEVERE EPISODE OF RECURRENT MAJOR DEPRESSIVE DISORDER, WITHOUT PSYCHOTIC FEATURES (HCC): ICD-10-CM

## 2024-10-02 DIAGNOSIS — E11.65 UNCONTROLLED TYPE 2 DIABETES MELLITUS WITH HYPERGLYCEMIA (HCC): Primary | ICD-10-CM

## 2024-10-02 DIAGNOSIS — K86.89 PANCREATIC INSUFFICIENCY: ICD-10-CM

## 2024-10-02 DIAGNOSIS — I10 BENIGN ESSENTIAL HYPERTENSION: ICD-10-CM

## 2024-10-02 DIAGNOSIS — G89.4 CHRONIC PAIN DISORDER: ICD-10-CM

## 2024-10-02 DIAGNOSIS — E43 SEVERE PROTEIN-CALORIE MALNUTRITION (HCC): ICD-10-CM

## 2024-10-02 PROCEDURE — 99309 SBSQ NF CARE MODERATE MDM 30: CPT | Performed by: NURSE PRACTITIONER

## 2024-10-03 ENCOUNTER — TELEPHONE (OUTPATIENT)
Dept: GASTROENTEROLOGY | Facility: CLINIC | Age: 54
End: 2024-10-03

## 2024-10-03 NOTE — TELEPHONE ENCOUNTER
Phone call to pt, left message to read her [employer] message for results to Gastric emptying or to call the office to go over it, and pt has f/u with Dr. Gabriel.

## 2024-10-06 ENCOUNTER — TELEPHONE (OUTPATIENT)
Dept: OTHER | Facility: OTHER | Age: 54
End: 2024-10-06

## 2024-10-06 NOTE — TELEPHONE ENCOUNTER
"RN stated, \"I would like to let the provider on call know that the resident has a blood sugar of 412.\"    On call notified via secure chat.  "
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
## 2024-10-08 DIAGNOSIS — G43.709 CHRONIC MIGRAINE WITHOUT AURA WITHOUT STATUS MIGRAINOSUS, NOT INTRACTABLE: ICD-10-CM

## 2024-10-08 DIAGNOSIS — F40.01 PANIC DISORDER WITH AGORAPHOBIA: ICD-10-CM

## 2024-10-08 RX ORDER — DIAZEPAM 5 MG
5 TABLET ORAL EVERY 12 HOURS PRN
Qty: 15 TABLET | Refills: 0 | Status: SHIPPED | OUTPATIENT
Start: 2024-10-08

## 2024-10-08 RX ORDER — BUTALBITAL, ACETAMINOPHEN AND CAFFEINE 50; 325; 40 MG/1; MG/1; MG/1
TABLET ORAL
Qty: 15 TABLET | Refills: 0 | Status: SHIPPED | OUTPATIENT
Start: 2024-10-08

## 2024-10-08 NOTE — PROGRESS NOTES
Med refill: Diazepam 5mg every 12 hours PRN. DO NOT give within 8 hours of Tramadol. 15 tablets. NO refill.

## 2024-10-09 ENCOUNTER — NURSING HOME VISIT (OUTPATIENT)
Dept: GERIATRICS | Facility: OTHER | Age: 54
End: 2024-10-09
Payer: MEDICARE

## 2024-10-09 ENCOUNTER — TELEPHONE (OUTPATIENT)
Dept: OTHER | Facility: OTHER | Age: 54
End: 2024-10-09

## 2024-10-09 DIAGNOSIS — F33.2 SEVERE EPISODE OF RECURRENT MAJOR DEPRESSIVE DISORDER, WITHOUT PSYCHOTIC FEATURES (HCC): ICD-10-CM

## 2024-10-09 DIAGNOSIS — M25.571 ACUTE RIGHT ANKLE PAIN: ICD-10-CM

## 2024-10-09 DIAGNOSIS — K86.89 PANCREATIC INSUFFICIENCY: ICD-10-CM

## 2024-10-09 DIAGNOSIS — G89.4 CHRONIC PAIN DISORDER: ICD-10-CM

## 2024-10-09 DIAGNOSIS — E11.65 UNCONTROLLED TYPE 2 DIABETES MELLITUS WITH HYPERGLYCEMIA (HCC): Primary | ICD-10-CM

## 2024-10-09 PROCEDURE — 99309 SBSQ NF CARE MODERATE MDM 30: CPT | Performed by: NURSE PRACTITIONER

## 2024-10-10 ENCOUNTER — APPOINTMENT (EMERGENCY)
Dept: RADIOLOGY | Facility: HOSPITAL | Age: 54
End: 2024-10-10
Payer: MEDICARE

## 2024-10-10 ENCOUNTER — HOSPITAL ENCOUNTER (EMERGENCY)
Facility: HOSPITAL | Age: 54
Discharge: HOME | End: 2024-10-10
Attending: EMERGENCY MEDICINE
Payer: MEDICARE

## 2024-10-10 VITALS
OXYGEN SATURATION: 98 % | SYSTOLIC BLOOD PRESSURE: 153 MMHG | HEART RATE: 100 BPM | RESPIRATION RATE: 18 BRPM | TEMPERATURE: 98.5 F | DIASTOLIC BLOOD PRESSURE: 98 MMHG

## 2024-10-10 DIAGNOSIS — S82.409A FIBULA FRACTURE: Primary | ICD-10-CM

## 2024-10-10 PROCEDURE — 73610 X-RAY EXAM OF ANKLE: CPT

## 2024-10-10 PROCEDURE — 99283 EMERGENCY DEPT VISIT LOW MDM: CPT

## 2024-10-10 PROCEDURE — 99284 EMERGENCY DEPT VISIT MOD MDM: CPT | Performed by: EMERGENCY MEDICINE

## 2024-10-10 RX ORDER — HYDROCODONE BITARTRATE AND ACETAMINOPHEN 5; 325 MG/1; MG/1
1 TABLET ORAL ONCE
Status: COMPLETED | OUTPATIENT
Start: 2024-10-10 | End: 2024-10-10

## 2024-10-10 RX ADMIN — HYDROCODONE BITARTRATE AND ACETAMINOPHEN 1 TABLET: 5; 325 TABLET ORAL at 00:45

## 2024-10-10 NOTE — ED PROVIDER NOTES
Final diagnoses:   Fibula fracture     ED Disposition       ED Disposition   Discharge    Condition   Stable    Date/Time   Thu Oct 10, 2024  1:11 AM    Comment   Barb Palomo discharge to home/self care.                   Assessment & Plan       Medical Decision Making  54-year-old female sent in from [facility] [place] due to an abnormal right ankle x-ray.  X-ray shows what is likely a subacute fibular fracture.  Plan for splint discharge back to facility with referral to Ortho.    Amount and/or Complexity of Data Reviewed  Radiology: ordered.    Risk  Prescription drug management.             Medications   HYDROcodone-acetaminophen (NORCO) 5-325 mg per tablet 1 tablet (1 tablet Oral Given 10/10/24 0045)       ED Risk Strat Scores                           SBIRT 22yo+      Flowsheet Row Most Recent Value   Initial Alcohol Screen: US AUDIT-C     1. How often do you have a drink containing alcohol? 0 Filed at: 10/10/2024 0040   2. How many drinks containing alcohol do you have on a typical day you are drinking?  0 Filed at: 10/10/2024 0040   3b. FEMALE Any Age, or MALE 65+: How often do you have 4 or more drinks on one occassion? 0 Filed at: 10/10/2024 0040   Audit-C Score 0 Filed at: 10/10/2024 0040   YOSELYN: How many times in the past year have you...    Used an illegal drug or used a prescription medication for non-medical reasons? Never Filed at: 10/10/2024 0040                            History of Present Illness       Chief Complaint   Patient presents with    Ankle Injury     Pt states that she has been experiencing pain in her right ankle and arch of her foot for 2 days. Today she had her foot x-rayed and states that it is fractured. Pt denies any falls or trauma to her leg.        Past Medical History:   Diagnosis Date    Acute venous embolism and thrombosis of deep vessels of distal lower extremity (HCC)     Anemia     Anxiety     last assessed 11/20/17    Arthritis     Asthma     Cerebral infarction  (HCC)     unspecified, last assessed 11/14/16    Chest pain     last assessed 5/9/17    Chronic cough     last assessed 12/12/13    Depression     Diabetes mellitus, type 2 (HCC)     DJD (degenerative joint disease)     Esophageal reflux     Fibromyalgia     GERD without esophagitis     resolved 5/13/16    History of pulmonary embolism     Hyperlipidemia     Hypertension     Hypothyroidism     Iron deficiency     Pancreatitis     Panic attack     Panic disorder     Polyarthritis     PTSD (post-traumatic stress disorder)     Sleep difficulties     Stroke syndrome     Thyroid disease     TIA (transient ischemic attack)     TIA (transient ischemic attack)     Venous embolism and thrombosis of deep vessels of distal lower extremity (HCC)     Vitamin B12 deficiency     Vitamin D deficiency       Past Surgical History:   Procedure Laterality Date    CARPAL TUNNEL RELEASE Right     neuroplasty decompression of median nerve    CATARACT EXTRACTION      CHOLECYSTECTOMY      ERCP      ERCP      ESOPHAGOGASTRIC FUNDOPLASTY      NISSEN FUNDOPLICATION      PATELLA SURGERY Left 12/2021    AK ESOPHAGOGASTRODUODENOSCOPY TRANSORAL DIAGNOSTIC N/A 11/02/2016    Procedure: EGD AND COLONOSCOPY;  Surgeon: Jatin Shepherd MD;  Location: [facility];  Service: Gastroenterology    AK NDSC WRST SURG W/RLS TRANSVRS CARPL LIGM Right 03/08/2016    Procedure: RELEASE CARPAL TUNNEL ENDOSCOPIC;  Surgeon: Guero Restrepo MD;  Location: [facility];  Service: Orthopedics    AK TENDON SHEATH INCISION Right 03/08/2016    Procedure: RELEASE TRIGGER FINGER RIGHT THUMB;  Surgeon: Guero Restrepo MD;  Location: [facility];  Service: Orthopedics    TONSILLECTOMY AND ADENOIDECTOMY        Family History   Problem Relation Age of Onset    Diabetes Mother         type 2    Heart attack Mother 39        acute MI    Kidney disease Mother         CKD NKF classfication    Depression Mother     Arthritis Mother     Substance Abuse Mother         mother OD in past on  MS04    Ulcerative colitis Mother     Schizophrenia Mother     Suicide Attempts Mother     Bipolar disorder Mother     Diabetes Maternal Grandmother     Heart attack Father         acute MI    Psoriasis Father     Cancer Father         gastric cancer    Stroke Father     Crohn's disease Sister     Bipolar disorder Sister     Rheum arthritis Maternal Grandfather     Throat cancer Maternal Grandfather     Suicide Attempts Daughter     Drug abuse Daughter     Lung cancer Paternal Grandmother     Cancer Paternal Grandfather     No Known Problems Sister     Bipolar disorder Maternal Uncle     Anesthesia problems Neg Hx       Social History     Tobacco Use    Smoking status: Every Day     Current packs/day: 1.00     Average packs/day: 1 pack/day for 41.8 years (41.8 ttl pk-yrs)     Types: Cigarettes     Start date: 1/1/1983    Smokeless tobacco: Never    Tobacco comments:     20 + years   Vaping Use    Vaping status: Former    Substances: Nicotine   Substance Use Topics    Alcohol use: Not Currently     Alcohol/week: 0.0 standard drinks of alcohol     Comment: last was in 2010 after DUI    Drug use: No      E-Cigarette/Vaping    E-Cigarette Use Former User     Cartridges/Day 1       E-Cigarette/Vaping Substances    Nicotine Yes     THC No     CBD No     Flavoring No       I have reviewed and agree with the history as documented.     Barb is a pleasant 55 yo F coming from [facility].  She reports that her right ankle has been hurting her for about 2 to 3 days.  Yesterday she had an x-ray performed and was informed that her ankle was broken.  She has been ambulating short distances on the lower extremity in question.  Reports pain especially over the lateral malleolus.  Has baseline neuropathy, reports no change.  Unsure of acute injury.      Ankle Injury  Associated symptoms: no abdominal pain, no chest pain, no cough, no fever, no nausea, no rash, no shortness of breath and no vomiting        Review of Systems    Constitutional:  Negative for chills and fever.   Respiratory:  Negative for cough and shortness of breath.    Cardiovascular:  Negative for chest pain and palpitations.   Gastrointestinal:  Negative for abdominal pain, nausea and vomiting.   Genitourinary:  Negative for dysuria and hematuria.   Musculoskeletal:  Positive for arthralgias and gait problem. Negative for back pain and joint swelling.   Skin:  Negative for color change and rash.   Neurological:  Negative for syncope.   All other systems reviewed and are negative.          Objective       ED Triage Vitals   Temperature Pulse Blood Pressure Respirations SpO2 Patient Position - Orthostatic VS   10/10/24 0037 10/10/24 0037 10/10/24 0037 10/10/24 0037 10/10/24 0037 10/10/24 0037   98.5 °F (36.9 °C) (!) 111 115/70 20 98 % Sitting      Temp Source Heart Rate Source BP Location FiO2 (%) Pain Score    10/10/24 0037 10/10/24 0037 10/10/24 0037 -- 10/10/24 0045    Oral Monitor Left arm  8      Vitals      Date and Time Temp Pulse SpO2 Resp BP Pain Score FACES Pain Rating User   10/10/24 0045 -- -- -- -- -- 8 -- AA   10/10/24 0037 98.5 °F (36.9 °C) 111 98 % 20 115/70 -- -- AA            Physical Exam  Vitals and nursing note reviewed.   Constitutional:       Appearance: She is well-developed.      Comments: Chronically ill-appearing in no acute distress.   HENT:      Head: Normocephalic and atraumatic.   Eyes:      Conjunctiva/sclera: Conjunctivae normal.   Cardiovascular:      Rate and Rhythm: Normal rate and regular rhythm.      Heart sounds: No murmur heard.  Pulmonary:      Effort: Pulmonary effort is normal. No respiratory distress.      Breath sounds: Normal breath sounds.   Abdominal:      Palpations: Abdomen is soft.      Tenderness: There is no abdominal tenderness.   Musculoskeletal:         General: Swelling present.      Cervical back: Neck supple.      Comments: Mild to moderate swelling over the right lateral malleolus with tenderness, no gross  "deformity.  Palpable DP pulses.   Skin:     General: Skin is warm and dry.      Capillary Refill: Capillary refill takes less than 2 seconds.   Neurological:      General: No focal deficit present.      Mental Status: She is alert and oriented to person, place, and time.   Psychiatric:         Mood and Affect: Mood normal.         Results Reviewed       None            XR ankle 3+ views RIGHT    (Results Pending)       Procedures    ED Medication and Procedure Management   Prior to Admission Medications   Prescriptions Last Dose Informant Patient Reported? Taking?   Albuterol Sulfate (ProAir RespiClick) 108 (90 Base) MCG/ACT AEPB   No Yes   Sig: Inhale 2 puffs every 6 (six) hours as needed (wheezing)   BD PEN NEEDLE LINDY U/F 32G X 4 MM MISC 10/9/2024  No Yes   Sig: Inject under the skin daily   Blood Glucose Monitoring Suppl (OneTouch Verio Reflect) w/Device KIT 10/9/2024  No Yes   Sig: CHECK BLOOD SUGARS FOUR TIMES DAILY. PLEASE SUBSTITUTE WITH APPROPRIATE ALTERNATIVE AS COVERED BY PATIENT'S INSURANCE. DX: E11.65   DULoxetine (CYMBALTA) 60 mg delayed release capsule 10/9/2024  No Yes   Sig: Take 1 capsule (60 mg total) by mouth 2 (two) times a day   Fluticasone-Salmeterol (Advair Diskus) 500-50 mcg/dose inhaler 10/9/2024  No Yes   Sig: Inhale 1 puff by mouth 2 times daily.  Rinse mouth after use   Insulin Pen Needle (BD Pen Needle Lindy U/F) 32G X 4 MM MISC 10/9/2024  No Yes   Sig: Use 2 (two) times a day   Insulin Syringe-Needle U-100 31G X 5/16\" 0.3 ML MISC 10/9/2024  No Yes   Sig: Use twice daily   Lidocaine 4 % PTCH   Yes No   Sig: Apply topically   OneTouch Delica Lancets 33G MISC 10/9/2024  No Yes   Sig: Check blood sugars four times daily. Please substitute with appropriate alternative as covered by patient's insurance. Dx: E11.65   OneTouch Ultra test strip 10/9/2024  No Yes   Sig: USE AS DIRECTED TO TEST 3 TIMES A DAY   aspirin 81 mg chewable tablet 10/9/2024  Yes Yes   Sig: Chew 81 mg daily    atorvastatin " (LIPITOR) 80 mg tablet 10/9/2024  No Yes   Sig: TAKE 1 TABLET BY MOUTH EVERY DAY   baclofen 5 MG TABS 10/9/2024  No Yes   Sig: Take 5 mg by mouth 2 (two) times a day   butalbital-acetaminophen-caffeine (FIORICET,ESGIC) -40 mg per tablet   No Yes   Sig: TAKE 1 TABLET EVERY 6 HOURS AS NEEDED FOR MIGRAINE.  Please limit 3-4 per week, 15 per month.   cholestyramine sugar free (QUESTRAN LIGHT) 4 g packet 10/9/2024  No Yes   Sig: Take 1 packet (4 g total) by mouth 2 (two) times a day Hold for copnstipation   diazepam (VALIUM) 5 mg tablet Not Taking  No No   Sig: Take 1 tablet (5 mg total) by mouth every 12 (twelve) hours as needed for anxiety (DO NOT GIVE WITHIN 8 HOURS OF TRAMADOL)   Patient not taking: Reported on 10/10/2024   dicyclomine (BENTYL) 10 mg capsule   No No   Sig: Take 1 capsule (10 mg total) by mouth 4 (four) times a day (before meals and at bedtime) for 15 days   famotidine (PEPCID) 20 mg tablet 10/9/2024  No Yes   Sig: Take 1 tablet (20 mg total) by mouth 2 (two) times a day   gabapentin (NEURONTIN) 400 mg capsule 10/9/2024  No Yes   Sig: TAKE 2 CAPSULES BY MOUTH 3 TIMES A DAY   insulin glargine (LANTUS) 100 units/mL subcutaneous injection 10/9/2024  No Yes   Sig: Inject 25 Units under the skin daily at bedtime   insulin lispro (HumALOG/ADMELOG) 100 units/mL injection 10/9/2024  No Yes   Sig: Inject 10 Units under the skin 3 (three) times a day with meals   levothyroxine 112 mcg tablet 10/9/2024  No Yes   Sig: TAKE 1 TABLET BY MOUTH EVERY DAY   mirtazapine (REMERON) 7.5 MG tablet 10/9/2024  No Yes   Sig: Take 1 tablet (7.5 mg total) by mouth daily at bedtime   nicotine (NICODERM CQ) 21 mg/24 hr TD 24 hr patch 10/9/2024  No Yes   Sig: Place 1 patch on the skin over 24 hours daily   pancrelipase, Lip-Prot-Amyl, (CREON) 24,000 units 10/9/2024  No Yes   Sig: Take 1 capsule (24,000 Units total) by mouth 3 (three) times a day with meals   prochlorperazine (COMPAZINE) 5 mg tablet Not Taking  No No   Sig:  Take 1 tablet (5 mg total) by mouth every 6 (six) hours as needed for nausea or vomiting   Patient not taking: Reported on 10/10/2024      Facility-Administered Medications: None     Patient's Medications   Discharge Prescriptions    No medications on file       ED SEPSIS DOCUMENTATION   Time reflects when diagnosis was documented in both MDM as applicable and the Disposition within this note       Time User Action Codes Description Comment    10/10/2024  1:11 AM Jonn Munoz Add [S82.409A] Fibula fracture                  Jonn Munoz MD  10/10/24 0119

## 2024-10-10 NOTE — ED NOTES
This RN spoke with [facility] about pt's discharge back to facility. Staff agreed that an Uber would be acceptable transportation for pt.     Cherise Lauren RN  10/10/24 0147

## 2024-10-11 DIAGNOSIS — G43.709 CHRONIC MIGRAINE WITHOUT AURA, NOT INTRACTABLE, WITHOUT STATUS MIGRAINOSUS: Primary | ICD-10-CM

## 2024-10-11 RX ORDER — BUTALBITAL, ASPIRIN, AND CAFFEINE 325; 50; 40 MG/1; MG/1; MG/1
1 CAPSULE ORAL EVERY 6 HOURS PRN
Qty: 15 CAPSULE | Refills: 0 | Status: SHIPPED | OUTPATIENT
Start: 2024-10-11

## 2024-10-11 RX ORDER — BUTALBITAL, ASPIRIN, AND CAFFEINE 325; 50; 40 MG/1; MG/1; MG/1
1 CAPSULE ORAL EVERY 6 HOURS PRN
COMMUNITY
End: 2024-10-11 | Stop reason: SDUPTHER

## 2024-10-11 NOTE — PROGRESS NOTES
New script:  Fiorinal [-40mg] capsule. Give 1 capsule every 6 hours PRN.  Please limit 3-4 per week, 15 per month. 15 capsules. NO refill.    Note: Current pharmacy in SNF is not able to fill Fiorecet [-40mg] tablet.

## 2024-10-14 ENCOUNTER — TELEPHONE (OUTPATIENT)
Dept: PSYCHIATRY | Facility: CLINIC | Age: 54
End: 2024-10-14

## 2024-10-14 NOTE — TELEPHONE ENCOUNTER
Writer called and left voicemail to remind client of paperwork that is required prior to to the time of her appointment tomorrow, 10/15/24. Writer described where to find the forms and offered to change appointment to in person if that would be easier.

## 2024-10-15 ENCOUNTER — TELEPHONE (OUTPATIENT)
Age: 54
End: 2024-10-15

## 2024-10-15 ENCOUNTER — TELEPHONE (OUTPATIENT)
Dept: OTHER | Facility: OTHER | Age: 54
End: 2024-10-15

## 2024-10-15 ENCOUNTER — TELEPHONE (OUTPATIENT)
Dept: PSYCHIATRY | Facility: CLINIC | Age: 54
End: 2024-10-15

## 2024-10-15 ENCOUNTER — TELEMEDICINE (OUTPATIENT)
Dept: PSYCHIATRY | Facility: CLINIC | Age: 54
End: 2024-10-15
Payer: MEDICARE

## 2024-10-15 DIAGNOSIS — F31.81 BIPOLAR II DISORDER (HCC): Primary | ICD-10-CM

## 2024-10-15 DIAGNOSIS — F41.1 GAD (GENERALIZED ANXIETY DISORDER): ICD-10-CM

## 2024-10-15 DIAGNOSIS — F33.2 SEVERE EPISODE OF RECURRENT MAJOR DEPRESSIVE DISORDER, WITHOUT PSYCHOTIC FEATURES (HCC): ICD-10-CM

## 2024-10-15 PROBLEM — F32.A DEPRESSION, UNSPECIFIED: Status: RESOLVED | Noted: 2022-09-14 | Resolved: 2024-10-15

## 2024-10-15 PROCEDURE — 90792 PSYCH DIAG EVAL W/MED SRVCS: CPT

## 2024-10-15 NOTE — TELEPHONE ENCOUNTER
Writer called Pooja at rehab which client was staying, discussed release of information, [email or web address] information and was told client was near her phone to ask permission to speak with Pooja. Writer gave fax to fax completed SHANI back. Pooja told writer that she is not doing well mentally and is very tired and had asked her to do her appointments. Writer was able to reach client and inform her of the need for paperwork. Writer did ask client if she wanted writer to go over it with Pooja present to have someone in person to help her.    Client preferred writer to go over the instructions with herself. Writer went over how to find everything. Client seems very overwhelmed and told writer she is tired. Writer did not go over the SHANI at this time with client as to not overwhelm her and will refrain from emailing the blank until next contact if client desires it to be [facility]

## 2024-10-15 NOTE — BH CRISIS PLAN
Client Name: Barb Palomo       Client YOB: 1970    Srikanth Safety Plan      Creation Date: 10/15/24    Created By: ELISEO Hodges       Step 1: Warning Signs:   Warning Signs   Heart beats fast   antsy            Step 2: Internal Coping Strategies:   Internal Coping Strategies   text daughter   look at pictures of grandson            Step 3: People and social settings that provide distraction:   Name Contact Information   sister- Meena in cell phone   Daughter in cell phone            Step 4: People whom I can ask for help during a crisis:      Name Contact Information    see above in cell phone      Step 5: Professionals or agencies I can contact during a crisis:      Clinican/Agency Name Phone Emergency Contact    [facility]  [phone]      Local Emergency Department Emergency Department Phone Emergency Department Address    911          Crisis Phone Numbers:   Suicide Prevention Lifeline: Call or Text  570 Crisis Text Line: Text HOME to 512-804   Please note: Some [facility] do not have a separate number for Child/Adolescent specific crisis. If your county is not listed under Child/Adolescent, please call the adult number for your county      Adult Crisis Numbers: Child/Adolescent Crisis Numbers   [facility]: [phone] [facility]: [phone]   [facility]: [phone] [facility]: [phone]   [facility]: [phone] [place]: [phone]   [facility]: [phone] [place]: [phone]   [facility]: [phone]   [facility]: [phone]   [facility]: [phone]   [facility]: [phone] (daytime) [phone] (after hours, weekends, holidays)      Step 6: Making the environment safer (plan for lethal means safety):      Optional: What is most important to me and worth living for?   Wants to get out of facility and her grandson     Srikanth Safety Plan. Jeanette  Eleuterio and Karthik Moser. Used with permission of the authors.

## 2024-10-15 NOTE — TELEPHONE ENCOUNTER
Client and writer went over forms, client will have to eventually fix virtual care consent form; however, it is not letting her sign the re-done one with emergency contact.    Client is good for today's appointment as she has signed the VC form (it let her sign when she put her own name in).     Contact client directly NOT the rehab per client.     Writer will not send blank SHANI previously mentioned to Pooja.

## 2024-10-15 NOTE — PROGRESS NOTES
[facility] - Outpatient Follow-up Note  Barb Palomo 54 y.o. female MRN: [id number]  Encounter: [id number]          ASSESSMENT AND PLAN:    54-year-old female with uncontrolled diabetes seen via telehealth for follow-up of prolonged September hospitalization for C. difficile diarrhea and failure to thrive.  C. difficile diarrhea has resolved after the fidaxomycin course followed by vancomycin followed by fecal transplant.  She has been gaining weight and notes improvement in her symptoms with pancreatic enzyme supplementation.  1. Irritable bowel syndrome with diarrhea  2. Pancreatic insufficiency  3. Unintentional weight loss  - dicyclomine (BENTYL) 10 mg capsule; Take 1 capsule (10 mg total) by mouth 4 (four) times a day as needed (abdominal pain or diarrhea)  Dispense: 120 capsule; Refill: 11  -Continue Creon  -Continue Questran    4. Bloating  - simethicone (MYLICON) 125 MG chewable tablet; Chew 1 tablet (125 mg total) 3 (three) times a day as needed for flatulence  Dispense: 90 tablet; Refill: 11    5. Type 2 diabetes mellitus with other neurologic complication, with long-term current use of insulin ([facility])  Continue working at rehab for glycemic control as this is likely contributing to her GI symptoms and unintentional weight loss    6. Colon cancer screening  Colonoscopy 9/6/2024 did comment on some small polyps that were not removed as well as inadequate prep.  Given her current active medical issues with no large lesions seen on colonoscopy, will evaluate overall health in 6 months to determine timing of colonoscopy for colon cancer screening purposes    ______________________________________________________________________    SUBJECTIVE:    Currently at a rehab.  Feels miserable. Sugars aren't controlled and not getting her insulin correctly.    Feels much better on Creon.  Feels like she's gain weight.  Not having diarrhea.  Having a lot of mucus and gas. Can't identify any  triggers.  Used to take GasX which was helpful but rehab hasn't been giving it to her.  Taking questran twice a day which helps.  Thought dicyclomine was hlepful but they stopped giving it to her,.    Reviewed 9/16/2024 hospital discharge summary for relapsed C. difficile diarrhea.  Treated with fidaxomicin followed by vancomycin followed by FMT 9/6/2024 9/13/2024 cortisol stimulation test normal  9/11/2024 TSH normal    9/6/24 Colonoscopy  ? The terminal ileum appeared normal.  ? Fecal microbiota transplant stool positive into terminal ileum, cecum, and ascending colon.  ? Bowel prep leading to suboptimal visualization.  Large lesions were not seen.  Smaller polyps were not removed due to the indication of procedure being for FMT.  Underlying mucosa did appear normal.  ? Performed random pancolonic forceps biopsies    Pathology 9/6/24  A. Colon, random colon biopsies r/ colitis- cold biopsy:  - Benign colonic mucosa with non-specific reactive changes.  - No thickened basement membranes or intraepithelial lymphocytosis to suggest microscopic colitis     (i.e. collagenous colitis or lymphocytic colitis, resp.).  - No active or chronic colitis.    -- No granulomas or organism.   - No glandular dysplasia and no evidence of malignancy.    REVIEW OF SYSTEMS IS OTHERWISE NEGATIVE.      Historical Information   Past Medical History:   Diagnosis Date    Acute venous embolism and thrombosis of deep vessels of distal lower extremity (HCC)     Anemia     Anxiety     last assessed 11/20/17    Arthritis     Asthma     Cerebral infarction (HCC)     unspecified, last assessed 11/14/16    Chest pain     last assessed 5/9/17    Chronic cough     last assessed 12/12/13    Depression     Diabetes mellitus, type 2 (HCC)     DJD (degenerative joint disease)     Esophageal reflux     Fibromyalgia     GERD without esophagitis     resolved 5/13/16    History of pulmonary embolism     Hyperlipidemia     Hypertension     Hypothyroidism      Iron deficiency     Pancreatitis     Panic attack     Panic disorder     Polyarthritis     PTSD (post-traumatic stress disorder)     Sleep difficulties     Stroke syndrome     Thyroid disease     TIA (transient ischemic attack)     TIA (transient ischemic attack)     Venous embolism and thrombosis of deep vessels of distal lower extremity (HCC)     Vitamin B12 deficiency     Vitamin D deficiency      Past Surgical History:   Procedure Laterality Date    CARPAL TUNNEL RELEASE Right     neuroplasty decompression of median nerve    CATARACT EXTRACTION      CHOLECYSTECTOMY      ERCP      ERCP      ESOPHAGOGASTRIC FUNDOPLASTY      NISSEN FUNDOPLICATION      PATELLA SURGERY Left 12/2021    WY ESOPHAGOGASTRODUODENOSCOPY TRANSORAL DIAGNOSTIC N/A 11/02/2016    Procedure: EGD AND COLONOSCOPY;  Surgeon: Jatin Shepherd MD;  Location: [facility];  Service: Gastroenterology    WY NDSC WRST SURG W/RLS TRANSVRS CARPL LIGM Right 03/08/2016    Procedure: RELEASE CARPAL TUNNEL ENDOSCOPIC;  Surgeon: Guero Restrepo MD;  Location: [facility];  Service: Orthopedics    WY TENDON SHEATH INCISION Right 03/08/2016    Procedure: RELEASE TRIGGER FINGER RIGHT THUMB;  Surgeon: Guero Restrepo MD;  Location: [facility];  Service: Orthopedics    TONSILLECTOMY AND ADENOIDECTOMY       Social History   Social History     Substance and Sexual Activity   Alcohol Use Not Currently    Alcohol/week: 0.0 standard drinks of alcohol    Comment: last was in 2010 after DUI     Social History     Substance and Sexual Activity   Drug Use No     Social History     Tobacco Use   Smoking Status Every Day    Current packs/day: 1.00    Average packs/day: 1 pack/day for 41.8 years (41.8 ttl pk-yrs)    Types: Cigarettes    Start date: 1/1/1983   Smokeless Tobacco Never   Tobacco Comments    20 + years     Family History   Problem Relation Age of Onset    Diabetes Mother         type 2    Heart attack Mother 39        acute MI    Kidney disease Mother         CKD  "NKF classfication    Depression Mother     Arthritis Mother     Substance Abuse Mother         mother OD in past on MS04    Ulcerative colitis Mother     Schizophrenia Mother     Suicide Attempts Mother     Bipolar disorder Mother     Diabetes Maternal Grandmother     Heart attack Father         acute MI    Psoriasis Father     Cancer Father         gastric cancer    Stroke Father     Crohn's disease Sister     Bipolar disorder Sister     Rheum arthritis Maternal Grandfather     Throat cancer Maternal Grandfather     Suicide Attempts Daughter     Drug abuse Daughter     Lung cancer Paternal Grandmother     Cancer Paternal Grandfather     No Known Problems Sister     Bipolar disorder Maternal Uncle     Anesthesia problems Neg Hx        Meds/Allergies       Current Outpatient Medications:     Albuterol Sulfate (ProAir RespiClick) 108 (90 Base) MCG/ACT AEPB    aspirin 81 mg chewable tablet    atorvastatin (LIPITOR) 80 mg tablet    baclofen 5 MG TABS    BD PEN NEEDLE LINDY U/F 32G X 4 MM MISC    Blood Glucose Monitoring Suppl (OneTouch Verio Reflect) w/Device KIT    butalbital-acetaminophen-caffeine (FIORICET,ESGIC) -40 mg per tablet    butalbital-aspirin-caffeine (FIORINAL) -40 mg capsule    cholestyramine sugar free (QUESTRAN LIGHT) 4 g packet    diazepam (VALIUM) 5 mg tablet    dicyclomine (BENTYL) 10 mg capsule    DULoxetine (CYMBALTA) 60 mg delayed release capsule    famotidine (PEPCID) 20 mg tablet    Fluticasone-Salmeterol (Advair Diskus) 500-50 mcg/dose inhaler    gabapentin (NEURONTIN) 400 mg capsule    insulin glargine (LANTUS) 100 units/mL subcutaneous injection    insulin lispro (HumALOG/ADMELOG) 100 units/mL injection    Insulin Pen Needle (BD Pen Needle Lindy U/F) 32G X 4 MM MISC    Insulin Syringe-Needle U-100 31G X 5/16\" 0.3 ML MISC    levothyroxine 112 mcg tablet    Lidocaine 4 % PTCH    mirtazapine (REMERON) 7.5 MG tablet    nicotine (NICODERM CQ) 21 mg/24 hr TD 24 hr patch    OneTouch Delica " Lancets 33G MISC    OneTouch Ultra test strip    pancrelipase, Lip-Prot-Amyl, (CREON) 24,000 units    prochlorperazine (COMPAZINE) 5 mg tablet    Allergies   Allergen Reactions    Medical Tape Rash    Lexapro [Escitalopram Oxalate]     Escitalopram Other (See Comments) and Palpitations    Other Hives and Rash     Adhesive Tape           Objective     Last menstrual period 03/04/2016, not currently breastfeeding. There is no height or weight on file to calculate BMI.      PHYSICAL EXAM:      General Appearance:   Alert, cooperative, no distress   HEENT:   Normocephalic, atraumatic, anicteric.     Neck:  Supple, symmetrical, trachea midline   Lungs:   Clear to auscultation bilaterally; no rales, rhonchi or wheezing; respirations unlabored    Heart::   Regular rate and rhythm; no murmur, rub, or gallop.   Abdomen:   Soft, non-tender, non-distended; normal bowel sounds; no masses, no organomegaly    Genitalia:   Deferred    Rectal:   Deferred    Extremities:  No cyanosis, clubbing or edema    Pulses:  2+ and symmetric    Skin:  No jaundice, rashes, or lesions    Lymph nodes:  No palpable cervical lymphadenopathy        Lab Results:   No visits with results within 1 Day(s) from this visit.   Latest known visit with results is:   No results displayed because visit has over 200 results.            Radiology Results:   XR ankle 3+ views RIGHT    Result Date: 10/10/2024  Narrative: XR ANKLE 3+ VW RIGHT INDICATION: pain, swelling. COMPARISON: None FINDINGS: No acute fracture or dislocation. Mild osteoarthritic degenerative changes are present. There is mild diffuse bone demineralization. No lytic or blastic osseous lesion. Unremarkable soft tissues.     Impression: No acute osseous abnormality. Computerized Assisted Algorithm (CAA) may have been used to analyze all applicable images. Workstation performed: [id number]     NM gastric emptying    Result Date: 9/27/2024  Narrative: GASTRIC EMPTYING STUDY INDICATION: R68.81: Early  satiety COMPARISON:  None available TECHNIQUE:   The study was performed following the oral administration of 1.06 mCi Tc-99m sulfur colloid combined with scrambled eggs, as part of a standard meal.  Following the meal, one minute anterior and posterior images were obtained immediately and  at 0.25 hours, 0.5 hour, 1 hour, 1.5 hour, 2 hour, 3 hour and 4 hour intervals from the time of ingestion.  Geometric mean analyses were then performed. FINDINGS: Gastric emptying at 0.5 hours = 10% (N < 30%) Gastric emptying at 1 hour = 21% (N = 10 - 70%) Gastric emptying at 2 hours = 41% (N = > 40%) Gastric emptying at 3 hours = 61% (N = > 70%) Gastric emptying at 4 hours = 81% (N = >90%) Linear T-1/2 = 150 minutes     Impression: Mildly delayed rate of gastric emptying. Workstation performed: [id number]

## 2024-10-15 NOTE — TELEPHONE ENCOUNTER
[facility]  patients botox appointment for today 10/15/24 due to patient not feeling well. Advised someone will call back to .    Please ask for Pooja.    Thank you    Pooja-[facility]   [phone]

## 2024-10-15 NOTE — LETTER
Re: Barb Palomo  : 1970      To whom this may concern:       Barb was seen for an initial psychiatric evaluation today, 10/15/2024. It is my recommendation that she start Risperidone 0.5 mg BID for paranoia and irritability. She was made aware of this recommendation and was agreeable to this plan. She is scheduled for a follow up psychiatrically in 4 weeks. If an emergent situation should arise, or if she becomes suicidal, she should go to the nearest emergency room. Any questions, Please do not hesitate to call @ [phone].        Sincerely,         ELISEO Rodriguez  Psychiatric Nurse Practitioner  [facility]

## 2024-10-15 NOTE — TELEPHONE ENCOUNTER
Pooja form [facility] where pt has been since 9/16/24 called to let provider know that she will be faxing over paperwork from facility re medications to offfice fax today for provider. Also Pooja said that pt is saying odd things. Thinks all NP paperwork is done for appt. Pt will have phone by her for appt

## 2024-10-16 ENCOUNTER — TELEMEDICINE (OUTPATIENT)
Dept: GASTROENTEROLOGY | Facility: MEDICAL CENTER | Age: 54
End: 2024-10-16
Payer: MEDICARE

## 2024-10-16 ENCOUNTER — NURSING HOME VISIT (OUTPATIENT)
Dept: GERIATRICS | Facility: OTHER | Age: 54
End: 2024-10-16
Payer: MEDICARE

## 2024-10-16 DIAGNOSIS — K86.89 PANCREATIC INSUFFICIENCY: ICD-10-CM

## 2024-10-16 DIAGNOSIS — R14.0 BLOATING: ICD-10-CM

## 2024-10-16 DIAGNOSIS — E43 SEVERE PROTEIN-CALORIE MALNUTRITION (HCC): ICD-10-CM

## 2024-10-16 DIAGNOSIS — M25.571 ACUTE RIGHT ANKLE PAIN: ICD-10-CM

## 2024-10-16 DIAGNOSIS — E11.65 UNCONTROLLED TYPE 2 DIABETES MELLITUS WITH HYPERGLYCEMIA (HCC): Primary | ICD-10-CM

## 2024-10-16 DIAGNOSIS — R63.4 UNINTENTIONAL WEIGHT LOSS: ICD-10-CM

## 2024-10-16 DIAGNOSIS — E11.49 TYPE 2 DIABETES MELLITUS WITH OTHER NEUROLOGIC COMPLICATION, WITH LONG-TERM CURRENT USE OF INSULIN (HCC): Primary | ICD-10-CM

## 2024-10-16 DIAGNOSIS — G89.4 CHRONIC PAIN DISORDER: ICD-10-CM

## 2024-10-16 DIAGNOSIS — K58.0 IRRITABLE BOWEL SYNDROME WITH DIARRHEA: ICD-10-CM

## 2024-10-16 DIAGNOSIS — Z79.4 TYPE 2 DIABETES MELLITUS WITH OTHER NEUROLOGIC COMPLICATION, WITH LONG-TERM CURRENT USE OF INSULIN (HCC): Primary | ICD-10-CM

## 2024-10-16 DIAGNOSIS — Z12.11 COLON CANCER SCREENING: ICD-10-CM

## 2024-10-16 PROCEDURE — 99214 OFFICE O/P EST MOD 30 MIN: CPT | Performed by: STUDENT IN AN ORGANIZED HEALTH CARE EDUCATION/TRAINING PROGRAM

## 2024-10-16 PROCEDURE — 99309 SBSQ NF CARE MODERATE MDM 30: CPT | Performed by: NURSE PRACTITIONER

## 2024-10-16 RX ORDER — DICYCLOMINE HYDROCHLORIDE 10 MG/1
10 CAPSULE ORAL 4 TIMES DAILY PRN
Qty: 120 CAPSULE | Refills: 11 | Status: SHIPPED | OUTPATIENT
Start: 2024-10-16

## 2024-10-16 RX ORDER — SIMETHICONE 125 MG
125 TABLET,CHEWABLE ORAL 3 TIMES DAILY PRN
Qty: 90 TABLET | Refills: 11 | Status: SHIPPED | OUTPATIENT
Start: 2024-10-16

## 2024-10-16 NOTE — TELEPHONE ENCOUNTER
Clinical staff from [facility] called requesting a call back regarding pt high .   Paged out to on call provider Harvey GOMES with call back number #[phone]

## 2024-10-16 NOTE — TELEPHONE ENCOUNTER
Left message for Pooja for her to reschedule Barb's Botox appt, she can be rescheduled in [place] on 10/22.

## 2024-10-17 ENCOUNTER — TELEPHONE (OUTPATIENT)
Age: 54
End: 2024-10-17

## 2024-10-17 NOTE — TELEPHONE ENCOUNTER
ZEE Dickson from [facility] called to find out when the patients next appt was. Writer checked the patients chart and there was no communication consent filled out to be able to provide information to them. She explained that verbal permission was given at the last call. Writer explained that is good for that call only. Writer emailed out a communication consent for the patient to fill out and return.

## 2024-10-19 ENCOUNTER — TELEPHONE (OUTPATIENT)
Dept: OTHER | Facility: OTHER | Age: 54
End: 2024-10-19

## 2024-10-20 NOTE — TELEPHONE ENCOUNTER
Aldo called, requesting a callback from on call provider, regarding patient's high glucose level provider paged via ESC

## 2024-10-21 ENCOUNTER — NURSING HOME VISIT (OUTPATIENT)
Dept: GERIATRICS | Facility: OTHER | Age: 54
End: 2024-10-21
Payer: MEDICARE

## 2024-10-21 DIAGNOSIS — G43.909 MIGRAINE WITHOUT STATUS MIGRAINOSUS, NOT INTRACTABLE, UNSPECIFIED MIGRAINE TYPE: ICD-10-CM

## 2024-10-21 DIAGNOSIS — K86.89 PANCREATIC INSUFFICIENCY: ICD-10-CM

## 2024-10-21 DIAGNOSIS — E11.65 UNCONTROLLED TYPE 2 DIABETES MELLITUS WITH HYPERGLYCEMIA (HCC): Primary | ICD-10-CM

## 2024-10-21 DIAGNOSIS — G89.4 CHRONIC PAIN DISORDER: ICD-10-CM

## 2024-10-21 DIAGNOSIS — M25.571 ACUTE RIGHT ANKLE PAIN: ICD-10-CM

## 2024-10-21 PROCEDURE — 99309 SBSQ NF CARE MODERATE MDM 30: CPT | Performed by: NURSE PRACTITIONER

## 2024-10-22 ENCOUNTER — PROCEDURE VISIT (OUTPATIENT)
Dept: NEUROLOGY | Facility: CLINIC | Age: 54
End: 2024-10-22
Payer: MEDICARE

## 2024-10-22 VITALS — HEART RATE: 103 BPM | SYSTOLIC BLOOD PRESSURE: 71 MMHG | DIASTOLIC BLOOD PRESSURE: 44 MMHG | TEMPERATURE: 97.6 F

## 2024-10-22 DIAGNOSIS — G43.709 CHRONIC MIGRAINE WITHOUT AURA WITHOUT STATUS MIGRAINOSUS, NOT INTRACTABLE: Primary | ICD-10-CM

## 2024-10-22 PROCEDURE — 64615 CHEMODENERV MUSC MIGRAINE: CPT | Performed by: PHYSICIAN ASSISTANT

## 2024-10-22 NOTE — PROGRESS NOTES
Universal Protocol   Consent: Verbal consent obtained. Written consent obtained.  Risks and benefits: risks, benefits and alternatives were discussed  Consent given by: patient  Patient understanding: patient states understanding of the procedure being performed  Patient consent: the patient's understanding of the procedure matches consent given  Procedure consent: procedure consent matches procedure scheduled      Chemodenervation     Date/Time  10/22/2024 11:00 AM     Performed by  Marie Alfaro PA-C   Authorized by  Marie Alfaro PA-C     Pre-procedure details      Prepped With: Alcohol     Procedure details      Position:  Upright   Botox      Botox Type:  Type A    Brand:  Botox    mL's of Botulinum Toxin:  200    Final Concentration per CC:  100 units    Needle Gauge:  30 G 2.5 inch   Procedures      Botox Procedures: chronic headache      Indications: migraines     Injection Location      Head / Face:  L superior trapezius, R superior trapezius, L superior cervical paraspinal, R superior cervical paraspinal, L , R , procerus, L temporalis, R temporalis, R frontalis, L frontalis, R medial occipitalis and L medial occipitalis    L  injection amount:  5 unit(s)    R  injection amount:  5 unit(s)    L lateral frontalis:  5 unit(s)    R lateral frontalis:  5 unit(s)    L medial frontalis:  5 unit(s)    R medial frontalis:  5 unit(s)    L temporalis injection amount:  20 unit(s)    R temporalis injection amount:  20 unit(s)    Procerus injection amount:  5 unit(s)    L medial occipitalis injection amount:  15 unit(s)    R medial occipitalis injection amount:  15 unit(s)    L superior cervical paraspinal injection amount:  10 unit(s)    R superior cervical paraspinal injection amount:  10 unit(s)    L superior trapezius injection amount:  15 unit(s)    R superior trapezius injection amount:  15 unit(s)   Total Units      Total units used:  200    Total units discarded:  0    Post-procedure details      Chemodenervation:  Chronic migraine    Facial Nerve Location::  Bilateral facial nerve    Patient tolerance of procedure:  Tolerated well, no immediate complications   Comments       Extra 45 units between the b/l anterior temporalis muscles and b/l occipitalis muscles, all medically necessary.       Blood pressure (!) 71/44, pulse 103, temperature 97.6 °F (36.4 °C), temperature source Temporal, last menstrual period 03/04/2016, not currently breastfeeding.    -- consider midodrine if BP consistently low. Continue 64 oz water t/o the day.

## 2024-10-24 DIAGNOSIS — M54.40 LOW BACK PAIN WITH SCIATICA, SCIATICA LATERALITY UNSPECIFIED, UNSPECIFIED BACK PAIN LATERALITY, UNSPECIFIED CHRONICITY: ICD-10-CM

## 2024-10-24 DIAGNOSIS — G43.709 CHRONIC MIGRAINE WITHOUT AURA WITHOUT STATUS MIGRAINOSUS, NOT INTRACTABLE: Primary | ICD-10-CM

## 2024-10-24 DIAGNOSIS — M13.0 POLYARTHRITIS, UNSPECIFIED: ICD-10-CM

## 2024-10-24 RX ORDER — TRAMADOL HYDROCHLORIDE 200 MG/1
200 TABLET, EXTENDED RELEASE ORAL DAILY PRN
Qty: 10 TABLET | Refills: 0 | Status: SHIPPED | OUTPATIENT
Start: 2024-10-24

## 2024-10-24 NOTE — PROGRESS NOTES
Med refill: Tramadol HCl ER oral Extend release 200mg 24 hour tablet. 10 tablets. NO refill. Give 1 tablet every 24 hours PRN (moderate pain)

## 2024-10-28 ENCOUNTER — NURSING HOME VISIT (OUTPATIENT)
Dept: GERIATRICS | Facility: OTHER | Age: 54
End: 2024-10-28
Payer: MEDICARE

## 2024-10-28 DIAGNOSIS — E11.65 UNCONTROLLED TYPE 2 DIABETES MELLITUS WITH HYPERGLYCEMIA (HCC): Primary | ICD-10-CM

## 2024-10-28 DIAGNOSIS — G43.909 MIGRAINE WITHOUT STATUS MIGRAINOSUS, NOT INTRACTABLE, UNSPECIFIED MIGRAINE TYPE: ICD-10-CM

## 2024-10-28 DIAGNOSIS — F33.2 SEVERE EPISODE OF RECURRENT MAJOR DEPRESSIVE DISORDER, WITHOUT PSYCHOTIC FEATURES (HCC): ICD-10-CM

## 2024-10-28 DIAGNOSIS — M25.571 ACUTE RIGHT ANKLE PAIN: ICD-10-CM

## 2024-10-28 DIAGNOSIS — K86.89 PANCREATIC INSUFFICIENCY: ICD-10-CM

## 2024-10-28 DIAGNOSIS — G89.4 CHRONIC PAIN DISORDER: ICD-10-CM

## 2024-10-28 PROCEDURE — 99309 SBSQ NF CARE MODERATE MDM 30: CPT | Performed by: NURSE PRACTITIONER

## 2024-10-30 NOTE — ASSESSMENT & PLAN NOTE
Malnutrition Findings:   Frail stature versus cachexia  Prolonged recent hospitalization  Generalized weakness  Anemia  Chronic diarrhea  Pancreatic insufficiency  Recent C.diff infection  Poorly controlled DMT2  BMI: 19.2 kg/m2 (9/16/2024)  Patient with an ave of 50-75% meal completion in SNF  Patient declined nutritional shakes (lactose intolerance)  Currently on Diabetic diet  Continue weekly weight checks in SNF  RD following

## 2024-10-30 NOTE — PROGRESS NOTES
[facility]  [address], [address], [place] [address]  [phone]    NAME: Barb Palomo  AGE: 54 y.o. SEX: female    Progress Note    Location: [facility]  POS: 31 ([facility])    Assessment/Plan:    Uncontrolled type 2 diabetes mellitus with hyperglycemia (HCC)    Lab Results   Component Value Date    HGBA1C 16.1 (H) 08/16/2024   Goal: < 5.7%  FBG range (9/17-18/2024) = 156 to 326  Pre-lunch BG range (9/17-18/2024) = 103 to 323  Pre-dinner BG range (9/16-18/2024) = 120 to 380  FBG range (9/17-18/2024) = 156 to 326  Pre-lunch BG range (9/17-18/2024) = 103 to 323  Pre-dinner BG range (9/16-18/2024) = 120 to 380  Continue the following meds:  - Insulin glargine 28 units daily at HS  - Humalog 10 units TID with meals  Continue Carbohydrate controlled diet    Severe episode of recurrent major depressive disorder, without psychotic features (HCC)  No behavioral disturbance in SNF  Patient reported some sleep disturbance: going to bathroom at night, new place  Continue the following meds: Cymbalta 60mg BID / PRN Diazepa, 5mg every 12 hours  Continue to monitor sleep patterns/appetite/mood/ anxiety levels in SNF  Consult Behavioral Health CRNP as needed.    Severe protein-calorie malnutrition (HCC)  Malnutrition Findings:   Frail stature versus cachexia  Prolonged recent hospitalization  Generalized weakness  Anemia  Chronic diarrhea  Pancreatic insufficiency  Recent C.diff infection  Poorly controlled DMT2  BMI: 19.2 kg/m2 (9/16/2024)  Patient with an ave of 50-75% meal completion in SNF  Patient declined nutritional shakes (lactose intolerance)  Currently on Diabetic diet  Continue weekly weight checks in SNF  RD following    Chronic pain disorder  Including migraine  Continue the following meds:  - Fiorinal PRN  - Tramadol ER 200mg daily PRN  - Tylenol 650mg Q6 hours PRN  - Baclofen 5mg BID  - Gabapentin 800mg TID  Nursing to continue to assess for pain Q shift while awake     Benign essential  "hypertension  BP range (9/16-18/2024) = 90/62 to 120/78  HR range (9/16-18/2024) = 76 to 112/min  Not on anti-HTN medication    Hypothyroidism  TSH (9/11/20242) = 2.357 <= 3.08 (7/6/2024)  Continue Levothyroxine 112mcg daily    Pancreatic insufficiency  With Chronic intermittent diarrhea  Continue Creon DR  [id number] UNIT: 1 tablet TID       Chief complaint / Reason for visit:  Follow- visit    History of Present Illness:  This is a 54-year-old female patient currently admitted at [facility] (9/16/2024 to present) for skilled nursing and rehabilitation services following discharge from acute care hospitalization (H: 8/5/2024 to 9/16/2024) with Dx of C.diff diarrhea (8/18/2024), uncontrolled DM Type 2, Acute Cystitis wo hematuria, Severe Protein Calorie Malnutrition, Hypomagnesemia, Failure to Thrive, Sinus tachycardia, hypotension.   Patient is seen and examined today to follow-up for any acute and chronic medical conditions.    Patient is in bed for this visit -alert, cooperative, calm, very pleasant and not in distress.  Patient is verbally engaged with clear coherent speech -oriented to name/birthday/current date/place (aware that she is in a facility for rehabilitation).  Patient acknowledged feeling much better today.  Patient reported intermittent semiformed stools, no cramping or tenesmus.  Patient reported that she is not sleeping well -trouble starting to sleep and awakening too early.  Patient reported baseline appetite, \" I'm not a big eater even at home\". Patient reported she feels safe where she is.  Otherwise patient denies no acute medical concerns during ROS assessment including pain.  Nursing has no acute medical concerns with this visit.     Nursing and prior provider notes reviewed on this visit. Discussed visit with PCP and nursing staff/ supervisor.    Review of Systems:  Per history of present illness, all other systems reviewed and negative.    HISTORY:  Medical Hx: Reviewed, " "unchanged  Family Hx: Reviewed, unchanged  Soc Hx: Reviewed,  unchanged    ALLERGY: Reviewed, unchanged.   Allergies   Allergen Reactions    Medical Tape Rash    Lexapro [Escitalopram Oxalate]     Escitalopram Other (See Comments) and Palpitations    Other Hives and Rash     Adhesive Tape        PHYSICAL EXAM:  Vital Signs: T97.7F -P76 -R16 BP: 118/74 Spo2: 97% RA  Weight: 104.0 lbs (9/16/2024)    General: NAD. Well appearing. No acute distress. Frail stature  Head: Atraumatic. Normocephalic.  Eye Exam: anicteric sclera, no discharge, PERRLA, No injection  Oral Exam: moist mucous membranes, no buccaloropharyngeal erythema, palatine tonsils WNL.  Neck Exam: no anterior cervical lymphadenopathy noted, neck supple. Trachea midline, no carotid bruit, no masses  Cardiovascular: regular rate, regular rhythm, no: murmurs/ rubs/ gallops. S1 and S2 appreciated.  Pulmonary: no wheeze, no rhonchi, no rales. No chest tenderness. Normal chest wall expansion  Abdominal: soft, non-tender, nondistended, bowel sounds audible x 4 quadrants. No palpable hepatosplenomegaly, no tympany  : Non distended bladder.   Extremities and skin: no edema noted, no rashes. Intact skin  Neurological: alert, cooperative and responsive, Oriented x 3. No tics, normal sensation to pressure and light touch.  moving all 4 extremities symmetrically.    Laboratory / Imaging results reviewed. Last labs done on 9/13/2024    Current Medications: All medications reviewed and updated in Nursing Home eMAR.    Please note: This note was completed in part utilizing a voice-recognition software may have been used in the preparation of this document. Grammatical errors, random word insertion, spelling mistakes, and incomplete sentences may be an occasional consequence of the system secondary to software limitations, ambient noise and hardware issues. Occasional wrong word or \"sound-alike\" substitutions may have occurred due to the inherent limitations of voice " recognition software. At the time of dictation, efforts were made to edit, clarify and/or correct errors. Interpretation should be guided by context. Please read the chart carefully and recognize, using context, where substitutions have occurred. If you have any questions or concerns about the context, text or information contained within the body of this dictation, please contact myself, the provider, for further clarification.      ELISEO Levine  10/30/2024

## 2024-10-30 NOTE — ASSESSMENT & PLAN NOTE
Including migraine  Continue the following meds:  - Fiorinal PRN  - Tramadol ER 200mg daily PRN  - Tylenol 650mg Q6 hours PRN  - Baclofen 5mg BID  - Gabapentin 800mg TID  Nursing to continue to assess for pain Q shift while awake

## 2024-10-30 NOTE — ASSESSMENT & PLAN NOTE
Lab Results   Component Value Date    HGBA1C 16.1 (H) 08/16/2024   Goal: < 5.7%  FBG range (9/17-18/2024) = 156 to 326  Pre-lunch BG range (9/17-18/2024) = 103 to 323  Pre-dinner BG range (9/16-18/2024) = 120 to 380  FBG range (9/17-18/2024) = 156 to 326  Pre-lunch BG range (9/17-18/2024) = 103 to 323  Pre-dinner BG range (9/16-18/2024) = 120 to 380  Continue the following meds:  - Insulin glargine 28 units daily at HS  - Humalog 10 units TID with meals  Continue Carbohydrate controlled diet

## 2024-10-30 NOTE — PROGRESS NOTES
"[facility]  [address] [place] [address] Rd, [address], [place] [address]  [phone]    NAME: Barb Palomo  AGE: 54 y.o. SEX: female    Progress Note    Location: [facility]  POS: 31 ([facility])    Assessment/Plan:    Uncontrolled type 2 diabetes mellitus with hyperglycemia (HCC)  HbA1C: 16.1 (H) 08/16/2024  Goal: < 5.7%  Still poorly controlled.   Likely intake determined  FBG range (9/19-23/2024) = 255 to 338  Pre-lunch BG range (9/19-23/2024) = 173 to 415  Pre-dinner BG range (9/19-23/2024) = 216 to 397  Continue the following meds:  - Insulin Glargine 28 units daily at HS  - Humalog 10 units TID with meals  Continue Carbohydrate controlled diet  Renal function (9/23/2024): Crea: 0.48/ BUN: 18/ eGFR: 113  K level (9/23/2024): 4.4 / Na level: 140     Severe episode of recurrent major depressive disorder, without psychotic features (HCC)  No behavioral disturbance in SNF  Continue the following meds: Cymbalta 60mg BID / PRN Diazepa, 5mg every 12 hours  Consult Behavioral Health CRNP as needed.     Severe protein-calorie malnutrition (HCC)  Malnutrition Findings:   Frail stature versus cachexia  Recent Sepsis  Weight loss on review: 2 lbs from admission  Prolonged recent hospitalization  Generalized weakness  Anemia: Hbg/Hct: 10.2/ 30/3 (9/23/2024) <= 10.7/ 32.9 (9/13/2024)  - indices (9/23/2024): WNL  Chronic diarrhea  Pancreatic insufficiency  Recent C.diff infection  Poorly controlled DMT2  BMI: 18.66 kg/m2 (9/23/2024) <= 19.2 kg/m2 (9/16/2024)  Patient with an ave of 50-75% meal completion in SNF  Patient declined nutritional shakes (lactose intolerance)  Currently on Diabetic diet  Continue weekly weight checks in SNF  RD following     Chronic pain disorder  Including intermittent migraine  Patient reported chronic pain \"all over\" today  Continue the following meds:  - Fiorinal PRN  - Tramadol ER 200mg daily PRN  - Tylenol 650mg Q6 hours PRN  - Baclofen 5mg BID  - Gabapentin 800mg TID  Nursing to continue to " "assess for pain Q shift while awake      Benign essential hypertension  BP range (9/19-23/2024) = 90/60 to 132/78  HR range (9/16-18/2024) = 72 to 88/min  Not on anti-HTN medication     Pancreatic insufficiency  With Chronic intermittent diarrhea  Continue [place]  [id number] UNIT: 1 tablet TID      Chief complaint / Reason for visit:  Follow- visit    History of Present Illness:  This is a 54-year-old female patient currently admitted at [facility] (9/16/2024 to present) for skilled nursing and rehabilitation services. Patient is seen and examined today to follow-up for any acute and chronic medical conditions.    Patient is in bed for this visit -alert, with fatigued presentation, cooperative, calm, very pleasant and not in distress.  Patient is verbally engaged with clear coherent speech -oriented to name/birthday/current date/place. Patient acknowledged feeling well today - reported disturbed sleep in SNF.  Patient reported paranoid thoughts: \" The nursing staff does not like me here.  I can hear them talking about me through the walls\".  Per nursing, no behavioral disturbance or increased anxiety observed on day shift but CNA from 3p-11PM reported patient paces along the hallway, verbally reporting that she wants to go home -nursing is also reported patient did report the patient had expressed concerns that other residents does not like her as well.  Patient is currently in a private room.  Patient would most likely benefit with a shared room to reduce signs of isolation. Otherwise patient denies no acute medical concerns during ROS assessment including pain.  Nursing has no acute medical concerns with this visit.    Nursing and prior provider notes reviewed on this visit. Discussed visit with PCP and nursing staff/ supervisor.    Review of Systems:  Per history of present illness, all other systems reviewed and negative.    HISTORY:  Medical Hx: Reviewed, unchanged  Family Hx: Reviewed, unchanged  Soc Hx: " "Reviewed,  unchanged    ALLERGY: Reviewed, unchanged.   Allergies   Allergen Reactions    Medical Tape Rash    Lexapro [Escitalopram Oxalate]     Escitalopram Other (See Comments) and Palpitations    Other Hives and Rash     Adhesive Tape        PHYSICAL EXAM:  Vital Signs: T97.8F -P78 -R16 BP: 132/78 Spo2: 96% RA  Weight: 102.0 lbs (9/23/2024) <= 104.0 lbs (9/16/2024)    General: NAD. Well appearing. No acute distress. Frail stature  Head: Atraumatic. Normocephalic.  Eye Exam: anicteric sclera, no discharge, PERRLA, No injection  Oral Exam: moist mucous membranes, no buccaloropharyngeal erythema, palatine tonsils WNL.  Neck Exam: no anterior cervical lymphadenopathy noted, neck supple. Trachea midline, no carotid bruit, no masses  Cardiovascular: regular rate, regular rhythm, no: murmurs/ rubs/ gallops. S1 and S2 appreciated.  Pulmonary: no wheeze, no rhonchi, no rales. No chest tenderness. Normal chest wall expansion  Abdominal: soft, non-tender, nondistended, bowel sounds audible x 4 quadrants. No palpable hepatosplenomegaly, no tympany  : Non distended bladder.   Extremities and skin: no edema noted, no rashes. Intact skin  Neurological: alert, cooperative and responsive, Oriented x 3. No tics, normal sensation to pressure and light touch.  moving all 4 extremities symmetrically.    Laboratory / Imaging results reviewed. Last labs done on 9/23/2204    Current Medications: All medications reviewed and updated in Nursing Home eMAR.    Please note: This note was completed in part utilizing a voice-recognition software may have been used in the preparation of this document. Grammatical errors, random word insertion, spelling mistakes, and incomplete sentences may be an occasional consequence of the system secondary to software limitations, ambient noise and hardware issues. Occasional wrong word or \"sound-alike\" substitutions may have occurred due to the inherent limitations of voice recognition software. At the " time of dictation, efforts were made to edit, clarify and/or correct errors. Interpretation should be guided by context. Please read the chart carefully and recognize, using context, where substitutions have occurred. If you have any questions or concerns about the context, text or information contained within the body of this dictation, please contact myself, the provider, for further clarification.      ELISEO Levine  10/30/2024

## 2024-10-30 NOTE — ASSESSMENT & PLAN NOTE
No behavioral disturbance in SNF  Patient reported some sleep disturbance: going to bathroom at night, new place  Continue the following meds: Cymbalta 60mg BID / PRN Diazepa, 5mg every 12 hours  Continue to monitor sleep patterns/appetite/mood/ anxiety levels in SNF  Consult Behavioral Health CRNP as needed.

## 2024-10-30 NOTE — ASSESSMENT & PLAN NOTE
BP range (9/16-18/2024) = 90/62 to 120/78  HR range (9/16-18/2024) = 76 to 112/min  Not on anti-HTN medication

## 2024-11-04 ENCOUNTER — NURSING HOME VISIT (OUTPATIENT)
Dept: GERIATRICS | Facility: OTHER | Age: 54
End: 2024-11-04
Payer: MEDICARE

## 2024-11-04 DIAGNOSIS — K86.89 PANCREATIC INSUFFICIENCY: ICD-10-CM

## 2024-11-04 DIAGNOSIS — M25.571 ACUTE RIGHT ANKLE PAIN: ICD-10-CM

## 2024-11-04 DIAGNOSIS — R00.0 SINUS TACHYCARDIA: ICD-10-CM

## 2024-11-04 DIAGNOSIS — F31.81 BIPOLAR II DISORDER (HCC): ICD-10-CM

## 2024-11-04 DIAGNOSIS — F33.2 SEVERE EPISODE OF RECURRENT MAJOR DEPRESSIVE DISORDER, WITHOUT PSYCHOTIC FEATURES (HCC): ICD-10-CM

## 2024-11-04 DIAGNOSIS — E11.65 UNCONTROLLED TYPE 2 DIABETES MELLITUS WITH HYPERGLYCEMIA (HCC): Primary | ICD-10-CM

## 2024-11-04 DIAGNOSIS — G89.4 CHRONIC PAIN DISORDER: ICD-10-CM

## 2024-11-04 PROCEDURE — 99309 SBSQ NF CARE MODERATE MDM 30: CPT | Performed by: NURSE PRACTITIONER

## 2024-11-05 ENCOUNTER — TELEPHONE (OUTPATIENT)
Age: 54
End: 2024-11-05

## 2024-11-05 ENCOUNTER — OFFICE VISIT (OUTPATIENT)
Dept: ENDOCRINOLOGY | Facility: CLINIC | Age: 54
End: 2024-11-05
Payer: MEDICARE

## 2024-11-05 VITALS
OXYGEN SATURATION: 86 % | SYSTOLIC BLOOD PRESSURE: 108 MMHG | BODY MASS INDEX: 21.53 KG/M2 | HEART RATE: 112 BPM | DIASTOLIC BLOOD PRESSURE: 62 MMHG | HEIGHT: 62 IN | WEIGHT: 117 LBS

## 2024-11-05 DIAGNOSIS — Z79.4 TYPE 2 DIABETES MELLITUS WITH OTHER NEUROLOGIC COMPLICATION, WITH LONG-TERM CURRENT USE OF INSULIN (HCC): Primary | ICD-10-CM

## 2024-11-05 DIAGNOSIS — I10 BENIGN ESSENTIAL HYPERTENSION: ICD-10-CM

## 2024-11-05 DIAGNOSIS — E78.2 MIXED HYPERLIPIDEMIA: ICD-10-CM

## 2024-11-05 DIAGNOSIS — E11.65 UNCONTROLLED TYPE 2 DIABETES MELLITUS WITH HYPERGLYCEMIA (HCC): ICD-10-CM

## 2024-11-05 DIAGNOSIS — E03.9 ACQUIRED HYPOTHYROIDISM: ICD-10-CM

## 2024-11-05 DIAGNOSIS — E11.49 TYPE 2 DIABETES MELLITUS WITH OTHER NEUROLOGIC COMPLICATION, WITH LONG-TERM CURRENT USE OF INSULIN (HCC): Primary | ICD-10-CM

## 2024-11-05 DIAGNOSIS — R26.2 AMBULATORY DYSFUNCTION: ICD-10-CM

## 2024-11-05 PROCEDURE — 99215 OFFICE O/P EST HI 40 MIN: CPT | Performed by: INTERNAL MEDICINE

## 2024-11-05 RX ORDER — INSULIN LISPRO 100 [IU]/ML
INJECTION, SOLUTION INTRAVENOUS; SUBCUTANEOUS
Start: 2024-11-05

## 2024-11-05 RX ORDER — CHOLESTYRAMINE 4 G/9G
1 POWDER, FOR SUSPENSION ORAL
COMMUNITY

## 2024-11-05 RX ORDER — INSULIN GLARGINE 100 [IU]/ML
INJECTION, SOLUTION SUBCUTANEOUS
Start: 2024-11-05

## 2024-11-05 NOTE — TELEPHONE ENCOUNTER
Pt has an appt today at 1:20 but does not need Lyft services. She stated that she has transportation for appt.

## 2024-11-05 NOTE — PROGRESS NOTES
Barb Palomo 54 y.o. female MRN: [id number]    Encounter: [id number]      Assessment & Plan     Assessment:  This is a 54 y.o.-year-old female with diabetes with hyperglycemia.    Plan:  Diagnoses and all orders for this visit:    Type 2 diabetes mellitus with other neurologic complication, with long-term current use of insulin (HCC)    Lab Results   Component Value Date    HGBA1C 16.1 (H) 08/16/2024   Last A1c 16.1%, uncontrolled.  Has been staying in rehab for last 2 to 3 weeks, reviewed blood sugar log.  Fasting blood sugars are elevated in 300 mg per DL range, usually blood sugars before lunch are in 70 to 110 mg per DL range, blood sugars before dinner are usually in 200 to 300 mg per DL range  Per patient, rehab facility is holding insulin usually during lunchtime as blood sugars are below 150 mg/dl  As fasting blood sugars are elevated, will increase Lantus to 40 units at bedtime.  Will reduce Humalog to 8 units before breakfast, and continue 10 units of Humalog before lunch and increase Humalog to 14 units before dinner plus correction scale which was provided to patient.  Hold Humalog insulin if blood sugar is less than 90 mg per DL  Written instructions were provided to patient.  Educated about hypoglycemia symptoms and treatment.  Discussed the importance of follow-up with ophthalmology and podiatry..  Educated about long-term complications of uncontrolled diabetes including worsening of neuropathy, retinopathy, nephropathy, risk of heart attack and stroke    -     Ambulatory Referral to Ophthalmology; Future  -     Ambulatory referral to Podiatry; Future  -     Basic metabolic panel; Future  -     Albumin / creatinine urine ratio; Future  -     Hemoglobin A1C; Future  -     Basic metabolic panel  -     Albumin / creatinine urine ratio  -     Hemoglobin A1C  -     Hemoglobin A1C; Future  -     Hemoglobin A1C    Uncontrolled type 2 diabetes mellitus with hyperglycemia (HCC)    Lab Results   Component  Value Date    HGBA1C 16.1 (H) 08/16/2024   A1c 16%, uncontrolled.  Adjustment made to insulin regimen    -     insulin glargine (LANTUS) 100 units/mL subcutaneous injection; Inject 40 units at bedtime  -     insulin lispro (HumALOG/ADMELOG) 100 units/mL injection; Inject 8 units before breakfast, 10 units before lunch and 14 units before dinner +scale ( hold if blood sugar is less than 90 )    Ambulatory dysfunction  Patient is currently in rehab, getting physical therapy  Benign essential hypertension  Blood pressure is well-controlled, continue current management as per PCP  Mixed hyperlipidemia  Lab Results   Component Value Date    LDLCALC 86 07/06/2022      Currently managed on statins, managed by PCP  Acquired hypothyroidism    Lab Results   Component Value Date    [id number] 2.357 09/11/2024    TSH 2.12 01/15/2024   Thyroid blood work is within normal range, continue current management.  Continue current dose of levothyroxine 12 mcg daily on empty stomach 1 hour before breakfast    -     T4, free; Future  -     TSH, 3rd generation; Future  -     T4, free  -     TSH, 3rd generation    Other orders  -     cholestyramine (QUESTRAN) 4 g packet; Take 1 packet by mouth 3 (three) times a day with meals     I have spent a total time of 40 minutes in caring for this patient on the day of the visit/encounter including Diagnostic results, Prognosis, Risks and benefits of tx options, Instructions for management, Patient and family education, Importance of tx compliance, Risk factor reductions, Impressions, Counseling / Coordination of care, Documenting in the medical record, Reviewing / ordering tests, medicine, procedures  , and Obtaining or reviewing history  .     CC: Diabetes    History of Present Illness     HPI:  Babr Palomo is a 54-year-old woman with medical history of type 2 diabetes with long-term insulin use, over 20 years with complications such as peripheral neuropathy, retinopathy, history of CVA is  here for follow-up.  She also has history of pulmonary embolism, anemia osteoarthritis, fibromyalgia, hypertension and hyperlipidemia, chronic pancreatitis, opioid dependence, hypothyroidism.  She was recently admitted in the hospital in September 2024 for generalized weakness.  She is currently staying at rehab, is getting Lantus 32 units at night, Humalog 10 units with meals.  Her blood sugars are checked 3 times daily and her blood sugars usually range in 70 to 350 mg per DL range.  Blood sugars are usually, in 80 to 120 mg before lunch.  Before breakfast usually blood sugars are in 300 to 350 mg per DL range    Lab Results   Component Value Date    HGBA1C 16.1 (H) 08/16/2024     Lab Results   Component Value Date    CREATININE 0.48 09/23/2024     Lab Results   Component Value Date    [id number] 2.357 09/11/2024    TSH 2.12 01/15/2024          Review of Systems   Constitutional:  Positive for activity change and fatigue. Negative for diaphoresis, fever and unexpected weight change.   HENT: Negative.     Eyes:  Negative for visual disturbance.   Respiratory:  Negative for cough, chest tightness and shortness of breath.    Cardiovascular:  Negative for chest pain, palpitations and leg swelling.   Gastrointestinal:  Negative for abdominal pain, blood in stool, constipation, diarrhea, nausea and vomiting.   Endocrine: Negative for cold intolerance, heat intolerance, polydipsia, polyphagia and polyuria.   Genitourinary:  Negative for dysuria, enuresis, frequency and urgency.   Musculoskeletal:  Negative for arthralgias and myalgias.   Skin:  Negative for pallor, rash and wound.   Allergic/Immunologic: Negative.    Neurological:  Negative for dizziness, tremors, weakness and numbness.   Hematological: Negative.    Psychiatric/Behavioral: Negative.         Historical Information   Past Medical History:   Diagnosis Date    Acute venous embolism and thrombosis of deep vessels of distal lower extremity (HCC)     Anemia      Anxiety     last assessed 11/20/17    Arthritis     Asthma     Cerebral infarction (HCC)     unspecified, last assessed 11/14/16    Chest pain     last assessed 5/9/17    Chronic cough     last assessed 12/12/13    Depression     Diabetes mellitus, type 2 (HCC)     DJD (degenerative joint disease)     Esophageal reflux     Fibromyalgia     GERD without esophagitis     resolved 5/13/16    History of pulmonary embolism     Hyperlipidemia     Hypertension     Hypothyroidism     Iron deficiency     Pancreatitis     Panic attack     Panic disorder     Polyarthritis     PTSD (post-traumatic stress disorder)     Sleep difficulties     Stroke syndrome     Thyroid disease     TIA (transient ischemic attack)     TIA (transient ischemic attack)     Venous embolism and thrombosis of deep vessels of distal lower extremity (HCC)     Vitamin B12 deficiency     Vitamin D deficiency      Past Surgical History:   Procedure Laterality Date    CARPAL TUNNEL RELEASE Right     neuroplasty decompression of median nerve    CATARACT EXTRACTION      CHOLECYSTECTOMY      ERCP      ERCP      ESOPHAGOGASTRIC FUNDOPLASTY      NISSEN FUNDOPLICATION      PATELLA SURGERY Left 12/2021    OK ESOPHAGOGASTRODUODENOSCOPY TRANSORAL DIAGNOSTIC N/A 11/02/2016    Procedure: EGD AND COLONOSCOPY;  Surgeon: Jatin Shepherd MD;  Location: [facility];  Service: Gastroenterology    OK NDSC WRST SURG W/RLS TRANSVRS CARPL LIGM Right 03/08/2016    Procedure: RELEASE CARPAL TUNNEL ENDOSCOPIC;  Surgeon: Guero Restrepo MD;  Location: [facility];  Service: Orthopedics    OK TENDON SHEATH INCISION Right 03/08/2016    Procedure: RELEASE TRIGGER FINGER RIGHT THUMB;  Surgeon: Guero Restrepo MD;  Location: [facility];  Service: Orthopedics    TONSILLECTOMY AND ADENOIDECTOMY       Social History   Social History     Substance and Sexual Activity   Alcohol Use Not Currently    Alcohol/week: 0.0 standard drinks of alcohol    Comment: last was in 2010 after DUI     Social  History     Substance and Sexual Activity   Drug Use No     Social History     Tobacco Use   Smoking Status Every Day    Current packs/day: 1.00    Average packs/day: 1 pack/day for 41.8 years (41.8 ttl pk-yrs)    Types: Cigarettes    Start date: 1/1/1983   Smokeless Tobacco Never   Tobacco Comments    20 + years     Family History:   Family History   Problem Relation Age of Onset    Diabetes Mother         type 2    Heart attack Mother 39        acute MI    Kidney disease Mother         CKD NKF classfication    Depression Mother     Arthritis Mother     Substance Abuse Mother         mother OD in past on MS04    Ulcerative colitis Mother     Schizophrenia Mother     Suicide Attempts Mother     Bipolar disorder Mother     Diabetes Maternal Grandmother     Heart attack Father         acute MI    Psoriasis Father     Cancer Father         gastric cancer    Stroke Father     Crohn's disease Sister     Bipolar disorder Sister     Rheum arthritis Maternal Grandfather     Throat cancer Maternal Grandfather     Suicide Attempts Daughter     Drug abuse Daughter     Lung cancer Paternal Grandmother     Cancer Paternal Grandfather     No Known Problems Sister     Bipolar disorder Maternal Uncle     Anesthesia problems Neg Hx        Meds/Allergies   Current Outpatient Medications   Medication Sig Dispense Refill    Albuterol Sulfate (ProAir RespiClick) 108 (90 Base) MCG/ACT AEPB Inhale 2 puffs every 6 (six) hours as needed (wheezing) 1 each 1    atorvastatin (LIPITOR) 80 mg tablet TAKE 1 TABLET BY MOUTH EVERY DAY 30 tablet 5    BD PEN NEEDLE LINDY U/F 32G X 4 MM MISC Inject under the skin daily 30 each 5    Blood Glucose Monitoring Suppl (OneTouch Verio Reflect) w/Device KIT CHECK BLOOD SUGARS FOUR TIMES DAILY. PLEASE SUBSTITUTE WITH APPROPRIATE ALTERNATIVE AS COVERED BY PATIENT'S INSURANCE. DX: E11.65 1 kit 0    butalbital-acetaminophen-caffeine (FIORICET,ESGIC) -40 mg per tablet TAKE 1 TABLET EVERY 6 HOURS AS NEEDED FOR  "MIGRAINE.  Please limit 3-4 per week, 15 per month. 15 tablet 0    butalbital-aspirin-caffeine (FIORINAL) -40 mg capsule Take 1 capsule by mouth every 6 (six) hours as needed for headaches or migraine . Please limit 3-4 per week, 15 per month. 15 capsule 0    cholestyramine (QUESTRAN) 4 g packet Take 1 packet by mouth 3 (three) times a day with meals      dicyclomine (BENTYL) 10 mg capsule Take 1 capsule (10 mg total) by mouth 4 (four) times a day as needed (abdominal pain or diarrhea) 120 capsule 11    DULoxetine (CYMBALTA) 60 mg delayed release capsule Take 1 capsule (60 mg total) by mouth 2 (two) times a day 180 capsule 1    Fluticasone-Salmeterol (Advair Diskus) 500-50 mcg/dose inhaler Inhale 1 puff by mouth 2 times daily.  Rinse mouth after use 180 blister 1    gabapentin (NEURONTIN) 400 mg capsule TAKE 2 CAPSULES BY MOUTH 3 TIMES A  capsule 5    insulin glargine (LANTUS) 100 units/mL subcutaneous injection Inject 40 units at bedtime      insulin lispro (HumALOG/ADMELOG) 100 units/mL injection Inject 8 units before breakfast, 10 units before lunch and 14 units before dinner +scale ( hold if blood sugar is less than 90 )      Insulin Pen Needle (BD Pen Needle Kimberli U/F) 32G X 4 MM MISC Use 2 (two) times a day 200 each 3    Insulin Syringe-Needle U-100 31G X 5/16\" 0.3 ML MISC Use twice daily 60 each 5    levothyroxine 112 mcg tablet TAKE 1 TABLET BY MOUTH EVERY DAY 90 tablet 1    Lidocaine 4 % PTCH Apply topically      mirtazapine (REMERON) 7.5 MG tablet Take 1 tablet (7.5 mg total) by mouth daily at bedtime 90 tablet 1    nicotine (NICODERM CQ) 21 mg/24 hr TD 24 hr patch Place 1 patch on the skin over 24 hours daily 28 patch 0    OneTouch Delica Lancets 33G MISC Check blood sugars four times daily. Please substitute with appropriate alternative as covered by patient's insurance. Dx: E11.65 400 each 3    OneTouch Ultra test strip USE AS DIRECTED TO TEST 3 TIMES A  strip 0    simethicone " "(MYLICON) 125 MG chewable tablet Chew 1 tablet (125 mg total) 3 (three) times a day as needed for flatulence 90 tablet 11    traMADol (ULTRAM-ER) 200 MG 24 hr tablet Take 1 tablet (200 mg total) by mouth daily as needed for moderate pain 10 tablet 0    aspirin 81 mg chewable tablet Chew 81 mg daily  (Patient not taking: Reported on 10/15/2024)      baclofen 5 MG TABS Take 5 mg by mouth 2 (two) times a day 60 tablet 0    cholestyramine sugar free (QUESTRAN LIGHT) 4 g packet Take 1 packet (4 g total) by mouth 2 (two) times a day Hold for copnstipation 60 packet 0    famotidine (PEPCID) 20 mg tablet Take 1 tablet (20 mg total) by mouth 2 (two) times a day 60 tablet 0    pancrelipase, Lip-Prot-Amyl, (CREON) 24,000 units Take 1 capsule (24,000 Units total) by mouth 3 (three) times a day with meals 90 capsule 0    prochlorperazine (COMPAZINE) 5 mg tablet Take 1 tablet (5 mg total) by mouth every 6 (six) hours as needed for nausea or vomiting (Patient not taking: Reported on 10/10/2024) 30 tablet 2     No current facility-administered medications for this visit.     Allergies   Allergen Reactions    Medical Tape Rash    Lexapro [Escitalopram Oxalate]     Escitalopram Other (See Comments) and Palpitations    Other Hives and Rash     Adhesive Tape       Objective   Vitals: Blood pressure 108/62, pulse (!) 112, height 5' 2\" (1.575 m), weight 53.1 kg (117 lb), last menstrual period 03/04/2016, SpO2 (!) 86%, not currently breastfeeding.    Physical Exam  Vitals reviewed.   Constitutional:       General: She is not in acute distress.     Appearance: Normal appearance. She is not ill-appearing.   HENT:      Head: Normocephalic and atraumatic.      Nose: Nose normal.   Eyes:      Extraocular Movements: Extraocular movements intact.      Conjunctiva/sclera: Conjunctivae normal.   Pulmonary:      Effort: No respiratory distress.   Musculoskeletal:      Cervical back: Normal range of motion.   Neurological:      General: No focal " deficit present.      Mental Status: She is alert and oriented to person, place, and time.   Psychiatric:         Mood and Affect: Mood normal.         Behavior: Behavior normal.         The history was obtained from the review of the chart, patient.    Lab Results:   Lab Results   Component Value Date/Time    Hemoglobin A1C 16.1 (H) 08/16/2024 04:11 AM    WBC 5.08 09/13/2024 04:55 AM    WBC 8.29 09/11/2024 04:39 AM    WBC 8.19 09/09/2024 10:39 AM    Hemoglobin 10.7 (L) 09/13/2024 04:55 AM    Hemoglobin 9.6 (L) 09/11/2024 04:39 AM    Hemoglobin 10.6 (L) 09/09/2024 10:39 AM    Hematocrit 32.9 (L) 09/13/2024 04:55 AM    Hematocrit 29.8 (L) 09/11/2024 04:39 AM    Hematocrit 32.6 (L) 09/09/2024 10:39 AM     (H) 09/13/2024 04:55 AM     (H) 09/11/2024 04:39 AM     (H) 09/09/2024 10:39 AM    Platelets 270 09/13/2024 04:55 AM    Platelets 243 09/11/2024 04:39 AM    Platelets 261 09/09/2024 10:39 AM    BUN 18 09/23/2024 04:27 AM    BUN 20 09/13/2024 04:55 AM    BUN 16 09/11/2024 04:39 AM    BUN 15 09/10/2024 05:41 AM    BUN 15 01/15/2024 02:16 PM    Potassium 4.4 09/23/2024 04:27 AM    Potassium 4.2 09/13/2024 04:55 AM    Potassium 4.0 09/11/2024 04:39 AM    Potassium 4.0 09/10/2024 05:41 AM    Potassium 4.6 01/15/2024 02:16 PM    Chloride 102 09/23/2024 04:27 AM    Chloride 107 09/13/2024 04:55 AM    Chloride 107 09/11/2024 04:39 AM    Chloride 110 (H) 09/10/2024 05:41 AM    Chloride 92 (L) 01/15/2024 02:16 PM    Carbon Dioxide 30 09/23/2024 04:27 AM    Carbon Dioxide 23 01/15/2024 02:16 PM    CO2 30 09/13/2024 04:55 AM    CO2 27 09/11/2024 04:39 AM    CO2 25 09/10/2024 05:41 AM    Creatinine 0.48 09/23/2024 04:27 AM    Creatinine 0.55 (L) 09/13/2024 04:55 AM    Creatinine 0.47 (L) 09/11/2024 04:39 AM    Creatinine 0.46 (L) 09/10/2024 05:41 AM    Creatinine 0.69 01/15/2024 02:16 PM    AST 57 (H) 09/04/2024 06:03 AM    AST 42 (H) 09/01/2024 05:25 AM    AST 45 (H) 08/24/2024 07:46 AM    AST 17 01/15/2024  "02:16 PM    ALT 97 (H) 09/04/2024 06:03 AM    ALT 59 (H) 09/01/2024 05:25 AM    ALT 43 08/24/2024 07:46 AM    ALT 19 01/15/2024 02:16 PM    Total Protein 5.9 (L) 09/04/2024 06:03 AM    Total Protein 5.9 (L) 09/01/2024 05:25 AM    Total Protein 4.4 (L) 08/24/2024 07:46 AM    Protein, Total 6.7 01/15/2024 02:16 PM    Albumin 3.7 09/04/2024 06:03 AM    Albumin 3.6 09/01/2024 05:25 AM    Albumin 2.7 (L) 08/24/2024 07:46 AM    ALBUMIN 4.3 01/15/2024 02:16 PM           Imaging Studies: Results Review Statement: No pertinent imaging studies reviewed.    Portions of the record may have been created with voice recognition software. Occasional wrong word or \"sound a like\" substitutions may have occurred due to the inherent limitations of voice recognition software. Read the chart carefully and recognize, using context, where substitutions have occurred.    "
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
## 2024-11-05 NOTE — PATIENT INSTRUCTIONS
Inject Humalog 8 units before breakfast, 10 units before lunch and 12 units before dinner +scale (  Scale - 150-200 -2 units, 201-250-4 units, 251-300 -6 units, 301-350-8 units, > 350- 10 units )   Inject 40 units at bedtime       Hypoglycemia instructions   Barb Palomo  11/5/2024  [id number]    Low Blood Sugar    Steps to treat low blood sugar.    1. Test blood sugar if you have symptoms of low blood sugar:   Low Blood Sugar Symptoms:  o Sweaty  o Dizzy  o Rapid heartbeat  o Shaky    o Bad mood  o Hungry      2. Treat blood sugar less than 70 with 15 grams of fast-acting carbohydrate:   Examples of 15 grams Fast-Acting Carbohydrate:  o 4 oz juice  o 4 oz regular soda  o 3-4 glucose tablets (chew)  o 3-4 hard candies (chew)              3.   Wait 15 minutes and test your blood sugar again           4. If blood sugar is less than 100, repeat steps 2-3.      5. When your blood sugar is 100 or more, eat a snack if it will be longer than one hour until your next meal. The snack should be 15 grams of carbohydrate and a protein:   Examples of snacks:  o ½ sandwich  o 6 crackers with cheese  o Piece of fruit with cheese or peanut butter  o 6 crackers with peanut butter    Atypical Chest pain / L arm pain today - check labs, xr, dimer, outpt fu

## 2024-11-07 PROBLEM — M25.571 ACUTE RIGHT ANKLE PAIN: Status: ACTIVE | Noted: 2024-11-07

## 2024-11-07 NOTE — PROGRESS NOTES
[facility]  [address], [address], [place] [address]  [phone]    NAME: Barb Palomo  AGE: 54 y.o. SEX: female    Progress Note    Location: [facility]  POS: 31 ([facility])    Assessment/Plan:    Uncontrolled type 2 diabetes mellitus with hyperglycemia (HCC)  HbA1C: 16.1 (H) 08/16/2024  Goal: < 5.7%  FBG range (9/24/2024 to 10/2/2024) = 76 to 305  Pre-lunch BG range (9/24/2024 to 9/30/2024) = 106 to 349  Pre-dinner BG range (9/24/2024 to 9/30/2024) = 98 to 487  Continue the following meds:  - Insulin Glargine 32 units daily at HS (increased on 10/8/2024)  - Humalog 10 units TID with meals - HOLD for BG  < 120  Per nursing, patient meal intake is variable and not consistent with recommended diet.  Discussed about improving snack/drinks/ meal choices and intake  Encouraged oral fluids (water) and less carbonated/ high glycemic drinks and snacks - patient acknowledged understanding  Continue Carbohydrate controlled diet     Severe episode of recurrent major depressive disorder, without psychotic features (HCC)  No behavioral disturbance in SNF  Patient with less paranoid behaviors now that she is shared room in SNF  Continue the following meds: Cymbalta 60mg BID / PRN Diazepam 5mg every 12 hours  Consult Behavioral Health CRNP as needed.     Chronic pain disorder  Including intermittent migraine  Continue the following meds:  - Fiorinal PRN  - Tramadol ER 200mg daily PRN  - Tylenol 650mg Q6 hours PRN  - Baclofen 5mg BID  - Gabapentin 800mg TID  Nursing to continue to assess for pain Q shift while awake     Pancreatic insufficiency  With Chronic intermittent diarrhea  Patient denies any diarreah on the last 3 days but report of bloating and cramping.  Continue Juan J HINKLE  [id number] UNIT: 1 tablet TID    Right ankle pain  Patient reported pain onset was 2 days ago  (+) erythema/ swelling to foot and ankles/ tenderness on ROM  Patient denies any trauma  Continue RICE  XRAY to Right ankle today  Labs:  Uric acid level, CBC with diff, CMP on 10/10/2024  ACE wrap to Right ankle/ foot       Chief complaint / Reason for visit:  Follow- visit    History of Present Illness:  This is a 54-year-old female patient currently admitted at [facility] (9/16/2024 to present) for skilled nursing and rehabilitation services. Patient is seen and examined today to follow-up for any acute and chronic medical conditions.     Patient is in bed for this visit -alert, cooperative, calm, very pleasant and not in distress. Patient is verbally engaged with clear coherent speech -oriented to name/birthday/current date/place. Patient reported pain to Right ankle that started about 2 days ago. Patient denies any trauma/fall.  Patient reported that she woke up 1 morning and her ankle is bothering her.  On assessment, (+) right ankle swelling, erythema, foot swelling and tenderness on assessment.  Discussed about applying ACE wrap, cold compress and XRAY today - patient agreeable. Patient denies any other acute medical concerns during ROS assessment including pain. Nursing, patient reported right ankle pain and later to this provider yesterday - with initial order to start RICE. Nursing is not aware of any known or reported injury during therapy or ambulation documented or reported by prior shifts.  Nursing has started cool compress. Otherwise no acute medical concerns with this visit.    Nursing and prior provider notes reviewed on this visit. Discussed visit with PCP and nursing staff/ supervisor.    Review of Systems:  Per history of present illness, all other systems reviewed and negative.    HISTORY:  Medical Hx: Reviewed, unchanged  Family Hx: Reviewed, unchanged  Soc Hx: Reviewed,  unchanged    ALLERGY: Reviewed, unchanged.   Allergies   Allergen Reactions    Medical Tape Rash    Lexapro [Escitalopram Oxalate]     Escitalopram Other (See Comments) and Palpitations    Other Hives and Rash     Adhesive Tape        PHYSICAL EXAM:  Vital  "Signs: T98.7F -P111 -R16 BP: 113/74 Spo2: 96% RA  Weight: 107.4 lbs (10/7/2024) <= 101.0 lbs (9/30/2024) <= 104.0 lbs (9/16/2024)    General: NAD. Well appearing. No acute distress. Frail stature  Head: Atraumatic. Normocephalic.  Eye Exam: anicteric sclera, no discharge, PERRLA, No injection  Oral Exam: moist mucous membranes, no buccaloropharyngeal erythema, palatine tonsils WNL.  Neck Exam: no anterior cervical lymphadenopathy noted, neck supple. Trachea midline, no carotid bruit, no masses  Cardiovascular: regular rate, regular rhythm, no: murmurs/ rubs/ gallops. S1 and S2 appreciated.  Pulmonary: no wheeze, no rhonchi, no rales. No chest tenderness. Normal chest wall expansion  Abdominal: soft, non-tender, nondistended, bowel sounds audible x 4 quadrants. No palpable hepatosplenomegaly, no tympany  : Non distended bladder.   Extremities and skin: no edema noted, no rashes. Intact skin. Right ankle (+) swelling and erythema.  Neurological: alert, cooperative and responsive, Oriented x 3. No tics, normal sensation to pressure and light touch.  moving all 4 extremities symmetrically.    Laboratory / Imaging results reviewed.    Current Medications: All medications reviewed and updated in Nursing Home eMAR.    Please note: This note was completed in part utilizing a voice-recognition software may have been used in the preparation of this document. Grammatical errors, random word insertion, spelling mistakes, and incomplete sentences may be an occasional consequence of the system secondary to software limitations, ambient noise and hardware issues. Occasional wrong word or \"sound-alike\" substitutions may have occurred due to the inherent limitations of voice recognition software. At the time of dictation, efforts were made to edit, clarify and/or correct errors. Interpretation should be guided by context. Please read the chart carefully and recognize, using context, where substitutions have occurred. If you have " any questions or concerns about the context, text or information contained within the body of this dictation, please contact myself, the provider, for further clarification.      ELISEO Levine  11/7/2024

## 2024-11-08 DIAGNOSIS — M13.0 POLYARTHRITIS, UNSPECIFIED: ICD-10-CM

## 2024-11-08 DIAGNOSIS — G43.709 CHRONIC MIGRAINE WITHOUT AURA WITHOUT STATUS MIGRAINOSUS, NOT INTRACTABLE: ICD-10-CM

## 2024-11-08 DIAGNOSIS — M54.40 LOW BACK PAIN WITH SCIATICA, SCIATICA LATERALITY UNSPECIFIED, UNSPECIFIED BACK PAIN LATERALITY, UNSPECIFIED CHRONICITY: ICD-10-CM

## 2024-11-08 RX ORDER — TRAMADOL HYDROCHLORIDE 200 MG/1
200 TABLET, EXTENDED RELEASE ORAL DAILY PRN
Qty: 15 TABLET | Refills: 0 | Status: SHIPPED | OUTPATIENT
Start: 2024-11-08

## 2024-11-09 NOTE — PROGRESS NOTES
[facility]  [address], [address], [place] [address]  [phone]    NAME: Barb Palomo  AGE: 54 y.o. SEX: female    Progress Note    Location: [facility]  POS: 31 ([facility])    Assessment/Plan:    Uncontrolled type 2 diabetes mellitus with hyperglycemia (HCC)  HbA1C: 16.1 (H) 08/16/2024  Goal: < 5.7%  FBG range (10/3-16/2024) = 200 to 413/ 76 to 305  Pre-lunch BG range (10/3-16/2024) = 103 to 381/ 106 to 349  Pre-dinner BG range (10/5-16/2024) = 139 to 410/ 98 to 487  Continue the following meds:  - Insulin Glargine 32 units daily at HS (increased on 10/8/2024)  - Humalog 10 units TID with meals - HOLD for BG  < 120  Per nursing, patient meal intake is variable and not consistent with recommended diet.  Re-discussed about improving snack/drinks/ meal choices and intake  Re-discussed improving oral fluids (water) and less carbonated/ high glycemic drinks and snacks - patient acknowledged understanding  Continue Carbohydrate controlled diet  Followed by Endocrinology office as out-patient.  Labs on 10/17/2024: CBC with diff and BMP     Severe episode of recurrent major depressive disorder, without psychotic features (HCC)  No behavioral disturbance in SNF  Patient with less paranoid behaviors now that she is shared room in SNF  Continue the following meds: Cymbalta 60mg BID / PRN Diazepam 5mg every 12 hours  Scheduled to see [facility] Behavioral Health office on 10/17/2024.     Chronic pain disorder  Including intermittent migraine  Continue the following meds:  - Fiorinal PRN  - Tramadol ER 200mg daily PRN  - Tylenol 650mg Q6 hours PRN  - Baclofen 5mg BID  - Gabapentin 800mg TID  Nursing to continue to assess for pain Q shift while awake      Pancreatic insufficiency  With Chronic intermittent diarrhea  Patient denies any diarreah / loose stools on this visit  Discussed about avoiding dairy products.  Continue Juan J HINKLE  [id number] UNIT: 1 tablet TID  Seen for follow-up [facility] GI office today  "(telemedicine):  - Start Dicyclomine 10mg QID PRN  - Start Simethicone 125mg tablet TID PRN (bloating/ flatus)  - Recommended for Colon cancer screening: Colonoscopy in 6 months      Right ankle pain  Patient continues to report of ankle pain  Significantly improved (+) erythema/ swelling to foot and ankles/ tenderness on ROM on this visit.  Uric acid level (10/10/2024): 3.4  Repeat XRAY to Right ankle ([facility]: 10/11/2024)  - \"Mild osteoarthritic degenerative changes. There is mild diffuse bone demineralization. Unremarkable soft tissues\".  Patient denies any trauma  Continue ACE wrap to Right ankle/ foot  Consult Physiatry  Labs on 10/17/2024: CBC with diff and BMP    Chief complaint / Reason for visit:  Follow- visit    History of Present Illness:  This is a 54-year-old female patient currently admitted at [facility] (9/16/2024 to present) for skilled nursing and rehabilitation services. Patient is seen and examined today to follow-up for any acute and chronic medical conditions. Patient is in bed for this visit -alert, cooperative, calm, very pleasant and not in distress. Patient is verbally engaged with clear coherent speech -oriented to name/birthday/current date/place. Patient continues to report pain to Right ankle - currently with ACE wrap in place. Swellinghad reduced from last visit. Repeat XRAY at ER (10/10/2024) to Right ankle showed no fracture or soft tissue swelling. Uric acid is at normal levels. Otherwise, patient denies any other acute medical concerns during ROS assessment. Patient denies any diarrhea/ loose stools or constipation. Nursing has no acute medical concerns with this visit.    Nursing and prior provider notes reviewed on this visit. Discussed visit with PCP and nursing staff/ supervisor.    Review of Systems:  Per history of present illness, all other systems reviewed and negative.    HISTORY:  Medical Hx: Reviewed, unchanged  Family Hx: Reviewed, unchanged  Soc Hx: Reviewed,  " "unchanged    ALLERGY: Reviewed, unchanged.   Allergies   Allergen Reactions    Medical Tape Rash    Lexapro [Escitalopram Oxalate]     Escitalopram Other (See Comments) and Palpitations    Other Hives and Rash     Adhesive Tape        PHYSICAL EXAM:  Vital Signs: T98.8F -P112 -R16 BP: 114/81 SpO2: 98% RA  Weight: 102.4 lbs (10/14/2024) <= 107.4 lbs (10/7/2024) <= 101.0 lbs (9/30/2024) <= 104.0 lbs (9/16/2024)    General: NAD. Well appearing. No acute distress. Frail stature  Head: Atraumatic. Normocephalic.  Eye Exam: anicteric sclera, no discharge, PERRLA, No injection  Oral Exam: moist mucous membranes, no buccaloropharyngeal erythema, palatine tonsils WNL.  Neck Exam: no anterior cervical lymphadenopathy noted, neck supple. Trachea midline, no carotid bruit, no masses  Cardiovascular: elevated HR, regular rhythm, no: murmurs/ rubs/ gallops. S1 and S2 appreciated.  Pulmonary: no wheeze, no rhonchi, no rales. No chest tenderness. Normal chest wall expansion  Abdominal: soft, non-tender, nondistended, bowel sounds audible x 4 quadrants. No palpable hepatosplenomegaly, no tympany  : Non distended bladder.   Extremities and skin: no edema noted, no rashes. Intact skin. Right ankle (+) swelling and erythema.  Neurological: alert, cooperative and responsive, Oriented x 3. No tics, normal sensation to pressure and light touch.  moving all 4 extremities symmetrically.    Laboratory / Imaging results reviewed. Last 10/10/2024    Current Medications: All medications reviewed and updated in Nursing Home eMAR.    Please note: This note was completed in part utilizing a voice-recognition software may have been used in the preparation of this document. Grammatical errors, random word insertion, spelling mistakes, and incomplete sentences may be an occasional consequence of the system secondary to software limitations, ambient noise and hardware issues. Occasional wrong word or \"sound-alike\" substitutions may have occurred due " to the inherent limitations of voice recognition software. At the time of dictation, efforts were made to edit, clarify and/or correct errors. Interpretation should be guided by context. Please read the chart carefully and recognize, using context, where substitutions have occurred. If you have any questions or concerns about the context, text or information contained within the body of this dictation, please contact myself, the provider, for further clarification.      ELISEO Levine  11/9/2024

## 2024-11-09 NOTE — PROGRESS NOTES
[facility]  [address], [place] [address]  [phone]    NAME: Barb Palomo  AGE: 54 y.o. SEX: female    Progress Note    Location: [facility]  POS: 31 (SNF)    Assessment/Plan:    Uncontrolled type 2 diabetes mellitus with hyperglycemia (HCC)  HbA1C: 16.1 (H) 08/16/2024  Goal: < 5.7%  Mildly improved BG readings  FBG range (10/17-21/2024) = 208 to 312  Pre-lunch BG range (10/17-21/2024) = 110 to 231   Pre-dinner BG range (10/17-21/2024) = 139 to 383  Continue the following meds:  - Insulin Glargine 32 units daily at HS (increased on 10/8/2024)  - Humalog 10 units TID with meals - HOLD for BG  < 120  Per nursing, patient meal intake is variable and not consistent with recommended diet.  Re-discussed about improving snack/drinks/ meal choices and intake  Re-discussed improving oral fluids (water) and less carbonated/ high glycemic drinks and snacks - patient acknowledged understanding  Continue Carbohydrate controlled diet  Renal function (10/17/2024): Crea: 0.43/ BUN: 19/ eGFR: 116     Severe episode of recurrent major depressive disorder, without psychotic features (HCC)  No behavioral disturbance in SNF  Stable.   Have intermittent period of emotional lability: cries easily - sad that she is still in SNF and not at home  Seen by [facility] behavioral Health Office on 10/17/2024:  - ordered: Risperidone 0.5mg BID  - Mirtazapine 7.5mg daily at HS  Continue the following meds: Cymbalta 60mg BID / PRN Diazepam 5mg every 12 hours     Chronic pain disorder  Including intermittent migraine  Continue the following meds:  - Fiorinal PRN  - Tramadol ER 200mg daily PRN  - Tylenol 650mg Q6 hours PRN  - Baclofen 5mg BID  - Gabapentin 800mg TID  Nursing to continue to assess for pain Q shift while awake      Pancreatic insufficiency  With Chronic intermittent diarrhea  Patient denies any diarreah / loose stools / bloating on this visit  Continue to avoid dairy products.  Continue the following  meds:  - Juan J HINKLE  [id number] UNIT: 1 tablet TID  - Dicyclomine 10mg QID PRN  - Simethicone 125mg tablet TID PRN (bloating/ flatus)  - Cholestyramine 4G BID (loose stools)  Renal function (10/17/2024): Crea: 0.43/ BUN: 19/ eGFR: 116  Hbg/Hct (10/17/2024): 10.5/ 30.4 (L). RBC: 3.23 (L)  - indices WNL (10/17/2024)    Right ankle pain  Patient continues to report of ankle pain  Continue ACE wrap to Right ankle/ foot  Followed and managed by Physiatry in SNF.    Migraine, unspecified  Patient reported daily headaches   Per nursing, patient request for tramadol ER 200mg daily PRN  Continue Fiorinal Q6 hours PRN (limit: 3-4 x / week)  Followed by [facility] office as out-patient. Next appointment: 10/22/2024      Chief complaint / Reason for visit:  Follow- visit    History of Present Illness:  This is a 54-year-old female patient currently admitted at [facility] (9/16/2024 to present) for skilled nursing and rehabilitation services. Patient is seen and examined today to follow-up for any acute and chronic medical conditions. Patient is in bed for this visit -alert, cooperative, calm, very pleasant and not in distress. Patient is verbally engaged with clear coherent speech -oriented to name/birthday/current date/place. Patient acknowledged feeling well on this visit - pain to Right ankle is much improved - currently with ACE wrap in place. Otherwise, patient denies any other acute medical concerns during ROS assessment. Patient continues to deny any diarrhea/ loose stools or constipation. Nursing has no acute medical concerns with this visit.    Nursing and prior provider notes reviewed on this visit. Discussed visit with PCP and nursing staff/ supervisor.    Review of Systems:  Per history of present illness, all other systems reviewed and negative.    HISTORY:  Medical Hx: Reviewed, unchanged  Family Hx: Reviewed, unchanged  Soc Hx: Reviewed,  unchanged    ALLERGY: Reviewed, unchanged.   Allergies   Allergen Reactions  "   Medical Tape Rash    Lexapro [Escitalopram Oxalate]     Escitalopram Other (See Comments) and Palpitations    Other Hives and Rash     Adhesive Tape        PHYSICAL EXAM:  Vital Signs: T99.7F -P116 -R16 BP: 114/81 (10/16/2024) SpO2: 98% RA  Weight: 102.4 lbs (10/14/2024) <= 107.4 lbs (10/7/2024) <= 101.0 lbs (9/30/2024) <= 104.0 lbs (9/16/2024)    General: NAD. Well appearing. No acute distress. Frail stature  Head: Atraumatic. Normocephalic.  Eye Exam: anicteric sclera, no discharge, PERRLA, No injection  Oral Exam: moist mucous membranes, no buccaloropharyngeal erythema, palatine tonsils WNL.  Neck Exam: no anterior cervical lymphadenopathy noted, neck supple. Trachea midline, no carotid bruit, no masses  Cardiovascular: elevated HR, regular rhythm, no: murmurs/ rubs/ gallops. S1 and S2 appreciated.  Pulmonary: no wheeze, no rhonchi, no rales. No chest tenderness. Normal chest wall expansion  Abdominal: soft, non-tender, nondistended, bowel sounds audible x 4 quadrants. No palpable hepatosplenomegaly, no tympany  : Non distended bladder.   Extremities and skin: no edema noted, no rashes. Intact skin. Right ankle (+) tenderness to ROM but no swelling and erythema.  Neurological: alert, cooperative and responsive, Oriented x 3. No tics, normal sensation to pressure and light touch.  moving all 4 extremities symmetrically.    Laboratory / Imaging results reviewed.    Current Medications: All medications reviewed and updated in Nursing Home eMAR.    Please note: This note was completed in part utilizing a voice-recognition software may have been used in the preparation of this document. Grammatical errors, random word insertion, spelling mistakes, and incomplete sentences may be an occasional consequence of the system secondary to software limitations, ambient noise and hardware issues. Occasional wrong word or \"sound-alike\" substitutions may have occurred due to the inherent limitations of voice recognition " software. At the time of dictation, efforts were made to edit, clarify and/or correct errors. Interpretation should be guided by context. Please read the chart carefully and recognize, using context, where substitutions have occurred. If you have any questions or concerns about the context, text or information contained within the body of this dictation, please contact myself, the provider, for further clarification.      ELISEO Levine  11/9/2024

## 2024-11-11 ENCOUNTER — NURSING HOME VISIT (OUTPATIENT)
Dept: GERIATRICS | Facility: OTHER | Age: 54
End: 2024-11-11
Payer: MEDICARE

## 2024-11-11 DIAGNOSIS — R00.0 SINUS TACHYCARDIA: ICD-10-CM

## 2024-11-11 DIAGNOSIS — K86.89 PANCREATIC INSUFFICIENCY: ICD-10-CM

## 2024-11-11 DIAGNOSIS — E11.69 TYPE 2 DIABETES MELLITUS WITH OTHER SPECIFIED COMPLICATION, WITH LONG-TERM CURRENT USE OF INSULIN (HCC): Primary | ICD-10-CM

## 2024-11-11 DIAGNOSIS — F31.81 BIPOLAR II DISORDER (HCC): ICD-10-CM

## 2024-11-11 DIAGNOSIS — Z79.4 TYPE 2 DIABETES MELLITUS WITH OTHER SPECIFIED COMPLICATION, WITH LONG-TERM CURRENT USE OF INSULIN (HCC): Primary | ICD-10-CM

## 2024-11-11 DIAGNOSIS — G89.4 CHRONIC PAIN DISORDER: ICD-10-CM

## 2024-11-11 DIAGNOSIS — M25.571 ACUTE RIGHT ANKLE PAIN: ICD-10-CM

## 2024-11-11 PROCEDURE — 99309 SBSQ NF CARE MODERATE MDM 30: CPT | Performed by: NURSE PRACTITIONER

## 2024-11-13 NOTE — PROGRESS NOTES
[facility]  [address], [place] [address]  [phone]    NAME: Barb Palomo  AGE: 54 y.o. SEX: female    Progress Note    Location: [facility]  POS: 31 ([facility])    Assessment/Plan:    Uncontrolled type 2 diabetes mellitus with hyperglycemia (HCC)  HbA1C: 16.1 (H) 08/16/2024  Goal: < 5.7%  Mildly improved BG readings  FBG range (10/22-28/2024) = 169 to 452  Pre-lunch BG range (10/22-28/2024) = 81 to 345   Pre-dinner BG range (10/22-28/2024) = 182 to 339  Inconsistent adherence to diabetic diet and meal completion  Continue the following meds:  - Insulin Glargine 32 units daily at HS (increased on 10/8/2024)  - Humalog 10 units TID with meals - HOLD for BG  < 120  Re-discussed about improving snack/drinks/ meal choices and intake  Re-discussed improving oral fluids (water) and less carbonated/ high glycemic drinks and snacks - patient acknowledged understanding  Continue Carbohydrate controlled diet  Renal function (10/28/2024): Crea: 0.46/ BUN: 16/ eGFR: 114  K level (10/28/2024): 3.9/ Na level: 142     Severe episode of recurrent major depressive disorder, without psychotic features (HCC)  No behavioral disturbance in SNF  Have intermittent period of emotional lability: cries easily - sad that she is still in SNF and not at home  Followed and managed by [facility] behavioral Health Office on 10/17/2024.  Continue the following meds:  - Cymbalta 60mg BID  - Risperidone 0.5mg BID  - Mirtazapine 7.5mg daily at HS  PRN Diazepam 5mg every 12 hours discontinued on 10/16/2024 due to incident of fall in bathroom: dizziness/ vertigo ( 2 hours after taking PRN Diazepam) suspicious for additive effect with other sedating medications.     Chronic pain disorder  Continue the following meds:  - Tramadol ER 200mg daily PRN  - Tylenol 650mg Q6 hours PRN  - Baclofen 5mg BID  - Gabapentin 800mg TID  Nursing to continue to assess for pain Q shift while awake      Pancreatic insufficiency  With Chronic  intermittent diarrhea  Patient denies any diarreah / loose stools / bloating on this visit  Continue to avoid dairy products. May have lactose free dairy.  Continue the following meds:  - Juan J HINKLE  [id number] UNIT: 1 tablet TID  - Cholestyramine 4G BID (loose stools)  - Dicyclomine 10mg QID PRN  - Simethicone 125mg tablet TID PRN (bloating/ flatus)     Right ankle pain  Patient continues to report of ankle pain  Continue ACE wrap to Right ankle/ foot  Start Lidocaine 4% patch daily  Followed and managed by Physiatry in SNF.     Migraine, unspecified  Patient reported daily headaches   Per nursing, patient request for Tramadol ER 200mg daily PRN  Continue Fiorinal Q6 hours PRN (limit: 3-4 x / week)  Followed by [facility] Neuro office as out-patient.  Last seen on 10/22/2024. Received Botox injection.  - Next appointment: in 90 days      Chief complaint / Reason for visit:  Follow- visit    History of Present Illness:  This is a 54-year-old female patient currently admitted at [facility] (9/16/2024 to present) for skilled nursing and rehabilitation services. Patient is seen and examined today to follow-up for any acute and chronic medical conditions. Patient is in bed for this visit -alert, cooperative, calm, very pleasant and not in distress. Patient is verbally engaged with clear coherent speech -oriented x 4. Patient acknowledged feeling well on this visit. Patient continues to report of pain to Right ankle - minimal erythema and swelling seen - requested to be seen by Orthopedic office - okayed. Encouraged to continue ACE wrap. Patient reported that she always have fast HR - asymptomatic. Otherwise, patient denies any other acute medical concerns during ROS assessment. Patient continues to deny any diarrhea/ loose stools or constipation. Nursing has no acute medical concerns with this visit.    Nursing and prior provider notes reviewed on this visit. Discussed visit with PCP and nursing staff/ supervisor.    Review of  Systems:  Per history of present illness, all other systems reviewed and negative.    HISTORY:  Medical Hx: Reviewed, unchanged  Family Hx: Reviewed, unchanged  Soc Hx: Reviewed,  unchanged    ALLERGY: Reviewed, unchanged.   Allergies   Allergen Reactions    Medical Tape Rash    Lexapro [Escitalopram Oxalate]     Escitalopram Other (See Comments) and Palpitations    Other Hives and Rash     Adhesive Tape        PHYSICAL EXAM:  Vital Signs: T97.6F -P103 -R16 BP: 114/81 (10/16/2024) SpO2: 98% RA  Weight: 102.4 lbs (10/14/2024) <= 104.0 lbs (9/16/2024)    General: NAD. Well appearing. No acute distress. Frail stature  Head: Atraumatic. Normocephalic.  Eye Exam: anicteric sclera, no discharge, PERRLA, No injection  Oral Exam: moist mucous membranes, no buccaloropharyngeal erythema, palatine tonsils WNL.  Neck Exam: no anterior cervical lymphadenopathy noted, neck supple. Trachea midline, no carotid bruit, no masses  Cardiovascular: elevated HR, irregular rhythm, no: murmurs/ rubs/ gallops. S1 and S2 appreciated.  Pulmonary: no wheeze, no rhonchi, no rales. No chest tenderness. Normal chest wall expansion  Abdominal: soft, non-tender, nondistended, bowel sounds audible x 4 quadrants. No palpable hepatosplenomegaly, no tympany  : Non distended bladder.   Extremities and skin: no edema noted, no rashes. Intact skin. Right ankle (+) tenderness to ROM but minimal to no swelling and erythema.  Neurological: alert, cooperative and responsive, Oriented x 3. No tics, normal sensation to pressure and light touch.  moving all 4 extremities symmetrically.    Laboratory / Imaging results reviewed.    Current Medications: All medications reviewed and updated in Nursing Home eMAR.    Please note: This note was completed in part utilizing a voice-recognition software may have been used in the preparation of this document. Grammatical errors, random word insertion, spelling mistakes, and incomplete sentences may be an occasional  "consequence of the system secondary to software limitations, ambient noise and hardware issues. Occasional wrong word or \"sound-alike\" substitutions may have occurred due to the inherent limitations of voice recognition software. At the time of dictation, efforts were made to edit, clarify and/or correct errors. Interpretation should be guided by context. Please read the chart carefully and recognize, using context, where substitutions have occurred. If you have any questions or concerns about the context, text or information contained within the body of this dictation, please contact myself, the provider, for further clarification.      ELISEO Levine  11/13/2024  "

## 2024-11-15 ENCOUNTER — TELEPHONE (OUTPATIENT)
Age: 54
End: 2024-11-15

## 2024-11-15 ENCOUNTER — TELEMEDICINE (OUTPATIENT)
Dept: PSYCHIATRY | Facility: CLINIC | Age: 54
End: 2024-11-15

## 2024-11-15 DIAGNOSIS — F33.2 SEVERE EPISODE OF RECURRENT MAJOR DEPRESSIVE DISORDER, WITHOUT PSYCHOTIC FEATURES (HCC): Primary | ICD-10-CM

## 2024-11-15 PROCEDURE — NC001 PR NO CHARGE

## 2024-11-15 NOTE — TELEPHONE ENCOUNTER
Patient is calling regarding cancelling an appointment.    Date/Time: 11/15/24 at 8 am    Reason: tech issues    Patient was rescheduled: YES [x] NO []  If yes, when was Patient reschedule for: 11/18/24 at 9:30 am    Patient requesting call back to reschedule: YES [] NO [x]

## 2024-11-15 NOTE — BH TREATMENT PLAN
TREATMENT PLAN (Medication Management Only)        [facility]    Name and Date of Birth:  Barb Palomo 54 y.o. 1970  Date of Treatment Plan: November 15, 2024  Diagnosis/Diagnoses:  MDD, unspecified psychosis  Strengths/Personal Resources for Self-Care: taking medications as prescribed, ability to adapt to life changes, ability to communicate needs, ability to communicate well, ability to listen, ability to reason, ability to understand psychiatric illness, average or above intelligence.  Area/Areas of need (in own words):   1. Long Term Goal: improve control of mood.  Target Date:6 months - 5/15/2025  Person/Persons responsible for completion of goal: Barb  2.  Short Term Objective (s) - How will we reach this goal?:   A. Provider new recommended medication/dosage changes and/or continue medication(s): continue current medications as prescribed.  B.  Maintain medication compliance .  C.  Attend appointments as scheduled .  Target Date:6 months - 5/15/2025  Person/Persons Responsible for Completion of Goal: Barb  Progress Towards Goals: continuing treatment  Treatment Modality: medication management every 4 weeks  Review due 180 days from date of this plan: 6 months - 5/15/2025  Expected length of service: ongoing treatment  My Physician/PA/NP and I have developed this plan together and I agree to work on the goals and objectives. I understand the treatment goals that were developed for my treatment.

## 2024-11-16 NOTE — PROGRESS NOTES
[facility]  [address], [address], [place] [address]  [phone]    NAME: Barb Palomo  AGE: 54 y.o. SEX: female    Progress Note    Location: [facility]  POS: 31 ([facility])    Assessment/Plan:    Uncontrolled type 2 diabetes mellitus with hyperglycemia (HCC)  HbA1C: 16.1 (H) 08/16/2024  Goal: < 5.7%  Mildly improved BG readings  FBG range (10/29/24 to 11/4/20242) = 85 to 350  Pre-lunch BG range (10/29/24 to 11/4/20242) = 76 to 272  Pre-dinner BG range (10/29/24 to 11/4/20242) = 114 to 348  Continues with inconsistent adherence to diabetic diet and meal completion  Continue the following meds:  - Insulin Glargine 32 units daily at HS  - Humalog 10 units TID with meals - HOLD for BG  < 120  Encouraged maintaining a consistent meal intake  Continue Carbohydrate controlled diet  Has appointment with Endocrinology office on 11/5/2024.     Bipolar Disorder (HCC)  No behavioral disturbance in SNF  Continues with intermittent period of emotional lability  - no reported paranoid behavior  - mostly keeps to room unless to get snacks or attends therapy session.  Continue the following meds:  - Cymbalta 60mg BID  - Risperidone 0.5mg BID  - Mirtazapine 7.5mg daily at HS  Continue Behavioral Health CRNP consult PRN     Chronic pain disorder  Continue the following meds:  - Tramadol ER 200mg daily PRN  - Tylenol 650mg Q6 hours PRN  - Baclofen 5mg BID  - Gabapentin 800mg TID  Nursing to continue to assess for pain Q shift while awake      Pancreatic insufficiency  With Chronic intermittent diarrhea  Patient denies any diarreah / loose stools / bloating on this visit  Continue to avoid dairy products. May have lactose free dairy.  Continue the following meds:  - Juan J HINKLE  [id number] UNIT: 1 tablet TID  - Cholestyramine 4G BID (loose stools)  - Dicyclomine 10mg QID PRN  - Simethicone 125mg tablet TID PRN (bloating/ flatus)     Right ankle pain  XR to Right ankle (10/10/2024) - showed mild osteoarthritic  degenerative change with mild none demineralization. Sofoft tissue unremarkable.  Patient continues to report of ankle pain - no change in ambulation  Continue ACE wrap to Right ankle/ foot  Continue Lidocaine 4% patch daily  Followed and managed by Physiatry in SNF.    Sinus tachycardia  Patient reported chronic tachycardia  Work-up in hospital: benign  Seen by in-patient Cardiologist:  - believed to be physiologic response  - Minipress discontinued due to hypotension  Not on BB or any anti-HTN medication  Patient continues to present with SBP < 100 in SNF  Patient continues to be asymptomatic - on assessment  HR range (9/16/2024 to 11/4/2024) = 72 to 116/min  BP range (9/16/2024 to 11/4/2024) = 60/40 to 132/78  ** 1  and higher on this period      Chief complaint / Reason for visit:  Follow- visit    History of Present Illness:  This is a 54-year-old female patient currently admitted at [facility] (9/16/2024 to present) for skilled nursing and rehabilitation services. Patient is seen and examined today to follow-up for any acute and chronic medical conditions. Patient is still in bed for this visit -alert, cooperative, calm, very pleasant and not in distress. Patient is verbally engaged with clear coherent speech -oriented x 4. Patient acknowledged feeling well on this visit. Patient continues to report of pain to Right ankle. Per nursing, patient had not reported any increase in pain - continues to be actively ambulatory. Otherwise, patient denies any other acute medical concerns during ROS assessment. Patient continues to deny any diarrhea/ loose stools or constipation. Nursing has no acute medical concerns with this visit    Nursing and prior provider notes reviewed on this visit. Discussed visit with PCP and nursing staff/ supervisor.    Review of Systems:  Per history of present illness, all other systems reviewed and negative.    HISTORY:  Medical Hx: Reviewed, unchanged  Family Hx: Reviewed,  "unchanged  Soc Hx: Reviewed,  unchanged    ALLERGY: Reviewed, unchanged.   Allergies   Allergen Reactions    Medical Tape Rash    Lexapro [Escitalopram Oxalate]     Escitalopram Other (See Comments) and Palpitations    Other Hives and Rash     Adhesive Tape        PHYSICAL EXAM:  Vital Signs: T98.6F -P107 -R16 BP: 98/86 SpO2: 96% RA  Weight: 102.4 lbs (10/14/2024) <= 104.0 lbs (9/16/2024)    General: NAD. Well appearing. No acute distress. Frail stature  Head: Atraumatic. Normocephalic.  Eye Exam: anicteric sclera, no discharge, PERRLA, No injection  Oral Exam: moist mucous membranes, no buccaloropharyngeal erythema, palatine tonsils WNL.  Neck Exam: no anterior cervical lymphadenopathy noted, neck supple. Trachea midline, no carotid bruit, no masses  Cardiovascular: elevated HR, irregular rhythm, no: murmurs/ rubs/ gallops. S1 and S2 appreciated.  Pulmonary: no wheeze, no rhonchi, no rales. No chest tenderness. Normal chest wall expansion  Abdominal: soft, non-tender, nondistended, bowel sounds audible x 4 quadrants. No palpable hepatosplenomegaly, no tympany  : Non distended bladder.   Extremities and skin: no edema noted, no rashes. Intact skin. Right ankle (+) tenderness to ROM but minimal to no swelling and erythema.  Neurological: alert, cooperative and responsive, Oriented x 3. No tics, normal sensation to pressure and light touch.  moving all 4 extremities symmetrically.    Laboratory / Imaging results reviewed.    Current Medications: All medications reviewed and updated in Nursing Home eMAR.    Please note: This note was completed in part utilizing a voice-recognition software may have been used in the preparation of this document. Grammatical errors, random word insertion, spelling mistakes, and incomplete sentences may be an occasional consequence of the system secondary to software limitations, ambient noise and hardware issues. Occasional wrong word or \"sound-alike\" substitutions may have occurred " due to the inherent limitations of voice recognition software. At the time of dictation, efforts were made to edit, clarify and/or correct errors. Interpretation should be guided by context. Please read the chart carefully and recognize, using context, where substitutions have occurred. If you have any questions or concerns about the context, text or information contained within the body of this dictation, please contact myself, the provider, for further clarification.      ELISEO Levine  11/16/2024

## 2024-11-18 ENCOUNTER — OFFICE VISIT (OUTPATIENT)
Dept: OBGYN CLINIC | Facility: MEDICAL CENTER | Age: 54
End: 2024-11-18
Payer: MEDICARE

## 2024-11-18 ENCOUNTER — NURSING HOME VISIT (OUTPATIENT)
Dept: GERIATRICS | Facility: OTHER | Age: 54
End: 2024-11-18
Payer: MEDICARE

## 2024-11-18 ENCOUNTER — TELEPHONE (OUTPATIENT)
Dept: OTHER | Facility: OTHER | Age: 54
End: 2024-11-18

## 2024-11-18 ENCOUNTER — APPOINTMENT (OUTPATIENT)
Dept: RADIOLOGY | Facility: MEDICAL CENTER | Age: 54
End: 2024-11-18
Payer: MEDICARE

## 2024-11-18 VITALS
WEIGHT: 117 LBS | DIASTOLIC BLOOD PRESSURE: 84 MMHG | HEART RATE: 76 BPM | BODY MASS INDEX: 21.53 KG/M2 | SYSTOLIC BLOOD PRESSURE: 132 MMHG | HEIGHT: 62 IN

## 2024-11-18 DIAGNOSIS — M25.551 PAIN IN RIGHT HIP: ICD-10-CM

## 2024-11-18 DIAGNOSIS — G89.29 CHRONIC BILATERAL LOW BACK PAIN, UNSPECIFIED WHETHER SCIATICA PRESENT: ICD-10-CM

## 2024-11-18 DIAGNOSIS — S82.409A FIBULA FRACTURE: ICD-10-CM
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
## 2024-11-18 DIAGNOSIS — M25.571 ACUTE RIGHT ANKLE PAIN: ICD-10-CM

## 2024-11-18 DIAGNOSIS — M25.571 ACUTE RIGHT ANKLE PAIN: Primary | ICD-10-CM

## 2024-11-18 DIAGNOSIS — M19.071 PRIMARY OSTEOARTHRITIS OF RIGHT ANKLE: ICD-10-CM

## 2024-11-18 DIAGNOSIS — G89.29 CHRONIC CERVICAL PAIN: ICD-10-CM

## 2024-11-18 DIAGNOSIS — M25.471 SWELLING OF ANKLE JOINT, RIGHT: ICD-10-CM

## 2024-11-18 DIAGNOSIS — Z79.4 TYPE 2 DIABETES MELLITUS WITH OTHER SPECIFIED COMPLICATION, WITH LONG-TERM CURRENT USE OF INSULIN (HCC): Primary | ICD-10-CM

## 2024-11-18 DIAGNOSIS — M54.2 CHRONIC CERVICAL PAIN: ICD-10-CM

## 2024-11-18 DIAGNOSIS — F31.81 BIPOLAR II DISORDER (HCC): ICD-10-CM

## 2024-11-18 DIAGNOSIS — E11.69 TYPE 2 DIABETES MELLITUS WITH OTHER SPECIFIED COMPLICATION, WITH LONG-TERM CURRENT USE OF INSULIN (HCC): Primary | ICD-10-CM

## 2024-11-18 DIAGNOSIS — K86.89 PANCREATIC INSUFFICIENCY: ICD-10-CM

## 2024-11-18 DIAGNOSIS — G89.4 CHRONIC PAIN DISORDER: ICD-10-CM

## 2024-11-18 DIAGNOSIS — M79.671 PAIN IN RIGHT FOOT: ICD-10-CM

## 2024-11-18 DIAGNOSIS — M54.50 CHRONIC BILATERAL LOW BACK PAIN, UNSPECIFIED WHETHER SCIATICA PRESENT: ICD-10-CM

## 2024-11-18 PROCEDURE — 99309 SBSQ NF CARE MODERATE MDM 30: CPT | Performed by: NURSE PRACTITIONER

## 2024-11-18 PROCEDURE — 99204 OFFICE O/P NEW MOD 45 MIN: CPT | Performed by: EMERGENCY MEDICINE

## 2024-11-18 PROCEDURE — 73610 X-RAY EXAM OF ANKLE: CPT

## 2024-11-18 PROCEDURE — 73502 X-RAY EXAM HIP UNI 2-3 VIEWS: CPT

## 2024-11-18 NOTE — PROGRESS NOTES
Assessment/Plan:    Diagnoses and all orders for this visit:    Acute right ankle pain  -     XR ankle 3+ vw right; Future  -     MRI ankle/heel right  wo contrast; Future  -     Cam Boot  -     Ambulatory Referral to Podiatry; Future    Fibula fracture  -     Ambulatory Referral to Orthopedic Surgery  -     MRI ankle/heel right  wo contrast; Future  -     Cam Boot  -     Ambulatory Referral to Podiatry; Future    Pain in right hip  -     XR hip/pelv 2-3 vws right if performed; Future    Fall, initial encounter  -     XR hip/pelv 2-3 vws right if performed; Future    Swelling of ankle joint, right  -     MRI ankle/heel right  wo contrast; Future  -     Cam Boot  -     Ambulatory Referral to Podiatry; Future    Pain in right foot  -     MRI ankle/heel right  wo contrast; Future  -     Cam Boot  -     Ambulatory Referral to Podiatry; Future    Chronic cervical pain  -     Ambulatory referral to Spine & Pain Management; Future    Chronic bilateral low back pain, unspecified whether sciatica present  -     Ambulatory referral to Spine & Pain Management; Future    Primary osteoarthritis of right ankle    Barb may WBAT in the CAM boot and using walker, may take boot off when relaxing/sleeping.  Referred for MRI Right ankle and to podiatry.  Also referred to Pain Management Dr. Goff who patient has seen before for her chronic low back pain.  Pain medicines per facility physicians or PCP.  Reviewed prior Xrays, obtained Xrays Right hip and ankle showing DJD but no acute findings.    Reviewed prior Pain Management notes, patient requesting referral for possible SIJ CSI    Return if symptoms worsen or fail to improve.      Subjective:   Patient ID: Barb Palomo is a 54 y.o. female.    ED visit 10/10/24  Resides at [facility]    NP hx b/l LE neuropathy presents for right ankle pain since beginning of October.  Her pain is lateral and in her arch and occurred while walking, Denies injury.  Noted swelling.     Xrays ankle were obtained and due to concerns for fracture evaluated in ED.      Recent injury at her facility right lateral hip pain states the grab bar gave way causing her to fall back onto the toilet occurring a few days ago      Review of Systems    The following portions of the patient's chart were reviewed and updated as appropriate:   Allergy:    Allergies   Allergen Reactions    Medical Tape Rash    Lexapro [Escitalopram Oxalate]     Escitalopram Other (See Comments) and Palpitations    Other Hives and Rash     Adhesive Tape       Medications:    Current Outpatient Medications:     Albuterol Sulfate (ProAir RespiClick) 108 (90 Base) MCG/ACT AEPB, Inhale 2 puffs every 6 (six) hours as needed (wheezing), Disp: 1 each, Rfl: 1    atorvastatin (LIPITOR) 80 mg tablet, TAKE 1 TABLET BY MOUTH EVERY DAY, Disp: 30 tablet, Rfl: 5    baclofen 5 MG TABS, Take 5 mg by mouth 2 (two) times a day, Disp: 60 tablet, Rfl: 0    BD PEN NEEDLE LINDY U/F 32G X 4 MM MISC, Inject under the skin daily, Disp: 30 each, Rfl: 5    Blood Glucose Monitoring Suppl (OneTouch Verio Reflect) w/Device KIT, CHECK BLOOD SUGARS FOUR TIMES DAILY. PLEASE SUBSTITUTE WITH APPROPRIATE ALTERNATIVE AS COVERED BY PATIENT'S INSURANCE. DX: E11.65, Disp: 1 kit, Rfl: 0    butalbital-acetaminophen-caffeine (FIORICET,ESGIC) -40 mg per tablet, TAKE 1 TABLET EVERY 6 HOURS AS NEEDED FOR MIGRAINE.  Please limit 3-4 per week, 15 per month., Disp: 15 tablet, Rfl: 0    butalbital-aspirin-caffeine (FIORINAL) -40 mg capsule, Take 1 capsule by mouth every 6 (six) hours as needed for headaches or migraine . Please limit 3-4 per week, 15 per month., Disp: 15 capsule, Rfl: 0    cholestyramine (QUESTRAN) 4 g packet, Take 1 packet by mouth 3 (three) times a day with meals, Disp: , Rfl:     cholestyramine sugar free (QUESTRAN LIGHT) 4 g packet, Take 1 packet (4 g total) by mouth 2 (two) times a day Hold for copnstipation, Disp: 60 packet, Rfl: 0    dicyclomine  "(BENTYL) 10 mg capsule, Take 1 capsule (10 mg total) by mouth 4 (four) times a day as needed (abdominal pain or diarrhea), Disp: 120 capsule, Rfl: 11    DULoxetine (CYMBALTA) 60 mg delayed release capsule, Take 1 capsule (60 mg total) by mouth 2 (two) times a day, Disp: 180 capsule, Rfl: 1    famotidine (PEPCID) 20 mg tablet, Take 1 tablet (20 mg total) by mouth 2 (two) times a day, Disp: 60 tablet, Rfl: 0    Fluticasone-Salmeterol (Advair Diskus) 500-50 mcg/dose inhaler, Inhale 1 puff by mouth 2 times daily.  Rinse mouth after use, Disp: 180 blister, Rfl: 1    gabapentin (NEURONTIN) 400 mg capsule, TAKE 2 CAPSULES BY MOUTH 3 TIMES A DAY, Disp: 180 capsule, Rfl: 5    insulin glargine (LANTUS) 100 units/mL subcutaneous injection, Inject 40 units at bedtime, Disp: , Rfl:     insulin lispro (HumALOG/ADMELOG) 100 units/mL injection, Inject 8 units before breakfast, 10 units before lunch and 14 units before dinner +scale ( hold if blood sugar is less than 90 ), Disp: , Rfl:     Insulin Pen Needle (BD Pen Needle Kimberli U/F) 32G X 4 MM MISC, Use 2 (two) times a day, Disp: 200 each, Rfl: 3    Insulin Syringe-Needle U-100 31G X 5/16\" 0.3 ML MISC, Use twice daily, Disp: 60 each, Rfl: 5    levothyroxine 112 mcg tablet, TAKE 1 TABLET BY MOUTH EVERY DAY, Disp: 90 tablet, Rfl: 1    Lidocaine 4 % PTCH, Apply topically, Disp: , Rfl:     mirtazapine (REMERON) 7.5 MG tablet, Take 1 tablet (7.5 mg total) by mouth daily at bedtime, Disp: 90 tablet, Rfl: 1    nicotine (NICODERM CQ) 21 mg/24 hr TD 24 hr patch, Place 1 patch on the skin over 24 hours daily, Disp: 28 patch, Rfl: 0    OneTouch Delica Lancets 33G MISC, Check blood sugars four times daily. Please substitute with appropriate alternative as covered by patient's insurance. Dx: E11.65, Disp: 400 each, Rfl: 3    OneTouch Ultra test strip, USE AS DIRECTED TO TEST 3 TIMES A DAY, Disp: 100 strip, Rfl: 0    pancrelipase, Lip-Prot-Amyl, (CREON) 24,000 units, Take 1 capsule (24,000 Units " total) by mouth 3 (three) times a day with meals, Disp: 90 capsule, Rfl: 0    simethicone (MYLICON) 125 MG chewable tablet, Chew 1 tablet (125 mg total) 3 (three) times a day as needed for flatulence, Disp: 90 tablet, Rfl: 11    traMADol (ULTRAM-ER) 200 MG 24 hr tablet, Take 1 tablet (200 mg total) by mouth daily as needed for moderate pain, Disp: 15 tablet, Rfl: 0    aspirin 81 mg chewable tablet, Chew 81 mg daily  (Patient not taking: Reported on 11/18/2024), Disp: , Rfl:     Patient Active Problem List   Diagnosis    Severe episode of recurrent major depressive disorder, without psychotic features (HCC)    Gastroesophageal reflux disease with esophagitis without hemorrhage    History of decompression of median nerve    Hypothyroidism    Protein calorie malnutrition (HCC)    Tobacco dependence    Post-traumatic stress disorder, unspecified    Benign essential hypertension    Chronic fatigue, unspecified    Fatty liver    Hyperlipidemia    Insomnia    Iron deficiency    Chronic diarrhea    Opioid dependence with other opioid-induced disorder (HCC)    Pancreatic cyst    Vitamin B12 deficiency    Vitamin D deficiency    Reactive airway disease without complication    Temporary cerebral vascular dysfunction    History of pulmonary embolism    Arthralgia of temporomandibular joint    Carpal tunnel syndrome    Dysphagia    Chronic migraine without aura, not intractable, without status migrainosus    Diplopia    ROGERIO positive    Symptom associated with female genital organs    Irregular menstrual cycle    Female stress incontinence    Decreased libido    Chronic cervical pain    Paresthesia and pain of both upper extremities    Paresthesia of both lower extremities    Chronic pain of both shoulders    Cervical radiculopathy    Lumbar radiculopathy    DDD (degenerative disc disease), cervical    DDD (degenerative disc disease), lumbar    Low back pain with sciatica    Cervical spinal stenosis    Cervical spondylosis  "without myelopathy    Chronic pain disorder    Neuropathy    Weakness of right upper extremity    Nausea    Chronic prescription benzodiazepine use    Uncontrolled type 2 diabetes mellitus with hyperglycemia (HCC)    S/P left knee arthroscopy    Unspecified psychosis not due to a substance or known physiological condition (HCC)    Pancolitis (HCC)    Agoraphobia with panic disorder    Bipolar II disorder (HCC)    Polyarthritis, unspecified    Chronic migraine without aura without status migrainosus, not intractable    HHNC (hyperglycemic hyperosmolar nonketotic coma) ([facility])    Abdominal pain    Severe protein-calorie malnutrition (HCC)    Hypomagnesemia    Failure to thrive in adult    Sinus tachycardia    Hypotension    Bacteriuria    Personal history of transient ischemic attack (TIA), and cerebral infarction without residual deficits    Type 2 diabetes mellitus with other specified complication (HCC)    Fibromyalgia    Other chronic pain    Migraine, unspecified, not intractable, without status migrainosus    Ambulatory dysfunction    MARITO (generalized anxiety disorder)    Pancreatic insufficiency    Acute right ankle pain    Fibula fracture    Pain in right foot    Swelling of ankle joint, right       Objective:  /84   Pulse 76   Ht 5' 2\" (1.575 m)   Wt 53.1 kg (117 lb)   LMP 03/04/2016   BMI 21.40 kg/m²     Right Ankle Exam     Tenderness   The patient is experiencing tenderness in the lateral malleolus, deltoid and ATF.  Swelling: moderate    Range of Motion   Eversion:  abnormal   Inversion:  abnormal     Other   Erythema: absent  Sensation: normal  Pulse: present     Comments:  There is no tenderness to palpation of the lisfranc, and no plantar ecchymosis    There is no tenderness to palpation of the proximal fibula.                  Physical Exam      Neurologic Exam    Procedures    I have personally reviewed pertinent films in PACS.            Past Medical History:   Diagnosis Date    Acute " venous embolism and thrombosis of deep vessels of distal lower extremity (HCC)     Anemia     Anxiety     last assessed 11/20/17    Arthritis     Asthma     Cerebral infarction (HCC)     unspecified, last assessed 11/14/16    Chest pain     last assessed 5/9/17    Chronic cough     last assessed 12/12/13    Depression     Diabetes mellitus, type 2 (HCC)     DJD (degenerative joint disease)     Esophageal reflux     Fibromyalgia     GERD without esophagitis     resolved 5/13/16    History of pulmonary embolism     Hyperlipidemia     Hypertension     Hypothyroidism     Iron deficiency     Pancreatitis     Panic attack     Panic disorder     Polyarthritis     PTSD (post-traumatic stress disorder)     Sleep difficulties     Stroke syndrome     Thyroid disease     TIA (transient ischemic attack)     TIA (transient ischemic attack)     Venous embolism and thrombosis of deep vessels of distal lower extremity (HCC)     Vitamin B12 deficiency     Vitamin D deficiency        Past Surgical History:   Procedure Laterality Date    CARPAL TUNNEL RELEASE Right     neuroplasty decompression of median nerve    CATARACT EXTRACTION      CHOLECYSTECTOMY      ERCP      ERCP      ESOPHAGOGASTRIC FUNDOPLASTY      NISSEN FUNDOPLICATION      PATELLA SURGERY Left 12/2021    NM ESOPHAGOGASTRODUODENOSCOPY TRANSORAL DIAGNOSTIC N/A 11/02/2016    Procedure: EGD AND COLONOSCOPY;  Surgeon: Jatin Shepherd MD;  Location: [facility];  Service: Gastroenterology    NM NDSC WRST SURG W/RLS TRANSVRS CARPL LIGM Right 03/08/2016    Procedure: RELEASE CARPAL TUNNEL ENDOSCOPIC;  Surgeon: Guero Restrepo MD;  Location: [facility];  Service: Orthopedics    NM TENDON SHEATH INCISION Right 03/08/2016    Procedure: RELEASE TRIGGER FINGER RIGHT THUMB;  Surgeon: Guero Restrepo MD;  Location: [facility];  Service: Orthopedics    TONSILLECTOMY AND ADENOIDECTOMY         Social History     Socioeconomic History    Marital status: /Civil Union     Spouse name:  Not on file    Number of children: 1    Years of education: technical school    Highest education level: Associate degree: occupational, technical, or vocational program   Occupational History    Occupation: unemployed     Comment: SSDI   Tobacco Use    Smoking status: Every Day     Current packs/day: 1.00     Average packs/day: 1 pack/day for 41.9 years (41.9 ttl pk-yrs)     Types: Cigarettes     Start date: 1/1/1983    Smokeless tobacco: Never    Tobacco comments:     20 + years   Vaping Use    Vaping status: Former    Substances: Nicotine   Substance and Sexual Activity    Alcohol use: Not Currently     Alcohol/week: 0.0 standard drinks of alcohol     Comment: last was in 2010 after DUI    Drug use: No    Sexual activity: Yes     Partners: Male   Other Topics Concern    Not on file   Social History Narrative    No coffee consumption     Social Drivers of Health     Financial Resource Strain: High Risk (2/12/2021)    Overall Financial Resource Strain (CARDIA)     Difficulty of Paying Living Expenses: Very hard   Food Insecurity: Patient Unable To Answer (8/16/2024)    Nursing - Inadequate Food Risk Classification     Worried About Running Out of Food in the Last Year: Patient unable to answer     Ran Out of Food in the Last Year: Patient unable to answer     Ran Out of Food in the Last Year: Not on file   Transportation Needs: No Transportation Needs (8/16/2024)    PRAPARE - Transportation     Lack of Transportation (Medical): No     Lack of Transportation (Non-Medical): No   Recent Concern: Transportation Needs - Unmet Transportation Needs (6/20/2024)    PRAPARE - Transportation     Lack of Transportation (Medical): Yes     Lack of Transportation (Non-Medical): No   Physical Activity: Inactive (8/19/2020)    Exercise Vital Sign     Days of Exercise per Week: 0 days     Minutes of Exercise per Session: 0 min   Stress: Stress Concern Present (8/19/2020)    [employer] - Occupational  Stress Questionnaire     Feeling of Stress : Rather much   Social Connections: Moderately Isolated (8/19/2020)    Social Connection and Isolation Panel [NHANES]     Frequency of Communication with Friends and Family: Three times a week     Frequency of Social Gatherings with Friends and Family: Never     Attends Confucianist Services: Never     Active Member of Clubs or Organizations: No     Attends Club or Organization Meetings: Never     Marital Status:    Intimate Partner Violence: Not At Risk (8/19/2020)    Humiliation, Afraid, Rape, and Kick questionnaire     Fear of Current or Ex-Partner: No     Emotionally Abused: No     Physically Abused: No     Sexually Abused: No   Housing Stability: High Risk (8/16/2024)    Housing Stability Vital Sign     Unable to Pay for Housing in the Last Year: Yes     Number of Times Moved in the Last Year: 1     Homeless in the Last Year: No       Family History   Problem Relation Age of Onset    Diabetes Mother         type 2    Heart attack Mother 39        acute MI    Kidney disease Mother         CKD NKF classfication    Depression Mother     Arthritis Mother     Substance Abuse Mother         mother OD in past on MS04    Ulcerative colitis Mother     Schizophrenia Mother     Suicide Attempts Mother     Bipolar disorder Mother     Diabetes Maternal Grandmother     Heart attack Father         acute MI    Psoriasis Father     Cancer Father         gastric cancer    Stroke Father     Crohn's disease Sister     Bipolar disorder Sister     Rheum arthritis Maternal Grandfather     Throat cancer Maternal Grandfather     Suicide Attempts Daughter     Drug abuse Daughter     Lung cancer Paternal Grandmother     Cancer Paternal Grandfather     No Known Problems Sister     Bipolar disorder Maternal Uncle     Anesthesia problems Neg Hx

## 2024-11-18 NOTE — LETTER
November 18, 2024     Patient: Barb Palomo  YOB: 1970  Date of Visit: 11/18/2024      To Whom it May Concern:    Barb Palomo is under my professional care. Barb was seen in my office on 11/18/2024. Barb may WBAT in the CAM boot and using walker, may take boot off when relaxing/sleeping.  Referred for MRI Right ankle and to podiatry.  Also referred to Pain Management Dr. Goff who patient has seen before for her chronic low back pain.  Pain medicines per facility physicians or PCP.    If you have any questions or concerns, please don't hesitate to call.         Sincerely,          Teto Woo MD        CC: No Recipients

## 2024-11-25 ENCOUNTER — NURSING HOME VISIT (OUTPATIENT)
Dept: GERIATRICS | Facility: OTHER | Age: 54
End: 2024-11-25
Payer: MEDICARE

## 2024-11-25 DIAGNOSIS — F31.81 BIPOLAR II DISORDER (HCC): ICD-10-CM

## 2024-11-25 DIAGNOSIS — R00.0 SINUS TACHYCARDIA: ICD-10-CM

## 2024-11-25 DIAGNOSIS — E11.69 TYPE 2 DIABETES MELLITUS WITH OTHER SPECIFIED COMPLICATION, WITH LONG-TERM CURRENT USE OF INSULIN (HCC): Primary | ICD-10-CM

## 2024-11-25 DIAGNOSIS — K86.89 PANCREATIC INSUFFICIENCY: ICD-10-CM

## 2024-11-25 DIAGNOSIS — G89.4 CHRONIC PAIN DISORDER: ICD-10-CM

## 2024-11-25 DIAGNOSIS — M25.471 SWELLING OF ANKLE JOINT, RIGHT: ICD-10-CM

## 2024-11-25 DIAGNOSIS — Z79.4 TYPE 2 DIABETES MELLITUS WITH OTHER SPECIFIED COMPLICATION, WITH LONG-TERM CURRENT USE OF INSULIN (HCC): Primary | ICD-10-CM

## 2024-11-25 PROCEDURE — 99309 SBSQ NF CARE MODERATE MDM 30: CPT | Performed by: NURSE PRACTITIONER

## 2024-11-26 ENCOUNTER — TELEPHONE (OUTPATIENT)
Age: 54
End: 2024-11-26

## 2024-11-26 NOTE — TELEPHONE ENCOUNTER
I called patient based off referral, patient declined that visit at this time but did have a question about her AirCast.    She states it bothers her and gives her a lot of pain in her hip.  She would like to know she can discontinue this air cast,    Can someone please give her a call & advise.    C/b [phone]

## 2024-11-27 ENCOUNTER — TELEPHONE (OUTPATIENT)
Dept: OBGYN CLINIC | Facility: MEDICAL CENTER | Age: 54
End: 2024-11-27

## 2024-11-27 NOTE — TELEPHONE ENCOUNTER
I called Barb to verify she received the message Estefany gave her that she could discontinue the air cast. I repeated the message from Dr. Woo and repeated she could take off the air cast.

## 2024-11-27 NOTE — TELEPHONE ENCOUNTER
Caller: Patient     Doctor: Chilo    Reason for call: Patient is staying at [facility] and stated that will not allow her to remove the boot without an order from the doctor.     Call back#: [phone]    [facility] [phone]  Patient did not have the fax number.

## 2024-11-29 NOTE — RESULT NOTES
Message   Normal     Verified Results  (1) CORTISOL AM SPECIMEN [id number] 07:31AM Doris Gunning     Test Name Result Flag Reference   CORTISOL, A M  5 6 mcg/dL     Reference Range  8 a m  (7-9 a m ) Specimen: 4 0-22 0 No

## 2024-11-29 NOTE — TELEPHONE ENCOUNTER
Caller: paresh from [facility]     Doctor: brian    Reason for call: needs documentation stating that patient can remove boot     Fax #: [phone]    Call back#: [phone]

## 2024-12-02 ENCOUNTER — NURSING HOME VISIT (OUTPATIENT)
Age: 54
End: 2024-12-02
Payer: MEDICARE

## 2024-12-02 DIAGNOSIS — K86.89 PANCREATIC INSUFFICIENCY: ICD-10-CM

## 2024-12-02 DIAGNOSIS — I10 BENIGN ESSENTIAL HYPERTENSION: Primary | ICD-10-CM

## 2024-12-02 DIAGNOSIS — F33.2 SEVERE EPISODE OF RECURRENT MAJOR DEPRESSIVE DISORDER, WITHOUT PSYCHOTIC FEATURES (HCC): ICD-10-CM

## 2024-12-02 DIAGNOSIS — R00.0 SINUS TACHYCARDIA: ICD-10-CM

## 2024-12-02 DIAGNOSIS — K21.00 GASTROESOPHAGEAL REFLUX DISEASE WITH ESOPHAGITIS WITHOUT HEMORRHAGE: ICD-10-CM

## 2024-12-02 DIAGNOSIS — E11.65 UNCONTROLLED TYPE 2 DIABETES MELLITUS WITH HYPERGLYCEMIA (HCC): ICD-10-CM

## 2024-12-02 DIAGNOSIS — E83.42 HYPOMAGNESEMIA: ICD-10-CM

## 2024-12-02 PROCEDURE — 99310 SBSQ NF CARE HIGH MDM 45: CPT

## 2024-12-02 NOTE — PROGRESS NOTES
[facility]  [address] [address] [phone]  FACILITY: [facility]   Code 31 (STR)  Follow up visit       NAME: Barb Plaomo  AGE: 54 y.o. SEX: female CODE STATUS: CPR    DATE OF ENCOUNTER: 12/2/24    Assessment and Plan     1. Benign essential hypertension  Assessment & Plan:  BP variable, generally SBP 90s-140s at times lower/higher.   Patient reports to symptomatic orthostatic lightheadedness  HR 80s-110s, however generally in the high 90s/100s   No acute cardiac complaints  Avoid hypotension  Orthostatic vitals orders  Continue to monitor BP  Not presently on antihypertensive or beta blocker  Continue to monitor closely.   2. Gastroesophageal reflux disease with esophagitis without hemorrhage  Assessment & Plan:  Stable per patient  Continue Pepcid  Avoid citrus, spicy, caffeine, and chocolate  Avoid eating/drinking 1 hour prior to laying down  Continue monitoring symptoms     3. Uncontrolled type 2 diabetes mellitus with hyperglycemia (HCC)  Assessment & Plan:    Lab Results   Component Value Date    HGBA1C 16.1 (H) 08/16/2024   A1c poorly controlled.   Fasting -491  Lunch BS   Dinner -306  BS liable, and given hx of DM medication non-compliance patient may be a good candidate for insulin pump. I reached out to patients Endocrinologist to discuss possible BG monitor and pump. Appreciate recommendations.   Current regime Glargine 42u HS (increased as of 12/2 from 40u), Humalog 10u TID  Adjust regime as appropriate   Avoid hypoglycemia   Continue Carbohydrate controlled diet  Continue to monitor BS  4. Severe episode of recurrent major depressive disorder, without psychotic features (HCC)  Assessment & Plan:  Per patient mood is stable  No reported behavioral disturbance   Continue Cymbalta, Mirtazapine and Risperdal  Continue supportive measures  Encourage activity and engagement  Will continue care in collaboration with psychiatry services prn  Continue to  monitor for acute changes in condition   5. Sinus tachycardia  Assessment & Plan:  Patient noted to have tachycardia inpatient thought to be due to hypotension and side effects of minipress and ongoing diarrhea.   Although tachycardia continued. TSH was checked which was normal per records, cardiology was consulted, Echo normal.     Upon review of records since admission to SNF, patient has had steady intermittent tachycardia greater than 50% of readings.   Patient denies any cardiac complaints  EKG from 9/11 showed sinus tachycardia  Will order repeat TSH (last TSH was 2.357 from 2 months ago, prior to that it was 0.661 3 months ago).   BMP ordered  EKG ordered  Cardiology consult placed.   Continue to monitor for acute changes.   Orders:  -     Ambulatory Referral to Cardiology; Future  6. Pancreatic insufficiency  Assessment & Plan:  With Chronic intermittent diarrhea  Per patient recently stool has been formed, denies any recent episodes of diarrhea.   [place]  [id number] UNIT: 1 tablet TID  Continue to monitor   7. Hypomagnesemia  Assessment & Plan:  Last Mg on file 1.7 from 9/2024  Repeat Mg labs 12/4  Replete as appropriate        All medications and routine orders were reviewed and updated as needed.    Chief Complaint     STR follow up visit    Past Medical and Surgica History      Past Medical History:   Diagnosis Date    Acute venous embolism and thrombosis of deep vessels of distal lower extremity (HCC)     Anemia     Anxiety     last assessed 11/20/17    Arthritis     Asthma     Cerebral infarction (HCC)     unspecified, last assessed 11/14/16    Chest pain     last assessed 5/9/17    Chronic cough     last assessed 12/12/13    Depression     Diabetes mellitus, type 2 (HCC)     DJD (degenerative joint disease)     Esophageal reflux     Fibromyalgia     GERD without esophagitis     resolved 5/13/16    History of pulmonary embolism     Hyperlipidemia     Hypertension     Hypothyroidism     Iron  deficiency     Pancreatitis     Panic attack     Panic disorder     Polyarthritis     PTSD (post-traumatic stress disorder)     Sleep difficulties     Stroke syndrome     Thyroid disease     TIA (transient ischemic attack)     TIA (transient ischemic attack)     Venous embolism and thrombosis of deep vessels of distal lower extremity (HCC)     Vitamin B12 deficiency     Vitamin D deficiency      Past Surgical History:   Procedure Laterality Date    CARPAL TUNNEL RELEASE Right     neuroplasty decompression of median nerve    CATARACT EXTRACTION      CHOLECYSTECTOMY      ERCP      ERCP      ESOPHAGOGASTRIC FUNDOPLASTY      NISSEN FUNDOPLICATION      PATELLA SURGERY Left 12/2021    RI ESOPHAGOGASTRODUODENOSCOPY TRANSORAL DIAGNOSTIC N/A 11/02/2016    Procedure: EGD AND COLONOSCOPY;  Surgeon: Jatin Shepherd MD;  Location: [facility];  Service: Gastroenterology    RI NDSC WRST SURG W/RLS TRANSVRS CARPL LIGM Right 03/08/2016    Procedure: RELEASE CARPAL TUNNEL ENDOSCOPIC;  Surgeon: uGero Restrepo MD;  Location: [facility];  Service: Orthopedics    RI TENDON SHEATH INCISION Right 03/08/2016    Procedure: RELEASE TRIGGER FINGER RIGHT THUMB;  Surgeon: Guero Restrepo MD;  Location: [facility];  Service: Orthopedics    TONSILLECTOMY AND ADENOIDECTOMY       Allergies   Allergen Reactions    Medical Tape Rash    Lexapro [Escitalopram Oxalate]     Escitalopram Other (See Comments) and Palpitations    Other Hives and Rash     Adhesive Tape        History of Present Illness     Barb Palomo is a  54-year-old female with PMH of DM2, pancreatic insufficiency, Bipolar disorder, sinus tachycardia, HTN, and GERD.   Patient was originally hospitalized from 8/15-9/16 after a fall at home and having back pain. She was managed for pain and chronic diarrhea. She was evaluated by GI and ID. Found to have cdiff and treated with antibiotics. She was also found to have a UTI for which she was treated. She continued to have diarrhea and  "overall weight loss and failure to thrive. She then had FMT on 9/6/24 with improvement of diarrhea and had medication adjustments. She was also hypotensive and tachycardic and improved with diarrhea treatment but continues to have intermittent tachycardia. She had also stopped her DM2 medications and found to have uncontrolled glucose levels and was started back on medications. She was also evaluated by endocrinology during her stay. Subsequently discharged to [facility] for therapy.   On 10/10 patient went to ED due to right ankle pain for about 2 to 3 days, per records X-ray shows what is likely a subacute fibular fracture.  Patient was splinted and discharge back to facility with referral to Ortho.     Patient being seen and examined for follow up on acute and chronic medical conditions. Upon exam patient is lying in bed she states her hip and ankle pain is overall improving. Per nursing patient was noted to have elevated BS over the weekend. Patient reports intermittent dizziness upon rising. Patient appetite is \"fine\", significant amount of snacks noted at bedside. Patient is having regular formed bowel movements every day, last BM this morning. Patient offers no further medical complaints at this time. Patient is in no acute distress, denies CP, SOB, palpitations, abdominal pain, N/V/C/D.   Patient has MRI on 12/9, Pain Management 12/16, Podiatry 12/17.         The patient's allergies, past medical, surgical, social and family history were reviewed and unchanged.    Review of Systems     Review of Systems   Constitutional:  Negative for appetite change, chills, fatigue and fever.   HENT:  Negative for congestion, rhinorrhea, sore throat and trouble swallowing.    Respiratory:  Negative for cough and shortness of breath.    Cardiovascular:  Negative for chest pain and palpitations.   Gastrointestinal:  Negative for abdominal distention, abdominal pain, blood in stool, constipation, diarrhea, nausea and " vomiting.   Genitourinary:  Negative for difficulty urinating, dysuria and hematuria.   Musculoskeletal:  Positive for arthralgias and gait problem.   Neurological:  Positive for weakness and light-headedness (upon rising). Negative for headaches.   All other systems reviewed and are negative.      Objective     Vitals:   Vitals:    12/02/24 1348   BP: 106/74   Pulse: 105   Resp: 18   Temp: (!) 97.1 °F (36.2 °C)   SpO2: 97%       Labs Reviewed  CBC:   Results from Last 12 Months   Lab Units 09/13/24  0455   WBC Thousand/uL 5.08   RBC Million/uL 3.26*   HEMOGLOBIN g/dL 10.7*   HEMATOCRIT % 32.9*   MCV fL 101*   MCH pg 32.8   MCHC g/dL 32.5   RDW % 14.2   MPV fL 10.7   PLATELETS Thousands/uL 270   NRBC AUTO /100 WBCs 0   SEGS PCT % 40*   LYMPHO PCT % 45*   MONO PCT % 10   EOS PCT % 4   BASOS PCT % 1   TOTAL NEUT ABS Thousands/µL 2.04   LYMPHS ABS Thousands/µL 2.28   MONOS ABS Thousand/µL 0.49   EOS ABS Thousand/µL 0.20     Chemistry Profile:   Results from Last 12 Months   Lab Units 09/23/24  0427 09/13/24  0455 09/04/24  2342 09/04/24  0603 09/03/24  0637 09/02/24  0706 08/25/24  0526 08/24/24  0746   POTASSIUM mmol/L 4.4 4.2   < > 4.3   < > 3.8   < > 3.9   CHLORIDE mmol/L 102 107   < > 105   < > 103   < > 112*   CO2 mmol/L 30 30   < > 26   < > 29   < > 24   BUN mg/dL 18 20   < > 15   < > 16   < > 7   CREATININE mg/dL 0.48 0.55*   < > 0.49*   < > 0.42*   < > 0.30*   GLUCOSE RANDOM mg/dL 601* 54*   < > 285*   < > 256*   < > 310*   CALCIUM mg/dL 9.3 9.2   < > 9.2   < > 9.2   < > 7.2*   CORRECTED CALCIUM mg/dL  --   --   --   --   --   --   --  8.2*   MAGNESIUM mg/dL  --  1.7*   < > 1.6*   < > 1.5*   < > 1.4*   PHOSPHORUS mg/dL  --   --   --   --   --  4.5   < >  --    AST U/L  --   --   --  57*  --   --    < > 45*   ALT U/L  --   --   --  97*  --   --    < > 43   ALK PHOS U/L  --   --   --  207*  --   --    < > 131*   EGFR  113 107   < > 111   < > 117   < > 131    < > = values in this interval not displayed.        Physical Exam  Vitals and nursing note reviewed.   Constitutional:       General: She is not in acute distress.     Appearance: Normal appearance. She is not ill-appearing.      Comments: Frail appearing    HENT:      Head: Normocephalic and atraumatic.      Right Ear: External ear normal.      Left Ear: External ear normal.      Nose: Nose normal. No congestion or rhinorrhea.      Mouth/Throat:      Mouth: Mucous membranes are dry.   Eyes:      General: No scleral icterus.        Right eye: No discharge.         Left eye: No discharge.      Conjunctiva/sclera: Conjunctivae normal.   Cardiovascular:      Rate and Rhythm: Tachycardia present. Rhythm irregular.   Pulmonary:      Effort: No respiratory distress.      Breath sounds: No wheezing, rhonchi or rales.   Abdominal:      General: Bowel sounds are normal. There is no distension.      Tenderness: There is no abdominal tenderness. There is no guarding or rebound.   Musculoskeletal:         General: No swelling (minor swelling to right ankle).      Right lower leg: No edema.      Left lower leg: No edema.   Skin:     General: Skin is warm and dry.      Findings: No bruising.   Neurological:      Mental Status: She is alert and oriented to person, place, and time.      Motor: Weakness present.      Gait: Gait abnormal.   Psychiatric:         Mood and Affect: Mood normal.         Behavior: Behavior normal.         Thought Content: Thought content normal.         Pertinent Laboratory/Diagnostic Studies:   Reviewed in facility chart-stable    Current Medications   Medications reviewed and updated see facility MAR for details.      Current Outpatient Medications:     Albuterol Sulfate (ProAir RespiClick) 108 (90 Base) MCG/ACT AEPB, Inhale 2 puffs every 6 (six) hours as needed (wheezing), Disp: 1 each, Rfl: 1    aspirin 81 mg chewable tablet, Chew 81 mg daily  (Patient not taking: Reported on 11/18/2024), Disp: , Rfl:     atorvastatin (LIPITOR) 80 mg tablet, TAKE  1 TABLET BY MOUTH EVERY DAY, Disp: 30 tablet, Rfl: 5    baclofen 5 MG TABS, Take 5 mg by mouth 2 (two) times a day, Disp: 60 tablet, Rfl: 0    BD PEN NEEDLE LINDY U/F 32G X 4 MM MISC, Inject under the skin daily, Disp: 30 each, Rfl: 5    Blood Glucose Monitoring Suppl (OneTouch Verio Reflect) w/Device KIT, CHECK BLOOD SUGARS FOUR TIMES DAILY. PLEASE SUBSTITUTE WITH APPROPRIATE ALTERNATIVE AS COVERED BY PATIENT'S INSURANCE. DX: E11.65, Disp: 1 kit, Rfl: 0    butalbital-acetaminophen-caffeine (FIORICET,ESGIC) -40 mg per tablet, TAKE 1 TABLET EVERY 6 HOURS AS NEEDED FOR MIGRAINE.  Please limit 3-4 per week, 15 per month., Disp: 15 tablet, Rfl: 0    butalbital-aspirin-caffeine (FIORINAL) -40 mg capsule, Take 1 capsule by mouth every 6 (six) hours as needed for headaches or migraine . Please limit 3-4 per week, 15 per month., Disp: 15 capsule, Rfl: 0    cholestyramine (QUESTRAN) 4 g packet, Take 1 packet by mouth 3 (three) times a day with meals, Disp: , Rfl:     cholestyramine sugar free (QUESTRAN LIGHT) 4 g packet, Take 1 packet (4 g total) by mouth 2 (two) times a day Hold for copnstipation, Disp: 60 packet, Rfl: 0    dicyclomine (BENTYL) 10 mg capsule, Take 1 capsule (10 mg total) by mouth 4 (four) times a day as needed (abdominal pain or diarrhea), Disp: 120 capsule, Rfl: 11    DULoxetine (CYMBALTA) 60 mg delayed release capsule, Take 1 capsule (60 mg total) by mouth 2 (two) times a day, Disp: 180 capsule, Rfl: 1    famotidine (PEPCID) 20 mg tablet, Take 1 tablet (20 mg total) by mouth 2 (two) times a day, Disp: 60 tablet, Rfl: 0    Fluticasone-Salmeterol (Advair Diskus) 500-50 mcg/dose inhaler, Inhale 1 puff by mouth 2 times daily.  Rinse mouth after use, Disp: 180 blister, Rfl: 1    gabapentin (NEURONTIN) 400 mg capsule, TAKE 2 CAPSULES BY MOUTH 3 TIMES A DAY, Disp: 180 capsule, Rfl: 5    insulin glargine (LANTUS) 100 units/mL subcutaneous injection, Inject 40 units at bedtime, Disp: , Rfl:     insulin  "lispro (HumALOG/ADMELOG) 100 units/mL injection, Inject 8 units before breakfast, 10 units before lunch and 14 units before dinner +scale ( hold if blood sugar is less than 90 ), Disp: , Rfl:     Insulin Pen Needle (BD Pen Needle Kimberli U/F) 32G X 4 MM MISC, Use 2 (two) times a day, Disp: 200 each, Rfl: 3    Insulin Syringe-Needle U-100 31G X 5/16\" 0.3 ML MISC, Use twice daily, Disp: 60 each, Rfl: 5    levothyroxine 112 mcg tablet, TAKE 1 TABLET BY MOUTH EVERY DAY, Disp: 90 tablet, Rfl: 1    Lidocaine 4 % PTCH, Apply topically, Disp: , Rfl:     mirtazapine (REMERON) 7.5 MG tablet, Take 1 tablet (7.5 mg total) by mouth daily at bedtime, Disp: 90 tablet, Rfl: 1    nicotine (NICODERM CQ) 21 mg/24 hr TD 24 hr patch, Place 1 patch on the skin over 24 hours daily, Disp: 28 patch, Rfl: 0    OneTouch Delica Lancets 33G MISC, Check blood sugars four times daily. Please substitute with appropriate alternative as covered by patient's insurance. Dx: E11.65, Disp: 400 each, Rfl: 3    OneTouch Ultra test strip, USE AS DIRECTED TO TEST 3 TIMES A DAY, Disp: 100 strip, Rfl: 0    pancrelipase, Lip-Prot-Amyl, (CREON) 24,000 units, Take 1 capsule (24,000 Units total) by mouth 3 (three) times a day with meals, Disp: 90 capsule, Rfl: 0    simethicone (MYLICON) 125 MG chewable tablet, Chew 1 tablet (125 mg total) 3 (three) times a day as needed for flatulence, Disp: 90 tablet, Rfl: 11    traMADol (ULTRAM-ER) 200 MG 24 hr tablet, Take 1 tablet (200 mg total) by mouth daily as needed for moderate pain, Disp: 15 tablet, Rfl: 0       Please note:  Voice-recognition software may have been used in the preparation of this document.  Occasional wrong word or \"sound-alike\" substitutions may have occurred due to the inherent limitations of voice recognition software.  Interpretation should be guided by context.     I have spent a total time of 50 minutes in caring for this patient on the day of the visit/encounter including Diagnostic results, " Prognosis, Risks and benefits of tx options, Instructions for management, Patient and family education, Importance of tx compliance, Risk factor reductions, Impressions, Counseling / Coordination of care, Documenting in the medical record, Reviewing / ordering tests, medicine, procedures  , and Obtaining or reviewing history  .      ELISEO Brar

## 2024-12-03 VITALS
RESPIRATION RATE: 18 BRPM | TEMPERATURE: 97.1 F | SYSTOLIC BLOOD PRESSURE: 106 MMHG | BODY MASS INDEX: 21.25 KG/M2 | OXYGEN SATURATION: 97 % | WEIGHT: 116.2 LBS | HEART RATE: 105 BPM | DIASTOLIC BLOOD PRESSURE: 74 MMHG

## 2024-12-03 NOTE — ASSESSMENT & PLAN NOTE
Lab Results   Component Value Date    HGBA1C 16.1 (H) 08/16/2024   A1c poorly controlled.   Fasting -491  Lunch BS   Dinner -306  BS liable, and given hx of DM medication non-compliance patient may be a good candidate for insulin pump. I reached out to patients Endocrinologist to discuss possible BG monitor and pump. Appreciate recommendations.   Current regime Glargine 42u HS (increased as of 12/2 from 40u), Humalog 10u TID  Adjust regime as appropriate   Avoid hypoglycemia   Continue Carbohydrate controlled diet  Continue to monitor BS

## 2024-12-03 NOTE — ASSESSMENT & PLAN NOTE
BP variable, generally SBP 90s-140s at times lower/higher.   Patient reports to symptomatic orthostatic lightheadedness  HR 80s-110s, however generally in the high 90s/100s   No acute cardiac complaints  Avoid hypotension  Orthostatic vitals orders  Continue to monitor BP  Not presently on antihypertensive or beta blocker  Continue to monitor closely.

## 2024-12-03 NOTE — ASSESSMENT & PLAN NOTE
Stable per patient  Continue Pepcid  Avoid citrus, spicy, caffeine, and chocolate  Avoid eating/drinking 1 hour prior to laying down  Continue monitoring symptoms

## 2024-12-03 NOTE — ASSESSMENT & PLAN NOTE
With Chronic intermittent diarrhea  Per patient recently stool has been formed, denies any recent episodes of diarrhea.   Continue Juan J HINKLE  [id number] UNIT: 1 tablet TID  Continue to monitor

## 2024-12-03 NOTE — ASSESSMENT & PLAN NOTE
Per patient mood is stable  No reported behavioral disturbance   Continue Cymbalta, Mirtazapine and Risperdal  Continue supportive measures  Encourage activity and engagement  Will continue care in collaboration with psychiatry services prn  Continue to monitor for acute changes in condition

## 2024-12-03 NOTE — ASSESSMENT & PLAN NOTE
Patient noted to have tachycardia inpatient thought to be due to hypotension and side effects of minipress and ongoing diarrhea.   Although tachycardia continued. TSH was checked which was normal per records, cardiology was consulted, Echo normal.     Upon review of records since admission to SNF, patient has had steady intermittent tachycardia greater than 50% of readings.   Patient denies any cardiac complaints  EKG from 9/11 showed sinus tachycardia  Will order repeat TSH (last TSH was 2.357 from 2 months ago, prior to that it was 0.661 3 months ago).   BMP ordered  EKG ordered  Cardiology consult placed.   Continue to monitor for acute changes.

## 2024-12-03 NOTE — PROGRESS NOTES
[facility]  [address] [place] [address], [address], [place] [address]  [phone]    NAME: Barb Palomo  AGE: 54 y.o. SEX: female    Progress Note    Location: [facility]  POS: 31 ([facility])    Assessment/Plan:       Type 2 diabetes mellitus with other specified complication (HCC)  HbA1C: 16.1 (H) 08/16/2024  Goal: < 5.7%  FBG range (11/12-18/2024) = 164 to 294  Pre-lunch BG range (11/12-18/2024) = 70 to 291  ** 2 episode of pre-lunch BG less than 100 on this period  Pre-dinner BG range (11/12-18/2024) = 74 to 453  ** 1 episode of pre-dinner BG less than 100 on this period  ** 1 episode of pre-dinner BG > 350 on this period  Patient documented to eat % of meals in SNF  Continue the following meds:  - Insulin Glargine 40 units daily at HS  - Humalog 10 units TID with meals - HOLD for BG  < 120  Encouraged maintaining a consistent meal intake  Continue Carbohydrate controlled diet  Seen by Endoc office on 11/5/2024  HbA1C on 12/10/2024     Bipolar II Disorder (HCC)  No behavioral disturbance in SNF  Continues with intermittent period of emotional lability  Continue the following meds:  - Cymbalta 60mg BID  - Risperidone 0.5mg BID  - Mirtazapine 7.5mg daily at HS  Continue Behavioral Health CRNP consult PRN  Followed by out-patient behavioral health provider     Chronic pain disorder  Patient report chronic pain  Continue the following meds:  - Tramadol ER 200mg daily PRN  - Tylenol 650mg Q6 hours PRN  - Baclofen 5mg BID  - Gabapentin 800mg TID  Nursing to continue to assess for pain Q shift while awake   Followed and managed by Physiatry CRNP in SNF     Pancreatic insufficiency  Stable  Patient denies any GI related concerns on this visit  Continue the following meds:  - Juan J HINKLE  [id number] UNIT: 1 tablet TID  - Cholestyramine 4G BID (loose stools)  - Dicyclomine 10mg QID PRN  - Simethicone 125mg tablet TID PRN (bloating/ flatus)        Chief complaint / Reason for visit:  Follow- visit    History of Present  Illness:  This is a 54-year-old female patient currently admitted at [facility] (9/16/2024 to present) for skilled nursing and rehabilitation services. Patient is seen and examined today to follow-up for any acute and chronic medical conditions. Patient is still in bed for this visit -alert, cooperative, calm, very pleasant and not in distress. Patient is verbally engaged with clear coherent speech -oriented x 4. Patient acknowledged feeling well on this visit but continues to report of pain to Right ankle. Patient denies any other acute medical concerns during ROS assessment. Nursing has no acute medical concerns with this visit    Nursing and prior provider notes reviewed on this visit. Discussed visit with PCP and nursing staff/ supervisor.    Review of Systems:  Per history of present illness, all other systems reviewed and negative.    HISTORY:  Medical Hx: Reviewed, unchanged  Family Hx: Reviewed, unchanged  Soc Hx: Reviewed,  unchanged    ALLERGY: Reviewed, unchanged.   Allergies   Allergen Reactions    Medical Tape Rash    Lexapro [Escitalopram Oxalate]     Escitalopram Other (See Comments) and Palpitations    Other Hives and Rash     Adhesive Tape        PHYSICAL EXAM:  Vital Signs: T98.0F -P101 -R16 BP: 130/88 SpO2: 99% RA  Weight: 116.2 lbs (11/7/2024) <= 102.4 lbs (10/14/2024) <= 104.0 lbs (9/16/2024)    General: NAD. Well appearing. No acute distress. Frail stature  Head: Atraumatic. Normocephalic.  Eye Exam: anicteric sclera, no discharge, PERRLA, No injection  Oral Exam: moist mucous membranes, no buccaloropharyngeal erythema, palatine tonsils WNL.  Neck Exam: no anterior cervical lymphadenopathy noted, neck supple. Trachea midline, no carotid bruit, no masses  Cardiovascular: elevated HR, irregular rhythm, no: murmurs/ rubs/ gallops. S1 and S2 appreciated.  Pulmonary: no wheeze, no rhonchi, no rales. No chest tenderness. Normal chest wall expansion  Abdominal: soft, non-tender, nondistended, bowel  "sounds audible x 4 quadrants. No palpable hepatosplenomegaly, no tympany  : Non distended bladder.   Extremities and skin: no edema noted, no rashes. Intact skin. Right ankle (+) tenderness to ROM - no swelling and erythema.  Neurological: alert, cooperative and responsive, Oriented x 3. No tics, normal sensation to pressure and light touch.  moving all 4 extremities symmetrically.    Laboratory / Imaging results reviewed.    Current Medications: All medications reviewed and updated in Nursing Home eMAR.    Please note: This note was completed in part utilizing a voice-recognition software may have been used in the preparation of this document. Grammatical errors, random word insertion, spelling mistakes, and incomplete sentences may be an occasional consequence of the system secondary to software limitations, ambient noise and hardware issues. Occasional wrong word or \"sound-alike\" substitutions may have occurred due to the inherent limitations of voice recognition software. At the time of dictation, efforts were made to edit, clarify and/or correct errors. Interpretation should be guided by context. Please read the chart carefully and recognize, using context, where substitutions have occurred. If you have any questions or concerns about the context, text or information contained within the body of this dictation, please contact myself, the provider, for further clarification.      ELISEO Levine  12/3/2024  "

## 2024-12-03 NOTE — PROGRESS NOTES
[facility]  [address], [place] [address]  [phone]    NAME: Barb Palomo  AGE: 54 y.o. SEX: female    Progress Note    Location: [facility]  POS: 31 (SNF)    Assessment/Plan:    Type 2 diabetes mellitus with other specified complication (HCC)  HbA1C: 16.1 (H) 08/16/2024  Goal: < 5.7%  Improved BG readings  FBG range (11/5-11/2024) = 137 to 334  Pre-lunch BG range (11/5-11/2024) = 71 to 189  Pre-dinner BG range (11/5-11/2024) = 82 to 340  Continue the following meds:  - Insulin Glargine 40 units daily at HS  - Humalog 10 units TID with meals - HOLD for BG  < 120  Encouraged maintaining a consistent meal intake  Continue Carbohydrate controlled diet  Seen by Endoc office on 11/5/2024 - with new orders implemented  - will hold off decreasing AM Humalog to 8units and keep 10 units for now due to BG persistent over 250 in AM with occasional FBG less than 150.  HbA1C on 12/10/2024     Bipolar II Disorder (HCC)  No behavioral disturbance in SNF  Continues with intermittent period of emotional lability  Continue the following meds:  - Cymbalta 60mg BID  - Risperidone 0.5mg BID  - Mirtazapine 7.5mg daily at HS  Continue Behavioral Health CRNP consult PRN     Chronic pain disorder  Patient report chronic pain  Continue the following meds:  - Tramadol ER 200mg daily PRN  - Tylenol 650mg Q6 hours PRN  - Baclofen 5mg BID  - Gabapentin 800mg TID  Nursing to continue to assess for pain Q shift while awake      Pancreatic insufficiency  With Chronic intermittent diarrhea  Patient denies any diarreah / loose stools / bloating on this visit  Continue the following meds:  - Juan J HINKLE  [id number] UNIT: 1 tablet TID  - Cholestyramine 4G BID (loose stools)  - Dicyclomine 10mg QID PRN  - Simethicone 125mg tablet TID PRN (bloating/ flatus)     Right ankle pain  Patient continues to report of ankle pain -  - no change in ambulation  - swelling resolved  Continue ACE wrap to Right ankle/ foot  Continue  "Lidocaine 4% patch daily  Consult Orthopedic office for further evaluation and management  Followed and managed by Physiatry in SNF.     Sinus tachycardia  Chronic per patient: \" I always have it. It does not other me\"  Not on BB or any anti-HTN medication  Patient continues to be asymptomatic - on assessment  Continue to check BP/HR in SNF      Chief complaint / Reason for visit:  Follow- visit    History of Present Illness:  This is a 54-year-old female patient currently admitted at [facility] (9/16/2024 to present) for skilled nursing and rehabilitation services. Patient is seen and examined today to follow-up for any acute and chronic medical conditions. Patient is still in bed for this visit -alert, cooperative, calm, very pleasant and not in distress. Patient is verbally engaged with clear coherent speech -oriented x 4. Patient acknowledged feeling well on this visit but continues to report of pain to Right ankle. Patient denies any other acute medical concerns during ROS assessment. Nursing has no acute medical concerns with this visit    Nursing and prior provider notes reviewed on this visit. Discussed visit with PCP and nursing staff/ supervisor.    Review of Systems:  Per history of present illness, all other systems reviewed and negative.    HISTORY:  Medical Hx: Reviewed, unchanged  Family Hx: Reviewed, unchanged  Soc Hx: Reviewed,  unchanged    ALLERGY: Reviewed, unchanged.   Allergies   Allergen Reactions    Medical Tape Rash    Lexapro [Escitalopram Oxalate]     Escitalopram Other (See Comments) and Palpitations    Other Hives and Rash     Adhesive Tape        PHYSICAL EXAM:  Vital Signs: T97.8F -P84 -R16 BP: 118/64 (11/9/2024) SpO2: 98% RA  Weight: 116.2 lbs (11/7/2024) <= 102.4 lbs (10/14/2024) <= 104.0 lbs (9/16/2024)    General: NAD. Well appearing. No acute distress. Frail stature  Head: Atraumatic. Normocephalic.  Eye Exam: anicteric sclera, no discharge, PERRLA, No injection  Oral Exam: moist " "mucous membranes, no buccaloropharyngeal erythema, palatine tonsils WNL.  Neck Exam: no anterior cervical lymphadenopathy noted, neck supple. Trachea midline, no carotid bruit, no masses  Cardiovascular: elevated HR, irregular rhythm, no: murmurs/ rubs/ gallops. S1 and S2 appreciated.  Pulmonary: no wheeze, no rhonchi, no rales. No chest tenderness. Normal chest wall expansion  Abdominal: soft, non-tender, nondistended, bowel sounds audible x 4 quadrants. No palpable hepatosplenomegaly, no tympany  : Non distended bladder.   Extremities and skin: no edema noted, no rashes. Intact skin. Right ankle (+) tenderness to ROM but minimal to no swelling and erythema.  Neurological: alert, cooperative and responsive, Oriented x 3. No tics, normal sensation to pressure and light touch.  moving all 4 extremities symmetrically.    Laboratory / Imaging results reviewed.    Current Medications: All medications reviewed and updated in Nursing Home eMAR.    Please note: This note was completed in part utilizing a voice-recognition software may have been used in the preparation of this document. Grammatical errors, random word insertion, spelling mistakes, and incomplete sentences may be an occasional consequence of the system secondary to software limitations, ambient noise and hardware issues. Occasional wrong word or \"sound-alike\" substitutions may have occurred due to the inherent limitations of voice recognition software. At the time of dictation, efforts were made to edit, clarify and/or correct errors. Interpretation should be guided by context. Please read the chart carefully and recognize, using context, where substitutions have occurred. If you have any questions or concerns about the context, text or information contained within the body of this dictation, please contact myself, the provider, for further clarification.      ELISEO Levine  12/3/2024  "

## 2024-12-09 ENCOUNTER — HOSPITAL ENCOUNTER (OUTPATIENT)
Dept: MRI IMAGING | Facility: HOSPITAL | Age: 54
Discharge: HOME | End: 2024-12-09
Attending: EMERGENCY MEDICINE
Payer: MEDICARE

## 2024-12-09 ENCOUNTER — NURSING HOME VISIT (OUTPATIENT)
Age: 54
End: 2024-12-09
Payer: MEDICARE

## 2024-12-09 VITALS
WEIGHT: 116.2 LBS | SYSTOLIC BLOOD PRESSURE: 129 MMHG | HEART RATE: 105 BPM | RESPIRATION RATE: 18 BRPM | TEMPERATURE: 98.7 F | BODY MASS INDEX: 21.25 KG/M2 | OXYGEN SATURATION: 99 % | DIASTOLIC BLOOD PRESSURE: 70 MMHG

## 2024-12-09 DIAGNOSIS — G43.709 CHRONIC MIGRAINE WITHOUT AURA WITHOUT STATUS MIGRAINOSUS, NOT INTRACTABLE: ICD-10-CM

## 2024-12-09 DIAGNOSIS — K86.89 PANCREATIC INSUFFICIENCY: ICD-10-CM

## 2024-12-09 DIAGNOSIS — K21.00 GASTROESOPHAGEAL REFLUX DISEASE WITH ESOPHAGITIS WITHOUT HEMORRHAGE: ICD-10-CM

## 2024-12-09 DIAGNOSIS — S82.409A FIBULA FRACTURE: ICD-10-CM

## 2024-12-09 DIAGNOSIS — R26.2 AMBULATORY DYSFUNCTION: ICD-10-CM

## 2024-12-09 DIAGNOSIS — M25.471 SWELLING OF ANKLE JOINT, RIGHT: ICD-10-CM

## 2024-12-09 DIAGNOSIS — E83.42 HYPOMAGNESEMIA: ICD-10-CM

## 2024-12-09 DIAGNOSIS — F33.2 SEVERE EPISODE OF RECURRENT MAJOR DEPRESSIVE DISORDER, WITHOUT PSYCHOTIC FEATURES (HCC): ICD-10-CM

## 2024-12-09 DIAGNOSIS — I10 BENIGN ESSENTIAL HYPERTENSION: ICD-10-CM

## 2024-12-09 DIAGNOSIS — E11.65 UNCONTROLLED TYPE 2 DIABETES MELLITUS WITH HYPERGLYCEMIA (HCC): ICD-10-CM

## 2024-12-09 DIAGNOSIS — G89.4 CHRONIC PAIN DISORDER: ICD-10-CM

## 2024-12-09 DIAGNOSIS — M25.571 ACUTE RIGHT ANKLE PAIN: ICD-10-CM

## 2024-12-09 DIAGNOSIS — R00.0 SINUS TACHYCARDIA: ICD-10-CM

## 2024-12-09 DIAGNOSIS — R13.12 OROPHARYNGEAL DYSPHAGIA: ICD-10-CM

## 2024-12-09 DIAGNOSIS — M79.671 PAIN IN RIGHT FOOT: ICD-10-CM

## 2024-12-09 PROCEDURE — 99310 SBSQ NF CARE HIGH MDM 45: CPT

## 2024-12-09 PROCEDURE — 73721 MRI JNT OF LWR EXTRE W/O DYE: CPT

## 2024-12-09 RX ORDER — TRAMADOL HYDROCHLORIDE 200 MG/1
200 TABLET, EXTENDED RELEASE ORAL DAILY PRN
Qty: 15 TABLET | Refills: 0 | Status: ON HOLD | OUTPATIENT
Start: 2024-12-09

## 2024-12-09 NOTE — PROGRESS NOTES
[facility]  [address] [address] [phone]  FACILITY: [facility]   Code 31 (STR)  Follow up visit       NAME: Barb Palomo  AGE: 54 y.o. SEX: female CODE STATUS: CPR    DATE OF ENCOUNTER: 12/9/24    Assessment and Plan     1. Chronic migraine without aura without status migrainosus, not intractable  Assessment & Plan:  Patient reports no recent episodes of migraine   Continue with prn fioricet  Continue monitoring symptoms   2. Benign essential hypertension  Assessment & Plan:  BP variable, generally SBP 90s-140s at times lower/higher.   Patient reports to symptomatic orthostatic lightheadedness  HR 80s-110s, however generally in the high 90s/100s   No acute cardiac complaints  Avoid hypotension  Continue to monitor BP  Not presently on antihypertensive or beta blocker  Continue to monitor closely.   Patient has Cardiology appointment 12/11  3. Oropharyngeal dysphagia  Assessment & Plan:  Continue with CCD diet regular texture with thin consistency  Continue monitoring for aspiration  Continue to encourage oral intake   Monitor for acute changes in conditions   4. Gastroesophageal reflux disease with esophagitis without hemorrhage  Assessment & Plan:  Stable per patient  Continue Pepcid  Avoid citrus, spicy, caffeine, and chocolate  Avoid eating/drinking 1 hour prior to laying down  OOB with meals  Continue monitoring symptoms     5. Pancreatic insufficiency  Assessment & Plan:  With Chronic intermittent diarrhea  Per patient recently stool has been formed, denies any recent episodes of diarrhea.   [place]  [id number] UNIT: 1 tablet TID  Continue to monitor   6. Uncontrolled type 2 diabetes mellitus with hyperglycemia (HCC)  Assessment & Plan:    Lab Results   Component Value Date    HGBA1C 16.1 (H) 08/16/2024   A1c poorly controlled.   Fasting BS   BS liable, and given hx of DM medication non-compliance patient may be a good candidate for insulin pump. I reached  out to patients Endocrinologist who felt she was not a good candidate for insulin pump, as she had previously difficulty in getting her insulin supply on numerous accounts and patient has to show compliance to carb counting and flexible insulin therapy prior to insulin pump.   I will work with patient on carb counting education and recommend follow up with Endo outpatient.   Current regime Glargine 42u HS (increased as of 12/2 from 40u), Humalog 10u TID  Adjust regime as appropriate   A1c ordered for 12/10  Avoid hypoglycemia   Continue Carbohydrate controlled diet  Continue to monitor BS  Orders:  -     insulin lispro (HumALOG/ADMELOG) 100 units/mL injection; 10units TID with hold parameters  -     insulin glargine (LANTUS) 100 units/mL subcutaneous injection; Inject 42 units at bedtime  7. Severe episode of recurrent major depressive disorder, without psychotic features (HCC)  Assessment & Plan:  Per patient mood is stable  No reported behavioral disturbance   Continue Cymbalta, Mirtazapine and Risperdal  Continue supportive measures  Encourage activity and engagement  Will continue care in collaboration with psychiatry services prn  Continue to monitor for acute changes in condition   8. Ambulatory dysfunction  Assessment & Plan:  Multifactorial- acute and chronic conditions  Continue PT/OT   Assist with ADLs  Encourage PO nutrition and hydration.  Encourage appropriate DME use    following for discharge planning.  Maintain fall and safety precautions.    9. Hypomagnesemia  Assessment & Plan:  Last Mg on file 1.7 from 9/2024  12/4 Mg 1.5 low normal   Continue Mg oxide daily  Continue to monitor, repeat approx 2-3 weeks   10. Sinus tachycardia  Assessment & Plan:  Patient noted to have tachycardia inpatient thought to be due to hypotension and side effects of minipress and ongoing diarrhea.   Although tachycardia continued. TSH was checked which was normal per records, cardiology was consulted, Echo  normal.     Upon review of records since admission to SNF, patient has had steady intermittent tachycardia greater than 50% of readings.   Patient denies any cardiac complaints  EKG from 9/11 showed sinus tachycardia; repeat EKG 12/4 normal sinus rhythm   12/4 TSH 1.26   12/4 BMP generally unremarkable   Not presently on rate control medication   Cardiology appointment 12/11  Continue to monitor for acute changes.   11. Chronic pain disorder  Assessment & Plan:  Chronic multi joint pain, including migraines   Continue Fiorinal PRN, Tramadol ER 200mg daily PRN, Tylenol 650mg Q6 hours PRN, Baclofen 5mg BID and Gabapentin 800mg TID  Continue to monitor pain.   Wean/adjust as appropriate   Orders:  -     traMADol (ULTRAM-ER) 200 MG 24 hr tablet; Take 1 tablet (200 mg total) by mouth daily as needed for moderate pain       All medications and routine orders were reviewed and updated as needed.    Chief Complaint     STR follow up visit    Past Medical and Surgica History      Past Medical History:   Diagnosis Date    Acute venous embolism and thrombosis of deep vessels of distal lower extremity (HCC)     Anemia     Anxiety     last assessed 11/20/17    Arthritis     Asthma     Cerebral infarction (HCC)     unspecified, last assessed 11/14/16    Chest pain     last assessed 5/9/17    Chronic cough     last assessed 12/12/13    Depression     Diabetes mellitus, type 2 (HCC)     DJD (degenerative joint disease)     Esophageal reflux     Fibromyalgia     GERD without esophagitis     resolved 5/13/16    History of pulmonary embolism     Hyperlipidemia     Hypertension     Hypothyroidism     Iron deficiency     Pancreatitis     Panic attack     Panic disorder     Polyarthritis     PTSD (post-traumatic stress disorder)     Sleep difficulties     Stroke syndrome     Thyroid disease     TIA (transient ischemic attack)     TIA (transient ischemic attack)     Venous embolism and thrombosis of deep vessels of distal lower extremity  (HCC)     Vitamin B12 deficiency     Vitamin D deficiency      Past Surgical History:   Procedure Laterality Date    CARPAL TUNNEL RELEASE Right     neuroplasty decompression of median nerve    CATARACT EXTRACTION      CHOLECYSTECTOMY      ERCP      ERCP      ESOPHAGOGASTRIC FUNDOPLASTY      NISSEN FUNDOPLICATION      PATELLA SURGERY Left 12/2021    TX ESOPHAGOGASTRODUODENOSCOPY TRANSORAL DIAGNOSTIC N/A 11/02/2016    Procedure: EGD AND COLONOSCOPY;  Surgeon: Jatin Shepherd MD;  Location: [facility];  Service: Gastroenterology    TX NDSC WRST SURG W/RLS TRANSVRS CARPL LIGM Right 03/08/2016    Procedure: RELEASE CARPAL TUNNEL ENDOSCOPIC;  Surgeon: Guero Restrepo MD;  Location: [facility];  Service: Orthopedics    TX TENDON SHEATH INCISION Right 03/08/2016    Procedure: RELEASE TRIGGER FINGER RIGHT THUMB;  Surgeon: Guero Restrepo MD;  Location: [facility];  Service: Orthopedics    TONSILLECTOMY AND ADENOIDECTOMY       Allergies   Allergen Reactions    Medical Tape Rash    Lexapro [Escitalopram Oxalate]     Escitalopram Other (See Comments) and Palpitations    Other Hives and Rash     Adhesive Tape        History of Present Illness     Barb Palomo is a  54-year-old female with PMH of DM2, pancreatic insufficiency, Bipolar disorder, sinus tachycardia, HTN, and GERD. Patient was originally hospitalized from 8/15-9/16 after a fall at home and having back pain. She was managed for pain and chronic diarrhea. She was evaluated by GI and ID. Found to have cdiff and treated with antibiotics. She was also found to have a UTI for which she was treated. She continued to have diarrhea and overall weight loss and failure to thrive. She then had FMT on 9/6/24 with improvement of diarrhea and had medication adjustments. She was also hypotensive and tachycardic and improved with diarrhea treatment but continues to have intermittent tachycardia. She had also stopped her DM2 medications and found to have uncontrolled glucose  "levels and was started back on medications. She was also evaluated by endocrinology during her stay. Subsequently discharged to [facility] for therapy.   On 10/10 patient went to ED due to right ankle pain for about 2 to 3 days, per records X-ray shows what is likely a subacute fibular fracture.  Patient was splinted and discharge back to facility with referral to Ortho.     Patient being seen and examined for follow up on acute and chronic medical conditions. Upon exam patient is lying in bed she states her hip and ankle pain is overall improving, she feels the US therapy is working. Patient reports intermittent dizziness upon rising. Patient appetite is \"good\", significant amount of snacks noted at bedside, however patient reports candies are mostly sugar free. Patient is having regular formed bowel movements, last BM this morning. Patient offers no further medical complaints at this time. Patient is in no acute distress, denies CP, SOB, palpitations, abdominal pain, N/V/C/D.   Patient has MRI on 12/9, Pain Management 12/16, Podiatry 12/17 and Cardiology on 12/11.         The patient's allergies, past medical, surgical, social and family history were reviewed and unchanged.    Review of Systems     Review of Systems   Constitutional:  Negative for appetite change, chills, fatigue and fever.   HENT:  Negative for congestion, rhinorrhea, sore throat and trouble swallowing.    Respiratory:  Negative for cough and shortness of breath.    Cardiovascular:  Negative for chest pain and palpitations.   Gastrointestinal:  Negative for abdominal distention, abdominal pain, blood in stool, constipation, diarrhea, nausea and vomiting.   Genitourinary:  Negative for difficulty urinating, dysuria and hematuria.   Musculoskeletal:  Positive for arthralgias (improving) and gait problem.   Neurological:  Positive for weakness and light-headedness (intermittent upon rising). Negative for headaches.   All other systems reviewed and " are negative.      Objective     Vitals:   Vitals:    12/09/24 1637   BP: 129/70   Pulse: 105   Resp: 18   Temp: 98.7 °F (37.1 °C)   SpO2: 99%       Labs Reviewed  CBC:   Results from Last 12 Months   Lab Units 09/13/24  0455   WBC Thousand/uL 5.08   RBC Million/uL 3.26*   HEMOGLOBIN g/dL 10.7*   HEMATOCRIT % 32.9*   MCV fL 101*   MCH pg 32.8   MCHC g/dL 32.5   RDW % 14.2   MPV fL 10.7   PLATELETS Thousands/uL 270   NRBC AUTO /100 WBCs 0   SEGS PCT % 40*   LYMPHO PCT % 45*   MONO PCT % 10   EOS PCT % 4   BASOS PCT % 1   TOTAL NEUT ABS Thousands/µL 2.04   LYMPHS ABS Thousands/µL 2.28   MONOS ABS Thousand/µL 0.49   EOS ABS Thousand/µL 0.20     Chemistry Profile:   Results from Last 12 Months   Lab Units 09/23/24  0427 09/13/24  0455 09/04/24  2342 09/04/24  0603 09/03/24  0637 09/02/24  0706 08/25/24  0526 08/24/24  0746   POTASSIUM mmol/L 4.4 4.2   < > 4.3   < > 3.8   < > 3.9   CHLORIDE mmol/L 102 107   < > 105   < > 103   < > 112*   CO2 mmol/L 30 30   < > 26   < > 29   < > 24   BUN mg/dL 18 20   < > 15   < > 16   < > 7   CREATININE mg/dL 0.48 0.55*   < > 0.49*   < > 0.42*   < > 0.30*   GLUCOSE RANDOM mg/dL 601* 54*   < > 285*   < > 256*   < > 310*   CALCIUM mg/dL 9.3 9.2   < > 9.2   < > 9.2   < > 7.2*   CORRECTED CALCIUM mg/dL  --   --   --   --   --   --   --  8.2*   MAGNESIUM mg/dL  --  1.7*   < > 1.6*   < > 1.5*   < > 1.4*   PHOSPHORUS mg/dL  --   --   --   --   --  4.5   < >  --    AST U/L  --   --   --  57*  --   --    < > 45*   ALT U/L  --   --   --  97*  --   --    < > 43   ALK PHOS U/L  --   --   --  207*  --   --    < > 131*   EGFR  113 107   < > 111   < > 117   < > 131    < > = values in this interval not displayed.       Physical Exam  Vitals and nursing note reviewed.   Constitutional:       General: She is not in acute distress.     Appearance: Normal appearance. She is not ill-appearing.      Comments: Frail appearing    HENT:      Head: Normocephalic and atraumatic.      Right Ear: External ear  normal.      Left Ear: External ear normal.      Nose: Nose normal. No congestion or rhinorrhea.      Mouth/Throat:      Mouth: Mucous membranes are dry.   Eyes:      General: No scleral icterus.        Right eye: No discharge.         Left eye: No discharge.      Conjunctiva/sclera: Conjunctivae normal.   Cardiovascular:      Rate and Rhythm: Tachycardia present. Rhythm irregular.   Pulmonary:      Effort: No respiratory distress.      Breath sounds: No wheezing, rhonchi or rales.   Abdominal:      General: Bowel sounds are normal. There is no distension.      Tenderness: There is no abdominal tenderness. There is no guarding or rebound.   Musculoskeletal:      Right lower leg: No edema.      Left lower leg: No edema.   Skin:     General: Skin is warm and dry.      Findings: No bruising.   Neurological:      Mental Status: She is alert and oriented to person, place, and time.      Motor: Weakness present.      Gait: Gait abnormal.   Psychiatric:         Mood and Affect: Mood normal.         Behavior: Behavior normal.         Thought Content: Thought content normal.         Pertinent Laboratory/Diagnostic Studies:   Reviewed in facility chart-stable    Current Medications   Medications reviewed and updated see facility MAR for details.      Current Outpatient Medications:     acetaminophen (TYLENOL) 650 mg CR tablet, Take 650 mg by mouth every 6 (six) hours as needed for mild pain, Disp: , Rfl:     Cholecalciferol (VITAMIN D3) 1,000 units tablet, Take 1,000 Units by mouth daily, Disp: , Rfl:     insulin glargine (LANTUS) 100 units/mL subcutaneous injection, Inject 42 units at bedtime, Disp: , Rfl:     insulin lispro (HumALOG/ADMELOG) 100 units/mL injection, 10units TID with hold parameters, Disp: , Rfl:     risperiDONE (RisperDAL M-TAB) 0.5 mg disintegrating tablet, Take 0.5 mg by mouth 2 (two) times a day, Disp: , Rfl:     traMADol (ULTRAM-ER) 200 MG 24 hr tablet, Take 1 tablet (200 mg total) by mouth daily as  needed for moderate pain, Disp: 15 tablet, Rfl: 0    Albuterol Sulfate (ProAir RespiClick) 108 (90 Base) MCG/ACT AEPB, Inhale 2 puffs every 6 (six) hours as needed (wheezing), Disp: 1 each, Rfl: 1    aspirin 81 mg chewable tablet, Chew 81 mg daily  (Patient not taking: Reported on 11/18/2024), Disp: , Rfl:     atorvastatin (LIPITOR) 80 mg tablet, TAKE 1 TABLET BY MOUTH EVERY DAY, Disp: 30 tablet, Rfl: 5    baclofen 5 MG TABS, Take 5 mg by mouth 2 (two) times a day, Disp: 60 tablet, Rfl: 0    BD PEN NEEDLE LINDY U/F 32G X 4 MM MISC, Inject under the skin daily, Disp: 30 each, Rfl: 5    Blood Glucose Monitoring Suppl (OneTouch Verio Reflect) w/Device KIT, CHECK BLOOD SUGARS FOUR TIMES DAILY. PLEASE SUBSTITUTE WITH APPROPRIATE ALTERNATIVE AS COVERED BY PATIENT'S INSURANCE. DX: E11.65, Disp: 1 kit, Rfl: 0    butalbital-aspirin-caffeine (FIORINAL) -40 mg capsule, Take 1 capsule by mouth every 6 (six) hours as needed for headaches or migraine . Please limit 3-4 per week, 15 per month., Disp: 15 capsule, Rfl: 0    cholestyramine sugar free (QUESTRAN LIGHT) 4 g packet, Take 1 packet (4 g total) by mouth 2 (two) times a day Hold for copnstipation, Disp: 60 packet, Rfl: 0    dicyclomine (BENTYL) 10 mg capsule, Take 1 capsule (10 mg total) by mouth 4 (four) times a day as needed (abdominal pain or diarrhea), Disp: 120 capsule, Rfl: 11    DULoxetine (CYMBALTA) 60 mg delayed release capsule, Take 1 capsule (60 mg total) by mouth 2 (two) times a day, Disp: 180 capsule, Rfl: 1    famotidine (PEPCID) 20 mg tablet, Take 1 tablet (20 mg total) by mouth 2 (two) times a day, Disp: 60 tablet, Rfl: 0    Fluticasone-Salmeterol (Advair Diskus) 500-50 mcg/dose inhaler, Inhale 1 puff by mouth 2 times daily.  Rinse mouth after use, Disp: 180 blister, Rfl: 1    gabapentin (NEURONTIN) 400 mg capsule, TAKE 2 CAPSULES BY MOUTH 3 TIMES A DAY, Disp: 180 capsule, Rfl: 5    Insulin Pen Needle (BD Pen Needle Lindy U/F) 32G X 4 MM MISC, Use 2 (two)  "times a day, Disp: 200 each, Rfl: 3    Insulin Syringe-Needle U-100 31G X 5/16\" 0.3 ML MISC, Use twice daily, Disp: 60 each, Rfl: 5    levothyroxine 112 mcg tablet, TAKE 1 TABLET BY MOUTH EVERY DAY, Disp: 90 tablet, Rfl: 1    Lidocaine 4 % PTCH, Apply topically, Disp: , Rfl:     mirtazapine (REMERON) 7.5 MG tablet, Take 1 tablet (7.5 mg total) by mouth daily at bedtime, Disp: 90 tablet, Rfl: 1    nicotine (NICODERM CQ) 21 mg/24 hr TD 24 hr patch, Place 1 patch on the skin over 24 hours daily, Disp: 28 patch, Rfl: 0    OneTouch Delica Lancets 33G MISC, Check blood sugars four times daily. Please substitute with appropriate alternative as covered by patient's insurance. Dx: E11.65, Disp: 400 each, Rfl: 3    OneTouch Ultra test strip, USE AS DIRECTED TO TEST 3 TIMES A DAY, Disp: 100 strip, Rfl: 0    pancrelipase, Lip-Prot-Amyl, (CREON) 24,000 units, Take 1 capsule (24,000 Units total) by mouth 3 (three) times a day with meals, Disp: 90 capsule, Rfl: 0    simethicone (MYLICON) 125 MG chewable tablet, Chew 1 tablet (125 mg total) 3 (three) times a day as needed for flatulence, Disp: 90 tablet, Rfl: 11       Please note:  Voice-recognition software may have been used in the preparation of this document.  Occasional wrong word or \"sound-alike\" substitutions may have occurred due to the inherent limitations of voice recognition software.  Interpretation should be guided by context.     I have spent a total time of 47 minutes in caring for this patient on the day of the visit/encounter including Diagnostic results, Prognosis, Risks and benefits of tx options, Instructions for management, Patient and family education, Importance of tx compliance, Risk factor reductions, Impressions, Counseling / Coordination of care, Documenting in the medical record, Reviewing / ordering tests, medicine, procedures  , and Obtaining or reviewing history  .      ELISEO Brar    "

## 2024-12-10 ENCOUNTER — TELEPHONE (OUTPATIENT)
Dept: ENDOCRINOLOGY | Facility: CLINIC | Age: 54
End: 2024-12-10

## 2024-12-10 DIAGNOSIS — E11.65 UNCONTROLLED TYPE 2 DIABETES MELLITUS WITH HYPERGLYCEMIA (HCC): Primary | ICD-10-CM

## 2024-12-10 RX ORDER — INSULIN LISPRO 100 [IU]/ML
INJECTION, SOLUTION INTRAVENOUS; SUBCUTANEOUS
Status: ON HOLD
Start: 2024-12-10

## 2024-12-10 RX ORDER — RISPERIDONE 0.5 MG/1
0.5 TABLET, ORALLY DISINTEGRATING ORAL 2 TIMES DAILY
Status: ON HOLD | COMMUNITY

## 2024-12-10 RX ORDER — INSULIN GLARGINE 100 [IU]/ML
INJECTION, SOLUTION SUBCUTANEOUS
Status: ON HOLD
Start: 2024-12-10

## 2024-12-10 RX ORDER — SENNOSIDES 8.6 MG
650 CAPSULE ORAL EVERY 6 HOURS PRN
Status: ON HOLD | COMMUNITY

## 2024-12-10 NOTE — ASSESSMENT & PLAN NOTE
Patient reports no recent episodes of migraine   Continue with prn fioricet  Continue monitoring symptoms

## 2024-12-10 NOTE — ASSESSMENT & PLAN NOTE
Lab Results   Component Value Date    HGBA1C 16.1 (H) 08/16/2024   A1c poorly controlled.   Fasting BS   BS liable, and given hx of DM medication non-compliance patient may be a good candidate for insulin pump. I reached out to patients Endocrinologist who felt she was not a good candidate for insulin pump, as she had previously difficulty in getting her insulin supply on numerous accounts and patient has to show compliance to carb counting and flexible insulin therapy prior to insulin pump.   I will work with patient on carb counting education and recommend follow up with Endo outpatient.   Current regime Glargine 42u HS (increased as of 12/2 from 40u), Humalog 10u TID  Adjust regime as appropriate   A1c ordered for 12/10  Avoid hypoglycemia   Continue Carbohydrate controlled diet  Continue to monitor BS

## 2024-12-10 NOTE — ASSESSMENT & PLAN NOTE
Chronic multi joint pain, including migraines   Continue Fiorinal PRN, Tramadol ER 200mg daily PRN, Tylenol 650mg Q6 hours PRN, Baclofen 5mg BID and Gabapentin 800mg TID  Continue to monitor pain.   Wean/adjust as appropriate

## 2024-12-10 NOTE — ASSESSMENT & PLAN NOTE
BP variable, generally SBP 90s-140s at times lower/higher.   Patient reports to symptomatic orthostatic lightheadedness  HR 80s-110s, however generally in the high 90s/100s   No acute cardiac complaints  Avoid hypotension  Continue to monitor BP  Not presently on antihypertensive or beta blocker  Continue to monitor closely.   Patient has Cardiology appointment 12/11

## 2024-12-10 NOTE — ASSESSMENT & PLAN NOTE
Continue with CCD diet regular texture with thin consistency  Continue monitoring for aspiration  Continue to encourage oral intake   Monitor for acute changes in conditions

## 2024-12-10 NOTE — ASSESSMENT & PLAN NOTE
Multifactorial- acute and chronic conditions  Continue PT/OT   Assist with ADLs  Encourage PO nutrition and hydration.  Encourage appropriate DME use    following for discharge planning.  Maintain fall and safety precautions.

## 2024-12-10 NOTE — ASSESSMENT & PLAN NOTE
Last Mg on file 1.7 from 9/2024 12/4 Mg 1.5 low normal   Continue Mg oxide daily  Continue to monitor, repeat approx 2-3 weeks

## 2024-12-10 NOTE — ASSESSMENT & PLAN NOTE
Stable per patient  Continue Pepcid  Avoid citrus, spicy, caffeine, and chocolate  Avoid eating/drinking 1 hour prior to laying down  OOB with meals  Continue monitoring symptoms

## 2024-12-10 NOTE — ASSESSMENT & PLAN NOTE
Patient noted to have tachycardia inpatient thought to be due to hypotension and side effects of minipress and ongoing diarrhea.   Although tachycardia continued. TSH was checked which was normal per records, cardiology was consulted, Echo normal.     Upon review of records since admission to SNF, patient has had steady intermittent tachycardia greater than 50% of readings.   Patient denies any cardiac complaints  EKG from 9/11 showed sinus tachycardia; repeat EKG 12/4 normal sinus rhythm   12/4 TSH 1.26   12/4 BMP generally unremarkable   Not presently on rate control medication   Cardiology appointment 12/11  Continue to monitor for acute changes.

## 2024-12-10 NOTE — ASSESSMENT & PLAN NOTE
With Chronic intermittent diarrhea  Per patient recently stool has been formed, denies any recent episodes of diarrhea.   Continue Juan J HINKLE  [id number] UNIT: 1 tablet TID  Continue to monitor

## 2024-12-11 ENCOUNTER — CONSULT (OUTPATIENT)
Dept: CARDIOLOGY CLINIC | Facility: CLINIC | Age: 54
End: 2024-12-11
Payer: MEDICARE

## 2024-12-11 ENCOUNTER — DOCUMENTATION (OUTPATIENT)
Dept: CARDIOLOGY CLINIC | Facility: CLINIC | Age: 54
End: 2024-12-11

## 2024-12-11 VITALS
HEART RATE: 115 BPM | DIASTOLIC BLOOD PRESSURE: 50 MMHG | HEIGHT: 62 IN | WEIGHT: 124.1 LBS | BODY MASS INDEX: 22.84 KG/M2 | OXYGEN SATURATION: 99 % | SYSTOLIC BLOOD PRESSURE: 80 MMHG

## 2024-12-11 DIAGNOSIS — I95.1 ORTHOSTATIC HYPOTENSION: ICD-10-CM

## 2024-12-11 DIAGNOSIS — F17.200 TOBACCO DEPENDENCE: ICD-10-CM

## 2024-12-11 DIAGNOSIS — R00.0 SINUS TACHYCARDIA: ICD-10-CM

## 2024-12-11 DIAGNOSIS — R06.09 DOE (DYSPNEA ON EXERTION): ICD-10-CM

## 2024-12-11 DIAGNOSIS — E78.5 DYSLIPIDEMIA: ICD-10-CM

## 2024-12-11 PROCEDURE — 99214 OFFICE O/P EST MOD 30 MIN: CPT | Performed by: INTERNAL MEDICINE

## 2024-12-11 NOTE — PROGRESS NOTES
Cardiology Follow Up    Barb Palomo  1970  [id number]  [facility]  [address] [facility]  [place] [id number] [phone]    1. OLIVIER (dyspnea on exertion)  NM myocardial perfusion spect (rx stress and/or rest)      2. Sinus tachycardia  Ambulatory Referral to Cardiology    AMB extended holter monitor      3. Orthostatic hypotension        4. Dyslipidemia        5. Tobacco dependence            Discussion/Summary:  Ms. Palomo is a pleasant 54-year-old female who presents to the office today for the re-evaluation of tachycardia.    Her blood pressure is very low in the office today.  She reports symptoms of orthostasis.  Orthostatic vital signs were attempted.  Her blood pressure did fall slightly but she could not stand for the full three minutes as she was symptomatic with lightheadedness and diaphoresis.  Hence I have advised a trial of midodrine 2.5 mg three times daily.  I have asked that the facility to send me blood pressure and pulse readings in a few weeks for review.    Sinus tachycardia persists despite weight gain and resolution of diarrhea.  She may have a component of inappropriate sinus tachycardia.  I requested a 3-day Zio patch for further evaluation.  She had an updated echocardiogram recently which was unremarkable.  She does intermittently feel palpitations although her blood pressure is too low for initiation of any AV saroj blocking agents.  I will await her monitor and can discuss the initiation of Corlanor thereafter.     Her most recent lipids do reveal an acceptable LDL on a high intensity statin regimen.  She does have a high triglyceride level for which therapeutic lifestyle modifications were recommended.    Given her dyspnea on exertion and multiple risk factors for coronary artery disease a pharmacologic nuclear stress test has been requested.    I will see her back in the office once the  "above-noted testing is complete.    Interval History: Ms. Palomo is a 54-year-old female who presents to the office today for follow-up.  She was seen by one of my partners when she was admitted to [facility] in [place] in the setting of generalized weakness and ongoing diarrhea.  There was a 70 to 80 pound weight loss over the past year.  In this setting she had sinus tachycardia and hypotension.  It was felt that this was a physiologic response to multiple factors including ongoing pain, recurrent diarrhea and volume depletion along with medications including Minipress.  She underwent an echocardiogram which was unremarkable.    She currently resides at a rehabilitation facility.  She does physical and occupational therapy five days a week.  She reports ongoing shortness of breath with exertion.  This happens if she walks for about a block.  She has to stop to recover.  She denies any exertional chest pain.  She does report intermittent palpitations throughout the day where she feels like her heart is racing.  This occurs when her blood pressure is low which happens on a daily basis.  She does report some lightheadedness with standing on a pretty consistent basis.  She states that she was walking with physical therapy in the recent past and had a near syncopal episode.  She does attempt to remain well-hydrated throughout the day drinking a lot of water.  She eats three meals a day.  She no longer experiences diarrhea.  She reports lower extremity edema, worse as the day goes on.  She denies any other signs or symptoms of congestive heart failure including paroxysmal nocturnal dyspnea, orthopnea, acute weight gain or increasing abdominal girth.  She denies symptoms of claudication.    She reports she was previously maintained on metoprolol for hypertension.  This was discontinued a number of years ago.  She also reports having had a tilt table test which she\" failed\" a number of years " ago.    She has a history of tobacco abuse and smoked up to a pack and a half of cigarettes per day and quit a few months ago.    She reports her mother had a myocardial infarction and stenting in her 40s.  Her dad had a myocardial infarction in his 50s.    Medical Problems       Problem List       Severe episode of recurrent major depressive disorder, without psychotic features (HCC)    Gastroesophageal reflux disease with esophagitis without hemorrhage    History of decompression of median nerve    Hypothyroidism    Protein calorie malnutrition (HCC)    Malnutrition Findings:                                 BMI Findings:           Body mass index is 22.7 kg/m².         Tobacco dependence    Post-traumatic stress disorder, unspecified    Benign essential hypertension    Chronic fatigue, unspecified    Fatty liver    Hyperlipidemia    Insomnia    Iron deficiency    Chronic diarrhea    Opioid dependence with other opioid-induced disorder (HCC)    Pancreatic cyst    Vitamin B12 deficiency    Vitamin D deficiency    Reactive airway disease without complication    Temporary cerebral vascular dysfunction    History of pulmonary embolism    Arthralgia of temporomandibular joint    Carpal tunnel syndrome    Dysphagia    Chronic migraine without aura, not intractable, without status migrainosus    Diplopia    ROGERIO positive    Symptom associated with female genital organs    Irregular menstrual cycle    Female stress incontinence    Decreased libido    Chronic cervical pain    Paresthesia and pain of both upper extremities    Paresthesia of both lower extremities    Chronic pain of both shoulders    Cervical radiculopathy    Lumbar radiculopathy    DDD (degenerative disc disease), cervical    DDD (degenerative disc disease), lumbar    Low back pain with sciatica    Cervical spinal stenosis    Cervical spondylosis without myelopathy    Chronic pain disorder    Overview Signed 10/11/2022 12:05 PM by Prasanna Mendez DO    Formatting of this note might be different from the original.  Uses Tramadol PRN         Neuropathy    Weakness of right upper extremity    Nausea    Chronic prescription benzodiazepine use    Uncontrolled type 2 diabetes mellitus with hyperglycemia ([facility])      Lab Results   Component Value Date    HGBA1C 16.1 (H) 08/16/2024         S/P left knee arthroscopy    Unspecified psychosis not due to a substance or known physiological condition ([facility])    Pancolitis ([facility])    Agoraphobia with panic disorder    Bipolar II disorder ([facility])    Polyarthritis, unspecified    Chronic migraine without aura without status migrainosus, not intractable    HHNC (hyperglycemic hyperosmolar nonketotic coma) ([facility])      Lab Results   Component Value Date    HGBA1C 16.1 (H) 08/16/2024         Abdominal pain    Severe protein-calorie malnutrition ([facility])    Malnutrition Findings:                                 BMI Findings:           Body mass index is 22.7 kg/m².         Hypomagnesemia    Failure to thrive in adult    Sinus tachycardia    Hypotension    Bacteriuria    Personal history of transient ischemic attack (TIA), and cerebral infarction without residual deficits    Type 2 diabetes mellitus with other specified complication ([facility])      Lab Results   Component Value Date    HGBA1C 16.1 (H) 08/16/2024         Fibromyalgia    Other chronic pain    Migraine, unspecified, not intractable, without status migrainosus    Ambulatory dysfunction    MARITO (generalized anxiety disorder)    Pancreatic insufficiency    Acute right ankle pain    Fibula fracture    Pain in right foot    Swelling of ankle joint, right        Past Medical History:   Diagnosis Date    Acute venous embolism and thrombosis of deep vessels of distal lower extremity ([facility])     Anemia     Anxiety     last assessed 11/20/17    Arthritis     Asthma     Cerebral infarction ([facility])     unspecified, last assessed 11/14/16    Chest pain     last assessed 5/9/17    Chronic cough     last  assessed 12/12/13    Depression     Diabetes mellitus, type 2 (HCC)     DJD (degenerative joint disease)     Esophageal reflux     Fibromyalgia     GERD without esophagitis     resolved 5/13/16    History of pulmonary embolism     Hyperlipidemia     Hypertension     Hypothyroidism     Iron deficiency     Pancreatitis     Panic attack     Panic disorder     Polyarthritis     PTSD (post-traumatic stress disorder)     Sleep difficulties     Stroke syndrome     Thyroid disease     TIA (transient ischemic attack)     TIA (transient ischemic attack)     Venous embolism and thrombosis of deep vessels of distal lower extremity (HCC)     Vitamin B12 deficiency     Vitamin D deficiency      Social History     Socioeconomic History    Marital status: /Civil Union     Spouse name: Not on file    Number of children: 1    Years of education: technical school    Highest education level: Associate degree: occupational, technical, or vocational program   Occupational History    Occupation: unemployed     Comment: SSDI   Tobacco Use    Smoking status: Every Day     Current packs/day: 1.00     Average packs/day: 1 pack/day for 41.9 years (41.9 ttl pk-yrs)     Types: Cigarettes     Start date: 1/1/1983    Smokeless tobacco: Never    Tobacco comments:     20 + years   Vaping Use    Vaping status: Former    Substances: Nicotine   Substance and Sexual Activity    Alcohol use: Not Currently     Alcohol/week: 0.0 standard drinks of alcohol     Comment: last was in 2010 after DUI    Drug use: No    Sexual activity: Yes     Partners: Male   Other Topics Concern    Not on file   Social History Narrative    No coffee consumption     Social Drivers of Health     Financial Resource Strain: High Risk (2/12/2021)    Overall Financial Resource Strain (CARDIA)     Difficulty of Paying Living Expenses: Very hard   Food Insecurity: Patient Unable To Answer (8/16/2024)    Nursing - Inadequate Food Risk Classification     Worried About Running Out  of Food in the Last Year: Patient unable to answer     Ran Out of Food in the Last Year: Patient unable to answer     Ran Out of Food in the Last Year: Not on file   Transportation Needs: No Transportation Needs (8/16/2024)    PRAPARE - Transportation     Lack of Transportation (Medical): No     Lack of Transportation (Non-Medical): No   Recent Concern: Transportation Needs - Unmet Transportation Needs (6/20/2024)    PRAPARE - Transportation     Lack of Transportation (Medical): Yes     Lack of Transportation (Non-Medical): No   Physical Activity: Inactive (8/19/2020)    Exercise Vital Sign     Days of Exercise per Week: 0 days     Minutes of Exercise per Session: 0 min   Stress: Stress Concern Present (8/19/2020)    [employer] - Occupational Stress Questionnaire     Feeling of Stress : Rather much   Social Connections: Moderately Isolated (8/19/2020)    Social Connection and Isolation Panel [NHANES]     Frequency of Communication with Friends and Family: Three times a week     Frequency of Social Gatherings with Friends and Family: Never     Attends [employer] Services: Never     Active Member of Clubs or Organizations: No     Attends Club or Organization Meetings: Never     Marital Status:    Intimate Partner Violence: Not At Risk (8/19/2020)    Humiliation, Afraid, Rape, and Kick questionnaire     Fear of Current or Ex-Partner: No     Emotionally Abused: No     Physically Abused: No     Sexually Abused: No   Housing Stability: High Risk (8/16/2024)    Housing Stability Vital Sign     Unable to Pay for Housing in the Last Year: Yes     Number of Times Moved in the Last Year: 1     Homeless in the Last Year: No      Family History   Problem Relation Age of Onset    Diabetes Mother         type 2    Heart attack Mother 39        acute MI    Kidney disease Mother         CKD NKF classfication    Depression Mother     Arthritis Mother     Substance Abuse Mother         mother OD in  past on MS04    Ulcerative colitis Mother     Schizophrenia Mother     Suicide Attempts Mother     Bipolar disorder Mother     Diabetes Maternal Grandmother     Heart attack Father         acute MI    Psoriasis Father     Cancer Father         gastric cancer    Stroke Father     Crohn's disease Sister     Bipolar disorder Sister     Rheum arthritis Maternal Grandfather     Throat cancer Maternal Grandfather     Suicide Attempts Daughter     Drug abuse Daughter     Lung cancer Paternal Grandmother     Cancer Paternal Grandfather     No Known Problems Sister     Bipolar disorder Maternal Uncle     Anesthesia problems Neg Hx      Past Surgical History:   Procedure Laterality Date    CARPAL TUNNEL RELEASE Right     neuroplasty decompression of median nerve    CATARACT EXTRACTION      CHOLECYSTECTOMY      ERCP      ERCP      ESOPHAGOGASTRIC FUNDOPLASTY      NISSEN FUNDOPLICATION      PATELLA SURGERY Left 12/2021    SD ESOPHAGOGASTRODUODENOSCOPY TRANSORAL DIAGNOSTIC N/A 11/02/2016    Procedure: EGD AND COLONOSCOPY;  Surgeon: Jatin Shepherd MD;  Location: [facility];  Service: Gastroenterology    SD NDSC WRST SURG W/RLS TRANSVRS CARPL LIGM Right 03/08/2016    Procedure: RELEASE CARPAL TUNNEL ENDOSCOPIC;  Surgeon: Guero Restrepo MD;  Location: [facility];  Service: Orthopedics    SD TENDON SHEATH INCISION Right 03/08/2016    Procedure: RELEASE TRIGGER FINGER RIGHT THUMB;  Surgeon: Guero Restrepo MD;  Location: [facility];  Service: Orthopedics    TONSILLECTOMY AND ADENOIDECTOMY         Current Outpatient Medications:     acetaminophen (TYLENOL) 650 mg CR tablet, Take 650 mg by mouth every 6 (six) hours as needed for mild pain, Disp: , Rfl:     Albuterol Sulfate (ProAir RespiClick) 108 (90 Base) MCG/ACT AEPB, Inhale 2 puffs every 6 (six) hours as needed (wheezing), Disp: 1 each, Rfl: 1    atorvastatin (LIPITOR) 80 mg tablet, TAKE 1 TABLET BY MOUTH EVERY DAY, Disp: 30 tablet, Rfl: 5    baclofen 5 MG TABS, Take 5 mg by  "mouth 2 (two) times a day, Disp: 60 tablet, Rfl: 0    BD PEN NEEDLE LINDY U/F 32G X 4 MM MISC, Inject under the skin daily, Disp: 30 each, Rfl: 5    Blood Glucose Monitoring Suppl (OneTouch Verio Reflect) w/Device KIT, CHECK BLOOD SUGARS FOUR TIMES DAILY. PLEASE SUBSTITUTE WITH APPROPRIATE ALTERNATIVE AS COVERED BY PATIENT'S INSURANCE. DX: E11.65, Disp: 1 kit, Rfl: 0    butalbital-aspirin-caffeine (FIORINAL) -40 mg capsule, Take 1 capsule by mouth every 6 (six) hours as needed for headaches or migraine . Please limit 3-4 per week, 15 per month., Disp: 15 capsule, Rfl: 0    Cholecalciferol (VITAMIN D3) 1,000 units tablet, Take 1,000 Units by mouth daily, Disp: , Rfl:     cholestyramine sugar free (QUESTRAN LIGHT) 4 g packet, Take 1 packet (4 g total) by mouth 2 (two) times a day Hold for copnstipation, Disp: 60 packet, Rfl: 0    dicyclomine (BENTYL) 10 mg capsule, Take 1 capsule (10 mg total) by mouth 4 (four) times a day as needed (abdominal pain or diarrhea), Disp: 120 capsule, Rfl: 11    DULoxetine (CYMBALTA) 60 mg delayed release capsule, Take 1 capsule (60 mg total) by mouth 2 (two) times a day, Disp: 180 capsule, Rfl: 1    famotidine (PEPCID) 20 mg tablet, Take 1 tablet (20 mg total) by mouth 2 (two) times a day, Disp: 60 tablet, Rfl: 0    Fluticasone-Salmeterol (Advair Diskus) 500-50 mcg/dose inhaler, Inhale 1 puff by mouth 2 times daily.  Rinse mouth after use, Disp: 180 blister, Rfl: 1    gabapentin (NEURONTIN) 400 mg capsule, TAKE 2 CAPSULES BY MOUTH 3 TIMES A DAY, Disp: 180 capsule, Rfl: 5    insulin glargine (LANTUS) 100 units/mL subcutaneous injection, Inject 42 units at bedtime, Disp: , Rfl:     insulin lispro (HumALOG/ADMELOG) 100 units/mL injection, 10units TID with hold parameters, Disp: , Rfl:     Insulin Pen Needle (BD Pen Needle Lindy U/F) 32G X 4 MM MISC, Use 2 (two) times a day, Disp: 200 each, Rfl: 3    Insulin Syringe-Needle U-100 31G X 5/16\" 0.3 ML MISC, Use twice daily, Disp: 60 each, " Rfl: 5    levothyroxine 112 mcg tablet, TAKE 1 TABLET BY MOUTH EVERY DAY, Disp: 90 tablet, Rfl: 1    Lidocaine 4 % PTCH, Apply topically, Disp: , Rfl:     mirtazapine (REMERON) 7.5 MG tablet, Take 1 tablet (7.5 mg total) by mouth daily at bedtime, Disp: 90 tablet, Rfl: 1    nicotine (NICODERM CQ) 21 mg/24 hr TD 24 hr patch, Place 1 patch on the skin over 24 hours daily, Disp: 28 patch, Rfl: 0    OneTouch Delica Lancets 33G MISC, Check blood sugars four times daily. Please substitute with appropriate alternative as covered by patient's insurance. Dx: E11.65, Disp: 400 each, Rfl: 3    OneTouch Ultra test strip, USE AS DIRECTED TO TEST 3 TIMES A DAY, Disp: 100 strip, Rfl: 0    pancrelipase, Lip-Prot-Amyl, (CREON) 24,000 units, Take 1 capsule (24,000 Units total) by mouth 3 (three) times a day with meals, Disp: 90 capsule, Rfl: 0    risperiDONE (RisperDAL M-TAB) 0.5 mg disintegrating tablet, Take 0.5 mg by mouth 2 (two) times a day, Disp: , Rfl:     simethicone (MYLICON) 125 MG chewable tablet, Chew 1 tablet (125 mg total) 3 (three) times a day as needed for flatulence, Disp: 90 tablet, Rfl: 11    traMADol (ULTRAM-ER) 200 MG 24 hr tablet, Take 1 tablet (200 mg total) by mouth daily as needed for moderate pain, Disp: 15 tablet, Rfl: 0    aspirin 81 mg chewable tablet, Chew 81 mg daily  (Patient not taking: Reported on 10/15/2024), Disp: , Rfl:   Allergies   Allergen Reactions    Medical Tape Rash    Lexapro [Escitalopram Oxalate]     Escitalopram Other (See Comments) and Palpitations    Other Hives and Rash     Adhesive Tape       Labs:     Chemistry        Component Value Date/Time     07/16/2016 1121    K 4.4 09/23/2024 0427     09/23/2024 0427    CO2 30 09/23/2024 0427    BUN 18 09/23/2024 0427    CREATININE 0.48 09/23/2024 0427        Component Value Date/Time    CALCIUM 9.3 09/23/2024 0427    ALKPHOS 207 (H) 09/04/2024 0603    ALKPHOS 112 01/15/2024 1416    AST 57 (H) 09/04/2024 0603    AST 17 01/15/2024  "1416    ALT 97 (H) 09/04/2024 0603    ALT 19 01/15/2024 1416    BILITOT 0.3 10/22/2016 1038            Lab Results   Component Value Date    CHOL 265 (H) 10/22/2016    CHOL 309 (H) 07/16/2016    CHOL 284 (H) 04/02/2016     Lab Results   Component Value Date    HDL 89 07/06/2022    HDL 54 10/22/2016    HDL 49 07/16/2016     Lab Results   Component Value Date    LDLCALC 86 07/06/2022    LDLCALC 109 (H) 05/07/2016    LDLCALC 131 (H) 02/26/2014     Lab Results   Component Value Date    TRIG 157 (H) 07/06/2022    TRIG 163 (H) 08/25/2017    TRIG 284 (H) 10/22/2016     No results found for: \"CHOLHDL\"    Imaging: XR ankle 3+ vw right  Result Date: 11/18/2024  Narrative: RIGHT ANKLE INDICATION:   Pain in right ankle and joints of right foot. COMPARISON:  None. VIEWS:  XR ANKLE 3+ VW RIGHT Images: 3 FINDINGS: There is no acute fracture or dislocation. Degenerative changes are present at the subtalar and calcaneocuboid joints. No lytic or blastic osseous lesion. Soft tissues are unremarkable.     Impression: Degenerative changes with no gross acute osseous abnormality. Electronically signed: 11/18/2024 08:25 PM Conor Francisco MD    XR hip/pelv 2-3 vws right if performed  Result Date: 11/18/2024  Narrative: RIGHT HIP INDICATION:   Pain in right hip. Unspecified fall, initial encounter. COMPARISON:  None. VIEWS:  XR HIP/PELV 2-3 VWS RIGHT W PELVIS IF PERFORMED Images: 3 FINDINGS: There is no acute fracture or dislocation. There are mild degenerative changes of the hip joint. No lytic or blastic osseous lesion. Soft tissues are unremarkable. The visualized lumbar spine is unremarkable.     Impression: Mild degenerative changes with no acute osseous abnormality. Electronically signed: 11/18/2024 04:55 PM Conor Francisco MD        Review of Systems   Constitutional: Positive for weight gain.   Cardiovascular:  Positive for dyspnea on exertion and near-syncope. Negative for chest pain, claudication and leg swelling.   Neurological:  " "Positive for light-headedness.   All other systems reviewed and are negative.      Vitals:    12/11/24 1451   BP: (!) 80/50   Pulse: (!) 115   SpO2: 99%     Vitals:    12/11/24 1451   Weight: 56.3 kg (124 lb 1.6 oz)     Height: 5' 2\" (157.5 cm)   Body mass index is 22.7 kg/m².    Physical Exam:   General appearance:  Appears stated age, alert, well appearing and in no distress  HEENT:  PERRLA, EOMI, no scleral icterus, no conjunctival pallor  NECK:  Supple, No elevated JVP, no thyromegaly, no carotid bruits  HEART:  Regular but tachycardic, normal S1/S2, no S3/S4, no murmur   LUNGS:  Clear to auscultation bilaterally  ABDOMEN:  Soft, non-tender, positive bowel sounds, no rebound or guarding, no organomegaly   EXTREMITIES: Positive pedal edema  SKIN: No lesions or rashes on exposed skin  NEURO:  CN II-XII intact, no focal deficits    "

## 2024-12-12 NOTE — PROGRESS NOTES
[facility]  [address], [address], [place] [address]  [phone]    NAME: Barb Palomo  AGE: 54 y.o. SEX: female    Progress Note    Location: [facility]  POS: 31 ([facility])    Assessment/Plan:    Type 2 diabetes mellitus with other specified complication (HCC)  HbA1C: 16.1 (H) 08/16/2024  Goal: < 5.7%  FBG range (11/19-25/2024) = 177 to 314  ** 3  and higher on this period  Pre-lunch BG range (11/19-25/2024) = 78 to 256  ** 1 pre-lunch BG less than 100 on this period  Pre-dinner BG range (11/19-25/2024) = 88 to 237  ** 1 pre-dinner BG less than 100 on this period  Patient documented to eat % of meals in SNF  Continue the following meds:  - Insulin Glargine 40 units daily at HS  - Humalog 10 units TID with meals - HOLD for BG  < 120  Encouraged maintaining a consistent meal intake  Continue Carbohydrate controlled diet  HbA1C on 12/10/2024     Bipolar II Disorder (HCC)  Stable. No behavioral disturbance in SNF  Continues with intermittent period of emotional lability  Continue the following meds:  - Cymbalta 60mg BID  - Risperidone 0.5mg BID  - Mirtazapine 7.5mg daily at HS  Continue Behavioral Health CRNP consult PRN  Followed by out-patient behavioral health provider     Chronic pain disorder  Patient report chronic pain and persistent pain to Right ankle  Continue the following meds:  - Tramadol ER 200mg daily PRN  - Tylenol 650mg Q6 hours PRN  - Baclofen 5mg BID  - Gabapentin 800mg TID  Nursing to continue to assess for pain Q shift while awake   Followed and managed by Physiatry CRNP in SNF     Pancreatic insufficiency  Stable Patient denies any GI related concerns on this visit  Continue the following meds:  - Juan J HINKLE  [id number] UNIT: 1 tablet TID  - Cholestyramine 4G BID (loose stools)  - Dicyclomine 10mg QID PRN  - Simethicone 125mg tablet TID PRN (bloating/ flatus)  Followed by Endoc Office as out-patient    Swelling of ankle joint, Right  Patient continues to report of  pain on ambulation  No swelling or erythema seen in this visit  Continue lidocaine patch to affected site daily  Continue Ace wrap daily  Seen by [facility] Ortho office on 11/18/2024  Seen by Rheumatoid office on 11/19/2024  Followed by Pain management     Sinus tachycardia  Per patient chronic  HR variable: 63 to 107/min (Nov/2024). HR today: 99/min  BP variable: 95/59 to 146/96  BP today: 131/88 (Nov/2024).   On review increasing frequency of HR over 80/min   Patient report intermittent dizziness but not more than baseline not disruptive of ADL's  - patient takes medication that likely contribute to dizziness  Work-up done while in hospital - no recommendation at discharge  - EKG converted to NSR on discharge to SNF  Will continue to monitor for now  Will consider Cardiology consult and labs in future      Chief complaint / Reason for visit:  Follow- visit    History of Present Illness:  This is a 54-year-old female patient currently admitted at [facility] (9/16/2024 to present) for skilled nursing and rehabilitation services. Patient is seen and examined today to follow-up for any acute and chronic medical conditions. Patient is still in bed for this visit -alert, cooperative, calm, very pleasant and not in distress. Patient is verbally engaged with clear coherent speech -oriented x 4. Patient acknowledged feeling well on this visit - denies any acute medical concerns during ROS assessment. Patient continues to report of pain to Right ankle with walking. Nursing has no acute medical concerns with this visit.    Nursing and prior provider notes reviewed on this visit. Discussed visit with PCP and nursing staff/ supervisor.    Review of Systems:  Per history of present illness, all other systems reviewed and negative.    HISTORY:  Medical Hx: Reviewed, unchanged  Family Hx: Reviewed, unchanged  Soc Hx: Reviewed,  unchanged    ALLERGY: Reviewed, unchanged.   Allergies   Allergen Reactions    Medical Tape Rash    Lexapro  "[Escitalopram Oxalate]     Escitalopram Other (See Comments) and Palpitations    Other Hives and Rash     Adhesive Tape        PHYSICAL EXAM:  Vital Signs: T98.3F -P99 -R16 BP: 131/88 SpO2: 97% RA  Weight: 116.2 lbs (11/7/2024) <= 102.4 lbs (10/14/2024) <= 104.0 lbs (9/16/2024)    General: NAD. Well appearing. No acute distress. Frail stature versus cachexia  Head: Atraumatic. Normocephalic.  Eye Exam: anicteric sclera, no discharge, PERRLA, No injection  Oral Exam: moist mucous membranes, no buccaloropharyngeal erythema, palatine tonsils WNL.  Neck Exam: no anterior cervical lymphadenopathy noted, neck supple. Trachea midline, no carotid bruit, no masses  Cardiovascular: elevated HR, irregular rhythm, no: murmurs/ rubs/ gallops. S1 and S2 appreciated.  Pulmonary: no wheeze, no rhonchi, no rales. No chest tenderness. Normal chest wall expansion  Abdominal: soft, non-tender, nondistended, bowel sounds audible x 4 quadrants. No palpable hepatosplenomegaly, no tympany  : Non distended bladder.   Extremities and skin: no edema noted, no rashes. Intact skin. Right ankle (+) tenderness to ROM - no swelling and erythema.  Neurological: alert, cooperative and responsive, Oriented x 3. No tics, normal sensation to pressure and light touch.  moving all 4 extremities symmetrically.    Laboratory / Imaging results reviewed. Last albs done on 11/20/2024    Current Medications: All medications reviewed and updated in Nursing Home eMAR.    Please note: This note was completed in part utilizing a voice-recognition software may have been used in the preparation of this document. Grammatical errors, random word insertion, spelling mistakes, and incomplete sentences may be an occasional consequence of the system secondary to software limitations, ambient noise and hardware issues. Occasional wrong word or \"sound-alike\" substitutions may have occurred due to the inherent limitations of voice recognition software. At the time of " dictation, efforts were made to edit, clarify and/or correct errors. Interpretation should be guided by context. Please read the chart carefully and recognize, using context, where substitutions have occurred. If you have any questions or concerns about the context, text or information contained within the body of this dictation, please contact myself, the provider, for further clarification.      ELISEO Levine  12/12/2024   no

## 2024-12-14 NOTE — TELEPHONE ENCOUNTER
One week follow up call for New Patient appointment with   ELISEO Hodges   on 10/15/2024 was made on 10/02/2024. Writer informed patient of New Patient paperwork needing to be completed 5 days prior to the appointment. Writer confirmed paperwork has been sent via [employer].    Appointment was made on: 09/25/2024      
RIGHT LOWER LOBE/LEFT LOWER LOBE

## 2025-06-19 NOTE — ASSESSMENT & PLAN NOTE
Improving, still below goal. Additional repletion today.  Recheck magnesium in a.m. and continue replacement as needed   No assistance needed